# Patient Record
Sex: FEMALE | Race: WHITE | NOT HISPANIC OR LATINO | Employment: UNEMPLOYED | URBAN - NONMETROPOLITAN AREA
[De-identification: names, ages, dates, MRNs, and addresses within clinical notes are randomized per-mention and may not be internally consistent; named-entity substitution may affect disease eponyms.]

---

## 2017-01-24 ENCOUNTER — OFFICE VISIT (OUTPATIENT)
Dept: CARDIOLOGY | Facility: CLINIC | Age: 62
End: 2017-01-24

## 2017-01-24 VITALS
HEIGHT: 60 IN | SYSTOLIC BLOOD PRESSURE: 138 MMHG | DIASTOLIC BLOOD PRESSURE: 82 MMHG | OXYGEN SATURATION: 96 % | BODY MASS INDEX: 41.46 KG/M2 | HEART RATE: 79 BPM | WEIGHT: 211.2 LBS

## 2017-01-24 DIAGNOSIS — I10 ESSENTIAL HYPERTENSION: ICD-10-CM

## 2017-01-24 DIAGNOSIS — I25.10 CORONARY ARTERY DISEASE INVOLVING NATIVE CORONARY ARTERY OF NATIVE HEART WITHOUT ANGINA PECTORIS: ICD-10-CM

## 2017-01-24 DIAGNOSIS — E78.2 MIXED HYPERLIPIDEMIA: Primary | ICD-10-CM

## 2017-01-24 PROCEDURE — 99214 OFFICE O/P EST MOD 30 MIN: CPT | Performed by: INTERNAL MEDICINE

## 2017-01-24 RX ORDER — ATORVASTATIN CALCIUM 40 MG/1
40 TABLET, FILM COATED ORAL DAILY
Qty: 90 TABLET | Refills: 3 | Status: SHIPPED | OUTPATIENT
Start: 2017-01-24 | End: 2018-02-27 | Stop reason: DRUGHIGH

## 2017-01-24 NOTE — PROGRESS NOTES
subjective     Chief Complaint   Patient presents with   • Hypertension   • Hyperlipidemia   • Coronary Artery Disease     History of Present Illness    HYPERTENSION  Lesley Michel has long-standing history of essential hypertension.  she is taking medications regularly.  There are no medication side effects.  Blood pressure is very well controlled.  There has been no headache, nausea or chest pain.  There has been no syncopal or presyncopal episode.  she denies episodes of hypo-tension or accelerated hypertension.  Patient is taking Zestoretic, Norvasc and Coreg.    Coronary artery disease  Patient has known coronary artery disease requiring drug-eluting stent to to LAD in 2011.  She has chronically occluded collateralized dominant RCA with high-grade stenosis of the ostium of small D1 and 60% stenosis of mid  posterolateral branch of RCA.  Patient has been doing very well she denies any chest pain palpitations or shortness of breath.    Hyperlipidemia  Patient has severe hyperlipidemia with LDL over 160.  Patient has been taking Lipitor 20 mg daily.  It'll be increased to 40 mg daily.  After next lab work she may have to go to 80 mg.  Aggressive risk factor modification was stressed.      Hypothyroidism  Lesley Michel has long-standing history of hypothyroidism.she is taking Synthroid.  Doing very well.  No drug side effects.  No change in Weight.  No cold intolerance. denies any constipation.  No dry skin and no hair loss.  .    Patient Active Problem List   Diagnosis   • Hypertension   • Hyperlipidemia   • Migraine without aura   • Coronary artery disease status post drug-eluting stent to LAD 2011.  Chronically occluded collateralized dominant RCA high-grade stenosis of the ostium of small D1 and 60% mid RCA posterolateral wall    • Chronic renal failure, stage 2 (mild)       Social History   Substance Use Topics   • Smoking status: Current Every Day Smoker     Types: Electronic Cigarette   • Smokeless  "tobacco: Never Used   • Alcohol use No       No Known Allergies      Current Outpatient Prescriptions:   •  amLODIPine (NORVASC) 10 MG tablet, Take 10 mg by mouth daily., Disp: , Rfl:   •  aspirin 325 MG tablet, Take 325 mg by mouth as needed for mild pain (1-3)., Disp: , Rfl:   •  busPIRone (BUSPAR) 5 MG tablet, Take 5 mg by mouth daily., Disp: , Rfl:   •  carvedilol (COREG) 12.5 MG tablet, Take 1 tablet by mouth 2 (two) times a day., Disp: 60 tablet, Rfl: 11  •  folic acid (FOLVITE) 1 MG tablet, Take 1 mg by mouth daily., Disp: , Rfl:   •  gabapentin (NEURONTIN) 800 MG tablet, Take 800 mg by mouth 3 (three) times a day., Disp: , Rfl:   •  levothyroxine (SYNTHROID, LEVOTHROID) 25 MCG tablet, Take 25 mcg by mouth daily., Disp: , Rfl:   •  lisinopril-hydrochlorothiazide (PRINZIDE,ZESTORETIC) 20-25 MG per tablet, Take 1 tablet by mouth daily., Disp: , Rfl:   •  loratadine (CLARITIN) 10 MG tablet, Take 10 mg by mouth daily., Disp: , Rfl:   •  montelukast (SINGULAIR) 10 MG tablet, Take 10 mg by mouth every night., Disp: , Rfl:   •  pantoprazole (PROTONIX) 40 MG EC tablet, Take 40 mg by mouth daily., Disp: , Rfl:   •  vitamin B-12 (CYANOCOBALAMIN) 1000 MCG tablet, Take 1,000 mcg by mouth daily., Disp: , Rfl:   •  atorvastatin (LIPITOR) 40 MG tablet, Take 1 tablet by mouth Daily., Disp: 90 tablet, Rfl: 3      The following portions of the patient's history were reviewed and updated as appropriate: allergies, current medications, past family history, past medical history, past social history, past surgical history and problem list.    Review of Systems   Constitution: Negative.   HENT: Negative.    Eyes: Negative.    Cardiovascular: Negative.    Respiratory: Negative.    Hematologic/Lymphatic: Negative.    Musculoskeletal: Negative.    Gastrointestinal: Negative.    Neurological: Negative.           Objective:     Visit Vitals   • /82 (BP Location: Left arm, Patient Position: Sitting)   • Pulse 79   • Ht 60\" (152.4 " cm)   • Wt 211 lb 3.2 oz (95.8 kg)   • SpO2 96%   • BMI 41.25 kg/m2     Physical Exam   Constitutional: She appears well-developed and well-nourished.   HENT:   Head: Normocephalic and atraumatic.   Mouth/Throat: Oropharynx is clear and moist.   Eyes: Conjunctivae and EOM are normal. Pupils are equal, round, and reactive to light. No scleral icterus.   Neck: Normal range of motion. Neck supple. No JVD present. No tracheal deviation present. No thyromegaly present.   Cardiovascular: Normal rate, regular rhythm, normal heart sounds and intact distal pulses.  Exam reveals no friction rub.    No murmur heard.  Pulmonary/Chest: Effort normal and breath sounds normal. No respiratory distress. She has no wheezes. She has no rales. She exhibits no tenderness.   Abdominal: Soft. Bowel sounds are normal. She exhibits no distension and no mass. There is no tenderness. There is no rebound and no guarding.   Musculoskeletal: Normal range of motion. She exhibits no edema, tenderness or deformity.   Lymphadenopathy:     She has no cervical adenopathy.   Neurological: She is alert. She has normal reflexes. No cranial nerve deficit. She exhibits normal muscle tone. Coordination normal.   Skin: Skin is warm and dry.   Psychiatric: She has a normal mood and affect. Her behavior is normal. Judgment and thought content normal.         Lab Review  Cholesterol 230 triglyceride 114  with HDL of 46.  Liver functions are normal    Procedures     I personally viewed and interpreted the patient's LAB data         Assessment:     1. Mixed hyperlipidemia    2. Essential hypertension    3. Coronary artery disease involving native coronary artery of native heart without angina pectoris          Plan:      Patient has severe hyperlipidemia with LDL of 161.  She is taking Lipitor 20 mg daily.  Patient was advised to increase it to 40 mg daily.  Strict dietary control and healthy lifestyle exercise program and risk factor modification was  stressed to the patient.    Coronary artery disease  Patient is stable tolerating medications very well no change in therapy is needed at this time.  Blood pressure is also very well controlled.    After next lab work she may need to go to Lipitor 80 mg daily.  Her liver functions are normal.    Creatinine is mildly elevated at 1.16 with normal BUN of 13.  It was discussed with the patient that she is to drink more fluids and may have to switch his lisinopril HCT to lisinopril.  At this time no change was made.    Return in about 3 months (around 4/24/2017).

## 2017-01-24 NOTE — LETTER
January 29, 2017     Woodrow Raymond, DEEPA  [address] [facility] [address]    Patient: Lesley Michel   YOB: 1955   Date of Visit: 1/24/2017       Dear DEEPA Key:    Thank you for referring Lesley Michel to me for evaluation. Below are the relevant portions of my assessment and plan of care.    If you have questions, please do not hesitate to call me. I look forward to following Lesley along with you.         Sincerely,        Rebecca Echeverria MD        CC: No Recipients  Rebecca Echeverria MD  1/29/2017 11:51 AM  Signed  subjective     Chief Complaint   Patient presents with   • Hypertension   • Hyperlipidemia   • Coronary Artery Disease     History of Present Illness    HYPERTENSION  Lesley Michel has long-standing history of essential hypertension.  she is taking medications regularly.  There are no medication side effects.  Blood pressure is very well controlled.  There has been no headache, nausea or chest pain.  There has been no syncopal or presyncopal episode.  she denies episodes of hypo-tension or accelerated hypertension.  Patient is taking Zestoretic, Norvasc and Coreg.    Coronary artery disease  Patient has known coronary artery disease requiring drug-eluting stent to to LAD in 2011.  She has chronically occluded collateralized dominant RCA with high-grade stenosis of the ostium of small D1 and 60% stenosis of mid  posterolateral branch of RCA.  Patient has been doing very well she denies any chest pain palpitations or shortness of breath.    Hyperlipidemia  Patient has severe hyperlipidemia with LDL over 160.  Patient has been taking Lipitor 20 mg daily.  It'll be increased to 40 mg daily.  After next lab work she may have to go to 80 mg.  Aggressive risk factor modification was stressed.      Hypothyroidism  Lesley Michel has long-standing history of hypothyroidism.she is taking Synthroid.  Doing very well.  No drug side effects.  No change in Weight.  No  cold intolerance. denies any constipation.  No dry skin and no hair loss.  .    Patient Active Problem List   Diagnosis   • Hypertension   • Hyperlipidemia   • Migraine without aura   • Coronary artery disease status post drug-eluting stent to LAD 2011.  Chronically occluded collateralized dominant RCA high-grade stenosis of the ostium of small D1 and 60% mid RCA posterolateral wall    • Chronic renal failure, stage 2 (mild)       Social History   Substance Use Topics   • Smoking status: Current Every Day Smoker     Types: Electronic Cigarette   • Smokeless tobacco: Never Used   • Alcohol use No       No Known Allergies      Current Outpatient Prescriptions:   •  amLODIPine (NORVASC) 10 MG tablet, Take 10 mg by mouth daily., Disp: , Rfl:   •  aspirin 325 MG tablet, Take 325 mg by mouth as needed for mild pain (1-3)., Disp: , Rfl:   •  busPIRone (BUSPAR) 5 MG tablet, Take 5 mg by mouth daily., Disp: , Rfl:   •  carvedilol (COREG) 12.5 MG tablet, Take 1 tablet by mouth 2 (two) times a day., Disp: 60 tablet, Rfl: 11  •  folic acid (FOLVITE) 1 MG tablet, Take 1 mg by mouth daily., Disp: , Rfl:   •  gabapentin (NEURONTIN) 800 MG tablet, Take 800 mg by mouth 3 (three) times a day., Disp: , Rfl:   •  levothyroxine (SYNTHROID, LEVOTHROID) 25 MCG tablet, Take 25 mcg by mouth daily., Disp: , Rfl:   •  lisinopril-hydrochlorothiazide (PRINZIDE,ZESTORETIC) 20-25 MG per tablet, Take 1 tablet by mouth daily., Disp: , Rfl:   •  loratadine (CLARITIN) 10 MG tablet, Take 10 mg by mouth daily., Disp: , Rfl:   •  montelukast (SINGULAIR) 10 MG tablet, Take 10 mg by mouth every night., Disp: , Rfl:   •  pantoprazole (PROTONIX) 40 MG EC tablet, Take 40 mg by mouth daily., Disp: , Rfl:   •  vitamin B-12 (CYANOCOBALAMIN) 1000 MCG tablet, Take 1,000 mcg by mouth daily., Disp: , Rfl:   •  atorvastatin (LIPITOR) 40 MG tablet, Take 1 tablet by mouth Daily., Disp: 90 tablet, Rfl: 3      The following portions of the patient's history were  "reviewed and updated as appropriate: allergies, current medications, past family history, past medical history, past social history, past surgical history and problem list.    Review of Systems   Constitution: Negative.   HENT: Negative.    Eyes: Negative.    Cardiovascular: Negative.    Respiratory: Negative.    Hematologic/Lymphatic: Negative.    Musculoskeletal: Negative.    Gastrointestinal: Negative.    Neurological: Negative.           Objective:     Visit Vitals   • /82 (BP Location: Left arm, Patient Position: Sitting)   • Pulse 79   • Ht 60\" (152.4 cm)   • Wt 211 lb 3.2 oz (95.8 kg)   • SpO2 96%   • BMI 41.25 kg/m2     Physical Exam   Constitutional: She appears well-developed and well-nourished.   HENT:   Head: Normocephalic and atraumatic.   Mouth/Throat: Oropharynx is clear and moist.   Eyes: Conjunctivae and EOM are normal. Pupils are equal, round, and reactive to light. No scleral icterus.   Neck: Normal range of motion. Neck supple. No JVD present. No tracheal deviation present. No thyromegaly present.   Cardiovascular: Normal rate, regular rhythm, normal heart sounds and intact distal pulses.  Exam reveals no friction rub.    No murmur heard.  Pulmonary/Chest: Effort normal and breath sounds normal. No respiratory distress. She has no wheezes. She has no rales. She exhibits no tenderness.   Abdominal: Soft. Bowel sounds are normal. She exhibits no distension and no mass. There is no tenderness. There is no rebound and no guarding.   Musculoskeletal: Normal range of motion. She exhibits no edema, tenderness or deformity.   Lymphadenopathy:     She has no cervical adenopathy.   Neurological: She is alert. She has normal reflexes. No cranial nerve deficit. She exhibits normal muscle tone. Coordination normal.   Skin: Skin is warm and dry.   Psychiatric: She has a normal mood and affect. Her behavior is normal. Judgment and thought content normal.         Lab Review  Cholesterol 230 triglyceride " 114  with HDL of 46.  Liver functions are normal    Procedures     I personally viewed and interpreted the patient's LAB data         Assessment:     1. Mixed hyperlipidemia    2. Essential hypertension    3. Coronary artery disease involving native coronary artery of native heart without angina pectoris          Plan:      Patient has severe hyperlipidemia with LDL of 161.  She is taking Lipitor 20 mg daily.  Patient was advised to increase it to 40 mg daily.  Strict dietary control and healthy lifestyle exercise program and risk factor modification was stressed to the patient.    Coronary artery disease  Patient is stable tolerating medications very well no change in therapy is needed at this time.  Blood pressure is also very well controlled.    After next lab work she may need to go to Lipitor 80 mg daily.  Her liver functions are normal.    Creatinine is mildly elevated at 1.16 with normal BUN of 13.  It was discussed with the patient that she is to drink more fluids and may have to switch his lisinopril HCT to lisinopril.  At this time no change was made.    Return in about 3 months (around 4/24/2017).

## 2017-01-24 NOTE — MR AVS SNAPSHOT
Lesley Michel   1/24/2017 11:00 AM   Office Visit    Dept Phone:  [phone]   Encounter #:  [id number]    Provider:  Rebecca Echeverria MD   Department:  [facility] CARDIOLOGY                Your Full Care Plan              Today's Medication Changes          These changes are accurate as of: 1/24/17 12:07 PM.  If you have any questions, ask your nurse or doctor.               Medication(s)that have changed:     atorvastatin 40 MG tablet   Commonly known as:  LIPITOR   Take 1 tablet by mouth Daily.   What changed:    - medication strength  - how much to take   Changed by:  Rebecca Echeverria MD            Where to Get Your Medications      These medications were sent to [place], [place] - [address] [facility] [address] - [phone] [facility] [phone]   [address] [facility] [facility] [address] [facility] [address]     Phone:  [phone]     atorvastatin 40 MG tablet                  Your Updated Medication List          This list is accurate as of: 1/24/17 12:07 PM.  Always use your most recent med list.                amLODIPine 10 MG tablet   Commonly known as:  NORVASC       aspirin 325 MG tablet       atorvastatin 40 MG tablet   Commonly known as:  LIPITOR   Take 1 tablet by mouth Daily.       busPIRone 5 MG tablet   Commonly known as:  BUSPAR       carvedilol 12.5 MG tablet   Commonly known as:  COREG   Take 1 tablet by mouth 2 (two) times a day.       folic acid 1 MG tablet   Commonly known as:  FOLVITE       gabapentin 800 MG tablet   Commonly known as:  NEURONTIN       levothyroxine 25 MCG tablet   Commonly known as:  SYNTHROID, LEVOTHROID       lisinopril-hydrochlorothiazide 20-25 MG per tablet   Commonly known as:  PRINZIDE,ZESTORETIC       loratadine 10 MG tablet   Commonly known as:  CLARITIN       montelukast 10 MG tablet   Commonly known as:  SINGULAIR       pantoprazole 40 MG EC tablet   Commonly known as:  PROTONIX       vitamin B-12 1000 MCG tablet    "  Commonly known as:  CYANOCOBALAMIN               You Were Diagnosed With        Codes Comments    Essential hypertension    -  Primary ICD-10-CM: I10  ICD-9-CM: 401.9     Mixed hyperlipidemia     ICD-10-CM: E78.2  ICD-9-CM: 272.2     Coronary artery disease involving native coronary artery of native heart without angina pectoris     ICD-10-CM: I25.10  ICD-9-CM: 414.01       Instructions     None    Patient Instructions History      Upcoming Appointments     Visit Type Date Time Department    FOLLOW UP 1/24/2017 11:00 AM [facility] [place]    FOLLOW UP 4/13/2017 10:00 AM [facility] [place] [place]      MyChart Signup     Our records indicate that you have declined [facility] signup. If you would like to sign up for [employer] [employer], please email [email or web address] or call [phone] to obtain an activation code.             Other Info from Your Visit           Your Appointments     Apr 13, 2017 10:00 AM EDT   Follow Up with Rebecca Echeverria MD   [facility] (--)    [address] [place] [address] [phone]           Arrive 15 minutes prior to appointment.              Allergies     No Known Allergies      Reason for Visit     Hypertension     Hyperlipidemia     Coronary Artery Disease           Vital Signs     Blood Pressure Pulse Height Weight Oxygen Saturation Body Mass Index    138/82 (BP Location: Left arm, Patient Position: Sitting) 79 60\" (152.4 cm) 211 lb 3.2 oz (95.8 kg) 96% 41.25 kg/m2    Smoking Status                   Current Every Day Smoker           Problems and Diagnoses Noted     Coronary heart disease    High cholesterol or triglycerides    High blood pressure        "

## 2018-02-27 ENCOUNTER — OFFICE VISIT (OUTPATIENT)
Dept: CARDIOLOGY | Facility: CLINIC | Age: 63
End: 2018-02-27

## 2018-02-27 VITALS
BODY MASS INDEX: 41.19 KG/M2 | WEIGHT: 209.8 LBS | HEIGHT: 60 IN | SYSTOLIC BLOOD PRESSURE: 158 MMHG | HEART RATE: 85 BPM | DIASTOLIC BLOOD PRESSURE: 96 MMHG | OXYGEN SATURATION: 95 %

## 2018-02-27 DIAGNOSIS — N18.2 CHRONIC RENAL FAILURE, STAGE 2 (MILD): ICD-10-CM

## 2018-02-27 DIAGNOSIS — Z72.0 TOBACCO USE: ICD-10-CM

## 2018-02-27 DIAGNOSIS — I25.10 CORONARY ARTERY DISEASE INVOLVING NATIVE CORONARY ARTERY OF NATIVE HEART WITHOUT ANGINA PECTORIS: Primary | ICD-10-CM

## 2018-02-27 DIAGNOSIS — E78.2 MIXED HYPERLIPIDEMIA: ICD-10-CM

## 2018-02-27 DIAGNOSIS — I10 ESSENTIAL HYPERTENSION: ICD-10-CM

## 2018-02-27 PROCEDURE — 99213 OFFICE O/P EST LOW 20 MIN: CPT | Performed by: INTERNAL MEDICINE

## 2018-02-27 RX ORDER — LISINOPRIL AND HYDROCHLOROTHIAZIDE 20; 12.5 MG/1; MG/1
TABLET ORAL
COMMUNITY
Start: 2018-02-02 | End: 2018-02-27 | Stop reason: SDUPTHER

## 2018-02-27 RX ORDER — ATORVASTATIN CALCIUM 80 MG/1
40 TABLET, FILM COATED ORAL NIGHTLY
COMMUNITY
End: 2022-02-10 | Stop reason: ALTCHOICE

## 2018-02-27 RX ORDER — NITROGLYCERIN 0.4 MG/1
0.4 TABLET SUBLINGUAL
COMMUNITY
Start: 2018-02-02 | End: 2023-03-02 | Stop reason: SDUPTHER

## 2018-02-27 RX ORDER — HYDROCODONE BITARTRATE AND ACETAMINOPHEN 10; 325 MG/1; MG/1
TABLET ORAL
COMMUNITY
Start: 2018-02-02

## 2018-02-27 RX ORDER — PROMETHAZINE HYDROCHLORIDE 25 MG/1
TABLET ORAL
COMMUNITY
Start: 2018-02-08 | End: 2018-02-27

## 2018-02-27 RX ORDER — ASPIRIN 325 MG
TABLET, DELAYED RELEASE (ENTERIC COATED) ORAL
COMMUNITY
Start: 2018-02-02 | End: 2018-02-27 | Stop reason: SDUPTHER

## 2018-02-27 RX ORDER — ERGOCALCIFEROL 1.25 MG/1
CAPSULE ORAL
COMMUNITY
Start: 2018-01-04

## 2018-02-27 RX ORDER — ATORVASTATIN CALCIUM 80 MG/1
TABLET, FILM COATED ORAL
COMMUNITY
Start: 2018-02-02 | End: 2018-02-27 | Stop reason: SDUPTHER

## 2018-02-27 NOTE — PROGRESS NOTES
subjective     Chief Complaint   Patient presents with   • Follow-up   • Hypertension   • Hyperlipidemia   • Corony artery disease     History of Present Illness    Ration is here for follow-up.  She has a history of hypertension, hyperlipidemia and known coronary artery disease.  She had the drug-eluting stent to LAD in 2011.  She has chronically occluded and well collateralized RCA.  She also has high-grade stenosis of the small D1 and 60% mid RCA posterolateral branch.    She is quite active denies any chest pain palpitations or shortness of breath.  She is taking Lipitor for hyperlipidemia.  Apparently it was increased to 80 mg daily.  Because of high cholesterol levels.  She is tolerating it very well.  Patient has not lost much weight but she is trying and has lost 2 pounds since last visit    She also has ongoing tobacco use but she is again trying with left chronic cigarettes    Blood pressure at home is normal.  However today blood pressure is quite high.  She states that that she has not taken any medication today    Past Surgical History:   Procedure Laterality Date   • ANKLE SURGERY      RIGHT   • APPENDECTOMY     • CARDIAC CATHETERIZATION      LEFT   • CORONARY STENT PLACEMENT     • HYSTERECTOMY       Family History   Problem Relation Age of Onset   • Coronary artery disease Other    • Heart disease Mother    • Heart attack Mother 73   • Fibromyalgia Mother    • Heart attack Father 72   • Ovarian cancer Sister      Past Medical History:   Diagnosis Date   • Anxiety    • Arthritis    • Coronary artery disease    • H/O heart artery stent    • Hyperlipidemia    • Hypertension    • Obesity      Patient Active Problem List   Diagnosis   • Hypertension   • Hyperlipidemia   • Migraine without aura   • Coronary artery disease status post drug-eluting stent to LAD 2011.  Chronically occluded collateralized dominant RCA high-grade stenosis of the ostium of small D1 and 60% mid RCA posterolateral wall    • Chronic  renal failure, stage 2 (mild)       Social History   Substance Use Topics   • Smoking status: Current Every Day Smoker     Types: Electronic Cigarette   • Smokeless tobacco: Never Used   • Alcohol use No       No Known Allergies    Current Outpatient Prescriptions on File Prior to Visit   Medication Sig   • amLODIPine (NORVASC) 10 MG tablet Take 10 mg by mouth daily.   • aspirin 325 MG tablet Take 325 mg by mouth as needed for mild pain (1-3).   • atorvastatin (LIPITOR) 40 MG tablet Take 1 tablet by mouth Daily.   • busPIRone (BUSPAR) 5 MG tablet Take 5 mg by mouth daily.   • carvedilol (COREG) 12.5 MG tablet Take 1 tablet by mouth 2 (two) times a day.   • folic acid (FOLVITE) 1 MG tablet Take 1 mg by mouth daily.   • gabapentin (NEURONTIN) 800 MG tablet Take 800 mg by mouth 3 (three) times a day.   • levothyroxine (SYNTHROID, LEVOTHROID) 25 MCG tablet Take 25 mcg by mouth daily.   • lisinopril-hydrochlorothiazide (PRINZIDE,ZESTORETIC) 20-25 MG per tablet Take 1 tablet by mouth daily.   • loratadine (CLARITIN) 10 MG tablet Take 10 mg by mouth daily.   • montelukast (SINGULAIR) 10 MG tablet Take 10 mg by mouth every night.   • pantoprazole (PROTONIX) 40 MG EC tablet Take 40 mg by mouth daily.   • vitamin B-12 (CYANOCOBALAMIN) 1000 MCG tablet Take 1,000 mcg by mouth daily.     No current facility-administered medications on file prior to visit.          The following portions of the patient's history were reviewed and updated as appropriate: allergies, current medications, past family history, past medical history, past social history, past surgical history and problem list.    Review of Systems   Constitution: Negative.   HENT: Negative.  Negative for congestion.    Eyes: Negative.    Cardiovascular: Negative.  Negative for chest pain, cyanosis, dyspnea on exertion, irregular heartbeat, leg swelling, near-syncope, orthopnea, palpitations, paroxysmal nocturnal dyspnea and syncope.   Respiratory: Negative.  Negative  "for shortness of breath.    Hematologic/Lymphatic: Negative.    Musculoskeletal: Negative.    Gastrointestinal: Negative.    Neurological: Negative.  Negative for headaches.          Objective:     /96 (BP Location: Left arm, Patient Position: Sitting, Cuff Size: Adult)  Pulse 85  Ht 152.4 cm (60\")  Wt 95.2 kg (209 lb 12.8 oz)  SpO2 95%  BMI 40.97 kg/m2  Physical Exam   Constitutional: She appears well-developed and well-nourished. No distress.   HENT:   Head: Normocephalic and atraumatic.   Mouth/Throat: Oropharynx is clear and moist. No oropharyngeal exudate.   Eyes: Conjunctivae and EOM are normal. Pupils are equal, round, and reactive to light. No scleral icterus.   Neck: Normal range of motion. Neck supple. No JVD present. No tracheal deviation present. No thyromegaly present.   Cardiovascular: Normal rate, regular rhythm, normal heart sounds and intact distal pulses.  PMI is not displaced.  Exam reveals no gallop, no friction rub and no decreased pulses.    No murmur heard.  Pulses:       Carotid pulses are 3+ on the right side, and 3+ on the left side.       Radial pulses are 3+ on the right side, and 3+ on the left side.   Pulmonary/Chest: Effort normal and breath sounds normal. No respiratory distress. She has no wheezes. She has no rales. She exhibits no tenderness.   Abdominal: Soft. Bowel sounds are normal. She exhibits no distension, no abdominal bruit and no mass. There is no splenomegaly or hepatomegaly. There is no tenderness. There is no rebound and no guarding.   Musculoskeletal: Normal range of motion. She exhibits no edema, tenderness or deformity.   Lymphadenopathy:     She has no cervical adenopathy.   Neurological: She is alert. She has normal reflexes. No cranial nerve deficit. She exhibits normal muscle tone. Coordination normal.   Skin: Skin is warm and dry. No rash noted. She is not diaphoretic. No erythema.   Psychiatric: She has a normal mood and affect. Her behavior is " normal. Judgment and thought content normal.         Lab Review  No labs available  Procedures               Assessment:     1. Coronary artery disease involving native coronary artery of native heart without angina pectoris    2. Mixed hyperlipidemia    3. Essential hypertension    4. Chronic renal failure, stage 2 (mild)          Plan:      Blood pressure is significantly elevated today.  Patient was advised to take medications regularly and check blood pressure.  She will let me know about blood pressure readings.  She is taking lisinopril HCT, Coreg and amlodipine.  Patient insists her blood pressure has been very good at home.    Will also get the lab report from PCP.  She is not taking Lipitor 80 mg daily.    She is stable from cardiac standpoint without any chest pain palpitations or shortness of breath.  Aggressive risk factor modification including weight loss and cessation of tobacco with better blood pressure control and lipid control was emphasized.  Follow-up scheduled          No Follow-up on file.

## 2018-05-15 ENCOUNTER — OFFICE VISIT (OUTPATIENT)
Dept: CARDIOLOGY | Facility: CLINIC | Age: 63
End: 2018-05-15

## 2018-05-15 VITALS
HEART RATE: 87 BPM | BODY MASS INDEX: 42.21 KG/M2 | HEIGHT: 60 IN | DIASTOLIC BLOOD PRESSURE: 84 MMHG | SYSTOLIC BLOOD PRESSURE: 144 MMHG | OXYGEN SATURATION: 95 % | WEIGHT: 215 LBS

## 2018-05-15 DIAGNOSIS — I10 ESSENTIAL HYPERTENSION: ICD-10-CM

## 2018-05-15 DIAGNOSIS — Z72.0 TOBACCO USE: ICD-10-CM

## 2018-05-15 DIAGNOSIS — E78.2 MIXED HYPERLIPIDEMIA: ICD-10-CM

## 2018-05-15 DIAGNOSIS — R60.0 BILATERAL LOWER EXTREMITY EDEMA: ICD-10-CM

## 2018-05-15 DIAGNOSIS — N18.2 CHRONIC RENAL FAILURE, STAGE 2 (MILD): ICD-10-CM

## 2018-05-15 DIAGNOSIS — I25.10 CORONARY ARTERY DISEASE INVOLVING NATIVE CORONARY ARTERY OF NATIVE HEART WITHOUT ANGINA PECTORIS: Primary | ICD-10-CM

## 2018-05-15 PROCEDURE — 99214 OFFICE O/P EST MOD 30 MIN: CPT | Performed by: INTERNAL MEDICINE

## 2018-05-15 PROCEDURE — 93000 ELECTROCARDIOGRAM COMPLETE: CPT | Performed by: INTERNAL MEDICINE

## 2018-05-15 RX ORDER — LISINOPRIL 20 MG/1
20 TABLET ORAL 2 TIMES DAILY
Qty: 180 TABLET | Refills: 0 | Status: SHIPPED | OUTPATIENT
Start: 2018-05-15 | End: 2019-03-13 | Stop reason: SDUPTHER

## 2018-05-15 RX ORDER — FUROSEMIDE 20 MG/1
20 TABLET ORAL DAILY
Qty: 90 TABLET | Refills: 0 | Status: SHIPPED | OUTPATIENT
Start: 2018-05-15 | End: 2018-05-29

## 2018-05-15 NOTE — PROGRESS NOTES
subjective     Chief Complaint   Patient presents with   • Leg Swelling   • Edema   • Follow-up   • Results     LABS OUTSIDE      History of Present Illness  Patient is 62 years old white female who was sent here for evaluation of marked bilateral lower extremity edema.  Patient states that she has been gradually swelling and both legs are huge.  There is no pain in the legs no blisters.  No calf tenderness no history of DVT.  Patient has history of hypertension and she is taking Norvasc.  She also has known coronary artery disease with prior drug-eluting stent.  She denies any chest pain or palpitation but she gets short of breath easily.  Other problems consist of mild renal failure, hypertension and hyperlipidemia.  Patient also has ongoing tobacco use issue.    Past Surgical History:   Procedure Laterality Date   • ANKLE SURGERY      RIGHT   • APPENDECTOMY     • CARDIAC CATHETERIZATION      LEFT   • CORONARY STENT PLACEMENT     • HYSTERECTOMY       Family History   Problem Relation Age of Onset   • Coronary artery disease Other    • Heart disease Mother    • Heart attack Mother 73   • Fibromyalgia Mother    • Heart attack Father 72   • Ovarian cancer Sister      Past Medical History:   Diagnosis Date   • Anxiety    • Arthritis    • Coronary artery disease    • H/O heart artery stent    • Hyperlipidemia    • Hypertension    • Obesity      Patient Active Problem List   Diagnosis   • Hypertension   • Hyperlipidemia   • Migraine without aura   • Coronary artery disease status post drug-eluting stent to LAD 2011.  Chronically occluded collateralized dominant RCA high-grade stenosis of the ostium of small D1 and 60% mid RCA posterolateral wall    • Chronic renal failure, stage 2 (mild)   • Tobacco use       Social History   Substance Use Topics   • Smoking status: Current Every Day Smoker     Types: Electronic Cigarette   • Smokeless tobacco: Never Used   • Alcohol use No       No Known Allergies    Current Outpatient  Prescriptions on File Prior to Visit   Medication Sig   • amLODIPine (NORVASC) 10 MG tablet Take 10 mg by mouth daily.   • aspirin 325 MG tablet Take 325 mg by mouth as needed for mild pain (1-3).   • atorvastatin (LIPITOR) 80 MG tablet Take 80 mg by mouth Daily.   • busPIRone (BUSPAR) 5 MG tablet Take 5 mg by mouth daily.   • carvedilol (COREG) 12.5 MG tablet Take 1 tablet by mouth 2 (two) times a day.   • folic acid (FOLVITE) 1 MG tablet Take 1 mg by mouth daily.   • gabapentin (NEURONTIN) 800 MG tablet Take 800 mg by mouth 3 (three) times a day.   • HYDROcodone-acetaminophen (NORCO)  MG per tablet    • levothyroxine (SYNTHROID, LEVOTHROID) 25 MCG tablet Take 25 mcg by mouth daily.   • loratadine (CLARITIN) 10 MG tablet Take 10 mg by mouth daily.   • montelukast (SINGULAIR) 10 MG tablet Take 10 mg by mouth every night.   • nitroglycerin (NITROSTAT) 0.4 MG SL tablet    • pantoprazole (PROTONIX) 40 MG EC tablet Take 40 mg by mouth daily.   • vitamin B-12 (CYANOCOBALAMIN) 1000 MCG tablet Take 1,000 mcg by mouth daily.   • vitamin D (ERGOCALCIFEROL) 09745 units capsule capsule    • [DISCONTINUED] lisinopril-hydrochlorothiazide (PRINZIDE,ZESTORETIC) 20-25 MG per tablet Take 1 tablet by mouth daily.     No current facility-administered medications on file prior to visit.          The following portions of the patient's history were reviewed and updated as appropriate: allergies, current medications, past family history, past medical history, past social history, past surgical history and problem list.    Review of Systems   Constitution: Positive for weight gain.   HENT: Negative.  Negative for congestion.    Eyes: Negative.    Cardiovascular: Positive for dyspnea on exertion and leg swelling. Negative for chest pain, claudication, cyanosis, irregular heartbeat, near-syncope, orthopnea, palpitations, paroxysmal nocturnal dyspnea and syncope.   Respiratory: Positive for shortness of breath.    Endocrine: Negative.   "  Hematologic/Lymphatic: Negative.    Skin: Negative.    Musculoskeletal: Negative.    Gastrointestinal: Negative.    Genitourinary: Negative.    Neurological: Negative.  Negative for headaches.          Objective:     /84 (BP Location: Left arm, Patient Position: Sitting)   Pulse 87   Ht 152.4 cm (60\")   Wt 97.5 kg (215 lb)   SpO2 95%   BMI 41.99 kg/m²   Physical Exam   Constitutional: She appears well-developed and well-nourished. No distress.   HENT:   Head: Normocephalic and atraumatic.   Mouth/Throat: Oropharynx is clear and moist. No oropharyngeal exudate.   Eyes: Conjunctivae and EOM are normal. Pupils are equal, round, and reactive to light. No scleral icterus.   Neck: Normal range of motion. Neck supple. No JVD present. No tracheal deviation present. No thyromegaly present.   Cardiovascular: Normal rate, regular rhythm, normal heart sounds and intact distal pulses.  PMI is not displaced.  Exam reveals no gallop, no friction rub and no decreased pulses.    No murmur heard.  Pulses:       Carotid pulses are 3+ on the right side, and 3+ on the left side.       Radial pulses are 3+ on the right side, and 3+ on the left side.   Pulmonary/Chest: Effort normal and breath sounds normal. No respiratory distress. She has no wheezes. She has no rales. She exhibits no tenderness.   Abdominal: Soft. Bowel sounds are normal. She exhibits no distension, no abdominal bruit and no mass. There is no splenomegaly or hepatomegaly. There is no tenderness. There is no rebound and no guarding.   Musculoskeletal: Normal range of motion. She exhibits edema. She exhibits no tenderness or deformity.   Marked bilateral lower extremity edema.   Lymphadenopathy:     She has no cervical adenopathy.   Neurological: She is alert. She has normal reflexes. No cranial nerve deficit. She exhibits normal muscle tone. Coordination normal.   Skin: Skin is warm and dry. No rash noted. She is not diaphoretic. No erythema.   Psychiatric: " She has a normal mood and affect. Her behavior is normal. Judgment and thought content normal.         Lab Review      ECG 12 Lead  Date/Time: 5/15/2018 4:58 PM  Performed by: AMAYA HERNANDEZ  Authorized by: AMAYA HERNANDEZ   Comparison: compared with previous ECG from 9/23/2016  Similar to previous ECG  Rhythm: sinus rhythm  Rate: normal  BPM: 69  Conduction: conduction normal  ST Segments: ST segments normal  T Waves: T waves normal  QRS axis: normal  Other: no other findings  Clinical impression: abnormal ECG  Comments: Possible old inferior wall MI and nonspecific ST and T changes               I personally viewed and interpreted the patient's LAB data         Assessment:     1. Coronary artery disease involving native coronary artery of native heart without angina pectoris    2. Mixed hyperlipidemia    3. Essential hypertension    4. Chronic renal failure, stage 2 (mild)    5. Tobacco use    6. Bilateral lower extremity edema          Plan:      Patient has marked bilateral lower extremity edema.  Amlodipine probably is contributing to the edema.  Patient was advised to DC amlodipine  She is taking lisinopril HCT 20/25 one a day  She was advised to DC lisinopril HCT and start regular lisinopril 20 mg twice a day.  Lasix 20 mg daily was added.  BNP was ordered.  Patient will have comprehensive metabolic panel and echo next visit.  He was advised to check her blood pressure closely.        Return in about 2 weeks (around 5/29/2018).

## 2018-05-23 ENCOUNTER — HOSPITAL ENCOUNTER (OUTPATIENT)
Dept: CARDIOLOGY | Facility: HOSPITAL | Age: 63
Discharge: HOME | End: 2018-05-23
Attending: INTERNAL MEDICINE | Admitting: INTERNAL MEDICINE

## 2018-05-23 DIAGNOSIS — R60.0 BILATERAL LOWER EXTREMITY EDEMA: ICD-10-CM

## 2018-05-23 DIAGNOSIS — I25.10 CORONARY ARTERY DISEASE INVOLVING NATIVE CORONARY ARTERY OF NATIVE HEART WITHOUT ANGINA PECTORIS: ICD-10-CM

## 2018-05-23 PROCEDURE — 93306 TTE W/DOPPLER COMPLETE: CPT

## 2018-05-23 PROCEDURE — 93306 TTE W/DOPPLER COMPLETE: CPT | Performed by: INTERNAL MEDICINE

## 2018-05-24 LAB
BH CV ECHO MEAS - % IVS THICK: -8.4 %
BH CV ECHO MEAS - % LVPW THICK: 20.7 %
BH CV ECHO MEAS - ACS: 2 CM
BH CV ECHO MEAS - AO MAX PG: 13.3 MMHG
BH CV ECHO MEAS - AO MEAN PG: 7.2 MMHG
BH CV ECHO MEAS - AO ROOT AREA (BSA CORRECTED): 1.6
BH CV ECHO MEAS - AO ROOT AREA: 7.5 CM^2
BH CV ECHO MEAS - AO ROOT DIAM: 3.1 CM
BH CV ECHO MEAS - AO V2 MAX: 182.1 CM/SEC
BH CV ECHO MEAS - AO V2 MEAN: 127.2 CM/SEC
BH CV ECHO MEAS - AO V2 VTI: 39.4 CM
BH CV ECHO MEAS - BSA(HAYCOCK): 2.1 M^2
BH CV ECHO MEAS - BSA: 1.9 M^2
BH CV ECHO MEAS - BZI_BMI: 42 KILOGRAMS/M^2
BH CV ECHO MEAS - BZI_METRIC_HEIGHT: 152.4 CM
BH CV ECHO MEAS - BZI_METRIC_WEIGHT: 97.5 KG
BH CV ECHO MEAS - CONTRAST EF 4CH: 60.4 ML/M^2
BH CV ECHO MEAS - EDV(CUBED): 115.6 ML
BH CV ECHO MEAS - EDV(MOD-SP4): 106 ML
BH CV ECHO MEAS - EDV(TEICH): 111.3 ML
BH CV ECHO MEAS - EF(CUBED): 68.3 %
BH CV ECHO MEAS - EF(MOD-SP4): 60.4 %
BH CV ECHO MEAS - EF(TEICH): 59.7 %
BH CV ECHO MEAS - ESV(CUBED): 36.7 ML
BH CV ECHO MEAS - ESV(MOD-SP4): 42 ML
BH CV ECHO MEAS - ESV(TEICH): 44.9 ML
BH CV ECHO MEAS - FS: 31.8 %
BH CV ECHO MEAS - IVS/LVPW: 0.97
BH CV ECHO MEAS - IVSD: 1 CM
BH CV ECHO MEAS - IVSS: 0.96 CM
BH CV ECHO MEAS - LA DIMENSION: 3.9 CM
BH CV ECHO MEAS - LA/AO: 1.3
BH CV ECHO MEAS - LV DIASTOLIC VOL/BSA (35-75): 55.1 ML/M^2
BH CV ECHO MEAS - LV MASS(C)D: 189 GRAMS
BH CV ECHO MEAS - LV MASS(C)DI: 98.2 GRAMS/M^2
BH CV ECHO MEAS - LV MASS(C)S: 114.6 GRAMS
BH CV ECHO MEAS - LV MASS(C)SI: 59.5 GRAMS/M^2
BH CV ECHO MEAS - LV SYSTOLIC VOL/BSA (12-30): 21.8 ML/M^2
BH CV ECHO MEAS - LVIDD: 4.9 CM
BH CV ECHO MEAS - LVIDS: 3.3 CM
BH CV ECHO MEAS - LVLD AP4: 8 CM
BH CV ECHO MEAS - LVLS AP4: 6.9 CM
BH CV ECHO MEAS - LVOT AREA (M): 3.1 CM^2
BH CV ECHO MEAS - LVOT AREA: 3.2 CM^2
BH CV ECHO MEAS - LVOT DIAM: 2 CM
BH CV ECHO MEAS - LVPWD: 1.1 CM
BH CV ECHO MEAS - LVPWS: 1.3 CM
BH CV ECHO MEAS - MV A MAX VEL: 107.6 CM/SEC
BH CV ECHO MEAS - MV E MAX VEL: 106.1 CM/SEC
BH CV ECHO MEAS - MV E/A: 0.99
BH CV ECHO MEAS - PA ACC SLOPE: 1508 CM/SEC^2
BH CV ECHO MEAS - PA ACC TIME: 0.08 SEC
BH CV ECHO MEAS - PA PR(ACCEL): 42.6 MMHG
BH CV ECHO MEAS - RAP SYSTOLE: 10 MMHG
BH CV ECHO MEAS - RVDD: 1 CM
BH CV ECHO MEAS - RVSP: 43.8 MMHG
BH CV ECHO MEAS - SI(AO): 153.9 ML/M^2
BH CV ECHO MEAS - SI(CUBED): 41 ML/M^2
BH CV ECHO MEAS - SI(MOD-SP4): 33.2 ML/M^2
BH CV ECHO MEAS - SI(TEICH): 34.5 ML/M^2
BH CV ECHO MEAS - SV(AO): 296.2 ML
BH CV ECHO MEAS - SV(CUBED): 78.9 ML
BH CV ECHO MEAS - SV(MOD-SP4): 64 ML
BH CV ECHO MEAS - SV(TEICH): 66.4 ML
BH CV ECHO MEAS - TR MAX VEL: 290.7 CM/SEC
MAXIMAL PREDICTED HEART RATE: 160 BPM
STRESS TARGET HR: 136 BPM

## 2018-05-29 ENCOUNTER — OFFICE VISIT (OUTPATIENT)
Dept: CARDIOLOGY | Facility: CLINIC | Age: 63
End: 2018-05-29

## 2018-05-29 VITALS
SYSTOLIC BLOOD PRESSURE: 172 MMHG | DIASTOLIC BLOOD PRESSURE: 100 MMHG | WEIGHT: 213 LBS | BODY MASS INDEX: 41.82 KG/M2 | OXYGEN SATURATION: 93 % | HEART RATE: 83 BPM | HEIGHT: 60 IN

## 2018-05-29 DIAGNOSIS — Z72.0 TOBACCO USE: ICD-10-CM

## 2018-05-29 DIAGNOSIS — IMO0001 CLASS 3 OBESITY DUE TO EXCESS CALORIES WITHOUT SERIOUS COMORBIDITY WITH BODY MASS INDEX (BMI) OF 40.0 TO 44.9 IN ADULT: ICD-10-CM

## 2018-05-29 DIAGNOSIS — I10 ESSENTIAL HYPERTENSION: ICD-10-CM

## 2018-05-29 DIAGNOSIS — R60.0 BILATERAL LOWER EXTREMITY EDEMA: Primary | ICD-10-CM

## 2018-05-29 PROCEDURE — 99213 OFFICE O/P EST LOW 20 MIN: CPT | Performed by: INTERNAL MEDICINE

## 2018-05-29 RX ORDER — AMLODIPINE BESYLATE 10 MG/1
5 TABLET ORAL DAILY
COMMUNITY
Start: 2018-05-29 | End: 2019-03-13 | Stop reason: SDUPTHER

## 2018-05-29 RX ORDER — CARVEDILOL 12.5 MG/1
25 TABLET ORAL 2 TIMES DAILY WITH MEALS
Qty: 60 TABLET | Refills: 11 | Status: SHIPPED | OUTPATIENT
Start: 2018-05-29 | End: 2019-03-13 | Stop reason: SDUPTHER

## 2018-05-29 RX ORDER — HYDRALAZINE HYDROCHLORIDE 10 MG/1
10 TABLET, FILM COATED ORAL 2 TIMES DAILY
Qty: 60 TABLET | Refills: 2 | Status: SHIPPED | OUTPATIENT
Start: 2018-05-29 | End: 2019-03-13 | Stop reason: SDUPTHER

## 2018-05-29 RX ORDER — FUROSEMIDE 20 MG/1
20 TABLET ORAL 2 TIMES DAILY
Qty: 90 TABLET | Refills: 0 | Status: SHIPPED | OUTPATIENT
Start: 2018-05-29 | End: 2019-03-13 | Stop reason: SDUPTHER

## 2018-05-29 NOTE — PROGRESS NOTES
subjective     Chief Complaint   Patient presents with   • Coronary Artery Disease   • Hyperlipidemia   • Hypertension   • Follow-up     History of Present Illness  Patient is 62 years old white female who was seen by me a few weeks ago because of marked bilateral lower extremity edema.  She has been taking Norvasc 10 mg daily that is probably the cause of the edema.  Patient underwent an echocardiogram.  LV ejection fraction was normal.  BNP was ordered but it was not done.    Patient is feeling better now.  Ankle edema is significantly better however she still has lot of edema.  Blood pressure is running high.    Past Surgical History:   Procedure Laterality Date   • ANKLE SURGERY      RIGHT   • APPENDECTOMY     • CARDIAC CATHETERIZATION      LEFT   • CORONARY STENT PLACEMENT     • HYSTERECTOMY       Family History   Problem Relation Age of Onset   • Coronary artery disease Other    • Heart disease Mother    • Heart attack Mother 73   • Fibromyalgia Mother    • Heart attack Father 72   • Ovarian cancer Sister      Past Medical History:   Diagnosis Date   • Anxiety    • Arthritis    • Coronary artery disease    • H/O heart artery stent    • Hyperlipidemia    • Hypertension    • Obesity      Patient Active Problem List   Diagnosis   • Hypertension   • Hyperlipidemia   • Migraine without aura   • Coronary artery disease status post drug-eluting stent to LAD 2011.  Chronically occluded collateralized dominant RCA high-grade stenosis of the ostium of small D1 and 60% mid RCA posterolateral wall    • Chronic renal failure, stage 2 (mild)   • Tobacco use   • Class 3 obesity due to excess calories without serious comorbidity with body mass index (BMI) of 40.0 to 44.9 in adult   • Bilateral lower extremity edema       Social History   Substance Use Topics   • Smoking status: Current Every Day Smoker     Types: Electronic Cigarette   • Smokeless tobacco: Never Used   • Alcohol use No       No Known Allergies    Current  "Outpatient Prescriptions on File Prior to Visit   Medication Sig   • aspirin 325 MG tablet Take 325 mg by mouth as needed for mild pain (1-3).   • atorvastatin (LIPITOR) 80 MG tablet Take 80 mg by mouth Daily.   • busPIRone (BUSPAR) 5 MG tablet Take 5 mg by mouth daily.   • folic acid (FOLVITE) 1 MG tablet Take 1 mg by mouth daily.   • gabapentin (NEURONTIN) 800 MG tablet Take 800 mg by mouth 3 (three) times a day.   • HYDROcodone-acetaminophen (NORCO)  MG per tablet    • levothyroxine (SYNTHROID, LEVOTHROID) 25 MCG tablet Take 25 mcg by mouth daily.   • lisinopril (PRINIVIL,ZESTRIL) 20 MG tablet Take 1 tablet by mouth 2 (Two) Times a Day.   • loratadine (CLARITIN) 10 MG tablet Take 10 mg by mouth daily.   • montelukast (SINGULAIR) 10 MG tablet Take 10 mg by mouth every night.   • nitroglycerin (NITROSTAT) 0.4 MG SL tablet    • pantoprazole (PROTONIX) 40 MG EC tablet Take 40 mg by mouth daily.   • vitamin B-12 (CYANOCOBALAMIN) 1000 MCG tablet Take 1,000 mcg by mouth daily.   • vitamin D (ERGOCALCIFEROL) 78349 units capsule capsule    • [DISCONTINUED] amLODIPine (NORVASC) 10 MG tablet Take 10 mg by mouth daily.   • [DISCONTINUED] carvedilol (COREG) 12.5 MG tablet Take 1 tablet by mouth 2 (two) times a day.   • [DISCONTINUED] furosemide (LASIX) 20 MG tablet Take 1 tablet by mouth Daily.     No current facility-administered medications on file prior to visit.          The following portions of the patient's history were reviewed and updated as appropriate: allergies, current medications, past family history, past medical history, past social history, past surgical history and problem list.    Review of Systems   Constitution: Negative.   HENT: Negative.    Eyes: Negative.    Cardiovascular: Positive for leg swelling.        Elevated blood pressure   Respiratory: Negative.           Objective:     /100 (BP Location: Left arm, Patient Position: Sitting)   Pulse 83   Ht 152.4 cm (60\")   Wt 96.6 kg (213 lb)  "  SpO2 93%   BMI 41.60 kg/m²   Physical Exam   Constitutional: She appears well-developed and well-nourished. No distress.   Obesity   HENT:   Head: Normocephalic and atraumatic.   Mouth/Throat: Oropharynx is clear and moist. No oropharyngeal exudate.   Eyes: Conjunctivae and EOM are normal. Pupils are equal, round, and reactive to light. No scleral icterus.   Neck: Normal range of motion. Neck supple. No JVD present. No tracheal deviation present. No thyromegaly present.   Cardiovascular: Normal rate, regular rhythm, normal heart sounds and intact distal pulses.  PMI is not displaced.  Exam reveals no gallop, no friction rub and no decreased pulses.    No murmur heard.  Pulses:       Carotid pulses are 3+ on the right side, and 3+ on the left side.       Radial pulses are 3+ on the right side, and 3+ on the left side.   Elevated blood pressure   Pulmonary/Chest: Effort normal and breath sounds normal. No respiratory distress. She has no wheezes. She has no rales. She exhibits no tenderness.   Abdominal: Soft. Bowel sounds are normal. She exhibits no distension, no abdominal bruit and no mass. There is no splenomegaly or hepatomegaly. There is no tenderness. There is no rebound and no guarding.   Musculoskeletal: Normal range of motion. She exhibits edema. She exhibits no tenderness or deformity.   Lymphadenopathy:     She has no cervical adenopathy.   Neurological: She is alert. She has normal reflexes. No cranial nerve deficit. She exhibits normal muscle tone. Coordination normal.   Skin: Skin is warm and dry. No rash noted. She is not diaphoretic. No erythema.   Psychiatric: She has a normal mood and affect. Her behavior is normal. Judgment and thought content normal.         Lab Review    Procedures       I personally viewed and interpreted the patient's LAB data         Assessment:     1. Bilateral lower extremity edema    2. Tobacco use    3. Class 3 obesity due to excess calories without serious comorbidity  with body mass index (BMI) of 40.0 to 44.9 in adult    4. Essential hypertension          Plan:      Patient has marked bilateral lower extremity edema but it is better than before.  Patient was advised to decrease Norvasc 5 mg daily.  Blood pressure is already elevated and probably will get worse.  She was advised to add hydralazine 10 mg twice a day,   Coreg was increased to 25 twice a day.  Lasix was increased to 40 mg daily.  She will continue lisinopril 20 twice a day.    She will check blood pressure closely and call for further instructions.  Cessation of smoking was stressed.  Weight loss and healthy lifestyle was emphasized.          Return in about 3 weeks (around 6/19/2018).

## 2018-06-27 ENCOUNTER — APPOINTMENT (OUTPATIENT)
Dept: CT IMAGING | Facility: HOSPITAL | Age: 63
End: 2018-06-27

## 2018-06-27 ENCOUNTER — HOSPITAL ENCOUNTER (EMERGENCY)
Facility: HOSPITAL | Age: 63
Discharge: HOME | End: 2018-06-27
Attending: INTERNAL MEDICINE | Admitting: INTERNAL MEDICINE

## 2018-06-27 VITALS
WEIGHT: 211 LBS | DIASTOLIC BLOOD PRESSURE: 80 MMHG | HEIGHT: 62 IN | BODY MASS INDEX: 38.83 KG/M2 | HEART RATE: 56 BPM | RESPIRATION RATE: 20 BRPM | OXYGEN SATURATION: 96 % | SYSTOLIC BLOOD PRESSURE: 140 MMHG | TEMPERATURE: 98 F

## 2018-06-27 DIAGNOSIS — S00.83XA TRAUMATIC HEMATOMA OF CHEEK, INITIAL ENCOUNTER: Primary | ICD-10-CM

## 2018-06-27 PROCEDURE — 70450 CT HEAD/BRAIN W/O DYE: CPT

## 2018-06-27 PROCEDURE — 99283 EMERGENCY DEPT VISIT LOW MDM: CPT

## 2018-06-27 PROCEDURE — 70486 CT MAXILLOFACIAL W/O DYE: CPT

## 2018-06-27 PROCEDURE — 70486 CT MAXILLOFACIAL W/O DYE: CPT | Performed by: RADIOLOGY

## 2018-06-27 PROCEDURE — 70450 CT HEAD/BRAIN W/O DYE: CPT | Performed by: RADIOLOGY

## 2018-06-27 RX ORDER — LISINOPRIL 10 MG/1
20 TABLET ORAL ONCE
Status: COMPLETED | OUTPATIENT
Start: 2018-06-27 | End: 2018-06-27

## 2018-06-27 RX ORDER — CARVEDILOL 25 MG/1
25 TABLET ORAL ONCE
Status: COMPLETED | OUTPATIENT
Start: 2018-06-27 | End: 2018-06-27

## 2018-06-27 RX ORDER — HYDROCODONE BITARTRATE AND ACETAMINOPHEN 5; 325 MG/1; MG/1
1 TABLET ORAL ONCE
Status: COMPLETED | OUTPATIENT
Start: 2018-06-27 | End: 2018-06-27

## 2018-06-27 RX ORDER — ACETAMINOPHEN 500 MG
500 TABLET ORAL EVERY 6 HOURS PRN
Qty: 60 TABLET | Refills: 0 | Status: SHIPPED | OUTPATIENT
Start: 2018-06-27

## 2018-06-27 RX ADMIN — HYDROCODONE BITARTRATE AND ACETAMINOPHEN 1 TABLET: 5; 325 TABLET ORAL at 12:06

## 2018-06-27 RX ADMIN — LISINOPRIL 20 MG: 10 TABLET ORAL at 12:05

## 2018-06-27 RX ADMIN — CARVEDILOL 25 MG: 25 TABLET, FILM COATED ORAL at 12:05

## 2018-10-15 ENCOUNTER — NURSE TRIAGE (OUTPATIENT)
Dept: CALL CENTER | Facility: HOSPITAL | Age: 63
End: 2018-10-15

## 2018-10-15 NOTE — TELEPHONE ENCOUNTER
"Caller saw her PCP this afternoon and had additional questions regarding the shingles.      Reason for Disposition  • [1] Shingles rash (matches SYMPTOMS) AND [2] weak immune system (e.g., HIV positive,  cancer chemotherapy, chronic steroid treatment, splenectomy) AND [3] NOT taking antiviral medication    Additional Information  • Negative: Difficult to awaken or acting confused (e.g., disoriented, slurred speech)  • Negative: Sounds like a life-threatening emergency to the triager  • Negative: [1] Localized rash AND [2] doesn't match the SYMPTOMS of shingles  • Negative: [1] Back pain AND [2] doesn't match the SYMPTOMS of shingles  • Negative: Patient sounds very sick or weak to the triager    Answer Assessment - Initial Assessment Questions  1. APPEARANCE of RASH: \"Describe the rash.\"      Flat red leisons that are painful.   2. LOCATION: \"Where is the rash located?\"       Starts at her back and comes around under her breast.   3. ONSET: \"When did the rash start?\"       Last night.   4. ITCHING: \"Does the rash itch?\" If so, ask: \"How bad is the itch?\"  (Scale 1-10; or mild, moderate, severe)      Mild   5. PAIN: \"Does the rash hurt?\" If so, ask: \"How bad is the pain?\"  (Scale 1-10; or mild, moderate, severe)      Moderate   6. OTHER SYMPTOMS: \"Do you have any other symptoms?\" (e.g., fever)      Just does not feel well.  Was seen by her PCP this afternoon and diagnosed.   7. PREGNANCY: \"Is there any chance you are pregnant?\" \"When was your last menstrual period?\"      No - 62 years old.    Protocols used: SHINGLES-ADULT-      "

## 2018-10-31 ENCOUNTER — OFFICE VISIT (OUTPATIENT)
Dept: CARDIOLOGY | Facility: CLINIC | Age: 63
End: 2018-10-31

## 2018-10-31 VITALS
DIASTOLIC BLOOD PRESSURE: 78 MMHG | HEIGHT: 62 IN | OXYGEN SATURATION: 97 % | BODY MASS INDEX: 38.83 KG/M2 | WEIGHT: 211 LBS | SYSTOLIC BLOOD PRESSURE: 136 MMHG | HEART RATE: 88 BPM

## 2018-10-31 DIAGNOSIS — E66.9 OBESITY (BMI 35.0-39.9 WITHOUT COMORBIDITY): ICD-10-CM

## 2018-10-31 DIAGNOSIS — I25.10 CORONARY ARTERY DISEASE INVOLVING NATIVE CORONARY ARTERY OF NATIVE HEART WITHOUT ANGINA PECTORIS: Primary | ICD-10-CM

## 2018-10-31 DIAGNOSIS — R60.0 BILATERAL LOWER EXTREMITY EDEMA: ICD-10-CM

## 2018-10-31 DIAGNOSIS — I10 ESSENTIAL HYPERTENSION: ICD-10-CM

## 2018-10-31 DIAGNOSIS — Z72.0 TOBACCO USE: ICD-10-CM

## 2018-10-31 PROCEDURE — 99214 OFFICE O/P EST MOD 30 MIN: CPT | Performed by: INTERNAL MEDICINE

## 2018-11-01 PROBLEM — E03.8 OTHER SPECIFIED HYPOTHYROIDISM: Status: ACTIVE | Noted: 2018-11-01

## 2018-11-01 PROBLEM — E66.9 OBESITY (BMI 35.0-39.9 WITHOUT COMORBIDITY): Status: ACTIVE | Noted: 2018-05-29

## 2019-03-13 ENCOUNTER — OFFICE VISIT (OUTPATIENT)
Dept: CARDIOLOGY | Facility: CLINIC | Age: 64
End: 2019-03-13

## 2019-03-13 VITALS
DIASTOLIC BLOOD PRESSURE: 96 MMHG | OXYGEN SATURATION: 95 % | HEIGHT: 62 IN | HEART RATE: 80 BPM | BODY MASS INDEX: 41.59 KG/M2 | WEIGHT: 226 LBS | SYSTOLIC BLOOD PRESSURE: 162 MMHG

## 2019-03-13 DIAGNOSIS — E66.9 OBESITY (BMI 35.0-39.9 WITHOUT COMORBIDITY): ICD-10-CM

## 2019-03-13 DIAGNOSIS — Z72.0 TOBACCO USE: ICD-10-CM

## 2019-03-13 DIAGNOSIS — R60.0 BILATERAL LOWER EXTREMITY EDEMA: ICD-10-CM

## 2019-03-13 DIAGNOSIS — E78.2 MIXED HYPERLIPIDEMIA: ICD-10-CM

## 2019-03-13 DIAGNOSIS — I25.10 CORONARY ARTERY DISEASE INVOLVING NATIVE CORONARY ARTERY OF NATIVE HEART WITHOUT ANGINA PECTORIS: ICD-10-CM

## 2019-03-13 DIAGNOSIS — I10 ESSENTIAL HYPERTENSION: Primary | Chronic | ICD-10-CM

## 2019-03-13 PROCEDURE — 99214 OFFICE O/P EST MOD 30 MIN: CPT | Performed by: INTERNAL MEDICINE

## 2019-03-13 RX ORDER — FUROSEMIDE 20 MG/1
20 TABLET ORAL 2 TIMES DAILY
COMMUNITY
End: 2019-10-07

## 2019-03-13 RX ORDER — FERROUS SULFATE 325(65) MG
TABLET ORAL
COMMUNITY
Start: 2019-02-14 | End: 2022-02-10 | Stop reason: ALTCHOICE

## 2019-03-13 RX ORDER — LISINOPRIL 20 MG/1
5 TABLET ORAL DAILY
COMMUNITY
End: 2022-02-10 | Stop reason: DRUGHIGH

## 2019-03-13 RX ORDER — HYDRALAZINE HYDROCHLORIDE 10 MG/1
10 TABLET, FILM COATED ORAL 3 TIMES DAILY
COMMUNITY

## 2019-03-13 RX ORDER — AMLODIPINE BESYLATE 10 MG/1
10 TABLET ORAL DAILY
COMMUNITY
End: 2022-02-10 | Stop reason: ALTCHOICE

## 2019-03-13 RX ORDER — CARVEDILOL 12.5 MG/1
25 TABLET ORAL 2 TIMES DAILY WITH MEALS
COMMUNITY
End: 2022-02-10 | Stop reason: DRUGHIGH

## 2019-03-13 NOTE — PROGRESS NOTES
subjective     Chief Complaint   Patient presents with   • Coronary Artery Disease   • Follow-up     History of Present Illness  Patient is 63 years old white female who is overweight patient is trying to lose weight but has not been successful.  Patient has essential hypertension but is not taking medication regularly.  She has not taken any medication since yesterday and blood pressure is high.  Patient just states that she just forgot.  Patient has known coronary artery disease status post drug-eluting stent to LAD also had chronically occluded collateralized dominant RCA and high-grade stenosis of the small D1.  Patient denies any chest pain palpitations or shortness of breath.  Hyperlipidemia is being treated with Lipitor.  Patient is taking Norvasc, Coreg, hydralazine, lisinopril and Lasix blood pressure is still high because she is not taking it properly.    Patient wants to know about her abdominal aortic aneurysm apparently she had a CAT scan or ultrasound done with her PCP and has appointment with Dr. Cristina.  Size of aneurysm is not none to me.    Past Surgical History:   Procedure Laterality Date   • ANKLE SURGERY      RIGHT   • APPENDECTOMY     • CARDIAC CATHETERIZATION      LEFT   • CORONARY STENT PLACEMENT     • HYSTERECTOMY       Family History   Problem Relation Age of Onset   • Coronary artery disease Other    • Heart disease Mother    • Heart attack Mother 73   • Fibromyalgia Mother    • Heart attack Father 72   • Ovarian cancer Sister      Past Medical History:   Diagnosis Date   • Anxiety    • Arthritis    • Coronary artery disease    • H/O heart artery stent    • Hyperlipidemia    • Hypertension    • Obesity      Patient Active Problem List   Diagnosis   • Hypertension   • Hyperlipidemia   • Migraine without aura   • Coronary artery disease status post drug-eluting stent to LAD 2011.  Chronically occluded collateralized dominant RCA high-grade stenosis of the ostium of small D1 and 60% mid RCA  posterolateral wall    • Chronic renal failure, stage 2 (mild)   • Tobacco use   • Obesity (BMI 35.0-39.9 without comorbidity)   • Bilateral lower extremity edema   • Other specified hypothyroidism       Social History     Tobacco Use   • Smoking status: Current Every Day Smoker     Types: Electronic Cigarette   • Smokeless tobacco: Never Used   Substance Use Topics   • Alcohol use: No   • Drug use: No       No Known Allergies    Current Outpatient Medications on File Prior to Visit   Medication Sig   • acetaminophen (TYLENOL) 500 MG tablet Take 1 tablet by mouth Every 6 (Six) Hours As Needed for Mild Pain .   • amLODIPine (NORVASC) 10 MG tablet Take 10 mg by mouth Daily.   • aspirin 325 MG tablet Take 325 mg by mouth as needed for mild pain (1-3).   • atorvastatin (LIPITOR) 80 MG tablet Take 20 mg by mouth 2 (Two) Times a Day.   • busPIRone (BUSPAR) 5 MG tablet Take 5 mg by mouth daily.   • carvedilol (COREG) 12.5 MG tablet Take 12.5 mg by mouth 2 (Two) Times a Day With Meals.   • ferrous sulfate 325 (65 FE) MG tablet    • folic acid (FOLVITE) 1 MG tablet Take 1 mg by mouth daily.   • furosemide (LASIX) 20 MG tablet Take 20 mg by mouth 2 (Two) Times a Day.   • gabapentin (NEURONTIN) 800 MG tablet Take 800 mg by mouth 3 (three) times a day.   • hydrALAZINE (APRESOLINE) 10 MG tablet Take 10 mg by mouth 3 (Three) Times a Day.   • HYDROcodone-acetaminophen (NORCO)  MG per tablet    • levothyroxine (SYNTHROID, LEVOTHROID) 25 MCG tablet Take 25 mcg by mouth daily.   • lisinopril (PRINIVIL,ZESTRIL) 20 MG tablet Take 20 mg by mouth Daily.   • loratadine (CLARITIN) 10 MG tablet Take 10 mg by mouth daily.   • montelukast (SINGULAIR) 10 MG tablet Take 10 mg by mouth every night.   • nitroglycerin (NITROSTAT) 0.4 MG SL tablet    • pantoprazole (PROTONIX) 40 MG EC tablet Take 40 mg by mouth daily.   • vitamin B-12 (CYANOCOBALAMIN) 1000 MCG tablet Take 1,000 mcg by mouth daily.   • vitamin D (ERGOCALCIFEROL) 23948 units  "capsule capsule    • [DISCONTINUED] amLODIPine (NORVASC) 10 MG tablet Take 0.5 tablets by mouth Daily.   • [DISCONTINUED] carvedilol (COREG) 12.5 MG tablet Take 2 tablets by mouth 2 (Two) Times a Day With Meals.   • [DISCONTINUED] furosemide (LASIX) 20 MG tablet Take 1 tablet by mouth 2 (Two) Times a Day.   • [DISCONTINUED] hydrALAZINE (APRESOLINE) 10 MG tablet Take 1 tablet by mouth 2 (Two) Times a Day.   • [DISCONTINUED] lisinopril (PRINIVIL,ZESTRIL) 20 MG tablet Take 1 tablet by mouth 2 (Two) Times a Day.     No current facility-administered medications on file prior to visit.          The following portions of the patient's history were reviewed and updated as appropriate: allergies, current medications, past family history, past medical history, past social history, past surgical history and problem list.    Review of Systems   Constitution: Negative.   HENT: Negative for congestion.    Eyes: Negative.    Cardiovascular: Positive for leg swelling. Negative for chest pain, cyanosis, dyspnea on exertion, irregular heartbeat, near-syncope, orthopnea, palpitations, paroxysmal nocturnal dyspnea and syncope.   Respiratory: Negative.  Negative for shortness of breath.    Hematologic/Lymphatic: Negative.    Musculoskeletal: Positive for arthritis and back pain.   Gastrointestinal: Negative.    Neurological: Positive for headaches.   Psychiatric/Behavioral: The patient is nervous/anxious.           Objective:     /96 (BP Location: Left arm, Patient Position: Sitting)   Pulse 80   Ht 157.5 cm (62\")   Wt 103 kg (226 lb)   SpO2 95%   BMI 41.34 kg/m²   Physical Exam   Constitutional: She appears well-developed and well-nourished. No distress.   HENT:   Head: Normocephalic and atraumatic.   Mouth/Throat: Oropharynx is clear and moist. No oropharyngeal exudate.   Eyes: Conjunctivae and EOM are normal. Pupils are equal, round, and reactive to light. No scleral icterus.   Neck: Normal range of motion. Neck supple. No " JVD present. No tracheal deviation present. No thyromegaly present.   Cardiovascular: Normal rate, regular rhythm, normal heart sounds and intact distal pulses. PMI is not displaced. Exam reveals no gallop, no friction rub and no decreased pulses.   No murmur heard.  Pulses:       Carotid pulses are 3+ on the right side, and 3+ on the left side.       Radial pulses are 3+ on the right side, and 3+ on the left side.   Pulmonary/Chest: Effort normal and breath sounds normal. No respiratory distress. She has no wheezes. She has no rales. She exhibits no tenderness.   Abdominal: Soft. Bowel sounds are normal. She exhibits no distension, no abdominal bruit and no mass. There is no splenomegaly or hepatomegaly. There is no tenderness. There is no rebound and no guarding.   Musculoskeletal: Normal range of motion. She exhibits no edema, tenderness or deformity.   Lymphadenopathy:     She has no cervical adenopathy.   Neurological: She is alert. She has normal reflexes. No cranial nerve deficit. She exhibits normal muscle tone. Coordination normal.   Skin: Skin is warm and dry. No rash noted. She is not diaphoretic. No erythema.   Psychiatric: She has a normal mood and affect. Her behavior is normal. Judgment and thought content normal.         Lab Review    Procedures       I personally viewed and interpreted the patient's LAB data         Assessment:     1. Essential hypertension    2. Tobacco use    3. Mixed hyperlipidemia    4. Coronary artery disease involving native coronary artery of native heart without angina pectoris    5. Bilateral lower extremity edema    6. Obesity (BMI 35.0-39.9 without comorbidity)          Plan:     Blood pressure is significantly elevated however patient has not taken any medications.  Importance of medications were discussed with the patient in detail.  Patient is supposed to be taking  Coreg 12.5 twice daily  Norvasc 10 mg daily  Hydralazine 10 mg 3 times daily  Lisinopril 20 and Lasix 20  mg daily.    Patient was advised to restart medication and check blood pressure regularly.  She may need to increase lisinopril 20 mg twice daily.    Cessation of tobacco use is very strongly urged.  Ankle edema is much better low-dose Lasix was continued.  Patient's renal function will need to be followed closely.  Healthy lifestyle and weight loss was urged  Patient did not have lab work done she was advised to have lab work done with her next visit to her physician.        No Follow-up on file.

## 2019-03-18 ENCOUNTER — TELEPHONE (OUTPATIENT)
Dept: CARDIOLOGY | Facility: CLINIC | Age: 64
End: 2019-03-18

## 2019-03-22 ENCOUNTER — TRANSCRIBE ORDERS (OUTPATIENT)
Dept: ADMINISTRATIVE | Facility: HOSPITAL | Age: 64
End: 2019-03-22

## 2019-03-22 DIAGNOSIS — I71.9 AORTIC ANEURYSM WITHOUT RUPTURE, UNSPECIFIED PORTION OF AORTA (HCC): Primary | ICD-10-CM

## 2019-04-04 ENCOUNTER — APPOINTMENT (OUTPATIENT)
Dept: ULTRASOUND IMAGING | Facility: HOSPITAL | Age: 64
End: 2019-04-04

## 2019-04-05 ENCOUNTER — HOSPITAL ENCOUNTER (OUTPATIENT)
Dept: ULTRASOUND IMAGING | Facility: HOSPITAL | Age: 64
Discharge: HOME | End: 2019-04-05
Admitting: NURSE PRACTITIONER

## 2019-04-05 DIAGNOSIS — I71.9 AORTIC ANEURYSM WITHOUT RUPTURE, UNSPECIFIED PORTION OF AORTA (HCC): ICD-10-CM

## 2019-04-05 PROCEDURE — 76775 US EXAM ABDO BACK WALL LIM: CPT

## 2019-04-05 PROCEDURE — 76775 US EXAM ABDO BACK WALL LIM: CPT | Performed by: RADIOLOGY

## 2019-07-01 ENCOUNTER — TELEPHONE (OUTPATIENT)
Dept: CARDIAC SURGERY | Facility: CLINIC | Age: 64
End: 2019-07-01

## 2019-07-01 NOTE — TELEPHONE ENCOUNTER
L.V WITH PT AND LEFT MESSAGE WITH PTS SON TO HAVE PT TO CALL OUR OFFICE TO R/S N/S APPT FROM 5/8/19 WITH [facility] IN [place], PT HAS ALSO CX APPT FROM 4/3/19.

## 2019-10-07 ENCOUNTER — TELEPHONE (OUTPATIENT)
Dept: CARDIAC SURGERY | Facility: CLINIC | Age: 64
End: 2019-10-07

## 2019-10-07 ENCOUNTER — OFFICE VISIT (OUTPATIENT)
Dept: CARDIOLOGY | Facility: CLINIC | Age: 64
End: 2019-10-07

## 2019-10-07 VITALS
WEIGHT: 229 LBS | BODY MASS INDEX: 42.14 KG/M2 | DIASTOLIC BLOOD PRESSURE: 80 MMHG | HEIGHT: 62 IN | OXYGEN SATURATION: 95 % | SYSTOLIC BLOOD PRESSURE: 122 MMHG | HEART RATE: 66 BPM

## 2019-10-07 DIAGNOSIS — E78.2 MIXED HYPERLIPIDEMIA: ICD-10-CM

## 2019-10-07 DIAGNOSIS — Z72.0 TOBACCO USE: ICD-10-CM

## 2019-10-07 DIAGNOSIS — I10 ESSENTIAL HYPERTENSION: Chronic | ICD-10-CM

## 2019-10-07 DIAGNOSIS — I25.10 CORONARY ARTERY DISEASE INVOLVING NATIVE CORONARY ARTERY OF NATIVE HEART WITHOUT ANGINA PECTORIS: Primary | ICD-10-CM

## 2019-10-07 DIAGNOSIS — Z23 NEED FOR PROPHYLACTIC VACCINATION AGAINST STREPTOCOCCUS PNEUMONIAE (PNEUMOCOCCUS) AND INFLUENZA: ICD-10-CM

## 2019-10-07 PROCEDURE — 90732 PPSV23 VACC 2 YRS+ SUBQ/IM: CPT | Performed by: INTERNAL MEDICINE

## 2019-10-07 PROCEDURE — 90674 CCIIV4 VAC NO PRSV 0.5 ML IM: CPT | Performed by: INTERNAL MEDICINE

## 2019-10-07 PROCEDURE — 99213 OFFICE O/P EST LOW 20 MIN: CPT | Performed by: INTERNAL MEDICINE

## 2019-10-07 PROCEDURE — 90471 IMMUNIZATION ADMIN: CPT | Performed by: INTERNAL MEDICINE

## 2019-10-07 RX ORDER — DILTIAZEM HYDROCHLORIDE 120 MG/1
120 CAPSULE, COATED, EXTENDED RELEASE ORAL DAILY
COMMUNITY
Start: 2019-09-19 | End: 2022-02-10 | Stop reason: ALTCHOICE

## 2019-10-07 NOTE — PROGRESS NOTES
subjective     Chief Complaint   Patient presents with   • Coronary Artery Disease   • Hypertension     History of Present Illness  Patient is 63 years old white female who has known coronary artery disease requiring drug-eluting stent to LAD.  She also has chronically occluded collateralized dominant RCA with high-grade stenosis of the ostium of small D1 and 60% mid RCA posterior lateral wall branch.  Medical management and risk factor modification.  She is taking Coreg 25 mg twice daily and Lipitor 40 mg daily along with aspirin.    She also has hypertension and is taking hydralazine, Cardizem, Coreg, Norvasc and lisinopril all these medications are under directions of her PCP    She seems to be doing very well except she states that she has developed some degree of renal failure.  No lab work is available for review.  She is still taking Lasix.    Past Surgical History:   Procedure Laterality Date   • ANKLE SURGERY      RIGHT   • APPENDECTOMY     • CARDIAC CATHETERIZATION      LEFT   • CORONARY STENT PLACEMENT     • HYSTERECTOMY       Family History   Problem Relation Age of Onset   • Coronary artery disease Other    • Heart disease Mother    • Heart attack Mother 73   • Fibromyalgia Mother    • Heart attack Father 72   • Ovarian cancer Sister      Past Medical History:   Diagnosis Date   • Anxiety    • Arthritis    • Coronary artery disease    • H/O heart artery stent    • Hyperlipidemia    • Hypertension    • Obesity      Patient Active Problem List   Diagnosis   • Hypertension   • Hyperlipidemia   • Migraine without aura   • Coronary artery disease status post drug-eluting stent to LAD 2011.  Chronically occluded collateralized dominant RCA high-grade stenosis of the ostium of small D1 and 60% mid RCA posterolateral wall    • Chronic renal failure, stage 2 (mild)   • Tobacco use   • Obesity (BMI 35.0-39.9 without comorbidity)   • Bilateral lower extremity edema   • Other specified hypothyroidism       Social  History     Tobacco Use   • Smoking status: Current Every Day Smoker     Types: Electronic Cigarette   • Smokeless tobacco: Never Used   Substance Use Topics   • Alcohol use: No   • Drug use: No       No Known Allergies    Current Outpatient Medications on File Prior to Visit   Medication Sig   • amLODIPine (NORVASC) 10 MG tablet Take 10 mg by mouth Daily.   • aspirin 325 MG tablet Take 325 mg by mouth as needed for mild pain (1-3).   • atorvastatin (LIPITOR) 80 MG tablet Take 40 mg by mouth Every Night.   • busPIRone (BUSPAR) 5 MG tablet Take 5 mg by mouth daily.   • carvedilol (COREG) 12.5 MG tablet Take 25 mg by mouth 2 (Two) Times a Day With Meals.   • dilTIAZem CD (CARDIZEM CD) 120 MG 24 hr capsule Take 120 mg by mouth Daily.   • ferrous sulfate 325 (65 FE) MG tablet    • folic acid (FOLVITE) 1 MG tablet Take 1 mg by mouth daily.   • gabapentin (NEURONTIN) 800 MG tablet Take 800 mg by mouth 3 (three) times a day.   • hydrALAZINE (APRESOLINE) 10 MG tablet Take 10 mg by mouth 3 (Three) Times a Day.   • HYDROcodone-acetaminophen (NORCO)  MG per tablet    • levothyroxine (SYNTHROID, LEVOTHROID) 25 MCG tablet Take 25 mcg by mouth daily.   • lisinopril (PRINIVIL,ZESTRIL) 20 MG tablet Take 20 mg by mouth Daily.   • loratadine (CLARITIN) 10 MG tablet Take 10 mg by mouth daily.   • montelukast (SINGULAIR) 10 MG tablet Take 10 mg by mouth every night.   • nitroglycerin (NITROSTAT) 0.4 MG SL tablet    • pantoprazole (PROTONIX) 40 MG EC tablet Take 40 mg by mouth daily.   • vitamin B-12 (CYANOCOBALAMIN) 1000 MCG tablet Take 1,000 mcg by mouth daily.   • vitamin D (ERGOCALCIFEROL) 71334 units capsule capsule    • [DISCONTINUED] furosemide (LASIX) 20 MG tablet Take 20 mg by mouth 2 (Two) Times a Day.   • acetaminophen (TYLENOL) 500 MG tablet Take 1 tablet by mouth Every 6 (Six) Hours As Needed for Mild Pain .     No current facility-administered medications on file prior to visit.          The following portions of the  "patient's history were reviewed and updated as appropriate: allergies, current medications, past family history, past medical history, past social history, past surgical history and problem list.    Review of Systems   Constitution: Negative.   HENT: Negative.  Negative for congestion.    Eyes: Negative.    Cardiovascular: Negative.  Negative for chest pain, cyanosis, dyspnea on exertion, irregular heartbeat, leg swelling, near-syncope, orthopnea, palpitations, paroxysmal nocturnal dyspnea and syncope.   Respiratory: Negative.  Negative for shortness of breath.    Hematologic/Lymphatic: Negative.    Musculoskeletal: Negative.    Gastrointestinal: Negative.    Neurological: Negative.  Negative for headaches.          Objective:     /80 (BP Location: Left arm, Patient Position: Sitting, Cuff Size: Adult)   Pulse 66   Ht 157.5 cm (62\")   Wt 104 kg (229 lb)   SpO2 95%   BMI 41.88 kg/m²   Physical Exam   Constitutional: She appears well-developed and well-nourished. No distress.   HENT:   Head: Normocephalic and atraumatic.   Mouth/Throat: Oropharynx is clear and moist. No oropharyngeal exudate.   Eyes: Conjunctivae and EOM are normal. Pupils are equal, round, and reactive to light. No scleral icterus.   Neck: Normal range of motion. Neck supple. No JVD present. No tracheal deviation present. No thyromegaly present.   Cardiovascular: Normal rate, regular rhythm, normal heart sounds and intact distal pulses. PMI is not displaced. Exam reveals no gallop, no friction rub and no decreased pulses.   No murmur heard.  Pulses:       Carotid pulses are 3+ on the right side, and 3+ on the left side.       Radial pulses are 3+ on the right side, and 3+ on the left side.   Pulmonary/Chest: Effort normal and breath sounds normal. No respiratory distress. She has no wheezes. She has no rales. She exhibits no tenderness.   Abdominal: Soft. Bowel sounds are normal. She exhibits no distension, no abdominal bruit and no mass. " There is no splenomegaly or hepatomegaly. There is no tenderness. There is no rebound and no guarding.   Musculoskeletal: Normal range of motion. She exhibits no edema, tenderness or deformity.   Lymphadenopathy:     She has no cervical adenopathy.   Neurological: She is alert. She has normal reflexes. No cranial nerve deficit. She exhibits normal muscle tone. Coordination normal.   Skin: Skin is warm and dry. No rash noted. She is not diaphoretic. No erythema.   Psychiatric: She has a normal mood and affect. Her behavior is normal. Judgment and thought content normal.         Lab Review  No lab work available.  Procedures       I personally viewed and interpreted the patient's LAB data         Assessment:     1. Coronary artery disease involving native coronary artery of native heart without angina pectoris    2. Need for prophylactic vaccination against Streptococcus pneumoniae (pneumococcus) and influenza    3. Essential hypertension    4. Mixed hyperlipidemia    5. Tobacco use          Plan:     Patient has known coronary artery disease, she is doing very well without any symptoms.  She was advised to continue aspirin, beta-blocker therapy and statin therapy  Weight loss and cessation of tobacco use was also stressed.  Patient states that she has developed renal failure, however no lab work is available.  She has appointment with Dr. vera however she still taking Lasix 20 mg twice a day.  Unfortunately no lab work is available but patient was advised to hold off on Lasix until she can see Dr. vera.  Follow-up scheduled        No Follow-up on file.

## 2019-12-16 ENCOUNTER — TELEPHONE (OUTPATIENT)
Dept: CARDIAC SURGERY | Facility: CLINIC | Age: 64
End: 2019-12-16

## 2020-01-21 ENCOUNTER — TELEPHONE (OUTPATIENT)
Dept: CARDIAC SURGERY | Facility: CLINIC | Age: 65
End: 2020-01-21

## 2020-04-20 ENCOUNTER — TELEPHONE (OUTPATIENT)
Dept: CARDIOLOGY | Facility: CLINIC | Age: 65
End: 2020-04-20

## 2020-04-20 ENCOUNTER — OFFICE VISIT (OUTPATIENT)
Dept: CARDIOLOGY | Facility: CLINIC | Age: 65
End: 2020-04-20

## 2020-04-20 DIAGNOSIS — E66.9 OBESITY (BMI 35.0-39.9 WITHOUT COMORBIDITY): ICD-10-CM

## 2020-04-20 DIAGNOSIS — Z72.0 TOBACCO USE: ICD-10-CM

## 2020-04-20 DIAGNOSIS — I10 ESSENTIAL HYPERTENSION: Primary | Chronic | ICD-10-CM

## 2020-04-20 DIAGNOSIS — I25.10 CORONARY ARTERY DISEASE INVOLVING NATIVE CORONARY ARTERY OF NATIVE HEART WITHOUT ANGINA PECTORIS: ICD-10-CM

## 2020-04-20 DIAGNOSIS — E78.2 MIXED HYPERLIPIDEMIA: ICD-10-CM

## 2020-04-20 PROCEDURE — 99213 OFFICE O/P EST LOW 20 MIN: CPT | Performed by: INTERNAL MEDICINE

## 2020-04-20 NOTE — TELEPHONE ENCOUNTER
Called and given message per Dr. Echeverria.  She stated she would get her pcp this weeks appt to order this.

## 2020-04-20 NOTE — TELEPHONE ENCOUNTER
----- Message from Rebecca Echeverria MD sent at 4/20/2020  1:19 PM EDT -----  I reviewed your lab work however cholesterol was not checked  in this lab, you may want to discuss with your PCP

## 2020-04-20 NOTE — PROGRESS NOTES
subjective     Chief Complaint   Patient presents with   • Coronary Artery Disease     Follow up,  pt has no complaints    • Hypertension     Follow up     History of Present Illness    You have chosen to receive care through a telephone visit. Do you consent to use a telephone visit for your medical care today? Yes    Patient is 64 years old white female who is being evaluated via telephone for cardiac problem.  Patient states that she has been doing very well.  She has history of coronary artery disease status post drug-eluting stent.  She denies any chest pain palpitations or shortness of breath.  She is following diet and is physically active, following healthy lifestyle.  Blood pressure according to her is very well controlled, patient is taking lisinopril 20 mg daily, hydralazine 10 mg 3 times daily, Cardizem and Coreg.  Patient also is taking Norvasc 10 mg daily.  She is taking 2 calcium channel blockers  Hyperlipidemia is controlled with Lipitor 40 mg daily.  Currently patient is doing very well with no specific complaints.  She also has been trying to lose weight.  Patient had lab work done recently with her PCP will get a copy for our records.    Unfortunately patient is still smoking, tobacco use was discouraged.    Past Surgical History:   Procedure Laterality Date   • ANKLE SURGERY      RIGHT   • APPENDECTOMY     • CARDIAC CATHETERIZATION      LEFT   • CORONARY STENT PLACEMENT     • HYSTERECTOMY       Family History   Problem Relation Age of Onset   • Coronary artery disease Other    • Heart disease Mother    • Heart attack Mother 73   • Fibromyalgia Mother    • Heart attack Father 72   • Ovarian cancer Sister      Past Medical History:   Diagnosis Date   • Anxiety    • Arthritis    • Coronary artery disease    • H/O heart artery stent    • Hyperlipidemia    • Hypertension    • Obesity      Patient Active Problem List   Diagnosis   • Hypertension   • Hyperlipidemia   • Migraine without aura   • Coronary  artery disease status post drug-eluting stent to LAD 2011.  Chronically occluded collateralized dominant RCA high-grade stenosis of the ostium of small D1 and 60% mid RCA posterolateral wall    • Chronic renal failure, stage 2 (mild)   • Tobacco use   • Obesity (BMI 35.0-39.9 without comorbidity)   • Bilateral lower extremity edema   • Other specified hypothyroidism       Social History     Tobacco Use   • Smoking status: Current Every Day Smoker     Packs/day: 0.50     Types: Electronic Cigarette, Cigarettes   • Smokeless tobacco: Never Used   Substance Use Topics   • Alcohol use: No   • Drug use: No       No Known Allergies    Current Outpatient Medications on File Prior to Visit   Medication Sig   • acetaminophen (TYLENOL) 500 MG tablet Take 1 tablet by mouth Every 6 (Six) Hours As Needed for Mild Pain .   • amLODIPine (NORVASC) 10 MG tablet Take 10 mg by mouth Daily.   • aspirin 325 MG tablet Take 325 mg by mouth as needed for mild pain (1-3).   • atorvastatin (LIPITOR) 80 MG tablet Take 40 mg by mouth Every Night.   • busPIRone (BUSPAR) 5 MG tablet Take 5 mg by mouth daily.   • carvedilol (COREG) 12.5 MG tablet Take 25 mg by mouth 2 (Two) Times a Day With Meals.   • dilTIAZem CD (CARDIZEM CD) 120 MG 24 hr capsule Take 120 mg by mouth Daily.   • folic acid (FOLVITE) 1 MG tablet Take 1 mg by mouth daily.   • gabapentin (NEURONTIN) 800 MG tablet Take 800 mg by mouth 3 (three) times a day.   • hydrALAZINE (APRESOLINE) 10 MG tablet Take 10 mg by mouth 3 (Three) Times a Day.   • HYDROcodone-acetaminophen (NORCO)  MG per tablet    • levothyroxine (SYNTHROID, LEVOTHROID) 25 MCG tablet Take 25 mcg by mouth daily.   • lisinopril (PRINIVIL,ZESTRIL) 20 MG tablet Take 20 mg by mouth Daily.   • loratadine (CLARITIN) 10 MG tablet Take 10 mg by mouth daily.   • montelukast (SINGULAIR) 10 MG tablet Take 10 mg by mouth every night.   • nitroglycerin (NITROSTAT) 0.4 MG SL tablet    • pantoprazole (PROTONIX) 40 MG EC tablet  Take 40 mg by mouth daily.   • vitamin B-12 (CYANOCOBALAMIN) 1000 MCG tablet Take 1,000 mcg by mouth daily.   • vitamin D (ERGOCALCIFEROL) 51832 units capsule capsule    • ferrous sulfate 325 (65 FE) MG tablet      No current facility-administered medications on file prior to visit.          The following portions of the patient's history were reviewed and updated as appropriate: allergies, current medications, past family history, past medical history, past social history, past surgical history and problem list.    Review of Systems   Constitution: Negative.   HENT: Negative.  Negative for congestion.    Eyes: Negative.    Cardiovascular: Negative.  Negative for chest pain, cyanosis, dyspnea on exertion, irregular heartbeat, leg swelling, near-syncope, orthopnea, palpitations, paroxysmal nocturnal dyspnea and syncope.   Respiratory: Negative.  Negative for shortness of breath.    Hematologic/Lymphatic: Negative.    Musculoskeletal: Negative.    Gastrointestinal: Negative.    Neurological: Negative.  Negative for headaches.          Objective:     There were no vitals taken for this visit.  Physical Exam   Constitutional:   Not done         Lab Review  Copy of labs from PCP requested  Procedures       I personally viewed and interpreted the patient's LAB data         Assessment:     1. Essential hypertension    2. Mixed hyperlipidemia    3. Coronary artery disease involving native coronary artery of native heart without angina pectoris    4. Tobacco use          Plan:     Blood pressure is very well controlled patient is requiring a lot of medications however her medicines are working patient will continue current medications.  Hyperlipidemia is controlled with Lipitor 40 .patient will continue Lipitor healthy lifestyle weight loss was again emphasized.  Cessation of tobacco use stressed.  Weight loss emphasized.  Patient is doing very well from cardiac standpoint she has had no angina no orthopnea PND or ankle  edema  No change in therapy was made.  We will get a copy of lab work from PCP  Follow-up scheduled  Coronavirus precautions discussed.    This visit has been rescheduled as a phone visit to comply with patient safety concerns in accordance with CDC recommendations. Total time of discussion was 15 minutes.          No follow-ups on file.

## 2020-05-07 ENCOUNTER — TRANSCRIBE ORDERS (OUTPATIENT)
Dept: ADMINISTRATIVE | Facility: HOSPITAL | Age: 65
End: 2020-05-07

## 2020-05-07 DIAGNOSIS — N18.30 CHRONIC KIDNEY DISEASE, STAGE III (MODERATE) (HCC): Primary | ICD-10-CM

## 2020-09-11 ENCOUNTER — APPOINTMENT (OUTPATIENT)
Dept: ULTRASOUND IMAGING | Facility: HOSPITAL | Age: 65
End: 2020-09-11

## 2020-09-17 ENCOUNTER — OFFICE VISIT (OUTPATIENT)
Dept: CARDIOLOGY | Facility: CLINIC | Age: 65
End: 2020-09-17

## 2020-09-17 VITALS
RESPIRATION RATE: 16 BRPM | HEIGHT: 62 IN | DIASTOLIC BLOOD PRESSURE: 68 MMHG | TEMPERATURE: 96.6 F | BODY MASS INDEX: 41.88 KG/M2 | WEIGHT: 227.6 LBS | HEART RATE: 63 BPM | OXYGEN SATURATION: 95 % | SYSTOLIC BLOOD PRESSURE: 128 MMHG

## 2020-09-17 DIAGNOSIS — E78.2 MIXED HYPERLIPIDEMIA: ICD-10-CM

## 2020-09-17 DIAGNOSIS — I10 ESSENTIAL HYPERTENSION: Chronic | ICD-10-CM

## 2020-09-17 DIAGNOSIS — I25.10 CORONARY ARTERY DISEASE INVOLVING NATIVE CORONARY ARTERY OF NATIVE HEART WITHOUT ANGINA PECTORIS: Primary | ICD-10-CM

## 2020-09-17 DIAGNOSIS — Z72.0 TOBACCO USE: ICD-10-CM

## 2020-09-17 PROCEDURE — 99214 OFFICE O/P EST MOD 30 MIN: CPT | Performed by: INTERNAL MEDICINE

## 2020-09-17 PROCEDURE — 93000 ELECTROCARDIOGRAM COMPLETE: CPT | Performed by: INTERNAL MEDICINE

## 2020-09-17 NOTE — PROGRESS NOTES
subjective     Chief Complaint   Patient presents with   • Hypertension     follow up   • Hyperlipidemia     follow up   • Results     labs results     History of Present Illness  Patient is 64 years old white female who is here for cardiology follow-up.  She states that she is doing very well blood pressure is very well controlled on current medication.  Patient also has known coronary artery disease requiring drug-eluting stent in 2011.  She denies any chest pain palpitations or shortness of breath.  She is taking antiplatelet therapy with aspirin, statin therapy with Lipitor and beta-blocker therapy with Coreg.  Hyperlipidemia is well controlled with Lipitor.  She is also trying to diet.  Unfortunately she continues to smoke.    Past Surgical History:   Procedure Laterality Date   • ANKLE SURGERY      RIGHT   • APPENDECTOMY     • CARDIAC CATHETERIZATION      LEFT   • CORONARY STENT PLACEMENT     • HYSTERECTOMY       Family History   Problem Relation Age of Onset   • Coronary artery disease Other    • Heart disease Mother    • Heart attack Mother 73   • Fibromyalgia Mother    • Heart attack Father 72   • Ovarian cancer Sister      Past Medical History:   Diagnosis Date   • Anxiety    • Arthritis    • Coronary artery disease    • H/O heart artery stent    • Hyperlipidemia    • Hypertension    • Obesity      Patient Active Problem List   Diagnosis   • Hypertension   • Hyperlipidemia   • Migraine without aura   • Coronary artery disease status post drug-eluting stent to LAD 2011.  Chronically occluded collateralized dominant RCA high-grade stenosis of the ostium of small D1 and 60% mid RCA posterolateral wall    • Chronic renal failure, stage 2 (mild)   • Tobacco use   • Obesity (BMI 35.0-39.9 without comorbidity)   • Bilateral lower extremity edema   • Other specified hypothyroidism       Social History     Tobacco Use   • Smoking status: Current Every Day Smoker     Packs/day: 0.50     Types: Electronic Cigarette,  Cigarettes   • Smokeless tobacco: Never Used   Substance Use Topics   • Alcohol use: No   • Drug use: No       No Known Allergies    Current Outpatient Medications on File Prior to Visit   Medication Sig   • acetaminophen (TYLENOL) 500 MG tablet Take 1 tablet by mouth Every 6 (Six) Hours As Needed for Mild Pain .   • aspirin 325 MG tablet Take 325 mg by mouth as needed for mild pain (1-3).   • atorvastatin (LIPITOR) 80 MG tablet Take 40 mg by mouth Every Night.   • carvedilol (COREG) 12.5 MG tablet Take 25 mg by mouth 2 (Two) Times a Day With Meals.   • ferrous sulfate 325 (65 FE) MG tablet    • folic acid (FOLVITE) 1 MG tablet Take 1 mg by mouth daily.   • gabapentin (NEURONTIN) 800 MG tablet Take 800 mg by mouth 3 (three) times a day.   • hydrALAZINE (APRESOLINE) 10 MG tablet Take 10 mg by mouth 3 (Three) Times a Day.   • HYDROcodone-acetaminophen (NORCO)  MG per tablet    • levothyroxine (SYNTHROID, LEVOTHROID) 25 MCG tablet Take 75 mcg by mouth Daily.   • lisinopril (PRINIVIL,ZESTRIL) 20 MG tablet Take 5 mg by mouth Daily.   • loratadine (CLARITIN) 10 MG tablet Take 10 mg by mouth daily.   • montelukast (SINGULAIR) 10 MG tablet Take 10 mg by mouth every night.   • nitroglycerin (NITROSTAT) 0.4 MG SL tablet    • pantoprazole (PROTONIX) 40 MG EC tablet Take 40 mg by mouth daily.   • vitamin B-12 (CYANOCOBALAMIN) 1000 MCG tablet Take 1,000 mcg by mouth daily.   • vitamin D (ERGOCALCIFEROL) 99232 units capsule capsule    • amLODIPine (NORVASC) 10 MG tablet Take 10 mg by mouth Daily.   • dilTIAZem CD (CARDIZEM CD) 120 MG 24 hr capsule Take 120 mg by mouth Daily.   • [DISCONTINUED] busPIRone (BUSPAR) 5 MG tablet Take 5 mg by mouth daily.     No current facility-administered medications on file prior to visit.          The following portions of the patient's history were reviewed and updated as appropriate: allergies, current medications, past family history, past medical history, past social history, past  "surgical history and problem list.    Review of Systems   Constitution: Negative.   HENT: Negative.  Negative for congestion.    Eyes: Negative.    Cardiovascular: Negative.  Negative for chest pain, cyanosis, dyspnea on exertion, irregular heartbeat, leg swelling, near-syncope, orthopnea, palpitations, paroxysmal nocturnal dyspnea and syncope.   Respiratory: Negative.  Negative for shortness of breath.    Hematologic/Lymphatic: Negative.    Musculoskeletal: Negative.    Gastrointestinal: Negative.    Neurological: Negative.  Negative for headaches.          Objective:     /68 (BP Location: Left arm, Patient Position: Sitting, Cuff Size: Large Adult)   Pulse 63   Temp 96.6 °F (35.9 °C) (Temporal)   Resp 16   Ht 157.5 cm (62.01\")   Wt 103 kg (227 lb 9.6 oz)   SpO2 95%   BMI 41.62 kg/m²   Constitutional:       General: Not in acute distress.     Appearance: Well-developed. Not diaphoretic.   Eyes:      General: No scleral icterus.     Conjunctiva/sclera: Conjunctivae normal.      Pupils: Pupils are equal, round, and reactive to light.   HENT:      Head: Normocephalic and atraumatic.    Mouth/Throat:      Pharynx: No oropharyngeal exudate.   Neck:      Musculoskeletal: Normal range of motion and neck supple.      Thyroid: No thyromegaly.      Vascular: No JVD.      Trachea: No tracheal deviation.      Lymphadenopathy: No cervical adenopathy.   Pulmonary:      Effort: Pulmonary effort is normal. No respiratory distress.      Breath sounds: Normal breath sounds.   Chest:      Chest wall: Not tender to palpatation.   Cardiovascular:      Normal rate. Regular rhythm.      No gallop.   Pulses:     Intact distal pulses. No decreased pulses.   Abdominal:      General: Bowel sounds are normal. There is no distension or abdominal bruit.      Palpations: Abdomen is soft. There is no hepatomegaly, splenomegaly or abdominal mass.      Tenderness: There is no abdominal tenderness. There is no guarding or rebound.   "   Musculoskeletal: Normal range of motion.         General: No tenderness or deformity.   Skin:     General: Skin is warm and dry.      Findings: No erythema or rash.   Neurological:      Mental Status: Alert.      Cranial Nerves: No cranial nerve deficit.      Motor: Normal muscle tone.      Coordination: Coordination normal.      Deep Tendon Reflexes: Reflexes are normal and symmetric. Reflexes normal.   Psychiatric:         Behavior: Behavior normal.         Thought Content: Thought content normal.         Judgment: Judgment normal.           Lab Review      Procedures       I personally viewed and interpreted the patient's LAB data         Assessment:     1. Coronary artery disease involving native coronary artery of native heart without angina pectoris    2. Essential hypertension    3. Mixed hyperlipidemia    4. Tobacco use          Plan:   Lab work reviewed  Cholesterol is 199 triglyceride 136 LDL was elevated at 135.  Lipitor dose has been increased    She is doing very well from cardiac standpoint she will continue aspirin beta-blocker therapy and statin therapy .    Blood pressure is very well controlled .  Hyperlipidemia is also very well controlled    Cessation of tobacco use was stressed.    Follow-up scheduled    No follow-ups on file.

## 2020-09-22 ENCOUNTER — HOSPITAL ENCOUNTER (OUTPATIENT)
Dept: ULTRASOUND IMAGING | Facility: HOSPITAL | Age: 65
Discharge: HOME | End: 2020-09-22
Admitting: INTERNAL MEDICINE

## 2020-09-22 DIAGNOSIS — N18.30 CHRONIC KIDNEY DISEASE, STAGE III (MODERATE) (HCC): ICD-10-CM

## 2020-09-22 PROCEDURE — 76775 US EXAM ABDO BACK WALL LIM: CPT | Performed by: RADIOLOGY

## 2020-09-22 PROCEDURE — 76775 US EXAM ABDO BACK WALL LIM: CPT

## 2020-10-27 ENCOUNTER — LAB REQUISITION (OUTPATIENT)
Dept: LAB | Facility: HOSPITAL | Age: 65
End: 2020-10-27

## 2020-10-27 DIAGNOSIS — Z20.828 CONTACT WITH AND (SUSPECTED) EXPOSURE TO OTHER VIRAL COMMUNICABLE DISEASES: ICD-10-CM

## 2020-10-27 PROCEDURE — U0004 COV-19 TEST NON-CDC HGH THRU: HCPCS | Performed by: NURSE PRACTITIONER

## 2020-10-28 LAB — SARS-COV-2 RNA RESP QL NAA+PROBE: NOT DETECTED

## 2021-01-11 ENCOUNTER — OFFICE VISIT (OUTPATIENT)
Dept: CARDIOLOGY | Facility: CLINIC | Age: 66
End: 2021-01-11

## 2021-01-11 DIAGNOSIS — Z72.0 TOBACCO USE: ICD-10-CM

## 2021-01-11 DIAGNOSIS — I25.10 CORONARY ARTERY DISEASE INVOLVING NATIVE CORONARY ARTERY OF NATIVE HEART WITHOUT ANGINA PECTORIS: Primary | ICD-10-CM

## 2021-01-11 DIAGNOSIS — I10 ESSENTIAL HYPERTENSION: Chronic | ICD-10-CM

## 2021-01-11 DIAGNOSIS — E78.2 MIXED HYPERLIPIDEMIA: ICD-10-CM

## 2021-01-11 PROCEDURE — 99443 PR PHYS/QHP TELEPHONE EVALUATION 21-30 MIN: CPT | Performed by: INTERNAL MEDICINE

## 2021-01-11 NOTE — PROGRESS NOTES
subjective     Chief Complaint   Patient presents with   • Coronary Artery Disease     Follow up   • Hypertension     Follow up, pt states her bp machine broke so has not been taking her bp      History of Present Illness     You have chosen to receive care through a telephone visit. Do you consent to use a telephone visit for your medical care today? Yes  Patient is 65 years old white female who is being evaluated via telephone visit.  Patient has known coronary artery disease status post drug-eluting stenting of LAD in 2011.  She denies any chest pain.  She is doing very well she is taking aspirin regularly along with the beta-blocker therapy with Coreg and statin therapy with Lipitor.  Patient has essential hypertension which has been difficult to control your blood pressure is very well controlled now she is taking Coreg, lisinopril, hydralazine and Cardizem.    Unfortunately patient continues to use tobacco.  She also has not been able to lose weight.    Past Surgical History:   Procedure Laterality Date   • ANKLE SURGERY      RIGHT   • APPENDECTOMY     • CARDIAC CATHETERIZATION      LEFT   • CORONARY STENT PLACEMENT     • HYSTERECTOMY       Family History   Problem Relation Age of Onset   • Coronary artery disease Other    • Heart disease Mother    • Heart attack Mother 73   • Fibromyalgia Mother    • Heart attack Father 72   • Ovarian cancer Sister      Past Medical History:   Diagnosis Date   • Anxiety    • Arthritis    • Coronary artery disease    • H/O heart artery stent    • Hyperlipidemia    • Hypertension    • Obesity      Patient Active Problem List   Diagnosis   • Hypertension   • Hyperlipidemia   • Migraine without aura   • Coronary artery disease status post drug-eluting stent to LAD 2011.  Chronically occluded collateralized dominant RCA high-grade stenosis of the ostium of small D1 and 60% mid RCA posterolateral wall    • Chronic renal failure, stage 2 (mild)   • Tobacco use   • Obesity (BMI  35.0-39.9 without comorbidity)   • Bilateral lower extremity edema   • Other specified hypothyroidism       Social History     Tobacco Use   • Smoking status: Current Every Day Smoker     Packs/day: 0.50     Types: Electronic Cigarette, Cigarettes   • Smokeless tobacco: Never Used   Substance Use Topics   • Alcohol use: No   • Drug use: No       No Known Allergies    Current Outpatient Medications on File Prior to Visit   Medication Sig   • acetaminophen (TYLENOL) 500 MG tablet Take 1 tablet by mouth Every 6 (Six) Hours As Needed for Mild Pain .   • amLODIPine (NORVASC) 10 MG tablet Take 10 mg by mouth Daily.   • aspirin 325 MG tablet Take 325 mg by mouth as needed for mild pain (1-3).   • atorvastatin (LIPITOR) 80 MG tablet Take 40 mg by mouth Every Night.   • carvedilol (COREG) 12.5 MG tablet Take 25 mg by mouth 2 (Two) Times a Day With Meals.   • dilTIAZem CD (CARDIZEM CD) 120 MG 24 hr capsule Take 120 mg by mouth Daily.   • ferrous sulfate 325 (65 FE) MG tablet    • folic acid (FOLVITE) 1 MG tablet Take 1 mg by mouth daily.   • gabapentin (NEURONTIN) 800 MG tablet Take 800 mg by mouth 3 (three) times a day.   • hydrALAZINE (APRESOLINE) 10 MG tablet Take 10 mg by mouth 3 (Three) Times a Day.   • HYDROcodone-acetaminophen (NORCO)  MG per tablet    • levothyroxine (SYNTHROID, LEVOTHROID) 25 MCG tablet Take 75 mcg by mouth Daily.   • lisinopril (PRINIVIL,ZESTRIL) 20 MG tablet Take 5 mg by mouth Daily.   • loratadine (CLARITIN) 10 MG tablet Take 10 mg by mouth daily.   • montelukast (SINGULAIR) 10 MG tablet Take 10 mg by mouth every night.   • nitroglycerin (NITROSTAT) 0.4 MG SL tablet    • pantoprazole (PROTONIX) 40 MG EC tablet Take 40 mg by mouth daily.   • vitamin B-12 (CYANOCOBALAMIN) 1000 MCG tablet Take 1,000 mcg by mouth daily.   • vitamin D (ERGOCALCIFEROL) 92715 units capsule capsule      No current facility-administered medications on file prior to visit.          The following portions of the  patient's history were reviewed and updated as appropriate: allergies, current medications, past family history, past medical history, past social history, past surgical history and problem list.    Review of Systems   Constitution: Negative.   HENT: Negative.  Negative for congestion.    Eyes: Negative.    Cardiovascular: Negative.  Negative for chest pain, cyanosis, dyspnea on exertion, irregular heartbeat, leg swelling, near-syncope, orthopnea, palpitations, paroxysmal nocturnal dyspnea and syncope.   Respiratory: Negative.  Negative for shortness of breath.    Hematologic/Lymphatic: Negative.    Musculoskeletal: Negative.    Gastrointestinal: Negative.    Neurological: Negative.  Negative for headaches.          Objective:     There were no vitals taken for this visit.  Blood pressure machine is broken at home she is getting a new one soon.  Patient was advised to check blood pressure closely.    Physical Exam  Not available    Lab Review  Copy of lab work will be obtained from PCP    Procedures       I personally viewed and interpreted the patient's LAB data         Assessment:     1. Coronary artery disease involving native coronary artery of native heart without angina pectoris    2. Essential hypertension    3. Mixed hyperlipidemia    4. Tobacco use          Plan:     Patient has known coronary artery disease status post drug-eluting stent.  She is doing very well denies any chest pain.  She was advised to continue antiplatelet therapy with aspirin, statin therapy with Lipitor and beta-blocker therapy with Coreg.    Healthy lifestyle emphasized.  Cessation of tobacco use stressed.  Weight loss and dietary restriction emphasized.  Patient has hyperlipidemia, she had lab work done which will be checked.  Request of lab work from PCP sent.  Blood pressure control according to her is much better.  COVID-19 precautions discussed.  Follow-up scheduled  This visit has been rescheduled as a phone visit to comply  with patient safety concerns in accordance with CDC recommendations. Total time of discussion was 25 minutes.          No follow-ups on file.

## 2021-03-08 ENCOUNTER — TELEPHONE (OUTPATIENT)
Dept: CARDIOLOGY | Facility: CLINIC | Age: 66
End: 2021-03-08

## 2021-03-24 ENCOUNTER — IMMUNIZATION (OUTPATIENT)
Dept: VACCINE CLINIC | Facility: HOSPITAL | Age: 66
End: 2021-03-24

## 2021-03-24 PROCEDURE — 0001A: CPT | Performed by: INTERNAL MEDICINE

## 2021-03-24 PROCEDURE — 91300 HC SARSCOV02 VAC 30MCG/0.3ML IM: CPT | Performed by: INTERNAL MEDICINE

## 2021-04-14 ENCOUNTER — IMMUNIZATION (OUTPATIENT)
Dept: VACCINE CLINIC | Facility: HOSPITAL | Age: 66
End: 2021-04-14

## 2021-04-14 PROCEDURE — 91300 HC SARSCOV02 VAC 30MCG/0.3ML IM: CPT | Performed by: INTERNAL MEDICINE

## 2021-04-14 PROCEDURE — 0002A: CPT | Performed by: INTERNAL MEDICINE

## 2022-02-07 ENCOUNTER — APPOINTMENT (OUTPATIENT)
Dept: MAMMOGRAPHY | Facility: HOSPITAL | Age: 67
End: 2022-02-07

## 2022-02-07 ENCOUNTER — APPOINTMENT (OUTPATIENT)
Dept: BONE DENSITY | Facility: HOSPITAL | Age: 67
End: 2022-02-07

## 2022-02-10 ENCOUNTER — OFFICE VISIT (OUTPATIENT)
Dept: CARDIOLOGY | Facility: CLINIC | Age: 67
End: 2022-02-10

## 2022-02-10 VITALS
HEIGHT: 62 IN | OXYGEN SATURATION: 98 % | TEMPERATURE: 97.3 F | DIASTOLIC BLOOD PRESSURE: 82 MMHG | SYSTOLIC BLOOD PRESSURE: 124 MMHG | BODY MASS INDEX: 43.79 KG/M2 | WEIGHT: 238 LBS

## 2022-02-10 DIAGNOSIS — I10 PRIMARY HYPERTENSION: Chronic | ICD-10-CM

## 2022-02-10 DIAGNOSIS — E78.2 MIXED HYPERLIPIDEMIA: ICD-10-CM

## 2022-02-10 DIAGNOSIS — E66.01 CLASS 3 SEVERE OBESITY DUE TO EXCESS CALORIES WITHOUT SERIOUS COMORBIDITY WITH BODY MASS INDEX (BMI) OF 40.0 TO 44.9 IN ADULT: ICD-10-CM

## 2022-02-10 DIAGNOSIS — F17.210 CIGARETTE SMOKER: ICD-10-CM

## 2022-02-10 DIAGNOSIS — I25.10 CORONARY ARTERY DISEASE INVOLVING NATIVE CORONARY ARTERY OF NATIVE HEART WITHOUT ANGINA PECTORIS: Primary | ICD-10-CM

## 2022-02-10 DIAGNOSIS — Z72.0 TOBACCO USE: ICD-10-CM

## 2022-02-10 PROBLEM — E66.2 HYPOVENTILATION ASSOCIATED WITH OBESITY (HCC): Status: ACTIVE | Noted: 2022-02-10

## 2022-02-10 PROBLEM — E66.813 CLASS 3 SEVERE OBESITY WITHOUT SERIOUS COMORBIDITY WITH BODY MASS INDEX (BMI) OF 40.0 TO 44.9 IN ADULT: Status: ACTIVE | Noted: 2022-02-10

## 2022-02-10 PROBLEM — E66.9 OBESITY (BMI 35.0-39.9 WITHOUT COMORBIDITY): Status: RESOLVED | Noted: 2018-05-29 | Resolved: 2022-02-10

## 2022-02-10 PROBLEM — E66.2 HYPOVENTILATION ASSOCIATED WITH OBESITY (HCC): Status: RESOLVED | Noted: 2022-02-10 | Resolved: 2022-02-10

## 2022-02-10 PROCEDURE — 93000 ELECTROCARDIOGRAM COMPLETE: CPT | Performed by: INTERNAL MEDICINE

## 2022-02-10 PROCEDURE — 99214 OFFICE O/P EST MOD 30 MIN: CPT | Performed by: INTERNAL MEDICINE

## 2022-02-10 RX ORDER — ROSUVASTATIN CALCIUM 20 MG/1
1 TABLET, COATED ORAL DAILY
COMMUNITY
Start: 2022-02-02

## 2022-02-10 RX ORDER — LISINOPRIL 5 MG/1
1 TABLET ORAL DAILY
COMMUNITY
Start: 2022-02-02

## 2022-02-10 RX ORDER — CETIRIZINE HYDROCHLORIDE 10 MG/1
1 TABLET ORAL DAILY
COMMUNITY
Start: 2022-02-02

## 2022-02-10 RX ORDER — LEVOTHYROXINE SODIUM 0.07 MG/1
1 TABLET ORAL DAILY
COMMUNITY
Start: 2022-02-02

## 2022-02-10 RX ORDER — CARVEDILOL 25 MG/1
1 TABLET ORAL DAILY
COMMUNITY
Start: 2022-02-02

## 2022-02-10 NOTE — PROGRESS NOTES
subjective     Chief Complaint   Patient presents with   • Coronary Artery Disease     follow up     History of Present Illness  Lesley 66 years old white female who is here for cardiology follow-up.  She has known coronary artery disease requiring drug-eluting stent to LAD in 2011  She has chronically occluded collateralized dominant RCA      She has been doing very well she has residual disease high-grade stenosis of the ostium of a small D1 and 60% mid RCA posterolateral wall branch.    Is currently taking Coreg 25 twice daily, statin therapy with Crestor and aspirin    Blood pressure is very well controlled she is taking hydralazine 10 mg 3 times daily, lisinopril 5 mg daily Coreg 25 twice daily.    Also states that she has renal failure and potassium was high.  We will check her lab work  Copy of lab work requested.  She is doing fairly well from cardiac standpoint at this time.    She is morbidly obese and has not been able to lose any weight.  She also continues to smoke.    Past Surgical History:   Procedure Laterality Date   • ANKLE SURGERY      RIGHT   • APPENDECTOMY     • CARDIAC CATHETERIZATION      LEFT   • CORONARY STENT PLACEMENT     • HYSTERECTOMY       Family History   Problem Relation Age of Onset   • Coronary artery disease Other    • Heart disease Mother    • Heart attack Mother 73   • Fibromyalgia Mother    • Heart attack Father 72   • Ovarian cancer Sister      Past Medical History:   Diagnosis Date   • Anxiety    • Arthritis    • Coronary artery disease    • H/O heart artery stent    • Hyperlipidemia    • Hypertension    • Obesity      Patient Active Problem List   Diagnosis   • Hypertension   • Hyperlipidemia   • Migraine without aura   • CAD, chronically occluded collateralized dominant RCA, status post drug-eluting stent to LAD 2011, high-grade stenosis of the ostium of the small D1 and 60% mid RCA posterior lateral branch   • Chronic renal failure, stage 2 (mild)   • Tobacco use   •  Bilateral lower extremity edema   • Other specified hypothyroidism   • Cigarette smoker   • Class 3 severe obesity without serious comorbidity with body mass index (BMI) of 40.0 to 44.9 in adult (HCC)       Social History     Tobacco Use   • Smoking status: Current Every Day Smoker     Packs/day: 0.50     Types: Electronic Cigarette, Cigarettes   • Smokeless tobacco: Never Used   Substance Use Topics   • Alcohol use: No   • Drug use: No       No Known Allergies    Current Outpatient Medications on File Prior to Visit   Medication Sig   • acetaminophen (TYLENOL) 500 MG tablet Take 1 tablet by mouth Every 6 (Six) Hours As Needed for Mild Pain .   • aspirin 325 MG tablet Take 325 mg by mouth as needed for mild pain (1-3).   • carvedilol (COREG) 25 MG tablet Take 1 tablet by mouth Daily.   • cetirizine (zyrTEC) 10 MG tablet Take 1 tablet by mouth Daily.   • folic acid (FOLVITE) 1 MG tablet Take 1 mg by mouth daily.   • gabapentin (NEURONTIN) 800 MG tablet Take 800 mg by mouth 3 (three) times a day.   • hydrALAZINE (APRESOLINE) 10 MG tablet Take 10 mg by mouth 3 (Three) Times a Day.   • HYDROcodone-acetaminophen (NORCO)  MG per tablet    • levothyroxine (SYNTHROID, LEVOTHROID) 75 MCG tablet Take 1 tablet by mouth Daily.   • lisinopril (PRINIVIL,ZESTRIL) 5 MG tablet Take 1 tablet by mouth Daily.   • montelukast (SINGULAIR) 10 MG tablet Take 10 mg by mouth every night.   • nitroglycerin (NITROSTAT) 0.4 MG SL tablet Place 0.4 mg under the tongue Every 5 (Five) Minutes As Needed.   • pantoprazole (PROTONIX) 40 MG EC tablet Take 40 mg by mouth daily.   • rosuvastatin (CRESTOR) 20 MG tablet Take 1 tablet by mouth Daily.   • vitamin B-12 (CYANOCOBALAMIN) 1000 MCG tablet Take 1,000 mcg by mouth daily.   • vitamin D (ERGOCALCIFEROL) 34402 units capsule capsule    • [DISCONTINUED] amLODIPine (NORVASC) 10 MG tablet Take 10 mg by mouth Daily.   • [DISCONTINUED] atorvastatin (LIPITOR) 80 MG tablet Take 40 mg by mouth Every  "Night.   • [DISCONTINUED] carvedilol (COREG) 12.5 MG tablet Take 25 mg by mouth 2 (Two) Times a Day With Meals.   • [DISCONTINUED] dilTIAZem CD (CARDIZEM CD) 120 MG 24 hr capsule Take 120 mg by mouth Daily.   • [DISCONTINUED] ferrous sulfate 325 (65 FE) MG tablet    • [DISCONTINUED] levothyroxine (SYNTHROID, LEVOTHROID) 25 MCG tablet Take 75 mcg by mouth Daily.   • [DISCONTINUED] lisinopril (PRINIVIL,ZESTRIL) 20 MG tablet Take 5 mg by mouth Daily.   • [DISCONTINUED] loratadine (CLARITIN) 10 MG tablet Take 10 mg by mouth daily.     No current facility-administered medications on file prior to visit.         The following portions of the patient's history were reviewed and updated as appropriate: allergies, current medications, past family history, past medical history, past social history, past surgical history and problem list.    Review of Systems   Constitutional: Negative.   HENT: Negative.  Negative for congestion.    Eyes: Negative.    Cardiovascular: Negative.  Negative for chest pain, cyanosis, dyspnea on exertion, irregular heartbeat, leg swelling, near-syncope, orthopnea, palpitations, paroxysmal nocturnal dyspnea and syncope.   Respiratory: Negative.  Negative for shortness of breath.    Hematologic/Lymphatic: Negative.    Musculoskeletal: Negative.    Gastrointestinal: Negative.    Neurological: Negative.  Negative for headaches.          Objective:     /82 (BP Location: Left arm, Patient Position: Sitting, Cuff Size: Adult)   Temp 97.3 °F (36.3 °C)   Ht 157.5 cm (62\")   Wt 108 kg (238 lb)   SpO2 98%   BMI 43.53 kg/m²   Pulmonary:      Effort: Pulmonary effort is normal.      Breath sounds: Normal breath sounds. No stridor. No wheezing. No rhonchi. No rales.   Cardiovascular:      PMI at left midclavicular line. Normal rate. Regular rhythm. Normal S1. Normal S2.      Murmurs: There is no murmur.      No gallop. No click. No rub.   Pulses:     Intact distal pulses.   Edema:     Peripheral edema " absent.           Lab Review  Copy of lab work requested    ECG 12 Lead    Date/Time: 2/10/2022 2:13 PM  Performed by: Rebecca Echeverria MD  Authorized by: Rebecca Echeverria MD   Rhythm comments: Junctional rhythm  Rate: normal  BPM: 64  Conduction: conduction normal  QRS axis: normal  Other findings: non-specific ST-T wave changes               I personally viewed and interpreted the patient's LAB data         Assessment:     1. Coronary artery disease involving native coronary artery of native heart without angina pectoris    2. Cigarette smoker    3. Class 3 severe obesity due to excess calories without serious comorbidity with body mass index (BMI) of 40.0 to 44.9 in adult (HCC)    4. Primary hypertension    5. Mixed hyperlipidemia    6. Tobacco use          Plan:     Patient has known coronary artery disease as described.  She is taking antiplatelet therapy statin therapy and beta-blocker therapy.  She is asymptomatic from cardiac standpoint at this point.  Healthy lifestyle emphasized.  Unfortunately she continues to smoke and has not been able to lose weight.  Weight loss and cessation of tobacco use was strongly urged.  Blood pressure is very well controlled.  She states that her potassium runs high  She is taking lisinopril.  We will check copy of lab work from PCP.  Healthy lifestyle emphasized follow-up scheduled.  EKG today does not show any acute changes        No follow-ups on file.

## 2022-02-16 ENCOUNTER — APPOINTMENT (OUTPATIENT)
Dept: MAMMOGRAPHY | Facility: HOSPITAL | Age: 67
End: 2022-02-16

## 2022-02-16 ENCOUNTER — APPOINTMENT (OUTPATIENT)
Dept: BONE DENSITY | Facility: HOSPITAL | Age: 67
End: 2022-02-16

## 2022-06-07 ENCOUNTER — HOSPITAL ENCOUNTER (OUTPATIENT)
Dept: MAMMOGRAPHY | Facility: HOSPITAL | Age: 67
Discharge: HOME | End: 2022-06-07

## 2022-06-07 ENCOUNTER — HOSPITAL ENCOUNTER (OUTPATIENT)
Dept: BONE DENSITY | Facility: HOSPITAL | Age: 67
Discharge: HOME | End: 2022-06-07

## 2022-06-07 DIAGNOSIS — L70.9 SAPHO SYNDROME: ICD-10-CM

## 2022-06-07 DIAGNOSIS — M85.80 SAPHO SYNDROME: ICD-10-CM

## 2022-06-07 DIAGNOSIS — M86.9 SAPHO SYNDROME: ICD-10-CM

## 2022-06-07 DIAGNOSIS — M65.9 SAPHO SYNDROME: ICD-10-CM

## 2022-06-07 DIAGNOSIS — Z12.31 VISIT FOR SCREENING MAMMOGRAM: ICD-10-CM

## 2022-06-07 DIAGNOSIS — L40.3 SAPHO SYNDROME: ICD-10-CM

## 2022-06-07 PROCEDURE — 77080 DXA BONE DENSITY AXIAL: CPT | Performed by: RADIOLOGY

## 2022-06-07 PROCEDURE — 77063 BREAST TOMOSYNTHESIS BI: CPT

## 2022-06-07 PROCEDURE — 77067 SCR MAMMO BI INCL CAD: CPT

## 2022-06-07 PROCEDURE — 77063 BREAST TOMOSYNTHESIS BI: CPT | Performed by: RADIOLOGY

## 2022-06-07 PROCEDURE — 77067 SCR MAMMO BI INCL CAD: CPT | Performed by: RADIOLOGY

## 2022-06-07 PROCEDURE — 77080 DXA BONE DENSITY AXIAL: CPT

## 2023-03-02 ENCOUNTER — OFFICE VISIT (OUTPATIENT)
Dept: CARDIOLOGY | Facility: CLINIC | Age: 68
End: 2023-03-02
Payer: MEDICARE

## 2023-03-02 VITALS — WEIGHT: 180 LBS | HEIGHT: 61 IN | BODY MASS INDEX: 33.99 KG/M2

## 2023-03-02 DIAGNOSIS — I10 PRIMARY HYPERTENSION: Chronic | ICD-10-CM

## 2023-03-02 DIAGNOSIS — E78.2 MIXED HYPERLIPIDEMIA: ICD-10-CM

## 2023-03-02 DIAGNOSIS — F17.210 CIGARETTE SMOKER: ICD-10-CM

## 2023-03-02 DIAGNOSIS — E66.01 CLASS 3 SEVERE OBESITY DUE TO EXCESS CALORIES WITHOUT SERIOUS COMORBIDITY WITH BODY MASS INDEX (BMI) OF 40.0 TO 44.9 IN ADULT: ICD-10-CM

## 2023-03-02 DIAGNOSIS — I25.10 CORONARY ARTERY DISEASE INVOLVING NATIVE CORONARY ARTERY OF NATIVE HEART WITHOUT ANGINA PECTORIS: Primary | ICD-10-CM

## 2023-03-02 PROCEDURE — 99443 PR PHYS/QHP TELEPHONE EVALUATION 21-30 MIN: CPT | Performed by: INTERNAL MEDICINE

## 2023-03-02 RX ORDER — NITROGLYCERIN 0.4 MG/1
0.4 TABLET SUBLINGUAL
Qty: 30 TABLET | Refills: 2 | Status: SHIPPED | OUTPATIENT
Start: 2023-03-02

## 2023-03-02 NOTE — PROGRESS NOTES
subjective     Chief Complaint   Patient presents with   • Follow-up     ROUTINE     History of Present Illness  You have chosen to receive care through a telephone visit. Do you consent to use a telephone visit for your medical care today? Yes    Lesley is 67 years old white female who is being evaluated via telephone visit.  She stated that she has been having cold and runny nose but no fever.    She has history of coronary artery disease with chronically occluded well collateralized dominant RCA, status post drug-eluting stent to LAD with high-grade stenosis of the ostium of the small D1.  She also has 60% mid RCA posterolateral branch lesion.  She is physically quite active and denies any chest pain palpitations or shortness of breath.  She wants refill of nitroglycerin but has not been having any chest pain.    Hyperlipidemia  She is taking Crestor 20 mg daily and plans to have lab work done this week.  She is also trying to diet but she is overweight and has been having some difficulty in losing weight.    Hypertension  Blood pressure according to her is well controlled she is taking lisinopril and hydralazine.    Ongoing tobacco use.  She is still smoking half a pack of cigarettes daily.    Past Surgical History:   Procedure Laterality Date   • ANKLE SURGERY      RIGHT   • APPENDECTOMY     • CARDIAC CATHETERIZATION      LEFT   • CORONARY STENT PLACEMENT     • HYSTERECTOMY       Family History   Problem Relation Age of Onset   • Heart disease Mother    • Heart attack Mother 73   • Fibromyalgia Mother    • Heart attack Father 72   • Ovarian cancer Sister    • Coronary artery disease Other    • Breast cancer Neg Hx      Past Medical History:   Diagnosis Date   • Anxiety    • Arthritis    • Coronary artery disease    • H/O heart artery stent    • Hyperlipidemia    • Hypertension    • Obesity      Patient Active Problem List   Diagnosis   • Hypertension   • Hyperlipidemia   • Migraine without aura   • CAD,  chronically occluded collateralized dominant RCA, status post drug-eluting stent to LAD 2011, high-grade stenosis of the ostium of the small D1 and 60% mid RCA posterior lateral branch   • Chronic renal failure, stage 2 (mild)   • Tobacco use   • Bilateral lower extremity edema   • Other specified hypothyroidism   • Cigarette smoker   • Class 3 severe obesity without serious comorbidity with body mass index (BMI) of 40.0 to 44.9 in adult (HCC)       Social History     Tobacco Use   • Smoking status: Every Day     Packs/day: 0.50     Types: Electronic Cigarette, Cigarettes   • Smokeless tobacco: Never   Substance Use Topics   • Alcohol use: No   • Drug use: No       No Known Allergies    Current Outpatient Medications on File Prior to Visit   Medication Sig   • aspirin 325 MG tablet Take 1 tablet by mouth As Needed for Mild Pain.   • carvedilol (COREG) 25 MG tablet Take 1 tablet by mouth Daily.   • cetirizine (zyrTEC) 10 MG tablet Take 1 tablet by mouth Daily.   • folic acid (FOLVITE) 1 MG tablet Take 1 tablet by mouth Daily.   • gabapentin (NEURONTIN) 800 MG tablet Take 1 tablet by mouth 3 (Three) Times a Day.   • hydrALAZINE (APRESOLINE) 10 MG tablet Take 1 tablet by mouth 3 (Three) Times a Day.   • HYDROcodone-acetaminophen (NORCO)  MG per tablet    • lisinopril (PRINIVIL,ZESTRIL) 5 MG tablet Take 1 tablet by mouth Daily.   • montelukast (SINGULAIR) 10 MG tablet Take 1 tablet by mouth Every Night.   • pantoprazole (PROTONIX) 40 MG EC tablet Take 1 tablet by mouth Daily.   • rosuvastatin (CRESTOR) 20 MG tablet Take 1 tablet by mouth Daily.   • vitamin B-12 (CYANOCOBALAMIN) 1000 MCG tablet Take 1 tablet by mouth Daily.   • vitamin D (ERGOCALCIFEROL) 36737 units capsule capsule    • acetaminophen (TYLENOL) 500 MG tablet Take 1 tablet by mouth Every 6 (Six) Hours As Needed for Mild Pain .   • levothyroxine (SYNTHROID, LEVOTHROID) 75 MCG tablet Take 1 tablet by mouth Daily.   • [DISCONTINUED] nitroglycerin  "(NITROSTAT) 0.4 MG SL tablet Place 1 tablet under the tongue Every 5 (Five) Minutes As Needed.     No current facility-administered medications on file prior to visit.         The following portions of the patient's history were reviewed and updated as appropriate: allergies, current medications, past family history, past medical history, past social history, past surgical history and problem list.    Review of Systems   Constitutional: Negative.   HENT: Negative.  Negative for congestion.    Eyes: Negative.    Cardiovascular: Negative.  Negative for chest pain, cyanosis, dyspnea on exertion, irregular heartbeat, leg swelling, near-syncope, orthopnea, palpitations, paroxysmal nocturnal dyspnea and syncope.   Respiratory: Negative.  Negative for shortness of breath.    Hematologic/Lymphatic: Negative.    Musculoskeletal: Negative.    Gastrointestinal: Negative.    Neurological: Negative.  Negative for headaches.          Objective:     Ht 154.9 cm (61\")   Wt 81.6 kg (180 lb) Comment: STATED  BMI 34.01 kg/m²   Physical Exam      Lab Review  Lab work tomorrow including CBC, CMP, lipid panel and thyroid function    Procedures    We will check EKG next visit.  I personally viewed and interpreted the patient's LAB data         Assessment:     1. Coronary artery disease involving native coronary artery of native heart without angina pectoris    2. Mixed hyperlipidemia    3. Primary hypertension    4. Class 3 severe obesity due to excess calories without serious comorbidity with body mass index (BMI) of 40.0 to 44.9 in adult (HCC)    5. Cigarette smoker          Plan:     Coronary artery disease  Lesley is 67 years old white female who has known coronary artery disease with totally occluded RCA which is well collateralized.  She also has history of LAD stent .  She is taking antiplatelet therapy with aspirin, statin therapy with Crestor.  Fortunately she continues to smoke half pack of cigarettes daily and also has not " been able to lose weight.  Healthy lifestyle and aggressive risk factor modification again emphasized.  She will have EKG and further ischemic work-up next visit.  Hyperlipidemia on Crestor therapy lab work next week.  Hypertension well-controlled with lisinopril and hydralazine.  Refill of nitroglycerin was given.  Follow-up scheduled  This visit has been rescheduled as a phone visit to comply with patient safety concerns in accordance with CDC recommendations. Total time of discussion was 25 minutes.    No follow-ups on file.

## 2023-09-26 ENCOUNTER — TELEPHONE (OUTPATIENT)
Dept: CARDIOLOGY | Facility: CLINIC | Age: 68
End: 2023-09-26
Payer: MEDICARE

## 2023-09-26 NOTE — TELEPHONE ENCOUNTER
Caller: Lesley Michel    Relationship to patient: Self    Best call back number: [phone]    Chief complaint: SWELLING IN HER FEET    Type of visit: FOLLOW UP    Requested date:  ASAP ON A MONDAY/FRIDAY.        Additional notes:PATIENT LAST IN OFFICE IN MARCH 2023.  PLEASE REACH OUT TO PATIENT TO SCHEDULE.  PATIENT NEEDS A MONDAY OR FRIDAY APPOINTMENT

## 2023-10-06 ENCOUNTER — TELEPHONE (OUTPATIENT)
Dept: CARDIOLOGY | Facility: CLINIC | Age: 68
End: 2023-10-06

## 2023-10-06 ENCOUNTER — OFFICE VISIT (OUTPATIENT)
Dept: CARDIOLOGY | Facility: CLINIC | Age: 68
End: 2023-10-06
Payer: MEDICARE

## 2023-10-06 VITALS
RESPIRATION RATE: 18 BRPM | DIASTOLIC BLOOD PRESSURE: 117 MMHG | WEIGHT: 225.8 LBS | HEART RATE: 80 BPM | SYSTOLIC BLOOD PRESSURE: 185 MMHG | BODY MASS INDEX: 42.66 KG/M2 | OXYGEN SATURATION: 92 %

## 2023-10-06 DIAGNOSIS — R06.00 DYSPNEA, UNSPECIFIED TYPE: ICD-10-CM

## 2023-10-06 DIAGNOSIS — I10 PRIMARY HYPERTENSION: ICD-10-CM

## 2023-10-06 DIAGNOSIS — R60.0 BILATERAL LOWER EXTREMITY EDEMA: ICD-10-CM

## 2023-10-06 DIAGNOSIS — I25.10 CORONARY ARTERY DISEASE INVOLVING NATIVE CORONARY ARTERY OF NATIVE HEART WITHOUT ANGINA PECTORIS: Primary | ICD-10-CM

## 2023-10-06 DIAGNOSIS — E78.2 MIXED HYPERLIPIDEMIA: ICD-10-CM

## 2023-10-06 DIAGNOSIS — N18.2 CHRONIC RENAL FAILURE, STAGE 2 (MILD): ICD-10-CM

## 2023-10-06 DIAGNOSIS — Z72.0 TOBACCO USE: ICD-10-CM

## 2023-10-06 RX ORDER — ROSUVASTATIN CALCIUM 20 MG/1
20 TABLET, COATED ORAL DAILY
Qty: 30 TABLET | Refills: 0 | Status: ON HOLD | OUTPATIENT
Start: 2023-10-06

## 2023-10-06 RX ORDER — CARVEDILOL 25 MG/1
25 TABLET ORAL 2 TIMES DAILY WITH MEALS
Qty: 60 TABLET | Refills: 0 | Status: ON HOLD | OUTPATIENT
Start: 2023-10-06

## 2023-10-06 RX ORDER — LEVOTHYROXINE SODIUM 88 UG/1
88 TABLET ORAL DAILY
Status: ON HOLD | COMMUNITY
Start: 2023-09-18

## 2023-10-06 RX ORDER — HYDRALAZINE HYDROCHLORIDE 25 MG/1
25 TABLET, FILM COATED ORAL 2 TIMES WEEKLY
Qty: 60 TABLET | Refills: 0 | Status: SHIPPED | OUTPATIENT
Start: 2023-10-09 | End: 2023-10-06 | Stop reason: SDUPTHER

## 2023-10-06 RX ORDER — HYDRALAZINE HYDROCHLORIDE 25 MG/1
25 TABLET, FILM COATED ORAL 2 TIMES DAILY
Qty: 60 TABLET | Refills: 0 | Status: ON HOLD | OUTPATIENT
Start: 2023-10-06

## 2023-10-06 NOTE — LETTER
October 6, 2023     DEEPA Kim  [address] [facility] [address]    Patient: Lesley Michel   YOB: 1955   Date of Visit: 10/6/2023       Dear DEEPA Kim    Lesley Michel was in my office today. Below is a copy of my note.    If you have questions, please do not hesitate to call me. I look forward to following Lesley along with you.         Sincerely,        DEEPA Villarreal        CC: No Recipients    Subjective    Lesley Michel is a 67 y.o. female.   Chief Complaint   Patient presents with   • Shortness of Breath     Panic attacks, 3-4 times daily, 2 wks duration      History of Present Illness   Lesley Michel is a 67-year-old female who presents to clinic today for delayed cardiology follow-up.     Coronary artery disease with chronically occluded well collateralized dominant RCA, status post drug-eluting stent to LAD with high-grade stenosis of the ostium of the small D1.  She also has 60% mid RCA posterolateral branch lesion.  She is currently on aspirin.  She reportedly is taking Crestor and Coreg however Coreg is not currently on her medication list from the pharmacy but patient feels she is taking it.  She denies chest pain.  She denies any recent nitroglycerin use.     Hyperlipidemia currently taking Crestor 20 mg daily.  No recent labs available for review.  She reports compliance with therapy however verification through the pharmacy reports she has not filled in 2 months.     Hypertension currently on lisinopril 5 mg daily.  She was supposed to be taking hydralazine and Coreg but pharmacy reports no refills since January 2023.  She is not monitoring BP at home.  She continues to smoke.    She states over the last couple weeks that she has noticed some mild dyspnea, orthopnea and bilateral lower extremity swelling.  She is not currently taking diuretic therapy.  She reports underlying CKD however has follow-up with nephrology since  pandemic.    Patient Active Problem List   Diagnosis   • Hypertension   • Hyperlipidemia   • Migraine without aura   • CAD, chronically occluded collateralized dominant RCA, status post drug-eluting stent to LAD 2011, high-grade stenosis of the ostium of the small D1 and 60% mid RCA posterior lateral branch   • Chronic renal failure, stage 2 (mild)   • Tobacco use   • Bilateral lower extremity edema   • Other specified hypothyroidism   • Cigarette smoker   • Class 3 severe obesity without serious comorbidity with body mass index (BMI) of 40.0 to 44.9 in adult     Past Medical History:   Diagnosis Date   • Anxiety    • Arthritis    • Coronary artery disease    • H/O heart artery stent    • Hyperlipidemia    • Hypertension    • Obesity      Past Surgical History:   Procedure Laterality Date   • ANKLE SURGERY      RIGHT   • APPENDECTOMY     • CARDIAC CATHETERIZATION      LEFT   • CORONARY STENT PLACEMENT     • HYSTERECTOMY         Family History   Problem Relation Age of Onset   • Heart disease Mother    • Heart attack Mother 73   • Fibromyalgia Mother    • Heart attack Father 72   • Ovarian cancer Sister    • Coronary artery disease Other    • Breast cancer Neg Hx      Social History     Tobacco Use   • Smoking status: Every Day     Packs/day: 0.50     Types: Electronic Cigarette, Cigarettes   • Smokeless tobacco: Never   Substance Use Topics   • Alcohol use: No   • Drug use: No         The following portions of the patient's history were reviewed and updated as appropriate: allergies, current medications, past family history, past medical history, past social history, past surgical history and problem list.    No Known Allergies      Current Outpatient Medications:   •  aspirin 325 MG tablet, Take 1 tablet by mouth As Needed for Mild Pain., Disp: , Rfl:   •  carvedilol (COREG) 25 MG tablet, Take 1 tablet by mouth 2 (Two) Times a Day With Meals., Disp: 60 tablet, Rfl: 0  •  folic acid (FOLVITE) 1 MG tablet, Take 1  tablet by mouth Daily., Disp: , Rfl:   •  gabapentin (NEURONTIN) 800 MG tablet, Take 1 tablet by mouth 3 (Three) Times a Day., Disp: , Rfl:   •  [START ON 10/9/2023] hydrALAZINE (APRESOLINE) 25 MG tablet, Take 1 tablet by mouth 2 (Two) Times a Week., Disp: 60 tablet, Rfl: 0  •  HYDROcodone-acetaminophen (NORCO)  MG per tablet, , Disp: , Rfl:   •  levothyroxine (SYNTHROID, LEVOTHROID) 88 MCG tablet, Take 1 tablet by mouth Daily., Disp: , Rfl:   •  lisinopril (PRINIVIL,ZESTRIL) 5 MG tablet, Take 1 tablet by mouth Daily., Disp: , Rfl:   •  montelukast (SINGULAIR) 10 MG tablet, Take 1 tablet by mouth Every Night., Disp: , Rfl:   •  nitroglycerin (NITROSTAT) 0.4 MG SL tablet, Place 1 tablet under the tongue Every 5 (Five) Minutes As Needed for Chest Pain., Disp: 30 tablet, Rfl: 2  •  pantoprazole (PROTONIX) 40 MG EC tablet, Take 1 tablet by mouth Daily., Disp: , Rfl:   •  rosuvastatin (CRESTOR) 20 MG tablet, Take 1 tablet by mouth Daily., Disp: 30 tablet, Rfl: 0  •  vitamin B-12 (CYANOCOBALAMIN) 1000 MCG tablet, Take 1 tablet by mouth Daily., Disp: , Rfl:   •  vitamin D (ERGOCALCIFEROL) 58341 units capsule capsule, , Disp: , Rfl:   •  acetaminophen (TYLENOL) 500 MG tablet, Take 1 tablet by mouth Every 6 (Six) Hours As Needed for Mild Pain . (Patient not taking: Reported on 10/6/2023), Disp: 60 tablet, Rfl: 0  •  cetirizine (zyrTEC) 10 MG tablet, Take 1 tablet by mouth Daily. (Patient not taking: Reported on 10/6/2023), Disp: , Rfl:     Review of Systems   Constitutional:  Negative for activity change, appetite change, chills, diaphoresis, fatigue and fever.   HENT:  Negative for congestion, drooling, ear discharge, ear pain, mouth sores, nosebleeds, postnasal drip, rhinorrhea, sinus pressure, sneezing and sore throat.    Eyes:  Negative for pain, discharge and visual disturbance.   Respiratory:  Positive for shortness of breath. Negative for cough, chest tightness and wheezing.    Cardiovascular:  Positive for leg  swelling. Negative for chest pain and palpitations.   Gastrointestinal:  Negative for abdominal pain, constipation, diarrhea, nausea and vomiting.   Endocrine: Negative for cold intolerance, heat intolerance, polydipsia, polyphagia and polyuria.   Musculoskeletal:  Negative for arthralgias, myalgias and neck pain.   Skin:  Negative for rash and wound.   Neurological:  Negative for syncope, speech difficulty, weakness, light-headedness and headaches.   Hematological:  Negative for adenopathy. Does not bruise/bleed easily.   Psychiatric/Behavioral:  Negative for confusion, dysphoric mood and sleep disturbance. The patient is not nervous/anxious.    All other systems reviewed and are negative.    BP (!) 185/117 (BP Location: Right arm, Patient Position: Sitting, Cuff Size: Adult)   Pulse 80   Resp 18   Wt 102 kg (225 lb 12.8 oz)   SpO2 92%   BMI 42.66 kg/m²     Objective  No Known Allergies    Physical Exam  Vitals reviewed.   Constitutional:       Appearance: Normal appearance. She is well-developed. She is obese.   HENT:      Head: Normocephalic.   Eyes:      Conjunctiva/sclera: Conjunctivae normal.   Neck:      Thyroid: No thyromegaly.      Vascular: No carotid bruit or JVD.   Cardiovascular:      Rate and Rhythm: Normal rate and regular rhythm.   Pulmonary:      Effort: Pulmonary effort is normal.      Breath sounds: Wheezing present.   Musculoskeletal:      Cervical back: Neck supple.      Right lower leg: Edema present.      Left lower leg: Edema present.   Skin:     General: Skin is warm and dry.   Neurological:      Mental Status: She is alert and oriented to person, place, and time.   Psychiatric:         Attention and Perception: Attention normal.         Mood and Affect: Mood normal.         Speech: Speech normal.         Behavior: Behavior normal. Behavior is cooperative.         Cognition and Memory: Cognition normal.         ECG 12 Lead    Date/Time: 10/6/2023 12:19 PM  Performed by: Imelda Woodall  DEEPA MAGALLANES  Authorized by: Imelda Woodall APRN   Comparison: compared with previous ECG   Similar to previous ECG  Rhythm: sinus rhythm  Rate: normal  BPM: 77  Other findings: non-specific ST-T wave changes    Clinical impression: non-specific ECG  Comments: QT/QTc - 354/386              Assessment & Plan  Diagnoses and all orders for this visit:    1. Coronary artery disease involving native coronary artery of native heart without angina pectoris (Primary)  -     ECG 12 Lead  Asymptomatic, EKG stable, continue aspirin, plan to reinitiate Crestor and Coreg.  Consider ischemic work-up in the near future    2. Dyspnea, unspecified type  -     BNP; Future  -     XR Chest 2 View; Future  Further work-up is recommended    3. Bilateral lower extremity edema  Further work-up was recommended  Sodium restrictions recommended    4. Primary hypertension  -     Blood Pressure Device  Uncontrolled  Discussed with pharmacy routine medicine refills and they report she has not filled hydralazine or Coreg since January however patient feels she is taking Coreg. will ensure she resumes Coreg and hydralazine, recommend close monitoring of BP    5. Mixed hyperlipidemia  -     Comprehensive Metabolic Panel; Future  -     Lipid Panel; Future  -     CK; Future  Refill Crestor, heart healthy    6. Chronic renal failure, stage 2 (mild)  Continue to monitor     7. Tobacco use  Recommend avoidance of tobacco use    Other orders  -     carvedilol (COREG) 25 MG tablet; Take 1 tablet by mouth 2 (Two) Times a Day With Meals.  Dispense: 60 tablet; Refill: 0  -     hydrALAZINE (APRESOLINE) 25 MG tablet; Take 1 tablet by mouth 2 (Two) Times a Week.  Dispense: 60 tablet; Refill: 0  -     rosuvastatin (CRESTOR) 20 MG tablet; Take 1 tablet by mouth Daily.  Dispense: 30 tablet; Refill: 0         Lengthy discussion with the patient today about medication compliance, routine follow-up and routine labs.  She is encouraged to monitor BP twice daily, bring  in a medication list at follow-up visit.  Discussed the importance with her [id number] medication list.    Further recommendations pending lab results    Advised if new or worsening symptoms following work-up, go to the ER.  She expresses understanding    Follow-up in 1 week, sooner if needed.

## 2023-10-06 NOTE — PROGRESS NOTES
Subjective     Lesley Michel is a 67 y.o. female.   Chief Complaint   Patient presents with    Shortness of Breath     Panic attacks, 3-4 times daily, 2 wks duration      History of Present Illness   Lesley Michel is a 67-year-old female who presents to clinic today for delayed cardiology follow-up.     Coronary artery disease with chronically occluded well collateralized dominant RCA, status post drug-eluting stent to LAD with high-grade stenosis of the ostium of the small D1.  She also has 60% mid RCA posterolateral branch lesion.  She is currently on aspirin.  She reportedly is taking Crestor and Coreg however Coreg is not currently on her medication list from the pharmacy but patient feels she is taking it.  She denies chest pain.  She denies any recent nitroglycerin use.     Hyperlipidemia currently taking Crestor 20 mg daily.  No recent labs available for review.  She reports compliance with therapy however verification through the pharmacy reports she has not filled in 2 months.     Hypertension currently on lisinopril 5 mg daily.  She was supposed to be taking hydralazine and Coreg but pharmacy reports no refills since January 2023.  She is not monitoring BP at home.  She continues to smoke.    She states over the last couple weeks that she has noticed some mild dyspnea, orthopnea and bilateral lower extremity swelling.  She is not currently taking diuretic therapy.  She reports underlying CKD however has follow-up with nephrology since pandemic.    Patient Active Problem List   Diagnosis    Hypertension    Hyperlipidemia    Migraine without aura    CAD, chronically occluded collateralized dominant RCA, status post drug-eluting stent to LAD 2011, high-grade stenosis of the ostium of the small D1 and 60% mid RCA posterior lateral branch    Chronic renal failure, stage 2 (mild)    Tobacco use    Bilateral lower extremity edema    Other specified hypothyroidism    Cigarette smoker    Class 3 severe obesity  without serious comorbidity with body mass index (BMI) of 40.0 to 44.9 in adult     Past Medical History:   Diagnosis Date    Anxiety     Arthritis     Coronary artery disease     H/O heart artery stent     Hyperlipidemia     Hypertension     Obesity      Past Surgical History:   Procedure Laterality Date    ANKLE SURGERY      RIGHT    APPENDECTOMY      CARDIAC CATHETERIZATION      LEFT    CORONARY STENT PLACEMENT      HYSTERECTOMY         Family History   Problem Relation Age of Onset    Heart disease Mother     Heart attack Mother 73    Fibromyalgia Mother     Heart attack Father 72    Ovarian cancer Sister     Coronary artery disease Other     Breast cancer Neg Hx      Social History     Tobacco Use    Smoking status: Every Day     Packs/day: 0.50     Types: Electronic Cigarette, Cigarettes    Smokeless tobacco: Never   Substance Use Topics    Alcohol use: No    Drug use: No         The following portions of the patient's history were reviewed and updated as appropriate: allergies, current medications, past family history, past medical history, past social history, past surgical history and problem list.    No Known Allergies      Current Outpatient Medications:     aspirin 325 MG tablet, Take 1 tablet by mouth As Needed for Mild Pain., Disp: , Rfl:     carvedilol (COREG) 25 MG tablet, Take 1 tablet by mouth 2 (Two) Times a Day With Meals., Disp: 60 tablet, Rfl: 0    folic acid (FOLVITE) 1 MG tablet, Take 1 tablet by mouth Daily., Disp: , Rfl:     gabapentin (NEURONTIN) 800 MG tablet, Take 1 tablet by mouth 3 (Three) Times a Day., Disp: , Rfl:     [START ON 10/9/2023] hydrALAZINE (APRESOLINE) 25 MG tablet, Take 1 tablet by mouth 2 (Two) Times a Week., Disp: 60 tablet, Rfl: 0    HYDROcodone-acetaminophen (NORCO)  MG per tablet, , Disp: , Rfl:     levothyroxine (SYNTHROID, LEVOTHROID) 88 MCG tablet, Take 1 tablet by mouth Daily., Disp: , Rfl:     lisinopril (PRINIVIL,ZESTRIL) 5 MG tablet, Take 1 tablet by  mouth Daily., Disp: , Rfl:     montelukast (SINGULAIR) 10 MG tablet, Take 1 tablet by mouth Every Night., Disp: , Rfl:     nitroglycerin (NITROSTAT) 0.4 MG SL tablet, Place 1 tablet under the tongue Every 5 (Five) Minutes As Needed for Chest Pain., Disp: 30 tablet, Rfl: 2    pantoprazole (PROTONIX) 40 MG EC tablet, Take 1 tablet by mouth Daily., Disp: , Rfl:     rosuvastatin (CRESTOR) 20 MG tablet, Take 1 tablet by mouth Daily., Disp: 30 tablet, Rfl: 0    vitamin B-12 (CYANOCOBALAMIN) 1000 MCG tablet, Take 1 tablet by mouth Daily., Disp: , Rfl:     vitamin D (ERGOCALCIFEROL) 17287 units capsule capsule, , Disp: , Rfl:     acetaminophen (TYLENOL) 500 MG tablet, Take 1 tablet by mouth Every 6 (Six) Hours As Needed for Mild Pain . (Patient not taking: Reported on 10/6/2023), Disp: 60 tablet, Rfl: 0    cetirizine (zyrTEC) 10 MG tablet, Take 1 tablet by mouth Daily. (Patient not taking: Reported on 10/6/2023), Disp: , Rfl:     Review of Systems   Constitutional:  Negative for activity change, appetite change, chills, diaphoresis, fatigue and fever.   HENT:  Negative for congestion, drooling, ear discharge, ear pain, mouth sores, nosebleeds, postnasal drip, rhinorrhea, sinus pressure, sneezing and sore throat.    Eyes:  Negative for pain, discharge and visual disturbance.   Respiratory:  Positive for shortness of breath. Negative for cough, chest tightness and wheezing.    Cardiovascular:  Positive for leg swelling. Negative for chest pain and palpitations.   Gastrointestinal:  Negative for abdominal pain, constipation, diarrhea, nausea and vomiting.   Endocrine: Negative for cold intolerance, heat intolerance, polydipsia, polyphagia and polyuria.   Musculoskeletal:  Negative for arthralgias, myalgias and neck pain.   Skin:  Negative for rash and wound.   Neurological:  Negative for syncope, speech difficulty, weakness, light-headedness and headaches.   Hematological:  Negative for adenopathy. Does not bruise/bleed  easily.   Psychiatric/Behavioral:  Negative for confusion, dysphoric mood and sleep disturbance. The patient is not nervous/anxious.    All other systems reviewed and are negative.    BP (!) 185/117 (BP Location: Right arm, Patient Position: Sitting, Cuff Size: Adult)   Pulse 80   Resp 18   Wt 102 kg (225 lb 12.8 oz)   SpO2 92%   BMI 42.66 kg/m²     Objective   No Known Allergies    Physical Exam  Vitals reviewed.   Constitutional:       Appearance: Normal appearance. She is well-developed. She is obese.   HENT:      Head: Normocephalic.   Eyes:      Conjunctiva/sclera: Conjunctivae normal.   Neck:      Thyroid: No thyromegaly.      Vascular: No carotid bruit or JVD.   Cardiovascular:      Rate and Rhythm: Normal rate and regular rhythm.   Pulmonary:      Effort: Pulmonary effort is normal.      Breath sounds: Wheezing present.   Musculoskeletal:      Cervical back: Neck supple.      Right lower leg: Edema present.      Left lower leg: Edema present.   Skin:     General: Skin is warm and dry.   Neurological:      Mental Status: She is alert and oriented to person, place, and time.   Psychiatric:         Attention and Perception: Attention normal.         Mood and Affect: Mood normal.         Speech: Speech normal.         Behavior: Behavior normal. Behavior is cooperative.         Cognition and Memory: Cognition normal.         ECG 12 Lead    Date/Time: 10/6/2023 12:19 PM  Performed by: Imelda Woodall APRN  Authorized by: Imelda Woodall APRN   Comparison: compared with previous ECG   Similar to previous ECG  Rhythm: sinus rhythm  Rate: normal  BPM: 77  Other findings: non-specific ST-T wave changes    Clinical impression: non-specific ECG  Comments: QT/QTc - 354/386              Assessment & Plan   Diagnoses and all orders for this visit:    1. Coronary artery disease involving native coronary artery of native heart without angina pectoris (Primary)  -     ECG 12 Lead  Asymptomatic, EKG stable, continue  aspirin, plan to reinitiate Crestor and Coreg.  Consider ischemic work-up in the near future    2. Dyspnea, unspecified type  -     BNP; Future  -     XR Chest 2 View; Future  Further work-up is recommended    3. Bilateral lower extremity edema  Further work-up was recommended  Sodium restrictions recommended    4. Primary hypertension  -     Blood Pressure Device  Uncontrolled  Discussed with pharmacy routine medicine refills and they report she has not filled hydralazine or Coreg since January however patient feels she is taking Coreg. will ensure she resumes Coreg and hydralazine, recommend close monitoring of BP    5. Mixed hyperlipidemia  -     Comprehensive Metabolic Panel; Future  -     Lipid Panel; Future  -     CK; Future  Refill Crestor, heart healthy    6. Chronic renal failure, stage 2 (mild)  Continue to monitor     7. Tobacco use  Recommend avoidance of tobacco use    Other orders  -     carvedilol (COREG) 25 MG tablet; Take 1 tablet by mouth 2 (Two) Times a Day With Meals.  Dispense: 60 tablet; Refill: 0  -     hydrALAZINE (APRESOLINE) 25 MG tablet; Take 1 tablet by mouth 2 (Two) Times a Week.  Dispense: 60 tablet; Refill: 0  -     rosuvastatin (CRESTOR) 20 MG tablet; Take 1 tablet by mouth Daily.  Dispense: 30 tablet; Refill: 0         Lengthy discussion with the patient today about medication compliance, routine follow-up and routine labs.  She is encouraged to monitor BP twice daily, bring in a medication list at follow-up visit.  Discussed the importance with her [id number] medication list.    Further recommendations pending lab results    Advised if new or worsening symptoms following work-up, go to the ER.  She expresses understanding    Follow-up in 1 week, sooner if needed.

## 2023-10-08 ENCOUNTER — APPOINTMENT (OUTPATIENT)
Dept: GENERAL RADIOLOGY | Facility: HOSPITAL | Age: 68
DRG: 291 | End: 2023-10-08
Payer: MEDICARE

## 2023-10-08 ENCOUNTER — HOSPITAL ENCOUNTER (INPATIENT)
Facility: HOSPITAL | Age: 68
LOS: 20 days | Discharge: HOME | DRG: 291 | End: 2023-10-30
Attending: EMERGENCY MEDICINE | Admitting: INTERNAL MEDICINE
Payer: MEDICARE

## 2023-10-08 DIAGNOSIS — J96.21 ACUTE ON CHRONIC RESPIRATORY FAILURE WITH HYPOXIA: ICD-10-CM

## 2023-10-08 DIAGNOSIS — I50.9 ACUTE ON CHRONIC CONGESTIVE HEART FAILURE, UNSPECIFIED HEART FAILURE TYPE: ICD-10-CM

## 2023-10-08 DIAGNOSIS — N17.9 ACUTE RENAL FAILURE, UNSPECIFIED ACUTE RENAL FAILURE TYPE: ICD-10-CM

## 2023-10-08 DIAGNOSIS — J44.1 COPD WITH ACUTE EXACERBATION: ICD-10-CM

## 2023-10-08 DIAGNOSIS — N18.9 ACUTE KIDNEY INJURY SUPERIMPOSED ON CHRONIC KIDNEY DISEASE: Primary | ICD-10-CM

## 2023-10-08 DIAGNOSIS — J96.01 ACUTE HYPOXIC RESPIRATORY FAILURE: ICD-10-CM

## 2023-10-08 DIAGNOSIS — N17.9 ACUTE KIDNEY INJURY SUPERIMPOSED ON CHRONIC KIDNEY DISEASE: Primary | ICD-10-CM

## 2023-10-08 DIAGNOSIS — E87.5 HYPERKALEMIA: ICD-10-CM

## 2023-10-08 LAB
A-A DO2: 47.7 MMHG (ref 0–300)
ALBUMIN SERPL-MCNC: 3.4 G/DL (ref 3.5–5.2)
ALBUMIN/GLOB SERPL: 1.1 G/DL
ALP SERPL-CCNC: 146 U/L (ref 39–117)
ALT SERPL W P-5'-P-CCNC: <5 U/L (ref 1–33)
ANION GAP SERPL CALCULATED.3IONS-SCNC: 10.2 MMOL/L (ref 5–15)
ARTERIAL PATENCY WRIST A: ABNORMAL
AST SERPL-CCNC: 7 U/L (ref 1–32)
ATMOSPHERIC PRESS: 725 MMHG
BASE EXCESS BLDA CALC-SCNC: -4.2 MMOL/L (ref 0–2)
BASOPHILS # BLD AUTO: 0.06 10*3/MM3 (ref 0–0.2)
BASOPHILS NFR BLD AUTO: 0.8 % (ref 0–1.5)
BDY SITE: ABNORMAL
BILIRUB SERPL-MCNC: 0.5 MG/DL (ref 0–1.2)
BUN SERPL-MCNC: 27 MG/DL (ref 8–23)
BUN/CREAT SERPL: 7.5 (ref 7–25)
CALCIUM SPEC-SCNC: 9 MG/DL (ref 8.6–10.5)
CHLORIDE SERPL-SCNC: 107 MMOL/L (ref 98–107)
CO2 BLDA-SCNC: 21.9 MMOL/L (ref 22–33)
CO2 SERPL-SCNC: 20.8 MMOL/L (ref 22–29)
COHGB MFR BLD: 3.4 % (ref 0–5)
CREAT SERPL-MCNC: 3.61 MG/DL (ref 0.57–1)
CRP SERPL-MCNC: 5.41 MG/DL (ref 0–0.5)
D-LACTATE SERPL-SCNC: 0.9 MMOL/L (ref 0.5–2)
DEPRECATED RDW RBC AUTO: 53.7 FL (ref 37–54)
EGFRCR SERPLBLD CKD-EPI 2021: 13.4 ML/MIN/1.73
EOSINOPHIL # BLD AUTO: 0.91 10*3/MM3 (ref 0–0.4)
EOSINOPHIL NFR BLD AUTO: 12.3 % (ref 0.3–6.2)
ERYTHROCYTE [DISTWIDTH] IN BLOOD BY AUTOMATED COUNT: 16.5 % (ref 12.3–15.4)
GEN 5 2HR TROPONIN T REFLEX: 26 NG/L
GLOBULIN UR ELPH-MCNC: 3.2 GM/DL
GLUCOSE BLDC GLUCOMTR-MCNC: 49 MG/DL (ref 70–130)
GLUCOSE BLDC GLUCOMTR-MCNC: 67 MG/DL (ref 70–130)
GLUCOSE BLDC GLUCOMTR-MCNC: 79 MG/DL (ref 70–130)
GLUCOSE BLDC GLUCOMTR-MCNC: 91 MG/DL (ref 70–130)
GLUCOSE SERPL-MCNC: 102 MG/DL (ref 65–99)
HCO3 BLDA-SCNC: 20.7 MMOL/L (ref 20–26)
HCT VFR BLD AUTO: 36.2 % (ref 34–46.6)
HCT VFR BLD CALC: 34.5 % (ref 38–51)
HGB BLD-MCNC: 11 G/DL (ref 12–15.9)
HGB BLDA-MCNC: 11.2 G/DL (ref 13.5–17.5)
IMM GRANULOCYTES # BLD AUTO: 0.02 10*3/MM3 (ref 0–0.05)
IMM GRANULOCYTES NFR BLD AUTO: 0.3 % (ref 0–0.5)
INHALED O2 CONCENTRATION: 21 %
LYMPHOCYTES # BLD AUTO: 1.16 10*3/MM3 (ref 0.7–3.1)
LYMPHOCYTES NFR BLD AUTO: 15.7 % (ref 19.6–45.3)
Lab: ABNORMAL
Lab: ABNORMAL
MAGNESIUM SERPL-MCNC: 1.9 MG/DL (ref 1.6–2.4)
MCH RBC QN AUTO: 27 PG (ref 26.6–33)
MCHC RBC AUTO-ENTMCNC: 30.4 G/DL (ref 31.5–35.7)
MCV RBC AUTO: 88.9 FL (ref 79–97)
METHGB BLD QL: 0.2 % (ref 0–3)
MODALITY: ABNORMAL
MONOCYTES # BLD AUTO: 0.61 10*3/MM3 (ref 0.1–0.9)
MONOCYTES NFR BLD AUTO: 8.3 % (ref 5–12)
NEUTROPHILS NFR BLD AUTO: 4.62 10*3/MM3 (ref 1.7–7)
NEUTROPHILS NFR BLD AUTO: 62.6 % (ref 42.7–76)
NOTIFIED BY: ABNORMAL
NOTIFIED WHO: ABNORMAL
NRBC BLD AUTO-RTO: 0 /100 WBC (ref 0–0.2)
NT-PROBNP SERPL-MCNC: 8093 PG/ML (ref 0–900)
OXYHGB MFR BLDV: 85.2 % (ref 94–99)
PCO2 BLDA: 36.5 MM HG (ref 35–45)
PCO2 TEMP ADJ BLD: ABNORMAL MM[HG]
PH BLDA: 7.36 PH UNITS (ref 7.35–7.45)
PH, TEMP CORRECTED: ABNORMAL
PLATELET # BLD AUTO: 289 10*3/MM3 (ref 140–450)
PMV BLD AUTO: 10.1 FL (ref 6–12)
PO2 BLDA: 53.5 MM HG (ref 83–108)
PO2 TEMP ADJ BLD: ABNORMAL MM[HG]
POTASSIUM SERPL-SCNC: 5.9 MMOL/L (ref 3.5–5.2)
PROCALCITONIN SERPL-MCNC: 0.1 NG/ML (ref 0–0.25)
PROT SERPL-MCNC: 6.6 G/DL (ref 6–8.5)
RBC # BLD AUTO: 4.07 10*6/MM3 (ref 3.77–5.28)
SAO2 % BLDCOA: 88.4 % (ref 94–99)
SODIUM SERPL-SCNC: 138 MMOL/L (ref 136–145)
TROPONIN T DELTA: 3 NG/L
TROPONIN T SERPL HS-MCNC: 23 NG/L
VENTILATOR MODE: ABNORMAL
WBC NRBC COR # BLD: 7.38 10*3/MM3 (ref 3.4–10.8)

## 2023-10-08 PROCEDURE — 25010000002 HEPARIN (PORCINE) PER 1000 UNITS: Performed by: INTERNAL MEDICINE

## 2023-10-08 PROCEDURE — G0378 HOSPITAL OBSERVATION PER HR: HCPCS

## 2023-10-08 PROCEDURE — 93005 ELECTROCARDIOGRAM TRACING: CPT | Performed by: EMERGENCY MEDICINE

## 2023-10-08 PROCEDURE — 82948 REAGENT STRIP/BLOOD GLUCOSE: CPT

## 2023-10-08 PROCEDURE — 83605 ASSAY OF LACTIC ACID: CPT | Performed by: EMERGENCY MEDICINE

## 2023-10-08 PROCEDURE — 36600 WITHDRAWAL OF ARTERIAL BLOOD: CPT

## 2023-10-08 PROCEDURE — 36415 COLL VENOUS BLD VENIPUNCTURE: CPT

## 2023-10-08 PROCEDURE — 85025 COMPLETE CBC W/AUTO DIFF WBC: CPT | Performed by: EMERGENCY MEDICINE

## 2023-10-08 PROCEDURE — 84439 ASSAY OF FREE THYROXINE: CPT

## 2023-10-08 PROCEDURE — 82375 ASSAY CARBOXYHB QUANT: CPT

## 2023-10-08 PROCEDURE — 71045 X-RAY EXAM CHEST 1 VIEW: CPT

## 2023-10-08 PROCEDURE — 93010 ELECTROCARDIOGRAM REPORT: CPT | Performed by: INTERNAL MEDICINE

## 2023-10-08 PROCEDURE — 82805 BLOOD GASES W/O2 SATURATION: CPT

## 2023-10-08 PROCEDURE — 63710000001 INSULIN REGULAR HUMAN PER 5 UNITS: Performed by: EMERGENCY MEDICINE

## 2023-10-08 PROCEDURE — 99285 EMERGENCY DEPT VISIT HI MDM: CPT

## 2023-10-08 PROCEDURE — 80053 COMPREHEN METABOLIC PANEL: CPT | Performed by: EMERGENCY MEDICINE

## 2023-10-08 PROCEDURE — 83735 ASSAY OF MAGNESIUM: CPT | Performed by: EMERGENCY MEDICINE

## 2023-10-08 PROCEDURE — 25010000002 CALCIUM GLUCONATE-NACL 1-0.675 GM/50ML-% SOLUTION: Performed by: EMERGENCY MEDICINE

## 2023-10-08 PROCEDURE — 71045 X-RAY EXAM CHEST 1 VIEW: CPT | Performed by: RADIOLOGY

## 2023-10-08 PROCEDURE — 83036 HEMOGLOBIN GLYCOSYLATED A1C: CPT

## 2023-10-08 PROCEDURE — 80061 LIPID PANEL: CPT

## 2023-10-08 PROCEDURE — 83880 ASSAY OF NATRIURETIC PEPTIDE: CPT | Performed by: EMERGENCY MEDICINE

## 2023-10-08 PROCEDURE — 84443 ASSAY THYROID STIM HORMONE: CPT

## 2023-10-08 PROCEDURE — 84145 PROCALCITONIN (PCT): CPT

## 2023-10-08 PROCEDURE — 83050 HGB METHEMOGLOBIN QUAN: CPT

## 2023-10-08 PROCEDURE — 86140 C-REACTIVE PROTEIN: CPT

## 2023-10-08 PROCEDURE — 87040 BLOOD CULTURE FOR BACTERIA: CPT | Performed by: EMERGENCY MEDICINE

## 2023-10-08 PROCEDURE — 84484 ASSAY OF TROPONIN QUANT: CPT | Performed by: EMERGENCY MEDICINE

## 2023-10-08 PROCEDURE — 99223 1ST HOSP IP/OBS HIGH 75: CPT

## 2023-10-08 RX ORDER — AMOXICILLIN 250 MG
2 CAPSULE ORAL 2 TIMES DAILY
Status: DISCONTINUED | OUTPATIENT
Start: 2023-10-08 | End: 2023-10-15

## 2023-10-08 RX ORDER — HEPARIN SODIUM 5000 [USP'U]/ML
5000 INJECTION, SOLUTION INTRAVENOUS; SUBCUTANEOUS EVERY 12 HOURS SCHEDULED
Status: DISCONTINUED | OUTPATIENT
Start: 2023-10-08 | End: 2023-10-24

## 2023-10-08 RX ORDER — SODIUM CHLORIDE 0.9 % (FLUSH) 0.9 %
10 SYRINGE (ML) INJECTION AS NEEDED
Status: DISCONTINUED | OUTPATIENT
Start: 2023-10-08 | End: 2023-10-30 | Stop reason: HOSPADM

## 2023-10-08 RX ORDER — DEXTROSE MONOHYDRATE 25 G/50ML
25 INJECTION, SOLUTION INTRAVENOUS ONCE
Status: COMPLETED | OUTPATIENT
Start: 2023-10-08 | End: 2023-10-08

## 2023-10-08 RX ORDER — BISACODYL 5 MG/1
5 TABLET, DELAYED RELEASE ORAL DAILY PRN
Status: DISCONTINUED | OUTPATIENT
Start: 2023-10-08 | End: 2023-10-15

## 2023-10-08 RX ORDER — POLYETHYLENE GLYCOL 3350 17 G/17G
17 POWDER, FOR SOLUTION ORAL DAILY PRN
Status: DISCONTINUED | OUTPATIENT
Start: 2023-10-08 | End: 2023-10-15

## 2023-10-08 RX ORDER — SODIUM CHLORIDE 9 MG/ML
40 INJECTION, SOLUTION INTRAVENOUS AS NEEDED
Status: DISCONTINUED | OUTPATIENT
Start: 2023-10-08 | End: 2023-10-30 | Stop reason: HOSPADM

## 2023-10-08 RX ORDER — NITROGLYCERIN 0.4 MG/1
0.4 TABLET SUBLINGUAL
Status: DISCONTINUED | OUTPATIENT
Start: 2023-10-08 | End: 2023-10-30 | Stop reason: HOSPADM

## 2023-10-08 RX ORDER — CALCIUM GLUCONATE 20 MG/ML
1000 INJECTION, SOLUTION INTRAVENOUS ONCE
Status: COMPLETED | OUTPATIENT
Start: 2023-10-08 | End: 2023-10-08

## 2023-10-08 RX ORDER — SODIUM CHLORIDE 0.9 % (FLUSH) 0.9 %
10 SYRINGE (ML) INJECTION EVERY 12 HOURS SCHEDULED
Status: DISCONTINUED | OUTPATIENT
Start: 2023-10-08 | End: 2023-10-30 | Stop reason: HOSPADM

## 2023-10-08 RX ORDER — BISACODYL 10 MG
10 SUPPOSITORY, RECTAL RECTAL DAILY PRN
Status: DISCONTINUED | OUTPATIENT
Start: 2023-10-08 | End: 2023-10-15

## 2023-10-08 RX ADMIN — SODIUM BICARBONATE 50 MEQ: 84 INJECTION INTRAVENOUS at 19:49

## 2023-10-08 RX ADMIN — INSULIN HUMAN 10 UNITS: 100 INJECTION, SOLUTION PARENTERAL at 19:53

## 2023-10-08 RX ADMIN — DEXTROSE MONOHYDRATE 25 G: 25 INJECTION, SOLUTION INTRAVENOUS at 19:50

## 2023-10-08 RX ADMIN — SODIUM ZIRCONIUM CYCLOSILICATE 10 G: 10 POWDER, FOR SUSPENSION ORAL at 19:54

## 2023-10-08 RX ADMIN — HEPARIN SODIUM 5000 UNITS: 5000 INJECTION INTRAVENOUS; SUBCUTANEOUS at 21:51

## 2023-10-08 RX ADMIN — DOCUSATE SODIUM 50 MG AND SENNOSIDES 8.6 MG 2 TABLET: 8.6; 5 TABLET, FILM COATED ORAL at 21:50

## 2023-10-08 RX ADMIN — CALCIUM GLUCONATE 1000 MG: 20 INJECTION, SOLUTION INTRAVENOUS at 19:55

## 2023-10-08 RX ADMIN — Medication 10 ML: at 21:51

## 2023-10-08 NOTE — H&P
[facility] Medicine Services  History & Physical    Patient Identification:  Name:  Lesley Michel  Age:  67 y.o.  Sex:  female  :  1955  MRN:  [id number]   Visit Number:  [id number]  Admit Date: 10/8/2023   Primary Care Physician:  Woodrow Raymond APRN    Subjective     Chief complaint: Shortness of Breath, Edema    History of presenting illness:      Lesley Michel is a 67 y.o. female with past medical history significant for CAD status post stenting, hyperlipidemia, essential hypertension, COPD, tobacco abuse, CKD, hypothyroidism, arthritis, anxiety, history of migraines, obesity by BMI, and history of medical noncompliance.  Patient presents to [facility] emergency department for further evaluation of increasing lower extremity edema and shortness of breath which has been progressively worsening over the past few days.  Reportedly visited her cardiologist 2 days ago.  Per office visit note, patient has been noncompliant with all of her medications.  States that she cannot take diuretics due to chronic kidney disease, although it does not appear that she has followed up with nephrologist recently.  Cardiology note documentation included mild CKD.  Creatinine on arrival 3.6. Unknown recent baseline. However, per review of labs from 2020, creatinine was 1.08 at that time. Patient also has elevated proBNP of 8000. High-sensitivity troponin T indeterminately elevated without significant delta. EKG obtained without evidence of acute arrhythmia or acute ST elevations or depressions.  No evidence of acute ischemia.  ABG was obtained on room air and noted hypoxemia with PO2 of 53.5.  Patient was placed on 2 L nasal cannula and is now stable.  Potassium was elevated at 5.9. Received treatment for elevated potassium in the ED.Patient currently denies any chest pain, palpitations, dizziness, nausea, vomiting, abdominal pain, dysuria, or difficulty urinating. She also  "denies recent fever, chills, or sputum production. Reports chronic dry cough. She states that she continues to smoke cigarettes and has requested a nicotine patch. Denies illicit substance abuse or alcohol abuse. She reported compliance with home medications during my exam; asked what kinds of medicines she takes and she states \"I don't know. All I know is if the doctor prescribes them, I have been taking them\". She is alert and oriented x4. Calm, cooperative, pleasant. Does not wear supplemental O2 at home; currently requiring 2L. Discussed plan with patient. She voiced agreement and understanding with no further questions, concerns, or needs at this time.     Upon arrival to the ED, vital signs were temperature 98.1, pulse 68, respirations 16, blood pressure 156/78 sitting, SPO2 saturation 88% on room air.  ABG with pH 7.363, PCO2 36.5, PO2 53.5, O2 saturation 88.4% on room air.  High-sensitivity troponin T 23 with +3 delta.  proBNP 8093.  CMP with potassium 5.9, CO2 20.8, BUN 27, creatinine 3.61, EGFR 13.4, glucose 102, alkaline phosphatase 146, albumin 3.4, otherwise unremarkable.  Lactate 0.9.  CBC with hemoglobin 11.0, RDW 16.5, MCHC 30.4, otherwise unremarkable.  Peripheral blood cultures x2 pending. Chest x-ray noted trace right effusion and right basilar airspace disease.    Known Emergency Department medications received prior to my evaluation included 1000 mg IV calcium gluconate, D50, 10 units of IV regular insulin, 50 mEq sodium bicarb, 10 mg packet of Lokelma. Emergency Department Room location at the time of my evaluation was 102.  Discussed with admitting physician, Dr. Ford, Fanny BAUMAN MD.    ---------------------------------------------------------------------------------------------------------------------   Review of Systems   Constitutional:  Positive for fatigue. Negative for chills and fever.   HENT:  Negative for congestion, sore throat and trouble swallowing.    Respiratory:  Positive for " cough (nonproductive) and shortness of breath. Negative for wheezing.    Cardiovascular:  Positive for leg swelling. Negative for chest pain and palpitations.   Gastrointestinal:  Negative for abdominal pain, constipation, diarrhea, nausea and vomiting.   Genitourinary:  Negative for difficulty urinating, flank pain, frequency and urgency.   Musculoskeletal:  Negative for back pain, gait problem, neck pain and neck stiffness.   Neurological:  Negative for dizziness, tremors, seizures, syncope, facial asymmetry, speech difficulty, weakness, light-headedness, numbness and headaches.     ---------------------------------------------------------------------------------------------------------------------   Past Medical History:   Diagnosis Date    Anxiety     Arthritis     Coronary artery disease     H/O heart artery stent     Hyperlipidemia     Hypertension     Obesity      Past Surgical History:   Procedure Laterality Date    ANKLE SURGERY      RIGHT    APPENDECTOMY      CARDIAC CATHETERIZATION      LEFT    CORONARY STENT PLACEMENT      HYSTERECTOMY       Family History   Problem Relation Age of Onset    Heart disease Mother     Heart attack Mother 73    Fibromyalgia Mother     Heart attack Father 72    Ovarian cancer Sister     Coronary artery disease Other     Breast cancer Neg Hx      Social History     Socioeconomic History    Marital status:    Tobacco Use    Smoking status: Every Day     Packs/day: .5     Types: Electronic Cigarette, Cigarettes    Smokeless tobacco: Never   Substance and Sexual Activity    Alcohol use: No    Drug use: No    Sexual activity: Never     ---------------------------------------------------------------------------------------------------------------------   Allergies:  Patient has no known allergies.  ---------------------------------------------------------------------------------------------------------------------   Home medications:    Medications below are reported home  medications pulling from within the system; at this time, these medications have not been reconciled unless otherwise specified and are in the verification process for further verifcation as current home medications.  (Not in a hospital admission)      Hospital Scheduled Meds:          Current listed hospital scheduled medications may not yet reflect those currently placed in orders that are signed and held awaiting patient's arrival to floor.   ---------------------------------------------------------------------------------------------------------------------     Objective     Vital Signs:  Temp:  [98.1 °F (36.7 °C)] 98.1 °F (36.7 °C)  Heart Rate:  [58-79] 74  Resp:  [16] 16  BP: (115-156)/(70-90) 154/87      10/08/23  1633   Weight: 102 kg (225 lb)     Body mass index is 43.94 kg/m².  ---------------------------------------------------------------------------------------------------------------------       Physical Exam  Vitals reviewed.   Constitutional:       General: She is awake. She is not in acute distress.     Appearance: She is obese. She is not diaphoretic.      Interventions: Nasal cannula in place.      Comments: 2L nasal cannula.    HENT:      Head: Normocephalic and atraumatic.      Mouth/Throat:      Mouth: Mucous membranes are moist.      Pharynx: Oropharynx is clear.   Eyes:      Extraocular Movements: Extraocular movements intact.      Pupils: Pupils are equal, round, and reactive to light.   Cardiovascular:      Rate and Rhythm: Normal rate and regular rhythm.      Pulses: Normal pulses.           Dorsalis pedis pulses are 1+ on the right side and 1+ on the left side.      Heart sounds: Normal heart sounds. No murmur heard.     No friction rub.   Pulmonary:      Effort: Pulmonary effort is normal. No accessory muscle usage, respiratory distress or retractions.      Breath sounds: Decreased breath sounds and wheezing present. No rhonchi or rales.      Comments: Mild, end expiratory wheezing  present and bilateral upper lobes.  Abdominal:      General: Bowel sounds are normal. There is no distension.      Palpations: Abdomen is soft.      Tenderness: There is no abdominal tenderness. There is no guarding.   Musculoskeletal:      Cervical back: Neck supple. No rigidity.      Right lower le+ Pitting Edema present.      Left lower le+ Pitting Edema present.   Skin:     General: Skin is warm and dry.      Capillary Refill: Capillary refill takes 2 to 3 seconds.      Coloration: Skin is pale.   Neurological:      Mental Status: She is alert and oriented to person, place, and time. Mental status is at baseline.      Cranial Nerves: No dysarthria or facial asymmetry.      Sensory: Sensation is intact.      Motor: No weakness or tremor.   Psychiatric:         Attention and Perception: Attention normal.         Mood and Affect: Mood normal.         Speech: Speech normal.         Behavior: Behavior normal. Behavior is cooperative.         Thought Content: Thought content normal.         Cognition and Memory: Cognition normal.         Judgment: Judgment normal.       ---------------------------------------------------------------------------------------------------------------------  EKG:  Pending formal cardiology interpretation. Per my review, appears normal sinus rhythm without evidence of acute ischemia.     Telemetry:  Appearing normal sinus rhythm 60-70s on my exam.   I have personally looked at both the EKG and the telemetry strips.  ---------------------------------------------------------------------------------------------------------------------   Results from last 7 days   Lab Units 10/08/23  1709   LACTATE mmol/L 0.9   WBC 10*3/mm3 7.38   HEMOGLOBIN g/dL 11.0*   HEMATOCRIT % 36.2   MCV fL 88.9   MCHC g/dL 30.4*   PLATELETS 10*3/mm3 289     Results from last 7 days   Lab Units 10/08/23  1707   PH, ARTERIAL pH units 7.363   PO2 ART mm Hg 53.5*   PCO2, ARTERIAL mm Hg 36.5   HCO3 ART mmol/L 20.7  "    Results from last 7 days   Lab Units 10/08/23  1709   SODIUM mmol/L 138   POTASSIUM mmol/L 5.9*   MAGNESIUM mg/dL 1.9   CHLORIDE mmol/L 107   CO2 mmol/L 20.8*   BUN mg/dL 27*   CREATININE mg/dL 3.61*   CALCIUM mg/dL 9.0   GLUCOSE mg/dL 102*   ALBUMIN g/dL 3.4*   BILIRUBIN mg/dL 0.5   ALK PHOS U/L 146*   AST (SGOT) U/L 7   ALT (SGPT) U/L <5   Estimated Creatinine Clearance: 16.7 mL/min (A) (by C-G formula based on SCr of 3.61 mg/dL (H)).  No results found for: \"AMMONIA\"  Results from last 7 days   Lab Units 10/08/23  1923 10/08/23  1709   HSTROP T ng/L 26* 23*     Results from last 7 days   Lab Units 10/08/23  1709   PROBNP pg/mL 8,093.0*     No results found for: \"HGBA1C\"  No results found for: \"TSH\", \"FREET4\"  No results found for: \"PREGTESTUR\", \"PREGSERUM\", \"HCG\", \"HCGQUANT\"  Pain Management Panel           No data to display                  ---------------------------------------------------------------------------------------------------------------------  Imaging Results (Last 7 Days)       Procedure Component Value Units Date/Time    XR Chest 1 View [[id number]] Collected: 10/08/23 1917     Updated: 10/08/23 1919    Narrative:      XR CHEST 1 VW-     CLINICAL INDICATION: sob        COMPARISON: None immediately available      TECHNIQUE: Single frontal view of the chest.     FINDINGS:     LUNGS: Trace right effusion and right basilar airspace disease     HEART AND MEDIASTINUM: Heart and mediastinal contours are unremarkable        SKELETON: Bony and soft tissue structures are unremarkable.             Impression:      Trace right effusion and right basilar airspace disease           This report was finalized on 10/8/2023 7:17 PM by Dr. Julio Dominguez MD.               Last echocardiogram:  Results for orders placed during the hospital encounter of 05/23/18    Adult Transthoracic Echo Complete W/ Cont if Necessary Per Protocol    Interpretation Summary  · Normal left ventricular cavity size and wall thickness " noted.  · Left ventricular systolic function is normal.Estimated EF appears to be in the range of 61 - 65%.  · Left ventricular diastolic dysfunction (grade I) consistent with impaired relaxation.  · Calcification in the right ventricular cavity noted which is probably calcified moderator band .  · Mild MAC is present with trace mitral valve regurgitation .  · Trace tricuspid valve regurgitation is noted with mild pulmonary hypertension . PRVSP is 44 mmHg.  · No significant valvular heart disease  · Small anterior echo-free space is noted which may represent fat pad V/S trivial pericardial effusion. It is located anteriorly and it is hemodynamically insignificant          I have personally reviewed the above radiology images and read the final radiology report on 10/08/23  ---------------------------------------------------------------------------------------------------------------------  Assessment / Plan     Active Hospital Problems    Diagnosis  POA    **Acute hypoxic respiratory failure [J96.01]  Yes       ASSESSMENT/PLAN:  -Acute on chronic HFpEF, present on admission  -Elevated proBNP and evidence of trace right pleural effusion on arrival  -Acute hypoxemic respiratory failure due to above  -COPD without acute exacerbation  -Ongoing tobacco abuse  -Acute kidney injury on CKD, present on admission  -Acute hyperkalemia, present on admission  -Indeterminately elevated troponin without significant delta, present on admission, likely due to poor renal function and hypoxia  -Mild normocytic anemia, unknown chronicity however suspect chronic due to CKD  -History of CAD status post stenting  -Hyperlipidemia  -Essential hypertension  -67 y.o. female presents to [facility] ED with CC of SOA and increasing lower extremity edema over the last several days. Recently evaluated in outpatient Cardiology office on 10/6/2023. Noted history of medical noncompliance. Not on diuretics due to CKD. Formerly followed with Dr. Sherwood  (Nephrology) in the outpatient setting although has not followed up recently.   -proBNP 8000 on arrival. Chest x-ray noted trace right effusion and right basilar airspace disease.  -No leukocytosis.  Pro-Binh negative.  CRP mildly elevated at 5.41.  Patient denies any fever, chills, sputum production.  Do not currently suspect right basilar pneumonia.  Encourage incentive spirometry use and turn, cough, and deep breathing exercises.  -Closely monitor off antibiotics for now.  -Repeat CBC with differential in the a.m.  -Encourage smoking cessation.  -NRT made available.  -Received 80 mg IV Lasix in the ED.  -Monitor response with strict I's and O's and daily weights.  -Currently stable on 2 L. Room air is baseline.  Titrate supplemental O2 to maintain SPO2 saturations greater than 90%.  -Transthoracic echocardiogram from 2019 noted normal EF with grade 1 diastolic dysfunction.  -Obtain updated transthoracic echocardiogram.  -Baseline creatinine unknown with creatinine on admission of 3.6.  -Request records from outpatient Nephrology clinic (Dr. Sherwood).   -Per review of most recent available labs in June 2020; creatinine 1.08 and GFR was between 56 and 63.  -Monitor urine output and renal function closely.  -Avoid nephrotoxins as able. Avoid hypotension.   -Repeat chemistry panel in AM.   -BP appearing controlled/stable at this time. Continue to monitor VS per hospital policy. Review home antihypertensive agents once reconciled by pharmacy with plans to continue except for any that may contribute to renal failure. Holding parameters to prevent hypotension and/or bradycardia.   -High sensitivity Troponin T 23 with +3 delta. EKG without evidence of acute ischemic changes. Patient denies chest pain on exam. Troponin elevation likely secondary to MAICO and hypoxia.   -Potassium was 5.9 on arrival. Patient received hyperkalemia treatment in the ED.  Repeat chemistry panel pending.  -Continuous cardiac monitoring and pulse  oximetry ordered.  -Plan to continue aspirin, statin.   -Obtain HgbA1c and lipid profile.     -Hypoglycemia without known hx of diabetes  -BG 49 on arrival to the floor. Patient was alert&oriented, asymptomatic at the time. Ate a turkey sandwich and peanut butter/jeremy crackers. Repeat BG was 91. Accu checks ordered Q3 hours for now. Obtain HgbA1c. Hypoglycemia protocol in place as necessary. Hypoglycemia likely result of IV insulin that patient received in the ED as part of hyperkalemia treatment.     -Physical debility; PT consult. Maintain fall precautions.  Provide assistance.  -Arthritis; supportive care. Tylenol as needed for mild pain.  -Hypothyroidism; obtain TSH and free T4.  Plan to resume home levothyroxine pending laboratory results.  -History of migraines without aura  -Morbid obesity by BMI; affecting all aspects of care.  Encourage lifestyle modifications.  -History of medical noncompliance; encourage compliance.    ----------  -DVT prophylaxis: Subcu heparin.  -Activity: As tolerated. Fall precautions.   -Expected length of stay:   INPATIENT status due to the need for care which can only be reasonably provided in an hospital setting such as aggressive/expedited ancillary services and/or consultation services, the necessity for IV medications, close physician monitoring and/or the possible need for procedures.  In such, I feel patient's risk for adverse outcomes and need for care warrant INPATIENT evaluation and predict the patient's care encounter to likely last beyond 2 midnights.   -Disposition pending clinical course.    High risk secondary to acute on chronic HFpEF with elevated proBNP and trace right pleural effusion, MAICO on CKD, acute hyperkalemia, and acute hypoxemic respiratory failure.      CODE STATUS: FULL CODE      Marie Pardo, APRN   10/08/23  20:07 EDT  Pager  #[phone]  ---------------------------------------------------------------------------------------------------------------------

## 2023-10-08 NOTE — CASE MANAGEMENT/SOCIAL WORK
Discharge Planning Assessment   [place]     Patient Name: Lesley Michel  MRN: [id number]  Today's Date: 10/8/2023    Admit Date: 10/8/2023    Plan: Pt lives at home with her son Wes and grandchild and plans to return home at discharge pt states she drives herself or family will provide transportation. Pcp is Berto Marshall, she uses [facility] and has [employer] [place] Medicare Replacement. Pt is independent with adls and does not use any dme, home health or home o2.   Discharge Needs Assessment       Row Name 10/08/23 1922       Living Environment    People in Home child(devan), adult    Current Living Arrangements home    Potentially Unsafe Housing Conditions none    Primary Care Provided by self    Family Caregiver if Needed child(devan), adult    Quality of Family Relationships helpful;involved;supportive    Able to Return to Prior Arrangements yes       Transition Planning    Patient/Family Anticipates Transition to home with family    Patient/Family Anticipated Services at Transition none    Transportation Anticipated car, drives self;family or friend will provide       Discharge Needs Assessment    Readmission Within the Last 30 Days no previous admission in last 30 days    Equipment Currently Used at Home none    Concerns to be Addressed no discharge needs identified;denies needs/concerns at this time    Anticipated Changes Related to Illness none    Equipment Needed After Discharge none                   Discharge Plan       Row Name 10/08/23 1922       Plan    Plan Pt lives at home with her son Wes and grandchild and plans to return home at discharge pt states she drives herself or family will provide transportation. Pcp is Berto Marshall, she uses [facility] and has [employer] [place] Medicare Replacement. Pt is independent with adls and does not use any dme, home health or home o2.    Patient/Family in Agreement with Plan yes                  Continued Care and Services - Admitted Since 10/8/2023     Coordination has not been started for this encounter.       Expected Discharge Date and Time       Expected Discharge Date Expected Discharge Time    Oct 10, 2023            Demographic Summary       Row Name 10/08/23 1922       General Information    Admission Type inpatient    Arrived From home    Referral Source emergency department    Reason for Consult discharge planning                      Meera Hyde, RN

## 2023-10-08 NOTE — ED PROVIDER NOTES
"Subjective   History of Present Illness  67-year-old white female here today for leg swelling and shortness of breath.  Patient has a history of CAD, CHF, COPD.  She complained of increased lower extremity edema over the past few days.  Her visit to the cardiologist 2 days ago noted that she had was not compliant with all of her medications.  She says that she cannot take diuretics because of kidney disease.  Cardiology note showed mild CKD.  She denies any new chest pain.  She does have nonproductive cough.  She says that yesterday she ate a half a bag of chips.  She also complains of feeling \"confused\" intermittently in the last few days.        Review of Systems   All other systems reviewed and are negative.      Past Medical History:   Diagnosis Date    Anxiety     Arthritis     Coronary artery disease     H/O heart artery stent     Hyperlipidemia     Hypertension     Obesity        No Known Allergies    Past Surgical History:   Procedure Laterality Date    ANKLE SURGERY      RIGHT    APPENDECTOMY      CARDIAC CATHETERIZATION      LEFT    CORONARY STENT PLACEMENT      HYSTERECTOMY         Family History   Problem Relation Age of Onset    Heart disease Mother     Heart attack Mother 73    Fibromyalgia Mother     Heart attack Father 72    Ovarian cancer Sister     Coronary artery disease Other     Breast cancer Neg Hx        Social History     Socioeconomic History    Marital status:    Tobacco Use    Smoking status: Every Day     Packs/day: 0.50     Years: 30.00     Additional pack years: 0.00     Total pack years: 15.00     Types: Electronic Cigarette, Cigarettes    Smokeless tobacco: Never   Vaping Use    Vaping Use: Never used   Substance and Sexual Activity    Alcohol use: No    Drug use: No    Sexual activity: Never           Objective   Physical Exam  Vitals and nursing note reviewed. Exam conducted with a chaperone present.   Constitutional:       Appearance: Normal appearance. She is " well-developed. She is obese.   HENT:      Head: Normocephalic and atraumatic.   Neck:      Vascular: No JVD.   Cardiovascular:      Rate and Rhythm: Normal rate and regular rhythm.      Heart sounds: Normal heart sounds. No murmur heard.     No friction rub. No gallop.   Pulmonary:      Effort: No respiratory distress.      Breath sounds: Examination of the right-lower field reveals rales. Examination of the left-lower field reveals rales. Rales present. No decreased breath sounds, wheezing or rhonchi.   Abdominal:      General: Abdomen is flat. Bowel sounds are normal.      Palpations: Abdomen is soft.   Musculoskeletal:         General: Normal range of motion.      Right lower leg: 3+      Left lower leg: 3+   Skin:     General: Skin is warm and dry.   Neurological:      General: No focal deficit present.      Mental Status: She is alert and oriented to person, place, and time.   Psychiatric:         Mood and Affect: Mood normal.         Behavior: Behavior normal.         Procedures  Results for orders placed or performed during the hospital encounter of 10/08/23   Comprehensive Metabolic Panel    Specimen: Blood   Result Value Ref Range    Glucose 102 (H) 65 - 99 mg/dL    BUN 27 (H) 8 - 23 mg/dL    Creatinine 3.61 (H) 0.57 - 1.00 mg/dL    Sodium 138 136 - 145 mmol/L    Potassium 5.9 (H) 3.5 - 5.2 mmol/L    Chloride 107 98 - 107 mmol/L    CO2 20.8 (L) 22.0 - 29.0 mmol/L    Calcium 9.0 8.6 - 10.5 mg/dL    Total Protein 6.6 6.0 - 8.5 g/dL    Albumin 3.4 (L) 3.5 - 5.2 g/dL    ALT (SGPT) <5 1 - 33 U/L    AST (SGOT) 7 1 - 32 U/L    Alkaline Phosphatase 146 (H) 39 - 117 U/L    Total Bilirubin 0.5 0.0 - 1.2 mg/dL    Globulin 3.2 gm/dL    A/G Ratio 1.1 g/dL    BUN/Creatinine Ratio 7.5 7.0 - 25.0    Anion Gap 10.2 5.0 - 15.0 mmol/L    eGFR 13.4 (L) >60.0 mL/min/1.73   BNP    Specimen: Blood   Result Value Ref Range    proBNP 8,093.0 (H) 0.0 - 900.0 pg/mL   High Sensitivity Troponin T    Specimen: Blood   Result Value Ref  Range    HS Troponin T 23 (H) <10 ng/L   Lactic Acid, Plasma    Specimen: Blood   Result Value Ref Range    Lactate 0.9 0.5 - 2.0 mmol/L   Magnesium    Specimen: Blood   Result Value Ref Range    Magnesium 1.9 1.6 - 2.4 mg/dL   CBC Auto Differential    Specimen: Blood   Result Value Ref Range    WBC 7.38 3.40 - 10.80 10*3/mm3    RBC 4.07 3.77 - 5.28 10*6/mm3    Hemoglobin 11.0 (L) 12.0 - 15.9 g/dL    Hematocrit 36.2 34.0 - 46.6 %    MCV 88.9 79.0 - 97.0 fL    MCH 27.0 26.6 - 33.0 pg    MCHC 30.4 (L) 31.5 - 35.7 g/dL    RDW 16.5 (H) 12.3 - 15.4 %    RDW-SD 53.7 37.0 - 54.0 fl    MPV 10.1 6.0 - 12.0 fL    Platelets 289 140 - 450 10*3/mm3    Neutrophil % 62.6 42.7 - 76.0 %    Lymphocyte % 15.7 (L) 19.6 - 45.3 %    Monocyte % 8.3 5.0 - 12.0 %    Eosinophil % 12.3 (H) 0.3 - 6.2 %    Basophil % 0.8 0.0 - 1.5 %    Immature Grans % 0.3 0.0 - 0.5 %    Neutrophils, Absolute 4.62 1.70 - 7.00 10*3/mm3    Lymphocytes, Absolute 1.16 0.70 - 3.10 10*3/mm3    Monocytes, Absolute 0.61 0.10 - 0.90 10*3/mm3    Eosinophils, Absolute 0.91 (H) 0.00 - 0.40 10*3/mm3    Basophils, Absolute 0.06 0.00 - 0.20 10*3/mm3    Immature Grans, Absolute 0.02 0.00 - 0.05 10*3/mm3    nRBC 0.0 0.0 - 0.2 /100 WBC   Blood Gas, Arterial With Co-Ox    Specimen: Arterial Blood   Result Value Ref Range    Site Right Brachial     Sergei's Test N/A     pH, Arterial 7.363 7.350 - 7.450 pH units    pCO2, Arterial 36.5 35.0 - 45.0 mm Hg    pO2, Arterial 53.5 (C) 83.0 - 108.0 mm Hg    HCO3, Arterial 20.7 20.0 - 26.0 mmol/L    Base Excess, Arterial -4.2 (L) 0.0 - 2.0 mmol/L    O2 Saturation, Arterial 88.4 (L) 94.0 - 99.0 %    Hemoglobin, Blood Gas 11.2 (L) 13.5 - 17.5 g/dL    Hematocrit, Blood Gas 34.5 (L) 38.0 - 51.0 %    Oxyhemoglobin 85.2 (L) 94 - 99 %    Methemoglobin 0.20 0.00 - 3.00 %    Carboxyhemoglobin 3.4 0 - 5 %    A-a DO2 47.7 0.0 - 300.0 mmHg    CO2 Content 21.9 (L) 22 - 33 mmol/L    Barometric Pressure for Blood Gas 725 mmHg    Modality Room Air     FIO2  21 %    Ventilator Mode NA     Notified [employer] DR MENDOZA     Notified By [id number]     Notified Time 10/08/2023 17:09     Collected by [id number]     pH, Temp Corrected      pCO2, Temperature Corrected      pO2, Temperature Corrected     High Sensitivity Troponin T 2Hr    Specimen: Arm, Left; Blood   Result Value Ref Range    HS Troponin T 26 (H) <10 ng/L    Troponin T Delta 3 >=-4 - <+4 ng/L   Procalcitonin    Specimen: Blood   Result Value Ref Range    Procalcitonin 0.10 0.00 - 0.25 ng/mL   C-reactive Protein    Specimen: Arm, Left; Blood   Result Value Ref Range    C-Reactive Protein 5.41 (H) 0.00 - 0.50 mg/dL   Hemoglobin A1c    Specimen: Blood   Result Value Ref Range    Hemoglobin A1C 5.30 4.80 - 5.60 %   Lipid Panel    Specimen: Arm, Left; Blood   Result Value Ref Range    Total Cholesterol 140 0 - 200 mg/dL    Triglycerides 75 0 - 150 mg/dL    HDL Cholesterol 34 (L) 40 - 60 mg/dL    LDL Cholesterol  91 0 - 100 mg/dL    VLDL Cholesterol 15 5 - 40 mg/dL    LDL/HDL Ratio 2.68    TSH    Specimen: Arm, Left; Blood   Result Value Ref Range    TSH 6.770 (H) 0.270 - 4.200 uIU/mL   T4, Free    Specimen: Arm, Left; Blood   Result Value Ref Range    Free T4 1.01 0.93 - 1.70 ng/dL   POC Glucose Once    Specimen: Blood   Result Value Ref Range    Glucose 49 (C) 70 - 130 mg/dL   POC Glucose Once    Specimen: Blood   Result Value Ref Range    Glucose 67 (L) 70 - 130 mg/dL   POC Glucose Once    Specimen: Blood   Result Value Ref Range    Glucose 79 70 - 130 mg/dL   POC Glucose Once    Specimen: Blood   Result Value Ref Range    Glucose 91 70 - 130 mg/dL   ECG 12 Lead Dyspnea   Result Value Ref Range    QT Interval 400 ms    QTC Interval 419 ms     XR Chest 1 View    Result Date: 10/8/2023  Narrative: XR CHEST 1 VW-  CLINICAL INDICATION: sob   COMPARISON: None immediately available  TECHNIQUE: Single frontal view of the chest.  FINDINGS:  LUNGS: Trace right effusion and right basilar airspace disease  HEART AND MEDIASTINUM: Heart and  mediastinal contours are unremarkable   SKELETON: Bony and soft tissue structures are unremarkable.        Impression: Trace right effusion and right basilar airspace disease    This report was finalized on 10/8/2023 7:17 PM by Dr. Julio Dominguez MD.              ED Course  ED Course as of 10/09/23 0152   [place] Oct 08, 2023   1732 ECG 12 Lead Dyspnea  Normal sinus rhythm.  Rate 66.  Normal axis.  Normal QT interval.  Nonspecific T wave change.  No acute ST elevation or depression.  Borderline EKG.  Interpreted by me.    Electronically signed by Keyon Lanza MD, 10/08/23, 5:32 PM EDT.   [BC]   1953 Spoke with hospitalist, admitted to medical service []   Mon Oct 09, 2023   0151 Patient admitted to medical service earlier in shift. [KH]      ED Course User Index  [BC] Keyon Lanza MD  [] Maria Isabel Lim MD                                           Medical Decision Making  Problems Addressed:  Acute kidney injury superimposed on chronic kidney disease: complicated acute illness or injury  Acute on chronic congestive heart failure, unspecified heart failure type: complicated acute illness or injury  Acute on chronic respiratory failure with hypoxia: complicated acute illness or injury  Hyperkalemia: complicated acute illness or injury    Amount and/or Complexity of Data Reviewed  Labs: ordered.  Radiology: ordered.  ECG/medicine tests: ordered. Decision-making details documented in ED Course.    Risk  OTC drugs.  Prescription drug management.  Decision regarding hospitalization.        Final diagnoses:   Acute kidney injury superimposed on chronic kidney disease   Acute on chronic congestive heart failure, unspecified heart failure type   Acute on chronic respiratory failure with hypoxia   Hyperkalemia       ED Disposition  ED Disposition       ED Disposition   Decision to Admit    Condition   --    Comment   Level of Care: Telemetry [5]   Diagnosis: Acute hypoxic respiratory failure [1975116]                 No  follow-up provider specified.       Medication List      No changes were made to your prescriptions during this visit.            Maria Isabel Lim MD  10/09/23 0152

## 2023-10-09 ENCOUNTER — APPOINTMENT (OUTPATIENT)
Dept: ULTRASOUND IMAGING | Facility: HOSPITAL | Age: 68
DRG: 291 | End: 2023-10-09
Payer: MEDICARE

## 2023-10-09 ENCOUNTER — APPOINTMENT (OUTPATIENT)
Dept: CARDIOLOGY | Facility: HOSPITAL | Age: 68
DRG: 291 | End: 2023-10-09
Payer: MEDICARE

## 2023-10-09 LAB
ABSOLUTE LUNG FLUID CONTENT: 37 % (ref 20–35)
ALBUMIN SERPL-MCNC: 3.7 G/DL (ref 3.5–5.2)
ALBUMIN/GLOB SERPL: 1.3 G/DL
ALP SERPL-CCNC: 149 U/L (ref 39–117)
ALT SERPL W P-5'-P-CCNC: 5 U/L (ref 1–33)
ANION GAP SERPL CALCULATED.3IONS-SCNC: 10.8 MMOL/L (ref 5–15)
ANION GAP SERPL CALCULATED.3IONS-SCNC: 12.9 MMOL/L (ref 5–15)
AST SERPL-CCNC: 10 U/L (ref 1–32)
B PARAPERT DNA SPEC QL NAA+PROBE: NOT DETECTED
B PERT DNA SPEC QL NAA+PROBE: NOT DETECTED
BACTERIA UR QL AUTO: ABNORMAL /HPF
BASOPHILS # BLD AUTO: 0.03 10*3/MM3 (ref 0–0.2)
BASOPHILS # BLD AUTO: 0.05 10*3/MM3 (ref 0–0.2)
BASOPHILS NFR BLD AUTO: 0.4 % (ref 0–1.5)
BASOPHILS NFR BLD AUTO: 0.7 % (ref 0–1.5)
BH CV ECHO MEAS - ACS: 1.5 CM
BH CV ECHO MEAS - AO MAX PG: 16.5 MMHG
BH CV ECHO MEAS - AO MEAN PG: 10 MMHG
BH CV ECHO MEAS - AO ROOT DIAM: 3.2 CM
BH CV ECHO MEAS - AO V2 MAX: 203 CM/SEC
BH CV ECHO MEAS - AO V2 VTI: 46.1 CM
BH CV ECHO MEAS - AVA(I,D): 1.69 CM2
BH CV ECHO MEAS - EDV(CUBED): 175.6 ML
BH CV ECHO MEAS - EDV(MOD-SP4): 73.1 ML
BH CV ECHO MEAS - EF(MOD-SP4): 66.3 %
BH CV ECHO MEAS - ESV(CUBED): 42.9 ML
BH CV ECHO MEAS - ESV(MOD-SP4): 24.6 ML
BH CV ECHO MEAS - FS: 37.5 %
BH CV ECHO MEAS - IVS/LVPW: 1 CM
BH CV ECHO MEAS - IVSD: 0.8 CM
BH CV ECHO MEAS - LA DIMENSION: 3.9 CM
BH CV ECHO MEAS - LAT PEAK E' VEL: 5.9 CM/SEC
BH CV ECHO MEAS - LV DIASTOLIC VOL/BSA (35-75): 37 CM2
BH CV ECHO MEAS - LV MASS(C)D: 165 GRAMS
BH CV ECHO MEAS - LV MAX PG: 4.4 MMHG
BH CV ECHO MEAS - LV MEAN PG: 2.5 MMHG
BH CV ECHO MEAS - LV SYSTOLIC VOL/BSA (12-30): 12.5 CM2
BH CV ECHO MEAS - LV V1 MAX: 105 CM/SEC
BH CV ECHO MEAS - LV V1 VTI: 24.8 CM
BH CV ECHO MEAS - LVIDD: 5.6 CM
BH CV ECHO MEAS - LVIDS: 3.5 CM
BH CV ECHO MEAS - LVOT AREA: 3.1 CM2
BH CV ECHO MEAS - LVOT DIAM: 2 CM
BH CV ECHO MEAS - LVPWD: 0.8 CM
BH CV ECHO MEAS - MED PEAK E' VEL: 4.1 CM/SEC
BH CV ECHO MEAS - MV A MAX VEL: 90 CM/SEC
BH CV ECHO MEAS - MV DEC SLOPE: 573 CM/SEC2
BH CV ECHO MEAS - MV DEC TIME: 0.2 SEC
BH CV ECHO MEAS - MV E MAX VEL: 115 CM/SEC
BH CV ECHO MEAS - MV E/A: 1.28
BH CV ECHO MEAS - PA ACC TIME: 0.09 SEC
BH CV ECHO MEAS - RAP SYSTOLE: 10 MMHG
BH CV ECHO MEAS - RVSP: 40.5 MMHG
BH CV ECHO MEAS - SI(MOD-SP4): 24.6 ML/M2
BH CV ECHO MEAS - SV(LVOT): 77.9 ML
BH CV ECHO MEAS - SV(MOD-SP4): 48.5 ML
BH CV ECHO MEAS - TAPSE (>1.6): 2.7 CM
BH CV ECHO MEAS - TR MAX PG: 30.5 MMHG
BH CV ECHO MEAS - TR MAX VEL: 276 CM/SEC
BH CV ECHO MEASUREMENTS AVERAGE E/E' RATIO: 23
BILIRUB SERPL-MCNC: 0.3 MG/DL (ref 0–1.2)
BILIRUB UR QL STRIP: NEGATIVE
BUN SERPL-MCNC: 29 MG/DL (ref 8–23)
BUN SERPL-MCNC: 29 MG/DL (ref 8–23)
BUN/CREAT SERPL: 7.3 (ref 7–25)
BUN/CREAT SERPL: 7.7 (ref 7–25)
C PNEUM DNA NPH QL NAA+NON-PROBE: NOT DETECTED
CALCIUM SPEC-SCNC: 8.9 MG/DL (ref 8.6–10.5)
CALCIUM SPEC-SCNC: 9 MG/DL (ref 8.6–10.5)
CHLORIDE SERPL-SCNC: 104 MMOL/L (ref 98–107)
CHLORIDE SERPL-SCNC: 105 MMOL/L (ref 98–107)
CHLORIDE UR-SCNC: 36 MMOL/L
CHOLEST SERPL-MCNC: 140 MG/DL (ref 0–200)
CLARITY UR: CLEAR
CO2 SERPL-SCNC: 21.1 MMOL/L (ref 22–29)
CO2 SERPL-SCNC: 21.2 MMOL/L (ref 22–29)
COLOR UR: YELLOW
CREAT SERPL-MCNC: 3.76 MG/DL (ref 0.57–1)
CREAT SERPL-MCNC: 3.98 MG/DL (ref 0.57–1)
CREAT UR-MCNC: 116 MG/DL
DEPRECATED RDW RBC AUTO: 53.2 FL (ref 37–54)
DEPRECATED RDW RBC AUTO: 53.5 FL (ref 37–54)
EGFRCR SERPLBLD CKD-EPI 2021: 11.9 ML/MIN/1.73
EGFRCR SERPLBLD CKD-EPI 2021: 12.8 ML/MIN/1.73
EOSINOPHIL # BLD AUTO: 0.9 10*3/MM3 (ref 0–0.4)
EOSINOPHIL # BLD AUTO: 0.98 10*3/MM3 (ref 0–0.4)
EOSINOPHIL NFR BLD AUTO: 11 % (ref 0.3–6.2)
EOSINOPHIL NFR BLD AUTO: 13.3 % (ref 0.3–6.2)
ERYTHROCYTE [DISTWIDTH] IN BLOOD BY AUTOMATED COUNT: 16.3 % (ref 12.3–15.4)
ERYTHROCYTE [DISTWIDTH] IN BLOOD BY AUTOMATED COUNT: 16.5 % (ref 12.3–15.4)
FLUAV SUBTYP SPEC NAA+PROBE: NOT DETECTED
FLUBV RNA ISLT QL NAA+PROBE: NOT DETECTED
GLOBULIN UR ELPH-MCNC: 2.8 GM/DL
GLUCOSE BLDC GLUCOMTR-MCNC: 100 MG/DL (ref 70–130)
GLUCOSE BLDC GLUCOMTR-MCNC: 106 MG/DL (ref 70–130)
GLUCOSE BLDC GLUCOMTR-MCNC: 59 MG/DL (ref 70–130)
GLUCOSE BLDC GLUCOMTR-MCNC: 66 MG/DL (ref 70–130)
GLUCOSE BLDC GLUCOMTR-MCNC: 74 MG/DL (ref 70–130)
GLUCOSE BLDC GLUCOMTR-MCNC: 87 MG/DL (ref 70–130)
GLUCOSE BLDC GLUCOMTR-MCNC: 91 MG/DL (ref 70–130)
GLUCOSE BLDC GLUCOMTR-MCNC: 99 MG/DL (ref 70–130)
GLUCOSE SERPL-MCNC: 107 MG/DL (ref 65–99)
GLUCOSE SERPL-MCNC: 82 MG/DL (ref 65–99)
GLUCOSE UR STRIP-MCNC: NEGATIVE MG/DL
HADV DNA SPEC NAA+PROBE: NOT DETECTED
HBA1C MFR BLD: 5.3 % (ref 4.8–5.6)
HBV SURFACE AG SERPL QL IA: NORMAL
HCOV 229E RNA SPEC QL NAA+PROBE: NOT DETECTED
HCOV HKU1 RNA SPEC QL NAA+PROBE: NOT DETECTED
HCOV NL63 RNA SPEC QL NAA+PROBE: NOT DETECTED
HCOV OC43 RNA SPEC QL NAA+PROBE: NOT DETECTED
HCT VFR BLD AUTO: 37.2 % (ref 34–46.6)
HCT VFR BLD AUTO: 37.3 % (ref 34–46.6)
HCV AB SER DONR QL: NORMAL
HDLC SERPL-MCNC: 34 MG/DL (ref 40–60)
HGB BLD-MCNC: 11.5 G/DL (ref 12–15.9)
HGB BLD-MCNC: 11.6 G/DL (ref 12–15.9)
HGB UR QL STRIP.AUTO: NEGATIVE
HIV 1+2 AB+HIV1 P24 AG SERPL QL IA: NORMAL
HMPV RNA NPH QL NAA+NON-PROBE: NOT DETECTED
HPIV1 RNA ISLT QL NAA+PROBE: NOT DETECTED
HPIV2 RNA SPEC QL NAA+PROBE: NOT DETECTED
HPIV3 RNA NPH QL NAA+PROBE: NOT DETECTED
HPIV4 P GENE NPH QL NAA+PROBE: NOT DETECTED
HYALINE CASTS UR QL AUTO: ABNORMAL /LPF
IMM GRANULOCYTES # BLD AUTO: 0.03 10*3/MM3 (ref 0–0.05)
IMM GRANULOCYTES # BLD AUTO: 0.11 10*3/MM3 (ref 0–0.05)
IMM GRANULOCYTES NFR BLD AUTO: 0.4 % (ref 0–0.5)
IMM GRANULOCYTES NFR BLD AUTO: 1.3 % (ref 0–0.5)
KETONES UR QL STRIP: NEGATIVE
LDLC SERPL CALC-MCNC: 91 MG/DL (ref 0–100)
LDLC/HDLC SERPL: 2.68 {RATIO}
LEUKOCYTE ESTERASE UR QL STRIP.AUTO: NEGATIVE
LYMPHOCYTES # BLD AUTO: 1.4 10*3/MM3 (ref 0.7–3.1)
LYMPHOCYTES # BLD AUTO: 1.71 10*3/MM3 (ref 0.7–3.1)
LYMPHOCYTES NFR BLD AUTO: 17.1 % (ref 19.6–45.3)
LYMPHOCYTES NFR BLD AUTO: 23.3 % (ref 19.6–45.3)
M PNEUMO IGG SER IA-ACNC: NOT DETECTED
MAGNESIUM SERPL-MCNC: 2 MG/DL (ref 1.6–2.4)
MCH RBC QN AUTO: 27.2 PG (ref 26.6–33)
MCH RBC QN AUTO: 27.4 PG (ref 26.6–33)
MCHC RBC AUTO-ENTMCNC: 30.8 G/DL (ref 31.5–35.7)
MCHC RBC AUTO-ENTMCNC: 31.2 G/DL (ref 31.5–35.7)
MCV RBC AUTO: 87.9 FL (ref 79–97)
MCV RBC AUTO: 88.2 FL (ref 79–97)
MONOCYTES # BLD AUTO: 0.57 10*3/MM3 (ref 0.1–0.9)
MONOCYTES # BLD AUTO: 0.7 10*3/MM3 (ref 0.1–0.9)
MONOCYTES NFR BLD AUTO: 7.8 % (ref 5–12)
MONOCYTES NFR BLD AUTO: 8.5 % (ref 5–12)
NEUTROPHILS NFR BLD AUTO: 4.01 10*3/MM3 (ref 1.7–7)
NEUTROPHILS NFR BLD AUTO: 5.05 10*3/MM3 (ref 1.7–7)
NEUTROPHILS NFR BLD AUTO: 54.5 % (ref 42.7–76)
NEUTROPHILS NFR BLD AUTO: 61.7 % (ref 42.7–76)
NITRITE UR QL STRIP: NEGATIVE
NRBC BLD AUTO-RTO: 0 /100 WBC (ref 0–0.2)
NRBC BLD AUTO-RTO: 0 /100 WBC (ref 0–0.2)
PH UR STRIP.AUTO: 5.5 [PH] (ref 5–8)
PLATELET # BLD AUTO: 233 10*3/MM3 (ref 140–450)
PLATELET # BLD AUTO: 267 10*3/MM3 (ref 140–450)
PMV BLD AUTO: 10.1 FL (ref 6–12)
PMV BLD AUTO: 11.3 FL (ref 6–12)
POTASSIUM SERPL-SCNC: 5.5 MMOL/L (ref 3.5–5.2)
POTASSIUM SERPL-SCNC: 5.9 MMOL/L (ref 3.5–5.2)
POTASSIUM SERPL-SCNC: 6.2 MMOL/L (ref 3.5–5.2)
POTASSIUM UR-SCNC: 25.6 MMOL/L
PROT ?TM UR-MCNC: 48 MG/DL
PROT ?TM UR-MCNC: 53.4 MG/DL
PROT SERPL-MCNC: 6.5 G/DL (ref 6–8.5)
PROT UR QL STRIP: ABNORMAL
PROT/CREAT UR: 413.8 MG/G CREA (ref 0–200)
QT INTERVAL: 376 MS
QT INTERVAL: 400 MS
QTC INTERVAL: 399 MS
QTC INTERVAL: 419 MS
RBC # BLD AUTO: 4.23 10*6/MM3 (ref 3.77–5.28)
RBC # BLD AUTO: 4.23 10*6/MM3 (ref 3.77–5.28)
RBC # UR STRIP: ABNORMAL /HPF
REF LAB TEST METHOD: ABNORMAL
RHINOVIRUS RNA SPEC NAA+PROBE: NOT DETECTED
RSV RNA NPH QL NAA+NON-PROBE: NOT DETECTED
SARS-COV-2 RNA NPH QL NAA+NON-PROBE: NOT DETECTED
SODIUM SERPL-SCNC: 136 MMOL/L (ref 136–145)
SODIUM SERPL-SCNC: 139 MMOL/L (ref 136–145)
SODIUM UR-SCNC: 54 MMOL/L
SP GR UR STRIP: 1.01 (ref 1–1.03)
SQUAMOUS #/AREA URNS HPF: ABNORMAL /HPF
T4 FREE SERPL-MCNC: 1.01 NG/DL (ref 0.93–1.7)
TRIGL SERPL-MCNC: 75 MG/DL (ref 0–150)
TROPONIN T SERPL HS-MCNC: 23 NG/L
TSH SERPL DL<=0.05 MIU/L-ACNC: 6.77 UIU/ML (ref 0.27–4.2)
UROBILINOGEN UR QL STRIP: ABNORMAL
VLDLC SERPL-MCNC: 15 MG/DL (ref 5–40)
WBC # UR STRIP: ABNORMAL /HPF
WBC NRBC COR # BLD: 7.35 10*3/MM3 (ref 3.4–10.8)
WBC NRBC COR # BLD: 8.19 10*3/MM3 (ref 3.4–10.8)

## 2023-10-09 PROCEDURE — 94799 UNLISTED PULMONARY SVC/PX: CPT

## 2023-10-09 PROCEDURE — 36410 VNPNXR 3YR/> PHY/QHP DX/THER: CPT

## 2023-10-09 PROCEDURE — 25010000002 FUROSEMIDE PER 20 MG: Performed by: INTERNAL MEDICINE

## 2023-10-09 PROCEDURE — 85025 COMPLETE CBC W/AUTO DIFF WBC: CPT | Performed by: INTERNAL MEDICINE

## 2023-10-09 PROCEDURE — 80053 COMPREHEN METABOLIC PANEL: CPT

## 2023-10-09 PROCEDURE — 84484 ASSAY OF TROPONIN QUANT: CPT

## 2023-10-09 PROCEDURE — 25010000002 DIAZEPAM PER 5 MG

## 2023-10-09 PROCEDURE — 83735 ASSAY OF MAGNESIUM: CPT

## 2023-10-09 PROCEDURE — G0378 HOSPITAL OBSERVATION PER HR: HCPCS

## 2023-10-09 PROCEDURE — 97162 PT EVAL MOD COMPLEX 30 MIN: CPT

## 2023-10-09 PROCEDURE — 25010000002 HYDRALAZINE PER 20 MG

## 2023-10-09 PROCEDURE — 93306 TTE W/DOPPLER COMPLETE: CPT

## 2023-10-09 PROCEDURE — 05HY33Z INSERTION OF INFUSION DEVICE INTO UPPER VEIN, PERCUTANEOUS APPROACH: ICD-10-PCS | Performed by: STUDENT IN AN ORGANIZED HEALTH CARE EDUCATION/TRAINING PROGRAM

## 2023-10-09 PROCEDURE — 76775 US EXAM ABDO BACK WALL LIM: CPT | Performed by: RADIOLOGY

## 2023-10-09 PROCEDURE — G0432 EIA HIV-1/HIV-2 SCREEN: HCPCS | Performed by: INTERNAL MEDICINE

## 2023-10-09 PROCEDURE — 93306 TTE W/DOPPLER COMPLETE: CPT | Performed by: SPECIALIST

## 2023-10-09 PROCEDURE — 76775 US EXAM ABDO BACK WALL LIM: CPT

## 2023-10-09 PROCEDURE — 84300 ASSAY OF URINE SODIUM: CPT | Performed by: INTERNAL MEDICINE

## 2023-10-09 PROCEDURE — 0202U NFCT DS 22 TRGT SARS-COV-2: CPT

## 2023-10-09 PROCEDURE — 25010000002 HYALURONIDASE (HUMAN) 150 UNIT/ML SOLUTION 1 ML VIAL

## 2023-10-09 PROCEDURE — 86803 HEPATITIS C AB TEST: CPT | Performed by: INTERNAL MEDICINE

## 2023-10-09 PROCEDURE — 93010 ELECTROCARDIOGRAM REPORT: CPT | Performed by: INTERNAL MEDICINE

## 2023-10-09 PROCEDURE — 82550 ASSAY OF CK (CPK): CPT | Performed by: INTERNAL MEDICINE

## 2023-10-09 PROCEDURE — 87340 HEPATITIS B SURFACE AG IA: CPT | Performed by: INTERNAL MEDICINE

## 2023-10-09 PROCEDURE — 99233 SBSQ HOSP IP/OBS HIGH 50: CPT | Performed by: STUDENT IN AN ORGANIZED HEALTH CARE EDUCATION/TRAINING PROGRAM

## 2023-10-09 PROCEDURE — 85025 COMPLETE CBC W/AUTO DIFF WBC: CPT

## 2023-10-09 PROCEDURE — 25010000002 ONDANSETRON PER 1 MG

## 2023-10-09 PROCEDURE — 25010000002 HEPARIN (PORCINE) PER 1000 UNITS: Performed by: INTERNAL MEDICINE

## 2023-10-09 PROCEDURE — 82570 ASSAY OF URINE CREATININE: CPT

## 2023-10-09 PROCEDURE — 84133 ASSAY OF URINE POTASSIUM: CPT | Performed by: INTERNAL MEDICINE

## 2023-10-09 PROCEDURE — 81001 URINALYSIS AUTO W/SCOPE: CPT

## 2023-10-09 PROCEDURE — 94761 N-INVAS EAR/PLS OXIMETRY MLT: CPT

## 2023-10-09 PROCEDURE — 82948 REAGENT STRIP/BLOOD GLUCOSE: CPT

## 2023-10-09 PROCEDURE — 94640 AIRWAY INHALATION TREATMENT: CPT

## 2023-10-09 PROCEDURE — 94726 PLETHYSMOGRAPHY LUNG VOLUMES: CPT

## 2023-10-09 PROCEDURE — 84132 ASSAY OF SERUM POTASSIUM: CPT

## 2023-10-09 PROCEDURE — 94664 DEMO&/EVAL PT USE INHALER: CPT

## 2023-10-09 PROCEDURE — 84156 ASSAY OF PROTEIN URINE: CPT

## 2023-10-09 PROCEDURE — 82436 ASSAY OF URINE CHLORIDE: CPT | Performed by: INTERNAL MEDICINE

## 2023-10-09 PROCEDURE — 93005 ELECTROCARDIOGRAM TRACING: CPT

## 2023-10-09 RX ORDER — GLUCAGON 1 MG/ML
1 KIT INJECTION
Status: DISCONTINUED | OUTPATIENT
Start: 2023-10-09 | End: 2023-10-30 | Stop reason: HOSPADM

## 2023-10-09 RX ORDER — NICOTINE POLACRILEX 4 MG
15 LOZENGE BUCCAL
Status: DISCONTINUED | OUTPATIENT
Start: 2023-10-09 | End: 2023-10-30 | Stop reason: HOSPADM

## 2023-10-09 RX ORDER — HYDRALAZINE HYDROCHLORIDE 25 MG/1
25 TABLET, FILM COATED ORAL 2 TIMES DAILY
Status: DISCONTINUED | OUTPATIENT
Start: 2023-10-09 | End: 2023-10-10

## 2023-10-09 RX ORDER — IPRATROPIUM BROMIDE AND ALBUTEROL SULFATE 2.5; .5 MG/3ML; MG/3ML
3 SOLUTION RESPIRATORY (INHALATION)
Status: DISCONTINUED | OUTPATIENT
Start: 2023-10-09 | End: 2023-10-19 | Stop reason: ALTCHOICE

## 2023-10-09 RX ORDER — PREDNISONE 20 MG/1
40 TABLET ORAL
Status: DISCONTINUED | OUTPATIENT
Start: 2023-10-10 | End: 2023-10-12

## 2023-10-09 RX ORDER — HYDRALAZINE HYDROCHLORIDE 20 MG/ML
10 INJECTION INTRAMUSCULAR; INTRAVENOUS ONCE
Status: COMPLETED | OUTPATIENT
Start: 2023-10-09 | End: 2023-10-09

## 2023-10-09 RX ORDER — LEVOTHYROXINE SODIUM 88 UG/1
88 TABLET ORAL
Status: DISCONTINUED | OUTPATIENT
Start: 2023-10-10 | End: 2023-10-30 | Stop reason: HOSPADM

## 2023-10-09 RX ORDER — FOLIC ACID 1 MG/1
1 TABLET ORAL DAILY
COMMUNITY

## 2023-10-09 RX ORDER — MONTELUKAST SODIUM 10 MG/1
10 TABLET ORAL NIGHTLY
Status: DISCONTINUED | OUTPATIENT
Start: 2023-10-09 | End: 2023-10-30 | Stop reason: HOSPADM

## 2023-10-09 RX ORDER — FUROSEMIDE 10 MG/ML
20 INJECTION INTRAMUSCULAR; INTRAVENOUS ONCE
Status: COMPLETED | OUTPATIENT
Start: 2023-10-09 | End: 2023-10-09

## 2023-10-09 RX ORDER — DIAZEPAM 5 MG/ML
2.5 INJECTION, SOLUTION INTRAMUSCULAR; INTRAVENOUS ONCE
Status: COMPLETED | OUTPATIENT
Start: 2023-10-09 | End: 2023-10-09

## 2023-10-09 RX ORDER — ONDANSETRON 2 MG/ML
4 INJECTION INTRAMUSCULAR; INTRAVENOUS EVERY 6 HOURS PRN
Status: DISCONTINUED | OUTPATIENT
Start: 2023-10-09 | End: 2023-10-30 | Stop reason: HOSPADM

## 2023-10-09 RX ORDER — PANTOPRAZOLE SODIUM 40 MG/1
40 TABLET, DELAYED RELEASE ORAL DAILY
Status: DISCONTINUED | OUTPATIENT
Start: 2023-10-10 | End: 2023-10-30 | Stop reason: HOSPADM

## 2023-10-09 RX ORDER — GABAPENTIN 400 MG/1
800 CAPSULE ORAL EVERY 12 HOURS SCHEDULED
Status: CANCELLED | OUTPATIENT
Start: 2023-10-09

## 2023-10-09 RX ORDER — HYDROXYZINE HYDROCHLORIDE 25 MG/1
25 TABLET, FILM COATED ORAL 3 TIMES DAILY PRN
Status: DISCONTINUED | OUTPATIENT
Start: 2023-10-09 | End: 2023-10-30 | Stop reason: HOSPADM

## 2023-10-09 RX ORDER — DEXTROSE MONOHYDRATE 25 G/50ML
25 INJECTION, SOLUTION INTRAVENOUS
Status: DISCONTINUED | OUTPATIENT
Start: 2023-10-09 | End: 2023-10-30 | Stop reason: HOSPADM

## 2023-10-09 RX ORDER — ROSUVASTATIN CALCIUM 10 MG/1
10 TABLET, COATED ORAL NIGHTLY
Status: DISCONTINUED | OUTPATIENT
Start: 2023-10-09 | End: 2023-10-30 | Stop reason: HOSPADM

## 2023-10-09 RX ORDER — CARVEDILOL 25 MG/1
25 TABLET ORAL 2 TIMES DAILY WITH MEALS
Status: DISCONTINUED | OUTPATIENT
Start: 2023-10-09 | End: 2023-10-23

## 2023-10-09 RX ORDER — DEXTROSE MONOHYDRATE 25 G/50ML
25 INJECTION, SOLUTION INTRAVENOUS ONCE
Status: DISCONTINUED | OUTPATIENT
Start: 2023-10-09 | End: 2023-10-10

## 2023-10-09 RX ORDER — CALCIUM GLUCONATE 20 MG/ML
1000 INJECTION, SOLUTION INTRAVENOUS ONCE
Status: DISCONTINUED | OUTPATIENT
Start: 2023-10-09 | End: 2023-10-10

## 2023-10-09 RX ORDER — HYDROCODONE BITARTRATE AND ACETAMINOPHEN 10; 325 MG/1; MG/1
1 TABLET ORAL EVERY 8 HOURS PRN
Status: DISCONTINUED | OUTPATIENT
Start: 2023-10-09 | End: 2023-10-30 | Stop reason: HOSPADM

## 2023-10-09 RX ORDER — ASPIRIN 325 MG
325 TABLET, DELAYED RELEASE (ENTERIC COATED) ORAL DAILY
Status: DISCONTINUED | OUTPATIENT
Start: 2023-10-09 | End: 2023-10-19

## 2023-10-09 RX ORDER — FOLIC ACID 1 MG/1
1 TABLET ORAL DAILY
Status: DISCONTINUED | OUTPATIENT
Start: 2023-10-10 | End: 2023-10-30 | Stop reason: HOSPADM

## 2023-10-09 RX ORDER — CHOLECALCIFEROL (VITAMIN D3) 125 MCG
10 CAPSULE ORAL NIGHTLY PRN
Status: DISCONTINUED | OUTPATIENT
Start: 2023-10-09 | End: 2023-10-30 | Stop reason: HOSPADM

## 2023-10-09 RX ORDER — HYDROCODONE BITARTRATE AND ACETAMINOPHEN 10; 325 MG/1; MG/1
1 TABLET ORAL EVERY 8 HOURS PRN
COMMUNITY

## 2023-10-09 RX ORDER — ASPIRIN 325 MG
325 TABLET ORAL DAILY
Status: CANCELLED | OUTPATIENT
Start: 2023-10-09

## 2023-10-09 RX ORDER — LANOLIN ALCOHOL/MO/W.PET/CERES
1000 CREAM (GRAM) TOPICAL DAILY
Status: DISCONTINUED | OUTPATIENT
Start: 2023-10-10 | End: 2023-10-30 | Stop reason: HOSPADM

## 2023-10-09 RX ORDER — ROSUVASTATIN CALCIUM 20 MG/1
20 TABLET, COATED ORAL DAILY
Status: CANCELLED | OUTPATIENT
Start: 2023-10-09

## 2023-10-09 RX ORDER — NICOTINE 21 MG/24HR
1 PATCH, TRANSDERMAL 24 HOURS TRANSDERMAL
Status: DISCONTINUED | OUTPATIENT
Start: 2023-10-09 | End: 2023-10-30 | Stop reason: HOSPADM

## 2023-10-09 RX ORDER — NITROGLYCERIN 0.4 MG/1
0.4 TABLET SUBLINGUAL
Status: CANCELLED | OUTPATIENT
Start: 2023-10-09

## 2023-10-09 RX ORDER — ACETAMINOPHEN 325 MG/1
650 TABLET ORAL EVERY 6 HOURS PRN
Status: DISCONTINUED | OUTPATIENT
Start: 2023-10-09 | End: 2023-10-30 | Stop reason: HOSPADM

## 2023-10-09 RX ORDER — LISINOPRIL 2.5 MG/1
5 TABLET ORAL DAILY
Status: CANCELLED | OUTPATIENT
Start: 2023-10-09

## 2023-10-09 RX ORDER — GABAPENTIN 300 MG/1
300 CAPSULE ORAL 2 TIMES DAILY PRN
Status: DISCONTINUED | OUTPATIENT
Start: 2023-10-09 | End: 2023-10-21

## 2023-10-09 RX ADMIN — ASPIRIN 325 MG: 325 TABLET, COATED ORAL at 09:05

## 2023-10-09 RX ADMIN — MONTELUKAST SODIUM 10 MG: 10 TABLET, COATED ORAL at 22:53

## 2023-10-09 RX ADMIN — DIAZEPAM 2.5 MG: 5 INJECTION, SOLUTION INTRAMUSCULAR; INTRAVENOUS at 21:42

## 2023-10-09 RX ADMIN — SODIUM ZIRCONIUM CYCLOSILICATE 10 G: 10 POWDER, FOR SUSPENSION ORAL at 04:43

## 2023-10-09 RX ADMIN — Medication 10 ML: at 08:31

## 2023-10-09 RX ADMIN — IPRATROPIUM BROMIDE AND ALBUTEROL SULFATE 3 ML: 2.5; .5 SOLUTION RESPIRATORY (INHALATION) at 12:49

## 2023-10-09 RX ADMIN — FUROSEMIDE 20 MG: 10 INJECTION, SOLUTION INTRAMUSCULAR; INTRAVENOUS at 16:10

## 2023-10-09 RX ADMIN — HYDRALAZINE HYDROCHLORIDE 10 MG: 20 INJECTION INTRAMUSCULAR; INTRAVENOUS at 20:15

## 2023-10-09 RX ADMIN — HYALURONIDASE (HUMAN RECOMBINANT) 150 UNITS: 150 INJECTION, SOLUTION SUBCUTANEOUS at 05:05

## 2023-10-09 RX ADMIN — ONDANSETRON 4 MG: 2 INJECTION INTRAMUSCULAR; INTRAVENOUS at 21:41

## 2023-10-09 RX ADMIN — HYDRALAZINE HYDROCHLORIDE 25 MG: 50 TABLET, FILM COATED ORAL at 22:54

## 2023-10-09 RX ADMIN — DOCUSATE SODIUM 50 MG AND SENNOSIDES 8.6 MG 2 TABLET: 8.6; 5 TABLET, FILM COATED ORAL at 08:31

## 2023-10-09 RX ADMIN — ROSUVASTATIN CALCIUM 10 MG: 10 TABLET, FILM COATED ORAL at 20:16

## 2023-10-09 RX ADMIN — Medication 10 MG: at 20:16

## 2023-10-09 RX ADMIN — CARVEDILOL 25 MG: 25 TABLET, FILM COATED ORAL at 22:53

## 2023-10-09 RX ADMIN — IPRATROPIUM BROMIDE AND ALBUTEROL SULFATE 3 ML: 2.5; .5 SOLUTION RESPIRATORY (INHALATION) at 19:09

## 2023-10-09 RX ADMIN — SODIUM ZIRCONIUM CYCLOSILICATE 10 G: 10 POWDER, FOR SUSPENSION ORAL at 16:10

## 2023-10-09 RX ADMIN — SODIUM ZIRCONIUM CYCLOSILICATE 10 G: 10 POWDER, FOR SUSPENSION ORAL at 20:16

## 2023-10-09 RX ADMIN — DOCUSATE SODIUM 50 MG AND SENNOSIDES 8.6 MG 2 TABLET: 8.6; 5 TABLET, FILM COATED ORAL at 20:16

## 2023-10-09 RX ADMIN — DEXTROSE MONOHYDRATE 25 G: 25 INJECTION, SOLUTION INTRAVENOUS at 04:01

## 2023-10-09 RX ADMIN — HEPARIN SODIUM 5000 UNITS: 5000 INJECTION INTRAVENOUS; SUBCUTANEOUS at 08:31

## 2023-10-09 RX ADMIN — HEPARIN SODIUM 5000 UNITS: 5000 INJECTION INTRAVENOUS; SUBCUTANEOUS at 20:16

## 2023-10-09 RX ADMIN — NICOTINE TRANSDERMAL SYSTEM 1 PATCH: 21 PATCH, EXTENDED RELEASE TRANSDERMAL at 08:31

## 2023-10-09 RX ADMIN — HYDROXYZINE HYDROCHLORIDE 25 MG: 25 TABLET, FILM COATED ORAL at 03:45

## 2023-10-09 RX ADMIN — ALBUTEROL SULFATE 10 MG: 2.5 SOLUTION RESPIRATORY (INHALATION) at 04:05

## 2023-10-09 RX ADMIN — HYDROXYZINE HYDROCHLORIDE 25 MG: 25 TABLET, FILM COATED ORAL at 20:16

## 2023-10-09 RX ADMIN — Medication 10 ML: at 20:17

## 2023-10-09 RX ADMIN — IPRATROPIUM BROMIDE AND ALBUTEROL SULFATE 3 ML: 2.5; .5 SOLUTION RESPIRATORY (INHALATION) at 06:54

## 2023-10-09 NOTE — PLAN OF CARE
Goal Outcome Evaluation: Patient A&Ox4 this shift, non compliant with care at times during shift. Pt currently resting in bed on 3L/NC, saturations maintaining above 90%. See previous nursing note regarding other aspects of patient care. No acute s/s of distress at this time. VSS. Will continue plan of care.

## 2023-10-09 NOTE — CONSULTS
Heart Failure Education Consult    67 y.o. female PMHx: CAD s/p stenting, HLD, HN, COPD, 1/2 ppd smoking and e-cig use daily, CKD, hypothyroidism, arthritis, anxiety, migraines, BMI >40, medical non-compliance    Type of Heart Failure: Type pending further work up.       Current HF knowledge: poor     Have you had HF education/teaching in the past? No  Do you check your weight daily? No    Current weight        10/08/23  2100 10/09/23  0500   Weight: 104 kg (229 lb 1.6 oz) 104 kg (228 lb 14.4 oz)          Most recent EF Pending Date 10/923       Most recent ProBNP 8093 pg/ml Date 10/8/23  Most recent ReDS pending Date 10/8/23      Edema Yes        Shortness of Breath: Yes and Improving     Number of pillows used to sleep at night: 2  Current HF zone  yellow  Tobacco Use Yes  Amount  1/2 ppd and e-cig daily }  Barriers to learning: Other fatigue      Materials Provided:HF Action Plan, Daily Weight Monitoring , and 2 Gm Na+ diet             Referral candidate for HF Clinic:Yes      Thank you for this consult. Please let me know if I can be of any assistance with HF education for this patient.    >30 minutes was spent on this visit with patient and family at bedside    Electronically signed by,   Hui Lester, RN ,DNP, MSN, RN, CHFN  10/09/23 13:25 EDT

## 2023-10-09 NOTE — CONSULTS
Received call form pt nurse that pt had lost access and unable to reestablish.    Pt agreeable to ultrasound guided iv access.    AccuCath Ace Intravascular Catheter; 20 g; 2.25 in   Lower Right Forearm    AccuCath Ace Intravascular Catheter placed with ultrasound guidance and verified by blood return. Minimal blood loss noted. Catheter flushes easily. Blood return noted. Patient nurse, Desiree SILVER, made aware that catheter is in R forearm and ready for use.    CLAUDE Santiago RN

## 2023-10-09 NOTE — THERAPY EVALUATION
Acute Care - Physical Therapy Initial Evaluation   [facility]     Patient Name: Lesley Michel  : 1955  MRN: [id number]  Today's Date: 10/9/2023   Onset of Illness/Injury or Date of Surgery: 10/08/23  Visit Dx:     ICD-10-CM ICD-9-CM   1. Acute kidney injury superimposed on chronic kidney disease  N17.9 584.9    N18.9 585.9   2. Acute on chronic congestive heart failure, unspecified heart failure type  I50.9 428.0   3. Acute on chronic respiratory failure with hypoxia  J96.21 518.84     799.02   4. Hyperkalemia  E87.5 276.7     Patient Active Problem List   Diagnosis    Hypertension    Hyperlipidemia    Migraine without aura    CAD, chronically occluded collateralized dominant RCA, status post drug-eluting stent to LAD , high-grade stenosis of the ostium of the small D1 and 60% mid RCA posterior lateral branch    Chronic renal failure, stage 2 (mild)    Tobacco use    Bilateral lower extremity edema    Other specified hypothyroidism    Cigarette smoker    Class 3 severe obesity without serious comorbidity with body mass index (BMI) of 40.0 to 44.9 in adult    Acute hypoxic respiratory failure     Past Medical History:   Diagnosis Date    Anxiety     Arthritis     Coronary artery disease     H/O heart artery stent     Hyperlipidemia     Hypertension     Obesity      Past Surgical History:   Procedure Laterality Date    ANKLE SURGERY      RIGHT    APPENDECTOMY      CARDIAC CATHETERIZATION      LEFT    CORONARY STENT PLACEMENT      HYSTERECTOMY       PT Assessment (last 12 hours)       PT Evaluation and Treatment       Row Name 10/09/23 1624          Physical Therapy Time and Intention    Subjective Information complains of;dyspnea  -AG     Document Type evaluation  -AG     Mode of Treatment physical therapy  -AG       Row Name 10/09/23 1624          General Information    Patient Profile Reviewed yes  -AG     Onset of Illness/Injury or Date of Surgery 10/08/23  -AG     Referring Physician Dr. Hanks  -AG      Patient Observations agree to therapy;cooperative;alert  -AG     Patient/Family/Caregiver Comments/Observations pt. supine in bed on 3L O2 nc; pt. family member at bedside.  Pt. agreeable to participation.  She states she has ambulated to restroom without assistive device without difficulty.  However, today, ambulated only a few steps before experiencing dyspnea and O2 desaturation.  -AG     Prior Level of Function independent:;all household mobility  -AG     Equipment Currently Used at Home none  -AG     Pertinent History of Current Functional Problem pt. admitted with acute hypoxic respiratory failure  -AG     Existing Precautions/Restrictions fall;oxygen therapy device and L/min  -AG     Equipment Issued to Patient gait belt  -AG     Benefits Reviewed patient:;improve function;increase independence;increase balance;increase strength;increase knowledge;decrease risk of DVT  -AG     Barriers to Rehab medically complex  -AG       Row Name 10/09/23 1624          Previous Level of Function/Home Environm    Toileting, Premorbid Functional Level independent  -AG     BADLs, Premorbid Functional Level independent  -AG     Bed Mobility, Premorbid Functional Level independent  -AG     Transfers, Premorbid Functional Level independent  -AG     Stairs, Premorbid Functional Level independent  -AG     Community Ambulation, Premorbid Functional Level independent  -AG       Row Name 10/09/23 1624          Living Environment    Current Living Arrangements home  mobile home  -AG     Home Accessibility stairs to enter home  -AG     People in Home child(devan), adult  pt. home with son, grandson  -AG     Primary Care Provided by self  -AG       Row Name 10/09/23 1624          Home Main Entrance    Number of Stairs, Main Entrance two  -AG     Stair Railings, Main Entrance railings on both sides of stairs  -AG       Row Name 10/09/23 1626          Home Use of Assistive/Adaptive Equipment    Equipment Currently Used at Home none  -AG        Row Name 10/09/23 1624          Pain    Additional Documentation --  reports B chronic knee pain d/t OA, states she is awaiting B TKA  -AG       Row Name 10/09/23 1624          Cognition    Affect/Mental Status (Cognition) WFL  -     Orientation Status (Cognition) oriented x 3  -AG     Follows Commands (Cognition) L  -     Personal Safety Interventions fall prevention program maintained;gait belt;nonskid shoes/slippers when out of bed;supervised activity  -AG       Row Name 10/09/23 1624          Range of Motion (ROM)    Range of Motion ROM is WFL;bilateral lower extremities  -AG       [facility] Name 10/09/23 1624          Strength (Manual Muscle Testing)    Strength (Manual Muscle Testing) --  B LE grossly 4/5  -AG       [facility] Name 10/09/23 1624          Sensory    Hearing Status WFL  -[facility] Name 10/09/23 1624          Vision Assessment/Intervention    Visual Impairment/Limitations WFL  -AG       Row Name 10/09/23 1624          Sensory Assessment (Somatosensory)    Sensory Assessment (Somatosensory) LE sensation intact  -[facility] Name 10/09/23 1624          Mobility    Extremity Weight-bearing Status --  no restrictions  -AG       [facility] Name 10/09/23 1624          Bed Mobility    Bed Mobility scooting/bridging;supine-sit  -AG     Scooting/Bridging [place] (Bed Mobility) standby assist  -AG     Supine-Sit [place] (Bed Mobility) standby assist  -AG     Assistive Device (Bed Mobility) bed rails  -AG       [facility] Name 10/09/23 1624          Transfers    Transfers sit-stand transfer;stand-sit transfer;stand pivot/stand step transfer  -[facility] Name 10/09/23 1624          Sit-Stand Transfer    Sit-Stand [place] (Transfers) contact guard;verbal cues  -AG     Assistive Device (Sit-Stand Transfers) --  L HHA  -AG       [facility] Name 10/09/23 1624          Stand-Sit Transfer    Stand-Sit [place] (Transfers) verbal cues;contact guard  -AG     Assistive Device (Stand-Sit Transfers) --  L HHA  -AG        Row Name 10/09/23 1624          Stand Pivot/Stand Step Transfer    Stand Pivot/Stand Step [place] (Transfers) verbal cues;contact guard  -AG     Assistive Device (Stand Pivot Stand Step Transfer) --  L HHA  -AG       Row Name 10/09/23 1624          Gait/Stairs (Locomotion)    Gait/Stairs Locomotion gait/ambulation independence;gait/ambulation assistive device;distance ambulated;gait pattern;gait deviations;maintains weight-bearing status  -AG     [place] Level (Gait) verbal cues;contact guard  -AG     Assistive Device (Gait) --  L HHA  -AG     Patient was able to Ambulate yes  -AG     Distance in Feet (Gait) 4 steps forward, backward  -AG     Pattern (Gait) step-to  -AG     Deviations/Abnormal Patterns (Gait) antalgic;base of support, wide;gait speed decreased;stride length decreased  -AG       Row Name 10/09/23 1624          Safety Issues, Functional Mobility    Impairments Affecting Function (Mobility) endurance/activity tolerance;balance;pain;shortness of breath;strength  -AG       Row Name 10/09/23 1624          Balance    Balance Assessment sitting static balance;sitting dynamic balance;sit to stand dynamic balance;standing static balance;standing dynamic balance  -AG     Static Sitting Balance supervision  -AG     Position, Sitting Balance unsupported;sitting edge of bed  -AG     Static Standing Balance verbal cues;contact guard  -AG     Dynamic Standing Balance verbal cues;contact guard  -AG     Position/Device Used, Standing Balance --  L HHA  -AG       Row Name 10/09/23 1624          Motor Skills    Motor Skills functional endurance;motor control/coordination interventions  -AG     Motor Control/Coordination Interventions therapeutic exercise/ROM;gross motor coordination activities  -AG       Row Name             Wound 10/09/23 0422 Left lower arm Extravasation    Wound - Properties Group Placement Date: 10/09/23  - Placement Time: 0422 -SM Side: Left  - Orientation: lower  - Location:  arm  -SM Primary Wound Type: Extravasatio  -SM    Retired Wound - Properties Group Placement Date: 10/09/23  -SM Placement Time: 0422 -SM Side: Left  -SM Orientation: lower  -SM Location: arm  -SM Primary Wound Type: Extravasatio  -SM    Retired Wound - Properties Group Date first assessed: 10/09/23  -SM Time first assessed: 0422 -SM Side: Left  -SM Location: arm  -SM Primary Wound Type: Extravasatio  -SM      Row Name 10/09/23 1624          Coping    Observed Emotional State calm;cooperative  -AG     Verbalized Emotional State acceptance  -AG     Trust Relationship/Rapport care explained;choices provided;thoughts/feelings acknowledged  -AG     Family/Support Persons family  -AG     Involvement in Care at bedside;attentive to patient  -AG     Family/Support System Care support provided;self-care encouraged  -AG       Row Name 10/09/23 1624          Plan of Care Review    Plan of Care Reviewed With patient  -AG     Outcome Evaluation PT evaluation complete.  Pt. SBA for bed mobility, able to stand with L HHA and take ~4 forward, backward steps to bed; on 3L O2 nc and exhibited dyspnea following activity with O2 desaturation noted.  B ankles, feet edematous.  PT will continue to follow.  -AG       Row Name 10/09/23 1624          Vital Signs    Intra SpO2 (%) 85  -AG     O2 Delivery Intra Treatment supplemental O2  -AG     Post SpO2 (%) 98  -AG     O2 Delivery Post Treatment supplemental O2  -AG     Intra Patient Position Standing  -AG     Post Patient Position Sitting  -AG       Row Name 10/09/23 1624          Positioning and Restraints    Pre-Treatment Position in bed  -AG     Post Treatment Position bed  -AG     In Bed sitting EOB;call light within reach;encouraged to call for assist;with family/caregiver;side rails up x3  -AG       Row Name 10/09/23 1624          Therapy Assessment/Plan (PT)    Patient/Family Therapy Goals Statement (PT) return home to [facility] with family  -AG     Functional Level at Time of  Evaluation (PT) CGA  -AG     PT Diagnosis (PT) impaired functional mobility and endurance  -AG     Rehab Potential (PT) good, to achieve stated therapy goals  -AG     Criteria for Skilled Interventions Met (PT) yes;meets criteria  -AG     Therapy Frequency (PT) 2 times/wk  2-5 times/ week per priority  -AG     Predicted Duration of Therapy Intervention (PT) LOS  -AG     Problem List (PT) problems related to;balance;mobility;strength;pain  -AG     Activity Limitations Related to Problem List (PT) unable to ambulate safely;BADLs not performed adequately or safely;unable to transfer safely  -AG       Row Name 10/09/23 1624          Therapy Plan Review/Discharge Plan (PT)    Therapy Plan Review (PT) evaluation/treatment results reviewed;care plan/treatment goals reviewed;risks/benefits reviewed;current/potential barriers reviewed;participants voiced agreement with care plan;participants included;patient  -AG       Row Name 10/09/23 1627          Physical Therapy Goals    Bed Mobility Goal Selection (PT) bed mobility, PT goal 1  -AG     Transfer Goal Selection (PT) transfer, PT goal 1  -AG     Gait Training Goal Selection (PT) gait training, PT goal 1  -AG       Row Name 10/09/23 1624          Bed Mobility Goal 1 (PT)    Activity/Assistive Device (Bed Mobility Goal 1, PT) bed mobility activities, all  -AG     [place] Level/Cues Needed (Bed Mobility Goal 1, PT) modified independence  -AG     Time Frame (Bed Mobility Goal 1, PT) by discharge  -AG       Row Name 10/09/23 1627          Transfer Goal 1 (PT)    Activity/Assistive Device (Transfer Goal 1, PT) sit-to-stand/stand-to-sit;bed-to-chair/chair-to-bed;toilet  -AG     [place] Level/Cues Needed (Transfer Goal 1, PT) modified independence  -AG     Time Frame (Transfer Goal 1, PT) by discharge  -AG       Row Name 10/09/23 1628          Gait Training Goal 1 (PT)    Activity/Assistive Device (Gait Training Goal 1, PT) gait (walking locomotion);assistive device  use;decrease fall risk;diminish gait deviation;improve balance and speed;increase endurance/gait distance  appropriate a.d.  -AG     [place] Level (Gait Training Goal 1, PT) standby assist  -AG     Distance (Gait Training Goal 1, PT) 25  -AG     Time Frame (Gait Training Goal 1, PT) by discharge  -AG               User Key  (r) = Recorded By, (t) = Taken By, (c) = Cosigned By      Initials Name Provider Type    AG Ayana Logan, PT Physical Therapist    Fiordaliza Echevarria, RN Registered Nurse                    Physical Therapy Education       Title: PT OT SLP Therapies (Done)       Topic: Physical Therapy (Done)       Point: Mobility training (Done)       Learning Progress Summary             Patient Acceptance, E,D, VU,NR by  at 10/9/2023 1623                         Point: Home exercise program (Done)       Learning Progress Summary             Patient Acceptance, E,D, VU,NR by  at 10/9/2023 1623                         Point: Body mechanics (Done)       Learning Progress Summary             Patient Acceptance, E,D, VU,NR by  at 10/9/2023 1623                         Point: Precautions (Done)       Learning Progress Summary             Patient Acceptance, E,D, VU,NR by  at 10/9/2023 1623                                         User Key       Initials Effective Dates Name Provider Type Discipline     06/16/21 -  Ayana Logan, PT Physical Therapist PT                  PT Recommendation and Plan  Planned Therapy Interventions (PT): balance training, bed mobility training, gait training, home exercise program, transfer training, strengthening, patient/family education, postural re-education  Therapy Frequency (PT): 2 times/wk (2-5 times/ week per priority)  Plan of Care Reviewed With: patient  Outcome Evaluation: PT evaluation complete.  Pt. SBA for bed mobility, able to stand with L HHA and take ~4 forward, backward steps to bed; on 3L O2 nc and exhibited dyspnea following activity with O2  desaturation noted.  B ankles, feet edematous.  PT will continue to follow.       Time Calculation:    PT Charges       Row Name 10/09/23 1623             Time Calculation    PT Received On 10/09/23  -AG      PT Goal Re-Cert Due Date 10/23/23  -AG                User Key  (r) = Recorded By, (t) = Taken By, (c) = Cosigned By      Initials Name Provider Type     Ayana Logan, PT Physical Therapist                  Therapy Charges for Today       Code Description Service Date Service Provider Modifiers Qty    36288880213 HC PT EVAL MOD COMPLEXITY 4 10/9/2023 Ayana Logan, PT GP 1            PT G-Codes  AM-PAC 6 Clicks Score (PT): 22    Ayana Logan, PT  10/9/2023

## 2023-10-09 NOTE — CASE MANAGEMENT/SOCIAL WORK
Discharge Planning Assessment   Silvestre     Patient Name: Lesley Michel  MRN: [id number]  Today's Date: 10/9/2023    Admit Date: 10/8/2023     Discharge Plan       Row Name 10/09/23 1348       Plan    Plan CM f/u with pt at the bedside. Pt is sleeping, wakes easily but falls back to sleep mid sentence. Friend at the bedside. Pt lives at home in [facility] with son Wes and 3 yr old grandchild and plans to return at d/c. Pt requests a B/P cuff at d/c. Pt does not have DME/HH. Pt's son will transport home at d/c. CM will follow.    Patient/Family in Agreement with Plan yes             Katie Morris RN

## 2023-10-09 NOTE — NURSING NOTE
Lab notified nursing staff of critical potassium of 6.2, [facility] notified, see orders. Patient's blood glucose was 66, patient given simple carbohydrates, recheck in 15 minutes was 59, x1 D50W given per protcol. Pt showing signs of anxiety, PRN Atarax given. After administration, pt complained of pain and swelling around IV site. Extravasation orders placed. Hyperkalemia protocol yet to be administered due to lack of IV access and hypoglycemia. Pt currently refusing to have blood glucose checked or to be given a new IV. [facility] paged, awaiting response.       Pt later agreeable to D50W antidote injections. Pt continues to refuse to get an IV. Patient agreeable to drink Lokelma for hyperkalemia protocol, no other hyperkalemia orders have been administered due to lack of IV access, [facility] aware.

## 2023-10-09 NOTE — PROGRESS NOTES
[facility] HOSPITALIST PROGRESS NOTE     Patient Identification:  Name:  Lesley Michel  Age:  67 y.o.  Sex:  female  :  1955  MRN:  [id number]  Visit Number:  [id number]  ROOM: [address] [place] [address]     Primary Care Provider:  Woodrow Raymond APRN     Date of Admission: 10/8/2023    Length of stay in inpatient status:  0    Subjective     Chief Compliant:    Chief Complaint   Patient presents with    Shortness of Breath    Edema       Patient with dyspnea at rest improved since admission but continues requiring O2, on RA at baseline at home but does report hx of COPD in past not on any controlling inhalers. Reports LE edema roughly at baseline this am. No cp or palpitations, ongoing non productive cough reported. Patient oriented x3 but occasionally needs questions repeated and having some confusion that waxes/wanes. USIV placed in RUE for difficult access        Objective     Current Hospital Meds:  aspirin, 325 mg, Oral, Daily  calcium gluconate, 1,000 mg, Intravenous, Once  dextrose, 25 g, Intravenous, Once  furosemide, 20 mg, Intravenous, Once  heparin (porcine), 5,000 Units, Subcutaneous, Q12H  insulin regular, 5 Units, Intravenous, Once  ipratropium-albuterol, 3 mL, Nebulization, 4x Daily - RT  nicotine, 1 patch, Transdermal, Q24H  [START ON 10/10/2023] predniSONE, 40 mg, Oral, Daily With Breakfast  rosuvastatin, 10 mg, Oral, Nightly  senna-docusate sodium, 2 tablet, Oral, BID  sodium chloride, 10 mL, Intravenous, Q12H  sodium zirconium cyclosilicate, 10 g, Oral, TID       Current Antimicrobial Therapy:  Anti-Infectives (From admission, onward)      None          Current Diuretic Therapy:  Diuretics (From admission, onward)      Ordered     Dose/Rate Route Frequency Start Stop    10/09/23 1354  furosemide (LASIX) injection 20 mg        Ordering Provider: Palomo Steinberg MD    20 mg Intravenous Once 10/09/23 4058             ----------------------------------------------------------------------------------------------------------------------  Vital Signs:  Temp:  [97.8 °F (36.6 °C)-99.7 °F (37.6 °C)] 98.8 °F (37.1 °C)  Heart Rate:  [] 69  Resp:  [16-22] 22  BP: (115-173)/(70-96) 171/96  SpO2:  [88 %-99 %] 93 %  on  Flow (L/min):  [2-3] 3;   Device (Oxygen Therapy): nasal cannula  Body mass index is 44.7 kg/m².    Wt Readings from Last 3 Encounters:   10/09/23 104 kg (228 lb 14.4 oz)   10/06/23 102 kg (225 lb 12.8 oz)   03/02/23 81.6 kg (180 lb)     Intake & Output (last 3 days)         10/06 0701  10/07 0700 10/07 0701  10/08 0700 10/08 0701  10/09 0700 10/09 0701  10/10 0700    P.O.    480    Total Intake(mL/kg)    480 (4.6)    Net    +480            Urine Unmeasured Occurrence   5 x           Diet: Cardiac Diets, Renal Diets, Fluid Restriction (240 mL/tray) Diets; Healthy Heart (2-3 Na+); Low Sodium (2-3g), Low Potassium, Low Phosphorus; 1500 mL/day; Texture: Regular Texture (IDDSI 7); Fluid Consistency: Thin (IDDSI 0)  ----------------------------------------------------------------------------------------------------------------------  Physical Exam  Vitals and nursing note reviewed.   Constitutional:       General: She is not in acute distress.  HENT:      Head: Normocephalic and atraumatic.   Eyes:      General: No scleral icterus.     Extraocular Movements: Extraocular movements intact.   Cardiovascular:      Rate and Rhythm: Normal rate.   Pulmonary:      Effort: Pulmonary effort is normal. No respiratory distress.      Breath sounds: Wheezing present. No rales.   Abdominal:      General: There is no distension.      Palpations: Abdomen is soft.   Musculoskeletal:      Right lower leg: Edema present.      Left lower leg: Edema present.   Skin:     General: Skin is warm and dry.   Neurological:      General: No focal deficit present.      Mental Status: She is alert.   Psychiatric:         Mood and Affect: Mood normal.          Behavior: Behavior normal.       ----------------------------------------------------------------------------------------------------------------------    ----------------------------------------------------------------------------------------------------------------------  LABS:    CBC and coagulation:  Results from last 7 days   Lab Units 10/09/23  0132 10/08/23  2108 10/08/23  1923 10/08/23  1709   PROCALCITONIN ng/mL  --  0.10  --   --    LACTATE mmol/L  --   --   --  0.9   CRP mg/dL  --   --  5.41*  --    WBC 10*3/mm3 7.35  --   --  7.38   HEMOGLOBIN g/dL 11.6*  --   --  11.0*   HEMATOCRIT % 37.2  --   --  36.2   MCV fL 87.9  --   --  88.9   MCHC g/dL 31.2*  --   --  30.4*   PLATELETS 10*3/mm3 233  --   --  289     Acid/base balance:  Results from last 7 days   Lab Units 10/08/23  1707   PH, ARTERIAL pH units 7.363   PO2 ART mm Hg 53.5*   PCO2, ARTERIAL mm Hg 36.5   HCO3 ART mmol/L 20.7     Renal and electrolytes:  Results from last 7 days   Lab Units 10/09/23  1135 10/09/23  0754 10/09/23  0132 10/08/23  1709   SODIUM mmol/L 136  --  139 138   POTASSIUM mmol/L 5.5* 5.9* 6.2* 5.9*   MAGNESIUM mg/dL  --   --  2.0 1.9   CHLORIDE mmol/L 104  --  105 107   CO2 mmol/L 21.2*  --  21.1* 20.8*   BUN mg/dL 29*  --  29* 27*   CREATININE mg/dL 3.76*  --  3.98* 3.61*   CALCIUM mg/dL 8.9  --  9.0 9.0   GLUCOSE mg/dL 107*  --  82 102*     Estimated Creatinine Clearance: 16.2 mL/min (A) (by C-G formula based on SCr of 3.76 mg/dL (H)).    Liver and pancreatic function:  Results from last 7 days   Lab Units 10/09/23  0132 10/08/23  1709   ALBUMIN g/dL 3.7 3.4*   BILIRUBIN mg/dL 0.3 0.5   ALK PHOS U/L 149* 146*   AST (SGOT) U/L 10 7   ALT (SGPT) U/L 5 <5     Endocrine function:  Lab Results   Component Value Date    HGBA1C 5.30 10/08/2023     Point of care bedside glucose levels:  Results from last 7 days   Lab Units 10/09/23  1117 10/09/23  0751 10/09/23  0654 10/09/23  0425 10/09/23  0353 10/09/23  0324 10/08/23  2111  10/08/23  2229 10/08/23  2207 10/08/23  2147   GLUCOSE mg/dL 91 87 99 74 59* 66* 91 79 67* 49*     Glucose levels from the CMP:  Results from last 7 days   Lab Units 10/09/23  1135 10/09/23  0132 10/08/23  1709   GLUCOSE mg/dL 107* 82 102*     Lab Results   Component Value Date    TSH 6.770 (H) 10/08/2023    FREET4 1.01 10/08/2023     Cardiac:  Results from last 7 days   Lab Units 10/08/23  1923 10/08/23  1709   HSTROP T ng/L 26* 23*   PROBNP pg/mL  --  8,093.0*     Results from last 7 days   Lab Units 10/08/23  1923   CHOLESTEROL mg/dL 140   TRIGLYCERIDES mg/dL 75   HDL CHOL mg/dL 34*   LDL CHOL mg/dL 91     Cultures:  Lab Results   Component Value Date    COLORU Yellow 10/09/2023    CLARITYU Clear 10/09/2023    PHUR 5.5 10/09/2023    PROTEINUR 48.0 10/09/2023    PROTEINUR 53.4 10/09/2023    GLUCOSEU Negative 10/09/2023    KETONESU Negative 10/09/2023    BLOODU Negative 10/09/2023    NITRITEU Negative 10/09/2023    LEUKOCYTESUR Negative 10/09/2023    BILIRUBINUR Negative 10/09/2023    UROBILINOGEN 0.2 E.U./dL 10/09/2023    RBCUA None Seen 10/09/2023    WBCUA 0-2 10/09/2023    BACTERIA None Seen 10/09/2023     Microbiology Results (last 10 days)       Procedure Component Value - Date/Time    Respiratory Panel PCR w/COVID-19(SARS-CoV-2) LOBO/VAZQUEZ/MARLIN/PAD/COR/MAD/KELBY In-House, NP Swab in UTM/VTM, 3-4 HR TAT - Swab, Nasopharynx [[id number]]  (Normal) Collected: 10/09/23 0103    Lab Status: Final result Specimen: Swab from Nasopharynx Updated: 10/09/23 0156     ADENOVIRUS, PCR Not Detected     Coronavirus 229E Not Detected     Coronavirus HKU1 Not Detected     Coronavirus NL63 Not Detected     Coronavirus OC43 Not Detected     COVID19 Not Detected     Human Metapneumovirus Not Detected     Human Rhinovirus/Enterovirus Not Detected     Influenza A PCR Not Detected     Influenza B PCR Not Detected     Parainfluenza Virus 1 Not Detected     Parainfluenza Virus 2 Not Detected     Parainfluenza Virus 3 Not Detected      "Parainfluenza Virus 4 Not Detected     RSV, PCR Not Detected     Bordetella pertussis pcr Not Detected     Bordetella parapertussis PCR Not Detected     Chlamydophila pneumoniae PCR Not Detected     Mycoplasma pneumo by PCR Not Detected    Narrative:      In the setting of a positive respiratory panel with a viral infection PLUS a negative procalcitonin without other underlying concern for bacterial infection, consider observing off antibiotics or discontinuation of antibiotics and continue supportive care. If the respiratory panel is positive for atypical bacterial infection (Bordetella pertussis, Chlamydophila pneumoniae, or Mycoplasma pneumoniae), consider antibiotic de-escalation to target atypical bacterial infection.            No results found for: \"PREGTESTUR\", \"PREGSERUM\", \"HCG\", \"HCGQUANT\"  Pain Management Panel          Latest Ref Rng & Units 10/9/2023   Pain Management Panel   Creatinine, Urine mg/dL 116.0        I have personally looked at the labs and they are summarized above.  ----------------------------------------------------------------------------------------------------------------------  Detailed radiology reports for the last 24 hours:    Imaging Results (Last 24 Hours)       Procedure Component Value Units Date/Time    US Renal Bilateral [[id number]] Collected: 10/09/23 0546     Updated: 10/09/23 0551    Narrative:      CLINICAL HISTORY: Acute kidney injury.     COMPARISON: None available.     TECHNIQUE: Sonography of the kidneys was obtained.     FINDINGS: The right kidney is small and echogenic, with a thinned  cortex.  It measures 8.4 cm in length with the cortical thickness of 6  mm.  The left kidney is normal in size and echogenicity, measuring 10.3  cm in length.  Cortical thickness is normal at 1.1 cm.     No hydronephrosis is present.  No border deforming lesion or shadowing  calculus is identified.       Impression:         SMALL AND ECHOGENIC RIGHT KIDNEY SUGGESTING A UNILATERAL " PROCESS SUCH AS  RENAL ARTERY STENOSIS.     NORMAL LEFT KIDNEY.     NEGATIVE FOR HYDRONEPHROSIS.           This report was finalized on 10/9/2023 5:49 AM by Imelda Walsh MD.       XR Chest 1 View [[id number]] Collected: 10/08/23 1917     Updated: 10/08/23 1919    Narrative:      XR CHEST 1 VW-     CLINICAL INDICATION: sob        COMPARISON: None immediately available      TECHNIQUE: Single frontal view of the chest.     FINDINGS:     LUNGS: Trace right effusion and right basilar airspace disease     HEART AND MEDIASTINUM: Heart and mediastinal contours are unremarkable        SKELETON: Bony and soft tissue structures are unremarkable.             Impression:      Trace right effusion and right basilar airspace disease           This report was finalized on 10/8/2023 7:17 PM by Dr. Julio Dominguez MD.                 CXR reviewed with RLL opacification and small effusion, evidence of hyperinflation    Assessment & Plan      #Acute hypoxic respiratory failure  #COPD hx w/exacerbation  -Likely secondary to progressive HF exacerbation vs volume overload from progressive CKD as primary etiology but wheezing on exam with component of COPDe  -CT chest to better eval for PNA  -Cont duonebs, add prednisone 40mg x5 days. Resp panel reviewed and negative  TTE pending    #Reported hx HpEF  #CAD s/p PCI  -Resume home antihypertensives and beta blocker, asa and dose reduced statin resumed  -Holding lisinopril given HyperK, diuretics per nephrology    #MAICO? on CKD  #Hyperkalemia  #Troponin elevation secondary to CKD  -Baseline unknown, records requested by nephrologist for comparison but BUN near normal range. Right kidney smaller and echogenic concerning for PA, needs close outpatient F/u  -Nephrology consulted and appreciate input  -Cont lokelma for hyperkalemia, no ecg changes and improving on repeat bmp    #Hypoglycemia  -Improved after admission with PO resumption, A1c reviewed and wnl      - - Chronic - -    #Debility: PT  consulted  #Hypothyroidism  #Obesity:Body mass index is 44.7 kg/m².    VTE Prophylaxis:   Mechanical Order History:       None          Pharmalogical Order History:        Ordered     Dose Route Frequency Stop    10/08/23 2052  heparin (porcine) 5000 UNIT/ML injection 5,000 Units         5,000 Units SC Every 12 Hours Scheduled --                    Code Status and Medical Interventions:   Ordered at: 10/08/23 2006     Level Of Support Discussed With:    Patient     Code Status (Patient has no pulse and is not breathing):    CPR (Attempt to Resuscitate)     Medical Interventions (Patient has pulse or is breathing):    Full Support         Disposition: Cont inpatient management pend respiratory status improvement    I have reviewed any copied/forwarded text or data, verified its accuracy, and updated as necessary above.    Chris Hanks MD  [facility]  10/09/23  14:07 EDT

## 2023-10-09 NOTE — PAYOR COMM NOTE
"CONTACT: PARRIS OWENS RN  UTILIZATION MANAGEMENT DEPT.  [facility]  [address] [place] [address]  PHONE: [phone]  FAX: [phone]      OBSERVATION AUTH REQUEST    Matthew Michel (67 y.o. Female)       Date of Birth   1955    Social Security Number       Address   [address] [facility] [address]    Home Phone   [phone]    MRN   [id number]       [employer]arsanism   [facility]    Marital Status                               Admission Date   10/8/23    Admission Type   Emergency    Admitting Provider   Fanny Ford MD    Attending Provider   hCris Hanks MD    Department, Room/Bed   [facility], 3313/2S       Discharge Date       Discharge Disposition       Discharge Destination                                 Attending Provider: Chris Hanks MD    Allergies: No Known Allergies    Isolation: None   Infection: None   Code Status: CPR    Ht: 152.4 cm (60\")   Wt: 104 kg (228 lb 14.4 oz)    Admission Cmt: None   Principal Problem: Acute hypoxic respiratory failure [J96.01]                   Active Insurance as of 10/8/2023       Primary Coverage       Payor Plan Insurance Group Employer/Plan Group    [facility] MEDICARE REPLACEMENT [employer] MEDICARE REPLACEMENT        Payor Plan Address Payor Plan Phone Number Payor Plan Fax Number Effective Dates    [address] [address] [phone]  1/1/2023 - None Entered    [facility] [address]         Subscriber Name Subscriber Birth Date Member ID       MATTHEW MICHEL 1955 [id number]                     Emergency Contacts        (Rel.) Home Phone Work Phone Mobile Phone    LOYD MICHEL (Son) [phone] -- --                 History & Physical        Marie Pardo APRN at 10/08/23 1956       Attestation signed by Fanny Ford MD at 10/09/23 5328    I have reviewed this documentation and agree.  I have formulated and discussed the assessment and plan with WAQAS Salazar.  I " have also reviewed the patient's past medical history, vital signs, labs, imaging, and EKGs.  Please note that the EKGs dated 10/6/2023, 2/10/2022, 5/15/2018, all look similar to the current EKG.  We await the nephrology consultation.                    [facility] Medicine Services  History & Physical    Patient Identification:  Name:  Lesley Michel  Age:  67 y.o.  Sex:  female  :  1955  MRN:  [id number]   Visit Number:  [id number]  Admit Date: 10/8/2023   Primary Care Physician:  Woodrow Raymond APRN    Subjective     Chief complaint: Shortness of Breath, Edema    History of presenting illness:      Lesley Michel is a 67 y.o. female with past medical history significant for CAD status post stenting, hyperlipidemia, essential hypertension, COPD, tobacco abuse, CKD, hypothyroidism, arthritis, anxiety, history of migraines, obesity by BMI, and history of medical noncompliance.  Patient presents to [facility] emergency department for further evaluation of increasing lower extremity edema and shortness of breath which has been progressively worsening over the past few days.  Reportedly visited her cardiologist 2 days ago.  Per office visit note, patient has been noncompliant with all of her medications.  States that she cannot take diuretics due to chronic kidney disease, although it does not appear that she has followed up with nephrologist recently.  Cardiology note documentation included mild CKD.  Creatinine on arrival 3.6. Unknown recent baseline. However, per review of labs from 2020, creatinine was 1.08 at that time. Patient also has elevated proBNP of 8000. High-sensitivity troponin T indeterminately elevated without significant delta. EKG obtained without evidence of acute arrhythmia or acute ST elevations or depressions.  No evidence of acute ischemia.  ABG was obtained on room air and noted hypoxemia with PO2 of 53.5.  Patient was placed on 2 L nasal  "cannula and is now stable.  Potassium was elevated at 5.9. Received treatment for elevated potassium in the ED.Patient currently denies any chest pain, palpitations, dizziness, nausea, vomiting, abdominal pain, dysuria, or difficulty urinating. She also denies recent fever, chills, or sputum production. Reports chronic dry cough. She states that she continues to smoke cigarettes and has requested a nicotine patch. Denies illicit substance abuse or alcohol abuse. She reported compliance with home medications during my exam; asked what kinds of medicines she takes and she states \"I don't know. All I know is if the doctor prescribes them, I have been taking them\". She is alert and oriented x4. Calm, cooperative, pleasant. Does not wear supplemental O2 at home; currently requiring 2L. Discussed plan with patient. She voiced agreement and understanding with no further questions, concerns, or needs at this time.     Upon arrival to the ED, vital signs were temperature 98.1, pulse 68, respirations 16, blood pressure 156/78 sitting, SPO2 saturation 88% on room air.  ABG with pH 7.363, PCO2 36.5, PO2 53.5, O2 saturation 88.4% on room air.  High-sensitivity troponin T 23 with +3 delta.  proBNP 8093.  CMP with potassium 5.9, CO2 20.8, BUN 27, creatinine 3.61, EGFR 13.4, glucose 102, alkaline phosphatase 146, albumin 3.4, otherwise unremarkable.  Lactate 0.9.  CBC with hemoglobin 11.0, RDW 16.5, MCHC 30.4, otherwise unremarkable.  Peripheral blood cultures x2 pending. Chest x-ray noted trace right effusion and right basilar airspace disease.    Known Emergency Department medications received prior to my evaluation included 1000 mg IV calcium gluconate, D50, 10 units of IV regular insulin, 50 mEq sodium bicarb, 10 mg packet of Lokelma. Emergency Department Room location at the time of my evaluation was 102.  Discussed with admitting physician, Fanny Greenberg, " MD.    ---------------------------------------------------------------------------------------------------------------------   Review of Systems   Constitutional:  Positive for fatigue. Negative for chills and fever.   HENT:  Negative for congestion, sore throat and trouble swallowing.    Respiratory:  Positive for cough (nonproductive) and shortness of breath. Negative for wheezing.    Cardiovascular:  Positive for leg swelling. Negative for chest pain and palpitations.   Gastrointestinal:  Negative for abdominal pain, constipation, diarrhea, nausea and vomiting.   Genitourinary:  Negative for difficulty urinating, flank pain, frequency and urgency.   Musculoskeletal:  Negative for back pain, gait problem, neck pain and neck stiffness.   Neurological:  Negative for dizziness, tremors, seizures, syncope, facial asymmetry, speech difficulty, weakness, light-headedness, numbness and headaches.     ---------------------------------------------------------------------------------------------------------------------   Past Medical History:   Diagnosis Date    Anxiety     Arthritis     Coronary artery disease     H/O heart artery stent     Hyperlipidemia     Hypertension     Obesity      Past Surgical History:   Procedure Laterality Date    ANKLE SURGERY      RIGHT    APPENDECTOMY      CARDIAC CATHETERIZATION      LEFT    CORONARY STENT PLACEMENT      HYSTERECTOMY       Family History   Problem Relation Age of Onset    Heart disease Mother     Heart attack Mother 73    Fibromyalgia Mother     Heart attack Father 72    Ovarian cancer Sister     Coronary artery disease Other     Breast cancer Neg Hx      Social History     Socioeconomic History    Marital status:    Tobacco Use    Smoking status: Every Day     Packs/day: .5     Types: Electronic Cigarette, Cigarettes    Smokeless tobacco: Never   Substance and Sexual Activity    Alcohol use: No    Drug use: No    Sexual activity: Never      ---------------------------------------------------------------------------------------------------------------------   Allergies:  Patient has no known allergies.  ---------------------------------------------------------------------------------------------------------------------   Home medications:    Medications below are reported home medications pulling from within the system; at this time, these medications have not been reconciled unless otherwise specified and are in the verification process for further verifcation as current home medications.  (Not in a hospital admission)      Hospital Scheduled Meds:          Current listed hospital scheduled medications may not yet reflect those currently placed in orders that are signed and held awaiting patient's arrival to floor.   ---------------------------------------------------------------------------------------------------------------------     Objective     Vital Signs:  Temp:  [98.1 °F (36.7 °C)] 98.1 °F (36.7 °C)  Heart Rate:  [58-79] 74  Resp:  [16] 16  BP: (115-156)/(70-90) 154/87      10/08/23  1633   Weight: 102 kg (225 lb)     Body mass index is 43.94 kg/m².  ---------------------------------------------------------------------------------------------------------------------       Physical Exam  Vitals reviewed.   Constitutional:       General: She is awake. She is not in acute distress.     Appearance: She is obese. She is not diaphoretic.      Interventions: Nasal cannula in place.      Comments: 2L nasal cannula.    HENT:      Head: Normocephalic and atraumatic.      Mouth/Throat:      Mouth: Mucous membranes are moist.      Pharynx: Oropharynx is clear.   Eyes:      Extraocular Movements: Extraocular movements intact.      Pupils: Pupils are equal, round, and reactive to light.   Cardiovascular:      Rate and Rhythm: Normal rate and regular rhythm.      Pulses: Normal pulses.           Dorsalis pedis pulses are 1+ on the right side and 1+ on  the left side.      Heart sounds: Normal heart sounds. No murmur heard.     No friction rub.   Pulmonary:      Effort: Pulmonary effort is normal. No accessory muscle usage, respiratory distress or retractions.      Breath sounds: Decreased breath sounds and wheezing present. No rhonchi or rales.      Comments: Mild, end expiratory wheezing present and bilateral upper lobes.  Abdominal:      General: Bowel sounds are normal. There is no distension.      Palpations: Abdomen is soft.      Tenderness: There is no abdominal tenderness. There is no guarding.   Musculoskeletal:      Cervical back: Neck supple. No rigidity.      Right lower le+ Pitting Edema present.      Left lower le+ Pitting Edema present.   Skin:     General: Skin is warm and dry.      Capillary Refill: Capillary refill takes 2 to 3 seconds.      Coloration: Skin is pale.   Neurological:      Mental Status: She is alert and oriented to person, place, and time. Mental status is at baseline.      Cranial Nerves: No dysarthria or facial asymmetry.      Sensory: Sensation is intact.      Motor: No weakness or tremor.   Psychiatric:         Attention and Perception: Attention normal.         Mood and Affect: Mood normal.         Speech: Speech normal.         Behavior: Behavior normal. Behavior is cooperative.         Thought Content: Thought content normal.         Cognition and Memory: Cognition normal.         Judgment: Judgment normal.       ---------------------------------------------------------------------------------------------------------------------  EKG:  Pending formal cardiology interpretation. Per my review, appears normal sinus rhythm without evidence of acute ischemia.     Telemetry:  Appearing normal sinus rhythm 60-70s on my exam.   I have personally looked at both the EKG and the telemetry strips.  ---------------------------------------------------------------------------------------------------------------------   Results from  "last 7 days   Lab Units 10/08/23  1709   LACTATE mmol/L 0.9   WBC 10*3/mm3 7.38   HEMOGLOBIN g/dL 11.0*   HEMATOCRIT % 36.2   MCV fL 88.9   MCHC g/dL 30.4*   PLATELETS 10*3/mm3 289     Results from last 7 days   Lab Units 10/08/23  1707   PH, ARTERIAL pH units 7.363   PO2 ART mm Hg 53.5*   PCO2, ARTERIAL mm Hg 36.5   HCO3 ART mmol/L 20.7     Results from last 7 days   Lab Units 10/08/23  1709   SODIUM mmol/L 138   POTASSIUM mmol/L 5.9*   MAGNESIUM mg/dL 1.9   CHLORIDE mmol/L 107   CO2 mmol/L 20.8*   BUN mg/dL 27*   CREATININE mg/dL 3.61*   CALCIUM mg/dL 9.0   GLUCOSE mg/dL 102*   ALBUMIN g/dL 3.4*   BILIRUBIN mg/dL 0.5   ALK PHOS U/L 146*   AST (SGOT) U/L 7   ALT (SGPT) U/L <5   Estimated Creatinine Clearance: 16.7 mL/min (A) (by C-G formula based on SCr of 3.61 mg/dL (H)).  No results found for: \"AMMONIA\"  Results from last 7 days   Lab Units 10/08/23  1923 10/08/23  1709   HSTROP T ng/L 26* 23*     Results from last 7 days   Lab Units 10/08/23  1709   PROBNP pg/mL 8,093.0*     No results found for: \"HGBA1C\"  No results found for: \"TSH\", \"FREET4\"  No results found for: \"PREGTESTUR\", \"PREGSERUM\", \"HCG\", \"HCGQUANT\"  Pain Management Panel           No data to display                  ---------------------------------------------------------------------------------------------------------------------  Imaging Results (Last 7 Days)       Procedure Component Value Units Date/Time    XR Chest 1 View [[id number]] Collected: 10/08/23 1917     Updated: 10/08/23 1919    Narrative:      XR CHEST 1 VW-     CLINICAL INDICATION: sob        COMPARISON: None immediately available      TECHNIQUE: Single frontal view of the chest.     FINDINGS:     LUNGS: Trace right effusion and right basilar airspace disease     HEART AND MEDIASTINUM: Heart and mediastinal contours are unremarkable        SKELETON: Bony and soft tissue structures are unremarkable.             Impression:      Trace right effusion and right basilar airspace disease   "         This report was finalized on 10/8/2023 7:17 PM by Dr. Julio Dominguez MD.               Last echocardiogram:  Results for orders placed during the hospital encounter of 05/23/18    Adult Transthoracic Echo Complete W/ Cont if Necessary Per Protocol    Interpretation Summary  · Normal left ventricular cavity size and wall thickness noted.  · Left ventricular systolic function is normal.Estimated EF appears to be in the range of 61 - 65%.  · Left ventricular diastolic dysfunction (grade I) consistent with impaired relaxation.  · Calcification in the right ventricular cavity noted which is probably calcified moderator band .  · Mild MAC is present with trace mitral valve regurgitation .  · Trace tricuspid valve regurgitation is noted with mild pulmonary hypertension . PRVSP is 44 mmHg.  · No significant valvular heart disease  · Small anterior echo-free space is noted which may represent fat pad V/S trivial pericardial effusion. It is located anteriorly and it is hemodynamically insignificant          I have personally reviewed the above radiology images and read the final radiology report on 10/08/23  ---------------------------------------------------------------------------------------------------------------------  Assessment / Plan     Active Hospital Problems    Diagnosis  POA    **Acute hypoxic respiratory failure [J96.01]  Yes       ASSESSMENT/PLAN:  -Acute on chronic HFpEF, present on admission  -Elevated proBNP and evidence of trace right pleural effusion on arrival  -Acute hypoxemic respiratory failure due to above  -COPD without acute exacerbation  -Ongoing tobacco abuse  -Acute kidney injury on CKD, present on admission  -Acute hyperkalemia, present on admission  -Indeterminately elevated troponin without significant delta, present on admission, likely due to poor renal function and hypoxia  -Mild normocytic anemia, unknown chronicity however suspect chronic due to CKD  -History of CAD status post  stenting  -Hyperlipidemia  -Essential hypertension  -67 y.o. female presents to [facility] ED with CC of SOA and increasing lower extremity edema over the last several days. Recently evaluated in outpatient Cardiology office on 10/6/2023. Noted history of medical noncompliance. Not on diuretics due to CKD. Formerly followed with Dr. Sherwood (Nephrology) in the outpatient setting although has not followed up recently.   -proBNP 8000 on arrival. Chest x-ray noted trace right effusion and right basilar airspace disease.  -No leukocytosis.  Pro-Binh negative.  CRP mildly elevated at 5.41.  Patient denies any fever, chills, sputum production.  Do not currently suspect right basilar pneumonia.  Encourage incentive spirometry use and turn, cough, and deep breathing exercises.  -Closely monitor off antibiotics for now.  -Repeat CBC with differential in the a.m.  -Encourage smoking cessation.  -NRT made available.  -Received 80 mg IV Lasix in the ED.  -Monitor response with strict I's and O's and daily weights.  -Currently stable on 2 L. Room air is baseline.  Titrate supplemental O2 to maintain SPO2 saturations greater than 90%.  -Transthoracic echocardiogram from 2019 noted normal EF with grade 1 diastolic dysfunction.  -Obtain updated transthoracic echocardiogram.  -Baseline creatinine unknown with creatinine on admission of 3.6.  -Request records from outpatient Nephrology clinic (Dr. Sherwood).   -Per review of most recent available labs in June 2020; creatinine 1.08 and GFR was between 56 and 63.  -Monitor urine output and renal function closely.  -Avoid nephrotoxins as able. Avoid hypotension.   -Repeat chemistry panel in AM.   -BP appearing controlled/stable at this time. Continue to monitor VS per hospital policy. Review home antihypertensive agents once reconciled by pharmacy with plans to continue except for any that may contribute to renal failure. Holding parameters to prevent hypotension and/or bradycardia.   -High  sensitivity Troponin T 23 with +3 delta. EKG without evidence of acute ischemic changes. Patient denies chest pain on exam. Troponin elevation likely secondary to MAICO and hypoxia.   -Potassium was 5.9 on arrival. Patient received hyperkalemia treatment in the ED.  Repeat chemistry panel pending.  -Continuous cardiac monitoring and pulse oximetry ordered.  -Plan to continue aspirin, statin.   -Obtain HgbA1c and lipid profile.     -Hypoglycemia without known hx of diabetes  -BG 49 on arrival to the floor. Patient was alert&oriented, asymptomatic at the time. Ate a turkey sandwich and peanut butter/jeremy crackers. Repeat BG was 91. Accu checks ordered Q3 hours for now. Obtain HgbA1c. Hypoglycemia protocol in place as necessary. Hypoglycemia likely result of IV insulin that patient received in the ED as part of hyperkalemia treatment.     -Physical debility; PT consult. Maintain fall precautions.  Provide assistance.  -Arthritis; supportive care. Tylenol as needed for mild pain.  -Hypothyroidism; obtain TSH and free T4.  Plan to resume home levothyroxine pending laboratory results.  -History of migraines without aura  -Morbid obesity by BMI; affecting all aspects of care.  Encourage lifestyle modifications.  -History of medical noncompliance; encourage compliance.    ----------  -DVT prophylaxis: Subcu heparin.  -Activity: As tolerated. Fall precautions.   -Expected length of stay:   INPATIENT status due to the need for care which can only be reasonably provided in an hospital setting such as aggressive/expedited ancillary services and/or consultation services, the necessity for IV medications, close physician monitoring and/or the possible need for procedures.  In such, I feel patient's risk for adverse outcomes and need for care warrant INPATIENT evaluation and predict the patient's care encounter to likely last beyond 2 midnights.   -Disposition pending clinical course.    High risk secondary to acute on chronic  "HFpEF with elevated proBNP and trace right pleural effusion, MAICO on CKD, acute hyperkalemia, and acute hypoxemic respiratory failure.      CODE STATUS: FULL CODE      DEEPA Gaxiola   10/08/23  20:07 EDT  Pager #[phone]  ---------------------------------------------------------------------------------------------------------------------        Electronically signed by Fanny Ford MD at 10/09/23 7274          Emergency Department Notes        Maria Isabel Lim MD at 10/08/23 1640          Subjective   History of Present Illness  67-year-old white female here today for leg swelling and shortness of breath.  Patient has a history of CAD, CHF, COPD.  She complained of increased lower extremity edema over the past few days.  Her visit to the cardiologist 2 days ago noted that she had was not compliant with all of her medications.  She says that she cannot take diuretics because of kidney disease.  Cardiology note showed mild CKD.  She denies any new chest pain.  She does have nonproductive cough.  She says that yesterday she ate a half a bag of chips.  She also complains of feeling \"confused\" intermittently in the last few days.        Review of Systems   All other systems reviewed and are negative.      Past Medical History:   Diagnosis Date    Anxiety     Arthritis     Coronary artery disease     H/O heart artery stent     Hyperlipidemia     Hypertension     Obesity        No Known Allergies    Past Surgical History:   Procedure Laterality Date    ANKLE SURGERY      RIGHT    APPENDECTOMY      CARDIAC CATHETERIZATION      LEFT    CORONARY STENT PLACEMENT      HYSTERECTOMY         Family History   Problem Relation Age of Onset    Heart disease Mother     Heart attack Mother 73    Fibromyalgia Mother     Heart attack Father 72    Ovarian cancer Sister     Coronary artery disease Other     Breast cancer Neg Hx        Social History     Socioeconomic History    Marital status:    Tobacco Use    Smoking " status: Every Day     Packs/day: 0.50     Years: 30.00     Additional pack years: 0.00     Total pack years: 15.00     Types: Electronic Cigarette, Cigarettes    Smokeless tobacco: Never   Vaping Use    Vaping Use: Never used   Substance and Sexual Activity    Alcohol use: No    Drug use: No    Sexual activity: Never           Objective   Physical Exam  Vitals and nursing note reviewed. Exam conducted with a chaperone present.   Constitutional:       Appearance: Normal appearance. She is well-developed. She is obese.   HENT:      Head: Normocephalic and atraumatic.   Neck:      Vascular: No JVD.   Cardiovascular:      Rate and Rhythm: Normal rate and regular rhythm.      Heart sounds: Normal heart sounds. No murmur heard.     No friction rub. No gallop.   Pulmonary:      Effort: No respiratory distress.      Breath sounds: Examination of the right-lower field reveals rales. Examination of the left-lower field reveals rales. Rales present. No decreased breath sounds, wheezing or rhonchi.   Abdominal:      General: Abdomen is flat. Bowel sounds are normal.      Palpations: Abdomen is soft.   Musculoskeletal:         General: Normal range of motion.      Right lower leg: 3+      Left lower leg: 3+   Skin:     General: Skin is warm and dry.   Neurological:      General: No focal deficit present.      Mental Status: She is alert and oriented to person, place, and time.   Psychiatric:         Mood and Affect: Mood normal.         Behavior: Behavior normal.         Procedures  Results for orders placed or performed during the hospital encounter of 10/08/23   Comprehensive Metabolic Panel    Specimen: Blood   Result Value Ref Range    Glucose 102 (H) 65 - 99 mg/dL    BUN 27 (H) 8 - 23 mg/dL    Creatinine 3.61 (H) 0.57 - 1.00 mg/dL    Sodium 138 136 - 145 mmol/L    Potassium 5.9 (H) 3.5 - 5.2 mmol/L    Chloride 107 98 - 107 mmol/L    CO2 20.8 (L) 22.0 - 29.0 mmol/L    Calcium 9.0 8.6 - 10.5 mg/dL    Total Protein 6.6 6.0 -  8.5 g/dL    Albumin 3.4 (L) 3.5 - 5.2 g/dL    ALT (SGPT) <5 1 - 33 U/L    AST (SGOT) 7 1 - 32 U/L    Alkaline Phosphatase 146 (H) 39 - 117 U/L    Total Bilirubin 0.5 0.0 - 1.2 mg/dL    Globulin 3.2 gm/dL    A/G Ratio 1.1 g/dL    BUN/Creatinine Ratio 7.5 7.0 - 25.0    Anion Gap 10.2 5.0 - 15.0 mmol/L    eGFR 13.4 (L) >60.0 mL/min/1.73   BNP    Specimen: Blood   Result Value Ref Range    proBNP 8,093.0 (H) 0.0 - 900.0 pg/mL   High Sensitivity Troponin T    Specimen: Blood   Result Value Ref Range    HS Troponin T 23 (H) <10 ng/L   Lactic Acid, Plasma    Specimen: Blood   Result Value Ref Range    Lactate 0.9 0.5 - 2.0 mmol/L   Magnesium    Specimen: Blood   Result Value Ref Range    Magnesium 1.9 1.6 - 2.4 mg/dL   CBC Auto Differential    Specimen: Blood   Result Value Ref Range    WBC 7.38 3.40 - 10.80 10*3/mm3    RBC 4.07 3.77 - 5.28 10*6/mm3    Hemoglobin 11.0 (L) 12.0 - 15.9 g/dL    Hematocrit 36.2 34.0 - 46.6 %    MCV 88.9 79.0 - 97.0 fL    MCH 27.0 26.6 - 33.0 pg    MCHC 30.4 (L) 31.5 - 35.7 g/dL    RDW 16.5 (H) 12.3 - 15.4 %    RDW-SD 53.7 37.0 - 54.0 fl    MPV 10.1 6.0 - 12.0 fL    Platelets 289 140 - 450 10*3/mm3    Neutrophil % 62.6 42.7 - 76.0 %    Lymphocyte % 15.7 (L) 19.6 - 45.3 %    Monocyte % 8.3 5.0 - 12.0 %    Eosinophil % 12.3 (H) 0.3 - 6.2 %    Basophil % 0.8 0.0 - 1.5 %    Immature Grans % 0.3 0.0 - 0.5 %    Neutrophils, Absolute 4.62 1.70 - 7.00 10*3/mm3    Lymphocytes, Absolute 1.16 0.70 - 3.10 10*3/mm3    Monocytes, Absolute 0.61 0.10 - 0.90 10*3/mm3    Eosinophils, Absolute 0.91 (H) 0.00 - 0.40 10*3/mm3    Basophils, Absolute 0.06 0.00 - 0.20 10*3/mm3    Immature Grans, Absolute 0.02 0.00 - 0.05 10*3/mm3    nRBC 0.0 0.0 - 0.2 /100 WBC   Blood Gas, Arterial With Co-Ox    Specimen: Arterial Blood   Result Value Ref Range    Site Right Brachial     Sergei's Test N/A     pH, Arterial 7.363 7.350 - 7.450 pH units    pCO2, Arterial 36.5 35.0 - 45.0 mm Hg    pO2, Arterial 53.5 (C) 83.0 - 108.0 mm Hg     HCO3, Arterial 20.7 20.0 - 26.0 mmol/L    Base Excess, Arterial -4.2 (L) 0.0 - 2.0 mmol/L    O2 Saturation, Arterial 88.4 (L) 94.0 - 99.0 %    Hemoglobin, Blood Gas 11.2 (L) 13.5 - 17.5 g/dL    Hematocrit, Blood Gas 34.5 (L) 38.0 - 51.0 %    Oxyhemoglobin 85.2 (L) 94 - 99 %    Methemoglobin 0.20 0.00 - 3.00 %    Carboxyhemoglobin 3.4 0 - 5 %    A-a DO2 47.7 0.0 - 300.0 mmHg    CO2 Content 21.9 (L) 22 - 33 mmol/L    Barometric Pressure for Blood Gas 725 mmHg    Modality Room Air     FIO2 21 %    Ventilator Mode NA     Notified Who DR MENDOZA     Notified By [id number]     Notified Time 10/08/2023 17:09     Collected by [id number]     pH, Temp Corrected      pCO2, Temperature Corrected      pO2, Temperature Corrected     High Sensitivity Troponin T 2Hr    Specimen: Arm, Left; Blood   Result Value Ref Range    HS Troponin T 26 (H) <10 ng/L    Troponin T Delta 3 >=-4 - <+4 ng/L   Procalcitonin    Specimen: Blood   Result Value Ref Range    Procalcitonin 0.10 0.00 - 0.25 ng/mL   C-reactive Protein    Specimen: Arm, Left; Blood   Result Value Ref Range    C-Reactive Protein 5.41 (H) 0.00 - 0.50 mg/dL   Hemoglobin A1c    Specimen: Blood   Result Value Ref Range    Hemoglobin A1C 5.30 4.80 - 5.60 %   Lipid Panel    Specimen: Arm, Left; Blood   Result Value Ref Range    Total Cholesterol 140 0 - 200 mg/dL    Triglycerides 75 0 - 150 mg/dL    HDL Cholesterol 34 (L) 40 - 60 mg/dL    LDL Cholesterol  91 0 - 100 mg/dL    VLDL Cholesterol 15 5 - 40 mg/dL    LDL/HDL Ratio 2.68    TSH    Specimen: Arm, Left; Blood   Result Value Ref Range    TSH 6.770 (H) 0.270 - 4.200 uIU/mL   T4, Free    Specimen: Arm, Left; Blood   Result Value Ref Range    Free T4 1.01 0.93 - 1.70 ng/dL   POC Glucose Once    Specimen: Blood   Result Value Ref Range    Glucose 49 (C) 70 - 130 mg/dL   POC Glucose Once    Specimen: Blood   Result Value Ref Range    Glucose 67 (L) 70 - 130 mg/dL   POC Glucose Once    Specimen: Blood   Result Value Ref Range    Glucose 79  70 - 130 mg/dL   POC Glucose Once    Specimen: Blood   Result Value Ref Range    Glucose 91 70 - 130 mg/dL   ECG 12 Lead Dyspnea   Result Value Ref Range    QT Interval 400 ms    QTC Interval 419 ms     XR Chest 1 View    Result Date: 10/8/2023  Narrative: XR CHEST 1 VW-  CLINICAL INDICATION: sob   COMPARISON: None immediately available  TECHNIQUE: Single frontal view of the chest.  FINDINGS:  LUNGS: Trace right effusion and right basilar airspace disease  HEART AND MEDIASTINUM: Heart and mediastinal contours are unremarkable   SKELETON: Bony and soft tissue structures are unremarkable.        Impression: Trace right effusion and right basilar airspace disease    This report was finalized on 10/8/2023 7:17 PM by Dr. Julio Dominguez MD.             ED Course  ED Course as of 10/09/23 0152   [place] Oct 08, 2023   1732 ECG 12 Lead Dyspnea  Normal sinus rhythm.  Rate 66.  Normal axis.  Normal QT interval.  Nonspecific T wave change.  No acute ST elevation or depression.  Borderline EKG.  Interpreted by me.    Electronically signed by Keyon Lanza MD, 10/08/23, 5:32 PM EDT.   [BC]   1953 Spoke with hospitalist, admitted to medical service []   Mon Oct 09, 2023   0151 Patient admitted to medical service earlier in shift. [KH]      ED Course User Index  [BC] Keyon Lanza MD  [] Maria Isabel Lim MD                                           Medical Decision Making  Problems Addressed:  Acute kidney injury superimposed on chronic kidney disease: complicated acute illness or injury  Acute on chronic congestive heart failure, unspecified heart failure type: complicated acute illness or injury  Acute on chronic respiratory failure with hypoxia: complicated acute illness or injury  Hyperkalemia: complicated acute illness or injury    Amount and/or Complexity of Data Reviewed  Labs: ordered.  Radiology: ordered.  ECG/medicine tests: ordered. Decision-making details documented in ED Course.    Risk  OTC drugs.  Prescription drug  management.  Decision regarding hospitalization.        Final diagnoses:   Acute kidney injury superimposed on chronic kidney disease   Acute on chronic congestive heart failure, unspecified heart failure type   Acute on chronic respiratory failure with hypoxia   Hyperkalemia       ED Disposition  ED Disposition       ED Disposition   Decision to Admit    Condition   --    Comment   Level of Care: Telemetry [5]   Diagnosis: Acute hypoxic respiratory failure [[id number]]                 No follow-up provider specified.       Medication List      No changes were made to your prescriptions during this visit.            Maria Isabel Lim MD  10/09/23 0152      Electronically signed by Maria Isabel Lim MD at 10/09/23 0152       Facility-Administered Medications as of 10/9/2023   Medication Dose Route Frequency Provider Last Rate Last Admin    acetaminophen (TYLENOL) tablet 650 mg  650 mg Oral Q6H PRN Marie Pardo APRN        [COMPLETED] albuterol (PROVENTIL) nebulizer solution 0.5% 2.5 mg/0.5mL  10 mg Nebulization Once Marie Pardo APRN   10 mg at 10/09/23 0405    aspirin EC tablet 325 mg  325 mg Oral Daily Marie Pardo APRN   325 mg at 10/09/23 0905    sennosides-docusate (PERICOLACE) 8.6-50 MG per tablet 2 tablet  2 tablet Oral BID Fanny Ford MD   2 tablet at 10/09/23 0831    And    polyethylene glycol (MIRALAX) packet 17 g  17 g Oral Daily PRN Fanny Ford MD        And    bisacodyl (DULCOLAX) EC tablet 5 mg  5 mg Oral Daily PRN Fanny Ford MD        And    bisacodyl (DULCOLAX) suppository 10 mg  10 mg Rectal Daily PRN Fanny Ford MD        [COMPLETED] calcium gluconate 1000 Mg/50ml 0.675% NaCl IV SOLN  1,000 mg Intravenous Once Keyon Lanza MD   Stopped at 10/08/23 2100    calcium gluconate 1000 Mg/50ml 0.675% NaCl IV SOLN  1,000 mg Intravenous Once Marie Pardo APRN        [COMPLETED] dextrose (D50W) (25 g/50 mL) IV injection 25 g  25 g Intravenous Once Keyon Lanza MD   25 g at  10/08/23 1950    dextrose (D50W) (25 g/50 mL) IV injection 25 g  25 g Intravenous Q15 Min PRN Marie Pardo APRN   25 g at 10/09/23 0401    dextrose (D50W) (25 g/50 mL) IV injection 25 g  25 g Intravenous Once Marie Pardo APRN        dextrose (GLUTOSE) oral gel 15 g  15 g Oral Q15 Min PRN Marie Pardo APRN        glucagon HCl (Diagnostic) injection 1 mg  1 mg Intramuscular Q15 Min PRN Marie Pardo APRN        heparin (porcine) 5000 UNIT/ML injection 5,000 Units  5,000 Units Subcutaneous Q12H Fanny Ford MD   5,000 Units at 10/09/23 0831    [COMPLETED] hyaluronidase 150 units in NS 10 ml syringe  150 Units Subcutaneous Once Marie Pardo APRN   150 Units at 10/09/23 0505    hydrOXYzine (ATARAX) tablet 25 mg  25 mg Oral TID PRN Marie Pardo APRN   25 mg at 10/09/23 0345    [COMPLETED] insulin regular (humuLIN R,novoLIN R) injection 10 Units  10 Units Subcutaneous Once Keyon Lanza MD   10 Units at 10/08/23 1953    insulin regular (humuLIN R,novoLIN R) injection 5 Units  5 Units Intravenous Once Marie Pardo APRN        ipratropium-albuterol (DUO-NEB) nebulizer solution 3 mL  3 mL Nebulization 4x Daily - RT Marie Pardo APRN   3 mL at 10/09/23 0654    melatonin tablet 10 mg  10 mg Oral Nightly PRN Marie Pardo APRN        nicotine (NICODERM CQ) 21 MG/24HR patch 1 patch  1 patch Transdermal Q24H Marie Pardo APRN   1 patch at 10/09/23 0831    nitroglycerin (NITROSTAT) SL tablet 0.4 mg  0.4 mg Sublingual Q5 Min PRN Fanny Ford MD        rosuvastatin (CRESTOR) tablet 10 mg  10 mg Oral Nightly Marie Pardo APRN        [COMPLETED] sodium bicarbonate injection 8.4% 50 mEq  50 mEq Intravenous Once Keyon Lanza MD   50 mEq at 10/08/23 1949    sodium chloride 0.9 % flush 10 mL  10 mL Intravenous PRN Fanny Ford MD        sodium chloride 0.9 % flush 10 mL  10 mL Intravenous Q12H Fanny Ford MD   10 mL at 10/09/23 0831    sodium chloride 0.9 % flush 10 mL  10 mL  Intravenous PRN Fanny Ford MD        sodium chloride 0.9 % infusion 40 mL  40 mL Intravenous PRN Fanny Ford MD        [COMPLETED] sodium zirconium cyclosilicate (LOKELMA) pack 10 g  10 g Oral Once Keyon Lanza MD   10 g at 10/08/23 1954    [COMPLETED] sodium zirconium cyclosilicate (LOKELMA) pack 10 g  10 g Oral Once Marie Pardo APRN   10 g at 10/09/23 0443     Orders (all)        Start     Ordered    10/09/23 2100  rosuvastatin (CRESTOR) tablet 10 mg  Nightly         10/09/23 0248    10/09/23 1200  Basic Metabolic Panel  Once         10/09/23 0752    10/09/23 1127  POC Glucose Once  PROCEDURE ONCE        Comments: Complete no more than 45 minutes prior to patient eating      10/09/23 1117    10/09/23 1000  Basic Metabolic Panel  Once,   Status:  Canceled         10/09/23 0752    10/09/23 0920  Connectors / Hubs Must Be Scrubbed 15 Seconds Using 70% Alcohol Before Access - Allow to Dry Before Accessing Line  Continuous         10/09/23 0921    10/09/23 0914  ReDs Vest  Once         10/09/23 0913    10/09/23 0900  nicotine (NICODERM CQ) 21 MG/24HR patch 1 patch  Every 24 Hours Scheduled         10/09/23 0215    10/09/23 0900  aspirin EC tablet 325 mg  Daily         10/09/23 0248    10/09/23 0808  POC Glucose Once  PROCEDURE ONCE        Comments: Complete no more than 45 minutes prior to patient eating      10/09/23 0654    10/09/23 0800  Oral Care  2 Times Daily       10/08/23 2052    10/09/23 0800  Apply Cold Compress to Affected Area for 20 Minutes  4 Times Daily       10/09/23 0414    10/09/23 0757  POC Glucose Once  PROCEDURE ONCE        Comments: Complete no more than 45 minutes prior to patient eating      10/09/23 0751    10/09/23 0755  PT Consult: Eval & Treat Functional Mobility Below Baseline  Once        Comments: Reason Why PT Needed: weakness    10/09/23 0754    10/09/23 0753  Inpatient Nephrology Consult  Once        Specialty:  Nephrology  Provider:  Palomo Steinberg MD     10/09/23 0753    10/09/23 0743  Consult for CHF Education  Once        Provider:  (Not yet assigned)    10/09/23 0742    10/09/23 0730  Potassium  STAT         10/09/23 0729    10/09/23 0700  POC Glucose 4x Daily Before Meals & at Bedtime  4 Times Daily Before Meals & at Bedtime,   Status:  Canceled      Comments: Complete no more than 45 minutes prior to patient eating      10/09/23 0242    10/09/23 0700  ipratropium-albuterol (DUO-NEB) nebulizer solution 3 mL  4 Times Daily - RT         10/09/23 0245    10/09/23 0600  CBC & Differential  Morning Draw         10/09/23 0049    10/09/23 0600  Comprehensive Metabolic Panel  Morning Draw         10/09/23 0049    10/09/23 0600  CBC Auto Differential  PROCEDURE ONCE         10/09/23 0049    10/09/23 0600  Incentive Spirometry  Every 4 Hours While Awake       10/09/23 0245    10/09/23 0535  Needlestick Pt Source  STAT         10/09/23 0534    10/09/23 0535  Hepatitis C Antibody  PROCEDURE ONCE         10/09/23 0534    10/09/23 0535  Hepatitis B Surface Antigen  PROCEDURE ONCE         10/09/23 0534    10/09/23 0535  HIV-1 / O / 2 Ag / Antibody  PROCEDURE ONCE         10/09/23 0534    10/09/23 0500  hyaluronidase 150 units in NS 10 ml syringe  Once         10/09/23 0414    10/09/23 0432  POC Glucose Once  PROCEDURE ONCE        Comments: Complete no more than 45 minutes prior to patient eating      10/09/23 0425    10/09/23 0411  Extravasation Standing Orders - IMMEDIATELY STOP INFUSION  Once         10/09/23 0414    10/09/23 0411  Extravasation Standing Orders - DO NOT REMOVE CATHETER / NEEDLE  Once         10/09/23 0414    10/09/23 0411  Extravasation Standing Orders - AVOID PRESSURE  Once         10/09/23 0414    10/09/23 0411  Extravasation Standing Orders - Disconnect the IV Administration Set  Once         10/09/23 0414    10/09/23 0411  Extravasation Standing Orders - Evaluate the Site for Extravasation of Fluid (Check for Blood Return)  Once         10/09/23 0414     10/09/23 0411  Extravasation Standing Orders - Aspirate as Much of the Medication From the Needle / Cannula As Possible Using A Small (1-5mL) Syringe - Verify Recommended Treatment - If Antidote Does Not Require Infusion Through Extravasated Line Remove Needle / Cannula After Aspiration  Once         10/09/23 0414    10/09/23 0411  Extravasation Standing Orders - Jassi Around the Area With A Pen  Once         10/09/23 0414    10/09/23 0411  Extravasation Standing Orders - Photograph the Area for Medical Record If Indicated Per Photo Policy  Once         10/09/23 0414    10/09/23 0411  Extravasation Standing Orders - Elevate Affected Extremity for 24-48 Hours  Continuous         10/09/23 0414    10/09/23 0411  Notify Provider Prior to Administration of Antidote - Extravasation Standing Orders  Until Discontinued         10/09/23 0414    10/09/23 0411  Notify Provider for Tissue Sloughing, Necrosis or Blistering at Extravasation Site  Until Discontinued         10/09/23 0414    10/09/23 0411  Indicate Extravasated Medication to Determine Antidote to Initiate  Once        Comments: Select infusion extravasated to determine antidote to initiate.    10/09/23 0414    10/09/23 0400  POC Glucose Once  PROCEDURE ONCE        Comments: Complete no more than 45 minutes prior to patient eating      10/09/23 0353    10/09/23 0345  calcium gluconate 1000 Mg/50ml 0.675% NaCl IV SOLN  Once         10/09/23 0253    10/09/23 0345  insulin regular (humuLIN R,novoLIN R) injection 5 Units  Once         10/09/23 0253    10/09/23 0345  dextrose (D50W) (25 g/50 mL) IV injection 25 g  Once         10/09/23 0253    10/09/23 0345  albuterol (PROVENTIL) nebulizer solution 0.5% 2.5 mg/0.5mL  Once         10/09/23 0253    10/09/23 0345  sodium zirconium cyclosilicate (LOKELMA) pack 10 g  Once         10/09/23 0253    10/09/23 0337  POC Glucose Once  PROCEDURE ONCE        Comments: Complete no more than 45 minutes prior to patient eating       10/09/23 0324    10/09/23 0327  hydrOXYzine (ATARAX) tablet 25 mg  3 Times Daily PRN         10/09/23 0327    10/09/23 0327  Urinalysis, Microscopic Only - Urine, Clean Catch  Once         10/09/23 0326    10/09/23 0300  POC Glucose Finger Q3H  Every 3 Hours       10/09/23 0243    10/09/23 0300  POC Glucose Q1H  Every Hour      Comments: POC Glucose - 30 Minutes After Insulin Administration & Then Q1H x3      10/09/23 0253    10/09/23 0255  Magnesium  STAT         10/09/23 0254    10/09/23 0255  US Renal Bilateral  1 Time Imaging         10/09/23 0254    10/09/23 0252  ECG 12 Lead Electrolyte Imbalance  STAT         10/09/23 0251    10/09/23 0251  Fall Precautions  Continuous         10/09/23 0250    10/09/23 0251  Case Management  Consult  Once        Provider:  (Not yet assigned)    10/09/23 0250    10/09/23 0250  Urinalysis With Microscopic If Indicated (No Culture) - Urine, Clean Catch  Once         10/09/23 0249    10/09/23 0250  US Renal Bilateral  1 Time Imaging,   Status:  Canceled         10/09/23 0249    10/09/23 0249  Creatinine Urine Random (kidney function) GFR component - Urine, Clean Catch  Once         10/09/23 0249    10/09/23 0249  Protein / Creatinine Ratio, Urine - Urine, Clean Catch  Once         10/09/23 0249    10/09/23 0249  Protein, Urine, Random - Urine, Clean Catch  Once         10/09/23 0249    10/09/23 0248  Adult Transthoracic Echo Complete W/ Cont if Necessary Per Protocol  Once         10/09/23 0247    10/09/23 0247  Strict Intake & Output  Every Shift       10/09/23 0246    10/09/23 0246  Turn Cough Deep Breathe  Once         10/09/23 0245    10/09/23 0242  dextrose (GLUTOSE) oral gel 15 g  Every 15 Minutes PRN         10/09/23 0242    10/09/23 0242  dextrose (D50W) (25 g/50 mL) IV injection 25 g  Every 15 Minutes PRN         10/09/23 0242    10/09/23 0242  glucagon HCl (Diagnostic) injection 1 mg  Every 15 Minutes PRN         10/09/23 0242    10/09/23 0242  Obtain  Medical Records  Until Discontinued        Comments: Please obtain records from Dr. Sherwood's office regarding most recent office visits.    10/09/23 0242    10/09/23 0050  Respiratory Panel PCR w/COVID-19(SARS-CoV-2) [facility] In-House, NP Swab in UTM/VTM, 3-4 HR TAT - Swab, Nasopharynx  Once         10/09/23 0049    10/09/23 0049  Hemoglobin A1c  Once         10/09/23 0048    10/09/23 0049  Lipid Panel  Once         10/09/23 0048    10/09/23 0049  TSH  Once         10/09/23 0048    10/09/23 0049  T4, Free  Once         10/09/23 0048    10/09/23 0048  melatonin tablet 10 mg  Nightly PRN         10/09/23 0048    10/09/23 0048  acetaminophen (TYLENOL) tablet 650 mg  Every 6 Hours PRN         10/09/23 0048    10/09/23 0000  Vital Signs  Every 4 Hours       10/08/23 2052    10/09/23 0000  Intake & Output  Every 4 Hours       10/08/23 2052    10/08/23 2357  POC Glucose Once  PROCEDURE ONCE        Comments: Complete no more than 45 minutes prior to patient eating      10/08/23 2207    10/08/23 2357  POC Glucose Once  PROCEDURE ONCE        Comments: Complete no more than 45 minutes prior to patient eating      10/08/23 2229    10/08/23 2357  POC Glucose Once  PROCEDURE ONCE        Comments: Complete no more than 45 minutes prior to patient eating      10/08/23 2348    10/08/23 2356  POC Glucose Once  PROCEDURE ONCE        Comments: Complete no more than 45 minutes prior to patient eating      10/08/23 2147    10/08/23 2145  sodium chloride 0.9 % flush 10 mL  Every 12 Hours Scheduled         10/08/23 2052    10/08/23 2145  sennosides-docusate (PERICOLACE) 8.6-50 MG per tablet 2 tablet  2 Times Daily        See Hyperspace for full Linked Orders Report.    10/08/23 2052    10/08/23 2145  heparin (porcine) 5000 UNIT/ML injection 5,000 Units  Every 12 Hours Scheduled         10/08/23 2052    10/08/23 2053  Weigh Patient  Once         10/08/23 2052    10/08/23 2053  Notify PCP of Patient Admission  Once          10/08/23 2052    10/08/23 2053  Insert Peripheral IV  Once         10/08/23 2052    10/08/23 2053  Saline Lock & Maintain IV Access  Continuous,   Status:  Canceled         10/08/23 2052    10/08/23 2053  Continuous Cardiac Monitoring  Continuous        Comments: Follow Standing Orders As Outlined in Process Instructions (Open Order Report to View Full Instructions)    10/08/23 2052    10/08/23 2053  Telemetry - Maintain IV Access  Continuous         10/08/23 2052    10/08/23 2053  Telemetry - Place Orders & Notify Provider of Results When Patient Experiences Acute Chest Pain, Dysrhythmia or Respiratory Distress  Until Discontinued         10/08/23 2052    10/08/23 2053  May Be Off Telemetry for Tests  Continuous         10/08/23 2052    10/08/23 2053  Pulse Oximetry, Continuous  Continuous         10/08/23 2052    10/08/23 2053  Activity - Bed Rest With Exceptions (Specify)  Until Discontinued         10/08/23 2052    10/08/23 2053  Daily Weights  Daily       10/08/23 2052    10/08/23 2053  POC Glucose Once  Once        Comments: Complete no more than 45 minutes prior to patient eating      10/08/23 2052    10/08/23 2053  Diet: Cardiac Diets, Renal Diets, Fluid Restriction (240 mL/tray) Diets; Healthy Heart (2-3 Na+); Low Sodium (2-3g), Low Potassium, Low Phosphorus; 1500 mL/day; Texture: Regular Texture (IDDSI 7); Fluid Consistency: Thin (IDDSI 0)  Diet Effective Now         10/08/23 2052    10/08/23 2053  Procalcitonin  STAT         10/08/23 2052    10/08/23 2053  C-reactive Protein  Once         10/08/23 2052    10/08/23 2052  sodium chloride 0.9 % flush 10 mL  As Needed         10/08/23 2052    10/08/23 2052  sodium chloride 0.9 % infusion 40 mL  As Needed         10/08/23 2052    10/08/23 2052  polyethylene glycol (MIRALAX) packet 17 g  Daily PRN        See Hyperspace for full Linked Orders Report.    10/08/23 2052    10/08/23 2052  bisacodyl (DULCOLAX) EC tablet 5 mg  Daily PRN        See Hyperspace for full  Linked Orders Report.    10/08/23 2052    10/08/23 2052  bisacodyl (DULCOLAX) suppository 10 mg  Daily PRN        See Hyperspace for full Linked Orders Report.    10/08/23 2052    10/08/23 2052  nitroglycerin (NITROSTAT) SL tablet 0.4 mg  Every 5 Minutes PRN         10/08/23 2052    10/08/23 2006  Initiate Observation Status  Once         10/08/23 2006    10/08/23 2006  Code Status and Medical Interventions:  Continuous         10/08/23 2006    10/08/23 1918  dextrose (D50W) (25 g/50 mL) IV injection 25 g  Once         10/08/23 1902    10/08/23 1918  insulin regular (humuLIN R,novoLIN R) injection 10 Units  Once         10/08/23 1902    10/08/23 1918  calcium gluconate 1000 Mg/50ml 0.675% NaCl IV SOLN  Once         10/08/23 1902    10/08/23 1918  sodium bicarbonate injection 8.4% 50 mEq  Once         10/08/23 1902    10/08/23 1918  sodium zirconium cyclosilicate (LOKELMA) pack 10 g  Once         10/08/23 1902    10/08/23 1909  High Sensitivity Troponin T 2Hr  PROCEDURE ONCE         10/08/23 1736    10/08/23 1709  Blood Gas, Arterial With Co-Ox  PROCEDURE ONCE         10/08/23 1707    10/08/23 1650  XR Chest 1 View  1 Time Imaging         10/08/23 1650    10/08/23 1650  Monitor Blood Pressure  Per Hospital Policy         10/08/23 1650    10/08/23 1650  Cardiac Monitoring  Continuous,   Status:  Canceled        Comments: Follow Standing Orders As Outlined in Process Instructions (Open Order Report to View Full Instructions)    10/08/23 1650    10/08/23 1650  Pulse Oximetry, Continuous  Continuous,   Status:  Canceled         10/08/23 1650    10/08/23 1650  Insert Peripheral IV  Once        See Hyperspace for full Linked Orders Report.    10/08/23 1650    10/08/23 1649  sodium chloride 0.9 % flush 10 mL  As Needed        See Hyperspace for full Linked Orders Report.    10/08/23 1650    10/08/23 1649  CBC & Differential  Once         10/08/23 1650    10/08/23 1649  Comprehensive Metabolic Panel  Once         10/08/23  1650    10/08/23 1649  Blood Gas, Arterial -With Co-Ox Panel: Yes  Once         10/08/23 1650    10/08/23 1649  BNP  Once         10/08/23 1650    10/08/23 1649  High Sensitivity Troponin T  Once         10/08/23 1650    10/08/23 1649  Blood Culture - Blood, Arm, Left  Once         10/08/23 1650    10/08/23 1649  Blood Culture - Blood, Arm, Right  Once         10/08/23 1650    10/08/23 1649  Lactic Acid, Plasma  Once         10/08/23 1650    10/08/23 1649  Magnesium  Once         10/08/23 1650    10/08/23 1649  CBC Auto Differential  PROCEDURE ONCE         10/08/23 1650    10/08/23 1636  ECG 12 Lead Dyspnea  Once         10/08/23 1636    Unscheduled  Follow Hypoglycemia Standing Orders For Blood Glucose <70 & Notify Provider of Treatment  As Needed      Comments: Follow Hypoglycemia Orders As Outlined in Process Instructions (Open Order Report to View Full Instructions)  Notify Provider Any Time Hypoglycemia Treatment is Administered    10/09/23 0242    Unscheduled  Extravasation Standing Orders - Encourage Active Range of Motion After 48 Hours  As Needed       10/09/23 0414    Unscheduled  Change Dressing to IV Site As Needed When Damp, Loose or Soiled  As Needed       10/09/23 0921    Unscheduled  Change Needleless Connectors  As Needed      Comments: Change Needleless Connectors When:  - Administration Set Changed  - Dressing Changed  - Removed For Any Reason  - Residual Blood or Debris Within Connector  - Prior to Drawing Blood Cultures  - Contamination of Connector  - After Administration of Blood or Blood Components    10/09/23 0921    --  folic acid (FOLVITE) 1 MG tablet  Daily         10/09/23 1146    --  HYDROcodone-acetaminophen (NORCO)  MG per tablet  Every 8 Hours PRN         10/09/23 1146    Pending  aspirin tablet 325 mg  Daily         Pending    Pending  carvedilol (COREG) tablet 25 mg  2 Times Daily With Meals         Pending    Pending  folic acid (FOLVITE) tablet 1 mg  Daily         Pending     Pending  gabapentin (NEURONTIN) capsule 800 mg  Every 12 Hours Scheduled         Pending    Pending  hydrALAZINE (APRESOLINE) tablet 25 mg  2 Times Daily         Pending    Pending  HYDROcodone-acetaminophen (NORCO)  MG per tablet 1 tablet  Every 8 Hours PRN         Pending    Pending  levothyroxine (SYNTHROID, LEVOTHROID) tablet 88 mcg  Daily         Pending    Pending  lisinopril (PRINIVIL,ZESTRIL) tablet 5 mg  Daily         Pending    Pending  montelukast (SINGULAIR) tablet 10 mg  Nightly         Pending    Pending  nitroglycerin (NITROSTAT) SL tablet 0.4 mg  Every 5 Minutes PRN         Pending    Pending  pantoprazole (PROTONIX) EC tablet 40 mg  Daily         Pending    Pending  rosuvastatin (CRESTOR) tablet 20 mg  Daily         Pending    Pending  vitamin B-12 (CYANOCOBALAMIN) tablet 1,000 mcg  Daily         Pending                  Physician Progress Notes (all)    No notes of this type exist for this encounter.       Consult Notes (all)    No notes of this type exist for this encounter.

## 2023-10-09 NOTE — CONSULTS
NEPHROLOGY CONSULT NOTE    Referring Provider: Dr. Ford  Reason for Consultation: Acute kidney injury    Chief complaint edema    Subjective .     History of present illness: Patient is a 67-year-old with past medical history of coronary artery disease hyperlipidemia hypertension chronic kidney disease stage 3 patient has seen nephrology in the past but has not followed up came in with a creatinine of 3.9 and a potassium of 6.2 patient was taking lisinopril at home patient has swelling going on for few days for least a month before that she was normal patient also has a history of diastolic congestive heart failure.  Patient has seen cardiology recently and has not been taking her medication accordingly going to however she is not taking diuretics secondary to chronic kidney disease patient stated that she has not missed medicine patient denies any nausea vomiting diarrhea no urgency no frequency no fever no chills no passing out no NSAIDs no recent antibiotics does not have an oxygen at home right now she is breathing          Review of Systems  All systems were reviewed and negative except for: Above    History  Past Medical History:   Diagnosis Date    Anxiety     Arthritis     Coronary artery disease     H/O heart artery stent     Hyperlipidemia     Hypertension     Obesity    ,   Past Surgical History:   Procedure Laterality Date    ANKLE SURGERY      RIGHT    APPENDECTOMY      CARDIAC CATHETERIZATION      LEFT    CORONARY STENT PLACEMENT      HYSTERECTOMY     ,   Family History   Problem Relation Age of Onset    Heart disease Mother     Heart attack Mother 73    Fibromyalgia Mother     Heart attack Father 72    Ovarian cancer Sister     Coronary artery disease Other     Breast cancer Neg Hx    ,   Social History     Tobacco Use    Smoking status: Every Day     Packs/day: 0.50     Years: 30.00     Additional pack years: 0.00     Total pack years: 15.00     Types: Electronic Cigarette, Cigarettes    Smokeless  tobacco: Never   Vaping Use    Vaping Use: Never used   Substance Use Topics    Alcohol use: No    Drug use: No   , Allergies:  Patient has no known allergies.,   Current Facility-Administered Medications:     acetaminophen (TYLENOL) tablet 650 mg, 650 mg, Oral, Q6H PRN, Edvin, Marie, APRN    aspirin EC tablet 325 mg, 325 mg, Oral, Daily, Chambers, Marie, APRN, 325 mg at 10/09/23 0905    sennosides-docusate (PERICOLACE) 8.6-50 MG per tablet 2 tablet, 2 tablet, Oral, BID, 2 tablet at 10/09/23 0831 **AND** polyethylene glycol (MIRALAX) packet 17 g, 17 g, Oral, Daily PRN **AND** bisacodyl (DULCOLAX) EC tablet 5 mg, 5 mg, Oral, Daily PRN **AND** bisacodyl (DULCOLAX) suppository 10 mg, 10 mg, Rectal, Daily PRN, Fanny Ford MD    calcium gluconate 1000 Mg/50ml 0.675% NaCl IV SOLN, 1,000 mg, Intravenous, Once, Edvin, [place], APRN    dextrose (D50W) (25 g/50 mL) IV injection 25 g, 25 g, Intravenous, Q15 Min PRN, Edvin, [place], APRN, 25 g at 10/09/23 0401    dextrose (D50W) (25 g/50 mL) IV injection 25 g, 25 g, Intravenous, Once, Edvin, Marie, APRN    dextrose (GLUTOSE) oral gel 15 g, 15 g, Oral, Q15 Min PRN, Edvin, Marie, APRN    glucagon HCl (Diagnostic) injection 1 mg, 1 mg, Intramuscular, Q15 Min PRN, Chambers, Marie, APRN    heparin (porcine) 5000 UNIT/ML injection 5,000 Units, 5,000 Units, Subcutaneous, Q12H, Fanny Ford MD, 5,000 Units at 10/09/23 0831    hydrOXYzine (ATARAX) tablet 25 mg, 25 mg, Oral, TID PRN, Chambers, [place], APRN, 25 mg at 10/09/23 0345    insulin regular (humuLIN R,novoLIN R) injection 5 Units, 5 Units, Intravenous, Once, Marie Pardo APRN    ipratropium-albuterol (DUO-NEB) nebulizer solution 3 mL, 3 mL, Nebulization, 4x Daily - RT, Marie Pardo APRN, 3 mL at 10/09/23 1249    melatonin tablet 10 mg, 10 mg, Oral, Nightly PRN, Marie Pardo APRN    nicotine (NICODERM CQ) 21 MG/24HR patch 1 patch, 1 patch, Transdermal, Q24H, Marie Pardo APRN, 1 patch at 10/09/23 0831     nitroglycerin (NITROSTAT) SL tablet 0.4 mg, 0.4 mg, Sublingual, Q5 Min PRN, Fanny Ford MD    rosuvastatin (CRESTOR) tablet 10 mg, 10 mg, Oral, Nightly, Marie Pardo APRN    [COMPLETED] Insert Peripheral IV, , , Once **AND** sodium chloride 0.9 % flush 10 mL, 10 mL, Intravenous, PRN, Fanny Ford MD    sodium chloride 0.9 % flush 10 mL, 10 mL, Intravenous, Q12H, Fanny Ford MD, 10 mL at 10/09/23 0831    sodium chloride 0.9 % flush 10 mL, 10 mL, Intravenous, PRN, Fanny Ford MD    sodium chloride 0.9 % infusion 40 mL, 40 mL, Intravenous, PRN, Fanny Ford MD     Medications Prior to Admission   Medication Sig Dispense Refill Last Dose    aspirin 325 MG tablet Take 1 tablet by mouth Daily.   10/8/2023    carvedilol (COREG) 25 MG tablet Take 1 tablet by mouth 2 (Two) Times a Day With Meals. 60 tablet 0 10/8/2023    folic acid (FOLVITE) 1 MG tablet Take 1 tablet by mouth Daily.   10/8/2023    hydrALAZINE (APRESOLINE) 25 MG tablet Take 1 tablet by mouth 2 (Two) Times a Day. 60 tablet 0 10/8/2023    levothyroxine (SYNTHROID, LEVOTHROID) 88 MCG tablet Take 1 tablet by mouth Daily.   10/8/2023    lisinopril (PRINIVIL,ZESTRIL) 5 MG tablet Take 1 tablet by mouth Daily.   10/8/2023    montelukast (SINGULAIR) 10 MG tablet Take 1 tablet by mouth Every Night.   10/8/2023    pantoprazole (PROTONIX) 40 MG EC tablet Take 1 tablet by mouth Daily.   10/8/2023    rosuvastatin (CRESTOR) 20 MG tablet Take 1 tablet by mouth Daily. 30 tablet 0 10/8/2023    vitamin B-12 (CYANOCOBALAMIN) 1000 MCG tablet Take 1 tablet by mouth Daily.   10/8/2023    gabapentin (NEURONTIN) 800 MG tablet Take 1 tablet by mouth 2 (Two) Times a Day As Needed (nerve pain).   Unknown    HYDROcodone-acetaminophen (NORCO)  MG per tablet Take 1 tablet by mouth Every 8 (Eight) Hours As Needed for Moderate Pain.   Unknown    nitroglycerin (NITROSTAT) 0.4 MG SL tablet Place 1 tablet under the tongue Every 5 (Five) Minutes As  "Needed for Chest Pain. 30 tablet 2 Unknown          Objective     Laboratory Data :     Albumin Albumin   Date Value Ref Range Status   10/09/2023 3.7 3.5 - 5.2 g/dL Final   10/08/2023 3.4 (L) 3.5 - 5.2 g/dL Final      Magnesium Magnesium   Date Value Ref Range Status   10/09/2023 2.0 1.6 - 2.4 mg/dL Final   10/08/2023 1.9 1.6 - 2.4 mg/dL Final          PTH               No results found for: \"PTH\"    CBC and coagulation:  Results from last 7 days   Lab Units 10/09/23  0132 10/08/23  2108 10/08/23  1923 10/08/23  1709   PROCALCITONIN ng/mL  --  0.10  --   --    LACTATE mmol/L  --   --   --  0.9   CRP mg/dL  --   --  5.41*  --    WBC 10*3/mm3 7.35  --   --  7.38   HEMOGLOBIN g/dL 11.6*  --   --  11.0*   HEMATOCRIT % 37.2  --   --  36.2   MCV fL 87.9  --   --  88.9   MCHC g/dL 31.2*  --   --  30.4*   PLATELETS 10*3/mm3 233  --   --  289     Acid/base balance:  Results from last 7 days   Lab Units 10/08/23  1707   PH, ARTERIAL pH units 7.363   PO2 ART mm Hg 53.5*   PCO2, ARTERIAL mm Hg 36.5   HCO3 ART mmol/L 20.7     Renal and electrolytes:    Results from last 7 days   Lab Units 10/09/23  1135 10/09/23  0754 10/09/23  0132 10/08/23  1709   SODIUM mmol/L 136  --  139 138   POTASSIUM mmol/L 5.5* 5.9* 6.2* 5.9*   MAGNESIUM mg/dL  --   --  2.0 1.9   CHLORIDE mmol/L 104  --  105 107   CO2 mmol/L 21.2*  --  21.1* 20.8*   BUN mg/dL 29*  --  29* 27*   CREATININE mg/dL 3.76*  --  3.98* 3.61*   CALCIUM mg/dL 8.9  --  9.0 9.0     Estimated Creatinine Clearance: 16.2 mL/min (A) (by RODRIGO-G formula based on SCr of 3.76 mg/dL (H)).    Liver and pancreatic function:  Results from last 7 days   Lab Units 10/09/23  0132 10/08/23  1709   ALBUMIN g/dL 3.7 3.4*   BILIRUBIN mg/dL 0.3 0.5   ALK PHOS U/L 149* 146*   AST (SGOT) U/L 10 7   ALT (SGPT) U/L 5 <5         Cardiac:  Results from last 7 days   Lab Units 10/08/23  1709   PROBNP pg/mL 8,093.0*     Liver and pancreatic function:  Results from last 7 days   Lab Units 10/09/23  0132 " 10/08/23  1709   ALBUMIN g/dL 3.7 3.4*   BILIRUBIN mg/dL 0.3 0.5   ALK PHOS U/L 149* 146*   AST (SGOT) U/L 10 7   ALT (SGPT) U/L 5 <5       US Renal Bilateral    Result Date: 10/9/2023   SMALL AND ECHOGENIC RIGHT KIDNEY SUGGESTING A UNILATERAL PROCESS SUCH AS RENAL ARTERY STENOSIS.  NORMAL LEFT KIDNEY.  NEGATIVE FOR HYDRONEPHROSIS.    This report was finalized on 10/9/2023 5:49 AM by Imelda Walsh MD.      XR Chest 1 View    Result Date: 10/8/2023  Trace right effusion and right basilar airspace disease    This report was finalized on 10/8/2023 7:17 PM by Dr. Julio Dominguez MD.        Vital Signs   Vitals:    10/09/23 0640 10/09/23 0654 10/09/23 1056 10/09/23 1249   BP: 173/85  171/96    BP Location: Right arm  Right arm    Patient Position: Lying  Sitting    Pulse: 76 81 69    Resp: 20 20 20 22   Temp: 99.7 °F (37.6 °C)  98.8 °F (37.1 °C)    TempSrc: Oral  Oral    SpO2: 97% 95% 93% 93%   Weight:       Height:            Intake/Output                   10/09/23 0701 - 10/10/23 0700     [id number] Total              Intake    P.O.  480  -- 480    Total Intake 480 -- 480       Output    Total Output -- -- --               Physical Exam:     General Appearance:    Alert, cooperative, in no acute distress, oriented times three   Head:    Normocephalic, without obvious abnormality   Eyes:            Conjunctivae and sclerae normal, no icterus, no pallor, pupils CCERLA   Ears:    Ears appear intact    Throat:      Neck:   No adenopathy,no thyromegaly, no carotid bruit, no JVD   Back:       Lungs:    B/ wheezes    Heart:    Regular rhythm and normal rate, normal S1 and S2, no            murmur, no gallop, no rub, no click   Chest Wall:    No abnormalities observed   Abdomen:     Normal bowel sounds, no masses, no organomegaly, soft        non-tender, non-distended, no guarding, no rebound                tenderness   Rectal:     Deferred   Extremities:   Moves all extremities well, plus 2 edema, no cyanosis, no              redness   Pulses:      Skin:   No petechiae, no nodules, bruising or rash   Lymph nodes:      Neurologic:   Cranial nerves grossly intact, sensation normal, moves all extremities.     Results Review:   I reviewed the patient's new clinical results.  I personally viewed and interpreted the patient's EKG/Telemetry data      Assessment and Plan;    -Acute kidney injury  -Atrophic right kidney  -Chronic kidney disease stage IIIa  -Acute hyperkalemia  -Bilateral edema  -Acute hypoxic respite failure  -COPD  -Hypertension  -Medical noncompliance    10/09/23  Patient baseline creatinine is unknown we will get the records came in with a creatinine of 3.69 is down to 3.6 patient is short of breath we will give 1 dose of Lasix we will check urine protein creatinine ratio, ultrasound of the kidneys showed atrophic right kidney    Patient's bilateral knee pain is most likely multifactorial secondary to diastolic congestive heart failure with pulmonary hypertension also will give 1 dose of Lasix today    Hyperkalemia most likely secondary to lisinopril and decreased GFR we will hold lisinopril and start the patient on Lokelma    Prognosis guarded    Please avoid nephrotoxic medication and pharmacy to adjust the medication according to the GFR    Plan of care discussed with pt, answered all questions, patient verbalized understanding and agreed.    MERNA Steinberg MD  10/09/23  13:21 EDT

## 2023-10-09 NOTE — PLAN OF CARE
Goal Outcome Evaluation:   Patient had non eventful day. Patient on 3L NC maintaining above 90%. No complaints of pain this shift. Family at bedside attentive to care. No other complaints this shift. Plan of care ongoing.

## 2023-10-10 ENCOUNTER — APPOINTMENT (OUTPATIENT)
Dept: CT IMAGING | Facility: HOSPITAL | Age: 68
DRG: 291 | End: 2023-10-10
Payer: MEDICARE

## 2023-10-10 ENCOUNTER — APPOINTMENT (OUTPATIENT)
Dept: GENERAL RADIOLOGY | Facility: HOSPITAL | Age: 68
DRG: 291 | End: 2023-10-10
Payer: MEDICARE

## 2023-10-10 PROBLEM — N17.9 ACUTE RENAL FAILURE (ARF): Status: ACTIVE | Noted: 2023-10-10

## 2023-10-10 LAB
ALBUMIN SERPL-MCNC: 3.2 G/DL (ref 3.5–5.2)
ALBUMIN/GLOB SERPL: 0.9 G/DL
ALP SERPL-CCNC: 132 U/L (ref 39–117)
ALT SERPL W P-5'-P-CCNC: <5 U/L (ref 1–33)
ANION GAP SERPL CALCULATED.3IONS-SCNC: 10.6 MMOL/L (ref 5–15)
ANION GAP SERPL CALCULATED.3IONS-SCNC: 11.2 MMOL/L (ref 5–15)
AST SERPL-CCNC: 13 U/L (ref 1–32)
BILIRUB SERPL-MCNC: 0.3 MG/DL (ref 0–1.2)
BILIRUB UR QL STRIP: NEGATIVE
BUN SERPL-MCNC: 30 MG/DL (ref 8–23)
BUN SERPL-MCNC: 33 MG/DL (ref 8–23)
BUN/CREAT SERPL: 7.4 (ref 7–25)
BUN/CREAT SERPL: 8.1 (ref 7–25)
CALCIUM SPEC-SCNC: 8.7 MG/DL (ref 8.6–10.5)
CALCIUM SPEC-SCNC: 9.1 MG/DL (ref 8.6–10.5)
CHLORIDE SERPL-SCNC: 103 MMOL/L (ref 98–107)
CHLORIDE SERPL-SCNC: 105 MMOL/L (ref 98–107)
CK SERPL-CCNC: 217 U/L (ref 20–180)
CLARITY UR: ABNORMAL
CO2 SERPL-SCNC: 20.8 MMOL/L (ref 22–29)
CO2 SERPL-SCNC: 22.4 MMOL/L (ref 22–29)
COLOR UR: YELLOW
CREAT SERPL-MCNC: 4.06 MG/DL (ref 0.57–1)
CREAT SERPL-MCNC: 4.07 MG/DL (ref 0.57–1)
EGFRCR SERPLBLD CKD-EPI 2021: 11.6 ML/MIN/1.73
EGFRCR SERPLBLD CKD-EPI 2021: 11.7 ML/MIN/1.73
GEN 5 2HR TROPONIN T REFLEX: 25 NG/L
GLOBULIN UR ELPH-MCNC: 3.5 GM/DL
GLUCOSE BLDC GLUCOMTR-MCNC: 101 MG/DL (ref 70–130)
GLUCOSE BLDC GLUCOMTR-MCNC: 102 MG/DL (ref 70–130)
GLUCOSE BLDC GLUCOMTR-MCNC: 103 MG/DL (ref 70–130)
GLUCOSE BLDC GLUCOMTR-MCNC: 126 MG/DL (ref 70–130)
GLUCOSE BLDC GLUCOMTR-MCNC: 164 MG/DL (ref 70–130)
GLUCOSE SERPL-MCNC: 109 MG/DL (ref 65–99)
GLUCOSE SERPL-MCNC: 128 MG/DL (ref 65–99)
GLUCOSE UR STRIP-MCNC: NEGATIVE MG/DL
HGB UR QL STRIP.AUTO: NEGATIVE
KETONES UR QL STRIP: NEGATIVE
LEUKOCYTE ESTERASE UR QL STRIP.AUTO: NEGATIVE
NITRITE UR QL STRIP: NEGATIVE
PH UR STRIP.AUTO: <=5 [PH] (ref 5–8)
POTASSIUM SERPL-SCNC: 5.6 MMOL/L (ref 3.5–5.2)
POTASSIUM SERPL-SCNC: 5.6 MMOL/L (ref 3.5–5.2)
PROT SERPL-MCNC: 6.7 G/DL (ref 6–8.5)
PROT UR QL STRIP: ABNORMAL
QT INTERVAL: 352 MS
QTC INTERVAL: 437 MS
SODIUM SERPL-SCNC: 136 MMOL/L (ref 136–145)
SODIUM SERPL-SCNC: 137 MMOL/L (ref 136–145)
SP GR UR STRIP: 1.01 (ref 1–1.03)
TROPONIN T DELTA: 2 NG/L
UROBILINOGEN UR QL STRIP: ABNORMAL

## 2023-10-10 PROCEDURE — 25010000002 HEPARIN (PORCINE) PER 1000 UNITS: Performed by: INTERNAL MEDICINE

## 2023-10-10 PROCEDURE — 94799 UNLISTED PULMONARY SVC/PX: CPT

## 2023-10-10 PROCEDURE — 81003 URINALYSIS AUTO W/O SCOPE: CPT | Performed by: INTERNAL MEDICINE

## 2023-10-10 PROCEDURE — 86235 NUCLEAR ANTIGEN ANTIBODY: CPT | Performed by: INTERNAL MEDICINE

## 2023-10-10 PROCEDURE — 82948 REAGENT STRIP/BLOOD GLUCOSE: CPT

## 2023-10-10 PROCEDURE — 71250 CT THORAX DX C-: CPT | Performed by: RADIOLOGY

## 2023-10-10 PROCEDURE — 63710000001 PREDNISONE PER 1 MG: Performed by: STUDENT IN AN ORGANIZED HEALTH CARE EDUCATION/TRAINING PROGRAM

## 2023-10-10 PROCEDURE — 25010000002 AZITHROMYCIN PER 500 MG: Performed by: STUDENT IN AN ORGANIZED HEALTH CARE EDUCATION/TRAINING PROGRAM

## 2023-10-10 PROCEDURE — 25810000003 SODIUM CHLORIDE 0.9 % SOLUTION 250 ML FLEX CONT: Performed by: STUDENT IN AN ORGANIZED HEALTH CARE EDUCATION/TRAINING PROGRAM

## 2023-10-10 PROCEDURE — 80048 BASIC METABOLIC PNL TOTAL CA: CPT | Performed by: INTERNAL MEDICINE

## 2023-10-10 PROCEDURE — 25010000002 ONDANSETRON PER 1 MG

## 2023-10-10 PROCEDURE — 71045 X-RAY EXAM CHEST 1 VIEW: CPT | Performed by: RADIOLOGY

## 2023-10-10 PROCEDURE — 94664 DEMO&/EVAL PT USE INHALER: CPT

## 2023-10-10 PROCEDURE — 71045 X-RAY EXAM CHEST 1 VIEW: CPT

## 2023-10-10 PROCEDURE — 94761 N-INVAS EAR/PLS OXIMETRY MLT: CPT

## 2023-10-10 PROCEDURE — 71250 CT THORAX DX C-: CPT

## 2023-10-10 PROCEDURE — 99232 SBSQ HOSP IP/OBS MODERATE 35: CPT | Performed by: STUDENT IN AN ORGANIZED HEALTH CARE EDUCATION/TRAINING PROGRAM

## 2023-10-10 PROCEDURE — P9047 ALBUMIN (HUMAN), 25%, 50ML: HCPCS | Performed by: INTERNAL MEDICINE

## 2023-10-10 PROCEDURE — 25010000002 ALBUMIN HUMAN 25% PER 50 ML: Performed by: INTERNAL MEDICINE

## 2023-10-10 RX ORDER — ALBUMIN (HUMAN) 12.5 G/50ML
25 SOLUTION INTRAVENOUS ONCE
Status: COMPLETED | OUTPATIENT
Start: 2023-10-10 | End: 2023-10-10

## 2023-10-10 RX ORDER — HYDRALAZINE HYDROCHLORIDE 25 MG/1
25 TABLET, FILM COATED ORAL EVERY 8 HOURS SCHEDULED
Status: DISCONTINUED | OUTPATIENT
Start: 2023-10-10 | End: 2023-10-14

## 2023-10-10 RX ADMIN — GABAPENTIN 300 MG: 300 CAPSULE ORAL at 04:15

## 2023-10-10 RX ADMIN — IPRATROPIUM BROMIDE AND ALBUTEROL SULFATE 3 ML: 2.5; .5 SOLUTION RESPIRATORY (INHALATION) at 06:43

## 2023-10-10 RX ADMIN — SODIUM ZIRCONIUM CYCLOSILICATE 10 G: 10 POWDER, FOR SUSPENSION ORAL at 16:07

## 2023-10-10 RX ADMIN — HEPARIN SODIUM 5000 UNITS: 5000 INJECTION INTRAVENOUS; SUBCUTANEOUS at 08:28

## 2023-10-10 RX ADMIN — IPRATROPIUM BROMIDE AND ALBUTEROL SULFATE 3 ML: 2.5; .5 SOLUTION RESPIRATORY (INHALATION) at 01:45

## 2023-10-10 RX ADMIN — DOCUSATE SODIUM 50 MG AND SENNOSIDES 8.6 MG 2 TABLET: 8.6; 5 TABLET, FILM COATED ORAL at 08:27

## 2023-10-10 RX ADMIN — CARVEDILOL 25 MG: 25 TABLET, FILM COATED ORAL at 08:27

## 2023-10-10 RX ADMIN — ROSUVASTATIN CALCIUM 10 MG: 10 TABLET, FILM COATED ORAL at 21:39

## 2023-10-10 RX ADMIN — NICOTINE TRANSDERMAL SYSTEM 1 PATCH: 21 PATCH, EXTENDED RELEASE TRANSDERMAL at 08:34

## 2023-10-10 RX ADMIN — Medication 10 ML: at 21:40

## 2023-10-10 RX ADMIN — ALBUMIN HUMAN 25 G: 0.25 SOLUTION INTRAVENOUS at 10:07

## 2023-10-10 RX ADMIN — LEVOTHYROXINE SODIUM 88 MCG: 88 TABLET ORAL at 05:10

## 2023-10-10 RX ADMIN — PREDNISONE 40 MG: 20 TABLET ORAL at 08:28

## 2023-10-10 RX ADMIN — PANTOPRAZOLE SODIUM 40 MG: 40 TABLET, DELAYED RELEASE ORAL at 08:28

## 2023-10-10 RX ADMIN — HYDROXYZINE HYDROCHLORIDE 25 MG: 25 TABLET, FILM COATED ORAL at 21:40

## 2023-10-10 RX ADMIN — SODIUM ZIRCONIUM CYCLOSILICATE 10 G: 10 POWDER, FOR SUSPENSION ORAL at 21:39

## 2023-10-10 RX ADMIN — ACETAMINOPHEN 650 MG: 325 TABLET ORAL at 08:27

## 2023-10-10 RX ADMIN — HYDROXYZINE HYDROCHLORIDE 25 MG: 25 TABLET, FILM COATED ORAL at 04:15

## 2023-10-10 RX ADMIN — Medication 1000 MCG: at 08:29

## 2023-10-10 RX ADMIN — HYDRALAZINE HYDROCHLORIDE 25 MG: 25 TABLET, FILM COATED ORAL at 15:23

## 2023-10-10 RX ADMIN — ONDANSETRON 4 MG: 2 INJECTION INTRAMUSCULAR; INTRAVENOUS at 04:11

## 2023-10-10 RX ADMIN — ASPIRIN 325 MG: 325 TABLET, COATED ORAL at 08:27

## 2023-10-10 RX ADMIN — Medication 10 ML: at 08:43

## 2023-10-10 RX ADMIN — SODIUM ZIRCONIUM CYCLOSILICATE 10 G: 10 POWDER, FOR SUSPENSION ORAL at 08:28

## 2023-10-10 RX ADMIN — AZITHROMYCIN DIHYDRATE 500 MG: 500 INJECTION, POWDER, LYOPHILIZED, FOR SOLUTION INTRAVENOUS at 08:29

## 2023-10-10 RX ADMIN — ACETAMINOPHEN 650 MG: 325 TABLET ORAL at 15:23

## 2023-10-10 RX ADMIN — Medication 1 MG: at 08:27

## 2023-10-10 RX ADMIN — MONTELUKAST SODIUM 10 MG: 10 TABLET, COATED ORAL at 21:39

## 2023-10-10 RX ADMIN — HYDRALAZINE HYDROCHLORIDE 25 MG: 25 TABLET, FILM COATED ORAL at 21:39

## 2023-10-10 RX ADMIN — DOCUSATE SODIUM 50 MG AND SENNOSIDES 8.6 MG 2 TABLET: 8.6; 5 TABLET, FILM COATED ORAL at 21:39

## 2023-10-10 RX ADMIN — HEPARIN SODIUM 5000 UNITS: 5000 INJECTION INTRAVENOUS; SUBCUTANEOUS at 21:39

## 2023-10-10 RX ADMIN — CARVEDILOL 25 MG: 25 TABLET, FILM COATED ORAL at 17:43

## 2023-10-10 RX ADMIN — IPRATROPIUM BROMIDE AND ALBUTEROL SULFATE 3 ML: 2.5; .5 SOLUTION RESPIRATORY (INHALATION) at 12:08

## 2023-10-10 RX ADMIN — GABAPENTIN 300 MG: 300 CAPSULE ORAL at 19:01

## 2023-10-10 NOTE — PAYOR COMM NOTE
"CONTACT: PARRIS OWENS RN  UTILIZATION MANAGEMENT DEPT.  [facility]  [address] [place] [address]  PHONE: [phone]  FAX: [phone]      INPATIENT AUTH REQUEST    PT HAS BEEN CONVERTED FROM OBSERVATION TO INPATIENT ON 10/10/23    REF#[id number]       Matthew Michel (67 y.o. Female)       Date of Birth   1955    Social Security Number       Address   [address] [place] [address]    Home Phone   [phone]    MRN   [id number]       Protestant   [facility]    Marital Status                               Admission Date   10/10/23    Admission Type   Emergency    Admitting Provider   Fanny Ford MD    Attending Provider   Chris Hanks MD    Department, Room/Bed   [address], 3313/2S       Discharge Date       Discharge Disposition       Discharge Destination                                 Attending Provider: Chris Hanks MD    Allergies: No Known Allergies    Isolation: None   Infection: None   Code Status: CPR    Ht: 152.4 cm (60\")   Wt: 105 kg (230 lb 12.8 oz)    Admission Cmt: None   Principal Problem: Acute hypoxic respiratory failure [J96.01]                   Active Insurance as of 10/8/2023       Primary Coverage       Payor Plan Insurance Group Employer/Plan Group    [facility]IC[facility]        Payor Plan Address Payor Plan Phone Number Payor Plan Fax Number Effective Dates    [address] [address] [phone]  1/1/2023 - None Entered    [facility] [id number]         Subscriber Name Subscriber Birth Date Member ID       MATTHEW MICHEL 1955 [id number]                     Emergency Contacts        (Rel.) Home Phone Work Phone Mobile Phone    LOYD MICHEL (Son) [phone] -- --                 History & Physical        Marie Pardo APRN at 10/08/23 1956       Attestation signed by Fanny Ford MD at 10/09/23 3332    I have reviewed this documentation " and agree.  I have formulated and discussed the assessment and plan with WAQAS Salazar.  I have also reviewed the patient's past medical history, vital signs, labs, imaging, and EKGs.  Please note that the EKGs dated 10/6/2023, 2/10/2022, 5/15/2018, all look similar to the current EKG.  We await the nephrology consultation.                    [facility] Medicine Services  History & Physical    Patient Identification:  Name:  Lesley Michel  Age:  67 y.o.  Sex:  female  :  1955  MRN:  [id number]   Visit Number:  [id number]  Admit Date: 10/8/2023   Primary Care Physician:  Woodrow Raymond APRN    Subjective     Chief complaint: Shortness of Breath, Edema    History of presenting illness:      Lesley Michel is a 67 y.o. female with past medical history significant for CAD status post stenting, hyperlipidemia, essential hypertension, COPD, tobacco abuse, CKD, hypothyroidism, arthritis, anxiety, history of migraines, obesity by BMI, and history of medical noncompliance.  Patient presents to [facility] emergency department for further evaluation of increasing lower extremity edema and shortness of breath which has been progressively worsening over the past few days.  Reportedly visited her cardiologist 2 days ago.  Per office visit note, patient has been noncompliant with all of her medications.  States that she cannot take diuretics due to chronic kidney disease, although it does not appear that she has followed up with nephrologist recently.  Cardiology note documentation included mild CKD.  Creatinine on arrival 3.6. Unknown recent baseline. However, per review of labs from 2020, creatinine was 1.08 at that time. Patient also has elevated proBNP of 8000. High-sensitivity troponin T indeterminately elevated without significant delta. EKG obtained without evidence of acute arrhythmia or acute ST elevations or depressions.  No evidence of acute ischemia.  ABG was obtained on  "room air and noted hypoxemia with PO2 of 53.5.  Patient was placed on 2 L nasal cannula and is now stable.  Potassium was elevated at 5.9. Received treatment for elevated potassium in the ED.Patient currently denies any chest pain, palpitations, dizziness, nausea, vomiting, abdominal pain, dysuria, or difficulty urinating. She also denies recent fever, chills, or sputum production. Reports chronic dry cough. She states that she continues to smoke cigarettes and has requested a nicotine patch. Denies illicit substance abuse or alcohol abuse. She reported compliance with home medications during my exam; asked what kinds of medicines she takes and she states \"I don't know. All I know is if the doctor prescribes them, I have been taking them\". She is alert and oriented x4. Calm, cooperative, pleasant. Does not wear supplemental O2 at home; currently requiring 2L. Discussed plan with patient. She voiced agreement and understanding with no further questions, concerns, or needs at this time.     Upon arrival to the ED, vital signs were temperature 98.1, pulse 68, respirations 16, blood pressure 156/78 sitting, SPO2 saturation 88% on room air.  ABG with pH 7.363, PCO2 36.5, PO2 53.5, O2 saturation 88.4% on room air.  High-sensitivity troponin T 23 with +3 delta.  proBNP 8093.  CMP with potassium 5.9, CO2 20.8, BUN 27, creatinine 3.61, EGFR 13.4, glucose 102, alkaline phosphatase 146, albumin 3.4, otherwise unremarkable.  Lactate 0.9.  CBC with hemoglobin 11.0, RDW 16.5, MCHC 30.4, otherwise unremarkable.  Peripheral blood cultures x2 pending. Chest x-ray noted trace right effusion and right basilar airspace disease.    Known Emergency Department medications received prior to my evaluation included 1000 mg IV calcium gluconate, D50, 10 units of IV regular insulin, 50 mEq sodium bicarb, 10 mg packet of Lokelma. Emergency Department Room location at the time of my evaluation was 102.  Discussed with admitting physician,  " Fanny Ford MD.    ---------------------------------------------------------------------------------------------------------------------   Review of Systems   Constitutional:  Positive for fatigue. Negative for chills and fever.   HENT:  Negative for congestion, sore throat and trouble swallowing.    Respiratory:  Positive for cough (nonproductive) and shortness of breath. Negative for wheezing.    Cardiovascular:  Positive for leg swelling. Negative for chest pain and palpitations.   Gastrointestinal:  Negative for abdominal pain, constipation, diarrhea, nausea and vomiting.   Genitourinary:  Negative for difficulty urinating, flank pain, frequency and urgency.   Musculoskeletal:  Negative for back pain, gait problem, neck pain and neck stiffness.   Neurological:  Negative for dizziness, tremors, seizures, syncope, facial asymmetry, speech difficulty, weakness, light-headedness, numbness and headaches.     ---------------------------------------------------------------------------------------------------------------------   Past Medical History:   Diagnosis Date    Anxiety     Arthritis     Coronary artery disease     H/O heart artery stent     Hyperlipidemia     Hypertension     Obesity      Past Surgical History:   Procedure Laterality Date    ANKLE SURGERY      RIGHT    APPENDECTOMY      CARDIAC CATHETERIZATION      LEFT    CORONARY STENT PLACEMENT      HYSTERECTOMY       Family History   Problem Relation Age of Onset    Heart disease Mother     Heart attack Mother 73    Fibromyalgia Mother     Heart attack Father 72    Ovarian cancer Sister     Coronary artery disease Other     Breast cancer Neg Hx      Social History     Socioeconomic History    Marital status:    Tobacco Use    Smoking status: Every Day     Packs/day: .5     Types: Electronic Cigarette, Cigarettes    Smokeless tobacco: Never   Substance and Sexual Activity    Alcohol use: No    Drug use: No    Sexual activity: Never      ---------------------------------------------------------------------------------------------------------------------   Allergies:  Patient has no known allergies.  ---------------------------------------------------------------------------------------------------------------------   Home medications:    Medications below are reported home medications pulling from within the system; at this time, these medications have not been reconciled unless otherwise specified and are in the verification process for further verifcation as current home medications.  (Not in a hospital admission)      Hospital Scheduled Meds:          Current listed hospital scheduled medications may not yet reflect those currently placed in orders that are signed and held awaiting patient's arrival to floor.   ---------------------------------------------------------------------------------------------------------------------     Objective     Vital Signs:  Temp:  [98.1 °F (36.7 °C)] 98.1 °F (36.7 °C)  Heart Rate:  [58-79] 74  Resp:  [16] 16  BP: (115-156)/(70-90) 154/87      10/08/23  1633   Weight: 102 kg (225 lb)     Body mass index is 43.94 kg/m².  ---------------------------------------------------------------------------------------------------------------------       Physical Exam  Vitals reviewed.   Constitutional:       General: She is awake. She is not in acute distress.     Appearance: She is obese. She is not diaphoretic.      Interventions: Nasal cannula in place.      Comments: 2L nasal cannula.    HENT:      Head: Normocephalic and atraumatic.      Mouth/Throat:      Mouth: Mucous membranes are moist.      Pharynx: Oropharynx is clear.   Eyes:      Extraocular Movements: Extraocular movements intact.      Pupils: Pupils are equal, round, and reactive to light.   Cardiovascular:      Rate and Rhythm: Normal rate and regular rhythm.      Pulses: Normal pulses.           Dorsalis pedis pulses are 1+ on the right side and 1+ on  the left side.      Heart sounds: Normal heart sounds. No murmur heard.     No friction rub.   Pulmonary:      Effort: Pulmonary effort is normal. No accessory muscle usage, respiratory distress or retractions.      Breath sounds: Decreased breath sounds and wheezing present. No rhonchi or rales.      Comments: Mild, end expiratory wheezing present and bilateral upper lobes.  Abdominal:      General: Bowel sounds are normal. There is no distension.      Palpations: Abdomen is soft.      Tenderness: There is no abdominal tenderness. There is no guarding.   Musculoskeletal:      Cervical back: Neck supple. No rigidity.      Right lower le+ Pitting Edema present.      Left lower le+ Pitting Edema present.   Skin:     General: Skin is warm and dry.      Capillary Refill: Capillary refill takes 2 to 3 seconds.      Coloration: Skin is pale.   Neurological:      Mental Status: She is alert and oriented to person, place, and time. Mental status is at baseline.      Cranial Nerves: No dysarthria or facial asymmetry.      Sensory: Sensation is intact.      Motor: No weakness or tremor.   Psychiatric:         Attention and Perception: Attention normal.         Mood and Affect: Mood normal.         Speech: Speech normal.         Behavior: Behavior normal. Behavior is cooperative.         Thought Content: Thought content normal.         Cognition and Memory: Cognition normal.         Judgment: Judgment normal.       ---------------------------------------------------------------------------------------------------------------------  EKG:  Pending formal cardiology interpretation. Per my review, appears normal sinus rhythm without evidence of acute ischemia.     Telemetry:  Appearing normal sinus rhythm 60-70s on my exam.   I have personally looked at both the EKG and the telemetry strips.  ---------------------------------------------------------------------------------------------------------------------   Results from  "last 7 days   Lab Units 10/08/23  1709   LACTATE mmol/L 0.9   WBC 10*3/mm3 7.38   HEMOGLOBIN g/dL 11.0*   HEMATOCRIT % 36.2   MCV fL 88.9   MCHC g/dL 30.4*   PLATELETS 10*3/mm3 289     Results from last 7 days   Lab Units 10/08/23  1707   PH, ARTERIAL pH units 7.363   PO2 ART mm Hg 53.5*   PCO2, ARTERIAL mm Hg 36.5   HCO3 ART mmol/L 20.7     Results from last 7 days   Lab Units 10/08/23  1709   SODIUM mmol/L 138   POTASSIUM mmol/L 5.9*   MAGNESIUM mg/dL 1.9   CHLORIDE mmol/L 107   CO2 mmol/L 20.8*   BUN mg/dL 27*   CREATININE mg/dL 3.61*   CALCIUM mg/dL 9.0   GLUCOSE mg/dL 102*   ALBUMIN g/dL 3.4*   BILIRUBIN mg/dL 0.5   ALK PHOS U/L 146*   AST (SGOT) U/L 7   ALT (SGPT) U/L <5   Estimated Creatinine Clearance: 16.7 mL/min (A) (by C-G formula based on SCr of 3.61 mg/dL (H)).  No results found for: \"AMMONIA\"  Results from last 7 days   Lab Units 10/08/23  1923 10/08/23  1709   HSTROP T ng/L 26* 23*     Results from last 7 days   Lab Units 10/08/23  1709   PROBNP pg/mL 8,093.0*     No results found for: \"HGBA1C\"  No results found for: \"TSH\", \"FREET4\"  No results found for: \"PREGTESTUR\", \"PREGSERUM\", \"HCG\", \"HCGQUANT\"  Pain Management Panel           No data to display                  ---------------------------------------------------------------------------------------------------------------------  Imaging Results (Last 7 Days)       Procedure Component Value Units Date/Time    XR Chest 1 View [[id number]] Collected: 10/08/23 1917     Updated: 10/08/23 1919    Narrative:      XR CHEST 1 VW-     CLINICAL INDICATION: sob        COMPARISON: None immediately available      TECHNIQUE: Single frontal view of the chest.     FINDINGS:     LUNGS: Trace right effusion and right basilar airspace disease     HEART AND MEDIASTINUM: Heart and mediastinal contours are unremarkable        SKELETON: Bony and soft tissue structures are unremarkable.             Impression:      Trace right effusion and right basilar airspace disease   "         This report was finalized on 10/8/2023 7:17 PM by Dr. Julio Dominguez MD.               Last echocardiogram:  Results for orders placed during the hospital encounter of 05/23/18    Adult Transthoracic Echo Complete W/ Cont if Necessary Per Protocol    Interpretation Summary  · Normal left ventricular cavity size and wall thickness noted.  · Left ventricular systolic function is normal.Estimated EF appears to be in the range of 61 - 65%.  · Left ventricular diastolic dysfunction (grade I) consistent with impaired relaxation.  · Calcification in the right ventricular cavity noted which is probably calcified moderator band .  · Mild MAC is present with trace mitral valve regurgitation .  · Trace tricuspid valve regurgitation is noted with mild pulmonary hypertension . PRVSP is 44 mmHg.  · No significant valvular heart disease  · Small anterior echo-free space is noted which may represent fat pad V/S trivial pericardial effusion. It is located anteriorly and it is hemodynamically insignificant          I have personally reviewed the above radiology images and read the final radiology report on 10/08/23  ---------------------------------------------------------------------------------------------------------------------  Assessment / Plan     Active Hospital Problems    Diagnosis  POA    **Acute hypoxic respiratory failure [J96.01]  Yes       ASSESSMENT/PLAN:  -Acute on chronic HFpEF, present on admission  -Elevated proBNP and evidence of trace right pleural effusion on arrival  -Acute hypoxemic respiratory failure due to above  -COPD without acute exacerbation  -Ongoing tobacco abuse  -Acute kidney injury on CKD, present on admission  -Acute hyperkalemia, present on admission  -Indeterminately elevated troponin without significant delta, present on admission, likely due to poor renal function and hypoxia  -Mild normocytic anemia, unknown chronicity however suspect chronic due to CKD  -History of CAD status post  stenting  -Hyperlipidemia  -Essential hypertension  -67 y.o. female presents to [facility] ED with CC of SOA and increasing lower extremity edema over the last several days. Recently evaluated in outpatient Cardiology office on 10/6/2023. Noted history of medical noncompliance. Not on diuretics due to CKD. Formerly followed with Dr. Sherwood (Nephrology) in the outpatient setting although has not followed up recently.   -proBNP 8000 on arrival. Chest x-ray noted trace right effusion and right basilar airspace disease.  -No leukocytosis.  Pro-Binh negative.  CRP mildly elevated at 5.41.  Patient denies any fever, chills, sputum production.  Do not currently suspect right basilar pneumonia.  Encourage incentive spirometry use and turn, cough, and deep breathing exercises.  -Closely monitor off antibiotics for now.  -Repeat CBC with differential in the a.m.  -Encourage smoking cessation.  -NRT made available.  -Received 80 mg IV Lasix in the ED.  -Monitor response with strict I's and O's and daily weights.  -Currently stable on 2 L. Room air is baseline.  Titrate supplemental O2 to maintain SPO2 saturations greater than 90%.  -Transthoracic echocardiogram from 2019 noted normal EF with grade 1 diastolic dysfunction.  -Obtain updated transthoracic echocardiogram.  -Baseline creatinine unknown with creatinine on admission of 3.6.  -Request records from outpatient Nephrology clinic (Dr. Sherwood).   -Per review of most recent available labs in June 2020; creatinine 1.08 and GFR was between 56 and 63.  -Monitor urine output and renal function closely.  -Avoid nephrotoxins as able. Avoid hypotension.   -Repeat chemistry panel in AM.   -BP appearing controlled/stable at this time. Continue to monitor VS per hospital policy. Review home antihypertensive agents once reconciled by pharmacy with plans to continue except for any that may contribute to renal failure. Holding parameters to prevent hypotension and/or bradycardia.   -High  sensitivity Troponin T 23 with +3 delta. EKG without evidence of acute ischemic changes. Patient denies chest pain on exam. Troponin elevation likely secondary to MAICO and hypoxia.   -Potassium was 5.9 on arrival. Patient received hyperkalemia treatment in the ED.  Repeat chemistry panel pending.  -Continuous cardiac monitoring and pulse oximetry ordered.  -Plan to continue aspirin, statin.   -Obtain HgbA1c and lipid profile.     -Hypoglycemia without known hx of diabetes  -BG 49 on arrival to the floor. Patient was alert&oriented, asymptomatic at the time. Ate a turkey sandwich and peanut butter/jeremy crackers. Repeat BG was 91. Accu checks ordered Q3 hours for now. Obtain HgbA1c. Hypoglycemia protocol in place as necessary. Hypoglycemia likely result of IV insulin that patient received in the ED as part of hyperkalemia treatment.     -Physical debility; PT consult. Maintain fall precautions.  Provide assistance.  -Arthritis; supportive care. Tylenol as needed for mild pain.  -Hypothyroidism; obtain TSH and free T4.  Plan to resume home levothyroxine pending laboratory results.  -History of migraines without aura  -Morbid obesity by BMI; affecting all aspects of care.  Encourage lifestyle modifications.  -History of medical noncompliance; encourage compliance.    ----------  -DVT prophylaxis: Subcu heparin.  -Activity: As tolerated. Fall precautions.   -Expected length of stay:   INPATIENT status due to the need for care which can only be reasonably provided in an hospital setting such as aggressive/expedited ancillary services and/or consultation services, the necessity for IV medications, close physician monitoring and/or the possible need for procedures.  In such, I feel patient's risk for adverse outcomes and need for care warrant INPATIENT evaluation and predict the patient's care encounter to likely last beyond 2 midnights.   -Disposition pending clinical course.    High risk secondary to acute on chronic  "HFpEF with elevated proBNP and trace right pleural effusion, MAICO on CKD, acute hyperkalemia, and acute hypoxemic respiratory failure.      CODE STATUS: FULL CODE      DEEPA Gaxiola   10/08/23  20:07 EDT  Pager #[phone]  ---------------------------------------------------------------------------------------------------------------------        Electronically signed by Fanny Ford MD at 10/09/23 2077          Emergency Department Notes        Maria Isabel Lim MD at 10/08/23 1640          Subjective   History of Present Illness  67-year-old white female here today for leg swelling and shortness of breath.  Patient has a history of CAD, CHF, COPD.  She complained of increased lower extremity edema over the past few days.  Her visit to the cardiologist 2 days ago noted that she had was not compliant with all of her medications.  She says that she cannot take diuretics because of kidney disease.  Cardiology note showed mild CKD.  She denies any new chest pain.  She does have nonproductive cough.  She says that yesterday she ate a half a bag of chips.  She also complains of feeling \"confused\" intermittently in the last few days.        Review of Systems   All other systems reviewed and are negative.      Past Medical History:   Diagnosis Date    Anxiety     Arthritis     Coronary artery disease     H/O heart artery stent     Hyperlipidemia     Hypertension     Obesity        No Known Allergies    Past Surgical History:   Procedure Laterality Date    ANKLE SURGERY      RIGHT    APPENDECTOMY      CARDIAC CATHETERIZATION      LEFT    CORONARY STENT PLACEMENT      HYSTERECTOMY         Family History   Problem Relation Age of Onset    Heart disease Mother     Heart attack Mother 73    Fibromyalgia Mother     Heart attack Father 72    Ovarian cancer Sister     Coronary artery disease Other     Breast cancer Neg Hx        Social History     Socioeconomic History    Marital status:    Tobacco Use    Smoking " status: Every Day     Packs/day: 0.50     Years: 30.00     Additional pack years: 0.00     Total pack years: 15.00     Types: Electronic Cigarette, Cigarettes    Smokeless tobacco: Never   Vaping Use    Vaping Use: Never used   Substance and Sexual Activity    Alcohol use: No    Drug use: No    Sexual activity: Never           Objective   Physical Exam  Vitals and nursing note reviewed. Exam conducted with a chaperone present.   Constitutional:       Appearance: Normal appearance. She is well-developed. She is obese.   HENT:      Head: Normocephalic and atraumatic.   Neck:      Vascular: No JVD.   Cardiovascular:      Rate and Rhythm: Normal rate and regular rhythm.      Heart sounds: Normal heart sounds. No murmur heard.     No friction rub. No gallop.   Pulmonary:      Effort: No respiratory distress.      Breath sounds: Examination of the right-lower field reveals rales. Examination of the left-lower field reveals rales. Rales present. No decreased breath sounds, wheezing or rhonchi.   Abdominal:      General: Abdomen is flat. Bowel sounds are normal.      Palpations: Abdomen is soft.   Musculoskeletal:         General: Normal range of motion.      Right lower leg: 3+      Left lower leg: 3+   Skin:     General: Skin is warm and dry.   Neurological:      General: No focal deficit present.      Mental Status: She is alert and oriented to person, place, and time.   Psychiatric:         Mood and Affect: Mood normal.         Behavior: Behavior normal.         Procedures  Results for orders placed or performed during the hospital encounter of 10/08/23   Comprehensive Metabolic Panel    Specimen: Blood   Result Value Ref Range    Glucose 102 (H) 65 - 99 mg/dL    BUN 27 (H) 8 - 23 mg/dL    Creatinine 3.61 (H) 0.57 - 1.00 mg/dL    Sodium 138 136 - 145 mmol/L    Potassium 5.9 (H) 3.5 - 5.2 mmol/L    Chloride 107 98 - 107 mmol/L    CO2 20.8 (L) 22.0 - 29.0 mmol/L    Calcium 9.0 8.6 - 10.5 mg/dL    Total Protein 6.6 6.0 -  8.5 g/dL    Albumin 3.4 (L) 3.5 - 5.2 g/dL    ALT (SGPT) <5 1 - 33 U/L    AST (SGOT) 7 1 - 32 U/L    Alkaline Phosphatase 146 (H) 39 - 117 U/L    Total Bilirubin 0.5 0.0 - 1.2 mg/dL    Globulin 3.2 gm/dL    A/G Ratio 1.1 g/dL    BUN/Creatinine Ratio 7.5 7.0 - 25.0    Anion Gap 10.2 5.0 - 15.0 mmol/L    eGFR 13.4 (L) >60.0 mL/min/1.73   BNP    Specimen: Blood   Result Value Ref Range    proBNP 8,093.0 (H) 0.0 - 900.0 pg/mL   High Sensitivity Troponin T    Specimen: Blood   Result Value Ref Range    HS Troponin T 23 (H) <10 ng/L   Lactic Acid, Plasma    Specimen: Blood   Result Value Ref Range    Lactate 0.9 0.5 - 2.0 mmol/L   Magnesium    Specimen: Blood   Result Value Ref Range    Magnesium 1.9 1.6 - 2.4 mg/dL   CBC Auto Differential    Specimen: Blood   Result Value Ref Range    WBC 7.38 3.40 - 10.80 10*3/mm3    RBC 4.07 3.77 - 5.28 10*6/mm3    Hemoglobin 11.0 (L) 12.0 - 15.9 g/dL    Hematocrit 36.2 34.0 - 46.6 %    MCV 88.9 79.0 - 97.0 fL    MCH 27.0 26.6 - 33.0 pg    MCHC 30.4 (L) 31.5 - 35.7 g/dL    RDW 16.5 (H) 12.3 - 15.4 %    RDW-SD 53.7 37.0 - 54.0 fl    MPV 10.1 6.0 - 12.0 fL    Platelets 289 140 - 450 10*3/mm3    Neutrophil % 62.6 42.7 - 76.0 %    Lymphocyte % 15.7 (L) 19.6 - 45.3 %    Monocyte % 8.3 5.0 - 12.0 %    Eosinophil % 12.3 (H) 0.3 - 6.2 %    Basophil % 0.8 0.0 - 1.5 %    Immature Grans % 0.3 0.0 - 0.5 %    Neutrophils, Absolute 4.62 1.70 - 7.00 10*3/mm3    Lymphocytes, Absolute 1.16 0.70 - 3.10 10*3/mm3    Monocytes, Absolute 0.61 0.10 - 0.90 10*3/mm3    Eosinophils, Absolute 0.91 (H) 0.00 - 0.40 10*3/mm3    Basophils, Absolute 0.06 0.00 - 0.20 10*3/mm3    Immature Grans, Absolute 0.02 0.00 - 0.05 10*3/mm3    nRBC 0.0 0.0 - 0.2 /100 WBC   Blood Gas, Arterial With Co-Ox    Specimen: Arterial Blood   Result Value Ref Range    Site Right Brachial     Sergei's Test N/A     pH, Arterial 7.363 7.350 - 7.450 pH units    pCO2, Arterial 36.5 35.0 - 45.0 mm Hg    pO2, Arterial 53.5 (C) 83.0 - 108.0 mm Hg     HCO3, Arterial 20.7 20.0 - 26.0 mmol/L    Base Excess, Arterial -4.2 (L) 0.0 - 2.0 mmol/L    O2 Saturation, Arterial 88.4 (L) 94.0 - 99.0 %    Hemoglobin, Blood Gas 11.2 (L) 13.5 - 17.5 g/dL    Hematocrit, Blood Gas 34.5 (L) 38.0 - 51.0 %    Oxyhemoglobin 85.2 (L) 94 - 99 %    Methemoglobin 0.20 0.00 - 3.00 %    Carboxyhemoglobin 3.4 0 - 5 %    A-a DO2 47.7 0.0 - 300.0 mmHg    CO2 Content 21.9 (L) 22 - 33 mmol/L    Barometric Pressure for Blood Gas 725 mmHg    Modality Room Air     FIO2 21 %    Ventilator Mode NA     Notified Who DR MENDOZA     Notified By [id number]     Notified Time 10/08/2023 17:09     Collected by [id number]     pH, Temp Corrected      pCO2, Temperature Corrected      pO2, Temperature Corrected     High Sensitivity Troponin T 2Hr    Specimen: Arm, Left; Blood   Result Value Ref Range    HS Troponin T 26 (H) <10 ng/L    Troponin T Delta 3 >=-4 - <+4 ng/L   Procalcitonin    Specimen: Blood   Result Value Ref Range    Procalcitonin 0.10 0.00 - 0.25 ng/mL   C-reactive Protein    Specimen: Arm, Left; Blood   Result Value Ref Range    C-Reactive Protein 5.41 (H) 0.00 - 0.50 mg/dL   Hemoglobin A1c    Specimen: Blood   Result Value Ref Range    Hemoglobin A1C 5.30 4.80 - 5.60 %   Lipid Panel    Specimen: Arm, Left; Blood   Result Value Ref Range    Total Cholesterol 140 0 - 200 mg/dL    Triglycerides 75 0 - 150 mg/dL    HDL Cholesterol 34 (L) 40 - 60 mg/dL    LDL Cholesterol  91 0 - 100 mg/dL    VLDL Cholesterol 15 5 - 40 mg/dL    LDL/HDL Ratio 2.68    TSH    Specimen: Arm, Left; Blood   Result Value Ref Range    TSH 6.770 (H) 0.270 - 4.200 uIU/mL   T4, Free    Specimen: Arm, Left; Blood   Result Value Ref Range    Free T4 1.01 0.93 - 1.70 ng/dL   POC Glucose Once    Specimen: Blood   Result Value Ref Range    Glucose 49 (C) 70 - 130 mg/dL   POC Glucose Once    Specimen: Blood   Result Value Ref Range    Glucose 67 (L) 70 - 130 mg/dL   POC Glucose Once    Specimen: Blood   Result Value Ref Range    Glucose 79  70 - 130 mg/dL   POC Glucose Once    Specimen: Blood   Result Value Ref Range    Glucose 91 70 - 130 mg/dL   ECG 12 Lead Dyspnea   Result Value Ref Range    QT Interval 400 ms    QTC Interval 419 ms     XR Chest 1 View    Result Date: 10/8/2023  Narrative: XR CHEST 1 VW-  CLINICAL INDICATION: sob   COMPARISON: None immediately available  TECHNIQUE: Single frontal view of the chest.  FINDINGS:  LUNGS: Trace right effusion and right basilar airspace disease  HEART AND MEDIASTINUM: Heart and mediastinal contours are unremarkable   SKELETON: Bony and soft tissue structures are unremarkable.        Impression: Trace right effusion and right basilar airspace disease    This report was finalized on 10/8/2023 7:17 PM by Dr. Julio Dominguez MD.             ED Course  ED Course as of 10/09/23 0152   [place] Oct 08, 2023   1732 ECG 12 Lead Dyspnea  Normal sinus rhythm.  Rate 66.  Normal axis.  Normal QT interval.  Nonspecific T wave change.  No acute ST elevation or depression.  Borderline EKG.  Interpreted by me.    Electronically signed by Keyon Lanza MD, 10/08/23, 5:32 PM EDT.   [BC]   1953 Spoke with hospitalist, admitted to medical service []   Mon Oct 09, 2023   0151 Patient admitted to medical service earlier in shift. [KH]      ED Course User Index  [BC] Keyon Lanza MD  [] Maria Isabel Lim MD                                           Medical Decision Making  Problems Addressed:  Acute kidney injury superimposed on chronic kidney disease: complicated acute illness or injury  Acute on chronic congestive heart failure, unspecified heart failure type: complicated acute illness or injury  Acute on chronic respiratory failure with hypoxia: complicated acute illness or injury  Hyperkalemia: complicated acute illness or injury    Amount and/or Complexity of Data Reviewed  Labs: ordered.  Radiology: ordered.  ECG/medicine tests: ordered. Decision-making details documented in ED Course.    Risk  OTC drugs.  Prescription drug  management.  Decision regarding hospitalization.        Final diagnoses:   Acute kidney injury superimposed on chronic kidney disease   Acute on chronic congestive heart failure, unspecified heart failure type   Acute on chronic respiratory failure with hypoxia   Hyperkalemia       ED Disposition  ED Disposition       ED Disposition   Decision to Admit    Condition   --    Comment   Level of Care: Telemetry [5]   Diagnosis: Acute hypoxic respiratory failure [[id number]]                 No follow-up provider specified.       Medication List      No changes were made to your prescriptions during this visit.            Maria Isabel Lim MD  10/09/23 0152      Electronically signed by Maria Isabel Lim MD at 10/09/23 0152       Facility-Administered Medications as of 10/10/2023   Medication Dose Route Frequency Provider Last Rate Last Admin    acetaminophen (TYLENOL) tablet 650 mg  650 mg Oral Q6H PRN Marie Pardo APRN   650 mg at 10/10/23 0827    [COMPLETED] albumin human 25 % IV SOLN 25 g  25 g Intravenous Once Palomo Steinberg MD   25 g at 10/10/23 1007    [COMPLETED] albuterol (PROVENTIL) nebulizer solution 0.5% 2.5 mg/0.5mL  10 mg Nebulization Once Marie Pardo APRN   10 mg at 10/09/23 0405    aspirin EC tablet 325 mg  325 mg Oral Daily Marie Pardo APRN   325 mg at 10/10/23 0827    azithromycin (ZITHROMAX) 500 mg in sodium chloride 0.9 % 250 mL IVPB-VTB  500 mg Intravenous Q24H Chris Hanks MD   500 mg at 10/10/23 0829    sennosides-docusate (PERICOLACE) 8.6-50 MG per tablet 2 tablet  2 tablet Oral BID Fanny Ford MD   2 tablet at 10/10/23 0827    And    polyethylene glycol (MIRALAX) packet 17 g  17 g Oral Daily PRN Fanny Ford MD        And    bisacodyl (DULCOLAX) EC tablet 5 mg  5 mg Oral Daily PRN Fanny Ford MD        And    bisacodyl (DULCOLAX) suppository 10 mg  10 mg Rectal Daily PRN Fanny Ford MD        [COMPLETED] calcium gluconate 1000 Mg/50ml 0.675% NaCl IV SOLN   1,000 mg Intravenous Once Keyon Lanza MD   Stopped at 10/08/23 2100    calcium gluconate 1000 Mg/50ml 0.675% NaCl IV SOLN  1,000 mg Intravenous Once Marie Pardo APRN        carvedilol (COREG) tablet 25 mg  25 mg Oral BID With Meals Marie Pardo APRN   25 mg at 10/10/23 0827    [COMPLETED] dextrose (D50W) (25 g/50 mL) IV injection 25 g  25 g Intravenous Once Keyon Lanza MD   25 g at 10/08/23 1950    dextrose (D50W) (25 g/50 mL) IV injection 25 g  25 g Intravenous Q15 Min PRN Marie Pardo APRN   25 g at 10/09/23 0401    dextrose (D50W) (25 g/50 mL) IV injection 25 g  25 g Intravenous Once Marie Pardo APRN        dextrose (GLUTOSE) oral gel 15 g  15 g Oral Q15 Min PRN Marie Pardo APRN        [COMPLETED] diazePAM (VALIUM) injection 2.5 mg  2.5 mg Intravenous Once Marie Pardo APRN   2.5 mg at 10/09/23 2142    folic acid (FOLVITE) tablet 1 mg  1 mg Oral Daily Marie Pardo APRN   1 mg at 10/10/23 0827    [COMPLETED] furosemide (LASIX) injection 20 mg  20 mg Intravenous Once Palomo Steinberg MD   20 mg at 10/09/23 1610    gabapentin (NEURONTIN) capsule 300 mg  300 mg Oral BID PRN Marie Pardo APRN   300 mg at 10/10/23 0415    glucagon HCl (Diagnostic) injection 1 mg  1 mg Intramuscular Q15 Min PRN Marie Pardo APRN        heparin (porcine) 5000 UNIT/ML injection 5,000 Units  5,000 Units Subcutaneous Q12H Fanny Ford MD   5,000 Units at 10/10/23 0828    [COMPLETED] hyaluronidase 150 units in NS 10 ml syringe  150 Units Subcutaneous Once Marie Pardo APRN   150 Units at 10/09/23 0505    [COMPLETED] hydrALAZINE (APRESOLINE) injection 10 mg  10 mg Intravenous Once Marie Pardo APRN   10 mg at 10/09/23 2015    hydrALAZINE (APRESOLINE) tablet 25 mg  25 mg Oral Q8H Palomo Steinberg MD        HYDROcodone-acetaminophen (NORCO)  MG per tablet 1 tablet  1 tablet Oral Q8H PRN Marie Pardo APRN        hydrOXYzine (ATARAX) tablet 25 mg  25 mg Oral TID PRN Marie Pardo,  APRN   25 mg at 10/10/23 0415    [COMPLETED] insulin regular (humuLIN R,novoLIN R) injection 10 Units  10 Units Subcutaneous Once Keyon Lanza MD   10 Units at 10/08/23 1953    insulin regular (humuLIN R,novoLIN R) injection 5 Units  5 Units Intravenous Once Marie Pardo APRN        ipratropium-albuterol (DUO-NEB) nebulizer solution 3 mL  3 mL Nebulization 4x Daily - RT Marie Pardo APRN   3 mL at 10/10/23 0643    levothyroxine (SYNTHROID, LEVOTHROID) tablet 88 mcg  88 mcg Oral Q AM Marie Pardo APRN   88 mcg at 10/10/23 0510    melatonin tablet 10 mg  10 mg Oral Nightly PRN Marie Pardo APRN   10 mg at 10/09/23 2016    montelukast (SINGULAIR) tablet 10 mg  10 mg Oral Nightly Marie Pardo APRN   10 mg at 10/09/23 2253    nicotine (NICODERM CQ) 21 MG/24HR patch 1 patch  1 patch Transdermal Q24H Marie Pardo APRN   1 patch at 10/10/23 0834    nitroglycerin (NITROSTAT) SL tablet 0.4 mg  0.4 mg Sublingual Q5 Min PRN Fanny Ford MD        ondansetron (ZOFRAN) injection 4 mg  4 mg Intravenous Q6H PRN Marie Pardo APRN   4 mg at 10/10/23 0411    pantoprazole (PROTONIX) EC tablet 40 mg  40 mg Oral Daily Marie Pardo APRN   40 mg at 10/10/23 0828    predniSONE (DELTASONE) tablet 40 mg  40 mg Oral Daily With Breakfast Chris Hanks MD   40 mg at 10/10/23 0828    rosuvastatin (CRESTOR) tablet 10 mg  10 mg Oral Nightly Marie Pardo APRN   10 mg at 10/09/23 2016    [COMPLETED] sodium bicarbonate injection 8.4% 50 mEq  50 mEq Intravenous Once Keyon Lanza MD   50 mEq at 10/08/23 1949    sodium chloride 0.9 % flush 10 mL  10 mL Intravenous PRN Fanny Ford MD        sodium chloride 0.9 % flush 10 mL  10 mL Intravenous Q12H Fanny Ford MD   10 mL at 10/10/23 0843    sodium chloride 0.9 % flush 10 mL  10 mL Intravenous PRN Fanny Ford MD        sodium chloride 0.9 % infusion 40 mL  40 mL Intravenous PRN Fanny Ford MD        [COMPLETED] sodium zirconium  cyclosilicate (LOKELMA) pack 10 g  10 g Oral Once Keyon Lanza MD   10 g at 10/08/23 1954    [COMPLETED] sodium zirconium cyclosilicate (LOKELMA) pack 10 g  10 g Oral Once Marie Pardo APRN   10 g at 10/09/23 0443    sodium zirconium cyclosilicate (LOKELMA) pack 10 g  10 g Oral TID Palomo Steinberg MD   10 g at 10/10/23 0828    vitamin B-12 (CYANOCOBALAMIN) tablet 1,000 mcg  1,000 mcg Oral Daily Marie Pardo APRN   1,000 mcg at 10/10/23 0829     Orders (all)        Start     Ordered    10/11/23 0600  ANCA Panel  Morning Draw         10/10/23 0826    10/11/23 0600  Anti-DNA Antibody, Double-stranded  Morning Draw         10/10/23 0826    10/11/23 0600  Antinuclear Antibodies Direct  Morning Draw         10/10/23 0826    10/11/23 0600  C3 Complement  Morning Draw         10/10/23 0826    10/11/23 0600  C4 Complement  Morning Draw         10/10/23 0826    10/11/23 0600  Glomerular Basement Membrane Antibodies  Morning Draw         10/10/23 0826    10/11/23 0600  Hepatitis Panel, Acute  Morning Draw         10/10/23 0826    10/11/23 0600  TERRELL + PE  Morning Draw         10/10/23 0826    10/11/23 0400  Comprehensive Metabolic Panel  Morning Draw         10/10/23 0826    10/10/23 1400  Basic Metabolic Panel  Morning Draw         10/09/23 1347    10/10/23 1400  hydrALAZINE (APRESOLINE) tablet 25 mg  Every 8 Hours Scheduled         10/10/23 0825    10/10/23 1039  Inpatient Admission  Once         10/10/23 1038    10/10/23 1018  XR Chest 1 View  1 Time Imaging         10/10/23 1017    10/10/23 0915  albumin human 25 % IV SOLN 25 g  Once         10/10/23 0826    10/10/23 0900  folic acid (FOLVITE) tablet 1 mg  Daily         10/09/23 2138    10/10/23 0900  pantoprazole (PROTONIX) EC tablet 40 mg  Daily         10/09/23 2138    10/10/23 0900  vitamin B-12 (CYANOCOBALAMIN) tablet 1,000 mcg  Daily         10/09/23 2138    10/10/23 0900  azithromycin (ZITHROMAX) 500 mg in sodium chloride 0.9 % 250 mL IVPB-VTB  Every 24  Hours         10/10/23 0722    10/10/23 0827  Antihistone Antibodies  Once         10/10/23 0826    10/10/23 0800  predniSONE (DELTASONE) tablet 40 mg  Daily With Breakfast         10/09/23 1406    10/10/23 0728  POC Glucose Once  PROCEDURE ONCE        Comments: Complete no more than 45 minutes prior to patient eating      10/10/23 0711    10/10/23 0600  levothyroxine (SYNTHROID, LEVOTHROID) tablet 88 mcg  Every Early Morning         10/09/23 2138    10/10/23 0600  CK  Morning Draw         10/09/23 1347    10/10/23 0518  POC Glucose Once  PROCEDURE ONCE        Comments: Complete no more than 45 minutes prior to patient eating      10/10/23 0512    10/10/23 0400  CBC & Differential  Morning Draw         10/09/23 1347    10/10/23 0400  Comprehensive Metabolic Panel  Morning Draw         10/09/23 1347    10/10/23 0400  CBC Auto Differential  PROCEDURE ONCE         10/09/23 2001    10/09/23 2352  High Sensitivity Troponin T 2Hr  PROCEDURE ONCE         10/09/23 2229    10/09/23 2230  carvedilol (COREG) tablet 25 mg  2 Times Daily With Meals         10/09/23 2138    10/09/23 2230  hydrALAZINE (APRESOLINE) tablet 25 mg  2 Times Daily,   Status:  Discontinued         10/09/23 2138    10/09/23 2230  montelukast (SINGULAIR) tablet 10 mg  Nightly         10/09/23 2138    10/09/23 2230  diazePAM (VALIUM) injection 2.5 mg  Once         10/09/23 2135    10/09/23 2138  HYDROcodone-acetaminophen (NORCO)  MG per tablet 1 tablet  Every 8 Hours PRN         10/09/23 2138    10/09/23 2138  gabapentin (NEURONTIN) capsule 300 mg  2 Times Daily PRN         10/09/23 2138    10/09/23 2135  ECG 12 Lead Other; Smothering, Nauseated, Anxious  STAT         10/09/23 2134    10/09/23 2135  High Sensitivity Troponin T  STAT         10/09/23 2135    10/09/23 2132  ondansetron (ZOFRAN) injection 4 mg  Every 6 Hours PRN         10/09/23 2132    10/09/23 2100  rosuvastatin (CRESTOR) tablet 10 mg  Nightly         10/09/23 0248    10/09/23 2100   hydrALAZINE (APRESOLINE) injection 10 mg  Once         10/09/23 2003    10/09/23 2011  POC Glucose Once  PROCEDURE ONCE        Comments: Complete no more than 45 minutes prior to patient eating      10/09/23 2004    10/09/23 1625  POC Glucose Once  PROCEDURE ONCE        Comments: Complete no more than 45 minutes prior to patient eating      10/09/23 1616    10/09/23 1600  sodium zirconium cyclosilicate (LOKELMA) pack 10 g  3 Times Daily         10/09/23 1346    10/09/23 1445  furosemide (LASIX) injection 20 mg  Once         10/09/23 1354    10/09/23 1409  CT Chest Without Contrast Diagnostic  1 Time Imaging         10/09/23 1408    10/09/23 1347  Potassium, Urine, Random - Urine, Clean Catch  Once         10/09/23 1347    10/09/23 1347  Chloride, Urine, Random - Urine, Clean Catch  Once         10/09/23 1347    10/09/23 1347  Sodium, Urine, Random - Urine, Clean Catch  Once         10/09/23 1347    10/09/23 1347  Urinalysis without microscopic (no culture) -  Once         10/09/23 1347    10/09/23 1200  Basic Metabolic Panel  Once         10/09/23 0752    10/09/23 1127  POC Glucose Once  PROCEDURE ONCE        Comments: Complete no more than 45 minutes prior to patient eating      10/09/23 1117    10/09/23 1000  Basic Metabolic Panel  Once,   Status:  Canceled         10/09/23 0752    10/09/23 0920  Connectors / Hubs Must Be Scrubbed 15 Seconds Using 70% Alcohol Before Access - Allow to Dry Before Accessing Line  Continuous         10/09/23 0921    10/09/23 0914  ReDs Vest  Once         10/09/23 0913    10/09/23 0900  nicotine (NICODERM CQ) 21 MG/24HR patch 1 patch  Every 24 Hours Scheduled         10/09/23 0215    10/09/23 0900  aspirin EC tablet 325 mg  Daily         10/09/23 0248    10/09/23 0808  POC Glucose Once  PROCEDURE ONCE        Comments: Complete no more than 45 minutes prior to patient eating      10/09/23 0654    10/09/23 0800  Oral Care  2 Times Daily       10/08/23 2052    10/09/23 0800  Apply Cold  Compress to Affected Area for 20 Minutes  4 Times Daily       10/09/23 0414    10/09/23 0757  POC Glucose Once  PROCEDURE ONCE        Comments: Complete no more than 45 minutes prior to patient eating      10/09/23 0751    10/09/23 0755  PT Consult: Eval & Treat Functional Mobility Below Baseline  Once        Comments: Reason Why PT Needed: weakness    10/09/23 0754    10/09/23 0753  Inpatient Nephrology Consult  Once        Specialty:  Nephrology  Provider:  Palomo Steinberg MD    10/09/23 0753    10/09/23 0743  Consult for CHF Education  Once        Provider:  (Not yet assigned)    10/09/23 0742    10/09/23 0730  Potassium  STAT         10/09/23 0729    10/09/23 0700  POC Glucose 4x Daily Before Meals & at Bedtime  4 Times Daily Before Meals & at Bedtime,   Status:  Canceled      Comments: Complete no more than 45 minutes prior to patient eating      10/09/23 0242    10/09/23 0700  ipratropium-albuterol (DUO-NEB) nebulizer solution 3 mL  4 Times Daily - RT         10/09/23 0245    10/09/23 0600  CBC & Differential  Morning Draw         10/09/23 0049    10/09/23 0600  Comprehensive Metabolic Panel  Morning Draw         10/09/23 0049    10/09/23 0600  CBC Auto Differential  PROCEDURE ONCE         10/09/23 0049    10/09/23 0600  Incentive Spirometry  Every 4 Hours While Awake       10/09/23 0245    10/09/23 0535  Needlestick Pt Source  STAT         10/09/23 0534    10/09/23 0535  Hepatitis C Antibody  PROCEDURE ONCE         10/09/23 0534    10/09/23 0535  Hepatitis B Surface Antigen  PROCEDURE ONCE         10/09/23 0534    10/09/23 0535  HIV-1 / O / 2 Ag / Antibody  PROCEDURE ONCE         10/09/23 0534    10/09/23 0500  hyaluronidase 150 units in NS 10 ml syringe  Once         10/09/23 0414    10/09/23 0432  POC Glucose Once  PROCEDURE ONCE        Comments: Complete no more than 45 minutes prior to patient eating      10/09/23 0425    10/09/23 0411  Extravasation Standing Orders - IMMEDIATELY STOP INFUSION  Once          10/09/23 0414    10/09/23 0411  Extravasation Standing Orders - DO NOT REMOVE CATHETER / NEEDLE  Once         10/09/23 0414    10/09/23 0411  Extravasation Standing Orders - AVOID PRESSURE  Once         10/09/23 0414    10/09/23 0411  Extravasation Standing Orders - Disconnect the IV Administration Set  Once         10/09/23 0414    10/09/23 0411  Extravasation Standing Orders - Evaluate the Site for Extravasation of Fluid (Check for Blood Return)  Once         10/09/23 0414    10/09/23 0411  Extravasation Standing Orders - Aspirate as Much of the Medication From the Needle / Cannula As Possible Using A Small (1-5mL) Syringe - Verify Recommended Treatment - If Antidote Does Not Require Infusion Through Extravasated Line Remove Needle / Cannula After Aspiration  Once         10/09/23 0414    10/09/23 0411  Extravasation Standing Orders - Jassi Around the Area With A Pen  Once         10/09/23 0414    10/09/23 0411  Extravasation Standing Orders - Photograph the Area for Medical Record If Indicated Per Photo Policy  Once         10/09/23 0414    10/09/23 0411  Extravasation Standing Orders - Elevate Affected Extremity for 24-48 Hours  Continuous         10/09/23 0414    10/09/23 0411  Notify Provider Prior to Administration of Antidote - Extravasation Standing Orders  Until Discontinued         10/09/23 0414    10/09/23 0411  Notify Provider for Tissue Sloughing, Necrosis or Blistering at Extravasation Site  Until Discontinued         10/09/23 0414    10/09/23 0411  Indicate Extravasated Medication to Determine Antidote to Initiate  Once        Comments: Select infusion extravasated to determine antidote to initiate.    10/09/23 0414    10/09/23 0400  POC Glucose Once  PROCEDURE ONCE        Comments: Complete no more than 45 minutes prior to patient eating      10/09/23 0353    10/09/23 0345  calcium gluconate 1000 Mg/50ml 0.675% NaCl IV SOLN  Once         10/09/23 0253    10/09/23 0345  insulin regular (humuLIN  R,novoLIN R) injection 5 Units  Once         10/09/23 0253    10/09/23 0345  dextrose (D50W) (25 g/50 mL) IV injection 25 g  Once         10/09/23 0253    10/09/23 0345  albuterol (PROVENTIL) nebulizer solution 0.5% 2.5 mg/0.5mL  Once         10/09/23 0253    10/09/23 0345  sodium zirconium cyclosilicate (LOKELMA) pack 10 g  Once         10/09/23 0253    10/09/23 0337  POC Glucose Once  PROCEDURE ONCE        Comments: Complete no more than 45 minutes prior to patient eating      10/09/23 0324    10/09/23 0327  hydrOXYzine (ATARAX) tablet 25 mg  3 Times Daily PRN         10/09/23 0327    10/09/23 0327  Urinalysis, Microscopic Only - Urine, Clean Catch  Once         10/09/23 0326    10/09/23 0300  POC Glucose Finger Q3H  Every 3 Hours       10/09/23 0243    10/09/23 0300  POC Glucose Q1H  Every Hour      Comments: POC Glucose - 30 Minutes After Insulin Administration & Then Q1H x3      10/09/23 0253    10/09/23 0255  Magnesium  STAT         10/09/23 0254    10/09/23 0255  US Renal Bilateral  1 Time Imaging         10/09/23 0254    10/09/23 0252  ECG 12 Lead Electrolyte Imbalance  STAT         10/09/23 0251    10/09/23 0251  Fall Precautions  Continuous         10/09/23 0250    10/09/23 0251  Case Management  Consult  Once        Provider:  (Not yet assigned)    10/09/23 0250    10/09/23 0250  Urinalysis With Microscopic If Indicated (No Culture) - Urine, Clean Catch  Once         10/09/23 0249    10/09/23 0250  US Renal Bilateral  1 Time Imaging,   Status:  Canceled         10/09/23 0249    10/09/23 0249  Creatinine Urine Random (kidney function) GFR component - Urine, Clean Catch  Once,   Status:  Canceled         10/09/23 0249    10/09/23 0249  Protein / Creatinine Ratio, Urine - Urine, Clean Catch  Once         10/09/23 0249    10/09/23 0249  Protein, Urine, Random - Urine, Clean Catch  Once         10/09/23 0249    10/09/23 0248  Adult Transthoracic Echo Complete W/ Cont if Necessary Per Protocol   Once         10/09/23 0247    10/09/23 0247  Strict Intake & Output  Every Shift       10/09/23 0246    10/09/23 0246  Turn Cough Deep Breathe  Once         10/09/23 0245    10/09/23 0242  dextrose (GLUTOSE) oral gel 15 g  Every 15 Minutes PRN         10/09/23 0242    10/09/23 0242  dextrose (D50W) (25 g/50 mL) IV injection 25 g  Every 15 Minutes PRN         10/09/23 0242    10/09/23 0242  glucagon HCl (Diagnostic) injection 1 mg  Every 15 Minutes PRN         10/09/23 0242    10/09/23 0242  Obtain Medical Records  Until Discontinued        Comments: Please obtain records from Dr. Sherwood's office regarding most recent office visits.    10/09/23 0242    10/09/23 0050  Respiratory Panel PCR w/COVID-19(SARS-CoV-2) [facility] In-House, NP Swab in UTM/VTM, 3-4 HR TAT - Swab, Nasopharynx  Once         10/09/23 0049    10/09/23 0049  Hemoglobin A1c  Once         10/09/23 0048    10/09/23 0049  Lipid Panel  Once         10/09/23 0048    10/09/23 0049  TSH  Once         10/09/23 0048    10/09/23 0049  T4, Free  Once         10/09/23 0048    10/09/23 0048  melatonin tablet 10 mg  Nightly PRN         10/09/23 0048    10/09/23 0048  acetaminophen (TYLENOL) tablet 650 mg  Every 6 Hours PRN         10/09/23 0048    10/09/23 0000  Vital Signs  Every 4 Hours       10/08/23 2052    10/09/23 0000  Intake & Output  Every 4 Hours       10/08/23 2052    10/09/23 0000  Ambulatory Referral to Heart Failure Clinic         10/09/23 1330    10/08/23 2357  POC Glucose Once  PROCEDURE ONCE        Comments: Complete no more than 45 minutes prior to patient eating      10/08/23 2207    10/08/23 2357  POC Glucose Once  PROCEDURE ONCE        Comments: Complete no more than 45 minutes prior to patient eating      10/08/23 2229    10/08/23 2357  POC Glucose Once  PROCEDURE ONCE        Comments: Complete no more than 45 minutes prior to patient eating      10/08/23 3588    10/08/23 8743  POC Glucose Once  PROCEDURE ONCE        Comments:  Complete no more than 45 minutes prior to patient eating      10/08/23 2147    10/08/23 2145  sodium chloride 0.9 % flush 10 mL  Every 12 Hours Scheduled         10/08/23 2052    10/08/23 2145  sennosides-docusate (PERICOLACE) 8.6-50 MG per tablet 2 tablet  2 Times Daily        See Hyperspace for full Linked Orders Report.    10/08/23 2052    10/08/23 2145  heparin (porcine) 5000 UNIT/ML injection 5,000 Units  Every 12 Hours Scheduled         10/08/23 2052    10/08/23 2053  Weigh Patient  Once         10/08/23 2052    10/08/23 2053  Notify PCP of Patient Admission  Once         10/08/23 2052    10/08/23 2053  Insert Peripheral IV  Once         10/08/23 2052    10/08/23 2053  Saline Lock & Maintain IV Access  Continuous,   Status:  Canceled         10/08/23 2052    10/08/23 2053  Continuous Cardiac Monitoring  Continuous        Comments: Follow Standing Orders As Outlined in Process Instructions (Open Order Report to View Full Instructions)    10/08/23 2052    10/08/23 2053  Telemetry - Maintain IV Access  Continuous         10/08/23 2052    10/08/23 2053  Telemetry - Place Orders & Notify Provider of Results When Patient Experiences Acute Chest Pain, Dysrhythmia or Respiratory Distress  Until Discontinued         10/08/23 2052    10/08/23 2053  May Be Off Telemetry for Tests  Continuous         10/08/23 2052    10/08/23 2053  Pulse Oximetry, Continuous  Continuous         10/08/23 2052    10/08/23 2053  Activity - Bed Rest With Exceptions (Specify)  Until Discontinued         10/08/23 2052    10/08/23 2053  Daily Weights  Daily       10/08/23 2052    10/08/23 2053  POC Glucose Once  Once        Comments: Complete no more than 45 minutes prior to patient eating      10/08/23 2052    10/08/23 2053  Diet: Cardiac Diets, Renal Diets, Fluid Restriction (240 mL/tray) Diets; Healthy Heart (2-3 Na+); Low Sodium (2-3g), Low Potassium, Low Phosphorus; 1500 mL/day; Texture: Regular Texture (IDDSI 7); Fluid Consistency: Thin  (IDDSI 0)  Diet Effective Now         10/08/23 2052    10/08/23 2053  Procalcitonin  STAT         10/08/23 2052    10/08/23 2053  C-reactive Protein  Once         10/08/23 2052    10/08/23 2052  sodium chloride 0.9 % flush 10 mL  As Needed         10/08/23 2052    10/08/23 2052  sodium chloride 0.9 % infusion 40 mL  As Needed         10/08/23 2052    10/08/23 2052  polyethylene glycol (MIRALAX) packet 17 g  Daily PRN        See Hyperspace for full Linked Orders Report.    10/08/23 2052    10/08/23 2052  bisacodyl (DULCOLAX) EC tablet 5 mg  Daily PRN        See Hyperspace for full Linked Orders Report.    10/08/23 2052    10/08/23 2052  bisacodyl (DULCOLAX) suppository 10 mg  Daily PRN        See Hyperspace for full Linked Orders Report.    10/08/23 2052    10/08/23 2052  nitroglycerin (NITROSTAT) SL tablet 0.4 mg  Every 5 Minutes PRN         10/08/23 2052    10/08/23 2006  Initiate Observation Status  Once         10/08/23 2006    10/08/23 2006  Code Status and Medical Interventions:  Continuous         10/08/23 2006    10/08/23 1918  dextrose (D50W) (25 g/50 mL) IV injection 25 g  Once         10/08/23 1902    10/08/23 1918  insulin regular (humuLIN R,novoLIN R) injection 10 Units  Once         10/08/23 1902    10/08/23 1918  calcium gluconate 1000 Mg/50ml 0.675% NaCl IV SOLN  Once         10/08/23 1902    10/08/23 1918  sodium bicarbonate injection 8.4% 50 mEq  Once         10/08/23 1902    10/08/23 1918  sodium zirconium cyclosilicate (LOKELMA) pack 10 g  Once         10/08/23 1902    10/08/23 1909  High Sensitivity Troponin T 2Hr  PROCEDURE ONCE         10/08/23 1736    10/08/23 1709  Blood Gas, Arterial With Co-Ox  PROCEDURE ONCE         10/08/23 1707    10/08/23 1650  XR Chest 1 View  1 Time Imaging         10/08/23 1650    10/08/23 1650  Monitor Blood Pressure  Per Hospital Policy         10/08/23 1650    10/08/23 1650  Cardiac Monitoring  Continuous,   Status:  Canceled        Comments: Follow Standing  Orders As Outlined in Process Instructions (Open Order Report to View Full Instructions)    10/08/23 1650    10/08/23 1650  Pulse Oximetry, Continuous  Continuous,   Status:  Canceled         10/08/23 1650    10/08/23 1650  Insert Peripheral IV  Once        See Hyperspace for full Linked Orders Report.    10/08/23 1650    10/08/23 1649  sodium chloride 0.9 % flush 10 mL  As Needed        See Hyperspace for full Linked Orders Report.    10/08/23 1650    10/08/23 1649  CBC & Differential  Once         10/08/23 1650    10/08/23 1649  Comprehensive Metabolic Panel  Once         10/08/23 1650    10/08/23 1649  Blood Gas, Arterial -With Co-Ox Panel: Yes  Once         10/08/23 1650    10/08/23 1649  BNP  Once         10/08/23 1650    10/08/23 1649  High Sensitivity Troponin T  Once         10/08/23 1650    10/08/23 1649  Blood Culture - Blood, Arm, Left  Once         10/08/23 1650    10/08/23 1649  Blood Culture - Blood, Arm, Right  Once         10/08/23 1650    10/08/23 1649  Lactic Acid, Plasma  Once         10/08/23 1650    10/08/23 1649  Magnesium  Once         10/08/23 1650    10/08/23 1649  CBC Auto Differential  PROCEDURE ONCE         10/08/23 1650    10/08/23 1636  ECG 12 Lead Dyspnea  Once         10/08/23 1636    Unscheduled  Follow Hypoglycemia Standing Orders For Blood Glucose <70 & Notify Provider of Treatment  As Needed      Comments: Follow Hypoglycemia Orders As Outlined in Process Instructions (Open Order Report to View Full Instructions)  Notify Provider Any Time Hypoglycemia Treatment is Administered    10/09/23 0242    Unscheduled  Extravasation Standing Orders - Encourage Active Range of Motion After 48 Hours  As Needed       10/09/23 0414    Unscheduled  Change Dressing to IV Site As Needed When Damp, Loose or Soiled  As Needed       10/09/23 0921    Unscheduled  Change Needleless Connectors  As Needed      Comments: Change Needleless Connectors When:  - Administration Set Changed  - Dressing  Changed  - Removed For Any Reason  - Residual Blood or Debris Within Connector  - Prior to Drawing Blood Cultures  - Contamination of Connector  - After Administration of Blood or Blood Components    10/09/23 0921    --  folic acid (FOLVITE) 1 MG tablet  Daily         10/09/23 1146    --  HYDROcodone-acetaminophen (NORCO)  MG per tablet  Every 8 Hours PRN         10/09/23 1146    --  SCANNED - TELEMETRY           10/08/23 0000    --  SCANNED - TELEMETRY           10/08/23 0000    --  SCANNED - TELEMETRY           10/08/23 0000                     Physician Progress Notes (all)        Chris Hanks MD at 10/09/23 1405              [facility] HOSPITALIST PROGRESS NOTE     Patient Identification:  Name:  Lesley Michel  Age:  67 y.o.  Sex:  female  :  1955  MRN:  [id number]  Visit Number:  [id number]  ROOM: [address] [place] [address]     Primary Care Provider:  Woodrow Raymond APRN     Date of Admission: 10/8/2023    Length of stay in inpatient status:  0    Subjective     Chief Compliant:    Chief Complaint   Patient presents with    Shortness of Breath    Edema       Patient with dyspnea at rest improved since admission but continues requiring O2, on RA at baseline at home but does report hx of COPD in past not on any controlling inhalers. Reports LE edema roughly at baseline this am. No cp or palpitations, ongoing non productive cough reported. Patient oriented x3 but occasionally needs questions repeated and having some confusion that waxes/wanes. USIV placed in RUE for difficult access        Objective     Current Hospital Meds:  aspirin, 325 mg, Oral, Daily  calcium gluconate, 1,000 mg, Intravenous, Once  dextrose, 25 g, Intravenous, Once  furosemide, 20 mg, Intravenous, Once  heparin (porcine), 5,000 Units, Subcutaneous, Q12H  insulin regular, 5 Units, Intravenous, Once  ipratropium-albuterol, 3 mL, Nebulization, 4x Daily - RT  nicotine, 1 patch, Transdermal, Q24H  [START ON 10/10/2023]  predniSONE, 40 mg, Oral, Daily With Breakfast  rosuvastatin, 10 mg, Oral, Nightly  senna-docusate sodium, 2 tablet, Oral, BID  sodium chloride, 10 mL, Intravenous, Q12H  sodium zirconium cyclosilicate, 10 g, Oral, TID       Current Antimicrobial Therapy:  Anti-Infectives (From admission, onward)      None          Current Diuretic Therapy:  Diuretics (From admission, onward)      Ordered     Dose/Rate Route Frequency Start Stop    10/09/23 1354  furosemide (LASIX) injection 20 mg        Ordering Provider: Palomo Steinberg MD    20 mg Intravenous Once 10/09/23 1445            ----------------------------------------------------------------------------------------------------------------------  Vital Signs:  Temp:  [97.8 °F (36.6 °C)-99.7 °F (37.6 °C)] 98.8 °F (37.1 °C)  Heart Rate:  [] 69  Resp:  [16-22] 22  BP: (115-173)/(70-96) 171/96  SpO2:  [88 %-99 %] 93 %  on  Flow (L/min):  [2-3] 3;   Device (Oxygen Therapy): nasal cannula  Body mass index is 44.7 kg/m².    Wt Readings from Last 3 Encounters:   10/09/23 104 kg (228 lb 14.4 oz)   10/06/23 102 kg (225 lb 12.8 oz)   03/02/23 81.6 kg (180 lb)     Intake & Output (last 3 days)         10/06 0701  10/07 0700 10/07 0701  10/08 0700 10/08 0701  10/09 0700 10/09 0701  10/10 0700    P.O.    480    Total Intake(mL/kg)    480 (4.6)    Net    +480            Urine Unmeasured Occurrence   5 x           Diet: Cardiac Diets, Renal Diets, Fluid Restriction (240 mL/tray) Diets; Healthy Heart (2-3 Na+); Low Sodium (2-3g), Low Potassium, Low Phosphorus; 1500 mL/day; Texture: Regular Texture (IDDSI 7); Fluid Consistency: Thin (IDDSI 0)  ----------------------------------------------------------------------------------------------------------------------  Physical Exam  Vitals and nursing note reviewed.   Constitutional:       General: She is not in acute distress.  HENT:      Head: Normocephalic and atraumatic.   Eyes:      General: No scleral icterus.     Extraocular  Movements: Extraocular movements intact.   Cardiovascular:      Rate and Rhythm: Normal rate.   Pulmonary:      Effort: Pulmonary effort is normal. No respiratory distress.      Breath sounds: Wheezing present. No rales.   Abdominal:      General: There is no distension.      Palpations: Abdomen is soft.   Musculoskeletal:      Right lower leg: Edema present.      Left lower leg: Edema present.   Skin:     General: Skin is warm and dry.   Neurological:      General: No focal deficit present.      Mental Status: She is alert.   Psychiatric:         Mood and Affect: Mood normal.         Behavior: Behavior normal.       ----------------------------------------------------------------------------------------------------------------------    ----------------------------------------------------------------------------------------------------------------------  LABS:    CBC and coagulation:  Results from last 7 days   Lab Units 10/09/23  0132 10/08/23  2108 10/08/23  1923 10/08/23  1709   PROCALCITONIN ng/mL  --  0.10  --   --    LACTATE mmol/L  --   --   --  0.9   CRP mg/dL  --   --  5.41*  --    WBC 10*3/mm3 7.35  --   --  7.38   HEMOGLOBIN g/dL 11.6*  --   --  11.0*   HEMATOCRIT % 37.2  --   --  36.2   MCV fL 87.9  --   --  88.9   MCHC g/dL 31.2*  --   --  30.4*   PLATELETS 10*3/mm3 233  --   --  289     Acid/base balance:  Results from last 7 days   Lab Units 10/08/23  1707   PH, ARTERIAL pH units 7.363   PO2 ART mm Hg 53.5*   PCO2, ARTERIAL mm Hg 36.5   HCO3 ART mmol/L 20.7     Renal and electrolytes:  Results from last 7 days   Lab Units 10/09/23  1135 10/09/23  0754 10/09/23  0132 10/08/23  1709   SODIUM mmol/L 136  --  139 138   POTASSIUM mmol/L 5.5* 5.9* 6.2* 5.9*   MAGNESIUM mg/dL  --   --  2.0 1.9   CHLORIDE mmol/L 104  --  105 107   CO2 mmol/L 21.2*  --  21.1* 20.8*   BUN mg/dL 29*  --  29* 27*   CREATININE mg/dL 3.76*  --  3.98* 3.61*   CALCIUM mg/dL 8.9  --  9.0 9.0   GLUCOSE mg/dL 107*  --  82 102*      Estimated Creatinine Clearance: 16.2 mL/min (A) (by C-G formula based on SCr of 3.76 mg/dL (H)).    Liver and pancreatic function:  Results from last 7 days   Lab Units 10/09/23  0132 10/08/23  1709   ALBUMIN g/dL 3.7 3.4*   BILIRUBIN mg/dL 0.3 0.5   ALK PHOS U/L 149* 146*   AST (SGOT) U/L 10 7   ALT (SGPT) U/L 5 <5     Endocrine function:  Lab Results   Component Value Date    HGBA1C 5.30 10/08/2023     Point of care bedside glucose levels:  Results from last 7 days   Lab Units 10/09/23  1117 10/09/23  0751 10/09/23  0654 10/09/23  0425 10/09/23  0353 10/09/23  0324 10/08/23  2348 10/08/23  2229 10/08/23  2207 10/08/23  2147   GLUCOSE mg/dL 91 87 99 74 59* 66* 91 79 67* 49*     Glucose levels from the [facility]:  Results from last 7 days   Lab Units 10/09/23  1135 10/09/23  0132 10/08/23  1709   GLUCOSE mg/dL 107* 82 102*     Lab Results   Component Value Date    TSH 6.770 (H) 10/08/2023    FREET4 1.01 10/08/2023     Cardiac:  Results from last 7 days   Lab Units 10/08/23  1923 10/08/23  1709   HSTROP T ng/L 26* 23*   PROBNP pg/mL  --  8,093.0*     Results from last 7 days   Lab Units 10/08/23  1923   CHOLESTEROL mg/dL 140   TRIGLYCERIDES mg/dL 75   HDL CHOL mg/dL 34*   LDL CHOL mg/dL 91     Cultures:  Lab Results   Component Value Date    COLORU Yellow 10/09/2023    CLARITYU Clear 10/09/2023    PHUR 5.5 10/09/2023    PROTEINUR 48.0 10/09/2023    PROTEINUR 53.4 10/09/2023    GLUCOSEU Negative 10/09/2023    KETONESU Negative 10/09/2023    BLOODU Negative 10/09/2023    NITRITEU Negative 10/09/2023    LEUKOCYTESUR Negative 10/09/2023    BILIRUBINUR Negative 10/09/2023    UROBILINOGEN 0.2 E.U./dL 10/09/2023    RBCUA None Seen 10/09/2023    WBCUA 0-2 10/09/2023    BACTERIA None Seen 10/09/2023     Microbiology Results (last 10 days)       Procedure Component Value - Date/Time    Respiratory Panel PCR w/COVID-19(SARS-CoV-2) LOBO/VAZQUEZ/MARLIN/PAD/COR/MAD/KELBY In-House, NP Swab in UTM/VTM, 3-4 HR TAT - Swab, Nasopharynx [[id number]]  " (Normal) Collected: 10/09/23 0103    Lab Status: Final result Specimen: Swab from Nasopharynx Updated: 10/09/23 0156     ADENOVIRUS, PCR Not Detected     Coronavirus 229E Not Detected     Coronavirus HKU1 Not Detected     Coronavirus NL63 Not Detected     Coronavirus OC43 Not Detected     COVID19 Not Detected     Human Metapneumovirus Not Detected     Human Rhinovirus/Enterovirus Not Detected     Influenza A PCR Not Detected     Influenza B PCR Not Detected     Parainfluenza Virus 1 Not Detected     Parainfluenza Virus 2 Not Detected     Parainfluenza Virus 3 Not Detected     Parainfluenza Virus 4 Not Detected     RSV, PCR Not Detected     Bordetella pertussis pcr Not Detected     Bordetella parapertussis PCR Not Detected     Chlamydophila pneumoniae PCR Not Detected     Mycoplasma pneumo by PCR Not Detected    Narrative:      In the setting of a positive respiratory panel with a viral infection PLUS a negative procalcitonin without other underlying concern for bacterial infection, consider observing off antibiotics or discontinuation of antibiotics and continue supportive care. If the respiratory panel is positive for atypical bacterial infection (Bordetella pertussis, Chlamydophila pneumoniae, or Mycoplasma pneumoniae), consider antibiotic de-escalation to target atypical bacterial infection.            No results found for: \"PREGTESTUR\", \"PREGSERUM\", \"HCG\", \"HCGQUANT\"  Pain Management Panel          Latest Ref Rng & Units 10/9/2023   Pain Management Panel   Creatinine, Urine mg/dL 116.0        I have personally looked at the labs and they are summarized above.  ----------------------------------------------------------------------------------------------------------------------  Detailed radiology reports for the last 24 hours:    Imaging Results (Last 24 Hours)       Procedure Component Value Units Date/Time    US Renal Bilateral [[id number]] Collected: 10/09/23 0546     Updated: 10/09/23 0551    Narrative:      " CLINICAL HISTORY: Acute kidney injury.     COMPARISON: None available.     TECHNIQUE: Sonography of the kidneys was obtained.     FINDINGS: The right kidney is small and echogenic, with a thinned  cortex.  It measures 8.4 cm in length with the cortical thickness of 6  mm.  The left kidney is normal in size and echogenicity, measuring 10.3  cm in length.  Cortical thickness is normal at 1.1 cm.     No hydronephrosis is present.  No border deforming lesion or shadowing  calculus is identified.       Impression:         SMALL AND ECHOGENIC RIGHT KIDNEY SUGGESTING A UNILATERAL PROCESS SUCH AS  RENAL ARTERY STENOSIS.     NORMAL LEFT KIDNEY.     NEGATIVE FOR HYDRONEPHROSIS.           This report was finalized on 10/9/2023 5:49 AM by Imelda Walsh MD.       XR Chest 1 View [[id number]] Collected: 10/08/23 1917     Updated: 10/08/23 1919    Narrative:      XR CHEST 1 VW-     CLINICAL INDICATION: sob        COMPARISON: None immediately available      TECHNIQUE: Single frontal view of the chest.     FINDINGS:     LUNGS: Trace right effusion and right basilar airspace disease     HEART AND MEDIASTINUM: Heart and mediastinal contours are unremarkable        SKELETON: Bony and soft tissue structures are unremarkable.             Impression:      Trace right effusion and right basilar airspace disease           This report was finalized on 10/8/2023 7:17 PM by Dr. Julio Dominguez MD.                 CXR reviewed with RLL opacification and small effusion, evidence of hyperinflation    Assessment & Plan      #Acute hypoxic respiratory failure  #COPD hx w/exacerbation  -Likely secondary to progressive HF exacerbation vs volume overload from progressive CKD as primary etiology but wheezing on exam with component of COPDe  -CT chest to better eval for PNA  -Cont duonebs, add prednisone 40mg x5 days. Resp panel reviewed and negative  TTE pending    #Reported hx HpEF  #CAD s/p PCI  -Resume home antihypertensives and beta blocker, asa and  dose reduced statin resumed  -Holding lisinopril given HyperK, diuretics per nephrology    #MAICO? on CKD  #Hyperkalemia  #Troponin elevation secondary to CKD  -Baseline unknown, records requested by nephrologist for comparison but BUN near normal range. Right kidney smaller and echogenic concerning for PA, needs close outpatient F/u  -Nephrology consulted and appreciate input  -Cont lokelma for hyperkalemia, no ecg changes and improving on repeat bmp    #Hypoglycemia  -Improved after admission with PO resumption, A1c reviewed and wnl      - - Chronic - -    #Debility: PT consulted  #Hypothyroidism  #Obesity:Body mass index is 44.7 kg/m².    VTE Prophylaxis:   Mechanical Order History:       None          Pharmalogical Order History:        Ordered     Dose Route Frequency Stop    10/08/23 2052  heparin (porcine) 5000 UNIT/ML injection 5,000 Units         5,000 Units SC Every 12 Hours Scheduled --                    Code Status and Medical Interventions:   Ordered at: 10/08/23 2006     Level Of Support Discussed With:    Patient     Code Status (Patient has no pulse and is not breathing):    CPR (Attempt to Resuscitate)     Medical Interventions (Patient has pulse or is breathing):    Full Support         Disposition: Cont inpatient management pend respiratory status improvement    I have reviewed any copied/forwarded text or data, verified its accuracy, and updated as necessary above.    Chris Hanks MD  [facility]  10/09/23  14:07 EDT      Electronically signed by Chris Hanks MD at 10/09/23 1423       Progress Notes signed by [employer], [employer] at 10/09/23 1442         [Media Unavailable] Scan on 10/8/2023 by [employer], [employer]: KIDNEY DISEASE OFFICE VISIT, NEPHROLOGY SPECIALIST, 05/07/2020          Electronically signed by [employer], [employer] at 10/09/23 1442          Consult Notes (all)        Palomo Steinberg MD at 10/09/23 1321          NEPHROLOGY CONSULT NOTE    Referring  Provider: Dr. Ford  Reason for Consultation: Acute kidney injury    Chief complaint edema    Subjective .     History of present illness: Patient is a 67-year-old with past medical history of coronary artery disease hyperlipidemia hypertension chronic kidney disease stage 3 patient has seen nephrology in the past but has not followed up came in with a creatinine of 3.9 and a potassium of 6.2 patient was taking lisinopril at home patient has swelling going on for few days for least a month before that she was normal patient also has a history of diastolic congestive heart failure.  Patient has seen cardiology recently and has not been taking her medication accordingly going to however she is not taking diuretics secondary to chronic kidney disease patient stated that she has not missed medicine patient denies any nausea vomiting diarrhea no urgency no frequency no fever no chills no passing out no NSAIDs no recent antibiotics does not have an oxygen at home right now she is breathing          Review of Systems  All systems were reviewed and negative except for: Above    History  Past Medical History:   Diagnosis Date    Anxiety     Arthritis     Coronary artery disease     H/O heart artery stent     Hyperlipidemia     Hypertension     Obesity    ,   Past Surgical History:   Procedure Laterality Date    ANKLE SURGERY      RIGHT    APPENDECTOMY      CARDIAC CATHETERIZATION      LEFT    CORONARY STENT PLACEMENT      HYSTERECTOMY     ,   Family History   Problem Relation Age of Onset    Heart disease Mother     Heart attack Mother 73    Fibromyalgia Mother     Heart attack Father 72    Ovarian cancer Sister     Coronary artery disease Other     Breast cancer Neg Hx    ,   Social History     Tobacco Use    Smoking status: Every Day     Packs/day: 0.50     Years: 30.00     Additional pack years: 0.00     Total pack years: 15.00     Types: Electronic Cigarette, Cigarettes    Smokeless tobacco: Never   Vaping Use     Vaping Use: Never used   Substance Use Topics    Alcohol use: No    Drug use: No   , Allergies:  Patient has no known allergies.,   Current Facility-Administered Medications:     acetaminophen (TYLENOL) tablet 650 mg, 650 mg, Oral, Q6H PRN, Marie Pardo, APRN    aspirin EC tablet 325 mg, 325 mg, Oral, Daily, Chambers, [place], APRN, 325 mg at 10/09/23 0905    sennosides-docusate (PERICOLACE) 8.6-50 MG per tablet 2 tablet, 2 tablet, Oral, BID, 2 tablet at 10/09/23 0831 **AND** polyethylene glycol (MIRALAX) packet 17 g, 17 g, Oral, Daily PRN **AND** bisacodyl (DULCOLAX) EC tablet 5 mg, 5 mg, Oral, Daily PRN **AND** bisacodyl (DULCOLAX) suppository 10 mg, 10 mg, Rectal, Daily PRN, Fanny Ford MD    calcium gluconate 1000 Mg/50ml 0.675% NaCl IV SOLN, 1,000 mg, Intravenous, Once, Edvin, Marie, APRN    dextrose (D50W) (25 g/50 mL) IV injection 25 g, 25 g, Intravenous, Q15 Min PRN, Marie Pardo, APRN, 25 g at 10/09/23 0401    dextrose (D50W) (25 g/50 mL) IV injection 25 g, 25 g, Intravenous, Once, Edvin, Marie, APRN    dextrose (GLUTOSE) oral gel 15 g, 15 g, Oral, Q15 Min PRN, Tyrel Pardoy, APRN    glucagon HCl (Diagnostic) injection 1 mg, 1 mg, Intramuscular, Q15 Min PRN, Edvin, Marie, APRN    heparin (porcine) 5000 UNIT/ML injection 5,000 Units, 5,000 Units, Subcutaneous, Q12H, Fanny Ford MD, 5,000 Units at 10/09/23 0831    hydrOXYzine (ATARAX) tablet 25 mg, 25 mg, Oral, TID PRN, Edvin, [place], APRN, 25 mg at 10/09/23 0345    insulin regular (humuLIN R,novoLIN R) injection 5 Units, 5 Units, Intravenous, Once, Marie Pardo APRN    ipratropium-albuterol (DUO-NEB) nebulizer solution 3 mL, 3 mL, Nebulization, 4x Daily - RT, Marie Pardo APRN, 3 mL at 10/09/23 1249    melatonin tablet 10 mg, 10 mg, Oral, Nightly PRN, Marie Pardo APRN    nicotine (NICODERM CQ) 21 MG/24HR patch 1 patch, 1 patch, Transdermal, Q24H, Marie Pardo APRN, 1 patch at 10/09/23 0831    nitroglycerin (NITROSTAT) SL  tablet 0.4 mg, 0.4 mg, Sublingual, Q5 Min PRN, Fanny Ford MD    rosuvastatin (CRESTOR) tablet 10 mg, 10 mg, Oral, Nightly, Marie Pardo APRN    [COMPLETED] Insert Peripheral IV, , , Once **AND** sodium chloride 0.9 % flush 10 mL, 10 mL, Intravenous, PRN, Fanny Ford MD    sodium chloride 0.9 % flush 10 mL, 10 mL, Intravenous, Q12H, Fanny Ford MD, 10 mL at 10/09/23 0831    sodium chloride 0.9 % flush 10 mL, 10 mL, Intravenous, PRN, Fanny Ford MD    sodium chloride 0.9 % infusion 40 mL, 40 mL, Intravenous, PRGLENN, Fanny Ford MD     Medications Prior to Admission   Medication Sig Dispense Refill Last Dose    aspirin 325 MG tablet Take 1 tablet by mouth Daily.   10/8/2023    carvedilol (COREG) 25 MG tablet Take 1 tablet by mouth 2 (Two) Times a Day With Meals. 60 tablet 0 10/8/2023    folic acid (FOLVITE) 1 MG tablet Take 1 tablet by mouth Daily.   10/8/2023    hydrALAZINE (APRESOLINE) 25 MG tablet Take 1 tablet by mouth 2 (Two) Times a Day. 60 tablet 0 10/8/2023    levothyroxine (SYNTHROID, LEVOTHROID) 88 MCG tablet Take 1 tablet by mouth Daily.   10/8/2023    lisinopril (PRINIVIL,ZESTRIL) 5 MG tablet Take 1 tablet by mouth Daily.   10/8/2023    montelukast (SINGULAIR) 10 MG tablet Take 1 tablet by mouth Every Night.   10/8/2023    pantoprazole (PROTONIX) 40 MG EC tablet Take 1 tablet by mouth Daily.   10/8/2023    rosuvastatin (CRESTOR) 20 MG tablet Take 1 tablet by mouth Daily. 30 tablet 0 10/8/2023    vitamin B-12 (CYANOCOBALAMIN) 1000 MCG tablet Take 1 tablet by mouth Daily.   10/8/2023    gabapentin (NEURONTIN) 800 MG tablet Take 1 tablet by mouth 2 (Two) Times a Day As Needed (nerve pain).   Unknown    HYDROcodone-acetaminophen (NORCO)  MG per tablet Take 1 tablet by mouth Every 8 (Eight) Hours As Needed for Moderate Pain.   Unknown    nitroglycerin (NITROSTAT) 0.4 MG SL tablet Place 1 tablet under the tongue Every 5 (Five) Minutes As Needed for Chest Pain. 30 tablet 2  "Unknown          Objective     Laboratory Data :     Albumin Albumin   Date Value Ref Range Status   10/09/2023 3.7 3.5 - 5.2 g/dL Final   10/08/2023 3.4 (L) 3.5 - 5.2 g/dL Final      Magnesium Magnesium   Date Value Ref Range Status   10/09/2023 2.0 1.6 - 2.4 mg/dL Final   10/08/2023 1.9 1.6 - 2.4 mg/dL Final          PTH               No results found for: \"PTH\"    CBC and coagulation:  Results from last 7 days   Lab Units 10/09/23  0132 10/08/23  2108 10/08/23  1923 10/08/23  1709   PROCALCITONIN ng/mL  --  0.10  --   --    LACTATE mmol/L  --   --   --  0.9   CRP mg/dL  --   --  5.41*  --    WBC 10*3/mm3 7.35  --   --  7.38   HEMOGLOBIN g/dL 11.6*  --   --  11.0*   HEMATOCRIT % 37.2  --   --  36.2   MCV fL 87.9  --   --  88.9   MCHC g/dL 31.2*  --   --  30.4*   PLATELETS 10*3/mm3 233  --   --  289     Acid/base balance:  Results from last 7 days   Lab Units 10/08/23  1707   PH, ARTERIAL pH units 7.363   PO2 ART mm Hg 53.5*   PCO2, ARTERIAL mm Hg 36.5   HCO3 ART mmol/L 20.7     Renal and electrolytes:    Results from last 7 days   Lab Units 10/09/23  1135 10/09/23  0754 10/09/23  0132 10/08/23  1709   SODIUM mmol/L 136  --  139 138   POTASSIUM mmol/L 5.5* 5.9* 6.2* 5.9*   MAGNESIUM mg/dL  --   --  2.0 1.9   CHLORIDE mmol/L 104  --  105 107   CO2 mmol/L 21.2*  --  21.1* 20.8*   BUN mg/dL 29*  --  29* 27*   CREATININE mg/dL 3.76*  --  3.98* 3.61*   CALCIUM mg/dL 8.9  --  9.0 9.0     Estimated Creatinine Clearance: 16.2 mL/min (A) (by C-G formula based on SCr of 3.76 mg/dL (H)).    Liver and pancreatic function:  Results from last 7 days   Lab Units 10/09/23  0132 10/08/23  1709   ALBUMIN g/dL 3.7 3.4*   BILIRUBIN mg/dL 0.3 0.5   ALK PHOS U/L 149* 146*   AST (SGOT) U/L 10 7   ALT (SGPT) U/L 5 <5         Cardiac:  Results from last 7 days   Lab Units 10/08/23  1709   PROBNP pg/mL 8,093.0*     Liver and pancreatic function:  Results from last 7 days   Lab Units 10/09/23  0132 10/08/23  1709   ALBUMIN g/dL 3.7 3.4* "   BILIRUBIN mg/dL 0.3 0.5   ALK PHOS U/L 149* 146*   AST (SGOT) U/L 10 7   ALT (SGPT) U/L 5 <5       US Renal Bilateral    Result Date: 10/9/2023   SMALL AND ECHOGENIC RIGHT KIDNEY SUGGESTING A UNILATERAL PROCESS SUCH AS RENAL ARTERY STENOSIS.  NORMAL LEFT KIDNEY.  NEGATIVE FOR HYDRONEPHROSIS.    This report was finalized on 10/9/2023 5:49 AM by Imelda Walsh MD.      XR Chest 1 View    Result Date: 10/8/2023  Trace right effusion and right basilar airspace disease    This report was finalized on 10/8/2023 7:17 PM by Dr. Julio Dominguez MD.        Vital Signs   Vitals:    10/09/23 0640 10/09/23 0654 10/09/23 1056 10/09/23 1249   BP: 173/85  171/96    BP Location: Right arm  Right arm    Patient Position: Lying  Sitting    Pulse: 76 81 69    Resp: 20 20 20 22   Temp: 99.7 °F (37.6 °C)  98.8 °F (37.1 °C)    TempSrc: Oral  Oral    SpO2: 97% 95% 93% 93%   Weight:       Height:            Intake/Output                   10/09/23 0701 - 10/10/23 0700     [id number] Total              Intake    P.O.  480  -- 480    Total Intake 480 -- 480       Output    Total Output -- -- --               Physical Exam:     General Appearance:    Alert, cooperative, in no acute distress, oriented times three   Head:    Normocephalic, without obvious abnormality   Eyes:            Conjunctivae and sclerae normal, no icterus, no pallor, pupils CCERLA   Ears:    Ears appear intact    Throat:      Neck:   No adenopathy,no thyromegaly, no carotid bruit, no JVD   Back:       Lungs:    B/ wheezes    Heart:    Regular rhythm and normal rate, normal S1 and S2, no            murmur, no gallop, no rub, no click   Chest Wall:    No abnormalities observed   Abdomen:     Normal bowel sounds, no masses, no organomegaly, soft        non-tender, non-distended, no guarding, no rebound                tenderness   Rectal:     Deferred   Extremities:   Moves all extremities well, plus 2 edema, no cyanosis, no             redness   Pulses:      Skin:    No petechiae, no nodules, bruising or rash   Lymph nodes:      Neurologic:   Cranial nerves grossly intact, sensation normal, moves all extremities.     Results Review:   I reviewed the patient's new clinical results.  I personally viewed and interpreted the patient's EKG/Telemetry data      Assessment and Plan;    -Acute kidney injury  -Atrophic right kidney  -Chronic kidney disease stage IIIa  -Acute hyperkalemia  -Bilateral edema  -Acute hypoxic respite failure  -COPD  -Hypertension  -Medical noncompliance    10/09/23  Patient baseline creatinine is unknown we will get the records came in with a creatinine of 3.69 is down to 3.6 patient is short of breath we will give 1 dose of Lasix we will check urine protein creatinine ratio, ultrasound of the kidneys showed atrophic right kidney    Patient's bilateral knee pain is most likely multifactorial secondary to diastolic congestive heart failure with pulmonary hypertension also will give 1 dose of Lasix today    Hyperkalemia most likely secondary to lisinopril and decreased GFR we will hold lisinopril and start the patient on Lokelma    Prognosis guarded    Please avoid nephrotoxic medication and pharmacy to adjust the medication according to the GFR    Plan of care discussed with pt, answered all questions, patient verbalized understanding and agreed.    MERNA Steinberg MD  10/09/23  13:21 EDT            Electronically signed by Palomo Steinberg MD at 10/09/23 0294

## 2023-10-10 NOTE — PROGRESS NOTES
Nephrology Progress Note      Subjective     10/10/2023  Patient still short of breath blood pressures running towards higher side still have swelling    Objective       Vital signs :     Vitals:    10/10/23 0145 10/10/23 0338 10/10/23 0557 10/10/23 0700   BP:  174/81  160/89   BP Location:  Left arm  Left arm   Patient Position:  Lying  Sitting   Pulse: 75 81  82   Resp: 20 20  18   Temp:  98.2 °F (36.8 °C)  97.4 °F (36.3 °C)   TempSrc:  Oral  Oral   SpO2: 90% 90%  93%   Weight:   105 kg (230 lb 12.8 oz)    Height:            Intake/Output                   10/09/23 0701 - 10/10/23 0700     [id number] 4067-0564 Total              Intake    P.O.  480  120 600    Total Intake 480 120 600       Output    Urine  500  -- 500    Total Output 500 -- 500           10/10/2023 examined and reviewed  Physical Exam:    General Appearance : alert, pleasant, appears stated age, cooperative   Head  :  normocephalic, without obvious abnormality and atraumatic  Eyes  :  conjunctivae and sclerae normal, no icterus, no pallor and PERRLA  Neck  :  no adenopathy, suppple, no carotid bruit, no JVD   Lungs : Bilateral wheezes  Heart :  regular rhythm & normal rate, normal S1, S2 and no murmur, no rub  Abdomen : normal bowel sounds, no masses, no hepatomegaly, no splenomegaly, soft non-tender and no guarding  Extremities : moves extremities well, 2 plus  edema, no cyanosis and no redness  Skin :  no bleeding, bruising or rash  Neurologic :   orientated to person, place, time and situation, Grossly no focal deficits  Acess :     Laboratory Data :       CBC and coagulation:  Results from last 7 days   Lab Units 10/09/23  2326 10/09/23  0132 10/08/23  2108 10/08/23  1923 10/08/23  1709   PROCALCITONIN ng/mL  --   --  0.10  --   --    LACTATE mmol/L  --   --   --   --  0.9   CRP mg/dL  --   --   --  5.41*  --    WBC 10*3/mm3 8.19 7.35  --   --  7.38   HEMOGLOBIN g/dL 11.5* 11.6*  --   --  11.0*   HEMATOCRIT % 37.3 37.2  --   --  36.2   MCV fL  "88.2 87.9  --   --  88.9   MCHC g/dL 30.8* 31.2*  --   --  30.4*   PLATELETS 10*3/mm3 267 233  --   --  289     Acid/base balance:  Results from last 7 days   Lab Units 10/08/23  1707   PH, ARTERIAL pH units 7.363   PO2 ART mm Hg 53.5*   PCO2, ARTERIAL mm Hg 36.5   HCO3 ART mmol/L 20.7     Renal and electrolytes:    Results from last 7 days   Lab Units 10/09/23  2325 10/09/23  1135 10/09/23  0754 10/09/23  0132 10/08/23  1709   SODIUM mmol/L 136 136  --  139 138   POTASSIUM mmol/L 5.6* 5.5* 5.9* 6.2* 5.9*   MAGNESIUM mg/dL  --   --   --  2.0 1.9   CHLORIDE mmol/L 103 104  --  105 107   CO2 mmol/L 22.4 21.2*  --  21.1* 20.8*   BUN mg/dL 30* 29*  --  29* 27*   CREATININE mg/dL 4.07* 3.76*  --  3.98* 3.61*   CALCIUM mg/dL 9.1 8.9  --  9.0 9.0     Estimated Creatinine Clearance: 15.1 mL/min (A) (by C-G formula based on SCr of 4.07 mg/dL (H)).     Liver and pancreatic function:  Results from last 7 days   Lab Units 10/09/23  2325 10/09/23  0132 10/08/23  1709   ALBUMIN g/dL 3.2* 3.7 3.4*   BILIRUBIN mg/dL 0.3 0.3 0.5   ALK PHOS U/L 132* 149* 146*   AST (SGOT) U/L 13 10 7   ALT (SGPT) U/L <5 5 <5       Albumin Albumin   Date Value Ref Range Status   10/09/2023 3.2 (L) 3.5 - 5.2 g/dL Final   10/09/2023 3.7 3.5 - 5.2 g/dL Final   10/08/2023 3.4 (L) 3.5 - 5.2 g/dL Final      Magnesium Magnesium   Date Value Ref Range Status   10/09/2023 2.0 1.6 - 2.4 mg/dL Final   10/08/2023 1.9 1.6 - 2.4 mg/dL Final          PTH               No results found for: \"PTH\"    Cardiac:  Results from last 7 days   Lab Units 10/08/23  1709   PROBNP pg/mL 8,093.0*     Liver and pancreatic function:  Results from last 7 days   Lab Units 10/09/23  2325 10/09/23  0132 10/08/23  1709   ALBUMIN g/dL 3.2* 3.7 3.4*   BILIRUBIN mg/dL 0.3 0.3 0.5   ALK PHOS U/L 132* 149* 146*   AST (SGOT) U/L 13 10 7   ALT (SGPT) U/L <5 5 <5       US Renal Bilateral    Result Date: 10/9/2023   SMALL AND ECHOGENIC RIGHT KIDNEY SUGGESTING A UNILATERAL PROCESS SUCH AS RENAL " ARTERY STENOSIS.  NORMAL LEFT KIDNEY.  NEGATIVE FOR HYDRONEPHROSIS.    This report was finalized on 10/9/2023 5:49 AM by Imelda Walsh MD.      XR Chest 1 View    Result Date: 10/8/2023  Trace right effusion and right basilar airspace disease    This report was finalized on 10/8/2023 7:17 PM by Dr. Julio Dominguez MD.        Medications :     aspirin, 325 mg, Oral, Daily  azithromycin, 500 mg, Intravenous, Q24H  calcium gluconate, 1,000 mg, Intravenous, Once  carvedilol, 25 mg, Oral, BID With Meals  dextrose, 25 g, Intravenous, Once  folic acid, 1 mg, Oral, Daily  heparin (porcine), 5,000 Units, Subcutaneous, Q12H  hydrALAZINE, 25 mg, Oral, BID  insulin regular, 5 Units, Intravenous, Once  ipratropium-albuterol, 3 mL, Nebulization, 4x Daily - RT  levothyroxine, 88 mcg, Oral, Q AM  montelukast, 10 mg, Oral, Nightly  nicotine, 1 patch, Transdermal, Q24H  pantoprazole, 40 mg, Oral, Daily  predniSONE, 40 mg, Oral, Daily With Breakfast  rosuvastatin, 10 mg, Oral, Nightly  senna-docusate sodium, 2 tablet, Oral, BID  sodium chloride, 10 mL, Intravenous, Q12H  sodium zirconium cyclosilicate, 10 g, Oral, TID  vitamin B-12, 1,000 mcg, Oral, Daily             Assessment & Plan     -Acute kidney injury  -Atrophic right kidney  -Chronic kidney disease stage IIIa  -Acute hyperkalemia  -Bilateral edema  -Acute hypoxic respite failure  -COPD  -Hypertension  -Medical noncompliance       10/09/23  Patient baseline creatinine is unknown we will get the records came in with a creatinine of 3.69 is down to 3.6 patient is short of breath we will give 1 dose of Lasix we will check urine protein creatinine ratio, ultrasound of the kidneys showed atrophic right kidney     Patient's bilateral swelling is most likely multifactorial secondary to diastolic congestive heart failure with pulmonary hypertension also will give 1 dose of Lasix today     Hyperkalemia most likely secondary to lisinopril and decreased GFR we will hold lisinopril and  start the patient on Lokelma      10/10/2023  Patient's creatinine is 4.07 from 3.6, patient got 1 dose of Lasix as patient was very short of breath, ultrasound of the kidney showed atrophic right kidney can be secondary to renal artery stenosis but I do not think this is causing her kidney function to get worse acutely, patient's proteinuria is only 413 mg, will check serology, hold diuretics give 1 dose of albumin , continue to hold lisinopril    Continue Lokelma for hyperkalemia    Will increase hydralazine to 25 mg 3 times a day    Patient's anasarca is most likely secondary to diastolic congestive heart failure and pulmonary hypertension PA pressure around 45    Prognosis guarded     Please avoid nephrotoxic medication and pharmacy to adjust the medication according to the GFR     Plan of care discussed with pt, answered all questions, patient verbalized understanding and agreed.      MERNA Steinberg MD  10/10/23  07:36 EDT

## 2023-10-10 NOTE — PROGRESS NOTES
[facility] HOSPITALIST PROGRESS NOTE     Patient Identification:  Name:  Lesley Michel  Age:  67 y.o.  Sex:  female  :  1955  MRN:  [id number]  Visit Number:  [id number]  ROOM: [address] [place] [address]     Primary Care Provider:  Woodrow Raymond APRN     Date of Admission: 10/8/2023    Length of stay in inpatient status:  0    Subjective     Chief Compliant:    Chief Complaint   Patient presents with    Shortness of Breath    Edema       Patient with ongoing O2 requirement without any new issues reported overnight. Tolerating lying flat this am but continues using 3L NC and lungs sound progressively wet with diffuse crackles b/l. Discussed w/u thus far and patient agreeable to plan.  UOP remains <800cc/24hrs        Objective     Current Hospital Meds:  aspirin, 325 mg, Oral, Daily  azithromycin, 500 mg, Intravenous, Q24H  carvedilol, 25 mg, Oral, BID With Meals  folic acid, 1 mg, Oral, Daily  heparin (porcine), 5,000 Units, Subcutaneous, Q12H  hydrALAZINE, 25 mg, Oral, Q8H  insulin regular, 5 Units, Intravenous, Once  ipratropium-albuterol, 3 mL, Nebulization, 4x Daily - RT  levothyroxine, 88 mcg, Oral, Q AM  montelukast, 10 mg, Oral, Nightly  nicotine, 1 patch, Transdermal, Q24H  pantoprazole, 40 mg, Oral, Daily  predniSONE, 40 mg, Oral, Daily With Breakfast  rosuvastatin, 10 mg, Oral, Nightly  senna-docusate sodium, 2 tablet, Oral, BID  sodium chloride, 10 mL, Intravenous, Q12H  sodium zirconium cyclosilicate, 10 g, Oral, TID  vitamin B-12, 1,000 mcg, Oral, Daily       Current Antimicrobial Therapy:  Anti-Infectives (From admission, onward)      Ordered     Dose/Rate Route Frequency Start Stop    10/10/23 0722  azithromycin (ZITHROMAX) 500 mg in sodium chloride 0.9 % 250 mL IVPB-VTB        Ordering Provider: Chris Hanks MD    500 mg  over 60 Minutes Intravenous Every 24 Hours 10/10/23 0900 10/13/23 0859          Current Diuretic Therapy:  Diuretics (From admission, onward)      Ordered      Dose/Rate Route Frequency Start Stop    10/09/23 1354  furosemide (LASIX) injection 20 mg        Ordering Provider: Palomo Steinberg MD    20 mg Intravenous Once 10/09/23 1445 10/09/23 1610          ----------------------------------------------------------------------------------------------------------------------  Vital Signs:  Temp:  [97.4 °F (36.3 °C)-99 °F (37.2 °C)] 97.6 °F (36.4 °C)  Heart Rate:  [] 82  Resp:  [18-24] 20  BP: (144-200)/() 144/78  SpO2:  [90 %-95 %] 95 %  on  Flow (L/min):  [3] 3;   Device (Oxygen Therapy): nasal cannula;humidified  Body mass index is 45.08 kg/m².    Wt Readings from Last 3 Encounters:   10/10/23 105 kg (230 lb 12.8 oz)   10/06/23 102 kg (225 lb 12.8 oz)   03/02/23 81.6 kg (180 lb)     Intake & Output (last 3 days)         10/07 0701  10/08 0700 10/08 0701  10/09 0700 10/09 0701  10/10 0700 10/10 0701  10/11 0700    P.O.   600 240    Total Intake(mL/kg)   600 (5.7) 240 (2.3)    Urine (mL/kg/hr)   500 (0.2)     Total Output   500     Net   +100 +240            Urine Unmeasured Occurrence  5 x 2 x     Stool Unmeasured Occurrence   0 x           Diet: Cardiac Diets, Renal Diets, Fluid Restriction (240 mL/tray) Diets; Healthy Heart (2-3 Na+); Low Sodium (2-3g), Low Potassium, Low Phosphorus; 1500 mL/day; Texture: Regular Texture (IDDSI 7); Fluid Consistency: Thin (IDDSI 0)  ----------------------------------------------------------------------------------------------------------------------  Physical Exam  Vitals and nursing note reviewed.   Constitutional:       General: She is not in acute distress.  HENT:      Head: Normocephalic and atraumatic.   Eyes:      General: No scleral icterus.     Extraocular Movements: Extraocular movements intact.   Cardiovascular:      Rate and Rhythm: Normal rate.   Pulmonary:      Effort: Pulmonary effort is normal. No respiratory distress.      Breath sounds: Wheezing present. No rales.   Abdominal:      General: There is no  distension.      Palpations: Abdomen is soft.   Musculoskeletal:      Right lower leg: Edema present.      Left lower leg: Edema present.   Skin:     General: Skin is warm and dry.   Neurological:      General: No focal deficit present.      Mental Status: She is alert.   Psychiatric:         Mood and Affect: Mood normal.         Behavior: Behavior normal.       ----------------------------------------------------------------------------------------------------------------------    ----------------------------------------------------------------------------------------------------------------------  LABS:    CBC and coagulation:  Results from last 7 days   Lab Units 10/09/23  2326 10/09/23  0132 10/08/23  2108 10/08/23  1923 10/08/23  1709   PROCALCITONIN ng/mL  --   --  0.10  --   --    LACTATE mmol/L  --   --   --   --  0.9   CRP mg/dL  --   --   --  5.41*  --    WBC 10*3/mm3 8.19 7.35  --   --  7.38   HEMOGLOBIN g/dL 11.5* 11.6*  --   --  11.0*   HEMATOCRIT % 37.3 37.2  --   --  36.2   MCV fL 88.2 87.9  --   --  88.9   MCHC g/dL 30.8* 31.2*  --   --  30.4*   PLATELETS 10*3/mm3 267 233  --   --  289     Acid/base balance:  Results from last 7 days   Lab Units 10/08/23  1707   PH, ARTERIAL pH units 7.363   PO2 ART mm Hg 53.5*   PCO2, ARTERIAL mm Hg 36.5   HCO3 ART mmol/L 20.7     Renal and electrolytes:  Results from last 7 days   Lab Units 10/09/23  2325 10/09/23  1135 10/09/23  0754 10/09/23  0132 10/08/23  1709   SODIUM mmol/L 136 136  --  139 138   POTASSIUM mmol/L 5.6* 5.5* 5.9* 6.2* 5.9*   MAGNESIUM mg/dL  --   --   --  2.0 1.9   CHLORIDE mmol/L 103 104  --  105 107   CO2 mmol/L 22.4 21.2*  --  21.1* 20.8*   BUN mg/dL 30* 29*  --  29* 27*   CREATININE mg/dL 4.07* 3.76*  --  3.98* 3.61*   CALCIUM mg/dL 9.1 8.9  --  9.0 9.0   GLUCOSE mg/dL 109* 107*  --  82 102*     Estimated Creatinine Clearance: 15.1 mL/min (A) (by C-G formula based on SCr of 4.07 mg/dL (H)).    Liver and pancreatic function:  Results from  last 7 days   Lab Units 10/09/23  2325 10/09/23  0132 10/08/23  1709   ALBUMIN g/dL 3.2* 3.7 3.4*   BILIRUBIN mg/dL 0.3 0.3 0.5   ALK PHOS U/L 132* 149* 146*   AST (SGOT) U/L 13 10 7   ALT (SGPT) U/L <5 5 <5     Endocrine function:  Lab Results   Component Value Date    HGBA1C 5.30 10/08/2023     Point of care bedside glucose levels:  Results from last 7 days   Lab Units 10/10/23  1124 10/10/23  0711 10/10/23  0512 10/09/23  2004 10/09/23  1616 10/09/23  1117 10/09/23  0751 10/09/23  0654 10/09/23  0425 10/09/23  0353 10/09/23  0324 10/08/23  2348 10/08/23  2229 10/08/23  2207   GLUCOSE mg/dL 103 101 102 100 106 91 87 99 74 59* 66* 91 79 67*     Glucose levels from the [facility]:  Results from last 7 days   Lab Units 10/09/23  2325 10/09/23  1135 10/09/23  0132 10/08/23  1709   GLUCOSE mg/dL 109* 107* 82 102*     Lab Results   Component Value Date    TSH 6.770 (H) 10/08/2023    FREET4 1.01 10/08/2023     Cardiac:  Results from last 7 days   Lab Units 10/09/23  2325 10/09/23  2152 10/08/23  1923 10/08/23  1709   CK TOTAL U/L 217*  --   --   --    HSTROP T ng/L 25* 23* 26* 23*   PROBNP pg/mL  --   --   --  8,093.0*     Results from last 7 days   Lab Units 10/08/23  1923   CHOLESTEROL mg/dL 140   TRIGLYCERIDES mg/dL 75   HDL CHOL mg/dL 34*   LDL CHOL mg/dL 91     Cultures:  Lab Results   Component Value Date    COLORU Yellow 10/09/2023    CLARITYU Clear 10/09/2023    PHUR 5.5 10/09/2023    PROTEINUR 48.0 10/09/2023    PROTEINUR 53.4 10/09/2023    GLUCOSEU Negative 10/09/2023    KETONESU Negative 10/09/2023    BLOODU Negative 10/09/2023    NITRITEU Negative 10/09/2023    LEUKOCYTESUR Negative 10/09/2023    BILIRUBINUR Negative 10/09/2023    UROBILINOGEN 0.2 E.U./dL 10/09/2023    RBCUA None Seen 10/09/2023    WBCUA 0-2 10/09/2023    BACTERIA None Seen 10/09/2023     Microbiology Results (last 10 days)       Procedure Component Value - Date/Time    Respiratory Panel PCR w/COVID-19(SARS-CoV-2) LOBO/VAZQUEZ/MARLIN/PAD/COR/MAD/KELBY  "In-House, NP Swab in [facility], 3-4 HR TAT - Swab, Nasopharynx [[id number]]  (Normal) Collected: 10/09/23 0103    Lab Status: Final result Specimen: Swab from Nasopharynx Updated: 10/09/23 0156     ADENOVIRUS, PCR Not Detected     Coronavirus 229E Not Detected     Coronavirus HKU1 Not Detected     Coronavirus NL63 Not Detected     Coronavirus OC43 Not Detected     COVID19 Not Detected     Human Metapneumovirus Not Detected     Human Rhinovirus/Enterovirus Not Detected     Influenza A PCR Not Detected     Influenza B PCR Not Detected     Parainfluenza Virus 1 Not Detected     Parainfluenza Virus 2 Not Detected     Parainfluenza Virus 3 Not Detected     Parainfluenza Virus 4 Not Detected     RSV, PCR Not Detected     Bordetella pertussis pcr Not Detected     Bordetella parapertussis PCR Not Detected     Chlamydophila pneumoniae PCR Not Detected     Mycoplasma pneumo by PCR Not Detected    Narrative:      In the setting of a positive respiratory panel with a viral infection PLUS a negative procalcitonin without other underlying concern for bacterial infection, consider observing off antibiotics or discontinuation of antibiotics and continue supportive care. If the respiratory panel is positive for atypical bacterial infection (Bordetella pertussis, Chlamydophila pneumoniae, or Mycoplasma pneumoniae), consider antibiotic de-escalation to target atypical bacterial infection.    Blood Culture - Blood, Arm, Left [[id number]]  (Normal) Collected: 10/08/23 1709    Lab Status: Preliminary result Specimen: Blood from Arm, Left Updated: 10/09/23 1715     Blood Culture No growth at 24 hours    Blood Culture - Blood, Arm, Right [[id number]]  (Normal) Collected: 10/08/23 1709    Lab Status: Preliminary result Specimen: Blood from Arm, Right Updated: 10/09/23 1715     Blood Culture No growth at 24 hours            No results found for: \"PREGTESTUR\", \"PREGSERUM\", \"HCG\", \"HCGQUANT\"  Pain Management Panel          Latest Ref Rng & Units " 10/9/2023   Pain Management Panel   Creatinine, Urine mg/dL 116.0        I have personally looked at the labs and they are summarized above.  ----------------------------------------------------------------------------------------------------------------------  Detailed radiology reports for the last 24 hours:    Imaging Results (Last 24 Hours)       Procedure Component Value Units Date/Time    XR Chest 1 View [[id number]] Collected: 10/10/23 1155     Updated: 10/10/23 1157    Narrative:      EXAM:    XR Chest, 1 View     EXAM DATE:    10/10/2023 12:08 PM     CLINICAL HISTORY:    dyspnea; N17.9-Acute kidney failure, unspecified; N18.9-Chronic kidney  disease, unspecified; I50.9-Heart failure, unspecified; J96.21-Acute and  chronic respiratory failure with hypoxia; E87.5-Hyperkalemia     TECHNIQUE:    Frontal view of the chest.     COMPARISON:    10/8/2023     FINDINGS:    Lungs and pleural spaces:  CHF with interstitial edema and pulmonary  vascular congestion.  Development of consolidative bilateral mid and  lower lung airspace disease.  Trace right pleural effusion.  No  pneumothorax.    Heart:  Unremarkable as visualized.  No cardiomegaly.    Mediastinum:  Unremarkable as visualized.    Bones/joints:  Unremarkable as visualized.       Impression:      1.  Bilateral airspace disease consistent with pneumonia.  2.  CHF/edema.        This report was finalized on 10/10/2023 11:55 AM by Dr. Eduardo August MD.       CT Chest Without Contrast Diagnostic [[id number]] Collected: 10/10/23 1150     Updated: 10/10/23 1157    Narrative:      EXAM:    CT Chest Without Intravenous Contrast     EXAM DATE:    10/10/2023 11:59 AM     CLINICAL HISTORY:    Respiratory illness, nondiagnostic xray; N17.9-Acute kidney failure,  unspecified; N18.9-Chronic kidney disease, unspecified; I50.9-Heart  failure, unspecified; J96.21-Acute and chronic respiratory failure with  hypoxia; E87.5-Hyperkalemia     TECHNIQUE:    Axial computed  tomography images of the chest without intravenous  contrast.  Sagittal and coronal reformatted images were created and  reviewed.  This CT exam was performed using one or more of the following  dose reduction techniques:  automated exposure control, adjustment of  the mA and/or kV according to patient size, and/or use of iterative  reconstruction technique.     COMPARISON:    X-ray 10/8/2023     FINDINGS:    Lungs and pleural spaces:  Consolidative airspace disease of the  peripheral aspect right lower lobe and right middle lobe with volume  loss.  Partial consolidation of the lingula.  Patchy airspace disease  left lower lobe.  Small right and very small left pleural effusions  which appear nonloculated.  Pulmonary vascular congestion.    Heart:  Small pericardial effusion.  Cardiomegaly.  Severe coronary  artery calcifications.    Mediastinum:  Mediastinal adenopathy and right greater than left hilar  adenopathy.  Right hilar lymph node is 1.8 cm.  Subcarinal lymph node is  2.5 cm.    Bones/joints:  Unremarkable as visualized.  No acute fracture.  No  dislocation.    Soft tissues:  Interstitial thickening compatible with edema.  Mild  anasarca.    Vasculature:  Atherosclerosis thoracic aorta.    Lymph nodes:  Right paratracheal lymph node is 2.4 x 1.3 cm.    Liver:  Liver cirrhosis.       Impression:      1.  CHF/edema.  2.  Small right and very small left pleural effusions which are  nonloculated.  3.  Bilateral consolidative airspace disease as detailed above  consistent with pneumonia.  4.  Small pericardial effusion.  5.  Cardiomegaly with severe coronary artery calcifications.  6.  Mediastinal and hilar adenopathy. May be reactive but follow-up  recommended to exclude neoplastic etiologies.  7.  Liver cirrhosis.  8.  Anasarca.  9.  Other nonacute findings above.        This report was finalized on 10/10/2023 11:55 AM by Dr. Eduardo August MD.                 CT chest reviewed with b/l opacification  consistent with edema vs atypical PNA    Assessment & Plan      #Acute hypoxic respiratory failure on 3L NC, secondary to PNA  #COPD hx w/exacerbation  -Likely secondary to progressive HF exacerbation vs volume overload from progressive CKD as primary etiology but wheezing on exam with component of COPDe  -CT chest performed and consistent with edema but cannot definitively exclude atypical PNA  -Resp panel reviewed and negative, TTE with diastolic dysfunction and EF preserved. CRP elevated but procal wnl  -Cont duonebs, prednisone, and azithromycin    #Reported hx HpEF  #CAD s/p PCI  -Resumed home antihypertensives and beta blocker, asa and dose reduced statin resumed. Hydralazine dose increased from BID to q8hr and can increase further to maintain BP <180 systolic  -Holding lisinopril given HyperK, diuretics per nephrology  -Reds vest midlly elevated at 37%    #Oliguric MAICO? on CKD  #Hyperkalemia  #Troponin elevation secondary to CKD  -Baseline unknown, records requested by nephrologist for comparison but BUN near normal range. Right kidney smaller and echogenic concerning for PA, needs close outpatient F/u. UA with mild proteinuria, otherwise bland  -Nephrology consulted and appreciate input  -Cont lokelma for hyperkalemia, no ecg changes and improving on repeat bmp but remains elevated  -Cr not improving currently, nephrology rec albumin dose today and will repeat renal panel daily but patient at high risk for progression to HD    #Hypoglycemia  -Improved after admission with PO diet resumption, A1c reviewed and wnl      - - Chronic - -    #Debility: PT consulted  #Hypothyroidism: Cont synthroid  #Obesity:Body mass index is 45.08 kg/m².    VTE Prophylaxis:   Mechanical Order History:       None          Pharmalogical Order History:        Ordered     Dose Route Frequency Stop    10/08/23 2052  heparin (porcine) 5000 UNIT/ML injection 5,000 Units         5,000 Units SC Every 12 Hours Scheduled --                     Code Status and Medical Interventions:   Ordered at: 10/08/23 2006     Level Of Support Discussed With:    Patient     Code Status (Patient has no pulse and is not breathing):    CPR (Attempt to Resuscitate)     Medical Interventions (Patient has pulse or is breathing):    Full Support         Disposition: Cont inpatient management for respiratory and renal failure    I have reviewed any copied/forwarded text or data, verified its accuracy, and updated as necessary above.    Chris Hanks MD  [facility] Hospitalist  10/10/23  14:39 EDT

## 2023-10-10 NOTE — PLAN OF CARE
Goal Outcome Evaluation:   Patient resting in bed. No acute signs or symptoms of distress. No complaints at this time. Patient complaint of anxiety earlier in shift that has since resolved. PRN medication provided. Patient remains on 3L nasal cannula, humidified and tolerating well. Room air is patient baseline. Will continue to follow with plan of care.

## 2023-10-10 NOTE — PLAN OF CARE
Goal Outcome Evaluation:   Patient has been pleasant this shift. Patient's O2 would drop when she would take off her nasal cannula, education provided and given. No other S&S of distress noted during shift. Patient is currently resting in bed. Family at bedside. Will continue with plan of care.

## 2023-10-11 LAB
ALBUMIN SERPL-MCNC: 3.8 G/DL (ref 3.5–5.2)
ALBUMIN/GLOB SERPL: 1.1 G/DL
ALP SERPL-CCNC: 136 U/L (ref 39–117)
ALT SERPL W P-5'-P-CCNC: 6 U/L (ref 1–33)
ANION GAP SERPL CALCULATED.3IONS-SCNC: 11.1 MMOL/L (ref 5–15)
ANION GAP SERPL CALCULATED.3IONS-SCNC: 11.1 MMOL/L (ref 5–15)
AST SERPL-CCNC: 13 U/L (ref 1–32)
BILIRUB SERPL-MCNC: 0.3 MG/DL (ref 0–1.2)
BUN SERPL-MCNC: 38 MG/DL (ref 8–23)
BUN SERPL-MCNC: 39 MG/DL (ref 8–23)
BUN/CREAT SERPL: 8.1 (ref 7–25)
BUN/CREAT SERPL: 8.3 (ref 7–25)
C3 SERPL-MCNC: 121 MG/DL (ref 82–167)
C4 SERPL-MCNC: 35 MG/DL (ref 14–44)
CALCIUM SPEC-SCNC: 9.4 MG/DL (ref 8.6–10.5)
CALCIUM SPEC-SCNC: 9.5 MG/DL (ref 8.6–10.5)
CHLORIDE SERPL-SCNC: 101 MMOL/L (ref 98–107)
CHLORIDE SERPL-SCNC: 101 MMOL/L (ref 98–107)
CO2 SERPL-SCNC: 21.9 MMOL/L (ref 22–29)
CO2 SERPL-SCNC: 23.9 MMOL/L (ref 22–29)
CREAT SERPL-MCNC: 4.7 MG/DL (ref 0.57–1)
CREAT SERPL-MCNC: 4.71 MG/DL (ref 0.57–1)
EGFRCR SERPLBLD CKD-EPI 2021: 9.8 ML/MIN/1.73
EGFRCR SERPLBLD CKD-EPI 2021: 9.8 ML/MIN/1.73
GLOBULIN UR ELPH-MCNC: 3.4 GM/DL
GLUCOSE BLDC GLUCOMTR-MCNC: 127 MG/DL (ref 70–130)
GLUCOSE BLDC GLUCOMTR-MCNC: 153 MG/DL (ref 70–130)
GLUCOSE BLDC GLUCOMTR-MCNC: 157 MG/DL (ref 70–130)
GLUCOSE BLDC GLUCOMTR-MCNC: 172 MG/DL (ref 70–130)
GLUCOSE SERPL-MCNC: 133 MG/DL (ref 65–99)
GLUCOSE SERPL-MCNC: 155 MG/DL (ref 65–99)
HAV IGM SERPL QL IA: NORMAL
HBV CORE IGM SERPL QL IA: NORMAL
HBV SURFACE AG SERPL QL IA: NORMAL
HCV AB SER DONR QL: NORMAL
POTASSIUM SERPL-SCNC: 5.5 MMOL/L (ref 3.5–5.2)
POTASSIUM SERPL-SCNC: 5.9 MMOL/L (ref 3.5–5.2)
PROT SERPL-MCNC: 7.2 G/DL (ref 6–8.5)
SODIUM SERPL-SCNC: 134 MMOL/L (ref 136–145)
SODIUM SERPL-SCNC: 136 MMOL/L (ref 136–145)

## 2023-10-11 PROCEDURE — 94799 UNLISTED PULMONARY SVC/PX: CPT

## 2023-10-11 PROCEDURE — 83516 IMMUNOASSAY NONANTIBODY: CPT | Performed by: INTERNAL MEDICINE

## 2023-10-11 PROCEDURE — 80048 BASIC METABOLIC PNL TOTAL CA: CPT | Performed by: INTERNAL MEDICINE

## 2023-10-11 PROCEDURE — 86037 ANCA TITER EACH ANTIBODY: CPT | Performed by: INTERNAL MEDICINE

## 2023-10-11 PROCEDURE — 97116 GAIT TRAINING THERAPY: CPT

## 2023-10-11 PROCEDURE — 25010000002 ALBUMIN HUMAN 25% PER 50 ML: Performed by: INTERNAL MEDICINE

## 2023-10-11 PROCEDURE — 94761 N-INVAS EAR/PLS OXIMETRY MLT: CPT

## 2023-10-11 PROCEDURE — 25010000002 HEPARIN (PORCINE) PER 1000 UNITS: Performed by: INTERNAL MEDICINE

## 2023-10-11 PROCEDURE — 25010000002 AZITHROMYCIN PER 500 MG: Performed by: STUDENT IN AN ORGANIZED HEALTH CARE EDUCATION/TRAINING PROGRAM

## 2023-10-11 PROCEDURE — 82948 REAGENT STRIP/BLOOD GLUCOSE: CPT

## 2023-10-11 PROCEDURE — 86038 ANTINUCLEAR ANTIBODIES: CPT | Performed by: INTERNAL MEDICINE

## 2023-10-11 PROCEDURE — 97110 THERAPEUTIC EXERCISES: CPT

## 2023-10-11 PROCEDURE — 94664 DEMO&/EVAL PT USE INHALER: CPT

## 2023-10-11 PROCEDURE — 86160 COMPLEMENT ANTIGEN: CPT | Performed by: INTERNAL MEDICINE

## 2023-10-11 PROCEDURE — 82784 ASSAY IGA/IGD/IGG/IGM EACH: CPT | Performed by: INTERNAL MEDICINE

## 2023-10-11 PROCEDURE — 86225 DNA ANTIBODY NATIVE: CPT | Performed by: INTERNAL MEDICINE

## 2023-10-11 PROCEDURE — 99232 SBSQ HOSP IP/OBS MODERATE 35: CPT | Performed by: STUDENT IN AN ORGANIZED HEALTH CARE EDUCATION/TRAINING PROGRAM

## 2023-10-11 PROCEDURE — 86334 IMMUNOFIX E-PHORESIS SERUM: CPT | Performed by: INTERNAL MEDICINE

## 2023-10-11 PROCEDURE — 80074 ACUTE HEPATITIS PANEL: CPT | Performed by: INTERNAL MEDICINE

## 2023-10-11 PROCEDURE — 80053 COMPREHEN METABOLIC PANEL: CPT | Performed by: INTERNAL MEDICINE

## 2023-10-11 PROCEDURE — 25810000003 SODIUM CHLORIDE 0.9 % SOLUTION 250 ML FLEX CONT: Performed by: STUDENT IN AN ORGANIZED HEALTH CARE EDUCATION/TRAINING PROGRAM

## 2023-10-11 PROCEDURE — 63710000001 PREDNISONE PER 1 MG: Performed by: STUDENT IN AN ORGANIZED HEALTH CARE EDUCATION/TRAINING PROGRAM

## 2023-10-11 PROCEDURE — P9047 ALBUMIN (HUMAN), 25%, 50ML: HCPCS | Performed by: INTERNAL MEDICINE

## 2023-10-11 PROCEDURE — 84165 PROTEIN E-PHORESIS SERUM: CPT | Performed by: INTERNAL MEDICINE

## 2023-10-11 RX ORDER — SODIUM POLYSTYRENE SULFONATE 4.1 MEQ/G
30 POWDER, FOR SUSPENSION ORAL; RECTAL ONCE
Status: COMPLETED | OUTPATIENT
Start: 2023-10-11 | End: 2023-10-11

## 2023-10-11 RX ORDER — LACTULOSE 10 G/15ML
30 SOLUTION ORAL DAILY
Status: COMPLETED | OUTPATIENT
Start: 2023-10-11 | End: 2023-10-11

## 2023-10-11 RX ORDER — ALBUMIN (HUMAN) 12.5 G/50ML
25 SOLUTION INTRAVENOUS ONCE
Status: COMPLETED | OUTPATIENT
Start: 2023-10-11 | End: 2023-10-11

## 2023-10-11 RX ADMIN — PANTOPRAZOLE SODIUM 40 MG: 40 TABLET, DELAYED RELEASE ORAL at 08:33

## 2023-10-11 RX ADMIN — SODIUM POLYSTYRENE SULFONATE 30 G: 1 POWDER ORAL; RECTAL at 10:46

## 2023-10-11 RX ADMIN — LEVOTHYROXINE SODIUM 88 MCG: 88 TABLET ORAL at 06:44

## 2023-10-11 RX ADMIN — ALBUMIN HUMAN 25 G: 0.25 SOLUTION INTRAVENOUS at 10:47

## 2023-10-11 RX ADMIN — HYDRALAZINE HYDROCHLORIDE 25 MG: 25 TABLET, FILM COATED ORAL at 06:44

## 2023-10-11 RX ADMIN — HYDROXYZINE HYDROCHLORIDE 25 MG: 25 TABLET, FILM COATED ORAL at 21:53

## 2023-10-11 RX ADMIN — AZITHROMYCIN DIHYDRATE 500 MG: 500 INJECTION, POWDER, LYOPHILIZED, FOR SOLUTION INTRAVENOUS at 10:47

## 2023-10-11 RX ADMIN — HYDRALAZINE HYDROCHLORIDE 25 MG: 25 TABLET, FILM COATED ORAL at 14:55

## 2023-10-11 RX ADMIN — HYDRALAZINE HYDROCHLORIDE 25 MG: 25 TABLET, FILM COATED ORAL at 21:53

## 2023-10-11 RX ADMIN — CARVEDILOL 25 MG: 25 TABLET, FILM COATED ORAL at 17:38

## 2023-10-11 RX ADMIN — Medication 10 ML: at 21:54

## 2023-10-11 RX ADMIN — IPRATROPIUM BROMIDE AND ALBUTEROL SULFATE 3 ML: 2.5; .5 SOLUTION RESPIRATORY (INHALATION) at 01:42

## 2023-10-11 RX ADMIN — NICOTINE TRANSDERMAL SYSTEM 1 PATCH: 21 PATCH, EXTENDED RELEASE TRANSDERMAL at 08:34

## 2023-10-11 RX ADMIN — ROSUVASTATIN CALCIUM 10 MG: 10 TABLET, FILM COATED ORAL at 21:53

## 2023-10-11 RX ADMIN — MONTELUKAST SODIUM 10 MG: 10 TABLET, COATED ORAL at 21:53

## 2023-10-11 RX ADMIN — HEPARIN SODIUM 5000 UNITS: 5000 INJECTION INTRAVENOUS; SUBCUTANEOUS at 08:32

## 2023-10-11 RX ADMIN — PREDNISONE 40 MG: 20 TABLET ORAL at 08:33

## 2023-10-11 RX ADMIN — Medication 1000 MCG: at 08:34

## 2023-10-11 RX ADMIN — IPRATROPIUM BROMIDE AND ALBUTEROL SULFATE 3 ML: 2.5; .5 SOLUTION RESPIRATORY (INHALATION) at 18:39

## 2023-10-11 RX ADMIN — HEPARIN SODIUM 5000 UNITS: 5000 INJECTION INTRAVENOUS; SUBCUTANEOUS at 21:52

## 2023-10-11 RX ADMIN — GABAPENTIN 300 MG: 300 CAPSULE ORAL at 21:52

## 2023-10-11 RX ADMIN — CARVEDILOL 25 MG: 25 TABLET, FILM COATED ORAL at 08:33

## 2023-10-11 RX ADMIN — LACTULOSE 30 G: 20 SOLUTION ORAL at 10:46

## 2023-10-11 RX ADMIN — ASPIRIN 325 MG: 325 TABLET, COATED ORAL at 08:34

## 2023-10-11 RX ADMIN — IPRATROPIUM BROMIDE AND ALBUTEROL SULFATE 3 ML: 2.5; .5 SOLUTION RESPIRATORY (INHALATION) at 06:49

## 2023-10-11 RX ADMIN — IPRATROPIUM BROMIDE AND ALBUTEROL SULFATE 3 ML: 2.5; .5 SOLUTION RESPIRATORY (INHALATION) at 13:04

## 2023-10-11 RX ADMIN — Medication 10 ML: at 08:34

## 2023-10-11 RX ADMIN — Medication 1 MG: at 08:33

## 2023-10-11 NOTE — PLAN OF CARE
Goal Outcome Evaluation: Patient has been pleasant. Compliant with care. Patient remains on 3L/NC, saturations remaining above 90% with oxygen. Patient ambulated to bedside, steady gait noted. Currently resting in bed, watching television. Call light in reach.

## 2023-10-11 NOTE — PROGRESS NOTES
[facility] HOSPITALIST PROGRESS NOTE     Patient Identification:  Name:  Lesley Michel  Age:  67 y.o.  Sex:  female  :  1955  MRN:  [id number]  Visit Number:  [id number]  ROOM: [address] [place] [address]     Primary Care Provider:  Woodrow Raymond APRN     Date of Admission: 10/8/2023    Length of stay in inpatient status:  1    Subjective     Chief Compliant:    Chief Complaint   Patient presents with    Shortness of Breath    Edema       Patient remains on supplemental O2 but wheezing and WOB improved, LE edema reported a stable by patient but appears increased slightly on my exam as compared to yesterday. BP better controlled with increased hydralazine dose.        Objective     Current Hospital Meds:  aspirin, 325 mg, Oral, Daily  azithromycin, 500 mg, Intravenous, Q24H  carvedilol, 25 mg, Oral, BID With Meals  folic acid, 1 mg, Oral, Daily  heparin (porcine), 5,000 Units, Subcutaneous, Q12H  hydrALAZINE, 25 mg, Oral, Q8H  insulin regular, 5 Units, Intravenous, Once  ipratropium-albuterol, 3 mL, Nebulization, 4x Daily - RT  levothyroxine, 88 mcg, Oral, Q AM  montelukast, 10 mg, Oral, Nightly  nicotine, 1 patch, Transdermal, Q24H  pantoprazole, 40 mg, Oral, Daily  predniSONE, 40 mg, Oral, Daily With Breakfast  rosuvastatin, 10 mg, Oral, Nightly  senna-docusate sodium, 2 tablet, Oral, BID  sodium chloride, 10 mL, Intravenous, Q12H  vitamin B-12, 1,000 mcg, Oral, Daily       Current Antimicrobial Therapy:  Anti-Infectives (From admission, onward)      Ordered     Dose/Rate Route Frequency Start Stop    10/10/23 0722  azithromycin (ZITHROMAX) 500 mg in sodium chloride 0.9 % 250 mL IVPB-VTB        Ordering Provider: Chris Hanks MD    500 mg  over 60 Minutes Intravenous Every 24 Hours 10/10/23 0900 10/13/23 0859          Current Diuretic Therapy:  Diuretics (From admission, onward)      Ordered     Dose/Rate Route Frequency Start Stop    10/09/23 1354  furosemide (LASIX) injection 20 mg         Ordering Provider: Palomo Steinberg MD    20 mg Intravenous Once 10/09/23 1445 10/09/23 1610          ----------------------------------------------------------------------------------------------------------------------  Vital Signs:  Temp:  [97.9 °F (36.6 °C)-98.6 °F (37 °C)] 98 °F (36.7 °C)  Heart Rate:  [61-83] 83  Resp:  [18-21] 20  BP: (118-176)/(63-95) 147/72  SpO2:  [92 %-99 %] 96 %  on  Flow (L/min):  [3] 3;   Device (Oxygen Therapy): nasal cannula  Body mass index is 45.43 kg/m².    Wt Readings from Last 3 Encounters:   10/11/23 106 kg (232 lb 9.6 oz)   10/06/23 102 kg (225 lb 12.8 oz)   03/02/23 81.6 kg (180 lb)     Intake & Output (last 3 days)         10/08 0701  10/09 0700 10/09 0701  10/10 0700 10/10 0701  10/11 0700 10/11 0701  10/12 0700    P.O.  600 480 240    Total Intake(mL/kg)  600 (5.7) 480 (4.5) 240 (2.3)    Urine (mL/kg/hr)  500 (0.2)      Total Output  500      Net  +100 +480 +240            Urine Unmeasured Occurrence 5 x 2 x 2 x 2 x    Stool Unmeasured Occurrence  0 x  2 x          Diet: Cardiac Diets, Renal Diets, Fluid Restriction (240 mL/tray) Diets; Healthy Heart (2-3 Na+); Low Sodium (2-3g), Low Potassium, Low Phosphorus; 1500 mL/day; Texture: Regular Texture (IDDSI 7); Fluid Consistency: Thin (IDDSI 0)  ----------------------------------------------------------------------------------------------------------------------  Physical Exam  Vitals and nursing note reviewed.   Constitutional:       General: She is not in acute distress.  HENT:      Head: Normocephalic and atraumatic.   Eyes:      General: No scleral icterus.     Extraocular Movements: Extraocular movements intact.   Cardiovascular:      Rate and Rhythm: Normal rate.   Pulmonary:      Effort: Pulmonary effort is normal. No respiratory distress.      Breath sounds: No wheezing (Wheezing improved) or rales.   Abdominal:      General: There is no distension.      Palpations: Abdomen is soft.   Musculoskeletal:      Right  lower leg: Edema present.      Left lower leg: Edema present.   Skin:     General: Skin is warm and dry.   Neurological:      General: No focal deficit present.      Mental Status: She is alert.   Psychiatric:         Mood and Affect: Mood normal.         Behavior: Behavior normal.       ----------------------------------------------------------------------------------------------------------------------    ----------------------------------------------------------------------------------------------------------------------  LABS:    CBC and coagulation:  Results from last 7 days   Lab Units 10/09/23  2326 10/09/23  0132 10/08/23  2108 10/08/23  1923 10/08/23  1709   PROCALCITONIN ng/mL  --   --  0.10  --   --    LACTATE mmol/L  --   --   --   --  0.9   CRP mg/dL  --   --   --  5.41*  --    WBC 10*3/mm3 8.19 7.35  --   --  7.38   HEMOGLOBIN g/dL 11.5* 11.6*  --   --  11.0*   HEMATOCRIT % 37.3 37.2  --   --  36.2   MCV fL 88.2 87.9  --   --  88.9   MCHC g/dL 30.8* 31.2*  --   --  30.4*   PLATELETS 10*3/mm3 267 233  --   --  289     Acid/base balance:  Results from last 7 days   Lab Units 10/08/23  1707   PH, ARTERIAL pH units 7.363   PO2 ART mm Hg 53.5*   PCO2, ARTERIAL mm Hg 36.5   HCO3 ART mmol/L 20.7     Renal and electrolytes:  Results from last 7 days   Lab Units 10/11/23  1156 10/11/23  0525 10/10/23  1420 10/09/23  2325 10/09/23  1135 10/09/23  0754 10/09/23  0132 10/08/23  1709   SODIUM mmol/L 134* 136 137 136 136  --  139 138   POTASSIUM mmol/L 5.5* 5.9* 5.6* 5.6* 5.5* 5.9* 6.2* 5.9*   MAGNESIUM mg/dL  --   --   --   --   --   --  2.0 1.9   CHLORIDE mmol/L 101 101 105 103 104  --  105 107   CO2 mmol/L 21.9* 23.9 20.8* 22.4 21.2*  --  21.1* 20.8*   BUN mg/dL 39* 38* 33* 30* 29*  --  29* 27*   CREATININE mg/dL 4.70* 4.71* 4.06* 4.07* 3.76*  --  3.98* 3.61*   CALCIUM mg/dL 9.5 9.4 8.7 9.1 8.9  --  9.0 9.0   GLUCOSE mg/dL 133* 155* 128* 109* 107*  --  82 102*     Estimated Creatinine Clearance: 13.1 mL/min (A)  (by C-G formula based on SCr of 4.7 mg/dL (H)).    Liver and pancreatic function:  Results from last 7 days   Lab Units 10/11/23  0525 10/09/23  2325 10/09/23  0132 10/08/23  1709   ALBUMIN g/dL 3.8 3.2* 3.7 3.4*   BILIRUBIN mg/dL 0.3 0.3 0.3 0.5   ALK PHOS U/L 136* 132* 149* 146*   AST (SGOT) U/L 13 13 10 7   ALT (SGPT) U/L 6 <5 5 <5     Endocrine function:  Lab Results   Component Value Date    HGBA1C 5.30 10/08/2023     Point of care bedside glucose levels:  Results from last 7 days   Lab Units 10/11/23  1610 10/11/23  1113 10/11/23  0551 10/10/23  1926 10/10/23  1712 10/10/23  1124 10/10/23  0711 10/10/23  0512 10/09/23  2004 10/09/23  1616 10/09/23  1117 10/09/23  0751 10/09/23  0654 10/09/23  0425   GLUCOSE mg/dL 153* 127 172* 164* 126 103 101 102 100 106 91 87 99 74     Glucose levels from the [facility]:  Results from last 7 days   Lab Units 10/11/23  1156 10/11/23  0525 10/10/23  1420 10/09/23  2325 10/09/23  1135 10/09/23  0132 10/08/23  1709   GLUCOSE mg/dL 133* 155* 128* 109* 107* 82 102*     Lab Results   Component Value Date    TSH 6.770 (H) 10/08/2023    FREET4 1.01 10/08/2023     Cardiac:  Results from last 7 days   Lab Units 10/09/23  2325 10/09/23  2152 10/08/23  1923 10/08/23  1709   CK TOTAL U/L 217*  --   --   --    HSTROP T ng/L 25* 23* 26* 23*   PROBNP pg/mL  --   --   --  8,093.0*     Results from last 7 days   Lab Units 10/08/23  1923   CHOLESTEROL mg/dL 140   TRIGLYCERIDES mg/dL 75   HDL CHOL mg/dL 34*   LDL CHOL mg/dL 91     Cultures:  Lab Results   Component Value Date    COLORU Yellow 10/10/2023    CLARITYU Cloudy (A) 10/10/2023    PHUR <=5.0 10/10/2023    PROTEINUR 48.0 10/09/2023    PROTEINUR 53.4 10/09/2023    GLUCOSEU Negative 10/10/2023    KETONESU Negative 10/10/2023    BLOODU Negative 10/10/2023    NITRITEU Negative 10/10/2023    LEUKOCYTESUR Negative 10/10/2023    BILIRUBINUR Negative 10/10/2023    UROBILINOGEN 0.2 E.U./dL 10/10/2023    RBCUA None Seen 10/09/2023    WBCUA 0-2 10/09/2023     BACTERIA None Seen 10/09/2023     Microbiology Results (last 10 days)       Procedure Component Value - Date/Time    Respiratory Panel PCR w/COVID-19(SARS-CoV-2) LOBO/VAZQUEZ/MARLIN/PAD/COR/MAD/KELBY In-House, NP Swab in UTM/VTM, 3-4 HR TAT - Swab, Nasopharynx [[id number]]  (Normal) Collected: 10/09/23 0103    Lab Status: Final result Specimen: Swab from Nasopharynx Updated: 10/09/23 0156     ADENOVIRUS, PCR Not Detected     Coronavirus 229E Not Detected     Coronavirus HKU1 Not Detected     Coronavirus NL63 Not Detected     Coronavirus OC43 Not Detected     COVID19 Not Detected     Human Metapneumovirus Not Detected     Human Rhinovirus/Enterovirus Not Detected     Influenza A PCR Not Detected     Influenza B PCR Not Detected     Parainfluenza Virus 1 Not Detected     Parainfluenza Virus 2 Not Detected     Parainfluenza Virus 3 Not Detected     Parainfluenza Virus 4 Not Detected     RSV, PCR Not Detected     Bordetella pertussis pcr Not Detected     Bordetella parapertussis PCR Not Detected     Chlamydophila pneumoniae PCR Not Detected     Mycoplasma pneumo by PCR Not Detected    Narrative:      In the setting of a positive respiratory panel with a viral infection PLUS a negative procalcitonin without other underlying concern for bacterial infection, consider observing off antibiotics or discontinuation of antibiotics and continue supportive care. If the respiratory panel is positive for atypical bacterial infection (Bordetella pertussis, Chlamydophila pneumoniae, or Mycoplasma pneumoniae), consider antibiotic de-escalation to target atypical bacterial infection.    Blood Culture - Blood, Arm, Left [[id number]]  (Normal) Collected: 10/08/23 1709    Lab Status: Preliminary result Specimen: Blood from Arm, Left Updated: 10/10/23 1715     Blood Culture No growth at 2 days    Blood Culture - Blood, Arm, Right [[id number]]  (Normal) Collected: 10/08/23 1709    Lab Status: Preliminary result Specimen: Blood from Arm, Right  "Updated: 10/10/23 3585     Blood Culture No growth at 2 days            No results found for: \"PREGTESTUR\", \"PREGSERUM\", \"HCG\", \"HCGQUANT\"  Pain Management Panel          Latest Ref Rng & Units 10/9/2023   Pain Management Panel   Creatinine, Urine mg/dL 116.0        I have personally looked at the labs and they are summarized above.  ----------------------------------------------------------------------------------------------------------------------  Detailed radiology reports for the last 24 hours:    Imaging Results (Last 24 Hours)       ** No results found for the last 24 hours. **              CT chest reviewed with b/l opacification consistent with edema vs atypical PNA    Assessment & Plan      #Acute hypoxic respiratory failure on 3L NC, secondary to PNA  #COPD hx w/exacerbation  -Likely secondary to progressive HF exacerbation vs volume overload from progressive CKD as primary etiology but wheezing on exam with component of COPDe  -CT chest performed and consistent with edema but cannot definitively exclude atypical PNA  -Resp panel reviewed and negative, TTE with diastolic dysfunction and EF preserved. CRP elevated but procal wnl  -Cont duonebs, prednisone, and azithromycin. Wheezing improved and will wean O2 as able however Redsvest elevated and pulm edema likely contributing to O2 requirement    #Reported hx HpEF  #CAD s/p PCI  -Resumed home antihypertensives and beta blocker, asa and dose reduced statin resumed. Hydralazine dose increased from BID to q8hr and can increase further to maintain BP <180 systolic  -Holding lisinopril given HyperK, diuretics per nephrology  -Reds vest midlly elevated at 37%    #Oliguric MAICO? on CKD  #Hyperkalemia  #Troponin elevation secondary to CKD  -Baseline unknown, records requested by nephrologist for comparison but BUN near normal range. Right kidney smaller and echogenic concerning for PA, needs close outpatient F/u. UA with mild proteinuria, otherwise " lolita  -Nephrology consulted and appreciate input  -Cont lokelma for hyperkalemia, no ecg changes and improving on repeat bmp but remains elevated  -Cr not improving currently, nephrology rec albumin dose today and will repeat renal panel daily but patient at high risk for progression to HD. Remains hypervolemic without significant UOP documented  -C3/c4 wnl, complete serologies pend    #Hypoglycemia  -Improved after admission with PO diet resumption, A1c reviewed and wnl    - - Chronic - -    #Debility: PT consulted  #Hypothyroidism: Cont synthroid  #Obesity:Body mass index is 45.43 kg/m².    VTE Prophylaxis:   Mechanical Order History:       None          Pharmalogical Order History:        Ordered     Dose Route Frequency Stop    10/08/23 2052  heparin (porcine) 5000 UNIT/ML injection 5,000 Units         5,000 Units SC Every 12 Hours Scheduled --                    Code Status and Medical Interventions:   Ordered at: 10/08/23 2006     Level Of Support Discussed With:    Patient     Code Status (Patient has no pulse and is not breathing):    CPR (Attempt to Resuscitate)     Medical Interventions (Patient has pulse or is breathing):    Full Support         Disposition: Cont inpatient management for respiratory and renal failure    I have reviewed any copied/forwarded text or data, verified its accuracy, and updated as necessary above.    Chris Hanks MD  [facility]  10/11/23  16:54 EDT

## 2023-10-11 NOTE — THERAPY TREATMENT NOTE
Acute Care - Physical Therapy Treatment Note   [place]     Patient Name: Lesley Michel  : 1955  MRN: [id number]  Today's Date: 10/11/2023   Onset of Illness/Injury or Date of Surgery: 10/08/23  Visit Dx:     ICD-10-CM ICD-9-CM   1. Acute kidney injury superimposed on chronic kidney disease  N17.9 584.9    N18.9 585.9   2. Acute on chronic congestive heart failure, unspecified heart failure type  I50.9 428.0   3. Acute on chronic respiratory failure with hypoxia  J96.21 518.84     799.02   4. Hyperkalemia  E87.5 276.7     Patient Active Problem List   Diagnosis    Hypertension    Hyperlipidemia    Migraine without aura    CAD, chronically occluded collateralized dominant RCA, status post drug-eluting stent to LAD , high-grade stenosis of the ostium of the small D1 and 60% mid RCA posterior lateral branch    Chronic renal failure, stage 2 (mild)    Tobacco use    Bilateral lower extremity edema    Other specified hypothyroidism    Cigarette smoker    Class 3 severe obesity without serious comorbidity with body mass index (BMI) of 40.0 to 44.9 in adult    Acute hypoxic respiratory failure    Acute renal failure (ARF)     Past Medical History:   Diagnosis Date    Anxiety     Arthritis     Coronary artery disease     H/O heart artery stent     Hyperlipidemia     Hypertension     Obesity      Past Surgical History:   Procedure Laterality Date    ANKLE SURGERY      RIGHT    APPENDECTOMY      CARDIAC CATHETERIZATION      LEFT    CORONARY STENT PLACEMENT      HYSTERECTOMY       PT Assessment (last 12 hours)       PT Evaluation and Treatment       Row Name 10/11/23 1434          Physical Therapy Time and Intention    Subjective Information no complaints  -KM     Document Type therapy note (daily note)  -KM     Mode of Treatment individual therapy;physical therapy  -KM     Patient Effort good  -KM     Symptoms Noted During/After Treatment none  -KM       Row Name 10/11/23 1439          General Information     Patient Profile Reviewed yes  -KM     Patient Observations alert;cooperative;agree to therapy  -KM     Existing Precautions/Restrictions fall;oxygen therapy device and L/min  -KM       Row Name 10/11/23 1434          Cognition    Affect/Mental Status (Cognition) WFL  -KM     Follows Commands (Cognition) WFL  -KM       Row Name 10/11/23 1434          Bed Mobility    Bed Mobility supine-sit  -KM     Supine-Sit [place] (Bed Mobility) standby assist  -KM       Row Name 10/11/23 1434          Transfers    Transfers sit-stand transfer;stand-sit transfer  -KM       Row Name 10/11/23 1434          Sit-Stand Transfer    Sit-Stand [place] (Transfers) contact guard  -KM       Row Name 10/11/23 1434          Stand-Sit Transfer    Stand-Sit [place] (Transfers) contact guard  -KM       Row Name 10/11/23 1434          Gait/Stairs (Locomotion)    Gait/Stairs Locomotion gait/ambulation independence;distance ambulated  -KM     [place] Level (Gait) contact guard;minimum assist (75% patient effort)  -KM     Assistive Device (Gait) --  HHA  -KM     Distance in Feet (Gait) 50  -KM     Pattern (Gait) step-to  -KM     Deviations/Abnormal Patterns (Gait) antalgic;base of support, wide;gait speed decreased;stride length decreased  -KM       Row Name 10/11/23 1434          Safety Issues, Functional Mobility    Impairments Affecting Function (Mobility) endurance/activity tolerance;balance;pain;shortness of breath;strength  -KM       Row Name 10/11/23 1434          Motor Skills    Comments, Therapeutic Exercise seated ther-ex  -KM     Additional Documentation Comments, Therapeutic Exercise (Row)  -KM       Row Name             Wound 10/09/23 0422 Left lower arm Extravasation    Wound - Properties Group Placement Date: 10/09/23  -SM Placement Time: 0422 -SM Side: Left  -SM Orientation: lower  -SM Location: arm  -SM Primary Wound Type: Extravasatio  -SM    Retired Wound - Properties Group Placement Date: 10/09/23  -  Placement Time: 0422 -SM Side: Left  -SM Orientation: lower  -SM Location: arm  -SM Primary Wound Type: Extravasatio  -SM    Retired Wound - Properties Group Date first assessed: 10/09/23  - Time first assessed: 0422 -SM Side: Left  -SM Location: arm  -SM Primary Wound Type: Extravasatio  -SM      Row Name 10/11/23 1434          Progress Summary (PT)    Daily Progress Summary (PT) Pt. was able to perform functional mobility skills w/ SBA-CGA. She was able to ambulate moderate distance w/ HHA. She tolerated EOB ther-ex w/ no complaints. Pt. would continue to benefit from skilled PT services.  -               User Key  (r) = Recorded By, (t) = Taken By, (c) = Cosigned By      Initials Name Provider Type    Fiordaliza Echevarria, RN Registered Nurse    Jose M Fung, PT Physical Therapist                    Physical Therapy Education       Title: PT OT SLP Therapies (Done)       Topic: Physical Therapy (Done)       Point: Mobility training (Done)       Learning Progress Summary             Patient Acceptance, E,D, VU,NR by  at 10/9/2023 1623                         Point: Home exercise program (Done)       Learning Progress Summary             Patient Acceptance, E,D, VU,NR by  at 10/9/2023 1623                         Point: Body mechanics (Done)       Learning Progress Summary             Patient Acceptance, E,D, VU,NR by  at 10/9/2023 1623                         Point: Precautions (Done)       Learning Progress Summary             Patient Acceptance, E,D, VU,NR by  at 10/9/2023 1623                                         User Key       Initials Effective Dates Name Provider Type On license of [facility] 06/16/21 -  Ayana Logan, PT Physical Therapist PT                  PT Recommendation and Plan     Progress Summary (PT)  Daily Progress Summary (PT): Pt. was able to perform functional mobility skills w/ SBA-CGA. She was able to ambulate moderate distance w/ HHA. She tolerated EOB ther-ex w/ no  complaints. Pt. would continue to benefit from skilled PT services.       Time Calculation:    PT Charges       Row Name 10/11/23 1430             Time Calculation    PT Received On 10/11/23  -KM         Time Calculation- PT    Total Timed Code Minutes- PT 23 minute(s)  -KM                User Key  (r) = Recorded By, (t) = Taken By, (c) = Cosigned By      Initials Name Provider Type    Jose M Fung, PT Physical Therapist                  Therapy Charges for Today       Code Description Service Date Service Provider Modifiers Qty    09466707543 HC PT THER PROC EA 15 MIN 10/11/2023 Jose M Anthony, PT GP 1    43272805885 HC GAIT TRAINING EA 15 MIN 10/11/2023 Jose M Anthony, PT GP 1            PT G-Codes  AM-PAC 6 Clicks Score (PT): 21    Jose M Anthony PT  10/11/2023

## 2023-10-11 NOTE — CASE MANAGEMENT/SOCIAL WORK
Continued Stay Note  [facility] [place]     Patient Name: Lesley Michel  MRN: [id number]  Today's Date: 10/11/2023    Admit Date: 10/8/2023    Plan: Discharge plan remains unchanged, should pt require O2, no preference of provider; discharge plan remains home with no further needs identified at this time, family to transport.   Discharge Plan       Row Name 10/11/23 1128       Plan    Plan Discharge plan remains unchanged, should pt require O2, no preference of provider; discharge plan remains home with no further needs identified at this time, family to transport.    Patient/Family in Agreement with Plan yes    Plan Comments per nephro will order kayexalate, and lactulose for hyperkalemia, K+ 5.9, Cr 4.71, pt is at high risk for HD; cont on iv zithro.          Avani Pope

## 2023-10-11 NOTE — PLAN OF CARE
Goal Outcome Evaluation:     Patient resting in bed. No complaints of chest pain or shortness of breath. No visible indicators of acute distress noted at this time. Will continue to follow plan of care this shift.

## 2023-10-11 NOTE — PROGRESS NOTES
Nephrology Progress Note      Subjective     10/10/2023  Patient still short of breath blood pressures running towards higher side still have swelling    10/11/2023  Patient still wheezing still short of breath blood pressure is better swelling is still there and wants to go home    Objective       Vital signs :     Vitals:    10/11/23 0300 10/11/23 0500 10/11/23 0550 10/11/23 0641   BP: 124/70  153/76 142/72   BP Location: Left arm  Left arm Left arm   Patient Position: Lying  Lying Lying   Pulse: 74  63 61   Resp: 20   20   Temp: 98.4 °F (36.9 °C)   97.9 °F (36.6 °C)   TempSrc: Oral   Oral   SpO2: 94%   98%   Weight:  106 kg (232 lb 9.6 oz)     Height:            Intake/Output                         10/09/23 0701 - 10/10/23 0700 10/10/23 0701 - 10/11/23 0700     7392-4680 0529-5810 Total 1788-1913 5147-0957 Total                 Intake    P.O.  480  120 600  480  -- 480    Total Intake 480 120 600 480 -- 480       Output    Urine  500  -- 500  --  -- --    Total Output 500 -- 500 -- -- --           10/10/2023 examined and reviewed  10/11/2023 examined and reviewed  Physical Exam:    General Appearance : alert, pleasant, appears stated age, cooperative   Head  :  normocephalic, without obvious abnormality and atraumatic  Eyes  :  conjunctivae and sclerae normal, no icterus, no pallor and PERRLA  Neck  :  no adenopathy, suppple, no carotid bruit, no JVD   Lungs : Bilateral wheezes  Heart :  regular rhythm & normal rate, normal S1, S2 and no murmur, no rub  Abdomen : normal bowel sounds, no masses, no hepatomegaly, no splenomegaly, soft non-tender and no guarding  Extremities : moves extremities well, 2 plus  edema, no cyanosis and no redness  Skin :  no bleeding, bruising or rash  Neurologic :   orientated to person, place, time and situation, Grossly no focal deficits  Acess :     Laboratory Data :       CBC and coagulation:  Results from last 7 days   Lab Units 10/09/23  2326 10/09/23  0132 10/08/23  2107  10/08/23  1923 10/08/23  1709   PROCALCITONIN ng/mL  --   --  0.10  --   --    LACTATE mmol/L  --   --   --   --  0.9   CRP mg/dL  --   --   --  5.41*  --    WBC 10*3/mm3 8.19 7.35  --   --  7.38   HEMOGLOBIN g/dL 11.5* 11.6*  --   --  11.0*   HEMATOCRIT % 37.3 37.2  --   --  36.2   MCV fL 88.2 87.9  --   --  88.9   MCHC g/dL 30.8* 31.2*  --   --  30.4*   PLATELETS 10*3/mm3 267 233  --   --  289     Acid/base balance:  Results from last 7 days   Lab Units 10/08/23  1707   PH, ARTERIAL pH units 7.363   PO2 ART mm Hg 53.5*   PCO2, ARTERIAL mm Hg 36.5   HCO3 ART mmol/L 20.7     Renal and electrolytes:    Results from last 7 days   Lab Units 10/11/23  0525 10/10/23  1420 10/09/23  2325 10/09/23  1135 10/09/23  0754 10/09/23  0132 10/08/23  1709   SODIUM mmol/L 136 137 136 136  --  139 138   POTASSIUM mmol/L 5.9* 5.6* 5.6* 5.5*   < > 6.2* 5.9*   MAGNESIUM mg/dL  --   --   --   --   --  2.0 1.9   CHLORIDE mmol/L 101 105 103 104  --  105 107   CO2 mmol/L 23.9 20.8* 22.4 21.2*  --  21.1* 20.8*   BUN mg/dL 38* 33* 30* 29*  --  29* 27*   CREATININE mg/dL 4.71* 4.06* 4.07* 3.76*  --  3.98* 3.61*   CALCIUM mg/dL 9.4 8.7 9.1 8.9  --  9.0 9.0    < > = values in this interval not displayed.     Estimated Creatinine Clearance: 13.1 mL/min (A) (by C-G formula based on SCr of 4.71 mg/dL (H)).     Liver and pancreatic function:  Results from last 7 days   Lab Units 10/11/23  0525 10/09/23  2325 10/09/23  0132   ALBUMIN g/dL 3.8 3.2* 3.7   BILIRUBIN mg/dL 0.3 0.3 0.3   ALK PHOS U/L 136* 132* 149*   AST (SGOT) U/L 13 13 10   ALT (SGPT) U/L 6 <5 5       Albumin Albumin   Date Value Ref Range Status   10/11/2023 3.8 3.5 - 5.2 g/dL Final   10/09/2023 3.2 (L) 3.5 - 5.2 g/dL Final   10/09/2023 3.7 3.5 - 5.2 g/dL Final   10/08/2023 3.4 (L) 3.5 - 5.2 g/dL Final      Magnesium Magnesium   Date Value Ref Range Status   10/09/2023 2.0 1.6 - 2.4 mg/dL Final   10/08/2023 1.9 1.6 - 2.4 mg/dL Final          PTH               No results found for:  "\"PTH\"    Cardiac:  Results from last 7 days   Lab Units 10/08/23  1709   PROBNP pg/mL 8,093.0*     Liver and pancreatic function:  Results from last 7 days   Lab Units 10/11/23  0525 10/09/23  2325 10/09/23  0132   ALBUMIN g/dL 3.8 3.2* 3.7   BILIRUBIN mg/dL 0.3 0.3 0.3   ALK PHOS U/L 136* 132* 149*   AST (SGOT) U/L 13 13 10   ALT (SGPT) U/L 6 <5 5       XR Chest 1 View    Result Date: 10/10/2023  1.  Bilateral airspace disease consistent with pneumonia. 2.  CHF/edema.   This report was finalized on 10/10/2023 11:55 AM by Dr. Eduardo August MD.      CT Chest Without Contrast Diagnostic    Result Date: 10/10/2023  1.  CHF/edema. 2.  Small right and very small left pleural effusions which are nonloculated. 3.  Bilateral consolidative airspace disease as detailed above consistent with pneumonia. 4.  Small pericardial effusion. 5.  Cardiomegaly with severe coronary artery calcifications. 6.  Mediastinal and hilar adenopathy. May be reactive but follow-up recommended to exclude neoplastic etiologies. 7.  Liver cirrhosis. 8.  Anasarca. 9.  Other nonacute findings above.   This report was finalized on 10/10/2023 11:55 AM by Dr. Eduardo August MD.      US Renal Bilateral    Result Date: 10/9/2023   SMALL AND ECHOGENIC RIGHT KIDNEY SUGGESTING A UNILATERAL PROCESS SUCH AS RENAL ARTERY STENOSIS.  NORMAL LEFT KIDNEY.  NEGATIVE FOR HYDRONEPHROSIS.    This report was finalized on 10/9/2023 5:49 AM by Imelda Walsh MD.      XR Chest 1 View    Result Date: 10/8/2023  Trace right effusion and right basilar airspace disease    This report was finalized on 10/8/2023 7:17 PM by Dr. Julio Dominguez MD.        Medications :     aspirin, 325 mg, Oral, Daily  azithromycin, 500 mg, Intravenous, Q24H  carvedilol, 25 mg, Oral, BID With Meals  folic acid, 1 mg, Oral, Daily  heparin (porcine), 5,000 Units, Subcutaneous, Q12H  hydrALAZINE, 25 mg, Oral, Q8H  insulin regular, 5 Units, Intravenous, Once  ipratropium-albuterol, 3 mL, Nebulization, 4x " Daily - RT  levothyroxine, 88 mcg, Oral, Q AM  montelukast, 10 mg, Oral, Nightly  nicotine, 1 patch, Transdermal, Q24H  pantoprazole, 40 mg, Oral, Daily  predniSONE, 40 mg, Oral, Daily With Breakfast  rosuvastatin, 10 mg, Oral, Nightly  senna-docusate sodium, 2 tablet, Oral, BID  sodium chloride, 10 mL, Intravenous, Q12H  sodium zirconium cyclosilicate, 10 g, Oral, TID  vitamin B-12, 1,000 mcg, Oral, Daily             Assessment & Plan     -Acute kidney injury  -Atrophic right kidney  -Chronic kidney disease stage IIIa  -Acute hyperkalemia  -Bilateral edema  -Acute hypoxic respite failure  -COPD  -Hypertension  -Medical noncompliance       10/09/23  Patient baseline creatinine is unknown we will get the records came in with a creatinine of 3.69 is down to 3.6 patient is short of breath we will give 1 dose of Lasix we will check urine protein creatinine ratio, ultrasound of the kidneys showed atrophic right kidney     Patient's bilateral swelling is most likely multifactorial secondary to diastolic congestive heart failure with pulmonary hypertension also will give 1 dose of Lasix today     Hyperkalemia most likely secondary to lisinopril and decreased GFR we will hold lisinopril and start the patient on Lokelma      10/10/2023  Patient's creatinine is 4.07 from 3.6, patient got 1 dose of Lasix as patient was very short of breath, ultrasound of the kidney showed atrophic right kidney can be secondary to renal artery stenosis but I do not think this is causing her kidney function to get worse acutely, patient's proteinuria is only 413 mg, will check serology, hold diuretics give 1 dose of albumin , continue to hold lisinopril    Continue Lokelma for hyperkalemia    Will increase hydralazine to 25 mg 3 times a day    Patient's anasarca is most likely secondary to diastolic congestive heart failure and pulmonary hypertension PA pressure around 45    10/11/2023  Patient's baseline creatinine is unknown and is rising 4.7  from 4.0 from 3.6, will continue to hold Lasix ultrasound of the kidney showed atrophic right kidney proteinuria of 430 mg serologies pending, will continue to hold lisinopril give 1 dose of albumin  We will give Kayexalate and lactulose for hyperkalemia and counseled the patient on low potassium diet  Patient is high risk for dialysis  Blood pressures well controlled      Prognosis guarded     Please avoid nephrotoxic medication and pharmacy to adjust the medication according to the GFR     Plan of care discussed with pt, answered all questions, patient verbalized understanding and agreed.      MERNA Steinberg MD  10/11/23  07:22 EDT

## 2023-10-12 ENCOUNTER — APPOINTMENT (OUTPATIENT)
Dept: GENERAL RADIOLOGY | Facility: HOSPITAL | Age: 68
DRG: 291 | End: 2023-10-12
Payer: MEDICARE

## 2023-10-12 LAB
ALBUMIN SERPL ELPH-MCNC: 3.3 G/DL (ref 2.9–4.4)
ALBUMIN SERPL-MCNC: 3.9 G/DL (ref 3.5–5.2)
ALBUMIN/GLOB SERPL: 1.1 {RATIO} (ref 0.7–1.7)
ALBUMIN/GLOB SERPL: 1.3 G/DL
ALP SERPL-CCNC: 140 U/L (ref 39–117)
ALPHA1 GLOB SERPL ELPH-MCNC: 0.3 G/DL (ref 0–0.4)
ALPHA2 GLOB SERPL ELPH-MCNC: 0.9 G/DL (ref 0.4–1)
ALT SERPL W P-5'-P-CCNC: 7 U/L (ref 1–33)
ANA SER QL: NEGATIVE
ANION GAP SERPL CALCULATED.3IONS-SCNC: 12.7 MMOL/L (ref 5–15)
ANION GAP SERPL CALCULATED.3IONS-SCNC: 12.7 MMOL/L (ref 5–15)
ANION GAP SERPL CALCULATED.3IONS-SCNC: 9.4 MMOL/L (ref 5–15)
AST SERPL-CCNC: 15 U/L (ref 1–32)
B-GLOBULIN SERPL ELPH-MCNC: 1.1 G/DL (ref 0.7–1.3)
BILIRUB SERPL-MCNC: 0.3 MG/DL (ref 0–1.2)
BUN SERPL-MCNC: 46 MG/DL (ref 8–23)
BUN SERPL-MCNC: 47 MG/DL (ref 8–23)
BUN SERPL-MCNC: 47 MG/DL (ref 8–23)
BUN/CREAT SERPL: 9.2 (ref 7–25)
BUN/CREAT SERPL: 9.6 (ref 7–25)
BUN/CREAT SERPL: 9.7 (ref 7–25)
C-ANCA TITR SER IF: NORMAL TITER
CALCIUM SPEC-SCNC: 9.3 MG/DL (ref 8.6–10.5)
CALCIUM SPEC-SCNC: 9.3 MG/DL (ref 8.6–10.5)
CALCIUM SPEC-SCNC: 9.5 MG/DL (ref 8.6–10.5)
CHLORIDE SERPL-SCNC: 101 MMOL/L (ref 98–107)
CHLORIDE SERPL-SCNC: 102 MMOL/L (ref 98–107)
CHLORIDE SERPL-SCNC: 104 MMOL/L (ref 98–107)
CO2 SERPL-SCNC: 19.3 MMOL/L (ref 22–29)
CO2 SERPL-SCNC: 20.3 MMOL/L (ref 22–29)
CO2 SERPL-SCNC: 23.6 MMOL/L (ref 22–29)
CREAT SERPL-MCNC: 4.85 MG/DL (ref 0.57–1)
CREAT SERPL-MCNC: 4.91 MG/DL (ref 0.57–1)
CREAT SERPL-MCNC: 5.01 MG/DL (ref 0.57–1)
DEPRECATED RDW RBC AUTO: 54.5 FL (ref 37–54)
DSDNA AB SER-ACNC: <1 IU/ML (ref 0–9)
EGFRCR SERPLBLD CKD-EPI 2021: 9.1 ML/MIN/1.73
EGFRCR SERPLBLD CKD-EPI 2021: 9.3 ML/MIN/1.73
EGFRCR SERPLBLD CKD-EPI 2021: 9.4 ML/MIN/1.73
ERYTHROCYTE [DISTWIDTH] IN BLOOD BY AUTOMATED COUNT: 16.3 % (ref 12.3–15.4)
GAMMA GLOB SERPL ELPH-MCNC: 0.9 G/DL (ref 0.4–1.8)
GBM AB SER IA-ACNC: <0.2 UNITS (ref 0–0.9)
GLOBULIN SER-MCNC: 3.2 G/DL (ref 2.2–3.9)
GLOBULIN UR ELPH-MCNC: 3.1 GM/DL
GLUCOSE BLDC GLUCOMTR-MCNC: 103 MG/DL (ref 70–130)
GLUCOSE BLDC GLUCOMTR-MCNC: 122 MG/DL (ref 70–130)
GLUCOSE BLDC GLUCOMTR-MCNC: 130 MG/DL (ref 70–130)
GLUCOSE BLDC GLUCOMTR-MCNC: 145 MG/DL (ref 70–130)
GLUCOSE SERPL-MCNC: 114 MG/DL (ref 65–99)
GLUCOSE SERPL-MCNC: 126 MG/DL (ref 65–99)
GLUCOSE SERPL-MCNC: 145 MG/DL (ref 65–99)
HCT VFR BLD AUTO: 31.9 % (ref 34–46.6)
HGB BLD-MCNC: 9.5 G/DL (ref 12–15.9)
HISTONE IGG SER IA-ACNC: 0.3 UNITS (ref 0–0.9)
IGA SERPL-MCNC: 456 MG/DL (ref 87–352)
IGG SERPL-MCNC: 1007 MG/DL (ref 586–1602)
IGM SERPL-MCNC: 20 MG/DL (ref 26–217)
INTERPRETATION SERPL IEP-IMP: ABNORMAL
LABORATORY COMMENT REPORT: ABNORMAL
M PROTEIN SERPL ELPH-MCNC: ABNORMAL G/DL
MCH RBC QN AUTO: 26.8 PG (ref 26.6–33)
MCHC RBC AUTO-ENTMCNC: 29.8 G/DL (ref 31.5–35.7)
MCV RBC AUTO: 90.1 FL (ref 79–97)
MYELOPEROXIDASE AB SER IA-ACNC: <0.2 UNITS (ref 0–0.9)
P-ANCA ATYPICAL TITR SER IF: NORMAL TITER
P-ANCA TITR SER IF: NORMAL TITER
PLATELET # BLD AUTO: 269 10*3/MM3 (ref 140–450)
PMV BLD AUTO: 10.7 FL (ref 6–12)
POTASSIUM SERPL-SCNC: 5.5 MMOL/L (ref 3.5–5.2)
POTASSIUM SERPL-SCNC: 5.5 MMOL/L (ref 3.5–5.2)
POTASSIUM SERPL-SCNC: 5.8 MMOL/L (ref 3.5–5.2)
PROT SERPL-MCNC: 6.5 G/DL (ref 6–8.5)
PROT SERPL-MCNC: 7 G/DL (ref 6–8.5)
PROTEINASE3 AB SER IA-ACNC: <0.2 UNITS (ref 0–0.9)
RBC # BLD AUTO: 3.54 10*6/MM3 (ref 3.77–5.28)
SODIUM SERPL-SCNC: 134 MMOL/L (ref 136–145)
SODIUM SERPL-SCNC: 135 MMOL/L (ref 136–145)
SODIUM SERPL-SCNC: 136 MMOL/L (ref 136–145)
WBC NRBC COR # BLD: 7.34 10*3/MM3 (ref 3.4–10.8)

## 2023-10-12 PROCEDURE — 94799 UNLISTED PULMONARY SVC/PX: CPT

## 2023-10-12 PROCEDURE — 25010000002 FUROSEMIDE PER 20 MG: Performed by: INTERNAL MEDICINE

## 2023-10-12 PROCEDURE — 97110 THERAPEUTIC EXERCISES: CPT

## 2023-10-12 PROCEDURE — 71045 X-RAY EXAM CHEST 1 VIEW: CPT | Performed by: RADIOLOGY

## 2023-10-12 PROCEDURE — 80053 COMPREHEN METABOLIC PANEL: CPT | Performed by: INTERNAL MEDICINE

## 2023-10-12 PROCEDURE — 71045 X-RAY EXAM CHEST 1 VIEW: CPT

## 2023-10-12 PROCEDURE — 25810000003 SODIUM CHLORIDE 0.9 % SOLUTION: Performed by: INTERNAL MEDICINE

## 2023-10-12 PROCEDURE — 82948 REAGENT STRIP/BLOOD GLUCOSE: CPT

## 2023-10-12 PROCEDURE — 85027 COMPLETE CBC AUTOMATED: CPT | Performed by: STUDENT IN AN ORGANIZED HEALTH CARE EDUCATION/TRAINING PROGRAM

## 2023-10-12 PROCEDURE — 63710000001 PREDNISONE PER 1 MG: Performed by: STUDENT IN AN ORGANIZED HEALTH CARE EDUCATION/TRAINING PROGRAM

## 2023-10-12 PROCEDURE — 99222 1ST HOSP IP/OBS MODERATE 55: CPT | Performed by: INTERNAL MEDICINE

## 2023-10-12 PROCEDURE — 94664 DEMO&/EVAL PT USE INHALER: CPT

## 2023-10-12 PROCEDURE — P9047 ALBUMIN (HUMAN), 25%, 50ML: HCPCS | Performed by: INTERNAL MEDICINE

## 2023-10-12 PROCEDURE — 25010000002 AZITHROMYCIN PER 500 MG: Performed by: STUDENT IN AN ORGANIZED HEALTH CARE EDUCATION/TRAINING PROGRAM

## 2023-10-12 PROCEDURE — 25010000002 ALBUMIN HUMAN 25% PER 50 ML: Performed by: INTERNAL MEDICINE

## 2023-10-12 PROCEDURE — 94761 N-INVAS EAR/PLS OXIMETRY MLT: CPT

## 2023-10-12 PROCEDURE — 25010000002 HEPARIN (PORCINE) PER 1000 UNITS: Performed by: INTERNAL MEDICINE

## 2023-10-12 PROCEDURE — 25810000003 SODIUM CHLORIDE 0.9 % SOLUTION 250 ML FLEX CONT: Performed by: INTERNAL MEDICINE

## 2023-10-12 PROCEDURE — 94660 CPAP INITIATION&MGMT: CPT

## 2023-10-12 PROCEDURE — 25810000003 SODIUM CHLORIDE 0.9 % SOLUTION 250 ML FLEX CONT: Performed by: STUDENT IN AN ORGANIZED HEALTH CARE EDUCATION/TRAINING PROGRAM

## 2023-10-12 PROCEDURE — 99233 SBSQ HOSP IP/OBS HIGH 50: CPT | Performed by: INTERNAL MEDICINE

## 2023-10-12 RX ORDER — LACTULOSE 10 G/15ML
10 SOLUTION ORAL ONCE
Status: COMPLETED | OUTPATIENT
Start: 2023-10-12 | End: 2023-10-12

## 2023-10-12 RX ORDER — ALBUMIN (HUMAN) 12.5 G/50ML
25 SOLUTION INTRAVENOUS ONCE
Status: COMPLETED | OUTPATIENT
Start: 2023-10-12 | End: 2023-10-12

## 2023-10-12 RX ORDER — FUROSEMIDE 10 MG/ML
20 INJECTION INTRAMUSCULAR; INTRAVENOUS ONCE
Status: COMPLETED | OUTPATIENT
Start: 2023-10-12 | End: 2023-10-12

## 2023-10-12 RX ORDER — SODIUM CHLORIDE 9 MG/ML
75 INJECTION, SOLUTION INTRAVENOUS CONTINUOUS
Status: DISCONTINUED | OUTPATIENT
Start: 2023-10-12 | End: 2023-10-12

## 2023-10-12 RX ORDER — PREDNISONE 20 MG/1
20 TABLET ORAL
Status: COMPLETED | OUTPATIENT
Start: 2023-10-13 | End: 2023-10-14

## 2023-10-12 RX ADMIN — SODIUM ZIRCONIUM CYCLOSILICATE 10 G: 10 POWDER, FOR SUSPENSION ORAL at 10:43

## 2023-10-12 RX ADMIN — HYDRALAZINE HYDROCHLORIDE 25 MG: 25 TABLET, FILM COATED ORAL at 14:16

## 2023-10-12 RX ADMIN — FUROSEMIDE 20 MG: 10 INJECTION, SOLUTION INTRAMUSCULAR; INTRAVENOUS at 12:06

## 2023-10-12 RX ADMIN — Medication 10 MG: at 20:34

## 2023-10-12 RX ADMIN — ASPIRIN 325 MG: 325 TABLET, COATED ORAL at 09:37

## 2023-10-12 RX ADMIN — LEVOTHYROXINE SODIUM 88 MCG: 88 TABLET ORAL at 05:49

## 2023-10-12 RX ADMIN — HYDROXYZINE HYDROCHLORIDE 25 MG: 25 TABLET, FILM COATED ORAL at 09:31

## 2023-10-12 RX ADMIN — Medication 10 ML: at 09:32

## 2023-10-12 RX ADMIN — HYDRALAZINE HYDROCHLORIDE 25 MG: 25 TABLET, FILM COATED ORAL at 21:05

## 2023-10-12 RX ADMIN — CARVEDILOL 25 MG: 25 TABLET, FILM COATED ORAL at 17:07

## 2023-10-12 RX ADMIN — IPRATROPIUM BROMIDE AND ALBUTEROL SULFATE 3 ML: 2.5; .5 SOLUTION RESPIRATORY (INHALATION) at 07:07

## 2023-10-12 RX ADMIN — MONTELUKAST SODIUM 10 MG: 10 TABLET, COATED ORAL at 20:36

## 2023-10-12 RX ADMIN — NICOTINE TRANSDERMAL SYSTEM 1 PATCH: 21 PATCH, EXTENDED RELEASE TRANSDERMAL at 10:45

## 2023-10-12 RX ADMIN — PANTOPRAZOLE SODIUM 40 MG: 40 TABLET, DELAYED RELEASE ORAL at 10:43

## 2023-10-12 RX ADMIN — IPRATROPIUM BROMIDE AND ALBUTEROL SULFATE 3 ML: 2.5; .5 SOLUTION RESPIRATORY (INHALATION) at 00:17

## 2023-10-12 RX ADMIN — SODIUM CHLORIDE 75 ML/HR: 9 INJECTION, SOLUTION INTRAVENOUS at 10:49

## 2023-10-12 RX ADMIN — HYDRALAZINE HYDROCHLORIDE 25 MG: 25 TABLET, FILM COATED ORAL at 05:49

## 2023-10-12 RX ADMIN — SODIUM CHLORIDE 40 ML: 9 INJECTION, SOLUTION INTRAVENOUS at 10:49

## 2023-10-12 RX ADMIN — HYDROCODONE BITARTRATE AND ACETAMINOPHEN 1 TABLET: 10; 325 TABLET ORAL at 10:43

## 2023-10-12 RX ADMIN — Medication 1000 MCG: at 10:42

## 2023-10-12 RX ADMIN — CARVEDILOL 25 MG: 25 TABLET, FILM COATED ORAL at 10:44

## 2023-10-12 RX ADMIN — Medication 10 ML: at 20:35

## 2023-10-12 RX ADMIN — HYDROXYZINE HYDROCHLORIDE 25 MG: 25 TABLET, FILM COATED ORAL at 20:34

## 2023-10-12 RX ADMIN — Medication 1 MG: at 10:43

## 2023-10-12 RX ADMIN — ROSUVASTATIN CALCIUM 10 MG: 10 TABLET, FILM COATED ORAL at 20:35

## 2023-10-12 RX ADMIN — HEPARIN SODIUM 5000 UNITS: 5000 INJECTION INTRAVENOUS; SUBCUTANEOUS at 09:38

## 2023-10-12 RX ADMIN — ALBUMIN HUMAN 25 G: 0.25 SOLUTION INTRAVENOUS at 09:31

## 2023-10-12 RX ADMIN — LACTULOSE 10 G: 20 SOLUTION ORAL at 09:38

## 2023-10-12 RX ADMIN — PREDNISONE 40 MG: 20 TABLET ORAL at 10:43

## 2023-10-12 RX ADMIN — IPRATROPIUM BROMIDE AND ALBUTEROL SULFATE 3 ML: 2.5; .5 SOLUTION RESPIRATORY (INHALATION) at 18:36

## 2023-10-12 RX ADMIN — AZITHROMYCIN DIHYDRATE 500 MG: 500 INJECTION, POWDER, LYOPHILIZED, FOR SOLUTION INTRAVENOUS at 10:37

## 2023-10-12 RX ADMIN — GABAPENTIN 300 MG: 300 CAPSULE ORAL at 13:13

## 2023-10-12 RX ADMIN — HEPARIN SODIUM 5000 UNITS: 5000 INJECTION INTRAVENOUS; SUBCUTANEOUS at 20:34

## 2023-10-12 NOTE — THERAPY TREATMENT NOTE
Acute Care - Physical Therapy Treatment Note   [facility]     Patient Name: Lesley Michel  : 1955  MRN: [id number]  Today's Date: 10/12/2023   Onset of Illness/Injury or Date of Surgery: 10/08/23  Visit Dx:     ICD-10-CM ICD-9-CM   1. Acute kidney injury superimposed on chronic kidney disease  N17.9 584.9    N18.9 585.9   2. Acute on chronic congestive heart failure, unspecified heart failure type  I50.9 428.0   3. Acute on chronic respiratory failure with hypoxia  J96.21 518.84     799.02   4. Hyperkalemia  E87.5 276.7     Patient Active Problem List   Diagnosis    Hypertension    Hyperlipidemia    Migraine without aura    CAD, chronically occluded collateralized dominant RCA, status post drug-eluting stent to LAD , high-grade stenosis of the ostium of the small D1 and 60% mid RCA posterior lateral branch    Chronic renal failure, stage 2 (mild)    Tobacco use    Bilateral lower extremity edema    Other specified hypothyroidism    Cigarette smoker    Class 3 severe obesity without serious comorbidity with body mass index (BMI) of 40.0 to 44.9 in adult    Acute hypoxic respiratory failure    Acute renal failure (ARF)     Past Medical History:   Diagnosis Date    Anxiety     Arthritis     Coronary artery disease     H/O heart artery stent     Hyperlipidemia     Hypertension     Obesity      Past Surgical History:   Procedure Laterality Date    ANKLE SURGERY      RIGHT    APPENDECTOMY      CARDIAC CATHETERIZATION      LEFT    CORONARY STENT PLACEMENT      HYSTERECTOMY       PT Assessment (last 12 hours)       PT Evaluation and Treatment       Row Name 10/12/23 1043          Physical Therapy Time and Intention    Subjective Information complains of;weakness;fatigue;pain;dyspnea;swelling  -HC     Document Type therapy note (daily note)  -HC     Mode of Treatment individual therapy;physical therapy  -HC     Patient Effort good  -HC     Comment Pt and RN Kouts in agreement for PT. Pt was sitting EOB when  entering room and agreed to EOB exercises but refused all other theraputic activities. Pt return to sidelying at end of session.  -       Row Name 10/12/23 1043          General Information    Patient Profile Reviewed yes  -     Existing Precautions/Restrictions fall;oxygen therapy device and L/min  -       Row Name 10/12/23 1043          Cognition    Follows Commands (Cognition) WFL  -     Personal Safety Interventions fall prevention program maintained;nonskid shoes/slippers when out of bed;supervised activity  -       Row Name 10/12/23 1043          Bed Mobility    Bed Mobility supine-sit;sit-supine  -     Sit-Supine [place] (Bed Mobility) contact guard  -       Row Name 10/12/23 1043          Transfers    Transfers sit-stand transfer;stand-sit transfer  -       Row Name 10/12/23 1043          Sit-Stand Transfer    Comment, (Sit-Stand Transfer) refused  -       Row Name 10/12/23 1043          Safety Issues, Functional Mobility    Impairments Affecting Function (Mobility) endurance/activity tolerance;balance;pain;shortness of breath;strength  -       Row Name 10/12/23 1043          Motor Skills    Therapeutic Exercise other (see comments)  Sitting: AP, LAQ, March, knees in/out  -       Row Name             Wound 10/09/23 0422 Left lower arm Extravasation    Wound - Properties Group Placement Date: 10/09/23  -SM Placement Time: 0422 -SM Side: Left  -SM Orientation: lower  -SM Location: arm  -SM Primary Wound Type: Extravasatio  -SM    Retired Wound - Properties Group Placement Date: 10/09/23  -SM Placement Time: 0422  -SM Side: Left  -SM Orientation: lower  -SM Location: arm  -SM Primary Wound Type: Extravasatio  -SM    Retired Wound - Properties Group Date first assessed: 10/09/23  -SM Time first assessed: 0422  -SM Side: Left  -SM Location: arm  -SM Primary Wound Type: Extravasatio  -SM      Row Name 10/12/23 1043          Positioning and Restraints    Pre-Treatment Position in bed   -HC     Post Treatment Position bed  -HC     In Bed side lying left;fowlers;call light within reach;encouraged to call for assist;side rails up x2  -HC               User Key  (r) = Recorded By, (t) = Taken By, (c) = Cosigned By      Initials Name Provider Type    Fiordaliza Echevarria, RN Registered Nurse     Supriya Carias PTA Physical Therapist Assistant                    Physical Therapy Education       Title: PT OT SLP Therapies (Done)       Topic: Physical Therapy (Done)       Point: Mobility training (Done)       Learning Progress Summary             Patient Acceptance, E,D, VU,NR by  at 10/9/2023 1623                         Point: Home exercise program (Done)       Learning Progress Summary             Patient Acceptance, E,D, VU,NR by  at 10/9/2023 1623                         Point: Body mechanics (Done)       Learning Progress Summary             Patient Acceptance, E,D, VU,NR by  at 10/9/2023 1623                         Point: Precautions (Done)       Learning Progress Summary             Patient Acceptance, E,D, VU,NR by  at 10/9/2023 1623                                         User Key       Initials Effective Dates Name Provider Type Discipline     06/16/21 -  Ayana Logan, PT Physical Therapist PT                  PT Recommendation and Plan             Time Calculation:    PT Charges       Row Name 10/12/23 1046             Time Calculation    PT Received On 10/12/23  -HC         Time Calculation- PT    Total Timed Code Minutes- PT 15 minute(s)  -HC                User Key  (r) = Recorded By, (t) = Taken By, (c) = Cosigned By      Initials Name Provider Type     Supriya Carias PTA Physical Therapist Assistant                  Therapy Charges for Today       Code Description Service Date Service Provider Modifiers Qty    [id number]  PT THER PROC EA 15 MIN 10/12/2023 Supriya Carias PTA GP, CQ 1            PT G-Codes  AM-PAC 6 Clicks Score (PT): 21    Supriya L  Aretha, PTA  10/12/2023

## 2023-10-12 NOTE — PROGRESS NOTES
[facility] HOSPITALIST PROGRESS NOTE    Subjective     History:   Lesley Michel is a 67 y.o. female admitted on 10/8/2023 secondary to Acute hypoxic respiratory failure     Procedures: None    CC: Follow up MAICO    Patient seen and examined with NADEEM Boyle. Awake and alert. Reports continued dyspnea. Reports poor appetite. No reported vomiting. No reported CP. Episode of worsening dyspnea this AM with IVF's with some improvement after IVF's stopped. No acute events overnight per RN.     History taken from: patient, chart, and RN.      Objective     Vital Signs  Temp:  [97.8 °F (36.6 °C)-98.4 °F (36.9 °C)] 98 °F (36.7 °C)  Heart Rate:  [66-86] 66  Resp:  [18-20] 18  BP: (154-178)/(73-98) 158/77    Intake/Output Summary (Last 24 hours) at 10/12/2023 1810  Last data filed at 10/12/2023 1500  Gross per 24 hour   Intake 900 ml   Output 800 ml   Net 100 ml         Physical Exam:  General:    Awake, alert, in no acute distress, ill appearing   Heart:      Normal S1 and S2. Regular rate and rhythm. No significant murmur, rubs or gallops appreciated.   Lungs:     Respirations regular, even and unlabored. Wheezing in upper lobes with diminished breath sounds at bases.    Abdomen:   Soft and nontender. No guarding, rebound tenderness or  organomegaly noted. Bowel sounds present x 4.   Extremities:  2+ bilateral lower extremity edema. (+) swelling B/L hands. Moves UE and LE equally B/L.     Results Review:    Results from last 7 days   Lab Units 10/12/23  0057 10/09/23  2326 10/09/23  0132 10/08/23  1709   WBC 10*3/mm3 7.34 8.19 7.35 7.38   HEMOGLOBIN g/dL 9.5* 11.5* 11.6* 11.0*   PLATELETS 10*3/mm3 269 267 233 289     Results from last 7 days   Lab Units 10/12/23  1429 10/12/23  1225 10/12/23  0057 10/11/23  1156 10/11/23  0525 10/10/23  1420 10/09/23  2325   SODIUM mmol/L 135* 136 134* 134* 136 137 136   POTASSIUM mmol/L 5.5* 5.5* 5.8* 5.5* 5.9* 5.6* 5.6*   CHLORIDE mmol/L 102 104 101 101 101 105 103   CO2  mmol/L 20.3* 19.3* 23.6 21.9* 23.9 20.8* 22.4   BUN mg/dL 47* 47* 46* 39* 38* 33* 30*   CREATININE mg/dL 4.91* 4.85* 5.01* 4.70* 4.71* 4.06* 4.07*   CALCIUM mg/dL 9.3 9.3 9.5 9.5 9.4 8.7 9.1   GLUCOSE mg/dL 126* 114* 145* 133* 155* 128* 109*     Results from last 7 days   Lab Units 10/12/23  0057 10/11/23  0525 10/09/23  2325 10/09/23  0132 10/08/23  1709   BILIRUBIN mg/dL 0.3 0.3 0.3 0.3 0.5   ALK PHOS U/L 140* 136* 132* 149* 146*   AST (SGOT) U/L 15 13 13 10 7   ALT (SGPT) U/L 7 6 <5 5 <5     Results from last 7 days   Lab Units 10/09/23  0132 10/08/23  1709   MAGNESIUM mg/dL 2.0 1.9         Results from last 7 days   Lab Units 10/09/23  2325 10/09/23  2152 10/08/23  1923   CK TOTAL U/L 217*  --   --    HSTROP T ng/L 25* 23* 26*       Imaging Results (Last 24 Hours)       Procedure Component Value Units Date/Time    XR Chest 1 View [[id number]] Collected: 10/12/23 1218     Updated: 10/12/23 1220    Narrative:      EXAM:    XR Chest, 1 View     EXAM DATE:    10/12/2023 12:41 PM     CLINICAL HISTORY:    Shortness of Breath; N17.9-Acute kidney failure, unspecified;  N18.9-Chronic kidney disease, unspecified; I50.9-Heart failure,  unspecified; J96.21-Acute and chronic respiratory failure with hypoxia;  E87.5-Hyperkalemia     TECHNIQUE:    Frontal view of the chest.     COMPARISON:    10/10/2023     FINDINGS:    Lungs and pleural spaces:  Decreased vascular congestion.  Decreased  interstitial thickening.  Improvement in pleural effusions.  No  pneumothorax.    Heart:  Cardiomegaly.    Mediastinum:  Unremarkable as visualized.    Bones/joints:  Unremarkable as visualized.       Impression:        Significant interval improvement in CHF/volume overload.        This report was finalized on 10/12/2023 12:18 PM by Dr. Eduardo August MD.                 Medications:  aspirin, 325 mg, Oral, Daily  carvedilol, 25 mg, Oral, BID With Meals  folic acid, 1 mg, Oral, Daily  heparin (porcine), 5,000 Units, Subcutaneous,  Q12H  hydrALAZINE, 25 mg, Oral, Q8H  insulin regular, 5 Units, Intravenous, Once  ipratropium-albuterol, 3 mL, Nebulization, 4x Daily - RT  levothyroxine, 88 mcg, Oral, Q AM  montelukast, 10 mg, Oral, Nightly  nicotine, 1 patch, Transdermal, Q24H  pantoprazole, 40 mg, Oral, Daily  [START ON 10/13/2023] predniSONE, 20 mg, Oral, Daily With Breakfast  rosuvastatin, 10 mg, Oral, Nightly  senna-docusate sodium, 2 tablet, Oral, BID  sodium chloride, 10 mL, Intravenous, Q12H  vitamin B-12, 1,000 mcg, Oral, Daily               Assessment & Plan   Anasarca: proBNP and ReDs vest reading elevated on admission. Imaging reveals CHF. Echo reveals an EF of 56-60%, grade I diastolic dysfunction, mild AS, mild pulmonary HTN and small pericardial effusion with no evidence of cardiac tamponade. Worsening renal failure possibly contributing with acute diastolic CHF as well. Repeat ReDs vest. Management of volume status per nephrology. Pt at high risk for progressing to HD but currently stating she would want HD if deemed necessary. Cont to monitor volume status closely.      Acute exacerbation of COPD: Possibly exacerbated by pulmonary edema. Respiratory PCR negative. Pulm consulted in the setting of persistent wheezing and discussion with nephrology. Pulm has decreased steroids. Course of azithromycin completed. Cont nebs. Pulm input appreciated.     Bilateral pneumonia: Procal normal. Cultures with NGTD. Completed course of azithromycin as above. Cont nebs.     Acute hypoxic respiratory failure: Likely multifactorial in the setting of above. Treatment as outlined above. Wean supplemental O2 as tolerated.     MAICO on CKD IIIa: Baseline Cr appears to be around 2.2. Non nephrotic proteinuria. Complements are normal with serologies pending. No evidence of hydronephrosis on renal US with US revealing small and echogenic right kidney. Cr has continued to rise. Pt at high risk for HD. Cont to monitor closely. Nephrology input appreciated.      Hyperkalemia: Holding home lisinopril. Cont targeted treatment per nephrology. Monitor on telemetry.     Essential HTN: BP intermittently elevated. Hydralazine has been increased. Cont COreg. Cont to monitor.     Hypothyroidism: TSH mildly elevated with normal free T4. Cont levothyroxine. Will need repeat labs as an outpatient.     Tobacco abuse: Cont NRT and encourage cessation.     Morbid obesity by BMI: Complicates all aspects of care.     Goals of Care: Pt informs me she would likely not want HD if deemed necessary. Consulted palliative care for additional support with she voicing this to palliative as well. Per discussion with palliative, pt wishes to be DNR/DNI. Cont ongoing discussions and supportive treatment. Palliative care input appreciated.     DVT PPX: SQ heparin    Discussed with Mike Mccracken and palliative care APRN.     Disposition Unclear at present in the setting of MAICO.     José Miguel Tellez DO  10/12/23  18:10 EDT

## 2023-10-12 NOTE — CONSULTS
Referring Provider: dr Tellez  Reason for Consultation: sob, hypoxic respiratory failure      Chief complaint -sob      History of present illness: Patient is a 67-year-old female with past medical history significant for coronary artery disease status post tenting, hyperlipidemia, essential hypertension, COPD, tobacco abuse, CKD, hypothyroidism, arthritis, anxiety, history of migraines, obesity and history of medical noncompliance presented to our hospital ER with increasing shortness of breath and leg swelling.  Patients shortness of breath has been progressively getting worse over last few days before coming to the ER.  Treatment given in the ER then onto the floor was reviewed.  Patient was diagnosed with volume overload and received diuretics.  Case discussed with Dr. Houser.  Latest ABG chest x-ray and vitals reviewed.  Chest x-ray from today shows improvement.    Review of Systems  History obtained from chart review and the patient  General ROS: Positive for generalized fatigue  Psychological ROS: negative for - anxiety or depression  ENT ROS: negative for - headaches, visual changes or vocal changes  Respiratory ROS: positive for - cough and shortness of breath  Cardiovascular ROS: no chest pain or dyspnea on exertion  Gastrointestinal ROS: no abdominal pain, change in bowel habits, or black or bloody stools  Musculoskeletal ROS: negative for - joint pain, joint stiffness or joint swelling  Neurological ROS: no TIA or stroke symptoms  Hematological: no bleeding  Skin: no bruises, no rash        History  Past Medical History:   Diagnosis Date    Anxiety     Arthritis     Coronary artery disease     H/O heart artery stent     Hyperlipidemia     Hypertension     Obesity    ,   Past Surgical History:   Procedure Laterality Date    ANKLE SURGERY      RIGHT    APPENDECTOMY      CARDIAC CATHETERIZATION      LEFT    CORONARY STENT PLACEMENT      HYSTERECTOMY     ,   Family History   Problem Relation Age of  Onset    Heart disease Mother     Heart attack Mother 73    Fibromyalgia Mother     Heart attack Father 72    Ovarian cancer Sister     Coronary artery disease Other     Breast cancer Neg Hx    ,   Social History     Tobacco Use    Smoking status: Every Day     Packs/day: 0.50     Years: 30.00     Additional pack years: 0.00     Total pack years: 15.00     Types: Electronic Cigarette, Cigarettes    Smokeless tobacco: Never   Vaping Use    Vaping Use: Never used   Substance Use Topics    Alcohol use: No    Drug use: No   ,   Medications Prior to Admission   Medication Sig Dispense Refill Last Dose    aspirin 325 MG tablet Take 1 tablet by mouth Daily.   10/8/2023    carvedilol (COREG) 25 MG tablet Take 1 tablet by mouth 2 (Two) Times a Day With Meals. 60 tablet 0 10/8/2023    folic acid (FOLVITE) 1 MG tablet Take 1 tablet by mouth Daily.   10/8/2023    hydrALAZINE (APRESOLINE) 25 MG tablet Take 1 tablet by mouth 2 (Two) Times a Day. 60 tablet 0 10/8/2023    levothyroxine (SYNTHROID, LEVOTHROID) 88 MCG tablet Take 1 tablet by mouth Daily.   10/8/2023    lisinopril (PRINIVIL,ZESTRIL) 5 MG tablet Take 1 tablet by mouth Daily.   10/8/2023    montelukast (SINGULAIR) 10 MG tablet Take 1 tablet by mouth Every Night.   10/8/2023    pantoprazole (PROTONIX) 40 MG EC tablet Take 1 tablet by mouth Daily.   10/8/2023    rosuvastatin (CRESTOR) 20 MG tablet Take 1 tablet by mouth Daily. 30 tablet 0 10/8/2023    vitamin B-12 (CYANOCOBALAMIN) 1000 MCG tablet Take 1 tablet by mouth Daily.   10/8/2023    gabapentin (NEURONTIN) 800 MG tablet Take 1 tablet by mouth 2 (Two) Times a Day As Needed (nerve pain).   Unknown    HYDROcodone-acetaminophen (NORCO)  MG per tablet Take 1 tablet by mouth Every 8 (Eight) Hours As Needed for Moderate Pain.   Unknown    nitroglycerin (NITROSTAT) 0.4 MG SL tablet Place 1 tablet under the tongue Every 5 (Five) Minutes As Needed for Chest Pain. 30 tablet 2 Unknown   , Scheduled Meds:  aspirin, 325  mg, Oral, Daily  carvedilol, 25 mg, Oral, BID With Meals  folic acid, 1 mg, Oral, Daily  heparin (porcine), 5,000 Units, Subcutaneous, Q12H  hydrALAZINE, 25 mg, Oral, Q8H  insulin regular, 5 Units, Intravenous, Once  ipratropium-albuterol, 3 mL, Nebulization, 4x Daily - RT  levothyroxine, 88 mcg, Oral, Q AM  montelukast, 10 mg, Oral, Nightly  nicotine, 1 patch, Transdermal, Q24H  pantoprazole, 40 mg, Oral, Daily  predniSONE, 40 mg, Oral, Daily With Breakfast  rosuvastatin, 10 mg, Oral, Nightly  senna-docusate sodium, 2 tablet, Oral, BID  sodium chloride, 10 mL, Intravenous, Q12H  vitamin B-12, 1,000 mcg, Oral, Daily    , Continuous Infusions:    and Allergies:  Patient has no known allergies.    Objective     Vital Signs   Temp:  [97.8 °F (36.6 °C)-98.4 °F (36.9 °C)] 98.1 °F (36.7 °C)  Heart Rate:  [69-86] 86  Resp:  [18-20] 18  BP: (147-178)/(72-98) 168/83    Physical Exam:             General-obese in appearance, not in any acute distress    HEENT-PERRLA    Neck-supple    Respiratory-respirations normal-on auscultation no wheezing no crackles, decreased breath sounds    Cardiovascular-  Normal S1 and S2. No S3, S4 or murmurs. No JVD, no carotid bruit and no edema, pulses normal bilaterally     GI-nontender nondistended bowel sounds positive    CNS-nonfocal    Musculoskeletal -no edema  Extremities- no obvious deformity noticed     Psychiatric-mood good, good eye contact, alert awake oriented  Skin- no visible rash                                                                   Results Review:    LABS:    Lab Results   Component Value Date    GLUCOSE 114 (H) 10/12/2023    BUN 47 (H) 10/12/2023    CREATININE 4.85 (H) 10/12/2023    BCR 9.7 10/12/2023    CO2 19.3 (L) 10/12/2023    CALCIUM 9.3 10/12/2023    ALBUMIN 3.9 10/12/2023    AST 15 10/12/2023    ALT 7 10/12/2023    WBC 7.34 10/12/2023    HGB 9.5 (L) 10/12/2023    HCT 31.9 (L) 10/12/2023    MCV 90.1 10/12/2023     10/12/2023     10/12/2023    K  "5.5 (H) 10/12/2023     10/12/2023    ANIONGAP 12.7 10/12/2023       No results found for: \"INR\", \"PROTIME\"             I reviewed the patient's new clinical results.  I reviewed the patient's new imaging results and agree with the interpretation.      Assessment & Plan     Shortness of breath-likely multifactorial most likely related to her chronic heart failure with preserved ejection fraction with elevated brain atretic peptide.  Received diuretics which increase her creatinine.  Latest chest x-ray shows improvement.      COPD-we will decrease the steroids.  Continue oxygen to maintain saturation 88 to 92%.  We will start the patient on BiPAP for possible sleep apnea and respiratory failure.  Patient agreed to be admitted overnight.  Oxygen FiO2 requirement reviewed and titrated to maintain saturation 88 to 92%.    Renal failure and elevated potassium- received fluids this morning which made her short of breath which were stopped and diuretics were given.      Bedside rounds were done with RT and patient's nurse. All the lab and clinical findings were discussed with them and plan was also discussed in great detail.      Smoker-did extensive smoking cessation counseling.  Patient is not ready to quit yet.  Follow-up with pulmonary in the outpatient basis.  PFTs on outpatient basis.  Family member present-son at bedside Case discussed with him in great detail and answered his infection.        Echo-  Results for orders placed during the hospital encounter of 10/08/23    Adult Transthoracic Echo Complete W/ Cont if Necessary Per Protocol    Interpretation Summary    Left ventricular systolic function is normal. Left ventricular ejection fraction appears to be 56 - 60%.    Left ventricular diastolic function is consistent with (grade Ia w/high LAP) impaired relaxation.    The left atrial cavity is mild to moderately dilated.    Left atrial volume is moderately increased.    Mild aortic valve stenosis is " present.    Estimated right ventricular systolic pressure from tricuspid regurgitation is mildly elevated (35-45 mmHg).    There is a small (<1cm) pericardial effusion. There is no evidence of cardiac tamponade.  Case discussed with primary team.        Acute hypoxic respiratory failure    Acute renal failure (ARF)          Elmer Mckeon MD  10/12/23  14:00 EDT

## 2023-10-12 NOTE — PLAN OF CARE
Goal Outcome Evaluation:     Patient resting in bed. Patient continues to wear 3L NC for oxygen saturation to remain >90% Will continue to follow plan of care this shift.

## 2023-10-12 NOTE — PLAN OF CARE
Goal Outcome Evaluation:   Pt resting in bed. No signs or symptoms of distress noted. Pt is very anxious this shift over the thought that her medications is over dosing her. MD made aware. Will continue with plan of care.

## 2023-10-12 NOTE — CONSULTS
Palliative Care Initial Consult     Attending Physician: José Miguel Tellez, *  Referring Provider: Jerry Tellez    assistance with clarification of goals of care and psychosocial support  Code Status:   Code Status and Medical Interventions:   Ordered at: 10/08/23 2006     Level Of Support Discussed With:    Patient     Code Status (Patient has no pulse and is not breathing):    CPR (Attempt to Resuscitate)     Medical Interventions (Patient has pulse or is breathing):    Full Support      Advanced Directives: Advance Directive Status: Patient does not have advance directive   Healthcare surrogate: NOK son Wes Michel  Goals of Care: Lesley states that she would not want to be resuscitated or to be intubated on a ventilator, she also states that if Hemodialysis were recommended she would not want it.    HPI:  Lesley Michel is a 67 y.o. female admitted on 10/8/2023 due to shortness of breath and edema. Lesley has a medical history of CAD status post stenting, hyperlipidemia, essential hypertension, COPD, tobacco abuse, CKD, hypothyroidism, arthritis, anxiety, history of migraines, obesity by BMI, and history of medical noncompliance. Pt follows with a cardiologist and per their notes CKD and that  she has been noncompliant with all of her medications, despite upon admission her stating that she is compliant with her medications. Today 10/12/23 she is alert and oriented time four and was able to discuss goals of care.         ROS: Negative except as above in HPI.     Past Medical History:   Diagnosis Date    Anxiety     Arthritis     Coronary artery disease     H/O heart artery stent     Hyperlipidemia     Hypertension     Obesity      Past Surgical History:   Procedure Laterality Date    ANKLE SURGERY      RIGHT    APPENDECTOMY      CARDIAC CATHETERIZATION      LEFT    CORONARY STENT PLACEMENT      HYSTERECTOMY       Social History     Socioeconomic History    Marital status:    Tobacco Use     Smoking status: Every Day     Packs/day: 0.50     Years: 30.00     Additional pack years: 0.00     Total pack years: 15.00     Types: Electronic Cigarette, Cigarettes    Smokeless tobacco: Never   Vaping Use    Vaping Use: Never used   Substance and Sexual Activity    Alcohol use: No    Drug use: No    Sexual activity: Never     Family History   Problem Relation Age of Onset    Heart disease Mother     Heart attack Mother 73    Fibromyalgia Mother     Heart attack Father 72    Ovarian cancer Sister     Coronary artery disease Other     Breast cancer Neg Hx        No Known Allergies    Current Facility-Administered Medications   Medication Dose Route Frequency Provider Last Rate Last Admin    acetaminophen (TYLENOL) tablet 650 mg  650 mg Oral Q6H PRN Edvin, Fentress, APRN   650 mg at 10/10/23 1523    aspirin EC tablet 325 mg  325 mg Oral Daily Chambers, Marie, APRN   325 mg at 10/12/23 0937    sennosides-docusate (PERICOLACE) 8.6-50 MG per tablet 2 tablet  2 tablet Oral BID Fanny Ford MD   2 tablet at 10/10/23 2139    And    polyethylene glycol (MIRALAX) packet 17 g  17 g Oral Daily PRN Fanny Ford MD        And    bisacodyl (DULCOLAX) EC tablet 5 mg  5 mg Oral Daily PRN Fanny Ford MD        And    bisacodyl (DULCOLAX) suppository 10 mg  10 mg Rectal Daily PRN Fanny Ford MD        carvedilol (COREG) tablet 25 mg  25 mg Oral BID With Meals Edvin, Marie, APRN   25 mg at 10/12/23 1044    dextrose (D50W) (25 g/50 mL) IV injection 25 g  25 g Intravenous Q15 Min PRN Edvin, Fentress, APRN   25 g at 10/09/23 0401    dextrose (GLUTOSE) oral gel 15 g  15 g Oral Q15 Min PRN Edvin, Fentress, APRN        folic acid (FOLVITE) tablet 1 mg  1 mg Oral Daily Chambers, Fentress, APRN   1 mg at 10/12/23 1043    gabapentin (NEURONTIN) capsule 300 mg  300 mg Oral BID PRN Chambers, Marie, APRN   300 mg at 10/12/23 1313    glucagon HCl (Diagnostic) injection 1 mg  1 mg Intramuscular Q15 Min PRN Edvin, Marie, APRN         heparin (porcine) 5000 UNIT/ML injection 5,000 Units  5,000 Units Subcutaneous Q12H Fanny Ford MD   5,000 Units at 10/12/23 0938    hydrALAZINE (APRESOLINE) tablet 25 mg  25 mg Oral Q8H Palomo Steinberg MD   25 mg at 10/12/23 1416    HYDROcodone-acetaminophen (NORCO)  MG per tablet 1 tablet  1 tablet Oral Q8H PRN Marie Pardo APRN   1 tablet at 10/12/23 1043    hydrOXYzine (ATARAX) tablet 25 mg  25 mg Oral TID PRN Marie Pardo APRN   25 mg at 10/12/23 0931    insulin regular (humuLIN R,novoLIN R) injection 5 Units  5 Units Intravenous Once Marie Pardo APRN        ipratropium-albuterol (DUO-NEB) nebulizer solution 3 mL  3 mL Nebulization 4x Daily - RT Marie Pardo APRN   3 mL at 10/12/23 0707    levothyroxine (SYNTHROID, LEVOTHROID) tablet 88 mcg  88 mcg Oral Q AM Marie Pardo APRN   88 mcg at 10/12/23 0549    melatonin tablet 10 mg  10 mg Oral Nightly PRN Marie Pardo APRN   10 mg at 10/09/23 2016    montelukast (SINGULAIR) tablet 10 mg  10 mg Oral Nightly Marie Pardo APRN   10 mg at 10/11/23 2153    nicotine (NICODERM CQ) 21 MG/24HR patch 1 patch  1 patch Transdermal Q24H Marie Pardo APRN   1 patch at 10/12/23 1045    nitroglycerin (NITROSTAT) SL tablet 0.4 mg  0.4 mg Sublingual Q5 Min PRN Fanny Ford MD        ondansetron (ZOFRAN) injection 4 mg  4 mg Intravenous Q6H PRN Marie Pardo APRN   4 mg at 10/10/23 0411    pantoprazole (PROTONIX) EC tablet 40 mg  40 mg Oral Daily Marie Pardo APRN   40 mg at 10/12/23 1043    predniSONE (DELTASONE) tablet 40 mg  40 mg Oral Daily With Breakfast Chris Hanks MD   40 mg at 10/12/23 1043    rosuvastatin (CRESTOR) tablet 10 mg  10 mg Oral Nightly Marie Pardo APRN   10 mg at 10/11/23 2153    sodium chloride 0.9 % flush 10 mL  10 mL Intravenous PRN Fanny Ford MD        sodium chloride 0.9 % flush 10 mL  10 mL Intravenous Q12H Fanny Ford MD   10 mL at 10/12/23 0932    sodium chloride 0.9 % flush  "10 mL  10 mL Intravenous PRN Fanny Ford MD        sodium chloride 0.9 % infusion 40 mL  40 mL Intravenous PRN Fanny Ford MD   40 mL at 10/12/23 1049    vitamin B-12 (CYANOCOBALAMIN) tablet 1,000 mcg  1,000 mcg Oral Daily Marie Pardo APRN   1,000 mcg at 10/12/23 1042          acetaminophen    senna-docusate sodium **AND** polyethylene glycol **AND** bisacodyl **AND** bisacodyl    dextrose    dextrose    gabapentin    glucagon (human recombinant)    HYDROcodone-acetaminophen    hydrOXYzine    melatonin    nitroglycerin    ondansetron    [COMPLETED] Insert Peripheral IV **AND** sodium chloride    sodium chloride    sodium chloride    Current medication reviewed for route, type, dose and frequency and are current per MAR.    Palliative Performance Scale Score:     /77 (BP Location: Left arm, Patient Position: Lying)   Pulse 66   Temp 98 °F (36.7 °C) (Oral)   Resp 18   Ht 152.4 cm (60\")   Wt 109 kg (241 lb 4.8 oz)   SpO2 95%   BMI 47.13 kg/m²     Intake/Output Summary (Last 24 hours) at 10/12/2023 1508  Last data filed at 10/12/2023 1500  Gross per 24 hour   Intake 900 ml   Output 800 ml   Net 100 ml       PE:  General Appearance:    Chronically ill appearing, alert, cooperative, NAD   HEENT:    NC/AT, without obvious abnormality, EOMI, anicteric    Neck:   supple, trachea midline, no JVD   Lungs:     Unlabored respirations, no wheezes rhonchi or rales    Heart:    RRR, normal S1 and S2, no M/R/G   Abdomen:     Soft, NT, ND, NABS    Extremities:   Moves all extremities, BLE 2+ edema   Pulses:   Pulses palpable and equal bilaterally   Skin:   Warm, dry   Neurologic:   A/Ox3, cooperative   Psych:   Calm, appropriate       Labs:   Results from last 7 days   Lab Units 10/12/23  0057   WBC 10*3/mm3 7.34   HEMOGLOBIN g/dL 9.5*   HEMATOCRIT % 31.9*   PLATELETS 10*3/mm3 269     Results from last 7 days   Lab Units 10/12/23  1225   SODIUM mmol/L 136   POTASSIUM mmol/L 5.5*   CHLORIDE mmol/L 104 "   CO2 mmol/L 19.3*   BUN mg/dL 47*   CREATININE mg/dL 4.85*   GLUCOSE mg/dL 114*   CALCIUM mg/dL 9.3     Results from last 7 days   Lab Units 10/12/23  1225 10/12/23  0057   SODIUM mmol/L 136 134*   POTASSIUM mmol/L 5.5* 5.8*   CHLORIDE mmol/L 104 101   CO2 mmol/L 19.3* 23.6   BUN mg/dL 47* 46*   CREATININE mg/dL 4.85* 5.01*   CALCIUM mg/dL 9.3 9.5   BILIRUBIN mg/dL  --  0.3   ALK PHOS U/L  --  140*   ALT (SGPT) U/L  --  7   AST (SGOT) U/L  --  15   GLUCOSE mg/dL 114* 145*     Imaging Results (Last 72 Hours)       Procedure Component Value Units Date/Time    XR Chest 1 View [[id number]] Collected: 10/12/23 1218     Updated: 10/12/23 1220    Narrative:      EXAM:    XR Chest, 1 View     EXAM DATE:    10/12/2023 12:41 PM     CLINICAL HISTORY:    Shortness of Breath; N17.9-Acute kidney failure, unspecified;  N18.9-Chronic kidney disease, unspecified; I50.9-Heart failure,  unspecified; J96.21-Acute and chronic respiratory failure with hypoxia;  E87.5-Hyperkalemia     TECHNIQUE:    Frontal view of the chest.     COMPARISON:    10/10/2023     FINDINGS:    Lungs and pleural spaces:  Decreased vascular congestion.  Decreased  interstitial thickening.  Improvement in pleural effusions.  No  pneumothorax.    Heart:  Cardiomegaly.    Mediastinum:  Unremarkable as visualized.    Bones/joints:  Unremarkable as visualized.       Impression:        Significant interval improvement in CHF/volume overload.        This report was finalized on 10/12/2023 12:18 PM by Dr. Eduardo August MD.       XR Chest 1 View [[id number]] Collected: 10/10/23 1155     Updated: 10/10/23 1157    Narrative:      EXAM:    XR Chest, 1 View     EXAM DATE:    10/10/2023 12:08 PM     CLINICAL HISTORY:    dyspnea; N17.9-Acute kidney failure, unspecified; N18.9-Chronic kidney  disease, unspecified; I50.9-Heart failure, unspecified; J96.21-Acute and  chronic respiratory failure with hypoxia; E87.5-Hyperkalemia     TECHNIQUE:    Frontal view of the chest.      COMPARISON:    10/8/2023     FINDINGS:    Lungs and pleural spaces:  CHF with interstitial edema and pulmonary  vascular congestion.  Development of consolidative bilateral mid and  lower lung airspace disease.  Trace right pleural effusion.  No  pneumothorax.    Heart:  Unremarkable as visualized.  No cardiomegaly.    Mediastinum:  Unremarkable as visualized.    Bones/joints:  Unremarkable as visualized.       Impression:      1.  Bilateral airspace disease consistent with pneumonia.  2.  CHF/edema.        This report was finalized on 10/10/2023 11:55 AM by Dr. Eduardo August MD.       CT Chest Without Contrast Diagnostic [[id number]] Collected: 10/10/23 1150     Updated: 10/10/23 1157    Narrative:      EXAM:    CT Chest Without Intravenous Contrast     EXAM DATE:    10/10/2023 11:59 AM     CLINICAL HISTORY:    Respiratory illness, nondiagnostic xray; N17.9-Acute kidney failure,  unspecified; N18.9-Chronic kidney disease, unspecified; I50.9-Heart  failure, unspecified; J96.21-Acute and chronic respiratory failure with  hypoxia; E87.5-Hyperkalemia     TECHNIQUE:    Axial computed tomography images of the chest without intravenous  contrast.  Sagittal and coronal reformatted images were created and  reviewed.  This CT exam was performed using one or more of the following  dose reduction techniques:  automated exposure control, adjustment of  the mA and/or kV according to patient size, and/or use of iterative  reconstruction technique.     COMPARISON:    X-ray 10/8/2023     FINDINGS:    Lungs and pleural spaces:  Consolidative airspace disease of the  peripheral aspect right lower lobe and right middle lobe with volume  loss.  Partial consolidation of the lingula.  Patchy airspace disease  left lower lobe.  Small right and very small left pleural effusions  which appear nonloculated.  Pulmonary vascular congestion.    Heart:  Small pericardial effusion.  Cardiomegaly.  Severe coronary  artery calcifications.     Mediastinum:  Mediastinal adenopathy and right greater than left hilar  adenopathy.  Right hilar lymph node is 1.8 cm.  Subcarinal lymph node is  2.5 cm.    Bones/joints:  Unremarkable as visualized.  No acute fracture.  No  dislocation.    Soft tissues:  Interstitial thickening compatible with edema.  Mild  anasarca.    Vasculature:  Atherosclerosis thoracic aorta.    Lymph nodes:  Right paratracheal lymph node is 2.4 x 1.3 cm.    Liver:  Liver cirrhosis.       Impression:      1.  CHF/edema.  2.  Small right and very small left pleural effusions which are  nonloculated.  3.  Bilateral consolidative airspace disease as detailed above  consistent with pneumonia.  4.  Small pericardial effusion.  5.  Cardiomegaly with severe coronary artery calcifications.  6.  Mediastinal and hilar adenopathy. May be reactive but follow-up  recommended to exclude neoplastic etiologies.  7.  Liver cirrhosis.  8.  Anasarca.  9.  Other nonacute findings above.        This report was finalized on 10/10/2023 11:55 AM by Dr. Eduardo August MD.               Diagnostics: Reviewed    A: Lesley Michel is a 67 y.o. female admitted on 10/8/2023 due to shortness of breath and edema. Lesley has a medical history of CAD status post stenting, hyperlipidemia, essential hypertension, COPD, tobacco abuse, CKD, hypothyroidism, arthritis, anxiety, history of migraines, obesity by BMI, and history of medical noncompliance. Pt follows with a cardiologist and per their notes CKD and that  she has been noncompliant with all of her medications, despite upon admission her stating that she is compliant with her medications. Today 10/12/23 she is alert and oriented time four and was able to discuss goals of care.          P:   I was able to have a conversation with Lesley regarding her goals of care for herself. We discussed if she would want to be resuscitated should her heart stop and to be intubated and placed on a ventilator. She stated that she would  not want to be resuscitated or to be put on a ventilator and stated if it is my time to go, to just let me go. I did discuss with her if nephrologists felt hemodialysis were indicated, how she felt about this, and she said that she would not want dialysis as she has seen to many people go on dialysis and not make it anyway, she note her daughter in law just passed of kidney failure on dialysis. I changed her code status to DNR/DNI and let NADEEM Boyle know and Dr Tellez of conversation.    We appreciate the consult and the opportunity to participate in Lesley Michel's care. We will continue to follow along. Please do not hesitate to contact us regarding further symptom management or goals of care needs, including after hours or on weekends via our on call provider at [phone].     Time: 55 minutes spent reviewing medical and medication records, assessing and examining patient, discussing with family, answering questions, providing some guidance about a plan and documentation of care, and coordinating care with other healthcare members, with > 50% time spent face to face.     Gewn Ellis, APRN    10/12/2023

## 2023-10-13 ENCOUNTER — APPOINTMENT (OUTPATIENT)
Dept: GENERAL RADIOLOGY | Facility: HOSPITAL | Age: 68
DRG: 291 | End: 2023-10-13
Payer: MEDICARE

## 2023-10-13 LAB
ABSOLUTE LUNG FLUID CONTENT: 43 % (ref 20–35)
ALBUMIN SERPL-MCNC: 4.1 G/DL (ref 3.5–5.2)
ALBUMIN/GLOB SERPL: 1.4 G/DL
ALP SERPL-CCNC: 124 U/L (ref 39–117)
ALT SERPL W P-5'-P-CCNC: 7 U/L (ref 1–33)
ANION GAP SERPL CALCULATED.3IONS-SCNC: 10.9 MMOL/L (ref 5–15)
ANION GAP SERPL CALCULATED.3IONS-SCNC: 13.5 MMOL/L (ref 5–15)
ANION GAP SERPL CALCULATED.3IONS-SCNC: 14.5 MMOL/L (ref 5–15)
AST SERPL-CCNC: 12 U/L (ref 1–32)
BACTERIA SPEC AEROBE CULT: NORMAL
BACTERIA SPEC AEROBE CULT: NORMAL
BASOPHILS # BLD AUTO: 0.01 10*3/MM3 (ref 0–0.2)
BASOPHILS NFR BLD AUTO: 0.2 % (ref 0–1.5)
BILIRUB SERPL-MCNC: 0.3 MG/DL (ref 0–1.2)
BUN SERPL-MCNC: 51 MG/DL (ref 8–23)
BUN SERPL-MCNC: 54 MG/DL (ref 8–23)
BUN SERPL-MCNC: 59 MG/DL (ref 8–23)
BUN/CREAT SERPL: 10 (ref 7–25)
BUN/CREAT SERPL: 11 (ref 7–25)
BUN/CREAT SERPL: 11.4 (ref 7–25)
CALCIUM SPEC-SCNC: 9.1 MG/DL (ref 8.6–10.5)
CALCIUM SPEC-SCNC: 9.5 MG/DL (ref 8.6–10.5)
CALCIUM SPEC-SCNC: 9.6 MG/DL (ref 8.6–10.5)
CHLORIDE SERPL-SCNC: 101 MMOL/L (ref 98–107)
CHLORIDE SERPL-SCNC: 102 MMOL/L (ref 98–107)
CHLORIDE SERPL-SCNC: 103 MMOL/L (ref 98–107)
CO2 SERPL-SCNC: 19.5 MMOL/L (ref 22–29)
CO2 SERPL-SCNC: 20.5 MMOL/L (ref 22–29)
CO2 SERPL-SCNC: 22.1 MMOL/L (ref 22–29)
CREAT SERPL-MCNC: 4.91 MG/DL (ref 0.57–1)
CREAT SERPL-MCNC: 5.1 MG/DL (ref 0.57–1)
CREAT SERPL-MCNC: 5.17 MG/DL (ref 0.57–1)
DEPRECATED RDW RBC AUTO: 54.1 FL (ref 37–54)
EGFRCR SERPLBLD CKD-EPI 2021: 8.7 ML/MIN/1.73
EGFRCR SERPLBLD CKD-EPI 2021: 8.9 ML/MIN/1.73
EGFRCR SERPLBLD CKD-EPI 2021: 9.3 ML/MIN/1.73
EOSINOPHIL # BLD AUTO: 0 10*3/MM3 (ref 0–0.4)
EOSINOPHIL NFR BLD AUTO: 0 % (ref 0.3–6.2)
ERYTHROCYTE [DISTWIDTH] IN BLOOD BY AUTOMATED COUNT: 16.5 % (ref 12.3–15.4)
GEN 5 2HR TROPONIN T REFLEX: 38 NG/L
GLOBULIN UR ELPH-MCNC: 2.9 GM/DL
GLUCOSE BLDC GLUCOMTR-MCNC: 117 MG/DL (ref 70–130)
GLUCOSE BLDC GLUCOMTR-MCNC: 132 MG/DL (ref 70–130)
GLUCOSE BLDC GLUCOMTR-MCNC: 144 MG/DL (ref 70–130)
GLUCOSE BLDC GLUCOMTR-MCNC: 153 MG/DL (ref 70–130)
GLUCOSE SERPL-MCNC: 106 MG/DL (ref 65–99)
GLUCOSE SERPL-MCNC: 119 MG/DL (ref 65–99)
GLUCOSE SERPL-MCNC: 149 MG/DL (ref 65–99)
HCT VFR BLD AUTO: 30.8 % (ref 34–46.6)
HGB BLD-MCNC: 9.5 G/DL (ref 12–15.9)
IMM GRANULOCYTES # BLD AUTO: 0.03 10*3/MM3 (ref 0–0.05)
IMM GRANULOCYTES NFR BLD AUTO: 0.5 % (ref 0–0.5)
LYMPHOCYTES # BLD AUTO: 1.32 10*3/MM3 (ref 0.7–3.1)
LYMPHOCYTES NFR BLD AUTO: 23.2 % (ref 19.6–45.3)
MCH RBC QN AUTO: 27.5 PG (ref 26.6–33)
MCHC RBC AUTO-ENTMCNC: 30.8 G/DL (ref 31.5–35.7)
MCV RBC AUTO: 89.3 FL (ref 79–97)
MONOCYTES # BLD AUTO: 0.26 10*3/MM3 (ref 0.1–0.9)
MONOCYTES NFR BLD AUTO: 4.6 % (ref 5–12)
NEUTROPHILS NFR BLD AUTO: 4.08 10*3/MM3 (ref 1.7–7)
NEUTROPHILS NFR BLD AUTO: 71.5 % (ref 42.7–76)
NRBC BLD AUTO-RTO: 0 /100 WBC (ref 0–0.2)
PLATELET # BLD AUTO: 279 10*3/MM3 (ref 140–450)
PMV BLD AUTO: 10.6 FL (ref 6–12)
POTASSIUM SERPL-SCNC: 5.6 MMOL/L (ref 3.5–5.2)
POTASSIUM SERPL-SCNC: 5.9 MMOL/L (ref 3.5–5.2)
POTASSIUM SERPL-SCNC: 6 MMOL/L (ref 3.5–5.2)
PROT SERPL-MCNC: 7 G/DL (ref 6–8.5)
RBC # BLD AUTO: 3.45 10*6/MM3 (ref 3.77–5.28)
SODIUM SERPL-SCNC: 134 MMOL/L (ref 136–145)
SODIUM SERPL-SCNC: 136 MMOL/L (ref 136–145)
SODIUM SERPL-SCNC: 137 MMOL/L (ref 136–145)
TROPONIN T DELTA: 3 NG/L
TROPONIN T SERPL HS-MCNC: 35 NG/L
WBC NRBC COR # BLD: 5.7 10*3/MM3 (ref 3.4–10.8)

## 2023-10-13 PROCEDURE — 94799 UNLISTED PULMONARY SVC/PX: CPT

## 2023-10-13 PROCEDURE — 99232 SBSQ HOSP IP/OBS MODERATE 35: CPT | Performed by: INTERNAL MEDICINE

## 2023-10-13 PROCEDURE — 71045 X-RAY EXAM CHEST 1 VIEW: CPT | Performed by: RADIOLOGY

## 2023-10-13 PROCEDURE — 94726 PLETHYSMOGRAPHY LUNG VOLUMES: CPT

## 2023-10-13 PROCEDURE — 63710000001 PREDNISONE PER 1 MG: Performed by: INTERNAL MEDICINE

## 2023-10-13 PROCEDURE — 25010000002 HEPARIN (PORCINE) PER 1000 UNITS: Performed by: INTERNAL MEDICINE

## 2023-10-13 PROCEDURE — 84484 ASSAY OF TROPONIN QUANT: CPT | Performed by: INTERNAL MEDICINE

## 2023-10-13 PROCEDURE — 93010 ELECTROCARDIOGRAM REPORT: CPT | Performed by: SPECIALIST

## 2023-10-13 PROCEDURE — 94761 N-INVAS EAR/PLS OXIMETRY MLT: CPT

## 2023-10-13 PROCEDURE — 80053 COMPREHEN METABOLIC PANEL: CPT | Performed by: INTERNAL MEDICINE

## 2023-10-13 PROCEDURE — 25010000002 ALBUMIN HUMAN 25% PER 50 ML: Performed by: INTERNAL MEDICINE

## 2023-10-13 PROCEDURE — 94664 DEMO&/EVAL PT USE INHALER: CPT

## 2023-10-13 PROCEDURE — 25010000002 FUROSEMIDE PER 20 MG: Performed by: INTERNAL MEDICINE

## 2023-10-13 PROCEDURE — P9047 ALBUMIN (HUMAN), 25%, 50ML: HCPCS | Performed by: INTERNAL MEDICINE

## 2023-10-13 PROCEDURE — 25010000002 HYDRALAZINE PER 20 MG: Performed by: INTERNAL MEDICINE

## 2023-10-13 PROCEDURE — 71045 X-RAY EXAM CHEST 1 VIEW: CPT

## 2023-10-13 PROCEDURE — 93005 ELECTROCARDIOGRAM TRACING: CPT | Performed by: INTERNAL MEDICINE

## 2023-10-13 PROCEDURE — 25810000003 SODIUM CHLORIDE 0.9 % SOLUTION: Performed by: INTERNAL MEDICINE

## 2023-10-13 PROCEDURE — 82948 REAGENT STRIP/BLOOD GLUCOSE: CPT

## 2023-10-13 PROCEDURE — 85025 COMPLETE CBC W/AUTO DIFF WBC: CPT | Performed by: INTERNAL MEDICINE

## 2023-10-13 RX ORDER — HYDRALAZINE HYDROCHLORIDE 20 MG/ML
10 INJECTION INTRAMUSCULAR; INTRAVENOUS EVERY 6 HOURS PRN
Status: DISCONTINUED | OUTPATIENT
Start: 2023-10-13 | End: 2023-10-30 | Stop reason: HOSPADM

## 2023-10-13 RX ORDER — FUROSEMIDE 10 MG/ML
20 INJECTION INTRAMUSCULAR; INTRAVENOUS ONCE
Status: COMPLETED | OUTPATIENT
Start: 2023-10-13 | End: 2023-10-13

## 2023-10-13 RX ORDER — ALBUMIN (HUMAN) 12.5 G/50ML
25 SOLUTION INTRAVENOUS ONCE
Qty: 100 ML | Refills: 0 | Status: COMPLETED | OUTPATIENT
Start: 2023-10-13 | End: 2023-10-13

## 2023-10-13 RX ORDER — SODIUM POLYSTYRENE SULFONATE 4.1 MEQ/G
30 POWDER, FOR SUSPENSION ORAL; RECTAL ONCE
Status: COMPLETED | OUTPATIENT
Start: 2023-10-14 | End: 2023-10-14

## 2023-10-13 RX ORDER — SODIUM CHLORIDE 9 MG/ML
100 INJECTION, SOLUTION INTRAVENOUS CONTINUOUS
Status: DISCONTINUED | OUTPATIENT
Start: 2023-10-13 | End: 2023-10-13

## 2023-10-13 RX ORDER — SODIUM POLYSTYRENE SULFONATE 4.1 MEQ/G
30 POWDER, FOR SUSPENSION ORAL; RECTAL ONCE
Qty: 30 G | Refills: 0 | Status: COMPLETED | OUTPATIENT
Start: 2023-10-13 | End: 2023-10-13

## 2023-10-13 RX ORDER — LACTULOSE 10 G/15ML
30 SOLUTION ORAL ONCE
Status: COMPLETED | OUTPATIENT
Start: 2023-10-13 | End: 2023-10-13

## 2023-10-13 RX ADMIN — PREDNISONE 20 MG: 20 TABLET ORAL at 08:36

## 2023-10-13 RX ADMIN — HYDRALAZINE HYDROCHLORIDE 10 MG: 20 INJECTION INTRAMUSCULAR; INTRAVENOUS at 12:58

## 2023-10-13 RX ADMIN — HYDROXYZINE HYDROCHLORIDE 25 MG: 25 TABLET, FILM COATED ORAL at 20:50

## 2023-10-13 RX ADMIN — Medication 10 ML: at 08:39

## 2023-10-13 RX ADMIN — HYDROCODONE BITARTRATE AND ACETAMINOPHEN 1 TABLET: 10; 325 TABLET ORAL at 20:50

## 2023-10-13 RX ADMIN — IPRATROPIUM BROMIDE AND ALBUTEROL SULFATE 3 ML: 2.5; .5 SOLUTION RESPIRATORY (INHALATION) at 12:30

## 2023-10-13 RX ADMIN — ASPIRIN 325 MG: 325 TABLET, COATED ORAL at 08:36

## 2023-10-13 RX ADMIN — IPRATROPIUM BROMIDE AND ALBUTEROL SULFATE 3 ML: 2.5; .5 SOLUTION RESPIRATORY (INHALATION) at 06:50

## 2023-10-13 RX ADMIN — HYDRALAZINE HYDROCHLORIDE 25 MG: 25 TABLET, FILM COATED ORAL at 20:51

## 2023-10-13 RX ADMIN — HYDRALAZINE HYDROCHLORIDE 25 MG: 25 TABLET, FILM COATED ORAL at 05:24

## 2023-10-13 RX ADMIN — HEPARIN SODIUM 5000 UNITS: 5000 INJECTION INTRAVENOUS; SUBCUTANEOUS at 20:50

## 2023-10-13 RX ADMIN — Medication 10 MG: at 20:50

## 2023-10-13 RX ADMIN — Medication 10 ML: at 20:50

## 2023-10-13 RX ADMIN — ROSUVASTATIN CALCIUM 10 MG: 10 TABLET, FILM COATED ORAL at 20:50

## 2023-10-13 RX ADMIN — CARVEDILOL 25 MG: 25 TABLET, FILM COATED ORAL at 08:42

## 2023-10-13 RX ADMIN — DOCUSATE SODIUM 50 MG AND SENNOSIDES 8.6 MG 2 TABLET: 8.6; 5 TABLET, FILM COATED ORAL at 08:36

## 2023-10-13 RX ADMIN — HYDRALAZINE HYDROCHLORIDE 25 MG: 25 TABLET, FILM COATED ORAL at 16:28

## 2023-10-13 RX ADMIN — HYDROCODONE BITARTRATE AND ACETAMINOPHEN 1 TABLET: 10; 325 TABLET ORAL at 08:59

## 2023-10-13 RX ADMIN — CARVEDILOL 25 MG: 25 TABLET, FILM COATED ORAL at 17:59

## 2023-10-13 RX ADMIN — GABAPENTIN 300 MG: 300 CAPSULE ORAL at 08:59

## 2023-10-13 RX ADMIN — SODIUM CHLORIDE 100 ML/HR: 9 INJECTION, SOLUTION INTRAVENOUS at 09:37

## 2023-10-13 RX ADMIN — ALBUMIN HUMAN 25 G: 0.25 SOLUTION INTRAVENOUS at 08:50

## 2023-10-13 RX ADMIN — MONTELUKAST SODIUM 10 MG: 10 TABLET, COATED ORAL at 20:50

## 2023-10-13 RX ADMIN — NICOTINE TRANSDERMAL SYSTEM 1 PATCH: 21 PATCH, EXTENDED RELEASE TRANSDERMAL at 08:43

## 2023-10-13 RX ADMIN — IPRATROPIUM BROMIDE AND ALBUTEROL SULFATE 3 ML: 2.5; .5 SOLUTION RESPIRATORY (INHALATION) at 00:06

## 2023-10-13 RX ADMIN — FUROSEMIDE 20 MG: 10 INJECTION, SOLUTION INTRAMUSCULAR; INTRAVENOUS at 16:28

## 2023-10-13 RX ADMIN — LACTULOSE 30 G: 20 SOLUTION ORAL at 08:51

## 2023-10-13 RX ADMIN — GABAPENTIN 300 MG: 300 CAPSULE ORAL at 20:50

## 2023-10-13 RX ADMIN — DOCUSATE SODIUM 50 MG AND SENNOSIDES 8.6 MG 2 TABLET: 8.6; 5 TABLET, FILM COATED ORAL at 20:50

## 2023-10-13 RX ADMIN — LEVOTHYROXINE SODIUM 88 MCG: 88 TABLET ORAL at 05:24

## 2023-10-13 RX ADMIN — IPRATROPIUM BROMIDE AND ALBUTEROL SULFATE 3 ML: 2.5; .5 SOLUTION RESPIRATORY (INHALATION) at 18:23

## 2023-10-13 RX ADMIN — Medication 1 MG: at 08:37

## 2023-10-13 RX ADMIN — Medication 1000 MCG: at 08:37

## 2023-10-13 RX ADMIN — HEPARIN SODIUM 5000 UNITS: 5000 INJECTION INTRAVENOUS; SUBCUTANEOUS at 08:38

## 2023-10-13 RX ADMIN — PANTOPRAZOLE SODIUM 40 MG: 40 TABLET, DELAYED RELEASE ORAL at 08:37

## 2023-10-13 RX ADMIN — HYDROXYZINE HYDROCHLORIDE 25 MG: 25 TABLET, FILM COATED ORAL at 12:07

## 2023-10-13 RX ADMIN — SODIUM POLYSTYRENE SULFONATE 30 G: 1 POWDER ORAL; RECTAL at 08:50

## 2023-10-13 NOTE — PROGRESS NOTES
"Palliative Care Daily Progress Note     S: Medical record reviewed, followed up with Primary NADEEM Okeefe and Dr Tellez regarding patient's condition. When palliative care entered the room Lesley was sitting up on edge of bed and spoke to me for a moment but then had to immerge get to BSC, I told her I would come back later, but ended up calling her room. She denied pain , nausea, vomiting, and shortness of breath at this time.      O:   Palliative Performance Scale Score:     BP (!) 185/100 (BP Location: Right arm, Patient Position: Lying) Comment: RN Sary Notified  Pulse 72   Temp 98.1 °F (36.7 °C) (Oral)   Resp 16   Ht 152.4 cm (60\")   Wt 110 kg (242 lb 9.6 oz)   SpO2 96%   BMI 47.38 kg/m²     Intake/Output Summary (Last 24 hours) at 10/13/2023 1642  Last data filed at 10/13/2023 0317  Gross per 24 hour   Intake 500 ml   Output --   Net 500 ml       PE:  Lesley was awake and alert  and was not in distress, she had unlabored respirations with no audible wheezes noted. She had visible 2+ BLE edema and was able to quickly get up and transfer to BSC.    Meds: Reviewed and changes noted    Labs:   Results from last 7 days   Lab Units 10/13/23  0428   WBC 10*3/mm3 5.70   HEMOGLOBIN g/dL 9.5*   HEMATOCRIT % 30.8*   PLATELETS 10*3/mm3 279     Results from last 7 days   Lab Units 10/13/23  1414   SODIUM mmol/L 136   POTASSIUM mmol/L 5.6*   CHLORIDE mmol/L 103   CO2 mmol/L 19.5*   BUN mg/dL 54*   CREATININE mg/dL 4.91*   GLUCOSE mg/dL 106*   CALCIUM mg/dL 9.5     Results from last 7 days   Lab Units 10/13/23  1414 10/13/23  0540   SODIUM mmol/L 136 134*   POTASSIUM mmol/L 5.6* 6.0*   CHLORIDE mmol/L 103 101   CO2 mmol/L 19.5* 22.1   BUN mg/dL 54* 51*   CREATININE mg/dL 4.91* 5.10*   CALCIUM mg/dL 9.5 9.6   BILIRUBIN mg/dL  --  0.3   ALK PHOS U/L  --  124*   ALT (SGPT) U/L  --  7   AST (SGOT) U/L  --  12   GLUCOSE mg/dL 106* 119*     Imaging Results (Last 72 Hours)       Procedure Component Value Units Date/Time "    XR Chest 1 View [[id number]] Collected: 10/13/23 1242     Updated: 10/13/23 1245    Narrative:      EXAM:    XR Chest, 1 View     EXAM DATE:    10/13/2023 1:10 PM     CLINICAL HISTORY:    Dyspnea; N17.9-Acute kidney failure, unspecified; N18.9-Chronic kidney  disease, unspecified; I50.9-Heart failure, unspecified; J96.21-Acute and  chronic respiratory failure with hypoxia; E87.5-Hyperkalemia     TECHNIQUE:    Frontal view of the chest.     COMPARISON:    10/12/2023     FINDINGS:    Lungs and pleural spaces:  Interstitial thickening compatible with  edema.  Minimal bibasilar airspace disease.  No pneumothorax.    Heart:  Cardiac enlargement.    Mediastinum:  Unremarkable as visualized.    Bones/joints:  Bony structures are stable.       Impression:      1.  Stable changes of CHF with interstitial edema.  2.  Development of bibasilar airspace disease which may represent  atelectasis or pneumonia.        This report was finalized on 10/13/2023 12:43 PM by Dr. Eduardo August MD.       XR Chest 1 View [[id number]] Collected: 10/12/23 1218     Updated: 10/12/23 1220    Narrative:      EXAM:    XR Chest, 1 View     EXAM DATE:    10/12/2023 12:41 PM     CLINICAL HISTORY:    Shortness of Breath; N17.9-Acute kidney failure, unspecified;  N18.9-Chronic kidney disease, unspecified; I50.9-Heart failure,  unspecified; J96.21-Acute and chronic respiratory failure with hypoxia;  E87.5-Hyperkalemia     TECHNIQUE:    Frontal view of the chest.     COMPARISON:    10/10/2023     FINDINGS:    Lungs and pleural spaces:  Decreased vascular congestion.  Decreased  interstitial thickening.  Improvement in pleural effusions.  No  pneumothorax.    Heart:  Cardiomegaly.    Mediastinum:  Unremarkable as visualized.    Bones/joints:  Unremarkable as visualized.       Impression:        Significant interval improvement in CHF/volume overload.        This report was finalized on 10/12/2023 12:18 PM by Dr. Eduardo August MD.                  Diagnostics: Reviewed    A: Lesley Michel is a 67 y.o.  female e admitted on 10/8/2023 due to shortness of breath and edema. Lesley has a medical history of CAD status post stenting, hyperlipidemia, essential hypertension, COPD, tobacco abuse, CKD, hypothyroidism, arthritis, anxiety, history of migraines, obesity by BMI, and history of medical noncompliance. Pt follows with a cardiologist and per their notes CKD and that  she has been noncompliant with all of her medications, despite upon admission her stating that she is compliant with her medications. Today 10/12/23 she is alert and oriented time four and was able to discuss goals of care.          P:  I was able to discuss with Lesley on the phone today to re visit her goals of care, specifically how she felt about dialysis. Lesley today tells me that she really does not want to have dialysis, but would do it if it were absolutely necessary. I feel like she is having a hard time accepting that her kidney functioning have worsened and has a bad experience with a loved one passing while on dialysis. I will continue to follow and support Lesley. I did discuss this conversation with Dr Tellez.    We will continue to follow along. Please do not hesitate to contact us regarding further sx mgmt or GOC needs, including after hours or on weekends via our on call provider at [phone].     Gwen Ellis, APRN    10/13/2023

## 2023-10-13 NOTE — PROGRESS NOTES
Referring Provider: dr Tellez  Reason for Consultation: sob, hypoxic respiratory failure      Chief complaint -sob      Sub- overnight events reviewed  All the labs med ins and out reviewed   Didn't like bipap  Suggested to lower the settings - but she doesnot want it.   Will dc it  Review of Systems- no changes   History obtained from chart review and the patient  General ROS: Positive for generalized fatigue  Psychological ROS: negative for - anxiety or depression  ENT ROS: negative for - headaches, visual changes or vocal changes  Respiratory ROS: positive for - cough and shortness of breath  Cardiovascular ROS: no chest pain or dyspnea on exertion  Gastrointestinal ROS: no abdominal pain, change in bowel habits, or black or bloody stools  Musculoskeletal ROS: negative for - joint pain, joint stiffness or joint swelling  Neurological ROS: no TIA or stroke symptoms  Hematological: no bleeding  Skin: no bruises, no rash        History  Past Medical History:   Diagnosis Date    Anxiety     Arthritis     Coronary artery disease     H/O heart artery stent     Hyperlipidemia     Hypertension     Obesity    ,   Past Surgical History:   Procedure Laterality Date    ANKLE SURGERY      RIGHT    APPENDECTOMY      CARDIAC CATHETERIZATION      LEFT    CORONARY STENT PLACEMENT      HYSTERECTOMY     ,   Family History   Problem Relation Age of Onset    Heart disease Mother     Heart attack Mother 73    Fibromyalgia Mother     Heart attack Father 72    Ovarian cancer Sister     Coronary artery disease Other     Breast cancer Neg Hx    ,   Social History     Tobacco Use    Smoking status: Every Day     Packs/day: 0.50     Years: 30.00     Additional pack years: 0.00     Total pack years: 15.00     Types: Electronic Cigarette, Cigarettes    Smokeless tobacco: Never   Vaping Use    Vaping Use: Never used   Substance Use Topics    Alcohol use: No    Drug use: No   ,   Medications Prior to Admission   Medication Sig Dispense  Refill Last Dose    aspirin 325 MG tablet Take 1 tablet by mouth Daily.   10/8/2023    carvedilol (COREG) 25 MG tablet Take 1 tablet by mouth 2 (Two) Times a Day With Meals. 60 tablet 0 10/8/2023    folic acid (FOLVITE) 1 MG tablet Take 1 tablet by mouth Daily.   10/8/2023    hydrALAZINE (APRESOLINE) 25 MG tablet Take 1 tablet by mouth 2 (Two) Times a Day. 60 tablet 0 10/8/2023    levothyroxine (SYNTHROID, LEVOTHROID) 88 MCG tablet Take 1 tablet by mouth Daily.   10/8/2023    lisinopril (PRINIVIL,ZESTRIL) 5 MG tablet Take 1 tablet by mouth Daily.   10/8/2023    montelukast (SINGULAIR) 10 MG tablet Take 1 tablet by mouth Every Night.   10/8/2023    pantoprazole (PROTONIX) 40 MG EC tablet Take 1 tablet by mouth Daily.   10/8/2023    rosuvastatin (CRESTOR) 20 MG tablet Take 1 tablet by mouth Daily. 30 tablet 0 10/8/2023    vitamin B-12 (CYANOCOBALAMIN) 1000 MCG tablet Take 1 tablet by mouth Daily.   10/8/2023    gabapentin (NEURONTIN) 800 MG tablet Take 1 tablet by mouth 2 (Two) Times a Day As Needed (nerve pain).   Unknown    HYDROcodone-acetaminophen (NORCO)  MG per tablet Take 1 tablet by mouth Every 8 (Eight) Hours As Needed for Moderate Pain.   Unknown    nitroglycerin (NITROSTAT) 0.4 MG SL tablet Place 1 tablet under the tongue Every 5 (Five) Minutes As Needed for Chest Pain. 30 tablet 2 Unknown   , Scheduled Meds:  albumin human, 25 g, Intravenous, Once  aspirin, 325 mg, Oral, Daily  carvedilol, 25 mg, Oral, BID With Meals  folic acid, 1 mg, Oral, Daily  heparin (porcine), 5,000 Units, Subcutaneous, Q12H  hydrALAZINE, 25 mg, Oral, Q8H  insulin regular, 5 Units, Intravenous, Once  ipratropium-albuterol, 3 mL, Nebulization, 4x Daily - RT  lactulose, 30 g, Oral, Once  levothyroxine, 88 mcg, Oral, Q AM  montelukast, 10 mg, Oral, Nightly  nicotine, 1 patch, Transdermal, Q24H  pantoprazole, 40 mg, Oral, Daily  predniSONE, 20 mg, Oral, Daily With Breakfast  rosuvastatin, 10 mg, Oral, Nightly  senna-docusate  "sodium, 2 tablet, Oral, BID  sodium chloride, 10 mL, Intravenous, Q12H  sodium polystyrene, 30 g, Oral, Once  vitamin B-12, 1,000 mcg, Oral, Daily    , Continuous Infusions:  sodium chloride, 100 mL/hr     and Allergies:  Patient has no known allergies.    Objective     Vital Signs   Temp:  [96.4 °F (35.8 °C)-98.7 °F (37.1 °C)] 96.4 °F (35.8 °C)  Heart Rate:  [57-86] 74  Resp:  [11-24] 11  BP: (127-179)/(77-93) 172/79    Physical Exam:no changes             General-obese in appearance, not in any acute distress    HEENT-PERRLA    Neck-supple    Respiratory-respirations normal-on auscultation no wheezing no crackles, decreased breath sounds bilateral no crackles    Cardiovascular-  Normal S1 and S2. No S3, S4 or murmurs. No JVD, no carotid bruit and no edema, pulses normal bilaterally     GI-nontender nondistended bowel sounds positive    CNS-nonfocal    Musculoskeletal -no edema  Extremities- no obvious deformity noticed     Psychiatric-mood good, good eye contact, alert awake oriented  Skin- no visible rash                                                                   Results Review:    LABS:    Lab Results   Component Value Date    GLUCOSE 119 (H) 10/13/2023    BUN 51 (H) 10/13/2023    CREATININE 5.10 (H) 10/13/2023    BCR 10.0 10/13/2023    CO2 22.1 10/13/2023    CALCIUM 9.6 10/13/2023    PROTENTOTREF 6.5 10/11/2023    ALBUMIN 4.1 10/13/2023    LABIL2 1.1 10/11/2023    AST 12 10/13/2023    ALT 7 10/13/2023    WBC 5.70 10/13/2023    HGB 9.5 (L) 10/13/2023    HCT 30.8 (L) 10/13/2023    MCV 89.3 10/13/2023     10/13/2023     (L) 10/13/2023    K 6.0 (H) 10/13/2023     10/13/2023    ANIONGAP 10.9 10/13/2023       No results found for: \"INR\", \"PROTIME\"             I reviewed the patient's new clinical results.  I reviewed the patient's new imaging results and agree with the interpretation.      Assessment & Plan     Shortness of breath-likely multifactorial most likely related to her chronic " heart failure with preserved ejection fraction with elevated brain atretic peptide.    Latest chest x-ray shows improvement- d/w patient       COPD-wdecreased the steroids.  Continue oxygen to maintain saturation 88 to 92%.    Oxygen FiO2 requirement reviewed and titrated to maintain saturation 88 to 92%.  Didn't use bipap and dont want to use it   Educated her about benefits   But still doesnot want to use it.  was educated about ill health effects of sleep apnea and what all effects it can have on health in long-term.  Which includes blood clots, arrhythmias, stroke, depression, hypothyroidism. was also educated about the pathophysiology of sleep apnea and how weight contributes in it.  Patient understood the conversation well and will try and do exercise and eat right kind of food to lose weight.       Renal failure and elevated potassium- nephrology on case   Recommend lokelma for hyperkalemia       Bedside rounds were done with RT and patient's nurse. All the lab and clinical findings were discussed with them and plan was also discussed in great detail.      Smoker-did extensive smoking cessation counseling.  Patient is not ready to quit yet.  Follow-up with pulmonary in the outpatient basis.  PFTs on outpatient basis.  Family member present-son at bedside Case discussed with him in great detail and answered his infection.        Echo-  Results for orders placed during the hospital encounter of 10/08/23    Adult Transthoracic Echo Complete W/ Cont if Necessary Per Protocol    Interpretation Summary    Left ventricular systolic function is normal. Left ventricular ejection fraction appears to be 56 - 60%.    Left ventricular diastolic function is consistent with (grade Ia w/high LAP) impaired relaxation.    The left atrial cavity is mild to moderately dilated.    Left atrial volume is moderately increased.    Mild aortic valve stenosis is present.    Estimated right ventricular systolic pressure from tricuspid  regurgitation is mildly elevated (35-45 mmHg).    There is a small (<1cm) pericardial effusion. There is no evidence of cardiac tamponade.  Case discussed with primary team.        Acute hypoxic respiratory failure    Acute renal failure (ARF)          Elmer Mckeon MD  10/13/23  08:16 EDT

## 2023-10-13 NOTE — PROGRESS NOTES
[facility] HOSPITALIST PROGRESS NOTE    Subjective     History:   Lesley Michel is a 67 y.o. female admitted on 10/8/2023 secondary to Acute hypoxic respiratory failure     Procedures: None    CC: Follow up MAICO    Patient seen and examined with NADEEM Okeefe. Awake and alert. Reports continued dyspnea.  No reported vomiting. Reports right shoulder pain. Remains edematous. Episodes of anxiety. BP intermittently elevated. No acute events overnight per RN.     History taken from: patient, chart, and RN.      Objective     Vital Signs  Temp:  [96.4 °F (35.8 °C)-98.7 °F (37.1 °C)] 98.1 °F (36.7 °C)  Heart Rate:  [57-81] 72  Resp:  [11-24] 16  BP: (127-212)/() 185/100    Intake/Output Summary (Last 24 hours) at 10/13/2023 1733  Last data filed at 10/13/2023 0317  Gross per 24 hour   Intake 500 ml   Output --   Net 500 ml         Physical Exam:  General:    Awake, alert, in no acute distress, ill appearing   Heart:      Normal S1 and S2. Regular rate and rhythm. No significant murmur, rubs or gallops appreciated.   Lungs:     Respirations regular, even and unlabored. Scattered wheezes with diminished breath sounds at bases but aeration slightly improved.    Abdomen:   Soft and nontender. No guarding, rebound tenderness or  organomegaly noted. Bowel sounds present x 4.   Extremities:  2+ bilateral lower extremity edema. (+) swelling B/L hands. Moves UE and LE equally B/L.     Results Review:    Results from last 7 days   Lab Units 10/13/23  0428 10/12/23  0057 10/09/23  2326 10/09/23  0132 10/08/23  1709   WBC 10*3/mm3 5.70 7.34 8.19 7.35 7.38   HEMOGLOBIN g/dL 9.5* 9.5* 11.5* 11.6* 11.0*   PLATELETS 10*3/mm3 279 269 267 233 289     Results from last 7 days   Lab Units 10/13/23  1414 10/13/23  0540 10/12/23  1429 10/12/23  1225 10/12/23  0057 10/11/23  1156 10/11/23  0525   SODIUM mmol/L 136 134* 135* 136 134* 134* 136   POTASSIUM mmol/L 5.6* 6.0* 5.5* 5.5* 5.8* 5.5* 5.9*   CHLORIDE mmol/L 103 101 102  104 101 101 101   CO2 mmol/L 19.5* 22.1 20.3* 19.3* 23.6 21.9* 23.9   BUN mg/dL 54* 51* 47* 47* 46* 39* 38*   CREATININE mg/dL 4.91* 5.10* 4.91* 4.85* 5.01* 4.70* 4.71*   CALCIUM mg/dL 9.5 9.6 9.3 9.3 9.5 9.5 9.4   GLUCOSE mg/dL 106* 119* 126* 114* 145* 133* 155*     Results from last 7 days   Lab Units 10/13/23  0540 10/12/23  0057 10/11/23  0525 10/09/23  2325 10/09/23  0132 10/08/23  1709   BILIRUBIN mg/dL 0.3 0.3 0.3 0.3 0.3 0.5   ALK PHOS U/L 124* 140* 136* 132* 149* 146*   AST (SGOT) U/L 12 15 13 13 10 7   ALT (SGPT) U/L 7 7 6 <5 5 <5     Results from last 7 days   Lab Units 10/09/23  0132 10/08/23  1709   MAGNESIUM mg/dL 2.0 1.9         Results from last 7 days   Lab Units 10/13/23  1659 10/13/23  1414 10/09/23  2325   CK TOTAL U/L  --   --  217*   HSTROP T ng/L 38* 35* 25*       Imaging Results (Last 24 Hours)       Procedure Component Value Units Date/Time    XR Chest 1 View [[id number]] Collected: 10/13/23 1242     Updated: 10/13/23 1245    Narrative:      EXAM:    XR Chest, 1 View     EXAM DATE:    10/13/2023 1:10 PM     CLINICAL HISTORY:    Dyspnea; N17.9-Acute kidney failure, unspecified; N18.9-Chronic kidney  disease, unspecified; I50.9-Heart failure, unspecified; J96.21-Acute and  chronic respiratory failure with hypoxia; E87.5-Hyperkalemia     TECHNIQUE:    Frontal view of the chest.     COMPARISON:    10/12/2023     FINDINGS:    Lungs and pleural spaces:  Interstitial thickening compatible with  edema.  Minimal bibasilar airspace disease.  No pneumothorax.    Heart:  Cardiac enlargement.    Mediastinum:  Unremarkable as visualized.    Bones/joints:  Bony structures are stable.       Impression:      1.  Stable changes of CHF with interstitial edema.  2.  Development of bibasilar airspace disease which may represent  atelectasis or pneumonia.        This report was finalized on 10/13/2023 12:43 PM by Dr. Eduardo August MD.                 Medications:  aspirin, 325 mg, Oral, Daily  carvedilol, 25 mg,  Oral, BID With Meals  folic acid, 1 mg, Oral, Daily  heparin (porcine), 5,000 Units, Subcutaneous, Q12H  hydrALAZINE, 25 mg, Oral, Q8H  insulin regular, 5 Units, Intravenous, Once  ipratropium-albuterol, 3 mL, Nebulization, 4x Daily - RT  levothyroxine, 88 mcg, Oral, Q AM  montelukast, 10 mg, Oral, Nightly  nicotine, 1 patch, Transdermal, Q24H  pantoprazole, 40 mg, Oral, Daily  predniSONE, 20 mg, Oral, Daily With Breakfast  rosuvastatin, 10 mg, Oral, Nightly  senna-docusate sodium, 2 tablet, Oral, BID  sodium chloride, 10 mL, Intravenous, Q12H  vitamin B-12, 1,000 mcg, Oral, Daily               Assessment & Plan   Anasarca: proBNP and ReDs vest reading elevated on admission. Imaging reveals CHF. Echo reveals an EF of 56-60%, grade I diastolic dysfunction, mild AS, mild pulmonary HTN and small pericardial effusion with no evidence of cardiac tamponade. Worsening renal failure possibly contributing with acute diastolic CHF as well. Repeat ReDs vest elevated from on admission. Management of volume status per nephrology. Pt at high risk for progressing to HD with pt now stating she would consider HD if deemed necessary. Cont to monitor volume status closely.      Acute exacerbation of COPD: Possibly exacerbated by pulmonary edema. Respiratory PCR negative. Pulm consulted in the setting of persistent wheezing and discussion with nephrology. Pulm has decreased steroids. Course of azithromycin completed. Cont nebs. Pulm input appreciated.     Bilateral pneumonia: Procal normal. Cultures with NGTD. Completed course of azithromycin as above. Cont nebs.     Acute hypoxic respiratory failure: Likely multifactorial in the setting of above. O2 requirements stable today. Treatment as outlined above. Wean supplemental O2 as tolerated.     MAICO on CKD IIIa: Baseline Cr appears to be around 2.2. Non nephrotic proteinuria. Complements are normal with serologies pending. No evidence of hydronephrosis on renal US with US revealing small  and echogenic right kidney. Cr remains elevated. Nephrology attempting IVF's again today. Pt at high risk for HD. Cont to monitor closely. Nephrology input appreciated.     Hyperkalemia: Holding home lisinopril. Cont targeted treatment per nephrology. Monitor on telemetry.     Essential HTN: BP intermittently elevated. Hydralazine has been increased. Cont Coreg. Add PRN IV hydralazine. Cont to monitor.     Hypothyroidism: TSH mildly elevated with normal free T4. Cont levothyroxine. Will need repeat labs as an outpatient.     Tobacco abuse: Cont NRT and encourage cessation.     Morbid obesity by BMI: Complicates all aspects of care.     Goals of Care: Pt now stating she would consider HD if deemed absolutely necessary. Per discussion with palliative, pt wishes to be DNR/DNI. Cont ongoing discussions and supportive treatment. Palliative care input appreciated.     DVT PPX: SQ heparin    Discussed with Dr. Steinberg and palliative care APRN.     Disposition Unclear at present in the setting of MAICO.     José Miguel Tellez,   10/13/23  17:33 EDT

## 2023-10-13 NOTE — PLAN OF CARE
Goal Outcome Evaluation:     Patient resting in bed. No complaints of chest pain. Patient wore bipap for about 10 minutes regardless of education and attempts to calm patient. No visible indicators of acute distress noted. Will continue to follow plan of care this shift.

## 2023-10-13 NOTE — PLAN OF CARE
Goal Outcome Evaluation: Patient has been very pleasant. Compliant with care. Patient remains on 3L/NC, saturations remaining above 90%. Patient ambulated in room steady gait noted. Patients family at bedside, updated on plan of care. Patient currently resting in bed telemetry monitor In tact. Call light in reach.

## 2023-10-13 NOTE — CASE MANAGEMENT/SOCIAL WORK
Continued Stay Note  [facility]     Patient Name: Lesley Michel  MRN: [id number]  Today's Date: 10/13/2023    Admit Date: 10/8/2023    Plan: Discharge plan remains unchanged, should pt require O2, no preference of provider; discharge plan remains home with no further needs identified at this time; did relay disinterest in HD even if recommended.   Discharge Plan       Row Name 10/13/23 1351       Plan    Plan Discharge plan remains unchanged, should pt require O2, no preference of provider; discharge plan remains home with no further needs identified at this time; did relay disinterest in HD even if recommended.    Patient/Family in Agreement with Plan yes      Row Name 10/13/23 4235       Plan    Plan Comments sat 92% 3L NC; per nephro, ivf@100, x1 dose iv albumin, repeat labs for further plan of care.          Avani Pope

## 2023-10-13 NOTE — PROGRESS NOTES
Nephrology Progress Note      Subjective     10/10/2023  Patient still short of breath blood pressures running towards higher side still have swelling    10/11/2023  Patient still wheezing still short of breath blood pressure is better swelling is still there and wants to go home    10/12/2023  Patient still wheezing but is feeling better denies any shortness of breath had diarrhea also no constipation    10/13/2023  Patient's breathing is much better denies any shortness of breath still have swelling no chest pain no urgency no frequency no nausea vomiting diarrhea    Objective       Vital signs :     Vitals:    10/13/23 0006 10/13/23 0016 10/13/23 0317 10/13/23 0500   BP:   179/88    BP Location:   Left arm    Patient Position:   Lying    Pulse: 72 74 57    Resp: 18 18 18    Temp:   98.4 °F (36.9 °C)    TempSrc:   Oral    SpO2: 95% 97% 97%    Weight:    110 kg (242 lb 9.6 oz)   Height:            Intake/Output                         10/11/23 0701 - 10/12/23 0700 10/12/23 0701 - 10/13/23 0700     [id number] 6901-0557 Total 9431-9846 7322-0655 Total                 Intake    P.O.  600  -- 600  900  500 1400    Total Intake 600 -- 600        Output    Urine  --  -- --  800  -- 800    Total Output -- -- -- 800 -- 800           10/10/2023 examined and reviewed  10/11/2023 examined and reviewed  10/12/2023 examined and reviewed  10/13/2023 examined and reviewed    Physical Exam:    General Appearance : alert, pleasant, appears stated age, cooperative   Head  :  normocephalic, without obvious abnormality and atraumatic  Eyes  :  conjunctivae and sclerae normal, no icterus, no pallor and PERRLA  Neck  :  no adenopathy, suppple, no carotid bruit, no JVD   Lungs : Decreased breath sound bilateral  Heart :  regular rhythm & normal rate, normal S1, S2 and no murmur, no rub  Abdomen : normal bowel sounds, no masses, no hepatomegaly, no splenomegaly, soft non-tender and no guarding  Extremities : moves extremities  well, 2 plus  edema, no cyanosis and no redness  Skin :  no bleeding, bruising or rash  Neurologic :   orientated to person, place, time and situation, Grossly no focal deficits  Acess :     Laboratory Data :       CBC and coagulation:  Results from last 7 days   Lab Units 10/13/23  0428 10/12/23  0057 10/09/23  2326 10/09/23  0132 10/08/23  2108 10/08/23  1923 10/08/23  1709   PROCALCITONIN ng/mL  --   --   --   --  0.10  --   --    LACTATE mmol/L  --   --   --   --   --   --  0.9   CRP mg/dL  --   --   --   --   --  5.41*  --    WBC 10*3/mm3 5.70 7.34 8.19   < >  --   --  7.38   HEMOGLOBIN g/dL 9.5* 9.5* 11.5*   < >  --   --  11.0*   HEMATOCRIT % 30.8* 31.9* 37.3   < >  --   --  36.2   MCV fL 89.3 90.1 88.2   < >  --   --  88.9   MCHC g/dL 30.8* 29.8* 30.8*   < >  --   --  30.4*   PLATELETS 10*3/mm3 279 269 267   < >  --   --  289    < > = values in this interval not displayed.     Acid/base balance:  Results from last 7 days   Lab Units 10/08/23  1707   PH, ARTERIAL pH units 7.363   PO2 ART mm Hg 53.5*   PCO2, ARTERIAL mm Hg 36.5   HCO3 ART mmol/L 20.7     Renal and electrolytes:    Results from last 7 days   Lab Units 10/13/23  0540 10/12/23  1429 10/12/23  1225 10/12/23  0057 10/11/23  1156 10/09/23  0754 10/09/23  0132 10/08/23  1709   SODIUM mmol/L 134* 135* 136 134* 134*   < > 139 138   POTASSIUM mmol/L 6.0* 5.5* 5.5* 5.8* 5.5*   < > 6.2* 5.9*   MAGNESIUM mg/dL  --   --   --   --   --   --  2.0 1.9   CHLORIDE mmol/L 101 102 104 101 101   < > 105 107   CO2 mmol/L 22.1 20.3* 19.3* 23.6 21.9*   < > 21.1* 20.8*   BUN mg/dL 51* 47* 47* 46* 39*   < > 29* 27*   CREATININE mg/dL 5.10* 4.91* 4.85* 5.01* 4.70*   < > 3.98* 3.61*   CALCIUM mg/dL 9.6 9.3 9.3 9.5 9.5   < > 9.0 9.0    < > = values in this interval not displayed.     Estimated Creatinine Clearance: 12.4 mL/min (A) (by C-G formula based on SCr of 5.1 mg/dL (H)).     Liver and pancreatic function:  Results from last 7 days   Lab Units 10/13/23  8291  "10/12/23  0057 10/11/23  0525   ALBUMIN g/dL 4.1 3.9 3.8  3.3   BILIRUBIN mg/dL 0.3 0.3 0.3   ALK PHOS U/L 124* 140* 136*   AST (SGOT) U/L 12 15 13   ALT (SGPT) U/L 7 7 6       Albumin Albumin   Date Value Ref Range Status   10/13/2023 4.1 3.5 - 5.2 g/dL Final   10/12/2023 3.9 3.5 - 5.2 g/dL Final   10/11/2023 3.8 3.5 - 5.2 g/dL Final   10/11/2023 3.3 2.9 - 4.4 g/dL Final      Magnesium No results found for: \"MG\"         PTH               No results found for: \"PTH\"    Cardiac:  Results from last 7 days   Lab Units 10/08/23  1709   PROBNP pg/mL 8,093.0*     Liver and pancreatic function:  Results from last 7 days   Lab Units 10/13/23  0540 10/12/23  0057 10/11/23  0525   ALBUMIN g/dL 4.1 3.9 3.8  3.3   BILIRUBIN mg/dL 0.3 0.3 0.3   ALK PHOS U/L 124* 140* 136*   AST (SGOT) U/L 12 15 13   ALT (SGPT) U/L 7 7 6       XR Chest 1 View    Result Date: 10/12/2023    Significant interval improvement in CHF/volume overload.   This report was finalized on 10/12/2023 12:18 PM by Dr. Eduardo August MD.      XR Chest 1 View    Result Date: 10/10/2023  1.  Bilateral airspace disease consistent with pneumonia. 2.  CHF/edema.   This report was finalized on 10/10/2023 11:55 AM by Dr. Eduardo August MD.      CT Chest Without Contrast Diagnostic    Result Date: 10/10/2023  1.  CHF/edema. 2.  Small right and very small left pleural effusions which are nonloculated. 3.  Bilateral consolidative airspace disease as detailed above consistent with pneumonia. 4.  Small pericardial effusion. 5.  Cardiomegaly with severe coronary artery calcifications. 6.  Mediastinal and hilar adenopathy. May be reactive but follow-up recommended to exclude neoplastic etiologies. 7.  Liver cirrhosis. 8.  Anasarca. 9.  Other nonacute findings above.   This report was finalized on 10/10/2023 11:55 AM by Dr. Eduardo August MD.      US Renal Bilateral    Result Date: 10/9/2023   SMALL AND ECHOGENIC RIGHT KIDNEY SUGGESTING A UNILATERAL PROCESS SUCH AS RENAL " ARTERY STENOSIS.  NORMAL LEFT KIDNEY.  NEGATIVE FOR HYDRONEPHROSIS.    This report was finalized on 10/9/2023 5:49 AM by Imelda Walsh MD.      XR Chest 1 View    Result Date: 10/8/2023  Trace right effusion and right basilar airspace disease    This report was finalized on 10/8/2023 7:17 PM by Dr. Julio Dominguez MD.        Medications :     aspirin, 325 mg, Oral, Daily  carvedilol, 25 mg, Oral, BID With Meals  folic acid, 1 mg, Oral, Daily  heparin (porcine), 5,000 Units, Subcutaneous, Q12H  hydrALAZINE, 25 mg, Oral, Q8H  insulin regular, 5 Units, Intravenous, Once  ipratropium-albuterol, 3 mL, Nebulization, 4x Daily - RT  levothyroxine, 88 mcg, Oral, Q AM  montelukast, 10 mg, Oral, Nightly  nicotine, 1 patch, Transdermal, Q24H  pantoprazole, 40 mg, Oral, Daily  predniSONE, 20 mg, Oral, Daily With Breakfast  rosuvastatin, 10 mg, Oral, Nightly  senna-docusate sodium, 2 tablet, Oral, BID  sodium chloride, 10 mL, Intravenous, Q12H  vitamin B-12, 1,000 mcg, Oral, Daily             Assessment & Plan     -Acute kidney injury  -Atrophic right kidney  -Chronic kidney disease stage IIIa  -Acute hyperkalemia  -Bilateral edema  -Acute hypoxic respite failure  -COPD  -Hypertension  -Medical noncompliance       10/09/23  Patient baseline creatinine is unknown we will get the records came in with a creatinine of 3.69 is down to 3.6 patient is short of breath we will give 1 dose of Lasix we will check urine protein creatinine ratio, ultrasound of the kidneys showed atrophic right kidney     Patient's bilateral swelling is most likely multifactorial secondary to diastolic congestive heart failure with pulmonary hypertension also will give 1 dose of Lasix today     Hyperkalemia most likely secondary to lisinopril and decreased GFR we will hold lisinopril and start the patient on Lokelma      10/10/2023  Patient's creatinine is 4.07 from 3.6, patient got 1 dose of Lasix as patient was very short of breath, ultrasound of the kidney  showed atrophic right kidney can be secondary to renal artery stenosis but I do not think this is causing her kidney function to get worse acutely, patient's proteinuria is only 413 mg, will check serology, hold diuretics give 1 dose of albumin , continue to hold lisinopril    Continue Lokelma for hyperkalemia    Will increase hydralazine to 25 mg 3 times a day    Patient's anasarca is most likely secondary to diastolic congestive heart failure and pulmonary hypertension PA pressure around 45    10/11/2023  Patient's baseline creatinine is unknown and is rising 4.7 from 4.0 from 3.6, will continue to hold Lasix ultrasound of the kidney showed atrophic right kidney proteinuria of 430 mg serologies pending, will continue to hold lisinopril give 1 dose of albumin  We will give Kayexalate and lactulose for hyperkalemia and counseled the patient on low potassium diet  Patient is high risk for dialysis  Blood pressures well controlled    10/12/2023  Patient's baseline creatinine last year was 2.2 is rising up to 5.0 ultrasound of the kidney showed atrophic right kidney proteinuria 430 mg serologies are pending, clinically patient is not in CHF we will start patient on gentle hydration  We will continue Lokelma for hyperkalemia  Patient is high risk for dialysis  Complements are normal other serologies are pending    10/13/2023  We will give Kayexalate and lactulose for hyperkalemia and check labs again at 12  Patient's creatinine 5.0 proteinuria 430 mg ultrasound showed atrophic right kidney but no obstruction serologies are pending most likely cause of acute kidney injury I think its intravascular depletion as I do not think patient is in florid congestive heart failure so we will start the patient on normal saline at 100 cc/h also check give 1 dose of albumin and repeat labs and then decide further  As the patient is high risk for dialysis I had a detailed discussion with the patient and the family explained to them all  the risk including but not limited to infection bleeding stroke death etc. with dialysis also options benefit and prognosis was explained to the patient and the family they verbalized understanding and patient states that she only wants if it is absolutely needed      Prognosis critical     Please avoid nephrotoxic medication and pharmacy to adjust the medication according to the GFR     Plan of care discussed with pt, and family answered all questions, patient verbalized understanding and agreed.      MERNA Steinberg MD  10/13/23  06:46 EDT

## 2023-10-14 LAB
ALBUMIN SERPL-MCNC: 4.1 G/DL (ref 3.5–5.2)
ALBUMIN/GLOB SERPL: 1.6 G/DL
ALP SERPL-CCNC: 105 U/L (ref 39–117)
ALT SERPL W P-5'-P-CCNC: 6 U/L (ref 1–33)
ANION GAP SERPL CALCULATED.3IONS-SCNC: 11.7 MMOL/L (ref 5–15)
ANION GAP SERPL CALCULATED.3IONS-SCNC: 13.3 MMOL/L (ref 5–15)
AST SERPL-CCNC: 10 U/L (ref 1–32)
BASOPHILS # BLD AUTO: 0.01 10*3/MM3 (ref 0–0.2)
BASOPHILS NFR BLD AUTO: 0.1 % (ref 0–1.5)
BILIRUB SERPL-MCNC: 0.3 MG/DL (ref 0–1.2)
BUN SERPL-MCNC: 57 MG/DL (ref 8–23)
BUN SERPL-MCNC: 60 MG/DL (ref 8–23)
BUN/CREAT SERPL: 11.8 (ref 7–25)
BUN/CREAT SERPL: 12.1 (ref 7–25)
CALCIUM SPEC-SCNC: 9.2 MG/DL (ref 8.6–10.5)
CALCIUM SPEC-SCNC: 9.3 MG/DL (ref 8.6–10.5)
CHLORIDE SERPL-SCNC: 103 MMOL/L (ref 98–107)
CHLORIDE SERPL-SCNC: 103 MMOL/L (ref 98–107)
CO2 SERPL-SCNC: 22.3 MMOL/L (ref 22–29)
CO2 SERPL-SCNC: 23.7 MMOL/L (ref 22–29)
CREAT SERPL-MCNC: 4.82 MG/DL (ref 0.57–1)
CREAT SERPL-MCNC: 4.96 MG/DL (ref 0.57–1)
DEPRECATED RDW RBC AUTO: 51.5 FL (ref 37–54)
EGFRCR SERPLBLD CKD-EPI 2021: 9.2 ML/MIN/1.73
EGFRCR SERPLBLD CKD-EPI 2021: 9.5 ML/MIN/1.73
EOSINOPHIL # BLD AUTO: 0 10*3/MM3 (ref 0–0.4)
EOSINOPHIL NFR BLD AUTO: 0 % (ref 0.3–6.2)
ERYTHROCYTE [DISTWIDTH] IN BLOOD BY AUTOMATED COUNT: 16.2 % (ref 12.3–15.4)
GLOBULIN UR ELPH-MCNC: 2.5 GM/DL
GLUCOSE BLDC GLUCOMTR-MCNC: 110 MG/DL (ref 70–130)
GLUCOSE BLDC GLUCOMTR-MCNC: 117 MG/DL (ref 70–130)
GLUCOSE BLDC GLUCOMTR-MCNC: 148 MG/DL (ref 70–130)
GLUCOSE BLDC GLUCOMTR-MCNC: 149 MG/DL (ref 70–130)
GLUCOSE SERPL-MCNC: 119 MG/DL (ref 65–99)
GLUCOSE SERPL-MCNC: 141 MG/DL (ref 65–99)
HCT VFR BLD AUTO: 30.3 % (ref 34–46.6)
HGB BLD-MCNC: 9.5 G/DL (ref 12–15.9)
IMM GRANULOCYTES # BLD AUTO: 0.06 10*3/MM3 (ref 0–0.05)
IMM GRANULOCYTES NFR BLD AUTO: 0.8 % (ref 0–0.5)
LYMPHOCYTES # BLD AUTO: 1.81 10*3/MM3 (ref 0.7–3.1)
LYMPHOCYTES NFR BLD AUTO: 24.5 % (ref 19.6–45.3)
MCH RBC QN AUTO: 27.2 PG (ref 26.6–33)
MCHC RBC AUTO-ENTMCNC: 31.4 G/DL (ref 31.5–35.7)
MCV RBC AUTO: 86.8 FL (ref 79–97)
MONOCYTES # BLD AUTO: 0.55 10*3/MM3 (ref 0.1–0.9)
MONOCYTES NFR BLD AUTO: 7.5 % (ref 5–12)
NEUTROPHILS NFR BLD AUTO: 4.95 10*3/MM3 (ref 1.7–7)
NEUTROPHILS NFR BLD AUTO: 67.1 % (ref 42.7–76)
NRBC BLD AUTO-RTO: 0 /100 WBC (ref 0–0.2)
PLATELET # BLD AUTO: 299 10*3/MM3 (ref 140–450)
PMV BLD AUTO: 10.4 FL (ref 6–12)
POTASSIUM SERPL-SCNC: 5.2 MMOL/L (ref 3.5–5.2)
POTASSIUM SERPL-SCNC: 5.5 MMOL/L (ref 3.5–5.2)
PROT SERPL-MCNC: 6.6 G/DL (ref 6–8.5)
QT INTERVAL: 344 MS
QTC INTERVAL: 411 MS
RBC # BLD AUTO: 3.49 10*6/MM3 (ref 3.77–5.28)
SODIUM SERPL-SCNC: 137 MMOL/L (ref 136–145)
SODIUM SERPL-SCNC: 140 MMOL/L (ref 136–145)
WBC NRBC COR # BLD: 7.38 10*3/MM3 (ref 3.4–10.8)

## 2023-10-14 PROCEDURE — 25010000002 HYDRALAZINE PER 20 MG: Performed by: INTERNAL MEDICINE

## 2023-10-14 PROCEDURE — 82948 REAGENT STRIP/BLOOD GLUCOSE: CPT

## 2023-10-14 PROCEDURE — 99232 SBSQ HOSP IP/OBS MODERATE 35: CPT | Performed by: INTERNAL MEDICINE

## 2023-10-14 PROCEDURE — 94761 N-INVAS EAR/PLS OXIMETRY MLT: CPT

## 2023-10-14 PROCEDURE — 25010000002 HEPARIN (PORCINE) PER 1000 UNITS: Performed by: INTERNAL MEDICINE

## 2023-10-14 PROCEDURE — 94799 UNLISTED PULMONARY SVC/PX: CPT

## 2023-10-14 PROCEDURE — 63710000001 PREDNISONE PER 1 MG: Performed by: INTERNAL MEDICINE

## 2023-10-14 PROCEDURE — 80053 COMPREHEN METABOLIC PANEL: CPT | Performed by: INTERNAL MEDICINE

## 2023-10-14 PROCEDURE — 85025 COMPLETE CBC W/AUTO DIFF WBC: CPT | Performed by: INTERNAL MEDICINE

## 2023-10-14 PROCEDURE — 94664 DEMO&/EVAL PT USE INHALER: CPT

## 2023-10-14 RX ORDER — HYDRALAZINE HYDROCHLORIDE 50 MG/1
50 TABLET, FILM COATED ORAL EVERY 8 HOURS SCHEDULED
Status: DISCONTINUED | OUTPATIENT
Start: 2023-10-14 | End: 2023-10-24

## 2023-10-14 RX ADMIN — ROSUVASTATIN CALCIUM 10 MG: 10 TABLET, FILM COATED ORAL at 20:48

## 2023-10-14 RX ADMIN — HYDRALAZINE HYDROCHLORIDE 50 MG: 50 TABLET, FILM COATED ORAL at 20:48

## 2023-10-14 RX ADMIN — DOCUSATE SODIUM 50 MG AND SENNOSIDES 8.6 MG 2 TABLET: 8.6; 5 TABLET, FILM COATED ORAL at 20:48

## 2023-10-14 RX ADMIN — IPRATROPIUM BROMIDE AND ALBUTEROL SULFATE 3 ML: 2.5; .5 SOLUTION RESPIRATORY (INHALATION) at 12:43

## 2023-10-14 RX ADMIN — Medication 10 ML: at 20:48

## 2023-10-14 RX ADMIN — HEPARIN SODIUM 5000 UNITS: 5000 INJECTION INTRAVENOUS; SUBCUTANEOUS at 08:17

## 2023-10-14 RX ADMIN — NICOTINE TRANSDERMAL SYSTEM 1 PATCH: 21 PATCH, EXTENDED RELEASE TRANSDERMAL at 08:18

## 2023-10-14 RX ADMIN — MONTELUKAST SODIUM 10 MG: 10 TABLET, COATED ORAL at 20:48

## 2023-10-14 RX ADMIN — IPRATROPIUM BROMIDE AND ALBUTEROL SULFATE 3 ML: 2.5; .5 SOLUTION RESPIRATORY (INHALATION) at 06:54

## 2023-10-14 RX ADMIN — IPRATROPIUM BROMIDE AND ALBUTEROL SULFATE 3 ML: 2.5; .5 SOLUTION RESPIRATORY (INHALATION) at 00:09

## 2023-10-14 RX ADMIN — Medication 1000 MCG: at 08:17

## 2023-10-14 RX ADMIN — SODIUM POLYSTYRENE SULFONATE 30 G: 1 POWDER ORAL; RECTAL at 00:26

## 2023-10-14 RX ADMIN — Medication 10 ML: at 08:18

## 2023-10-14 RX ADMIN — CARVEDILOL 25 MG: 25 TABLET, FILM COATED ORAL at 08:17

## 2023-10-14 RX ADMIN — HYDRALAZINE HYDROCHLORIDE 25 MG: 25 TABLET, FILM COATED ORAL at 05:25

## 2023-10-14 RX ADMIN — CARVEDILOL 25 MG: 25 TABLET, FILM COATED ORAL at 17:05

## 2023-10-14 RX ADMIN — ASPIRIN 325 MG: 325 TABLET, COATED ORAL at 08:17

## 2023-10-14 RX ADMIN — HYDRALAZINE HYDROCHLORIDE 10 MG: 20 INJECTION INTRAMUSCULAR; INTRAVENOUS at 00:44

## 2023-10-14 RX ADMIN — LEVOTHYROXINE SODIUM 88 MCG: 88 TABLET ORAL at 05:24

## 2023-10-14 RX ADMIN — HYDROCODONE BITARTRATE AND ACETAMINOPHEN 1 TABLET: 10; 325 TABLET ORAL at 08:22

## 2023-10-14 RX ADMIN — PANTOPRAZOLE SODIUM 40 MG: 40 TABLET, DELAYED RELEASE ORAL at 08:17

## 2023-10-14 RX ADMIN — PREDNISONE 20 MG: 20 TABLET ORAL at 08:17

## 2023-10-14 RX ADMIN — IPRATROPIUM BROMIDE AND ALBUTEROL SULFATE 3 ML: 2.5; .5 SOLUTION RESPIRATORY (INHALATION) at 19:23

## 2023-10-14 RX ADMIN — HEPARIN SODIUM 5000 UNITS: 5000 INJECTION INTRAVENOUS; SUBCUTANEOUS at 20:48

## 2023-10-14 RX ADMIN — DOCUSATE SODIUM 50 MG AND SENNOSIDES 8.6 MG 2 TABLET: 8.6; 5 TABLET, FILM COATED ORAL at 08:17

## 2023-10-14 RX ADMIN — HYDRALAZINE HYDROCHLORIDE 50 MG: 50 TABLET, FILM COATED ORAL at 13:03

## 2023-10-14 RX ADMIN — Medication 1 MG: at 08:17

## 2023-10-14 NOTE — PROGRESS NOTES
Nephrology Progress Note      Subjective     10/10/2023  Patient still short of breath blood pressures running towards higher side still have swelling    10/11/2023  Patient still wheezing still short of breath blood pressure is better swelling is still there and wants to go home    10/12/2023  Patient still wheezing but is feeling better denies any shortness of breath had diarrhea also no constipation    10/13/2023  Patient's breathing is much better denies any shortness of breath still have swelling no chest pain no urgency no frequency no nausea vomiting diarrhea    10/14/2023  Patient feeling much better denies any shortness of breath no nausea vomiting diarrhea    Objective       Vital signs :     Vitals:    10/14/23 0009 10/14/23 0206 10/14/23 0235 10/14/23 0700   BP:  173/87 176/98 169/90   BP Location:  Left arm Left arm Right arm   Patient Position:  Lying Sitting Lying   Pulse: 77 75 75 77   Resp: 20  20 15   Temp:   97.5 °F (36.4 °C) 97.8 °F (36.6 °C)   TempSrc:   Oral Oral   SpO2: 90%  95% 95%   Weight:       Height:            Intake/Output                         10/12/23 0701 - 10/13/23 0700 10/13/23 0701 - 10/14/23 0700     3288-7425 7381-0166 Total 1570-5273 4762-9997 Total                 Intake    P.O.  900  500 1400  355  265 620    Total Intake  355 265 620       Output    Urine  800  -- 800  --  -- --    Total Output 800 -- 800 -- -- --           10/10/2023 examined and reviewed  10/11/2023 examined and reviewed  10/12/2023 examined and reviewed  10/13/2023 examined and reviewed  10/14/2023 examined and  Physical Exam:    General Appearance : alert, pleasant, appears stated age, cooperative   Head  :  normocephalic, without obvious abnormality and atraumatic  Eyes  :  conjunctivae and sclerae normal, no icterus, no pallor and PERRLA  Neck  :  no adenopathy, suppple, no carotid bruit, no JVD   Lungs : Wheezes is much better  Heart :  regular rhythm & normal rate, normal S1, S2 and no  murmur, no rub  Abdomen : normal bowel sounds, no masses, no hepatomegaly, no splenomegaly, soft non-tender and no guarding  Extremities : moves extremities well, 2 plus  edema, no cyanosis and no redness  Skin :  no bleeding, bruising or rash  Neurologic :   orientated to person, place, time and situation, Grossly no focal deficits  Acess :     Laboratory Data :       CBC and coagulation:  Results from last 7 days   Lab Units 10/14/23  0337 10/13/23  0428 10/12/23  0057 10/09/23  0132 10/08/23  2108 10/08/23  1923 10/08/23  1709   PROCALCITONIN ng/mL  --   --   --   --  0.10  --   --    LACTATE mmol/L  --   --   --   --   --   --  0.9   CRP mg/dL  --   --   --   --   --  5.41*  --    WBC 10*3/mm3 7.38 5.70 7.34   < >  --   --  7.38   HEMOGLOBIN g/dL 9.5* 9.5* 9.5*   < >  --   --  11.0*   HEMATOCRIT % 30.3* 30.8* 31.9*   < >  --   --  36.2   MCV fL 86.8 89.3 90.1   < >  --   --  88.9   MCHC g/dL 31.4* 30.8* 29.8*   < >  --   --  30.4*   PLATELETS 10*3/mm3 299 279 269   < >  --   --  289    < > = values in this interval not displayed.     Acid/base balance:  Results from last 7 days   Lab Units 10/08/23  1707   PH, ARTERIAL pH units 7.363   PO2 ART mm Hg 53.5*   PCO2, ARTERIAL mm Hg 36.5   HCO3 ART mmol/L 20.7     Renal and electrolytes:    Results from last 7 days   Lab Units 10/14/23  0337 10/13/23  2127 10/13/23  1414 10/13/23  0540 10/12/23  1429 10/09/23  0754 10/09/23  0132 10/08/23  1709   SODIUM mmol/L 137 137 136 134* 135*   < > 139 138   POTASSIUM mmol/L 5.2 5.9* 5.6* 6.0* 5.5*   < > 6.2* 5.9*   MAGNESIUM mg/dL  --   --   --   --   --   --  2.0 1.9   CHLORIDE mmol/L 103 102 103 101 102   < > 105 107   CO2 mmol/L 22.3 20.5* 19.5* 22.1 20.3*   < > 21.1* 20.8*   BUN mg/dL 57* 59* 54* 51* 47*   < > 29* 27*   CREATININE mg/dL 4.82* 5.17* 4.91* 5.10* 4.91*   < > 3.98* 3.61*   CALCIUM mg/dL 9.2 9.1 9.5 9.6 9.3   < > 9.0 9.0    < > = values in this interval not displayed.     Estimated Creatinine Clearance: 13.1  "mL/min (A) (by C-G formula based on SCr of 4.82 mg/dL (H)).     Liver and pancreatic function:  Results from last 7 days   Lab Units 10/14/23  0337 10/13/23  0540 10/12/23  0057   ALBUMIN g/dL 4.1 4.1 3.9   BILIRUBIN mg/dL 0.3 0.3 0.3   ALK PHOS U/L 105 124* 140*   AST (SGOT) U/L 10 12 15   ALT (SGPT) U/L 6 7 7       Albumin Albumin   Date Value Ref Range Status   10/14/2023 4.1 3.5 - 5.2 g/dL Final   10/13/2023 4.1 3.5 - 5.2 g/dL Final   10/12/2023 3.9 3.5 - 5.2 g/dL Final      Magnesium No results found for: \"MG\"         PTH               No results found for: \"PTH\"    Cardiac:  Results from last 7 days   Lab Units 10/08/23  1709   PROBNP pg/mL 8,093.0*     Liver and pancreatic function:  Results from last 7 days   Lab Units 10/14/23  0337 10/13/23  0540 10/12/23  0057   ALBUMIN g/dL 4.1 4.1 3.9   BILIRUBIN mg/dL 0.3 0.3 0.3   ALK PHOS U/L 105 124* 140*   AST (SGOT) U/L 10 12 15   ALT (SGPT) U/L 6 7 7       XR Chest 1 View    Result Date: 10/13/2023  1.  Stable changes of CHF with interstitial edema. 2.  Development of bibasilar airspace disease which may represent atelectasis or pneumonia.   This report was finalized on 10/13/2023 12:43 PM by Dr. Eduardo August MD.      XR Chest 1 View    Result Date: 10/12/2023    Significant interval improvement in CHF/volume overload.   This report was finalized on 10/12/2023 12:18 PM by Dr. Eduardo August MD.      XR Chest 1 View    Result Date: 10/10/2023  1.  Bilateral airspace disease consistent with pneumonia. 2.  CHF/edema.   This report was finalized on 10/10/2023 11:55 AM by Dr. Eduardo August MD.      CT Chest Without Contrast Diagnostic    Result Date: 10/10/2023  1.  CHF/edema. 2.  Small right and very small left pleural effusions which are nonloculated. 3.  Bilateral consolidative airspace disease as detailed above consistent with pneumonia. 4.  Small pericardial effusion. 5.  Cardiomegaly with severe coronary artery calcifications. 6.  Mediastinal and hilar " adenopathy. May be reactive but follow-up recommended to exclude neoplastic etiologies. 7.  Liver cirrhosis. 8.  Anasarca. 9.  Other nonacute findings above.   This report was finalized on 10/10/2023 11:55 AM by Dr. Eduardo August MD.      US Renal Bilateral    Result Date: 10/9/2023   SMALL AND ECHOGENIC RIGHT KIDNEY SUGGESTING A UNILATERAL PROCESS SUCH AS RENAL ARTERY STENOSIS.  NORMAL LEFT KIDNEY.  NEGATIVE FOR HYDRONEPHROSIS.    This report was finalized on 10/9/2023 5:49 AM by Imelda Walsh MD.      XR Chest 1 View    Result Date: 10/8/2023  Trace right effusion and right basilar airspace disease    This report was finalized on 10/8/2023 7:17 PM by Dr. Julio Dominguez MD.        Medications :     aspirin, 325 mg, Oral, Daily  carvedilol, 25 mg, Oral, BID With Meals  folic acid, 1 mg, Oral, Daily  heparin (porcine), 5,000 Units, Subcutaneous, Q12H  hydrALAZINE, 50 mg, Oral, Q8H  insulin regular, 5 Units, Intravenous, Once  ipratropium-albuterol, 3 mL, Nebulization, 4x Daily - RT  levothyroxine, 88 mcg, Oral, Q AM  montelukast, 10 mg, Oral, Nightly  nicotine, 1 patch, Transdermal, Q24H  pantoprazole, 40 mg, Oral, Daily  rosuvastatin, 10 mg, Oral, Nightly  senna-docusate sodium, 2 tablet, Oral, BID  sodium chloride, 10 mL, Intravenous, Q12H  vitamin B-12, 1,000 mcg, Oral, Daily             Assessment & Plan     -Acute kidney injury  -Atrophic right kidney  -Chronic kidney disease stage IIIa  -Acute hyperkalemia  -Bilateral edema  -Acute hypoxic respite failure  -COPD  -Hypertension  -Medical noncompliance       10/09/23  Patient baseline creatinine is unknown we will get the records came in with a creatinine of 3.69 is down to 3.6 patient is short of breath we will give 1 dose of Lasix we will check urine protein creatinine ratio, ultrasound of the kidneys showed atrophic right kidney     Patient's bilateral swelling is most likely multifactorial secondary to diastolic congestive heart failure with pulmonary  hypertension also will give 1 dose of Lasix today     Hyperkalemia most likely secondary to lisinopril and decreased GFR we will hold lisinopril and start the patient on Lokelma      10/10/2023  Patient's creatinine is 4.07 from 3.6, patient got 1 dose of Lasix as patient was very short of breath, ultrasound of the kidney showed atrophic right kidney can be secondary to renal artery stenosis but I do not think this is causing her kidney function to get worse acutely, patient's proteinuria is only 413 mg, will check serology, hold diuretics give 1 dose of albumin , continue to hold lisinopril    Continue Lokelma for hyperkalemia    Will increase hydralazine to 25 mg 3 times a day    Patient's anasarca is most likely secondary to diastolic congestive heart failure and pulmonary hypertension PA pressure around 45    10/11/2023  Patient's baseline creatinine is unknown and is rising 4.7 from 4.0 from 3.6, will continue to hold Lasix ultrasound of the kidney showed atrophic right kidney proteinuria of 430 mg serologies pending, will continue to hold lisinopril give 1 dose of albumin  We will give Kayexalate and lactulose for hyperkalemia and counseled the patient on low potassium diet  Patient is high risk for dialysis  Blood pressures well controlled    10/12/2023  Patient's baseline creatinine last year was 2.2 is rising up to 5.0 ultrasound of the kidney showed atrophic right kidney proteinuria 430 mg serologies are pending, clinically patient is not in CHF we will start patient on gentle hydration  We will continue Lokelma for hyperkalemia  Patient is high risk for dialysis  Complements are normal other serologies are pending    10/13/2023  We will give Kayexalate and lactulose for hyperkalemia and check labs again at 12  Patient's creatinine 5.0 proteinuria 430 mg ultrasound showed atrophic right kidney but no obstruction serologies are pending most likely cause of acute kidney injury I think its intravascular  depletion as I do not think patient is in florid congestive heart failure so we will start the patient on normal saline at 100 cc/h also check give 1 dose of albumin and repeat labs and then decide further  As the patient is high risk for dialysis I had a detailed discussion with the patient and the family explained to them all the risk including but not limited to infection bleeding stroke death etc. with dialysis also options benefit and prognosis was explained to the patient and the family they verbalized understanding and patient states that she only wants if it is absolutely needed    10/14/2023  Patient's creatinine is 4.8 from 5.0 so finally getting better we will hold off on any diuretics and fluid as the patient's shortness of breath is better   We will repeat labs with urine for potassium of 5.3  No acute indication for dialysis  Discussed with Dr. Houser    Prognosis critical     Please avoid nephrotoxic medication and pharmacy to adjust the medication according to the GFR     Plan of care discussed with pt, and family answered all questions, patient verbalized understanding and agreed.      MERNA Steinberg MD  10/14/23  09:20 EDT

## 2023-10-14 NOTE — PLAN OF CARE
Goal Outcome Evaluation:  Plan of Care Reviewed With: patient           Outcome Evaluation: Patient's VSS. Pt ambulated to the bathroom during shift and sat up on side of the bed. Pt complained of pain during shift & PRN pain medication given. Pt on 3L NC saturations of 92%. Pt voiced no further concerns at this time.

## 2023-10-14 NOTE — PLAN OF CARE
Problem: Adult Inpatient Plan of Care  Goal: Absence of Hospital-Acquired Illness or Injury  Intervention: Prevent Skin Injury  Recent Flowsheet Documentation  Taken 10/13/2023 1902 by Toribio Holloway RN  Body Position: position changed independently     Problem: Adult Inpatient Plan of Care  Goal: Absence of Hospital-Acquired Illness or Injury  Intervention: Prevent and Manage VTE (Venous Thromboembolism) Risk  Recent Flowsheet Documentation  Taken 10/13/2023 1902 by Toribio Holloway, RN  Activity Management: activity encouraged  VTE Prevention/Management: (See MAR) other (see comments)   Goal Outcome Evaluation:

## 2023-10-14 NOTE — PROGRESS NOTES
[facility] HOSPITALIST PROGRESS NOTE    Subjective     History:   Lesley Michel is a 67 y.o. female admitted on 10/8/2023 secondary to Acute hypoxic respiratory failure     Procedures: None    CC: Follow up MAICO    Patient seen and examined with NADEEM Montero. Awake and alert. States she feels better today with improving dyspnea. No reported CP. No reported N/V.  BP elevated. No acute events overnight per RN.     History taken from: patient, chart, and RN.      Objective     Vital Signs  Temp:  [97.5 °F (36.4 °C)-97.9 °F (36.6 °C)] 97.8 °F (36.6 °C)  Heart Rate:  [65-86] 68  Resp:  [15-20] 18  BP: (148-192)/() 148/78    Intake/Output Summary (Last 24 hours) at 10/14/2023 1507  Last data filed at 10/14/2023 1300  Gross per 24 hour   Intake 885 ml   Output --   Net 885 ml         Physical Exam:  General:    Awake, alert, in no acute distress, ill appearing   Heart:      Normal S1 and S2. Regular rate and rhythm. No significant murmur, rubs or gallops appreciated.   Lungs:     Respirations regular, even and unlabored. Diminished breath sounds at bases but aeration slightly improved. No wheezes appreciated today.    Abdomen:   Soft and nontender. No guarding, rebound tenderness or  organomegaly noted. Bowel sounds present x 4.   Extremities:  2+ bilateral lower extremity edema. (+) swelling B/L hands. Moves UE and LE equally B/L.     Results Review:    Results from last 7 days   Lab Units 10/14/23  0337 10/13/23  0428 10/12/23  0057 10/09/23  2326 10/09/23  0132 10/08/23  1709   WBC 10*3/mm3 7.38 5.70 7.34 8.19 7.35 7.38   HEMOGLOBIN g/dL 9.5* 9.5* 9.5* 11.5* 11.6* 11.0*   PLATELETS 10*3/mm3 299 279 269 267 233 289     Results from last 7 days   Lab Units 10/14/23  1335 10/14/23  0337 10/13/23  2127 10/13/23  1414 10/13/23  0540 10/12/23  1429 10/12/23  1225   SODIUM mmol/L 140 137 137 136 134* 135* 136   POTASSIUM mmol/L 5.5* 5.2 5.9* 5.6* 6.0* 5.5* 5.5*   CHLORIDE mmol/L 103 103 102 103 101 102  104   CO2 mmol/L 23.7 22.3 20.5* 19.5* 22.1 20.3* 19.3*   BUN mg/dL 60* 57* 59* 54* 51* 47* 47*   CREATININE mg/dL 4.96* 4.82* 5.17* 4.91* 5.10* 4.91* 4.85*   CALCIUM mg/dL 9.3 9.2 9.1 9.5 9.6 9.3 9.3   GLUCOSE mg/dL 141* 119* 149* 106* 119* 126* 114*     Results from last 7 days   Lab Units 10/14/23  0337 10/13/23  0540 10/12/23  0057 10/11/23  0525 10/09/23  2325 10/09/23  0132 10/08/23  1709   BILIRUBIN mg/dL 0.3 0.3 0.3 0.3 0.3 0.3 0.5   ALK PHOS U/L 105 124* 140* 136* 132* 149* 146*   AST (SGOT) U/L 10 12 15 13 13 10 7   ALT (SGPT) U/L 6 7 7 6 <5 5 <5     Results from last 7 days   Lab Units 10/09/23  0132 10/08/23  1709   MAGNESIUM mg/dL 2.0 1.9         Results from last 7 days   Lab Units 10/13/23  1659 10/13/23  1414 10/09/23  2325   CK TOTAL U/L  --   --  217*   HSTROP T ng/L 38* 35* 25*       Imaging Results (Last 24 Hours)       ** No results found for the last 24 hours. **              Medications:  aspirin, 325 mg, Oral, Daily  carvedilol, 25 mg, Oral, BID With Meals  folic acid, 1 mg, Oral, Daily  heparin (porcine), 5,000 Units, Subcutaneous, Q12H  hydrALAZINE, 50 mg, Oral, Q8H  insulin regular, 5 Units, Intravenous, Once  ipratropium-albuterol, 3 mL, Nebulization, 4x Daily - RT  levothyroxine, 88 mcg, Oral, Q AM  montelukast, 10 mg, Oral, Nightly  nicotine, 1 patch, Transdermal, Q24H  pantoprazole, 40 mg, Oral, Daily  rosuvastatin, 10 mg, Oral, Nightly  senna-docusate sodium, 2 tablet, Oral, BID  sodium chloride, 10 mL, Intravenous, Q12H  vitamin B-12, 1,000 mcg, Oral, Daily               Assessment & Plan   Anasarca: proBNP and ReDs vest reading elevated on admission. Imaging reveals CHF. Echo reveals an EF of 56-60%, grade I diastolic dysfunction, mild AS, mild pulmonary HTN and small pericardial effusion with no evidence of cardiac tamponade. Worsening renal failure possibly contributing with acute diastolic CHF as well. Repeat ReDs vest elevated from on admission. Management of volume status per  nephrology. Pt at high risk for progressing to HD with pt now stating she would consider HD if deemed necessary. Cont to monitor volume status closely.      Acute exacerbation of COPD: Possibly exacerbated by pulmonary edema. Respiratory PCR negative. Pulm consulted in the setting of persistent wheezing and discussion with nephrology. Pulm has tapered steroids. Course of azithromycin completed. Cont nebs. Pulm input appreciated.     Bilateral pneumonia: Procal normal. Cultures with NGTD. Completed course of azithromycin as above. Cont nebs.     Acute hypoxic respiratory failure: Likely multifactorial in the setting of above. O2 requirements stable today. Treatment as outlined above. Wean supplemental O2 as tolerated.     MAICO on CKD IIIa: Baseline Cr appears to be around 2.2. Non nephrotic proteinuria. Complements are normal with serologies pending. No evidence of hydronephrosis on renal US with US revealing small and echogenic right kidney. Cr remains elevated but stable today. Nephrology recommending close monitoring off Lasix or IVF's today. Pt at high risk for HD. Cont to monitor closely. Nephrology input appreciated.     Hyperkalemia: Holding home lisinopril. Cont targeted treatment per nephrology. Monitor on telemetry.     Essential HTN: BP remains elevated. Increase hydralazine. Cont Coreg. Cont PRN IV hydralazine. Attempt to avoid wide fluctuations in BP. Cont to monitor.     Hypothyroidism: TSH mildly elevated with normal free T4. Cont levothyroxine. Will need repeat labs as an outpatient.     Tobacco abuse: Cont NRT and encourage cessation.     Morbid obesity by BMI: Complicates all aspects of care.     Goals of Care: Pt initially informed me she would not want HD but later informed me she would consider HD if deemed absolutely necessary. Per discussion with palliative, pt wishes to be DNR/DNI. Cont ongoing discussions and supportive treatment. Palliative care input appreciated.     DVT PPX: SQ  heparin    Discussed with Dr. Steinberg.     Disposition Unclear at present in the setting of MAICO.     José Miguel Tellez,   10/14/23  15:07 EDT

## 2023-10-15 LAB
ALBUMIN SERPL-MCNC: 3.8 G/DL (ref 3.5–5.2)
ALBUMIN/GLOB SERPL: 1.5 G/DL
ALP SERPL-CCNC: 90 U/L (ref 39–117)
ALT SERPL W P-5'-P-CCNC: 7 U/L (ref 1–33)
ANION GAP SERPL CALCULATED.3IONS-SCNC: 13.2 MMOL/L (ref 5–15)
AST SERPL-CCNC: 9 U/L (ref 1–32)
BASOPHILS # BLD AUTO: 0.02 10*3/MM3 (ref 0–0.2)
BASOPHILS NFR BLD AUTO: 0.3 % (ref 0–1.5)
BILIRUB SERPL-MCNC: 0.4 MG/DL (ref 0–1.2)
BUN SERPL-MCNC: 58 MG/DL (ref 8–23)
BUN/CREAT SERPL: 13.3 (ref 7–25)
CALCIUM SPEC-SCNC: 9.4 MG/DL (ref 8.6–10.5)
CHLORIDE SERPL-SCNC: 105 MMOL/L (ref 98–107)
CO2 SERPL-SCNC: 20.8 MMOL/L (ref 22–29)
CREAT SERPL-MCNC: 4.35 MG/DL (ref 0.57–1)
DEPRECATED RDW RBC AUTO: 52.9 FL (ref 37–54)
EGFRCR SERPLBLD CKD-EPI 2021: 10.7 ML/MIN/1.73
EOSINOPHIL # BLD AUTO: 0.07 10*3/MM3 (ref 0–0.4)
EOSINOPHIL NFR BLD AUTO: 0.9 % (ref 0.3–6.2)
ERYTHROCYTE [DISTWIDTH] IN BLOOD BY AUTOMATED COUNT: 16.2 % (ref 12.3–15.4)
GLOBULIN UR ELPH-MCNC: 2.6 GM/DL
GLUCOSE BLDC GLUCOMTR-MCNC: 100 MG/DL (ref 70–130)
GLUCOSE BLDC GLUCOMTR-MCNC: 110 MG/DL (ref 70–130)
GLUCOSE BLDC GLUCOMTR-MCNC: 113 MG/DL (ref 70–130)
GLUCOSE BLDC GLUCOMTR-MCNC: 115 MG/DL (ref 70–130)
GLUCOSE BLDC GLUCOMTR-MCNC: 123 MG/DL (ref 70–130)
GLUCOSE SERPL-MCNC: 98 MG/DL (ref 65–99)
HCT VFR BLD AUTO: 31.5 % (ref 34–46.6)
HGB BLD-MCNC: 9.5 G/DL (ref 12–15.9)
IMM GRANULOCYTES # BLD AUTO: 0.04 10*3/MM3 (ref 0–0.05)
IMM GRANULOCYTES NFR BLD AUTO: 0.5 % (ref 0–0.5)
LYMPHOCYTES # BLD AUTO: 2.29 10*3/MM3 (ref 0.7–3.1)
LYMPHOCYTES NFR BLD AUTO: 31 % (ref 19.6–45.3)
MCH RBC QN AUTO: 26.7 PG (ref 26.6–33)
MCHC RBC AUTO-ENTMCNC: 30.2 G/DL (ref 31.5–35.7)
MCV RBC AUTO: 88.5 FL (ref 79–97)
MONOCYTES # BLD AUTO: 0.65 10*3/MM3 (ref 0.1–0.9)
MONOCYTES NFR BLD AUTO: 8.8 % (ref 5–12)
NEUTROPHILS NFR BLD AUTO: 4.32 10*3/MM3 (ref 1.7–7)
NEUTROPHILS NFR BLD AUTO: 58.5 % (ref 42.7–76)
NRBC BLD AUTO-RTO: 0 /100 WBC (ref 0–0.2)
PLATELET # BLD AUTO: 292 10*3/MM3 (ref 140–450)
PMV BLD AUTO: 10.4 FL (ref 6–12)
POTASSIUM SERPL-SCNC: 5.1 MMOL/L (ref 3.5–5.2)
PROT SERPL-MCNC: 6.4 G/DL (ref 6–8.5)
RBC # BLD AUTO: 3.56 10*6/MM3 (ref 3.77–5.28)
SODIUM SERPL-SCNC: 139 MMOL/L (ref 136–145)
WBC NRBC COR # BLD: 7.39 10*3/MM3 (ref 3.4–10.8)

## 2023-10-15 PROCEDURE — 99232 SBSQ HOSP IP/OBS MODERATE 35: CPT | Performed by: INTERNAL MEDICINE

## 2023-10-15 PROCEDURE — 25010000002 HEPARIN (PORCINE) PER 1000 UNITS: Performed by: INTERNAL MEDICINE

## 2023-10-15 PROCEDURE — 82948 REAGENT STRIP/BLOOD GLUCOSE: CPT

## 2023-10-15 PROCEDURE — 94761 N-INVAS EAR/PLS OXIMETRY MLT: CPT

## 2023-10-15 PROCEDURE — 94664 DEMO&/EVAL PT USE INHALER: CPT

## 2023-10-15 PROCEDURE — 94799 UNLISTED PULMONARY SVC/PX: CPT

## 2023-10-15 PROCEDURE — 80053 COMPREHEN METABOLIC PANEL: CPT | Performed by: INTERNAL MEDICINE

## 2023-10-15 PROCEDURE — 85025 COMPLETE CBC W/AUTO DIFF WBC: CPT | Performed by: INTERNAL MEDICINE

## 2023-10-15 PROCEDURE — 25010000002 HYDRALAZINE PER 20 MG: Performed by: INTERNAL MEDICINE

## 2023-10-15 RX ADMIN — HEPARIN SODIUM 5000 UNITS: 5000 INJECTION INTRAVENOUS; SUBCUTANEOUS at 08:49

## 2023-10-15 RX ADMIN — MONTELUKAST SODIUM 10 MG: 10 TABLET, COATED ORAL at 20:49

## 2023-10-15 RX ADMIN — HYDROXYZINE HYDROCHLORIDE 25 MG: 25 TABLET, FILM COATED ORAL at 14:21

## 2023-10-15 RX ADMIN — Medication 10 ML: at 08:46

## 2023-10-15 RX ADMIN — HYDRALAZINE HYDROCHLORIDE 50 MG: 50 TABLET, FILM COATED ORAL at 05:20

## 2023-10-15 RX ADMIN — HYDRALAZINE HYDROCHLORIDE 50 MG: 50 TABLET, FILM COATED ORAL at 22:14

## 2023-10-15 RX ADMIN — ACETAMINOPHEN 650 MG: 325 TABLET ORAL at 14:22

## 2023-10-15 RX ADMIN — ASPIRIN 325 MG: 325 TABLET, COATED ORAL at 08:43

## 2023-10-15 RX ADMIN — HEPARIN SODIUM 5000 UNITS: 5000 INJECTION INTRAVENOUS; SUBCUTANEOUS at 20:49

## 2023-10-15 RX ADMIN — IPRATROPIUM BROMIDE AND ALBUTEROL SULFATE 3 ML: 2.5; .5 SOLUTION RESPIRATORY (INHALATION) at 07:08

## 2023-10-15 RX ADMIN — LEVOTHYROXINE SODIUM 88 MCG: 88 TABLET ORAL at 05:20

## 2023-10-15 RX ADMIN — NICOTINE TRANSDERMAL SYSTEM 1 PATCH: 21 PATCH, EXTENDED RELEASE TRANSDERMAL at 08:48

## 2023-10-15 RX ADMIN — CARVEDILOL 25 MG: 25 TABLET, FILM COATED ORAL at 17:36

## 2023-10-15 RX ADMIN — HYDRALAZINE HYDROCHLORIDE 50 MG: 50 TABLET, FILM COATED ORAL at 14:21

## 2023-10-15 RX ADMIN — HYDRALAZINE HYDROCHLORIDE 10 MG: 20 INJECTION INTRAMUSCULAR; INTRAVENOUS at 09:23

## 2023-10-15 RX ADMIN — ROSUVASTATIN CALCIUM 10 MG: 10 TABLET, FILM COATED ORAL at 20:49

## 2023-10-15 RX ADMIN — CARVEDILOL 25 MG: 25 TABLET, FILM COATED ORAL at 08:43

## 2023-10-15 RX ADMIN — Medication 10 ML: at 20:49

## 2023-10-15 RX ADMIN — GABAPENTIN 300 MG: 300 CAPSULE ORAL at 14:22

## 2023-10-15 RX ADMIN — Medication 10 MG: at 20:49

## 2023-10-15 RX ADMIN — PANTOPRAZOLE SODIUM 40 MG: 40 TABLET, DELAYED RELEASE ORAL at 08:46

## 2023-10-15 RX ADMIN — HYDROCODONE BITARTRATE AND ACETAMINOPHEN 1 TABLET: 10; 325 TABLET ORAL at 20:48

## 2023-10-15 RX ADMIN — Medication 1000 MCG: at 08:43

## 2023-10-15 RX ADMIN — Medication 1 MG: at 08:46

## 2023-10-15 NOTE — PLAN OF CARE
Goal Outcome Evaluation:   Patient has been pleasant. Compliant with care and medications as ordered. Patient has complained of pain and anxiety, PRN medications given as ordered. Patient is currently resting in bed. Will continue with plan of care.

## 2023-10-15 NOTE — PROGRESS NOTES
Nephrology Progress Note      Subjective     10/10/2023  Patient still short of breath blood pressures running towards higher side still have swelling    10/11/2023  Patient still wheezing still short of breath blood pressure is better swelling is still there and wants to go home    10/12/2023  Patient still wheezing but is feeling better denies any shortness of breath had diarrhea also no constipation    10/13/2023  Patient's breathing is much better denies any shortness of breath still have swelling no chest pain no urgency no frequency no nausea vomiting diarrhea    10/14/2023  Patient feeling much better denies any shortness of breath no nausea vomiting diarrhea    10/15/2023  Patient is having a lot of diarrhea denies any shortness of breath wheezes are much better blood pressure is up and down    Objective       Vital signs :     Vitals:    10/15/23 0319 10/15/23 0500 10/15/23 0700 10/15/23 0916   BP: 150/78  (!) 190/96 (!) 201/100   BP Location: Right arm  Right arm    Patient Position: Lying  Lying    Pulse: 69  81    Resp: 18  16    Temp: 98 °F (36.7 °C)  97.8 °F (36.6 °C)    TempSrc: Oral  Oral    SpO2: 96%  94%    Weight:  109 kg (240 lb 14.4 oz)     Height:            Intake/Output                         10/13/23 0701 - 10/14/23 0700 10/14/23 0701 - 10/15/23 0700     3799-1232 8714-6998 Total 0630-7069 7951-3240 Total                 Intake    P.O.  355  265 620  590  60 650    I.V.  --  -- --  0  -- 0    Total Intake 355 265 620 590 60 650       Output    Total Output -- -- -- -- -- --           10/10/2023 examined and reviewed  10/11/2023 examined and reviewed  10/12/2023 examined and reviewed  10/13/2023 examined and reviewed  10/14/2023 examined and reviewed  10/15/2023 examined and reviewed    Physical Exam:    General Appearance : alert, pleasant, appears stated age, cooperative   Head  :  normocephalic, without obvious abnormality and atraumatic  Eyes  :  conjunctivae and sclerae normal, no  icterus, no pallor and PERRLA  Neck  :  no adenopathy, suppple, no carotid bruit, no JVD   Lungs : Wheezes is much better  Heart :  regular rhythm & normal rate, normal S1, S2 and no murmur, no rub  Abdomen : normal bowel sounds, no masses, no hepatomegaly, no splenomegaly, soft non-tender and no guarding  Extremities : moves extremities well, 2 plus  edema, no cyanosis and no redness  Skin :  no bleeding, bruising or rash  Neurologic :   orientated to person, place, time and situation, Grossly no focal deficits  Acess :     Laboratory Data :       CBC and coagulation:  Results from last 7 days   Lab Units 10/15/23  0317 10/14/23  0337 10/13/23  0428 10/09/23  0132 10/08/23  2108 10/08/23  1923 10/08/23  1709   PROCALCITONIN ng/mL  --   --   --   --  0.10  --   --    LACTATE mmol/L  --   --   --   --   --   --  0.9   CRP mg/dL  --   --   --   --   --  5.41*  --    WBC 10*3/mm3 7.39 7.38 5.70   < >  --   --  7.38   HEMOGLOBIN g/dL 9.5* 9.5* 9.5*   < >  --   --  11.0*   HEMATOCRIT % 31.5* 30.3* 30.8*   < >  --   --  36.2   MCV fL 88.5 86.8 89.3   < >  --   --  88.9   MCHC g/dL 30.2* 31.4* 30.8*   < >  --   --  30.4*   PLATELETS 10*3/mm3 292 299 279   < >  --   --  289    < > = values in this interval not displayed.     Acid/base balance:  Results from last 7 days   Lab Units 10/08/23  1707   PH, ARTERIAL pH units 7.363   PO2 ART mm Hg 53.5*   PCO2, ARTERIAL mm Hg 36.5   HCO3 ART mmol/L 20.7     Renal and electrolytes:    Results from last 7 days   Lab Units 10/15/23  0317 10/14/23  1335 10/14/23  0337 10/13/23  2127 10/13/23  1414 10/09/23  0754 10/09/23  0132 10/08/23  1709   SODIUM mmol/L 139 140 137 137 136   < > 139 138   POTASSIUM mmol/L 5.1 5.5* 5.2 5.9* 5.6*   < > 6.2* 5.9*   MAGNESIUM mg/dL  --   --   --   --   --   --  2.0 1.9   CHLORIDE mmol/L 105 103 103 102 103   < > 105 107   CO2 mmol/L 20.8* 23.7 22.3 20.5* 19.5*   < > 21.1* 20.8*   BUN mg/dL 58* 60* 57* 59* 54*   < > 29* 27*   CREATININE mg/dL 4.35*  "4.96* 4.82* 5.17* 4.91*   < > 3.98* 3.61*   CALCIUM mg/dL 9.4 9.3 9.2 9.1 9.5   < > 9.0 9.0    < > = values in this interval not displayed.     Estimated Creatinine Clearance: 14.4 mL/min (A) (by C-G formula based on SCr of 4.35 mg/dL (H)).     Liver and pancreatic function:  Results from last 7 days   Lab Units 10/15/23  0317 10/14/23  0337 10/13/23  0540   ALBUMIN g/dL 3.8 4.1 4.1   BILIRUBIN mg/dL 0.4 0.3 0.3   ALK PHOS U/L 90 105 124*   AST (SGOT) U/L 9 10 12   ALT (SGPT) U/L 7 6 7       Albumin Albumin   Date Value Ref Range Status   10/15/2023 3.8 3.5 - 5.2 g/dL Final   10/14/2023 4.1 3.5 - 5.2 g/dL Final   10/13/2023 4.1 3.5 - 5.2 g/dL Final      Magnesium No results found for: \"MG\"         PTH               No results found for: \"PTH\"    Cardiac:  Results from last 7 days   Lab Units 10/08/23  1709   PROBNP pg/mL 8,093.0*     Liver and pancreatic function:  Results from last 7 days   Lab Units 10/15/23  0317 10/14/23  0337 10/13/23  0540   ALBUMIN g/dL 3.8 4.1 4.1   BILIRUBIN mg/dL 0.4 0.3 0.3   ALK PHOS U/L 90 105 124*   AST (SGOT) U/L 9 10 12   ALT (SGPT) U/L 7 6 7       XR Chest 1 View    Result Date: 10/13/2023  1.  Stable changes of CHF with interstitial edema. 2.  Development of bibasilar airspace disease which may represent atelectasis or pneumonia.   This report was finalized on 10/13/2023 12:43 PM by Dr. Eduardo August MD.      XR Chest 1 View    Result Date: 10/12/2023    Significant interval improvement in CHF/volume overload.   This report was finalized on 10/12/2023 12:18 PM by Dr. Eduardo August MD.      XR Chest 1 View    Result Date: 10/10/2023  1.  Bilateral airspace disease consistent with pneumonia. 2.  CHF/edema.   This report was finalized on 10/10/2023 11:55 AM by Dr. Eduardo August MD.      CT Chest Without Contrast Diagnostic    Result Date: 10/10/2023  1.  CHF/edema. 2.  Small right and very small left pleural effusions which are nonloculated. 3.  Bilateral consolidative airspace " disease as detailed above consistent with pneumonia. 4.  Small pericardial effusion. 5.  Cardiomegaly with severe coronary artery calcifications. 6.  Mediastinal and hilar adenopathy. May be reactive but follow-up recommended to exclude neoplastic etiologies. 7.  Liver cirrhosis. 8.  Anasarca. 9.  Other nonacute findings above.   This report was finalized on 10/10/2023 11:55 AM by Dr. Eduardo August MD.      US Renal Bilateral    Result Date: 10/9/2023   SMALL AND ECHOGENIC RIGHT KIDNEY SUGGESTING A UNILATERAL PROCESS SUCH AS RENAL ARTERY STENOSIS.  NORMAL LEFT KIDNEY.  NEGATIVE FOR HYDRONEPHROSIS.    This report was finalized on 10/9/2023 5:49 AM by Imelda Walsh MD.      XR Chest 1 View    Result Date: 10/8/2023  Trace right effusion and right basilar airspace disease    This report was finalized on 10/8/2023 7:17 PM by Dr. Julio Dominguez MD.        Medications :     aspirin, 325 mg, Oral, Daily  carvedilol, 25 mg, Oral, BID With Meals  folic acid, 1 mg, Oral, Daily  heparin (porcine), 5,000 Units, Subcutaneous, Q12H  hydrALAZINE, 50 mg, Oral, Q8H  insulin regular, 5 Units, Intravenous, Once  ipratropium-albuterol, 3 mL, Nebulization, 4x Daily - RT  levothyroxine, 88 mcg, Oral, Q AM  montelukast, 10 mg, Oral, Nightly  nicotine, 1 patch, Transdermal, Q24H  pantoprazole, 40 mg, Oral, Daily  rosuvastatin, 10 mg, Oral, Nightly  senna-docusate sodium, 2 tablet, Oral, BID  sodium chloride, 10 mL, Intravenous, Q12H  vitamin B-12, 1,000 mcg, Oral, Daily             Assessment & Plan     -Acute kidney injury  -Atrophic right kidney  -Chronic kidney disease stage IIIa  -Acute hyperkalemia  -Bilateral edema  -Acute hypoxic respite failure  -COPD  -Hypertension  -Medical noncompliance       10/09/23  Patient baseline creatinine is unknown we will get the records came in with a creatinine of 3.69 is down to 3.6 patient is short of breath we will give 1 dose of Lasix we will check urine protein creatinine ratio, ultrasound of  the kidneys showed atrophic right kidney     Patient's bilateral swelling is most likely multifactorial secondary to diastolic congestive heart failure with pulmonary hypertension also will give 1 dose of Lasix today     Hyperkalemia most likely secondary to lisinopril and decreased GFR we will hold lisinopril and start the patient on Lokelma      10/10/2023  Patient's creatinine is 4.07 from 3.6, patient got 1 dose of Lasix as patient was very short of breath, ultrasound of the kidney showed atrophic right kidney can be secondary to renal artery stenosis but I do not think this is causing her kidney function to get worse acutely, patient's proteinuria is only 413 mg, will check serology, hold diuretics give 1 dose of albumin , continue to hold lisinopril    Continue Lokelma for hyperkalemia    Will increase hydralazine to 25 mg 3 times a day    Patient's anasarca is most likely secondary to diastolic congestive heart failure and pulmonary hypertension PA pressure around 45    10/11/2023  Patient's baseline creatinine is unknown and is rising 4.7 from 4.0 from 3.6, will continue to hold Lasix ultrasound of the kidney showed atrophic right kidney proteinuria of 430 mg serologies pending, will continue to hold lisinopril give 1 dose of albumin  We will give Kayexalate and lactulose for hyperkalemia and counseled the patient on low potassium diet  Patient is high risk for dialysis  Blood pressures well controlled    10/12/2023  Patient's baseline creatinine last year was 2.2 is rising up to 5.0 ultrasound of the kidney showed atrophic right kidney proteinuria 430 mg serologies are pending, clinically patient is not in CHF we will start patient on gentle hydration  We will continue Lokelma for hyperkalemia  Patient is high risk for dialysis  Complements are normal other serologies are pending    10/13/2023  We will give Kayexalate and lactulose for hyperkalemia and check labs again at 12  Patient's creatinine 5.0  proteinuria 430 mg ultrasound showed atrophic right kidney but no obstruction serologies are pending most likely cause of acute kidney injury I think its intravascular depletion as I do not think patient is in florid congestive heart failure so we will start the patient on normal saline at 100 cc/h also check give 1 dose of albumin and repeat labs and then decide further  As the patient is high risk for dialysis I had a detailed discussion with the patient and the family explained to them all the risk including but not limited to infection bleeding stroke death etc. with dialysis also options benefit and prognosis was explained to the patient and the family they verbalized understanding and patient states that she only wants if it is absolutely needed    10/14/2023  Patient's creatinine is 4.8 from 5.0 so finally getting better we will hold off on any diuretics and fluid as the patient's shortness of breath is better   We will repeat labs with urine for potassium of 5.3  No acute indication for dialysis  Discussed with Dr. Houser    10/15/2023  Patient's baseline creatinine is 2.2 last year , is now 4.3 from 4.8 from 5.0, getting better so no absolute indication for dialysis ultrasound of the kidney showed atrophic right kidney, proteinuria 430 mg, most likely patient had acute kidney injury secondary to diuretics which is already getting better  Patient is having diarrhea secondary to lactulose and Kayexalate potassium is better we will hold off on lactulose   No absolute indication for dialysis today      Prognosis critical     Please avoid nephrotoxic medication and pharmacy to adjust the medication according to the GFR     Plan of care discussed with pt, and family answered all questions, patient verbalized understanding and agreed.      MERNA Steinberg MD  10/15/23  09:42 EDT

## 2023-10-15 NOTE — PLAN OF CARE
Problem: Adult Inpatient Plan of Care  Goal: Absence of Hospital-Acquired Illness or Injury  Intervention: Prevent Skin Injury  Recent Flowsheet Documentation  Taken 10/14/2023 1918 by Toribio Holloway RN  Body Position: position changed independently  Skin Protection:   adhesive use limited   incontinence pads utilized     Problem: Adult Inpatient Plan of Care  Goal: Absence of Hospital-Acquired Illness or Injury  Intervention: Prevent Infection  Recent Flowsheet Documentation  Taken 10/14/2023 2300 by Toribio Holloway RN  Infection Prevention:   rest/sleep promoted   environmental surveillance performed  Taken 10/14/2023 2100 by Toribio Holloway RN  Infection Prevention:   rest/sleep promoted   environmental surveillance performed  Taken 10/14/2023 1918 by Toribio Holloway RN  Infection Prevention:   rest/sleep promoted   environmental surveillance performed   Goal Outcome Evaluation:

## 2023-10-15 NOTE — PROGRESS NOTES
Palomo Steinberg MD  Physician  Nephrology     Progress Notes      Incomplete     Date of Service: 10/15/23 0939  Creation Time: 10/15/23 0939     Incomplete       Expand All Collapse All    Nephrology Progress Note           Subjective   10/10/2023  Patient still short of breath blood pressures running towards higher side still have swelling     10/11/2023  Patient still wheezing still short of breath blood pressure is better swelling is still there and wants to go home     10/12/2023  Patient still wheezing but is feeling better denies any shortness of breath had diarrhea also no constipation     10/13/2023  Patient's breathing is much better denies any shortness of breath still have swelling no chest pain no urgency no frequency no nausea vomiting diarrhea     10/14/2023  Patient feeling much better denies any shortness of breath no nausea vomiting diarrhea           Objective   Vital signs :      Vitals          Vitals:     10/15/23 0319 10/15/23 0500 10/15/23 0700 10/15/23 0916   BP: 150/78   (!) 190/96 (!) 201/100   BP Location: Right arm   Right arm     Patient Position: Lying   Lying     Pulse: 69   81     Resp: 18   16     Temp: 98 °F (36.7 °C)   97.8 °F (36.6 °C)     TempSrc: Oral   Oral     SpO2: 96%   94%     Weight:   109 kg (240 lb 14.4 oz)       Height:                    Intake/Output                             10/13/23 0701 - 10/14/23 0700 10/14/23 0701 - 10/15/23 0700       [id number] 1535-0260 Total 6063-2504 4184-2706 Total                                  Intake     P.O.  355  265 620  590  60 650     I.V.  --  -- --  0  -- 0     Total Intake 355 265 620 590 60 650                Output     Total Output -- -- -- -- -- --             10/10/2023 examined and reviewed  10/11/2023 examined and reviewed  10/12/2023 examined and reviewed  10/13/2023 examined and reviewed  10/14/2023 examined and  Physical Exam:     General Appearance : alert, pleasant, appears stated age, cooperative   Head  :   normocephalic, without obvious abnormality and atraumatic  Eyes  :  conjunctivae and sclerae normal, no icterus, no pallor and PERRLA  Neck  :  no adenopathy, suppple, no carotid bruit, no JVD   Lungs : Wheezes is much better  Heart :  regular rhythm & normal rate, normal S1, S2 and no murmur, no rub  Abdomen : normal bowel sounds, no masses, no hepatomegaly, no splenomegaly, soft non-tender and no guarding  Extremities : moves extremities well, 2 plus  edema, no cyanosis and no redness  Skin :  no bleeding, bruising or rash  Neurologic :   orientated to person, place, time and situation, Grossly no focal deficits  Acess :      Laboratory Data :         CBC and coagulation:             Results from last 7 days   Lab Units 10/15/23  0317 10/14/23  0337 10/13/23  0428 10/09/23  0132 10/08/23  2108 10/08/23  1923 10/08/23  1709   PROCALCITONIN ng/mL  --   --   --   --  0.10  --   --    LACTATE mmol/L  --   --   --   --   --   --  0.9   CRP mg/dL  --   --   --   --   --  5.41*  --    WBC 10*3/mm3 7.39 7.38 5.70   < >  --   --  7.38   HEMOGLOBIN g/dL 9.5* 9.5* 9.5*   < >  --   --  11.0*   HEMATOCRIT % 31.5* 30.3* 30.8*   < >  --   --  36.2   MCV fL 88.5 86.8 89.3   < >  --   --  88.9   MCHC g/dL 30.2* 31.4* 30.8*   < >  --   --  30.4*   PLATELETS 10*3/mm3 292 299 279   < >  --   --  289    < > = values in this interval not displayed.      Acid/base balance:       Results from last 7 days   Lab Units 10/08/23  1707   PH, ARTERIAL pH units 7.363   PO2 ART mm Hg 53.5*   PCO2, ARTERIAL mm Hg 36.5   HCO3 ART mmol/L 20.7      Renal and electrolytes:                 Results from last 7 days   Lab Units 10/15/23  0317 10/14/23  1335 10/14/23  0337 10/13/23  2127 10/13/23  1414 10/09/23  0754 10/09/23  0132 10/08/23  1709   SODIUM mmol/L 139 140 137 137 136   < > 139 138   POTASSIUM mmol/L 5.1 5.5* 5.2 5.9* 5.6*   < > 6.2* 5.9*   MAGNESIUM mg/dL  --   --   --   --   --   --  2.0 1.9   CHLORIDE mmol/L 105 103 103 102 103   < > 105  "107   CO2 mmol/L 20.8* 23.7 22.3 20.5* 19.5*   < > 21.1* 20.8*   BUN mg/dL 58* 60* 57* 59* 54*   < > 29* 27*   CREATININE mg/dL 4.35* 4.96* 4.82* 5.17* 4.91*   < > 3.98* 3.61*   CALCIUM mg/dL 9.4 9.3 9.2 9.1 9.5   < > 9.0 9.0    < > = values in this interval not displayed.      Estimated Creatinine Clearance: 14.4 mL/min (A) (by C-G formula based on SCr of 4.35 mg/dL (H)).     Liver and pancreatic function:         Results from last 7 days   Lab Units 10/15/23  0317 10/14/23  0337 10/13/23  0540   ALBUMIN g/dL 3.8 4.1 4.1   BILIRUBIN mg/dL 0.4 0.3 0.3   ALK PHOS U/L 90 105 124*   AST (SGOT) U/L 9 10 12   ALT (SGPT) U/L 7 6 7         Albumin       Albumin   Date Value Ref Range Status   10/15/2023 3.8 3.5 - 5.2 g/dL Final   10/14/2023 4.1 3.5 - 5.2 g/dL Final   10/13/2023 4.1 3.5 - 5.2 g/dL Final      Magnesium No results found for: \"MG\"            PTH               No results found for: \"PTH\"     Cardiac:       Results from last 7 days   Lab Units 10/08/23  1709   PROBNP pg/mL 8,093.0*      Liver and pancreatic function:         Results from last 7 days   Lab Units 10/15/23  0317 10/14/23  0337 10/13/23  0540   ALBUMIN g/dL 3.8 4.1 4.1   BILIRUBIN mg/dL 0.4 0.3 0.3   ALK PHOS U/L 90 105 124*   AST (SGOT) U/L 9 10 12   ALT (SGPT) U/L 7 6 7         XR Chest 1 View     Result Date: 10/13/2023  1.  Stable changes of CHF with interstitial edema. 2.  Development of bibasilar airspace disease which may represent atelectasis or pneumonia.   This report was finalized on 10/13/2023 12:43 PM by Dr. Eduardo August MD.       XR Chest 1 View     Result Date: 10/12/2023    Significant interval improvement in CHF/volume overload.   This report was finalized on 10/12/2023 12:18 PM by Dr. Eduardo August MD.       XR Chest 1 View     Result Date: 10/10/2023  1.  Bilateral airspace disease consistent with pneumonia. 2.  CHF/edema.   This report was finalized on 10/10/2023 11:55 AM by Dr. Eduardo August MD.       CT Chest Without " Contrast Diagnostic     Result Date: 10/10/2023  1.  CHF/edema. 2.  Small right and very small left pleural effusions which are nonloculated. 3.  Bilateral consolidative airspace disease as detailed above consistent with pneumonia. 4.  Small pericardial effusion. 5.  Cardiomegaly with severe coronary artery calcifications. 6.  Mediastinal and hilar adenopathy. May be reactive but follow-up recommended to exclude neoplastic etiologies. 7.  Liver cirrhosis. 8.  Anasarca. 9.  Other nonacute findings above.   This report was finalized on 10/10/2023 11:55 AM by Dr. Eduardo August MD.       US Renal Bilateral     Result Date: 10/9/2023   SMALL AND ECHOGENIC RIGHT KIDNEY SUGGESTING A UNILATERAL PROCESS SUCH AS RENAL ARTERY STENOSIS.  NORMAL LEFT KIDNEY.  NEGATIVE FOR HYDRONEPHROSIS.    This report was finalized on 10/9/2023 5:49 AM by Imelda Walsh MD.       XR Chest 1 View     Result Date: 10/8/2023  Trace right effusion and right basilar airspace disease    This report was finalized on 10/8/2023 7:17 PM by Dr. Julio Dominguez MD.         Medications :      aspirin, 325 mg, Oral, Daily  carvedilol, 25 mg, Oral, BID With Meals  folic acid, 1 mg, Oral, Daily  heparin (porcine), 5,000 Units, Subcutaneous, Q12H  hydrALAZINE, 50 mg, Oral, Q8H  insulin regular, 5 Units, Intravenous, Once  ipratropium-albuterol, 3 mL, Nebulization, 4x Daily - RT  levothyroxine, 88 mcg, Oral, Q AM  montelukast, 10 mg, Oral, Nightly  nicotine, 1 patch, Transdermal, Q24H  pantoprazole, 40 mg, Oral, Daily  rosuvastatin, 10 mg, Oral, Nightly  senna-docusate sodium, 2 tablet, Oral, BID  sodium chloride, 10 mL, Intravenous, Q12H  vitamin B-12, 1,000 mcg, Oral, Daily                    Assessment & Plan  -Acute kidney injury  -Atrophic right kidney  -Chronic kidney disease stage IIIa  -Acute hyperkalemia  -Bilateral edema  -Acute hypoxic respite failure  -COPD  -Hypertension  -Medical noncompliance        10/09/23  Patient baseline creatinine is unknown  we will get the records came in with a creatinine of 3.69 is down to 3.6 patient is short of breath we will give 1 dose of Lasix we will check urine protein creatinine ratio, ultrasound of the kidneys showed atrophic right kidney     Patient's bilateral swelling is most likely multifactorial secondary to diastolic congestive heart failure with pulmonary hypertension also will give 1 dose of Lasix today     Hyperkalemia most likely secondary to lisinopril and decreased GFR we will hold lisinopril and start the patient on Lokelma        10/10/2023  Patient's creatinine is 4.07 from 3.6, patient got 1 dose of Lasix as patient was very short of breath, ultrasound of the kidney showed atrophic right kidney can be secondary to renal artery stenosis but I do not think this is causing her kidney function to get worse acutely, patient's proteinuria is only 413 mg, will check serology, hold diuretics give 1 dose of albumin , continue to hold lisinopril     Continue Lokelma for hyperkalemia     Will increase hydralazine to 25 mg 3 times a day     Patient's anasarca is most likely secondary to diastolic congestive heart failure and pulmonary hypertension PA pressure around 45     10/11/2023  Patient's baseline creatinine is unknown and is rising 4.7 from 4.0 from 3.6, will continue to hold Lasix ultrasound of the kidney showed atrophic right kidney proteinuria of 430 mg serologies pending, will continue to hold lisinopril give 1 dose of albumin  We will give Kayexalate and lactulose for hyperkalemia and counseled the patient on low potassium diet  Patient is high risk for dialysis  Blood pressures well controlled     10/12/2023  Patient's baseline creatinine last year was 2.2 is rising up to 5.0 ultrasound of the kidney showed atrophic right kidney proteinuria 430 mg serologies are pending, clinically patient is not in CHF we will start patient on gentle hydration  We will continue Lokelma for hyperkalemia  Patient is high risk  for dialysis  Complements are normal other serologies are pending     10/13/2023  We will give Kayexalate and lactulose for hyperkalemia and check labs again at 12  Patient's creatinine 5.0 proteinuria 430 mg ultrasound showed atrophic right kidney but no obstruction serologies are pending most likely cause of acute kidney injury I think its intravascular depletion as I do not think patient is in florid congestive heart failure so we will start the patient on normal saline at 100 cc/h also check give 1 dose of albumin and repeat labs and then decide further  As the patient is high risk for dialysis I had a detailed discussion with the patient and the family explained to them all the risk including but not limited to infection bleeding stroke death etc. with dialysis also options benefit and prognosis was explained to the patient and the family they verbalized understanding and patient states that she only wants if it is absolutely needed     10/14/2023  Patient's creatinine is 4.8 from 5.0 so finally getting better we will hold off on any diuretics and fluid as the patient's shortness of breath is better   We will repeat labs with urine for potassium of 5.3  No acute indication for dialysis  Discussed with Dr. Houser     Prognosis critical     Please avoid nephrotoxic medication and pharmacy to adjust the medication according to the GFR     Plan of care discussed with pt, and family answered all questions, patient verbalized understanding and agreed.        MERNA Steinberg MD  10/15/23  09:39 EDT

## 2023-10-15 NOTE — PROGRESS NOTES
[facility] HOSPITALIST PROGRESS NOTE    Subjective     History:   Lesley Michel is a 67 y.o. female admitted on 10/8/2023 secondary to Acute hypoxic respiratory failure     Procedures: None    CC: Follow up MAICO    Patient seen and examined with NADEEM Parekh. Awake and alert. Dyspnea overall improved and stable today. No reported CP. No reported vomiting. Reports poor appetite.  BP elevated. Anxious to go home and considered leaving AMA but now agreeable to stay. No acute events overnight per RN.     History taken from: patient, chart, and RN.      Objective     Vital Signs  Temp:  [97.7 °F (36.5 °C)-98 °F (36.7 °C)] 97.8 °F (36.6 °C)  Heart Rate:  [64-81] 64  Resp:  [15-18] 18  BP: (142-201)/() 142/71    Intake/Output Summary (Last 24 hours) at 10/15/2023 1137  Last data filed at 10/14/2023 2000  Gross per 24 hour   Intake 590 ml   Output --   Net 590 ml         Physical Exam: Unchanged from previous.   General:    Awake, alert, in no acute distress, ill appearing   Heart:      Normal S1 and S2. Regular rate and rhythm. No significant murmur, rubs or gallops appreciated.   Lungs:     Respirations regular, even and unlabored. Diminished breath sounds at bases but aeration slightly improved. No wheezes appreciated today.    Abdomen:   Soft and nontender. No guarding, rebound tenderness or  organomegaly noted. Bowel sounds present x 4.   Extremities:  2+ bilateral lower extremity edema. (+) swelling B/L hands. Moves UE and LE equally B/L.     Results Review:    Results from last 7 days   Lab Units 10/15/23  0317 10/14/23  0337 10/13/23  0428 10/12/23  0057 10/09/23  2326 10/09/23  0132 10/08/23  1709   WBC 10*3/mm3 7.39 7.38 5.70 7.34 8.19 7.35 7.38   HEMOGLOBIN g/dL 9.5* 9.5* 9.5* 9.5* 11.5* 11.6* 11.0*   PLATELETS 10*3/mm3 292 299 279 269 267 233 289     Results from last 7 days   Lab Units 10/15/23  0317 10/14/23  1335 10/14/23  0337 10/13/23  2127 10/13/23  1414 10/13/23  0540 10/12/23  1429    SODIUM mmol/L 139 140 137 137 136 134* 135*   POTASSIUM mmol/L 5.1 5.5* 5.2 5.9* 5.6* 6.0* 5.5*   CHLORIDE mmol/L 105 103 103 102 103 101 102   CO2 mmol/L 20.8* 23.7 22.3 20.5* 19.5* 22.1 20.3*   BUN mg/dL 58* 60* 57* 59* 54* 51* 47*   CREATININE mg/dL 4.35* 4.96* 4.82* 5.17* 4.91* 5.10* 4.91*   CALCIUM mg/dL 9.4 9.3 9.2 9.1 9.5 9.6 9.3   GLUCOSE mg/dL 98 141* 119* 149* 106* 119* 126*     Results from last 7 days   Lab Units 10/15/23  0317 10/14/23  0337 10/13/23  0540 10/12/23  0057 10/11/23  0525 10/09/23  2325 10/09/23  0132   BILIRUBIN mg/dL 0.4 0.3 0.3 0.3 0.3 0.3 0.3   ALK PHOS U/L 90 105 124* 140* 136* 132* 149*   AST (SGOT) U/L 9 10 12 15 13 13 10   ALT (SGPT) U/L 7 6 7 7 6 <5 5     Results from last 7 days   Lab Units 10/09/23  0132 10/08/23  1709   MAGNESIUM mg/dL 2.0 1.9         Results from last 7 days   Lab Units 10/13/23  1659 10/13/23  1414 10/09/23  2325   CK TOTAL U/L  --   --  217*   HSTROP T ng/L 38* 35* 25*       Imaging Results (Last 24 Hours)       ** No results found for the last 24 hours. **              Medications:  aspirin, 325 mg, Oral, Daily  carvedilol, 25 mg, Oral, BID With Meals  folic acid, 1 mg, Oral, Daily  heparin (porcine), 5,000 Units, Subcutaneous, Q12H  hydrALAZINE, 50 mg, Oral, Q8H  insulin regular, 5 Units, Intravenous, Once  ipratropium-albuterol, 3 mL, Nebulization, 4x Daily - RT  levothyroxine, 88 mcg, Oral, Q AM  montelukast, 10 mg, Oral, Nightly  nicotine, 1 patch, Transdermal, Q24H  pantoprazole, 40 mg, Oral, Daily  rosuvastatin, 10 mg, Oral, Nightly  sodium chloride, 10 mL, Intravenous, Q12H  vitamin B-12, 1,000 mcg, Oral, Daily               Assessment & Plan   Anasarca: proBNP and ReDs vest reading elevated on admission. Imaging reveals CHF. Echo reveals an EF of 56-60%, grade I diastolic dysfunction, mild AS, mild pulmonary HTN and small pericardial effusion with no evidence of cardiac tamponade. Worsening renal failure possibly contributing with acute diastolic  CHF as well. Repeat ReDs vest elevated from on admission. Management of volume status per nephrology. Pt at high risk for progressing to HD with pt now stating she would consider HD if deemed necessary. Cont to monitor volume status closely.      Acute exacerbation of COPD: Possibly exacerbated by pulmonary edema. Respiratory PCR negative. Pulm consulted in the setting of persistent wheezing and discussion with nephrology. Pulm has tapered steroids. Course of azithromycin completed. Wheezing improved. Cont nebs. Pulm input appreciated.     Bilateral pneumonia: Procal normal. Cultures with NGTD. Completed course of azithromycin as above. Cont nebs.     Acute hypoxic respiratory failure: Likely multifactorial in the setting of above. O2 requirements stable today. Treatment as outlined above. Wean supplemental O2 as tolerated.     MAICO on CKD IIIa: Baseline Cr appears to be around 2.2. Non nephrotic proteinuria. Complements are normal with serologies pending. No evidence of hydronephrosis on renal US with US revealing small and echogenic right kidney. Cr remains elevated but stable today. Nephrology recommending close monitoring off Lasix or IVF's again today. Pt at high risk for HD. Cont to monitor closely. Nephrology input appreciated.     Hyperkalemia: Holding home lisinopril. Improved. Targeted treatment per nephrology. Monitor on telemetry.     Essential HTN: BP remains elevated. Increased hydralazine. Cont Coreg. Cont PRN IV hydralazine. Attempt to avoid wide fluctuations in BP. Cont to monitor.     Hypothyroidism: TSH mildly elevated with normal free T4. Cont levothyroxine. Will need repeat labs as an outpatient.     Tobacco abuse: Cont NRT and encourage cessation.     Morbid obesity by BMI: Complicates all aspects of care.     Goals of Care: Pt initially informed me she would not want HD but later informed me she would consider HD if deemed absolutely necessary. Per discussion with palliative, pt wishes to be  DNR/DNI. Cont ongoing discussions and supportive treatment. Palliative care input appreciated.     DVT PPX: SQ heparin     Disposition Unclear at present in the setting of MAICO.     José Miguel Tellez DO  10/15/23  11:37 EDT

## 2023-10-16 LAB
A-A DO2: 112.7 MMHG (ref 0–300)
A-A DO2: 89.8 MMHG (ref 0–300)
ALBUMIN SERPL-MCNC: 4 G/DL (ref 3.5–5.2)
ALBUMIN/GLOB SERPL: 1.6 G/DL
ALP SERPL-CCNC: 94 U/L (ref 39–117)
ALT SERPL W P-5'-P-CCNC: 6 U/L (ref 1–33)
ANION GAP SERPL CALCULATED.3IONS-SCNC: 11.5 MMOL/L (ref 5–15)
ARTERIAL PATENCY WRIST A: ABNORMAL
ARTERIAL PATENCY WRIST A: ABNORMAL
AST SERPL-CCNC: 10 U/L (ref 1–32)
ATMOSPHERIC PRESS: 726 MMHG
ATMOSPHERIC PRESS: 726 MMHG
BASE EXCESS BLDA CALC-SCNC: -2.4 MMOL/L (ref 0–2)
BASE EXCESS BLDA CALC-SCNC: -2.6 MMOL/L (ref 0–2)
BASOPHILS # BLD AUTO: 0.05 10*3/MM3 (ref 0–0.2)
BASOPHILS NFR BLD AUTO: 0.5 % (ref 0–1.5)
BDY SITE: ABNORMAL
BDY SITE: ABNORMAL
BILIRUB SERPL-MCNC: 0.4 MG/DL (ref 0–1.2)
BUN SERPL-MCNC: 61 MG/DL (ref 8–23)
BUN/CREAT SERPL: 13.6 (ref 7–25)
CALCIUM SPEC-SCNC: 9.1 MG/DL (ref 8.6–10.5)
CHLORIDE SERPL-SCNC: 106 MMOL/L (ref 98–107)
CO2 BLDA-SCNC: 25.3 MMOL/L (ref 22–33)
CO2 BLDA-SCNC: 25.7 MMOL/L (ref 22–33)
CO2 SERPL-SCNC: 22.5 MMOL/L (ref 22–29)
COHGB MFR BLD: 1.8 % (ref 0–5)
COHGB MFR BLD: 2.4 % (ref 0–5)
CREAT SERPL-MCNC: 4.47 MG/DL (ref 0.57–1)
DEPRECATED RDW RBC AUTO: 53.7 FL (ref 37–54)
EGFRCR SERPLBLD CKD-EPI 2021: 10.4 ML/MIN/1.73
EOSINOPHIL # BLD AUTO: 0.79 10*3/MM3 (ref 0–0.4)
EOSINOPHIL NFR BLD AUTO: 8.5 % (ref 0.3–6.2)
EPAP: 8
ERYTHROCYTE [DISTWIDTH] IN BLOOD BY AUTOMATED COUNT: 16.3 % (ref 12.3–15.4)
GAS FLOW AIRWAY: 4 LPM
GLOBULIN UR ELPH-MCNC: 2.5 GM/DL
GLUCOSE BLDC GLUCOMTR-MCNC: 105 MG/DL (ref 70–130)
GLUCOSE BLDC GLUCOMTR-MCNC: 122 MG/DL (ref 70–130)
GLUCOSE BLDC GLUCOMTR-MCNC: 140 MG/DL (ref 70–130)
GLUCOSE BLDC GLUCOMTR-MCNC: 99 MG/DL (ref 70–130)
GLUCOSE SERPL-MCNC: 103 MG/DL (ref 65–99)
HCO3 BLDA-SCNC: 23.9 MMOL/L (ref 20–26)
HCO3 BLDA-SCNC: 24.2 MMOL/L (ref 20–26)
HCT VFR BLD AUTO: 34.2 % (ref 34–46.6)
HCT VFR BLD CALC: 29.2 % (ref 38–51)
HCT VFR BLD CALC: 29.4 % (ref 38–51)
HGB BLD-MCNC: 10.5 G/DL (ref 12–15.9)
HGB BLDA-MCNC: 9.5 G/DL (ref 13.5–17.5)
HGB BLDA-MCNC: 9.6 G/DL (ref 13.5–17.5)
IMM GRANULOCYTES # BLD AUTO: 0.04 10*3/MM3 (ref 0–0.05)
IMM GRANULOCYTES NFR BLD AUTO: 0.4 % (ref 0–0.5)
INHALED O2 CONCENTRATION: 32 %
INHALED O2 CONCENTRATION: 36 %
IPAP: 16
LYMPHOCYTES # BLD AUTO: 3.87 10*3/MM3 (ref 0.7–3.1)
LYMPHOCYTES NFR BLD AUTO: 41.5 % (ref 19.6–45.3)
Lab: 2202
Lab: ABNORMAL
MCH RBC QN AUTO: 27.5 PG (ref 26.6–33)
MCHC RBC AUTO-ENTMCNC: 30.7 G/DL (ref 31.5–35.7)
MCV RBC AUTO: 89.5 FL (ref 79–97)
METHGB BLD QL: 0.5 % (ref 0–3)
METHGB BLD QL: <-0.1 % (ref 0–3)
MODALITY: ABNORMAL
MODALITY: ABNORMAL
MONOCYTES # BLD AUTO: 0.8 10*3/MM3 (ref 0.1–0.9)
MONOCYTES NFR BLD AUTO: 8.6 % (ref 5–12)
NEUTROPHILS NFR BLD AUTO: 3.77 10*3/MM3 (ref 1.7–7)
NEUTROPHILS NFR BLD AUTO: 40.5 % (ref 42.7–76)
NRBC BLD AUTO-RTO: 0 /100 WBC (ref 0–0.2)
OXYHGB MFR BLDV: 93 % (ref 94–99)
OXYHGB MFR BLDV: 93.7 % (ref 94–99)
PCO2 BLDA: 47.4 MM HG (ref 35–45)
PCO2 BLDA: 50.6 MM HG (ref 35–45)
PCO2 TEMP ADJ BLD: ABNORMAL MM[HG]
PCO2 TEMP ADJ BLD: ABNORMAL MM[HG]
PH BLDA: 7.29 PH UNITS (ref 7.35–7.45)
PH BLDA: 7.31 PH UNITS (ref 7.35–7.45)
PH, TEMP CORRECTED: ABNORMAL
PH, TEMP CORRECTED: ABNORMAL
PLATELET # BLD AUTO: 325 10*3/MM3 (ref 140–450)
PMV BLD AUTO: 10.3 FL (ref 6–12)
PO2 BLDA: 74.9 MM HG (ref 83–108)
PO2 BLDA: 75.8 MM HG (ref 83–108)
PO2 TEMP ADJ BLD: ABNORMAL MM[HG]
PO2 TEMP ADJ BLD: ABNORMAL MM[HG]
POTASSIUM SERPL-SCNC: 4.9 MMOL/L (ref 3.5–5.2)
PROT SERPL-MCNC: 6.5 G/DL (ref 6–8.5)
RBC # BLD AUTO: 3.82 10*6/MM3 (ref 3.77–5.28)
SAO2 % BLDCOA: 95.2 % (ref 94–99)
SAO2 % BLDCOA: 95.2 % (ref 94–99)
SET MECH RESP RATE: 12
SODIUM SERPL-SCNC: 140 MMOL/L (ref 136–145)
VENTILATOR MODE: ABNORMAL
VENTILATOR MODE: ABNORMAL
WBC NRBC COR # BLD: 9.32 10*3/MM3 (ref 3.4–10.8)

## 2023-10-16 PROCEDURE — 94799 UNLISTED PULMONARY SVC/PX: CPT

## 2023-10-16 PROCEDURE — 94664 DEMO&/EVAL PT USE INHALER: CPT

## 2023-10-16 PROCEDURE — 94660 CPAP INITIATION&MGMT: CPT

## 2023-10-16 PROCEDURE — 25010000002 ONDANSETRON PER 1 MG

## 2023-10-16 PROCEDURE — 82948 REAGENT STRIP/BLOOD GLUCOSE: CPT

## 2023-10-16 PROCEDURE — 85025 COMPLETE CBC W/AUTO DIFF WBC: CPT | Performed by: INTERNAL MEDICINE

## 2023-10-16 PROCEDURE — 99233 SBSQ HOSP IP/OBS HIGH 50: CPT | Performed by: INTERNAL MEDICINE

## 2023-10-16 PROCEDURE — 82375 ASSAY CARBOXYHB QUANT: CPT

## 2023-10-16 PROCEDURE — 99232 SBSQ HOSP IP/OBS MODERATE 35: CPT | Performed by: STUDENT IN AN ORGANIZED HEALTH CARE EDUCATION/TRAINING PROGRAM

## 2023-10-16 PROCEDURE — 36600 WITHDRAWAL OF ARTERIAL BLOOD: CPT

## 2023-10-16 PROCEDURE — 82805 BLOOD GASES W/O2 SATURATION: CPT

## 2023-10-16 PROCEDURE — 83050 HGB METHEMOGLOBIN QUAN: CPT

## 2023-10-16 PROCEDURE — 80053 COMPREHEN METABOLIC PANEL: CPT | Performed by: INTERNAL MEDICINE

## 2023-10-16 PROCEDURE — 94761 N-INVAS EAR/PLS OXIMETRY MLT: CPT

## 2023-10-16 PROCEDURE — 25010000002 HEPARIN (PORCINE) PER 1000 UNITS: Performed by: INTERNAL MEDICINE

## 2023-10-16 RX ADMIN — ROSUVASTATIN CALCIUM 10 MG: 10 TABLET, FILM COATED ORAL at 20:21

## 2023-10-16 RX ADMIN — HYDRALAZINE HYDROCHLORIDE 50 MG: 50 TABLET, FILM COATED ORAL at 05:13

## 2023-10-16 RX ADMIN — Medication 1 MG: at 08:14

## 2023-10-16 RX ADMIN — Medication 1000 MCG: at 08:14

## 2023-10-16 RX ADMIN — HEPARIN SODIUM 5000 UNITS: 5000 INJECTION INTRAVENOUS; SUBCUTANEOUS at 20:21

## 2023-10-16 RX ADMIN — LEVOTHYROXINE SODIUM 88 MCG: 88 TABLET ORAL at 05:13

## 2023-10-16 RX ADMIN — NICOTINE TRANSDERMAL SYSTEM 1 PATCH: 21 PATCH, EXTENDED RELEASE TRANSDERMAL at 08:14

## 2023-10-16 RX ADMIN — PANTOPRAZOLE SODIUM 40 MG: 40 TABLET, DELAYED RELEASE ORAL at 08:14

## 2023-10-16 RX ADMIN — CARVEDILOL 25 MG: 25 TABLET, FILM COATED ORAL at 08:13

## 2023-10-16 RX ADMIN — IPRATROPIUM BROMIDE AND ALBUTEROL SULFATE 3 ML: 2.5; .5 SOLUTION RESPIRATORY (INHALATION) at 18:27

## 2023-10-16 RX ADMIN — HYDROXYZINE HYDROCHLORIDE 25 MG: 25 TABLET, FILM COATED ORAL at 05:13

## 2023-10-16 RX ADMIN — Medication 10 ML: at 20:21

## 2023-10-16 RX ADMIN — IPRATROPIUM BROMIDE AND ALBUTEROL SULFATE 3 ML: 2.5; .5 SOLUTION RESPIRATORY (INHALATION) at 01:20

## 2023-10-16 RX ADMIN — CARVEDILOL 25 MG: 25 TABLET, FILM COATED ORAL at 18:40

## 2023-10-16 RX ADMIN — HEPARIN SODIUM 5000 UNITS: 5000 INJECTION INTRAVENOUS; SUBCUTANEOUS at 08:14

## 2023-10-16 RX ADMIN — ASPIRIN 325 MG: 325 TABLET, COATED ORAL at 08:13

## 2023-10-16 RX ADMIN — ONDANSETRON 4 MG: 2 INJECTION INTRAMUSCULAR; INTRAVENOUS at 04:13

## 2023-10-16 RX ADMIN — IPRATROPIUM BROMIDE AND ALBUTEROL SULFATE 3 ML: 2.5; .5 SOLUTION RESPIRATORY (INHALATION) at 13:10

## 2023-10-16 RX ADMIN — HYDRALAZINE HYDROCHLORIDE 50 MG: 50 TABLET, FILM COATED ORAL at 16:13

## 2023-10-16 RX ADMIN — IPRATROPIUM BROMIDE AND ALBUTEROL SULFATE 3 ML: 2.5; .5 SOLUTION RESPIRATORY (INHALATION) at 06:54

## 2023-10-16 RX ADMIN — HYDRALAZINE HYDROCHLORIDE 50 MG: 50 TABLET, FILM COATED ORAL at 22:50

## 2023-10-16 RX ADMIN — MONTELUKAST SODIUM 10 MG: 10 TABLET, COATED ORAL at 20:21

## 2023-10-16 RX ADMIN — Medication 10 ML: at 08:14

## 2023-10-16 NOTE — CASE MANAGEMENT/SOCIAL WORK
Continued Stay Note  [facility]     Patient Name: Lesley Michel  MRN: [id number]  Today's Date: 10/16/2023    Admit Date: 10/8/2023    Plan: Discharge plan remains unchanged, return home with family, no prefrence should O2 be required at discharge.  Attending updated via secure chat, pt is very frustrated and considering leaving AMA.  Avani Pope

## 2023-10-16 NOTE — PLAN OF CARE
Goal Outcome Evaluation:      Patient is on 3L nasal cannula with oxygen saturation 92% and greater. Lying in bed with no S&S of distress. No complaints at this time. Will continue to follow plan of care.

## 2023-10-16 NOTE — PROGRESS NOTES
"Progress Note Pulmonary      Subjective no new complaints.  He is on 3 L oxygen to maintain her O2 sat around 92%.  She wants to go home.    Interval History: No event        Review of Systems:    Reviewed ; unchanged       Vital Signs  Temp:  [97.5 °F (36.4 °C)-98.3 °F (36.8 °C)] 98 °F (36.7 °C)  Heart Rate:  [56-80] 58  Resp:  [16-20] 18  BP: (111-171)/(61-86) 111/61  Body mass index is 46.42 kg/m².    Intake/Output Summary (Last 24 hours) at 10/16/2023 1411  Last data filed at 10/16/2023 0900  Gross per 24 hour   Intake 200 ml   Output --   Net 200 ml     I/O this shift:  In: 200 [P.O.:200]  Out: -     Physical Exam:  General- normal in appearance, not in any acute distress    HEENT- pupils equally reactive to light, normal in size, no scleral icterus    Neck- supple    No JVD, no carotid bruit    Respiratory-low breathing, few basal rales, no wheezing.  Cardiovascular-  Normal S1 and S2. No S3, S4 or murmurs.    GI-nontender nondistended bowel sounds positive    CNS-alert oriented x3, grossly nonfocal    Extremities- pulses normal bilaterally , no clubbing and 1+ edema     Results Review:      Results from last 7 days   Lab Units 10/16/23  0042 10/15/23  0317 10/14/23  0337   WBC 10*3/mm3 9.32 7.39 7.38   HEMOGLOBIN g/dL 10.5* 9.5* 9.5*   PLATELETS 10*3/mm3 325 292 299     Results from last 7 days   Lab Units 10/16/23  0042 10/15/23  0317 10/14/23  1335   SODIUM mmol/L 140 139 140   POTASSIUM mmol/L 4.9 5.1 5.5*   CHLORIDE mmol/L 106 105 103   CO2 mmol/L 22.5 20.8* 23.7   BUN mg/dL 61* 58* 60*   CREATININE mg/dL 4.47* 4.35* 4.96*   CALCIUM mg/dL 9.1 9.4 9.3   GLUCOSE mg/dL 103* 98 141*     No results found for: \"INR\", \"PROTIME\"  Results from last 7 days   Lab Units 10/16/23  0042 10/15/23  0317 10/14/23  0337   ALK PHOS U/L 94 90 105   BILIRUBIN mg/dL 0.4 0.4 0.3   ALT (SGPT) U/L 6 7 6   AST (SGOT) U/L 10 9 10         Imaging Results (Last 24 Hours)       ** No results found for the last 24 hours. **         "         aspirin, 325 mg, Oral, Daily  carvedilol, 25 mg, Oral, BID With Meals  folic acid, 1 mg, Oral, Daily  heparin (porcine), 5,000 Units, Subcutaneous, Q12H  hydrALAZINE, 50 mg, Oral, Q8H  insulin regular, 5 Units, Intravenous, Once  ipratropium-albuterol, 3 mL, Nebulization, 4x Daily - RT  levothyroxine, 88 mcg, Oral, Q AM  montelukast, 10 mg, Oral, Nightly  nicotine, 1 patch, Transdermal, Q24H  pantoprazole, 40 mg, Oral, Daily  rosuvastatin, 10 mg, Oral, Nightly  sodium chloride, 10 mL, Intravenous, Q12H  vitamin B-12, 1,000 mcg, Oral, Daily           Medication Review:     Assessment & Plan     Morbidly obese female with chronic kidney disease.  Admitted with worsening shortness of breath and hypoxia.  Clinically improved.  She may needs oxygen as an outpatient.    Etiology of hypoxia seems to be multifactorial.  Fluid overload, underlying COPD, suspect obstructive sleep apnea.  Diastolic dysfunction.    She declined to use BiPAP.  Sequences of untreated sleep apnea which include hypertension, congestive heart failure, increased risk of coronary artery disease, stroke, depression, anxiety discussed with the patient.    However she agreed for outpatient at home sleep study.    I would also like to get a PFTs as an outpatient.    Taper down steroid.       Renal failure and elevated potassium- nephrology on case   Smoker-did   smoking cessation counseling.  Patient is not ready to quit yet.  Follow-up with pulmonary in the outpatient basis.        Anderson Solomon MD  10/16/23  14:11 EDT

## 2023-10-16 NOTE — PROGRESS NOTES
Nephrology Progress Note      Subjective     Patient states her diarrhea is getting better but not completely resolved.  No chest pain or shortness of breath    Objective       Vital signs :     Vitals:    10/16/23 0120 10/16/23 0300 10/16/23 0500 10/16/23 0715   BP:  148/68  143/78   BP Location:  Left arm  Left arm   Patient Position:  Lying  Lying   Pulse: 80 66  72   Resp: 18 18  18   Temp:  97.7 °F (36.5 °C)  98.3 °F (36.8 °C)   TempSrc:  Oral  Oral   SpO2: 96% 93%  93%   Weight:   108 kg (237 lb 11.2 oz)    Height:            Intake/Output                         10/14/23 0701 - 10/15/23 0700 10/15/23 0701 - 10/16/23 0700     7700-4357 2970-9850 Total 5664-1587 3506-6618 Total                 Intake    P.O.  590  60 650  360  -- 360    I.V.  0  -- 0  --  -- --    Total Intake 590 60 650 360 -- 360       Output    Total Output -- -- -- -- -- --             Physical Exam:    General Appearance : Not in acute distress  Lungs : Bilateral normal vesicular breathing with prolonged expiratory phase no crackles  Heart :  regular rhythm & normal rate, normal S1, S2 and no murmur, no rub  Abdomen : Soft nontender nondistended   extremities : moves extremities well, 2 plus  edema, no cyanosis and no redness  Neurologic :   orientated to person, place, time and situation, Grossly no focal deficits    Laboratory Data :       CBC and coagulation:  Results from last 7 days   Lab Units 10/16/23  0042 10/15/23  0317 10/14/23  0337   WBC 10*3/mm3 9.32 7.39 7.38   HEMOGLOBIN g/dL 10.5* 9.5* 9.5*   HEMATOCRIT % 34.2 31.5* 30.3*   MCV fL 89.5 88.5 86.8   MCHC g/dL 30.7* 30.2* 31.4*   PLATELETS 10*3/mm3 325 292 299     Acid/base balance:        Renal and electrolytes:    Results from last 7 days   Lab Units 10/16/23  0042 10/15/23  0317 10/14/23  1335 10/14/23  0337 10/13/23  2127   SODIUM mmol/L 140 139 140 137 137   POTASSIUM mmol/L 4.9 5.1 5.5* 5.2 5.9*   CHLORIDE mmol/L 106 105 103 103 102   CO2 mmol/L 22.5 20.8* 23.7 22.3  "20.5*   BUN mg/dL 61* 58* 60* 57* 59*   CREATININE mg/dL 4.47* 4.35* 4.96* 4.82* 5.17*   CALCIUM mg/dL 9.1 9.4 9.3 9.2 9.1     Estimated Creatinine Clearance: 14 mL/min (A) (by C-G formula based on SCr of 4.47 mg/dL (H)).     Liver and pancreatic function:  Results from last 7 days   Lab Units 10/16/23  0042 10/15/23  0317 10/14/23  0337   ALBUMIN g/dL 4.0 3.8 4.1   BILIRUBIN mg/dL 0.4 0.4 0.3   ALK PHOS U/L 94 90 105   AST (SGOT) U/L 10 9 10   ALT (SGPT) U/L 6 7 6       Albumin Albumin   Date Value Ref Range Status   10/16/2023 4.0 3.5 - 5.2 g/dL Final   10/15/2023 3.8 3.5 - 5.2 g/dL Final   10/14/2023 4.1 3.5 - 5.2 g/dL Final      Magnesium No results found for: \"MG\"         PTH               No results found for: \"PTH\"    Cardiac:        Liver and pancreatic function:  Results from last 7 days   Lab Units 10/16/23  0042 10/15/23  0317 10/14/23  0337   ALBUMIN g/dL 4.0 3.8 4.1   BILIRUBIN mg/dL 0.4 0.4 0.3   ALK PHOS U/L 94 90 105   AST (SGOT) U/L 10 9 10   ALT (SGPT) U/L 6 7 6       XR Chest 1 View    Result Date: 10/13/2023  1.  Stable changes of CHF with interstitial edema. 2.  Development of bibasilar airspace disease which may represent atelectasis or pneumonia.   This report was finalized on 10/13/2023 12:43 PM by Dr. Eduardo August MD.      XR Chest 1 View    Result Date: 10/12/2023    Significant interval improvement in CHF/volume overload.   This report was finalized on 10/12/2023 12:18 PM by Dr. Eduardo August MD.      XR Chest 1 View    Result Date: 10/10/2023  1.  Bilateral airspace disease consistent with pneumonia. 2.  CHF/edema.   This report was finalized on 10/10/2023 11:55 AM by Dr. Eduardo August MD.      CT Chest Without Contrast Diagnostic    Result Date: 10/10/2023  1.  CHF/edema. 2.  Small right and very small left pleural effusions which are nonloculated. 3.  Bilateral consolidative airspace disease as detailed above consistent with pneumonia. 4.  Small pericardial effusion. 5.  " Cardiomegaly with severe coronary artery calcifications. 6.  Mediastinal and hilar adenopathy. May be reactive but follow-up recommended to exclude neoplastic etiologies. 7.  Liver cirrhosis. 8.  Anasarca. 9.  Other nonacute findings above.   This report was finalized on 10/10/2023 11:55 AM by Dr. Eduardo August MD.      US Renal Bilateral    Result Date: 10/9/2023   SMALL AND ECHOGENIC RIGHT KIDNEY SUGGESTING A UNILATERAL PROCESS SUCH AS RENAL ARTERY STENOSIS.  NORMAL LEFT KIDNEY.  NEGATIVE FOR HYDRONEPHROSIS.    This report was finalized on 10/9/2023 5:49 AM by Imelda Walsh MD.      XR Chest 1 View    Result Date: 10/8/2023  Trace right effusion and right basilar airspace disease    This report was finalized on 10/8/2023 7:17 PM by Dr. Julio Dominguez MD.        Medications :     aspirin, 325 mg, Oral, Daily  carvedilol, 25 mg, Oral, BID With Meals  folic acid, 1 mg, Oral, Daily  heparin (porcine), 5,000 Units, Subcutaneous, Q12H  hydrALAZINE, 50 mg, Oral, Q8H  insulin regular, 5 Units, Intravenous, Once  ipratropium-albuterol, 3 mL, Nebulization, 4x Daily - RT  levothyroxine, 88 mcg, Oral, Q AM  montelukast, 10 mg, Oral, Nightly  nicotine, 1 patch, Transdermal, Q24H  pantoprazole, 40 mg, Oral, Daily  rosuvastatin, 10 mg, Oral, Nightly  sodium chloride, 10 mL, Intravenous, Q12H  vitamin B-12, 1,000 mcg, Oral, Daily             Assessment & Plan     -Acute kidney injury  -Atrophic right kidney  -Chronic kidney disease stage IIIa  -Acute hyperkalemia  -Bilateral edema  -Acute hypoxic respite failure  -COPD  -Hypertension  -Medical noncompliance    No further improvement in creatinine remains at 4.4, although nonoliguric.      MAICO on CKD most likely multifactori including ATN from prolonged prerenal state and hemodynamic changes with concomitant use of ACE inhibitors in setting of volume loss   baseline creatinine 2.2, admitted with 3.69   ultrasound of the kidneys showed atrophic right kidney    Bilateral lower  extremity swelling is better, will hold further diuretics today  - Follow-up serology  - Continue holding lisinopril  - Watchful waiting  - Patient is a high risk to require dialysis  - Educated and counseled the patient, explained the risk of hypokalemia and uremic encephalopathy in setting of untreated advanced renal failure, patient understood and verbalized appropriately    Please avoid nephrotoxic medication and pharmacy to adjust the medication according to the GFR     Plan of care discussed with pt, and family answered all questions, patient verbalized understanding and agreed.      Vani León MD  10/16/23  08:38 EDT

## 2023-10-16 NOTE — PROGRESS NOTES
[facility] HOSPITALIST PROGRESS NOTE     Patient Identification:  Name:  Lesley Michel  Age:  67 y.o.  Sex:  female  :  1955  MRN:  [id number]  Visit Number:  [id number]  ROOM: [address] [place] [address]     Primary Care Provider:  Woodrow Raymond APRN    Length of stay in inpatient status:  6    Subjective     Chief Compliant:    Chief Complaint   Patient presents with    Shortness of Breath    Edema       History of Presenting Illness: Patient seen and evaluated in follow-up for anasarca with MAICO on CKD stage IIIa complicated by acute exacerbation of COPD and bilateral pneumonia with acute hypoxemic respiratory failure.  Patient still requiring supplemental oxygen at time of evaluation today at 3 L nasal cannula.  Patient still with evidence of volume overload.  Nephrology following along, appreciate input as well as pulmonology's.    Objective     Current Hospital Meds:  aspirin, 325 mg, Oral, Daily  carvedilol, 25 mg, Oral, BID With Meals  folic acid, 1 mg, Oral, Daily  heparin (porcine), 5,000 Units, Subcutaneous, Q12H  hydrALAZINE, 50 mg, Oral, Q8H  insulin regular, 5 Units, Intravenous, Once  ipratropium-albuterol, 3 mL, Nebulization, 4x Daily - RT  levothyroxine, 88 mcg, Oral, Q AM  montelukast, 10 mg, Oral, Nightly  nicotine, 1 patch, Transdermal, Q24H  pantoprazole, 40 mg, Oral, Daily  rosuvastatin, 10 mg, Oral, Nightly  sodium chloride, 10 mL, Intravenous, Q12H  vitamin B-12, 1,000 mcg, Oral, Daily         ----------------------------------------------------------------------------------------------------------------------  Vital Signs:  Temp:  [97.5 °F (36.4 °C)-98.3 °F (36.8 °C)] 98.3 °F (36.8 °C)  Heart Rate:  [58-80] 67  Resp:  [18-20] 18  BP: (111-148)/(61-81) 140/77  SpO2:  [90 %-96 %] 96 %  on  Flow (L/min):  [2-4] 4;   Device (Oxygen Therapy): nasal cannula  Body mass index is 46.42 kg/m².      Intake/Output Summary (Last 24 hours) at 10/16/2023 2000  Last data filed at 10/16/2023  "0900  Gross per 24 hour   Intake 200 ml   Output --   Net 200 ml      ----------------------------------------------------------------------------------------------------------------------  Physical exam:  Constitutional: Elderly chronically ill-appearing adult female.  No acute distress.      HENT:  Head:  Normocephalic and atraumatic.  Mouth:  Moist mucous membranes.    Eyes:  Conjunctivae and EOM are normal. No scleral icterus.    Cardiovascular:  Normal rate, regular rhythm and normal heart sounds with no murmur.  Pulmonary/Chest:  No respiratory distress, no wheezes, no crackles, with diminished breath sounds at the bases.  Abdominal:  Soft.  Bowel sounds are normal.  No distension and no tenderness.   Musculoskeletal:  No tenderness and no deformity.  No red or swollen joints anywhere.   Neurological:  Alert and oriented to person, place, and time.  No cranial nerve deficit.  No tongue deviation.  No facial droop.  No slurred speech. Intact Sensation throughout  Skin:  Skin is warm and dry. No rash or lesion noted. No pallor.   Peripheral vascular:  Pulses in all 4 extremities with no clubbing, no cyanosis, 2+ bilateral lower extremity edema with trace edema of the upper extremity.  Psychiatric: Appropriate mood and affect, pleasant.   ----------------------------------------------------------------------------------------------------------------------  WBC/HGB/HCT/PLT   9.32/10.5/34.2/325 (10/16 0042)  BUN/CREAT/GLUC/ALT/AST/INOCENTE/LIP    61/4.47/103/6/10/--/-- (10/16 0042)  LYTES - Na/K/Cl/CO2: 140/4.9/106/22.5 (10/16 0042)        No results found for: \"URINECX\"  No results found for: \"BLOODCX\"    I have personally looked at the labs and they are summarized above.  ----------------------------------------------------------------------------------------------------------------------  Detailed radiology reports for the last 24 hours:  No radiology results for the last day  Assessment & Plan  "     Anasarca  Hyperkalemia  MAICO on CKD stage IIIa    -Patient presenting with anasarca with echo showing EF of 56 to 60% with grade 1 diastolic dysfunction, mild AAS, mild pulmonary hypertension and small pericardial effusion without evidence of tamponade.    -Patient with progressive worsening renal failure likely contributing to volume retention with possibly some component of acute HFpEF.    -Reds vest remains elevated.    -Nephrology consulted and following along, volume status management per their discretion as patient not yet in emergent need of hemodialysis however remains at high risk for it.  Appreciate nephrology input.    -Patient wanting to go home and requesting to be discharged and stating she would fluid restrict at home and follow-up with nephrology outpatient as she does not want to be in the hospital.    Bilateral bacterial pneumonia  Acute exacerbation of COPD  Acute hypoxemic respiratory failure    -Procalcitonin normal, cultures without growth.    -Completed an appropriate course of azithromycin    -Continue nebulized medications    -We will wean supplemental oxygen as able however large component likely driven by pulmonary edema and anasarca.  Will likely need supplemental oxygen at home at discharge.    Essential hypertension    -Continue home antihypertensives as appropriate.  Holding lisinopril in the setting of recent hyperkalemia    Hypothyroidism    -Continue Synthroid    Tobacco abuse    -Cessation counseled.    Morbid obesity    -Complicates all aspects of care    Copied text in portions of the note has been reviewed and is accurate as of 10/16/23    VTE Prophylaxis:   Mechanical Order History:       None          Pharmalogical Order History:        Ordered     Dose Route Frequency Stop    10/08/23 2052  heparin (porcine) 5000 UNIT/ML injection 5,000 Units         5,000 Units SC Every 12 Hours Scheduled --                    Disposition Home once medically stable and  improved.    Jay Jay Pritchett DO  [facility]  10/16/23  20:00 EDT

## 2023-10-16 NOTE — PLAN OF CARE
Goal Outcome Evaluation:         Patient rested well overnight. No complaints at this time. PRN medication given as ordered when requested. Tolerated interventions well. Vital signs stable. Will continue to monitor.

## 2023-10-17 LAB
ALBUMIN SERPL-MCNC: 3.5 G/DL (ref 3.5–5.2)
ALBUMIN/GLOB SERPL: 1.3 G/DL
ALP SERPL-CCNC: 89 U/L (ref 39–117)
ALT SERPL W P-5'-P-CCNC: 6 U/L (ref 1–33)
ANION GAP SERPL CALCULATED.3IONS-SCNC: 12.1 MMOL/L (ref 5–15)
ANION GAP SERPL CALCULATED.3IONS-SCNC: 12.5 MMOL/L (ref 5–15)
AST SERPL-CCNC: 8 U/L (ref 1–32)
BILIRUB SERPL-MCNC: 0.2 MG/DL (ref 0–1.2)
BUN SERPL-MCNC: 63 MG/DL (ref 8–23)
BUN SERPL-MCNC: 69 MG/DL (ref 8–23)
BUN/CREAT SERPL: 11.3 (ref 7–25)
BUN/CREAT SERPL: 12.4 (ref 7–25)
CALCIUM SPEC-SCNC: 8.6 MG/DL (ref 8.6–10.5)
CALCIUM SPEC-SCNC: 8.7 MG/DL (ref 8.6–10.5)
CHLORIDE SERPL-SCNC: 104 MMOL/L (ref 98–107)
CHLORIDE SERPL-SCNC: 99 MMOL/L (ref 98–107)
CO2 SERPL-SCNC: 22.5 MMOL/L (ref 22–29)
CO2 SERPL-SCNC: 22.9 MMOL/L (ref 22–29)
CREAT SERPL-MCNC: 5.1 MG/DL (ref 0.57–1)
CREAT SERPL-MCNC: 6.1 MG/DL (ref 0.57–1)
EGFRCR SERPLBLD CKD-EPI 2021: 7.2 ML/MIN/1.73
EGFRCR SERPLBLD CKD-EPI 2021: 8.9 ML/MIN/1.73
GLOBULIN UR ELPH-MCNC: 2.6 GM/DL
GLUCOSE BLDC GLUCOMTR-MCNC: 114 MG/DL (ref 70–130)
GLUCOSE BLDC GLUCOMTR-MCNC: 115 MG/DL (ref 70–130)
GLUCOSE BLDC GLUCOMTR-MCNC: 123 MG/DL (ref 70–130)
GLUCOSE BLDC GLUCOMTR-MCNC: 134 MG/DL (ref 70–130)
GLUCOSE SERPL-MCNC: 101 MG/DL (ref 65–99)
GLUCOSE SERPL-MCNC: 116 MG/DL (ref 65–99)
POTASSIUM SERPL-SCNC: 5.2 MMOL/L (ref 3.5–5.2)
POTASSIUM SERPL-SCNC: 5.3 MMOL/L (ref 3.5–5.2)
PROT SERPL-MCNC: 6.1 G/DL (ref 6–8.5)
SODIUM SERPL-SCNC: 134 MMOL/L (ref 136–145)
SODIUM SERPL-SCNC: 139 MMOL/L (ref 136–145)

## 2023-10-17 PROCEDURE — 25010000002 FUROSEMIDE PER 20 MG: Performed by: INTERNAL MEDICINE

## 2023-10-17 PROCEDURE — 94799 UNLISTED PULMONARY SVC/PX: CPT

## 2023-10-17 PROCEDURE — 85060 BLOOD SMEAR INTERPRETATION: CPT | Performed by: INTERNAL MEDICINE

## 2023-10-17 PROCEDURE — 94660 CPAP INITIATION&MGMT: CPT

## 2023-10-17 PROCEDURE — 80053 COMPREHEN METABOLIC PANEL: CPT | Performed by: STUDENT IN AN ORGANIZED HEALTH CARE EDUCATION/TRAINING PROGRAM

## 2023-10-17 PROCEDURE — 99232 SBSQ HOSP IP/OBS MODERATE 35: CPT | Performed by: INTERNAL MEDICINE

## 2023-10-17 PROCEDURE — 82948 REAGENT STRIP/BLOOD GLUCOSE: CPT

## 2023-10-17 PROCEDURE — 25010000002 HEPARIN (PORCINE) PER 1000 UNITS: Performed by: INTERNAL MEDICINE

## 2023-10-17 PROCEDURE — 85025 COMPLETE CBC W/AUTO DIFF WBC: CPT | Performed by: INTERNAL MEDICINE

## 2023-10-17 PROCEDURE — 99233 SBSQ HOSP IP/OBS HIGH 50: CPT | Performed by: STUDENT IN AN ORGANIZED HEALTH CARE EDUCATION/TRAINING PROGRAM

## 2023-10-17 PROCEDURE — 94664 DEMO&/EVAL PT USE INHALER: CPT

## 2023-10-17 PROCEDURE — 94761 N-INVAS EAR/PLS OXIMETRY MLT: CPT

## 2023-10-17 RX ORDER — FUROSEMIDE 10 MG/ML
80 INJECTION INTRAMUSCULAR; INTRAVENOUS ONCE
Qty: 8 ML | Refills: 0 | Status: COMPLETED | OUTPATIENT
Start: 2023-10-17 | End: 2023-10-17

## 2023-10-17 RX ORDER — FUROSEMIDE 10 MG/ML
80 INJECTION INTRAMUSCULAR; INTRAVENOUS ONCE
Status: COMPLETED | OUTPATIENT
Start: 2023-10-17 | End: 2023-10-17

## 2023-10-17 RX ADMIN — HYDROXYZINE HYDROCHLORIDE 25 MG: 25 TABLET, FILM COATED ORAL at 03:06

## 2023-10-17 RX ADMIN — GABAPENTIN 300 MG: 300 CAPSULE ORAL at 03:06

## 2023-10-17 RX ADMIN — IPRATROPIUM BROMIDE AND ALBUTEROL SULFATE 3 ML: 2.5; .5 SOLUTION RESPIRATORY (INHALATION) at 06:22

## 2023-10-17 RX ADMIN — FUROSEMIDE 80 MG: 10 INJECTION, SOLUTION INTRAMUSCULAR; INTRAVENOUS at 17:52

## 2023-10-17 RX ADMIN — IPRATROPIUM BROMIDE AND ALBUTEROL SULFATE 3 ML: 2.5; .5 SOLUTION RESPIRATORY (INHALATION) at 00:45

## 2023-10-17 RX ADMIN — HYDROCODONE BITARTRATE AND ACETAMINOPHEN 1 TABLET: 10; 325 TABLET ORAL at 03:06

## 2023-10-17 RX ADMIN — Medication 1000 MCG: at 08:24

## 2023-10-17 RX ADMIN — SODIUM ZIRCONIUM CYCLOSILICATE 10 G: 10 POWDER, FOR SUSPENSION ORAL at 10:41

## 2023-10-17 RX ADMIN — Medication 1 MG: at 08:24

## 2023-10-17 RX ADMIN — HYDRALAZINE HYDROCHLORIDE 50 MG: 50 TABLET, FILM COATED ORAL at 13:06

## 2023-10-17 RX ADMIN — FUROSEMIDE 80 MG: 10 INJECTION, SOLUTION INTRAMUSCULAR; INTRAVENOUS at 08:28

## 2023-10-17 RX ADMIN — HEPARIN SODIUM 5000 UNITS: 5000 INJECTION INTRAVENOUS; SUBCUTANEOUS at 21:38

## 2023-10-17 RX ADMIN — MONTELUKAST SODIUM 10 MG: 10 TABLET, COATED ORAL at 21:38

## 2023-10-17 RX ADMIN — HYDRALAZINE HYDROCHLORIDE 50 MG: 50 TABLET, FILM COATED ORAL at 06:07

## 2023-10-17 RX ADMIN — ROSUVASTATIN CALCIUM 10 MG: 10 TABLET, FILM COATED ORAL at 21:38

## 2023-10-17 RX ADMIN — NICOTINE TRANSDERMAL SYSTEM 1 PATCH: 21 PATCH, EXTENDED RELEASE TRANSDERMAL at 08:24

## 2023-10-17 RX ADMIN — PANTOPRAZOLE SODIUM 40 MG: 40 TABLET, DELAYED RELEASE ORAL at 08:24

## 2023-10-17 RX ADMIN — HYDRALAZINE HYDROCHLORIDE 50 MG: 50 TABLET, FILM COATED ORAL at 21:38

## 2023-10-17 RX ADMIN — IPRATROPIUM BROMIDE AND ALBUTEROL SULFATE 3 ML: 2.5; .5 SOLUTION RESPIRATORY (INHALATION) at 13:22

## 2023-10-17 RX ADMIN — IPRATROPIUM BROMIDE AND ALBUTEROL SULFATE 3 ML: 2.5; .5 SOLUTION RESPIRATORY (INHALATION) at 18:33

## 2023-10-17 RX ADMIN — LEVOTHYROXINE SODIUM 88 MCG: 88 TABLET ORAL at 06:07

## 2023-10-17 RX ADMIN — CARVEDILOL 25 MG: 25 TABLET, FILM COATED ORAL at 17:04

## 2023-10-17 RX ADMIN — Medication 10 ML: at 21:37

## 2023-10-17 RX ADMIN — ASPIRIN 325 MG: 325 TABLET, COATED ORAL at 08:24

## 2023-10-17 RX ADMIN — HEPARIN SODIUM 5000 UNITS: 5000 INJECTION INTRAVENOUS; SUBCUTANEOUS at 08:24

## 2023-10-17 RX ADMIN — Medication 10 ML: at 08:24

## 2023-10-17 RX ADMIN — CARVEDILOL 25 MG: 25 TABLET, FILM COATED ORAL at 08:24

## 2023-10-17 NOTE — PROGRESS NOTES
"Nephrology Progress Note      Subjective     Patient denied any chest pain has some shortness of breath.  She still wants to go home    Objective       Vital signs :     Vitals:    10/17/23 0500 10/17/23 0622 10/17/23 0639 10/17/23 0702   BP:    117/61   BP Location:    Left arm   Patient Position:    Lying   Pulse:  70  76   Resp:  15  18   Temp:    98.3 °F (36.8 °C)   TempSrc:    Oral   SpO2:  92% 91% 93%   Weight: 109 kg (240 lb 9.6 oz)      Height: 152.4 cm (60\")           Intake/Output                         10/15/23 0701 - 10/16/23 0700 10/16/23 0701 - 10/17/23 0700     9126-4894 8328-7450 Total 5417-2225 7001-6375 Total                 Intake    P.O.  360  -- 360  200  -- 200    Total Intake 360 -- 360 200 -- 200       Output    Urine  --  -- --  --  125 125    Total Output -- -- -- -- 125 125             Physical Exam:    General Appearance : Not in acute distress  Lungs : Bibasilar crackles noted trace edema  Heart :  regular rhythm & normal rate, normal S1, S2 and no murmur, no rub  Abdomen : Soft nontender nondistended   extremities : Trace edema  Neurologic :   orientated to person, place, time and situation, Grossly no focal deficits    Laboratory Data :       CBC and coagulation:  Results from last 7 days   Lab Units 10/16/23  0042 10/15/23  0317 10/14/23  0337   WBC 10*3/mm3 9.32 7.39 7.38   HEMOGLOBIN g/dL 10.5* 9.5* 9.5*   HEMATOCRIT % 34.2 31.5* 30.3*   MCV fL 89.5 88.5 86.8   MCHC g/dL 30.7* 30.2* 31.4*   PLATELETS 10*3/mm3 325 292 299     Acid/base balance:  Results from last 7 days   Lab Units 10/16/23  2202 10/16/23  2020   PH, ARTERIAL pH units 7.311* 7.288*   PO2 ART mm Hg 74.9* 75.8*   PCO2, ARTERIAL mm Hg 47.4* 50.6*   HCO3 ART mmol/L 23.9 24.2       Renal and electrolytes:    Results from last 7 days   Lab Units 10/17/23  0038 10/16/23  0042 10/15/23  0317 10/14/23  1335 10/14/23  0337   SODIUM mmol/L 139 140 139 140 137   POTASSIUM mmol/L 5.3* 4.9 5.1 5.5* 5.2   CHLORIDE mmol/L 104 106 " "105 103 103   CO2 mmol/L 22.5 22.5 20.8* 23.7 22.3   BUN mg/dL 63* 61* 58* 60* 57*   CREATININE mg/dL 5.10* 4.47* 4.35* 4.96* 4.82*   CALCIUM mg/dL 8.6 9.1 9.4 9.3 9.2     Estimated Creatinine Clearance: 12.3 mL/min (A) (by C-G formula based on SCr of 5.1 mg/dL (H)).     Liver and pancreatic function:  Results from last 7 days   Lab Units 10/17/23  0038 10/16/23  0042 10/15/23  0317   ALBUMIN g/dL 3.5 4.0 3.8   BILIRUBIN mg/dL 0.2 0.4 0.4   ALK PHOS U/L 89 94 90   AST (SGOT) U/L 8 10 9   ALT (SGPT) U/L 6 6 7       Albumin Albumin   Date Value Ref Range Status   10/17/2023 3.5 3.5 - 5.2 g/dL Final   10/16/2023 4.0 3.5 - 5.2 g/dL Final   10/15/2023 3.8 3.5 - 5.2 g/dL Final      Magnesium No results found for: \"MG\"         PTH               No results found for: \"PTH\"    Cardiac:        Liver and pancreatic function:  Results from last 7 days   Lab Units 10/17/23  0038 10/16/23  0042 10/15/23  0317   ALBUMIN g/dL 3.5 4.0 3.8   BILIRUBIN mg/dL 0.2 0.4 0.4   ALK PHOS U/L 89 94 90   AST (SGOT) U/L 8 10 9   ALT (SGPT) U/L 6 6 7       XR Chest 1 View    Result Date: 10/13/2023  1.  Stable changes of CHF with interstitial edema. 2.  Development of bibasilar airspace disease which may represent atelectasis or pneumonia.   This report was finalized on 10/13/2023 12:43 PM by Dr. Eduardo August MD.      XR Chest 1 View    Result Date: 10/12/2023    Significant interval improvement in CHF/volume overload.   This report was finalized on 10/12/2023 12:18 PM by Dr. Eduardo August MD.      XR Chest 1 View    Result Date: 10/10/2023  1.  Bilateral airspace disease consistent with pneumonia. 2.  CHF/edema.   This report was finalized on 10/10/2023 11:55 AM by Dr. Eduardo August MD.      CT Chest Without Contrast Diagnostic    Result Date: 10/10/2023  1.  CHF/edema. 2.  Small right and very small left pleural effusions which are nonloculated. 3.  Bilateral consolidative airspace disease as detailed above consistent with pneumonia. 4.  " Small pericardial effusion. 5.  Cardiomegaly with severe coronary artery calcifications. 6.  Mediastinal and hilar adenopathy. May be reactive but follow-up recommended to exclude neoplastic etiologies. 7.  Liver cirrhosis. 8.  Anasarca. 9.  Other nonacute findings above.   This report was finalized on 10/10/2023 11:55 AM by Dr. Eduardo August MD.        Medications :     aspirin, 325 mg, Oral, Daily  carvedilol, 25 mg, Oral, BID With Meals  folic acid, 1 mg, Oral, Daily  heparin (porcine), 5,000 Units, Subcutaneous, Q12H  hydrALAZINE, 50 mg, Oral, Q8H  ipratropium-albuterol, 3 mL, Nebulization, 4x Daily - RT  levothyroxine, 88 mcg, Oral, Q AM  montelukast, 10 mg, Oral, Nightly  nicotine, 1 patch, Transdermal, Q24H  pantoprazole, 40 mg, Oral, Daily  rosuvastatin, 10 mg, Oral, Nightly  sodium chloride, 10 mL, Intravenous, Q12H  vitamin B-12, 1,000 mcg, Oral, Daily             Assessment & Plan     -Acute kidney injury  -Atrophic right kidney  -Chronic kidney disease stage IIIa  -Acute hyperkalemia  -Bilateral edema  -Acute hypoxic respite failure  -COPD  -Hypertension  -Medical noncompliance    Creatinine further worsened to 5.1.  Developing ongoing fluid overload with mild respiratory distress.  Educated and counseled the patient again and the need of dialysis.  She is adamant about not to do dialysis and wants to go home.  We will give a loading dose of Lasix 80 mg once and reassess in evening to repeat dose  Discussed in detail with Dr. Pritchett    MAICO on CKD most likely multifactori including ATN from prolonged prerenal state and hemodynamic changes with concomitant use of ACE inhibitors in setting of volume loss   baseline creatinine 2.2, admitted with 3.69   ultrasound of the kidneys showed atrophic right kidney  Serologies are negative    Bilateral lower extremity swelling is better, will hold further diuretics today  - IV Lasix 80 mg once  - Patient is a high risk to require dialysis  - Educated and counseled  the patient, explained the risk of hypokalemia and uremic encephalopathy in setting of untreated advanced renal failure, patient understood and verbalized appropriately    Please avoid nephrotoxic medication and pharmacy to adjust the medication according to the GFR     Plan of care discussed with pt, and family answered all questions, patient verbalized understanding and agreed.      Vani León MD  10/17/23  07:54 EDT

## 2023-10-17 NOTE — PROGRESS NOTES
"Progress Note Pulmonary      Subjective no new complaints.  Found off oxygen this morning.  She was able to use BiPAP last night  Interval History: Event noted.  Patient was found off oxygen and was a bit confused.  Blood gases showed mild mixed metabolic and respiratory acidosis.  BiPAP was placed      Review of Systems:    Reviewed ; unchanged       Vital Signs  Temp:  [97.7 °F (36.5 °C)-98.3 °F (36.8 °C)] 98.3 °F (36.8 °C)  Heart Rate:  [58-82] 76  Resp:  [15-20] 18  BP: (111-147)/(58-77) 117/61  Body mass index is 46.99 kg/m².    Intake/Output Summary (Last 24 hours) at 10/17/2023 1132  Last data filed at 10/17/2023 0900  Gross per 24 hour   Intake 200 ml   Output 125 ml   Net 75 ml     I/O this shift:  In: 200 [P.O.:200]  Out: -     Physical Exam:  General- normal in appearance, not in any acute distress    HEENT- pupils equally reactive to light, normal in size, no scleral icterus    Neck- supple    No JVD, no carotid bruit    Respiratory-occasional wheezing, few basal rales, no wheezing.  Cardiovascular-  Normal S1 and S2. No S3, S4 or murmurs.    GI-nontender nondistended bowel sounds positive    CNS-alert oriented x3, grossly nonfocal    Extremities- pulses normal bilaterally , no clubbing and 1+ edema     Results Review:      Results from last 7 days   Lab Units 10/16/23  0042 10/15/23  0317 10/14/23  0337   WBC 10*3/mm3 9.32 7.39 7.38   HEMOGLOBIN g/dL 10.5* 9.5* 9.5*   PLATELETS 10*3/mm3 325 292 299     Results from last 7 days   Lab Units 10/17/23  0038 10/16/23  0042 10/15/23  0317   SODIUM mmol/L 139 140 139   POTASSIUM mmol/L 5.3* 4.9 5.1   CHLORIDE mmol/L 104 106 105   CO2 mmol/L 22.5 22.5 20.8*   BUN mg/dL 63* 61* 58*   CREATININE mg/dL 5.10* 4.47* 4.35*   CALCIUM mg/dL 8.6 9.1 9.4   GLUCOSE mg/dL 101* 103* 98     No results found for: \"INR\", \"PROTIME\"  Results from last 7 days   Lab Units 10/17/23  0038 10/16/23  0042 10/15/23  0317   ALK PHOS U/L 89 94 90   BILIRUBIN mg/dL 0.2 0.4 0.4   ALT " (SGPT) U/L 6 6 7   AST (SGOT) U/L 8 10 9     Results from last 7 days   Lab Units 10/16/23  2202   PH, ARTERIAL pH units 7.311*   PO2 ART mm Hg 74.9*   PCO2, ARTERIAL mm Hg 47.4*   HCO3 ART mmol/L 23.9     Imaging Results (Last 24 Hours)       ** No results found for the last 24 hours. **                 aspirin, 325 mg, Oral, Daily  carvedilol, 25 mg, Oral, BID With Meals  folic acid, 1 mg, Oral, Daily  heparin (porcine), 5,000 Units, Subcutaneous, Q12H  hydrALAZINE, 50 mg, Oral, Q8H  ipratropium-albuterol, 3 mL, Nebulization, 4x Daily - RT  levothyroxine, 88 mcg, Oral, Q AM  montelukast, 10 mg, Oral, Nightly  nicotine, 1 patch, Transdermal, Q24H  pantoprazole, 40 mg, Oral, Daily  rosuvastatin, 10 mg, Oral, Nightly  sodium chloride, 10 mL, Intravenous, Q12H  vitamin B-12, 1,000 mcg, Oral, Daily           Medication Review:     Assessment & Plan     Morbidly obese female with chronic kidney disease.  Admitted with worsening shortness of breath and hypoxia.  Clinically improved.  She may needs oxygen as an outpatient.    Etiology of hypoxia seems to be multifactorial.  Fluid overload, underlying COPD, suspect obstructive sleep apnea.  Diastolic dysfunction.    She was able to use BiPAP last night but has declined to use it as an outpatient.    she agreed for outpatient at home sleep study.    I would also like to get a PFTs as an outpatient.    Taper down steroid.       Stable from pulmonary standpoint.    Anderson Solomon MD  10/17/23  11:32 EDT

## 2023-10-17 NOTE — PROGRESS NOTES
[facility] HOSPITALISTS CROSS COVER NOTE    Patient Identification:  Name:  Lesley Michel  Age:  67 y.o.  Sex:  female  :  1955  MRN:  [id number]  Visit number:  [id number]  Primary Care Provider:  Woodrow Raymond APRN    Length of stay in inpatient status:  6    Brief Update     Called about stat ABG results.  The PaCO2 is elevated and the PaO2 is higher than desired in this COPD patient.  Thus, I have ordered BiPAP with an ABG in one hour and for the oxygen saturations to remain between 88-92% to prevent any further CO2 trapping.      Fanny Ford MD  [facility]  10/16/23  20:39 EDT

## 2023-10-17 NOTE — PLAN OF CARE
Goal Outcome Evaluation:      Patient restless over night. At beginning of shift. Patient was not herself compared to previous night shift. Pt had her tele leads and oxygen sensor off laying on bedside table. Nasal cannula was off and wrapped around her legs. Stated she was going to go to the restroom without her oxygen. Stat ABG obtained per protocol and VICK Diez made aware. New order for BiPAP after ABG results. Pt back to baseline mental status without confusion. Vital signs stable. Will continue to monitor.

## 2023-10-17 NOTE — PLAN OF CARE
Goal Outcome Evaluation:  Pt resting in bed. No signs or symptoms of distress noted. No complaint at this time. Will continue with plan of care.

## 2023-10-17 NOTE — CASE MANAGEMENT/SOCIAL WORK
Discharge Planning Assessment   [place]     Patient Name: Lesley Michel  MRN: [id number]  Today's Date: 10/17/2023    Admit Date: 10/8/2023       Discharge Plan       Row Name 10/17/23 1622       Plan    Plan SS consulted on this date for discharge planning.  Case Management has been following pt thus far.  SS spoke with pt at bedside.  Pt resides at home with family and plans to return home at discharge.  Pt currently does not utilize home health or DME.  Pt stated she was going home in am.  SS questioned pt on whether she would need outpt dialysis.  Pt stated that she is not agreeable to dialysis.  SS would recommend Psych evaluation for capacity if questions remain regarding pt's ability to make and understand own decisions. SS noted Palliative Care following pt.   SS will follow along with Case Management.                  Continued Care and Services - Admitted Since 10/8/2023    Coordination has not been started for this encounter.       Expected Discharge Date and Time       Expected Discharge Date Expected Discharge Time    Oct 17, 2023               LIZBET Schwarz

## 2023-10-17 NOTE — PROGRESS NOTES
[facility] HOSPITALIST PROGRESS NOTE     Patient Identification:  Name:  Lesley Michel  Age:  67 y.o.  Sex:  female  :  1955  MRN:  [id number]  Visit Number:  [id number]  ROOM: [address] [place] [address]     Primary Care Provider:  Woodrow Raymond APRN    Length of stay in inpatient status:  7    Subjective     Chief Compliant:    Chief Complaint   Patient presents with    Shortness of Breath    Edema       History of Presenting Illness: Patient seen and evaluated in follow-up for anasarca with MAICO on CKD stage IIIa complicated by acute exacerbation of COPD and bilateral pneumonia with acute hypoxemic respiratory failure.  Patient still requiring supplemental oxygen at time of evaluation today at 3 L nasal cannula.  Patient still with evidence of volume overload.  Nephrology following along, appreciate input as well as pulmonology's.    Objective     Current Hospital Meds:  aspirin, 325 mg, Oral, Daily  carvedilol, 25 mg, Oral, BID With Meals  folic acid, 1 mg, Oral, Daily  heparin (porcine), 5,000 Units, Subcutaneous, Q12H  hydrALAZINE, 50 mg, Oral, Q8H  ipratropium-albuterol, 3 mL, Nebulization, 4x Daily - RT  levothyroxine, 88 mcg, Oral, Q AM  montelukast, 10 mg, Oral, Nightly  nicotine, 1 patch, Transdermal, Q24H  pantoprazole, 40 mg, Oral, Daily  rosuvastatin, 10 mg, Oral, Nightly  sodium chloride, 10 mL, Intravenous, Q12H  vitamin B-12, 1,000 mcg, Oral, Daily         ----------------------------------------------------------------------------------------------------------------------  Vital Signs:  Temp:  [97.7 °F (36.5 °C)-98.3 °F (36.8 °C)] 98.2 °F (36.8 °C)  Heart Rate:  [65-84] 84  Resp:  [15-20] 18  BP: (117-147)/(58-84) 138/84  SpO2:  [91 %-97 %] 92 %  on  Flow (L/min):  [3-4] 3;   Device (Oxygen Therapy): humidified;nasal cannula  Body mass index is 46.99 kg/m².      Intake/Output Summary (Last 24 hours) at 10/17/2023 1848  Last data filed at 10/17/2023 1500  Gross per 24 hour   Intake 600  "ml   Output 130 ml   Net 470 ml      ----------------------------------------------------------------------------------------------------------------------  Physical exam:  Constitutional: Elderly chronically ill-appearing adult female.  No acute distress.      HENT:  Head:  Normocephalic and atraumatic.  Mouth:  Moist mucous membranes.    Eyes:  Conjunctivae and EOM are normal. No scleral icterus.    Cardiovascular:  Normal rate, regular rhythm and normal heart sounds with no murmur.  Pulmonary/Chest:  No respiratory distress, no wheezes, no crackles, with diminished breath sounds at the bases.  Abdominal:  Soft.  Bowel sounds are normal.  No distension and no tenderness.   Musculoskeletal:  No tenderness and no deformity.  No red or swollen joints anywhere.   Neurological:  Alert and oriented to person, place, and time.  No cranial nerve deficit.  No tongue deviation.  No facial droop.  No slurred speech. Intact Sensation throughout  Skin:  Skin is warm and dry. No rash or lesion noted. No pallor.   Peripheral vascular:  Pulses in all 4 extremities with no clubbing, no cyanosis, 2+ bilateral lower extremity edema with trace edema of the upper extremity.  Psychiatric: Appropriate mood and affect, pleasant.   ----------------------------------------------------------------------------------------------------------------------  WBC/HGB/HCT/PLT   10.86/9.2/31.0/259 (10/17 1744)  BUN/CREAT/GLUC/ALT/AST/INOCENTE/LIP    69/6.10/116/6/8/--/-- (10/17 0038-10/17 1744)  LYTES - Na/K/Cl/CO2: 134*/5.2/99/22.9 (10/17 1744)     pH/pCO2/pO2/HCO3   7.311/47.4/74.9/23.9 (10/16 2202)  No results found for: \"URINECX\"  No results found for: \"BLOODCX\"    I have personally looked at the labs and they are summarized above.  ----------------------------------------------------------------------------------------------------------------------  Detailed radiology reports for the last 24 hours:  No radiology results for the last day  Assessment & " Plan      Anasarca  Hyperkalemia  MAICO on CKD stage IIIa with likely progression to CKD V vs ESRD    -Patient presenting with anasarca with echo showing EF of 56 to 60% with grade 1 diastolic dysfunction, mild AAS, mild pulmonary hypertension and small pericardial effusion without evidence of tamponade.    -Patient with progressive worsening renal failure likely contributing to volume retention with possibly some component of acute HFpEF.    -Reds vest remains elevated.  Patient trialed on diuretic therapy with little to no urine output and remains within anuric range.  Suspect patient will ultimately require hemodialysis.    -Nephrology consulted and following along, volume status management per their discretion as patient not yet in emergent need of hemodialysis however remains at high risk for it and if patient unresponsive to diuretics plans to discuss initiation of HD.    -Patient wanting to go home and requesting to be discharged and stating she would fluid restrict at home and follow-up with nephrology outpatient as she does not want to be in the hospital however given the lack of improvement in renal function have discussed the importance/need for possible need of initiation of a hemodialysis    -Palliative care consulted and following along.  Plan for family discussion tomorrow regarding hemodialysis as patient has at times expressed desire not to pursue it then changing her mind and being agreeable but going back and forth multiple times.  Also patient stating that she does not understand things despite multiple days of explaining in detail the reasons why interventions as above as well as what hemodialysis is.    Bilateral bacterial pneumonia  Acute exacerbation of COPD  Acute hypoxemic respiratory failure    -Procalcitonin normal, cultures without growth.    -Completed an appropriate course of azithromycin    -Continue nebulized medications    -We will wean supplemental oxygen as able however large  component likely driven by pulmonary edema and anasarca.  Will likely need supplemental oxygen at home at discharge.    Essential hypertension    -Continue home antihypertensives as appropriate.  Holding lisinopril in the setting of recent hyperkalemia    Hypothyroidism    -Continue Synthroid    Tobacco abuse    -Cessation counseled.    Morbid obesity    -Complicates all aspects of care    Copied text in portions of the note has been reviewed and is accurate as of 10/17/23    VTE Prophylaxis:   Mechanical Order History:       None          Pharmalogical Order History:        Ordered     Dose Route Frequency Stop    10/08/23 2052  heparin (porcine) 5000 UNIT/ML injection 5,000 Units         5,000 Units SC Every 12 Hours Scheduled --                    Disposition Home once medically stable and improved.    Jay Jay Pritchett DO  [facility] Hospitalist  10/17/23  18:48 EDT

## 2023-10-17 NOTE — PROGRESS NOTES
"Palliative Care Daily Progress Note     S: Medical record reviewed, followed up with Primary RN Tamar and Dr Pritchett regarding patient's condition. When palliative entered the room Lesley was laying down with grand daughter Olivia at bedside. I again discussed with Lesley her feelings about dialysis and despite me explaining what dialysis and what it was several times states she does not understand. GD Olivia says she thinks that someone needs to explain HD to her, I assured her that this has been discussed multiple times. Lesley denied pain. Nausea or anxiety at this time. States they have her on so many medications that she is confused. She was drowsy however alert and orient times three.      O:   Palliative Performance Scale Score:     /84 (BP Location: Left arm, Patient Position: Lying)   Pulse 84   Temp 98.2 °F (36.8 °C) (Oral)   Resp 18   Ht 152.4 cm (60\")   Wt 109 kg (240 lb 9.6 oz)   SpO2 92%   BMI 46.99 kg/m²     Intake/Output Summary (Last 24 hours) at 10/17/2023 1655  Last data filed at 10/17/2023 1500  Gross per 24 hour   Intake 440 ml   Output 125 ml   Net 315 ml       PE:  General Appearance:    Chronically ill appearing, alert, cooperative, NAD   HEENT:    NC/AT, without obvious abnormality, EOMI, anicteric    Neck:   supple, trachea midline, no JVD   Lungs:     Unlabored respirations, diminished bases, no wheezes rhonchi or rales    Heart:    RRR, normal S1 and S2, no M/R/G   Abdomen:     Soft, NT, ND, NABS    Extremities:   Moves all extremities, no edema   Pulses:   Pulses palpable and equal bilaterally   Skin:   Warm, dry   Neurologic:   A/Ox3, does not seem to understand her situation    Psych:   Calm, appropriate         Meds: Reviewed and changes noted    Labs:   Results from last 7 days   Lab Units 10/16/23  0042   WBC 10*3/mm3 9.32   HEMOGLOBIN g/dL 10.5*   HEMATOCRIT % 34.2   PLATELETS 10*3/mm3 325     Results from last 7 days   Lab Units 10/17/23  0038   SODIUM mmol/L 139 "   POTASSIUM mmol/L 5.3*   CHLORIDE mmol/L 104   CO2 mmol/L 22.5   BUN mg/dL 63*   CREATININE mg/dL 5.10*   GLUCOSE mg/dL 101*   CALCIUM mg/dL 8.6     Results from last 7 days   Lab Units 10/17/23  0038   SODIUM mmol/L 139   POTASSIUM mmol/L 5.3*   CHLORIDE mmol/L 104   CO2 mmol/L 22.5   BUN mg/dL 63*   CREATININE mg/dL 5.10*   CALCIUM mg/dL 8.6   BILIRUBIN mg/dL 0.2   ALK PHOS U/L 89   ALT (SGPT) U/L 6   AST (SGOT) U/L 8   GLUCOSE mg/dL 101*     Imaging Results (Last 72 Hours)       ** No results found for the last 72 hours. **              Diagnostics: Reviewed    A: Lesley Michel is a 67 y.o.  female admitted on 10/8/2023 due to shortness of breath and edema. Lesley has a medical history of CAD status post stenting, hyperlipidemia, essential hypertension, COPD, tobacco abuse, CKD, hypothyroidism, arthritis, anxiety, history of migraines, obesity by BMI, and history of medical noncompliance. Pt follows with a cardiologist and per their notes CKD and that  she has been noncompliant with all of her medications, despite upon admission her stating that she is compliant with her medications. Today 10/12/23 she is alert and oriented time four and was able to discuss goals of care.    Today 10/17/2023  Nephrology to give a loading dose of lasix 80mg and reassess this evening, per RN pt has 5ml of urine output since this am. T 98.2, HT 84, BP of 138/84 and was saturating 92 % on 3L NC    P:  I was able to have conversation with Lesley about her condition and her feelings about dialysis. Despite explaining to her multiple times what HD entailed, she states do I have to go 7 days a week which I explained that this was not the case. DEANA Baker at bedside and I encourage Her and her family to have a serious discussion with Lesley. I call her son and he is going to be available tomorrow at 8 am with his sister vis phone to have family discussion with Palliative and Dr Pritchett and if nephrology is available appreciate their  support/input.    We will continue to follow along. Please do not hesitate to contact us regarding further sx mgmt or GOC needs, including after hours or on weekends via our on call provider at [phone].     Gwen Ellis, APRN    10/17/2023

## 2023-10-18 ENCOUNTER — APPOINTMENT (OUTPATIENT)
Dept: GENERAL RADIOLOGY | Facility: HOSPITAL | Age: 68
DRG: 291 | End: 2023-10-18
Payer: MEDICARE

## 2023-10-18 LAB
ALBUMIN SERPL-MCNC: 3.6 G/DL (ref 3.5–5.2)
ALBUMIN/GLOB SERPL: 1.3 G/DL
ALP SERPL-CCNC: 134 U/L (ref 39–117)
ALT SERPL W P-5'-P-CCNC: 8 U/L (ref 1–33)
ANION GAP SERPL CALCULATED.3IONS-SCNC: 11.5 MMOL/L (ref 5–15)
AST SERPL-CCNC: 10 U/L (ref 1–32)
BASOPHILS # BLD AUTO: 0.03 10*3/MM3 (ref 0–0.2)
BASOPHILS NFR BLD AUTO: 0.3 % (ref 0–1.5)
BILIRUB SERPL-MCNC: 0.3 MG/DL (ref 0–1.2)
BUN SERPL-MCNC: 69 MG/DL (ref 8–23)
BUN/CREAT SERPL: 10.9 (ref 7–25)
CALCIUM SPEC-SCNC: 9 MG/DL (ref 8.6–10.5)
CHLORIDE SERPL-SCNC: 100 MMOL/L (ref 98–107)
CO2 SERPL-SCNC: 20.5 MMOL/L (ref 22–29)
CREAT SERPL-MCNC: 6.32 MG/DL (ref 0.57–1)
CYTOLOGIST CVX/VAG CYTO: NORMAL
DEPRECATED RDW RBC AUTO: 56.3 FL (ref 37–54)
DEPRECATED RDW RBC AUTO: 56.8 FL (ref 37–54)
EGFRCR SERPLBLD CKD-EPI 2021: 6.9 ML/MIN/1.73
EOSINOPHIL # BLD AUTO: 0.81 10*3/MM3 (ref 0–0.4)
EOSINOPHIL NFR BLD AUTO: 7.5 % (ref 0.3–6.2)
ERYTHROCYTE [DISTWIDTH] IN BLOOD BY AUTOMATED COUNT: 16.8 % (ref 12.3–15.4)
ERYTHROCYTE [DISTWIDTH] IN BLOOD BY AUTOMATED COUNT: 16.9 % (ref 12.3–15.4)
GLOBULIN UR ELPH-MCNC: 2.7 GM/DL
GLUCOSE BLDC GLUCOMTR-MCNC: 114 MG/DL (ref 70–130)
GLUCOSE BLDC GLUCOMTR-MCNC: 118 MG/DL (ref 70–130)
GLUCOSE BLDC GLUCOMTR-MCNC: 118 MG/DL (ref 70–130)
GLUCOSE SERPL-MCNC: 126 MG/DL (ref 65–99)
HCT VFR BLD AUTO: 31 % (ref 34–46.6)
HCT VFR BLD AUTO: 31.5 % (ref 34–46.6)
HGB BLD-MCNC: 9.2 G/DL (ref 12–15.9)
HGB BLD-MCNC: 9.3 G/DL (ref 12–15.9)
IMM GRANULOCYTES # BLD AUTO: 0.05 10*3/MM3 (ref 0–0.05)
IMM GRANULOCYTES NFR BLD AUTO: 0.5 % (ref 0–0.5)
LYMPHOCYTES # BLD AUTO: 1.86 10*3/MM3 (ref 0.7–3.1)
LYMPHOCYTES NFR BLD AUTO: 17.1 % (ref 19.6–45.3)
MCH RBC QN AUTO: 27.1 PG (ref 26.6–33)
MCH RBC QN AUTO: 27.1 PG (ref 26.6–33)
MCHC RBC AUTO-ENTMCNC: 29.5 G/DL (ref 31.5–35.7)
MCHC RBC AUTO-ENTMCNC: 29.7 G/DL (ref 31.5–35.7)
MCV RBC AUTO: 91.2 FL (ref 79–97)
MCV RBC AUTO: 91.8 FL (ref 79–97)
MONOCYTES # BLD AUTO: 0.97 10*3/MM3 (ref 0.1–0.9)
MONOCYTES NFR BLD AUTO: 8.9 % (ref 5–12)
NEUTROPHILS NFR BLD AUTO: 65.7 % (ref 42.7–76)
NEUTROPHILS NFR BLD AUTO: 7.14 10*3/MM3 (ref 1.7–7)
NRBC BLD AUTO-RTO: 0 /100 WBC (ref 0–0.2)
PATH INTERP BLD-IMP: NORMAL
PLATELET # BLD AUTO: 242 10*3/MM3 (ref 140–450)
PLATELET # BLD AUTO: 259 10*3/MM3 (ref 140–450)
PMV BLD AUTO: 10.6 FL (ref 6–12)
PMV BLD AUTO: 10.8 FL (ref 6–12)
POTASSIUM SERPL-SCNC: 5.2 MMOL/L (ref 3.5–5.2)
PROT SERPL-MCNC: 6.3 G/DL (ref 6–8.5)
RBC # BLD AUTO: 3.4 10*6/MM3 (ref 3.77–5.28)
RBC # BLD AUTO: 3.43 10*6/MM3 (ref 3.77–5.28)
SODIUM SERPL-SCNC: 132 MMOL/L (ref 136–145)
WBC NRBC COR # BLD: 10.86 10*3/MM3 (ref 3.4–10.8)
WBC NRBC COR # BLD: 11.6 10*3/MM3 (ref 3.4–10.8)

## 2023-10-18 PROCEDURE — 71045 X-RAY EXAM CHEST 1 VIEW: CPT | Performed by: RADIOLOGY

## 2023-10-18 PROCEDURE — 82948 REAGENT STRIP/BLOOD GLUCOSE: CPT

## 2023-10-18 PROCEDURE — 94799 UNLISTED PULMONARY SVC/PX: CPT

## 2023-10-18 PROCEDURE — 85027 COMPLETE CBC AUTOMATED: CPT | Performed by: STUDENT IN AN ORGANIZED HEALTH CARE EDUCATION/TRAINING PROGRAM

## 2023-10-18 PROCEDURE — 25010000002 ONDANSETRON PER 1 MG

## 2023-10-18 PROCEDURE — 25010000002 HEPARIN (PORCINE) PER 1000 UNITS: Performed by: INTERNAL MEDICINE

## 2023-10-18 PROCEDURE — 99233 SBSQ HOSP IP/OBS HIGH 50: CPT | Performed by: STUDENT IN AN ORGANIZED HEALTH CARE EDUCATION/TRAINING PROGRAM

## 2023-10-18 PROCEDURE — 25010000002 FUROSEMIDE PER 20 MG: Performed by: INTERNAL MEDICINE

## 2023-10-18 PROCEDURE — 94660 CPAP INITIATION&MGMT: CPT

## 2023-10-18 PROCEDURE — 94761 N-INVAS EAR/PLS OXIMETRY MLT: CPT

## 2023-10-18 PROCEDURE — 71045 X-RAY EXAM CHEST 1 VIEW: CPT

## 2023-10-18 PROCEDURE — 97116 GAIT TRAINING THERAPY: CPT

## 2023-10-18 PROCEDURE — 94664 DEMO&/EVAL PT USE INHALER: CPT

## 2023-10-18 PROCEDURE — 80053 COMPREHEN METABOLIC PANEL: CPT | Performed by: STUDENT IN AN ORGANIZED HEALTH CARE EDUCATION/TRAINING PROGRAM

## 2023-10-18 PROCEDURE — 97110 THERAPEUTIC EXERCISES: CPT

## 2023-10-18 RX ORDER — FUROSEMIDE 10 MG/ML
80 INJECTION INTRAMUSCULAR; INTRAVENOUS ONCE
Status: COMPLETED | OUTPATIENT
Start: 2023-10-18 | End: 2023-10-18

## 2023-10-18 RX ADMIN — ROSUVASTATIN CALCIUM 10 MG: 10 TABLET, FILM COATED ORAL at 21:37

## 2023-10-18 RX ADMIN — CARVEDILOL 25 MG: 25 TABLET, FILM COATED ORAL at 08:07

## 2023-10-18 RX ADMIN — NICOTINE TRANSDERMAL SYSTEM 1 PATCH: 21 PATCH, EXTENDED RELEASE TRANSDERMAL at 08:08

## 2023-10-18 RX ADMIN — FUROSEMIDE 80 MG: 10 INJECTION, SOLUTION INTRAMUSCULAR; INTRAVENOUS at 10:56

## 2023-10-18 RX ADMIN — HYDROCODONE BITARTRATE AND ACETAMINOPHEN 1 TABLET: 10; 325 TABLET ORAL at 08:15

## 2023-10-18 RX ADMIN — ASPIRIN 325 MG: 325 TABLET, COATED ORAL at 08:07

## 2023-10-18 RX ADMIN — CARVEDILOL 25 MG: 25 TABLET, FILM COATED ORAL at 17:18

## 2023-10-18 RX ADMIN — ONDANSETRON 4 MG: 2 INJECTION INTRAMUSCULAR; INTRAVENOUS at 15:03

## 2023-10-18 RX ADMIN — LEVOTHYROXINE SODIUM 88 MCG: 88 TABLET ORAL at 05:02

## 2023-10-18 RX ADMIN — Medication 1 MG: at 08:08

## 2023-10-18 RX ADMIN — MONTELUKAST SODIUM 10 MG: 10 TABLET, COATED ORAL at 21:37

## 2023-10-18 RX ADMIN — Medication 1000 MCG: at 08:07

## 2023-10-18 RX ADMIN — PANTOPRAZOLE SODIUM 40 MG: 40 TABLET, DELAYED RELEASE ORAL at 08:07

## 2023-10-18 RX ADMIN — Medication 10 ML: at 08:09

## 2023-10-18 RX ADMIN — IPRATROPIUM BROMIDE AND ALBUTEROL SULFATE 3 ML: 2.5; .5 SOLUTION RESPIRATORY (INHALATION) at 07:19

## 2023-10-18 RX ADMIN — HYDRALAZINE HYDROCHLORIDE 50 MG: 50 TABLET, FILM COATED ORAL at 14:47

## 2023-10-18 RX ADMIN — IPRATROPIUM BROMIDE AND ALBUTEROL SULFATE 3 ML: 2.5; .5 SOLUTION RESPIRATORY (INHALATION) at 00:11

## 2023-10-18 RX ADMIN — HEPARIN SODIUM 5000 UNITS: 5000 INJECTION INTRAVENOUS; SUBCUTANEOUS at 08:08

## 2023-10-18 RX ADMIN — Medication 10 ML: at 21:37

## 2023-10-18 RX ADMIN — HYDRALAZINE HYDROCHLORIDE 50 MG: 50 TABLET, FILM COATED ORAL at 05:02

## 2023-10-18 RX ADMIN — HEPARIN SODIUM 5000 UNITS: 5000 INJECTION INTRAVENOUS; SUBCUTANEOUS at 21:37

## 2023-10-18 NOTE — CASE MANAGEMENT/SOCIAL WORK
Continued Stay Note  [facility]     Patient Name: Lesley Michel  MRN: [id number]  Today's Date: 10/18/2023    Admit Date: 10/8/2023    Plan: Pt now agreeable to dialysis; general surgery consult for tunneled catheter placement; SS updated.  Discharge plan remains home with family whom will transport, and likely O/P HD set-up.    Avani Pope

## 2023-10-18 NOTE — PROGRESS NOTES
"Nephrology Progress Note      Subjective     Has mild shortness of breath but no chest pain    Objective       Vital signs :     Vitals:    10/18/23 0336 10/18/23 0500 10/18/23 0715 10/18/23 0719   BP: 138/62  162/88    BP Location: Left arm  Left arm    Patient Position: Lying  Lying    Pulse: 82  78 79   Resp: 18 18 18   Temp: 99 °F (37.2 °C)  98.5 °F (36.9 °C)    TempSrc: Oral  Oral    SpO2: 95%  93% 92%   Weight:  109 kg (239 lb 12.8 oz)     Height:  152.4 cm (60\")          Intake/Output                         10/16/23 0701 - 10/17/23 0700 10/17/23 0701 - 10/18/23 0700     7900-2263 8518-9702 Total 5670-2039 0391-0263 Total                 Intake    P.O.  200  -- 200  600  120 720    Total Intake 200 -- 200 600 120 720       Output    Urine  --  125 125  5  300 305    Total Output -- 125 125 5 300 305             Physical Exam:    General Appearance : Not in acute distress  Lungs : Bibasilar crackles noted trace edema  Heart :  regular rhythm & normal rate, normal S1, S2 and no murmur, no rub  Abdomen : Soft nontender nondistended   extremities : Trace edema  Neurologic :   orientated to person, place, time and situation, Grossly no focal deficits    Laboratory Data :       CBC and coagulation:  Results from last 7 days   Lab Units 10/18/23  0059 10/17/23  1744 10/16/23  0042   WBC 10*3/mm3 11.60* 10.86* 9.32   HEMOGLOBIN g/dL 9.3* 9.2* 10.5*   HEMATOCRIT % 31.5* 31.0* 34.2   MCV fL 91.8 91.2 89.5   MCHC g/dL 29.5* 29.7* 30.7*   PLATELETS 10*3/mm3 242 259 325     Acid/base balance:  Results from last 7 days   Lab Units 10/16/23  2202 10/16/23  2020   PH, ARTERIAL pH units 7.311* 7.288*   PO2 ART mm Hg 74.9* 75.8*   PCO2, ARTERIAL mm Hg 47.4* 50.6*   HCO3 ART mmol/L 23.9 24.2       Renal and electrolytes:    Results from last 7 days   Lab Units 10/18/23  0059 10/17/23  1744 10/17/23  0038 10/16/23  0042 10/15/23  0317   SODIUM mmol/L 132* 134* 139 140 139   POTASSIUM mmol/L 5.2 5.2 5.3* 4.9 5.1   CHLORIDE " "mmol/L 100 99 104 106 105   CO2 mmol/L 20.5* 22.9 22.5 22.5 20.8*   BUN mg/dL 69* 69* 63* 61* 58*   CREATININE mg/dL 6.32* 6.10* 5.10* 4.47* 4.35*   CALCIUM mg/dL 9.0 8.7 8.6 9.1 9.4     Estimated Creatinine Clearance: 9.9 mL/min (A) (by C-G formula based on SCr of 6.32 mg/dL (H)).     Liver and pancreatic function:  Results from last 7 days   Lab Units 10/18/23  0059 10/17/23  0038 10/16/23  0042   ALBUMIN g/dL 3.6 3.5 4.0   BILIRUBIN mg/dL 0.3 0.2 0.4   ALK PHOS U/L 134* 89 94   AST (SGOT) U/L 10 8 10   ALT (SGPT) U/L 8 6 6       Albumin Albumin   Date Value Ref Range Status   10/18/2023 3.6 3.5 - 5.2 g/dL Final   10/17/2023 3.5 3.5 - 5.2 g/dL Final   10/16/2023 4.0 3.5 - 5.2 g/dL Final      Magnesium No results found for: \"MG\"         PTH               No results found for: \"PTH\"    Cardiac:        Liver and pancreatic function:  Results from last 7 days   Lab Units 10/18/23  0059 10/17/23  0038 10/16/23  0042   ALBUMIN g/dL 3.6 3.5 4.0   BILIRUBIN mg/dL 0.3 0.2 0.4   ALK PHOS U/L 134* 89 94   AST (SGOT) U/L 10 8 10   ALT (SGPT) U/L 8 6 6       XR Chest 1 View    Result Date: 10/13/2023  1.  Stable changes of CHF with interstitial edema. 2.  Development of bibasilar airspace disease which may represent atelectasis or pneumonia.   This report was finalized on 10/13/2023 12:43 PM by Dr. Eduardo August MD.      XR Chest 1 View    Result Date: 10/12/2023    Significant interval improvement in CHF/volume overload.   This report was finalized on 10/12/2023 12:18 PM by Dr. Eduardo August MD.      XR Chest 1 View    Result Date: 10/10/2023  1.  Bilateral airspace disease consistent with pneumonia. 2.  CHF/edema.   This report was finalized on 10/10/2023 11:55 AM by Dr. Eduardo August MD.      CT Chest Without Contrast Diagnostic    Result Date: 10/10/2023  1.  CHF/edema. 2.  Small right and very small left pleural effusions which are nonloculated. 3.  Bilateral consolidative airspace disease as detailed above consistent " with pneumonia. 4.  Small pericardial effusion. 5.  Cardiomegaly with severe coronary artery calcifications. 6.  Mediastinal and hilar adenopathy. May be reactive but follow-up recommended to exclude neoplastic etiologies. 7.  Liver cirrhosis. 8.  Anasarca. 9.  Other nonacute findings above.   This report was finalized on 10/10/2023 11:55 AM by Dr. Eduardo August MD.        Medications :     aspirin, 325 mg, Oral, Daily  carvedilol, 25 mg, Oral, BID With Meals  folic acid, 1 mg, Oral, Daily  heparin (porcine), 5,000 Units, Subcutaneous, Q12H  hydrALAZINE, 50 mg, Oral, Q8H  ipratropium-albuterol, 3 mL, Nebulization, 4x Daily - RT  levothyroxine, 88 mcg, Oral, Q AM  montelukast, 10 mg, Oral, Nightly  nicotine, 1 patch, Transdermal, Q24H  pantoprazole, 40 mg, Oral, Daily  rosuvastatin, 10 mg, Oral, Nightly  sodium chloride, 10 mL, Intravenous, Q12H  vitamin B-12, 1,000 mcg, Oral, Daily             Assessment & Plan     -Acute kidney injury  -Atrophic right kidney  -Chronic kidney disease stage IIIa  -Acute hyperkalemia  -Bilateral edema  -Acute hypoxic respite failure  -COPD  -Hypertension  -Medical noncompliance    Oliguric ATN continue to get worse with worsening fluid status.  Again had a lengthy discussion with the patient and her son educated and counseled again on the merits and demerits of dialysis, complications and risks involved not being on dialysis.  Patient finally agreed to proceed to dialysis, will place surgical consult for tunneled dialysis catheter placement tomorrow.  Will give another dose of Lasix today    MAICO on CKD most likely multifactori including ATN from prolonged prerenal state and hemodynamic changes with concomitant use of ACE inhibitors in setting of volume loss   baseline creatinine 2.2, admitted with 3.69   ultrasound of the kidneys showed atrophic right kidney  Serologies are negative    Bilateral lower extremity swelling is better, will hold further diuretics today  - IV Lasix 80 mg  once  - Yale Kaiser's catheter    Please avoid nephrotoxic medication and pharmacy to adjust the medication according to the GFR     Plan of care discussed with pt, and family answered all questions, patient verbalized understanding and agreed.      Vani León MD  10/18/23  08:05 EDT

## 2023-10-18 NOTE — PLAN OF CARE
Goal Outcome Evaluation:   Pt resting in bed. No signs or symptoms of distress noted. No complaint at this time. Pt signed consent form with Kera MENDEZ RN and VAL (RN) at bedside at this time and pt was agreeable. Will continue with plan of care.

## 2023-10-18 NOTE — CASE MANAGEMENT/SOCIAL WORK
Discharge Planning Assessment  [facility]     Patient Name: Lesley Michel  MRN: [id number]  Today's Date: 10/18/2023    Admit Date: 10/8/2023       Discharge Plan       Row Name 10/18/23 1308       Plan    Plan SS noted pt now agreeable to receiving dialysis and will continue to follow to assist with arrangements for outpt dialysis chair.      Row Name 10/18/23 9737                           Continued Care and Services - Admitted Since 10/8/2023    Coordination has not been started for this encounter.       Expected Discharge Date and Time       Expected Discharge Date Expected Discharge Time    Oct 23, 2023             JAVED SchwarzW

## 2023-10-18 NOTE — THERAPY TREATMENT NOTE
Acute Care - Physical Therapy Treatment Note  [facility]     Patient Name: Lesley Michel  : 1955  MRN: [id number]  Today's Date: 10/18/2023   Onset of Illness/Injury or Date of Surgery: 10/08/23  Visit Dx:     ICD-10-CM ICD-9-CM   1. Acute kidney injury superimposed on chronic kidney disease  N17.9 584.9    N18.9 585.9   2. Acute on chronic congestive heart failure, unspecified heart failure type  I50.9 428.0   3. Acute on chronic respiratory failure with hypoxia  J96.21 518.84     799.02   4. Hyperkalemia  E87.5 276.7   5. Acute renal failure, unspecified acute renal failure type  N17.9 584.9     Patient Active Problem List   Diagnosis    Hypertension    Hyperlipidemia    Migraine without aura    CAD, chronically occluded collateralized dominant RCA, status post drug-eluting stent to LAD , high-grade stenosis of the ostium of the small D1 and 60% mid RCA posterior lateral branch    Chronic renal failure, stage 2 (mild)    Tobacco use    Bilateral lower extremity edema    Other specified hypothyroidism    Cigarette smoker    Class 3 severe obesity without serious comorbidity with body mass index (BMI) of 40.0 to 44.9 in adult    Acute hypoxic respiratory failure    Acute renal failure (ARF)     Past Medical History:   Diagnosis Date    Anxiety     Arthritis     Coronary artery disease     H/O heart artery stent     Hyperlipidemia     Hypertension     Obesity      Past Surgical History:   Procedure Laterality Date    ANKLE SURGERY      RIGHT    APPENDECTOMY      CARDIAC CATHETERIZATION      LEFT    CORONARY STENT PLACEMENT      HYSTERECTOMY       PT Assessment (last 12 hours)       PT Evaluation and Treatment       Row Name 10/18/23 1130          Physical Therapy Time and Intention    Subjective Information complains of;fatigue  -HC     Document Type therapy note (daily note)  -HC     Mode of Treatment individual therapy;physical therapy  -HC     Patient Effort good  -HC     Comment Pt and RN Tamar in  agreement for PT. Pt completed EOB exercises until tolerance. Pt walked 12' with HHA CGA/min A.  -       Row Name 10/18/23 1130          General Information    Patient Profile Reviewed yes  -HC     Existing Precautions/Restrictions fall;oxygen therapy device and L/min  -       Row Name 10/18/23 1130          Cognition    Follows Commands (Cognition) WFL  -     Personal Safety Interventions fall prevention program maintained;nonskid shoes/slippers when out of bed;supervised activity  -       Row Name 10/18/23 1130          Bed Mobility    Bed Mobility supine-sit;sit-supine  -     Supine-Sit [place] (Bed Mobility) standby assist  -       Row Name 10/18/23 1130          Transfers    Transfers sit-stand transfer;stand-sit transfer  -       Row Name 10/18/23 1130          Sit-Stand Transfer    Sit-Stand [place] (Transfers) contact guard  -     Assistive Device (Sit-Stand Transfers) other (see comments)  HHA  -HC       Row Name 10/18/23 1130          Stand-Sit Transfer    Stand-Sit [place] (Transfers) contact guard  -     Assistive Device (Stand-Sit Transfers) other (see comments)  HHA  -HC       Row Name 10/18/23 1130          Gait/Stairs (Locomotion)    Gait/Stairs Locomotion gait/ambulation independence;distance ambulated  -     [place] Level (Gait) contact guard;minimum assist (75% patient effort)  -     Assistive Device (Gait) other (see comments)  HHA  -HC     Patient was able to Ambulate yes  -     Distance in Feet (Gait) 12  -HC     Pattern (Gait) step-to  -HC     Deviations/Abnormal Patterns (Gait) antalgic;base of support, wide;gait speed decreased;stride length decreased  -       Row Name 10/18/23 1130          Safety Issues, Functional Mobility    Impairments Affecting Function (Mobility) endurance/activity tolerance;balance;pain;shortness of breath;strength  -       Row Name 10/18/23 1130          Motor Skills    Therapeutic Exercise other (see comments)   Sitting: AP, LAQ, March, Knees in/out  -HC       Row Name             Wound 10/09/23 0422 Left lower arm Extravasation    Wound - Properties Group Placement Date: 10/09/23  -SM Placement Time: 0422 -SM Side: Left  -SM Orientation: lower  -SM Location: arm  -SM Primary Wound Type: Extravasatio  -SM    Retired Wound - Properties Group Placement Date: 10/09/23  -SM Placement Time: 0422 -SM Side: Left  -SM Orientation: lower  -SM Location: arm  -SM Primary Wound Type: Extravasatio  -SM    Retired Wound - Properties Group Date first assessed: 10/09/23  -SM Time first assessed: 0422 -SM Side: Left  -SM Location: arm  -SM Primary Wound Type: Extravasatio  -SM      Row Name 10/18/23 1130          Positioning and Restraints    Pre-Treatment Position in bed  -HC     Post Treatment Position bed  -HC     In Bed sitting EOB;with nsg  -HC               User Key  (r) = Recorded By, (t) = Taken By, (c) = Cosigned By      Initials Name Provider Type    Fiordaliza Echevarria, RN Registered Nurse    Supriya Lopez PTA Physical Therapist Assistant                    Physical Therapy Education       Title: PT OT SLP Therapies (Done)       Topic: Physical Therapy (Done)       Point: Mobility training (Done)       Learning Progress Summary             Patient Acceptance, E,D, VU,NR by  at 10/9/2023 1623                         Point: Home exercise program (Done)       Learning Progress Summary             Patient Acceptance, E,D, VU,NR by  at 10/9/2023 1623                         Point: Body mechanics (Done)       Learning Progress Summary             Patient Acceptance, E,D, VU,NR by  at 10/9/2023 1623                         Point: Precautions (Done)       Learning Progress Summary             Patient Acceptance, E,D, VU,NR by  at 10/9/2023 1623                                         User Key       Initials Effective Dates Name Provider Type Discipline     06/16/21 -  Ayana Logan, PT Physical Therapist PT                   PT Recommendation and Plan             Time Calculation:    PT Charges       Row Name 10/18/23 1134             Time Calculation    PT Received On 10/18/23  -HC         Time Calculation- PT    Total Timed Code Minutes- PT 24 minute(s)  -HC                User Key  (r) = Recorded By, (t) = Taken By, (c) = Cosigned By      Initials Name Provider Type     Supriya Carias, BASILIA Physical Therapist Assistant                  Therapy Charges for Today       Code Description Service Date Service Provider Modifiers Qty    50483434455  GAIT TRAINING EA 15 MIN 10/18/2023 Supriay Carias PTA GP, CQ 1    [id number]  PT THER PROC EA 15 MIN 10/18/2023 Supriya Carias, BASILIA GP, CQ 1            PT G-Codes  AM-PAC 6 Clicks Score (PT): 22    Supriya Carias PTA  10/18/2023

## 2023-10-18 NOTE — PLAN OF CARE
Goal Outcome Evaluation:      Patient rested well overnight. No complaints. Remains on 3L NC. Refused bipap overnight. Vital signs stable. Will continue to monitor.

## 2023-10-18 NOTE — PROGRESS NOTES
"Palliative Care Daily Progress Note     S: Medical record reviewed, followed up with Primary RN Tamar and Dr Pritchett and Dr León regarding patient's condition. When Palliative entered the room Lesley was sitting up on edge of bed with her son Wes at bedside. Lesley denied pain, nausea, or shortness of breath at his time and did not appear to be in distress.      O:   Palliative Performance Scale Score:     /75 (BP Location: Left arm, Patient Position: Lying)   Pulse 67   Temp 98.5 °F (36.9 °C) (Oral)   Resp 18   Ht 152.4 cm (60\")   Wt 109 kg (239 lb 12.8 oz)   SpO2 92%   BMI 46.83 kg/m²     Intake/Output Summary (Last 24 hours) at 10/18/2023 1552  Last data filed at 10/18/2023 1300  Gross per 24 hour   Intake 600 ml   Output 675 ml   Net -75 ml       PE:  General Appearance:    Chronically ill appearing, alert, cooperative, NAD   HEENT:    NC/AT, without obvious abnormality, EOMI, anicteric    Neck:   supple, trachea midline, no JVD   Lungs:     Unlabored respirations, diminished bases, no wheezes rhonchi or rales    Heart:    RRR, normal S1 and S2, no M/R/G   Abdomen:     Soft, NT, ND, NABS    Extremities:   Moves all extremities, no edema   Pulses:   Pulses palpable and equal bilaterally   Skin:   Warm, dry   Neurologic:   A/Ox3, does not seem to understand her situation    Psych:   Calm, appropriate         Meds: Reviewed and changes noted    Labs:   Results from last 7 days   Lab Units 10/18/23  0059   WBC 10*3/mm3 11.60*   HEMOGLOBIN g/dL 9.3*   HEMATOCRIT % 31.5*   PLATELETS 10*3/mm3 242     Results from last 7 days   Lab Units 10/18/23  0059   SODIUM mmol/L 132*   POTASSIUM mmol/L 5.2   CHLORIDE mmol/L 100   CO2 mmol/L 20.5*   BUN mg/dL 69*   CREATININE mg/dL 6.32*   GLUCOSE mg/dL 126*   CALCIUM mg/dL 9.0     Results from last 7 days   Lab Units 10/18/23  0059   SODIUM mmol/L 132*   POTASSIUM mmol/L 5.2   CHLORIDE mmol/L 100   CO2 mmol/L 20.5*   BUN mg/dL 69*   CREATININE mg/dL 6.32* "   CALCIUM mg/dL 9.0   BILIRUBIN mg/dL 0.3   ALK PHOS U/L 134*   ALT (SGPT) U/L 8   AST (SGOT) U/L 10   GLUCOSE mg/dL 126*     Imaging Results (Last 72 Hours)       Procedure Component Value Units Date/Time    XR Chest 1 View [[id number]] Collected: 10/18/23 1021     Updated: 10/18/23 1024    Narrative:      EXAM:    XR Chest, 1 View     EXAM DATE:    10/18/2023 10:32 AM     CLINICAL HISTORY:    pulm edema; N17.9-Acute kidney failure, unspecified; N18.9-Chronic  kidney disease, unspecified; I50.9-Heart failure, unspecified;  J96.21-Acute and chronic respiratory failure with hypoxia;  E87.5-Hyperkalemia     TECHNIQUE:    Frontal view of the chest.     COMPARISON:    10/13/2023     FINDINGS:    LUNGS AND PLEURAL SPACES:  Coarsened interstitial markings and vague  bibasilar airspace opacities again noted.  Now small right pleural  effusion.  Tiny left pleural effusion.  No pneumothorax.    HEART:  Cardiomegaly.    MEDIASTINUM:  Unremarkable as visualized.    BONES/JOINTS:  Unremarkable as visualized.       Impression:      1.  Coarsened interstitial markings and vague bibasilar airspace  opacities again noted.  2.  Now small right pleural effusion.  3.  Tiny left pleural effusion.  4.  Cardiomegaly.        This report was finalized on 10/18/2023 10:22 AM by Dr. Kirk Mcdonald MD.                 Diagnostics: Reviewed    A: Lesley Michel is a 66 yo female admitted on 10/8/2023 due to shortness of breath and edema. Lesley has a medical history of CAD status post stenting, hyperlipidemia, essential hypertension, COPD, tobacco abuse, CKD, hypothyroidism, arthritis, anxiety, history of migraines, obesity by BMI, and history of medical noncompliance. Pt follows with a cardiologist and per their notes CKD and that  she has been noncompliant with all of her medications, despite upon admission her stating that she is compliant with her medications. Today 10/12/23 she is alert and oriented time four and was able to discuss  goals of care.     Today 10/18/2023  T 98.5, HR 67, RR 18, BP of 138/75 saturating 92% on 4 L NC, Lesley had minimal urine output, less than 30ml/hr with increase in Creatinine to 6.3.    P:  Today palliative was able to meet with  patients rosa isela Harvey at bedside to have a discussion with he and Lesley about her goals of care and to explain dialysis once more. I was able to answer all of Lesley;s questions as well as her sons and let her know that Dr León would be by today to discuss logistics of hemodialysis. At the end of out conversation Lesley was agreeable to dialysis and rosa isela Harvey agreed with this decision. I discussed pt and conversation with Dr Pritchett as well.    We will continue to follow along. Please do not hesitate to contact us regarding further sx mgmt or GOC needs, including after hours or on weekends via our on call provider at [phone].     Gwen Ellis, APRN    10/18/2023

## 2023-10-18 NOTE — PROGRESS NOTES
Psychiatric HOSPITALIST PROGRESS NOTE     Patient Identification:  Name:  Lesley Michel  Age:  67 y.o.  Sex:  female  :  1955  MRN:  [id number]  Visit Number:  [id number]  ROOM: [address] [place] [address]     Primary Care Provider:  Woodrow Raymond APRN    Length of stay in inpatient status:  8    Subjective     Chief Compliant:    Chief Complaint   Patient presents with    Shortness of Breath    Edema       History of Presenting Illness: Patient seen and evaluated in follow-up for anasarca with MAICO on CKD stage IIIa complicated by acute exacerbation of COPD and bilateral pneumonia with acute hypoxemic respiratory failure.  Patient still requiring supplemental oxygen at time of evaluation today at 3 L nasal cannula.  Unfortunately patient with little to no response to diuretics and renal function continue to worsen and nephrology recommending hemodialysis and patient now finally agreeable.    Objective     Current Hospital Meds:  aspirin, 325 mg, Oral, Daily  carvedilol, 25 mg, Oral, BID With Meals  folic acid, 1 mg, Oral, Daily  heparin (porcine), 5,000 Units, Subcutaneous, Q12H  hydrALAZINE, 50 mg, Oral, Q8H  ipratropium-albuterol, 3 mL, Nebulization, 4x Daily - RT  levothyroxine, 88 mcg, Oral, Q AM  montelukast, 10 mg, Oral, Nightly  nicotine, 1 patch, Transdermal, Q24H  pantoprazole, 40 mg, Oral, Daily  rosuvastatin, 10 mg, Oral, Nightly  sodium chloride, 10 mL, Intravenous, Q12H  vitamin B-12, 1,000 mcg, Oral, Daily         ----------------------------------------------------------------------------------------------------------------------  Vital Signs:  Temp:  [98 °F (36.7 °C)-99 °F (37.2 °C)] 98 °F (36.7 °C)  Heart Rate:  [63-82] 63  Resp:  [18] 18  BP: (123-162)/(56-88) 127/68  SpO2:  [91 %-98 %] 91 %  on  Flow (L/min):  [3] 3;   Device (Oxygen Therapy): nasal cannula  Body mass index is 46.83 kg/m².      Intake/Output Summary (Last 24 hours) at 10/18/2023 1852  Last data filed at 10/18/2023  "1300  Gross per 24 hour   Intake 600 ml   Output 675 ml   Net -75 ml      ----------------------------------------------------------------------------------------------------------------------  Physical exam:  Constitutional: Elderly chronically ill-appearing adult female.  No acute distress.      HENT:  Head:  Normocephalic and atraumatic.  Mouth:  Moist mucous membranes.    Eyes:  Conjunctivae and EOM are normal. No scleral icterus.    Cardiovascular:  Normal rate, regular rhythm and normal heart sounds with no murmur.  Pulmonary/Chest:  No respiratory distress, no wheezes, no crackles, with diminished breath sounds at the bases.  Abdominal:  Soft.  Bowel sounds are normal.  No distension and no tenderness.   Musculoskeletal:  No tenderness and no deformity.  No red or swollen joints anywhere.   Neurological:  Alert and oriented to person, place, and time.  No cranial nerve deficit.  No tongue deviation.  No facial droop.  No slurred speech. Intact Sensation throughout  Skin:  Skin is warm and dry. No rash or lesion noted. No pallor.   Peripheral vascular:  Pulses in all 4 extremities with no clubbing, no cyanosis, 2+ bilateral lower extremity edema with trace edema of the upper extremity.  Psychiatric: Appropriate mood and affect, pleasant.   ----------------------------------------------------------------------------------------------------------------------  WBC/HGB/HCT/PLT   11.60/9.3/31.5/242 (10/18 0059)  BUN/CREAT/GLUC/ALT/AST/INOCENTE/LIP    69/6.32/126/8/10/--/-- (10/18 0059)  LYTES - Na/K/Cl/CO2: 132*/5.2/100/20.5* (10/18 0059)        No results found for: \"URINECX\"  No results found for: \"BLOODCX\"    I have personally looked at the labs and they are summarized above.  ----------------------------------------------------------------------------------------------------------------------  Detailed radiology reports for the last 24 hours:  XR Chest 1 View    Result Date: 10/18/2023  1.  Coarsened interstitial " markings and vague bibasilar airspace opacities again noted. 2.  Now small right pleural effusion. 3.  Tiny left pleural effusion. 4.  Cardiomegaly.   This report was finalized on 10/18/2023 10:22 AM by Dr. Kirk Mcdonald MD.     Assessment & Plan      Anasarca  Hyperkalemia  MAICO on CKD stage IIIa with likely progression to CKD V vs ESRD    -Patient presenting with anasarca with echo showing EF of 56 to 60% with grade 1 diastolic dysfunction, mild AAS, mild pulmonary hypertension and small pericardial effusion without evidence of tamponade.    -Patient with progressive worsening renal failure likely contributing to volume retention with possibly some component of acute HFpEF.    -Reds vest remains elevated.  Patient trialed on diuretic therapy with little to no urine output and remains within anuric range.  Suspect patient will ultimately require hemodialysis.    -Nephrology consulted and following along, volume status management per their discretion.  Patient has been  trialed on high-dose diuretic therapy to no avail with continued worsening/no improvement in renal function.  As such nephrology recommending hemodialysis and patient agreeable.  Will be n.p.o. after midnight for tunneled dialysis catheter placement tomorrow.    -Patient wanting to go home and requesting to be discharged and stating she would fluid restrict at home and follow-up with nephrology outpatient as she does not want to be in the hospital however given the lack of improvement in renal function have discussed the importance/need for possible need of initiation of a hemodialysis and she is agreeable as above.    -Palliative care consulted and following along.      Bilateral bacterial pneumonia  Acute exacerbation of COPD  Acute hypoxemic respiratory failure    -Procalcitonin normal, cultures without growth.    -Completed an appropriate course of azithromycin    -Continue nebulized medications    -We will wean supplemental oxygen as able however  large component likely driven by pulmonary edema and anasarca.  Will likely need supplemental oxygen at home at discharge.    Essential hypertension    -Continue home antihypertensives as appropriate.  Holding lisinopril in the setting of recent hyperkalemia    Hypothyroidism    -Continue Synthroid    Tobacco abuse    -Cessation counseled.    Morbid obesity    -Complicates all aspects of care    Copied text in portions of the note has been reviewed and is accurate as of 10/18/23    VTE Prophylaxis:   Mechanical Order History:       None          Pharmalogical Order History:        Ordered     Dose Route Frequency Stop    10/08/23 2052  heparin (porcine) 5000 UNIT/ML injection 5,000 Units         5,000 Units SC Every 12 Hours Scheduled --                    Disposition Home once medically stable and improved and hemodialysis outpatient chair arranged.    Jay Jay Pritchett DO  [facility] Hospitalist  10/18/23  18:52 EDT

## 2023-10-19 ENCOUNTER — ANESTHESIA EVENT (OUTPATIENT)
Dept: PERIOP | Facility: HOSPITAL | Age: 68
End: 2023-10-19
Payer: MEDICARE

## 2023-10-19 ENCOUNTER — ANESTHESIA (OUTPATIENT)
Dept: PERIOP | Facility: HOSPITAL | Age: 68
End: 2023-10-19
Payer: MEDICARE

## 2023-10-19 ENCOUNTER — APPOINTMENT (OUTPATIENT)
Dept: GENERAL RADIOLOGY | Facility: HOSPITAL | Age: 68
DRG: 291 | End: 2023-10-19
Payer: MEDICARE

## 2023-10-19 ENCOUNTER — APPOINTMENT (OUTPATIENT)
Dept: CT IMAGING | Facility: HOSPITAL | Age: 68
DRG: 291 | End: 2023-10-19
Payer: MEDICARE

## 2023-10-19 LAB
ANION GAP SERPL CALCULATED.3IONS-SCNC: 12.8 MMOL/L (ref 5–15)
BUN SERPL-MCNC: 75 MG/DL (ref 8–23)
BUN/CREAT SERPL: 10.8 (ref 7–25)
CALCIUM SPEC-SCNC: 9 MG/DL (ref 8.6–10.5)
CHLORIDE SERPL-SCNC: 101 MMOL/L (ref 98–107)
CO2 SERPL-SCNC: 20.2 MMOL/L (ref 22–29)
CREAT SERPL-MCNC: 6.93 MG/DL (ref 0.57–1)
EGFRCR SERPLBLD CKD-EPI 2021: 6.1 ML/MIN/1.73
GLUCOSE BLDC GLUCOMTR-MCNC: 110 MG/DL (ref 70–130)
GLUCOSE BLDC GLUCOMTR-MCNC: 115 MG/DL (ref 70–130)
GLUCOSE BLDC GLUCOMTR-MCNC: 123 MG/DL (ref 70–130)
GLUCOSE BLDC GLUCOMTR-MCNC: 82 MG/DL (ref 70–130)
GLUCOSE SERPL-MCNC: 107 MG/DL (ref 65–99)
HAV IGM SERPL QL IA: NORMAL
HBV CORE IGM SERPL QL IA: NORMAL
HBV SURFACE AG SERPL QL IA: NORMAL
HCV AB SER DONR QL: NORMAL
POTASSIUM SERPL-SCNC: 5.9 MMOL/L (ref 3.5–5.2)
SODIUM SERPL-SCNC: 134 MMOL/L (ref 136–145)

## 2023-10-19 PROCEDURE — 80048 BASIC METABOLIC PNL TOTAL CA: CPT | Performed by: INTERNAL MEDICINE

## 2023-10-19 PROCEDURE — 80074 ACUTE HEPATITIS PANEL: CPT | Performed by: INTERNAL MEDICINE

## 2023-10-19 PROCEDURE — 25010000002 MORPHINE PER 10 MG

## 2023-10-19 PROCEDURE — C1750 CATH, HEMODIALYSIS,LONG-TERM: HCPCS | Performed by: SURGERY

## 2023-10-19 PROCEDURE — 82948 REAGENT STRIP/BLOOD GLUCOSE: CPT

## 2023-10-19 PROCEDURE — 02HV33Z INSERTION OF INFUSION DEVICE INTO SUPERIOR VENA CAVA, PERCUTANEOUS APPROACH: ICD-10-PCS | Performed by: SURGERY

## 2023-10-19 PROCEDURE — 25010000002 ONDANSETRON PER 1 MG: Performed by: NURSE ANESTHETIST, CERTIFIED REGISTERED

## 2023-10-19 PROCEDURE — 94660 CPAP INITIATION&MGMT: CPT

## 2023-10-19 PROCEDURE — 63710000001 INSULIN REGULAR HUMAN PER 5 UNITS: Performed by: STUDENT IN AN ORGANIZED HEALTH CARE EDUCATION/TRAINING PROGRAM

## 2023-10-19 PROCEDURE — 99233 SBSQ HOSP IP/OBS HIGH 50: CPT | Performed by: STUDENT IN AN ORGANIZED HEALTH CARE EDUCATION/TRAINING PROGRAM

## 2023-10-19 PROCEDURE — 0JH63XZ INSERTION OF TUNNELED VASCULAR ACCESS DEVICE INTO CHEST SUBCUTANEOUS TISSUE AND FASCIA, PERCUTANEOUS APPROACH: ICD-10-PCS | Performed by: SURGERY

## 2023-10-19 PROCEDURE — 71250 CT THORAX DX C-: CPT

## 2023-10-19 PROCEDURE — 25010000002 PROPOFOL 200 MG/20ML EMULSION: Performed by: NURSE ANESTHETIST, CERTIFIED REGISTERED

## 2023-10-19 PROCEDURE — 77001 FLUOROGUIDE FOR VEIN DEVICE: CPT | Performed by: SURGERY

## 2023-10-19 PROCEDURE — 97116 GAIT TRAINING THERAPY: CPT

## 2023-10-19 PROCEDURE — 94761 N-INVAS EAR/PLS OXIMETRY MLT: CPT

## 2023-10-19 PROCEDURE — 99222 1ST HOSP IP/OBS MODERATE 55: CPT | Performed by: SURGERY

## 2023-10-19 PROCEDURE — 71045 X-RAY EXAM CHEST 1 VIEW: CPT

## 2023-10-19 PROCEDURE — 5A1D70Z PERFORMANCE OF URINARY FILTRATION, INTERMITTENT, LESS THAN 6 HOURS PER DAY: ICD-10-PCS | Performed by: INTERNAL MEDICINE

## 2023-10-19 PROCEDURE — 25010000002 HEPARIN LOCK FLUSH PER 10 UNITS: Performed by: SURGERY

## 2023-10-19 PROCEDURE — 94799 UNLISTED PULMONARY SVC/PX: CPT

## 2023-10-19 PROCEDURE — 94664 DEMO&/EVAL PT USE INHALER: CPT

## 2023-10-19 PROCEDURE — 71250 CT THORAX DX C-: CPT | Performed by: RADIOLOGY

## 2023-10-19 PROCEDURE — 25010000002 LIDOCAINE 1 % SOLUTION: Performed by: SURGERY

## 2023-10-19 PROCEDURE — 76000 FLUOROSCOPY <1 HR PHYS/QHP: CPT

## 2023-10-19 PROCEDURE — 99233 SBSQ HOSP IP/OBS HIGH 50: CPT | Performed by: INTERNAL MEDICINE

## 2023-10-19 PROCEDURE — 25010000002 FENTANYL CITRATE (PF) 50 MCG/ML SOLUTION: Performed by: NURSE ANESTHETIST, CERTIFIED REGISTERED

## 2023-10-19 PROCEDURE — 25010000002 CEFAZOLIN PER 500 MG: Performed by: SURGERY

## 2023-10-19 PROCEDURE — 71045 X-RAY EXAM CHEST 1 VIEW: CPT | Performed by: RADIOLOGY

## 2023-10-19 PROCEDURE — 76000 FLUOROSCOPY <1 HR PHYS/QHP: CPT | Performed by: RADIOLOGY

## 2023-10-19 PROCEDURE — 36558 INSERT TUNNELED CV CATH: CPT | Performed by: SURGERY

## 2023-10-19 RX ORDER — FENTANYL CITRATE 50 UG/ML
INJECTION, SOLUTION INTRAMUSCULAR; INTRAVENOUS AS NEEDED
Status: DISCONTINUED | OUTPATIENT
Start: 2023-10-19 | End: 2023-10-19 | Stop reason: SURG

## 2023-10-19 RX ORDER — OXYCODONE HYDROCHLORIDE AND ACETAMINOPHEN 5; 325 MG/1; MG/1
1 TABLET ORAL ONCE AS NEEDED
Status: DISCONTINUED | OUTPATIENT
Start: 2023-10-19 | End: 2023-10-19 | Stop reason: HOSPADM

## 2023-10-19 RX ORDER — IPRATROPIUM BROMIDE AND ALBUTEROL SULFATE 2.5; .5 MG/3ML; MG/3ML
3 SOLUTION RESPIRATORY (INHALATION)
Status: DISCONTINUED | OUTPATIENT
Start: 2023-10-19 | End: 2023-10-20

## 2023-10-19 RX ORDER — ACETYLCYSTEINE 200 MG/ML
4 SOLUTION ORAL; RESPIRATORY (INHALATION)
Status: DISCONTINUED | OUTPATIENT
Start: 2023-10-19 | End: 2023-10-19

## 2023-10-19 RX ORDER — HEPARIN SODIUM (PORCINE) LOCK FLUSH IV SOLN 100 UNIT/ML 100 UNIT/ML
SOLUTION INTRAVENOUS AS NEEDED
Status: DISCONTINUED | OUTPATIENT
Start: 2023-10-19 | End: 2023-10-19 | Stop reason: HOSPADM

## 2023-10-19 RX ORDER — SODIUM CHLORIDE 9 MG/ML
INJECTION, SOLUTION INTRAVENOUS CONTINUOUS PRN
Status: DISCONTINUED | OUTPATIENT
Start: 2023-10-19 | End: 2023-10-19 | Stop reason: SURG

## 2023-10-19 RX ORDER — IPRATROPIUM BROMIDE AND ALBUTEROL SULFATE 2.5; .5 MG/3ML; MG/3ML
3 SOLUTION RESPIRATORY (INHALATION) ONCE AS NEEDED
Status: COMPLETED | OUTPATIENT
Start: 2023-10-19 | End: 2023-10-19

## 2023-10-19 RX ORDER — MIDAZOLAM HYDROCHLORIDE 1 MG/ML
0.5 INJECTION INTRAMUSCULAR; INTRAVENOUS
Status: DISCONTINUED | OUTPATIENT
Start: 2023-10-19 | End: 2023-10-19 | Stop reason: HOSPADM

## 2023-10-19 RX ORDER — SODIUM CHLORIDE 0.9 % (FLUSH) 0.9 %
10 SYRINGE (ML) INJECTION EVERY 12 HOURS SCHEDULED
Status: DISCONTINUED | OUTPATIENT
Start: 2023-10-19 | End: 2023-10-19 | Stop reason: HOSPADM

## 2023-10-19 RX ORDER — PROPOFOL 10 MG/ML
INJECTION, EMULSION INTRAVENOUS AS NEEDED
Status: DISCONTINUED | OUTPATIENT
Start: 2023-10-19 | End: 2023-10-19 | Stop reason: SURG

## 2023-10-19 RX ORDER — MIDAZOLAM HYDROCHLORIDE 1 MG/ML
1 INJECTION INTRAMUSCULAR; INTRAVENOUS
Status: DISCONTINUED | OUTPATIENT
Start: 2023-10-19 | End: 2023-10-19 | Stop reason: HOSPADM

## 2023-10-19 RX ORDER — ONDANSETRON 2 MG/ML
INJECTION INTRAMUSCULAR; INTRAVENOUS AS NEEDED
Status: DISCONTINUED | OUTPATIENT
Start: 2023-10-19 | End: 2023-10-19 | Stop reason: SURG

## 2023-10-19 RX ORDER — ASPIRIN 81 MG/1
81 TABLET ORAL DAILY
Status: DISCONTINUED | OUTPATIENT
Start: 2023-10-20 | End: 2023-10-30 | Stop reason: HOSPADM

## 2023-10-19 RX ORDER — SODIUM CHLORIDE, SODIUM LACTATE, POTASSIUM CHLORIDE, CALCIUM CHLORIDE 600; 310; 30; 20 MG/100ML; MG/100ML; MG/100ML; MG/100ML
125 INJECTION, SOLUTION INTRAVENOUS ONCE
Status: DISCONTINUED | OUTPATIENT
Start: 2023-10-19 | End: 2023-10-19 | Stop reason: HOSPADM

## 2023-10-19 RX ORDER — DEXTROSE MONOHYDRATE 25 G/50ML
25 INJECTION, SOLUTION INTRAVENOUS ONCE
Status: COMPLETED | OUTPATIENT
Start: 2023-10-19 | End: 2023-10-19

## 2023-10-19 RX ORDER — LIDOCAINE HYDROCHLORIDE 10 MG/ML
INJECTION, SOLUTION INFILTRATION; PERINEURAL AS NEEDED
Status: DISCONTINUED | OUTPATIENT
Start: 2023-10-19 | End: 2023-10-19 | Stop reason: HOSPADM

## 2023-10-19 RX ORDER — MAGNESIUM HYDROXIDE 1200 MG/15ML
LIQUID ORAL AS NEEDED
Status: DISCONTINUED | OUTPATIENT
Start: 2023-10-19 | End: 2023-10-19 | Stop reason: HOSPADM

## 2023-10-19 RX ORDER — MORPHINE SULFATE 2 MG/ML
1 INJECTION, SOLUTION INTRAMUSCULAR; INTRAVENOUS ONCE
Status: COMPLETED | OUTPATIENT
Start: 2023-10-19 | End: 2023-10-19

## 2023-10-19 RX ORDER — ACETYLCYSTEINE 200 MG/ML
1.5 SOLUTION ORAL; RESPIRATORY (INHALATION)
Status: DISCONTINUED | OUTPATIENT
Start: 2023-10-19 | End: 2023-10-20

## 2023-10-19 RX ORDER — ONDANSETRON 2 MG/ML
4 INJECTION INTRAMUSCULAR; INTRAVENOUS AS NEEDED
Status: DISCONTINUED | OUTPATIENT
Start: 2023-10-19 | End: 2023-10-19 | Stop reason: HOSPADM

## 2023-10-19 RX ORDER — SODIUM CHLORIDE, SODIUM LACTATE, POTASSIUM CHLORIDE, CALCIUM CHLORIDE 600; 310; 30; 20 MG/100ML; MG/100ML; MG/100ML; MG/100ML
100 INJECTION, SOLUTION INTRAVENOUS ONCE AS NEEDED
Status: DISCONTINUED | OUTPATIENT
Start: 2023-10-19 | End: 2023-10-19 | Stop reason: HOSPADM

## 2023-10-19 RX ORDER — EPHEDRINE SULFATE 5 MG/ML
INJECTION INTRAVENOUS AS NEEDED
Status: DISCONTINUED | OUTPATIENT
Start: 2023-10-19 | End: 2023-10-19 | Stop reason: SURG

## 2023-10-19 RX ORDER — FLUMAZENIL 0.1 MG/ML
INJECTION INTRAVENOUS AS NEEDED
Status: DISCONTINUED | OUTPATIENT
Start: 2023-10-19 | End: 2023-10-19 | Stop reason: SURG

## 2023-10-19 RX ORDER — SODIUM CHLORIDE 0.9 % (FLUSH) 0.9 %
10 SYRINGE (ML) INJECTION AS NEEDED
Status: DISCONTINUED | OUTPATIENT
Start: 2023-10-19 | End: 2023-10-19 | Stop reason: HOSPADM

## 2023-10-19 RX ORDER — FAMOTIDINE 10 MG/ML
INJECTION, SOLUTION INTRAVENOUS AS NEEDED
Status: DISCONTINUED | OUTPATIENT
Start: 2023-10-19 | End: 2023-10-19 | Stop reason: SURG

## 2023-10-19 RX ORDER — ALPRAZOLAM 1 MG/1
1 TABLET ORAL ONCE
Status: COMPLETED | OUTPATIENT
Start: 2023-10-19 | End: 2023-10-19

## 2023-10-19 RX ORDER — SODIUM CHLORIDE 9 MG/ML
40 INJECTION, SOLUTION INTRAVENOUS AS NEEDED
Status: DISCONTINUED | OUTPATIENT
Start: 2023-10-19 | End: 2023-10-19 | Stop reason: HOSPADM

## 2023-10-19 RX ADMIN — ACETYLCYSTEINE 1.5 ML: 200 SOLUTION ORAL; RESPIRATORY (INHALATION) at 23:10

## 2023-10-19 RX ADMIN — IPRATROPIUM BROMIDE AND ALBUTEROL SULFATE 3 ML: 2.5; .5 SOLUTION RESPIRATORY (INHALATION) at 00:30

## 2023-10-19 RX ADMIN — Medication 1000 MCG: at 08:51

## 2023-10-19 RX ADMIN — FAMOTIDINE 20 MG: 10 INJECTION, SOLUTION INTRAVENOUS at 12:40

## 2023-10-19 RX ADMIN — ACETYLCYSTEINE 1.5 ML: 200 SOLUTION ORAL; RESPIRATORY (INHALATION) at 16:28

## 2023-10-19 RX ADMIN — PROPOFOL 10 MG: 10 INJECTION, EMULSION INTRAVENOUS at 12:50

## 2023-10-19 RX ADMIN — Medication 10 ML: at 08:51

## 2023-10-19 RX ADMIN — EPHEDRINE SULFATE 10 MG: 5 INJECTION INTRAVENOUS at 13:01

## 2023-10-19 RX ADMIN — PROPOFOL 10 MG: 10 INJECTION, EMULSION INTRAVENOUS at 12:46

## 2023-10-19 RX ADMIN — CEFAZOLIN 2000 MG: 2 INJECTION, POWDER, FOR SOLUTION INTRAMUSCULAR; INTRAVENOUS at 12:40

## 2023-10-19 RX ADMIN — CARVEDILOL 25 MG: 25 TABLET, FILM COATED ORAL at 08:51

## 2023-10-19 RX ADMIN — EPHEDRINE SULFATE 15 MG: 5 INJECTION INTRAVENOUS at 12:44

## 2023-10-19 RX ADMIN — MORPHINE SULFATE 1 MG: 2 INJECTION, SOLUTION INTRAMUSCULAR; INTRAVENOUS at 21:07

## 2023-10-19 RX ADMIN — INSULIN HUMAN 5 UNITS: 100 INJECTION, SOLUTION PARENTERAL at 09:06

## 2023-10-19 RX ADMIN — IPRATROPIUM BROMIDE AND ALBUTEROL SULFATE 3 ML: .5; 2.5 SOLUTION RESPIRATORY (INHALATION) at 16:28

## 2023-10-19 RX ADMIN — Medication 1 MG: at 08:51

## 2023-10-19 RX ADMIN — PANTOPRAZOLE SODIUM 40 MG: 40 TABLET, DELAYED RELEASE ORAL at 08:51

## 2023-10-19 RX ADMIN — ASPIRIN 325 MG: 325 TABLET, COATED ORAL at 08:51

## 2023-10-19 RX ADMIN — FLUMAZENIL 0.5 MG: 0.1 INJECTION, SOLUTION INTRAVENOUS at 13:40

## 2023-10-19 RX ADMIN — ONDANSETRON 4 MG: 2 INJECTION INTRAMUSCULAR; INTRAVENOUS at 12:40

## 2023-10-19 RX ADMIN — FENTANYL CITRATE 100 MCG: 50 INJECTION, SOLUTION INTRAMUSCULAR; INTRAVENOUS at 12:40

## 2023-10-19 RX ADMIN — ALPRAZOLAM 1 MG: 1 TABLET ORAL at 09:33

## 2023-10-19 RX ADMIN — DEXTROSE MONOHYDRATE 25 G: 25 INJECTION, SOLUTION INTRAVENOUS at 09:07

## 2023-10-19 RX ADMIN — SODIUM CHLORIDE: 9 INJECTION, SOLUTION INTRAVENOUS at 12:33

## 2023-10-19 RX ADMIN — IPRATROPIUM BROMIDE AND ALBUTEROL SULFATE 3 ML: .5; 2.5 SOLUTION RESPIRATORY (INHALATION) at 23:10

## 2023-10-19 RX ADMIN — IPRATROPIUM BROMIDE AND ALBUTEROL SULFATE 3 ML: 2.5; .5 SOLUTION RESPIRATORY (INHALATION) at 07:14

## 2023-10-19 RX ADMIN — PROPOFOL 20 MG: 10 INJECTION, EMULSION INTRAVENOUS at 12:42

## 2023-10-19 RX ADMIN — IPRATROPIUM BROMIDE AND ALBUTEROL SULFATE 3 ML: .5; 2.5 SOLUTION RESPIRATORY (INHALATION) at 13:26

## 2023-10-19 RX ADMIN — Medication 10 ML: at 21:07

## 2023-10-19 NOTE — ANESTHESIA PREPROCEDURE EVALUATION
Anesthesia Evaluation     no history of anesthetic complications:   NPO Solid Status: Waived due to emergency  NPO Liquid Status: Waived due to emergency           Airway   Mallampati: II  TM distance: >3 FB  Neck ROM: full  No difficulty expected  Dental    (+) upper dentures    Pulmonary - normal exam   Cardiovascular - normal exam    (+) hypertension, CAD, hyperlipidemia      Neuro/Psych  (+) headaches, psychiatric history Anxiety  GI/Hepatic/Renal/Endo    (+) obesity, morbid obesity, renal disease- ARF, thyroid problem     Musculoskeletal     Abdominal  - normal exam    Bowel sounds: normal.   Substance History      OB/GYN          Other                          Anesthesia Plan    ASA 3     general     intravenous induction     Anesthetic plan, risks, benefits, and alternatives have been provided, discussed and informed consent has been obtained with: patient.        CODE STATUS:    Medical Intervention Limits: NO intubation (DNI)  Level Of Support Discussed With: Patient  Code Status (Patient has no pulse and is not breathing): No CPR (Do Not Attempt to Resuscitate)  Medical Interventions (Patient has pulse or is breathing): Limited Support

## 2023-10-19 NOTE — ANESTHESIA POSTPROCEDURE EVALUATION
Patient: Lesley Michel    Procedure Summary       Date: 10/19/23 Room / Location: [facility] OR 02 /  [facility] OR    Anesthesia Start: 1230 Anesthesia Stop: 1308    Procedure: HEMODIALYSIS CATHETER INSERTION (Right) Diagnosis:       Acute renal failure, unspecified acute renal failure type      (Acute renal failure, unspecified acute renal failure type [N17.9])    Surgeons: Bartolome Schwartz MD Provider: Artur Bess MD    Anesthesia Type: general ASA Status: 3            Anesthesia Type: general    Vitals  Vitals Value Taken Time   /66 10/19/23 1347   Temp 97 °F (36.1 °C) 10/19/23 1309   Pulse 61 10/19/23 1352   Resp 22 10/19/23 1309   SpO2 92 % 10/19/23 1352   Vitals shown include unfiled device data.        Post Anesthesia Care and Evaluation    Patient location during evaluation: PHASE II  Patient participation: complete - patient participated  Level of consciousness: responsive to light touch  Pain score: 1  Pain management: adequate    Airway patency: patent  Anesthetic complications: No anesthetic complications  PONV Status: controlled  Cardiovascular status: acceptable  Respiratory status: acceptable  Hydration status: acceptable

## 2023-10-19 NOTE — THERAPY TREATMENT NOTE
Acute Care - Physical Therapy Treatment Note   Sil[facility]re     Patient Name: Lesley Michel  : 1955  MRN: [id number]  Today's Date: 10/19/2023   Onset of Illness/Injury or Date of Surgery: 10/08/23  Visit Dx:     ICD-10-CM ICD-9-CM   1. Acute kidney injury superimposed on chronic kidney disease  N17.9 584.9    N18.9 585.9   2. Acute on chronic congestive heart failure, unspecified heart failure type  I50.9 428.0   3. Acute on chronic respiratory failure with hypoxia  J96.21 518.84     799.02   4. Hyperkalemia  E87.5 276.7   5. Acute renal failure, unspecified acute renal failure type  N17.9 584.9     Patient Active Problem List   Diagnosis    Hypertension    Hyperlipidemia    Migraine without aura    CAD, chronically occluded collateralized dominant RCA, status post drug-eluting stent to LAD , high-grade stenosis of the ostium of the small D1 and 60% mid RCA posterior lateral branch    Chronic renal failure, stage 2 (mild)    Tobacco use    Bilateral lower extremity edema    Other specified hypothyroidism    Cigarette smoker    Class 3 severe obesity without serious comorbidity with body mass index (BMI) of 40.0 to 44.9 in adult    Acute hypoxic respiratory failure    Acute renal failure (ARF)     Past Medical History:   Diagnosis Date    Anxiety     Arthritis     Coronary artery disease     H/O heart artery stent     Hyperlipidemia     Hypertension     Obesity      Past Surgical History:   Procedure Laterality Date    ANKLE SURGERY      RIGHT    APPENDECTOMY      CARDIAC CATHETERIZATION      LEFT    CORONARY STENT PLACEMENT      HYSTERECTOMY       PT Assessment (last 12 hours)       PT Evaluation and Treatment       Row Name 10/19/23 1153          Physical Therapy Time and Intention    Subjective Information no complaints  -HC     Document Type therapy note (daily note)  -HC     Mode of Treatment individual therapy;physical therapy  -HC     Patient Effort good  -HC     Comment Pt and RN Tamar in  agreement for PT. Pts family in room throughout Rx. Pt walked 20' x 2 with RW CGA/min A. . BR transfer min A.  -       Row Name 10/19/23 1153          General Information    Patient Profile Reviewed yes  -     Existing Precautions/Restrictions fall;oxygen therapy device and L/min  -       Row Name 10/19/23 1153          Cognition    Follows Commands (Cognition) WFL  -     Personal Safety Interventions fall prevention program maintained;gait belt;nonskid shoes/slippers when out of bed;supervised activity  -       Row Name 10/19/23 1153          Bed Mobility    Bed Mobility supine-sit;sit-supine  -     Supine-Sit Robinson (Bed Mobility) standby assist  -       Row Name 10/19/23 1153          Transfers    Transfers sit-stand transfer;stand-sit transfer;toilet transfer  -       Row Name 10/19/23 1153          Sit-Stand Transfer    Sit-Stand Robinson (Transfers) contact guard  -     Assistive Device (Sit-Stand Transfers) walker, front-wheeled  -       Row Name 10/19/23 1153          Stand-Sit Transfer    Stand-Sit Robinson (Transfers) contact guard  -     Assistive Device (Stand-Sit Transfers) walker, front-wheeled  -       Row Name 10/19/23 1153          Stand Pivot/Stand Step Transfer    Stand Pivot/Stand Step Robinson (Transfers) verbal cues;contact guard  -     Assistive Device (Stand Pivot Stand Step Transfer) walker, front-wheeled  -       Row Name 10/19/23 1153          Toilet Transfer    Type (Toilet Transfer) stand pivot/stand step  -     Robinson Level (Toilet Transfer) minimum assist (75% patient effort)  -     Assistive Device (Toilet Transfer) walker, front-wheeled  -       Row Name 10/19/23 1153          Gait/Stairs (Locomotion)    Gait/Stairs Locomotion gait/ambulation independence;distance ambulated  -     Robinson Level (Gait) contact guard;minimum assist (75% patient effort)  -     Assistive Device (Gait) walker, front-wheeled  -      Patient was able to Ambulate yes  -HC     Distance in Feet (Gait) 20' x 2  -HC     Pattern (Gait) step-to  -HC     Deviations/Abnormal Patterns (Gait) antalgic;base of support, wide;gait speed decreased;stride length decreased  -HC       Row Name 10/19/23 1153          Safety Issues, Functional Mobility    Impairments Affecting Function (Mobility) endurance/activity tolerance;balance;pain;shortness of breath;strength  -HC       Row Name             Wound 10/09/23 0422 Left lower arm Extravasation    Wound - Properties Group Placement Date: 10/09/23  - Placement Time: 0422 -SM Side: Left  -SM Orientation: lower  -SM Location: arm  -SM Primary Wound Type: Extravasatio  -SM    Retired Wound - Properties Group Placement Date: 10/09/23  -SM Placement Time: 0422 -SM Side: Left  -SM Orientation: lower  -SM Location: arm  -SM Primary Wound Type: Extravasatio  -SM    Retired Wound - Properties Group Date first assessed: 10/09/23  - Time first assessed: 0422 -SM Side: Left  -SM Location: arm  -SM Primary Wound Type: Extravasatio  -SM      Row Name 10/19/23 1153          Positioning and Restraints    Pre-Treatment Position in bed  -HC     Post Treatment Position chair  -HC     In Chair sitting;call light within reach;encouraged to call for assist;with family/caregiver;notified ns  -               User Key  (r) = Recorded By, (t) = Taken By, (c) = Cosigned By      Initials Name Provider Type    Fiordaliza Echevarria RN Registered Nurse    Supriya Lopez PTA Physical Therapist Assistant                    Physical Therapy Education       Title: PT OT SLP Therapies (Done)       Topic: Physical Therapy (Done)       Point: Mobility training (Done)       Learning Progress Summary             Patient Acceptance, E,D, VU,NR by  at 10/9/2023 1623                         Point: Home exercise program (Done)       Learning Progress Summary             Patient Acceptance, E,D, VU,NR by  at 10/9/2023 1623                          Point: Body mechanics (Done)       Learning Progress Summary             Patient Acceptance, E,D, VU,NR by  at 10/9/2023 1623                         Point: Precautions (Done)       Learning Progress Summary             Patient Acceptance, E,D, VU,NR by  at 10/9/2023 1623                                         User Key       Initials Effective Dates Name Provider Type Discipline     06/16/21 -  Ayana Logan, PT Physical Therapist PT                  PT Recommendation and Plan             Time Calculation:    PT Charges       Row Name 10/19/23 1156             Time Calculation    PT Received On 10/19/23  -HC         Time Calculation- PT    Total Timed Code Minutes- PT 15 minute(s)  -                User Key  (r) = Recorded By, (t) = Taken By, (c) = Cosigned By      Initials Name Provider Type     Supriya Carias PTA Physical Therapist Assistant                  Therapy Charges for Today       Code Description Service Date Service Provider Modifiers Qty    59352832660 HC GAIT TRAINING EA 15 MIN 10/18/2023 Supriya Carias PTA GP, CQ 1    21986085162 HC PT THER PROC EA 15 MIN 10/18/2023 Supriya Carias PTA GP, CQ 1    15082980783 HC GAIT TRAINING EA 15 MIN 10/19/2023 Supriya Carias, BASILIA GP, CQ 1            PT G-Codes  AM-PAC 6 Clicks Score (PT): 22    Supriya Carias PTA  10/19/2023

## 2023-10-19 NOTE — OP NOTE
HEMODIALYSIS CATHETER INSERTION  Procedure Note    Lesley Michel  10/19/2023    Pre-op Diagnosis:   Acute renal failure, unspecified acute renal failure type [N17.9]    Post-op Diagnosis:     Post-Op Diagnosis Codes:     * Acute renal failure, unspecified acute renal failure type [N17.9]    Procedure(s):  HEMODIALYSIS CATHETER INSERTION    Surgeon(s):  Bartolome Schwartz MD    Anesthesia: Choice    Staff:   Circulator: Ora Gonzalez RN  Radiology Technologist: Ethan Moreno  Scrub Person: Harry Sotomayor; Harry Erwin  Assistant: Kika Milton    Findings: Successful placement of 23 cm tunneled hemodialysis catheter into the right internal jugular vein          Operative Procedure: Patient was taken operating suite placed upon the operating table.  After induction of MAC anesthesia the right neck and chest were prepped and draped in usual sterile fashion.  Timeout procedure was performed.  Under ultrasound visualization after injection of local anesthetic an 18-gauge access needle was inserted into the right internal jugular vein and venous blood was aspirated.  Guidewire was placed without resistance and confirmed in appropriate position with fluoroscopy.  Ultrasound also confirmed guidewire resting in the right internal jugular vein.  A stab incision with guidewire entry site was made and via Seldinger technique and attempted serial dilation was made but the catheter in the subcutaneous tissue developed a kink and required reaccess and replacement of the guidewire.  Following replacement of the guidewire this was once again confirmed in the right internal jugular vein and the tract was serially dilated via Seldinger technique and a dilator introducer sheath inserted into the right internal jugular vein.  The 23 cm tunneled hemodialysis catheter was inserted into the breakaway sheath and the sheath was removed.  The tips were positioned appropriately and an exit site on the right chest was  identified.  A stab incision at this location was made and tunneler device was passed through the exit site up to the access site in the right neck.  This tract was dilated and in a retrograde fashion the catheter was pulled through the subcutaneous tunnel with the tips in appropriate position in the cuff 2 to 3 cm above the exit site.  The catheter was assembled and accessed and withdrew venous blood and flushed with heparin easily.  Fluoroscopy confirmed the tips in appropriate position at this time the catheter was secured to the skin with nylon suture and the neck access site closed with Monocryl subcuticular suture and dressed with skin affix.  A Biopatch occlusive dressing was placed at the exit site and the patient was awakened from anesthesia and taken recovery where stat chest x-ray was performed.    Estimated Blood Loss: 25 mL    Specimens:   None           Drains: None    Grafts/Implants: Right IJ 23 centimeter tunneled hemodialysis catheter    Complications: None           Bartolome Schwartz MD     Date: 10/19/2023  Time: 13:02 EDT

## 2023-10-19 NOTE — PROGRESS NOTES
[facility] HOSPITALIST PROGRESS NOTE     Patient Identification:  Name:  Lesley Michel  Age:  67 y.o.  Sex:  female  :  1955  MRN:  [id number]  Visit Number:  [id number]  ROOM: [address] [place] [address]     Primary Care Provider:  Woodrow Raymond APRN    Length of stay in inpatient status:  9    Subjective     Chief Compliant:    Chief Complaint   Patient presents with    Shortness of Breath    Edema       History of Presenting Illness: Patient seen and evaluated in follow-up for anasarca with MAICO on CKD stage IIIa complicated by acute exacerbation of COPD and bilateral pneumonia with acute hypoxemic respiratory failure.  Patient with successful placement of tunneled hemodialysis catheter today.  However postop x-ray notable for increased pleural effusion and likely mucous plugging of the right lower lobe.  Discussed with pulmonology and conservative measures today and possible bronc tomorrow if not improving.    Objective     Current Hospital Meds:  acetylcysteine, 1.5 mL, Nebulization, Q4H - RT  aspirin, 325 mg, Oral, Daily  carvedilol, 25 mg, Oral, BID With Meals  folic acid, 1 mg, Oral, Daily  heparin (porcine), 5,000 Units, Subcutaneous, Q12H  hydrALAZINE, 50 mg, Oral, Q8H  ipratropium-albuterol, 3 mL, Nebulization, Q4H - RT  levothyroxine, 88 mcg, Oral, Q AM  montelukast, 10 mg, Oral, Nightly  nicotine, 1 patch, Transdermal, Q24H  pantoprazole, 40 mg, Oral, Daily  rosuvastatin, 10 mg, Oral, Nightly  sodium chloride, 10 mL, Intravenous, Q12H  sodium zirconium cyclosilicate, 10 g, Oral, Once  vitamin B-12, 1,000 mcg, Oral, Daily         ----------------------------------------------------------------------------------------------------------------------  Vital Signs:  Temp:  [97 °F (36.1 °C)-98.1 °F (36.7 °C)] 97.9 °F (36.6 °C)  Heart Rate:  [60-77] 66  Resp:  [16-24] 20  BP: ()/(47-80) 138/80  SpO2:  [84 %-96 %] 95 %  on  Flow (L/min):  [3-6] 4;   Device (Oxygen Therapy): nasal  "cannula;humidified  Body mass index is 47.09 kg/m².      Intake/Output Summary (Last 24 hours) at 10/19/2023 1727  Last data filed at 10/19/2023 1302  Gross per 24 hour   Intake 290 ml   Output --   Net 290 ml      ----------------------------------------------------------------------------------------------------------------------  Physical exam:  Constitutional: Elderly chronically ill-appearing adult female.  No acute distress.      HENT:  Head:  Normocephalic and atraumatic.  Mouth:  Moist mucous membranes.    Eyes:  Conjunctivae and EOM are normal. No scleral icterus.    Cardiovascular:  Normal rate, regular rhythm and normal heart sounds with no murmur.  Pulmonary/Chest:  No respiratory distress, no wheezes, no crackles, with diminished breath sounds at the bases.  Abdominal:  Soft.  Bowel sounds are normal.  No distension and no tenderness.   Musculoskeletal:  No tenderness and no deformity.  No red or swollen joints anywhere.   Neurological: Somnolent but arousable and oriented.  No cranial nerve deficit.  No tongue deviation.  No facial droop.  No slurred speech. Intact Sensation throughout  Skin:  Skin is warm and dry. No rash or lesion noted. No pallor.   Peripheral vascular:  Pulses in all 4 extremities with no clubbing, no cyanosis, 2+ bilateral lower extremity edema with trace edema of the upper extremity.  Psychiatric: Appropriate mood and affect, pleasant.   ----------------------------------------------------------------------------------------------------------------------     BUN/CREAT/GLUC/ALT/AST/INOCENTE/LIP    75/6.93/107/--/--/--/-- (10/19 0055)  JESSICA - Na/K/Cl/CO2: 134*/5.9*/101/20.2* (10/19 0055)        No results found for: \"URINECX\"  No results found for: \"BLOODCX\"    I have personally looked at the labs and they are summarized above.  ----------------------------------------------------------------------------------------------------------------------  Detailed radiology reports for the last " 24 hours:  CT Chest Without Contrast Diagnostic    Result Date: 10/19/2023  1.  Tiny pericardial effusion. 2.  Right lung base fairly extensive atelectasis and probably consolidative pneumonia. Again there may be some mucus plugging or narrowing. 3.  Again there is tiny bilateral pleural effusions. 4.  Cardiomegaly.   This report was finalized on 10/19/2023 3:51 PM by Dr. Kirk Mcdonald MD.      XR Chest AP    Result Date: 10/19/2023  1.  There is been collapse of probably the right lower lobe possibly related to mucous plug. 2.  Small right pleural effusion persists. 3.  Improved aeration of the left lung base.   This report was finalized on 10/19/2023 2:00 PM by Dr. Kirk Mcdonald MD.      FL Surgery Fluoro    Result Date: 10/19/2023    As above.   This report was finalized on 10/19/2023 1:42 PM by Dr. Kirk Mcdonald MD.      XR Chest 1 View    Result Date: 10/18/2023  1.  Coarsened interstitial markings and vague bibasilar airspace opacities again noted. 2.  Now small right pleural effusion. 3.  Tiny left pleural effusion. 4.  Cardiomegaly.   This report was finalized on 10/18/2023 10:22 AM by Dr. Kirk Mcdonald MD.     Assessment & Plan      Anasarca  Hyperkalemia  MAICO on CKD stage IIIa with likely progression to CKD V vs ESRD    -Patient presenting with anasarca with echo showing EF of 56 to 60% with grade 1 diastolic dysfunction, mild AAS, mild pulmonary hypertension and small pericardial effusion without evidence of tamponade.    -Patient with progressive worsening renal failure likely contributing to volume retention with possibly some component of acute HFpEF.    -Reds vest remains elevated.  Patient trialed on diuretic therapy with little to no urine output and remains within anuric range.      -Nephrology consulted and following along, volume status management per their discretion.  Patient has been  trialed on high-dose diuretic therapy to no avail with continued worsening/no improvement in renal function.   As such nephrology recommending hemodialysis and patient agreeable and had successful tunneled hemodialysis catheter placement today.    -First dialysis session today, monitor for hypotension postdialysis.    -Palliative care consulted and following along.      Bilateral bacterial pneumonia  Acute exacerbation of COPD  Acute hypoxemic respiratory failure  Right lower lobe mucous plug with atelectasis and collapse    -Procalcitonin normal, cultures without growth.    -Completed an appropriate course of azithromycin    -Continue nebulized medications    -Patient maintaining 3 L nasal cannula requirement.  However postoperatively postop chest x-ray showing right lower lobe collapse and atelectasis likely due to mucous plug with pleural effusion.  CT imaging obtained for better characterization of lung parenchyma and shows the same as well as dense consolidation possibly pneumonia.    -Mucomyst 4 times daily with DuoNebs for conservative management for now after discussion with pulmonology.  Repeat chest x-ray in the morning if no resolution of mucous plugging plan for therapeutic bronchoscopy.    -Continue to hold on antibiotic therapy for now and follow-up on chest x-ray.    Essential hypertension    -Continue home antihypertensives as appropriate.      Hypothyroidism    -Continue Synthroid    Tobacco abuse    -Cessation counseled.    Morbid obesity    -Complicates all aspects of care    Copied text in portions of the note has been reviewed and is accurate as of 10/19/23    VTE Prophylaxis:   Mechanical Order History:       None          Pharmalogical Order History:        Ordered     Dose Route Frequency Stop    10/08/23 2052  heparin (porcine) 5000 UNIT/ML injection 5,000 Units         5,000 Units SC Every 12 Hours Scheduled --                    Disposition Home once medically stable and improved and hemodialysis outpatient chair arranged.    Jay Jay Pritchett DO  [facility] Hospitalist  10/19/23  17:27  EDT

## 2023-10-19 NOTE — PLAN OF CARE
Goal Outcome Evaluation:   Pt is at Diaylsis at this time. No signs or symptoms of distress noted. Care on going

## 2023-10-19 NOTE — PROGRESS NOTES
Chief complaint: Acute kidney injury    No acute events.  Patient with Xanax this morning for anxiety and is somewhat drowsy but stable.    No acute distress, sleeping but arousable  Clear to auscultation bilaterally with diminished bases    67-year-old female with acute on chronic kidney injury and need for tunneled hemodialysis access.  -OR for tunneled hemodialysis catheter placement today.

## 2023-10-19 NOTE — CONSULTS
[facility]   Consult Note    Patient Name: Lesley Michel  : 1955  MRN: [id number]  Primary Care Physician:  Woodrow Raymond APRN  Referring Physician: No ref. provider found  Date of admission: 10/8/2023    Consults  Subjective   Subjective     Reason for Consult/ Chief Complaint: Need for hemodialysis access    Shortness of Breath  Pertinent negatives include no abdominal pain, chest pain, fever, headaches, rash, sore throat or vomiting.     Lesley Michel is a 67 y.o. female with acute on chronic worsening kidney disease and significant cardiopulmonary complications with need for improved volume status via hemodialysis and likely progression to hemodialysis with her underlying kidney disease.  No anticoagulation reported.  Patient on nasal cannula oxygen 4 L on examination.  She is alert and oriented.  No previous dialysis access reported.    Review of Systems   Constitutional:  Negative for activity change, appetite change, chills and fever.   HENT:  Negative for sore throat and trouble swallowing.    Eyes:  Negative for visual disturbance.   Respiratory:  Positive for shortness of breath. Negative for cough.    Cardiovascular:  Negative for chest pain and palpitations.   Gastrointestinal:  Negative for abdominal distention, abdominal pain, blood in stool, constipation, diarrhea, nausea and vomiting.   Endocrine: Negative for cold intolerance and heat intolerance.   Genitourinary:  Negative for dysuria.   Musculoskeletal:  Negative for joint swelling.   Skin:  Negative for color change, rash and wound.   Allergic/Immunologic: Negative for immunocompromised state.   Neurological:  Negative for dizziness, seizures, weakness and headaches.   Hematological:  Negative for adenopathy. Does not bruise/bleed easily.   Psychiatric/Behavioral:  Negative for agitation and confusion.         Personal History     Past Medical History:   Diagnosis Date    Anxiety     Arthritis     Coronary artery disease      H/O heart artery stent     Hyperlipidemia     Hypertension     Obesity        Past Surgical History:   Procedure Laterality Date    ANKLE SURGERY      RIGHT    APPENDECTOMY      CARDIAC CATHETERIZATION      LEFT    CORONARY STENT PLACEMENT      HYSTERECTOMY         Family History: family history includes Coronary artery disease in an other family member; Fibromyalgia in her mother; Heart attack (age of onset: 72) in her father; Heart attack (age of onset: 73) in her mother; Heart disease in her mother; Ovarian cancer in her sister. Otherwise pertinent FHx was reviewed and not pertinent to current issue.    Social History:  reports that she has been smoking electronic cigarette and cigarettes. She has a 15.00 pack-year smoking history. She has never used smokeless tobacco. She reports that she does not drink alcohol and does not use drugs.    Home Medications:   HYDROcodone-acetaminophen, aspirin, carvedilol, folic acid, gabapentin, hydrALAZINE, levothyroxine, lisinopril, montelukast, nitroglycerin, pantoprazole, rosuvastatin, and vitamin B-12    Allergies:  No Known Allergies    Objective    Objective     Vitals:  Temp:  [97.7 °F (36.5 °C)-98.5 °F (36.9 °C)] 97.7 °F (36.5 °C)  Heart Rate:  [60-77] 77  Resp:  [18] 18  BP: (111-138)/(56-78) 119/56  Flow (L/min):  [3] 3    Physical Exam  Constitutional:       Appearance: She is well-developed.   HENT:      Head: Normocephalic and atraumatic.   Eyes:      Conjunctiva/sclera: Conjunctivae normal.      Pupils: Pupils are equal, round, and reactive to light.   Neck:      Thyroid: No thyromegaly.      Vascular: No JVD.      Trachea: No tracheal deviation.   Cardiovascular:      Rate and Rhythm: Normal rate and regular rhythm.      Heart sounds: No murmur heard.     No friction rub. No gallop.   Pulmonary:      Effort: Pulmonary effort is normal.      Comments: Diminished bases bilaterally  Abdominal:      General: There is no distension.      Palpations: Abdomen is soft.  There is no hepatomegaly or splenomegaly.      Tenderness: There is no abdominal tenderness.      Hernia: No hernia is present.   Musculoskeletal:         General: No deformity. Normal range of motion.      Cervical back: Neck supple.   Skin:     General: Skin is warm and dry.   Neurological:      Mental Status: She is alert and oriented to person, place, and time.         Result Review    Result Review:  I have personally reviewed the results from the time of this admission to 10/19/2023 08:55 EDT and agree with these findings:  [x]  Laboratory list / accordion  []  Microbiology  [x]  Radiology  []  EKG/Telemetry   []  Cardiology/Vascular   []  Pathology  []  Old records  []  Other:        Assessment & Plan   Assessment / Plan     Brief Patient Summary:  Lesley Michel is a 67 y.o. female who has acute on chronic kidney disease with need for long-term hemodialysis access.    Active Hospital Problems:  Active Hospital Problems    Diagnosis     **Acute hypoxic respiratory failure     Acute renal failure (ARF)      Plan:   OR for tunneled hemodialysis catheter placement.  I discussed the risk and benefits of the procedure with the patient.    Bartolome Schwartz MD

## 2023-10-19 NOTE — PROGRESS NOTES
Nephrology Progress Note      Subjective     Patient is nervous and tearful but willing to proceed with catheter placement today    Objective       Vital signs :     Vitals:    10/19/23 0300 10/19/23 0500 10/19/23 0714 10/19/23 0727   BP: 125/72      BP Location: Left arm      Patient Position: Lying      Pulse: 60  65 77   Resp: 18  18 18   Temp: 97.7 °F (36.5 °C)      TempSrc:       SpO2: 95%  94% 93%   Weight:  109 kg (241 lb 1.6 oz)     Height:            Intake/Output                         10/17/23 0701 - 10/18/23 0700 10/18/23 0701 - 10/19/23 0700     8750-6102 1592-5313 Total 0826-0326 2764-2295 Total                 Intake    P.O.  600  120 720  720  -- 720    Total Intake 600 120 720 720 -- 720       Output    Urine  5  300 305  375  -- 375    Total Output 5 300 305 375 -- 375             Physical Exam:    General Appearance : Not in acute distress  Lungs : Bibasilar crackles noted trace edema  Heart :  regular rhythm & normal rate, normal S1, S2 and no murmur, no rub  Abdomen : Soft nontender nondistended   extremities : Trace edema  Neurologic :   orientated to person, place, time and situation, Grossly no focal deficits    Laboratory Data :       CBC and coagulation:  Results from last 7 days   Lab Units 10/18/23  0059 10/17/23  1744 10/16/23  0042   WBC 10*3/mm3 11.60* 10.86* 9.32   HEMOGLOBIN g/dL 9.3* 9.2* 10.5*   HEMATOCRIT % 31.5* 31.0* 34.2   MCV fL 91.8 91.2 89.5   MCHC g/dL 29.5* 29.7* 30.7*   PLATELETS 10*3/mm3 242 259 325     Acid/base balance:  Results from last 7 days   Lab Units 10/16/23  2202 10/16/23  2020   PH, ARTERIAL pH units 7.311* 7.288*   PO2 ART mm Hg 74.9* 75.8*   PCO2, ARTERIAL mm Hg 47.4* 50.6*   HCO3 ART mmol/L 23.9 24.2       Renal and electrolytes:    Results from last 7 days   Lab Units 10/19/23  0055 10/18/23  0059 10/17/23  1744 10/17/23  0038 10/16/23  0042   SODIUM mmol/L 134* 132* 134* 139 140   POTASSIUM mmol/L 5.9* 5.2 5.2 5.3* 4.9   CHLORIDE mmol/L 101 100 99 104  "106   CO2 mmol/L 20.2* 20.5* 22.9 22.5 22.5   BUN mg/dL 75* 69* 69* 63* 61*   CREATININE mg/dL 6.93* 6.32* 6.10* 5.10* 4.47*   CALCIUM mg/dL 9.0 9.0 8.7 8.6 9.1     Estimated Creatinine Clearance: 9.1 mL/min (A) (by C-G formula based on SCr of 6.93 mg/dL (H)).     Liver and pancreatic function:  Results from last 7 days   Lab Units 10/18/23  0059 10/17/23  0038 10/16/23  0042   ALBUMIN g/dL 3.6 3.5 4.0   BILIRUBIN mg/dL 0.3 0.2 0.4   ALK PHOS U/L 134* 89 94   AST (SGOT) U/L 10 8 10   ALT (SGPT) U/L 8 6 6       Albumin Albumin   Date Value Ref Range Status   10/18/2023 3.6 3.5 - 5.2 g/dL Final   10/17/2023 3.5 3.5 - 5.2 g/dL Final      Magnesium No results found for: \"MG\"         PTH               No results found for: \"PTH\"    Cardiac:        Liver and pancreatic function:  Results from last 7 days   Lab Units 10/18/23  0059 10/17/23  0038 10/16/23  0042   ALBUMIN g/dL 3.6 3.5 4.0   BILIRUBIN mg/dL 0.3 0.2 0.4   ALK PHOS U/L 134* 89 94   AST (SGOT) U/L 10 8 10   ALT (SGPT) U/L 8 6 6       XR Chest 1 View    Result Date: 10/18/2023  1.  Coarsened interstitial markings and vague bibasilar airspace opacities again noted. 2.  Now small right pleural effusion. 3.  Tiny left pleural effusion. 4.  Cardiomegaly.   This report was finalized on 10/18/2023 10:22 AM by Dr. Kirk Mcdonald MD.      XR Chest 1 View    Result Date: 10/13/2023  1.  Stable changes of CHF with interstitial edema. 2.  Development of bibasilar airspace disease which may represent atelectasis or pneumonia.   This report was finalized on 10/13/2023 12:43 PM by Dr. Eduardo August MD.      XR Chest 1 View    Result Date: 10/12/2023    Significant interval improvement in CHF/volume overload.   This report was finalized on 10/12/2023 12:18 PM by Dr. Eduardo August MD.        Medications :     aspirin, 325 mg, Oral, Daily  carvedilol, 25 mg, Oral, BID With Meals  folic acid, 1 mg, Oral, Daily  heparin (porcine), 5,000 Units, Subcutaneous, Q12H  hydrALAZINE, 50 " mg, Oral, Q8H  ipratropium-albuterol, 3 mL, Nebulization, 4x Daily - RT  levothyroxine, 88 mcg, Oral, Q AM  montelukast, 10 mg, Oral, Nightly  nicotine, 1 patch, Transdermal, Q24H  pantoprazole, 40 mg, Oral, Daily  rosuvastatin, 10 mg, Oral, Nightly  sodium chloride, 10 mL, Intravenous, Q12H  vitamin B-12, 1,000 mcg, Oral, Daily             Assessment & Plan     -Acute kidney injury  - Acute hypokalemia  -Atrophic right kidney  -Chronic kidney disease stage IIIa  -Acute hyperkalemia  -Bilateral edema  -Acute hypoxic respite failure  -COPD  -Hypertension  -Medical noncompliance    Getting tunneled dialysis catheter placement today and dialysis low efficacy for 2 and half hours and plan to do dialysis again tomorrow.  Educated and counseled the patient and family members present at bedside  Hyperkalemia is likely to be improving after dialysis    MAICO on CKD most likely multifactori including ATN from prolonged prerenal state and hemodynamic changes with concomitant use of ACE inhibitors in setting of volume loss   baseline creatinine 2.2, admitted with 3.69   ultrasound of the kidneys showed atrophic right kidney  Serologies are negative    Bilateral lower extremity swelling is better, will hold further diuretics today  - [place] [facility]'s catheter    Please avoid nephrotoxic medication and pharmacy to adjust the medication according to the GFR     Plan of care discussed with pt, and family answered all questions, patient verbalized understanding and agreed.      Vani León MD  10/19/23  08:10 EDT

## 2023-10-19 NOTE — ANESTHESIA POSTPROCEDURE EVALUATION
Patient: Lesley Michel    Procedure Summary       Date: 10/19/23 Room / Location: [facility] OR 02 /  [facility] OR    Anesthesia Start: 1230 Anesthesia Stop: 1308    Procedure: HEMODIALYSIS CATHETER INSERTION (Right) Diagnosis:       Acute renal failure, unspecified acute renal failure type      (Acute renal failure, unspecified acute renal failure type [N17.9])    Surgeons: Bartolome Schwartz MD Provider: Artur Bess MD    Anesthesia Type: general ASA Status: 3            Anesthesia Type: general    Vitals  Vitals Value Taken Time   BP     Temp     Pulse 66 10/19/23 1313   Resp     SpO2 88 % 10/19/23 1313   Vitals shown include unfiled device data.        Post Anesthesia Care and Evaluation    Patient location during evaluation: PHASE II  Patient participation: complete - patient participated  Level of consciousness: awake and alert  Pain score: 0  Pain management: adequate    Airway patency: patent  Anesthetic complications: No anesthetic complications    Cardiovascular status: acceptable  Respiratory status: acceptable  Hydration status: acceptable

## 2023-10-19 NOTE — PLAN OF CARE
Goal Outcome Evaluation:  Patient has rested well in bed this shift. A&Ox4 w/ intermittent confusion. Patient has maintained NPO status after midnight. No visible signs and symptoms of acute distress noted at this time. No requests or complaints at this time. Will continue to follow the plan of care.

## 2023-10-19 NOTE — PROGRESS NOTES
"Progress Note Pulmonary      Subjective notified by Dr. Pritchett for resident normal x-rays post tunnel catheter catheter placement.  Denies any worsening shortness of breath.  She also denies chest pain.          Interval History: Event noted as above       Review of Systems:    Reviewed ; unchanged       Vital Signs  Temp:  [97 °F (36.1 °C)-98.1 °F (36.7 °C)] 97.1 °F (36.2 °C)  Heart Rate:  [60-77] 61  Resp:  [17-24] 19  BP: ()/(47-78) 117/66  Body mass index is 47.09 kg/m².    Intake/Output Summary (Last 24 hours) at 10/19/2023 1504  Last data filed at 10/19/2023 1302  Gross per 24 hour   Intake 290 ml   Output --   Net 290 ml     I/O this shift:  In: 50 [I.V.:50]  Out: -     Physical Exam:  General- normal in appearance, not in any acute distress    HEENT- pupils equally reactive to light, normal in size, no scleral icterus    Neck- supple    No JVD, no carotid bruit    Respiratory-diminished breath sounds over right lung base.  No significant wheezing.    cardiovascular-  Normal S1 and S2. No S3, S4 or murmurs.    GI-nontender nondistended bowel sounds positive    CNS-alert oriented x3, grossly nonfocal    Extremities- pulses normal bilaterally , no clubbing and 1+ edema     Results Review:      Results from last 7 days   Lab Units 10/18/23  0059 10/17/23  1744 10/16/23  0042   WBC 10*3/mm3 11.60* 10.86* 9.32   HEMOGLOBIN g/dL 9.3* 9.2* 10.5*   PLATELETS 10*3/mm3 242 259 325     Results from last 7 days   Lab Units 10/19/23  0055 10/18/23  0059 10/17/23  1744   SODIUM mmol/L 134* 132* 134*   POTASSIUM mmol/L 5.9* 5.2 5.2   CHLORIDE mmol/L 101 100 99   CO2 mmol/L 20.2* 20.5* 22.9   BUN mg/dL 75* 69* 69*   CREATININE mg/dL 6.93* 6.32* 6.10*   CALCIUM mg/dL 9.0 9.0 8.7   GLUCOSE mg/dL 107* 126* 116*     No results found for: \"INR\", \"PROTIME\"  Results from last 7 days   Lab Units 10/18/23  0059 10/17/23  0038 10/16/23  0042   ALK PHOS U/L 134* 89 94   BILIRUBIN mg/dL 0.3 0.2 0.4   ALT (SGPT) U/L 8 6 6   AST " (SGOT) U/L 10 8 10     Results from last 7 days   Lab Units 10/16/23  2202   PH, ARTERIAL pH units 7.311*   PO2 ART mm Hg 74.9*   PCO2, ARTERIAL mm Hg 47.4*   HCO3 ART mmol/L 23.9     Imaging Results (Last 24 Hours)       Procedure Component Value Units Date/Time    XR Chest AP [[id number]] Collected: 10/19/23 1359     Updated: 10/19/23 1402    Narrative:      EXAM:    XR Chest, 1 View     EXAM DATE:    10/19/2023 2:04 PM     CLINICAL HISTORY:    Post hemodialysis cath placement; N17.9-Acute kidney failure,  unspecified; N18.9-Chronic kidney disease, unspecified; I50.9-Heart  failure, unspecified; J96.21-Acute and chronic respiratory failure with  hypoxia; E87.5-Hyperkalemia; N17.9-Acute kidney failure, unspecified     TECHNIQUE:    Frontal view of the chest.     COMPARISON:    XR Chest dated 10/18/2023     FINDINGS:    LUNGS AND PLEURAL SPACES:  There is been collapse of probably the  right lower lobe possibly related to mucous plug.  Small right pleural  effusion persists.  Improved aeration of the left lung base.    HEART:  Unremarkable as visualized.  No cardiomegaly.    MEDIASTINUM:  Mediastinum is shifted to the right.    BONES/JOINTS:  Unremarkable as visualized.    TUBES, LINES AND DEVICES:  Right central line is been placed with tip  in SVC.       Impression:      1.  There is been collapse of probably the right lower lobe possibly  related to mucous plug.  2.  Small right pleural effusion persists.  3.  Improved aeration of the left lung base.        This report was finalized on 10/19/2023 2:00 PM by Dr. Kirk Mcdonald MD.       FL Surgery Fluoro [[id number]] Collected: 10/19/23 1341     Updated: 10/19/23 1344    Narrative:      EXAM:    FL Fluoroscopy < 1 Hour     EXAM DATE:    10/19/2023 1:27 PM     CLINICAL HISTORY:    port; N17.9-Acute kidney failure, unspecified; N18.9-Chronic kidney  disease, unspecified; I50.9-Heart failure, unspecified; J96.21-Acute and  chronic respiratory failure with hypoxia;  E87.5-Hyperkalemia;  N17.9-Acute kidney failure, unspecified     TECHNIQUE:    Fluoroscopic images performed in multiple projections.  Fluoroscopic  guidance was provided by a physician. Fluoroscopy exposure time of  approximately 1 minute or less.     COMPARISON:    10/19/2023     FINDINGS:    TUBES, LINES AND DEVICES:  Right central catheter with tip in SVC.       Impression:        As above.        This report was finalized on 10/19/2023 1:42 PM by Dr. Kirk Mcdonald MD.                    acetylcysteine, 1.5 mL, Nebulization, Q4H - RT  aspirin, 325 mg, Oral, Daily  carvedilol, 25 mg, Oral, BID With Meals  folic acid, 1 mg, Oral, Daily  heparin (porcine), 5,000 Units, Subcutaneous, Q12H  hydrALAZINE, 50 mg, Oral, Q8H  ipratropium-albuterol, 3 mL, Nebulization, Q4H - RT  levothyroxine, 88 mcg, Oral, Q AM  montelukast, 10 mg, Oral, Nightly  nicotine, 1 patch, Transdermal, Q24H  pantoprazole, 40 mg, Oral, Daily  rosuvastatin, 10 mg, Oral, Nightly  sodium chloride, 10 mL, Intravenous, Q12H  sodium zirconium cyclosilicate, 10 g, Oral, Once  vitamin B-12, 1,000 mcg, Oral, Daily           Medication Review:     Assessment & Plan     Morbidly obese female with chronic kidney disease.  Initially admitted with worsening shortness of breath and hypoxia.      X-ray chest was reviewed by me.  Showed worsening right lower lobe airspace disease, trachea is slightly shifted to the right.  To me it looks like a combination of pleural effusion and mucous plug.    Dr. Pritchett has ordered CT of the chest to have a better look at lungs.  I have ordered DuoNeb with Mucomyst followed by chest PT.    We will keep patient n.p.o. overnight.  Repeat x-ray chest in the morning.  Consider bronchoscopy for therapeutic aspiration if mucous plugging persist.    # COPD,   # suspect obstructive sleep apnea.   #  Diastolic dysfunction.         Anderson Solomon MD  10/19/23  15:04 EDT

## 2023-10-20 ENCOUNTER — APPOINTMENT (OUTPATIENT)
Dept: GENERAL RADIOLOGY | Facility: HOSPITAL | Age: 68
DRG: 291 | End: 2023-10-20
Payer: MEDICARE

## 2023-10-20 LAB
A-A DO2: 67.4 MMHG (ref 0–300)
ALBUMIN SERPL-MCNC: 3.1 G/DL (ref 3.5–5.2)
ALBUMIN/GLOB SERPL: 1 G/DL
ALP SERPL-CCNC: 124 U/L (ref 39–117)
ALT SERPL W P-5'-P-CCNC: 5 U/L (ref 1–33)
ANION GAP SERPL CALCULATED.3IONS-SCNC: 13 MMOL/L (ref 5–15)
ARTERIAL PATENCY WRIST A: ABNORMAL
AST SERPL-CCNC: 7 U/L (ref 1–32)
ATMOSPHERIC PRESS: 717 MMHG
BASE EXCESS BLDA CALC-SCNC: 5.7 MMOL/L (ref 0–2)
BASOPHILS # BLD AUTO: 0.04 10*3/MM3 (ref 0–0.2)
BASOPHILS NFR BLD AUTO: 0.5 % (ref 0–1.5)
BDY SITE: ABNORMAL
BILIRUB SERPL-MCNC: 0.4 MG/DL (ref 0–1.2)
BUN SERPL-MCNC: 50 MG/DL (ref 8–23)
BUN/CREAT SERPL: 9.7 (ref 7–25)
CALCIUM SPEC-SCNC: 8.9 MG/DL (ref 8.6–10.5)
CHLORIDE SERPL-SCNC: 97 MMOL/L (ref 98–107)
CO2 BLDA-SCNC: 34.1 MMOL/L (ref 22–33)
CO2 SERPL-SCNC: 25 MMOL/L (ref 22–29)
COHGB MFR BLD: 2.2 % (ref 0–5)
CREAT SERPL-MCNC: 5.16 MG/DL (ref 0.57–1)
DEPRECATED RDW RBC AUTO: 54.5 FL (ref 37–54)
EGFRCR SERPLBLD CKD-EPI 2021: 8.7 ML/MIN/1.73
EOSINOPHIL # BLD AUTO: 0.52 10*3/MM3 (ref 0–0.4)
EOSINOPHIL NFR BLD AUTO: 6.6 % (ref 0.3–6.2)
ERYTHROCYTE [DISTWIDTH] IN BLOOD BY AUTOMATED COUNT: 16.8 % (ref 12.3–15.4)
GAS FLOW AIRWAY: 2 LPM
GLOBULIN UR ELPH-MCNC: 3 GM/DL
GLUCOSE BLDC GLUCOMTR-MCNC: 86 MG/DL (ref 70–130)
GLUCOSE SERPL-MCNC: 81 MG/DL (ref 65–99)
HCO3 BLDA-SCNC: 32.3 MMOL/L (ref 20–26)
HCT VFR BLD AUTO: 28.8 % (ref 34–46.6)
HCT VFR BLD CALC: 27.6 % (ref 38–51)
HGB BLD-MCNC: 8.7 G/DL (ref 12–15.9)
HGB BLDA-MCNC: 9 G/DL (ref 13.5–17.5)
IMM GRANULOCYTES # BLD AUTO: 0.06 10*3/MM3 (ref 0–0.05)
IMM GRANULOCYTES NFR BLD AUTO: 0.8 % (ref 0–0.5)
INHALED O2 CONCENTRATION: 28 %
LYMPHOCYTES # BLD AUTO: 1.65 10*3/MM3 (ref 0.7–3.1)
LYMPHOCYTES NFR BLD AUTO: 21.1 % (ref 19.6–45.3)
Lab: ABNORMAL
MCH RBC QN AUTO: 27.1 PG (ref 26.6–33)
MCHC RBC AUTO-ENTMCNC: 30.2 G/DL (ref 31.5–35.7)
MCV RBC AUTO: 89.7 FL (ref 79–97)
METHGB BLD QL: 0 % (ref 0–3)
MODALITY: ABNORMAL
MONOCYTES # BLD AUTO: 0.94 10*3/MM3 (ref 0.1–0.9)
MONOCYTES NFR BLD AUTO: 12 % (ref 5–12)
NEUTROPHILS NFR BLD AUTO: 4.62 10*3/MM3 (ref 1.7–7)
NEUTROPHILS NFR BLD AUTO: 59 % (ref 42.7–76)
NRBC BLD AUTO-RTO: 0 /100 WBC (ref 0–0.2)
OXYHGB MFR BLDV: 87 % (ref 94–99)
PCO2 BLDA: 57.9 MM HG (ref 35–45)
PCO2 TEMP ADJ BLD: ABNORMAL MM[HG]
PH BLDA: 7.36 PH UNITS (ref 7.35–7.45)
PH, TEMP CORRECTED: ABNORMAL
PLATELET # BLD AUTO: 222 10*3/MM3 (ref 140–450)
PMV BLD AUTO: 11.1 FL (ref 6–12)
PO2 BLDA: 55.7 MM HG (ref 83–108)
PO2 TEMP ADJ BLD: ABNORMAL MM[HG]
POTASSIUM SERPL-SCNC: 4.6 MMOL/L (ref 3.5–5.2)
PROCALCITONIN SERPL-MCNC: 0.4 NG/ML (ref 0–0.25)
PROT SERPL-MCNC: 6.1 G/DL (ref 6–8.5)
RBC # BLD AUTO: 3.21 10*6/MM3 (ref 3.77–5.28)
SAO2 % BLDCOA: 89 % (ref 94–99)
SODIUM SERPL-SCNC: 135 MMOL/L (ref 136–145)
VENTILATOR MODE: ABNORMAL
WBC NRBC COR # BLD: 7.83 10*3/MM3 (ref 3.4–10.8)

## 2023-10-20 PROCEDURE — 71045 X-RAY EXAM CHEST 1 VIEW: CPT

## 2023-10-20 PROCEDURE — 99233 SBSQ HOSP IP/OBS HIGH 50: CPT | Performed by: STUDENT IN AN ORGANIZED HEALTH CARE EDUCATION/TRAINING PROGRAM

## 2023-10-20 PROCEDURE — 25010000002 DIPHENHYDRAMINE PER 50 MG

## 2023-10-20 PROCEDURE — 87040 BLOOD CULTURE FOR BACTERIA: CPT | Performed by: INTERNAL MEDICINE

## 2023-10-20 PROCEDURE — 85025 COMPLETE CBC W/AUTO DIFF WBC: CPT | Performed by: STUDENT IN AN ORGANIZED HEALTH CARE EDUCATION/TRAINING PROGRAM

## 2023-10-20 PROCEDURE — 83050 HGB METHEMOGLOBIN QUAN: CPT

## 2023-10-20 PROCEDURE — 25010000002 DIAZEPAM PER 5 MG

## 2023-10-20 PROCEDURE — 25010000002 HEPARIN (PORCINE) PER 1000 UNITS: Performed by: SURGERY

## 2023-10-20 PROCEDURE — 80053 COMPREHEN METABOLIC PANEL: CPT | Performed by: INTERNAL MEDICINE

## 2023-10-20 PROCEDURE — 82375 ASSAY CARBOXYHB QUANT: CPT

## 2023-10-20 PROCEDURE — 99233 SBSQ HOSP IP/OBS HIGH 50: CPT | Performed by: INTERNAL MEDICINE

## 2023-10-20 PROCEDURE — 87150 DNA/RNA AMPLIFIED PROBE: CPT | Performed by: INTERNAL MEDICINE

## 2023-10-20 PROCEDURE — 94799 UNLISTED PULMONARY SVC/PX: CPT

## 2023-10-20 PROCEDURE — 82948 REAGENT STRIP/BLOOD GLUCOSE: CPT

## 2023-10-20 PROCEDURE — 94664 DEMO&/EVAL PT USE INHALER: CPT

## 2023-10-20 PROCEDURE — 71045 X-RAY EXAM CHEST 1 VIEW: CPT | Performed by: RADIOLOGY

## 2023-10-20 PROCEDURE — 94660 CPAP INITIATION&MGMT: CPT

## 2023-10-20 PROCEDURE — 94761 N-INVAS EAR/PLS OXIMETRY MLT: CPT

## 2023-10-20 PROCEDURE — 36600 WITHDRAWAL OF ARTERIAL BLOOD: CPT

## 2023-10-20 PROCEDURE — 84145 PROCALCITONIN (PCT): CPT | Performed by: INTERNAL MEDICINE

## 2023-10-20 PROCEDURE — 82805 BLOOD GASES W/O2 SATURATION: CPT

## 2023-10-20 RX ORDER — DIPHENHYDRAMINE HYDROCHLORIDE 50 MG/ML
25 INJECTION INTRAMUSCULAR; INTRAVENOUS ONCE
Status: COMPLETED | OUTPATIENT
Start: 2023-10-20 | End: 2023-10-20

## 2023-10-20 RX ORDER — IPRATROPIUM BROMIDE AND ALBUTEROL SULFATE 2.5; .5 MG/3ML; MG/3ML
3 SOLUTION RESPIRATORY (INHALATION)
Status: DISCONTINUED | OUTPATIENT
Start: 2023-10-20 | End: 2023-10-30 | Stop reason: HOSPADM

## 2023-10-20 RX ORDER — ACETYLCYSTEINE 200 MG/ML
1.5 SOLUTION ORAL; RESPIRATORY (INHALATION)
Status: DISCONTINUED | OUTPATIENT
Start: 2023-10-20 | End: 2023-10-30 | Stop reason: HOSPADM

## 2023-10-20 RX ORDER — DIAZEPAM 5 MG/ML
2.5 INJECTION, SOLUTION INTRAMUSCULAR; INTRAVENOUS ONCE
Status: COMPLETED | OUTPATIENT
Start: 2023-10-21 | End: 2023-10-20

## 2023-10-20 RX ADMIN — Medication 10 ML: at 12:47

## 2023-10-20 RX ADMIN — IPRATROPIUM BROMIDE AND ALBUTEROL SULFATE 3 ML: 2.5; .5 SOLUTION RESPIRATORY (INHALATION) at 18:36

## 2023-10-20 RX ADMIN — ASPIRIN 81 MG: 81 TABLET, COATED ORAL at 12:46

## 2023-10-20 RX ADMIN — IPRATROPIUM BROMIDE AND ALBUTEROL SULFATE 3 ML: 2.5; .5 SOLUTION RESPIRATORY (INHALATION) at 13:25

## 2023-10-20 RX ADMIN — Medication 1 MG: at 12:46

## 2023-10-20 RX ADMIN — HEPARIN SODIUM 5000 UNITS: 5000 INJECTION INTRAVENOUS; SUBCUTANEOUS at 20:04

## 2023-10-20 RX ADMIN — ACETYLCYSTEINE 1.5 ML: 200 SOLUTION ORAL; RESPIRATORY (INHALATION) at 13:25

## 2023-10-20 RX ADMIN — HYDRALAZINE HYDROCHLORIDE 50 MG: 50 TABLET, FILM COATED ORAL at 20:03

## 2023-10-20 RX ADMIN — HEPARIN SODIUM 5000 UNITS: 5000 INJECTION INTRAVENOUS; SUBCUTANEOUS at 12:46

## 2023-10-20 RX ADMIN — ACETYLCYSTEINE 1.5 ML: 200 SOLUTION ORAL; RESPIRATORY (INHALATION) at 18:36

## 2023-10-20 RX ADMIN — DIAZEPAM 2.5 MG: 5 INJECTION, SOLUTION INTRAMUSCULAR; INTRAVENOUS at 23:22

## 2023-10-20 RX ADMIN — CARVEDILOL 25 MG: 25 TABLET, FILM COATED ORAL at 10:18

## 2023-10-20 RX ADMIN — HYDROXYZINE HYDROCHLORIDE 25 MG: 25 TABLET, FILM COATED ORAL at 19:19

## 2023-10-20 RX ADMIN — HYDRALAZINE HYDROCHLORIDE 50 MG: 50 TABLET, FILM COATED ORAL at 16:01

## 2023-10-20 RX ADMIN — NICOTINE TRANSDERMAL SYSTEM 1 PATCH: 21 PATCH, EXTENDED RELEASE TRANSDERMAL at 12:47

## 2023-10-20 RX ADMIN — IPRATROPIUM BROMIDE AND ALBUTEROL SULFATE 3 ML: .5; 2.5 SOLUTION RESPIRATORY (INHALATION) at 06:59

## 2023-10-20 RX ADMIN — CARVEDILOL 25 MG: 25 TABLET, FILM COATED ORAL at 18:21

## 2023-10-20 RX ADMIN — Medication 1000 MCG: at 12:46

## 2023-10-20 RX ADMIN — ACETYLCYSTEINE 1.5 ML: 200 SOLUTION ORAL; RESPIRATORY (INHALATION) at 06:59

## 2023-10-20 RX ADMIN — ROSUVASTATIN CALCIUM 10 MG: 10 TABLET, FILM COATED ORAL at 20:04

## 2023-10-20 RX ADMIN — DIPHENHYDRAMINE HYDROCHLORIDE 25 MG: 50 INJECTION INTRAMUSCULAR; INTRAVENOUS at 21:17

## 2023-10-20 RX ADMIN — ACETAMINOPHEN 650 MG: 325 TABLET ORAL at 18:21

## 2023-10-20 RX ADMIN — PANTOPRAZOLE SODIUM 40 MG: 40 TABLET, DELAYED RELEASE ORAL at 12:47

## 2023-10-20 RX ADMIN — MONTELUKAST SODIUM 10 MG: 10 TABLET, COATED ORAL at 20:04

## 2023-10-20 NOTE — DISCHARGE PLACEMENT REQUEST
"Matthew Michel (67 y.o. Female)       Date of Birth   1955    Social Security Number       Address   [address] [facility] [address]    Home Phone   [phone]    MRN   [id number]       [facility]    Marital Status                               Admission Date   10/8/23    Admission Type   Emergency    Admitting Provider   Fanny Ford MD    Attending Provider   Jay Jay Pritchett DO    Department, Room/Bed   [address], [address]321/       Discharge Date       Discharge Disposition       Discharge Destination                                 Attending Provider: Jay Jay Pritchett DO    Allergies: No Known Allergies    Isolation: None   Infection: None   Code Status: No CPR    Ht: 152.4 cm (60\")   Wt: 101 kg (223 lb 9.6 oz)    Admission Cmt: None   Principal Problem: Acute hypoxic respiratory failure [J96.01]                   Active Insurance as of 10/8/2023       Primary Coverage       Payor Plan Insurance Group Employer/Plan Group    [facility] [employer] MEDICARE REPLACEMENT        Payor Plan Address Payor Plan Phone Number Payor Plan Fax Number Effective Dates    [address] [address] [phone]  1/1/2023 - None Entered    [facility] [id number]         Subscriber Name Subscriber Birth Date Member ID       MATTHEW MICHEL 1955 [id number]                     Emergency Contacts        (Rel.) Home Phone Work Phone Mobile Phone    LOYD MICHEL (Son) [phone] -- --              Emergency Contact Information       Name Relation Home Work Mobile    LOYD MICHEL Son [phone]            Insurance Information                  [facility] MEDICARE REPLACEMENT/WELLCARE MEDICARE REPLACEMENT Phone: [phone]    Subscriber: Matthew Michel Subscriber#: [id number]    Group#:  Precert#: [id number]          Problem List             Codes Noted - Resolved       Hospital    Acute renal failure (ARF) ICD-10-CM: " N17.9  ICD-9-CM: 584.9 10/10/2023 - Present    * (Principal) Acute hypoxic respiratory failure ICD-10-CM: J96.01  ICD-9-CM: 518.81 10/8/2023 - Present       Non-Hospital    Cigarette smoker ICD-10-CM: F17.210  ICD-9-CM: 305.1 2/10/2022 - Present    Class 3 severe obesity without serious comorbidity with body mass index (BMI) of 40.0 to 44.9 in adult ICD-10-CM: E66.01, Z68.41  ICD-9-CM: 278.01, V85.41 2/10/2022 - Present    Other specified hypothyroidism ICD-10-CM: E03.8  ICD-9-CM: 244.8 2018 - Present    Bilateral lower extremity edema ICD-10-CM: R60.0  ICD-9-CM: 782.3 2018 - Present    Tobacco use ICD-10-CM: Z72.0  ICD-9-CM: 305.1 2018 - Present    Hypertension (Chronic) ICD-10-CM: I10  ICD-9-CM: 401.9 2016 - Present    Hyperlipidemia ICD-10-CM: E78.5  ICD-9-CM: 272.4 2016 - Present    Migraine without aura ICD-10-CM: G43.009  ICD-9-CM: 346.10 2016 - Present    CAD, chronically occluded collateralized dominant RCA, status post drug-eluting stent to LAD , high-grade stenosis of the ostium of the small D1 and 60% mid RCA posterior lateral branch ICD-10-CM: I25.10  ICD-9-CM: 414.00 2016 - Present    Chronic renal failure, stage 2 (mild) ICD-10-CM: N18.2  ICD-9-CM: 585.2 2016 - Present        History & Physical        Chambers, DEEPA Salazar at 10/08/23 1956       Attestation signed by Fanny Ford MD at 10/09/23 4089    I have reviewed this documentation and agree.  I have formulated and discussed the assessment and plan with WAQAS Salazar.  I have also reviewed the patient's past medical history, vital signs, labs, imaging, and EKGs.  Please note that the EKGs dated 10/6/2023, 2/10/2022, 5/15/2018, all look similar to the current EKG.  We await the nephrology consultation.                    [facility] Medicine Services  History & Physical    Patient Identification:  Name:  Lesley Michel  Age:  67 y.o.  Sex:  female  :  1955  MRN:  [id number]    Visit Number:  [id number]  Admit Date: 10/8/2023   Primary Care Physician:  Woodrow Ryamond APRN    Subjective     Chief complaint: Shortness of Breath, Edema    History of presenting illness:      Lesley Michel is a 67 y.o. female with past medical history significant for CAD status post stenting, hyperlipidemia, essential hypertension, COPD, tobacco abuse, CKD, hypothyroidism, arthritis, anxiety, history of migraines, obesity by BMI, and history of medical noncompliance.  Patient presents to [facility] emergency department for further evaluation of increasing lower extremity edema and shortness of breath which has been progressively worsening over the past few days.  Reportedly visited her cardiologist 2 days ago.  Per office visit note, patient has been noncompliant with all of her medications.  States that she cannot take diuretics due to chronic kidney disease, although it does not appear that she has followed up with nephrologist recently.  Cardiology note documentation included mild CKD.  Creatinine on arrival 3.6. Unknown recent baseline. However, per review of labs from June 2020, creatinine was 1.08 at that time. Patient also has elevated proBNP of 8000. High-sensitivity troponin T indeterminately elevated without significant delta. EKG obtained without evidence of acute arrhythmia or acute ST elevations or depressions.  No evidence of acute ischemia.  ABG was obtained on room air and noted hypoxemia with PO2 of 53.5.  Patient was placed on 2 L nasal cannula and is now stable.  Potassium was elevated at 5.9. Received treatment for elevated potassium in the ED.Patient currently denies any chest pain, palpitations, dizziness, nausea, vomiting, abdominal pain, dysuria, or difficulty urinating. She also denies recent fever, chills, or sputum production. Reports chronic dry cough. She states that she continues to smoke cigarettes and has requested a nicotine patch. Denies illicit substance  "abuse or alcohol abuse. She reported compliance with home medications during my exam; asked what kinds of medicines she takes and she states \"I don't know. All I know is if the doctor prescribes them, I have been taking them\". She is alert and oriented x4. Calm, cooperative, pleasant. Does not wear supplemental O2 at home; currently requiring 2L. Discussed plan with patient. She voiced agreement and understanding with no further questions, concerns, or needs at this time.     Upon arrival to the ED, vital signs were temperature 98.1, pulse 68, respirations 16, blood pressure 156/78 sitting, SPO2 saturation 88% on room air.  ABG with pH 7.363, PCO2 36.5, PO2 53.5, O2 saturation 88.4% on room air.  High-sensitivity troponin T 23 with +3 delta.  proBNP 8093.  CMP with potassium 5.9, CO2 20.8, BUN 27, creatinine 3.61, EGFR 13.4, glucose 102, alkaline phosphatase 146, albumin 3.4, otherwise unremarkable.  Lactate 0.9.  CBC with hemoglobin 11.0, RDW 16.5, MCHC 30.4, otherwise unremarkable.  Peripheral blood cultures x2 pending. Chest x-ray noted trace right effusion and right basilar airspace disease.    Known Emergency Department medications received prior to my evaluation included 1000 mg IV calcium gluconate, D50, 10 units of IV regular insulin, 50 mEq sodium bicarb, 10 mg packet of Lokelma. Emergency Department Room location at the time of my evaluation was [facility].  Discussed with admitting physician, Fanny Greenberg MD.    ---------------------------------------------------------------------------------------------------------------------   Review of Systems   Constitutional:  Positive for fatigue. Negative for chills and fever.   HENT:  Negative for congestion, sore throat and trouble swallowing.    Respiratory:  Positive for cough (nonproductive) and shortness of breath. Negative for wheezing.    Cardiovascular:  Positive for leg swelling. Negative for chest pain and palpitations.   Gastrointestinal:  Negative " for abdominal pain, constipation, diarrhea, nausea and vomiting.   Genitourinary:  Negative for difficulty urinating, flank pain, frequency and urgency.   Musculoskeletal:  Negative for back pain, gait problem, neck pain and neck stiffness.   Neurological:  Negative for dizziness, tremors, seizures, syncope, facial asymmetry, speech difficulty, weakness, light-headedness, numbness and headaches.     ---------------------------------------------------------------------------------------------------------------------   Past Medical History:   Diagnosis Date    Anxiety     Arthritis     Coronary artery disease     H/O heart artery stent     Hyperlipidemia     Hypertension     Obesity      Past Surgical History:   Procedure Laterality Date    ANKLE SURGERY      RIGHT    APPENDECTOMY      CARDIAC CATHETERIZATION      LEFT    CORONARY STENT PLACEMENT      HYSTERECTOMY       Family History   Problem Relation Age of Onset    Heart disease Mother     Heart attack Mother 73    Fibromyalgia Mother     Heart attack Father 72    Ovarian cancer Sister     Coronary artery disease Other     Breast cancer Neg Hx      Social History     Socioeconomic History    Marital status:    Tobacco Use    Smoking status: Every Day     Packs/day: .5     Types: Electronic Cigarette, Cigarettes    Smokeless tobacco: Never   Substance and Sexual Activity    Alcohol use: No    Drug use: No    Sexual activity: Never     ---------------------------------------------------------------------------------------------------------------------   Allergies:  Patient has no known allergies.  ---------------------------------------------------------------------------------------------------------------------   Home medications:    Medications below are reported home medications pulling from within the system; at this time, these medications have not been reconciled unless otherwise specified and are in the verification process for further verifcation as  current home medications.  (Not in a hospital admission)      Hospital Scheduled Meds:          Current listed hospital scheduled medications may not yet reflect those currently placed in orders that are signed and held awaiting patient's arrival to floor.   ---------------------------------------------------------------------------------------------------------------------     Objective     Vital Signs:  Temp:  [98.1 °F (36.7 °C)] 98.1 °F (36.7 °C)  Heart Rate:  [58-79] 74  Resp:  [16] 16  BP: (115-156)/(70-90) 154/87      10/08/23  1633   Weight: 102 kg (225 lb)     Body mass index is 43.94 kg/m².  ---------------------------------------------------------------------------------------------------------------------       Physical Exam  Vitals reviewed.   Constitutional:       General: She is awake. She is not in acute distress.     Appearance: She is obese. She is not diaphoretic.      Interventions: Nasal cannula in place.      Comments: 2L nasal cannula.    HENT:      Head: Normocephalic and atraumatic.      Mouth/Throat:      Mouth: Mucous membranes are moist.      Pharynx: Oropharynx is clear.   Eyes:      Extraocular Movements: Extraocular movements intact.      Pupils: Pupils are equal, round, and reactive to light.   Cardiovascular:      Rate and Rhythm: Normal rate and regular rhythm.      Pulses: Normal pulses.           Dorsalis pedis pulses are 1+ on the right side and 1+ on the left side.      Heart sounds: Normal heart sounds. No murmur heard.     No friction rub.   Pulmonary:      Effort: Pulmonary effort is normal. No accessory muscle usage, respiratory distress or retractions.      Breath sounds: Decreased breath sounds and wheezing present. No rhonchi or rales.      Comments: Mild, end expiratory wheezing present and bilateral upper lobes.  Abdominal:      General: Bowel sounds are normal. There is no distension.      Palpations: Abdomen is soft.      Tenderness: There is no abdominal tenderness.  There is no guarding.   Musculoskeletal:      Cervical back: Neck supple. No rigidity.      Right lower le+ Pitting Edema present.      Left lower le+ Pitting Edema present.   Skin:     General: Skin is warm and dry.      Capillary Refill: Capillary refill takes 2 to 3 seconds.      Coloration: Skin is pale.   Neurological:      Mental Status: She is alert and oriented to person, place, and time. Mental status is at baseline.      Cranial Nerves: No dysarthria or facial asymmetry.      Sensory: Sensation is intact.      Motor: No weakness or tremor.   Psychiatric:         Attention and Perception: Attention normal.         Mood and Affect: Mood normal.         Speech: Speech normal.         Behavior: Behavior normal. Behavior is cooperative.         Thought Content: Thought content normal.         Cognition and Memory: Cognition normal.         Judgment: Judgment normal.       ---------------------------------------------------------------------------------------------------------------------  EKG:  Pending formal cardiology interpretation. Per my review, appears normal sinus rhythm without evidence of acute ischemia.     Telemetry:  Appearing normal sinus rhythm 60-70s on my exam.   I have personally looked at both the EKG and the telemetry strips.  ---------------------------------------------------------------------------------------------------------------------   Results from last 7 days   Lab Units 10/08/23  1709   LACTATE mmol/L 0.9   WBC 10*3/mm3 7.38   HEMOGLOBIN g/dL 11.0*   HEMATOCRIT % 36.2   MCV fL 88.9   MCHC g/dL 30.4*   PLATELETS 10*3/mm3 289     Results from last 7 days   Lab Units 10/08/23  1707   PH, ARTERIAL pH units 7.363   PO2 ART mm Hg 53.5*   PCO2, ARTERIAL mm Hg 36.5   HCO3 ART mmol/L 20.7     Results from last 7 days   Lab Units 10/08/23  1709   SODIUM mmol/L 138   POTASSIUM mmol/L 5.9*   MAGNESIUM mg/dL 1.9   CHLORIDE mmol/L 107   CO2 mmol/L 20.8*   BUN mg/dL 27*   CREATININE mg/dL  "3.61*   CALCIUM mg/dL 9.0   GLUCOSE mg/dL 102*   ALBUMIN g/dL 3.4*   BILIRUBIN mg/dL 0.5   ALK PHOS U/L 146*   AST (SGOT) U/L 7   ALT (SGPT) U/L <5   Estimated Creatinine Clearance: 16.7 mL/min (A) (by C-G formula based on SCr of 3.61 mg/dL (H)).  No results found for: \"AMMONIA\"  Results from last 7 days   Lab Units 10/08/23  1923 10/08/23  1709   HSTROP T ng/L 26* 23*     Results from last 7 days   Lab Units 10/08/23  1709   PROBNP pg/mL 8,093.0*     No results found for: \"HGBA1C\"  No results found for: \"TSH\", \"FREET4\"  No results found for: \"PREGTESTUR\", \"PREGSERUM\", \"HCG\", \"HCGQUANT\"  Pain Management Panel           No data to display                  ---------------------------------------------------------------------------------------------------------------------  Imaging Results (Last 7 Days)       Procedure Component Value Units Date/Time    XR Chest 1 View [[id number]] Collected: 10/08/23 1917     Updated: 10/08/23 1919    Narrative:      XR CHEST 1 VW-     CLINICAL INDICATION: sob        COMPARISON: None immediately available      TECHNIQUE: Single frontal view of the chest.     FINDINGS:     LUNGS: Trace right effusion and right basilar airspace disease     HEART AND MEDIASTINUM: Heart and mediastinal contours are unremarkable        SKELETON: Bony and soft tissue structures are unremarkable.             Impression:      Trace right effusion and right basilar airspace disease           This report was finalized on 10/8/2023 7:17 PM by Dr. Julio Dominguez MD.               Last echocardiogram:  Results for orders placed during the hospital encounter of 05/23/18    Adult Transthoracic Echo Complete W/ Cont if Necessary Per Protocol    Interpretation Summary  · Normal left ventricular cavity size and wall thickness noted.  · Left ventricular systolic function is normal.Estimated EF appears to be in the range of 61 - 65%.  · Left ventricular diastolic dysfunction (grade I) consistent with impaired " relaxation.  · Calcification in the right ventricular cavity noted which is probably calcified moderator band .  · Mild MAC is present with trace mitral valve regurgitation .  · Trace tricuspid valve regurgitation is noted with mild pulmonary hypertension . PRVSP is 44 mmHg.  · No significant valvular heart disease  · Small anterior echo-free space is noted which may represent fat pad V/S trivial pericardial effusion. It is located anteriorly and it is hemodynamically insignificant          I have personally reviewed the above radiology images and read the final radiology report on 10/08/23  ---------------------------------------------------------------------------------------------------------------------  Assessment / Plan     Active Hospital Problems    Diagnosis  POA    **Acute hypoxic respiratory failure [J96.01]  Yes       ASSESSMENT/PLAN:  -Acute on chronic HFpEF, present on admission  -Elevated proBNP and evidence of trace right pleural effusion on arrival  -Acute hypoxemic respiratory failure due to above  -COPD without acute exacerbation  -Ongoing tobacco abuse  -Acute kidney injury on CKD, present on admission  -Acute hyperkalemia, present on admission  -Indeterminately elevated troponin without significant delta, present on admission, likely due to poor renal function and hypoxia  -Mild normocytic anemia, unknown chronicity however suspect chronic due to CKD  -History of CAD status post stenting  -Hyperlipidemia  -Essential hypertension  -67 y.o. female presents to [facility] ED with CC of SOA and increasing lower extremity edema over the last several days. Recently evaluated in outpatient Cardiology office on 10/6/2023. Noted history of medical noncompliance. Not on diuretics due to CKD. Formerly followed with Dr. Sherwood (Nephrology) in the outpatient setting although has not followed up recently.   -proBNP 8000 on arrival. Chest x-ray noted trace right effusion and right basilar airspace disease.  -No  leukocytosis.  Pro-Binh negative.  CRP mildly elevated at 5.41.  Patient denies any fever, chills, sputum production.  Do not currently suspect right basilar pneumonia.  Encourage incentive spirometry use and turn, cough, and deep breathing exercises.  -Closely monitor off antibiotics for now.  -Repeat CBC with differential in the a.m.  -Encourage smoking cessation.  -NRT made available.  -Received 80 mg IV Lasix in the ED.  -Monitor response with strict I's and O's and daily weights.  -Currently stable on 2 L. Room air is baseline.  Titrate supplemental O2 to maintain SPO2 saturations greater than 90%.  -Transthoracic echocardiogram from 2019 noted normal EF with grade 1 diastolic dysfunction.  -Obtain updated transthoracic echocardiogram.  -Baseline creatinine unknown with creatinine on admission of 3.6.  -Request records from outpatient Nephrology clinic (Dr. Sherwood).   -Per review of most recent available labs in June 2020; creatinine 1.08 and GFR was between 56 and 63.  -Monitor urine output and renal function closely.  -Avoid nephrotoxins as able. Avoid hypotension.   -Repeat chemistry panel in AM.   -BP appearing controlled/stable at this time. Continue to monitor VS per hospital policy. Review home antihypertensive agents once reconciled by pharmacy with plans to continue except for any that may contribute to renal failure. Holding parameters to prevent hypotension and/or bradycardia.   -High sensitivity Troponin T 23 with +3 delta. EKG without evidence of acute ischemic changes. Patient denies chest pain on exam. Troponin elevation likely secondary to MAICO and hypoxia.   -Potassium was 5.9 on arrival. Patient received hyperkalemia treatment in the ED.  Repeat chemistry panel pending.  -Continuous cardiac monitoring and pulse oximetry ordered.  -Plan to continue aspirin, statin.   -Obtain HgbA1c and lipid profile.     -Hypoglycemia without known hx of diabetes  -BG 49 on arrival to the floor. Patient was  alert&oriented, asymptomatic at the time. Ate a turkey sandwich and peanut butter/jeremy crackers. Repeat BG was 91. Accu checks ordered Q3 hours for now. Obtain HgbA1c. Hypoglycemia protocol in place as necessary. Hypoglycemia likely result of IV insulin that patient received in the ED as part of hyperkalemia treatment.     -Physical debility; PT consult. Maintain fall precautions.  Provide assistance.  -Arthritis; supportive care. Tylenol as needed for mild pain.  -Hypothyroidism; obtain TSH and free T4.  Plan to resume home levothyroxine pending laboratory results.  -History of migraines without aura  -Morbid obesity by BMI; affecting all aspects of care.  Encourage lifestyle modifications.  -History of medical noncompliance; encourage compliance.    ----------  -DVT prophylaxis: Subcu heparin.  -Activity: As tolerated. Fall precautions.   -Expected length of stay:   INPATIENT status due to the need for care which can only be reasonably provided in an hospital setting such as aggressive/expedited ancillary services and/or consultation services, the necessity for IV medications, close physician monitoring and/or the possible need for procedures.  In such, I feel patient's risk for adverse outcomes and need for care warrant INPATIENT evaluation and predict the patient's care encounter to likely last beyond 2 midnights.   -Disposition pending clinical course.    High risk secondary to acute on chronic HFpEF with elevated proBNP and trace right pleural effusion, MAICO on CKD, acute hyperkalemia, and acute hypoxemic respiratory failure.      CODE STATUS: FULL CODE      Marie PardoDEEPA   10/08/23  20:07 EDT  Pager #[phone]  ---------------------------------------------------------------------------------------------------------------------        Electronically signed by Fanny Ford MD at 10/09/23 3275       Vital Signs (last day)       Date/Time Temp Temp src Pulse Resp BP Patient Position SpO2     10/20/23 1209 97.6 (36.4) Oral 78 18 157/80 Lying 91    10/20/23 1128 97.9 (36.6) Temporal 86 18 183/89 Lying --    10/20/23 0752 98.2 (36.8) Temporal 75 16 150/71 Lying --    10/20/23 0713 -- -- 87 16 -- -- 99    10/20/23 0659 -- -- 83 20 -- -- 93    10/20/23 0322 -- -- 83 20 -- -- 96    10/20/23 0300 98.1 (36.7) Oral 83 19 164/80 Lying 97    10/19/23 2310 -- -- 74 20 -- -- 98    10/19/23 2248 97.6 (36.4) Axillary 75 19 126/65 Lying 98    10/19/23 2003 97.6 (36.4) Axillary 75 19 146/82 Lying 96    10/19/23 1932 97.5 (36.4) Temporal 71 16 163/76 Lying --    10/19/23 1640 97.9 (36.6) Temporal 66 20 138/80 Lying --    10/19/23 1628 -- -- 64 16 -- -- 95    10/19/23 1404 -- -- 61 -- 117/66 Lying 94    10/19/23 1400 -- -- 60 -- 117/66 Lying 95    10/19/23 1359 97.1 (36.2) Temporal 62 19 110/76 Lying 96    10/19/23 1354 -- -- 61 18 109/68 Lying 93    10/19/23 1349 -- -- 62 17 108/66 Lying 95    10/19/23 1344 -- -- 62 19 106/64 Lying 96    10/19/23 1339 -- -- 64 24 105/54 Lying 92    10/19/23 1334 -- -- 64 21 102/58 Lying 94    10/19/23 1332 -- -- 65 18 -- -- --    10/19/23 1329 -- -- 68 19 102/61 Lying 93    10/19/23 1326 -- -- 64 20 -- -- 92    10/19/23 1324 -- -- 64 20 98/65 Lying 91    10/19/23 1319 -- -- 66 24 90/56 Lying 84    10/19/23 1314 -- -- 66 19 89/55 Lying 89    10/19/23 1309 97 (36.1) Temporal 66 22 76/47 Lying 89    10/19/23 1206 97.7 (36.5) Oral 64 20 106/53 Lying 95    10/19/23 0727 -- -- 77 18 -- -- 93    10/19/23 0714 97.7 (36.5) Oral 65 18 119/56 Lying 94    10/19/23 0300 97.7 (36.5) -- 60 18 125/72 Lying 95    10/19/23 0030 -- -- 65 18 -- -- 95          Lines, Drains & Airways       Active LDAs       Name Placement date Placement time Site Days    Peripheral IV 10/09/23 0910 Anterior;Right Forearm 10/09/23  0910  Forearm  11    Urethral Catheter Straight-tip 10/20/23  0612  -- less than 1    Hemodialysis Cath Double 10/19/23  1252  Internal Jugular  less than 1                  Current  Facility-Administered Medications   Medication Dose Route Frequency Provider Last Rate Last Admin    acetaminophen (TYLENOL) tablet 650 mg  650 mg Oral Q6H PRN Bartolome Schwartz MD   650 mg at 10/15/23 1422    acetylcysteine (MUCOMYST) 20 % nebulizer solution 1.5 mL  1.5 mL Nebulization Q6H - RT Jay Jay Pritchett DO        aspirin EC tablet 81 mg  81 mg Oral Daily Jay Jay Pritchett DO        carvedilol (COREG) tablet 25 mg  25 mg Oral BID With Meals Bartolome Schwartz MD   25 mg at 10/20/23 1018    dextrose (D50W) (25 g/50 mL) IV injection 25 g  25 g Intravenous Q15 Min PRN Bartolome Schwartz MD   25 g at 10/09/23 0401    dextrose (GLUTOSE) oral gel 15 g  15 g Oral Q15 Min PRN Bartolome Schwartz MD        folic acid (FOLVITE) tablet 1 mg  1 mg Oral Daily Bartolome Schwartz MD   1 mg at 10/19/23 0851    gabapentin (NEURONTIN) capsule 300 mg  300 mg Oral BID PRN Bartolome Schwartz MD   300 mg at 10/17/23 0306    glucagon HCl (Diagnostic) injection 1 mg  1 mg Intramuscular Q15 Min PRN Bartolome Schwartz MD        heparin (porcine) 5000 UNIT/ML injection 5,000 Units  5,000 Units Subcutaneous Q12H Bartolome Schwartz MD   5,000 Units at 10/18/23 2137    hydrALAZINE (APRESOLINE) injection 10 mg  10 mg Intravenous Q6H PRN Bartolome Schwartz MD   10 mg at 10/15/23 0923    hydrALAZINE (APRESOLINE) tablet 50 mg  50 mg Oral Q8H Bartolome Schwartz MD   50 mg at 10/18/23 1447    HYDROcodone-acetaminophen (NORCO)  MG per tablet 1 tablet  1 tablet Oral Q8H PRN Bartolome Schwartz MD   1 tablet at 10/18/23 0815    hydrOXYzine (ATARAX) tablet 25 mg  25 mg Oral TID PRN Bartolome Schwartz MD   25 mg at 10/17/23 0306    ipratropium-albuterol (DUO-NEB) nebulizer solution 3 mL  3 mL Nebulization Q6H - RT Jay Jay Pritchett DO        levothyroxine (SYNTHROID, LEVOTHROID) tablet 88 mcg  88 mcg Oral Q AM Bartolome Schwartz MD   88 mcg at 10/18/23 0502    melatonin tablet 10 mg  10 mg Oral Nightly PRN  Bartolome Schwartz MD   10 mg at 10/15/23 2049    montelukast (SINGULAIR) tablet 10 mg  10 mg Oral Nightly Bartolome Schwartz MD   10 mg at 10/18/23 2137    nicotine (NICODERM CQ) 21 MG/24HR patch 1 patch  1 patch Transdermal Q24H Bartolome Schwartz MD   1 patch at 10/18/23 0808    nitroglycerin (NITROSTAT) SL tablet 0.4 mg  0.4 mg Sublingual Q5 Min PRN Bartolome Schwartz MD        ondansetron (ZOFRAN) injection 4 mg  4 mg Intravenous Q6H PRN Bartolome Schwartz MD   4 mg at 10/18/23 1503    pantoprazole (PROTONIX) EC tablet 40 mg  40 mg Oral Daily Bartolome Schwartz MD   40 mg at 10/19/23 0851    rosuvastatin (CRESTOR) tablet 10 mg  10 mg Oral Nightly Bartolome Schwartz MD   10 mg at 10/18/23 2137    sodium chloride 0.9 % flush 10 mL  10 mL Intravenous PRN Bartolome Schwartz MD        sodium chloride 0.9 % flush 10 mL  10 mL Intravenous Q12H Bartolome Schwartz MD   10 mL at 10/19/23 2107    sodium chloride 0.9 % flush 10 mL  10 mL Intravenous PRN Bartolome Schwartz MD        sodium chloride 0.9 % infusion 40 mL  40 mL Intravenous PRN Bartolome Schwartz MD   40 mL at 10/12/23 1049    sodium zirconium cyclosilicate (LOKELMA) pack 10 g  10 g Oral Once Bartolome Schwartz MD        vitamin B-12 (CYANOCOBALAMIN) tablet 1,000 mcg  1,000 mcg Oral Daily Bartolome Schwartz MD   1,000 mcg at 10/19/23 0851     Lab Results (most recent)       Procedure Component Value Units Date/Time    POC Glucose Once [[id number]]  (Normal) Collected: 10/20/23 1218    Specimen: Blood Updated: 10/20/23 1225     Glucose 86 mg/dL     Procalcitonin [[id number]]  (Abnormal) Collected: 10/20/23 0157    Specimen: Blood Updated: 10/20/23 0956     Procalcitonin 0.40 ng/mL     Narrative:      As a Marker for Sepsis (Non-Neonates):    1. <0.5 ng/mL represents a low risk of severe sepsis and/or septic shock.  2. >2 ng/mL represents a high risk of severe sepsis and/or septic shock.    As a Marker for Lower Respiratory  "Tract Infections that require antibiotic therapy:    PCT on Admission    Antibiotic Therapy       6-12 Hrs later    >0.5                Strongly Recommended  >0.25 - <0.5        Recommended   0.1 - 0.25          Discouraged              Remeasure/reassess PCT  <0.1                Strongly Discouraged     Remeasure/reassess PCT    As 28 day mortality risk marker: \"Change in Procalcitonin Result\" (>80% or <=80%) if Day 0 (or Day 1) and Day 4 values are available. Refer to [email or web address]-[email or web address]    Change in PCT <=80%  A decrease of PCT levels below or equal to 80% defines a positive change in PCT test result representing a higher risk for 28-day all-cause mortality of patients diagnosed with severe sepsis for septic shock.    Change in PCT >80%  A decrease of PCT levels of more than 80% defines a negative change in PCT result representing a lower risk for 28-day all-cause mortality of patients diagnosed with severe sepsis or septic shock.       Comprehensive Metabolic Panel [[id number]]  (Abnormal) Collected: 10/20/23 0157    Specimen: Blood Updated: 10/20/23 0252     Glucose 81 mg/dL      BUN 50 mg/dL      Creatinine 5.16 mg/dL      Sodium 135 mmol/L      Potassium 4.6 mmol/L      Chloride 97 mmol/L      CO2 25.0 mmol/L      Calcium 8.9 mg/dL      Total Protein 6.1 g/dL      Albumin 3.1 g/dL      ALT (SGPT) 5 U/L      AST (SGOT) 7 U/L      Alkaline Phosphatase 124 U/L      Total Bilirubin 0.4 mg/dL      Globulin 3.0 gm/dL      A/G Ratio 1.0 g/dL      BUN/Creatinine Ratio 9.7     Anion Gap 13.0 mmol/L      eGFR 8.7 mL/min/1.73      Comment: <15 Indicative of kidney failure       Narrative:      GFR Normal >60  Chronic Kidney Disease <60  Kidney Failure <15      CBC & Differential [[id number]]  (Abnormal) Collected: 10/20/23 0157    Specimen: Blood Updated: 10/20/23 0225    Narrative:      The following orders were created for panel order CBC & Differential.  Procedure                               " Abnormality         Status                     ---------                               -----------         ------                     CBC Auto Differential[[id number]]        Abnormal            Final result                 Please view results for these tests on the individual orders.    CBC Auto Differential [[id number]]  (Abnormal) Collected: 10/20/23 0157    Specimen: Blood Updated: 10/20/23 0225     WBC 7.83 10*3/mm3      RBC 3.21 10*6/mm3      Hemoglobin 8.7 g/dL      Hematocrit 28.8 %      MCV 89.7 fL      MCH 27.1 pg      MCHC 30.2 g/dL      RDW 16.8 %      RDW-SD 54.5 fl      MPV 11.1 fL      Platelets 222 10*3/mm3      Neutrophil % 59.0 %      Lymphocyte % 21.1 %      Monocyte % 12.0 %      Eosinophil % 6.6 %      Basophil % 0.5 %      Immature Grans % 0.8 %      Neutrophils, Absolute 4.62 10*3/mm3      Lymphocytes, Absolute 1.65 10*3/mm3      Monocytes, Absolute 0.94 10*3/mm3      Eosinophils, Absolute 0.52 10*3/mm3      Basophils, Absolute 0.04 10*3/mm3      Immature Grans, Absolute 0.06 10*3/mm3      nRBC 0.0 /100 WBC     POC Glucose Once [[id number]]  (Normal) Collected: 10/19/23 2108    Specimen: Blood Updated: 10/19/23 2114     Glucose 82 mg/dL     Hepatitis Panel, Acute [[id number]]  (Normal) Collected: 10/19/23 0055    Specimen: Blood Updated: 10/19/23 0138     Hepatitis B Surface Ag Non-Reactive     Hep A IgM Non-Reactive     Hep B C IgM Non-Reactive     Hepatitis C Ab Non-Reactive    Narrative:      Results may be falsely decreased if patient taking Biotin.     Basic Metabolic Panel [[id number]]  (Abnormal) Collected: 10/19/23 0055    Specimen: Blood Updated: 10/19/23 0127     Glucose 107 mg/dL      BUN 75 mg/dL      Creatinine 6.93 mg/dL      Sodium 134 mmol/L      Potassium 5.9 mmol/L      Chloride 101 mmol/L      CO2 20.2 mmol/L      Calcium 9.0 mg/dL      BUN/Creatinine Ratio 10.8     Anion Gap 12.8 mmol/L      eGFR 6.1 mL/min/1.73      Comment: <15 Indicative of kidney failure       Narrative:       GFR Normal >60  Chronic Kidney Disease <60  Kidney Failure <15      Slide Review, Hematology [[id number]] Collected: 10/17/23 1744    Specimen: Blood Updated: 10/18/23 1040     Performed by: Andreina Cuevas M.D.     Pathologist Interpretation --     Minimal leukocytosis with mild absolute neutrophilia. Moderate normocytic anemia with mild anisocytosis, occasional colten cells and very rare helmet cells. No significant schistocytosis seen. Unremarkable platelets. No blasts or atypical cells identified. Findings were discussed with Dr. León at 10:35 am on 10/18/23.     CBC & Differential [[id number]]  (Abnormal) Collected: 10/17/23 1744    Specimen: Blood Updated: 10/18/23 0900    Narrative:      The following orders were created for panel order CBC & Differential.  Procedure                               Abnormality         Status                     ---------                               -----------         ------                     CBC Auto Differential[[id number]]        Abnormal            Edited Result - FINAL      Scan Slide[[id number]]                                                                    Please view results for these tests on the individual orders.    CBC Auto Differential [[id number]]  (Abnormal) Collected: 10/17/23 1744    Specimen: Blood Updated: 10/18/23 0900     WBC 10.86 10*3/mm3      RBC 3.40 10*6/mm3      Hemoglobin 9.2 g/dL      Hematocrit 31.0 %      MCV 91.2 fL      MCH 27.1 pg      MCHC 29.7 g/dL      RDW 16.9 %      RDW-SD 56.3 fl      MPV 10.6 fL      Platelets 259 10*3/mm3      Neutrophil % 65.7 %      Lymphocyte % 17.1 %      Monocyte % 8.9 %      Eosinophil % 7.5 %      Basophil % 0.3 %      Immature Grans % 0.5 %      Neutrophils, Absolute 7.14 10*3/mm3      Lymphocytes, Absolute 1.86 10*3/mm3      Monocytes, Absolute 0.97 10*3/mm3      Eosinophils, Absolute 0.81 10*3/mm3      Basophils, Absolute 0.03 10*3/mm3      Immature Grans, Absolute 0.05 10*3/mm3      nRBC 0.0 /100 WBC      Comprehensive Metabolic Panel [[id number]]  (Abnormal) Collected: 10/18/23 0059    Specimen: Blood Updated: 10/18/23 0156     Glucose 126 mg/dL      BUN 69 mg/dL      Creatinine 6.32 mg/dL      Sodium 132 mmol/L      Potassium 5.2 mmol/L      Chloride 100 mmol/L      CO2 20.5 mmol/L      Calcium 9.0 mg/dL      Total Protein 6.3 g/dL      Albumin 3.6 g/dL      ALT (SGPT) 8 U/L      AST (SGOT) 10 U/L      Alkaline Phosphatase 134 U/L      Total Bilirubin 0.3 mg/dL      Globulin 2.7 gm/dL      A/G Ratio 1.3 g/dL      BUN/Creatinine Ratio 10.9     Anion Gap 11.5 mmol/L      eGFR 6.9 mL/min/1.73      Comment: <15 Indicative of kidney failure       Narrative:      GFR Normal >60  Chronic Kidney Disease <60  Kidney Failure <15      CBC (No Diff) [[id number]]  (Abnormal) Collected: 10/18/23 0059    Specimen: Blood Updated: 10/18/23 0121     WBC 11.60 10*3/mm3      RBC 3.43 10*6/mm3      Hemoglobin 9.3 g/dL      Hematocrit 31.5 %      MCV 91.8 fL      MCH 27.1 pg      MCHC 29.5 g/dL      RDW 16.8 %      RDW-SD 56.8 fl      MPV 10.8 fL      Platelets 242 10*3/mm3     Basic Metabolic Panel [[id number]]  (Abnormal) Collected: 10/17/23 1744    Specimen: Blood Updated: 10/17/23 1809     Glucose 116 mg/dL      BUN 69 mg/dL      Creatinine 6.10 mg/dL      Sodium 134 mmol/L      Potassium 5.2 mmol/L      Chloride 99 mmol/L      CO2 22.9 mmol/L      Calcium 8.7 mg/dL      BUN/Creatinine Ratio 11.3     Anion Gap 12.1 mmol/L      eGFR 7.2 mL/min/1.73      Comment: <15 Indicative of kidney failure       Narrative:      GFR Normal >60  Chronic Kidney Disease <60  Kidney Failure <15      Blood Gas, Arterial With Co-Ox [[id number]]  (Abnormal) Collected: 10/16/23 2202    Specimen: Arterial Blood Updated: 10/16/23 2205     Site Left Brachial     Sergei's Test N/A     pH, Arterial 7.311 pH units      Comment: 84 Value below reference range        pCO2, Arterial 47.4 mm Hg      pO2, Arterial 74.9 mm Hg      Comment: 84 Value below reference  range        HCO3, Arterial 23.9 mmol/L      Base Excess, Arterial -2.4 mmol/L      O2 Saturation, Arterial 95.2 %      Hemoglobin, Blood Gas 9.6 g/dL      Comment: 84 Value below reference range        Hematocrit, Blood Gas 29.4 %      Comment: 84 Value below reference range        Oxyhemoglobin 93.7 %      Comment: 84 Value below reference range        Methemoglobin <-0.10 %      Comment: 94 Value below reportable range < _0.1        Carboxyhemoglobin 2.4 %      A-a DO2 89.8 mmHg      CO2 Content 25.3 mmol/L      Barometric Pressure for Blood Gas 726 mmHg      Modality BiPap     FIO2 32 %      Ventilator Mode NA     Set Mech Resp Rate 12.0     IPAP 16     Comment: Meter: [id number]     :  [id number]        EPAP 8     Collected by [id number]     pH, Temp Corrected --     pCO2, Temperature Corrected --     pO2, Temperature Corrected --    Blood Gas, Arterial With Co-Ox [[id number]]  (Abnormal) Collected: 10/16/23 2020    Specimen: Arterial Blood Updated: 10/16/23 2022     Site Right Brachial     Sergei's Test N/A     pH, Arterial 7.288 pH units      Comment: 84 Value below reference range        pCO2, Arterial 50.6 mm Hg      Comment: 83 Value above reference range        pO2, Arterial 75.8 mm Hg      Comment: 84 Value below reference range        HCO3, Arterial 24.2 mmol/L      Base Excess, Arterial -2.6 mmol/L      O2 Saturation, Arterial 95.2 %      Hemoglobin, Blood Gas 9.5 g/dL      Comment: 84 Value below reference range        Hematocrit, Blood Gas 29.2 %      Comment: 84 Value below reference range        Oxyhemoglobin 93.0 %      Comment: 84 Value below reference range        Methemoglobin 0.50 %      Carboxyhemoglobin 1.8 %      A-a DO2 112.7 mmHg      CO2 Content 25.7 mmol/L      Barometric Pressure for Blood Gas 726 mmHg      Modality Nasal Cannula     FIO2 36 %      Flow Rate 4.0 lpm      Ventilator Mode NA     Collected by [id number]     Comment: Meter: [id number]     :  [id number]         pH, Temp Corrected --     pCO2, Temperature Corrected --     pO2, Temperature Corrected --    High Sensitivity Troponin T 2Hr [[id number]]  (Abnormal) Collected: 10/13/23 1659    Specimen: Blood Updated: 10/13/23 1726     HS Troponin T 38 ng/L      Troponin T Delta 3 ng/L     Narrative:      High Sensitive Troponin T Reference Range:  <10.0 ng/L- Negative Female for AMI  <15.0 ng/L- Negative Male for AMI  >=10 - Abnormal Female indicating possible myocardial injury.  >=15 - Abnormal Male indicating possible myocardial injury.   Clinicians would have to utilize clinical acumen, EKG, Troponin, and serial changes to determine if it is an Acute Myocardial Infarction or myocardial injury due to an underlying chronic condition.         Blood Culture - Blood, Arm, Left [[id number]]  (Normal) Collected: 10/08/23 1709    Specimen: Blood from Arm, Left Updated: 10/13/23 1715     Blood Culture No growth at 5 days    Blood Culture - Blood, Arm, Right [[id number]]  (Normal) Collected: 10/08/23 1709    Specimen: Blood from Arm, Right Updated: 10/13/23 1715     Blood Culture No growth at 5 days    High Sensitivity Troponin T [[id number]]  (Abnormal) Collected: 10/13/23 1414    Specimen: Blood Updated: 10/13/23 1502     HS Troponin T 35 ng/L     Narrative:      High Sensitive Troponin T Reference Range:  <10.0 ng/L- Negative Female for AMI  <15.0 ng/L- Negative Male for AMI  >=10 - Abnormal Female indicating possible myocardial injury.  >=15 - Abnormal Male indicating possible myocardial injury.   Clinicians would have to utilize clinical acumen, EKG, Troponin, and serial changes to determine if it is an Acute Myocardial Infarction or myocardial injury due to an underlying chronic condition.         ANCA Panel [[id number]] Collected: 10/11/23 0525    Specimen: Blood Updated: 10/12/23 2207     ANTI-MPO ANTIBODIES <0.2 units      ANTI-PR3 ANTIBODIES <0.2 units      C-ANCA <1:20 titer      P-ANCA <1:20 titer      Comment: The presence of  positive fluorescence exhibiting P-ANCA or C-ANCA  patterns alone is not specific for the diagnosis of Wegener's  Granulomatosis (WG) or microscopic polyangiitis. Decisions about  treatment should not be based solely on ANCA IFA results.  The  International ANCA Group Consensus recommends follow up testing of  positive sera with both MD-3 and MPO-ANCA enzyme immunoassays. As  many as 5% serum samples are positive only by EIA.  Ref. AM J Clin Pathol 1999;111:507-513.        Atypical pANCA <1:20 titer      Comment: The atypical pANCA pattern has been observed in a significant  percentage of patients with ulcerative colitis, primary sclerosing  cholangitis and autoimmune hepatitis.       Narrative:      Performed at:  [address]  [id number]  : Nilo Tabares MD, Phone:  [phone]  Performed at:  [address] [place] [address]  [id number]  : Dipak Marino PhD, Phone:  [phone]    Glomerular Basement Membrane Antibodies [[id number]] Collected: 10/11/23 0525    Specimen: Blood Updated: 10/12/23 2207     Glomerular Basement Membrane Ab <0.2 units     Narrative:      Performed at:  [address]  [id number]  : Nilo Tabares MD, Phone:  [phone]    Antihistone Antibodies [[id number]] Collected: 10/10/23 1420    Specimen: Blood Updated: 10/12/23 1613     Histone Ab 0.3 Units      Comment:                          Negative                <1.0                           Weak Positive      1.0 - 1.5                           Moderate Positive  1.6 - 2.5                           Strong Positive         >2.5       Narrative:      Performed at:  [address]  [id number]  : Nilo Tabares MD, Phone:  [phone]    TERRELL + PE [[id number]]  (Abnormal) Collected: 10/11/23 0525    Specimen: Blood Updated: 10/12/23 1412     IgG 1007  mg/dL      IgA 456 mg/dL      IgM 20 mg/dL      Comment: Result confirmed on concentration.        Total Protein 6.5 g/dL      Albumin 3.3 g/dL      Alpha-1-Globulin 0.3 g/dL      Alpha-2-Globulin 0.9 g/dL      Beta Globulin 1.1 g/dL      Gamma Globulin 0.9 g/dL      M-Lamberto Not Observed g/dL      Globulin 3.2 g/dL      A/G Ratio 1.1     Immunofixation Reflex, Serum Comment:     Comment: Presence of monoclonal protein is unclear at this time. Suggest  repeat in 3 to 6 months if clinically indicated.        Please note Comment     Comment: Protein electrophoresis scan will follow via computer, mail, or   delivery.       Narrative:      Performed at:  [address] [place] [address]  [id number]  : Dipak Marino PhD, Phone:  [phone]    Anti-DNA Antibody, Double-stranded [[id number]] Collected: 10/11/23 0525    Specimen: Blood Updated: 10/12/23 1412     Anti-DNA (DS) Ab Qn <1 IU/mL      Comment:                                    Negative      <5                                     Equivocal  5 - 9                                     Positive      >9       Narrative:      Performed at:  [address] [place] [address]  [id number]  : Dipak Marino PhD, Phone:  [phone]    Antinuclear Antibodies Direct [[id number]] Collected: 10/11/23 0525    Specimen: Blood Updated: 10/12/23 1412     BETO Direct Negative    Narrative:      Performed at:  [address] [place] [address]  [id number]  : Dipak Marino PhD, Phone:  [phone]    CBC (No Diff) [[id number]]  (Abnormal) Collected: 10/12/23 0057    Specimen: Blood Updated: 10/12/23 0130     WBC 7.34 10*3/mm3      RBC 3.54 10*6/mm3      Hemoglobin 9.5 g/dL      Hematocrit 31.9 %      MCV 90.1 fL      MCH 26.8 pg      MCHC 29.8 g/dL      RDW 16.3 %      RDW-SD 54.5 fl      MPV 10.7 fL      Platelets 269 10*3/mm3     C3 Complement [[id number]]  (Normal) Collected: 10/11/23 0525     Specimen: Blood Updated: 10/11/23 1226     C3 Complement 121.0 mg/dl     C4 Complement [[id number]]  (Normal) Collected: 10/11/23 0525    Specimen: Blood Updated: 10/11/23 1226     C4 Complement 35.0 mg/dl     Hepatitis Panel, Acute [[id number]]  (Normal) Collected: 10/11/23 0525    Specimen: Blood Updated: 10/11/23 0632     Hepatitis B Surface Ag Non-Reactive     Hep A IgM Non-Reactive     Hep B C IgM Non-Reactive     Hepatitis C Ab Non-Reactive    Narrative:      Results may be falsely decreased if patient taking Biotin.     Urinalysis without microscopic (no culture) - Urine, Clean Catch [[id number]]  (Abnormal) Collected: 10/10/23 1915    Specimen: Urine, Clean Catch Updated: 10/10/23 1921     Color, UA Yellow     Appearance, UA Cloudy     pH, UA <=5.0     Specific Gravity, UA 1.012     Glucose, UA Negative     Ketones, UA Negative     Bilirubin, UA Negative     Blood, UA Negative     Protein, UA Trace     Leuk Esterase, UA Negative     Nitrite, UA Negative     Urobilinogen, UA 0.2 E.U./dL    CK [[id number]]  (Abnormal) Collected: 10/09/23 2325    Specimen: Blood Updated: 10/10/23 0001     Creatine Kinase 217 U/L     High Sensitivity Troponin T 2Hr [[id number]]  (Abnormal) Collected: 10/09/23 2325    Specimen: Blood Updated: 10/10/23 0000     HS Troponin T 25 ng/L      Troponin T Delta 2 ng/L     Narrative:      High Sensitive Troponin T Reference Range:  <10.0 ng/L- Negative Female for AMI  <15.0 ng/L- Negative Male for AMI  >=10 - Abnormal Female indicating possible myocardial injury.  >=15 - Abnormal Male indicating possible myocardial injury.   Clinicians would have to utilize clinical acumen, EKG, Troponin, and serial changes to determine if it is an Acute Myocardial Infarction or myocardial injury due to an underlying chronic condition.         High Sensitivity Troponin T [[id number]]  (Abnormal) Collected: 10/09/23 2152    Specimen: Blood Updated: 10/09/23 2229     HS Troponin T 23 ng/L      Comment:  Specimen hemolyzed.  Results may be affected.       Narrative:      High Sensitive Troponin T Reference Range:  <10.0 ng/L- Negative Female for AMI  <15.0 ng/L- Negative Male for AMI  >=10 - Abnormal Female indicating possible myocardial injury.  >=15 - Abnormal Male indicating possible myocardial injury.   Clinicians would have to utilize clinical acumen, EKG, Troponin, and serial changes to determine if it is an Acute Myocardial Infarction or myocardial injury due to an underlying chronic condition.         Chloride, Urine, Random - Urine, Clean Catch [[id number]] Collected: 10/09/23 1527    Specimen: Urine, Clean Catch Updated: 10/09/23 1545     Chloride, Urine 36 mmol/L     Narrative:      Reference intervals for random urine have not been established.  Clinical usage is dependent upon physician's interpretation in combination with other laboratory tests.       Sodium, Urine, Random - Urine, Clean Catch [[id number]] Collected: 10/09/23 1527    Specimen: Urine, Clean Catch Updated: 10/09/23 1545     Sodium, Urine 54 mmol/L     Narrative:      Reference intervals for random urine have not been established.  Clinical usage is dependent upon physician's interpretation in combination with other laboratory tests.       Potassium, Urine, Random - Urine, Clean Catch [[id number]] Collected: 10/09/23 1527    Specimen: Urine, Clean Catch Updated: 10/09/23 1544     Potassium, Urine 25.6 mmol/L     Narrative:      Reference intervals for random urine have not been established.  Clinical usage is dependent upon physician's interpretation in combination with other laboratory tests.       Protein / Creatinine Ratio, Urine - Urine, Clean Catch [[id number]]  (Abnormal) Collected: 10/09/23 0307    Specimen: Urine, Clean Catch Updated: 10/09/23 1208     Protein/Creatinine Ratio, Urine 413.8 mg/G Crea      Creatinine, Urine 116.0 mg/dL      Total Protein, Urine 48.0 mg/dL     Potassium [[id number]]  (Abnormal) Collected: 10/09/23 0754     Specimen: Blood Updated: 10/09/23 0858     Potassium 5.9 mmol/L      Comment: Slight hemolysis detected by analyzer. Results may be affected.       Needlestick Pt Source [[id number]]  (Normal) Collected: 10/09/23 0550    Specimen: Blood Updated: 10/09/23 0716    Narrative:      The following orders were created for panel order Needlestick Pt Source.  Procedure                               Abnormality         Status                     ---------                               -----------         ------                     Hepatitis C Antibody[[id number]]         Normal              Final result               Hepatitis B Surface Antigen[[id number]]  Normal              Final result               HIV-1 / O / 2 Ag / Antibody[[id number]]  Normal              Final result                 Please view results for these tests on the individual orders.    HIV-1 / O / 2 Ag / Antibody [[id number]]  (Normal) Collected: 10/09/23 0550    Specimen: Blood Updated: 10/09/23 0716     HIV-1/ HIV-2 Non-Reactive     Comment: A non-reactive test result does not preclude the possibility of exposure to HIV or infection with HIV. An antibody response to recent exposure may take several months to reach detectable levels.       Narrative:      The HIV antibody/antigen combo assay is a qualitative assay for HIV that includes the p24 antigen as well as antibodies to HIV types 1 and 2. This test is intended to be used as a screening assay in the diagnosis of HIV infection in patients over the age of 2.    Hepatitis C Antibody [[id number]]  (Normal) Collected: 10/09/23 0550    Specimen: Blood Updated: 10/09/23 0704     Hepatitis C Ab Non-Reactive    Narrative:      Results may be falsely decreased if patient taking Biotin.      Hepatitis B Surface Antigen [[id number]]  (Normal) Collected: 10/09/23 0550    Specimen: Blood Updated: 10/09/23 0654     Hepatitis B Surface Ag Non-Reactive    Urinalysis, Microscopic Only - Urine, Clean Catch [[id number]]   (Abnormal) Collected: 10/09/23 0307    Specimen: Urine, Clean Catch Updated: 10/09/23 0334     RBC, UA None Seen /HPF      WBC, UA 0-2 /HPF      Bacteria, UA None Seen /HPF      Squamous Epithelial Cells, UA 3-6 /HPF      Hyaline Casts, UA 0-2 /LPF      Methodology Manual Light Microscopy    Urinalysis With Microscopic If Indicated (No Culture) - Urine, Clean Catch [[id number]]  (Abnormal) Collected: 10/09/23 0307    Specimen: Urine, Clean Catch Updated: 10/09/23 0328     Color, UA Yellow     Appearance, UA Clear     pH, UA 5.5     Specific Gravity, UA 1.015     Glucose, UA Negative     Ketones, UA Negative     Bilirubin, UA Negative     Blood, UA Negative     Protein, UA 30 mg/dL (1+)     Leuk Esterase, UA Negative     Nitrite, UA Negative     Urobilinogen, UA 0.2 E.U./dL    Protein, Urine, Random - Urine, Clean Catch [[id number]] Collected: 10/09/23 0307    Specimen: Urine, Clean Catch Updated: 10/09/23 0325     Total Protein, Urine 53.4 mg/dL     Narrative:      Reference intervals for random urine have not been established.  Clinical usage is dependent upon physician's interpretation in combination with other laboratory tests.       Magnesium [[id number]]  (Normal) Collected: 10/09/23 0132    Specimen: Blood Updated: 10/09/23 0305     Magnesium 2.0 mg/dL     Respiratory Panel PCR w/COVID-19(SARS-CoV-2) LOBO/VAZQUEZ/MARLIN/PAD/COR/MAD/KELBY In-House, NP Swab in UTM/VTM, 3-4 HR TAT - Swab, Nasopharynx [[id number]]  (Normal) Collected: 10/09/23 0103    Specimen: Swab from Nasopharynx Updated: 10/09/23 0156     ADENOVIRUS, PCR Not Detected     Coronavirus 229E Not Detected     Coronavirus HKU1 Not Detected     Coronavirus NL63 Not Detected     Coronavirus OC43 Not Detected     COVID19 Not Detected     Human Metapneumovirus Not Detected     Human Rhinovirus/Enterovirus Not Detected     Influenza A PCR Not Detected     Influenza B PCR Not Detected     Parainfluenza Virus 1 Not Detected     Parainfluenza Virus 2 Not Detected      Parainfluenza Virus 3 Not Detected     Parainfluenza Virus 4 Not Detected     RSV, PCR Not Detected     Bordetella pertussis pcr Not Detected     Bordetella parapertussis PCR Not Detected     Chlamydophila pneumoniae PCR Not Detected     Mycoplasma pneumo by PCR Not Detected    Narrative:      In the setting of a positive respiratory panel with a viral infection PLUS a negative procalcitonin without other underlying concern for bacterial infection, consider observing off antibiotics or discontinuation of antibiotics and continue supportive care. If the respiratory panel is positive for atypical bacterial infection (Bordetella pertussis, Chlamydophila pneumoniae, or Mycoplasma pneumoniae), consider antibiotic de-escalation to target atypical bacterial infection.    TSH [[id number]]  (Abnormal) Collected: 10/08/23 1923    Specimen: Blood from Arm, Left Updated: 10/09/23 0120     TSH 6.770 uIU/mL     T4, Free [[id number]]  (Normal) Collected: 10/08/23 1923    Specimen: Blood from Arm, Left Updated: 10/09/23 0120     Free T4 1.01 ng/dL     Narrative:      Results may be falsely increased if patient taking Biotin.      Lipid Panel [[id number]]  (Abnormal) Collected: 10/08/23 1923    Specimen: Blood from Arm, Left Updated: 10/09/23 0113     Total Cholesterol 140 mg/dL      Triglycerides 75 mg/dL      HDL Cholesterol 34 mg/dL      LDL Cholesterol  91 mg/dL      VLDL Cholesterol 15 mg/dL      LDL/HDL Ratio 2.68    Narrative:      Cholesterol Reference Ranges  (U.S. Department of Health and Human Services ATP III Classifications)    Desirable          <200 mg/dL  Borderline High    200-239 mg/dL  High Risk          >240 mg/dL      Triglyceride Reference Ranges  (U.S. Department of Health and Human Services ATP III Classifications)    Normal           <150 mg/dL  Borderline High  150-199 mg/dL  High             200-499 mg/dL  Very High        >500 mg/dL    HDL Reference Ranges  (U.S. Department of Health and Human Services ATP  "III Classifications)    Low     <40 mg/dl (major risk factor for CHD)  High    >60 mg/dl ('negative' risk factor for CHD)        LDL Reference Ranges  (U.S. Department of Health and Human Services ATP III Classifications)    Optimal          <100 mg/dL  Near Optimal     100-129 mg/dL  Borderline High  130-159 mg/dL  High             160-189 mg/dL  Very High        >189 mg/dL    Hemoglobin A1c [[id number]]  (Normal) Collected: 10/08/23 1709    Specimen: Blood Updated: 10/09/23 0112     Hemoglobin A1C 5.30 %     Narrative:      Hemoglobin A1C Ranges:    Increased Risk for Diabetes  5.7% to 6.4%  Diabetes                     >= 6.5%  Diabetic Goal                < 7.0%    Procalcitonin [[id number]]  (Normal) Collected: 10/08/23 2108    Specimen: Blood Updated: 10/08/23 2143     Procalcitonin 0.10 ng/mL     Narrative:      As a Marker for Sepsis (Non-Neonates):    1. <0.5 ng/mL represents a low risk of severe sepsis and/or septic shock.  2. >2 ng/mL represents a high risk of severe sepsis and/or septic shock.    As a Marker for Lower Respiratory Tract Infections that require antibiotic therapy:    PCT on Admission    Antibiotic Therapy       6-12 Hrs later    >0.5                Strongly Recommended  >0.25 - <0.5        Recommended   0.1 - 0.25          Discouraged              Remeasure/reassess PCT  <0.1                Strongly Discouraged     Remeasure/reassess PCT    As 28 day mortality risk marker: \"Change in Procalcitonin Result\" (>80% or <=80%) if Day 0 (or Day 1) and Day 4 values are available. Refer to [email or web address]    Change in PCT <=80%  A decrease of PCT levels below or equal to 80% defines a positive change in PCT test result representing a higher risk for 28-day all-cause mortality of patients diagnosed with severe sepsis for septic shock.    Change in PCT >80%  A decrease of PCT levels of more than 80% defines a negative change in PCT result representing a lower risk for 28-day " all-cause mortality of patients diagnosed with severe sepsis or septic shock.       C-reactive Protein [[id number]]  (Abnormal) Collected: 10/08/23 1923    Specimen: Blood from Arm, Left Updated: 10/08/23 2115     C-Reactive Protein 5.41 mg/dL     Magnesium [[id number]]  (Normal) Collected: 10/08/23 1709    Specimen: Blood Updated: 10/08/23 1737     Magnesium 1.9 mg/dL     BNP [[id number]]  (Abnormal) Collected: 10/08/23 1709    Specimen: Blood Updated: 10/08/23 1735     proBNP 8,093.0 pg/mL     Narrative:      This assay is used as an aid in the diagnosis of individuals suspected of having heart failure. It can be used as an aid in the diagnosis of acute decompensated heart failure (ADHF) in patients presenting with signs and symptoms of ADHF to the emergency department (ED). In addition, NT-proBNP of <300 pg/mL indicates ADHF is not likely.    Age Range Result Interpretation  NT-proBNP Concentration (pg/mL:      <50             Positive            >450                   Gray                 300-450                    Negative             <300    50-75           Positive            >900                  Gray                300-900                  Negative            <300      >75             Positive            >1800                  Gray                300-1800                  Negative            <300    Lactic Acid, Plasma [[id number]]  (Normal) Collected: 10/08/23 1709    Specimen: Blood Updated: 10/08/23 1735     Lactate 0.9 mmol/L           Orders (last 24 hrs)        Start     Ordered    10/21/23 0400  Basic Metabolic Panel  Morning Draw         10/20/23 1034    10/20/23 1300  acetylcysteine (MUCOMYST) 20 % nebulizer solution 1.5 mL  Every 6 Hours - RT         10/20/23 0919    10/20/23 1300  ipratropium-albuterol (DUO-NEB) nebulizer solution 3 mL  Every 6 Hours - RT         10/20/23 0919    10/20/23 1226  POC Glucose Once  PROCEDURE ONCE        Comments: Complete no more than 45 minutes prior to patient  eating      10/20/23 1218    10/20/23 1025  Blood Gas, Arterial -With Co-Ox Panel: Yes  Once         10/20/23 1024    10/20/23 1007  Sitter At Bedside  Continuous         10/20/23 1007    10/20/23 0919  Blood Culture - Blood,  Once        See Hyperspace for full Linked Orders Report.    10/20/23 0918    10/20/23 0919  Blood Culture - Blood,  Once        Comments: From a different site than #1.     See Hyperspace for full Linked Orders Report.    10/20/23 0918    10/20/23 0919  Procalcitonin  STAT         10/20/23 0918    10/20/23 0918  Respiratory Culture - Sputum, Cough  Once         10/20/23 0918    10/20/23 0900  aspirin EC tablet 81 mg  Daily         10/19/23 1740    10/20/23 0856  Diet: Cardiac Diets, Diabetic Diets, Renal Diets; Healthy Heart (2-3 Na+); Consistent Carbohydrate; Low Potassium; Texture: Regular Texture (IDDSI 7); Fluid Consistency: Thin (IDDSI 0)  Diet Effective Now         10/20/23 0856    10/20/23 0804  Hemodialysis Inpatient  Once         10/20/23 0805    10/20/23 0801  Case Management  Consult  Once        Provider:  (Not yet assigned)    10/20/23 0801    10/20/23 0602  Insert Indwelling Urinary Catheter  Once        Comments: Follow Protocol As Outlined in Process Instructions (Open Order Report to View Full Instructions)   See Hyperspace for full Linked Orders Report.    10/20/23 0602    10/20/23 0602  Assess Need for Indwelling Urinary Catheter - Follow Removal Protocol  Continuous        Comments: Follow Protocol As Outlined in Process Instructions (Open Order Report to View Full Instructions)   See Hyperspace for full Linked Orders Report.    10/20/23 0602    10/20/23 0602  Urinary Catheter Care  Every Shift      See Hyperspace for full Linked Orders Report.    10/20/23 0602    10/20/23 0600  XR Chest 1 View  1 Time Imaging         10/19/23 1451    10/20/23 0600  CBC & Differential  Morning Draw         10/19/23 1739    10/20/23 0600  Comprehensive Metabolic Panel   Morning Draw,   Status:  Canceled         10/19/23 1739    10/20/23 0600  Bladder Scan  Daily       10/19/23 2048    10/20/23 0600  CBC Auto Differential  PROCEDURE ONCE         10/19/23 2202    10/20/23 0400  Comprehensive Metabolic Panel  Morning Draw         10/19/23 2059    10/20/23 0001  NPO Diet NPO Type: Strict NPO  Diet Effective Midnight,   Status:  Canceled         10/19/23 1448    10/20/23 0000  Oscillating Positive Expiratory Pressure (OPEP)  Every 4 Hours - RT      Comments: If patient cannot tolerate vest cpt with treatment    10/19/23 2332    10/19/23 2200  morphine injection 1 mg  Once         10/19/23 2102    10/19/23 2150  Initiate & Follow Hypercapnic Monitoring Guideline for Opioid Administration via EtCO2 and / or SpO2  Continuous        Comments: Follow Hypercapnic Monitoring Guideline As Outlined in Process Instructions (Open Order Report to View Full Instructions)    10/19/23 2149    10/19/23 2150  Opioid Administration - Document EtCO2 and / or SpO2 With Each Set of Vitals & Any Change in Patient Status  Per Order Details        Comments: With Each Set of Vitals & Any Change in Patient Status    10/19/23 2149    10/19/23 2150  Opioid Administration - Notify Provider Hypercapnic Monitoring  Until Discontinued         10/19/23 2149    10/19/23 2150  Opioid Administration - Continuous Pulse Oximetry (SpO2)  Continuous         10/19/23 2149    10/19/23 2115  POC Glucose Once  PROCEDURE ONCE        Comments: Complete no more than 45 minutes prior to patient eating      10/19/23 2108    10/19/23 1659  Hemodialysis Inpatient  Once         10/19/23 1659    10/19/23 1630  acetylcysteine (MUCOMYST) 20 % nebulizer solution 4 mL  4 Times Daily - RT,   Status:  Discontinued         10/19/23 1449    10/19/23 1530  ipratropium-albuterol (DUO-NEB) nebulizer solution 3 mL  Every 4 Hours - RT,   Status:  Discontinued         10/19/23 1451    10/19/23 1530  acetylcysteine (MUCOMYST) 20 % nebulizer solution 1.5  mL  Every 4 Hours - RT,   Status:  Discontinued         10/19/23 1451    10/19/23 1530  Chest Physiotherapy (PD & P)  Every 4 Hours - RT,   Status:  Canceled      Comments: Vest post neb    10/19/23 1451    10/19/23 1510  Obtain Informed Consent  Once         10/19/23 1510    10/19/23 1451  CPR - Full Support in OR  Once         10/19/23 1451    10/19/23 1443  CT Chest Without Contrast Diagnostic  1 Time Imaging         10/19/23 1443    10/19/23 1421  Advance Diet As Tolerated -  Until Discontinued         10/19/23 1420    10/19/23 1323  Pulse Oximetry, Continuous  Continuous,   Status:  Canceled         10/19/23 1322    10/19/23 1323  Vital signs every 5 minutes for 15 minutes, every 15 minutes thereafter.  Once,   Status:  Canceled         10/19/23 1322    10/19/23 1323  Call Anesthesiologist for additional IV Fluid bolus for Hypotension/Tachycardia  Until Discontinued,   Status:  Canceled         10/19/23 1322    10/19/23 1323  Notify Anesthesia of Any Acute Changes in Patient Condition  Until Discontinued,   Status:  Canceled         10/19/23 1322    10/19/23 1323  Notify Anesthesia for Unrelieved Pain  Until Discontinued,   Status:  Canceled         10/19/23 1322    10/19/23 1323  Once DC criteria to floor met, follow surgeon's orders.  Until Discontinued,   Status:  Canceled         10/19/23 1322    10/19/23 1323  Discharge patient from PACU when discharge criteria is met.  Until Discontinued,   Status:  Canceled         10/19/23 1322    10/19/23 1322  lactated ringers infusion  Once As Needed,   Status:  Discontinued         10/19/23 1322    10/19/23 1322  oxyCODONE-acetaminophen (PERCOCET) 5-325 MG per tablet 1 tablet  Once As Needed,   Status:  Discontinued         10/19/23 1322    10/19/23 1322  ondansetron (ZOFRAN) injection 4 mg  As Needed,   Status:  Discontinued         10/19/23 1322    10/19/23 1322  ipratropium-albuterol (DUO-NEB) nebulizer solution 3 mL  Once As Needed         10/19/23 1322     10/19/23 1304  XR Chest AP  1 Time Imaging         10/19/23 1304    10/19/23 1259  heparin injection  As Needed,   Status:  Discontinued         10/19/23 1259    10/19/23 1259  lidocaine (XYLOCAINE) 1 % injection  As Needed,   Status:  Discontinued         10/19/23 1259    10/19/23 1247  sodium chloride (NS) irrigation solution  As Needed,   Status:  Discontinued         10/19/23 1247    10/19/23 1202  sodium chloride 0.9 % flush 10 mL  Every 12 Hours Scheduled,   Status:  Discontinued         10/19/23 1200    10/19/23 1202  lactated ringers infusion  Once,   Status:  Discontinued         10/19/23 1200    10/19/23 1158  Vital Signs - Per Anesthesia Protocol  As Needed,   Status:  Canceled       10/19/23 1200    10/19/23 1158  sodium chloride 0.9 % flush 10 mL  As Needed,   Status:  Discontinued         10/19/23 1200    10/19/23 1158  sodium chloride 0.9 % infusion 40 mL  As Needed,   Status:  Discontinued         10/19/23 1200    10/19/23 1158  midazolam (VERSED) injection 1 mg  Every 10 Minutes PRN,   Status:  Discontinued         10/19/23 1200    10/19/23 1158  midazolam (VERSED) injection 0.5 mg  Every 10 Minutes PRN,   Status:  Discontinued         10/19/23 1200    10/19/23 1154  ceFAZolin 2000 mg IVPB in 100 mL NS (VTB)  Once         10/19/23 1152    10/19/23 0915  sodium zirconium cyclosilicate (LOKELMA) pack 10 g  Once         10/19/23 0817    10/14/23 1400  hydrALAZINE (APRESOLINE) tablet 50 mg  Every 8 Hours Scheduled         10/14/23 0825    10/14/23 1100  POC Glucose Finger 4x Daily Before Meals & at Bedtime  4 Times Daily Before Meals & at Bedtime       10/14/23 0825    10/13/23 1206  hydrALAZINE (APRESOLINE) injection 10 mg  Every 6 Hours PRN         10/13/23 1206    10/10/23 0900  folic acid (FOLVITE) tablet 1 mg  Daily         10/09/23 2138    10/10/23 0900  pantoprazole (PROTONIX) EC tablet 40 mg  Daily         10/09/23 2138    10/10/23 0900  vitamin B-12 (CYANOCOBALAMIN) tablet 1,000 mcg  Daily          10/09/23 2138    10/10/23 0600  levothyroxine (SYNTHROID, LEVOTHROID) tablet 88 mcg  Every Early Morning         10/09/23 2138    10/09/23 2230  carvedilol (COREG) tablet 25 mg  2 Times Daily With Meals         10/09/23 2138    10/09/23 2230  montelukast (SINGULAIR) tablet 10 mg  Nightly         10/09/23 2138    10/09/23 2138  HYDROcodone-acetaminophen (NORCO)  MG per tablet 1 tablet  Every 8 Hours PRN         10/09/23 2138    10/09/23 2138  gabapentin (NEURONTIN) capsule 300 mg  2 Times Daily PRN         10/09/23 2138    10/09/23 2132  ondansetron (ZOFRAN) injection 4 mg  Every 6 Hours PRN         10/09/23 2132    10/09/23 2100  rosuvastatin (CRESTOR) tablet 10 mg  Nightly         10/09/23 0248    10/09/23 0900  nicotine (NICODERM CQ) 21 MG/24HR patch 1 patch  Every 24 Hours Scheduled         10/09/23 0215    10/09/23 0900  aspirin EC tablet 325 mg  Daily,   Status:  Discontinued         10/09/23 0248    10/09/23 0800  Oral Care  2 Times Daily       10/08/23 2052    10/09/23 0700  ipratropium-albuterol (DUO-NEB) nebulizer solution 3 mL  4 Times Daily - RT,   Status:  Discontinued         10/09/23 0245    10/09/23 0600  Incentive Spirometry  Every 4 Hours While Awake       10/09/23 0245    10/09/23 0327  hydrOXYzine (ATARAX) tablet 25 mg  3 Times Daily PRN         10/09/23 0327    10/09/23 0247  Strict Intake & Output  Every Shift       10/09/23 0246    10/09/23 0242  dextrose (GLUTOSE) oral gel 15 g  Every 15 Minutes PRN         10/09/23 0242    10/09/23 0242  dextrose (D50W) (25 g/50 mL) IV injection 25 g  Every 15 Minutes PRN         10/09/23 0242    10/09/23 0242  glucagon HCl (Diagnostic) injection 1 mg  Every 15 Minutes PRN         10/09/23 0242    10/09/23 0048  melatonin tablet 10 mg  Nightly PRN         10/09/23 0048    10/09/23 0048  acetaminophen (TYLENOL) tablet 650 mg  Every 6 Hours PRN         10/09/23 0048    10/09/23 0000  Vital Signs  Every 4 Hours       10/08/23 2052    10/09/23 0000   Intake & Output  Every 4 Hours       10/08/23 2052    10/09/23 0000  Ambulatory Referral to Heart Failure Clinic         10/09/23 1330    10/08/23 2145  sodium chloride 0.9 % flush 10 mL  Every 12 Hours Scheduled         10/08/23 2052    10/08/23 2145  heparin (porcine) 5000 UNIT/ML injection 5,000 Units  Every 12 Hours Scheduled         10/08/23 2052    10/08/23 2053  Daily Weights  Daily       10/08/23 2052    10/08/23 2052  sodium chloride 0.9 % flush 10 mL  As Needed         10/08/23 2052    10/08/23 2052  sodium chloride 0.9 % infusion 40 mL  As Needed         10/08/23 2052    10/08/23 2052  nitroglycerin (NITROSTAT) SL tablet 0.4 mg  Every 5 Minutes PRN         10/08/23 2052    10/08/23 1649  sodium chloride 0.9 % flush 10 mL  As Needed        See Hyperspace for full Linked Orders Report.    10/08/23 1650    Unscheduled  Follow Hypoglycemia Standing Orders For Blood Glucose <70 & Notify Provider of Treatment  As Needed      Comments: Follow Hypoglycemia Orders As Outlined in Process Instructions (Open Order Report to View Full Instructions)  Notify Provider Any Time Hypoglycemia Treatment is Administered    10/09/23 0242    Unscheduled  Extravasation Standing Orders - Encourage Active Range of Motion After 48 Hours  As Needed       10/09/23 0414    Unscheduled  Change Dressing to IV Site As Needed When Damp, Loose or Soiled  As Needed       10/09/23 0921    Unscheduled  Change Needleless Connectors  As Needed      Comments: Change Needleless Connectors When:  - Administration Set Changed  - Dressing Changed  - Removed For Any Reason  - Residual Blood or Debris Within Connector  - Prior to Drawing Blood Cultures  - Contamination of Connector  - After Administration of Blood or Blood Components    10/09/23 0921    --  folic acid (FOLVITE) 1 MG tablet  Daily         10/09/23 1146    --  HYDROcodone-acetaminophen (NORCO)  MG per tablet  Every 8 Hours PRN         10/09/23 1146    --  SCANNED - TELEMETRY            10/08/23 0000    --  SCANNED - TELEMETRY           10/08/23 0000    --  SCANNED - TELEMETRY           10/08/23 0000    --  SCANNED - TELEMETRY           10/08/23 0000    --  SCANNED - TELEMETRY           10/08/23 0000    --  SCANNED - TELEMETRY           10/08/23 0000    --  SCANNED - TELEMETRY           10/08/23 0000    --  SCANNED - TELEMETRY           10/08/23 0000    --  SCANNED - TELEMETRY           10/08/23 0000    --  SCANNED - TELEMETRY           10/08/23 0000    --  SCANNED - TELEMETRY           10/08/23 0000    --  SCANNED - TELEMETRY           10/08/23 0000    --  SCANNED - TELEMETRY           10/08/23 0000    --  SCANNED - TELEMETRY           10/08/23 0000    --  SCANNED - TELEMETRY           10/08/23 0000    --  SCANNED - TELEMETRY           10/08/23 0000    --  SCANNED - TELEMETRY           10/08/23 0000    --  SCANNED - TELEMETRY           10/08/23 0000    --  SCANNED - TELEMETRY           10/08/23 0000    --  SCANNED - TELEMETRY           10/08/23 0000    --  SCANNED - TELEMETRY           10/08/23 0000    --  SCANNED - TELEMETRY           10/08/23 0000    --  SCANNED - TELEMETRY           10/08/23 0000    --  SCANNED - TELEMETRY           10/08/23 0000    --  SCANNED - TELEMETRY           10/08/23 0000    --  SCANNED - TELEMETRY           10/08/23 0000    Pending  XR Chest 1 View         Pending                     Operative/Procedure Notes (most recent note)        Bartolome Schwartz MD at 10/19/23 1251          HEMODIALYSIS CATHETER INSERTION  Procedure Note    Lesley Michel  10/19/2023    Pre-op Diagnosis:   Acute renal failure, unspecified acute renal failure type [N17.9]    Post-op Diagnosis:     Post-Op Diagnosis Codes:     * Acute renal failure, unspecified acute renal failure type [N17.9]    Procedure(s):  HEMODIALYSIS CATHETER INSERTION    Surgeon(s):  Bartolome Schwartz MD    Anesthesia: Choice    Staff:   Circulator: Ora Gonzalez RN  Radiology Technologist: Josh  Ethan FISCHER  Scrub Person: Harry Sotomayor; Harry Erwin  Assistant: Kika Milton    Findings: Successful placement of 23 cm tunneled hemodialysis catheter into the right internal jugular vein          Operative Procedure: Patient was taken operating suite placed upon the operating table.  After induction of MAC anesthesia the right neck and chest were prepped and draped in usual sterile fashion.  Timeout procedure was performed.  Under ultrasound visualization after injection of local anesthetic an 18-gauge access needle was inserted into the right internal jugular vein and venous blood was aspirated.  Guidewire was placed without resistance and confirmed in appropriate position with fluoroscopy.  Ultrasound also confirmed guidewire resting in the right internal jugular vein.  A stab incision with guidewire entry site was made and via Seldinger technique and attempted serial dilation was made but the catheter in the subcutaneous tissue developed a kink and required reaccess and replacement of the guidewire.  Following replacement of the guidewire this was once again confirmed in the right internal jugular vein and the tract was serially dilated via Seldinger technique and a dilator introducer sheath inserted into the right internal jugular vein.  The 23 cm tunneled hemodialysis catheter was inserted into the breakaway sheath and the sheath was removed.  The tips were positioned appropriately and an exit site on the right chest was identified.  A stab incision at this location was made and tunneler device was passed through the exit site up to the access site in the right neck.  This tract was dilated and in a retrograde fashion the catheter was pulled through the subcutaneous tunnel with the tips in appropriate position in the cuff 2 to 3 cm above the exit site.  The catheter was assembled and accessed and withdrew venous blood and flushed with heparin easily.  Fluoroscopy confirmed the tips in appropriate  position at this time the catheter was secured to the skin with nylon suture and the neck access site closed with Monocryl subcuticular suture and dressed with skin affix.  A Biopatch occlusive dressing was placed at the exit site and the patient was awakened from anesthesia and taken recovery where stat chest x-ray was performed.    Estimated Blood Loss: 25 mL    Specimens:   None           Drains: None    Grafts/Implants: Right IJ 23 centimeter tunneled hemodialysis catheter    Complications: None           Bartolome Schwartz MD     Date: 10/19/2023  Time: 13:02 EDT    Electronically signed by Bartolome Schwartz MD at 10/19/23 1305          Physician Progress Notes (most recent note)        Anderson Solomon MD at 10/20/23 0914          Progress Note Pulmonary      Subjective   Patient is getting dialysis and she feels much better      Interval History: EXTR chest from this morning reviewed, noted significant improvement in air entry in the right lung zone.      Review of Systems:    Reviewed ; unchanged       Vital Signs  Temp:  [97 °F (36.1 °C)-98.2 °F (36.8 °C)] 98.2 °F (36.8 °C)  Heart Rate:  [60-87] 75  Resp:  [16-24] 16  BP: ()/(47-82) 150/71  Body mass index is 43.67 kg/m².    Intake/Output Summary (Last 24 hours) at 10/20/2023 0914  Last data filed at 10/20/2023 0800  Gross per 24 hour   Intake 200 ml   Output 2300 ml   Net -2100 ml     No intake/output data recorded.    Physical Exam:  General- normal in appearance, not in any acute distress    HEENT- pupils equally reactive to light, normal in size, no scleral icterus    Neck- supple    No JVD, no carotid bruit    Respiratory-approved breath sounds over right lung base.  Occasional wheezing.      cardiovascular-  Normal S1 and S2. No S3, S4 or murmurs.    GI-nontender nondistended bowel sounds positive    CNS-alert oriented x3, grossly nonfocal    Extremities- pulses normal bilaterally , no clubbing and 1+ edema     Results Review:      Results  "from last 7 days   Lab Units 10/20/23  0157 10/18/23  0059 10/17/23  1744   WBC 10*3/mm3 7.83 11.60* 10.86*   HEMOGLOBIN g/dL 8.7* 9.3* 9.2*   PLATELETS 10*3/mm3 222 242 259     Results from last 7 days   Lab Units 10/20/23  0157 10/19/23  0055 10/18/23  0059   SODIUM mmol/L 135* 134* 132*   POTASSIUM mmol/L 4.6 5.9* 5.2   CHLORIDE mmol/L 97* 101 100   CO2 mmol/L 25.0 20.2* 20.5*   BUN mg/dL 50* 75* 69*   CREATININE mg/dL 5.16* 6.93* 6.32*   CALCIUM mg/dL 8.9 9.0 9.0   GLUCOSE mg/dL 81 107* 126*     No results found for: \"INR\", \"PROTIME\"  Results from last 7 days   Lab Units 10/20/23  0157 10/18/23  0059 10/17/23  0038   ALK PHOS U/L 124* 134* 89   BILIRUBIN mg/dL 0.4 0.3 0.2   ALT (SGPT) U/L 5 8 6   AST (SGOT) U/L 7 10 8     Results from last 7 days   Lab Units 10/16/23  2202   PH, ARTERIAL pH units 7.311*   PO2 ART mm Hg 74.9*   PCO2, ARTERIAL mm Hg 47.4*   HCO3 ART mmol/L 23.9     Imaging Results (Last 24 Hours)       Procedure Component Value Units Date/Time    XR Chest 1 View [[id number]] Collected: 10/20/23 0832     Updated: 10/20/23 0836    Narrative:      EXAM:    XR Chest, 1 View     EXAM DATE:    10/20/2023 8:37 AM     CLINICAL HISTORY:    follow up mucus plug and RLL collapse; N17.9-Acute kidney failure,  unspecified; N18.9-Chronic kidney disease, unspecified; I50.9-Heart  failure, unspecified; J96.21-Acute and chronic respiratory failure with  hypoxia; E87.5-Hyperkalemia; N17.9-Acute kidney failure, unspecified     TECHNIQUE:    Frontal view of the chest.     COMPARISON:    10/19/2023     FINDINGS:    LUNGS AND PLEURAL SPACES:  Better expansion of the right lower lobe  with some persistent airspace disease.  Minimal left lower lobe airspace  disease.  Coarsened interstitial markings noted throughout the lungs.   No pneumothorax.    HEART:  Mild cardiomegaly again noted.    MEDIASTINUM:  Unremarkable as visualized.    BONES/JOINTS:  Unremarkable as visualized.    TUBES, LINES AND DEVICES:  Right central " catheter with tip in SVC.       Impression:      1.  Better expansion of the right lower lobe with some persistent  airspace disease.  2.  Minimal left lower lobe airspace disease.  3.  Right central catheter with tip in SVC.  4.  Coarsened interstitial markings noted throughout the lungs.  5.  Mild cardiomegaly again noted.        This report was finalized on 10/20/2023 8:34 AM by Dr. Kirk Mcdonald MD.       CT Chest Without Contrast Diagnostic [[id number]] Collected: 10/19/23 1550     Updated: 10/19/23 1554    Narrative:      EXAM:    CT Chest Without Intravenous Contrast     EXAM DATE:    10/19/2023 4:32 PM     CLINICAL HISTORY:    rule out mucus plug vs hemothorax; N17.9-Acute kidney failure,  unspecified; N18.9-Chronic kidney disease, unspecified; I50.9-Heart  failure, unspecified; J96.21-Acute and chronic respiratory failure with  hypoxia; E87.5-Hyperkalemia; N17.9-Acute kidney failure, unspecified     TECHNIQUE:    Axial computed tomography images of the chest without intravenous  contrast.  Sagittal and coronal reformatted images were created and  reviewed.  This CT exam was performed using one or more of the following  dose reduction techniques:  automated exposure control, adjustment of  the mA and/or kV according to patient size, and/or use of iterative  reconstruction technique.     COMPARISON:    10/10/2023     FINDINGS:    LUNGS AND PLEURAL SPACES:  Right lung base fairly extensive  atelectasis and probably consolidative pneumonia. Again there may be  some mucus plugging or narrowing.  Again there is tiny bilateral pleural  effusions.    HEART:  Tiny pericardial effusion.  Cardiomegaly.  Coronary artery  calcifications are noted.    BONES/JOINTS:  Unremarkable as visualized.  No acute fracture.  No  dislocation.    SOFT TISSUES:  Unremarkable as visualized.    VASCULATURE:  See above.    LYMPH NODES:  Unremarkable as visualized.  No enlarged lymph nodes.       Impression:      1.  Tiny pericardial  effusion.  2.  Right lung base fairly extensive atelectasis and probably  consolidative pneumonia. Again there may be some mucus plugging or  narrowing.  3.  Again there is tiny bilateral pleural effusions.  4.  Cardiomegaly.        This report was finalized on 10/19/2023 3:51 PM by Dr. Kirk Mcdonald MD.       XR Chest AP [[id number]] Collected: 10/19/23 1359     Updated: 10/19/23 1402    Narrative:      EXAM:    XR Chest, 1 View     EXAM DATE:    10/19/2023 2:04 PM     CLINICAL HISTORY:    Post hemodialysis cath placement; N17.9-Acute kidney failure,  unspecified; N18.9-Chronic kidney disease, unspecified; I50.9-Heart  failure, unspecified; J96.21-Acute and chronic respiratory failure with  hypoxia; E87.5-Hyperkalemia; N17.9-Acute kidney failure, unspecified     TECHNIQUE:    Frontal view of the chest.     COMPARISON:    XR Chest dated 10/18/2023     FINDINGS:    LUNGS AND PLEURAL SPACES:  There is been collapse of probably the  right lower lobe possibly related to mucous plug.  Small right pleural  effusion persists.  Improved aeration of the left lung base.    HEART:  Unremarkable as visualized.  No cardiomegaly.    MEDIASTINUM:  Mediastinum is shifted to the right.    BONES/JOINTS:  Unremarkable as visualized.    TUBES, LINES AND DEVICES:  Right central line is been placed with tip  in SVC.       Impression:      1.  There is been collapse of probably the right lower lobe possibly  related to mucous plug.  2.  Small right pleural effusion persists.  3.  Improved aeration of the left lung base.        This report was finalized on 10/19/2023 2:00 PM by Dr. Kirk Mcdonald MD.       FL Surgery Fluoro [[id number]] Collected: 10/19/23 1341     Updated: 10/19/23 1344    Narrative:      EXAM:    FL Fluoroscopy < 1 Hour     EXAM DATE:    10/19/2023 1:27 PM     CLINICAL HISTORY:    port; N17.9-Acute kidney failure, unspecified; N18.9-Chronic kidney  disease, unspecified; I50.9-Heart failure, unspecified; J96.21-Acute  and  chronic respiratory failure with hypoxia; E87.5-Hyperkalemia;  N17.9-Acute kidney failure, unspecified     TECHNIQUE:    Fluoroscopic images performed in multiple projections.  Fluoroscopic  guidance was provided by a physician. Fluoroscopy exposure time of  approximately 1 minute or less.     COMPARISON:    10/19/2023     FINDINGS:    TUBES, LINES AND DEVICES:  Right central catheter with tip in SVC.       Impression:        As above.        This report was finalized on 10/19/2023 1:42 PM by Dr. Kirk Mcdonald MD.                    acetylcysteine, 1.5 mL, Nebulization, Q4H - RT  aspirin, 81 mg, Oral, Daily  carvedilol, 25 mg, Oral, BID With Meals  folic acid, 1 mg, Oral, Daily  heparin (porcine), 5,000 Units, Subcutaneous, Q12H  hydrALAZINE, 50 mg, Oral, Q8H  ipratropium-albuterol, 3 mL, Nebulization, Q4H - RT  levothyroxine, 88 mcg, Oral, Q AM  montelukast, 10 mg, Oral, Nightly  nicotine, 1 patch, Transdermal, Q24H  pantoprazole, 40 mg, Oral, Daily  rosuvastatin, 10 mg, Oral, Nightly  sodium chloride, 10 mL, Intravenous, Q12H  sodium zirconium cyclosilicate, 10 g, Oral, Once  vitamin B-12, 1,000 mcg, Oral, Daily           Medication Review:     Assessment & Plan     Morbidly obese female with chronic kidney disease.  Initially admitted with worsening shortness of breath and hypoxia.  X-ray chest from yesterday and later on CT scan showed significant atelectasis of the right lower lobe and midlung zone there was a concern for mucous plug.  Follow-up x-ray chest was done this morning after aggressive chest PT with the help of DuoNeb and Mucomyst followed by chest PT.  It showed remarkable improvement in right lung airspace disease.    Patient has coughed up yellowish sputum.  I have ordered sputum for Gram stain culture, blood cultures x2 and procalcitonin.  If procalcitonin is elevated, will consider adding broad-spectrum antibiotics.    No need to do a bronchoscopy given improvement in exam findings and x-ray  chest    # COPD,   # suspect obstructive sleep apnea.  refused BiPAP  #  Diastolic dysfunction.    #End-stage kidney disease, started on dialysis.    Titrate oxygen to a saturation of 92%.    DVT and GI prophylaxis.    Total time spent 30 minutes         Anderson Solomon MD  10/20/23  09:14 EDT      Electronically signed by Anderson Solomon MD at 10/20/23 0921          Consult Notes (most recent note)        Bartolome Schwartz MD at 10/19/23 0855          [facility]   Consult Note    Patient Name: Lesley Michel  : 1955  MRN: [id number]  Primary Care Physician:  Woodrow Raymond APRN  Referring Physician: No ref. provider found  Date of admission: 10/8/2023    Consults  Subjective   Subjective     Reason for Consult/ Chief Complaint: Need for hemodialysis access    Shortness of Breath  Pertinent negatives include no abdominal pain, chest pain, fever, headaches, rash, sore throat or vomiting.     Lesley Michel is a 67 y.o. female with acute on chronic worsening kidney disease and significant cardiopulmonary complications with need for improved volume status via hemodialysis and likely progression to hemodialysis with her underlying kidney disease.  No anticoagulation reported.  Patient on nasal cannula oxygen 4 L on examination.  She is alert and oriented.  No previous dialysis access reported.    Review of Systems   Constitutional:  Negative for activity change, appetite change, chills and fever.   HENT:  Negative for sore throat and trouble swallowing.    Eyes:  Negative for visual disturbance.   Respiratory:  Positive for shortness of breath. Negative for cough.    Cardiovascular:  Negative for chest pain and palpitations.   Gastrointestinal:  Negative for abdominal distention, abdominal pain, blood in stool, constipation, diarrhea, nausea and vomiting.   Endocrine: Negative for cold intolerance and heat intolerance.   Genitourinary:  Negative for dysuria.   Musculoskeletal:  Negative for joint  swelling.   Skin:  Negative for color change, rash and wound.   Allergic/Immunologic: Negative for immunocompromised state.   Neurological:  Negative for dizziness, seizures, weakness and headaches.   Hematological:  Negative for adenopathy. Does not bruise/bleed easily.   Psychiatric/Behavioral:  Negative for agitation and confusion.         Personal History     Past Medical History:   Diagnosis Date    Anxiety     Arthritis     Coronary artery disease     H/O heart artery stent     Hyperlipidemia     Hypertension     Obesity        Past Surgical History:   Procedure Laterality Date    ANKLE SURGERY      RIGHT    APPENDECTOMY      CARDIAC CATHETERIZATION      LEFT    CORONARY STENT PLACEMENT      HYSTERECTOMY         Family History: family history includes Coronary artery disease in an other family member; Fibromyalgia in her mother; Heart attack (age of onset: 72) in her father; Heart attack (age of onset: 73) in her mother; Heart disease in her mother; Ovarian cancer in her sister. Otherwise pertinent FHx was reviewed and not pertinent to current issue.    Social History:  reports that she has been smoking electronic cigarette and cigarettes. She has a 15.00 pack-year smoking history. She has never used smokeless tobacco. She reports that she does not drink alcohol and does not use drugs.    Home Medications:   HYDROcodone-acetaminophen, aspirin, carvedilol, folic acid, gabapentin, hydrALAZINE, levothyroxine, lisinopril, montelukast, nitroglycerin, pantoprazole, rosuvastatin, and vitamin B-12    Allergies:  No Known Allergies    Objective    Objective     Vitals:  Temp:  [97.7 °F (36.5 °C)-98.5 °F (36.9 °C)] 97.7 °F (36.5 °C)  Heart Rate:  [60-77] 77  Resp:  [18] 18  BP: (111-138)/(56-78) 119/56  Flow (L/min):  [3] 3    Physical Exam  Constitutional:       Appearance: She is well-developed.   HENT:      Head: Normocephalic and atraumatic.   Eyes:      Conjunctiva/sclera: Conjunctivae normal.      Pupils: Pupils  are equal, round, and reactive to light.   Neck:      Thyroid: No thyromegaly.      Vascular: No JVD.      Trachea: No tracheal deviation.   Cardiovascular:      Rate and Rhythm: Normal rate and regular rhythm.      Heart sounds: No murmur heard.     No friction rub. No gallop.   Pulmonary:      Effort: Pulmonary effort is normal.      Comments: Diminished bases bilaterally  Abdominal:      General: There is no distension.      Palpations: Abdomen is soft. There is no hepatomegaly or splenomegaly.      Tenderness: There is no abdominal tenderness.      Hernia: No hernia is present.   Musculoskeletal:         General: No deformity. Normal range of motion.      Cervical back: Neck supple.   Skin:     General: Skin is warm and dry.   Neurological:      Mental Status: She is alert and oriented to person, place, and time.         Result Review    Result Review:  I have personally reviewed the results from the time of this admission to 10/19/2023 08:55 EDT and agree with these findings:  [x]  Laboratory list / accordion  []  Microbiology  [x]  Radiology  []  EKG/Telemetry   []  Cardiology/Vascular   []  Pathology  []  Old records  []  Other:        Assessment & Plan   Assessment / Plan     Brief Patient Summary:  Lesley Michel is a 67 y.o. female who has acute on chronic kidney disease with need for long-term hemodialysis access.    Active Hospital Problems:  Active Hospital Problems    Diagnosis     **Acute hypoxic respiratory failure     Acute renal failure (ARF)      Plan:   OR for tunneled hemodialysis catheter placement.  I discussed the risk and benefits of the procedure with the patient.    Bartolome Schwartz MD      Electronically signed by Bartolome Schwartz MD at 10/19/23 4876          Physical Therapy Notes (most recent note)        Supriya Carias PTA at 10/19/23 1257  Version 1 of 1         Acute Care - Physical Therapy Treatment Note  [facility]     Patient Name: Lesley Michel  :  1955  MRN: [id number]  Today's Date: 10/19/2023   Onset of Illness/Injury or Date of Surgery: 10/08/23  Visit Dx:     ICD-10-CM ICD-9-CM   1. Acute kidney injury superimposed on chronic kidney disease  N17.9 584.9    N18.9 585.9   2. Acute on chronic congestive heart failure, unspecified heart failure type  I50.9 428.0   3. Acute on chronic respiratory failure with hypoxia  J96.21 518.84     799.02   4. Hyperkalemia  E87.5 276.7   5. Acute renal failure, unspecified acute renal failure type  N17.9 584.9     Patient Active Problem List   Diagnosis    Hypertension    Hyperlipidemia    Migraine without aura    CAD, chronically occluded collateralized dominant RCA, status post drug-eluting stent to LAD 2011, high-grade stenosis of the ostium of the small D1 and 60% mid RCA posterior lateral branch    Chronic renal failure, stage 2 (mild)    Tobacco use    Bilateral lower extremity edema    Other specified hypothyroidism    Cigarette smoker    Class 3 severe obesity without serious comorbidity with body mass index (BMI) of 40.0 to 44.9 in adult    Acute hypoxic respiratory failure    Acute renal failure (ARF)     Past Medical History:   Diagnosis Date    Anxiety     Arthritis     Coronary artery disease     H/O heart artery stent     Hyperlipidemia     Hypertension     Obesity      Past Surgical History:   Procedure Laterality Date    ANKLE SURGERY      RIGHT    APPENDECTOMY      CARDIAC CATHETERIZATION      LEFT    CORONARY STENT PLACEMENT      HYSTERECTOMY       PT Assessment (last 12 hours)       PT Evaluation and Treatment       Row Name 10/19/23 1153          Physical Therapy Time and Intention    Subjective Information no complaints  -HC     Document Type therapy note (daily note)  -HC     Mode of Treatment individual therapy;physical therapy  -HC     Patient Effort good  -HC     Comment Pt and RN [place] in agreement for PT. Pts family in room throughout Rx. Pt walked 20' x 2 with RW CGA/min A. . BR transfer  min A.  -       Row Name 10/19/23 1153          General Information    Patient Profile Reviewed yes  -HC     Existing Precautions/Restrictions fall;oxygen therapy device and L/min  -       Row Name 10/19/23 1153          Cognition    Follows Commands (Cognition) WFL  -     Personal Safety Interventions fall prevention program maintained;gait belt;nonskid shoes/slippers when out of bed;supervised activity  -       Row Name 10/19/23 1153          Bed Mobility    Bed Mobility supine-sit;sit-supine  -     Supine-Sit [place] (Bed Mobility) standby assist  -       Row Name 10/19/23 1153          Transfers    Transfers sit-stand transfer;stand-sit transfer;toilet transfer  -       Row Name 10/19/23 1153          Sit-Stand Transfer    Sit-Stand [place] (Transfers) contact guard  -     Assistive Device (Sit-Stand Transfers) walker, front-wheeled  -       Row Name 10/19/23 1153          Stand-Sit Transfer    Stand-Sit [place] (Transfers) contact guard  -     Assistive Device (Stand-Sit Transfers) walker, front-wheeled  -       Row Name 10/19/23 1153          Stand Pivot/Stand Step Transfer    Stand Pivot/Stand Step [place] (Transfers) verbal cues;contact guard  -     Assistive Device (Stand Pivot Stand Step Transfer) walker, front-wheeled  -       Row Name 10/19/23 1153          Toilet Transfer    Type (Toilet Transfer) stand pivot/stand step  -     [place] Level (Toilet Transfer) minimum assist (75% patient effort)  -     Assistive Device (Toilet Transfer) walker, front-wheeled  -       Row Name 10/19/23 1153          Gait/Stairs (Locomotion)    Gait/Stairs Locomotion gait/ambulation independence;distance ambulated  -     [place] Level (Gait) contact guard;minimum assist (75% patient effort)  -     Assistive Device (Gait) walker, front-wheeled  -     Patient was able to Ambulate yes  -     Distance in Feet (Gait) 20' x 2  -HC     Pattern (Gait) step-to   -HC     Deviations/Abnormal Patterns (Gait) antalgic;base of support, wide;gait speed decreased;stride length decreased  -HC       Row Name 10/19/23 1153          Safety Issues, Functional Mobility    Impairments Affecting Function (Mobility) endurance/activity tolerance;balance;pain;shortness of breath;strength  -HC       Row Name             Wound 10/09/23 0422 Left lower arm Extravasation    Wound - Properties Group Placement Date: 10/09/23  - Placement Time: 0422 -SM Side: Left  -SM Orientation: lower  -SM Location: arm  -SM Primary Wound Type: Extravasatio  -SM    Retired Wound - Properties Group Placement Date: 10/09/23  - Placement Time: 0422 -SM Side: Left  -SM Orientation: lower  -SM Location: arm  -SM Primary Wound Type: Extravasatio  -SM    Retired Wound - Properties Group Date first assessed: 10/09/23  - Time first assessed: 0422 -SM Side: Left  -SM Location: arm  -SM Primary Wound Type: Extravasatio  -SM      Row Name 10/19/23 1153          Positioning and Restraints    Pre-Treatment Position in bed  -HC     Post Treatment Position chair  -HC     In Chair sitting;call light within reach;encouraged to call for assist;with family/caregiver;notified [facility]  -               User Key  (r) = Recorded By, (t) = Taken By, (c) = Cosigned By      Initials Name Provider Type    Fiordaliza Echevarria, RN Registered Nurse    Supriya Lopez PTA Physical Therapist Assistant                    Physical Therapy Education       Title: PT OT SLP Therapies (Done)       Topic: Physical Therapy (Done)       Point: Mobility training (Done)       Learning Progress Summary             Patient Acceptance, E,D, VU,NR by  at 10/9/2023 1623                         Point: Home exercise program (Done)       Learning Progress Summary             Patient Acceptance, E,D, VU,NR by  at 10/9/2023 1623                         Point: Body mechanics (Done)       Learning Progress Summary             Patient Acceptance,  E,D, VU,NR by  at 10/9/2023 1623                         Point: Precautions (Done)       Learning Progress Summary             Patient Acceptance, E,D, VU,NR by  at 10/9/2023 1623                                         User Key       Initials Effective Dates Name Provider Type Discipline     06/16/21 -  Ayana Logan, PT Physical Therapist PT                  PT Recommendation and Plan             Time Calculation:    PT Charges       Row Name 10/19/23 1156             Time Calculation    PT Received On 10/19/23  -HC         Time Calculation- PT    Total Timed Code Minutes- PT 15 minute(s)  -HC                User Key  (r) = Recorded By, (t) = Taken By, (c) = Cosigned By      Initials Name Provider Type     Supriya Carias PTA Physical Therapist Assistant                  Therapy Charges for Today       Code Description Service Date Service Provider Modifiers Qty    65843640116 HC GAIT TRAINING EA 15 MIN 10/18/2023 Supriya Carias PTA GP, CQ 1    36934253142 HC PT THER PROC EA 15 MIN 10/18/2023 Supriya Carias PTA GP, CQ 1    59316692571 HC GAIT TRAINING EA 15 MIN 10/19/2023 Supriya Carias PTA GP, CQ 1            PT G-Codes  AM-PAC 6 Clicks Score (PT): 22    Supriya Carias PTA  10/19/2023      Electronically signed by Supriya Carias PTA at 10/19/23 1157       Occupational Therapy Notes (most recent note)    No notes exist for this encounter.       Speech Language Pathology Notes (most recent note)    No notes exist for this encounter.       ADL Documentation (most recent)      Flowsheet Row Most Recent Value   Transferring 2 - assistive person   Toileting 2 - assistive person   Bathing 0 - independent   Dressing 0 - independent   Eating 0 - independent   Communication 0 - understands/communicates without difficulty   Swallowing 0 - swallows foods/liquids without difficulty   Equipment Currently Used at Home none

## 2023-10-20 NOTE — PROGRESS NOTES
Nephrology Progress Note      Subjective     Not in acute distress, no chest pain    Objective       Vital signs :     Vitals:    10/20/23 0322 10/20/23 0500 10/20/23 0659 10/20/23 0713   BP:       BP Location:       Patient Position:       Pulse: 83  83 87   Resp: 20 20 16   Temp:       TempSrc:       SpO2: 96%  93% 99%   Weight:  101 kg (223 lb 9.6 oz)     Height:            Intake/Output                         10/18/23 0701 - 10/19/23 0700 10/19/23 0701 - 10/20/23 0700     [id number] 1065-2802 Total 3712-2074 3928-0153 Total                 Intake    P.O.  720  -- 720  --  150 150    I.V.  --  -- --  50  -- 50    Total Intake 720 -- 720 50 150 200       Output    Urine  375  -- 375  --  300 300    Dialysis  --  -- --  --  2000 2000    Total Output 375 -- 375 -- 2300 2300             Physical Exam:    General Appearance : Not in acute distress  Lungs : Bibasilar crackles noted trace edema  Heart :  regular rhythm & normal rate, normal S1, S2 and no murmur, no rub  Abdomen : Soft nontender nondistended   extremities : Trace edema  Neurologic :   orientated to person, place, time and situation, Grossly no focal deficits    Laboratory Data :       CBC and coagulation:  Results from last 7 days   Lab Units 10/20/23  0157 10/18/23  0059 10/17/23  1744   WBC 10*3/mm3 7.83 11.60* 10.86*   HEMOGLOBIN g/dL 8.7* 9.3* 9.2*   HEMATOCRIT % 28.8* 31.5* 31.0*   MCV fL 89.7 91.8 91.2   MCHC g/dL 30.2* 29.5* 29.7*   PLATELETS 10*3/mm3 222 242 259     Acid/base balance:  Results from last 7 days   Lab Units 10/16/23  2202 10/16/23  2020   PH, ARTERIAL pH units 7.311* 7.288*   PO2 ART mm Hg 74.9* 75.8*   PCO2, ARTERIAL mm Hg 47.4* 50.6*   HCO3 ART mmol/L 23.9 24.2       Renal and electrolytes:    Results from last 7 days   Lab Units 10/20/23  0157 10/19/23  0055 10/18/23  0059 10/17/23  1744 10/17/23  0038   SODIUM mmol/L 135* 134* 132* 134* 139   POTASSIUM mmol/L 4.6 5.9* 5.2 5.2 5.3*   CHLORIDE mmol/L 97* 101 100 99 104   CO2  "mmol/L 25.0 20.2* 20.5* 22.9 22.5   BUN mg/dL 50* 75* 69* 69* 63*   CREATININE mg/dL 5.16* 6.93* 6.32* 6.10* 5.10*   CALCIUM mg/dL 8.9 9.0 9.0 8.7 8.6     Estimated Creatinine Clearance: 11.6 mL/min (A) (by C-G formula based on SCr of 5.16 mg/dL (H)).     Liver and pancreatic function:  Results from last 7 days   Lab Units 10/20/23  0157 10/18/23  0059 10/17/23  0038   ALBUMIN g/dL 3.1* 3.6 3.5   BILIRUBIN mg/dL 0.4 0.3 0.2   ALK PHOS U/L 124* 134* 89   AST (SGOT) U/L 7 10 8   ALT (SGPT) U/L 5 8 6       Albumin Albumin   Date Value Ref Range Status   10/20/2023 3.1 (L) 3.5 - 5.2 g/dL Final   10/18/2023 3.6 3.5 - 5.2 g/dL Final      Magnesium No results found for: \"MG\"         PTH               No results found for: \"PTH\"    Cardiac:        Liver and pancreatic function:  Results from last 7 days   Lab Units 10/20/23  0157 10/18/23  0059 10/17/23  0038   ALBUMIN g/dL 3.1* 3.6 3.5   BILIRUBIN mg/dL 0.4 0.3 0.2   ALK PHOS U/L 124* 134* 89   AST (SGOT) U/L 7 10 8   ALT (SGPT) U/L 5 8 6       CT Chest Without Contrast Diagnostic    Result Date: 10/19/2023  1.  Tiny pericardial effusion. 2.  Right lung base fairly extensive atelectasis and probably consolidative pneumonia. Again there may be some mucus plugging or narrowing. 3.  Again there is tiny bilateral pleural effusions. 4.  Cardiomegaly.   This report was finalized on 10/19/2023 3:51 PM by Dr. Kirk Mcdonald MD.      XR Chest AP    Result Date: 10/19/2023  1.  There is been collapse of probably the right lower lobe possibly related to mucous plug. 2.  Small right pleural effusion persists. 3.  Improved aeration of the left lung base.   This report was finalized on 10/19/2023 2:00 PM by Dr. Kirk Mcdonald MD.      FL Surgery Fluoro    Result Date: 10/19/2023    As above.   This report was finalized on 10/19/2023 1:42 PM by Dr. Kirk Mcdonald MD.      XR Chest 1 View    Result Date: 10/18/2023  1.  Coarsened interstitial markings and vague bibasilar airspace opacities " again noted. 2.  Now small right pleural effusion. 3.  Tiny left pleural effusion. 4.  Cardiomegaly.   This report was finalized on 10/18/2023 10:22 AM by Dr. Kirk Mcdonald MD.      XR Chest 1 View    Result Date: 10/13/2023  1.  Stable changes of CHF with interstitial edema. 2.  Development of bibasilar airspace disease which may represent atelectasis or pneumonia.   This report was finalized on 10/13/2023 12:43 PM by Dr. Eduardo August MD.      XR Chest 1 View    Result Date: 10/12/2023    Significant interval improvement in CHF/volume overload.   This report was finalized on 10/12/2023 12:18 PM by Dr. Eduardo August MD.        Medications :     acetylcysteine, 1.5 mL, Nebulization, Q4H - RT  aspirin, 81 mg, Oral, Daily  carvedilol, 25 mg, Oral, BID With Meals  folic acid, 1 mg, Oral, Daily  heparin (porcine), 5,000 Units, Subcutaneous, Q12H  hydrALAZINE, 50 mg, Oral, Q8H  ipratropium-albuterol, 3 mL, Nebulization, Q4H - RT  levothyroxine, 88 mcg, Oral, Q AM  montelukast, 10 mg, Oral, Nightly  nicotine, 1 patch, Transdermal, Q24H  pantoprazole, 40 mg, Oral, Daily  rosuvastatin, 10 mg, Oral, Nightly  sodium chloride, 10 mL, Intravenous, Q12H  sodium zirconium cyclosilicate, 10 g, Oral, Once  vitamin B-12, 1,000 mcg, Oral, Daily             Assessment & Plan     -Acute kidney injury  - Acute hypokalemia  -Atrophic right kidney  -Chronic kidney disease stage IIIa  -Acute hyperkalemia  -Bilateral edema  -Acute hypoxic respite failure  -COPD  -Hypertension  -Medical noncompliance    Patient is getting second session of dialysis today will do third consecutive session tomorrow and then will leave on Tuesdays Thursdays and Saturdays intermittent dialysis  Once outpatient dialysis chair gets arranged patient can be discharged home    MAICO on CKD most likely multifactori including ATN from prolonged prerenal state and hemodynamic changes with concomitant use of ACE inhibitors in setting of volume loss   baseline creatinine  2.2, admitted with 3.69   ultrasound of the kidneys showed atrophic right kidney  Serologies are negative    Bilateral lower extremity swelling is better, will hold further diuretics today  - Norton Kaiser's catheter    Please avoid nephrotoxic medication and pharmacy to adjust the medication according to the GFR     Plan of care discussed with pt, and family answered all questions, patient verbalized understanding and agreed.      Vani León MD  10/20/23  08:00 EDT

## 2023-10-20 NOTE — PROGRESS NOTES
[facility] HOSPITALIST PROGRESS NOTE     Patient Identification:  Name:  Lesley Michel  Age:  67 y.o.  Sex:  female  :  1955  MRN:  [id number]  Visit Number:  [id number]  ROOM: [address] [place] [address]     Primary Care Provider:  Woodrow Raymond APRN    Length of stay in inpatient status:  10    Subjective     Chief Compliant:    Chief Complaint   Patient presents with    Shortness of Breath    Edema       History of Presenting Illness: Patient seen and evaluated in follow-up for anasarca with MAICO on CKD stage IIIa complicated by acute exacerbation of COPD and bilateral pneumonia with acute hypoxemic respiratory failure.  Patient with successful placement of tunneled hemodialysis catheter yesterday but postop x-ray showing likely mucous plugging and atelectasis and effusion.  Repeat chest x-ray today shows improvement postdialysis.  Patient with increased confusion not previously noted prior to tunneled dialysis catheter placement.  Possible component of some disequilibrium given back-to-back dialysis sessions coupled with recent anesthesia as well as some CO2 retention as noted on ABG.    Objective     Current Hospital Meds:  acetylcysteine, 1.5 mL, Nebulization, Q6H - RT  aspirin, 81 mg, Oral, Daily  carvedilol, 25 mg, Oral, BID With Meals  folic acid, 1 mg, Oral, Daily  heparin (porcine), 5,000 Units, Subcutaneous, Q12H  hydrALAZINE, 50 mg, Oral, Q8H  ipratropium-albuterol, 3 mL, Nebulization, Q6H - RT  levothyroxine, 88 mcg, Oral, Q AM  montelukast, 10 mg, Oral, Nightly  nicotine, 1 patch, Transdermal, Q24H  pantoprazole, 40 mg, Oral, Daily  rosuvastatin, 10 mg, Oral, Nightly  sodium chloride, 10 mL, Intravenous, Q12H  sodium zirconium cyclosilicate, 10 g, Oral, Once  vitamin B-12, 1,000 mcg, Oral, Daily         ----------------------------------------------------------------------------------------------------------------------  Vital Signs:  Temp:  [97.5 °F (36.4 °C)-98.2 °F (36.8 °C)] 98.2 °F  (36.8 °C)  Heart Rate:  [71-87] 74  Resp:  [16-24] 18  BP: (126-183)/(65-89) 154/77  SpO2:  [89 %-99 %] 96 %  on  Flow (L/min):  [4] 4;   Device (Oxygen Therapy): humidified;nasal cannula  Body mass index is 43.67 kg/m².      Intake/Output Summary (Last 24 hours) at 10/20/2023 1716  Last data filed at 10/20/2023 1127  Gross per 24 hour   Intake 150 ml   Output 4510 ml   Net -4360 ml      ----------------------------------------------------------------------------------------------------------------------  Physical exam:  Constitutional: Elderly chronically ill-appearing adult female.  No acute distress.      HENT:  Head:  Normocephalic and atraumatic.  Mouth:  Moist mucous membranes.    Eyes:  Conjunctivae and EOM are normal. No scleral icterus.    Cardiovascular:  Normal rate, regular rhythm and normal heart sounds with no murmur.  Pulmonary/Chest:  No respiratory distress, no wheezes, no crackles, with diminished breath sounds at the bases with improved aeration in the lower right lower lung field.  Abdominal:  Soft.  Bowel sounds are normal.  No distension and no tenderness.   Musculoskeletal:  No tenderness and no deformity.  No red or swollen joints anywhere.   Neurological: Alert and oriented only to self and not time or place.  No cranial nerve deficit.  No tongue deviation.  No facial droop.  No slurred speech. Intact Sensation throughout  Skin:  Skin is warm and dry. No rash or lesion noted. No pallor.   Peripheral vascular:  Pulses in all 4 extremities with no clubbing, no cyanosis, 2+ bilateral lower extremity edema with trace edema of the upper extremity.  Psychiatric: Appropriate mood and affect, pleasant.   ----------------------------------------------------------------------------------------------------------------------  WBC/HGB/HCT/PLT   7.83/8.7/28.8/222 (10/20 0157)  BUN/CREAT/GLUC/ALT/AST/INOCENTE/LIP    50/5.16/81/5/7/--/-- (10/20 0157)  LYTES - Na/K/Cl/CO2: 135*/4.6/97*/25.0 (10/20 0157)    "  pH/pCO2/pO2/HCO3   7.355/57.9/55.7/32.3 (10/20 1330)  No results found for: \"URINECX\"  No results found for: \"BLOODCX\"    I have personally looked at the labs and they are summarized above.  ----------------------------------------------------------------------------------------------------------------------  Detailed radiology reports for the last 24 hours:  XR Chest 1 View    Result Date: 10/20/2023  1.  Better expansion of the right lower lobe with some persistent airspace disease. 2.  Minimal left lower lobe airspace disease. 3.  Right central catheter with tip in SVC. 4.  Coarsened interstitial markings noted throughout the lungs. 5.  Mild cardiomegaly again noted.   This report was finalized on 10/20/2023 8:34 AM by Dr. Kirk Mcdonald MD.      CT Chest Without Contrast Diagnostic    Result Date: 10/19/2023  1.  Tiny pericardial effusion. 2.  Right lung base fairly extensive atelectasis and probably consolidative pneumonia. Again there may be some mucus plugging or narrowing. 3.  Again there is tiny bilateral pleural effusions. 4.  Cardiomegaly.   This report was finalized on 10/19/2023 3:51 PM by Dr. Kirk Mcdonald MD.      XR Chest AP    Result Date: 10/19/2023  1.  There is been collapse of probably the right lower lobe possibly related to mucous plug. 2.  Small right pleural effusion persists. 3.  Improved aeration of the left lung base.   This report was finalized on 10/19/2023 2:00 PM by Dr. Kirk Mcdonald MD.      FL Surgery Fluoro    Result Date: 10/19/2023    As above.   This report was finalized on 10/19/2023 1:42 PM by Dr. Kirk Mcdonald MD.     Assessment & Plan      Anasarca  Hyperkalemia  MAICO on CKD stage IIIa with likely progression to CKD V vs ESRD    -Patient presenting with anasarca with echo showing EF of 56 to 60% with grade 1 diastolic dysfunction, mild AAS, mild pulmonary hypertension and small pericardial effusion without evidence of tamponade.    -Patient with progressive worsening renal " failure likely contributing to volume retention with possibly some component of acute HFpEF.    -Reds vest remains elevated.  Patient trialed on diuretic therapy with little to no urine output and remains within anuric range.      -Nephrology consulted and following along, volume status management per their discretion.  Patient has been  trialed on high-dose diuretic therapy to no avail with continued worsening/no improvement in renal function.  As such nephrology recommending hemodialysis and patient agreeable and had successful tunneled hemodialysis catheter placement on 10/19/2023.    -First dialysis session yesterday with repeat session today.  In total approximately 4 L of volume removed.    -Palliative care consulted and following along.      Acute delirium  Bilateral bacterial pneumonia  Acute exacerbation of COPD  Acute hypoxemic respiratory failure  Right lower lobe mucous plug with atelectasis and collapse    -Procalcitonin normal, cultures without growth.    -Completed an appropriate course of azithromycin    -Continue nebulized medications    -Patient maintaining 3 L nasal cannula requirement.  However postoperatively postop chest x-ray showing right lower lobe collapse and atelectasis likely due to mucous plug with pleural effusion.  CT imaging obtained for better characterization of lung parenchyma and shows the same as well as dense consolidation possibly pneumonia.    -Mucomyst 4 times daily with DuoNebs for conservative management for now after discussion with pulmonology.  Repeat chest x-ray in the morning if no resolution of mucous plugging plan for therapeutic bronchoscopy.    -Chest x-ray obtained this morning 10/20/2023 shows improved aeration and reduction in atelectasis, plugging and effusion after dialysis and breathing treatments.  Continue breathing treatments however chest physiotherapy discontinued as patient noncompliant and refusing.    -Patient with some increased confusion and  delirium today and ABG shows increased CO2 and was placed on BiPAP this evening as patient previously refused but having increasing hypercapnia.  Procalcitonin not significantly elevated doubt strong suspicion for any ongoing active pneumonia at this time.  Continue supportive care and sitter at bedside.    Essential hypertension    -Continue home antihypertensives as appropriate.      Hypothyroidism    -Continue Synthroid    Tobacco abuse    -Cessation counseled.    Morbid obesity    -Complicates all aspects of care    Copied text in portions of the note has been reviewed and is accurate as of 10/20/23    VTE Prophylaxis:   Mechanical Order History:       None          Pharmalogical Order History:        Ordered     Dose Route Frequency Stop    10/08/23 2052  heparin (porcine) 5000 UNIT/ML injection 5,000 Units         5,000 Units SC Every 12 Hours Scheduled --                    Disposition Home once medically stable and improved and hemodialysis outpatient chair arranged.    Jay Jay Pritchett DO  [facility] Hospitalist  10/20/23  17:16 EDT

## 2023-10-20 NOTE — CASE MANAGEMENT/SOCIAL WORK
Discharge Planning Assessment  [facility]     Patient Name: Lesley Michel  MRN: [id number]  Today's Date: 10/20/2023    Admit Date: 10/8/2023    Plan: SS received consult to arrange outpt dialysis chair.  SS spoke with pt's son Wes who is agreeable for Dialysis setup at [facility].  SS sent pt's information to [facility] at [phone] and notified [facility] per Yadira.  SS will follow.      JAVED SchwarzW

## 2023-10-20 NOTE — PLAN OF CARE
Took over care from Janneth SILVER. I agree with previous assessment. No s/s of acute distress. VSS

## 2023-10-20 NOTE — PROGRESS NOTES
"Palliative Care Daily Progress Note     S: Medical record reviewed, pt was noted in room so evaluated patient in the acute dialysis room. Pt is much more alert today than yesterday however she remains confused. She is picking at dialysis lines but very talkative. She reports that she is feeling better. Family at bedside during this time. She denies any pain, no nausea, vomiting or diarrhea, no anxiety noted.       O:   Palliative Performance Scale Score:     /80 (BP Location: Left arm, Patient Position: Lying)   Pulse 78   Temp 97.6 °F (36.4 °C) (Oral)   Resp 18   Ht 152.4 cm (60\")   Wt 101 kg (223 lb 9.6 oz)   SpO2 91%   BMI 43.67 kg/m²     Intake/Output Summary (Last 24 hours) at 10/20/2023 1231  Last data filed at 10/20/2023 1127  Gross per 24 hour   Intake 200 ml   Output 4510 ml   Net -4310 ml       PE:    General Appearance:    Elderly Chronically ill appearing, alert, obese, cooperative, NAD   HEENT:    NC/AT, without obvious abnormality, EOMI, anicteric    Neck:   supple, trachea midline, no JVD   Lungs:     Diminished in bases, poor airflow noted in right lung, +crackles     Heart:    Irregular , normal S1 and S2, no M/R/G   Abdomen:     Soft, NT, ND, NABS    Extremities:   Moves all extremities, 2+ edema BLE   Pulses:   Pulses palpable and equal bilaterally   Skin:   Warm, dry   Neurologic:   Alert to self and situation, confused, disoriented    Psych:   \"Fidgeting with dialysis, appears anxious\"          Meds: Reviewed and changes not noted    Labs:   Results from last 7 days   Lab Units 10/20/23  0157   WBC 10*3/mm3 7.83   HEMOGLOBIN g/dL 8.7*   HEMATOCRIT % 28.8*   PLATELETS 10*3/mm3 222     Results from last 7 days   Lab Units 10/20/23  0157   SODIUM mmol/L 135*   POTASSIUM mmol/L 4.6   CHLORIDE mmol/L 97*   CO2 mmol/L 25.0   BUN mg/dL 50*   CREATININE mg/dL 5.16*   GLUCOSE mg/dL 81   CALCIUM mg/dL 8.9     Results from last 7 days   Lab Units 10/20/23  0157   SODIUM mmol/L 135*   POTASSIUM " mmol/L 4.6   CHLORIDE mmol/L 97*   CO2 mmol/L 25.0   BUN mg/dL 50*   CREATININE mg/dL 5.16*   CALCIUM mg/dL 8.9   BILIRUBIN mg/dL 0.4   ALK PHOS U/L 124*   ALT (SGPT) U/L 5   AST (SGOT) U/L 7   GLUCOSE mg/dL 81     Imaging Results (Last 72 Hours)       Procedure Component Value Units Date/Time    XR Chest 1 View [[id number]] Collected: 10/20/23 0832     Updated: 10/20/23 0836    Narrative:      EXAM:    XR Chest, 1 View     EXAM DATE:    10/20/2023 8:37 AM     CLINICAL HISTORY:    follow up mucus plug and RLL collapse; N17.9-Acute kidney failure,  unspecified; N18.9-Chronic kidney disease, unspecified; I50.9-Heart  failure, unspecified; J96.21-Acute and chronic respiratory failure with  hypoxia; E87.5-Hyperkalemia; N17.9-Acute kidney failure, unspecified     TECHNIQUE:    Frontal view of the chest.     COMPARISON:    10/19/2023     FINDINGS:    LUNGS AND PLEURAL SPACES:  Better expansion of the right lower lobe  with some persistent airspace disease.  Minimal left lower lobe airspace  disease.  Coarsened interstitial markings noted throughout the lungs.   No pneumothorax.    HEART:  Mild cardiomegaly again noted.    MEDIASTINUM:  Unremarkable as visualized.    BONES/JOINTS:  Unremarkable as visualized.    TUBES, LINES AND DEVICES:  Right central catheter with tip in SVC.       Impression:      1.  Better expansion of the right lower lobe with some persistent  airspace disease.  2.  Minimal left lower lobe airspace disease.  3.  Right central catheter with tip in SVC.  4.  Coarsened interstitial markings noted throughout the lungs.  5.  Mild cardiomegaly again noted.        This report was finalized on 10/20/2023 8:34 AM by Dr. Kirk Mcdnoald MD.       CT Chest Without Contrast Diagnostic [[id number]] Collected: 10/19/23 1550     Updated: 10/19/23 1554    Narrative:      EXAM:    CT Chest Without Intravenous Contrast     EXAM DATE:    10/19/2023 4:32 PM     CLINICAL HISTORY:    rule out mucus plug vs hemothorax;  N17.9-Acute kidney failure,  unspecified; N18.9-Chronic kidney disease, unspecified; I50.9-Heart  failure, unspecified; J96.21-Acute and chronic respiratory failure with  hypoxia; E87.5-Hyperkalemia; N17.9-Acute kidney failure, unspecified     TECHNIQUE:    Axial computed tomography images of the chest without intravenous  contrast.  Sagittal and coronal reformatted images were created and  reviewed.  This CT exam was performed using one or more of the following  dose reduction techniques:  automated exposure control, adjustment of  the mA and/or kV according to patient size, and/or use of iterative  reconstruction technique.     COMPARISON:    10/10/2023     FINDINGS:    LUNGS AND PLEURAL SPACES:  Right lung base fairly extensive  atelectasis and probably consolidative pneumonia. Again there may be  some mucus plugging or narrowing.  Again there is tiny bilateral pleural  effusions.    HEART:  Tiny pericardial effusion.  Cardiomegaly.  Coronary artery  calcifications are noted.    BONES/JOINTS:  Unremarkable as visualized.  No acute fracture.  No  dislocation.    SOFT TISSUES:  Unremarkable as visualized.    VASCULATURE:  See above.    LYMPH NODES:  Unremarkable as visualized.  No enlarged lymph nodes.       Impression:      1.  Tiny pericardial effusion.  2.  Right lung base fairly extensive atelectasis and probably  consolidative pneumonia. Again there may be some mucus plugging or  narrowing.  3.  Again there is tiny bilateral pleural effusions.  4.  Cardiomegaly.        This report was finalized on 10/19/2023 3:51 PM by Dr. Kirk Mcdonald MD.       XR Chest AP [[id number]] Collected: 10/19/23 1359     Updated: 10/19/23 1402    Narrative:      EXAM:    XR Chest, 1 View     EXAM DATE:    10/19/2023 2:04 PM     CLINICAL HISTORY:    Post hemodialysis cath placement; N17.9-Acute kidney failure,  unspecified; N18.9-Chronic kidney disease, unspecified; I50.9-Heart  failure, unspecified; J96.21-Acute and chronic  respiratory failure with  hypoxia; E87.5-Hyperkalemia; N17.9-Acute kidney failure, unspecified     TECHNIQUE:    Frontal view of the chest.     COMPARISON:    XR Chest dated 10/18/2023     FINDINGS:    LUNGS AND PLEURAL SPACES:  There is been collapse of probably the  right lower lobe possibly related to mucous plug.  Small right pleural  effusion persists.  Improved aeration of the left lung base.    HEART:  Unremarkable as visualized.  No cardiomegaly.    MEDIASTINUM:  Mediastinum is shifted to the right.    BONES/JOINTS:  Unremarkable as visualized.    TUBES, LINES AND DEVICES:  Right central line is been placed with tip  in SVC.       Impression:      1.  There is been collapse of probably the right lower lobe possibly  related to mucous plug.  2.  Small right pleural effusion persists.  3.  Improved aeration of the left lung base.        This report was finalized on 10/19/2023 2:00 PM by Dr. Kirk Mcdonald MD.       FL Surgery Fluoro [[id number]] Collected: 10/19/23 1341     Updated: 10/19/23 1344    Narrative:      EXAM:    FL Fluoroscopy < 1 Hour     EXAM DATE:    10/19/2023 1:27 PM     CLINICAL HISTORY:    port; N17.9-Acute kidney failure, unspecified; N18.9-Chronic kidney  disease, unspecified; I50.9-Heart failure, unspecified; J96.21-Acute and  chronic respiratory failure with hypoxia; E87.5-Hyperkalemia;  N17.9-Acute kidney failure, unspecified     TECHNIQUE:    Fluoroscopic images performed in multiple projections.  Fluoroscopic  guidance was provided by a physician. Fluoroscopy exposure time of  approximately 1 minute or less.     COMPARISON:    10/19/2023     FINDINGS:    TUBES, LINES AND DEVICES:  Right central catheter with tip in SVC.       Impression:        As above.        This report was finalized on 10/19/2023 1:42 PM by Dr. Kirk Mcdonald MD.       XR Chest 1 View [[id number]] Collected: 10/18/23 1021     Updated: 10/18/23 1024    Narrative:      EXAM:    XR Chest, 1 View     EXAM DATE:     "10/18/2023 10:32 AM     CLINICAL HISTORY:    pulm edema; N17.9-Acute kidney failure, unspecified; N18.9-Chronic  kidney disease, unspecified; I50.9-Heart failure, unspecified;  J96.21-Acute and chronic respiratory failure with hypoxia;  E87.5-Hyperkalemia     TECHNIQUE:    Frontal view of the chest.     COMPARISON:    10/13/2023     FINDINGS:    LUNGS AND PLEURAL SPACES:  Coarsened interstitial markings and vague  bibasilar airspace opacities again noted.  Now small right pleural  effusion.  Tiny left pleural effusion.  No pneumothorax.    HEART:  Cardiomegaly.    MEDIASTINUM:  Unremarkable as visualized.    BONES/JOINTS:  Unremarkable as visualized.       Impression:      1.  Coarsened interstitial markings and vague bibasilar airspace  opacities again noted.  2.  Now small right pleural effusion.  3.  Tiny left pleural effusion.  4.  Cardiomegaly.        This report was finalized on 10/18/2023 10:22 AM by Dr. Kirk Mcdonald MD.                 Diagnostics: Reviewed    A: Mrs. Michel is much more alert today than previously RN reports. She is talkative, is rather confused and disoriented. She keeps pulling at dialysis catheter lines and has to be reminded. She reports that \"oh I shouldn't touch that either\". Family is at bedside. Her pallor has improved since yesterday. Labs today sodium 135, BUN 50, Creat 5.16, eGFR 8.7, alk phos 124, procalcitonin 0.40, H&H 8.7/28.8 Her repeat CXR today shows better expansion of the right lower lobe with some persistent airspace disease, minimal left lower lobe airspace disease, right central catheter with tip in SVC, coarsened interstitial markings noted throughout the lungs, mild cardiomegaly again noted. 157/80 hr 78 rr 18 sat 91% on 02@4lpm n/c       P: Continue with current regimen at this time. Will continue to provide symptomatic and supportive palliative care for patient and family members. Will assist with disposition if needed.       We will continue to follow along. " Please do not hesitate to contact us regarding further sx mgmt or GOC needs, including after hours or on weekends via our on call provider at [phone].     Kimber Martel, APRN    10/20/2023

## 2023-10-20 NOTE — PROGRESS NOTES
"Progress Note Pulmonary      Subjective   Patient is getting dialysis and she feels much better      Interval History: EXTR chest from this morning reviewed, noted significant improvement in air entry in the right lung zone.      Review of Systems:    Reviewed ; unchanged       Vital Signs  Temp:  [97 °F (36.1 °C)-98.2 °F (36.8 °C)] 98.2 °F (36.8 °C)  Heart Rate:  [60-87] 75  Resp:  [16-24] 16  BP: ()/(47-82) 150/71  Body mass index is 43.67 kg/m².    Intake/Output Summary (Last 24 hours) at 10/20/2023 0914  Last data filed at 10/20/2023 0800  Gross per 24 hour   Intake 200 ml   Output 2300 ml   Net -2100 ml     No intake/output data recorded.    Physical Exam:  General- normal in appearance, not in any acute distress    HEENT- pupils equally reactive to light, normal in size, no scleral icterus    Neck- supple    No JVD, no carotid bruit    Respiratory-approved breath sounds over right lung base.  Occasional wheezing.      cardiovascular-  Normal S1 and S2. No S3, S4 or murmurs.    GI-nontender nondistended bowel sounds positive    CNS-alert oriented x3, grossly nonfocal    Extremities- pulses normal bilaterally , no clubbing and 1+ edema     Results Review:      Results from last 7 days   Lab Units 10/20/23  0157 10/18/23  0059 10/17/23  1744   WBC 10*3/mm3 7.83 11.60* 10.86*   HEMOGLOBIN g/dL 8.7* 9.3* 9.2*   PLATELETS 10*3/mm3 222 242 259     Results from last 7 days   Lab Units 10/20/23  0157 10/19/23  0055 10/18/23  0059   SODIUM mmol/L 135* 134* 132*   POTASSIUM mmol/L 4.6 5.9* 5.2   CHLORIDE mmol/L 97* 101 100   CO2 mmol/L 25.0 20.2* 20.5*   BUN mg/dL 50* 75* 69*   CREATININE mg/dL 5.16* 6.93* 6.32*   CALCIUM mg/dL 8.9 9.0 9.0   GLUCOSE mg/dL 81 107* 126*     No results found for: \"INR\", \"PROTIME\"  Results from last 7 days   Lab Units 10/20/23  0157 10/18/23  0059 10/17/23  0038   ALK PHOS U/L 124* 134* 89   BILIRUBIN mg/dL 0.4 0.3 0.2   ALT (SGPT) U/L 5 8 6   AST (SGOT) U/L 7 10 8     Results from " last 7 days   Lab Units 10/16/23  2202   PH, ARTERIAL pH units 7.311*   PO2 ART mm Hg 74.9*   PCO2, ARTERIAL mm Hg 47.4*   HCO3 ART mmol/L 23.9     Imaging Results (Last 24 Hours)       Procedure Component Value Units Date/Time    XR Chest 1 View [[id number]] Collected: 10/20/23 0832     Updated: 10/20/23 0836    Narrative:      EXAM:    XR Chest, 1 View     EXAM DATE:    10/20/2023 8:37 AM     CLINICAL HISTORY:    follow up mucus plug and RLL collapse; N17.9-Acute kidney failure,  unspecified; N18.9-Chronic kidney disease, unspecified; I50.9-Heart  failure, unspecified; J96.21-Acute and chronic respiratory failure with  hypoxia; E87.5-Hyperkalemia; N17.9-Acute kidney failure, unspecified     TECHNIQUE:    Frontal view of the chest.     COMPARISON:    10/19/2023     FINDINGS:    LUNGS AND PLEURAL SPACES:  Better expansion of the right lower lobe  with some persistent airspace disease.  Minimal left lower lobe airspace  disease.  Coarsened interstitial markings noted throughout the lungs.   No pneumothorax.    HEART:  Mild cardiomegaly again noted.    MEDIASTINUM:  Unremarkable as visualized.    BONES/JOINTS:  Unremarkable as visualized.    TUBES, LINES AND DEVICES:  Right central catheter with tip in SVC.       Impression:      1.  Better expansion of the right lower lobe with some persistent  airspace disease.  2.  Minimal left lower lobe airspace disease.  3.  Right central catheter with tip in SVC.  4.  Coarsened interstitial markings noted throughout the lungs.  5.  Mild cardiomegaly again noted.        This report was finalized on 10/20/2023 8:34 AM by Dr. Kirk Mcdonald MD.       CT Chest Without Contrast Diagnostic [[id number]] Collected: 10/19/23 1550     Updated: 10/19/23 1554    Narrative:      EXAM:    CT Chest Without Intravenous Contrast     EXAM DATE:    10/19/2023 4:32 PM     CLINICAL HISTORY:    rule out mucus plug vs hemothorax; N17.9-Acute kidney failure,  unspecified; N18.9-Chronic kidney disease,  unspecified; I50.9-Heart  failure, unspecified; J96.21-Acute and chronic respiratory failure with  hypoxia; E87.5-Hyperkalemia; N17.9-Acute kidney failure, unspecified     TECHNIQUE:    Axial computed tomography images of the chest without intravenous  contrast.  Sagittal and coronal reformatted images were created and  reviewed.  This CT exam was performed using one or more of the following  dose reduction techniques:  automated exposure control, adjustment of  the mA and/or kV according to patient size, and/or use of iterative  reconstruction technique.     COMPARISON:    10/10/2023     FINDINGS:    LUNGS AND PLEURAL SPACES:  Right lung base fairly extensive  atelectasis and probably consolidative pneumonia. Again there may be  some mucus plugging or narrowing.  Again there is tiny bilateral pleural  effusions.    HEART:  Tiny pericardial effusion.  Cardiomegaly.  Coronary artery  calcifications are noted.    BONES/JOINTS:  Unremarkable as visualized.  No acute fracture.  No  dislocation.    SOFT TISSUES:  Unremarkable as visualized.    VASCULATURE:  See above.    LYMPH NODES:  Unremarkable as visualized.  No enlarged lymph nodes.       Impression:      1.  Tiny pericardial effusion.  2.  Right lung base fairly extensive atelectasis and probably  consolidative pneumonia. Again there may be some mucus plugging or  narrowing.  3.  Again there is tiny bilateral pleural effusions.  4.  Cardiomegaly.        This report was finalized on 10/19/2023 3:51 PM by Dr. Kirk Mcdonald MD.       XR Chest AP [[id number]] Collected: 10/19/23 1359     Updated: 10/19/23 1402    Narrative:      EXAM:    XR Chest, 1 View     EXAM DATE:    10/19/2023 2:04 PM     CLINICAL HISTORY:    Post hemodialysis cath placement; N17.9-Acute kidney failure,  unspecified; N18.9-Chronic kidney disease, unspecified; I50.9-Heart  failure, unspecified; J96.21-Acute and chronic respiratory failure with  hypoxia; E87.5-Hyperkalemia; N17.9-Acute kidney  failure, unspecified     TECHNIQUE:    Frontal view of the chest.     COMPARISON:    XR Chest dated 10/18/2023     FINDINGS:    LUNGS AND PLEURAL SPACES:  There is been collapse of probably the  right lower lobe possibly related to mucous plug.  Small right pleural  effusion persists.  Improved aeration of the left lung base.    HEART:  Unremarkable as visualized.  No cardiomegaly.    MEDIASTINUM:  Mediastinum is shifted to the right.    BONES/JOINTS:  Unremarkable as visualized.    TUBES, LINES AND DEVICES:  Right central line is been placed with tip  in SVC.       Impression:      1.  There is been collapse of probably the right lower lobe possibly  related to mucous plug.  2.  Small right pleural effusion persists.  3.  Improved aeration of the left lung base.        This report was finalized on 10/19/2023 2:00 PM by Dr. Kirk Mcdonald MD.       FL Surgery Fluoro [[id number]] Collected: 10/19/23 1341     Updated: 10/19/23 1344    Narrative:      EXAM:    FL Fluoroscopy < 1 Hour     EXAM DATE:    10/19/2023 1:27 PM     CLINICAL HISTORY:    port; N17.9-Acute kidney failure, unspecified; N18.9-Chronic kidney  disease, unspecified; I50.9-Heart failure, unspecified; J96.21-Acute and  chronic respiratory failure with hypoxia; E87.5-Hyperkalemia;  N17.9-Acute kidney failure, unspecified     TECHNIQUE:    Fluoroscopic images performed in multiple projections.  Fluoroscopic  guidance was provided by a physician. Fluoroscopy exposure time of  approximately 1 minute or less.     COMPARISON:    10/19/2023     FINDINGS:    TUBES, LINES AND DEVICES:  Right central catheter with tip in SVC.       Impression:        As above.        This report was finalized on 10/19/2023 1:42 PM by Dr. Kirk Mcdonald MD.                    acetylcysteine, 1.5 mL, Nebulization, Q4H - RT  aspirin, 81 mg, Oral, Daily  carvedilol, 25 mg, Oral, BID With Meals  folic acid, 1 mg, Oral, Daily  heparin (porcine), 5,000 Units, Subcutaneous,  Q12H  hydrALAZINE, 50 mg, Oral, Q8H  ipratropium-albuterol, 3 mL, Nebulization, Q4H - RT  levothyroxine, 88 mcg, Oral, Q AM  montelukast, 10 mg, Oral, Nightly  nicotine, 1 patch, Transdermal, Q24H  pantoprazole, 40 mg, Oral, Daily  rosuvastatin, 10 mg, Oral, Nightly  sodium chloride, 10 mL, Intravenous, Q12H  sodium zirconium cyclosilicate, 10 g, Oral, Once  vitamin B-12, 1,000 mcg, Oral, Daily           Medication Review:     Assessment & Plan     Morbidly obese female with chronic kidney disease.  Initially admitted with worsening shortness of breath and hypoxia.  X-ray chest from yesterday and later on CT scan showed significant atelectasis of the right lower lobe and midlung zone there was a concern for mucous plug.  Follow-up x-ray chest was done this morning after aggressive chest PT with the help of DuoNeb and Mucomyst followed by chest PT.  It showed remarkable improvement in right lung airspace disease.    Patient has coughed up yellowish sputum.  I have ordered sputum for Gram stain culture, blood cultures x2 and procalcitonin.  If procalcitonin is elevated, will consider adding broad-spectrum antibiotics.    No need to do a bronchoscopy given improvement in exam findings and x-ray chest    # COPD,   # suspect obstructive sleep apnea.  refused BiPAP  #  Diastolic dysfunction.    #End-stage kidney disease, started on dialysis.    Titrate oxygen to a saturation of 92%.    DVT and GI prophylaxis.    Total time spent 30 minutes         Anderson Solomon MD  10/20/23  09:14 EDT

## 2023-10-20 NOTE — PLAN OF CARE
Problem: Adult Inpatient Plan of Care  Goal: Absence of Hospital-Acquired Illness or Injury  Intervention: Prevent Skin Injury  Recent Flowsheet Documentation  Taken 10/19/2023 2015 by Toribio Holloway RN  Body Position: supine  Skin Protection:   adhesive use limited   incontinence pads utilized     Problem: Adult Inpatient Plan of Care  Goal: Absence of Hospital-Acquired Illness or Injury  Intervention: Prevent and Manage VTE (Venous Thromboembolism) Risk  Recent Flowsheet Documentation  Taken 10/19/2023 2015 by Toribio Holloway RN  Activity Management: bedrest  VTE Prevention/Management:   sequential compression devices off   patient refused intervention   Goal Outcome Evaluation:

## 2023-10-21 LAB
A-A DO2: 113 MMHG (ref 0–300)
ANION GAP SERPL CALCULATED.3IONS-SCNC: 14.4 MMOL/L (ref 5–15)
ARTERIAL PATENCY WRIST A: ABNORMAL
ATMOSPHERIC PRESS: 723 MMHG
BASE EXCESS BLDA CALC-SCNC: 3.3 MMOL/L (ref 0–2)
BDY SITE: ABNORMAL
BUN SERPL-MCNC: 32 MG/DL (ref 8–23)
BUN/CREAT SERPL: 8.4 (ref 7–25)
CALCIUM SPEC-SCNC: 9 MG/DL (ref 8.6–10.5)
CHLORIDE SERPL-SCNC: 93 MMOL/L (ref 98–107)
CO2 BLDA-SCNC: 31.1 MMOL/L (ref 22–33)
CO2 SERPL-SCNC: 25.6 MMOL/L (ref 22–29)
COHGB MFR BLD: 2.1 % (ref 0–5)
CREAT SERPL-MCNC: 3.83 MG/DL (ref 0.57–1)
DEPRECATED RDW RBC AUTO: 53.1 FL (ref 37–54)
EGFRCR SERPLBLD CKD-EPI 2021: 12.5 ML/MIN/1.73
ERYTHROCYTE [DISTWIDTH] IN BLOOD BY AUTOMATED COUNT: 16.5 % (ref 12.3–15.4)
GAS FLOW AIRWAY: 4 LPM
GEN 5 2HR TROPONIN T REFLEX: 54 NG/L
GLUCOSE BLDC GLUCOMTR-MCNC: 81 MG/DL (ref 70–130)
GLUCOSE BLDC GLUCOMTR-MCNC: 86 MG/DL (ref 70–130)
GLUCOSE BLDC GLUCOMTR-MCNC: 87 MG/DL (ref 70–130)
GLUCOSE SERPL-MCNC: 71 MG/DL (ref 65–99)
HCO3 BLDA-SCNC: 29.5 MMOL/L (ref 20–26)
HCT VFR BLD AUTO: 27.9 % (ref 34–46.6)
HCT VFR BLD CALC: 28 % (ref 38–51)
HGB BLD-MCNC: 8.8 G/DL (ref 12–15.9)
HGB BLDA-MCNC: 9.2 G/DL (ref 13.5–17.5)
INHALED O2 CONCENTRATION: 36 %
Lab: ABNORMAL
MCH RBC QN AUTO: 27.9 PG (ref 26.6–33)
MCHC RBC AUTO-ENTMCNC: 31.5 G/DL (ref 31.5–35.7)
MCV RBC AUTO: 88.6 FL (ref 79–97)
METHGB BLD QL: 0.1 % (ref 0–3)
MODALITY: ABNORMAL
OXYHGB MFR BLDV: 92.5 % (ref 94–99)
PCO2 BLDA: 52.6 MM HG (ref 35–45)
PCO2 TEMP ADJ BLD: ABNORMAL MM[HG]
PH BLDA: 7.36 PH UNITS (ref 7.35–7.45)
PH, TEMP CORRECTED: ABNORMAL
PLATELET # BLD AUTO: 226 10*3/MM3 (ref 140–450)
PMV BLD AUTO: 10.9 FL (ref 6–12)
PO2 BLDA: 72.2 MM HG (ref 83–108)
PO2 TEMP ADJ BLD: ABNORMAL MM[HG]
POTASSIUM SERPL-SCNC: 4.4 MMOL/L (ref 3.5–5.2)
RBC # BLD AUTO: 3.15 10*6/MM3 (ref 3.77–5.28)
SAO2 % BLDCOA: 94.6 % (ref 94–99)
SODIUM SERPL-SCNC: 133 MMOL/L (ref 136–145)
TROPONIN T DELTA: 3 NG/L
TROPONIN T SERPL HS-MCNC: 51 NG/L
VENTILATOR MODE: ABNORMAL
WBC NRBC COR # BLD: 8.84 10*3/MM3 (ref 3.4–10.8)

## 2023-10-21 PROCEDURE — 25010000002 DIAZEPAM PER 5 MG

## 2023-10-21 PROCEDURE — 36600 WITHDRAWAL OF ARTERIAL BLOOD: CPT

## 2023-10-21 PROCEDURE — 82805 BLOOD GASES W/O2 SATURATION: CPT

## 2023-10-21 PROCEDURE — 93005 ELECTROCARDIOGRAM TRACING: CPT | Performed by: STUDENT IN AN ORGANIZED HEALTH CARE EDUCATION/TRAINING PROGRAM

## 2023-10-21 PROCEDURE — 99233 SBSQ HOSP IP/OBS HIGH 50: CPT | Performed by: INTERNAL MEDICINE

## 2023-10-21 PROCEDURE — 82948 REAGENT STRIP/BLOOD GLUCOSE: CPT

## 2023-10-21 PROCEDURE — 94660 CPAP INITIATION&MGMT: CPT

## 2023-10-21 PROCEDURE — 94799 UNLISTED PULMONARY SVC/PX: CPT

## 2023-10-21 PROCEDURE — 94761 N-INVAS EAR/PLS OXIMETRY MLT: CPT

## 2023-10-21 PROCEDURE — 93010 ELECTROCARDIOGRAM REPORT: CPT | Performed by: INTERNAL MEDICINE

## 2023-10-21 PROCEDURE — 25010000002 LORAZEPAM PER 2 MG: Performed by: STUDENT IN AN ORGANIZED HEALTH CARE EDUCATION/TRAINING PROGRAM

## 2023-10-21 PROCEDURE — 25010000002 ZIPRASIDONE MESYLATE PER 10 MG

## 2023-10-21 PROCEDURE — 83050 HGB METHEMOGLOBIN QUAN: CPT

## 2023-10-21 PROCEDURE — 94664 DEMO&/EVAL PT USE INHALER: CPT

## 2023-10-21 PROCEDURE — 82375 ASSAY CARBOXYHB QUANT: CPT

## 2023-10-21 PROCEDURE — 85027 COMPLETE CBC AUTOMATED: CPT | Performed by: STUDENT IN AN ORGANIZED HEALTH CARE EDUCATION/TRAINING PROGRAM

## 2023-10-21 PROCEDURE — 99233 SBSQ HOSP IP/OBS HIGH 50: CPT | Performed by: STUDENT IN AN ORGANIZED HEALTH CARE EDUCATION/TRAINING PROGRAM

## 2023-10-21 PROCEDURE — 80048 BASIC METABOLIC PNL TOTAL CA: CPT | Performed by: INTERNAL MEDICINE

## 2023-10-21 PROCEDURE — 25010000002 HEPARIN (PORCINE) PER 1000 UNITS: Performed by: SURGERY

## 2023-10-21 PROCEDURE — 84484 ASSAY OF TROPONIN QUANT: CPT | Performed by: STUDENT IN AN ORGANIZED HEALTH CARE EDUCATION/TRAINING PROGRAM

## 2023-10-21 RX ORDER — LORAZEPAM 2 MG/ML
0.5 INJECTION INTRAMUSCULAR ONCE
Status: COMPLETED | OUTPATIENT
Start: 2023-10-21 | End: 2023-10-21

## 2023-10-21 RX ORDER — CALCIUM CARBONATE 500 MG/1
2 TABLET, CHEWABLE ORAL 3 TIMES DAILY PRN
Status: DISCONTINUED | OUTPATIENT
Start: 2023-10-21 | End: 2023-10-30 | Stop reason: HOSPADM

## 2023-10-21 RX ORDER — RISPERIDONE 0.25 MG/1
0.25 TABLET ORAL EVERY 12 HOURS SCHEDULED
Status: DISCONTINUED | OUTPATIENT
Start: 2023-10-21 | End: 2023-10-22

## 2023-10-21 RX ORDER — GABAPENTIN 100 MG/1
200 CAPSULE ORAL EVERY 12 HOURS SCHEDULED
Status: DISCONTINUED | OUTPATIENT
Start: 2023-10-21 | End: 2023-10-30 | Stop reason: HOSPADM

## 2023-10-21 RX ORDER — DIAZEPAM 5 MG/ML
2.5 INJECTION, SOLUTION INTRAMUSCULAR; INTRAVENOUS ONCE
Status: COMPLETED | OUTPATIENT
Start: 2023-10-21 | End: 2023-10-21

## 2023-10-21 RX ADMIN — IPRATROPIUM BROMIDE AND ALBUTEROL SULFATE 3 ML: 2.5; .5 SOLUTION RESPIRATORY (INHALATION) at 00:18

## 2023-10-21 RX ADMIN — HYDROXYZINE HYDROCHLORIDE 25 MG: 25 TABLET, FILM COATED ORAL at 09:43

## 2023-10-21 RX ADMIN — MONTELUKAST SODIUM 10 MG: 10 TABLET, COATED ORAL at 21:01

## 2023-10-21 RX ADMIN — HYDRALAZINE HYDROCHLORIDE 50 MG: 50 TABLET, FILM COATED ORAL at 06:26

## 2023-10-21 RX ADMIN — CARVEDILOL 25 MG: 25 TABLET, FILM COATED ORAL at 09:16

## 2023-10-21 RX ADMIN — HEPARIN SODIUM 5000 UNITS: 5000 INJECTION INTRAVENOUS; SUBCUTANEOUS at 09:17

## 2023-10-21 RX ADMIN — IPRATROPIUM BROMIDE AND ALBUTEROL SULFATE 3 ML: 2.5; .5 SOLUTION RESPIRATORY (INHALATION) at 07:16

## 2023-10-21 RX ADMIN — IPRATROPIUM BROMIDE AND ALBUTEROL SULFATE 3 ML: 2.5; .5 SOLUTION RESPIRATORY (INHALATION) at 19:51

## 2023-10-21 RX ADMIN — RISPERIDONE 0.25 MG: 0.25 TABLET, FILM COATED ORAL at 21:01

## 2023-10-21 RX ADMIN — ASPIRIN 81 MG: 81 TABLET, COATED ORAL at 09:15

## 2023-10-21 RX ADMIN — PANTOPRAZOLE SODIUM 40 MG: 40 TABLET, DELAYED RELEASE ORAL at 09:16

## 2023-10-21 RX ADMIN — ACETYLCYSTEINE 1.5 ML: 200 SOLUTION ORAL; RESPIRATORY (INHALATION) at 13:42

## 2023-10-21 RX ADMIN — CARVEDILOL 25 MG: 25 TABLET, FILM COATED ORAL at 18:18

## 2023-10-21 RX ADMIN — ACETYLCYSTEINE 1.5 ML: 200 SOLUTION ORAL; RESPIRATORY (INHALATION) at 07:16

## 2023-10-21 RX ADMIN — HYDRALAZINE HYDROCHLORIDE 50 MG: 50 TABLET, FILM COATED ORAL at 13:20

## 2023-10-21 RX ADMIN — LORAZEPAM 0.5 MG: 2 INJECTION INTRAMUSCULAR; INTRAVENOUS at 11:59

## 2023-10-21 RX ADMIN — GABAPENTIN 200 MG: 100 CAPSULE ORAL at 10:49

## 2023-10-21 RX ADMIN — DIAZEPAM 2.5 MG: 5 INJECTION, SOLUTION INTRAMUSCULAR; INTRAVENOUS at 01:24

## 2023-10-21 RX ADMIN — ROSUVASTATIN CALCIUM 10 MG: 10 TABLET, FILM COATED ORAL at 21:01

## 2023-10-21 RX ADMIN — ACETYLCYSTEINE 1.5 ML: 200 SOLUTION ORAL; RESPIRATORY (INHALATION) at 19:51

## 2023-10-21 RX ADMIN — GABAPENTIN 200 MG: 100 CAPSULE ORAL at 21:01

## 2023-10-21 RX ADMIN — NICOTINE TRANSDERMAL SYSTEM 1 PATCH: 21 PATCH, EXTENDED RELEASE TRANSDERMAL at 09:17

## 2023-10-21 RX ADMIN — Medication 10 ML: at 09:17

## 2023-10-21 RX ADMIN — HEPARIN SODIUM 5000 UNITS: 5000 INJECTION INTRAVENOUS; SUBCUTANEOUS at 21:01

## 2023-10-21 RX ADMIN — ZIPRASIDONE MESYLATE 10 MG: 20 INJECTION, POWDER, LYOPHILIZED, FOR SOLUTION INTRAMUSCULAR at 03:38

## 2023-10-21 RX ADMIN — NITROGLYCERIN 0.4 MG: 0.4 TABLET, ORALLY DISINTEGRATING SUBLINGUAL at 12:03

## 2023-10-21 RX ADMIN — Medication 1000 MCG: at 09:16

## 2023-10-21 RX ADMIN — IPRATROPIUM BROMIDE AND ALBUTEROL SULFATE 3 ML: 2.5; .5 SOLUTION RESPIRATORY (INHALATION) at 13:43

## 2023-10-21 RX ADMIN — ACETYLCYSTEINE 1.5 ML: 200 SOLUTION ORAL; RESPIRATORY (INHALATION) at 00:18

## 2023-10-21 RX ADMIN — HYDROCODONE BITARTRATE AND ACETAMINOPHEN 1 TABLET: 10; 325 TABLET ORAL at 21:01

## 2023-10-21 RX ADMIN — HYDRALAZINE HYDROCHLORIDE 50 MG: 50 TABLET, FILM COATED ORAL at 21:01

## 2023-10-21 RX ADMIN — HYDROXYZINE HYDROCHLORIDE 25 MG: 25 TABLET, FILM COATED ORAL at 21:01

## 2023-10-21 RX ADMIN — Medication 10 ML: at 21:01

## 2023-10-21 RX ADMIN — Medication 1 MG: at 09:16

## 2023-10-21 NOTE — PLAN OF CARE
Goal Outcome Evaluation:                     Pt became restless, uncooperative, and anxious multiple times throughout shift. Pt refused BiPAP and pulled at catheter. Pt attempted to get out of bed.  AMADO Prado and RODRIGO Dexter APRN aware (see MAR for interventions). Sitter at bedside throughout shift. No s/s of acute distress noted at this time. /100, scheduled hydralazine given (see MAR).  Plan of care ongoing.

## 2023-10-21 NOTE — PLAN OF CARE
Goal Outcome Evaluation:  Plan of Care Reviewed With: patient        Progress: improving          Patient sitting in chair at bedside at this time with  at bedside. Patient has been been alert to self today. Patient currently voicing no complaints or concerns. No s/s of acute distress noted. Will continue with plan of care.

## 2023-10-21 NOTE — PROGRESS NOTES
Nephrology Progress Note      Subjective     The patient has been reported to be agitated and noncooperative but not in acute distress she has been becoming anxious and tried to pull the catheter    Objective       Vital signs :     Vitals:    10/21/23 0626 10/21/23 0716 10/21/23 0730 10/21/23 0855   BP: (!) 190/100   151/76   BP Location: Right arm   Right arm   Patient Position: Lying   Lying   Pulse: 79 84 80 75   Resp: 18 24 24 20   Temp: 98 °F (36.7 °C)      TempSrc: Oral      SpO2:  93% 97%    Weight:       Height:            Intake/Output                         10/19/23 0701 - 10/20/23 0700 10/20/23 0701 - 10/21/23 0700     [id number] 8888-1584 Total 5642-4547 2353-5657 Total                 Intake    P.O.  --  150 150  0  -- 0    I.V.  50  -- 50  --  -- --    Total Intake 50 150 200 0 -- 0       Output    Urine  --  300 300  250  300 550    Dialysis  --  2000 2000  2210  -- 2210    Total Output -- 2300 2300 2460 300 2760             Physical Exam:    General Appearance : Not in acute distress  Lungs : Bibasilar crackles noted trace edema  Heart :  regular rhythm & normal rate, normal S1, S2 and no murmur, no rub  Abdomen : Soft nontender nondistended   extremities : Trace edema  Neurologic :   Unable to assess fully    Laboratory Data :       CBC and coagulation:  Results from last 7 days   Lab Units 10/21/23  0325 10/20/23  0157 10/18/23  0059   PROCALCITONIN ng/mL  --  0.40*  --    WBC 10*3/mm3 8.84 7.83 11.60*   HEMOGLOBIN g/dL 8.8* 8.7* 9.3*   HEMATOCRIT % 27.9* 28.8* 31.5*   MCV fL 88.6 89.7 91.8   MCHC g/dL 31.5 30.2* 29.5*   PLATELETS 10*3/mm3 226 222 242     Acid/base balance:  Results from last 7 days   Lab Units 10/21/23  0907 10/20/23  1330 10/16/23  2202   PH, ARTERIAL pH units 7.357 7.355 7.311*   PO2 ART mm Hg 72.2* 55.7* 74.9*   PCO2, ARTERIAL mm Hg 52.6* 57.9* 47.4*   HCO3 ART mmol/L 29.5* 32.3* 23.9       Renal and electrolytes:    Results from last 7 days   Lab Units 10/21/23  5646  "10/20/23  0157 10/19/23  0055 10/18/23  0059 10/17/23  1744   SODIUM mmol/L 133* 135* 134* 132* 134*   POTASSIUM mmol/L 4.4 4.6 5.9* 5.2 5.2   CHLORIDE mmol/L 93* 97* 101 100 99   CO2 mmol/L 25.6 25.0 20.2* 20.5* 22.9   BUN mg/dL 32* 50* 75* 69* 69*   CREATININE mg/dL 3.83* 5.16* 6.93* 6.32* 6.10*   CALCIUM mg/dL 9.0 8.9 9.0 9.0 8.7     Estimated Creatinine Clearance: 15.7 mL/min (A) (by C-G formula based on SCr of 3.83 mg/dL (H)).     Liver and pancreatic function:  Results from last 7 days   Lab Units 10/20/23  0157 10/18/23  0059 10/17/23  0038   ALBUMIN g/dL 3.1* 3.6 3.5   BILIRUBIN mg/dL 0.4 0.3 0.2   ALK PHOS U/L 124* 134* 89   AST (SGOT) U/L 7 10 8   ALT (SGPT) U/L 5 8 6       Albumin Albumin   Date Value Ref Range Status   10/20/2023 3.1 (L) 3.5 - 5.2 g/dL Final      Magnesium No results found for: \"MG\"         PTH               No results found for: \"PTH\"    Cardiac:        Liver and pancreatic function:  Results from last 7 days   Lab Units 10/20/23  0157 10/18/23  0059 10/17/23  0038   ALBUMIN g/dL 3.1* 3.6 3.5   BILIRUBIN mg/dL 0.4 0.3 0.2   ALK PHOS U/L 124* 134* 89   AST (SGOT) U/L 7 10 8   ALT (SGPT) U/L 5 8 6       XR Chest 1 View    Result Date: 10/20/2023  1.  Better expansion of the right lower lobe with some persistent airspace disease. 2.  Minimal left lower lobe airspace disease. 3.  Right central catheter with tip in SVC. 4.  Coarsened interstitial markings noted throughout the lungs. 5.  Mild cardiomegaly again noted.   This report was finalized on 10/20/2023 8:34 AM by Dr. Kirk Mcdonald MD.      CT Chest Without Contrast Diagnostic    Result Date: 10/19/2023  1.  Tiny pericardial effusion. 2.  Right lung base fairly extensive atelectasis and probably consolidative pneumonia. Again there may be some mucus plugging or narrowing. 3.  Again there is tiny bilateral pleural effusions. 4.  Cardiomegaly.   This report was finalized on 10/19/2023 3:51 PM by Dr. Kirk Mcdonald MD.      XR Chest " AP    Result Date: 10/19/2023  1.  There is been collapse of probably the right lower lobe possibly related to mucous plug. 2.  Small right pleural effusion persists. 3.  Improved aeration of the left lung base.   This report was finalized on 10/19/2023 2:00 PM by Dr. Kirk Mcdonald MD.      FL Surgery Fluoro    Result Date: 10/19/2023    As above.   This report was finalized on 10/19/2023 1:42 PM by Dr. Kirk Mcdonald MD.      XR Chest 1 View    Result Date: 10/18/2023  1.  Coarsened interstitial markings and vague bibasilar airspace opacities again noted. 2.  Now small right pleural effusion. 3.  Tiny left pleural effusion. 4.  Cardiomegaly.   This report was finalized on 10/18/2023 10:22 AM by Dr. Kirk Mcdonald MD.      XR Chest 1 View    Result Date: 10/13/2023  1.  Stable changes of CHF with interstitial edema. 2.  Development of bibasilar airspace disease which may represent atelectasis or pneumonia.   This report was finalized on 10/13/2023 12:43 PM by Dr. Eduardo August MD.        Medications :     acetylcysteine, 1.5 mL, Nebulization, Q6H - RT  aspirin, 81 mg, Oral, Daily  carvedilol, 25 mg, Oral, BID With Meals  folic acid, 1 mg, Oral, Daily  gabapentin, 200 mg, Oral, Q12H  heparin (porcine), 5,000 Units, Subcutaneous, Q12H  hydrALAZINE, 50 mg, Oral, Q8H  ipratropium-albuterol, 3 mL, Nebulization, Q6H - RT  levothyroxine, 88 mcg, Oral, Q AM  montelukast, 10 mg, Oral, Nightly  nicotine, 1 patch, Transdermal, Q24H  pantoprazole, 40 mg, Oral, Daily  rosuvastatin, 10 mg, Oral, Nightly  sodium chloride, 10 mL, Intravenous, Q12H  vitamin B-12, 1,000 mcg, Oral, Daily             Assessment & Plan     -Acute kidney injury initiated on dialysis during this hospitalization on 10/19/2023  - Acute metabolic encephalopathy  -Atrophic right kidney  -Chronic kidney disease stage IIIa  -Acute hyperkalemia, resolved  -Acute hypoxic respite failure, improving  -COPD  -Essential hypertension  -Medical noncompliance    Patient  has 2 consecutive low efficacy dialysis sessions done so far.  Will hold dialysis today, and reassess tomorrow.  Highly likelihood of underlying generalized anxiety disorder and now with delirium's and strongly recommend psych evaluation.  Likelihood of dialysis related disequilibrium is extremely less likely and diagnosis of DDS is one of exclusion. Patient was dialyzed with low efficacy and short duration and predialysis BUN was only 75 mg/dL.  And urea reduction postdialysis is very appropriate from 75-50-32.    MAICO on CKD most likely multifactori including ATN from prolonged prerenal state and hemodynamic changes with concomitant use of ACE inhibitors in setting of volume loss   baseline creatinine 2.2, admitted with 3.69   ultrasound of the kidneys showed atrophic right kidney  Serologies are negative    Please avoid nephrotoxic medication and pharmacy to adjust the medication according to the GFR     Plan of care discussed with pt, and family answered all questions, patient verbalized understanding and agreed.      Vani León MD  10/21/23  10:47 EDT

## 2023-10-21 NOTE — PROGRESS NOTES
"Progress Note Pulmonary      Subjective   Patient is getting dialysis and she feels much better      Interval History: EXTR chest from this morning reviewed, noted significant improvement in air entry in the right lung zone.      Review of Systems:    Reviewed ; unchanged       Vital Signs  Temp:  [97.6 °F (36.4 °C)-98.3 °F (36.8 °C)] 98 °F (36.7 °C)  Heart Rate:  [66-86] 75  Resp:  [16-24] 20  BP: (131-190)/() 151/76  Body mass index is 44.02 kg/m².    Intake/Output Summary (Last 24 hours) at 10/21/2023 1117  Last data filed at 10/21/2023 0335  Gross per 24 hour   Intake --   Output 2760 ml   Net -2760 ml     No intake/output data recorded.    Physical Exam:  General- normal in appearance, not in any acute distress    HEENT- pupils equally reactive to light, normal in size, no scleral icterus    Neck- supple    No JVD, no carotid bruit    Respiratory-approved breath sounds over right lung base.  Occasional wheezing.      cardiovascular-  Normal S1 and S2. No S3, S4 or murmurs.    GI-nontender nondistended bowel sounds positive    CNS-alert oriented x3, grossly nonfocal    Extremities- pulses normal bilaterally , no clubbing and 1+ edema     Results Review:      Results from last 7 days   Lab Units 10/21/23  0325 10/20/23  0157 10/18/23  0059   WBC 10*3/mm3 8.84 7.83 11.60*   HEMOGLOBIN g/dL 8.8* 8.7* 9.3*   PLATELETS 10*3/mm3 226 222 242     Results from last 7 days   Lab Units 10/21/23  0325 10/20/23  0157 10/19/23  0055   SODIUM mmol/L 133* 135* 134*   POTASSIUM mmol/L 4.4 4.6 5.9*   CHLORIDE mmol/L 93* 97* 101   CO2 mmol/L 25.6 25.0 20.2*   BUN mg/dL 32* 50* 75*   CREATININE mg/dL 3.83* 5.16* 6.93*   CALCIUM mg/dL 9.0 8.9 9.0   GLUCOSE mg/dL 71 81 107*     No results found for: \"INR\", \"PROTIME\"  Results from last 7 days   Lab Units 10/20/23  0157 10/18/23  0059 10/17/23  0038   ALK PHOS U/L 124* 134* 89   BILIRUBIN mg/dL 0.4 0.3 0.2   ALT (SGPT) U/L 5 8 6   AST (SGOT) U/L 7 10 8     Results from last 7 " days   Lab Units 10/21/23  0907   PH, ARTERIAL pH units 7.357   PO2 ART mm Hg 72.2*   PCO2, ARTERIAL mm Hg 52.6*   HCO3 ART mmol/L 29.5*     Imaging Results (Last 24 Hours)       ** No results found for the last 24 hours. **                 acetylcysteine, 1.5 mL, Nebulization, Q6H - RT  aspirin, 81 mg, Oral, Daily  carvedilol, 25 mg, Oral, BID With Meals  folic acid, 1 mg, Oral, Daily  gabapentin, 200 mg, Oral, Q12H  heparin (porcine), 5,000 Units, Subcutaneous, Q12H  hydrALAZINE, 50 mg, Oral, Q8H  ipratropium-albuterol, 3 mL, Nebulization, Q6H - RT  levothyroxine, 88 mcg, Oral, Q AM  montelukast, 10 mg, Oral, Nightly  nicotine, 1 patch, Transdermal, Q24H  pantoprazole, 40 mg, Oral, Daily  rosuvastatin, 10 mg, Oral, Nightly  sodium chloride, 10 mL, Intravenous, Q12H  vitamin B-12, 1,000 mcg, Oral, Daily           Medication Review:     Assessment & Plan     Morbidly obese female with chronic kidney disease.  Initially admitted with worsening shortness of breath and hypoxia.  X-ray chest from yesterday and later on CT scan showed significant atelectasis of the right lower lobe and midlung zone there was a concern for mucous plug.  Follow-up x-ray chest was done this morning after aggressive chest PT with the help of DuoNeb and Mucomyst followed by chest PT.  It showed remarkable improvement in right lung airspace disease.    Patient has coughed up yellowish sputum.  I have ordered sputum for Gram stain culture, blood cultures x2 and procalcitonin.  If procalcitonin is elevated, will consider adding broad-spectrum antibiotics.    No need to do a bronchoscopy given improvement in exam findings and x-ray chest    # COPD,   # suspect obstructive sleep apnea.  refused BiPAP  #  Diastolic dysfunction.    #End-stage kidney disease, started on dialysis.    Titrate oxygen to a saturation of 92%.    DVT and GI prophylaxis.    Total time spent 30 minutes         Anderson Solomon MD  10/21/23  11:17 EDT

## 2023-10-21 NOTE — PROGRESS NOTES
[facility] HOSPITALIST PROGRESS NOTE     Patient Identification:  Name:  Lesley Michel  Age:  67 y.o.  Sex:  female  :  1955  MRN:  [id number]  Visit Number:  [id number]  ROOM: [address] [place] [address]     Primary Care Provider:  Woodrow Raymond APRN    Length of stay in inpatient status:  11    Subjective     Chief Compliant:    Chief Complaint   Patient presents with    Shortness of Breath    Edema       History of Presenting Illness: Patient seen and evaluated in follow-up for anasarca with MAICO on CKD stage IIIa complicated by acute exacerbation of COPD and bilateral pneumonia with acute hypoxemic respiratory failure.  Patient with successful placement of tunneled hemodialysis catheter 10/19/2023.  Patient with increased anxiety and as well as confusion and agitation overnight requiring Geodon.  Patient with increased anxiety and agitation/delirium ever since tunneled dialysis catheter placement.  Likely multifactorial as patient prior to tunnel dialysis catheter with significant anxiety and irritability over wanting to be out of the hospital.    Objective     Current Hospital Meds:  acetylcysteine, 1.5 mL, Nebulization, Q6H - RT  aspirin, 81 mg, Oral, Daily  carvedilol, 25 mg, Oral, BID With Meals  folic acid, 1 mg, Oral, Daily  gabapentin, 200 mg, Oral, Q12H  heparin (porcine), 5,000 Units, Subcutaneous, Q12H  hydrALAZINE, 50 mg, Oral, Q8H  ipratropium-albuterol, 3 mL, Nebulization, Q6H - RT  levothyroxine, 88 mcg, Oral, Q AM  montelukast, 10 mg, Oral, Nightly  nicotine, 1 patch, Transdermal, Q24H  pantoprazole, 40 mg, Oral, Daily  risperiDONE, 0.25 mg, Oral, Q12H  rosuvastatin, 10 mg, Oral, Nightly  sodium chloride, 10 mL, Intravenous, Q12H  vitamin B-12, 1,000 mcg, Oral, Daily         ----------------------------------------------------------------------------------------------------------------------  Vital Signs:  Temp:  [98 °F (36.7 °C)-98.3 °F (36.8 °C)] 98 °F (36.7 °C)  Heart Rate:  [66-84]  71  Resp:  [16-24] 20  BP: (131-190)/() 133/67  SpO2:  [93 %-98 %] 96 %  on  Flow (L/min):  [4] 4;   Device (Oxygen Therapy): nasal cannula;humidified  Body mass index is 44.02 kg/m².      Intake/Output Summary (Last 24 hours) at 10/21/2023 1633  Last data filed at 10/21/2023 1200  Gross per 24 hour   Intake 0 ml   Output 550 ml   Net -550 ml      ----------------------------------------------------------------------------------------------------------------------  Physical exam:  Constitutional: Elderly chronically ill-appearing adult female.  No acute distress.      HENT:  Head:  Normocephalic and atraumatic.  Mouth:  Moist mucous membranes.    Eyes:  Conjunctivae and EOM are normal. No scleral icterus.    Cardiovascular:  Normal rate, regular rhythm and normal heart sounds with no murmur.  Pulmonary/Chest:  No respiratory distress, no wheezes, no crackles, with diminished breath sounds at the bases with improved aeration in the lower right lower lung field.  Abdominal:  Soft.  Bowel sounds are normal.  No distension and no tenderness.   Musculoskeletal:  No tenderness and no deformity.  No red or swollen joints anywhere.   Neurological: Alert and and oriented to self and year but not exact date or place.  No cranial nerve deficit.  No tongue deviation.  No facial droop.  No slurred speech. Intact Sensation throughout  Skin:  Skin is warm and dry. No rash or lesion noted. No pallor.   Peripheral vascular:  Pulses in all 4 extremities with no clubbing, no cyanosis, 2+ bilateral lower extremity edema with trace edema of the upper extremity.  Psychiatric: Appropriate mood and affect, pleasant.   ----------------------------------------------------------------------------------------------------------------------  WBC/HGB/HCT/PLT   8.84/8.8/27.9/226 (10/21 0325)  BUN/CREAT/GLUC/ALT/AST/INOCENTE/LIP    32/3.83/71/--/--/--/-- (10/21 0325)  LYTES - Na/K/Cl/CO2: 133*/4.4/93*/25.6 (10/21 0325)     pH/pCO2/pO2/HCO3    "7.357/52.6/72.2/29.5 (10/21 0907)  No results found for: \"URINECX\"  No results found for: \"BLOODCX\"    I have personally looked at the labs and they are summarized above.  ----------------------------------------------------------------------------------------------------------------------  Detailed radiology reports for the last 24 hours:  XR Chest 1 View    Result Date: 10/20/2023  1.  Better expansion of the right lower lobe with some persistent airspace disease. 2.  Minimal left lower lobe airspace disease. 3.  Right central catheter with tip in SVC. 4.  Coarsened interstitial markings noted throughout the lungs. 5.  Mild cardiomegaly again noted.   This report was finalized on 10/20/2023 8:34 AM by Dr. Kirk Mcdonald MD.     Assessment & Plan      Anasarca  Hyperkalemia  MAICO on CKD stage IIIa with likely progression to CKD V vs ESRD    -Patient presenting with anasarca with echo showing EF of 56 to 60% with grade 1 diastolic dysfunction, mild AAS, mild pulmonary hypertension and small pericardial effusion without evidence of tamponade.    -Patient with progressive worsening renal failure likely contributing to volume retention with possibly some component of acute HFpEF.    -Reds vest remains elevated.  Patient trialed on diuretic therapy with little to no urine output and remains within anuric range.      -Nephrology consulted and following along, volume status management per their discretion.  Patient has been  trialed on high-dose diuretic therapy to no avail with continued worsening/no improvement in renal function.  As such nephrology recommending hemodialysis and patient agreeable and had successful tunneled hemodialysis catheter placement on 10/19/2023.    -First dialysis session 10/19 with repeat session on 10/20.  Approximately 4 L of volume removed in the last 48 hours.    -Palliative care consulted and following along.      Bilateral bacterial pneumonia  Acute exacerbation of COPD  Acute hypoxemic " respiratory failure  Right lower lobe mucous plug with atelectasis and collapse    -Procalcitonin normal, cultures without growth.    -Completed an appropriate course of azithromycin    -Continue nebulized medications    -Patient maintaining 3 L nasal cannula requirement.  However postoperatively postop chest x-ray showing right lower lobe collapse and atelectasis likely due to mucous plug with pleural effusion.  CT imaging obtained for better characterization of lung parenchyma and shows the same as well as dense consolidation possibly pneumonia.    -Mucomyst 4 times daily with Evelia for conservative management for now after discussion with pulmonology.  Repeat chest x-ray in the morning if no resolution of mucous plugging plan for therapeutic bronchoscopy.    -Chest x-ray obtained morning 10/20/2023 shows improved aeration and reduction in atelectasis, plugging and effusion after dialysis and breathing treatments.  Continue breathing treatments however chest physiotherapy discontinued as patient noncompliant and refusing.  Begin working on weaning Mucomyst treatments.    -Patient with some increased confusion and delirium 10/20/2023 and ABG shows increased CO2 and was placed on BiPAP that evening as patient previously refused but having increasing hypercapnia.  Procalcitonin not significantly elevated doubt strong suspicion for any ongoing active pneumonia at this time.  Continue supportive care and sitter at bedside.    Anxiety  Acute delirium    -Patient with significant anxiety prior to initiation of hemodialysis and placement of tunneled hemodialysis catheter.  Previously difficult when making decision regarding hemodialysis as patient's anxiety almost crippling and going back and forth on decision while in hospital as well as starting to leave AMA on several occasions.    -Patient now with continued anxiety as well as some delirium suspect hospital related with agitation overnight requiring pharmacotherapy  for safety.    -Patient frequently pulling at lines and BiPAP mask overnight.  Hold on further BiPAP as ABG is compensated and will also remove Kaiser catheter as patient is making excellent urine with no evidence of urinary retention on frequent bladder scans.    -Discussed with nephrology and reasonable to obtain psychiatry consult in regards to recommendations for further pharmacotherapy to help stabilize patient's anxiety and agitation/delirium.    -In the interim have started-Low-dose Risperdal and will use as needed anxiolytics as needed.  Given patient's agitation and confusion and delirium overnight would avoid further administrations of Benadryl as this would likely precipitate and worsen rather than abort symptoms.    -Consider CT head for further improvement in delirium with supportive care over the next 24 hours.    Essential hypertension    -Continue home antihypertensives as appropriate.      Hypothyroidism    -Continue Synthroid    Tobacco abuse    -Cessation counseled.    Morbid obesity    -Complicates all aspects of care    Copied text in portions of the note has been reviewed and is accurate as of 10/21/23    VTE Prophylaxis:   Mechanical Order History:       None          Pharmalogical Order History:        Ordered     Dose Route Frequency Stop    10/08/23 2052  heparin (porcine) 5000 UNIT/ML injection 5,000 Units         5,000 Units SC Every 12 Hours Scheduled --                    Disposition Home once medically stable and improved and hemodialysis outpatient chair arranged.    Jay Jay Pritchett DO  [facility]  10/21/23  16:33 EDT

## 2023-10-22 ENCOUNTER — APPOINTMENT (OUTPATIENT)
Dept: GENERAL RADIOLOGY | Facility: HOSPITAL | Age: 68
DRG: 291 | End: 2023-10-22
Payer: MEDICARE

## 2023-10-22 LAB
BACTERIA BLD CULT: NORMAL
BOTTLE TYPE: NORMAL
GLUCOSE BLDC GLUCOMTR-MCNC: 109 MG/DL (ref 70–130)
GLUCOSE BLDC GLUCOMTR-MCNC: 110 MG/DL (ref 70–130)
GLUCOSE BLDC GLUCOMTR-MCNC: 83 MG/DL (ref 70–130)
QT INTERVAL: 402 MS
QTC INTERVAL: 436 MS

## 2023-10-22 PROCEDURE — 94799 UNLISTED PULMONARY SVC/PX: CPT

## 2023-10-22 PROCEDURE — 99221 1ST HOSP IP/OBS SF/LOW 40: CPT | Performed by: PSYCHIATRY & NEUROLOGY

## 2023-10-22 PROCEDURE — 82948 REAGENT STRIP/BLOOD GLUCOSE: CPT

## 2023-10-22 PROCEDURE — 99232 SBSQ HOSP IP/OBS MODERATE 35: CPT | Performed by: INTERNAL MEDICINE

## 2023-10-22 PROCEDURE — 94761 N-INVAS EAR/PLS OXIMETRY MLT: CPT

## 2023-10-22 PROCEDURE — 25010000002 HEPARIN (PORCINE) PER 1000 UNITS: Performed by: SURGERY

## 2023-10-22 PROCEDURE — 71045 X-RAY EXAM CHEST 1 VIEW: CPT

## 2023-10-22 PROCEDURE — 94664 DEMO&/EVAL PT USE INHALER: CPT

## 2023-10-22 PROCEDURE — 99233 SBSQ HOSP IP/OBS HIGH 50: CPT | Performed by: STUDENT IN AN ORGANIZED HEALTH CARE EDUCATION/TRAINING PROGRAM

## 2023-10-22 PROCEDURE — 94660 CPAP INITIATION&MGMT: CPT

## 2023-10-22 RX ORDER — SERTRALINE HYDROCHLORIDE 25 MG/1
25 TABLET, FILM COATED ORAL DAILY
Status: DISCONTINUED | OUTPATIENT
Start: 2023-10-22 | End: 2023-10-30 | Stop reason: HOSPADM

## 2023-10-22 RX ADMIN — IPRATROPIUM BROMIDE AND ALBUTEROL SULFATE 3 ML: 2.5; .5 SOLUTION RESPIRATORY (INHALATION) at 13:02

## 2023-10-22 RX ADMIN — ACETYLCYSTEINE 1.5 ML: 200 SOLUTION ORAL; RESPIRATORY (INHALATION) at 13:02

## 2023-10-22 RX ADMIN — HEPARIN SODIUM 5000 UNITS: 5000 INJECTION INTRAVENOUS; SUBCUTANEOUS at 20:07

## 2023-10-22 RX ADMIN — ACETYLCYSTEINE 1.5 ML: 200 SOLUTION ORAL; RESPIRATORY (INHALATION) at 01:27

## 2023-10-22 RX ADMIN — IPRATROPIUM BROMIDE AND ALBUTEROL SULFATE 3 ML: 2.5; .5 SOLUTION RESPIRATORY (INHALATION) at 07:07

## 2023-10-22 RX ADMIN — THROMBIN, TOPICAL (BOVINE) 5000 UNITS: KIT at 15:05

## 2023-10-22 RX ADMIN — HEPARIN SODIUM 5000 UNITS: 5000 INJECTION INTRAVENOUS; SUBCUTANEOUS at 08:33

## 2023-10-22 RX ADMIN — ACETYLCYSTEINE 1.5 ML: 200 SOLUTION ORAL; RESPIRATORY (INHALATION) at 18:33

## 2023-10-22 RX ADMIN — PANTOPRAZOLE SODIUM 40 MG: 40 TABLET, DELAYED RELEASE ORAL at 08:32

## 2023-10-22 RX ADMIN — Medication 10 ML: at 08:33

## 2023-10-22 RX ADMIN — RISPERIDONE 0.25 MG: 0.25 TABLET, FILM COATED ORAL at 08:33

## 2023-10-22 RX ADMIN — CARVEDILOL 25 MG: 25 TABLET, FILM COATED ORAL at 08:32

## 2023-10-22 RX ADMIN — MONTELUKAST SODIUM 10 MG: 10 TABLET, COATED ORAL at 20:08

## 2023-10-22 RX ADMIN — GABAPENTIN 200 MG: 100 CAPSULE ORAL at 08:32

## 2023-10-22 RX ADMIN — Medication 10 MG: at 20:08

## 2023-10-22 RX ADMIN — IPRATROPIUM BROMIDE AND ALBUTEROL SULFATE 3 ML: 2.5; .5 SOLUTION RESPIRATORY (INHALATION) at 01:27

## 2023-10-22 RX ADMIN — HYDROCODONE BITARTRATE AND ACETAMINOPHEN 1 TABLET: 10; 325 TABLET ORAL at 20:08

## 2023-10-22 RX ADMIN — Medication 1000 MCG: at 08:32

## 2023-10-22 RX ADMIN — IPRATROPIUM BROMIDE AND ALBUTEROL SULFATE 3 ML: 2.5; .5 SOLUTION RESPIRATORY (INHALATION) at 18:33

## 2023-10-22 RX ADMIN — CARVEDILOL 25 MG: 25 TABLET, FILM COATED ORAL at 17:12

## 2023-10-22 RX ADMIN — LEVOTHYROXINE SODIUM 88 MCG: 88 TABLET ORAL at 05:34

## 2023-10-22 RX ADMIN — ACETYLCYSTEINE 1.5 ML: 200 SOLUTION ORAL; RESPIRATORY (INHALATION) at 07:07

## 2023-10-22 RX ADMIN — ROSUVASTATIN CALCIUM 10 MG: 10 TABLET, FILM COATED ORAL at 20:08

## 2023-10-22 RX ADMIN — ASPIRIN 81 MG: 81 TABLET, COATED ORAL at 08:33

## 2023-10-22 RX ADMIN — Medication 10 ML: at 20:08

## 2023-10-22 RX ADMIN — HYDRALAZINE HYDROCHLORIDE 50 MG: 50 TABLET, FILM COATED ORAL at 21:45

## 2023-10-22 RX ADMIN — NICOTINE TRANSDERMAL SYSTEM 1 PATCH: 21 PATCH, EXTENDED RELEASE TRANSDERMAL at 08:33

## 2023-10-22 RX ADMIN — HYDROXYZINE HYDROCHLORIDE 25 MG: 25 TABLET, FILM COATED ORAL at 20:08

## 2023-10-22 RX ADMIN — HYDRALAZINE HYDROCHLORIDE 50 MG: 50 TABLET, FILM COATED ORAL at 05:34

## 2023-10-22 RX ADMIN — Medication 1 MG: at 08:32

## 2023-10-22 RX ADMIN — SERTRALINE HYDROCHLORIDE 25 MG: 25 TABLET ORAL at 15:05

## 2023-10-22 RX ADMIN — GABAPENTIN 200 MG: 100 CAPSULE ORAL at 20:08

## 2023-10-22 RX ADMIN — HYDRALAZINE HYDROCHLORIDE 50 MG: 50 TABLET, FILM COATED ORAL at 14:10

## 2023-10-22 NOTE — PLAN OF CARE
Problem: Adult Inpatient Plan of Care  Goal: Absence of Hospital-Acquired Illness or Injury  Intervention: Prevent Skin Injury  Recent Flowsheet Documentation  Taken 10/21/2023 1934 by Toribio Holloway RN  Body Position: supine  Skin Protection:   adhesive use limited   incontinence pads utilized     Problem: Adult Inpatient Plan of Care  Goal: Absence of Hospital-Acquired Illness or Injury  Intervention: Prevent and Manage VTE (Venous Thromboembolism) Risk  Recent Flowsheet Documentation  Taken 10/21/2023 1934 by Toribio Holloway, RN  Activity Management: activity encouraged  VTE Prevention/Management: (See MAR) other (see comments)   Goal Outcome Evaluation:

## 2023-10-22 NOTE — PROGRESS NOTES
[facility] HOSPITALIST PROGRESS NOTE     Patient Identification:  Name:  Lesley Michel  Age:  67 y.o.  Sex:  female  :  1955  MRN:  [id number]  Visit Number:  [id number]  ROOM: [address] [place] [address]     Primary Care Provider:  Woodrow Raymond APRN    Length of stay in inpatient status:  12    Subjective     Chief Compliant:    Chief Complaint   Patient presents with    Shortness of Breath    Edema       History of Presenting Illness: Patient seen and evaluated in follow-up for anasarca with MAICO on CKD stage IIIa complicated by acute exacerbation of COPD and bilateral pneumonia with acute hypoxemic respiratory failure.  Patient with successful placement of tunneled hemodialysis catheter 10/19/2023.  Patient significantly improved mental status wise today at time of exam.  She is alert and oriented x4.  She does fatigue easily and is slightly anxious but overall essentially back to baseline.  Patient inquiring when she will be able to return home as she is tired of being in the hospital.    Objective     Current Hospital Meds:  acetylcysteine, 1.5 mL, Nebulization, Q6H - RT  aspirin, 81 mg, Oral, Daily  carvedilol, 25 mg, Oral, BID With Meals  folic acid, 1 mg, Oral, Daily  gabapentin, 200 mg, Oral, Q12H  heparin (porcine), 5,000 Units, Subcutaneous, Q12H  hydrALAZINE, 50 mg, Oral, Q8H  ipratropium-albuterol, 3 mL, Nebulization, Q6H - RT  levothyroxine, 88 mcg, Oral, Q AM  montelukast, 10 mg, Oral, Nightly  nicotine, 1 patch, Transdermal, Q24H  pantoprazole, 40 mg, Oral, Daily  rosuvastatin, 10 mg, Oral, Nightly  sertraline, 25 mg, Oral, Daily  sodium chloride, 10 mL, Intravenous, Q12H  vitamin B-12, 1,000 mcg, Oral, Daily         ----------------------------------------------------------------------------------------------------------------------  Vital Signs:  Temp:  [97.8 °F (36.6 °C)-98.1 °F (36.7 °C)] 97.8 °F (36.6 °C)  Heart Rate:  [71-83] 71  Resp:  [20-21] 20  BP: (150-163)/(72-85)  "161/83  SpO2:  [94 %-99 %] 99 %  on  Flow (L/min):  [4] 4;   Device (Oxygen Therapy): nasal cannula;humidified  Body mass index is 44.02 kg/m².      Intake/Output Summary (Last 24 hours) at 10/22/2023 1620  Last data filed at 10/22/2023 1346  Gross per 24 hour   Intake 360 ml   Output 200 ml   Net 160 ml      ----------------------------------------------------------------------------------------------------------------------  Physical exam:  Constitutional: Elderly chronically ill-appearing adult female.  No acute distress.      HENT:  Head:  Normocephalic and atraumatic.  Mouth:  Moist mucous membranes.    Eyes:  Conjunctivae and EOM are normal. No scleral icterus.    Cardiovascular:  Normal rate, regular rhythm and normal heart sounds with no murmur.  Pulmonary/Chest:  No respiratory distress, no wheezes, no crackles, with diminished breath sounds at the bases with improved aeration in the lower right lower lung field.  Abdominal:  Soft.  Bowel sounds are normal.  No distension and no tenderness.   Musculoskeletal:  No tenderness and no deformity.  No red or swollen joints anywhere.   Neurological: Alert and and oriented to person, place, and time and situation.  No cranial nerve deficit.  No tongue deviation.  No facial droop.  No slurred speech. Intact Sensation throughout  Skin:  Skin is warm and dry. No rash or lesion noted. No pallor.   Peripheral vascular:  Pulses in all 4 extremities with no clubbing, no cyanosis, 1+ bilateral lower extremity edema with trace edema of the upper extremity.  Psychiatric: Appropriate mood and affect, pleasant.   ----------------------------------------------------------------------------------------------------------------------                 No results found for: \"URINECX\"  No results found for: \"BLOODCX\"    I have personally looked at the labs and they are summarized " above.  ----------------------------------------------------------------------------------------------------------------------  Detailed radiology reports for the last 24 hours:  No radiology results for the last day  Assessment & Plan      Anasarca  Hyperkalemia  MAICO on CKD stage IIIa with likely progression to CKD V vs ESRD    -Patient presenting with anasarca with echo showing EF of 56 to 60% with grade 1 diastolic dysfunction, mild AAS, mild pulmonary hypertension and small pericardial effusion without evidence of tamponade.    -Patient with progressive worsening renal failure likely contributing to volume retention with possibly some component of acute HFpEF.    -Reds vest remains elevated.  Patient trialed on diuretic therapy with little to no urine output and remains within anuric range.      -Nephrology consulted and following along, volume status management per their discretion.  Patient has been  trialed on high-dose diuretic therapy to no avail with continued worsening/no improvement in renal function.  As such nephrology recommending hemodialysis and patient agreeable and had successful tunneled hemodialysis catheter placement on 10/19/2023.    -First dialysis session 10/19 with repeat session on 10/20.  Approximately 4 L of volume removed in the last 48 hours.    -Palliative care consulted and following along.      -Patient with only reliable transportation/family support on Mondays, Wednesdays and Fridays for hemodialysis sessions and would not have enough family support for Tuesday, Thursday, Saturday.  Updated nephrology and agreeable for this to be patient's schedule in the outpatient setting.    Bilateral bacterial pneumonia  Acute exacerbation of COPD  Acute hypoxemic respiratory failure  Right lower lobe mucous plug with atelectasis and collapse    -Procalcitonin normal, cultures without growth.    -Completed an appropriate course of azithromycin    -Continue nebulized medications    -Patient  maintaining 3 L nasal cannula requirement.  However postoperatively postop chest x-ray showing right lower lobe collapse and atelectasis likely due to mucous plug with pleural effusion.  CT imaging obtained for better characterization of lung parenchyma and shows the same as well as dense consolidation possibly pneumonia.    -Mucomyst 4 times daily with Evelia for conservative management for now after discussion with pulmonology.  Repeat chest x-ray in the morning if no resolution of mucous plugging plan for therapeutic bronchoscopy.    -Chest x-ray obtained morning 10/20/2023 shows improved aeration and reduction in atelectasis, plugging and effusion after dialysis and breathing treatments.  Continue breathing treatments however chest physiotherapy discontinued as patient noncompliant and refusing.  Begin working on weaning Mucomyst treatments.  Patient still with cough and decreased breath sounds in the right lower lung fields and will obtain updated chest x-ray to follow-up on previous effusion and atelectasis/airspace disease.    -Patient with some increased confusion and delirium 10/20/2023 and ABG shows increased CO2 and was placed on BiPAP that evening as patient previously refused but having increasing hypercapnia.  Procalcitonin not significantly elevated doubt strong suspicion for any ongoing active pneumonia at this time.  Continue supportive care and sitter at bedside.    -NIPPV discontinued due to patient noncompliance.    Anxiety  Acute delirium resolved    -Patient with significant anxiety prior to initiation of hemodialysis and placement of tunneled hemodialysis catheter.  Previously difficult when making decision regarding hemodialysis as patient's anxiety almost crippling and going back and forth on decision while in hospital as well as starting to leave AMA on several occasions.    -Patient now significantly improved today and alert and oriented x3 although still anxious at times.    -Patient  seen and evaluated by psychiatry today and started on sertraline.    -Discontinuation of Risperdal in light of sertraline.  Given patient's agitation and confusion and delirium overnight would avoid further administrations of Benadryl as this would likely precipitate and worsen rather than abort symptoms.    -Continue supportive care and frequent reorientation on hospital.    Essential hypertension    -Continue home antihypertensives as appropriate.      Hypothyroidism    -Continue Synthroid    Tobacco abuse    -Cessation counseled.    Morbid obesity    -Complicates all aspects of care    Copied text in portions of the note has been reviewed and is accurate as of 10/22/23    VTE Prophylaxis:   Mechanical Order History:       None          Pharmalogical Order History:        Ordered     Dose Route Frequency Stop    10/08/23 2052  heparin (porcine) 5000 UNIT/ML injection 5,000 Units         5,000 Units SC Every 12 Hours Scheduled --                    Disposition Home once medically stable and improved and hemodialysis outpatient chair arranged.    Jay Jay Pritchett DO  [facility] Hospitalist  10/22/23  16:20 EDT

## 2023-10-22 NOTE — PROGRESS NOTES
"Progress Note Pulmonary      Subjective   Patient feels better, no new complaints  Interval History: Event overnight    Review of Systems:    Reviewed ; unchanged       Vital Signs  Temp:  [97.9 °F (36.6 °C)-98.1 °F (36.7 °C)] 98 °F (36.7 °C)  Heart Rate:  [71-83] 74  Resp:  [20-21] 20  BP: (133-163)/(67-85) 160/84  Body mass index is 44.02 kg/m².    Intake/Output Summary (Last 24 hours) at 10/22/2023 1053  Last data filed at 10/22/2023 0800  Gross per 24 hour   Intake 120 ml   Output 200 ml   Net -80 ml     I/O this shift:  In: 120 [P.O.:120]  Out: -     Physical Exam:  General- normal in appearance, not in any acute distress    HEENT- pupils equally reactive to light, normal in size, no scleral icterus    Neck- supple    No JVD, no carotid bruit    Respiratory-improved breath sounds over right lung base.  Occasional wheezing.      cardiovascular-  Normal S1 and S2. No S3, S4 or murmurs.    GI-nontender nondistended bowel sounds positive    CNS-alert oriented x3, grossly nonfocal    Extremities- pulses normal bilaterally , no clubbing and 1+ edema     Results Review:      Results from last 7 days   Lab Units 10/21/23  0325 10/20/23  0157 10/18/23  0059   WBC 10*3/mm3 8.84 7.83 11.60*   HEMOGLOBIN g/dL 8.8* 8.7* 9.3*   PLATELETS 10*3/mm3 226 222 242     Results from last 7 days   Lab Units 10/21/23  0325 10/20/23  0157 10/19/23  0055   SODIUM mmol/L 133* 135* 134*   POTASSIUM mmol/L 4.4 4.6 5.9*   CHLORIDE mmol/L 93* 97* 101   CO2 mmol/L 25.6 25.0 20.2*   BUN mg/dL 32* 50* 75*   CREATININE mg/dL 3.83* 5.16* 6.93*   CALCIUM mg/dL 9.0 8.9 9.0   GLUCOSE mg/dL 71 81 107*     No results found for: \"INR\", \"PROTIME\"  Results from last 7 days   Lab Units 10/20/23  0157 10/18/23  0059 10/17/23  0038   ALK PHOS U/L 124* 134* 89   BILIRUBIN mg/dL 0.4 0.3 0.2   ALT (SGPT) U/L 5 8 6   AST (SGOT) U/L 7 10 8     Results from last 7 days   Lab Units 10/21/23  0907   PH, ARTERIAL pH units 7.357   PO2 ART mm Hg 72.2*   PCO2, ARTERIAL " mm Hg 52.6*   HCO3 ART mmol/L 29.5*     Imaging Results (Last 24 Hours)       ** No results found for the last 24 hours. **                 acetylcysteine, 1.5 mL, Nebulization, Q6H - RT  aspirin, 81 mg, Oral, Daily  carvedilol, 25 mg, Oral, BID With Meals  folic acid, 1 mg, Oral, Daily  gabapentin, 200 mg, Oral, Q12H  heparin (porcine), 5,000 Units, Subcutaneous, Q12H  hydrALAZINE, 50 mg, Oral, Q8H  ipratropium-albuterol, 3 mL, Nebulization, Q6H - RT  levothyroxine, 88 mcg, Oral, Q AM  montelukast, 10 mg, Oral, Nightly  nicotine, 1 patch, Transdermal, Q24H  pantoprazole, 40 mg, Oral, Daily  rosuvastatin, 10 mg, Oral, Nightly  sodium chloride, 10 mL, Intravenous, Q12H  vitamin B-12, 1,000 mcg, Oral, Daily           Medication Review:     Assessment & Plan     # Morbidly obese female with chronic kidney disease.  Initially admitted with worsening shortness of breath and hypoxia.  X-ray chest the day before yesterday and later on CT scan showed significant atelectasis of the right lower lobe and midlung zone there was a concern for mucous plug.  Follow-up x-ray chest was done today morning after aggressive chest PT with the help of DuoNeb and Mucomyst followed by chest PT.  It showed remarkable improvement in right lung airspace disease.    Patient has coughed up yellowish sputum.  Sputum culture negative.  Pro-Binh was slightly elevated.  She is not on antibiotic at the moment.    # COPD, stable  # suspect obstructive sleep apnea.  refused BiPAP  #  Diastolic dysfunction    #Highly suspect obstructive sleep apnea.  But patient refused to use BiPAP.    #End-stage kidney disease, started on dialysis.    Titrate oxygen to a saturation of 92%.    DVT and GI prophylaxis.    Total time spent 30 minutes    Follow-up as an outpatient.  Stable from pulmonary standpoint    Anderson Solomon MD  10/22/23  10:53 EDT

## 2023-10-22 NOTE — PLAN OF CARE
Goal Outcome Evaluation:  Pt resting in bed. No signs or symptoms of distress noted. No complaint at this time. Scant amount of blood around Dialysis cath, see MD orders. Plan of care on going.

## 2023-10-22 NOTE — PROGRESS NOTES
Nephrology Progress Note      Subjective   Feeling much better, no chest pain or shortness of breath    Objective       Vital signs :     Vitals:    10/22/23 0615 10/22/23 0707 10/22/23 0723 10/22/23 1039   BP: 163/82   160/84   BP Location: Right arm   Right arm   Patient Position: Lying   Lying   Pulse: 74 77 83 74   Resp: 20 20 20 20   Temp: 97.9 °F (36.6 °C)   98 °F (36.7 °C)   TempSrc: Oral   Oral   SpO2: 97% 99% 99% 94%   Weight:       Height:            Intake/Output                               10/20/23 0701 - 10/21/23 0700 10/21/23 0701 - 10/22/23 0700 10/22/23 0701 - 10/23/23 0700     3206-0370 0908-2065 Total 6514-5045 5814-7669 Total 9304-5459 5721-1334 Total                    Intake    P.O.  0  -- 0  0  -- 0  120  -- 120    Total Intake 0 -- 0 0 -- 0 120 -- 120       Output    Urine  250  300 550  --  200 200  --  -- --    Dialysis  2210  -- 2210  --  -- --  --  -- --    Total Output 2460 300 2760 -- 200 200 -- -- --             Physical Exam:    General Appearance : Not in acute distress  Lungs : Bibasilar crackles noted trace edema  Heart :  regular rhythm & normal rate, normal S1, S2 and no murmur, no rub  Abdomen : Soft nontender nondistended   extremities : Trace edema  Neurologic :   Unable to assess fully    Laboratory Data :       CBC and coagulation:  Results from last 7 days   Lab Units 10/21/23  0325 10/20/23  0157 10/18/23  0059   PROCALCITONIN ng/mL  --  0.40*  --    WBC 10*3/mm3 8.84 7.83 11.60*   HEMOGLOBIN g/dL 8.8* 8.7* 9.3*   HEMATOCRIT % 27.9* 28.8* 31.5*   MCV fL 88.6 89.7 91.8   MCHC g/dL 31.5 30.2* 29.5*   PLATELETS 10*3/mm3 226 222 242     Acid/base balance:  Results from last 7 days   Lab Units 10/21/23  0907 10/20/23  1330 10/16/23  2202   PH, ARTERIAL pH units 7.357 7.355 7.311*   PO2 ART mm Hg 72.2* 55.7* 74.9*   PCO2, ARTERIAL mm Hg 52.6* 57.9* 47.4*   HCO3 ART mmol/L 29.5* 32.3* 23.9       Renal and electrolytes:    Results from last 7 days   Lab Units 10/21/23  4970  "10/20/23  0157 10/19/23  0055 10/18/23  0059 10/17/23  1744   SODIUM mmol/L 133* 135* 134* 132* 134*   POTASSIUM mmol/L 4.4 4.6 5.9* 5.2 5.2   CHLORIDE mmol/L 93* 97* 101 100 99   CO2 mmol/L 25.6 25.0 20.2* 20.5* 22.9   BUN mg/dL 32* 50* 75* 69* 69*   CREATININE mg/dL 3.83* 5.16* 6.93* 6.32* 6.10*   CALCIUM mg/dL 9.0 8.9 9.0 9.0 8.7     Estimated Creatinine Clearance: 15.7 mL/min (A) (by C-G formula based on SCr of 3.83 mg/dL (H)).     Liver and pancreatic function:  Results from last 7 days   Lab Units 10/20/23  0157 10/18/23  0059 10/17/23  0038   ALBUMIN g/dL 3.1* 3.6 3.5   BILIRUBIN mg/dL 0.4 0.3 0.2   ALK PHOS U/L 124* 134* 89   AST (SGOT) U/L 7 10 8   ALT (SGPT) U/L 5 8 6       Albumin Albumin   Date Value Ref Range Status   10/20/2023 3.1 (L) 3.5 - 5.2 g/dL Final      Magnesium No results found for: \"MG\"         PTH               No results found for: \"PTH\"    Cardiac:        Liver and pancreatic function:  Results from last 7 days   Lab Units 10/20/23  0157 10/18/23  0059 10/17/23  0038   ALBUMIN g/dL 3.1* 3.6 3.5   BILIRUBIN mg/dL 0.4 0.3 0.2   ALK PHOS U/L 124* 134* 89   AST (SGOT) U/L 7 10 8   ALT (SGPT) U/L 5 8 6       XR Chest 1 View    Result Date: 10/20/2023  1.  Better expansion of the right lower lobe with some persistent airspace disease. 2.  Minimal left lower lobe airspace disease. 3.  Right central catheter with tip in SVC. 4.  Coarsened interstitial markings noted throughout the lungs. 5.  Mild cardiomegaly again noted.   This report was finalized on 10/20/2023 8:34 AM by Dr. Kirk Mcdonald MD.      CT Chest Without Contrast Diagnostic    Result Date: 10/19/2023  1.  Tiny pericardial effusion. 2.  Right lung base fairly extensive atelectasis and probably consolidative pneumonia. Again there may be some mucus plugging or narrowing. 3.  Again there is tiny bilateral pleural effusions. 4.  Cardiomegaly.   This report was finalized on 10/19/2023 3:51 PM by Dr. Kirk Mcdonald MD.      XR Chest " AP    Result Date: 10/19/2023  1.  There is been collapse of probably the right lower lobe possibly related to mucous plug. 2.  Small right pleural effusion persists. 3.  Improved aeration of the left lung base.   This report was finalized on 10/19/2023 2:00 PM by Dr. Kirk Mcdonald MD.      FL Surgery Fluoro    Result Date: 10/19/2023    As above.   This report was finalized on 10/19/2023 1:42 PM by Dr. Kirk Mcdonald MD.      XR Chest 1 View    Result Date: 10/18/2023  1.  Coarsened interstitial markings and vague bibasilar airspace opacities again noted. 2.  Now small right pleural effusion. 3.  Tiny left pleural effusion. 4.  Cardiomegaly.   This report was finalized on 10/18/2023 10:22 AM by Dr. Kirk Mcdonald MD.        Medications :     acetylcysteine, 1.5 mL, Nebulization, Q6H - RT  aspirin, 81 mg, Oral, Daily  carvedilol, 25 mg, Oral, BID With Meals  folic acid, 1 mg, Oral, Daily  gabapentin, 200 mg, Oral, Q12H  heparin (porcine), 5,000 Units, Subcutaneous, Q12H  hydrALAZINE, 50 mg, Oral, Q8H  ipratropium-albuterol, 3 mL, Nebulization, Q6H - RT  levothyroxine, 88 mcg, Oral, Q AM  montelukast, 10 mg, Oral, Nightly  nicotine, 1 patch, Transdermal, Q24H  pantoprazole, 40 mg, Oral, Daily  rosuvastatin, 10 mg, Oral, Nightly  sodium chloride, 10 mL, Intravenous, Q12H  vitamin B-12, 1,000 mcg, Oral, Daily             Assessment & Plan     -Acute kidney injury initiated on dialysis during this hospitalization on 10/19/2023  - Acute metabolic encephalopathy  -Atrophic right kidney  -Chronic kidney disease stage IIIa  -Acute hyperkalemia, resolved  -Acute hypoxic respite failure, improving  -COPD  -Essential hypertension  -Medical noncompliance    No emergent indication for dialysis today plan to do it tomorrow and outpatient dialysis schedule to be set as Mondays Wednesdays and Fridays per patient's convenience.  Mental status is significantly improved      MAICO on CKD most likely multifactori including ATN from  prolonged prerenal state and hemodynamic changes with concomitant use of ACE inhibitors in setting of volume loss   baseline creatinine 2.2, admitted with 3.69   ultrasound of the kidneys showed atrophic right kidney  Serologies are negative    Please avoid nephrotoxic medication and pharmacy to adjust the medication according to the GFR     Plan of care discussed with pt, and family answered all questions, patient verbalized understanding and agreed.      Vani León MD  10/22/23  10:57 EDT

## 2023-10-22 NOTE — CONSULTS
"Referring Provider: Vani León MD   Reason for Consultation: has history of extreme anxiety and likely generilzed anxiety disorder, now with agitation.       Chief complaint/Focus of Exam:  Lower extremity edema, Worsening of shrtness of breath    Subjective . \" I am doing okay today\"    History of present illness:  Patient is a 67 year old female with past medical history f CAD, S/P STENT, COPD, Hypertension, CKD, HYPOTHYROIDISM, ANXIETY, OBESITY , NONCOMPLIANCE, admitted to [facility] for lower extremity edema, and worsening of shortness of breath.  Psychiatry is consulted to assess for anxiety, agitation.  Upon evaluation she is pleasant this morning, interacting appropriately, she has her son at bed side and two grand daughters at bedside one is an adult with being in the process of becoming RN. Per patient she is feeling much better today and some days she feels little weird before her Hemodialysis, she states history anxiety and getting overwhelmed , she said \" it is not that nobody ever experiences\", But \"I never felt hopeless or worthless, I did not feel suicidal or homicidal or anything like that \" Per her son and adult grand daughter patient did have base line anxiety which is manifested as difficulty breathing and palpitations, usually at nights, patient is somewhat dismissive of her not taking recommendations from medical providers seriously, son stated he will not know what doctors are recommending the patient to do, stating that she does not follow the directions by providers. Patient denies significant mood episodes either depression or juan, denies auditory and visual hallucinations, except the time prior to or after her hemodialysis.  Patient denies seen ing a psychiatrist in the past, denies any psychotropic therapy except her son said she may have been given Xanax on and off for anxiety related to hemodialysis.  Patient denies previous suicide attempts, denies previous " psychiatric hospitalizations.  Review of Systems  Pertinent items are noted in HPI, all other systems reviewed and negative    History  Past Medical History:   Diagnosis Date    Anxiety     Arthritis     Coronary artery disease     H/O heart artery stent     Hyperlipidemia     Hypertension     Obesity    ,   Past Surgical History:   Procedure Laterality Date    ANKLE SURGERY      RIGHT    APPENDECTOMY      CARDIAC CATHETERIZATION      LEFT    CORONARY STENT PLACEMENT      HYSTERECTOMY     ,   Family History   Problem Relation Age of Onset    Heart disease Mother     Heart attack Mother 73    Fibromyalgia Mother     Heart attack Father 72    Ovarian cancer Sister     Coronary artery disease Other     Breast cancer Neg Hx    ,   Social History     Socioeconomic History    Marital status:    Tobacco Use    Smoking status: Every Day     Packs/day: 0.50     Years: 30.00     Additional pack years: 0.00     Total pack years: 15.00     Types: Electronic Cigarette, Cigarettes    Smokeless tobacco: Never   Vaping Use    Vaping Use: Never used   Substance and Sexual Activity    Alcohol use: No    Drug use: No    Sexual activity: Never     E-cigarette/Vaping    E-cigarette/Vaping Use Never User     Passive Exposure No     Counseling Given No      E-cigarette/Vaping Substances    Nicotine No     THC No     CBD No     Flavoring No      E-cigarette/Vaping Devices    Disposable No     Pre-filled or Refillable Cartridge No     Refillable Tank No     Pre-filled Pod No          ,   Medications Prior to Admission   Medication Sig Dispense Refill Last Dose    aspirin 325 MG tablet Take 1 tablet by mouth Daily.   10/8/2023    carvedilol (COREG) 25 MG tablet Take 1 tablet by mouth 2 (Two) Times a Day With Meals. 60 tablet 0 10/8/2023    folic acid (FOLVITE) 1 MG tablet Take 1 tablet by mouth Daily.   10/8/2023    hydrALAZINE (APRESOLINE) 25 MG tablet Take 1 tablet by mouth 2 (Two) Times a Day. 60 tablet 0 10/8/2023     levothyroxine (SYNTHROID, LEVOTHROID) 88 MCG tablet Take 1 tablet by mouth Daily.   10/8/2023    lisinopril (PRINIVIL,ZESTRIL) 5 MG tablet Take 1 tablet by mouth Daily.   10/8/2023    montelukast (SINGULAIR) 10 MG tablet Take 1 tablet by mouth Every Night.   10/8/2023    pantoprazole (PROTONIX) 40 MG EC tablet Take 1 tablet by mouth Daily.   10/8/2023    rosuvastatin (CRESTOR) 20 MG tablet Take 1 tablet by mouth Daily. 30 tablet 0 10/8/2023    vitamin B-12 (CYANOCOBALAMIN) 1000 MCG tablet Take 1 tablet by mouth Daily.   10/8/2023    gabapentin (NEURONTIN) 800 MG tablet Take 1 tablet by mouth 2 (Two) Times a Day As Needed (nerve pain).   Unknown    HYDROcodone-acetaminophen (NORCO)  MG per tablet Take 1 tablet by mouth Every 8 (Eight) Hours As Needed for Moderate Pain.   Unknown    nitroglycerin (NITROSTAT) 0.4 MG SL tablet Place 1 tablet under the tongue Every 5 (Five) Minutes As Needed for Chest Pain. 30 tablet 2 Unknown   , Scheduled Meds:  acetylcysteine, 1.5 mL, Nebulization, Q6H - RT  aspirin, 81 mg, Oral, Daily  carvedilol, 25 mg, Oral, BID With Meals  folic acid, 1 mg, Oral, Daily  gabapentin, 200 mg, Oral, Q12H  heparin (porcine), 5,000 Units, Subcutaneous, Q12H  hydrALAZINE, 50 mg, Oral, Q8H  ipratropium-albuterol, 3 mL, Nebulization, Q6H - RT  levothyroxine, 88 mcg, Oral, Q AM  montelukast, 10 mg, Oral, Nightly  nicotine, 1 patch, Transdermal, Q24H  pantoprazole, 40 mg, Oral, Daily  rosuvastatin, 10 mg, Oral, Nightly  sodium chloride, 10 mL, Intravenous, Q12H  vitamin B-12, 1,000 mcg, Oral, Daily   , and Allergies:  Patient has no known allergies.    Objective     Vital Signs   Temp:  [97.9 °F (36.6 °C)-98.1 °F (36.7 °C)] 98 °F (36.7 °C)  Heart Rate:  [71-83] 73  Resp:  [20-21] 20  BP: (150-163)/(72-85) 160/84    Mental Status Exam:  Hygiene:   fair  Cooperation:  Evasive  Eye Contact:  Fair  Psychomotor Behavior:  Appropriate  Affect:  Full range  Hopelessness: Denies  Speech:  Normal  Thought  Progress:  Goal directed  Thought Content:  Normal  Suicidal:  None  Homicidal:  None  Hallucinations:  None  Delusion:  None  Memory:  Intact  Orientation:  Person, Place, Time, and Situation  Reliability:  fair  Insight:  Fair  Judgement:  Fair  Impulse Control:  Fair    Results Review:   I reviewed the patient's new clinical results.  Lab Results (last 24 hours)       Procedure Component Value Units Date/Time    POC Glucose Once [[id number]]  (Normal) Collected: 10/22/23 1010    Specimen: Blood Updated: 10/22/23 1017     Glucose 110 mg/dL     POC Glucose Once [[id number]]  (Normal) Collected: 10/22/23 0655    Specimen: Blood Updated: 10/22/23 0712     Glucose 83 mg/dL     Blood Culture ID, PCR - Blood, Arm, Right [[id number]] Collected: 10/20/23 1334    Specimen: Blood from Arm, Right Updated: 10/22/23 0519     BCID, PCR Negative by BCID PCR. Culture to Follow.     BOTTLE TYPE Aerobic Bottle    Blood Culture - Blood, Arm, Right [[id number]]  (Abnormal) Collected: 10/20/23 1334    Specimen: Blood from Arm, Right Updated: 10/22/23 0519     Blood Culture Abnormal Stain     Gram Stain Aerobic Bottle Gram positive cocci in clusters    POC Glucose Once [[id number]]  (Normal) Collected: 10/21/23 2115    Specimen: Blood Updated: 10/21/23 2120     Glucose 86 mg/dL     High Sensitivity Troponin T 2Hr [[id number]]  (Abnormal) Collected: 10/21/23 1716    Specimen: Blood Updated: 10/21/23 1842     HS Troponin T 54 ng/L      Troponin T Delta 3 ng/L     Narrative:      High Sensitive Troponin T Reference Range:  <10.0 ng/L- Negative Female for AMI  <15.0 ng/L- Negative Male for AMI  >=10 - Abnormal Female indicating possible myocardial injury.  >=15 - Abnormal Male indicating possible myocardial injury.   Clinicians would have to utilize clinical acumen, EKG, Troponin, and serial changes to determine if it is an Acute Myocardial Infarction or myocardial injury due to an underlying chronic condition.         High Sensitivity Troponin  T [[id number]]  (Abnormal) Collected: 10/21/23 1316    Specimen: Blood Updated: 10/21/23 1418     HS Troponin T 51 ng/L     Narrative:      High Sensitive Troponin T Reference Range:  <10.0 ng/L- Negative Female for AMI  <15.0 ng/L- Negative Male for AMI  >=10 - Abnormal Female indicating possible myocardial injury.  >=15 - Abnormal Male indicating possible myocardial injury.   Clinicians would have to utilize clinical acumen, EKG, Troponin, and serial changes to determine if it is an Acute Myocardial Infarction or myocardial injury due to an underlying chronic condition.         Blood Culture - Blood, Arm, Left [[id number]]  (Normal) Collected: 10/20/23 1234    Specimen: Blood from Arm, Left Updated: 10/21/23 1401     Blood Culture No growth at 24 hours          Imaging Results (Last 24 Hours)       ** No results found for the last 24 hours. 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   Sertraline 25 mg po q daily , informed patient and family the benefits, risks, side effects, informed risk of addiction with benzodiazepine for anxiety disorder .  Patient and family agreed with plan.      I discussed the patient's findings and my recommendations with patient, family, and nursing staff    Renny Cruz MD  10/22/23  13:21 EDT

## 2023-10-22 NOTE — PAYOR COMM NOTE
"[facility]  JOSE ENRIQUE RIOS  PHONE  [phone]  FAX  [phone]  NPI:  [id number]    CLINICAL UPDATE  AUTH# [id number]    Matthew Michel (67 y.o. Female)       Date of Birth   1955    Social Security Number       Address   [address] [facility] [address]    Home Phone   [phone]    MRN   [id number]       Bahai   [place]    Marital Status                               Admission Date   10/8/23    Admission Type   Emergency    Admitting Provider   Fanny Ford MD    Attending Provider   Jay Jay Pritchett DO    Department, Room/Bed   [address], [address]/       Discharge Date       Discharge Disposition       Discharge Destination                                 Attending Provider: Jay Jay Pritchett DO    Allergies: No Known Allergies    Isolation: None   Infection: None   Code Status: No CPR    Ht: 152.4 cm (60\")   Wt: 102 kg (225 lb 6.4 oz)    Admission Cmt: None   Principal Problem: Acute hypoxic respiratory failure [J96.01]                   Active Insurance as of 10/8/2023       Primary Coverage       Payor Plan Insurance Group Employer/Plan Group    [facility] MEDICARE REPLACEMENT [employer] MEDICARE REPLACEMENT        Payor Plan Address Payor Plan Phone Number Payor Plan Fax Number Effective Dates    [address] [address] [phone]  1/1/2023 - None Entered    [facility] [id number]         Subscriber Name Subscriber Birth Date Member ID       MATTHEW MICHEL 1955 [id number]                     Emergency Contacts        (Rel.) Home Phone Work Phone Mobile Phone    LOYD MICHEL (Son) [phone] -- --              Current Facility-Administered Medications   Medication Dose Route Frequency Provider Last Rate Last Admin    acetaminophen (TYLENOL) tablet 650 mg  650 mg Oral Q6H PRN Bartolome Schwartz MD   650 mg at 10/20/23 1821    acetylcysteine (MUCOMYST) 20 % nebulizer solution 1.5 mL  1.5 mL Nebulization Q6H - RT Yarely" DO Jay Jay   1.5 mL at 10/22/23 1302    aspirin EC tablet 81 mg  81 mg Oral Daily Jay Jay Pritchett DO   81 mg at 10/22/23 0833    calcium carbonate (TUMS) chewable tablet 500 mg (200 mg elemental)  2 tablet Oral TID PRN Jay Jay Pritchett DO        carvedilol (COREG) tablet 25 mg  25 mg Oral BID With Meals Bartolome Schwartz MD   25 mg at 10/22/23 1712    dextrose (D50W) (25 g/50 mL) IV injection 25 g  25 g Intravenous Q15 Min PRN Bartolome Schwartz MD   25 g at 10/09/23 0401    dextrose (GLUTOSE) oral gel 15 g  15 g Oral Q15 Min PRN Bartolome Schwartz MD        folic acid (FOLVITE) tablet 1 mg  1 mg Oral Daily Bartolome Schwartz MD   1 mg at 10/22/23 0832    gabapentin (NEURONTIN) capsule 200 mg  200 mg Oral Q12H Jay Jay Pritchett DO   200 mg at 10/22/23 0832    glucagon HCl (Diagnostic) injection 1 mg  1 mg Intramuscular Q15 Min PRN Bartolome Schwartz MD        heparin (porcine) 5000 UNIT/ML injection 5,000 Units  5,000 Units Subcutaneous Q12H Bartolome Schwartz MD   5,000 Units at 10/22/23 0833    hydrALAZINE (APRESOLINE) injection 10 mg  10 mg Intravenous Q6H PRN Bartolome Schwartz MD   10 mg at 10/15/23 0923    hydrALAZINE (APRESOLINE) tablet 50 mg  50 mg Oral Q8H Bartolome Schwartz MD   50 mg at 10/22/23 1410    HYDROcodone-acetaminophen (NORCO)  MG per tablet 1 tablet  1 tablet Oral Q8H PRN Bartolome Schwartz MD   1 tablet at 10/21/23 2101    hydrOXYzine (ATARAX) tablet 25 mg  25 mg Oral TID PRN Bartolome Schwartz MD   25 mg at 10/21/23 2101    ipratropium-albuterol (DUO-NEB) nebulizer solution 3 mL  3 mL Nebulization Q6H - RT Ja yJay Pritchett DO   3 mL at 10/22/23 1302    levothyroxine (SYNTHROID, LEVOTHROID) tablet 88 mcg  88 mcg Oral Q AM Bartolome Schwartz MD   88 mcg at 10/22/23 0534    melatonin tablet 10 mg  10 mg Oral Nightly PRN Bartolome Schwartz MD   10 mg at 10/15/23 2049    montelukast (SINGULAIR) tablet 10 mg  10 mg Oral Nightly Bartolome Schwartz MD   10  mg at 10/21/23 2101    nicotine (NICODERM CQ) 21 MG/24HR patch 1 patch  1 patch Transdermal Q24H Bartolome Schwartz MD   1 patch at 10/22/23 0833    nitroglycerin (NITROSTAT) SL tablet 0.4 mg  0.4 mg Sublingual Q5 Min PRN Bartolome Schwartz MD   0.4 mg at 10/21/23 1203    ondansetron (ZOFRAN) injection 4 mg  4 mg Intravenous Q6H PRN Bartolome Schwartz MD   4 mg at 10/18/23 1503    pantoprazole (PROTONIX) EC tablet 40 mg  40 mg Oral Daily Bartolome Schwartz MD   40 mg at 10/22/23 0832    rosuvastatin (CRESTOR) tablet 10 mg  10 mg Oral Nightly Bartolome Schwartz MD   10 mg at 10/21/23 2101    sertraline (ZOLOFT) tablet 25 mg  25 mg Oral Daily Renny Cruz MD   25 mg at 10/22/23 1505    sodium chloride 0.9 % flush 10 mL  10 mL Intravenous PRN Bartolome Schwartz MD        sodium chloride 0.9 % flush 10 mL  10 mL Intravenous Q12H Bartolome Schwartz MD   10 mL at 10/22/23 0833    sodium chloride 0.9 % flush 10 mL  10 mL Intravenous PRN Bartolome Schwartz MD        sodium chloride 0.9 % infusion 40 mL  40 mL Intravenous PRN Bartolome Schwartz MD   40 mL at 10/12/23 1049    vitamin B-12 (CYANOCOBALAMIN) tablet 1,000 mcg  1,000 mcg Oral Daily Bartolome Schwartz MD   1,000 mcg at 10/22/23 0832     Orders (last 24 hrs)        Start     Ordered    10/23/23 0600  CBC (No Diff)  Morning Draw         10/22/23 1641    10/23/23 0600  Basic Metabolic Panel  Morning Draw         10/22/23 1641    10/22/23 1640  POC Glucose Once  PROCEDURE ONCE        Comments: Complete no more than 45 minutes prior to patient eating      10/22/23 1611    10/22/23 1620  XR Chest 1 View  1 Time Imaging         10/22/23 1620    10/22/23 1430  thrombin topical  Once         10/22/23 1338    10/22/23 1430  sertraline (ZOLOFT) tablet 25 mg  Daily         10/22/23 1339    10/22/23 1018  POC Glucose Once  PROCEDURE ONCE        Comments: Complete no more than 45 minutes prior to patient eating      10/22/23 1010     10/22/23 0713  POC Glucose Once  PROCEDURE ONCE        Comments: Complete no more than 45 minutes prior to patient eating      10/22/23 0655    10/22/23 0600  CBC (No Diff)  Morning Draw,   Status:  Canceled         10/21/23 1645    10/22/23 0600  Comprehensive Metabolic Panel  Morning Draw,   Status:  Canceled         10/21/23 1645    10/22/23 0406  Blood Culture ID, PCR - Blood, Arm, Right  Once        Comments: From a different site than #1.      10/22/23 0405    10/21/23 2121  POC Glucose Once  PROCEDURE ONCE        Comments: Complete no more than 45 minutes prior to patient eating      10/21/23 2115    10/21/23 2100  risperiDONE (risperDAL) tablet 0.25 mg  Every 12 Hours Scheduled,   Status:  Discontinued         10/21/23 1633    10/21/23 1152  calcium carbonate (TUMS) chewable tablet 500 mg (200 mg elemental)  3 Times Daily PRN         10/21/23 1152    10/21/23 1130  gabapentin (NEURONTIN) capsule 200 mg  Every 12 Hours Scheduled         10/21/23 1030    10/20/23 1300  acetylcysteine (MUCOMYST) 20 % nebulizer solution 1.5 mL  Every 6 Hours - RT         10/20/23 0919    10/20/23 1300  ipratropium-albuterol (DUO-NEB) nebulizer solution 3 mL  Every 6 Hours - RT         10/20/23 0919    10/20/23 0900  aspirin EC tablet 81 mg  Daily         10/19/23 1740    10/20/23 0602  Urinary Catheter Care  Every Shift,   Status:  Canceled      See Alena for full Linked Orders Report.    10/20/23 0602    10/20/23 0600  Bladder Scan  Daily       10/19/23 2048    10/20/23 0000  Oscillating Positive Expiratory Pressure (OPEP)  Every 4 Hours - RT      Comments: If patient cannot tolerate vest cpt with treatment    10/19/23 2332    10/14/23 1400  hydrALAZINE (APRESOLINE) tablet 50 mg  Every 8 Hours Scheduled         10/14/23 0825    10/14/23 1100  POC Glucose Finger 4x Daily Before Meals & at Bedtime  4 Times Daily Before Meals & at Bedtime       10/14/23 0825    10/13/23 1206  hydrALAZINE (APRESOLINE) injection 10 mg  Every 6  Hours PRN         10/13/23 1206    10/10/23 0900  folic acid (FOLVITE) tablet 1 mg  Daily         10/09/23 2138    10/10/23 0900  pantoprazole (PROTONIX) EC tablet 40 mg  Daily         10/09/23 2138    10/10/23 0900  vitamin B-12 (CYANOCOBALAMIN) tablet 1,000 mcg  Daily         10/09/23 2138    10/10/23 0600  levothyroxine (SYNTHROID, LEVOTHROID) tablet 88 mcg  Every Early Morning         10/09/23 2138    10/09/23 2230  carvedilol (COREG) tablet 25 mg  2 Times Daily With Meals         10/09/23 2138    10/09/23 2230  montelukast (SINGULAIR) tablet 10 mg  Nightly         10/09/23 2138    10/09/23 2138  HYDROcodone-acetaminophen (NORCO)  MG per tablet 1 tablet  Every 8 Hours PRN         10/09/23 2138    10/09/23 2132  ondansetron (ZOFRAN) injection 4 mg  Every 6 Hours PRN         10/09/23 2132    10/09/23 2100  rosuvastatin (CRESTOR) tablet 10 mg  Nightly         10/09/23 0248    10/09/23 0900  nicotine (NICODERM CQ) 21 MG/24HR patch 1 patch  Every 24 Hours Scheduled         10/09/23 0215    10/09/23 0800  Oral Care  2 Times Daily       10/08/23 2052    10/09/23 0600  Incentive Spirometry  Every 4 Hours While Awake       10/09/23 0245    10/09/23 0327  hydrOXYzine (ATARAX) tablet 25 mg  3 Times Daily PRN         10/09/23 0327    10/09/23 0247  Strict Intake & Output  Every Shift       10/09/23 0246    10/09/23 0242  dextrose (GLUTOSE) oral gel 15 g  Every 15 Minutes PRN         10/09/23 0242    10/09/23 0242  dextrose (D50W) (25 g/50 mL) IV injection 25 g  Every 15 Minutes PRN         10/09/23 0242    10/09/23 0242  glucagon HCl (Diagnostic) injection 1 mg  Every 15 Minutes PRN         10/09/23 0242    10/09/23 0048  melatonin tablet 10 mg  Nightly PRN         10/09/23 0048    10/09/23 0048  acetaminophen (TYLENOL) tablet 650 mg  Every 6 Hours PRN         10/09/23 0048    10/09/23 0000  Vital Signs  Every 4 Hours       10/08/23 2052    10/09/23 0000  Intake & Output  Every 4 Hours       10/08/23 2052    10/09/23  0000  Ambulatory Referral to Heart Failure Clinic         10/09/23 1330    10/08/23 2145  sodium chloride 0.9 % flush 10 mL  Every 12 Hours Scheduled         10/08/23 2052    10/08/23 2145  heparin (porcine) 5000 UNIT/ML injection 5,000 Units  Every 12 Hours Scheduled         10/08/23 2052    10/08/23 2053  Daily Weights  Daily       10/08/23 2052    10/08/23 2052  sodium chloride 0.9 % flush 10 mL  As Needed         10/08/23 2052    10/08/23 2052  sodium chloride 0.9 % infusion 40 mL  As Needed         10/08/23 2052    10/08/23 2052  nitroglycerin (NITROSTAT) SL tablet 0.4 mg  Every 5 Minutes PRN         10/08/23 2052    10/08/23 1649  sodium chloride 0.9 % flush 10 mL  As Needed        See Hyperspace for full Linked Orders Report.    10/08/23 1650    Unscheduled  Follow Hypoglycemia Standing Orders For Blood Glucose <70 & Notify Provider of Treatment  As Needed      Comments: Follow Hypoglycemia Orders As Outlined in Process Instructions (Open Order Report to View Full Instructions)  Notify Provider Any Time Hypoglycemia Treatment is Administered    10/09/23 0242    Unscheduled  Extravasation Standing Orders - Encourage Active Range of Motion After 48 Hours  As Needed       10/09/23 0414    Unscheduled  Change Dressing to IV Site As Needed When Damp, Loose or Soiled  As Needed       10/09/23 0921    Unscheduled  Change Needleless Connectors  As Needed      Comments: Change Needleless Connectors When:  - Administration Set Changed  - Dressing Changed  - Removed For Any Reason  - Residual Blood or Debris Within Connector  - Prior to Drawing Blood Cultures  - Contamination of Connector  - After Administration of Blood or Blood Components    10/09/23 0921    Unscheduled  Bladder Scan if Patient Unable to Void 4-6 Hours After Catheter Removal  As Needed         10/21/23 1039    Unscheduled  If Bladder Scan Volume is Less Than 500mL & Patient is Without Symptoms of Bladder Discomfort / Distention Monitor Every 1-2 Hours  for Spontaneous Void  As Needed       10/21/23 1039    Unscheduled  Straight Cath Every 4-6 Hours As Needed If Patient is Unable to Void After 4-6 Hours, Bladder Scan Volume is Greater Than 500mL & Patient Has Symptoms of Bladder Discomfort / Distention  As Needed       10/21/23 1039    Unscheduled  Schedule / Prompt Voiding For Patients With Urinary Incontinence  As Needed       10/21/23 1039    --  folic acid (FOLVITE) 1 MG tablet  Daily         10/09/23 1146    --  HYDROcodone-acetaminophen (NORCO)  MG per tablet  Every 8 Hours PRN         10/09/23 1146    --  SCANNED - TELEMETRY           10/08/23 0000    --  SCANNED - TELEMETRY           10/08/23 0000    --  SCANNED - TELEMETRY           10/08/23 0000    --  SCANNED - TELEMETRY           10/08/23 0000    --  SCANNED - TELEMETRY           10/08/23 0000    --  SCANNED - TELEMETRY           10/08/23 0000    --  SCANNED - TELEMETRY           10/08/23 0000    --  SCANNED - TELEMETRY           10/08/23 0000    --  SCANNED - TELEMETRY           10/08/23 0000    --  SCANNED - TELEMETRY           10/08/23 0000    --  SCANNED - TELEMETRY           10/08/23 0000    --  SCANNED - TELEMETRY           10/08/23 0000    --  SCANNED - TELEMETRY           10/08/23 0000    --  SCANNED - TELEMETRY           10/08/23 0000    --  SCANNED - TELEMETRY           10/08/23 0000    --  SCANNED - TELEMETRY           10/08/23 0000    --  SCANNED - TELEMETRY           10/08/23 0000    --  SCANNED - TELEMETRY           10/08/23 0000    --  SCANNED - TELEMETRY           10/08/23 0000    --  SCANNED - TELEMETRY           10/08/23 0000    --  SCANNED - TELEMETRY           10/08/23 0000    --  SCANNED - TELEMETRY           10/08/23 0000    --  SCANNED - TELEMETRY           10/08/23 0000    --  SCANNED - TELEMETRY           10/08/23 0000    --  SCANNED - TELEMETRY           10/08/23 0000    --  SCANNED - TELEMETRY           10/08/23 0000    --  SCANNED - TELEMETRY           10/08/23 0000     --  SCANNED - TELEMETRY           10/08/23 0000                     Physician Progress Notes (last 72 hours)        Jay Jay Pritchett DO at 10/22/23 1620              [facility] HOSPITALIST PROGRESS NOTE     Patient Identification:  Name:  Lesley Michel  Age:  67 y.o.  Sex:  female  :  1955  MRN:  [id number]  Visit Number:  [id number]  ROOM: [address] [place] [address]     Primary Care Provider:  Woodrow Raymond APRN    Length of stay in inpatient status:  12    Subjective     Chief Compliant:    Chief Complaint   Patient presents with    Shortness of Breath    Edema       History of Presenting Illness: Patient seen and evaluated in follow-up for anasarca with MAICO on CKD stage IIIa complicated by acute exacerbation of COPD and bilateral pneumonia with acute hypoxemic respiratory failure.  Patient with successful placement of tunneled hemodialysis catheter 10/19/2023.  Patient significantly improved mental status wise today at time of exam.  She is alert and oriented x4.  She does fatigue easily and is slightly anxious but overall essentially back to baseline.  Patient inquiring when she will be able to return home as she is tired of being in the hospital.    Objective     Current Hospital Meds:  acetylcysteine, 1.5 mL, Nebulization, Q6H - RT  aspirin, 81 mg, Oral, Daily  carvedilol, 25 mg, Oral, BID With Meals  folic acid, 1 mg, Oral, Daily  gabapentin, 200 mg, Oral, Q12H  heparin (porcine), 5,000 Units, Subcutaneous, Q12H  hydrALAZINE, 50 mg, Oral, Q8H  ipratropium-albuterol, 3 mL, Nebulization, Q6H - RT  levothyroxine, 88 mcg, Oral, Q AM  montelukast, 10 mg, Oral, Nightly  nicotine, 1 patch, Transdermal, Q24H  pantoprazole, 40 mg, Oral, Daily  rosuvastatin, 10 mg, Oral, Nightly  sertraline, 25 mg, Oral, Daily  sodium chloride, 10 mL, Intravenous, Q12H  vitamin B-12, 1,000 mcg, Oral, Daily          ----------------------------------------------------------------------------------------------------------------------  Vital Signs:  Temp:  [97.8 °F (36.6 °C)-98.1 °F (36.7 °C)] 97.8 °F (36.6 °C)  Heart Rate:  [71-83] 71  Resp:  [20-21] 20  BP: (150-163)/(72-85) 161/83  SpO2:  [94 %-99 %] 99 %  on  Flow (L/min):  [4] 4;   Device (Oxygen Therapy): nasal cannula;humidified  Body mass index is 44.02 kg/m².      Intake/Output Summary (Last 24 hours) at 10/22/2023 1620  Last data filed at 10/22/2023 1346  Gross per 24 hour   Intake 360 ml   Output 200 ml   Net 160 ml      ----------------------------------------------------------------------------------------------------------------------  Physical exam:  Constitutional: Elderly chronically ill-appearing adult female.  No acute distress.      HENT:  Head:  Normocephalic and atraumatic.  Mouth:  Moist mucous membranes.    Eyes:  Conjunctivae and EOM are normal. No scleral icterus.    Cardiovascular:  Normal rate, regular rhythm and normal heart sounds with no murmur.  Pulmonary/Chest:  No respiratory distress, no wheezes, no crackles, with diminished breath sounds at the bases with improved aeration in the lower right lower lung field.  Abdominal:  Soft.  Bowel sounds are normal.  No distension and no tenderness.   Musculoskeletal:  No tenderness and no deformity.  No red or swollen joints anywhere.   Neurological: Alert and and oriented to person, place, and time and situation.  No cranial nerve deficit.  No tongue deviation.  No facial droop.  No slurred speech. Intact Sensation throughout  Skin:  Skin is warm and dry. No rash or lesion noted. No pallor.   Peripheral vascular:  Pulses in all 4 extremities with no clubbing, no cyanosis, 1+ bilateral lower extremity edema with trace edema of the upper extremity.  Psychiatric: Appropriate mood and affect, pleasant.  "  ----------------------------------------------------------------------------------------------------------------------                 No results found for: \"URINECX\"  No results found for: \"BLOODCX\"    I have personally looked at the labs and they are summarized above.  ----------------------------------------------------------------------------------------------------------------------  Detailed radiology reports for the last 24 hours:  No radiology results for the last day  Assessment & Plan      Anasarca  Hyperkalemia  MAICO on CKD stage IIIa with likely progression to CKD V vs ESRD    -Patient presenting with anasarca with echo showing EF of 56 to 60% with grade 1 diastolic dysfunction, mild AAS, mild pulmonary hypertension and small pericardial effusion without evidence of tamponade.    -Patient with progressive worsening renal failure likely contributing to volume retention with possibly some component of acute HFpEF.    -Reds vest remains elevated.  Patient trialed on diuretic therapy with little to no urine output and remains within anuric range.      -Nephrology consulted and following along, volume status management per their discretion.  Patient has been  trialed on high-dose diuretic therapy to no avail with continued worsening/no improvement in renal function.  As such nephrology recommending hemodialysis and patient agreeable and had successful tunneled hemodialysis catheter placement on 10/19/2023.    -First dialysis session 10/19 with repeat session on 10/20.  Approximately 4 L of volume removed in the last 48 hours.    -Palliative care consulted and following along.      -Patient with only reliable transportation/family support on Mondays, Wednesdays and Fridays for hemodialysis sessions and would not have enough family support for Tuesday, Thursday, Saturday.  Updated nephrology and agreeable for this to be patient's schedule in the outpatient setting.    Bilateral bacterial pneumonia  Acute " exacerbation of COPD  Acute hypoxemic respiratory failure  Right lower lobe mucous plug with atelectasis and collapse    -Procalcitonin normal, cultures without growth.    -Completed an appropriate course of azithromycin    -Continue nebulized medications    -Patient maintaining 3 L nasal cannula requirement.  However postoperatively postop chest x-ray showing right lower lobe collapse and atelectasis likely due to mucous plug with pleural effusion.  CT imaging obtained for better characterization of lung parenchyma and shows the same as well as dense consolidation possibly pneumonia.    -Mucomyst 4 times daily with Evelia for conservative management for now after discussion with pulmonology.  Repeat chest x-ray in the morning if no resolution of mucous plugging plan for therapeutic bronchoscopy.    -Chest x-ray obtained morning 10/20/2023 shows improved aeration and reduction in atelectasis, plugging and effusion after dialysis and breathing treatments.  Continue breathing treatments however chest physiotherapy discontinued as patient noncompliant and refusing.  Begin working on weaning Mucomyst treatments.  Patient still with cough and decreased breath sounds in the right lower lung fields and will obtain updated chest x-ray to follow-up on previous effusion and atelectasis/airspace disease.    -Patient with some increased confusion and delirium 10/20/2023 and ABG shows increased CO2 and was placed on BiPAP that evening as patient previously refused but having increasing hypercapnia.  Procalcitonin not significantly elevated doubt strong suspicion for any ongoing active pneumonia at this time.  Continue supportive care and sitter at bedside.    -NIPPV discontinued due to patient noncompliance.    Anxiety  Acute delirium resolved    -Patient with significant anxiety prior to initiation of hemodialysis and placement of tunneled hemodialysis catheter.  Previously difficult when making decision regarding hemodialysis  as patient's anxiety almost crippling and going back and forth on decision while in hospital as well as starting to leave AMA on several occasions.    -Patient now significantly improved today and alert and oriented x3 although still anxious at times.    -Patient seen and evaluated by psychiatry today and started on sertraline.    -Discontinuation of Risperdal in light of sertraline.  Given patient's agitation and confusion and delirium overnight would avoid further administrations of Benadryl as this would likely precipitate and worsen rather than abort symptoms.    -Continue supportive care and frequent reorientation on hospital.    Essential hypertension    -Continue home antihypertensives as appropriate.      Hypothyroidism    -Continue Synthroid    Tobacco abuse    -Cessation counseled.    Morbid obesity    -Complicates all aspects of care    Copied text in portions of the note has been reviewed and is accurate as of 10/22/23    VTE Prophylaxis:   Mechanical Order History:       None          Pharmalogical Order History:        Ordered     Dose Route Frequency Stop    10/08/23 2052  heparin (porcine) 5000 UNIT/ML injection 5,000 Units         5,000 Units SC Every 12 Hours Scheduled --                    Disposition Home once medically stable and improved and hemodialysis outpatient chair arranged.    Jay Jay Pritchett DO  [facility] Hospitalist  10/22/23  16:20 EDT      Electronically signed by Jay Jay Pritchett DO at 10/22/23 1624       Vani León MD at 10/22/23 1057          Nephrology Progress Note      Subjective   Feeling much better, no chest pain or shortness of breath    Objective       Vital signs :     Vitals:    10/22/23 0615 10/22/23 0707 10/22/23 0723 10/22/23 1039   BP: 163/82   160/84   BP Location: Right arm   Right arm   Patient Position: Lying   Lying   Pulse: 74 77 83 74   Resp: 20 20 20 20   Temp: 97.9 °F (36.6 °C)   98 °F (36.7 °C)   TempSrc: Oral   Oral   SpO2: 97% 99% 99% 94%    Weight:       Height:            Intake/Output                               10/20/23 0701 - 10/21/23 0700 10/21/23 0701 - 10/22/23 0700 10/22/23 0701 - 10/23/23 0700     1085-0527 5061-2926 Total 3289-1989 7748-1503 Total 8015-4286 3796-5576 Total                    Intake    P.O.  0  -- 0  0  -- 0  120  -- 120    Total Intake 0 -- 0 0 -- 0 120 -- 120       Output    Urine  250  300 550  --  200 200  --  -- --    Dialysis  2210  -- 2210  --  -- --  --  -- --    Total Output 2460 300 2760 -- 200 200 -- -- --             Physical Exam:    General Appearance : Not in acute distress  Lungs : Bibasilar crackles noted trace edema  Heart :  regular rhythm & normal rate, normal S1, S2 and no murmur, no rub  Abdomen : Soft nontender nondistended   extremities : Trace edema  Neurologic :   Unable to assess fully    Laboratory Data :       CBC and coagulation:  Results from last 7 days   Lab Units 10/21/23  0325 10/20/23  0157 10/18/23  0059   PROCALCITONIN ng/mL  --  0.40*  --    WBC 10*3/mm3 8.84 7.83 11.60*   HEMOGLOBIN g/dL 8.8* 8.7* 9.3*   HEMATOCRIT % 27.9* 28.8* 31.5*   MCV fL 88.6 89.7 91.8   MCHC g/dL 31.5 30.2* 29.5*   PLATELETS 10*3/mm3 226 222 242     Acid/base balance:  Results from last 7 days   Lab Units 10/21/23  0907 10/20/23  1330 10/16/23  2202   PH, ARTERIAL pH units 7.357 7.355 7.311*   PO2 ART mm Hg 72.2* 55.7* 74.9*   PCO2, ARTERIAL mm Hg 52.6* 57.9* 47.4*   HCO3 ART mmol/L 29.5* 32.3* 23.9       Renal and electrolytes:    Results from last 7 days   Lab Units 10/21/23  0325 10/20/23  0157 10/19/23  0055 10/18/23  0059 10/17/23  1744   SODIUM mmol/L 133* 135* 134* 132* 134*   POTASSIUM mmol/L 4.4 4.6 5.9* 5.2 5.2   CHLORIDE mmol/L 93* 97* 101 100 99   CO2 mmol/L 25.6 25.0 20.2* 20.5* 22.9   BUN mg/dL 32* 50* 75* 69* 69*   CREATININE mg/dL 3.83* 5.16* 6.93* 6.32* 6.10*   CALCIUM mg/dL 9.0 8.9 9.0 9.0 8.7     Estimated Creatinine Clearance: 15.7 mL/min (A) (by C-G formula based on SCr of 3.83 mg/dL  "(H)).     Liver and pancreatic function:  Results from last 7 days   Lab Units 10/20/23  0157 10/18/23  0059 10/17/23  0038   ALBUMIN g/dL 3.1* 3.6 3.5   BILIRUBIN mg/dL 0.4 0.3 0.2   ALK PHOS U/L 124* 134* 89   AST (SGOT) U/L 7 10 8   ALT (SGPT) U/L 5 8 6       Albumin Albumin   Date Value Ref Range Status   10/20/2023 3.1 (L) 3.5 - 5.2 g/dL Final      Magnesium No results found for: \"MG\"         PTH               No results found for: \"PTH\"    Cardiac:        Liver and pancreatic function:  Results from last 7 days   Lab Units 10/20/23  0157 10/18/23  0059 10/17/23  0038   ALBUMIN g/dL 3.1* 3.6 3.5   BILIRUBIN mg/dL 0.4 0.3 0.2   ALK PHOS U/L 124* 134* 89   AST (SGOT) U/L 7 10 8   ALT (SGPT) U/L 5 8 6       XR Chest 1 View    Result Date: 10/20/2023  1.  Better expansion of the right lower lobe with some persistent airspace disease. 2.  Minimal left lower lobe airspace disease. 3.  Right central catheter with tip in SVC. 4.  Coarsened interstitial markings noted throughout the lungs. 5.  Mild cardiomegaly again noted.   This report was finalized on 10/20/2023 8:34 AM by Dr. Kirk Mcdonald MD.      CT Chest Without Contrast Diagnostic    Result Date: 10/19/2023  1.  Tiny pericardial effusion. 2.  Right lung base fairly extensive atelectasis and probably consolidative pneumonia. Again there may be some mucus plugging or narrowing. 3.  Again there is tiny bilateral pleural effusions. 4.  Cardiomegaly.   This report was finalized on 10/19/2023 3:51 PM by Dr. Kirk Mcdonald MD.      XR Chest AP    Result Date: 10/19/2023  1.  There is been collapse of probably the right lower lobe possibly related to mucous plug. 2.  Small right pleural effusion persists. 3.  Improved aeration of the left lung base.   This report was finalized on 10/19/2023 2:00 PM by Dr. Kirk Mcdonald MD.      FL Surgery Fluoro    Result Date: 10/19/2023    As above.   This report was finalized on 10/19/2023 1:42 PM by Dr. Kirk Mcdonald MD.      XR " Chest 1 View    Result Date: 10/18/2023  1.  Coarsened interstitial markings and vague bibasilar airspace opacities again noted. 2.  Now small right pleural effusion. 3.  Tiny left pleural effusion. 4.  Cardiomegaly.   This report was finalized on 10/18/2023 10:22 AM by Dr. Kirk Mcdonald MD.        Medications :     acetylcysteine, 1.5 mL, Nebulization, Q6H - RT  aspirin, 81 mg, Oral, Daily  carvedilol, 25 mg, Oral, BID With Meals  folic acid, 1 mg, Oral, Daily  gabapentin, 200 mg, Oral, Q12H  heparin (porcine), 5,000 Units, Subcutaneous, Q12H  hydrALAZINE, 50 mg, Oral, Q8H  ipratropium-albuterol, 3 mL, Nebulization, Q6H - RT  levothyroxine, 88 mcg, Oral, Q AM  montelukast, 10 mg, Oral, Nightly  nicotine, 1 patch, Transdermal, Q24H  pantoprazole, 40 mg, Oral, Daily  rosuvastatin, 10 mg, Oral, Nightly  sodium chloride, 10 mL, Intravenous, Q12H  vitamin B-12, 1,000 mcg, Oral, Daily             Assessment & Plan     -Acute kidney injury initiated on dialysis during this hospitalization on 10/19/2023  - Acute metabolic encephalopathy  -Atrophic right kidney  -Chronic kidney disease stage IIIa  -Acute hyperkalemia, resolved  -Acute hypoxic respite failure, improving  -COPD  -Essential hypertension  -Medical noncompliance    No emergent indication for dialysis today plan to do it tomorrow and outpatient dialysis schedule to be set as Mondays Wednesdays and Fridays per patient's convenience.  Mental status is significantly improved      MAICO on CKD most likely multifactori including ATN from prolonged prerenal state and hemodynamic changes with concomitant use of ACE inhibitors in setting of volume loss   baseline creatinine 2.2, admitted with 3.69   ultrasound of the kidneys showed atrophic right kidney  Serologies are negative    Please avoid nephrotoxic medication and pharmacy to adjust the medication according to the GFR     Plan of care discussed with pt, and family answered all questions, patient verbalized  "understanding and agreed.      Vani León MD  10/22/23  10:57 EDT      Electronically signed by Vani León MD at 10/22/23 1246       Anderson Solomon MD at 10/22/23 1053          Progress Note Pulmonary      Subjective   Patient feels better, no new complaints  Interval History: Event overnight    Review of Systems:    Reviewed ; unchanged       Vital Signs  Temp:  [97.9 °F (36.6 °C)-98.1 °F (36.7 °C)] 98 °F (36.7 °C)  Heart Rate:  [71-83] 74  Resp:  [20-21] 20  BP: (133-163)/(67-85) 160/84  Body mass index is 44.02 kg/m².    Intake/Output Summary (Last 24 hours) at 10/22/2023 1053  Last data filed at 10/22/2023 0800  Gross per 24 hour   Intake 120 ml   Output 200 ml   Net -80 ml     I/O this shift:  In: 120 [P.O.:120]  Out: -     Physical Exam:  General- normal in appearance, not in any acute distress    HEENT- pupils equally reactive to light, normal in size, no scleral icterus    Neck- supple    No JVD, no carotid bruit    Respiratory-improved breath sounds over right lung base.  Occasional wheezing.      cardiovascular-  Normal S1 and S2. No S3, S4 or murmurs.    GI-nontender nondistended bowel sounds positive    CNS-alert oriented x3, grossly nonfocal    Extremities- pulses normal bilaterally , no clubbing and 1+ edema     Results Review:      Results from last 7 days   Lab Units 10/21/23  0325 10/20/23  0157 10/18/23  0059   WBC 10*3/mm3 8.84 7.83 11.60*   HEMOGLOBIN g/dL 8.8* 8.7* 9.3*   PLATELETS 10*3/mm3 226 222 242     Results from last 7 days   Lab Units 10/21/23  0325 10/20/23  0157 10/19/23  0055   SODIUM mmol/L 133* 135* 134*   POTASSIUM mmol/L 4.4 4.6 5.9*   CHLORIDE mmol/L 93* 97* 101   CO2 mmol/L 25.6 25.0 20.2*   BUN mg/dL 32* 50* 75*   CREATININE mg/dL 3.83* 5.16* 6.93*   CALCIUM mg/dL 9.0 8.9 9.0   GLUCOSE mg/dL 71 81 107*     No results found for: \"INR\", \"PROTIME\"  Results from last 7 days   Lab Units 10/20/23  0157 10/18/23  0059 10/17/23  0038   ALK PHOS U/L 124* 134* 89   BILIRUBIN mg/dL " 0.4 0.3 0.2   ALT (SGPT) U/L 5 8 6   AST (SGOT) U/L 7 10 8     Results from last 7 days   Lab Units 10/21/23  0907   PH, ARTERIAL pH units 7.357   PO2 ART mm Hg 72.2*   PCO2, ARTERIAL mm Hg 52.6*   HCO3 ART mmol/L 29.5*     Imaging Results (Last 24 Hours)       ** No results found for the last 24 hours. **                 acetylcysteine, 1.5 mL, Nebulization, Q6H - RT  aspirin, 81 mg, Oral, Daily  carvedilol, 25 mg, Oral, BID With Meals  folic acid, 1 mg, Oral, Daily  gabapentin, 200 mg, Oral, Q12H  heparin (porcine), 5,000 Units, Subcutaneous, Q12H  hydrALAZINE, 50 mg, Oral, Q8H  ipratropium-albuterol, 3 mL, Nebulization, Q6H - RT  levothyroxine, 88 mcg, Oral, Q AM  montelukast, 10 mg, Oral, Nightly  nicotine, 1 patch, Transdermal, Q24H  pantoprazole, 40 mg, Oral, Daily  rosuvastatin, 10 mg, Oral, Nightly  sodium chloride, 10 mL, Intravenous, Q12H  vitamin B-12, 1,000 mcg, Oral, Daily           Medication Review:     Assessment & Plan     # Morbidly obese female with chronic kidney disease.  Initially admitted with worsening shortness of breath and hypoxia.  X-ray chest the day before yesterday and later on CT scan showed significant atelectasis of the right lower lobe and midlung zone there was a concern for mucous plug.  Follow-up x-ray chest was done today morning after aggressive chest PT with the help of DuoNeb and Mucomyst followed by chest PT.  It showed remarkable improvement in right lung airspace disease.    Patient has coughed up yellowish sputum.  Sputum culture negative.  Pro-Binh was slightly elevated.  She is not on antibiotic at the moment.    # COPD, stable  # suspect obstructive sleep apnea.  refused BiPAP  #  Diastolic dysfunction    #Highly suspect obstructive sleep apnea.  But patient refused to use BiPAP.    #End-stage kidney disease, started on dialysis.    Titrate oxygen to a saturation of 92%.    DVT and GI prophylaxis.    Total time spent 30 minutes    Follow-up as an outpatient.  Stable  from pulmonary standpoint    Anderson Solomon MD  10/22/23  10:53 EDT      Electronically signed by Anderson Solomon MD at 10/22/23 1139       Jay Jay Pritchett DO at 10/21/23 1633              [facility] HOSPITALIST PROGRESS NOTE     Patient Identification:  Name:  Lesley Michel  Age:  67 y.o.  Sex:  female  :  1955  MRN:  [id number]  Visit Number:  [id number]  ROOM: [address] [place] [address]     Primary Care Provider:  Woodrow Raymond APRN    Length of stay in inpatient status:  11    Subjective     Chief Compliant:    Chief Complaint   Patient presents with    Shortness of Breath    Edema       History of Presenting Illness: Patient seen and evaluated in follow-up for anasarca with MAICO on CKD stage IIIa complicated by acute exacerbation of COPD and bilateral pneumonia with acute hypoxemic respiratory failure.  Patient with successful placement of tunneled hemodialysis catheter 10/19/2023.  Patient with increased anxiety and as well as confusion and agitation overnight requiring Geodon.  Patient with increased anxiety and agitation/delirium ever since tunneled dialysis catheter placement.  Likely multifactorial as patient prior to tunnel dialysis catheter with significant anxiety and irritability over wanting to be out of the hospital.    Objective     Current Hospital Meds:  acetylcysteine, 1.5 mL, Nebulization, Q6H - RT  aspirin, 81 mg, Oral, Daily  carvedilol, 25 mg, Oral, BID With Meals  folic acid, 1 mg, Oral, Daily  gabapentin, 200 mg, Oral, Q12H  heparin (porcine), 5,000 Units, Subcutaneous, Q12H  hydrALAZINE, 50 mg, Oral, Q8H  ipratropium-albuterol, 3 mL, Nebulization, Q6H - RT  levothyroxine, 88 mcg, Oral, Q AM  montelukast, 10 mg, Oral, Nightly  nicotine, 1 patch, Transdermal, Q24H  pantoprazole, 40 mg, Oral, Daily  risperiDONE, 0.25 mg, Oral, Q12H  rosuvastatin, 10 mg, Oral, Nightly  sodium chloride, 10 mL, Intravenous, Q12H  vitamin B-12, 1,000 mcg, Oral, Daily          ----------------------------------------------------------------------------------------------------------------------  Vital Signs:  Temp:  [98 °F (36.7 °C)-98.3 °F (36.8 °C)] 98 °F (36.7 °C)  Heart Rate:  [66-84] 71  Resp:  [16-24] 20  BP: (131-190)/() 133/67  SpO2:  [93 %-98 %] 96 %  on  Flow (L/min):  [4] 4;   Device (Oxygen Therapy): nasal cannula;humidified  Body mass index is 44.02 kg/m².      Intake/Output Summary (Last 24 hours) at 10/21/2023 1633  Last data filed at 10/21/2023 1200  Gross per 24 hour   Intake 0 ml   Output 550 ml   Net -550 ml      ----------------------------------------------------------------------------------------------------------------------  Physical exam:  Constitutional: Elderly chronically ill-appearing adult female.  No acute distress.      HENT:  Head:  Normocephalic and atraumatic.  Mouth:  Moist mucous membranes.    Eyes:  Conjunctivae and EOM are normal. No scleral icterus.    Cardiovascular:  Normal rate, regular rhythm and normal heart sounds with no murmur.  Pulmonary/Chest:  No respiratory distress, no wheezes, no crackles, with diminished breath sounds at the bases with improved aeration in the lower right lower lung field.  Abdominal:  Soft.  Bowel sounds are normal.  No distension and no tenderness.   Musculoskeletal:  No tenderness and no deformity.  No red or swollen joints anywhere.   Neurological: Alert and and oriented to self and year but not exact date or place.  No cranial nerve deficit.  No tongue deviation.  No facial droop.  No slurred speech. Intact Sensation throughout  Skin:  Skin is warm and dry. No rash or lesion noted. No pallor.   Peripheral vascular:  Pulses in all 4 extremities with no clubbing, no cyanosis, 2+ bilateral lower extremity edema with trace edema of the upper extremity.  Psychiatric: Appropriate mood and affect, pleasant.  "  ----------------------------------------------------------------------------------------------------------------------  WBC/HGB/HCT/PLT   8.84/8.8/27.9/226 (10/21 0325)  BUN/CREAT/GLUC/ALT/AST/INOCENTE/LIP    32/3.83/71/--/--/--/-- (10/21 0325)  LYTES - Na/K/Cl/CO2: 133*/4.4/93*/25.6 (10/21 0325)     pH/pCO2/pO2/HCO3   7.357/52.6/72.2/29.5 (10/21 0907)  No results found for: \"URINECX\"  No results found for: \"BLOODCX\"    I have personally looked at the labs and they are summarized above.  ----------------------------------------------------------------------------------------------------------------------  Detailed radiology reports for the last 24 hours:  XR Chest 1 View    Result Date: 10/20/2023  1.  Better expansion of the right lower lobe with some persistent airspace disease. 2.  Minimal left lower lobe airspace disease. 3.  Right central catheter with tip in SVC. 4.  Coarsened interstitial markings noted throughout the lungs. 5.  Mild cardiomegaly again noted.   This report was finalized on 10/20/2023 8:34 AM by Dr. Kirk Mcdonald MD.     Assessment & Plan      Anasarca  Hyperkalemia  MAICO on CKD stage IIIa with likely progression to CKD V vs ESRD    -Patient presenting with anasarca with echo showing EF of 56 to 60% with grade 1 diastolic dysfunction, mild AAS, mild pulmonary hypertension and small pericardial effusion without evidence of tamponade.    -Patient with progressive worsening renal failure likely contributing to volume retention with possibly some component of acute HFpEF.    -Reds vest remains elevated.  Patient trialed on diuretic therapy with little to no urine output and remains within anuric range.      -Nephrology consulted and following along, volume status management per their discretion.  Patient has been  trialed on high-dose diuretic therapy to no avail with continued worsening/no improvement in renal function.  As such nephrology recommending hemodialysis and patient agreeable and had " successful tunneled hemodialysis catheter placement on 10/19/2023.    -First dialysis session 10/19 with repeat session on 10/20.  Approximately 4 L of volume removed in the last 48 hours.    -Palliative care consulted and following along.      Bilateral bacterial pneumonia  Acute exacerbation of COPD  Acute hypoxemic respiratory failure  Right lower lobe mucous plug with atelectasis and collapse    -Procalcitonin normal, cultures without growth.    -Completed an appropriate course of azithromycin    -Continue nebulized medications    -Patient maintaining 3 L nasal cannula requirement.  However postoperatively postop chest x-ray showing right lower lobe collapse and atelectasis likely due to mucous plug with pleural effusion.  CT imaging obtained for better characterization of lung parenchyma and shows the same as well as dense consolidation possibly pneumonia.    -Mucomyst 4 times daily with Evelia for conservative management for now after discussion with pulmonology.  Repeat chest x-ray in the morning if no resolution of mucous plugging plan for therapeutic bronchoscopy.    -Chest x-ray obtained morning 10/20/2023 shows improved aeration and reduction in atelectasis, plugging and effusion after dialysis and breathing treatments.  Continue breathing treatments however chest physiotherapy discontinued as patient noncompliant and refusing.  Begin working on weaning Mucomyst treatments.    -Patient with some increased confusion and delirium 10/20/2023 and ABG shows increased CO2 and was placed on BiPAP that evening as patient previously refused but having increasing hypercapnia.  Procalcitonin not significantly elevated doubt strong suspicion for any ongoing active pneumonia at this time.  Continue supportive care and sitter at bedside.    Anxiety  Acute delirium    -Patient with significant anxiety prior to initiation of hemodialysis and placement of tunneled hemodialysis catheter.  Previously difficult when making  decision regarding hemodialysis as patient's anxiety almost crippling and going back and forth on decision while in hospital as well as starting to leave AMA on several occasions.    -Patient now with continued anxiety as well as some delirium suspect hospital related with agitation overnight requiring pharmacotherapy for safety.    -Patient frequently pulling at lines and BiPAP mask overnight.  Hold on further BiPAP as ABG is compensated and will also remove Kaiser catheter as patient is making excellent urine with no evidence of urinary retention on frequent bladder scans.    -Discussed with nephrology and reasonable to obtain psychiatry consult in regards to recommendations for further pharmacotherapy to help stabilize patient's anxiety and agitation/delirium.    -In the interim have started-Low-dose Risperdal and will use as needed anxiolytics as needed.  Given patient's agitation and confusion and delirium overnight would avoid further administrations of Benadryl as this would likely precipitate and worsen rather than abort symptoms.    -Consider CT head for further improvement in delirium with supportive care over the next 24 hours.    Essential hypertension    -Continue home antihypertensives as appropriate.      Hypothyroidism    -Continue Synthroid    Tobacco abuse    -Cessation counseled.    Morbid obesity    -Complicates all aspects of care    Copied text in portions of the note has been reviewed and is accurate as of 10/21/23    VTE Prophylaxis:   Mechanical Order History:       None          Pharmalogical Order History:        Ordered     Dose Route Frequency Stop    10/08/23 2052  heparin (porcine) 5000 UNIT/ML injection 5,000 Units         5,000 Units SC Every 12 Hours Scheduled --                    Disposition Home once medically stable and improved and hemodialysis outpatient chair arranged.    Jay Jay Pritchett DO  [facility] Hospitalist  10/21/23  16:33 EDT      Electronically signed by  "Jay Jay Pritchett, DO at 10/21/23 1642       Anderson Solomon MD at 10/21/23 1117          Progress Note Pulmonary      Subjective   Patient is getting dialysis and she feels much better      Interval History: EXTR chest from this morning reviewed, noted significant improvement in air entry in the right lung zone.      Review of Systems:    Reviewed ; unchanged       Vital Signs  Temp:  [97.6 °F (36.4 °C)-98.3 °F (36.8 °C)] 98 °F (36.7 °C)  Heart Rate:  [66-86] 75  Resp:  [16-24] 20  BP: (131-190)/() 151/76  Body mass index is 44.02 kg/m².    Intake/Output Summary (Last 24 hours) at 10/21/2023 1117  Last data filed at 10/21/2023 0335  Gross per 24 hour   Intake --   Output 2760 ml   Net -2760 ml     No intake/output data recorded.    Physical Exam:  General- normal in appearance, not in any acute distress    HEENT- pupils equally reactive to light, normal in size, no scleral icterus    Neck- supple    No JVD, no carotid bruit    Respiratory-approved breath sounds over right lung base.  Occasional wheezing.      cardiovascular-  Normal S1 and S2. No S3, S4 or murmurs.    GI-nontender nondistended bowel sounds positive    CNS-alert oriented x3, grossly nonfocal    Extremities- pulses normal bilaterally , no clubbing and 1+ edema     Results Review:      Results from last 7 days   Lab Units 10/21/23  0325 10/20/23  0157 10/18/23  0059   WBC 10*3/mm3 8.84 7.83 11.60*   HEMOGLOBIN g/dL 8.8* 8.7* 9.3*   PLATELETS 10*3/mm3 226 222 242     Results from last 7 days   Lab Units 10/21/23  0325 10/20/23  0157 10/19/23  0055   SODIUM mmol/L 133* 135* 134*   POTASSIUM mmol/L 4.4 4.6 5.9*   CHLORIDE mmol/L 93* 97* 101   CO2 mmol/L 25.6 25.0 20.2*   BUN mg/dL 32* 50* 75*   CREATININE mg/dL 3.83* 5.16* 6.93*   CALCIUM mg/dL 9.0 8.9 9.0   GLUCOSE mg/dL 71 81 107*     No results found for: \"INR\", \"PROTIME\"  Results from last 7 days   Lab Units 10/20/23  0157 10/18/23  0059 10/17/23  0038   ALK PHOS U/L 124* 134* 89   BILIRUBIN mg/dL " 0.4 0.3 0.2   ALT (SGPT) U/L 5 8 6   AST (SGOT) U/L 7 10 8     Results from last 7 days   Lab Units 10/21/23  0907   PH, ARTERIAL pH units 7.357   PO2 ART mm Hg 72.2*   PCO2, ARTERIAL mm Hg 52.6*   HCO3 ART mmol/L 29.5*     Imaging Results (Last 24 Hours)       ** No results found for the last 24 hours. **                 acetylcysteine, 1.5 mL, Nebulization, Q6H - RT  aspirin, 81 mg, Oral, Daily  carvedilol, 25 mg, Oral, BID With Meals  folic acid, 1 mg, Oral, Daily  gabapentin, 200 mg, Oral, Q12H  heparin (porcine), 5,000 Units, Subcutaneous, Q12H  hydrALAZINE, 50 mg, Oral, Q8H  ipratropium-albuterol, 3 mL, Nebulization, Q6H - RT  levothyroxine, 88 mcg, Oral, Q AM  montelukast, 10 mg, Oral, Nightly  nicotine, 1 patch, Transdermal, Q24H  pantoprazole, 40 mg, Oral, Daily  rosuvastatin, 10 mg, Oral, Nightly  sodium chloride, 10 mL, Intravenous, Q12H  vitamin B-12, 1,000 mcg, Oral, Daily           Medication Review:     Assessment & Plan     Morbidly obese female with chronic kidney disease.  Initially admitted with worsening shortness of breath and hypoxia.  X-ray chest from yesterday and later on CT scan showed significant atelectasis of the right lower lobe and midlung zone there was a concern for mucous plug.  Follow-up x-ray chest was done this morning after aggressive chest PT with the help of DuoNeb and Mucomyst followed by chest PT.  It showed remarkable improvement in right lung airspace disease.    Patient has coughed up yellowish sputum.  I have ordered sputum for Gram stain culture, blood cultures x2 and procalcitonin.  If procalcitonin is elevated, will consider adding broad-spectrum antibiotics.    No need to do a bronchoscopy given improvement in exam findings and x-ray chest    # COPD,   # suspect obstructive sleep apnea.  refused BiPAP  #  Diastolic dysfunction.    #End-stage kidney disease, started on dialysis.    Titrate oxygen to a saturation of 92%.    DVT and GI prophylaxis.    Total time spent  30 minutes         Anderson Solomon MD  10/21/23  11:17 EDT      Electronically signed by Anderson Solomon MD at 10/22/23 1053       Vani León MD at 10/21/23 1047          Nephrology Progress Note      Subjective     The patient has been reported to be agitated and noncooperative but not in acute distress she has been becoming anxious and tried to pull the catheter    Objective       Vital signs :     Vitals:    10/21/23 0626 10/21/23 0716 10/21/23 0730 10/21/23 0855   BP: (!) 190/100   151/76   BP Location: Right arm   Right arm   Patient Position: Lying   Lying   Pulse: 79 84 80 75   Resp: 18 24 24 20   Temp: 98 °F (36.7 °C)      TempSrc: Oral      SpO2:  93% 97%    Weight:       Height:            Intake/Output                         10/19/23 0701 - 10/20/23 0700 10/20/23 0701 - 10/21/23 0700     [id number] Total [id number] 4454-2486 Total                 Intake    P.O.  --  150 150  0  -- 0    I.V.  50  -- 50  --  -- --    Total Intake 50 150 200 0 -- 0       Output    Urine  --  300 300  250  300 550    Dialysis  --  2000 2000 2210  -- 2210    Total Output -- 2300 2300 2460 300 2760             Physical Exam:    General Appearance : Not in acute distress  Lungs : Bibasilar crackles noted trace edema  Heart :  regular rhythm & normal rate, normal S1, S2 and no murmur, no rub  Abdomen : Soft nontender nondistended   extremities : Trace edema  Neurologic :   Unable to assess fully    Laboratory Data :       CBC and coagulation:  Results from last 7 days   Lab Units 10/21/23  0325 10/20/23  0157 10/18/23  0059   PROCALCITONIN ng/mL  --  0.40*  --    WBC 10*3/mm3 8.84 7.83 11.60*   HEMOGLOBIN g/dL 8.8* 8.7* 9.3*   HEMATOCRIT % 27.9* 28.8* 31.5*   MCV fL 88.6 89.7 91.8   MCHC g/dL 31.5 30.2* 29.5*   PLATELETS 10*3/mm3 226 222 242     Acid/base balance:  Results from last 7 days   Lab Units 10/21/23  0907 10/20/23  1330 10/16/23  2202   PH, ARTERIAL pH units 7.357 7.355 7.311*   PO2 ART mm Hg 72.2* 55.7*  "74.9*   PCO2, ARTERIAL mm Hg 52.6* 57.9* 47.4*   HCO3 ART mmol/L 29.5* 32.3* 23.9       Renal and electrolytes:    Results from last 7 days   Lab Units 10/21/23  0325 10/20/23  0157 10/19/23  0055 10/18/23  0059 10/17/23  1744   SODIUM mmol/L 133* 135* 134* 132* 134*   POTASSIUM mmol/L 4.4 4.6 5.9* 5.2 5.2   CHLORIDE mmol/L 93* 97* 101 100 99   CO2 mmol/L 25.6 25.0 20.2* 20.5* 22.9   BUN mg/dL 32* 50* 75* 69* 69*   CREATININE mg/dL 3.83* 5.16* 6.93* 6.32* 6.10*   CALCIUM mg/dL 9.0 8.9 9.0 9.0 8.7     Estimated Creatinine Clearance: 15.7 mL/min (A) (by C-G formula based on SCr of 3.83 mg/dL (H)).     Liver and pancreatic function:  Results from last 7 days   Lab Units 10/20/23  0157 10/18/23  0059 10/17/23  0038   ALBUMIN g/dL 3.1* 3.6 3.5   BILIRUBIN mg/dL 0.4 0.3 0.2   ALK PHOS U/L 124* 134* 89   AST (SGOT) U/L 7 10 8   ALT (SGPT) U/L 5 8 6       Albumin Albumin   Date Value Ref Range Status   10/20/2023 3.1 (L) 3.5 - 5.2 g/dL Final      Magnesium No results found for: \"MG\"         PTH               No results found for: \"PTH\"    Cardiac:        Liver and pancreatic function:  Results from last 7 days   Lab Units 10/20/23  0157 10/18/23  0059 10/17/23  0038   ALBUMIN g/dL 3.1* 3.6 3.5   BILIRUBIN mg/dL 0.4 0.3 0.2   ALK PHOS U/L 124* 134* 89   AST (SGOT) U/L 7 10 8   ALT (SGPT) U/L 5 8 6       XR Chest 1 View    Result Date: 10/20/2023  1.  Better expansion of the right lower lobe with some persistent airspace disease. 2.  Minimal left lower lobe airspace disease. 3.  Right central catheter with tip in SVC. 4.  Coarsened interstitial markings noted throughout the lungs. 5.  Mild cardiomegaly again noted.   This report was finalized on 10/20/2023 8:34 AM by Dr. Kirk Mcdonald MD.      CT Chest Without Contrast Diagnostic    Result Date: 10/19/2023  1.  Tiny pericardial effusion. 2.  Right lung base fairly extensive atelectasis and probably consolidative pneumonia. Again there may be some mucus plugging or narrowing. " 3.  Again there is tiny bilateral pleural effusions. 4.  Cardiomegaly.   This report was finalized on 10/19/2023 3:51 PM by Dr. Kirk Mcdonald MD.      XR Chest AP    Result Date: 10/19/2023  1.  There is been collapse of probably the right lower lobe possibly related to mucous plug. 2.  Small right pleural effusion persists. 3.  Improved aeration of the left lung base.   This report was finalized on 10/19/2023 2:00 PM by Dr. Kirk Mcdonald MD.      FL Surgery Fluoro    Result Date: 10/19/2023    As above.   This report was finalized on 10/19/2023 1:42 PM by Dr. Kirk Mcdonald MD.      XR Chest 1 View    Result Date: 10/18/2023  1.  Coarsened interstitial markings and vague bibasilar airspace opacities again noted. 2.  Now small right pleural effusion. 3.  Tiny left pleural effusion. 4.  Cardiomegaly.   This report was finalized on 10/18/2023 10:22 AM by Dr. Kirk Mcdonald MD.      XR Chest 1 View    Result Date: 10/13/2023  1.  Stable changes of CHF with interstitial edema. 2.  Development of bibasilar airspace disease which may represent atelectasis or pneumonia.   This report was finalized on 10/13/2023 12:43 PM by Dr. Eduardo August MD.        Medications :     acetylcysteine, 1.5 mL, Nebulization, Q6H - RT  aspirin, 81 mg, Oral, Daily  carvedilol, 25 mg, Oral, BID With Meals  folic acid, 1 mg, Oral, Daily  gabapentin, 200 mg, Oral, Q12H  heparin (porcine), 5,000 Units, Subcutaneous, Q12H  hydrALAZINE, 50 mg, Oral, Q8H  ipratropium-albuterol, 3 mL, Nebulization, Q6H - RT  levothyroxine, 88 mcg, Oral, Q AM  montelukast, 10 mg, Oral, Nightly  nicotine, 1 patch, Transdermal, Q24H  pantoprazole, 40 mg, Oral, Daily  rosuvastatin, 10 mg, Oral, Nightly  sodium chloride, 10 mL, Intravenous, Q12H  vitamin B-12, 1,000 mcg, Oral, Daily             Assessment & Plan     -Acute kidney injury initiated on dialysis during this hospitalization on 10/19/2023  - Acute metabolic encephalopathy  -Atrophic right kidney  -Chronic kidney  disease stage IIIa  -Acute hyperkalemia, resolved  -Acute hypoxic respite failure, improving  -COPD  -Essential hypertension  -Medical noncompliance    Patient has 2 consecutive low efficacy dialysis sessions done so far.  Will hold dialysis today, and reassess tomorrow.  Highly likelihood of underlying generalized anxiety disorder and now with delirium's and strongly recommend psych evaluation.  Likelihood of dialysis related disequilibrium is extremely less likely and diagnosis of DDS is one of exclusion. Patient was dialyzed with low efficacy and short duration and predialysis BUN was only 75 mg/dL.  And urea reduction postdialysis is very appropriate from 75-50-32.    MAICO on CKD most likely multifactori including ATN from prolonged prerenal state and hemodynamic changes with concomitant use of ACE inhibitors in setting of volume loss   baseline creatinine 2.2, admitted with 3.69   ultrasound of the kidneys showed atrophic right kidney  Serologies are negative    Please avoid nephrotoxic medication and pharmacy to adjust the medication according to the GFR     Plan of care discussed with pt, and family answered all questions, patient verbalized understanding and agreed.      Vani León MD  10/21/23  10:47 EDT      Electronically signed by Vani León MD at 10/22/23 1057       Jay Jay Pritchett DO at 10/20/23 1716              [facility] HOSPITALIST PROGRESS NOTE     Patient Identification:  Name:  Lesley Michel  Age:  67 y.o.  Sex:  female  :  1955  MRN:  [id number]  Visit Number:  [id number]  ROOM: [address] [place] [address]     Primary Care Provider:  Woodrow Raymond APRN    Length of stay in inpatient status:  10    Subjective     Chief Compliant:    Chief Complaint   Patient presents with    Shortness of Breath    Edema       History of Presenting Illness: Patient seen and evaluated in follow-up for anasarca with MAICO on CKD stage IIIa complicated by acute exacerbation of COPD and bilateral pneumonia  with acute hypoxemic respiratory failure.  Patient with successful placement of tunneled hemodialysis catheter yesterday but postop x-ray showing likely mucous plugging and atelectasis and effusion.  Repeat chest x-ray today shows improvement postdialysis.  Patient with increased confusion not previously noted prior to tunneled dialysis catheter placement.  Possible component of some disequilibrium given back-to-back dialysis sessions coupled with recent anesthesia as well as some CO2 retention as noted on ABG.    Objective     Current Hospital Meds:  acetylcysteine, 1.5 mL, Nebulization, Q6H - RT  aspirin, 81 mg, Oral, Daily  carvedilol, 25 mg, Oral, BID With Meals  folic acid, 1 mg, Oral, Daily  heparin (porcine), 5,000 Units, Subcutaneous, Q12H  hydrALAZINE, 50 mg, Oral, Q8H  ipratropium-albuterol, 3 mL, Nebulization, Q6H - RT  levothyroxine, 88 mcg, Oral, Q AM  montelukast, 10 mg, Oral, Nightly  nicotine, 1 patch, Transdermal, Q24H  pantoprazole, 40 mg, Oral, Daily  rosuvastatin, 10 mg, Oral, Nightly  sodium chloride, 10 mL, Intravenous, Q12H  sodium zirconium cyclosilicate, 10 g, Oral, Once  vitamin B-12, 1,000 mcg, Oral, Daily         ----------------------------------------------------------------------------------------------------------------------  Vital Signs:  Temp:  [97.5 °F (36.4 °C)-98.2 °F (36.8 °C)] 98.2 °F (36.8 °C)  Heart Rate:  [71-87] 74  Resp:  [16-24] 18  BP: (126-183)/(65-89) 154/77  SpO2:  [89 %-99 %] 96 %  on  Flow (L/min):  [4] 4;   Device (Oxygen Therapy): humidified;nasal cannula  Body mass index is 43.67 kg/m².      Intake/Output Summary (Last 24 hours) at 10/20/2023 1716  Last data filed at 10/20/2023 1127  Gross per 24 hour   Intake 150 ml   Output 4510 ml   Net -4360 ml      ----------------------------------------------------------------------------------------------------------------------  Physical exam:  Constitutional: Elderly chronically ill-appearing adult female.  No acute  "distress.      HENT:  Head:  Normocephalic and atraumatic.  Mouth:  Moist mucous membranes.    Eyes:  Conjunctivae and EOM are normal. No scleral icterus.    Cardiovascular:  Normal rate, regular rhythm and normal heart sounds with no murmur.  Pulmonary/Chest:  No respiratory distress, no wheezes, no crackles, with diminished breath sounds at the bases with improved aeration in the lower right lower lung field.  Abdominal:  Soft.  Bowel sounds are normal.  No distension and no tenderness.   Musculoskeletal:  No tenderness and no deformity.  No red or swollen joints anywhere.   Neurological: Alert and oriented only to self and not time or place.  No cranial nerve deficit.  No tongue deviation.  No facial droop.  No slurred speech. Intact Sensation throughout  Skin:  Skin is warm and dry. No rash or lesion noted. No pallor.   Peripheral vascular:  Pulses in all 4 extremities with no clubbing, no cyanosis, 2+ bilateral lower extremity edema with trace edema of the upper extremity.  Psychiatric: Appropriate mood and affect, pleasant.   ----------------------------------------------------------------------------------------------------------------------  WBC/HGB/HCT/PLT   7.83/8.7/28.8/222 (10/20 0157)  BUN/CREAT/GLUC/ALT/AST/INOCENTE/LIP    50/5.16/81/5/7/--/-- (10/20 0157)  LYTES - Na/K/Cl/CO2: 135*/4.6/97*/25.0 (10/20 0157)     pH/pCO2/pO2/HCO3   7.355/57.9/55.7/32.3 (10/20 1330)  No results found for: \"URINECX\"  No results found for: \"BLOODCX\"    I have personally looked at the labs and they are summarized above.  ----------------------------------------------------------------------------------------------------------------------  Detailed radiology reports for the last 24 hours:  XR Chest 1 View    Result Date: 10/20/2023  1.  Better expansion of the right lower lobe with some persistent airspace disease. 2.  Minimal left lower lobe airspace disease. 3.  Right central catheter with tip in SVC. 4.  Coarsened interstitial " markings noted throughout the lungs. 5.  Mild cardiomegaly again noted.   This report was finalized on 10/20/2023 8:34 AM by Dr. Kirk Mcdonald MD.      CT Chest Without Contrast Diagnostic    Result Date: 10/19/2023  1.  Tiny pericardial effusion. 2.  Right lung base fairly extensive atelectasis and probably consolidative pneumonia. Again there may be some mucus plugging or narrowing. 3.  Again there is tiny bilateral pleural effusions. 4.  Cardiomegaly.   This report was finalized on 10/19/2023 3:51 PM by Dr. Kirk Mcdonald MD.      XR Chest AP    Result Date: 10/19/2023  1.  There is been collapse of probably the right lower lobe possibly related to mucous plug. 2.  Small right pleural effusion persists. 3.  Improved aeration of the left lung base.   This report was finalized on 10/19/2023 2:00 PM by Dr. Kirk Mcdonald MD.      FL Surgery Fluoro    Result Date: 10/19/2023    As above.   This report was finalized on 10/19/2023 1:42 PM by Dr. Kirk Mcdonald MD.     Assessment & Plan      Anasarca  Hyperkalemia  MAICO on CKD stage IIIa with likely progression to CKD V vs ESRD    -Patient presenting with anasarca with echo showing EF of 56 to 60% with grade 1 diastolic dysfunction, mild AAS, mild pulmonary hypertension and small pericardial effusion without evidence of tamponade.    -Patient with progressive worsening renal failure likely contributing to volume retention with possibly some component of acute HFpEF.    -Reds vest remains elevated.  Patient trialed on diuretic therapy with little to no urine output and remains within anuric range.      -Nephrology consulted and following along, volume status management per their discretion.  Patient has been  trialed on high-dose diuretic therapy to no avail with continued worsening/no improvement in renal function.  As such nephrology recommending hemodialysis and patient agreeable and had successful tunneled hemodialysis catheter placement on 10/19/2023.    -First dialysis  session yesterday with repeat session today.  Tylenol total approximately 4 L of volume removed.    -Palliative care consulted and following along.      Acute delirium  Bilateral bacterial pneumonia  Acute exacerbation of COPD  Acute hypoxemic respiratory failure  Right lower lobe mucous plug with atelectasis and collapse    -Procalcitonin normal, cultures without growth.    -Completed an appropriate course of azithromycin    -Continue nebulized medications    -Patient maintaining 3 L nasal cannula requirement.  However postoperatively postop chest x-ray showing right lower lobe collapse and atelectasis likely due to mucous plug with pleural effusion.  CT imaging obtained for better characterization of lung parenchyma and shows the same as well as dense consolidation possibly pneumonia.    -Mucomyst 4 times daily with DuoNebs for conservative management for now after discussion with pulmonology.  Repeat chest x-ray in the morning if no resolution of mucous plugging plan for therapeutic bronchoscopy.    -Chest x-ray obtained this morning 10/20/2023 shows improved aeration and reduction in atelectasis, plugging and effusion after dialysis and breathing treatments.  Continue breathing treatments however chest physiotherapy discontinued as patient noncompliant and refusing.    -Patient with some increased confusion and delirium today and ABG shows increased CO2 and was placed on BiPAP this evening as patient previously refused but having increasing hypercapnia.  Procalcitonin not significantly elevated doubt strong suspicion for any ongoing active pneumonia at this time.  Continue supportive care and sitter at bedside.    Essential hypertension    -Continue home antihypertensives as appropriate.      Hypothyroidism    -Continue Synthroid    Tobacco abuse    -Cessation counseled.    Morbid obesity    -Complicates all aspects of care    Copied text in portions of the note has been reviewed and is accurate as of  10/20/23    VTE Prophylaxis:   Mechanical Order History:       None          Pharmalogical Order History:        Ordered     Dose Route Frequency Stop    10/08/23 2052  heparin (porcine) 5000 UNIT/ML injection 5,000 Units         5,000 Units SC Every 12 Hours Scheduled --                    Disposition Home once medically stable and improved and hemodialysis outpatient chair arranged.    Jay Jay Pritchett DO  [facility]  10/20/23  17:16 EDT      Electronically signed by Jay Jay Pritchett DO at 10/20/23 1725       Anderson Solomon MD at 10/20/23 0914          Progress Note Pulmonary      Subjective   Patient is getting dialysis and she feels much better      Interval History: EXTR chest from this morning reviewed, noted significant improvement in air entry in the right lung zone.      Review of Systems:    Reviewed ; unchanged       Vital Signs  Temp:  [97 °F (36.1 °C)-98.2 °F (36.8 °C)] 98.2 °F (36.8 °C)  Heart Rate:  [60-87] 75  Resp:  [16-24] 16  BP: ()/(47-82) 150/71  Body mass index is 43.67 kg/m².    Intake/Output Summary (Last 24 hours) at 10/20/2023 0914  Last data filed at 10/20/2023 0800  Gross per 24 hour   Intake 200 ml   Output 2300 ml   Net -2100 ml     No intake/output data recorded.    Physical Exam:  General- normal in appearance, not in any acute distress    HEENT- pupils equally reactive to light, normal in size, no scleral icterus    Neck- supple    No JVD, no carotid bruit    Respiratory-approved breath sounds over right lung base.  Occasional wheezing.      cardiovascular-  Normal S1 and S2. No S3, S4 or murmurs.    GI-nontender nondistended bowel sounds positive    CNS-alert oriented x3, grossly nonfocal    Extremities- pulses normal bilaterally , no clubbing and 1+ edema     Results Review:      Results from last 7 days   Lab Units 10/20/23  0157 10/18/23  0059 10/17/23  1744   WBC 10*3/mm3 7.83 11.60* 10.86*   HEMOGLOBIN g/dL 8.7* 9.3* 9.2*   PLATELETS 10*3/mm3 222 242  "259     Results from last 7 days   Lab Units 10/20/23  0157 10/19/23  0055 10/18/23  0059   SODIUM mmol/L 135* 134* 132*   POTASSIUM mmol/L 4.6 5.9* 5.2   CHLORIDE mmol/L 97* 101 100   CO2 mmol/L 25.0 20.2* 20.5*   BUN mg/dL 50* 75* 69*   CREATININE mg/dL 5.16* 6.93* 6.32*   CALCIUM mg/dL 8.9 9.0 9.0   GLUCOSE mg/dL 81 107* 126*     No results found for: \"INR\", \"PROTIME\"  Results from last 7 days   Lab Units 10/20/23  0157 10/18/23  0059 10/17/23  0038   ALK PHOS U/L 124* 134* 89   BILIRUBIN mg/dL 0.4 0.3 0.2   ALT (SGPT) U/L 5 8 6   AST (SGOT) U/L 7 10 8     Results from last 7 days   Lab Units 10/16/23  2202   PH, ARTERIAL pH units 7.311*   PO2 ART mm Hg 74.9*   PCO2, ARTERIAL mm Hg 47.4*   HCO3 ART mmol/L 23.9     Imaging Results (Last 24 Hours)       Procedure Component Value Units Date/Time    XR Chest 1 View [[id number]] Collected: 10/20/23 0832     Updated: 10/20/23 0836    Narrative:      EXAM:    XR Chest, 1 View     EXAM DATE:    10/20/2023 8:37 AM     CLINICAL HISTORY:    follow up mucus plug and RLL collapse; N17.9-Acute kidney failure,  unspecified; N18.9-Chronic kidney disease, unspecified; I50.9-Heart  failure, unspecified; J96.21-Acute and chronic respiratory failure with  hypoxia; E87.5-Hyperkalemia; N17.9-Acute kidney failure, unspecified     TECHNIQUE:    Frontal view of the chest.     COMPARISON:    10/19/2023     FINDINGS:    LUNGS AND PLEURAL SPACES:  Better expansion of the right lower lobe  with some persistent airspace disease.  Minimal left lower lobe airspace  disease.  Coarsened interstitial markings noted throughout the lungs.   No pneumothorax.    HEART:  Mild cardiomegaly again noted.    MEDIASTINUM:  Unremarkable as visualized.    BONES/JOINTS:  Unremarkable as visualized.    TUBES, LINES AND DEVICES:  Right central catheter with tip in SVC.       Impression:      1.  Better expansion of the right lower lobe with some persistent  airspace disease.  2.  Minimal left lower lobe airspace " disease.  3.  Right central catheter with tip in SVC.  4.  Coarsened interstitial markings noted throughout the lungs.  5.  Mild cardiomegaly again noted.        This report was finalized on 10/20/2023 8:34 AM by Dr. Kirk Mcdonald MD.       CT Chest Without Contrast Diagnostic [[id number]] Collected: 10/19/23 1550     Updated: 10/19/23 1554    Narrative:      EXAM:    CT Chest Without Intravenous Contrast     EXAM DATE:    10/19/2023 4:32 PM     CLINICAL HISTORY:    rule out mucus plug vs hemothorax; N17.9-Acute kidney failure,  unspecified; N18.9-Chronic kidney disease, unspecified; I50.9-Heart  failure, unspecified; J96.21-Acute and chronic respiratory failure with  hypoxia; E87.5-Hyperkalemia; N17.9-Acute kidney failure, unspecified     TECHNIQUE:    Axial computed tomography images of the chest without intravenous  contrast.  Sagittal and coronal reformatted images were created and  reviewed.  This CT exam was performed using one or more of the following  dose reduction techniques:  automated exposure control, adjustment of  the mA and/or kV according to patient size, and/or use of iterative  reconstruction technique.     COMPARISON:    10/10/2023     FINDINGS:    LUNGS AND PLEURAL SPACES:  Right lung base fairly extensive  atelectasis and probably consolidative pneumonia. Again there may be  some mucus plugging or narrowing.  Again there is tiny bilateral pleural  effusions.    HEART:  Tiny pericardial effusion.  Cardiomegaly.  Coronary artery  calcifications are noted.    BONES/JOINTS:  Unremarkable as visualized.  No acute fracture.  No  dislocation.    SOFT TISSUES:  Unremarkable as visualized.    VASCULATURE:  See above.    LYMPH NODES:  Unremarkable as visualized.  No enlarged lymph nodes.       Impression:      1.  Tiny pericardial effusion.  2.  Right lung base fairly extensive atelectasis and probably  consolidative pneumonia. Again there may be some mucus plugging or  narrowing.  3.  Again there is tiny  bilateral pleural effusions.  4.  Cardiomegaly.        This report was finalized on 10/19/2023 3:51 PM by Dr. Kirk Mcdonald MD.       XR Chest AP [[id number]] Collected: 10/19/23 1359     Updated: 10/19/23 1402    Narrative:      EXAM:    XR Chest, 1 View     EXAM DATE:    10/19/2023 2:04 PM     CLINICAL HISTORY:    Post hemodialysis cath placement; N17.9-Acute kidney failure,  unspecified; N18.9-Chronic kidney disease, unspecified; I50.9-Heart  failure, unspecified; J96.21-Acute and chronic respiratory failure with  hypoxia; E87.5-Hyperkalemia; N17.9-Acute kidney failure, unspecified     TECHNIQUE:    Frontal view of the chest.     COMPARISON:    XR Chest dated 10/18/2023     FINDINGS:    LUNGS AND PLEURAL SPACES:  There is been collapse of probably the  right lower lobe possibly related to mucous plug.  Small right pleural  effusion persists.  Improved aeration of the left lung base.    HEART:  Unremarkable as visualized.  No cardiomegaly.    MEDIASTINUM:  Mediastinum is shifted to the right.    BONES/JOINTS:  Unremarkable as visualized.    TUBES, LINES AND DEVICES:  Right central line is been placed with tip  in SVC.       Impression:      1.  There is been collapse of probably the right lower lobe possibly  related to mucous plug.  2.  Small right pleural effusion persists.  3.  Improved aeration of the left lung base.        This report was finalized on 10/19/2023 2:00 PM by Dr. Kirk Mcdonald MD.       FL Surgery Fluoro [[id number]] Collected: 10/19/23 1341     Updated: 10/19/23 1344    Narrative:      EXAM:    FL Fluoroscopy < 1 Hour     EXAM DATE:    10/19/2023 1:27 PM     CLINICAL HISTORY:    port; N17.9-Acute kidney failure, unspecified; N18.9-Chronic kidney  disease, unspecified; I50.9-Heart failure, unspecified; J96.21-Acute and  chronic respiratory failure with hypoxia; E87.5-Hyperkalemia;  N17.9-Acute kidney failure, unspecified     TECHNIQUE:    Fluoroscopic images performed in multiple projections.   Fluoroscopic  guidance was provided by a physician. Fluoroscopy exposure time of  approximately 1 minute or less.     COMPARISON:    10/19/2023     FINDINGS:    TUBES, LINES AND DEVICES:  Right central catheter with tip in SVC.       Impression:        As above.        This report was finalized on 10/19/2023 1:42 PM by Dr. Kirk Mcdonald MD.                    acetylcysteine, 1.5 mL, Nebulization, Q4H - RT  aspirin, 81 mg, Oral, Daily  carvedilol, 25 mg, Oral, BID With Meals  folic acid, 1 mg, Oral, Daily  heparin (porcine), 5,000 Units, Subcutaneous, Q12H  hydrALAZINE, 50 mg, Oral, Q8H  ipratropium-albuterol, 3 mL, Nebulization, Q4H - RT  levothyroxine, 88 mcg, Oral, Q AM  montelukast, 10 mg, Oral, Nightly  nicotine, 1 patch, Transdermal, Q24H  pantoprazole, 40 mg, Oral, Daily  rosuvastatin, 10 mg, Oral, Nightly  sodium chloride, 10 mL, Intravenous, Q12H  sodium zirconium cyclosilicate, 10 g, Oral, Once  vitamin B-12, 1,000 mcg, Oral, Daily           Medication Review:     Assessment & Plan     Morbidly obese female with chronic kidney disease.  Initially admitted with worsening shortness of breath and hypoxia.  X-ray chest from yesterday and later on CT scan showed significant atelectasis of the right lower lobe and midlung zone there was a concern for mucous plug.  Follow-up x-ray chest was done this morning after aggressive chest PT with the help of DuoNeb and Mucomyst followed by chest PT.  It showed remarkable improvement in right lung airspace disease.    Patient has coughed up yellowish sputum.  I have ordered sputum for Gram stain culture, blood cultures x2 and procalcitonin.  If procalcitonin is elevated, will consider adding broad-spectrum antibiotics.    No need to do a bronchoscopy given improvement in exam findings and x-ray chest    # COPD,   # suspect obstructive sleep apnea.  refused BiPAP  #  Diastolic dysfunction.    #End-stage kidney disease, started on dialysis.    Titrate oxygen to a saturation  "of 92%.    DVT and GI prophylaxis.    Total time spent 30 minutes         Anderson Solomon MD  10/20/23  09:14 EDT      Electronically signed by Anderson Solomon MD at 10/20/23 0921       Kimber Martel APRN at 10/20/23 0850          Palliative Care Daily Progress Note     S: Medical record reviewed, pt was noted in room so evaluated patient in the acute dialysis room. Pt is much more alert today than yesterday however she remains confused. She is picking at dialysis lines but very talkative. She reports that she is feeling better. Family at bedside during this time. She denies any pain, no nausea, vomiting or diarrhea, no anxiety noted.       O:   Palliative Performance Scale Score:     /80 (BP Location: Left arm, Patient Position: Lying)   Pulse 78   Temp 97.6 °F (36.4 °C) (Oral)   Resp 18   Ht 152.4 cm (60\")   Wt 101 kg (223 lb 9.6 oz)   SpO2 91%   BMI 43.67 kg/m²     Intake/Output Summary (Last 24 hours) at 10/20/2023 1231  Last data filed at 10/20/2023 1127  Gross per 24 hour   Intake 200 ml   Output 4510 ml   Net -4310 ml       PE:    General Appearance:    Elderly Chronically ill appearing, alert, obese, cooperative, NAD   HEENT:    NC/AT, without obvious abnormality, EOMI, anicteric    Neck:   supple, trachea midline, no JVD   Lungs:     Diminished in bases, poor airflow noted in right lung, +crackles     Heart:    Irregular , normal S1 and S2, no M/R/G   Abdomen:     Soft, NT, ND, NABS    Extremities:   Moves all extremities, 2+ edema BLE   Pulses:   Pulses palpable and equal bilaterally   Skin:   Warm, dry   Neurologic:   Alert to self and situation, confused, disoriented    Psych:   \"Fidgeting with dialysis, appears anxious\"          Meds: Reviewed and changes not noted    Labs:   Results from last 7 days   Lab Units 10/20/23  0157   WBC 10*3/mm3 7.83   HEMOGLOBIN g/dL 8.7*   HEMATOCRIT % 28.8*   PLATELETS 10*3/mm3 222     Results from last 7 days   Lab Units 10/20/23  0157   SODIUM mmol/L 135* "   POTASSIUM mmol/L 4.6   CHLORIDE mmol/L 97*   CO2 mmol/L 25.0   BUN mg/dL 50*   CREATININE mg/dL 5.16*   GLUCOSE mg/dL 81   CALCIUM mg/dL 8.9     Results from last 7 days   Lab Units 10/20/23  0157   SODIUM mmol/L 135*   POTASSIUM mmol/L 4.6   CHLORIDE mmol/L 97*   CO2 mmol/L 25.0   BUN mg/dL 50*   CREATININE mg/dL 5.16*   CALCIUM mg/dL 8.9   BILIRUBIN mg/dL 0.4   ALK PHOS U/L 124*   ALT (SGPT) U/L 5   AST (SGOT) U/L 7   GLUCOSE mg/dL 81     Imaging Results (Last 72 Hours)       Procedure Component Value Units Date/Time    XR Chest 1 View [[id number]] Collected: 10/20/23 0832     Updated: 10/20/23 0836    Narrative:      EXAM:    XR Chest, 1 View     EXAM DATE:    10/20/2023 8:37 AM     CLINICAL HISTORY:    follow up mucus plug and RLL collapse; N17.9-Acute kidney failure,  unspecified; N18.9-Chronic kidney disease, unspecified; I50.9-Heart  failure, unspecified; J96.21-Acute and chronic respiratory failure with  hypoxia; E87.5-Hyperkalemia; N17.9-Acute kidney failure, unspecified     TECHNIQUE:    Frontal view of the chest.     COMPARISON:    10/19/2023     FINDINGS:    LUNGS AND PLEURAL SPACES:  Better expansion of the right lower lobe  with some persistent airspace disease.  Minimal left lower lobe airspace  disease.  Coarsened interstitial markings noted throughout the lungs.   No pneumothorax.    HEART:  Mild cardiomegaly again noted.    MEDIASTINUM:  Unremarkable as visualized.    BONES/JOINTS:  Unremarkable as visualized.    TUBES, LINES AND DEVICES:  Right central catheter with tip in SVC.       Impression:      1.  Better expansion of the right lower lobe with some persistent  airspace disease.  2.  Minimal left lower lobe airspace disease.  3.  Right central catheter with tip in SVC.  4.  Coarsened interstitial markings noted throughout the lungs.  5.  Mild cardiomegaly again noted.        This report was finalized on 10/20/2023 8:34 AM by Dr. Kirk Mcdonald MD.       CT Chest Without Contrast Diagnostic  [[id number]] Collected: 10/19/23 1550     Updated: 10/19/23 1554    Narrative:      EXAM:    CT Chest Without Intravenous Contrast     EXAM DATE:    10/19/2023 4:32 PM     CLINICAL HISTORY:    rule out mucus plug vs hemothorax; N17.9-Acute kidney failure,  unspecified; N18.9-Chronic kidney disease, unspecified; I50.9-Heart  failure, unspecified; J96.21-Acute and chronic respiratory failure with  hypoxia; E87.5-Hyperkalemia; N17.9-Acute kidney failure, unspecified     TECHNIQUE:    Axial computed tomography images of the chest without intravenous  contrast.  Sagittal and coronal reformatted images were created and  reviewed.  This CT exam was performed using one or more of the following  dose reduction techniques:  automated exposure control, adjustment of  the mA and/or kV according to patient size, and/or use of iterative  reconstruction technique.     COMPARISON:    10/10/2023     FINDINGS:    LUNGS AND PLEURAL SPACES:  Right lung base fairly extensive  atelectasis and probably consolidative pneumonia. Again there may be  some mucus plugging or narrowing.  Again there is tiny bilateral pleural  effusions.    HEART:  Tiny pericardial effusion.  Cardiomegaly.  Coronary artery  calcifications are noted.    BONES/JOINTS:  Unremarkable as visualized.  No acute fracture.  No  dislocation.    SOFT TISSUES:  Unremarkable as visualized.    VASCULATURE:  See above.    LYMPH NODES:  Unremarkable as visualized.  No enlarged lymph nodes.       Impression:      1.  Tiny pericardial effusion.  2.  Right lung base fairly extensive atelectasis and probably  consolidative pneumonia. Again there may be some mucus plugging or  narrowing.  3.  Again there is tiny bilateral pleural effusions.  4.  Cardiomegaly.        This report was finalized on 10/19/2023 3:51 PM by Dr. Kirk Mcdonald MD.       XR Chest AP [[id number]] Collected: 10/19/23 1359     Updated: 10/19/23 1402    Narrative:      EXAM:    XR Chest, 1 View     EXAM DATE:     10/19/2023 2:04 PM     CLINICAL HISTORY:    Post hemodialysis cath placement; N17.9-Acute kidney failure,  unspecified; N18.9-Chronic kidney disease, unspecified; I50.9-Heart  failure, unspecified; J96.21-Acute and chronic respiratory failure with  hypoxia; E87.5-Hyperkalemia; N17.9-Acute kidney failure, unspecified     TECHNIQUE:    Frontal view of the chest.     COMPARISON:    XR Chest dated 10/18/2023     FINDINGS:    LUNGS AND PLEURAL SPACES:  There is been collapse of probably the  right lower lobe possibly related to mucous plug.  Small right pleural  effusion persists.  Improved aeration of the left lung base.    HEART:  Unremarkable as visualized.  No cardiomegaly.    MEDIASTINUM:  Mediastinum is shifted to the right.    BONES/JOINTS:  Unremarkable as visualized.    TUBES, LINES AND DEVICES:  Right central line is been placed with tip  in SVC.       Impression:      1.  There is been collapse of probably the right lower lobe possibly  related to mucous plug.  2.  Small right pleural effusion persists.  3.  Improved aeration of the left lung base.        This report was finalized on 10/19/2023 2:00 PM by Dr. Kirk Mcdonald MD.       FL Surgery Fluoro [[id number]] Collected: 10/19/23 1341     Updated: 10/19/23 1344    Narrative:      EXAM:    FL Fluoroscopy < 1 Hour     EXAM DATE:    10/19/2023 1:27 PM     CLINICAL HISTORY:    port; N17.9-Acute kidney failure, unspecified; N18.9-Chronic kidney  disease, unspecified; I50.9-Heart failure, unspecified; J96.21-Acute and  chronic respiratory failure with hypoxia; E87.5-Hyperkalemia;  N17.9-Acute kidney failure, unspecified     TECHNIQUE:    Fluoroscopic images performed in multiple projections.  Fluoroscopic  guidance was provided by a physician. Fluoroscopy exposure time of  approximately 1 minute or less.     COMPARISON:    10/19/2023     FINDINGS:    TUBES, LINES AND DEVICES:  Right central catheter with tip in SVC.       Impression:        As above.        This  "report was finalized on 10/19/2023 1:42 PM by Dr. Kirk Mcdonald MD.       XR Chest 1 View [[id number]] Collected: 10/18/23 1021     Updated: 10/18/23 1024    Narrative:      EXAM:    XR Chest, 1 View     EXAM DATE:    10/18/2023 10:32 AM     CLINICAL HISTORY:    pulm edema; N17.9-Acute kidney failure, unspecified; N18.9-Chronic  kidney disease, unspecified; I50.9-Heart failure, unspecified;  J96.21-Acute and chronic respiratory failure with hypoxia;  E87.5-Hyperkalemia     TECHNIQUE:    Frontal view of the chest.     COMPARISON:    10/13/2023     FINDINGS:    LUNGS AND PLEURAL SPACES:  Coarsened interstitial markings and vague  bibasilar airspace opacities again noted.  Now small right pleural  effusion.  Tiny left pleural effusion.  No pneumothorax.    HEART:  Cardiomegaly.    MEDIASTINUM:  Unremarkable as visualized.    BONES/JOINTS:  Unremarkable as visualized.       Impression:      1.  Coarsened interstitial markings and vague bibasilar airspace  opacities again noted.  2.  Now small right pleural effusion.  3.  Tiny left pleural effusion.  4.  Cardiomegaly.        This report was finalized on 10/18/2023 10:22 AM by Dr. Kirk Mcdonald MD.                 Diagnostics: Reviewed    A: Mrs. Michel is much more alert today than previously RN reports. She is talkative, is rather confused and disoriented. She keeps pulling at dialysis catheter lines and has to be reminded. She reports that \"oh I shouldn't touch that either\". Family is at bedside. Her pallor has improved since yesterday. Labs today sodium 135, BUN 50, Creat 5.16, eGFR 8.7, alk phos 124, procalcitonin 0.40, H&H 8.7/28.8 Her repeat CXR today shows better expansion of the right lower lobe with some persistent airspace disease, minimal left lower lobe airspace disease, right central catheter with tip in SVC, coarsened interstitial markings noted throughout the lungs, mild cardiomegaly again noted. 157/80 hr 78 rr 18 sat 91% on 02@4lpm n/c       P: Continue " with current regimen at this time. Will continue to provide symptomatic and supportive palliative care for patient and family members. Will assist with disposition if needed.       We will continue to follow along. Please do not hesitate to contact us regarding further sx mgmt or GOC needs, including after hours or on weekends via our on call provider at [phone].     DEEPA Naidu    10/20/2023    Electronically signed by Kimber Martel APRN at 10/20/23 1245       Vani León MD at 10/20/23 0800          Nephrology Progress Note      Subjective     Not in acute distress, no chest pain    Objective       Vital signs :     Vitals:    10/20/23 0322 10/20/23 0500 10/20/23 0659 10/20/23 0713   BP:       BP Location:       Patient Position:       Pulse: 83  83 87   Resp: 20 20 16   Temp:       TempSrc:       SpO2: 96%  93% 99%   Weight:  101 kg (223 lb 9.6 oz)     Height:            Intake/Output                         10/18/23 0701 - 10/19/23 0700 10/19/23 0701 - 10/20/23 0700     [id number] 5929-5334 Total 3905-4062 9945-9401 Total                 Intake    P.O.  720  -- 720  --  150 150    I.V.  --  -- --  50  -- 50    Total Intake 720 -- 720 50 150 200       Output    Urine  375  -- 375  --  300 300    Dialysis  --  -- --  --  2000 2000    Total Output 375 -- 375 -- 2300 2300             Physical Exam:    General Appearance : Not in acute distress  Lungs : Bibasilar crackles noted trace edema  Heart :  regular rhythm & normal rate, normal S1, S2 and no murmur, no rub  Abdomen : Soft nontender nondistended   extremities : Trace edema  Neurologic :   orientated to person, place, time and situation, Grossly no focal deficits    Laboratory Data :       CBC and coagulation:  Results from last 7 days   Lab Units 10/20/23  0157 10/18/23  0059 10/17/23  1744   WBC 10*3/mm3 7.83 11.60* 10.86*   HEMOGLOBIN g/dL 8.7* 9.3* 9.2*   HEMATOCRIT % 28.8* 31.5* 31.0*   MCV fL 89.7 91.8 91.2   MCHC g/dL 30.2* 29.5*  "29.7*   PLATELETS 10*3/mm3 222 242 259     Acid/base balance:  Results from last 7 days   Lab Units 10/16/23  2202 10/16/23  2020   PH, ARTERIAL pH units 7.311* 7.288*   PO2 ART mm Hg 74.9* 75.8*   PCO2, ARTERIAL mm Hg 47.4* 50.6*   HCO3 ART mmol/L 23.9 24.2       Renal and electrolytes:    Results from last 7 days   Lab Units 10/20/23  0157 10/19/23  0055 10/18/23  0059 10/17/23  1744 10/17/23  0038   SODIUM mmol/L 135* 134* 132* 134* 139   POTASSIUM mmol/L 4.6 5.9* 5.2 5.2 5.3*   CHLORIDE mmol/L 97* 101 100 99 104   CO2 mmol/L 25.0 20.2* 20.5* 22.9 22.5   BUN mg/dL 50* 75* 69* 69* 63*   CREATININE mg/dL 5.16* 6.93* 6.32* 6.10* 5.10*   CALCIUM mg/dL 8.9 9.0 9.0 8.7 8.6     Estimated Creatinine Clearance: 11.6 mL/min (A) (by C-G formula based on SCr of 5.16 mg/dL (H)).     Liver and pancreatic function:  Results from last 7 days   Lab Units 10/20/23  0157 10/18/23  0059 10/17/23  0038   ALBUMIN g/dL 3.1* 3.6 3.5   BILIRUBIN mg/dL 0.4 0.3 0.2   ALK PHOS U/L 124* 134* 89   AST (SGOT) U/L 7 10 8   ALT (SGPT) U/L 5 8 6       Albumin Albumin   Date Value Ref Range Status   10/20/2023 3.1 (L) 3.5 - 5.2 g/dL Final   10/18/2023 3.6 3.5 - 5.2 g/dL Final      Magnesium No results found for: \"MG\"         PTH               No results found for: \"PTH\"    Cardiac:        Liver and pancreatic function:  Results from last 7 days   Lab Units 10/20/23  0157 10/18/23  0059 10/17/23  0038   ALBUMIN g/dL 3.1* 3.6 3.5   BILIRUBIN mg/dL 0.4 0.3 0.2   ALK PHOS U/L 124* 134* 89   AST (SGOT) U/L 7 10 8   ALT (SGPT) U/L 5 8 6       CT Chest Without Contrast Diagnostic    Result Date: 10/19/2023  1.  Tiny pericardial effusion. 2.  Right lung base fairly extensive atelectasis and probably consolidative pneumonia. Again there may be some mucus plugging or narrowing. 3.  Again there is tiny bilateral pleural effusions. 4.  Cardiomegaly.   This report was finalized on 10/19/2023 3:51 PM by Dr. Kirk Mcdonald MD.      XR Chest AP    Result Date: " 10/19/2023  1.  There is been collapse of probably the right lower lobe possibly related to mucous plug. 2.  Small right pleural effusion persists. 3.  Improved aeration of the left lung base.   This report was finalized on 10/19/2023 2:00 PM by Dr. Kirk Mcdonald MD.      FL Surgery Fluoro    Result Date: 10/19/2023    As above.   This report was finalized on 10/19/2023 1:42 PM by Dr. Kirk Mcdonald MD.      XR Chest 1 View    Result Date: 10/18/2023  1.  Coarsened interstitial markings and vague bibasilar airspace opacities again noted. 2.  Now small right pleural effusion. 3.  Tiny left pleural effusion. 4.  Cardiomegaly.   This report was finalized on 10/18/2023 10:22 AM by Dr. Kirk Mcdonald MD.      XR Chest 1 View    Result Date: 10/13/2023  1.  Stable changes of CHF with interstitial edema. 2.  Development of bibasilar airspace disease which may represent atelectasis or pneumonia.   This report was finalized on 10/13/2023 12:43 PM by Dr. Eduardo August MD.      XR Chest 1 View    Result Date: 10/12/2023    Significant interval improvement in CHF/volume overload.   This report was finalized on 10/12/2023 12:18 PM by Dr. Eduardo August MD.        Medications :     acetylcysteine, 1.5 mL, Nebulization, Q4H - RT  aspirin, 81 mg, Oral, Daily  carvedilol, 25 mg, Oral, BID With Meals  folic acid, 1 mg, Oral, Daily  heparin (porcine), 5,000 Units, Subcutaneous, Q12H  hydrALAZINE, 50 mg, Oral, Q8H  ipratropium-albuterol, 3 mL, Nebulization, Q4H - RT  levothyroxine, 88 mcg, Oral, Q AM  montelukast, 10 mg, Oral, Nightly  nicotine, 1 patch, Transdermal, Q24H  pantoprazole, 40 mg, Oral, Daily  rosuvastatin, 10 mg, Oral, Nightly  sodium chloride, 10 mL, Intravenous, Q12H  sodium zirconium cyclosilicate, 10 g, Oral, Once  vitamin B-12, 1,000 mcg, Oral, Daily             Assessment & Plan     -Acute kidney injury  - Acute hypokalemia  -Atrophic right kidney  -Chronic kidney disease stage IIIa  -Acute hyperkalemia  -Bilateral  "edema  -Acute hypoxic respite failure  -COPD  -Hypertension  -Medical noncompliance    Patient is getting second session of dialysis today will do third consecutive session tomorrow and then will leave on Tuesdays Thursdays and Saturdays intermittent dialysis  Once outpatient dialysis chair gets arranged patient can be discharged home    MAICO on CKD most likely multifactori including ATN from prolonged prerenal state and hemodynamic changes with concomitant use of ACE inhibitors in setting of volume loss   baseline creatinine 2.2, admitted with 3.69   ultrasound of the kidneys showed atrophic right kidney  Serologies are negative    Bilateral lower extremity swelling is better, will hold further diuretics today  - Cardington Kaiser's catheter    Please avoid nephrotoxic medication and pharmacy to adjust the medication according to the GFR     Plan of care discussed with pt, and family answered all questions, patient verbalized understanding and agreed.      Vani León MD  10/20/23  08:00 EDT      Electronically signed by Vani León MD at 10/20/23 1606          Consult Notes (last 72 hours)        Renny Cruz MD at 10/22/23 1321        Consult Orders    1. Inpatient Psychiatrist Consult [[id number]] ordered by Vani León MD at 10/21/23 1055                 Referring Provider: Vani León MD   Reason for Consultation: has history of extreme anxiety and likely generilzed anxiety disorder, now with agitation.       Chief complaint/Focus of Exam:  Lower extremity edema, Worsening of shrtness of breath    Subjective . \" I am doing okay today\"    History of present illness:  Patient is a 67 year old female with past medical history f CAD, S/P STENT, COPD, Hypertension, CKD, HYPOTHYROIDISM, ANXIETY, OBESITY , NONCOMPLIANCE, admitted to [facility] for lower extremity edema, and worsening of shortness of breath.  Psychiatry is consulted to assess for anxiety, agitation.  Upon evaluation she is " "pleasant this morning, interacting appropriately, she has her son at bed side and two grand daughters at bedside one is an adult with being in the process of becoming RN. Per patient she is feeling much better today and some days she feels little weird before her Hemodialysis, she states history anxiety and getting overwhelmed , she said \" it is not that nobody ever experiences\", But \"I never felt hopeless or worthless, I did not feel suicidal or homicidal or anything like that \" Per her son and adult grand daughter patient did have base line anxiety which is manifested as difficulty breathing and palpitations, usually at nights, patient is somewhat dismissive of her not taking recommendations from medical providers seriously, son stated he will not know what doctors are recommending the patient to do, stating that she does not follow the directions by providers. Patient denies significant mood episodes either depression or juan, denies auditory and visual hallucinations, except the time prior to or after her hemodialysis.  Patient denies seen ing a psychiatrist in the past, denies any psychotropic therapy except her son said she may have been given Xanax on and off for anxiety related to hemodialysis.  Patient denies previous suicide attempts, denies previous psychiatric hospitalizations.  Review of Systems  Pertinent items are noted in HPI, all other systems reviewed and negative    History  Past Medical History:   Diagnosis Date    Anxiety     Arthritis     Coronary artery disease     H/O heart artery stent     Hyperlipidemia     Hypertension     Obesity    ,   Past Surgical History:   Procedure Laterality Date    ANKLE SURGERY      RIGHT    APPENDECTOMY      CARDIAC CATHETERIZATION      LEFT    CORONARY STENT PLACEMENT      HYSTERECTOMY     ,   Family History   Problem Relation Age of Onset    Heart disease Mother     Heart attack Mother 73    Fibromyalgia Mother     Heart attack Father 72    Ovarian cancer " Sister     Coronary artery disease Other     Breast cancer Neg Hx    ,   Social History     Socioeconomic History    Marital status:    Tobacco Use    Smoking status: Every Day     Packs/day: 0.50     Years: 30.00     Additional pack years: 0.00     Total pack years: 15.00     Types: Electronic Cigarette, Cigarettes    Smokeless tobacco: Never   Vaping Use    Vaping Use: Never used   Substance and Sexual Activity    Alcohol use: No    Drug use: No    Sexual activity: Never     E-cigarette/Vaping    E-cigarette/Vaping Use Never User     Passive Exposure No     Counseling Given No      E-cigarette/Vaping Substances    Nicotine No     THC No     CBD No     Flavoring No      E-cigarette/Vaping Devices    Disposable No     Pre-filled or Refillable Cartridge No     Refillable Tank No     Pre-filled Pod No          ,   Medications Prior to Admission   Medication Sig Dispense Refill Last Dose    aspirin 325 MG tablet Take 1 tablet by mouth Daily.   10/8/2023    carvedilol (COREG) 25 MG tablet Take 1 tablet by mouth 2 (Two) Times a Day With Meals. 60 tablet 0 10/8/2023    folic acid (FOLVITE) 1 MG tablet Take 1 tablet by mouth Daily.   10/8/2023    hydrALAZINE (APRESOLINE) 25 MG tablet Take 1 tablet by mouth 2 (Two) Times a Day. 60 tablet 0 10/8/2023    levothyroxine (SYNTHROID, LEVOTHROID) 88 MCG tablet Take 1 tablet by mouth Daily.   10/8/2023    lisinopril (PRINIVIL,ZESTRIL) 5 MG tablet Take 1 tablet by mouth Daily.   10/8/2023    montelukast (SINGULAIR) 10 MG tablet Take 1 tablet by mouth Every Night.   10/8/2023    pantoprazole (PROTONIX) 40 MG EC tablet Take 1 tablet by mouth Daily.   10/8/2023    rosuvastatin (CRESTOR) 20 MG tablet Take 1 tablet by mouth Daily. 30 tablet 0 10/8/2023    vitamin B-12 (CYANOCOBALAMIN) 1000 MCG tablet Take 1 tablet by mouth Daily.   10/8/2023    gabapentin (NEURONTIN) 800 MG tablet Take 1 tablet by mouth 2 (Two) Times a Day As Needed (nerve pain).   Unknown     HYDROcodone-acetaminophen (NORCO)  MG per tablet Take 1 tablet by mouth Every 8 (Eight) Hours As Needed for Moderate Pain.   Unknown    nitroglycerin (NITROSTAT) 0.4 MG SL tablet Place 1 tablet under the tongue Every 5 (Five) Minutes As Needed for Chest Pain. 30 tablet 2 Unknown   , Scheduled Meds:  acetylcysteine, 1.5 mL, Nebulization, Q6H - RT  aspirin, 81 mg, Oral, Daily  carvedilol, 25 mg, Oral, BID With Meals  folic acid, 1 mg, Oral, Daily  gabapentin, 200 mg, Oral, Q12H  heparin (porcine), 5,000 Units, Subcutaneous, Q12H  hydrALAZINE, 50 mg, Oral, Q8H  ipratropium-albuterol, 3 mL, Nebulization, Q6H - RT  levothyroxine, 88 mcg, Oral, Q AM  montelukast, 10 mg, Oral, Nightly  nicotine, 1 patch, Transdermal, Q24H  pantoprazole, 40 mg, Oral, Daily  rosuvastatin, 10 mg, Oral, Nightly  sodium chloride, 10 mL, Intravenous, Q12H  vitamin B-12, 1,000 mcg, Oral, Daily   , and Allergies:  Patient has no known allergies.    Objective     Vital Signs   Temp:  [97.9 °F (36.6 °C)-98.1 °F (36.7 °C)] 98 °F (36.7 °C)  Heart Rate:  [71-83] 73  Resp:  [20-21] 20  BP: (150-163)/(72-85) 160/84    Mental Status Exam:  Hygiene:   fair  Cooperation:  Evasive  Eye Contact:  Fair  Psychomotor Behavior:  Appropriate  Affect:  Full range  Hopelessness: Denies  Speech:  Normal  Thought Progress:  Goal directed  Thought Content:  Normal  Suicidal:  None  Homicidal:  None  Hallucinations:  None  Delusion:  None  Memory:  Intact  Orientation:  Person, Place, Time, and Situation  Reliability:  fair  Insight:  Fair  Judgement:  Fair  Impulse Control:  Fair    Results Review:   I reviewed the patient's new clinical results.  Lab Results (last 24 hours)       Procedure Component Value Units Date/Time    POC Glucose Once [[id number]]  (Normal) Collected: 10/22/23 1010    Specimen: Blood Updated: 10/22/23 1017     Glucose 110 mg/dL     POC Glucose Once [[id number]]  (Normal) Collected: 10/22/23 0655    Specimen: Blood Updated: 10/22/23 0712      Glucose 83 mg/dL     Blood Culture ID, PCR - Blood, Arm, Right [[id number]] Collected: 10/20/23 1334    Specimen: Blood from Arm, Right Updated: 10/22/23 0519     BCID, PCR Negative by BCID PCR. Culture to Follow.     BOTTLE TYPE Aerobic Bottle    Blood Culture - Blood, Arm, Right [[id number]]  (Abnormal) Collected: 10/20/23 1334    Specimen: Blood from Arm, Right Updated: 10/22/23 0519     Blood Culture Abnormal Stain     Gram Stain Aerobic Bottle Gram positive cocci in clusters    POC Glucose Once [[id number]]  (Normal) Collected: 10/21/23 2115    Specimen: Blood Updated: 10/21/23 2120     Glucose 86 mg/dL     High Sensitivity Troponin T 2Hr [[id number]]  (Abnormal) Collected: 10/21/23 1716    Specimen: Blood Updated: 10/21/23 1842     HS Troponin T 54 ng/L      Troponin T Delta 3 ng/L     Narrative:      High Sensitive Troponin T Reference Range:  <10.0 ng/L- Negative Female for AMI  <15.0 ng/L- Negative Male for AMI  >=10 - Abnormal Female indicating possible myocardial injury.  >=15 - Abnormal Male indicating possible myocardial injury.   Clinicians would have to utilize clinical acumen, EKG, Troponin, and serial changes to determine if it is an Acute Myocardial Infarction or myocardial injury due to an underlying chronic condition.         High Sensitivity Troponin T [[id number]]  (Abnormal) Collected: 10/21/23 1316    Specimen: Blood Updated: 10/21/23 1418     HS Troponin T 51 ng/L     Narrative:      High Sensitive Troponin T Reference Range:  <10.0 ng/L- Negative Female for AMI  <15.0 ng/L- Negative Male for AMI  >=10 - Abnormal Female indicating possible myocardial injury.  >=15 - Abnormal Male indicating possible myocardial injury.   Clinicians would have to utilize clinical acumen, EKG, Troponin, and serial changes to determine if it is an Acute Myocardial Infarction or myocardial injury due to an underlying chronic condition.         Blood Culture - Blood, Arm, Left [[id number]]  (Normal) Collected: 10/20/23  1234    Specimen: Blood from Arm, Left Updated: 10/21/23 1401     Blood Culture No growth at 24 hours          Imaging Results (Last 24 Hours)       ** No results found for the last 24 hours. 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   Sertraline 25 mg po q daily , informed patient and family the benefits, risks, side effects, informed risk of addiction with benzodiazepine for anxiety disorder .  Patient and family agreed with plan.      I discussed the patient's findings and my recommendations with patient, family, and nursing staff    Renny Cruz MD  10/22/23  13:21 EDT     Electronically signed by Renny Cruz MD at 10/22/23 4095

## 2023-10-23 LAB
ANION GAP SERPL CALCULATED.3IONS-SCNC: 10 MMOL/L (ref 5–15)
BACTERIA SPEC AEROBE CULT: ABNORMAL
BUN SERPL-MCNC: 43 MG/DL (ref 8–23)
BUN/CREAT SERPL: 7.9 (ref 7–25)
CALCIUM SPEC-SCNC: 8.9 MG/DL (ref 8.6–10.5)
CHLORIDE SERPL-SCNC: 94 MMOL/L (ref 98–107)
CO2 SERPL-SCNC: 31 MMOL/L (ref 22–29)
CREAT SERPL-MCNC: 5.43 MG/DL (ref 0.57–1)
DEPRECATED RDW RBC AUTO: 53.1 FL (ref 37–54)
EGFRCR SERPLBLD CKD-EPI 2021: 8.2 ML/MIN/1.73
ERYTHROCYTE [DISTWIDTH] IN BLOOD BY AUTOMATED COUNT: 16.3 % (ref 12.3–15.4)
GLUCOSE BLDC GLUCOMTR-MCNC: 106 MG/DL (ref 70–130)
GLUCOSE BLDC GLUCOMTR-MCNC: 108 MG/DL (ref 70–130)
GLUCOSE BLDC GLUCOMTR-MCNC: 99 MG/DL (ref 70–130)
GLUCOSE SERPL-MCNC: 100 MG/DL (ref 65–99)
GRAM STN SPEC: ABNORMAL
HCT VFR BLD AUTO: 26.8 % (ref 34–46.6)
HGB BLD-MCNC: 8 G/DL (ref 12–15.9)
ISOLATED FROM: ABNORMAL
MCH RBC QN AUTO: 26.9 PG (ref 26.6–33)
MCHC RBC AUTO-ENTMCNC: 29.9 G/DL (ref 31.5–35.7)
MCV RBC AUTO: 90.2 FL (ref 79–97)
PLATELET # BLD AUTO: 190 10*3/MM3 (ref 140–450)
PMV BLD AUTO: 10.4 FL (ref 6–12)
POTASSIUM SERPL-SCNC: 4.7 MMOL/L (ref 3.5–5.2)
RBC # BLD AUTO: 2.97 10*6/MM3 (ref 3.77–5.28)
SODIUM SERPL-SCNC: 135 MMOL/L (ref 136–145)
WBC NRBC COR # BLD: 5.71 10*3/MM3 (ref 3.4–10.8)

## 2023-10-23 PROCEDURE — 82948 REAGENT STRIP/BLOOD GLUCOSE: CPT

## 2023-10-23 PROCEDURE — 94799 UNLISTED PULMONARY SVC/PX: CPT

## 2023-10-23 PROCEDURE — 93005 ELECTROCARDIOGRAM TRACING: CPT | Performed by: INTERNAL MEDICINE

## 2023-10-23 PROCEDURE — 93010 ELECTROCARDIOGRAM REPORT: CPT | Performed by: SPECIALIST

## 2023-10-23 PROCEDURE — 99233 SBSQ HOSP IP/OBS HIGH 50: CPT | Performed by: INTERNAL MEDICINE

## 2023-10-23 PROCEDURE — 94761 N-INVAS EAR/PLS OXIMETRY MLT: CPT

## 2023-10-23 PROCEDURE — 94664 DEMO&/EVAL PT USE INHALER: CPT

## 2023-10-23 PROCEDURE — 25010000002 HEPARIN (PORCINE) PER 1000 UNITS: Performed by: SURGERY

## 2023-10-23 PROCEDURE — 93005 ELECTROCARDIOGRAM TRACING: CPT

## 2023-10-23 PROCEDURE — 80048 BASIC METABOLIC PNL TOTAL CA: CPT | Performed by: STUDENT IN AN ORGANIZED HEALTH CARE EDUCATION/TRAINING PROGRAM

## 2023-10-23 PROCEDURE — 85027 COMPLETE CBC AUTOMATED: CPT | Performed by: STUDENT IN AN ORGANIZED HEALTH CARE EDUCATION/TRAINING PROGRAM

## 2023-10-23 PROCEDURE — 99232 SBSQ HOSP IP/OBS MODERATE 35: CPT | Performed by: INTERNAL MEDICINE

## 2023-10-23 RX ORDER — ALPRAZOLAM 0.5 MG/1
0.5 TABLET ORAL ONCE
Status: COMPLETED | OUTPATIENT
Start: 2023-10-23 | End: 2023-10-23

## 2023-10-23 RX ORDER — BISACODYL 10 MG
10 SUPPOSITORY, RECTAL RECTAL ONCE
Status: COMPLETED | OUTPATIENT
Start: 2023-10-23 | End: 2023-10-23

## 2023-10-23 RX ORDER — METOPROLOL TARTRATE 50 MG/1
50 TABLET, FILM COATED ORAL EVERY 12 HOURS SCHEDULED
Status: DISCONTINUED | OUTPATIENT
Start: 2023-10-23 | End: 2023-10-26

## 2023-10-23 RX ORDER — METOPROLOL TARTRATE 5 MG/5ML
2.5 INJECTION INTRAVENOUS ONCE
Status: COMPLETED | OUTPATIENT
Start: 2023-10-24 | End: 2023-10-24

## 2023-10-23 RX ORDER — ALPRAZOLAM 1 MG/1
1 TABLET ORAL ONCE
Qty: 1 TABLET | Refills: 0 | Status: COMPLETED | OUTPATIENT
Start: 2023-10-24 | End: 2023-10-24

## 2023-10-23 RX ADMIN — GABAPENTIN 200 MG: 100 CAPSULE ORAL at 20:39

## 2023-10-23 RX ADMIN — HEPARIN SODIUM 5000 UNITS: 5000 INJECTION INTRAVENOUS; SUBCUTANEOUS at 20:39

## 2023-10-23 RX ADMIN — SERTRALINE HYDROCHLORIDE 25 MG: 25 TABLET ORAL at 09:01

## 2023-10-23 RX ADMIN — ASPIRIN 81 MG: 81 TABLET, COATED ORAL at 08:59

## 2023-10-23 RX ADMIN — PANTOPRAZOLE SODIUM 40 MG: 40 TABLET, DELAYED RELEASE ORAL at 09:01

## 2023-10-23 RX ADMIN — MUPIROCIN 1 APPLICATION: 20 OINTMENT TOPICAL at 20:39

## 2023-10-23 RX ADMIN — BISACODYL 10 MG: 10 SUPPOSITORY RECTAL at 06:11

## 2023-10-23 RX ADMIN — LEVOTHYROXINE SODIUM 88 MCG: 88 TABLET ORAL at 06:12

## 2023-10-23 RX ADMIN — IPRATROPIUM BROMIDE AND ALBUTEROL SULFATE 3 ML: 2.5; .5 SOLUTION RESPIRATORY (INHALATION) at 00:13

## 2023-10-23 RX ADMIN — ACETYLCYSTEINE 1.5 ML: 200 SOLUTION ORAL; RESPIRATORY (INHALATION) at 00:13

## 2023-10-23 RX ADMIN — Medication 10 ML: at 09:01

## 2023-10-23 RX ADMIN — Medication 1 MG: at 09:00

## 2023-10-23 RX ADMIN — HYDROCODONE BITARTRATE AND ACETAMINOPHEN 1 TABLET: 10; 325 TABLET ORAL at 20:38

## 2023-10-23 RX ADMIN — Medication 1000 MCG: at 09:01

## 2023-10-23 RX ADMIN — MUPIROCIN 1 APPLICATION: 20 OINTMENT TOPICAL at 09:01

## 2023-10-23 RX ADMIN — CARVEDILOL 25 MG: 25 TABLET, FILM COATED ORAL at 08:59

## 2023-10-23 RX ADMIN — HYDRALAZINE HYDROCHLORIDE 50 MG: 50 TABLET, FILM COATED ORAL at 06:12

## 2023-10-23 RX ADMIN — Medication 10 ML: at 20:39

## 2023-10-23 RX ADMIN — METOPROLOL TARTRATE 50 MG: 50 TABLET, FILM COATED ORAL at 20:38

## 2023-10-23 RX ADMIN — GABAPENTIN 200 MG: 100 CAPSULE ORAL at 10:36

## 2023-10-23 RX ADMIN — HYDROXYZINE HYDROCHLORIDE 25 MG: 25 TABLET, FILM COATED ORAL at 20:38

## 2023-10-23 RX ADMIN — ROSUVASTATIN CALCIUM 10 MG: 10 TABLET, FILM COATED ORAL at 20:39

## 2023-10-23 RX ADMIN — IPRATROPIUM BROMIDE AND ALBUTEROL SULFATE 3 ML: 2.5; .5 SOLUTION RESPIRATORY (INHALATION) at 06:36

## 2023-10-23 RX ADMIN — MONTELUKAST SODIUM 10 MG: 10 TABLET, COATED ORAL at 20:38

## 2023-10-23 RX ADMIN — NICOTINE TRANSDERMAL SYSTEM 1 PATCH: 21 PATCH, EXTENDED RELEASE TRANSDERMAL at 10:33

## 2023-10-23 RX ADMIN — HYDRALAZINE HYDROCHLORIDE 50 MG: 50 TABLET, FILM COATED ORAL at 14:23

## 2023-10-23 RX ADMIN — ACETYLCYSTEINE 1.5 ML: 200 SOLUTION ORAL; RESPIRATORY (INHALATION) at 06:36

## 2023-10-23 RX ADMIN — HYDRALAZINE HYDROCHLORIDE 50 MG: 50 TABLET, FILM COATED ORAL at 20:38

## 2023-10-23 RX ADMIN — Medication 10 MG: at 20:38

## 2023-10-23 RX ADMIN — ALPRAZOLAM 0.5 MG: 0.5 TABLET ORAL at 08:50

## 2023-10-23 RX ADMIN — HEPARIN SODIUM 5000 UNITS: 5000 INJECTION INTRAVENOUS; SUBCUTANEOUS at 09:00

## 2023-10-23 NOTE — NURSING NOTE
No documented BM since 10/14/23. rBian APRN and Naeem APRN aware. Rectal suppository given. Will continue to monitor throughout rest of shift.

## 2023-10-23 NOTE — CASE MANAGEMENT/SOCIAL WORK
Discharge Planning Assessment   Silvestre     Patient Name: Lesley Michel  MRN: [id number]  Today's Date: 10/23/2023    Admit Date: 10/8/2023    Plan: SS contacted Silvestre VASQUEZ per Jesika who states all the information has been sent to insurance for verification at this time. SS to follow.     Discharge Plan       Row Name 10/23/23 1205       Plan    Plan SS contacted Silvestre VASQUEZ per Jesika who states all the information has been sent to insurance for verification at this time. SS to follow.      1348- SS received a call from Silvestre VASQUEZ per Jesika who states insurance has came back and approved. Jesika states Otilia is not in office today and will be able to give outpatient dialysis chair days and times tomorrow 10/24/23. SS to follow.                 LIZBET Rodriguez

## 2023-10-23 NOTE — PROGRESS NOTES
Referring Provider: dr Tellez  Reason for Consultation: sob, hypoxic respiratory failure      Chief complaint -sob      Sub-overnight events reviewed.  All the labs medications and the notes and vitals reviewed.  Oxygen requirements reviewed and adjusted to maintain a sat of 88 to 92%.  Resting in bed comfortably not in any distress.  Review of Systems-generalized fatigue and shortness of breath on exertion otherwise negative.      History  Past Medical History:   Diagnosis Date    Anxiety     Arthritis     Coronary artery disease     H/O heart artery stent     Hyperlipidemia     Hypertension     Obesity    ,   Past Surgical History:   Procedure Laterality Date    ANKLE SURGERY      RIGHT    APPENDECTOMY      CARDIAC CATHETERIZATION      LEFT    CORONARY STENT PLACEMENT      HYSTERECTOMY     ,   Family History   Problem Relation Age of Onset    Heart disease Mother     Heart attack Mother 73    Fibromyalgia Mother     Heart attack Father 72    Ovarian cancer Sister     Coronary artery disease Other     Breast cancer Neg Hx    ,   Social History     Tobacco Use    Smoking status: Every Day     Packs/day: 0.50     Years: 30.00     Additional pack years: 0.00     Total pack years: 15.00     Types: Electronic Cigarette, Cigarettes    Smokeless tobacco: Never   Vaping Use    Vaping Use: Never used   Substance Use Topics    Alcohol use: No    Drug use: No   ,   Medications Prior to Admission   Medication Sig Dispense Refill Last Dose    aspirin 325 MG tablet Take 1 tablet by mouth Daily.   10/8/2023    carvedilol (COREG) 25 MG tablet Take 1 tablet by mouth 2 (Two) Times a Day With Meals. 60 tablet 0 10/8/2023    folic acid (FOLVITE) 1 MG tablet Take 1 tablet by mouth Daily.   10/8/2023    hydrALAZINE (APRESOLINE) 25 MG tablet Take 1 tablet by mouth 2 (Two) Times a Day. 60 tablet 0 10/8/2023    levothyroxine (SYNTHROID, LEVOTHROID) 88 MCG tablet Take 1 tablet by mouth Daily.   10/8/2023    lisinopril  (PRINIVIL,ZESTRIL) 5 MG tablet Take 1 tablet by mouth Daily.   10/8/2023    montelukast (SINGULAIR) 10 MG tablet Take 1 tablet by mouth Every Night.   10/8/2023    pantoprazole (PROTONIX) 40 MG EC tablet Take 1 tablet by mouth Daily.   10/8/2023    rosuvastatin (CRESTOR) 20 MG tablet Take 1 tablet by mouth Daily. 30 tablet 0 10/8/2023    vitamin B-12 (CYANOCOBALAMIN) 1000 MCG tablet Take 1 tablet by mouth Daily.   10/8/2023    gabapentin (NEURONTIN) 800 MG tablet Take 1 tablet by mouth 2 (Two) Times a Day As Needed (nerve pain).   Unknown    HYDROcodone-acetaminophen (NORCO)  MG per tablet Take 1 tablet by mouth Every 8 (Eight) Hours As Needed for Moderate Pain.   Unknown    nitroglycerin (NITROSTAT) 0.4 MG SL tablet Place 1 tablet under the tongue Every 5 (Five) Minutes As Needed for Chest Pain. 30 tablet 2 Unknown   , Scheduled Meds:  acetylcysteine, 1.5 mL, Nebulization, Q6H - RT  [START ON 10/24/2023] ALPRAZolam, 1 mg, Oral, Once  aspirin, 81 mg, Oral, Daily  folic acid, 1 mg, Oral, Daily  gabapentin, 200 mg, Oral, Q12H  heparin (porcine), 5,000 Units, Subcutaneous, Q12H  hydrALAZINE, 50 mg, Oral, Q8H  ipratropium-albuterol, 3 mL, Nebulization, Q6H - RT  levothyroxine, 88 mcg, Oral, Q AM  metoprolol tartrate, 50 mg, Oral, Q12H  montelukast, 10 mg, Oral, Nightly  mupirocin, 1 application , Each Nare, BID  nicotine, 1 patch, Transdermal, Q24H  pantoprazole, 40 mg, Oral, Daily  rosuvastatin, 10 mg, Oral, Nightly  sertraline, 25 mg, Oral, Daily  sodium chloride, 10 mL, Intravenous, Q12H  vitamin B-12, 1,000 mcg, Oral, Daily    , Continuous Infusions:      and Allergies:  Patient has no known allergies.    Objective     Vital Signs   Temp:  [97.9 °F (36.6 °C)-98.5 °F (36.9 °C)] 98.4 °F (36.9 °C)  Heart Rate:  [] 137  Resp:  [18-20] 20  BP: (100-157)/(56-81) 100/74    Physical Exam:           General-resting in bed comfortably not in any distress    HEENT-PERRLA    Neck-supple    Respiratory- not in any  "respiratory distress.  Wearing nasal cannula oxygen  Cardiovascular-  Normal S1 and S2. No S3, S4 or murmurs. No JVD, no carotid bruit and no edema, pulses normal bilaterally     GI-nontender nondistended bowel sounds positive    CNS-nonfocal    Musculoskeletal -no edema  Extremities- no obvious deformity noticed     Psychiatric-not awake oriented skin- no visible rash                                                                   Results Review:    LABS:    Lab Results   Component Value Date    GLUCOSE 100 (H) 10/23/2023    BUN 43 (H) 10/23/2023    CREATININE 5.43 (H) 10/23/2023    BCR 7.9 10/23/2023    CO2 31.0 (H) 10/23/2023    CALCIUM 8.9 10/23/2023    PROTENTOTREF 6.5 10/11/2023    ALBUMIN 3.1 (L) 10/20/2023    LABIL2 1.1 10/11/2023    AST 7 10/20/2023    ALT 5 10/20/2023    WBC 5.71 10/23/2023    HGB 8.0 (L) 10/23/2023    HCT 26.8 (L) 10/23/2023    MCV 90.2 10/23/2023     10/23/2023     (L) 10/23/2023    K 4.7 10/23/2023    CL 94 (L) 10/23/2023    ANIONGAP 10.0 10/23/2023       No results found for: \"INR\", \"PROTIME\"             I reviewed the patient's new clinical results.  I reviewed the patient's new imaging results and agree with the interpretation.      Assessment & Plan     Shortness of breath-likely multifactorial most likely related to her chronic heart failure with preserved ejection fraction with elevated brain atretic peptide.  Failed diuretics trial and has been on dialysis since 10/19.  Initially did not tolerate HD due to heart rate being high.  Latest chest x-ray shows improvement- d/w patient       COPD-continue oxygen to maintain saturation 88 to 92%.  Continue nebs.  Completed steroids and azithromycin.  If gets short of breath will start on BiPAP.    FLOR-sleep study on the outpatient basis.    Renal failure -on dialysis now.  Nephrology on case.    Bedside rounds were done with RT and patient's nurse. All the lab and clinical findings were discussed with them and plan was " also discussed in great detail.      Smoker-did extensive smoking cessation counseling.  Patient is not ready to quit yet.  Follow-up with pulmonary in the outpatient basis.  PFTs on outpatient basis.        Echo-  Results for orders placed during the hospital encounter of 10/08/23    Adult Transthoracic Echo Complete W/ Cont if Necessary Per Protocol    Interpretation Summary    Left ventricular systolic function is normal. Left ventricular ejection fraction appears to be 56 - 60%.    Left ventricular diastolic function is consistent with (grade Ia w/high LAP) impaired relaxation.    The left atrial cavity is mild to moderately dilated.    Left atrial volume is moderately increased.    Mild aortic valve stenosis is present.    Estimated right ventricular systolic pressure from tricuspid regurgitation is mildly elevated (35-45 mmHg).    There is a small (<1cm) pericardial effusion. There is no evidence of cardiac tamponade.          Acute hypoxic respiratory failure    Acute renal failure (ARF)          Elmer Mckeon MD  10/23/23  19:46 EDT

## 2023-10-23 NOTE — PROGRESS NOTES
[facility] HOSPITALIST PROGRESS NOTE    Subjective     History:   Lesley Michel is a 67 y.o. female admitted on 10/8/2023 secondary to Acute hypoxic respiratory failure     Procedures:   10/19/23: Right IJ tunneled HD catheter placement     CC: Follow up MAICO    Patient seen and examined in dialysis. Awake and alert. Reports anxiety with initiation of HD. Dyspnea and edema overall improved from previous. No reported CP. No reported vomiting. Tachycardic with initiation of HD. No acute events overnight per RN.     History taken from: patient, chart, and RN.      Objective     Vital Signs  Temp:  [97.9 °F (36.6 °C)-98.5 °F (36.9 °C)] 98.5 °F (36.9 °C)  Heart Rate:  [] 110  Resp:  [18-20] 20  BP: (110-157)/(56-81) 131/81    Intake/Output Summary (Last 24 hours) at 10/23/2023 1619  Last data filed at 10/23/2023 1226  Gross per 24 hour   Intake 580 ml   Output 100 ml   Net 480 ml         Physical Exam:   General:    Awake, alert, appears anxious, ill appearing   Heart:      Normal S1 and S2. Tachycardic. No significant murmur, rubs or gallops appreciated.   Lungs:     Respirations regular, even and unlabored. Scattered rales with diminished breath sounds at bases. No wheezes appreciated.     Abdomen:   Soft and nontender. No guarding, rebound tenderness or  organomegaly noted. Bowel sounds present x 4.   Extremities:  Tr bilateral lower extremity edema. Moves UE and LE equally B/L.     Results Review:    Results from last 7 days   Lab Units 10/23/23  0255 10/21/23  0325 10/20/23  0157 10/18/23  0059 10/17/23  1744   WBC 10*3/mm3 5.71 8.84 7.83 11.60* 10.86*   HEMOGLOBIN g/dL 8.0* 8.8* 8.7* 9.3* 9.2*   PLATELETS 10*3/mm3 190 226 222 242 259     Results from last 7 days   Lab Units 10/23/23  0255 10/21/23  0325 10/20/23  0157 10/19/23  0055 10/18/23  0059 10/17/23  1744 10/17/23  0038   SODIUM mmol/L 135* 133* 135* 134* 132* 134* 139   POTASSIUM mmol/L 4.7 4.4 4.6 5.9* 5.2 5.2 5.3*   CHLORIDE mmol/L  94* 93* 97* 101 100 99 104   CO2 mmol/L 31.0* 25.6 25.0 20.2* 20.5* 22.9 22.5   BUN mg/dL 43* 32* 50* 75* 69* 69* 63*   CREATININE mg/dL 5.43* 3.83* 5.16* 6.93* 6.32* 6.10* 5.10*   CALCIUM mg/dL 8.9 9.0 8.9 9.0 9.0 8.7 8.6   GLUCOSE mg/dL 100* 71 81 107* 126* 116* 101*     Results from last 7 days   Lab Units 10/20/23  0157 10/18/23  0059 10/17/23  0038   BILIRUBIN mg/dL 0.4 0.3 0.2   ALK PHOS U/L 124* 134* 89   AST (SGOT) U/L 7 10 8   ALT (SGPT) U/L 5 8 6               Results from last 7 days   Lab Units 10/21/23  1716 10/21/23  1316   HSTROP T ng/L 54* 51*       Imaging Results (Last 24 Hours)       Procedure Component Value Units Date/Time    XR Chest 1 View [[id number]] Collected: 10/22/23 2054     Updated: 10/22/23 2100    Narrative:      Procedure: Upright portable AP view chest.     Comparison: 10/20/2023.     Findings: A right-sided central venous at the expected position of the  catheter noted with distal tip atriocaval junction/SVC, unchanged.   Right lower lobe demonstrates left airspace consolidation or pleural  effusion when compared to the prior examination.  The platelike  atelectasis within the left mid hemithorax is also resolved.     Chest is fairly well expanded persistent mild to moderate pulmonary  edema present with interstitial changes.  Cardiac silhouette unchanged  and enlarged.     Overall, the congestive changes are mildly improved when compared to the  prior examination.       Impression:         Mild improvement in the appearance of the chest when compared to the  prior examination.        This report was finalized on 10/22/2023 8:58 PM by Shayna Gee MD.                 Medications:  acetylcysteine, 1.5 mL, Nebulization, Q6H - RT  [START ON 10/24/2023] ALPRAZolam, 1 mg, Oral, Once  aspirin, 81 mg, Oral, Daily  carvedilol, 25 mg, Oral, BID With Meals  folic acid, 1 mg, Oral, Daily  gabapentin, 200 mg, Oral, Q12H  heparin (porcine), 5,000 Units, Subcutaneous, Q12H  hydrALAZINE, 50  mg, Oral, Q8H  ipratropium-albuterol, 3 mL, Nebulization, Q6H - RT  levothyroxine, 88 mcg, Oral, Q AM  montelukast, 10 mg, Oral, Nightly  mupirocin, 1 application , Each Nare, BID  nicotine, 1 patch, Transdermal, Q24H  pantoprazole, 40 mg, Oral, Daily  rosuvastatin, 10 mg, Oral, Nightly  sertraline, 25 mg, Oral, Daily  sodium chloride, 10 mL, Intravenous, Q12H  vitamin B-12, 1,000 mcg, Oral, Daily               Assessment & Plan   Anasarca: proBNP and ReDs vest reading elevated on admission. Imaging revealed CHF. Echo revealed an EF of 56-60%, grade I diastolic dysfunction, mild AS, mild pulmonary HTN and small pericardial effusion with no evidence of cardiac tamponade. Worsening renal failure possibly contributing with acute diastolic CHF as well. Failed trials of diuretics with initiation of HD on 10/19. Pt unable to tolerate HD today due to tachycardia. Possible anxiety component addressed. Change Coreg to metoprolol and consult cardiology.  HD per nephrology.     Acute exacerbation of COPD: Possibly exacerbated by pulmonary edema. Respiratory PCR negative. Pulm consulted and assisted with steroid taper. Course of azithromycin completed. Wheezing improved. Cont nebs. Pulm input appreciated.     Bilateral pneumonia: Procal normal. Cultures with NGTD. Completed course of azithromycin as above. Cont nebs.     Acute hypoxic respiratory failure: Likely multifactorial in the setting of above. Post-op mucous plugging noted as well but improved. Pt has refused BiPAP. O2 requirements stable today. Treatment as outlined above. Wean supplemental O2 as tolerated.     MAICO on CKD IIIa with initiation of HD this admission: Baseline Cr appears to be around 2.2. Non nephrotic proteinuria. Complements are normal with negative serologies. No evidence of hydronephrosis on renal US with US revealing small and echogenic right kidney. S/P tunneled HD catheter placement with initiation of HD on 10/19 as above. Cont to monitor closely.  Nephrology input appreciated.     Hyperkalemia: Resolved with targeted treatment and initiation of HD. Holding home lisinopril. Monitor on telemetry.     Essential HTN: BP overall improved. Changed Coreg to metoprolol as above. Cont hydralazine. Attempt to avoid wide fluctuations in BP. Cont to monitor.     Hypothyroidism: TSH mildly elevated with normal free T4. Cont levothyroxine. Will need repeat labs as an outpatient.     Anxiety: Psychiatry consulted and started sertraline. Nephrology has ordered Xanax with HD. Cont supportive treatment.     Tobacco abuse: Cont NRT and encourage cessation.     Morbid obesity by BMI: Complicates all aspects of care.     DVT PPX: SQ heparin     Disposition SS working to arrange HD chair.     José Miguel Tellez,   10/23/23  16:19 EDT

## 2023-10-23 NOTE — PLAN OF CARE
Goal Outcome Evaluation:   Patient has been pleasantly confused during shift. Compliant with care and medications as ordered. Patient's heart rate has remained in the 130's throughout shift, EKG ordered, DO Geoff aware, see MAR.. Patient is currently resting in bed. Will continue with plan of care.

## 2023-10-23 NOTE — PROGRESS NOTES
Nephrology Progress Note      Subjective   Seen on dialysis, patient has had tachycardia started after initiating on dialysis.  There was no response with Xanax 0.5 mg so dialysis treatment has been stopped    Objective       Vital signs :     Vitals:    10/23/23 0500 10/23/23 0636 10/23/23 0646 10/23/23 0740   BP:   147/78 157/79   BP Location:   Right arm Left arm   Patient Position:   Lying Lying   Pulse:  84 76 81   Resp:  20 20 18   Temp:   98.4 °F (36.9 °C) 97.9 °F (36.6 °C)   TempSrc:   Oral Temporal   SpO2:  90% 97%    Weight: 99.8 kg (220 lb 1.6 oz)      Height:            Intake/Output                         10/21/23 0701 - 10/22/23 0700 10/22/23 0701 - 10/23/23 0700     5076-0065 2200-0149 Total 7285-8713 7086-4301 Total                 Intake    P.O.  0  -- 0  610  270 880    Total Intake 0 -- 0 610 270 880       Output    Urine  --  200 200  --  -- --    Total Output -- 200 200 -- -- --             Physical Exam:    General Appearance : Not in acute distress  Lungs : Bibasilar crackles noted trace edema  Heart :  regular rhythm & normal rate, normal S1, S2 and no murmur, no rub  Abdomen : Soft nontender nondistended   extremities : Trace edema  Neurologic :   Unable to assess fully    Laboratory Data :       CBC and coagulation:  Results from last 7 days   Lab Units 10/23/23  0255 10/21/23  0325 10/20/23  0157   PROCALCITONIN ng/mL  --   --  0.40*   WBC 10*3/mm3 5.71 8.84 7.83   HEMOGLOBIN g/dL 8.0* 8.8* 8.7*   HEMATOCRIT % 26.8* 27.9* 28.8*   MCV fL 90.2 88.6 89.7   MCHC g/dL 29.9* 31.5 30.2*   PLATELETS 10*3/mm3 190 226 222     Acid/base balance:  Results from last 7 days   Lab Units 10/21/23  0907 10/20/23  1330 10/16/23  2202   PH, ARTERIAL pH units 7.357 7.355 7.311*   PO2 ART mm Hg 72.2* 55.7* 74.9*   PCO2, ARTERIAL mm Hg 52.6* 57.9* 47.4*   HCO3 ART mmol/L 29.5* 32.3* 23.9       Renal and electrolytes:    Results from last 7 days   Lab Units 10/23/23  0255 10/21/23  0325 10/20/23  0157  "10/19/23  0055 10/18/23  0059   SODIUM mmol/L 135* 133* 135* 134* 132*   POTASSIUM mmol/L 4.7 4.4 4.6 5.9* 5.2   CHLORIDE mmol/L 94* 93* 97* 101 100   CO2 mmol/L 31.0* 25.6 25.0 20.2* 20.5*   BUN mg/dL 43* 32* 50* 75* 69*   CREATININE mg/dL 5.43* 3.83* 5.16* 6.93* 6.32*   CALCIUM mg/dL 8.9 9.0 8.9 9.0 9.0     Estimated Creatinine Clearance: 11 mL/min (A) (by C-G formula based on SCr of 5.43 mg/dL (H)).     Liver and pancreatic function:  Results from last 7 days   Lab Units 10/20/23  0157 10/18/23  0059 10/17/23  0038   ALBUMIN g/dL 3.1* 3.6 3.5   BILIRUBIN mg/dL 0.4 0.3 0.2   ALK PHOS U/L 124* 134* 89   AST (SGOT) U/L 7 10 8   ALT (SGPT) U/L 5 8 6       Albumin No results found for: \"ALBUMIN\"     Magnesium No results found for: \"MG\"         PTH               No results found for: \"PTH\"    Cardiac:        Liver and pancreatic function:  Results from last 7 days   Lab Units 10/20/23  0157 10/18/23  0059 10/17/23  0038   ALBUMIN g/dL 3.1* 3.6 3.5   BILIRUBIN mg/dL 0.4 0.3 0.2   ALK PHOS U/L 124* 134* 89   AST (SGOT) U/L 7 10 8   ALT (SGPT) U/L 5 8 6       XR Chest 1 View    Result Date: 10/22/2023   Mild improvement in the appearance of the chest when compared to the prior examination.   This report was finalized on 10/22/2023 8:58 PM by Shayna Gee MD.      XR Chest 1 View    Result Date: 10/20/2023  1.  Better expansion of the right lower lobe with some persistent airspace disease. 2.  Minimal left lower lobe airspace disease. 3.  Right central catheter with tip in SVC. 4.  Coarsened interstitial markings noted throughout the lungs. 5.  Mild cardiomegaly again noted.   This report was finalized on 10/20/2023 8:34 AM by Dr. Kirk Mcdonald MD.      CT Chest Without Contrast Diagnostic    Result Date: 10/19/2023  1.  Tiny pericardial effusion. 2.  Right lung base fairly extensive atelectasis and probably consolidative pneumonia. Again there may be some mucus plugging or narrowing. 3.  Again there is tiny bilateral " pleural effusions. 4.  Cardiomegaly.   This report was finalized on 10/19/2023 3:51 PM by Dr. Kirk Mcdonald MD.      XR Chest AP    Result Date: 10/19/2023  1.  There is been collapse of probably the right lower lobe possibly related to mucous plug. 2.  Small right pleural effusion persists. 3.  Improved aeration of the left lung base.   This report was finalized on 10/19/2023 2:00 PM by Dr. Kirk Mcdonald MD.      FL Surgery Fluoro    Result Date: 10/19/2023    As above.   This report was finalized on 10/19/2023 1:42 PM by Dr. Kirk Mcdonald MD.      XR Chest 1 View    Result Date: 10/18/2023  1.  Coarsened interstitial markings and vague bibasilar airspace opacities again noted. 2.  Now small right pleural effusion. 3.  Tiny left pleural effusion. 4.  Cardiomegaly.   This report was finalized on 10/18/2023 10:22 AM by Dr. Kirk Mcdonald MD.        Medications :     acetylcysteine, 1.5 mL, Nebulization, Q6H - RT  aspirin, 81 mg, Oral, Daily  carvedilol, 25 mg, Oral, BID With Meals  folic acid, 1 mg, Oral, Daily  gabapentin, 200 mg, Oral, Q12H  heparin (porcine), 5,000 Units, Subcutaneous, Q12H  hydrALAZINE, 50 mg, Oral, Q8H  ipratropium-albuterol, 3 mL, Nebulization, Q6H - RT  levothyroxine, 88 mcg, Oral, Q AM  montelukast, 10 mg, Oral, Nightly  mupirocin, 1 application , Each Nare, BID  nicotine, 1 patch, Transdermal, Q24H  pantoprazole, 40 mg, Oral, Daily  rosuvastatin, 10 mg, Oral, Nightly  sertraline, 25 mg, Oral, Daily  sodium chloride, 10 mL, Intravenous, Q12H  vitamin B-12, 1,000 mcg, Oral, Daily             Assessment & Plan     -Acute kidney injury initiated on dialysis during this hospitalization on 10/19/2023  - Acute metabolic encephalopathy  -Atrophic right kidney  -Chronic kidney disease stage IIIa  -Acute hyperkalemia, resolved  -Acute hypoxic respite failure, improving  -COPD  -Essential hypertension  -Medical noncompliance    Did not tolerate dialysis due to tachycardia, likely underlying generalized  anxiety disorder.  If tachycardia do not resolve after stopping dialysis recommend further work-up.  [facility] dialysis tomorrow, Xanax 0.5 mg 30 minutes before dialysis  Educated and counseled the patient      MAICO on CKD most likely multifactori including ATN from prolonged prerenal state and hemodynamic changes with concomitant use of ACE inhibitors in setting of volume loss   baseline creatinine 2.2, admitted with 3.69   ultrasound of the kidneys showed atrophic right kidney  Serologies are negative    Please avoid nephrotoxic medication and pharmacy to adjust the medication according to the GFR     Plan of care discussed with pt, and family answered all questions, patient verbalized understanding and agreed.      Vani León MD  10/23/23  08:01 EDT

## 2023-10-23 NOTE — NURSING NOTE
Received call from Dialysis RN at this time. States they will be up to get patient shortly. Dialysis RN stated to hold BP meds if patient is not picked up before morning medication pass. Will pass along in morning report.

## 2023-10-23 NOTE — PLAN OF CARE
Goal Outcome Evaluation:   Patient resting in bed. No acute signs or symptoms of distress. No complaints at this time. Patient had bath this shift. Patient has ambulated this shift with minimal assistance. Dialysis site care completed with antimicrobial dressing changed. Patient on 3L nasal cannula and tolerating well. Room air is patient baseline. Patient possible discharge this AM pending dialysis chair. Will continue to follow plan of care.

## 2023-10-24 LAB
ALBUMIN SERPL-MCNC: 3.4 G/DL (ref 3.5–5.2)
ALBUMIN/GLOB SERPL: 1.1 G/DL
ALP SERPL-CCNC: 121 U/L (ref 39–117)
ALT SERPL W P-5'-P-CCNC: 5 U/L (ref 1–33)
ANION GAP SERPL CALCULATED.3IONS-SCNC: 13.6 MMOL/L (ref 5–15)
APTT PPP: 47.6 SECONDS (ref 26.5–34.5)
AST SERPL-CCNC: 16 U/L (ref 1–32)
BASOPHILS # BLD AUTO: 0.04 10*3/MM3 (ref 0–0.2)
BASOPHILS NFR BLD AUTO: 0.7 % (ref 0–1.5)
BILIRUB SERPL-MCNC: 0.5 MG/DL (ref 0–1.2)
BUN SERPL-MCNC: 36 MG/DL (ref 8–23)
BUN/CREAT SERPL: 6.9 (ref 7–25)
CALCIUM SPEC-SCNC: 9.3 MG/DL (ref 8.6–10.5)
CHLORIDE SERPL-SCNC: 92 MMOL/L (ref 98–107)
CO2 SERPL-SCNC: 27.4 MMOL/L (ref 22–29)
CREAT SERPL-MCNC: 5.25 MG/DL (ref 0.57–1)
DEPRECATED RDW RBC AUTO: 55.1 FL (ref 37–54)
EGFRCR SERPLBLD CKD-EPI 2021: 8.6 ML/MIN/1.73
EOSINOPHIL # BLD AUTO: 0.17 10*3/MM3 (ref 0–0.4)
EOSINOPHIL NFR BLD AUTO: 2.8 % (ref 0.3–6.2)
ERYTHROCYTE [DISTWIDTH] IN BLOOD BY AUTOMATED COUNT: 16.6 % (ref 12.3–15.4)
GLOBULIN UR ELPH-MCNC: 3 GM/DL
GLUCOSE BLDC GLUCOMTR-MCNC: 111 MG/DL (ref 70–130)
GLUCOSE BLDC GLUCOMTR-MCNC: 114 MG/DL (ref 70–130)
GLUCOSE BLDC GLUCOMTR-MCNC: 118 MG/DL (ref 70–130)
GLUCOSE BLDC GLUCOMTR-MCNC: 163 MG/DL (ref 70–130)
GLUCOSE SERPL-MCNC: 103 MG/DL (ref 65–99)
HCT VFR BLD AUTO: 27.7 % (ref 34–46.6)
HGB BLD-MCNC: 8.4 G/DL (ref 12–15.9)
IMM GRANULOCYTES # BLD AUTO: 0.03 10*3/MM3 (ref 0–0.05)
IMM GRANULOCYTES NFR BLD AUTO: 0.5 % (ref 0–0.5)
INR PPP: 1.07 (ref 0.9–1.1)
LYMPHOCYTES # BLD AUTO: 0.86 10*3/MM3 (ref 0.7–3.1)
LYMPHOCYTES NFR BLD AUTO: 14.3 % (ref 19.6–45.3)
MCH RBC QN AUTO: 27.5 PG (ref 26.6–33)
MCHC RBC AUTO-ENTMCNC: 30.3 G/DL (ref 31.5–35.7)
MCV RBC AUTO: 90.8 FL (ref 79–97)
MONOCYTES # BLD AUTO: 0.85 10*3/MM3 (ref 0.1–0.9)
MONOCYTES NFR BLD AUTO: 14.2 % (ref 5–12)
NEUTROPHILS NFR BLD AUTO: 4.05 10*3/MM3 (ref 1.7–7)
NEUTROPHILS NFR BLD AUTO: 67.5 % (ref 42.7–76)
NRBC BLD AUTO-RTO: 0 /100 WBC (ref 0–0.2)
PLATELET # BLD AUTO: 192 10*3/MM3 (ref 140–450)
PMV BLD AUTO: 10.8 FL (ref 6–12)
POTASSIUM SERPL-SCNC: 4.9 MMOL/L (ref 3.5–5.2)
PROT SERPL-MCNC: 6.4 G/DL (ref 6–8.5)
PROTHROMBIN TIME: 14.4 SECONDS (ref 12.1–14.7)
QT INTERVAL: 306 MS
QT INTERVAL: 328 MS
QTC INTERVAL: 467 MS
QTC INTERVAL: 499 MS
RBC # BLD AUTO: 3.05 10*6/MM3 (ref 3.77–5.28)
SODIUM SERPL-SCNC: 133 MMOL/L (ref 136–145)
UFH PPP CHRO-ACNC: <0.1 IU/ML (ref 0.3–0.7)
UFH PPP CHRO-ACNC: <0.1 IU/ML (ref 0.3–0.7)
WBC NRBC COR # BLD: 6 10*3/MM3 (ref 3.4–10.8)

## 2023-10-24 PROCEDURE — 25010000002 ALBUMIN HUMAN 25% PER 50 ML: Performed by: INTERNAL MEDICINE

## 2023-10-24 PROCEDURE — P9047 ALBUMIN (HUMAN), 25%, 50ML: HCPCS | Performed by: INTERNAL MEDICINE

## 2023-10-24 PROCEDURE — 94799 UNLISTED PULMONARY SVC/PX: CPT

## 2023-10-24 PROCEDURE — 94761 N-INVAS EAR/PLS OXIMETRY MLT: CPT

## 2023-10-24 PROCEDURE — 99222 1ST HOSP IP/OBS MODERATE 55: CPT | Performed by: SPECIALIST

## 2023-10-24 PROCEDURE — 25010000002 HEPARIN (PORCINE) 25000-0.45 UT/250ML-% SOLUTION: Performed by: SPECIALIST

## 2023-10-24 PROCEDURE — 85520 HEPARIN ASSAY: CPT | Performed by: NURSE PRACTITIONER

## 2023-10-24 PROCEDURE — 85730 THROMBOPLASTIN TIME PARTIAL: CPT | Performed by: NURSE PRACTITIONER

## 2023-10-24 PROCEDURE — 85520 HEPARIN ASSAY: CPT | Performed by: SPECIALIST

## 2023-10-24 PROCEDURE — 99232 SBSQ HOSP IP/OBS MODERATE 35: CPT | Performed by: INTERNAL MEDICINE

## 2023-10-24 PROCEDURE — 99233 SBSQ HOSP IP/OBS HIGH 50: CPT | Performed by: INTERNAL MEDICINE

## 2023-10-24 PROCEDURE — 25010000002 HEPARIN (PORCINE) PER 1000 UNITS: Performed by: SURGERY

## 2023-10-24 PROCEDURE — 80053 COMPREHEN METABOLIC PANEL: CPT | Performed by: INTERNAL MEDICINE

## 2023-10-24 PROCEDURE — 94664 DEMO&/EVAL PT USE INHALER: CPT

## 2023-10-24 PROCEDURE — 82948 REAGENT STRIP/BLOOD GLUCOSE: CPT

## 2023-10-24 PROCEDURE — 85025 COMPLETE CBC W/AUTO DIFF WBC: CPT | Performed by: INTERNAL MEDICINE

## 2023-10-24 PROCEDURE — 85610 PROTHROMBIN TIME: CPT | Performed by: NURSE PRACTITIONER

## 2023-10-24 RX ORDER — HEPARIN SODIUM 10000 [USP'U]/100ML
1000 INJECTION, SOLUTION INTRAVENOUS
Status: DISCONTINUED | OUTPATIENT
Start: 2023-10-24 | End: 2023-10-24

## 2023-10-24 RX ORDER — HEPARIN SODIUM 5000 [USP'U]/ML
4000 INJECTION, SOLUTION INTRAVENOUS; SUBCUTANEOUS AS NEEDED
Status: DISCONTINUED | OUTPATIENT
Start: 2023-10-24 | End: 2023-10-24 | Stop reason: ALTCHOICE

## 2023-10-24 RX ORDER — HEPARIN SODIUM 5000 [USP'U]/ML
4000 INJECTION, SOLUTION INTRAVENOUS; SUBCUTANEOUS ONCE
Status: COMPLETED | OUTPATIENT
Start: 2023-10-24 | End: 2023-10-25

## 2023-10-24 RX ORDER — HEPARIN SODIUM 5000 [USP'U]/ML
2000 INJECTION, SOLUTION INTRAVENOUS; SUBCUTANEOUS AS NEEDED
Status: DISCONTINUED | OUTPATIENT
Start: 2023-10-24 | End: 2023-10-24 | Stop reason: ALTCHOICE

## 2023-10-24 RX ORDER — ALBUMIN (HUMAN) 12.5 G/50ML
12.5 SOLUTION INTRAVENOUS ONCE
Status: COMPLETED | OUTPATIENT
Start: 2023-10-24 | End: 2023-10-24

## 2023-10-24 RX ORDER — HYDRALAZINE HYDROCHLORIDE 25 MG/1
25 TABLET, FILM COATED ORAL EVERY 8 HOURS SCHEDULED
Status: DISCONTINUED | OUTPATIENT
Start: 2023-10-24 | End: 2023-10-30 | Stop reason: HOSPADM

## 2023-10-24 RX ORDER — HEPARIN SODIUM 10000 [USP'U]/100ML
10.9 INJECTION, SOLUTION INTRAVENOUS
Status: DISCONTINUED | OUTPATIENT
Start: 2023-10-24 | End: 2023-10-28

## 2023-10-24 RX ADMIN — HEPARIN SODIUM 9.9 UNITS/KG/HR: 10000 INJECTION, SOLUTION INTRAVENOUS at 17:09

## 2023-10-24 RX ADMIN — ACETAMINOPHEN 650 MG: 325 TABLET ORAL at 12:09

## 2023-10-24 RX ADMIN — MUPIROCIN 1 APPLICATION: 20 OINTMENT TOPICAL at 10:14

## 2023-10-24 RX ADMIN — ALBUMIN HUMAN 12.5 G: 0.25 SOLUTION INTRAVENOUS at 09:49

## 2023-10-24 RX ADMIN — ACETYLCYSTEINE 1.5 ML: 200 SOLUTION ORAL; RESPIRATORY (INHALATION) at 00:18

## 2023-10-24 RX ADMIN — Medication 1 MG: at 10:13

## 2023-10-24 RX ADMIN — GABAPENTIN 200 MG: 100 CAPSULE ORAL at 10:13

## 2023-10-24 RX ADMIN — NICOTINE TRANSDERMAL SYSTEM 1 PATCH: 21 PATCH, EXTENDED RELEASE TRANSDERMAL at 10:14

## 2023-10-24 RX ADMIN — PANTOPRAZOLE SODIUM 40 MG: 40 TABLET, DELAYED RELEASE ORAL at 10:15

## 2023-10-24 RX ADMIN — IPRATROPIUM BROMIDE AND ALBUTEROL SULFATE 3 ML: 2.5; .5 SOLUTION RESPIRATORY (INHALATION) at 18:40

## 2023-10-24 RX ADMIN — HEPARIN SODIUM 5000 UNITS: 5000 INJECTION INTRAVENOUS; SUBCUTANEOUS at 10:14

## 2023-10-24 RX ADMIN — SERTRALINE HYDROCHLORIDE 25 MG: 25 TABLET ORAL at 10:15

## 2023-10-24 RX ADMIN — HYDRALAZINE HYDROCHLORIDE 50 MG: 50 TABLET, FILM COATED ORAL at 05:10

## 2023-10-24 RX ADMIN — HYDROXYZINE HYDROCHLORIDE 25 MG: 25 TABLET, FILM COATED ORAL at 05:10

## 2023-10-24 RX ADMIN — Medication 10 ML: at 21:13

## 2023-10-24 RX ADMIN — ALPRAZOLAM 1 MG: 1 TABLET ORAL at 05:10

## 2023-10-24 RX ADMIN — HYDROCODONE BITARTRATE AND ACETAMINOPHEN 1 TABLET: 10; 325 TABLET ORAL at 05:10

## 2023-10-24 RX ADMIN — Medication 1000 MCG: at 10:16

## 2023-10-24 RX ADMIN — HYDRALAZINE HYDROCHLORIDE 50 MG: 50 TABLET, FILM COATED ORAL at 15:02

## 2023-10-24 RX ADMIN — LEVOTHYROXINE SODIUM 88 MCG: 88 TABLET ORAL at 05:10

## 2023-10-24 RX ADMIN — METOPROLOL TARTRATE 2.5 MG: 1 INJECTION, SOLUTION INTRAVENOUS at 00:20

## 2023-10-24 RX ADMIN — IPRATROPIUM BROMIDE AND ALBUTEROL SULFATE 3 ML: 2.5; .5 SOLUTION RESPIRATORY (INHALATION) at 13:00

## 2023-10-24 RX ADMIN — HYDROXYZINE HYDROCHLORIDE 25 MG: 25 TABLET, FILM COATED ORAL at 18:46

## 2023-10-24 RX ADMIN — IPRATROPIUM BROMIDE AND ALBUTEROL SULFATE 3 ML: 2.5; .5 SOLUTION RESPIRATORY (INHALATION) at 00:18

## 2023-10-24 RX ADMIN — ASPIRIN 81 MG: 81 TABLET, COATED ORAL at 10:13

## 2023-10-24 RX ADMIN — ACETYLCYSTEINE 1.5 ML: 200 SOLUTION ORAL; RESPIRATORY (INHALATION) at 18:40

## 2023-10-24 RX ADMIN — ALBUMIN HUMAN 12.5 G: 0.25 SOLUTION INTRAVENOUS at 10:08

## 2023-10-24 RX ADMIN — Medication 10 ML: at 10:16

## 2023-10-24 NOTE — PROGRESS NOTES
HEPARIN INFUSION  Lesley Michel is a  67 y.o. female receiving heparin infusion.     Therapy for (VTE/Cardiac):   cardiac  Patient Dosing Weight: 101  KG  Initial Bolus (Y/N):   no  Any Bolus (Y/N):    yes     Signs or Symptoms of Bleeding: no    Cardiac or Other (Not VTE)   Initial Bolus: 60 units/kg (Max 4,000 units)  Initial rate: 12 units/kg/hr (Max 1,000 units/hr)   Anti Xa Rebolus Infusion Hold time Change infusion Dose (Units/kg/hr) Next Anti Xa or aPTT Level Due   < 0.11 50 Units/kg  (4000 Units Max) None Increase by  3 Units/kg/hr 6 hours   0.11- 0.19 25 Units/kg  (2000 Units Max) None Increase by  2 Units/kg/hr 6 hours   0.2 - 0.29 0 None Increase by  1 Units/kg/hr 6 hours   0.3 - 0.5 0 None No Change 6 hours (after 2 consecutive levels in range check q24h @0700)   0.51 - 0.6 0 None Decrease by  1 Units/kg/hr 6 hours   0.61 - 0.8 0 30 Minutes Decrease by  2 Units/kg/hr 6 hours   0.81 - 1 0 60 Minutes Decrease by  3 Units/kg/hr 6 hours   >1 0 Hold  After Anti Xa less than 0.5 decrease previous rate by  4 Units/kg/hr  Every 2 hours until Anti Xa  less than 0.5 then when infusion restarts in 6 hours         Recommend anti-Xa every 6 hours.          Date   Time   Anti-Xa Current Rate (Unit/kg/hr) Bolus   (Units) Rate Change   (Unit/kg/hr) New Rate (Unit/kg/hr) Next   Anti-Xa Comments  Pump Check Daily   10/24 1612 drawn 0 no +9.90 9.90 2200 Discussed initial rate,no bolus w/ Katlyn RN                                                                                                                                                                                                                                 Pharmacy will continue to follow anti-Xa results and monitor for signs and symptoms of bleeding or thrombosis.    Inder Herrera, PharmD  10/24/23 15:50 EDT

## 2023-10-24 NOTE — PLAN OF CARE
Goal Outcome Evaluation:   Patient has been pleasant. Compliant with care and medications as ordered. Patient is currently resting in bed. No S&S of distress noted at this time. Heparin drip currently infusing as ordered. Will continue with plan of care.

## 2023-10-24 NOTE — CASE MANAGEMENT/SOCIAL WORK
Discharge Planning Assessment   Silvestre     Patient Name: Lesley Michel  MRN: [id number]  Today's Date: 10/24/2023    Admit Date: 10/8/2023    Plan: SS followed up with Silvestre VASQUEZ per Otilia who states she has to speak with dialysis RNEvelia today to see how pt tolerates dialysis. Otilia states he has concerns about pt's behaviors during dialysis previously. Otilia states she will follow up with SS after dialysis today. SS to notified physician. SS to follow.     Discharge Plan       Row Name 10/24/23 1206       Plan    Plan SS followed up with Silvestre VASQUEZ per Otilia who states she has to speak with dialysis RNEvelia today to see how pt tolerates dialysis. Otilia states he has concerns about pt's behaviors during dialysis previously. Otilia states she will follow up with SS after dialysis today. SS to notified physician. SS to follow.            LIZBET Rodriguez

## 2023-10-24 NOTE — PLAN OF CARE
Goal Outcome Evaluation:   Patient resting in bed. No acute signs or symptoms of distress. No complaints at this time. Patient remains anxious throughout shift. PRN and scheduled medication provided. Patient in At.Fib RVR upon shift change. Confirmed with EKG. 2.5 of metoprolol IV given per M.Chambers APRN order. Heart rate remains elevated, but less than before. Sustaining 120s-130s. Cardiology consult placed for this AM. Patient on 3L nasal cannula and tolerating well. Room air is patient baseline.Patient has ambulated this shift with minimal assistance. Dialysis site clean, no signs or symptoms of bleeding. Dialysis plan for this AM due to not being able to complete yesterday's scheduled dialysis. Social work to find chair at dialysis clinic. Plans for discharge upon being stable and finding chair per MD note. Will continue to follow plan of care.

## 2023-10-24 NOTE — PROGRESS NOTES
Referring Provider: dr Tellez  Reason for Consultation: sob, hypoxic respiratory failure      Chief complaint -sob      Sub-overnight events reviewed.  All the labs medications ins and outs reviewed.  Patient getting hemodialysis.  Heart rate remains on the higher side.  Received Xanax.  Sleepy.    Review of Systems-could not be obtained due to patient's mental status.      History  Past Medical History:   Diagnosis Date    Anxiety     Arthritis     Coronary artery disease     H/O heart artery stent     Hyperlipidemia     Hypertension     Obesity    ,   Past Surgical History:   Procedure Laterality Date    ANKLE SURGERY      RIGHT    APPENDECTOMY      CARDIAC CATHETERIZATION      LEFT    CORONARY STENT PLACEMENT      HYSTERECTOMY     ,   Family History   Problem Relation Age of Onset    Heart disease Mother     Heart attack Mother 73    Fibromyalgia Mother     Heart attack Father 72    Ovarian cancer Sister     Coronary artery disease Other     Breast cancer Neg Hx    ,   Social History     Tobacco Use    Smoking status: Every Day     Packs/day: 0.50     Years: 30.00     Additional pack years: 0.00     Total pack years: 15.00     Types: Electronic Cigarette, Cigarettes    Smokeless tobacco: Never   Vaping Use    Vaping Use: Never used   Substance Use Topics    Alcohol use: No    Drug use: No   ,   Medications Prior to Admission   Medication Sig Dispense Refill Last Dose    aspirin 325 MG tablet Take 1 tablet by mouth Daily.   10/8/2023    carvedilol (COREG) 25 MG tablet Take 1 tablet by mouth 2 (Two) Times a Day With Meals. 60 tablet 0 10/8/2023    folic acid (FOLVITE) 1 MG tablet Take 1 tablet by mouth Daily.   10/8/2023    hydrALAZINE (APRESOLINE) 25 MG tablet Take 1 tablet by mouth 2 (Two) Times a Day. 60 tablet 0 10/8/2023    levothyroxine (SYNTHROID, LEVOTHROID) 88 MCG tablet Take 1 tablet by mouth Daily.   10/8/2023    lisinopril (PRINIVIL,ZESTRIL) 5 MG tablet Take 1 tablet by mouth Daily.   10/8/2023     montelukast (SINGULAIR) 10 MG tablet Take 1 tablet by mouth Every Night.   10/8/2023    pantoprazole (PROTONIX) 40 MG EC tablet Take 1 tablet by mouth Daily.   10/8/2023    rosuvastatin (CRESTOR) 20 MG tablet Take 1 tablet by mouth Daily. 30 tablet 0 10/8/2023    vitamin B-12 (CYANOCOBALAMIN) 1000 MCG tablet Take 1 tablet by mouth Daily.   10/8/2023    gabapentin (NEURONTIN) 800 MG tablet Take 1 tablet by mouth 2 (Two) Times a Day As Needed (nerve pain).   Unknown    HYDROcodone-acetaminophen (NORCO)  MG per tablet Take 1 tablet by mouth Every 8 (Eight) Hours As Needed for Moderate Pain.   Unknown    nitroglycerin (NITROSTAT) 0.4 MG SL tablet Place 1 tablet under the tongue Every 5 (Five) Minutes As Needed for Chest Pain. 30 tablet 2 Unknown   , Scheduled Meds:  acetylcysteine, 1.5 mL, Nebulization, Q6H - RT  aspirin, 81 mg, Oral, Daily  folic acid, 1 mg, Oral, Daily  gabapentin, 200 mg, Oral, Q12H  heparin (porcine), 5,000 Units, Subcutaneous, Q12H  hydrALAZINE, 50 mg, Oral, Q8H  ipratropium-albuterol, 3 mL, Nebulization, Q6H - RT  levothyroxine, 88 mcg, Oral, Q AM  metoprolol tartrate, 50 mg, Oral, Q12H  montelukast, 10 mg, Oral, Nightly  mupirocin, 1 application , Each Nare, BID  nicotine, 1 patch, Transdermal, Q24H  pantoprazole, 40 mg, Oral, Daily  rosuvastatin, 10 mg, Oral, Nightly  sertraline, 25 mg, Oral, Daily  sodium chloride, 10 mL, Intravenous, Q12H  vitamin B-12, 1,000 mcg, Oral, Daily    , Continuous Infusions:      and Allergies:  Patient has no known allergies.    Objective     Vital Signs   Temp:  [97.7 °F (36.5 °C)-98.4 °F (36.9 °C)] 97.7 °F (36.5 °C)  Heart Rate:  [108-149] 111  Resp:  [16-20] 17  BP: ()/(60-79) 138/79    Physical Exam:           General-resting in bed comfortably not in any distress  Sleepy but easily arousable able to protect airway  HEENT-PERRLA    Neck-supple    Respiratory- not in any respiratory distress.  Wearing nasal cannula oxygen  Cardiovascular-  Normal S1  "and S2. No S3, S4 or murmurs. No JVD, no carotid bruit and no edema, pulses normal bilaterally     GI-nontender nondistended bowel sounds positive    CNS-nonfocal    Musculoskeletal -no edema  Extremities- no obvious deformity noticed     Psychiatric-not awake oriented skin- no visible rash                                                                   Results Review:    LABS:    Lab Results   Component Value Date    GLUCOSE 103 (H) 10/24/2023    BUN 36 (H) 10/24/2023    CREATININE 5.25 (H) 10/24/2023    BCR 6.9 (L) 10/24/2023    CO2 27.4 10/24/2023    CALCIUM 9.3 10/24/2023    PROTENTOTREF 6.5 10/11/2023    ALBUMIN 3.4 (L) 10/24/2023    LABIL2 1.1 10/11/2023    AST 16 10/24/2023    ALT 5 10/24/2023    WBC 6.00 10/24/2023    HGB 8.4 (L) 10/24/2023    HCT 27.7 (L) 10/24/2023    MCV 90.8 10/24/2023     10/24/2023     (L) 10/24/2023    K 4.9 10/24/2023    CL 92 (L) 10/24/2023    ANIONGAP 13.6 10/24/2023       No results found for: \"INR\", \"PROTIME\"             I reviewed the patient's new clinical results.  I reviewed the patient's new imaging results and agree with the interpretation.      Assessment & Plan     Shortness of breath-likely multifactorial most likely related to her chronic heart failure with preserved ejection fraction with elevated brain atretic peptide.  Failed diuretics trial and has been on dialysis since 10/19.  Initially did not tolerate HD due to heart rate being high.  Latest chest x-ray shows improvement- d/w patient   Getting hemodialysis.  Heart rate is high around 1 10-1 20.  Blood pressure holding good saturations are good.    COPD-continue oxygen to maintain saturation 88 to 92%.  Continue nebs.  Completed steroids and azithromycin.  If gets short of breath will start on BiPAP.    FLOR-sleep study on the outpatient basis.    Renal failure -on dialysis now.  Nephrology on case.        Smoker-did extensive smoking cessation counseling.  Patient is not ready to quit " yet.  Follow-up with pulmonary in the outpatient basis.  PFTs on outpatient basis.        Echo-  Results for orders placed during the hospital encounter of 10/08/23    Adult Transthoracic Echo Complete W/ Cont if Necessary Per Protocol    Interpretation Summary    Left ventricular systolic function is normal. Left ventricular ejection fraction appears to be 56 - 60%.    Left ventricular diastolic function is consistent with (grade Ia w/high LAP) impaired relaxation.    The left atrial cavity is mild to moderately dilated.    Left atrial volume is moderately increased.    Mild aortic valve stenosis is present.    Estimated right ventricular systolic pressure from tricuspid regurgitation is mildly elevated (35-45 mmHg).    There is a small (<1cm) pericardial effusion. There is no evidence of cardiac tamponade.          Acute hypoxic respiratory failure    Acute renal failure (ARF)          Elmer Mckeon MD  10/24/23  12:29 EDT

## 2023-10-24 NOTE — PROGRESS NOTES
"Palliative Care Daily Progress Note     S: Medical record reviewed, followed up with Primary NADEEM Parekh and Dr Tellez regarding patient's condition. Per conversation with RN Lesley had been drowsy this morning due to medication.  When palliative care entered the room Lesley was awake with her GD Olivia at bedside. Lesley stated she had back pain, however says that this is normal for her. She was anxious and frustrated as she wants to go home. She asked for a piece of paper and a writing utensil as she states she needed to make some lists. She was not in distress at this time.      O:   Palliative Performance Scale Score:     /79 (BP Location: Right arm, Patient Position: Lying)   Pulse (!) 135   Temp 97.7 °F (36.5 °C) (Temporal)   Resp 20   Ht 152.4 cm (60\")   Wt 101 kg (221 lb 9.6 oz)   SpO2 94%   BMI 43.28 kg/m²     Intake/Output Summary (Last 24 hours) at 10/24/2023 1522  Last data filed at 10/24/2023 1153  Gross per 24 hour   Intake 420 ml   Output 2800 ml   Net -2380 ml       PE:  General Appearance:    Chronically ill appearing, alert, cooperative, NAD   HEENT:    NC/AT, without obvious abnormality, EOMI, anicteric    Neck:   supple, trachea midline, no JVD   Lungs:     Unlabored respirations, diminished bases, no wheezes rhonchi or rales, occasional no productive cough noted.    Heart:    Tachycardic irregular rhythm, normal,, no M/R/G   Abdomen:     Soft, NT, ND, NABS    Extremities:   Moves all extremities, BLE edema 1-2(improved )   Pulses:   Pulses palpable and equal bilaterally   Skin:   Warm, dry   Neurologic:   A/Ox3, still does not understand complexity of her situation   Psych:   Anxious however pleasant         Meds: Reviewed and changes noted    Labs:   Results from last 7 days   Lab Units 10/24/23  0336   WBC 10*3/mm3 6.00   HEMOGLOBIN g/dL 8.4*   HEMATOCRIT % 27.7*   PLATELETS 10*3/mm3 192     Results from last 7 days   Lab Units 10/24/23  0336   SODIUM mmol/L 133*   POTASSIUM " mmol/L 4.9   CHLORIDE mmol/L 92*   CO2 mmol/L 27.4   BUN mg/dL 36*   CREATININE mg/dL 5.25*   GLUCOSE mg/dL 103*   CALCIUM mg/dL 9.3     Results from last 7 days   Lab Units 10/24/23  0336   SODIUM mmol/L 133*   POTASSIUM mmol/L 4.9   CHLORIDE mmol/L 92*   CO2 mmol/L 27.4   BUN mg/dL 36*   CREATININE mg/dL 5.25*   CALCIUM mg/dL 9.3   BILIRUBIN mg/dL 0.5   ALK PHOS U/L 121*   ALT (SGPT) U/L 5   AST (SGOT) U/L 16   GLUCOSE mg/dL 103*     Imaging Results (Last 72 Hours)       Procedure Component Value Units Date/Time    XR Chest 1 View [[id number]] Collected: 10/22/23 2054     Updated: 10/22/23 2100    Narrative:      Procedure: Upright portable AP view chest.     Comparison: 10/20/2023.     Findings: A right-sided central venous at the expected position of the  catheter noted with distal tip atriocaval junction/SVC, unchanged.   Right lower lobe demonstrates left airspace consolidation or pleural  effusion when compared to the prior examination.  The platelike  atelectasis within the left mid hemithorax is also resolved.     Chest is fairly well expanded persistent mild to moderate pulmonary  edema present with interstitial changes.  Cardiac silhouette unchanged  and enlarged.     Overall, the congestive changes are mildly improved when compared to the  prior examination.       Impression:         Mild improvement in the appearance of the chest when compared to the  prior examination.        This report was finalized on 10/22/2023 8:58 PM by Shayna Gee MD.                 Diagnostics: Reviewed    A: Lesley Michel is a 67 y.o.female  female admitted on 10/8/2023 due to shortness of breath and edema. Lesley has a medical history of CAD status post stenting, hyperlipidemia, essential hypertension, COPD, tobacco abuse, CKD, hypothyroidism, arthritis, anxiety, history of migraines, obesity by BMI, and history of medical noncompliance. Pt follows with a cardiologist and per their notes CKD and that  she has been  noncompliant with all of her medications, despite upon admission her stating that she is compliant with her medications. Today 10/12/23 she is alert and oriented time four and was able to discuss goals of care.   On Thursday 10/19 Lesley received her dialysis access had dialysis later that evening.  Today 10/24/23  Pt received Hemodialysis and tolerated better with HR still tachycardic however 110-20 with borderline hypotension. She was afebrile, RR 20 BP of 139/79(out of HD) and was saturating 93% on NC @ 3.5 L    P:  I was able to touch base with pts granddaughter Olivia at bedside to update and provide support. While Lesley does not understand her medical situation,ie dialysis her elevated HR and irregular rhythm, her granddaughter Olivia is more realistic and understands Lesley is not quite ready to discharge. I discussed pt with Dr Tellez.    We will continue to follow along. Please do not hesitate to contact us regarding further sx mgmt or GOC needs, including after hours or on weekends via our on call provider at [phone].     Gwen Ellis, APRN    10/24/2023

## 2023-10-24 NOTE — PHARMACY PATIENT ASSISTANCE
Pharmacy was consulted to check on Eliquis cost. Per patient's insurance, she has no copay at our retail pharmacy.    Thank you,  Adele Palacios, PharmD

## 2023-10-24 NOTE — PROGRESS NOTES
[facility] HOSPITALIST PROGRESS NOTE    Subjective     History:   Lesley Michel is a 67 y.o. female admitted on 10/8/2023 secondary to Acute hypoxic respiratory failure     Procedures:   10/19/23: Right IJ tunneled HD catheter placement     CC: Follow up MAICO    Patient seen and examined this AM in dialysis. More drowsy today with some confusion. Reports some dyspnea. No reported CP. No reported vomiting. BP borderline low. Episodes of tachycardia again noted with concern for new onset Afib. No acute events overnight per RN.     History taken from: patient, chart, and RN.      Objective     Vital Signs  Temp:  [97.7 °F (36.5 °C)-98.4 °F (36.9 °C)] 97.7 °F (36.5 °C)  Heart Rate:  [108-149] 135  Resp:  [16-20] 20  BP: ()/(60-79) 138/79    Intake/Output Summary (Last 24 hours) at 10/24/2023 1554  Last data filed at 10/24/2023 1153  Gross per 24 hour   Intake 420 ml   Output 2800 ml   Net -2380 ml         Physical Exam:   General:    Awake, more drowsy today, ill appearing   Heart:      Normal S1 and S2. Irregularly irregular. Tachycardic.    Lungs:     Respirations regular, even and unlabored. Scattered rales with diminished breath sounds at bases. No wheezes appreciated.     Abdomen:   Soft and nontender. No guarding, rebound tenderness or  organomegaly noted. Bowel sounds present x 4.   Extremities:  Tr bilateral lower extremity edema. Moves UE and LE equally B/L.     Results Review:    Results from last 7 days   Lab Units 10/24/23  0336 10/23/23  0255 10/21/23  0325 10/20/23  0157 10/18/23  0059 10/17/23  1744   WBC 10*3/mm3 6.00 5.71 8.84 7.83 11.60* 10.86*   HEMOGLOBIN g/dL 8.4* 8.0* 8.8* 8.7* 9.3* 9.2*   PLATELETS 10*3/mm3 192 190 226 222 242 259     Results from last 7 days   Lab Units 10/24/23  0336 10/23/23  0255 10/21/23  0325 10/20/23  0157 10/19/23  0055 10/18/23  0059 10/17/23  1744   SODIUM mmol/L 133* 135* 133* 135* 134* 132* 134*   POTASSIUM mmol/L 4.9 4.7 4.4 4.6 5.9* 5.2 5.2    CHLORIDE mmol/L 92* 94* 93* 97* 101 100 99   CO2 mmol/L 27.4 31.0* 25.6 25.0 20.2* 20.5* 22.9   BUN mg/dL 36* 43* 32* 50* 75* 69* 69*   CREATININE mg/dL 5.25* 5.43* 3.83* 5.16* 6.93* 6.32* 6.10*   CALCIUM mg/dL 9.3 8.9 9.0 8.9 9.0 9.0 8.7   GLUCOSE mg/dL 103* 100* 71 81 107* 126* 116*     Results from last 7 days   Lab Units 10/24/23  0336 10/20/23  0157 10/18/23  0059   BILIRUBIN mg/dL 0.5 0.4 0.3   ALK PHOS U/L 121* 124* 134*   AST (SGOT) U/L 16 7 10   ALT (SGPT) U/L 5 5 8               Results from last 7 days   Lab Units 10/21/23  1716 10/21/23  1316   HSTROP T ng/L 54* 51*       Imaging Results (Last 24 Hours)       ** No results found for the last 24 hours. **              Medications:  acetylcysteine, 1.5 mL, Nebulization, Q6H - RT  aspirin, 81 mg, Oral, Daily  folic acid, 1 mg, Oral, Daily  gabapentin, 200 mg, Oral, Q12H  hydrALAZINE, 50 mg, Oral, Q8H  ipratropium-albuterol, 3 mL, Nebulization, Q6H - RT  levothyroxine, 88 mcg, Oral, Q AM  metoprolol tartrate, 50 mg, Oral, Q12H  montelukast, 10 mg, Oral, Nightly  mupirocin, 1 application , Each Nare, BID  nicotine, 1 patch, Transdermal, Q24H  pantoprazole, 40 mg, Oral, Daily  rosuvastatin, 10 mg, Oral, Nightly  sertraline, 25 mg, Oral, Daily  sodium chloride, 10 mL, Intravenous, Q12H  vitamin B-12, 1,000 mcg, Oral, Daily      heparin, 1,000 Units/hr  Pharmacy Consult,   Pharmacy to Dose Heparin,             Assessment & Plan   Anasarca: proBNP and ReDs vest reading elevated on admission. Imaging revealed CHF. Echo revealed an EF of 56-60%, grade I diastolic dysfunction, mild AS, mild pulmonary HTN and small pericardial effusion with no evidence of cardiac tamponade. Worsening renal failure possibly contributing with acute diastolic CHF as well. Failed trials of diuretics with initiation of HD on 10/19. HD per nephrology.     Acute exacerbation of COPD: Possibly exacerbated by pulmonary edema. Respiratory PCR negative. Pulm consulted and assisted with  steroid taper. Course of azithromycin completed. Wheezing improved. Cont nebs. Pulm input appreciated.     Bilateral pneumonia: Procal normal. Cultures with NGTD. Completed course of azithromycin as above. Cont nebs.     Acute hypoxic respiratory failure: Likely multifactorial in the setting of above. Post-op mucous plugging noted as well but improved. Pt has refused BiPAP. O2 requirements stable today. Treatment as outlined above. Wean supplemental O2 as tolerated.     MAICO on CKD IIIa with initiation of HD this admission: Baseline Cr appears to be around 2.2. Non nephrotic proteinuria. Complements are normal with negative serologies. No evidence of hydronephrosis on renal US with US revealing small and echogenic right kidney. S/P tunneled HD catheter placement with initiation of HD on 10/19 as above. Cont to monitor closely. Nephrology input appreciated.     Hyperkalemia: Resolved with targeted treatment and initiation of HD. Holding home lisinopril. Monitor on telemetry.     New onset Afib with RVR: Previously changed Coreg to metoprolol. BP now borderline low limiting Rx options. Cardiology consulted and has started heparin gtt for stroke prevention. Monitor on telemetry. Cardiology input appreciated.     Essential HTN: BP previously elevated but now borderline low. Changed Coreg to metoprolol as above. Decrease hydralazine. Attempt to avoid wide fluctuations in BP. Cont to monitor.     Hypothyroidism: TSH mildly elevated with normal free T4. Cont levothyroxine. Will need repeat labs as an outpatient.     Anxiety: Psychiatry consulted and started sertraline. Nephrology has ordered Xanax with HD. Cont supportive treatment.     Tobacco abuse: Cont NRT and encourage cessation.     Morbid obesity by BMI: Complicates all aspects of care.     DVT PPX: Heparin gtt     Disposition SS working to arrange HD chair when medically stable.     José Miguel Tellez DO  10/24/23  15:54 EDT

## 2023-10-24 NOTE — CONSULTS
[facility]  CONSULT  NOTE      Patient information:  Date of Admit: 10/8/2023  Date of Consult: 10/24/23  Hospitalist/Referring MD:Fanny Ford MD  PCP: Woodrow Raymond APRN  MRN:  [id number]  Visit Number:  [id number]    LOS: 14  CODE STATUS:  Code Status and Medical Interventions:   Ordered at: 10/12/23 1553     Medical Intervention Limits:    NO intubation (DNI)     Level Of Support Discussed With:    Patient     Code Status (Patient has no pulse and is not breathing):    No CPR (Do Not Attempt to Resuscitate)     Medical Interventions (Patient has pulse or is breathing):    Limited Support       PROBLEM LIST: Principal Problem:    Acute hypoxic respiratory failure  Active Problems:    Acute renal failure (ARF)        General Cardiology Consulting Physician: Dr. Jessica MENDEZ    Assessment    New onset atrial fibrillation with RVR chads Vascor is at least 3  MAICO on CKD on hemodialysis  ASCVD status post  of the dominant RCA, status post drug-eluting stent placement to the LAD, and also existent high-grade stenosis of the ostial small first diagonal, was also 60% RCA lateral branch lesion  Dyslipidemia  Essential hypertension  Acute on chronic HFpEF  COPD  Current ongoing tobacco abuse  Mental status changes  Medical noncompliance          Recommendations   1.  Patient has new onset atrial fibrillation with RVR we will try to get rate control will be difficult to add further medications considering low blood pressure if continues to have atrial fibrillation with RVR may have to add amiodarone for rate control  2.  Will start heparin for thromboembolic prophylaxis we will consult pharmacy for Eliquis price  3.  Patient will require ischemia work-up however she had significant orthopnea she would not be able to lay flat consider ischemia work-up once patient is more stable and able to lay flat  4.  Continue hemodialysis and hemofiltration        Reason for  "Cardiology consultation: New onset atrial fibrillation with RVR    Subjective Data   ADMISSION INFORMATION:  Chief Complaint   Patient presents with    Shortness of Breath    Edema     History of Present Illness    Lesley Michel is a 67 y.o. female with a past medical history significant for  CAD chronically occluded well collateralized dominant RCA, status post drug-eluting stent to LAD with high-grade stenosis of the ostium of the small D1 and 60% mid RCA post lateral branch lesion, hyperlipidemia, essential hypertension, COPD, current tobacco abuse, CKD stage II, hypothyroidism, arthritis, anxiety, history of migraines, obesity by BMI, and history of medical noncompliance.       Patient presented to [facility] ([facility] emergency room (ER) on 10/8/2023 with complaints of increasing lower extremity edema and shortness of breath which has been progressively worsening over the past few days prior to presentation.  Today on 10/24/2023, Cardiology has been consulted for further evaluation and management for what appears to be new onset atrial fibrillation with RVR since 10/23/2023.     Primary Cardiologist: Imelda ARENAS/Dr. Echeverria and her last office visit was on 10/6/2023.    Patient is in room 321 and was examined by Dr. Lopez.  Telemetry currently reveals atrial fibrillation 114 with RVR noted as seen in telemetry strip attached below.  Patient is lying in bed resting quietly.  No acute distress noted at this time.  Patient currently denies any fluttering, skipped beats, chest pain, palpitations, and reports her breathing is  \"okay\" . Patient is oriented to person and place but not events or time. Patient reports her breathing is only short when she gets upset because she cannot go home when she has to be right back here on Monday.\"  She thinks today is Saturday. Patient reports she is not able to lay flat for long at all.     ORDERS:   Pharmacy consult for price check on Eliquis  Initiate IV " heparin drip with pharmacy to dose per weight without bolus    Cardiac risk factors:arteriosclerotic heart disease, diabetes mellitus, hypercholesterolemia, hypertension, smoking, Sedentary life style, and Obesity      Last Echo: Results for orders placed during the hospital encounter of 10/08/23    Adult Transthoracic Echo Complete W/ Cont if Necessary Per Protocol    Interpretation Summary    Left ventricular systolic function is normal. Left ventricular ejection fraction appears to be 56 - 60%.    Left ventricular diastolic function is consistent with (grade Ia w/high LAP) impaired relaxation.    The left atrial cavity is mild to moderately dilated.    Left atrial volume is moderately increased.    Mild aortic valve stenosis is present.    Estimated right ventricular systolic pressure from tricuspid regurgitation is mildly elevated (35-45 mmHg).    There is a small (<1cm) pericardial effusion. There is no evidence of cardiac tamponade.         Last Stress: None found in patient's chart.      Last Cath:  None found in patient's chart.                         Past Medical History:   Diagnosis Date    Anxiety     Arthritis     Coronary artery disease     H/O heart artery stent     Hyperlipidemia     Hypertension     Obesity      Past Surgical History:   Procedure Laterality Date    ANKLE SURGERY      RIGHT    APPENDECTOMY      CARDIAC CATHETERIZATION      LEFT    CORONARY STENT PLACEMENT      HYSTERECTOMY       Family History   Problem Relation Age of Onset    Heart disease Mother     Heart attack Mother 73    Fibromyalgia Mother     Heart attack Father 72    Ovarian cancer Sister     Coronary artery disease Other     Breast cancer Neg Hx      Social History     Tobacco Use    Smoking status: Every Day     Packs/day: 0.50     Years: 30.00     Additional pack years: 0.00     Total pack years: 15.00     Types: Electronic Cigarette, Cigarettes    Smokeless tobacco: Never   Vaping Use    Vaping Use: Never used    Substance Use Topics    Alcohol use: No    Drug use: No       ALLERGIES: Patient has no known allergies.    Medications listed below are reported home medications pulling from within the system:  Medications Prior to Admission   Medication Sig Dispense Refill Last Dose    aspirin 325 MG tablet Take 1 tablet by mouth Daily.   10/8/2023    carvedilol (COREG) 25 MG tablet Take 1 tablet by mouth 2 (Two) Times a Day With Meals. 60 tablet 0 10/8/2023    folic acid (FOLVITE) 1 MG tablet Take 1 tablet by mouth Daily.   10/8/2023    hydrALAZINE (APRESOLINE) 25 MG tablet Take 1 tablet by mouth 2 (Two) Times a Day. 60 tablet 0 10/8/2023    levothyroxine (SYNTHROID, LEVOTHROID) 88 MCG tablet Take 1 tablet by mouth Daily.   10/8/2023    lisinopril (PRINIVIL,ZESTRIL) 5 MG tablet Take 1 tablet by mouth Daily.   10/8/2023    montelukast (SINGULAIR) 10 MG tablet Take 1 tablet by mouth Every Night.   10/8/2023    pantoprazole (PROTONIX) 40 MG EC tablet Take 1 tablet by mouth Daily.   10/8/2023    rosuvastatin (CRESTOR) 20 MG tablet Take 1 tablet by mouth Daily. 30 tablet 0 10/8/2023    vitamin B-12 (CYANOCOBALAMIN) 1000 MCG tablet Take 1 tablet by mouth Daily.   10/8/2023    gabapentin (NEURONTIN) 800 MG tablet Take 1 tablet by mouth 2 (Two) Times a Day As Needed (nerve pain).   Unknown    HYDROcodone-acetaminophen (NORCO)  MG per tablet Take 1 tablet by mouth Every 8 (Eight) Hours As Needed for Moderate Pain.   Unknown    nitroglycerin (NITROSTAT) 0.4 MG SL tablet Place 1 tablet under the tongue Every 5 (Five) Minutes As Needed for Chest Pain. 30 tablet 2 Unknown       Review of Systems   Constitutional:  Negative for activity change, appetite change and diaphoresis.   HENT:  Negative for facial swelling and trouble swallowing.    Eyes:  Negative for visual disturbance.   Respiratory:  Positive for shortness of breath.    Cardiovascular:  Negative for chest pain, palpitations and leg swelling.   Gastrointestinal:  Negative  for blood in stool, nausea and vomiting.   Endocrine: Negative.    Genitourinary:  Negative for hematuria.   Musculoskeletal:  Negative for gait problem.   Skin:  Negative for color change.   Neurological:  Negative for dizziness, syncope, speech difficulty, weakness, light-headedness and headaches.   Psychiatric/Behavioral:  Negative for agitation and behavioral problems.      Objective Data      Vital Signs  Temp:  [97.7 °F (36.5 °C)-98.4 °F (36.9 °C)] 97.7 °F (36.5 °C)  Heart Rate:  [108-149] 135  Resp:  [16-20] 20  BP: ()/(60-79) 138/79  Flow (L/min):  [3-4] 3.5  Device (Oxygen Therapy): humidified;nasal cannula  Vital Signs (last 72 hrs)         10/21 0700  10/22 0659 10/22 0700  10/23 0659 10/23 0700  10/24 0659 10/24 0700  10/24 0847   Most Recent      Temp (°F) 97.9 -  98.1    97.8 -  98.4    97.9 -  98.5      98.3     98.3 (36.8) 10/24 0712    Heart Rate 71 -  84    71 -  84    81 -  149      108     108 10/24 0712    Resp 20 -  24    18 -  20    18 -  20      18     18 10/24 0712    /67 -  163/82    110/56 -  161/83    100/74 -  157/79      99/60     99/60 10/24 0712    SpO2 (%) 93 -  98    90 -  99    94 -  96      91     91 10/24 0712    Flow (L/min)   4    3 -  4    3 -  4       3 10/24 0424          BMI:  Body mass index is 43.28 kg/m².    WEIGHT:      10/23/23  0500 10/24/23  0500   Weight: 99.8 kg (220 lb 1.6 oz) 101 kg (221 lb 9.6 oz)       DIET:  Diet: Cardiac Diets, Diabetic Diets, Renal Diets; Healthy Heart (2-3 Na+); Consistent Carbohydrate; Low Potassium; Texture: Regular Texture (IDDSI 7); Fluid Consistency: Thin (IDDSI 0)    I&O:  Intake & Output (last 3 days)         10/21 0701  10/22 0700 10/22 0701  10/23 0700 10/23 0701  10/24 0700 10/24 0701  10/25 0700    P.O. 0 880 480     Total Intake(mL/kg) 0 (0) 880 (8.8) 480 (4.8)     Urine (mL/kg/hr) 200 (0.1)  500 (0.2)     Stool   0     Dialysis   100 2300    Total Output 200  600 2300    Net -200 +880 -120 -2300            Urine  Unmeasured Occurrence  3 x 1 x     Stool Unmeasured Occurrence  0 x 1 x             Physical Exam  Constitutional:       Appearance: Normal appearance.      Comments: BMI 43.28   HENT:      Head: Normocephalic and atraumatic.      Nose: Nose normal.      Mouth/Throat:      Mouth: Mucous membranes are moist.      Pharynx: Oropharynx is clear.   Eyes:      Conjunctiva/sclera: Conjunctivae normal.   Cardiovascular:      Rate and Rhythm: Tachycardia present. Rhythm irregular.      Pulses: Normal pulses.      Heart sounds: Normal heart sounds.   Pulmonary:      Effort: Pulmonary effort is normal.      Breath sounds: Normal breath sounds.   Abdominal:      General: Bowel sounds are normal.      Palpations: Abdomen is soft.   Musculoskeletal:         General: Normal range of motion.      Cervical back: Normal range of motion.   Skin:     General: Skin is warm and dry.   Neurological:      General: No focal deficit present.      Mental Status: She is alert and oriented to person, place, and time.   Psychiatric:         Mood and Affect: Mood normal.         Behavior: Behavior normal.       Results review   Results Review:    I have reviewed the patient's new clinical results.    Results from last 7 days   Lab Units 10/21/23  1716 10/21/23  1316   HSTROP T ng/L 54* 51*     Lab Results   Component Value Date    PROBNP 8,093.0 (H) 10/08/2023     Results from last 7 days   Lab Units 10/24/23  0336 10/23/23  0255 10/21/23  0325 10/20/23  0157 10/18/23  0059 10/17/23  1744   WBC 10*3/mm3 6.00 5.71 8.84 7.83 11.60* 10.86*   HEMOGLOBIN g/dL 8.4* 8.0* 8.8* 8.7* 9.3* 9.2*   PLATELETS 10*3/mm3 192 190 226 222 242 259     Results from last 7 days   Lab Units 10/24/23  0336 10/23/23  0255 10/21/23  0325 10/20/23  0157 10/19/23  0055 10/18/23  0059 10/17/23  1744   SODIUM mmol/L 133* 135* 133* 135* 134* 132* 134*   POTASSIUM mmol/L 4.9 4.7 4.4 4.6 5.9* 5.2 5.2   CHLORIDE mmol/L 92* 94* 93* 97* 101 100 99   CO2 mmol/L 27.4 31.0* 25.6 25.0  "20.2* 20.5* 22.9   BUN mg/dL 36* 43* 32* 50* 75* 69* 69*   CREATININE mg/dL 5.25* 5.43* 3.83* 5.16* 6.93* 6.32* 6.10*   CALCIUM mg/dL 9.3 8.9 9.0 8.9 9.0 9.0 8.7   GLUCOSE mg/dL 103* 100* 71 81 107* 126* 116*   ALT (SGPT) U/L 5  --   --  5  --  8  --    AST (SGOT) U/L 16  --   --  7  --  10  --      Lab Results   Component Value Date    MG 2.0 10/09/2023    MG 1.9 10/08/2023     Lab Results   Component Value Date    CHOL 140 10/08/2023    TRIG 75 10/08/2023    HDL 34 (L) 10/08/2023    LDL 91 10/08/2023     Estimated Creatinine Clearance: 11.4 mL/min (A) (by C-G formula based on SCr of 5.25 mg/dL (H)).  Lab Results   Component Value Date    HGBA1C 5.30 10/08/2023     No results found for: \"INR\"      Lab Results   Component Value Date    TSH 6.770 (H) 10/08/2023      No results found for: \"URICACID\"  Pain Management Panel          Latest Ref Rng & Units 10/9/2023   Pain Management Panel   Creatinine, Urine mg/dL 116.0      Microbiology Results (last 10 days)       Procedure Component Value - Date/Time    Blood Culture - Blood, Arm, Right [[id number]]  (Abnormal) Collected: 10/20/23 1334    Lab Status: Final result Specimen: Blood from Arm, Right Updated: 10/23/23 0825     Blood Culture Micrococcus species     Comment: No specific CLSI guidelines and no validated TANYA testing method.  Rarely implicated in infections.          Isolated from Aerobic Bottle     Gram Stain Aerobic Bottle Gram positive cocci in clusters    Narrative:      Probable contaminant requires clinical correlation, susceptibility not performed unless requested by physician.      Blood Culture ID, PCR - Blood, Arm, Right [[id number]] Collected: 10/20/23 1334    Lab Status: Final result Specimen: Blood from Arm, Right Updated: 10/22/23 0519     BCID, PCR Negative by BCID PCR. Culture to Follow.     BOTTLE TYPE Aerobic Bottle    Blood Culture - Blood, Arm, Left [[id number]]  (Normal) Collected: 10/20/23 1234    Lab Status: Preliminary result Specimen: " Blood from Arm, Left Updated: 10/24/23 1401     Blood Culture No growth at 4 days           Blood Culture   Date Value Ref Range Status   10/20/2023 Micrococcus species (C)  Final     Comment:     No specific CLSI guidelines and no validated TANYA testing method.  Rarely implicated in infections.           ECG:  10/23/2023      ECG/EMG Results (last 24 hours)       Procedure Component Value Units Date/Time    ECG 12 Lead Rhythm Change [[id number]] Collected: 10/23/23 1130     Updated: 10/23/23 1132     QT Interval 306 ms      QTC Interval 467 ms     Narrative:      Test Reason : Rhythm Change  Blood Pressure :   */*   mmHG  Vent. Rate : 140 BPM     Atrial Rate :  73 BPM     P-R Int :   * ms          QRS Dur :  94 ms      QT Int : 306 ms       P-R-T Axes :   *  24 -59 degrees     QTc Int : 467 ms    ** Critical Test Result: High HR  Supraventricular tachycardia with premature supraventricular complexes  ST & T wave abnormality, consider inferior ischemia  Abnormal ECG  When compared with ECG of 21-OCT-2023 11:47,  premature supraventricular complexes are now present  Vent. rate has increased BY  69 BPM  Inverted T waves have replaced nonspecific T wave abnormality in Inferior leads  T wave amplitude has decreased in Lateral leads    Referred By: LORRAINE           Confirmed By:     SCANNED - TELEMETRY   [[id number]] Resulted: 10/08/23     Updated: 10/23/23 1305    SCANNED - TELEMETRY   [[id number]] Resulted: 10/08/23     Updated: 10/23/23 1520    ECG 12 Lead Rhythm Change [[id number]] Collected: 10/23/23 1937     Updated: 10/23/23 1939     QT Interval 328 ms      QTC Interval 499 ms     Narrative:      Test Reason : Rhythm Change  Blood Pressure :   */*   mmHG  Vent. Rate : 139 BPM     Atrial Rate : 111 BPM     P-R Int :   * ms          QRS Dur :  82 ms      QT Int : 328 ms       P-R-T Axes :   *  30 -52 degrees     QTc Int : 499 ms    Atrial fibrillation with rapid ventricular response  ST & T wave abnormality, consider  inferior ischemia  Abnormal ECG  When compared with ECG of 23-OCT-2023 11:30, (Unconfirmed)  Atrial fibrillation has replaced Sinus rhythm    Referred By:            Confirmed By:     SCANNED - TELEMETRY   [[id number]] Resulted: 10/08/23     Updated: 10/23/23 2006    SCANNED - TELEMETRY   [[id number]] Resulted: 10/08/23     Updated: 10/24/23 0741            TELEMETRY:   Atrial fibrillation 114 with RVR noted as seen in telemetry strip attached below                    RADIOLOGY STUDIES:  Imaging Results (Last 72 Hours)       Procedure Component Value Units Date/Time    XR Chest 1 View [[id number]] Collected: 10/22/23 2054     Updated: 10/22/23 2100    Narrative:      Procedure: Upright portable AP view chest.     Comparison: 10/20/2023.     Findings: A right-sided central venous at the expected position of the  catheter noted with distal tip atriocaval junction/SVC, unchanged.   Right lower lobe demonstrates left airspace consolidation or pleural  effusion when compared to the prior examination.  The platelike  atelectasis within the left mid hemithorax is also resolved.     Chest is fairly well expanded persistent mild to moderate pulmonary  edema present with interstitial changes.  Cardiac silhouette unchanged  and enlarged.     Overall, the congestive changes are mildly improved when compared to the  prior examination.       Impression:         Mild improvement in the appearance of the chest when compared to the  prior examination.        This report was finalized on 10/22/2023 8:58 PM by Shayna Gee MD.               ALLERGIES: Patient has no known allergies.    CURRENT MEDICATIONS:  Current list of medications may not reflect those currently placed in orders that are not signed or are being held.     acetylcysteine, 1.5 mL, Nebulization, Q6H - RT  aspirin, 81 mg, Oral, Daily  folic acid, 1 mg, Oral, Daily  gabapentin, 200 mg, Oral, Q12H  heparin (porcine), 5,000 Units, Subcutaneous, Q12H  hydrALAZINE, 50 mg,  Oral, Q8H  ipratropium-albuterol, 3 mL, Nebulization, Q6H - RT  levothyroxine, 88 mcg, Oral, Q AM  metoprolol tartrate, 50 mg, Oral, Q12H  montelukast, 10 mg, Oral, Nightly  mupirocin, 1 application , Each Nare, BID  nicotine, 1 patch, Transdermal, Q24H  pantoprazole, 40 mg, Oral, Daily  rosuvastatin, 10 mg, Oral, Nightly  sertraline, 25 mg, Oral, Daily  sodium chloride, 10 mL, Intravenous, Q12H  vitamin B-12, 1,000 mcg, Oral, Daily           acetaminophen    calcium carbonate    dextrose    dextrose    glucagon (human recombinant)    hydrALAZINE    HYDROcodone-acetaminophen    hydrOXYzine    melatonin    nitroglycerin    ondansetron    [COMPLETED] Insert Peripheral IV **AND** sodium chloride    sodium chloride    sodium chloride    Thank you very much for asking us to be involved in this patient's care.  We will follow along with you. Please do not hesitate to call for any questions or concerns.     I have discussed the patients findings and recommendations with patient.    Electronically signed by DEEPA Navarro, 10/24/23, 3:37 PM EDT.   Electronically signed by Dwayne Lopez MD, 10/24/23, 3:58 PM EDT.                        Please note that portions of this note were copied and has been reviewed and is accurate as of 10/24/2023 .      Please note that portions of this note were completed with a voice recognition program.

## 2023-10-24 NOTE — PROGRESS NOTES
Nephrology Progress Note      Subjective   Seen on dialysis, tolerating dialysis relatively better today.    Heart rate running around 110s to 120s.    Objective       Vital signs :     Vitals:    10/24/23 0648 10/24/23 0712 10/24/23 1152 10/24/23 1204   BP:  99/60 98/69 138/79   BP Location:  Right arm Right arm Right arm   Patient Position:  Lying Lying Lying   Pulse: (!) 126 108 (!) 140 111   Resp: 16 18 18 17   Temp:  98.3 °F (36.8 °C) 98 °F (36.7 °C) 97.7 °F (36.5 °C)   TempSrc:  Oral Oral Temporal   SpO2: 94% 91% 91%    Weight:       Height:            Intake/Output                               10/22/23 0701 - 10/23/23 0700 10/23/23 0701 - 10/24/23 0700 10/24/23 0701 - 10/25/23 0700     0869-3416 6194-8371 Total 3370-6463 7572-1086 Total 6953-2556 3603-5132 Total                    Intake    P.O.  610  270 880  180  300 480  --  -- --    Total Intake 610 270 880 180 300 480 -- -- --       Output    Urine  --  -- --  --  500 500  --  -- --    Stool  --  -- --  0  0 0  --  -- --    Dialysis  --  -- --  100  -- 100  2300  -- 2300    Total Output -- -- -- 100  -- 2300             Physical Exam:    General Appearance : Not in acute distress  Lungs : Bibasilar crackles noted trace edema  Heart :  regular rhythm & normal rate, normal S1, S2 and no murmur, no rub  Abdomen : Soft nontender nondistended   extremities : Trace edema  Neurologic :   Unable to assess fully    Laboratory Data :       CBC and coagulation:  Results from last 7 days   Lab Units 10/24/23  0336 10/23/23  0255 10/21/23  0325 10/20/23  0157   PROCALCITONIN ng/mL  --   --   --  0.40*   WBC 10*3/mm3 6.00 5.71 8.84 7.83   HEMOGLOBIN g/dL 8.4* 8.0* 8.8* 8.7*   HEMATOCRIT % 27.7* 26.8* 27.9* 28.8*   MCV fL 90.8 90.2 88.6 89.7   MCHC g/dL 30.3* 29.9* 31.5 30.2*   PLATELETS 10*3/mm3 192 190 226 222     Acid/base balance:  Results from last 7 days   Lab Units 10/21/23  0907 10/20/23  1330   PH, ARTERIAL pH units 7.357 7.355   PO2 ART mm Hg  "72.2* 55.7*   PCO2, ARTERIAL mm Hg 52.6* 57.9*   HCO3 ART mmol/L 29.5* 32.3*       Renal and electrolytes:    Results from last 7 days   Lab Units 10/24/23  0336 10/23/23  0255 10/21/23  0325 10/20/23  0157 10/19/23  0055   SODIUM mmol/L 133* 135* 133* 135* 134*   POTASSIUM mmol/L 4.9 4.7 4.4 4.6 5.9*   CHLORIDE mmol/L 92* 94* 93* 97* 101   CO2 mmol/L 27.4 31.0* 25.6 25.0 20.2*   BUN mg/dL 36* 43* 32* 50* 75*   CREATININE mg/dL 5.25* 5.43* 3.83* 5.16* 6.93*   CALCIUM mg/dL 9.3 8.9 9.0 8.9 9.0     Estimated Creatinine Clearance: 11.4 mL/min (A) (by C-G formula based on SCr of 5.25 mg/dL (H)).     Liver and pancreatic function:  Results from last 7 days   Lab Units 10/24/23  0336 10/20/23  0157 10/18/23  0059   ALBUMIN g/dL 3.4* 3.1* 3.6   BILIRUBIN mg/dL 0.5 0.4 0.3   ALK PHOS U/L 121* 124* 134*   AST (SGOT) U/L 16 7 10   ALT (SGPT) U/L 5 5 8       Albumin Albumin   Date Value Ref Range Status   10/24/2023 3.4 (L) 3.5 - 5.2 g/dL Final        Magnesium No results found for: \"MG\"         PTH               No results found for: \"PTH\"    Cardiac:        Liver and pancreatic function:  Results from last 7 days   Lab Units 10/24/23  0336 10/20/23  0157 10/18/23  0059   ALBUMIN g/dL 3.4* 3.1* 3.6   BILIRUBIN mg/dL 0.5 0.4 0.3   ALK PHOS U/L 121* 124* 134*   AST (SGOT) U/L 16 7 10   ALT (SGPT) U/L 5 5 8       XR Chest 1 View    Result Date: 10/22/2023   Mild improvement in the appearance of the chest when compared to the prior examination.   This report was finalized on 10/22/2023 8:58 PM by Shayna Gee MD.      XR Chest 1 View    Result Date: 10/20/2023  1.  Better expansion of the right lower lobe with some persistent airspace disease. 2.  Minimal left lower lobe airspace disease. 3.  Right central catheter with tip in SVC. 4.  Coarsened interstitial markings noted throughout the lungs. 5.  Mild cardiomegaly again noted.   This report was finalized on 10/20/2023 8:34 AM by Dr. Kirk Mcdonald MD.      CT Chest Without " Contrast Diagnostic    Result Date: 10/19/2023  1.  Tiny pericardial effusion. 2.  Right lung base fairly extensive atelectasis and probably consolidative pneumonia. Again there may be some mucus plugging or narrowing. 3.  Again there is tiny bilateral pleural effusions. 4.  Cardiomegaly.   This report was finalized on 10/19/2023 3:51 PM by Dr. Kirk Mcdonald MD.      XR Chest AP    Result Date: 10/19/2023  1.  There is been collapse of probably the right lower lobe possibly related to mucous plug. 2.  Small right pleural effusion persists. 3.  Improved aeration of the left lung base.   This report was finalized on 10/19/2023 2:00 PM by Dr. Kirk Mcdonald MD.      FL Surgery Fluoro    Result Date: 10/19/2023    As above.   This report was finalized on 10/19/2023 1:42 PM by Dr. Kirk Mcdonald MD.      XR Chest 1 View    Result Date: 10/18/2023  1.  Coarsened interstitial markings and vague bibasilar airspace opacities again noted. 2.  Now small right pleural effusion. 3.  Tiny left pleural effusion. 4.  Cardiomegaly.   This report was finalized on 10/18/2023 10:22 AM by Dr. Kirk Mcdonald MD.        Medications :     acetylcysteine, 1.5 mL, Nebulization, Q6H - RT  aspirin, 81 mg, Oral, Daily  folic acid, 1 mg, Oral, Daily  gabapentin, 200 mg, Oral, Q12H  heparin (porcine), 5,000 Units, Subcutaneous, Q12H  hydrALAZINE, 50 mg, Oral, Q8H  ipratropium-albuterol, 3 mL, Nebulization, Q6H - RT  levothyroxine, 88 mcg, Oral, Q AM  metoprolol tartrate, 50 mg, Oral, Q12H  montelukast, 10 mg, Oral, Nightly  mupirocin, 1 application , Each Nare, BID  nicotine, 1 patch, Transdermal, Q24H  pantoprazole, 40 mg, Oral, Daily  rosuvastatin, 10 mg, Oral, Nightly  sertraline, 25 mg, Oral, Daily  sodium chloride, 10 mL, Intravenous, Q12H  vitamin B-12, 1,000 mcg, Oral, Daily             Assessment & Plan     -Acute kidney injury initiated on dialysis during this hospitalization on 10/19/2023  - Acute metabolic encephalopathy  -Atrophic right  kidney  -Chronic kidney disease stage IIIa  -Acute hyperkalemia, resolved  -Acute hypoxic respite failure, improving  -COPD  -Essential hypertension  -Medical noncompliance    Evaluated the patient on dialysis, tolerating slightly better compared to yesterday.  Borderline hypotension, ordered IV albumin 1 dose during dialysis.  Overall significant fluid overload clinically will continue doing ultrafiltration as hemodynamically tolerated  Educated and counseled patient      MAICO on CKD most likely multifactori including ATN from prolonged prerenal state and hemodynamic changes with concomitant use of ACE inhibitors in setting of volume loss   baseline creatinine 2.2, admitted with 3.69   ultrasound of the kidneys showed atrophic right kidney  Serologies are negative    Please avoid nephrotoxic medication and pharmacy to adjust the medication according to the GFR     Plan of care discussed with pt, and family answered all questions, patient verbalized understanding and agreed.      Vani León MD  10/24/23  14:18 EDT

## 2023-10-25 ENCOUNTER — APPOINTMENT (OUTPATIENT)
Dept: GENERAL RADIOLOGY | Facility: HOSPITAL | Age: 68
DRG: 291 | End: 2023-10-25
Payer: MEDICARE

## 2023-10-25 LAB
A-A DO2: 111.3 MMHG (ref 0–300)
ANION GAP SERPL CALCULATED.3IONS-SCNC: 13 MMOL/L (ref 5–15)
ARTERIAL PATENCY WRIST A: POSITIVE
ATMOSPHERIC PRESS: 733 MMHG
BACTERIA SPEC AEROBE CULT: NORMAL
BASE EXCESS BLDA CALC-SCNC: 6.3 MMOL/L (ref 0–2)
BASOPHILS # BLD AUTO: 0.05 10*3/MM3 (ref 0–0.2)
BASOPHILS NFR BLD AUTO: 0.6 % (ref 0–1.5)
BDY SITE: ABNORMAL
BUN SERPL-MCNC: 24 MG/DL (ref 8–23)
BUN/CREAT SERPL: 6 (ref 7–25)
CALCIUM SPEC-SCNC: 9 MG/DL (ref 8.6–10.5)
CHLORIDE SERPL-SCNC: 92 MMOL/L (ref 98–107)
CO2 BLDA-SCNC: 34.1 MMOL/L (ref 22–33)
CO2 SERPL-SCNC: 30 MMOL/L (ref 22–29)
COHGB MFR BLD: 2.2 % (ref 0–5)
CREAT SERPL-MCNC: 3.99 MG/DL (ref 0.57–1)
DEPRECATED RDW RBC AUTO: 54.6 FL (ref 37–54)
EGFRCR SERPLBLD CKD-EPI 2021: 11.9 ML/MIN/1.73
EOSINOPHIL # BLD AUTO: 0.53 10*3/MM3 (ref 0–0.4)
EOSINOPHIL NFR BLD AUTO: 6.2 % (ref 0.3–6.2)
ERYTHROCYTE [DISTWIDTH] IN BLOOD BY AUTOMATED COUNT: 16.8 % (ref 12.3–15.4)
GAS FLOW AIRWAY: 4 LPM
GLUCOSE BLDC GLUCOMTR-MCNC: 157 MG/DL (ref 70–130)
GLUCOSE BLDC GLUCOMTR-MCNC: 82 MG/DL (ref 70–130)
GLUCOSE BLDC GLUCOMTR-MCNC: 90 MG/DL (ref 70–130)
GLUCOSE SERPL-MCNC: 86 MG/DL (ref 65–99)
HCO3 BLDA-SCNC: 32.4 MMOL/L (ref 20–26)
HCT VFR BLD AUTO: 26.9 % (ref 34–46.6)
HCT VFR BLD CALC: 26.6 % (ref 38–51)
HGB BLD-MCNC: 8.1 G/DL (ref 12–15.9)
HGB BLDA-MCNC: 8.7 G/DL (ref 13.5–17.5)
IMM GRANULOCYTES # BLD AUTO: 0.04 10*3/MM3 (ref 0–0.05)
IMM GRANULOCYTES NFR BLD AUTO: 0.5 % (ref 0–0.5)
INHALED O2 CONCENTRATION: 36 %
LYMPHOCYTES # BLD AUTO: 1.62 10*3/MM3 (ref 0.7–3.1)
LYMPHOCYTES NFR BLD AUTO: 19 % (ref 19.6–45.3)
Lab: ABNORMAL
MCH RBC QN AUTO: 27.2 PG (ref 26.6–33)
MCHC RBC AUTO-ENTMCNC: 30.1 G/DL (ref 31.5–35.7)
MCV RBC AUTO: 90.3 FL (ref 79–97)
METHGB BLD QL: 0.3 % (ref 0–3)
MODALITY: ABNORMAL
MONOCYTES # BLD AUTO: 0.97 10*3/MM3 (ref 0.1–0.9)
MONOCYTES NFR BLD AUTO: 11.4 % (ref 5–12)
NEUTROPHILS NFR BLD AUTO: 5.32 10*3/MM3 (ref 1.7–7)
NEUTROPHILS NFR BLD AUTO: 62.3 % (ref 42.7–76)
NRBC BLD AUTO-RTO: 0 /100 WBC (ref 0–0.2)
OXYHGB MFR BLDV: 93.9 % (ref 94–99)
PCO2 BLDA: 54.7 MM HG (ref 35–45)
PCO2 TEMP ADJ BLD: ABNORMAL MM[HG]
PH BLDA: 7.38 PH UNITS (ref 7.35–7.45)
PH, TEMP CORRECTED: ABNORMAL
PLATELET # BLD AUTO: 190 10*3/MM3 (ref 140–450)
PMV BLD AUTO: 10.8 FL (ref 6–12)
PO2 BLDA: 75.3 MM HG (ref 83–108)
PO2 TEMP ADJ BLD: ABNORMAL MM[HG]
POTASSIUM SERPL-SCNC: 4 MMOL/L (ref 3.5–5.2)
QT INTERVAL: 438 MS
QTC INTERVAL: 448 MS
RBC # BLD AUTO: 2.98 10*6/MM3 (ref 3.77–5.28)
SAO2 % BLDCOA: 96.3 % (ref 94–99)
SODIUM SERPL-SCNC: 135 MMOL/L (ref 136–145)
UFH PPP CHRO-ACNC: 0.28 IU/ML (ref 0.3–0.7)
UFH PPP CHRO-ACNC: 0.3 IU/ML (ref 0.3–0.7)
UFH PPP CHRO-ACNC: 0.59 IU/ML (ref 0.3–0.7)
VENTILATOR MODE: ABNORMAL
WBC NRBC COR # BLD: 8.53 10*3/MM3 (ref 3.4–10.8)

## 2023-10-25 PROCEDURE — 94761 N-INVAS EAR/PLS OXIMETRY MLT: CPT

## 2023-10-25 PROCEDURE — 36600 WITHDRAWAL OF ARTERIAL BLOOD: CPT

## 2023-10-25 PROCEDURE — 80048 BASIC METABOLIC PNL TOTAL CA: CPT | Performed by: INTERNAL MEDICINE

## 2023-10-25 PROCEDURE — 94799 UNLISTED PULMONARY SVC/PX: CPT

## 2023-10-25 PROCEDURE — 85520 HEPARIN ASSAY: CPT

## 2023-10-25 PROCEDURE — 85520 HEPARIN ASSAY: CPT | Performed by: INTERNAL MEDICINE

## 2023-10-25 PROCEDURE — 25010000002 HEPARIN (PORCINE) PER 1000 UNITS: Performed by: INTERNAL MEDICINE

## 2023-10-25 PROCEDURE — 87205 SMEAR GRAM STAIN: CPT | Performed by: INTERNAL MEDICINE

## 2023-10-25 PROCEDURE — 71045 X-RAY EXAM CHEST 1 VIEW: CPT | Performed by: RADIOLOGY

## 2023-10-25 PROCEDURE — 99232 SBSQ HOSP IP/OBS MODERATE 35: CPT | Performed by: INTERNAL MEDICINE

## 2023-10-25 PROCEDURE — 85025 COMPLETE CBC W/AUTO DIFF WBC: CPT | Performed by: INTERNAL MEDICINE

## 2023-10-25 PROCEDURE — 93005 ELECTROCARDIOGRAM TRACING: CPT | Performed by: INTERNAL MEDICINE

## 2023-10-25 PROCEDURE — 82948 REAGENT STRIP/BLOOD GLUCOSE: CPT

## 2023-10-25 PROCEDURE — 25010000002 HEPARIN (PORCINE) 25000-0.45 UT/250ML-% SOLUTION: Performed by: INTERNAL MEDICINE

## 2023-10-25 PROCEDURE — 82375 ASSAY CARBOXYHB QUANT: CPT

## 2023-10-25 PROCEDURE — 99232 SBSQ HOSP IP/OBS MODERATE 35: CPT | Performed by: SPECIALIST

## 2023-10-25 PROCEDURE — 94664 DEMO&/EVAL PT USE INHALER: CPT

## 2023-10-25 PROCEDURE — 71045 X-RAY EXAM CHEST 1 VIEW: CPT

## 2023-10-25 PROCEDURE — 82805 BLOOD GASES W/O2 SATURATION: CPT

## 2023-10-25 PROCEDURE — 87070 CULTURE OTHR SPECIMN AEROBIC: CPT | Performed by: INTERNAL MEDICINE

## 2023-10-25 PROCEDURE — 93010 ELECTROCARDIOGRAM REPORT: CPT | Performed by: SPECIALIST

## 2023-10-25 PROCEDURE — 83050 HGB METHEMOGLOBIN QUAN: CPT

## 2023-10-25 RX ORDER — AMIODARONE HYDROCHLORIDE 200 MG/1
300 TABLET ORAL EVERY 12 HOURS SCHEDULED
Qty: 8 TABLET | Refills: 0 | Status: COMPLETED | OUTPATIENT
Start: 2023-10-25 | End: 2023-10-26

## 2023-10-25 RX ADMIN — ACETYLCYSTEINE 1.5 ML: 200 SOLUTION ORAL; RESPIRATORY (INHALATION) at 18:33

## 2023-10-25 RX ADMIN — Medication 10 ML: at 08:11

## 2023-10-25 RX ADMIN — AMIODARONE HYDROCHLORIDE 300 MG: 200 TABLET ORAL at 11:55

## 2023-10-25 RX ADMIN — IPRATROPIUM BROMIDE AND ALBUTEROL SULFATE 3 ML: 2.5; .5 SOLUTION RESPIRATORY (INHALATION) at 13:31

## 2023-10-25 RX ADMIN — PANTOPRAZOLE SODIUM 40 MG: 40 TABLET, DELAYED RELEASE ORAL at 08:10

## 2023-10-25 RX ADMIN — METOPROLOL TARTRATE 50 MG: 50 TABLET, FILM COATED ORAL at 20:42

## 2023-10-25 RX ADMIN — AMIODARONE HYDROCHLORIDE 300 MG: 200 TABLET ORAL at 20:43

## 2023-10-25 RX ADMIN — IPRATROPIUM BROMIDE AND ALBUTEROL SULFATE 3 ML: 2.5; .5 SOLUTION RESPIRATORY (INHALATION) at 07:21

## 2023-10-25 RX ADMIN — ACETYLCYSTEINE 1.5 ML: 200 SOLUTION ORAL; RESPIRATORY (INHALATION) at 07:22

## 2023-10-25 RX ADMIN — Medication 10 ML: at 20:49

## 2023-10-25 RX ADMIN — SERTRALINE HYDROCHLORIDE 25 MG: 25 TABLET ORAL at 08:10

## 2023-10-25 RX ADMIN — ACETYLCYSTEINE 1.5 ML: 200 SOLUTION ORAL; RESPIRATORY (INHALATION) at 13:31

## 2023-10-25 RX ADMIN — MONTELUKAST SODIUM 10 MG: 10 TABLET, COATED ORAL at 20:43

## 2023-10-25 RX ADMIN — HYDRALAZINE HYDROCHLORIDE 25 MG: 50 TABLET, FILM COATED ORAL at 05:34

## 2023-10-25 RX ADMIN — Medication 1000 MCG: at 08:10

## 2023-10-25 RX ADMIN — GABAPENTIN 200 MG: 100 CAPSULE ORAL at 20:42

## 2023-10-25 RX ADMIN — LEVOTHYROXINE SODIUM 88 MCG: 88 TABLET ORAL at 05:35

## 2023-10-25 RX ADMIN — ASPIRIN 81 MG: 81 TABLET, COATED ORAL at 08:10

## 2023-10-25 RX ADMIN — HYDRALAZINE HYDROCHLORIDE 25 MG: 50 TABLET, FILM COATED ORAL at 20:49

## 2023-10-25 RX ADMIN — IPRATROPIUM BROMIDE AND ALBUTEROL SULFATE 3 ML: 2.5; .5 SOLUTION RESPIRATORY (INHALATION) at 18:33

## 2023-10-25 RX ADMIN — IPRATROPIUM BROMIDE AND ALBUTEROL SULFATE 3 ML: 2.5; .5 SOLUTION RESPIRATORY (INHALATION) at 00:33

## 2023-10-25 RX ADMIN — ACETYLCYSTEINE 1.5 ML: 200 SOLUTION ORAL; RESPIRATORY (INHALATION) at 00:33

## 2023-10-25 RX ADMIN — NICOTINE TRANSDERMAL SYSTEM 1 PATCH: 21 PATCH, EXTENDED RELEASE TRANSDERMAL at 08:10

## 2023-10-25 RX ADMIN — GABAPENTIN 200 MG: 100 CAPSULE ORAL at 08:40

## 2023-10-25 RX ADMIN — HYDRALAZINE HYDROCHLORIDE 25 MG: 50 TABLET, FILM COATED ORAL at 14:03

## 2023-10-25 RX ADMIN — ROSUVASTATIN CALCIUM 10 MG: 10 TABLET, FILM COATED ORAL at 20:43

## 2023-10-25 RX ADMIN — HEPARIN SODIUM 4000 UNITS: 5000 INJECTION INTRAVENOUS; SUBCUTANEOUS at 00:25

## 2023-10-25 RX ADMIN — METOPROLOL TARTRATE 50 MG: 50 TABLET, FILM COATED ORAL at 08:10

## 2023-10-25 RX ADMIN — HYDROXYZINE HYDROCHLORIDE 25 MG: 25 TABLET, FILM COATED ORAL at 20:48

## 2023-10-25 RX ADMIN — Medication 10 MG: at 20:48

## 2023-10-25 RX ADMIN — Medication 1 MG: at 08:10

## 2023-10-25 RX ADMIN — HEPARIN SODIUM 11.9 UNITS/KG/HR: 10000 INJECTION, SOLUTION INTRAVENOUS at 14:12

## 2023-10-25 RX ADMIN — MUPIROCIN 1 APPLICATION: 20 OINTMENT TOPICAL at 08:11

## 2023-10-25 NOTE — PROGRESS NOTES
[facility]  PROGRESS NOTE    Patient information:  Name: Lesley Michel  Age/Sex: 67 y.o. female  :  1955        PCP: Woodrow Raymond APRN  Attending: Fanny Ford MD  MRN:  [id number]  Visit Number:  [id number]    LOS:  LOS: 15 days     CODE STATUS:    Code Status and Medical Interventions:   Ordered at: 10/12/23 8709     Medical Intervention Limits:    NO intubation (DNI)     Level Of Support Discussed With:    Patient     Code Status (Patient has no pulse and is not breathing):    No CPR (Do Not Attempt to Resuscitate)     Medical Interventions (Patient has pulse or is breathing):    Limited Support       PROBLEM LIST:Principal Problem:    Acute hypoxic respiratory failure  Active Problems:    Acute renal failure (ARF)      Reason for Cardiology follow-up: New onset atrial fibrillation    Subjective   ADMISSION INFORMATION:  Chief Complaint   Patient presents with    Shortness of Breath    Edema       HPI:  Lesley Michel is a 67 y.o. female with a past medical history significant for  CAD chronically occluded well collateralized dominant RCA, status post drug-eluting stent to LAD with high-grade stenosis of the ostium of the small D1 and 60% mid RCA post lateral branch lesion, hyperlipidemia, essential hypertension, COPD, current tobacco abuse, CKD stage II, hypothyroidism, arthritis, anxiety, history of migraines, obesity by BMI, and history of medical noncompliance.        Patient presented to [facility] ([facility] emergency room (ER) on 10/8/2023 with complaints of increasing lower extremity edema and shortness of breath which has been progressively worsening over the past few days prior to presentation.  10/24/2023, Cardiology was consulted for further evaluation and management for what appears to be new onset atrial fibrillation with RVR since 10/23/2023.      Primary Cardiologist: Imelda ARENAS/Dr. Echeverria and her last office  visit was on 10/6/2023.    PROCEDURES:   10/24/2023: Dialysis    Interval History:   Patient is in room 321 and was examined by Dr. Lopez.  Per saved telemetry strips, patient  converted back to sinus rhythm around 4:53 PM on 10/24/2023.  Had a brief run of atrial fibrillation around 5:43 AM 10/25/2023.  Is now currently in sinus rhythm 60s with PACs.  Please see telemetry strips attached below.  Sodium is 135, potassium 4.0, chloride 92, CO2 30.0, BUN 24, and creatinine 3.99.  Hemoglobin is 8.1 and platelet count is 190.  Patient is lying in bed resting quietly.  No acute distress noted at this time.  Patient is more alert and oriented x3 today.  Head of bed is elevated approximately 15 degrees.  She currently denies any chest pain, shortness of breath, or palpitations.  Patient agrees to have a Loretta stress test on 10/26/2023.    ORDERS:   No caffeine after 7 PM 10/25/2023  N.p.o. after midnight 10/25/2023  Loretta stress test on 10/26/2023    Vital Signs  Temp:  [97.4 °F (36.3 °C)-98.1 °F (36.7 °C)] 97.4 °F (36.3 °C)  Heart Rate:  [] 55  Resp:  [16-20] 16  BP: ()/(64-86) 153/86  Flow (L/min):  [3-4] 4  Device (Oxygen Therapy): humidified;nasal cannula  Vital Signs (last 72 hrs)         10/22 0700  10/23 0659 10/23 0700  10/24 0659 10/24 0700  10/25 0659 10/25 0700  10/25 0731   Most Recent      Temp (°F) 97.8 -  98.4    97.9 -  98.5    97.7 -  98.3      97.4     97.4 (36.3) 10/25 0700    Heart Rate 71 -  84    81 -  149    64 -  140      55     55 10/25 0700    Resp 18 -  20    16 -  20    17 -  20      16     16 10/25 0700    /56 -  161/83    100/74 -  157/79    98/69 -  143/80      153/86     153/86 10/25 0700    SpO2 (%) 90 -  99    94 -  96    91 -  98      95     95 10/25 0700    Flow (L/min) 3 -  4    3 -  4    3 -  4       4 10/25 0033          BMI:Body mass index is 43.83 kg/m².    WEIGHT:      10/23/23  0500 10/24/23  0500 10/25/23  0504   Weight: 99.8 kg (220 lb 1.6 oz) 101 kg (221 lb 9.6  oz) 102 kg (224 lb 6.4 oz)       DIET:Diet: Cardiac Diets, Diabetic Diets, Renal Diets; Healthy Heart (2-3 Na+); Consistent Carbohydrate; Low Potassium; Texture: Regular Texture (IDDSI 7); Fluid Consistency: Thin (IDDSI 0)    I&O:  Intake & Output (last 3 days)         10/22 0701  10/23 0700 10/23 0701  10/24 0700 10/24 0701  10/25 0700 10/25 0701  10/26 0700    P.O. 880 480 560     Total Intake(mL/kg) 880 (8.8) 480 (4.8) 560 (5.5)     Urine (mL/kg/hr)  500 (0.2)      Stool  0      Dialysis  100 2300     Total Output  600 2300     Net +880 -120 -1740             Urine Unmeasured Occurrence 3 x 1 x 0 x     Stool Unmeasured Occurrence 0 x 1 x              Objective     Physical Exam:      General Appearance:    Alert, cooperative, in no acute distress.  BMI 43.83   Head:    Normocephalic, without obvious abnormality, atraumatic.   Eyes:                          Conjunctivae and sclerae normal, no icterus, no pallor, corneas clear.   Neck:   No adenopathy, supple, trachea midline, no thyromegaly, no carotid bruit, no JVD.   Lungs:     Clear to auscultation, respirations regular, even and             unlabored.    Heart:    Regular rhythm and normal rate, normal S1 and S2, no          murmur, no gallop, no rub, no click   Chest Wall:    No abnormalities observed.   Abdomen:     Normal bowel sounds, no masses, no organomegaly, soft nontender, nondistended, no guarding, no rebound tenderness.   Extremities:   Moves all extremities well, no edema, no cyanosis, no           redness.   Pulses:   Pulses palpable and equal bilaterally.   Skin:   No bleeding, bruising or rash.   Neurologic:   Alert and Oriented x 3, Speech Clear & comprehensive.       Results review   Results Review:   Results from last 7 days   Lab Units 10/21/23  1716 10/21/23  1316   HSTROP T ng/L 54* 51*     Lab Results   Component Value Date    PROBNP 8,093.0 (H) 10/08/2023     Results from last 7 days   Lab Units 10/25/23  0706 10/24/23  0336  10/23/23  0255 10/21/23  0325 10/20/23  0157   WBC 10*3/mm3 8.53 6.00 5.71 8.84 7.83   HEMOGLOBIN g/dL 8.1* 8.4* 8.0* 8.8* 8.7*   PLATELETS 10*3/mm3 190 192 190 226 222     Results from last 7 days   Lab Units 10/25/23  0706 10/24/23  0336 10/23/23  0255 10/21/23  0325 10/20/23  0157 10/19/23  0055   SODIUM mmol/L 135* 133* 135* 133* 135* 134*   POTASSIUM mmol/L 4.0 4.9 4.7 4.4 4.6 5.9*   CHLORIDE mmol/L 92* 92* 94* 93* 97* 101   CO2 mmol/L 30.0* 27.4 31.0* 25.6 25.0 20.2*   BUN mg/dL 24* 36* 43* 32* 50* 75*   CREATININE mg/dL 3.99* 5.25* 5.43* 3.83* 5.16* 6.93*   CALCIUM mg/dL 9.0 9.3 8.9 9.0 8.9 9.0   GLUCOSE mg/dL 86 103* 100* 71 81 107*   ALT (SGPT) U/L  --  5  --   --  5  --    AST (SGOT) U/L  --  16  --   --  7  --      Lab Results   Component Value Date    MG 2.0 10/09/2023    MG 1.9 10/08/2023     Estimated Creatinine Clearance: 15.1 mL/min (A) (by C-G formula based on SCr of 3.99 mg/dL (H)).    Lab Results   Component Value Date    HGBA1C 5.30 10/08/2023     Lab Results   Component Value Date    CHOL 140 10/08/2023    TRIG 75 10/08/2023    LDL 91 10/08/2023    HDL 34 (L) 10/08/2023     Lab Results   Component Value Date    ABSOLUTELUNG 43 (A) 10/13/2023    ABSOLUTELUNG 37 (A) 10/09/2023     Lab Results   Component Value Date    INR 1.07 10/24/2023     Lab Results   Component Value Date    TSH 6.770 (H) 10/08/2023      Pain Management Panel          Latest Ref Rng & Units 10/9/2023   Pain Management Panel   Creatinine, Urine mg/dL 116.0      Microbiology Results (last 10 days)       Procedure Component Value - Date/Time    Blood Culture - Blood, Arm, Right [[id number]]  (Abnormal) Collected: 10/20/23 1334    Lab Status: Final result Specimen: Blood from Arm, Right Updated: 10/23/23 0833     Blood Culture Micrococcus species     Comment: No specific CLSI guidelines and no validated TANYA testing method.  Rarely implicated in infections.          Isolated from Aerobic Bottle     Gram Stain Aerobic Bottle Gram  positive cocci in clusters    Narrative:      Probable contaminant requires clinical correlation, susceptibility not performed unless requested by physician.      Blood Culture ID, PCR - Blood, Arm, Right [[id number]] Collected: 10/20/23 1334    Lab Status: Final result Specimen: Blood from Arm, Right Updated: 10/22/23 0519     BCID, PCR Negative by BCID PCR. Culture to Follow.     BOTTLE TYPE Aerobic Bottle    Blood Culture - Blood, Arm, Left [[id number]]  (Normal) Collected: 10/20/23 1234    Lab Status: Preliminary result Specimen: Blood from Arm, Left Updated: 10/24/23 1401     Blood Culture No growth at 4 days           Imaging Results (Last 24 Hours)       ** No results found for the last 24 hours. **          ECHO:  Results for orders placed during the hospital encounter of 10/08/23    Adult Transthoracic Echo Complete W/ Cont if Necessary Per Protocol    Interpretation Summary    Left ventricular systolic function is normal. Left ventricular ejection fraction appears to be 56 - 60%.    Left ventricular diastolic function is consistent with (grade Ia w/high LAP) impaired relaxation.    The left atrial cavity is mild to moderately dilated.    Left atrial volume is moderately increased.    Mild aortic valve stenosis is present.    Estimated right ventricular systolic pressure from tricuspid regurgitation is mildly elevated (35-45 mmHg).    There is a small (<1cm) pericardial effusion. There is no evidence of cardiac tamponade.      STRESS TEST:   No results found for this or any previous visit.      HEART CATH:  No results found for this or any previous visit.      TELEMETRY:     SR 60s with PACs                    I reviewed the patient's new clinical results.    ALLERGIES: Patient has no known allergies.    Medication Review:   Current list of medications may not reflect those currently placed in orders that are not signed or are being held.     acetylcysteine, 1.5 mL, Nebulization, Q6H - RT  aspirin, 81 mg,  Oral, Daily  folic acid, 1 mg, Oral, Daily  gabapentin, 200 mg, Oral, Q12H  hydrALAZINE, 25 mg, Oral, Q8H  ipratropium-albuterol, 3 mL, Nebulization, Q6H - RT  levothyroxine, 88 mcg, Oral, Q AM  metoprolol tartrate, 50 mg, Oral, Q12H  montelukast, 10 mg, Oral, Nightly  mupirocin, 1 application , Each Nare, BID  nicotine, 1 patch, Transdermal, Q24H  pantoprazole, 40 mg, Oral, Daily  rosuvastatin, 10 mg, Oral, Nightly  sertraline, 25 mg, Oral, Daily  sodium chloride, 10 mL, Intravenous, Q12H  vitamin B-12, 1,000 mcg, Oral, Daily      heparin, 12.9 Units/kg/hr, Last Rate: 12.9 Units/kg/hr (10/25/23 0027)  Pharmacy to Dose Heparin,         acetaminophen    calcium carbonate    dextrose    dextrose    glucagon (human recombinant)    hydrALAZINE    HYDROcodone-acetaminophen    hydrOXYzine    melatonin    nitroglycerin    ondansetron    Pharmacy to Dose Heparin    [COMPLETED] Insert Peripheral IV **AND** sodium chloride    sodium chloride    sodium chloride    Assessment    New onset atrial fibrillation with RVR chads Vascor is at least 3  MAICO on CKD on hemodialysis  ASCVD status post  of the dominant RCA, status post drug-eluting stent placement to the LAD, and also existent high-grade stenosis of the ostial small first diagonal, was also 60% RCA lateral branch lesion  Dyslipidemia  Essential hypertension  Acute on chronic HFpEF  COPD  Current ongoing tobacco abuse  Mental status changes  Medical noncompliance            Plan   1.  Patient is converted to normal sinus rhythm continue current management, will add amiodarone to maintain sinus rhythm  2.  We will proceed with ischemia work-up for assessment of ischemia with stress testing in a.m.  3.  We will start on Eliquis after stress testing if no ischemia          I have discussed the patients findings and recommendations with patient.    Thank you very much for asking us to be involved in this patient's care.  We will follow along with you.    Electronically signed  by DEEPA Navarro, 10/25/23, 10:31 AM EDT.   Electronically signed by Dwayne Lopez MD, 10/25/23, 10:36 AM EDT.                      Please note that portions of this note were completed with a voice recognition program.    Please note that portions of this note were copied and has been reviewed and is accurate as of 10/25/2023 .

## 2023-10-25 NOTE — CASE MANAGEMENT/SOCIAL WORK
Discharge Planning Assessment   [facility]     Patient Name: Lesley Michel  MRN: [id number]  Today's Date: 10/25/2023    Admit Date: 10/8/2023    Plan: SS contacted [facility] Bingham on this date. SS awaiting response at this time. SS to follow.     Discharge Plan       Row Name 10/25/23 1304       Plan    Plan SS contacted [facility] Bingham on this date. SS awaiting response at this time. SS to follow.    1537- SS received a call from [facility]re Bingham who states she is not able to give pt a dialysis chair on this date. She states she would like to see how pt reacts with dialyisis tomorrow. SS to follow.             LIZBET Rodriguez

## 2023-10-25 NOTE — PROGRESS NOTES
Referring Provider: dr Tellez  Reason for Consultation: sob, hypoxic respiratory failure      Chief complaint -sob      Sub-    overnight events reviewed.   Will get a chest xray  All the labs medications ins and outs and vitals reviewed.  Resting in bed comfortably not in any distress.  Review of Systems-resting in bed comfortably not in any distress.      History  Past Medical History:   Diagnosis Date    Anxiety     Arthritis     Coronary artery disease     H/O heart artery stent     Hyperlipidemia     Hypertension     Obesity    ,   Past Surgical History:   Procedure Laterality Date    ANKLE SURGERY      RIGHT    APPENDECTOMY      CARDIAC CATHETERIZATION      LEFT    CORONARY STENT PLACEMENT      HYSTERECTOMY      INSERTION HEMODIALYSIS CATHETER Right 10/19/2023    Procedure: HEMODIALYSIS CATHETER INSERTION;  Surgeon: Bartolome Schwartz MD;  Location: [facility];  Service: General;  Laterality: Right;   ,   Family History   Problem Relation Age of Onset    Heart disease Mother     Heart attack Mother 73    Fibromyalgia Mother     Heart attack Father 72    Ovarian cancer Sister     Coronary artery disease Other     Breast cancer Neg Hx    ,   Social History     Tobacco Use    Smoking status: Every Day     Packs/day: 0.50     Years: 30.00     Additional pack years: 0.00     Total pack years: 15.00     Types: Electronic Cigarette, Cigarettes    Smokeless tobacco: Never   Vaping Use    Vaping Use: Never used   Substance Use Topics    Alcohol use: No    Drug use: No   ,   Medications Prior to Admission   Medication Sig Dispense Refill Last Dose    aspirin 325 MG tablet Take 1 tablet by mouth Daily.   10/8/2023    carvedilol (COREG) 25 MG tablet Take 1 tablet by mouth 2 (Two) Times a Day With Meals. 60 tablet 0 10/8/2023    folic acid (FOLVITE) 1 MG tablet Take 1 tablet by mouth Daily.   10/8/2023    hydrALAZINE (APRESOLINE) 25 MG tablet Take 1 tablet by mouth 2 (Two) Times a Day. 60 tablet 0 10/8/2023     levothyroxine (SYNTHROID, LEVOTHROID) 88 MCG tablet Take 1 tablet by mouth Daily.   10/8/2023    lisinopril (PRINIVIL,ZESTRIL) 5 MG tablet Take 1 tablet by mouth Daily.   10/8/2023    montelukast (SINGULAIR) 10 MG tablet Take 1 tablet by mouth Every Night.   10/8/2023    pantoprazole (PROTONIX) 40 MG EC tablet Take 1 tablet by mouth Daily.   10/8/2023    rosuvastatin (CRESTOR) 20 MG tablet Take 1 tablet by mouth Daily. 30 tablet 0 10/8/2023    vitamin B-12 (CYANOCOBALAMIN) 1000 MCG tablet Take 1 tablet by mouth Daily.   10/8/2023    gabapentin (NEURONTIN) 800 MG tablet Take 1 tablet by mouth 2 (Two) Times a Day As Needed (nerve pain).   Unknown    HYDROcodone-acetaminophen (NORCO)  MG per tablet Take 1 tablet by mouth Every 8 (Eight) Hours As Needed for Moderate Pain.   Unknown    nitroglycerin (NITROSTAT) 0.4 MG SL tablet Place 1 tablet under the tongue Every 5 (Five) Minutes As Needed for Chest Pain. 30 tablet 2 Unknown   , Scheduled Meds:  acetylcysteine, 1.5 mL, Nebulization, Q6H - RT  amiodarone, 300 mg, Oral, Q12H  aspirin, 81 mg, Oral, Daily  folic acid, 1 mg, Oral, Daily  gabapentin, 200 mg, Oral, Q12H  hydrALAZINE, 25 mg, Oral, Q8H  ipratropium-albuterol, 3 mL, Nebulization, Q6H - RT  levothyroxine, 88 mcg, Oral, Q AM  metoprolol tartrate, 50 mg, Oral, Q12H  montelukast, 10 mg, Oral, Nightly  mupirocin, 1 application , Each Nare, BID  nicotine, 1 patch, Transdermal, Q24H  pantoprazole, 40 mg, Oral, Daily  rosuvastatin, 10 mg, Oral, Nightly  sertraline, 25 mg, Oral, Daily  sodium chloride, 10 mL, Intravenous, Q12H  vitamin B-12, 1,000 mcg, Oral, Daily    , Continuous Infusions:  heparin, 11.9 Units/kg/hr, Last Rate: 11.9 Units/kg/hr (10/25/23 0837)  Pharmacy to Dose Heparin,        and Allergies:  Patient has no known allergies.    Objective     Vital Signs   Temp:  [97.4 °F (36.3 °C)-98.1 °F (36.7 °C)] 97.4 °F (36.3 °C)  Heart Rate:  [] 58  Resp:  [16-20] 16  BP: ()/(64-86)  134/68    Physical Exam: No major changes          General-resting in bed comfortably not in any distress  More alert  HEENT-PERRLA    Neck-supple    Respiratory- not in any respiratory distress.  Wearing nasal cannula oxygen, not in any distress  Cardiovascular-  Normal S1 and S2. No S3, S4 or murmurs. No JVD, no carotid bruit and no edema, pulses normal bilaterally     GI-nontender nondistended bowel sounds positive    CNS-nonfocal    Musculoskeletal -no edema  Extremities- no obvious deformity noticed     Psychiatric-not awake oriented skin- no visible rash                                                                   Results Review:    LABS:    Lab Results   Component Value Date    GLUCOSE 86 10/25/2023    BUN 24 (H) 10/25/2023    CREATININE 3.99 (H) 10/25/2023    BCR 6.0 (L) 10/25/2023    CO2 30.0 (H) 10/25/2023    CALCIUM 9.0 10/25/2023    PROTENTOTREF 6.5 10/11/2023    ALBUMIN 3.4 (L) 10/24/2023    LABIL2 1.1 10/11/2023    AST 16 10/24/2023    ALT 5 10/24/2023    WBC 8.53 10/25/2023    HGB 8.1 (L) 10/25/2023    HCT 26.9 (L) 10/25/2023    MCV 90.3 10/25/2023     10/25/2023     (L) 10/25/2023    K 4.0 10/25/2023    CL 92 (L) 10/25/2023    ANIONGAP 13.0 10/25/2023       Lab Results   Component Value Date    INR 1.07 10/24/2023    PROTIME 14.4 10/24/2023       Results from last 7 days   Lab Units 10/24/23  1609   INR  1.07   APTT seconds 47.6*          I reviewed the patient's new clinical results.  I reviewed the patient's new imaging results and agree with the interpretation.      Assessment & Plan     Shortness of breath-likely multifactorial most likely related to her chronic heart failure with preserved ejection fraction with elevated brain atretic peptide.  Failed diuretics trial and has been on dialysis since 10/19.  Chest x-ray from today shows bilateral consolidations.  With patient's mental status could have aspirated.  We will get a CBC tomorrow morning if shows leukocytosis will  consider starting antibiotics.  Strict aspiration precautions.  We will repeat chest x-ray tomorrow morning.  Getting hemodialysis.  Heart rate is high around 1 10-1 20.  Blood pressure holding good saturations are good.    COPD-continue oxygen to maintain saturation 88 to 92%.  Continue nebs.  Completed steroids and azithromycin.  If gets short of breath will start on BiPAP.    FLOR-sleep study on the outpatient basis.    Renal failure -on dialysis now.  Nephrology on case.        Smoker-did extensive smoking cessation counseling.  Patient is not ready to quit yet.  Follow-up with pulmonary in the outpatient basis.  PFTs on outpatient basis.        Echo-  Results for orders placed during the hospital encounter of 10/08/23    Adult Transthoracic Echo Complete W/ Cont if Necessary Per Protocol    Interpretation Summary    Left ventricular systolic function is normal. Left ventricular ejection fraction appears to be 56 - 60%.    Left ventricular diastolic function is consistent with (grade Ia w/high LAP) impaired relaxation.    The left atrial cavity is mild to moderately dilated.    Left atrial volume is moderately increased.    Mild aortic valve stenosis is present.    Estimated right ventricular systolic pressure from tricuspid regurgitation is mildly elevated (35-45 mmHg).    There is a small (<1cm) pericardial effusion. There is no evidence of cardiac tamponade.          Acute hypoxic respiratory failure    Acute renal failure (ARF)          Elmer Mckeon MD  10/25/23  10:59 EDT

## 2023-10-25 NOTE — PROGRESS NOTES
Nephrology Progress Note      Subjective   Seen on dialysis, tolerating dialysis relatively better today.    Heart rate running around 110s to 120s.    Objective       Vital signs :     Vitals:    10/24/23 2341 10/25/23 0033 10/25/23 0504 10/25/23 0700   BP: 105/64  143/80 153/86   BP Location: Right arm  Right arm Right arm   Patient Position: Lying  Sitting Lying   Pulse: 68 65 71 55   Resp: 18 20 20 16   Temp:    97.4 °F (36.3 °C)   TempSrc:    Oral   SpO2: 97% 97% 92% 95%   Weight:   102 kg (224 lb 6.4 oz)    Height:            Intake/Output                         10/23/23 0701 - 10/24/23 0700 10/24/23 0701 - 10/25/23 0700     9104-5104 0770-6798 Total 1483-7199 6383-7286 Total                 Intake    P.O.  180  300 480  320  240 560    Total Intake 180 300 480 320 240 560       Output    Urine  --  500 500  --  -- --    Stool  0  0 0  --  -- --    Dialysis  100  -- 100  2300  -- 2300    Total Output 100  -- 2300             Physical Exam:    General Appearance : Not in acute distress  Lungs : Bibasilar crackles noted trace edema  Heart :  regular rhythm & normal rate, normal S1, S2 and no murmur, no rub  Abdomen : Soft nontender nondistended   extremities : Trace edema  Neurologic :   Unable to assess fully    Laboratory Data :       CBC and coagulation:  Results from last 7 days   Lab Units 10/25/23  0706 10/24/23  1609 10/24/23  0336 10/23/23  0255 10/21/23  0325 10/20/23  0157   PROCALCITONIN ng/mL  --   --   --   --   --  0.40*   WBC 10*3/mm3 8.53  --  6.00 5.71   < > 7.83   HEMOGLOBIN g/dL 8.1*  --  8.4* 8.0*   < > 8.7*   HEMATOCRIT % 26.9*  --  27.7* 26.8*   < > 28.8*   MCV fL 90.3  --  90.8 90.2   < > 89.7   MCHC g/dL 30.1*  --  30.3* 29.9*   < > 30.2*   PLATELETS 10*3/mm3 190  --  192 190   < > 222   INR   --  1.07  --   --   --   --     < > = values in this interval not displayed.     Acid/base balance:  Results from last 7 days   Lab Units 10/25/23  0252 10/21/23  0907 10/20/23  4180  "  PH, ARTERIAL pH units 7.381 7.357 7.355   PO2 ART mm Hg 75.3* 72.2* 55.7*   PCO2, ARTERIAL mm Hg 54.7* 52.6* 57.9*   HCO3 ART mmol/L 32.4* 29.5* 32.3*       Renal and electrolytes:    Results from last 7 days   Lab Units 10/25/23  0706 10/24/23  0336 10/23/23  0255 10/21/23  0325 10/20/23  0157   SODIUM mmol/L 135* 133* 135* 133* 135*   POTASSIUM mmol/L 4.0 4.9 4.7 4.4 4.6   CHLORIDE mmol/L 92* 92* 94* 93* 97*   CO2 mmol/L 30.0* 27.4 31.0* 25.6 25.0   BUN mg/dL 24* 36* 43* 32* 50*   CREATININE mg/dL 3.99* 5.25* 5.43* 3.83* 5.16*   CALCIUM mg/dL 9.0 9.3 8.9 9.0 8.9     Estimated Creatinine Clearance: 15.1 mL/min (A) (by C-G formula based on SCr of 3.99 mg/dL (H)).     Liver and pancreatic function:  Results from last 7 days   Lab Units 10/24/23  0336 10/20/23  0157   ALBUMIN g/dL 3.4* 3.1*   BILIRUBIN mg/dL 0.5 0.4   ALK PHOS U/L 121* 124*   AST (SGOT) U/L 16 7   ALT (SGPT) U/L 5 5       Albumin Albumin   Date Value Ref Range Status   10/24/2023 3.4 (L) 3.5 - 5.2 g/dL Final        Magnesium No results found for: \"MG\"         PTH               No results found for: \"PTH\"    Cardiac:        Liver and pancreatic function:  Results from last 7 days   Lab Units 10/24/23  0336 10/20/23  0157   ALBUMIN g/dL 3.4* 3.1*   BILIRUBIN mg/dL 0.5 0.4   ALK PHOS U/L 121* 124*   AST (SGOT) U/L 16 7   ALT (SGPT) U/L 5 5       XR Chest 1 View    Result Date: 10/22/2023   Mild improvement in the appearance of the chest when compared to the prior examination.   This report was finalized on 10/22/2023 8:58 PM by Shayna Gee MD.      XR Chest 1 View    Result Date: 10/20/2023  1.  Better expansion of the right lower lobe with some persistent airspace disease. 2.  Minimal left lower lobe airspace disease. 3.  Right central catheter with tip in SVC. 4.  Coarsened interstitial markings noted throughout the lungs. 5.  Mild cardiomegaly again noted.   This report was finalized on 10/20/2023 8:34 AM by Dr. Kirk Mcdonald MD.      CT Chest " Without Contrast Diagnostic    Result Date: 10/19/2023  1.  Tiny pericardial effusion. 2.  Right lung base fairly extensive atelectasis and probably consolidative pneumonia. Again there may be some mucus plugging or narrowing. 3.  Again there is tiny bilateral pleural effusions. 4.  Cardiomegaly.   This report was finalized on 10/19/2023 3:51 PM by Dr. Kirk Mcdonald MD.      XR Chest AP    Result Date: 10/19/2023  1.  There is been collapse of probably the right lower lobe possibly related to mucous plug. 2.  Small right pleural effusion persists. 3.  Improved aeration of the left lung base.   This report was finalized on 10/19/2023 2:00 PM by Dr. Kirk Mcdonald MD.      FL Surgery Fluoro    Result Date: 10/19/2023    As above.   This report was finalized on 10/19/2023 1:42 PM by Dr. Kirk Mcdonald MD.      XR Chest 1 View    Result Date: 10/18/2023  1.  Coarsened interstitial markings and vague bibasilar airspace opacities again noted. 2.  Now small right pleural effusion. 3.  Tiny left pleural effusion. 4.  Cardiomegaly.   This report was finalized on 10/18/2023 10:22 AM by Dr. Kirk Mcdonald MD.        Medications :     acetylcysteine, 1.5 mL, Nebulization, Q6H - RT  aspirin, 81 mg, Oral, Daily  folic acid, 1 mg, Oral, Daily  gabapentin, 200 mg, Oral, Q12H  hydrALAZINE, 25 mg, Oral, Q8H  ipratropium-albuterol, 3 mL, Nebulization, Q6H - RT  levothyroxine, 88 mcg, Oral, Q AM  metoprolol tartrate, 50 mg, Oral, Q12H  montelukast, 10 mg, Oral, Nightly  mupirocin, 1 application , Each Nare, BID  nicotine, 1 patch, Transdermal, Q24H  pantoprazole, 40 mg, Oral, Daily  rosuvastatin, 10 mg, Oral, Nightly  sertraline, 25 mg, Oral, Daily  sodium chloride, 10 mL, Intravenous, Q12H  vitamin B-12, 1,000 mcg, Oral, Daily      heparin, 12.9 Units/kg/hr, Last Rate: 12.9 Units/kg/hr (10/25/23 0027)  Pharmacy to Dose Heparin,           Assessment & Plan     -Acute kidney injury initiated on dialysis during this hospitalization on  10/19/2023  - Acute metabolic encephalopathy  -Atrophic right kidney  -Chronic kidney disease stage IIIa  -Acute hyperkalemia, resolved  -Acute hypoxic respite failure, improving  -COPD  -Essential hypertension  -Medical noncompliance    Evaluated the patient on dialysis, tolerating slightly better compared to yesterday.  Borderline hypotension, ordered IV albumin 1 dose during dialysis.  Overall significant fluid overload clinically will continue doing ultrafiltration as hemodynamically tolerated  Educated and counseled patient      MAICO on CKD most likely multifactori including ATN from prolonged prerenal state and hemodynamic changes with concomitant use of ACE inhibitors in setting of volume loss   baseline creatinine 2.2, admitted with 3.69   ultrasound of the kidneys showed atrophic right kidney  Serologies are negative    Please avoid nephrotoxic medication and pharmacy to adjust the medication according to the GFR     Plan of care discussed with pt, and family answered all questions, patient verbalized understanding and agreed.      Vani León MD  10/25/23  08:26 EDT

## 2023-10-25 NOTE — PROGRESS NOTES
[facility] HOSPITALIST PROGRESS NOTE    Subjective     History:   Lesley Michel is a 67 y.o. female admitted on 10/8/2023 secondary to Acute hypoxic respiratory failure     Procedures:   10/19/23: Right IJ tunneled HD catheter placement     CC: Follow up MAICO    Patient seen and examined. Appears much more awake and alert today. Dyspnea appears improved. No reported CP. No reported vomiting. BP stable. Brief episode of tachycardia but rate improved. No acute events overnight per RN.     History taken from: patient, chart, and RN.      Objective     Vital Signs  Temp:  [97.4 °F (36.3 °C)-98.1 °F (36.7 °C)] 97.9 °F (36.6 °C)  Heart Rate:  [55-71] 58  Resp:  [16-20] 18  BP: (105-153)/(64-86) 139/83    Intake/Output Summary (Last 24 hours) at 10/25/2023 1829  Last data filed at 10/25/2023 1400  Gross per 24 hour   Intake 600 ml   Output --   Net 600 ml         Physical Exam:   General:    Awake, more alert and interactive today, ill appearing   Heart:      Normal S1 and S2. Regular rate and rhythm.    Lungs:     Respirations regular, even and unlabored. Diminished breath sounds at bases. No wheezes appreciated.     Abdomen:   Soft and nontender. No guarding, rebound tenderness or  organomegaly noted. Bowel sounds present x 4.   Extremities:  Tr bilateral lower extremity edema. Moves UE and LE equally B/L.     Results Review:    Results from last 7 days   Lab Units 10/25/23  0706 10/24/23  0336 10/23/23  0255 10/21/23  0325 10/20/23  0157   WBC 10*3/mm3 8.53 6.00 5.71 8.84 7.83   HEMOGLOBIN g/dL 8.1* 8.4* 8.0* 8.8* 8.7*   PLATELETS 10*3/mm3 190 192 190 226 222     Results from last 7 days   Lab Units 10/25/23  0706 10/24/23  0336 10/23/23  0255 10/21/23  0325 10/20/23  0157 10/19/23  0055   SODIUM mmol/L 135* 133* 135* 133* 135* 134*   POTASSIUM mmol/L 4.0 4.9 4.7 4.4 4.6 5.9*   CHLORIDE mmol/L 92* 92* 94* 93* 97* 101   CO2 mmol/L 30.0* 27.4 31.0* 25.6 25.0 20.2*   BUN mg/dL 24* 36* 43* 32* 50* 75*    CREATININE mg/dL 3.99* 5.25* 5.43* 3.83* 5.16* 6.93*   CALCIUM mg/dL 9.0 9.3 8.9 9.0 8.9 9.0   GLUCOSE mg/dL 86 103* 100* 71 81 107*     Results from last 7 days   Lab Units 10/24/23  0336 10/20/23  0157   BILIRUBIN mg/dL 0.5 0.4   ALK PHOS U/L 121* 124*   AST (SGOT) U/L 16 7   ALT (SGPT) U/L 5 5           Results from last 7 days   Lab Units 10/24/23  1609   INR  1.07     Results from last 7 days   Lab Units 10/21/23  1716 10/21/23  1316   HSTROP T ng/L 54* 51*       Imaging Results (Last 24 Hours)       Procedure Component Value Units Date/Time    XR Chest 1 View [[id number]] Collected: 10/25/23 1244     Updated: 10/25/23 1247    Narrative:      XR CHEST 1 VW-     CLINICAL INDICATION: sob; N17.9-Acute kidney failure, unspecified;  N18.9-Chronic kidney disease, unspecified; I50.9-Heart failure,  unspecified; J96.21-Acute and chronic respiratory failure with hypoxia;  E87.5-Hyperkalemia; N17.9-Acute kidney failure, unspecified        COMPARISON: 10/22/2023     TECHNIQUE: Single frontal view of the chest.     FINDINGS:     LUNGS: Bibasilar consolidation     HEART AND MEDIASTINUM: Heart and mediastinal contours are unremarkable        SKELETON: Bony and soft tissue structures are unremarkable.             Impression:      Bibasilar consolidation           This report was finalized on 10/25/2023 12:44 PM by Dr. Julio Dominguez MD.                 Medications:  acetylcysteine, 1.5 mL, Nebulization, Q6H - RT  amiodarone, 300 mg, Oral, Q12H  aspirin, 81 mg, Oral, Daily  folic acid, 1 mg, Oral, Daily  gabapentin, 200 mg, Oral, Q12H  hydrALAZINE, 25 mg, Oral, Q8H  ipratropium-albuterol, 3 mL, Nebulization, Q6H - RT  levothyroxine, 88 mcg, Oral, Q AM  metoprolol tartrate, 50 mg, Oral, Q12H  montelukast, 10 mg, Oral, Nightly  mupirocin, 1 application , Each Nare, BID  nicotine, 1 patch, Transdermal, Q24H  pantoprazole, 40 mg, Oral, Daily  rosuvastatin, 10 mg, Oral, Nightly  sertraline, 25 mg, Oral, Daily  sodium chloride, 10  mL, Intravenous, Q12H  vitamin B-12, 1,000 mcg, Oral, Daily      heparin, 12.9 Units/kg/hr, Last Rate: 12.9 Units/kg/hr (10/25/23 1550)  Pharmacy to Dose Heparin,             Assessment & Plan   Anasarca: proBNP and ReDs vest reading elevated on admission. Imaging revealed CHF. Echo revealed an EF of 56-60%, grade I diastolic dysfunction, mild AS, mild pulmonary HTN and small pericardial effusion with no evidence of cardiac tamponade. Worsening renal failure possibly contributing with acute diastolic CHF as well. Failed trials of diuretics with initiation of HD on 10/19. HD per nephrology.     Acute exacerbation of COPD: Possibly exacerbated by pulmonary edema. Respiratory PCR negative. Pulm consulted and assisted with steroid taper. Course of azithromycin completed. Wheezing improved. Cont nebs. Pulm input appreciated.     Bilateral pneumonia: Procal normal. Cultures with NGTD. Completed course of azithromycin as above. Cont nebs.     Acute hypoxic respiratory failure: Likely multifactorial in the setting of above. Post-op mucous plugging noted as well but improved. Pt has refused BiPAP. O2 requirements stable today. Treatment as outlined above. Wean supplemental O2 as tolerated.     MAICO on CKD IIIa with initiation of HD this admission: Baseline Cr around 2.2. Non nephrotic proteinuria. Complements are normal with negative serologies. No evidence of hydronephrosis on renal US with US revealing small and echogenic right kidney. Likely 2/2 ATN 2/2 prolonged prerenal state and hemodynamic changes with use of ACE inhibitors as well. S/P tunneled HD catheter placement with initiation of HD on 10/19 as above. Cont to monitor closely. Nephrology input appreciated.     Hyperkalemia: Resolved with targeted treatment and initiation of HD. Holding home lisinopril. Monitor on telemetry.     New onset Afib with RVR: Previously changed Coreg to metoprolol. BP later borderline low limiting Rx options. Decreased hydralazine to  allow rise in BP. Cont metoprolol. Cardiology has added amiodarone. Currently on heparin gtt for stroke preventions. Cards planning ischemic eval with stress test. Monitor on telemetry. Cardiology input appreciated.     Essential HTN: BP previously elevated but later borderline low after initiation of HD. Changed Coreg to metoprolol as above. Decreased hydralazine with BP improved today. Attempt to avoid wide fluctuations in BP. Cont to monitor.     Hypothyroidism: TSH mildly elevated with normal free T4. Cont levothyroxine. Will need repeat labs as an outpatient.     Anxiety: Psychiatry consulted and started sertraline. Nephrology has ordered Xanax with HD. Cont supportive treatment.     Tobacco abuse: Cont NRT and encourage cessation.     Morbid obesity by BMI: Complicates all aspects of care.     DVT PPX: Heparin gtt     Disposition SS working to arrange HD chair when medically stable.     José Miguel Tellez DO  10/25/23  18:29 EDT

## 2023-10-25 NOTE — PROGRESS NOTES
Nephrology Progress Note      Subjective   No chest pain, shortness of breath    Objective       Vital signs :     Vitals:    10/25/23 1030 10/25/23 1331 10/25/23 1340 10/25/23 1500   BP: 134/68  118/65 139/83   BP Location: Right arm  Left arm Left arm   Patient Position: Lying  Lying Lying   Pulse: 58 65 61 58   Resp: 16 18 18 18   Temp:   97.9 °F (36.6 °C) 97.9 °F (36.6 °C)   TempSrc:   Oral Oral   SpO2:  96% 96% 97%   Weight:       Height:            Intake/Output                               10/23/23 0701 - 10/24/23 0700 10/24/23 0701 - 10/25/23 0700 10/25/23 0701 - 10/26/23 0700     2375-2893 2707-2573 Total 3928-0072 3943-3562 Total 9647-1537 1218-8537 Total                    Intake    P.O.  180  300 480  320  240 560  360  -- 360    Total Intake 180 300 480 320 240 560 360 -- 360       Output    Urine  --  500 500  --  -- --  --  -- --    Stool  0  0 0  --  -- --  --  -- --    Dialysis  100  -- 100  2300  -- 2300  --  -- --    Total Output 100  -- 2300 -- -- --             Physical Exam:    General Appearance : Not in acute distress  Lungs : Bibasilar crackles noted trace edema  Heart :  regular rhythm & normal rate, normal S1, S2 and no murmur, no rub  Abdomen : Soft nontender nondistended   extremities : Trace edema  Neurologic :   Unable to assess fully    Laboratory Data :       CBC and coagulation:  Results from last 7 days   Lab Units 10/25/23  0706 10/24/23  1609 10/24/23  0336 10/23/23  0255 10/21/23  0325 10/20/23  0157   PROCALCITONIN ng/mL  --   --   --   --   --  0.40*   WBC 10*3/mm3 8.53  --  6.00 5.71   < > 7.83   HEMOGLOBIN g/dL 8.1*  --  8.4* 8.0*   < > 8.7*   HEMATOCRIT % 26.9*  --  27.7* 26.8*   < > 28.8*   MCV fL 90.3  --  90.8 90.2   < > 89.7   MCHC g/dL 30.1*  --  30.3* 29.9*   < > 30.2*   PLATELETS 10*3/mm3 190  --  192 190   < > 222   INR   --  1.07  --   --   --   --     < > = values in this interval not displayed.     Acid/base balance:  Results from last 7 days   Lab  "Units 10/25/23  0252 10/21/23  0907 10/20/23  1330   PH, ARTERIAL pH units 7.381 7.357 7.355   PO2 ART mm Hg 75.3* 72.2* 55.7*   PCO2, ARTERIAL mm Hg 54.7* 52.6* 57.9*   HCO3 ART mmol/L 32.4* 29.5* 32.3*       Renal and electrolytes:    Results from last 7 days   Lab Units 10/25/23  0706 10/24/23  0336 10/23/23  0255 10/21/23  0325 10/20/23  0157   SODIUM mmol/L 135* 133* 135* 133* 135*   POTASSIUM mmol/L 4.0 4.9 4.7 4.4 4.6   CHLORIDE mmol/L 92* 92* 94* 93* 97*   CO2 mmol/L 30.0* 27.4 31.0* 25.6 25.0   BUN mg/dL 24* 36* 43* 32* 50*   CREATININE mg/dL 3.99* 5.25* 5.43* 3.83* 5.16*   CALCIUM mg/dL 9.0 9.3 8.9 9.0 8.9     Estimated Creatinine Clearance: 15.1 mL/min (A) (by C-G formula based on SCr of 3.99 mg/dL (H)).     Liver and pancreatic function:  Results from last 7 days   Lab Units 10/24/23  0336 10/20/23  0157   ALBUMIN g/dL 3.4* 3.1*   BILIRUBIN mg/dL 0.5 0.4   ALK PHOS U/L 121* 124*   AST (SGOT) U/L 16 7   ALT (SGPT) U/L 5 5       Albumin Albumin   Date Value Ref Range Status   10/24/2023 3.4 (L) 3.5 - 5.2 g/dL Final        Magnesium No results found for: \"MG\"         PTH               No results found for: \"PTH\"    Cardiac:        Liver and pancreatic function:  Results from last 7 days   Lab Units 10/24/23  0336 10/20/23  0157   ALBUMIN g/dL 3.4* 3.1*   BILIRUBIN mg/dL 0.5 0.4   ALK PHOS U/L 121* 124*   AST (SGOT) U/L 16 7   ALT (SGPT) U/L 5 5       XR Chest 1 View    Result Date: 10/25/2023  Bibasilar consolidation    This report was finalized on 10/25/2023 12:44 PM by Dr. Julio Dominguez MD.      XR Chest 1 View    Result Date: 10/22/2023   Mild improvement in the appearance of the chest when compared to the prior examination.   This report was finalized on 10/22/2023 8:58 PM by Shayna Gee MD.      XR Chest 1 View    Result Date: 10/20/2023  1.  Better expansion of the right lower lobe with some persistent airspace disease. 2.  Minimal left lower lobe airspace disease. 3.  Right central catheter with " tip in SVC. 4.  Coarsened interstitial markings noted throughout the lungs. 5.  Mild cardiomegaly again noted.   This report was finalized on 10/20/2023 8:34 AM by Dr. Kirk Mcdonald MD.      CT Chest Without Contrast Diagnostic    Result Date: 10/19/2023  1.  Tiny pericardial effusion. 2.  Right lung base fairly extensive atelectasis and probably consolidative pneumonia. Again there may be some mucus plugging or narrowing. 3.  Again there is tiny bilateral pleural effusions. 4.  Cardiomegaly.   This report was finalized on 10/19/2023 3:51 PM by Dr. Kirk Mcdonald MD.      XR Chest AP    Result Date: 10/19/2023  1.  There is been collapse of probably the right lower lobe possibly related to mucous plug. 2.  Small right pleural effusion persists. 3.  Improved aeration of the left lung base.   This report was finalized on 10/19/2023 2:00 PM by Dr. Kirk Mcdonald MD.      FL Surgery Fluoro    Result Date: 10/19/2023    As above.   This report was finalized on 10/19/2023 1:42 PM by Dr. Kirk Mcdonald MD.      XR Chest 1 View    Result Date: 10/18/2023  1.  Coarsened interstitial markings and vague bibasilar airspace opacities again noted. 2.  Now small right pleural effusion. 3.  Tiny left pleural effusion. 4.  Cardiomegaly.   This report was finalized on 10/18/2023 10:22 AM by Dr. Kirk Mcdonald MD.        Medications :     acetylcysteine, 1.5 mL, Nebulization, Q6H - RT  amiodarone, 300 mg, Oral, Q12H  aspirin, 81 mg, Oral, Daily  folic acid, 1 mg, Oral, Daily  gabapentin, 200 mg, Oral, Q12H  hydrALAZINE, 25 mg, Oral, Q8H  ipratropium-albuterol, 3 mL, Nebulization, Q6H - RT  levothyroxine, 88 mcg, Oral, Q AM  metoprolol tartrate, 50 mg, Oral, Q12H  montelukast, 10 mg, Oral, Nightly  mupirocin, 1 application , Each Nare, BID  nicotine, 1 patch, Transdermal, Q24H  pantoprazole, 40 mg, Oral, Daily  rosuvastatin, 10 mg, Oral, Nightly  sertraline, 25 mg, Oral, Daily  sodium chloride, 10 mL, Intravenous, Q12H  vitamin B-12, 1,000  mcg, Oral, Daily      heparin, 12.9 Units/kg/hr, Last Rate: 12.9 Units/kg/hr (10/25/23 1550)  Pharmacy to Dose Heparin,           Assessment & Plan     -Acute kidney injury initiated on dialysis during this hospitalization on 10/19/2023  - Acute metabolic encephalopathy  -Atrophic right kidney  -Chronic kidney disease stage IIIa  -Acute hyperkalemia, resolved  -Acute hypoxic respite failure, improving  -COPD  -Essential hypertension  -Medical noncompliance    Grossly stable, awaiting outpatient dialysis chair placement    MAICO on CKD most likely multifactori including ATN from prolonged prerenal state and hemodynamic changes with concomitant use of ACE inhibitors in setting of volume loss   baseline creatinine 2.2, admitted with 3.69   ultrasound of the kidneys showed atrophic right kidney  Serologies are negative    Please avoid nephrotoxic medication and pharmacy to adjust the medication according to the GFR     Plan of care discussed with pt, and family answered all questions, patient verbalized understanding and agreed.      Vani León MD  10/25/23  17:47 EDT

## 2023-10-25 NOTE — PLAN OF CARE
Goal Outcome Evaluation:  Pt resting in bed with no s/s of distress noted. VSS. IV access maintained throughout shift. No requests at this time. Pt on 3L NC to maintain O2 92% or above. Will continue with plan of care.

## 2023-10-25 NOTE — PLAN OF CARE
Goal Outcome Evaluation:      Patient has been resting in bed this shift. Patient was unable to arouse long enough to take evening medications, patient was very lethargic. DEEPA Garcia aware. Patient did arouse at about 0230 and was able to stay awake, orientation improved with time awake. Will continue with plan of care.

## 2023-10-25 NOTE — PAYOR COMM NOTE
"[facility]  NPI:[id number]    Utilization Review  Contact: Jeimy Beltran RN  Phone: [phone]  Fax:[phone]    CLINICAL UPDATE    ATTENTION  Amelia and Dr. Elise    MichelMatthew (67 y.o. Female)       Date of Birth   1955    Social Security Number       Address   [address] [place], [place] [address]    Home Phone   [phone]    MRN   [id number]       Ep[employer]   Faith    Marital Status                               Admission Date   10/8/23    Admission Type   Emergency    Admitting Provider   Fanny Ford MD    Attending Provider   José Miguel Tellez DO    Department, Room/Bed   [address], 3321/       Discharge Date       Discharge Disposition       Discharge Destination                                 Attending Provider: José Miguel Tellez DO    Allergies: No Known Allergies    Isolation: None   Infection: None   Code Status: No CPR    Ht: 152.4 cm (60\")   Wt: 102 kg (224 lb 6.4 oz)    Admission Cmt: None   Principal Problem: Acute hypoxic respiratory failure [J96.01]                   Active Insurance as of 10/8/2023       Primary Coverage       Payor Plan Insurance Group Employer/Plan Group    [facility]IC[facility]PL[facility]        Payor Plan Address Payor Plan Phone Number Payor Plan Fax Number Effective Dates    [address] [address] [phone]  1/1/2023 - None Entered    [facility] [address]         Subscriber Name Subscriber Birth Date Member ID       MATTHEW MICHEL 1955 [id number]                     Emergency Contacts        (Rel.) Home Phone Work Phone Mobile Phone    LOYD MICHEL (Son) [phone] -- --                 Physician Progress Notes (last 72 hours)        José Miguel Tellez DO at 10/24/23 1554                [facility] HOSPITALIST PROGRESS NOTE    Subjective     History:   Matthew Michel is a 67 y.o. female admitted on " 10/8/2023 secondary to Acute hypoxic respiratory failure     Procedures:   10/19/23: Right IJ tunneled HD catheter placement     CC: Follow up MAICO    Patient seen and examined this AM in dialysis. More drowsy today with some confusion. Reports some dyspnea. No reported CP. No reported vomiting. BP borderline low. Episodes of tachycardia again noted with concern for new onset Afib. No acute events overnight per RN.     History taken from: patient, chart, and RN.      Objective     Vital Signs  Temp:  [97.7 °F (36.5 °C)-98.4 °F (36.9 °C)] 97.7 °F (36.5 °C)  Heart Rate:  [108-149] 135  Resp:  [16-20] 20  BP: ()/(60-79) 138/79    Intake/Output Summary (Last 24 hours) at 10/24/2023 1554  Last data filed at 10/24/2023 1153  Gross per 24 hour   Intake 420 ml   Output 2800 ml   Net -2380 ml         Physical Exam:   General:    Awake, more drowsy today, ill appearing   Heart:      Normal S1 and S2. Irregularly irregular. Tachycardic.    Lungs:     Respirations regular, even and unlabored. Scattered rales with diminished breath sounds at bases. No wheezes appreciated.     Abdomen:   Soft and nontender. No guarding, rebound tenderness or  organomegaly noted. Bowel sounds present x 4.   Extremities:  Tr bilateral lower extremity edema. Moves UE and LE equally B/L.     Results Review:    Results from last 7 days   Lab Units 10/24/23  0336 10/23/23  0255 10/21/23  0325 10/20/23  0157 10/18/23  0059 10/17/23  1744   WBC 10*3/mm3 6.00 5.71 8.84 7.83 11.60* 10.86*   HEMOGLOBIN g/dL 8.4* 8.0* 8.8* 8.7* 9.3* 9.2*   PLATELETS 10*3/mm3 192 190 226 222 242 259     Results from last 7 days   Lab Units 10/24/23  0336 10/23/23  0255 10/21/23  0325 10/20/23  0157 10/19/23  0055 10/18/23  0059 10/17/23  1744   SODIUM mmol/L 133* 135* 133* 135* 134* 132* 134*   POTASSIUM mmol/L 4.9 4.7 4.4 4.6 5.9* 5.2 5.2   CHLORIDE mmol/L 92* 94* 93* 97* 101 100 99   CO2 mmol/L 27.4 31.0* 25.6 25.0 20.2* 20.5* 22.9   BUN mg/dL 36* 43* 32* 50* 75* 69*  69*   CREATININE mg/dL 5.25* 5.43* 3.83* 5.16* 6.93* 6.32* 6.10*   CALCIUM mg/dL 9.3 8.9 9.0 8.9 9.0 9.0 8.7   GLUCOSE mg/dL 103* 100* 71 81 107* 126* 116*     Results from last 7 days   Lab Units 10/24/23  0336 10/20/23  0157 10/18/23  0059   BILIRUBIN mg/dL 0.5 0.4 0.3   ALK PHOS U/L 121* 124* 134*   AST (SGOT) U/L 16 7 10   ALT (SGPT) U/L 5 5 8               Results from last 7 days   Lab Units 10/21/23  1716 10/21/23  1316   HSTROP T ng/L 54* 51*       Imaging Results (Last 24 Hours)       ** No results found for the last 24 hours. **              Medications:  acetylcysteine, 1.5 mL, Nebulization, Q6H - RT  aspirin, 81 mg, Oral, Daily  folic acid, 1 mg, Oral, Daily  gabapentin, 200 mg, Oral, Q12H  hydrALAZINE, 50 mg, Oral, Q8H  ipratropium-albuterol, 3 mL, Nebulization, Q6H - RT  levothyroxine, 88 mcg, Oral, Q AM  metoprolol tartrate, 50 mg, Oral, Q12H  montelukast, 10 mg, Oral, Nightly  mupirocin, 1 application , Each Nare, BID  nicotine, 1 patch, Transdermal, Q24H  pantoprazole, 40 mg, Oral, Daily  rosuvastatin, 10 mg, Oral, Nightly  sertraline, 25 mg, Oral, Daily  sodium chloride, 10 mL, Intravenous, Q12H  vitamin B-12, 1,000 mcg, Oral, Daily      heparin, 1,000 Units/hr  Pharmacy Consult,   Pharmacy to Dose Heparin,             Assessment & Plan   Anasarca: proBNP and ReDs vest reading elevated on admission. Imaging revealed CHF. Echo revealed an EF of 56-60%, grade I diastolic dysfunction, mild AS, mild pulmonary HTN and small pericardial effusion with no evidence of cardiac tamponade. Worsening renal failure possibly contributing with acute diastolic CHF as well. Failed trials of diuretics with initiation of HD on 10/19. HD per nephrology.     Acute exacerbation of COPD: Possibly exacerbated by pulmonary edema. Respiratory PCR negative. Pulm consulted and assisted with steroid taper. Course of azithromycin completed. Wheezing improved. Cont nebs. Pulm input appreciated.     Bilateral pneumonia: Procal  normal. Cultures with NGTD. Completed course of azithromycin as above. Cont nebs.     Acute hypoxic respiratory failure: Likely multifactorial in the setting of above. Post-op mucous plugging noted as well but improved. Pt has refused BiPAP. O2 requirements stable today. Treatment as outlined above. Wean supplemental O2 as tolerated.     MAICO on CKD IIIa with initiation of HD this admission: Baseline creatine appears to be around 2.2. Non nephrotic proteinuria. Complements are normal with negative serologies. No evidence of hydronephrosis on renal US with US revealing small and echogenic right kidney. S/P tunneled HD catheter placement with initiation of HD on 10/19 as above. Cont to monitor closely. Nephrology input appreciated.     Hyperkalemia: Resolved with targeted treatment and initiation of HD. Holding home lisinopril. Monitor on telemetry.     New onset Afib with RVR: Previously changed Coreg to metoprolol. BP now borderline low limiting Rx options. Cardiology consulted and has started heparin gtt for stroke prevention. Monitor on telemetry. Cardiology input appreciated.     Essential HTN: BP previously elevated but now borderline low. Changed Coreg to metoprolol as above. Decrease hydralazine. Attempt to avoid wide fluctuations in BP. Cont to monitor.     Hypothyroidism: TSH mildly elevated with normal free T4. Cont levothyroxine. Will need repeat labs as an outpatient.     Anxiety: Psychiatry consulted and started sertraline. Nephrology has ordered Xanax with HD. Cont supportive treatment.     Tobacco abuse: Cont NRT and encourage cessation.     Morbid obesity by BMI: Complicates all aspects of care.     DVT PPX: Heparin gtt     Disposition SS working to arrange HD chair when medically stable.     José Miguel Tellez DO  10/24/23  15:54 EDT     Electronically signed by José Miguel Tellez DO at 10/24/23 7183       Gwen Ellis APRN at 10/24/23 6953          Palliative Care Daily Progress Note  "    S: Medical record reviewed, followed up with Primary NADEEM Parekh and Dr Tellez regarding patient's condition. Per conversation with RN Lesley had been drowsy this morning due to medication.  When palliative care entered the room Lesley was awake with her GD Olivia at bedside. Lesley stated she had back pain, however says that this is normal for her. She was anxious and frustrated as she wants to go home. She asked for a piece of paper and a writing utensil as she states she needed to make some lists. She was not in distress at this time.      O:   Palliative Performance Scale Score:     /79 (BP Location: Right arm, Patient Position: Lying)   Pulse (!) 135   Temp 97.7 °F (36.5 °C) (Temporal)   Resp 20   Ht 152.4 cm (60\")   Wt 101 kg (221 lb 9.6 oz)   SpO2 94%   BMI 43.28 kg/m²     Intake/Output Summary (Last 24 hours) at 10/24/2023 1522  Last data filed at 10/24/2023 1153  Gross per 24 hour   Intake 420 ml   Output 2800 ml   Net -2380 ml       PE:  General Appearance:    Chronically ill appearing, alert, cooperative, NAD   HEENT:    NC/AT, without obvious abnormality, EOMI, anicteric    Neck:   supple, trachea midline, no JVD   Lungs:     Unlabored respirations, diminished bases, no wheezes rhonchi or rales, occasional no productive cough noted.    Heart:    Tachycardic irregular rhythm, normal,, no M/R/G   Abdomen:     Soft, NT, ND, NABS    Extremities:   Moves all extremities, BLE edema 1-2(improved )   Pulses:   Pulses palpable and equal bilaterally   Skin:   Warm, dry   Neurologic:   A/Ox3, still does not understand complexity of her situation   Psych:   Anxious however pleasant         Meds: Reviewed and changes noted    Labs:   Results from last 7 days   Lab Units 10/24/23  0336   WBC 10*3/mm3 6.00   HEMOGLOBIN g/dL 8.4*   HEMATOCRIT % 27.7*   PLATELETS 10*3/mm3 192     Results from last 7 days   Lab Units 10/24/23  0336   SODIUM mmol/L 133*   POTASSIUM mmol/L 4.9   CHLORIDE mmol/L 92*   CO2 " mmol/L 27.4   BUN mg/dL 36*   CREATININE mg/dL 5.25*   GLUCOSE mg/dL 103*   CALCIUM mg/dL 9.3     Results from last 7 days   Lab Units 10/24/23  0336   SODIUM mmol/L 133*   POTASSIUM mmol/L 4.9   CHLORIDE mmol/L 92*   CO2 mmol/L 27.4   BUN mg/dL 36*   CREATININE mg/dL 5.25*   CALCIUM mg/dL 9.3   BILIRUBIN mg/dL 0.5   ALK PHOS U/L 121*   ALT (SGPT) U/L 5   AST (SGOT) U/L 16   GLUCOSE mg/dL 103*     Imaging Results (Last 72 Hours)       Procedure Component Value Units Date/Time    XR Chest 1 View [[id number]] Collected: 10/22/23 2054     Updated: 10/22/23 2100    Narrative:      Procedure: Upright portable AP view chest.     Comparison: 10/20/2023.     Findings: A right-sided central venous at the expected position of the  catheter noted with distal tip atriocaval junction/SVC, unchanged.   Right lower lobe demonstrates left airspace consolidation or pleural  effusion when compared to the prior examination.  The platelike  atelectasis within the left mid hemithorax is also resolved.     Chest is fairly well expanded persistent mild to moderate pulmonary  edema present with interstitial changes.  Cardiac silhouette unchanged  and enlarged.     Overall, the congestive changes are mildly improved when compared to the  prior examination.       Impression:         Mild improvement in the appearance of the chest when compared to the  prior examination.        This report was finalized on 10/22/2023 8:58 PM by Shayna Gee MD.                 Diagnostics: Reviewed    A: Lesley Michel is a 67 y.o.female  female admitted on 10/8/2023 due to shortness of breath and edema. Lesley has a medical history of CAD status post stenting, hyperlipidemia, essential hypertension, COPD, tobacco abuse, CKD, hypothyroidism, arthritis, anxiety, history of migraines, obesity by BMI, and history of medical noncompliance. Pt follows with a cardiologist and per their notes CKD and that  she has been noncompliant with all of her medications,  despite upon admission her stating that she is compliant with her medications. Today 10/12/23 she is alert and oriented time four and was able to discuss goals of care.   On Thursday 10/19 Lesley received her dialysis access had dialysis later that evening.  Today 10/24/23  Pt received Hemodialysis and tolerated better with HR still tachycardic however 110-20 with borderline hypotension. She was afebrile, RR 20 BP of 139/79(out of HD) and was saturating 93% on NC @ 3.5 L    P:  I was able to touch base with pts granddaughter Olivia at bedside to update and provide support. While Lesley does not understand her medical situation,ie dialysis her elevated HR and irregular rhythm, her granddaughter Olivia is more realistic and understands Lesley is not quite ready to discharge. I discussed pt with Dr Tellez.    We will continue to follow along. Please do not hesitate to contact us regarding further sx mgmt or GOC needs, including after hours or on weekends via our on call provider at [phone].     DEEPA Johnson    10/24/2023    Electronically signed by Gwen Ellis APRN at 10/24/23 1602       Vani León MD at 10/24/23 1418          Nephrology Progress Note      Subjective   Seen on dialysis, tolerating dialysis relatively better today.    Heart rate running around 110s to 120s.    Objective       Vital signs :     Vitals:    10/24/23 0648 10/24/23 0712 10/24/23 1152 10/24/23 1204   BP:  99/60 98/69 138/79   BP Location:  Right arm Right arm Right arm   Patient Position:  Lying Lying Lying   Pulse: (!) 126 108 (!) 140 111   Resp: 16 18 18 17   Temp:  98.3 °F (36.8 °C) 98 °F (36.7 °C) 97.7 °F (36.5 °C)   TempSrc:  Oral Oral Temporal   SpO2: 94% 91% 91%    Weight:       Height:            Intake/Output                               10/22/23 0701 - 10/23/23 0700 10/23/23 0701 - 10/24/23 0700 10/24/23 0701 - 10/25/23 0700     [id number] Total 0701-1900 1901-0700 Total 0701-1900 1901-0700 Total                     Intake    P.O.  610  270 880  180  300 480  --  -- --    Total Intake 610 270 880 180 300 480 -- -- --       Output    Urine  --  -- --  --  500 500  --  -- --    Stool  --  -- --  0  0 0  --  -- --    Dialysis  --  -- --  100  -- 100  2300  -- 2300    Total Output -- -- -- 100  -- 2300             Physical Exam:    General Appearance : Not in acute distress  Lungs : Bibasilar crackles noted trace edema  Heart :  regular rhythm & normal rate, normal S1, S2 and no murmur, no rub  Abdomen : Soft nontender nondistended   extremities : Trace edema  Neurologic :   Unable to assess fully    Laboratory Data :       CBC and coagulation:  Results from last 7 days   Lab Units 10/24/23  0336 10/23/23  0255 10/21/23  0325 10/20/23  0157   PROCALCITONIN ng/mL  --   --   --  0.40*   WBC 10*3/mm3 6.00 5.71 8.84 7.83   HEMOGLOBIN g/dL 8.4* 8.0* 8.8* 8.7*   HEMATOCRIT % 27.7* 26.8* 27.9* 28.8*   MCV fL 90.8 90.2 88.6 89.7   MCHC g/dL 30.3* 29.9* 31.5 30.2*   PLATELETS 10*3/mm3 192 190 226 222     Acid/base balance:  Results from last 7 days   Lab Units 10/21/23  0907 10/20/23  1330   PH, ARTERIAL pH units 7.357 7.355   PO2 ART mm Hg 72.2* 55.7*   PCO2, ARTERIAL mm Hg 52.6* 57.9*   HCO3 ART mmol/L 29.5* 32.3*       Renal and electrolytes:    Results from last 7 days   Lab Units 10/24/23  0336 10/23/23  0255 10/21/23  0325 10/20/23  0157 10/19/23  0055   SODIUM mmol/L 133* 135* 133* 135* 134*   POTASSIUM mmol/L 4.9 4.7 4.4 4.6 5.9*   CHLORIDE mmol/L 92* 94* 93* 97* 101   CO2 mmol/L 27.4 31.0* 25.6 25.0 20.2*   BUN mg/dL 36* 43* 32* 50* 75*   CREATININE mg/dL 5.25* 5.43* 3.83* 5.16* 6.93*   CALCIUM mg/dL 9.3 8.9 9.0 8.9 9.0     Estimated Creatinine Clearance: 11.4 mL/min (A) (by C-G formula based on SCr of 5.25 mg/dL (H)).     Liver and pancreatic function:  Results from last 7 days   Lab Units 10/24/23  0336 10/20/23  0157 10/18/23  0059   ALBUMIN g/dL 3.4* 3.1* 3.6   BILIRUBIN mg/dL 0.5 0.4 0.3   ALK  "PHOS U/L 121* 124* 134*   AST (SGOT) U/L 16 7 10   ALT (SGPT) U/L 5 5 8       Albumin Albumin   Date Value Ref Range Status   10/24/2023 3.4 (L) 3.5 - 5.2 g/dL Final        Magnesium No results found for: \"MG\"         PTH               No results found for: \"PTH\"    Cardiac:        Liver and pancreatic function:  Results from last 7 days   Lab Units 10/24/23  0336 10/20/23  0157 10/18/23  0059   ALBUMIN g/dL 3.4* 3.1* 3.6   BILIRUBIN mg/dL 0.5 0.4 0.3   ALK PHOS U/L 121* 124* 134*   AST (SGOT) U/L 16 7 10   ALT (SGPT) U/L 5 5 8       XR Chest 1 View    Result Date: 10/22/2023   Mild improvement in the appearance of the chest when compared to the prior examination.   This report was finalized on 10/22/2023 8:58 PM by Shayna Gee MD.      XR Chest 1 View    Result Date: 10/20/2023  1.  Better expansion of the right lower lobe with some persistent airspace disease. 2.  Minimal left lower lobe airspace disease. 3.  Right central catheter with tip in SVC. 4.  Coarsened interstitial markings noted throughout the lungs. 5.  Mild cardiomegaly again noted.   This report was finalized on 10/20/2023 8:34 AM by Dr. Kirk Mcdonald MD.      CT Chest Without Contrast Diagnostic    Result Date: 10/19/2023  1.  Tiny pericardial effusion. 2.  Right lung base fairly extensive atelectasis and probably consolidative pneumonia. Again there may be some mucus plugging or narrowing. 3.  Again there is tiny bilateral pleural effusions. 4.  Cardiomegaly.   This report was finalized on 10/19/2023 3:51 PM by Dr. Kirk Mcdonald MD.      XR Chest AP    Result Date: 10/19/2023  1.  There is been collapse of probably the right lower lobe possibly related to mucous plug. 2.  Small right pleural effusion persists. 3.  Improved aeration of the left lung base.   This report was finalized on 10/19/2023 2:00 PM by Dr. Kirk Mcdonald MD.      FL Surgery Fluoro    Result Date: 10/19/2023    As above.   This report was finalized on 10/19/2023 1:42 PM by " Dr. Kirk Mcdonald MD.      XR Chest 1 View    Result Date: 10/18/2023  1.  Coarsened interstitial markings and vague bibasilar airspace opacities again noted. 2.  Now small right pleural effusion. 3.  Tiny left pleural effusion. 4.  Cardiomegaly.   This report was finalized on 10/18/2023 10:22 AM by Dr. Kirk Mcdonald MD.        Medications :     acetylcysteine, 1.5 mL, Nebulization, Q6H - RT  aspirin, 81 mg, Oral, Daily  folic acid, 1 mg, Oral, Daily  gabapentin, 200 mg, Oral, Q12H  heparin (porcine), 5,000 Units, Subcutaneous, Q12H  hydrALAZINE, 50 mg, Oral, Q8H  ipratropium-albuterol, 3 mL, Nebulization, Q6H - RT  levothyroxine, 88 mcg, Oral, Q AM  metoprolol tartrate, 50 mg, Oral, Q12H  montelukast, 10 mg, Oral, Nightly  mupirocin, 1 application , Each Nare, BID  nicotine, 1 patch, Transdermal, Q24H  pantoprazole, 40 mg, Oral, Daily  rosuvastatin, 10 mg, Oral, Nightly  sertraline, 25 mg, Oral, Daily  sodium chloride, 10 mL, Intravenous, Q12H  vitamin B-12, 1,000 mcg, Oral, Daily             Assessment & Plan     -Acute kidney injury initiated on dialysis during this hospitalization on 10/19/2023  - Acute metabolic encephalopathy  -Atrophic right kidney  -Chronic kidney disease stage IIIa  -Acute hyperkalemia, resolved  -Acute hypoxic respite failure, improving  -COPD  -Essential hypertension  -Medical noncompliance    Evaluated the patient on dialysis, tolerating slightly better compared to yesterday.  Borderline hypotension, ordered IV albumin 1 dose during dialysis.  Overall significant fluid overload clinically will continue doing ultrafiltration as hemodynamically tolerated  Educated and counseled patient      MAICO on CKD most likely multifactori including ATN from prolonged prerenal state and hemodynamic changes with concomitant use of ACE inhibitors in setting of volume loss   baseline creatinine 2.2, admitted with 3.69   ultrasound of the kidneys showed atrophic right kidney  Serologies are  negative    Please avoid nephrotoxic medication and pharmacy to adjust the medication according to the GFR     Plan of care discussed with pt, and family answered all questions, patient verbalized understanding and agreed.      Vani León MD  10/24/23  14:18 EDT      Electronically signed by Vani León MD at 10/24/23 1419       Elmer Mckeon MD at 10/24/23 1227              Referring Provider: dr Tellez  Reason for Consultation: sob, hypoxic respiratory failure      Chief complaint -sob      Sub-overnight events reviewed.  All the labs medications ins and outs reviewed.  Patient getting hemodialysis.  Heart rate remains on the higher side.  Received Xanax.  Sleepy.    Review of Systems-could not be obtained due to patient's mental status.      History  Past Medical History:   Diagnosis Date    Anxiety     Arthritis     Coronary artery disease     H/O heart artery stent     Hyperlipidemia     Hypertension     Obesity    ,   Past Surgical History:   Procedure Laterality Date    ANKLE SURGERY      RIGHT    APPENDECTOMY      CARDIAC CATHETERIZATION      LEFT    CORONARY STENT PLACEMENT      HYSTERECTOMY     ,   Family History   Problem Relation Age of Onset    Heart disease Mother     Heart attack Mother 73    Fibromyalgia Mother     Heart attack Father 72    Ovarian cancer Sister     Coronary artery disease Other     Breast cancer Neg Hx    ,   Social History     Tobacco Use    Smoking status: Every Day     Packs/day: 0.50     Years: 30.00     Additional pack years: 0.00     Total pack years: 15.00     Types: Electronic Cigarette, Cigarettes    Smokeless tobacco: Never   Vaping Use    Vaping Use: Never used   Substance Use Topics    Alcohol use: No    Drug use: No   ,   Medications Prior to Admission   Medication Sig Dispense Refill Last Dose    aspirin 325 MG tablet Take 1 tablet by mouth Daily.   10/8/2023    carvedilol (COREG) 25 MG tablet Take 1 tablet by mouth 2 (Two) Times a Day With Meals. 60 tablet 0  10/8/2023    folic acid (FOLVITE) 1 MG tablet Take 1 tablet by mouth Daily.   10/8/2023    hydrALAZINE (APRESOLINE) 25 MG tablet Take 1 tablet by mouth 2 (Two) Times a Day. 60 tablet 0 10/8/2023    levothyroxine (SYNTHROID, LEVOTHROID) 88 MCG tablet Take 1 tablet by mouth Daily.   10/8/2023    lisinopril (PRINIVIL,ZESTRIL) 5 MG tablet Take 1 tablet by mouth Daily.   10/8/2023    montelukast (SINGULAIR) 10 MG tablet Take 1 tablet by mouth Every Night.   10/8/2023    pantoprazole (PROTONIX) 40 MG EC tablet Take 1 tablet by mouth Daily.   10/8/2023    rosuvastatin (CRESTOR) 20 MG tablet Take 1 tablet by mouth Daily. 30 tablet 0 10/8/2023    vitamin B-12 (CYANOCOBALAMIN) 1000 MCG tablet Take 1 tablet by mouth Daily.   10/8/2023    gabapentin (NEURONTIN) 800 MG tablet Take 1 tablet by mouth 2 (Two) Times a Day As Needed (nerve pain).   Unknown    HYDROcodone-acetaminophen (NORCO)  MG per tablet Take 1 tablet by mouth Every 8 (Eight) Hours As Needed for Moderate Pain.   Unknown    nitroglycerin (NITROSTAT) 0.4 MG SL tablet Place 1 tablet under the tongue Every 5 (Five) Minutes As Needed for Chest Pain. 30 tablet 2 Unknown   , Scheduled Meds:  acetylcysteine, 1.5 mL, Nebulization, Q6H - RT  aspirin, 81 mg, Oral, Daily  folic acid, 1 mg, Oral, Daily  gabapentin, 200 mg, Oral, Q12H  heparin (porcine), 5,000 Units, Subcutaneous, Q12H  hydrALAZINE, 50 mg, Oral, Q8H  ipratropium-albuterol, 3 mL, Nebulization, Q6H - RT  levothyroxine, 88 mcg, Oral, Q AM  metoprolol tartrate, 50 mg, Oral, Q12H  montelukast, 10 mg, Oral, Nightly  mupirocin, 1 application , Each Nare, BID  nicotine, 1 patch, Transdermal, Q24H  pantoprazole, 40 mg, Oral, Daily  rosuvastatin, 10 mg, Oral, Nightly  sertraline, 25 mg, Oral, Daily  sodium chloride, 10 mL, Intravenous, Q12H  vitamin B-12, 1,000 mcg, Oral, Daily    , Continuous Infusions:      and Allergies:  Patient has no known allergies.    Objective     Vital Signs   Temp:  [97.7 °F (36.5  "°C)-98.4 °F (36.9 °C)] 97.7 °F (36.5 °C)  Heart Rate:  [108-149] 111  Resp:  [16-20] 17  BP: ()/(60-79) 138/79    Physical Exam:           General-resting in bed comfortably not in any distress  Sleepy but easily arousable able to protect airway  HEENT-PERRLA    Neck-supple    Respiratory- not in any respiratory distress.  Wearing nasal cannula oxygen  Cardiovascular-  Normal S1 and S2. No S3, S4 or murmurs. No JVD, no carotid bruit and no edema, pulses normal bilaterally     GI-nontender nondistended bowel sounds positive    CNS-nonfocal    Musculoskeletal -no edema  Extremities- no obvious deformity noticed     Psychiatric-not awake oriented skin- no visible rash                                                                   Results Review:    LABS:    Lab Results   Component Value Date    GLUCOSE 103 (H) 10/24/2023    BUN 36 (H) 10/24/2023    CREATININE 5.25 (H) 10/24/2023    BCR 6.9 (L) 10/24/2023    CO2 27.4 10/24/2023    CALCIUM 9.3 10/24/2023    PROTENTOTREF 6.5 10/11/2023    ALBUMIN 3.4 (L) 10/24/2023    LABIL2 1.1 10/11/2023    AST 16 10/24/2023    ALT 5 10/24/2023    WBC 6.00 10/24/2023    HGB 8.4 (L) 10/24/2023    HCT 27.7 (L) 10/24/2023    MCV 90.8 10/24/2023     10/24/2023     (L) 10/24/2023    K 4.9 10/24/2023    CL 92 (L) 10/24/2023    ANIONGAP 13.6 10/24/2023       No results found for: \"INR\", \"PROTIME\"             I reviewed the patient's new clinical results.  I reviewed the patient's new imaging results and agree with the interpretation.      Assessment & Plan     Shortness of breath-likely multifactorial most likely related to her chronic heart failure with preserved ejection fraction with elevated brain atretic peptide.  Failed diuretics trial and has been on dialysis since 10/19.  Initially did not tolerate HD due to heart rate being high.  Latest chest x-ray shows improvement- d/w patient   Getting hemodialysis.  Heart rate is high around 1 10-1 20.  Blood pressure " holding good saturations are good.    COPD-continue oxygen to maintain saturation 88 to 92%.  Continue nebs.  Completed steroids and azithromycin.  If gets short of breath will start on BiPAP.    FLOR-sleep study on the outpatient basis.    Renal failure -on dialysis now.  Nephrology on case.        Smoker-did extensive smoking cessation counseling.  Patient is not ready to quit yet.  Follow-up with pulmonary in the outpatient basis.  PFTs on outpatient basis.        Echo-  Results for orders placed during the hospital encounter of 10/08/23    Adult Transthoracic Echo Complete W/ Cont if Necessary Per Protocol    Interpretation Summary    Left ventricular systolic function is normal. Left ventricular ejection fraction appears to be 56 - 60%.    Left ventricular diastolic function is consistent with (grade Ia w/high LAP) impaired relaxation.    The left atrial cavity is mild to moderately dilated.    Left atrial volume is moderately increased.    Mild aortic valve stenosis is present.    Estimated right ventricular systolic pressure from tricuspid regurgitation is mildly elevated (35-45 mmHg).    There is a small (<1cm) pericardial effusion. There is no evidence of cardiac tamponade.          Acute hypoxic respiratory failure    Acute renal failure (ARF)          Elmer Mckeon MD  10/24/23  12:29 EDT         Electronically signed by Elmer Mckeon MD at 10/24/23 1231       Elmer Mckeon MD at 10/23/23 1946              Referring Provider: dr Tellez  Reason for Consultation: sob, hypoxic respiratory failure      Chief complaint -sob      Sub-overnight events reviewed.  All the labs medications and the notes and vitals reviewed.  Oxygen requirements reviewed and adjusted to maintain a sat of 88 to 92%.  Resting in bed comfortably not in any distress.  Review of Systems-generalized fatigue and shortness of breath on exertion otherwise negative.      History  Past Medical History:   Diagnosis Date    Anxiety      Arthritis     Coronary artery disease     H/O heart artery stent     Hyperlipidemia     Hypertension     Obesity    ,   Past Surgical History:   Procedure Laterality Date    ANKLE SURGERY      RIGHT    APPENDECTOMY      CARDIAC CATHETERIZATION      LEFT    CORONARY STENT PLACEMENT      HYSTERECTOMY     ,   Family History   Problem Relation Age of Onset    Heart disease Mother     Heart attack Mother 73    Fibromyalgia Mother     Heart attack Father 72    Ovarian cancer Sister     Coronary artery disease Other     Breast cancer Neg Hx    ,   Social History     Tobacco Use    Smoking status: Every Day     Packs/day: 0.50     Years: 30.00     Additional pack years: 0.00     Total pack years: 15.00     Types: Electronic Cigarette, Cigarettes    Smokeless tobacco: Never   Vaping Use    Vaping Use: Never used   Substance Use Topics    Alcohol use: No    Drug use: No   ,   Medications Prior to Admission   Medication Sig Dispense Refill Last Dose    aspirin 325 MG tablet Take 1 tablet by mouth Daily.   10/8/2023    carvedilol (COREG) 25 MG tablet Take 1 tablet by mouth 2 (Two) Times a Day With Meals. 60 tablet 0 10/8/2023    folic acid (FOLVITE) 1 MG tablet Take 1 tablet by mouth Daily.   10/8/2023    hydrALAZINE (APRESOLINE) 25 MG tablet Take 1 tablet by mouth 2 (Two) Times a Day. 60 tablet 0 10/8/2023    levothyroxine (SYNTHROID, LEVOTHROID) 88 MCG tablet Take 1 tablet by mouth Daily.   10/8/2023    lisinopril (PRINIVIL,ZESTRIL) 5 MG tablet Take 1 tablet by mouth Daily.   10/8/2023    montelukast (SINGULAIR) 10 MG tablet Take 1 tablet by mouth Every Night.   10/8/2023    pantoprazole (PROTONIX) 40 MG EC tablet Take 1 tablet by mouth Daily.   10/8/2023    rosuvastatin (CRESTOR) 20 MG tablet Take 1 tablet by mouth Daily. 30 tablet 0 10/8/2023    vitamin B-12 (CYANOCOBALAMIN) 1000 MCG tablet Take 1 tablet by mouth Daily.   10/8/2023    gabapentin (NEURONTIN) 800 MG tablet Take 1 tablet by mouth 2 (Two) Times a Day As Needed  (nerve pain).   Unknown    HYDROcodone-acetaminophen (NORCO)  MG per tablet Take 1 tablet by mouth Every 8 (Eight) Hours As Needed for Moderate Pain.   Unknown    nitroglycerin (NITROSTAT) 0.4 MG SL tablet Place 1 tablet under the tongue Every 5 (Five) Minutes As Needed for Chest Pain. 30 tablet 2 Unknown   , Scheduled Meds:  acetylcysteine, 1.5 mL, Nebulization, Q6H - RT  [START ON 10/24/2023] ALPRAZolam, 1 mg, Oral, Once  aspirin, 81 mg, Oral, Daily  folic acid, 1 mg, Oral, Daily  gabapentin, 200 mg, Oral, Q12H  heparin (porcine), 5,000 Units, Subcutaneous, Q12H  hydrALAZINE, 50 mg, Oral, Q8H  ipratropium-albuterol, 3 mL, Nebulization, Q6H - RT  levothyroxine, 88 mcg, Oral, Q AM  metoprolol tartrate, 50 mg, Oral, Q12H  montelukast, 10 mg, Oral, Nightly  mupirocin, 1 application , Each Nare, BID  nicotine, 1 patch, Transdermal, Q24H  pantoprazole, 40 mg, Oral, Daily  rosuvastatin, 10 mg, Oral, Nightly  sertraline, 25 mg, Oral, Daily  sodium chloride, 10 mL, Intravenous, Q12H  vitamin B-12, 1,000 mcg, Oral, Daily    , Continuous Infusions:      and Allergies:  Patient has no known allergies.    Objective     Vital Signs   Temp:  [97.9 °F (36.6 °C)-98.5 °F (36.9 °C)] 98.4 °F (36.9 °C)  Heart Rate:  [] 137  Resp:  [18-20] 20  BP: (100-157)/(56-81) 100/74    Physical Exam:           General-resting in bed comfortably not in any distress    HEENT-PERRLA    Neck-supple    Respiratory- not in any respiratory distress.  Wearing nasal cannula oxygen  Cardiovascular-  Normal S1 and S2. No S3, S4 or murmurs. No JVD, no carotid bruit and no edema, pulses normal bilaterally     GI-nontender nondistended bowel sounds positive    CNS-nonfocal    Musculoskeletal -no edema  Extremities- no obvious deformity noticed     Psychiatric-not awake oriented skin- no visible rash                                                                   Results Review:    LABS:    Lab Results   Component Value Date    GLUCOSE 100 (H)  "10/23/2023    BUN 43 (H) 10/23/2023    CREATININE 5.43 (H) 10/23/2023    BCR 7.9 10/23/2023    CO2 31.0 (H) 10/23/2023    CALCIUM 8.9 10/23/2023    PROTENTOTREF 6.5 10/11/2023    ALBUMIN 3.1 (L) 10/20/2023    LABIL2 1.1 10/11/2023    AST 7 10/20/2023    ALT 5 10/20/2023    WBC 5.71 10/23/2023    HGB 8.0 (L) 10/23/2023    HCT 26.8 (L) 10/23/2023    MCV 90.2 10/23/2023     10/23/2023     (L) 10/23/2023    K 4.7 10/23/2023    CL 94 (L) 10/23/2023    ANIONGAP 10.0 10/23/2023       No results found for: \"INR\", \"PROTIME\"             I reviewed the patient's new clinical results.  I reviewed the patient's new imaging results and agree with the interpretation.      Assessment & Plan     Shortness of breath-likely multifactorial most likely related to her chronic heart failure with preserved ejection fraction with elevated brain atretic peptide.  Failed diuretics trial and has been on dialysis since 10/19.  Initially did not tolerate HD due to heart rate being high.  Latest chest x-ray shows improvement- d/w patient       COPD-continue oxygen to maintain saturation 88 to 92%.  Continue nebs.  Completed steroids and azithromycin.  If gets short of breath will start on BiPAP.    FLOR-sleep study on the outpatient basis.    Renal failure -on dialysis now.  Nephrology on case.    Bedside rounds were done with RT and patient's nurse. All the lab and clinical findings were discussed with them and plan was also discussed in great detail.      Smoker-did extensive smoking cessation counseling.  Patient is not ready to quit yet.  Follow-up with pulmonary in the outpatient basis.  PFTs on outpatient basis.        Echo-  Results for orders placed during the hospital encounter of 10/08/23    Adult Transthoracic Echo Complete W/ Cont if Necessary Per Protocol    Interpretation Summary    Left ventricular systolic function is normal. Left ventricular ejection fraction appears to be 56 - 60%.    Left ventricular diastolic " function is consistent with (grade Ia w/high LAP) impaired relaxation.    The left atrial cavity is mild to moderately dilated.    Left atrial volume is moderately increased.    Mild aortic valve stenosis is present.    Estimated right ventricular systolic pressure from tricuspid regurgitation is mildly elevated (35-45 mmHg).    There is a small (<1cm) pericardial effusion. There is no evidence of cardiac tamponade.          Acute hypoxic respiratory failure    Acute renal failure (ARF)          Elmer Mckeon MD  10/23/23  19:46 EDT         Electronically signed by Elmer Mckeon MD at 10/24/23 1229       José Miguel Tellez DO at 10/23/23 1619                [facility] HOSPITALIST PROGRESS NOTE    Subjective     History:   Lesley Michel is a 67 y.o. female admitted on 10/8/2023 secondary to Acute hypoxic respiratory failure     Procedures:   10/19/23: Right IJ tunneled HD catheter placement     CC: Follow up MAICO    Patient seen and examined in dialysis. Awake and alert. Reports anxiety with initiation of HD. Dyspnea and edema overall improved from previous. No reported CP. No reported vomiting. Tachycardic with initiation of HD. No acute events overnight per RN.     History taken from: patient, chart, and RN.      Objective     Vital Signs  Temp:  [97.9 °F (36.6 °C)-98.5 °F (36.9 °C)] 98.5 °F (36.9 °C)  Heart Rate:  [] 110  Resp:  [18-20] 20  BP: (110-157)/(56-81) 131/81    Intake/Output Summary (Last 24 hours) at 10/23/2023 1619  Last data filed at 10/23/2023 1226  Gross per 24 hour   Intake 580 ml   Output 100 ml   Net 480 ml         Physical Exam:   General:    Awake, alert, appears anxious, ill appearing   Heart:      Normal S1 and S2. Tachycardic. No significant murmur, rubs or gallops appreciated.   Lungs:     Respirations regular, even and unlabored. Scattered rales with diminished breath sounds at bases. No wheezes appreciated.     Abdomen:   Soft and nontender. No guarding,  rebound tenderness or  organomegaly noted. Bowel sounds present x 4.   Extremities:  Tr bilateral lower extremity edema. Moves UE and LE equally B/L.     Results Review:    Results from last 7 days   Lab Units 10/23/23  0255 10/21/23  0325 10/20/23  0157 10/18/23  0059 10/17/23  1744   WBC 10*3/mm3 5.71 8.84 7.83 11.60* 10.86*   HEMOGLOBIN g/dL 8.0* 8.8* 8.7* 9.3* 9.2*   PLATELETS 10*3/mm3 190 226 222 242 259     Results from last 7 days   Lab Units 10/23/23  0255 10/21/23  0325 10/20/23  0157 10/19/23  0055 10/18/23  0059 10/17/23  1744 10/17/23  0038   SODIUM mmol/L 135* 133* 135* 134* 132* 134* 139   POTASSIUM mmol/L 4.7 4.4 4.6 5.9* 5.2 5.2 5.3*   CHLORIDE mmol/L 94* 93* 97* 101 100 99 104   CO2 mmol/L 31.0* 25.6 25.0 20.2* 20.5* 22.9 22.5   BUN mg/dL 43* 32* 50* 75* 69* 69* 63*   CREATININE mg/dL 5.43* 3.83* 5.16* 6.93* 6.32* 6.10* 5.10*   CALCIUM mg/dL 8.9 9.0 8.9 9.0 9.0 8.7 8.6   GLUCOSE mg/dL 100* 71 81 107* 126* 116* 101*     Results from last 7 days   Lab Units 10/20/23  0157 10/18/23  0059 10/17/23  0038   BILIRUBIN mg/dL 0.4 0.3 0.2   ALK PHOS U/L 124* 134* 89   AST (SGOT) U/L 7 10 8   ALT (SGPT) U/L 5 8 6               Results from last 7 days   Lab Units 10/21/23  1716 10/21/23  1316   HSTROP T ng/L 54* 51*       Imaging Results (Last 24 Hours)       Procedure Component Value Units Date/Time    XR Chest 1 View [[id number]] Collected: 10/22/23 2054     Updated: 10/22/23 2100    Narrative:      Procedure: Upright portable AP view chest.     Comparison: 10/20/2023.     Findings: A right-sided central venous at the expected position of the  catheter noted with distal tip atriocaval junction/SVC, unchanged.   Right lower lobe demonstrates left airspace consolidation or pleural  effusion when compared to the prior examination.  The platelike  atelectasis within the left mid hemithorax is also resolved.     Chest is fairly well expanded persistent mild to moderate pulmonary  edema present with interstitial  changes.  Cardiac silhouette unchanged  and enlarged.     Overall, the congestive changes are mildly improved when compared to the  prior examination.       Impression:         Mild improvement in the appearance of the chest when compared to the  prior examination.        This report was finalized on 10/22/2023 8:58 PM by Shayna Gee MD.                 Medications:  acetylcysteine, 1.5 mL, Nebulization, Q6H - RT  [START ON 10/24/2023] ALPRAZolam, 1 mg, Oral, Once  aspirin, 81 mg, Oral, Daily  carvedilol, 25 mg, Oral, BID With Meals  folic acid, 1 mg, Oral, Daily  gabapentin, 200 mg, Oral, Q12H  heparin (porcine), 5,000 Units, Subcutaneous, Q12H  hydrALAZINE, 50 mg, Oral, Q8H  ipratropium-albuterol, 3 mL, Nebulization, Q6H - RT  levothyroxine, 88 mcg, Oral, Q AM  montelukast, 10 mg, Oral, Nightly  mupirocin, 1 application , Each Nare, BID  nicotine, 1 patch, Transdermal, Q24H  pantoprazole, 40 mg, Oral, Daily  rosuvastatin, 10 mg, Oral, Nightly  sertraline, 25 mg, Oral, Daily  sodium chloride, 10 mL, Intravenous, Q12H  vitamin B-12, 1,000 mcg, Oral, Daily               Assessment & Plan   Anasarca: proBNP and ReDs vest reading elevated on admission. Imaging revealed CHF. Echo revealed an EF of 56-60%, grade I diastolic dysfunction, mild AS, mild pulmonary HTN and small pericardial effusion with no evidence of cardiac tamponade. Worsening renal failure possibly contributing with acute diastolic CHF as well. Failed trials of diuretics with initiation of HD on 10/19. Pt unable to tolerate HD today due to tachycardia. Possible anxiety component addressed. Change Coreg to metoprolol and consult cardiology.  HD per nephrology.     Acute exacerbation of COPD: Possibly exacerbated by pulmonary edema. Respiratory PCR negative. Pulm consulted and assisted with steroid taper. Course of azithromycin completed. Wheezing improved. Cont nebs. Pulm input appreciated.     Bilateral pneumonia: Procal normal. Cultures with NGTD.  Completed course of azithromycin as above. Cont nebs.     Acute hypoxic respiratory failure: Likely multifactorial in the setting of above. Post-op mucous plugging noted as well but improved. Pt has refused BiPAP. O2 requirements stable today. Treatment as outlined above. Wean supplemental O2 as tolerated.     MAICO on CKD IIIa with initiation of HD this admission: Baseline creatine appears to be around 2.2. Non nephrotic proteinuria. Complements are normal with negative serologies. No evidence of hydronephrosis on renal US with US revealing small and echogenic right kidney. S/P tunneled HD catheter placement with initiation of HD on 10/19 as above. Cont to monitor closely. Nephrology input appreciated.     Hyperkalemia: Resolved with targeted treatment and initiation of HD. Holding home lisinopril. Monitor on telemetry.     Essential HTN: BP overall improved. Changed Coreg to metoprolol as above. Cont hydralazine. Attempt to avoid wide fluctuations in BP. Cont to monitor.     Hypothyroidism: TSH mildly elevated with normal free T4. Cont levothyroxine. Will need repeat labs as an outpatient.     Anxiety: Psychiatry consulted and started sertraline. Nephrology has ordered Xanax with HD. Cont supportive treatment.     Tobacco abuse: Cont NRT and encourage cessation.     Morbid obesity by BMI: Complicates all aspects of care.     DVT PPX: SQ heparin     Disposition SS working to arrange HD chair.     José Miguel Tellez DO  10/23/23  16:19 EDT     Electronically signed by José Miguel Tellez DO at 10/24/23 1610       Vani León MD at 10/23/23 0801          Nephrology Progress Note      Subjective   Seen on dialysis, patient has had tachycardia started after initiating on dialysis.  There was no response with Xanax 0.5 mg so dialysis treatment has been stopped    Objective       Vital signs :     Vitals:    10/23/23 0500 10/23/23 0636 10/23/23 0646 10/23/23 0740   BP:   147/78 157/79   BP Location:   Right arm  Left arm   Patient Position:   Lying Lying   Pulse:  84 76 81   Resp:  20 20 18   Temp:   98.4 °F (36.9 °C) 97.9 °F (36.6 °C)   TempSrc:   Oral Temporal   SpO2:  90% 97%    Weight: 99.8 kg (220 lb 1.6 oz)      Height:            Intake/Output                         10/21/23 0701 - 10/22/23 0700 10/22/23 0701 - 10/23/23 0700     9726-6615 1454-9721 Total 2197-3251 8894-6785 Total                 Intake    P.O.  0  -- 0  610  270 880    Total Intake 0 -- 0 610 270 880       Output    Urine  --  200 200  --  -- --    Total Output -- 200 200 -- -- --             Physical Exam:    General Appearance : Not in acute distress  Lungs : Bibasilar crackles noted trace edema  Heart :  regular rhythm & normal rate, normal S1, S2 and no murmur, no rub  Abdomen : Soft nontender nondistended   extremities : Trace edema  Neurologic :   Unable to assess fully    Laboratory Data :       CBC and coagulation:  Results from last 7 days   Lab Units 10/23/23  0255 10/21/23  0325 10/20/23  0157   PROCALCITONIN ng/mL  --   --  0.40*   WBC 10*3/mm3 5.71 8.84 7.83   HEMOGLOBIN g/dL 8.0* 8.8* 8.7*   HEMATOCRIT % 26.8* 27.9* 28.8*   MCV fL 90.2 88.6 89.7   MCHC g/dL 29.9* 31.5 30.2*   PLATELETS 10*3/mm3 190 226 222     Acid/base balance:  Results from last 7 days   Lab Units 10/21/23  0907 10/20/23  1330 10/16/23  2202   PH, ARTERIAL pH units 7.357 7.355 7.311*   PO2 ART mm Hg 72.2* 55.7* 74.9*   PCO2, ARTERIAL mm Hg 52.6* 57.9* 47.4*   HCO3 ART mmol/L 29.5* 32.3* 23.9       Renal and electrolytes:    Results from last 7 days   Lab Units 10/23/23  0255 10/21/23  0325 10/20/23  0157 10/19/23  0055 10/18/23  0059   SODIUM mmol/L 135* 133* 135* 134* 132*   POTASSIUM mmol/L 4.7 4.4 4.6 5.9* 5.2   CHLORIDE mmol/L 94* 93* 97* 101 100   CO2 mmol/L 31.0* 25.6 25.0 20.2* 20.5*   BUN mg/dL 43* 32* 50* 75* 69*   CREATININE mg/dL 5.43* 3.83* 5.16* 6.93* 6.32*   CALCIUM mg/dL 8.9 9.0 8.9 9.0 9.0     Estimated Creatinine Clearance: 11 mL/min (A) (by C-G  "formula based on SCr of 5.43 mg/dL (H)).     Liver and pancreatic function:  Results from last 7 days   Lab Units 10/20/23  0157 10/18/23  0059 10/17/23  0038   ALBUMIN g/dL 3.1* 3.6 3.5   BILIRUBIN mg/dL 0.4 0.3 0.2   ALK PHOS U/L 124* 134* 89   AST (SGOT) U/L 7 10 8   ALT (SGPT) U/L 5 8 6       Albumin No results found for: \"ALBUMIN\"     Magnesium No results found for: \"MG\"         PTH               No results found for: \"PTH\"    Cardiac:        Liver and pancreatic function:  Results from last 7 days   Lab Units 10/20/23  0157 10/18/23  0059 10/17/23  0038   ALBUMIN g/dL 3.1* 3.6 3.5   BILIRUBIN mg/dL 0.4 0.3 0.2   ALK PHOS U/L 124* 134* 89   AST (SGOT) U/L 7 10 8   ALT (SGPT) U/L 5 8 6       XR Chest 1 View    Result Date: 10/22/2023   Mild improvement in the appearance of the chest when compared to the prior examination.   This report was finalized on 10/22/2023 8:58 PM by Shayna Gee MD.      XR Chest 1 View    Result Date: 10/20/2023  1.  Better expansion of the right lower lobe with some persistent airspace disease. 2.  Minimal left lower lobe airspace disease. 3.  Right central catheter with tip in SVC. 4.  Coarsened interstitial markings noted throughout the lungs. 5.  Mild cardiomegaly again noted.   This report was finalized on 10/20/2023 8:34 AM by Dr. Kirk Mcdonald MD.      CT Chest Without Contrast Diagnostic    Result Date: 10/19/2023  1.  Tiny pericardial effusion. 2.  Right lung base fairly extensive atelectasis and probably consolidative pneumonia. Again there may be some mucus plugging or narrowing. 3.  Again there is tiny bilateral pleural effusions. 4.  Cardiomegaly.   This report was finalized on 10/19/2023 3:51 PM by Dr. Kirk Mcdonald MD.      XR Chest AP    Result Date: 10/19/2023  1.  There is been collapse of probably the right lower lobe possibly related to mucous plug. 2.  Small right pleural effusion persists. 3.  Improved aeration of the left lung base.   This report was finalized " on 10/19/2023 2:00 PM by Dr. Kirk Mcdonald MD.      FL Surgery Fluoro    Result Date: 10/19/2023    As above.   This report was finalized on 10/19/2023 1:42 PM by Dr. Kirk Mcdonald MD.      XR Chest 1 View    Result Date: 10/18/2023  1.  Coarsened interstitial markings and vague bibasilar airspace opacities again noted. 2.  Now small right pleural effusion. 3.  Tiny left pleural effusion. 4.  Cardiomegaly.   This report was finalized on 10/18/2023 10:22 AM by Dr. Kirk Mcdonald MD.        Medications :     acetylcysteine, 1.5 mL, Nebulization, Q6H - RT  aspirin, 81 mg, Oral, Daily  carvedilol, 25 mg, Oral, BID With Meals  folic acid, 1 mg, Oral, Daily  gabapentin, 200 mg, Oral, Q12H  heparin (porcine), 5,000 Units, Subcutaneous, Q12H  hydrALAZINE, 50 mg, Oral, Q8H  ipratropium-albuterol, 3 mL, Nebulization, Q6H - RT  levothyroxine, 88 mcg, Oral, Q AM  montelukast, 10 mg, Oral, Nightly  mupirocin, 1 application , Each Nare, BID  nicotine, 1 patch, Transdermal, Q24H  pantoprazole, 40 mg, Oral, Daily  rosuvastatin, 10 mg, Oral, Nightly  sertraline, 25 mg, Oral, Daily  sodium chloride, 10 mL, Intravenous, Q12H  vitamin B-12, 1,000 mcg, Oral, Daily             Assessment & Plan     -Acute kidney injury initiated on dialysis during this hospitalization on 10/19/2023  - Acute metabolic encephalopathy  -Atrophic right kidney  -Chronic kidney disease stage IIIa  -Acute hyperkalemia, resolved  -Acute hypoxic respite failure, improving  -COPD  -Essential hypertension  -Medical noncompliance    Did not tolerate dialysis due to tachycardia, likely underlying generalized anxiety disorder.  If tachycardia do not resolve after stopping dialysis recommend further work-up.  [facility] dialysis tomorrow, Xanax 0.5 mg 30 minutes before dialysis  Educated and counseled the patient      MAICO on CKD most likely multifactori including ATN from prolonged prerenal state and hemodynamic changes with concomitant use of ACE inhibitors in setting  of volume loss   baseline creatinine 2.2, admitted with 3.69   ultrasound of the kidneys showed atrophic right kidney  Serologies are negative    Please avoid nephrotoxic medication and pharmacy to adjust the medication according to the GFR     Plan of care discussed with pt, and family answered all questions, patient verbalized understanding and agreed.      Vani León MD  10/23/23  08:01 EDT      Electronically signed by Vani León MD at 10/23/23 0931       Jay Jay Pritchett DO at 10/22/23 1620              [facility] HOSPITALIST PROGRESS NOTE     Patient Identification:  Name:  Lesley Michel  Age:  67 y.o.  Sex:  female  :  1955  MRN:  [id number]  Visit Number:  [id number]  ROOM: [address] [place] [address]     Primary Care Provider:  Woodrow Raymond APRN    Length of stay in inpatient status:  12    Subjective     Chief Compliant:    Chief Complaint   Patient presents with    Shortness of Breath    Edema       History of Presenting Illness: Patient seen and evaluated in follow-up for anasarca with MAICO on CKD stage IIIa complicated by acute exacerbation of COPD and bilateral pneumonia with acute hypoxemic respiratory failure.  Patient with successful placement of tunneled hemodialysis catheter 10/19/2023.  Patient significantly improved mental status wise today at time of exam.  She is alert and oriented x4.  She does fatigue easily and is slightly anxious but overall essentially back to baseline.  Patient inquiring when she will be able to return home as she is tired of being in the hospital.    Objective     Current Hospital Meds:  acetylcysteine, 1.5 mL, Nebulization, Q6H - RT  aspirin, 81 mg, Oral, Daily  carvedilol, 25 mg, Oral, BID With Meals  folic acid, 1 mg, Oral, Daily  gabapentin, 200 mg, Oral, Q12H  heparin (porcine), 5,000 Units, Subcutaneous, Q12H  hydrALAZINE, 50 mg, Oral, Q8H  ipratropium-albuterol, 3 mL, Nebulization, Q6H - RT  levothyroxine, 88 mcg, Oral, Q AM  montelukast, 10 mg,  Oral, Nightly  nicotine, 1 patch, Transdermal, Q24H  pantoprazole, 40 mg, Oral, Daily  rosuvastatin, 10 mg, Oral, Nightly  sertraline, 25 mg, Oral, Daily  sodium chloride, 10 mL, Intravenous, Q12H  vitamin B-12, 1,000 mcg, Oral, Daily         ----------------------------------------------------------------------------------------------------------------------  Vital Signs:  Temp:  [97.8 °F (36.6 °C)-98.1 °F (36.7 °C)] 97.8 °F (36.6 °C)  Heart Rate:  [71-83] 71  Resp:  [20-21] 20  BP: (150-163)/(72-85) 161/83  SpO2:  [94 %-99 %] 99 %  on  Flow (L/min):  [4] 4;   Device (Oxygen Therapy): nasal cannula;humidified  Body mass index is 44.02 kg/m².      Intake/Output Summary (Last 24 hours) at 10/22/2023 1620  Last data filed at 10/22/2023 1346  Gross per 24 hour   Intake 360 ml   Output 200 ml   Net 160 ml      ----------------------------------------------------------------------------------------------------------------------  Physical exam:  Constitutional: Elderly chronically ill-appearing adult female.  No acute distress.      HENT:  Head:  Normocephalic and atraumatic.  Mouth:  Moist mucous membranes.    Eyes:  Conjunctivae and EOM are normal. No scleral icterus.    Cardiovascular:  Normal rate, regular rhythm and normal heart sounds with no murmur.  Pulmonary/Chest:  No respiratory distress, no wheezes, no crackles, with diminished breath sounds at the bases with improved aeration in the lower right lower lung field.  Abdominal:  Soft.  Bowel sounds are normal.  No distension and no tenderness.   Musculoskeletal:  No tenderness and no deformity.  No red or swollen joints anywhere.   Neurological: Alert and and oriented to person, place, and time and situation.  No cranial nerve deficit.  No tongue deviation.  No facial droop.  No slurred speech. Intact Sensation throughout  Skin:  Skin is warm and dry. No rash or lesion noted. No pallor.   Peripheral vascular:  Pulses in all 4 extremities with no clubbing, no  "cyanosis, 1+ bilateral lower extremity edema with trace edema of the upper extremity.  Psychiatric: Appropriate mood and affect, pleasant.   ----------------------------------------------------------------------------------------------------------------------                 No results found for: \"URINECX\"  No results found for: \"BLOODCX\"    I have personally looked at the labs and they are summarized above.  ----------------------------------------------------------------------------------------------------------------------  Detailed radiology reports for the last 24 hours:  No radiology results for the last day  Assessment & Plan      Anasarca  Hyperkalemia  MAICO on CKD stage IIIa with likely progression to CKD V vs ESRD    -Patient presenting with anasarca with echo showing EF of 56 to 60% with grade 1 diastolic dysfunction, mild AAS, mild pulmonary hypertension and small pericardial effusion without evidence of tamponade.    -Patient with progressive worsening renal failure likely contributing to volume retention with possibly some component of acute HFpEF.    -Reds vest remains elevated.  Patient trialed on diuretic therapy with little to no urine output and remains within anuric range.      -Nephrology consulted and following along, volume status management per their discretion.  Patient has been  trialed on high-dose diuretic therapy to no avail with continued worsening/no improvement in renal function.  As such nephrology recommending hemodialysis and patient agreeable and had successful tunneled hemodialysis catheter placement on 10/19/2023.    -First dialysis session 10/19 with repeat session on 10/20.  Approximately 4 L of volume removed in the last 48 hours.    -Palliative care consulted and following along.      -Patient with only reliable transportation/family support on Mondays, Wednesdays and Fridays for hemodialysis sessions and would not have enough family support for Tuesday, Thursday, Saturday.  " Updated nephrology and agreeable for this to be patient's schedule in the outpatient setting.    Bilateral bacterial pneumonia  Acute exacerbation of COPD  Acute hypoxemic respiratory failure  Right lower lobe mucous plug with atelectasis and collapse    -Procalcitonin normal, cultures without growth.    -Completed an appropriate course of azithromycin    -Continue nebulized medications    -Patient maintaining 3 L nasal cannula requirement.  However postoperatively postop chest x-ray showing right lower lobe collapse and atelectasis likely due to mucous plug with pleural effusion.  CT imaging obtained for better characterization of lung parenchyma and shows the same as well as dense consolidation possibly pneumonia.    -Mucomyst 4 times daily with Evelia for conservative management for now after discussion with pulmonology.  Repeat chest x-ray in the morning if no resolution of mucous plugging plan for therapeutic bronchoscopy.    -Chest x-ray obtained morning 10/20/2023 shows improved aeration and reduction in atelectasis, plugging and effusion after dialysis and breathing treatments.  Continue breathing treatments however chest physiotherapy discontinued as patient noncompliant and refusing.  Begin working on weaning Mucomyst treatments.  Patient still with cough and decreased breath sounds in the right lower lung fields and will obtain updated chest x-ray to follow-up on previous effusion and atelectasis/airspace disease.    -Patient with some increased confusion and delirium 10/20/2023 and ABG shows increased CO2 and was placed on BiPAP that evening as patient previously refused but having increasing hypercapnia.  Procalcitonin not significantly elevated doubt strong suspicion for any ongoing active pneumonia at this time.  Continue supportive care and sitter at bedside.    -NIPPV discontinued due to patient noncompliance.    Anxiety  Acute delirium resolved    -Patient with significant anxiety prior to  initiation of hemodialysis and placement of tunneled hemodialysis catheter.  Previously difficult when making decision regarding hemodialysis as patient's anxiety almost crippling and going back and forth on decision while in hospital as well as starting to leave AMA on several occasions.    -Patient now significantly improved today and alert and oriented x3 although still anxious at times.    -Patient seen and evaluated by psychiatry today and started on sertraline.    -Discontinuation of Risperdal in light of sertraline.  Given patient's agitation and confusion and delirium overnight would avoid further administrations of Benadryl as this would likely precipitate and worsen rather than abort symptoms.    -Continue supportive care and frequent reorientation on hospital.    Essential hypertension    -Continue home antihypertensives as appropriate.      Hypothyroidism    -Continue Synthroid    Tobacco abuse    -Cessation counseled.    Morbid obesity    -Complicates all aspects of care    Copied text in portions of the note has been reviewed and is accurate as of 10/22/23    VTE Prophylaxis:   Mechanical Order History:       None          Pharmalogical Order History:        Ordered     Dose Route Frequency Stop    10/08/23 2052  heparin (porcine) 5000 UNIT/ML injection 5,000 Units         5,000 Units SC Every 12 Hours Scheduled --                    Disposition Home once medically stable and improved and hemodialysis outpatient chair arranged.    Jay Jay Pritchett DO  [facility]  10/22/23  16:20 EDT      Electronically signed by Jay Jay Pritchett DO at 10/22/23 1624       Vani León MD at 10/22/23 1057          Nephrology Progress Note      Subjective   Feeling much better, no chest pain or shortness of breath    Objective       Vital signs :     Vitals:    10/22/23 0615 10/22/23 0707 10/22/23 0723 10/22/23 1039   BP: 163/82   160/84   BP Location: Right arm   Right arm   Patient Position: Lying    Lying   Pulse: 74 77 83 74   Resp: 20 20 20 20   Temp: 97.9 °F (36.6 °C)   98 °F (36.7 °C)   TempSrc: Oral   Oral   SpO2: 97% 99% 99% 94%   Weight:       Height:            Intake/Output                               10/20/23 0701 - 10/21/23 0700 10/21/23 0701 - 10/22/23 0700 10/22/23 0701 - 10/23/23 0700     4629-8865 4980-1358 Total 6247-8954 0087-5168 Total 9079-7223 5137-1734 Total                    Intake    P.O.  0  -- 0  0  -- 0  120  -- 120    Total Intake 0 -- 0 0 -- 0 120 -- 120       Output    Urine  250  300 550  --  200 200  --  -- --    Dialysis  2210  -- 2210  --  -- --  --  -- --    Total Output 2460 300 2760 -- 200 200 -- -- --             Physical Exam:    General Appearance : Not in acute distress  Lungs : Bibasilar crackles noted trace edema  Heart :  regular rhythm & normal rate, normal S1, S2 and no murmur, no rub  Abdomen : Soft nontender nondistended   extremities : Trace edema  Neurologic :   Unable to assess fully    Laboratory Data :       CBC and coagulation:  Results from last 7 days   Lab Units 10/21/23  0325 10/20/23  0157 10/18/23  0059   PROCALCITONIN ng/mL  --  0.40*  --    WBC 10*3/mm3 8.84 7.83 11.60*   HEMOGLOBIN g/dL 8.8* 8.7* 9.3*   HEMATOCRIT % 27.9* 28.8* 31.5*   MCV fL 88.6 89.7 91.8   MCHC g/dL 31.5 30.2* 29.5*   PLATELETS 10*3/mm3 226 222 242     Acid/base balance:  Results from last 7 days   Lab Units 10/21/23  0907 10/20/23  1330 10/16/23  2202   PH, ARTERIAL pH units 7.357 7.355 7.311*   PO2 ART mm Hg 72.2* 55.7* 74.9*   PCO2, ARTERIAL mm Hg 52.6* 57.9* 47.4*   HCO3 ART mmol/L 29.5* 32.3* 23.9       Renal and electrolytes:    Results from last 7 days   Lab Units 10/21/23  0325 10/20/23  0157 10/19/23  0055 10/18/23  0059 10/17/23  1744   SODIUM mmol/L 133* 135* 134* 132* 134*   POTASSIUM mmol/L 4.4 4.6 5.9* 5.2 5.2   CHLORIDE mmol/L 93* 97* 101 100 99   CO2 mmol/L 25.6 25.0 20.2* 20.5* 22.9   BUN mg/dL 32* 50* 75* 69* 69*   CREATININE mg/dL 3.83* 5.16* 6.93* 6.32*  "6.10*   CALCIUM mg/dL 9.0 8.9 9.0 9.0 8.7     Estimated Creatinine Clearance: 15.7 mL/min (A) (by C-G formula based on SCr of 3.83 mg/dL (H)).     Liver and pancreatic function:  Results from last 7 days   Lab Units 10/20/23  0157 10/18/23  0059 10/17/23  0038   ALBUMIN g/dL 3.1* 3.6 3.5   BILIRUBIN mg/dL 0.4 0.3 0.2   ALK PHOS U/L 124* 134* 89   AST (SGOT) U/L 7 10 8   ALT (SGPT) U/L 5 8 6       Albumin Albumin   Date Value Ref Range Status   10/20/2023 3.1 (L) 3.5 - 5.2 g/dL Final      Magnesium No results found for: \"MG\"         PTH               No results found for: \"PTH\"    Cardiac:        Liver and pancreatic function:  Results from last 7 days   Lab Units 10/20/23  0157 10/18/23  0059 10/17/23  0038   ALBUMIN g/dL 3.1* 3.6 3.5   BILIRUBIN mg/dL 0.4 0.3 0.2   ALK PHOS U/L 124* 134* 89   AST (SGOT) U/L 7 10 8   ALT (SGPT) U/L 5 8 6       XR Chest 1 View    Result Date: 10/20/2023  1.  Better expansion of the right lower lobe with some persistent airspace disease. 2.  Minimal left lower lobe airspace disease. 3.  Right central catheter with tip in SVC. 4.  Coarsened interstitial markings noted throughout the lungs. 5.  Mild cardiomegaly again noted.   This report was finalized on 10/20/2023 8:34 AM by Dr. Kirk Mcdonald MD.      CT Chest Without Contrast Diagnostic    Result Date: 10/19/2023  1.  Tiny pericardial effusion. 2.  Right lung base fairly extensive atelectasis and probably consolidative pneumonia. Again there may be some mucus plugging or narrowing. 3.  Again there is tiny bilateral pleural effusions. 4.  Cardiomegaly.   This report was finalized on 10/19/2023 3:51 PM by Dr. Kirk Mcdonald MD.      XR Chest AP    Result Date: 10/19/2023  1.  There is been collapse of probably the right lower lobe possibly related to mucous plug. 2.  Small right pleural effusion persists. 3.  Improved aeration of the left lung base.   This report was finalized on 10/19/2023 2:00 PM by Dr. Kirk Mcdonald MD.      [place] " Surgery Fluoro    Result Date: 10/19/2023    As above.   This report was finalized on 10/19/2023 1:42 PM by Dr. Kirk Mcdonald MD.      XR Chest 1 View    Result Date: 10/18/2023  1.  Coarsened interstitial markings and vague bibasilar airspace opacities again noted. 2.  Now small right pleural effusion. 3.  Tiny left pleural effusion. 4.  Cardiomegaly.   This report was finalized on 10/18/2023 10:22 AM by Dr. Kirk Mcdonald MD.        Medications :     acetylcysteine, 1.5 mL, Nebulization, Q6H - RT  aspirin, 81 mg, Oral, Daily  carvedilol, 25 mg, Oral, BID With Meals  folic acid, 1 mg, Oral, Daily  gabapentin, 200 mg, Oral, Q12H  heparin (porcine), 5,000 Units, Subcutaneous, Q12H  hydrALAZINE, 50 mg, Oral, Q8H  ipratropium-albuterol, 3 mL, Nebulization, Q6H - RT  levothyroxine, 88 mcg, Oral, Q AM  montelukast, 10 mg, Oral, Nightly  nicotine, 1 patch, Transdermal, Q24H  pantoprazole, 40 mg, Oral, Daily  rosuvastatin, 10 mg, Oral, Nightly  sodium chloride, 10 mL, Intravenous, Q12H  vitamin B-12, 1,000 mcg, Oral, Daily             Assessment & Plan     -Acute kidney injury initiated on dialysis during this hospitalization on 10/19/2023  - Acute metabolic encephalopathy  -Atrophic right kidney  -Chronic kidney disease stage IIIa  -Acute hyperkalemia, resolved  -Acute hypoxic respite failure, improving  -COPD  -Essential hypertension  -Medical noncompliance    No emergent indication for dialysis today plan to do it tomorrow and outpatient dialysis schedule to be set as Mondays Wednesdays and Fridays per patient's convenience.  Mental status is significantly improved      MAICO on CKD most likely multifactori including ATN from prolonged prerenal state and hemodynamic changes with concomitant use of ACE inhibitors in setting of volume loss   baseline creatinine 2.2, admitted with 3.69   ultrasound of the kidneys showed atrophic right kidney  Serologies are negative    Please avoid nephrotoxic medication and pharmacy to  "adjust the medication according to the GFR     Plan of care discussed with pt, and family answered all questions, patient verbalized understanding and agreed.      Vani León MD  10/22/23  10:57 EDT      Electronically signed by Vani León MD at 10/22/23 1246       Anderson Solomon MD at 10/22/23 1053          Progress Note Pulmonary      Subjective   Patient feels better, no new complaints  Interval History: Event overnight    Review of Systems:    Reviewed ; unchanged       Vital Signs  Temp:  [97.9 °F (36.6 °C)-98.1 °F (36.7 °C)] 98 °F (36.7 °C)  Heart Rate:  [71-83] 74  Resp:  [20-21] 20  BP: (133-163)/(67-85) 160/84  Body mass index is 44.02 kg/m².    Intake/Output Summary (Last 24 hours) at 10/22/2023 1053  Last data filed at 10/22/2023 0800  Gross per 24 hour   Intake 120 ml   Output 200 ml   Net -80 ml     I/O this shift:  In: 120 [P.O.:120]  Out: -     Physical Exam:  General- normal in appearance, not in any acute distress    HEENT- pupils equally reactive to light, normal in size, no scleral icterus    Neck- supple    No JVD, no carotid bruit    Respiratory-improved breath sounds over right lung base.  Occasional wheezing.      cardiovascular-  Normal S1 and S2. No S3, S4 or murmurs.    GI-nontender nondistended bowel sounds positive    CNS-alert oriented x3, grossly nonfocal    Extremities- pulses normal bilaterally , no clubbing and 1+ edema     Results Review:      Results from last 7 days   Lab Units 10/21/23  0325 10/20/23  0157 10/18/23  0059   WBC 10*3/mm3 8.84 7.83 11.60*   HEMOGLOBIN g/dL 8.8* 8.7* 9.3*   PLATELETS 10*3/mm3 226 222 242     Results from last 7 days   Lab Units 10/21/23  0325 10/20/23  0157 10/19/23  0055   SODIUM mmol/L 133* 135* 134*   POTASSIUM mmol/L 4.4 4.6 5.9*   CHLORIDE mmol/L 93* 97* 101   CO2 mmol/L 25.6 25.0 20.2*   BUN mg/dL 32* 50* 75*   CREATININE mg/dL 3.83* 5.16* 6.93*   CALCIUM mg/dL 9.0 8.9 9.0   GLUCOSE mg/dL 71 81 107*     No results found for: \"INR\", " "\"PROTIME\"  Results from last 7 days   Lab Units 10/20/23  0157 10/18/23  0059 10/17/23  0038   ALK PHOS U/L 124* 134* 89   BILIRUBIN mg/dL 0.4 0.3 0.2   ALT (SGPT) U/L 5 8 6   AST (SGOT) U/L 7 10 8     Results from last 7 days   Lab Units 10/21/23  0907   PH, ARTERIAL pH units 7.357   PO2 ART mm Hg 72.2*   PCO2, ARTERIAL mm Hg 52.6*   HCO3 ART mmol/L 29.5*     Imaging Results (Last 24 Hours)       ** No results found for the last 24 hours. **                 acetylcysteine, 1.5 mL, Nebulization, Q6H - RT  aspirin, 81 mg, Oral, Daily  carvedilol, 25 mg, Oral, BID With Meals  folic acid, 1 mg, Oral, Daily  gabapentin, 200 mg, Oral, Q12H  heparin (porcine), 5,000 Units, Subcutaneous, Q12H  hydrALAZINE, 50 mg, Oral, Q8H  ipratropium-albuterol, 3 mL, Nebulization, Q6H - RT  levothyroxine, 88 mcg, Oral, Q AM  montelukast, 10 mg, Oral, Nightly  nicotine, 1 patch, Transdermal, Q24H  pantoprazole, 40 mg, Oral, Daily  rosuvastatin, 10 mg, Oral, Nightly  sodium chloride, 10 mL, Intravenous, Q12H  vitamin B-12, 1,000 mcg, Oral, Daily           Medication Review:     Assessment & Plan     # Morbidly obese female with chronic kidney disease.  Initially admitted with worsening shortness of breath and hypoxia.  X-ray chest the day before yesterday and later on CT scan showed significant atelectasis of the right lower lobe and midlung zone there was a concern for mucous plug.  Follow-up x-ray chest was done today morning after aggressive chest PT with the help of DuoNeb and Mucomyst followed by chest PT.  It showed remarkable improvement in right lung airspace disease.    Patient has coughed up yellowish sputum.  Sputum culture negative.  Pro-Binh was slightly elevated.  She is not on antibiotic at the moment.    # COPD, stable  # suspect obstructive sleep apnea.  refused BiPAP  #  Diastolic dysfunction    #Highly suspect obstructive sleep apnea.  But patient refused to use BiPAP.    #End-stage kidney disease, started on " dialysis.    Titrate oxygen to a saturation of 92%.    DVT and GI prophylaxis.    Total time spent 30 minutes    Follow-up as an outpatient.  Stable from pulmonary standpoint    Anderson Solomon MD  10/22/23  10:53 EDT      Electronically signed by Anderson Solomon MD at 10/22/23 1139          Consult Notes (last 72 hours)        Dwayne Lopez MD at 10/24/23 1526              [facility]  CONSULT  NOTE      Patient information:  Date of Admit: 10/8/2023  Date of Consult: 10/24/23  Hospitalist/Referring MD:Fanny Ford MD  PCP: Woodrow Raymond APRN  MRN:  [id number]  Visit Number:  [id number]    LOS: 14  CODE STATUS:  Code Status and Medical Interventions:   Ordered at: 10/12/23 1553     Medical Intervention Limits:    NO intubation (DNI)     Level Of Support Discussed With:    Patient     Code Status (Patient has no pulse and is not breathing):    No CPR (Do Not Attempt to Resuscitate)     Medical Interventions (Patient has pulse or is breathing):    Limited Support       PROBLEM LIST: Principal Problem:    Acute hypoxic respiratory failure  Active Problems:    Acute renal failure (ARF)        General Cardiology Consulting Physician: Dr. Jessica MENDEZ    Assessment    New onset atrial fibrillation with RVR chads Vascor is at least 3  MAICO on CKD on hemodialysis  ASCVD status post  of the dominant RCA, status post drug-eluting stent placement to the LAD, and also existent high-grade stenosis of the ostial small first diagonal, was also 60% RCA lateral branch lesion  Dyslipidemia  Essential hypertension  Acute on chronic HFpEF  COPD  Current ongoing tobacco abuse  Mental status changes  Medical noncompliance          Recommendations   1.  Patient has new onset atrial fibrillation with RVR we will try to get rate control will be difficult to add further medications considering low blood pressure if continues to have atrial fibrillation with RVR may have to add amiodarone  "for rate control  2.  Will start heparin for thromboembolic prophylaxis we will consult pharmacy for Eliquis price  3.  Patient will require ischemia work-up however she had significant orthopnea she would not be able to lay flat consider ischemia work-up once patient is more stable and able to lay flat  4.  Continue hemodialysis and hemofiltration        Reason for Cardiology consultation: New onset atrial fibrillation with RVR    Subjective Data   ADMISSION INFORMATION:  Chief Complaint   Patient presents with    Shortness of Breath    Edema     History of Present Illness    Lesley Michel is a 67 y.o. female with a past medical history significant for  CAD chronically occluded well collateralized dominant RCA, status post drug-eluting stent to LAD with high-grade stenosis of the ostium of the small D1 and 60% mid RCA post lateral branch lesion, hyperlipidemia, essential hypertension, COPD, current tobacco abuse, CKD stage II, hypothyroidism, arthritis, anxiety, history of migraines, obesity by BMI, and history of medical noncompliance.       Patient presented to [facility] ([facility] emergency room (ER) on 10/8/2023 with complaints of increasing lower extremity edema and shortness of breath which has been progressively worsening over the past few days prior to presentation.  Today on 10/24/2023, Cardiology has been consulted for further evaluation and management for what appears to be new onset atrial fibrillation with RVR since 10/23/2023.     Primary Cardiologist: Imelda ARENAS/Dr. Echeverria and her last office visit was on 10/6/2023.    Patient is in room 321 and was examined by Dr. Lopez.  Telemetry currently reveals atrial fibrillation 114 with RVR noted as seen in telemetry strip attached below.  Patient is lying in bed resting quietly.  No acute distress noted at this time.  Patient currently denies any fluttering, skipped beats, chest pain, palpitations, and reports her breathing is  \"okay\" . " "Patient is oriented to person and place but not events or time. Patient reports her breathing is only short when she gets upset because she cannot go home when she has to be right back here on Monday.\"  She thinks today is Saturday. Patient reports she is not able to lay flat for long at all.     ORDERS:   Pharmacy consult for price check on Eliquis  Initiate IV heparin drip with pharmacy to dose per weight without bolus    Cardiac risk factors:arteriosclerotic heart disease, diabetes mellitus, hypercholesterolemia, hypertension, smoking, Sedentary life style, and Obesity      Last Echo: Results for orders placed during the hospital encounter of 10/08/23    Adult Transthoracic Echo Complete W/ Cont if Necessary Per Protocol    Interpretation Summary    Left ventricular systolic function is normal. Left ventricular ejection fraction appears to be 56 - 60%.    Left ventricular diastolic function is consistent with (grade Ia w/high LAP) impaired relaxation.    The left atrial cavity is mild to moderately dilated.    Left atrial volume is moderately increased.    Mild aortic valve stenosis is present.    Estimated right ventricular systolic pressure from tricuspid regurgitation is mildly elevated (35-45 mmHg).    There is a small (<1cm) pericardial effusion. There is no evidence of cardiac tamponade.         Last Stress: None found in patient's chart.      Last Cath:  None found in patient's chart.                         Past Medical History:   Diagnosis Date    Anxiety     Arthritis     Coronary artery disease     H/O heart artery stent     Hyperlipidemia     Hypertension     Obesity      Past Surgical History:   Procedure Laterality Date    ANKLE SURGERY      RIGHT    APPENDECTOMY      CARDIAC CATHETERIZATION      LEFT    CORONARY STENT PLACEMENT      HYSTERECTOMY       Family History   Problem Relation Age of Onset    Heart disease Mother     Heart attack Mother 73    Fibromyalgia Mother     Heart attack Father 72 "    Ovarian cancer Sister     Coronary artery disease Other     Breast cancer Neg Hx      Social History     Tobacco Use    Smoking status: Every Day     Packs/day: 0.50     Years: 30.00     Additional pack years: 0.00     Total pack years: 15.00     Types: Electronic Cigarette, Cigarettes    Smokeless tobacco: Never   Vaping Use    Vaping Use: Never used   Substance Use Topics    Alcohol use: No    Drug use: No       ALLERGIES: Patient has no known allergies.    Medications listed below are reported home medications pulling from within the system:  Medications Prior to Admission   Medication Sig Dispense Refill Last Dose    aspirin 325 MG tablet Take 1 tablet by mouth Daily.   10/8/2023    carvedilol (COREG) 25 MG tablet Take 1 tablet by mouth 2 (Two) Times a Day With Meals. 60 tablet 0 10/8/2023    folic acid (FOLVITE) 1 MG tablet Take 1 tablet by mouth Daily.   10/8/2023    hydrALAZINE (APRESOLINE) 25 MG tablet Take 1 tablet by mouth 2 (Two) Times a Day. 60 tablet 0 10/8/2023    levothyroxine (SYNTHROID, LEVOTHROID) 88 MCG tablet Take 1 tablet by mouth Daily.   10/8/2023    lisinopril (PRINIVIL,ZESTRIL) 5 MG tablet Take 1 tablet by mouth Daily.   10/8/2023    montelukast (SINGULAIR) 10 MG tablet Take 1 tablet by mouth Every Night.   10/8/2023    pantoprazole (PROTONIX) 40 MG EC tablet Take 1 tablet by mouth Daily.   10/8/2023    rosuvastatin (CRESTOR) 20 MG tablet Take 1 tablet by mouth Daily. 30 tablet 0 10/8/2023    vitamin B-12 (CYANOCOBALAMIN) 1000 MCG tablet Take 1 tablet by mouth Daily.   10/8/2023    gabapentin (NEURONTIN) 800 MG tablet Take 1 tablet by mouth 2 (Two) Times a Day As Needed (nerve pain).   Unknown    HYDROcodone-acetaminophen (NORCO)  MG per tablet Take 1 tablet by mouth Every 8 (Eight) Hours As Needed for Moderate Pain.   Unknown    nitroglycerin (NITROSTAT) 0.4 MG SL tablet Place 1 tablet under the tongue Every 5 (Five) Minutes As Needed for Chest Pain. 30 tablet 2 Unknown        Review of Systems   Constitutional:  Negative for activity change, appetite change and diaphoresis.   HENT:  Negative for facial swelling and trouble swallowing.    Eyes:  Negative for visual disturbance.   Respiratory:  Positive for shortness of breath.    Cardiovascular:  Negative for chest pain, palpitations and leg swelling.   Gastrointestinal:  Negative for blood in stool, nausea and vomiting.   Endocrine: Negative.    Genitourinary:  Negative for hematuria.   Musculoskeletal:  Negative for gait problem.   Skin:  Negative for color change.   Neurological:  Negative for dizziness, syncope, speech difficulty, weakness, light-headedness and headaches.   Psychiatric/Behavioral:  Negative for agitation and behavioral problems.      Objective Data      Vital Signs  Temp:  [97.7 °F (36.5 °C)-98.4 °F (36.9 °C)] 97.7 °F (36.5 °C)  Heart Rate:  [108-149] 135  Resp:  [16-20] 20  BP: ()/(60-79) 138/79  Flow (L/min):  [3-4] 3.5  Device (Oxygen Therapy): humidified;nasal cannula  Vital Signs (last 72 hrs)         10/21 0700  10/22 0659 10/22 0700  10/23 0659 10/23 0700  10/24 0659 10/24 0700  10/24 0847   Most Recent      Temp (°F) 97.9 -  98.1    97.8 -  98.4    97.9 -  98.5      98.3     98.3 (36.8) 10/24 0712    Heart Rate 71 -  84    71 -  84    81 -  149      108     108 10/24 0712    Resp 20 -  24    18 -  20    18 -  20      18     18 10/24 0712    /67 -  163/82    110/56 -  161/83    100/74 -  157/79      99/60     99/60 10/24 0712    SpO2 (%) 93 -  98    90 -  99    94 -  96      91     91 10/24 0712    Flow (L/min)   4    3 -  4    3 -  4       3 10/24 0424          BMI:  Body mass index is 43.28 kg/m².    WEIGHT:      10/23/23  0500 10/24/23  0500   Weight: 99.8 kg (220 lb 1.6 oz) 101 kg (221 lb 9.6 oz)       DIET:  Diet: Cardiac Diets, Diabetic Diets, Renal Diets; Healthy Heart (2-3 Na+); Consistent Carbohydrate; Low Potassium; Texture: Regular Texture (IDDSI 7); Fluid Consistency: Thin (IDDSI  0)    I&O:  Intake & Output (last 3 days)         10/21 0701  10/22 0700 10/22 0701  10/23 0700 10/23 0701  10/24 0700 10/24 0701  10/25 0700    P.O. 0 880 480     Total Intake(mL/kg) 0 (0) 880 (8.8) 480 (4.8)     Urine (mL/kg/hr) 200 (0.1)  500 (0.2)     Stool   0     Dialysis   100 2300    Total Output 200  600 2300    Net -200 +880 -120 -2300            Urine Unmeasured Occurrence  3 x 1 x     Stool Unmeasured Occurrence  0 x 1 x             Physical Exam  Constitutional:       Appearance: Normal appearance.      Comments: BMI 43.28   HENT:      Head: Normocephalic and atraumatic.      Nose: Nose normal.      Mouth/Throat:      Mouth: Mucous membranes are moist.      Pharynx: Oropharynx is clear.   Eyes:      Conjunctiva/sclera: Conjunctivae normal.   Cardiovascular:      Rate and Rhythm: Tachycardia present. Rhythm irregular.      Pulses: Normal pulses.      Heart sounds: Normal heart sounds.   Pulmonary:      Effort: Pulmonary effort is normal.      Breath sounds: Normal breath sounds.   Abdominal:      General: Bowel sounds are normal.      Palpations: Abdomen is soft.   Musculoskeletal:         General: Normal range of motion.      Cervical back: Normal range of motion.   Skin:     General: Skin is warm and dry.   Neurological:      General: No focal deficit present.      Mental Status: She is alert and oriented to person, place, and time.   Psychiatric:         Mood and Affect: Mood normal.         Behavior: Behavior normal.       Results review   Results Review:    I have reviewed the patient's new clinical results.    Results from last 7 days   Lab Units 10/21/23  1716 10/21/23  1316   HSTROP T ng/L 54* 51*     Lab Results   Component Value Date    PROBNP 8,093.0 (H) 10/08/2023     Results from last 7 days   Lab Units 10/24/23  0336 10/23/23  0255 10/21/23  0325 10/20/23  0157 10/18/23  0059 10/17/23  1744   WBC 10*3/mm3 6.00 5.71 8.84 7.83 11.60* 10.86*   HEMOGLOBIN g/dL 8.4* 8.0* 8.8* 8.7* 9.3* 9.2*  "  PLATELETS 10*3/mm3 192 190 226 222 242 259     Results from last 7 days   Lab Units 10/24/23  0336 10/23/23  0255 10/21/23  0325 10/20/23  0157 10/19/23  0055 10/18/23  0059 10/17/23  1744   SODIUM mmol/L 133* 135* 133* 135* 134* 132* 134*   POTASSIUM mmol/L 4.9 4.7 4.4 4.6 5.9* 5.2 5.2   CHLORIDE mmol/L 92* 94* 93* 97* 101 100 99   CO2 mmol/L 27.4 31.0* 25.6 25.0 20.2* 20.5* 22.9   BUN mg/dL 36* 43* 32* 50* 75* 69* 69*   CREATININE mg/dL 5.25* 5.43* 3.83* 5.16* 6.93* 6.32* 6.10*   CALCIUM mg/dL 9.3 8.9 9.0 8.9 9.0 9.0 8.7   GLUCOSE mg/dL 103* 100* 71 81 107* 126* 116*   ALT (SGPT) U/L 5  --   --  5  --  8  --    AST (SGOT) U/L 16  --   --  7  --  10  --      Lab Results   Component Value Date    MG 2.0 10/09/2023    MG 1.9 10/08/2023     Lab Results   Component Value Date    CHOL 140 10/08/2023    TRIG 75 10/08/2023    HDL 34 (L) 10/08/2023    LDL 91 10/08/2023     Estimated Creatinine Clearance: 11.4 mL/min (A) (by C-G formula based on SCr of 5.25 mg/dL (H)).  Lab Results   Component Value Date    HGBA1C 5.30 10/08/2023     No results found for: \"INR\"      Lab Results   Component Value Date    TSH 6.770 (H) 10/08/2023      No results found for: \"URICACID\"  Pain Management Panel          Latest Ref Rng & Units 10/9/2023   Pain Management Panel   Creatinine, Urine mg/dL 116.0      Microbiology Results (last 10 days)       Procedure Component Value - Date/Time    Blood Culture - Blood, Arm, Right [[id number]]  (Abnormal) Collected: 10/20/23 1334    Lab Status: Final result Specimen: Blood from Arm, Right Updated: 10/23/23 0897     Blood Culture Micrococcus species     Comment: No specific CLSI guidelines and no validated TANYA testing method.  Rarely implicated in infections.          Isolated from Aerobic Bottle     Gram Stain Aerobic Bottle Gram positive cocci in clusters    Narrative:      Probable contaminant requires clinical correlation, susceptibility not performed unless requested by physician.      Blood " Culture ID, PCR - Blood, Arm, Right [[id number]] Collected: 10/20/23 1334    Lab Status: Final result Specimen: Blood from Arm, Right Updated: 10/22/23 0519     BCID, PCR Negative by BCID PCR. Culture to Follow.     BOTTLE TYPE Aerobic Bottle    Blood Culture - Blood, Arm, Left [[id number]]  (Normal) Collected: 10/20/23 1234    Lab Status: Preliminary result Specimen: Blood from Arm, Left Updated: 10/24/23 1401     Blood Culture No growth at 4 days           Blood Culture   Date Value Ref Range Status   10/20/2023 Micrococcus species (C)  Final     Comment:     No specific CLSI guidelines and no validated TANYA testing method.  Rarely implicated in infections.           ECG:  10/23/2023      ECG/EMG Results (last 24 hours)       Procedure Component Value Units Date/Time    ECG 12 Lead Rhythm Change [[id number]] Collected: 10/23/23 1130     Updated: 10/23/23 1132     QT Interval 306 ms      QTC Interval 467 ms     Narrative:      Test Reason : Rhythm Change  Blood Pressure :   */*   mmHG  Vent. Rate : 140 BPM     Atrial Rate :  73 BPM     P-R Int :   * ms          QRS Dur :  94 ms      QT Int : 306 ms       P-R-T Axes :   *  24 -59 degrees     QTc Int : 467 ms    ** Critical Test Result: High HR  Supraventricular tachycardia with premature supraventricular complexes  ST & T wave abnormality, consider inferior ischemia  Abnormal ECG  When compared with ECG of 21-OCT-2023 11:47,  premature supraventricular complexes are now present  Vent. rate has increased BY  69 BPM  Inverted T waves have replaced nonspecific T wave abnormality in Inferior leads  T wave amplitude has decreased in Lateral leads    Referred By: LORRAINE           Confirmed By:     SCANNED - TELEMETRY   [[id number]] Resulted: 10/08/23     Updated: 10/23/23 1305    SCANNED - TELEMETRY   [[id number]] Resulted: 10/08/23     Updated: 10/23/23 1520    ECG 12 Lead Rhythm Change [[id number]] Collected: 10/23/23 1937     Updated: 10/23/23 1939     QT Interval 328 ms       QTC Interval 499 ms     Narrative:      Test Reason : Rhythm Change  Blood Pressure :   */*   mmHG  Vent. Rate : 139 BPM     Atrial Rate : 111 BPM     P-R Int :   * ms          QRS Dur :  82 ms      QT Int : 328 ms       P-R-T Axes :   *  30 -52 degrees     QTc Int : 499 ms    Atrial fibrillation with rapid ventricular response  ST & T wave abnormality, consider inferior ischemia  Abnormal ECG  When compared with ECG of 23-OCT-2023 11:30, (Unconfirmed)  Atrial fibrillation has replaced Sinus rhythm    Referred By:            Confirmed By:     SCANNED - TELEMETRY   [[id number]] Resulted: 10/08/23     Updated: 10/23/23 2006    SCANNED - TELEMETRY   [[id number]] Resulted: 10/08/23     Updated: 10/24/23 0741            TELEMETRY:   Atrial fibrillation 114 with RVR noted as seen in telemetry strip attached below                    RADIOLOGY STUDIES:  Imaging Results (Last 72 Hours)       Procedure Component Value Units Date/Time    XR Chest 1 View [[id number]] Collected: 10/22/23 2054     Updated: 10/22/23 2100    Narrative:      Procedure: Upright portable AP view chest.     Comparison: 10/20/2023.     Findings: A right-sided central venous at the expected position of the  catheter noted with distal tip atriocaval junction/SVC, unchanged.   Right lower lobe demonstrates left airspace consolidation or pleural  effusion when compared to the prior examination.  The platelike  atelectasis within the left mid hemithorax is also resolved.     Chest is fairly well expanded persistent mild to moderate pulmonary  edema present with interstitial changes.  Cardiac silhouette unchanged  and enlarged.     Overall, the congestive changes are mildly improved when compared to the  prior examination.       Impression:         Mild improvement in the appearance of the chest when compared to the  prior examination.        This report was finalized on 10/22/2023 8:58 PM by Shayna Gee MD.               ALLERGIES: Patient has no known  allergies.    CURRENT MEDICATIONS:  Current list of medications may not reflect those currently placed in orders that are not signed or are being held.     acetylcysteine, 1.5 mL, Nebulization, Q6H - RT  aspirin, 81 mg, Oral, Daily  folic acid, 1 mg, Oral, Daily  gabapentin, 200 mg, Oral, Q12H  heparin (porcine), 5,000 Units, Subcutaneous, Q12H  hydrALAZINE, 50 mg, Oral, Q8H  ipratropium-albuterol, 3 mL, Nebulization, Q6H - RT  levothyroxine, 88 mcg, Oral, Q AM  metoprolol tartrate, 50 mg, Oral, Q12H  montelukast, 10 mg, Oral, Nightly  mupirocin, 1 application , Each Nare, BID  nicotine, 1 patch, Transdermal, Q24H  pantoprazole, 40 mg, Oral, Daily  rosuvastatin, 10 mg, Oral, Nightly  sertraline, 25 mg, Oral, Daily  sodium chloride, 10 mL, Intravenous, Q12H  vitamin B-12, 1,000 mcg, Oral, Daily           acetaminophen    calcium carbonate    dextrose    dextrose    glucagon (human recombinant)    hydrALAZINE    HYDROcodone-acetaminophen    hydrOXYzine    melatonin    nitroglycerin    ondansetron    [COMPLETED] Insert Peripheral IV **AND** sodium chloride    sodium chloride    sodium chloride    Thank you very much for asking us to be involved in this patient's care.  We will follow along with you. Please do not hesitate to call for any questions or concerns.     I have discussed the patients findings and recommendations with patient.    Electronically signed by DEEPA Navarro, 10/24/23, 3:37 PM EDT.   Electronically signed by Dwayne Lopez MD, 10/24/23, 3:58 PM EDT.                        Please note that portions of this note were copied and has been reviewed and is accurate as of 10/24/2023 .      Please note that portions of this note were completed with a voice recognition program.     Electronically signed by Dwayne Lopez MD at 10/24/23 2667       Renny Crzu MD at 10/22/23 1321        Consult Orders    1. Inpatient Psychiatrist Consult [[id number]] ordered by Vani León MD at 10/21/23  "0748                 Referring Provider: Vani León MD   Reason for Consultation: has history of extreme anxiety and likely generilzed anxiety disorder, now with agitation.       Chief complaint/Focus of Exam:  Lower extremity edema, Worsening of shrtness of breath    Subjective . \" I am doing okay today\"    History of present illness:  Patient is a 67 year old female with past medical history f CAD, S/P STENT, COPD, Hypertension, CKD, HYPOTHYROIDISM, ANXIETY, OBESITY , NONCOMPLIANCE, admitted to [facility] for lower extremity edema, and worsening of shortness of breath.  Psychiatry is consulted to assess for anxiety, agitation.  Upon evaluation she is pleasant this morning, interacting appropriately, she has her son at bed side and two grand daughters at bedside one is an adult with being in the process of becoming RN. Per patient she is feeling much better today and some days she feels little weird before her Hemodialysis, she states history anxiety and getting overwhelmed , she said \" it is not that nobody ever experiences\", But \"I never felt hopeless or worthless, I did not feel suicidal or homicidal or anything like that \" Per her son and adult grand daughter patient did have base line anxiety which is manifested as difficulty breathing and palpitations, usually at nights, patient is somewhat dismissive of her not taking recommendations from medical providers seriously, son stated he will not know what doctors are recommending the patient to do, stating that she does not follow the directions by providers. Patient denies significant mood episodes either depression or juan, denies auditory and visual hallucinations, except the time prior to or after her hemodialysis.  Patient denies seen ing a psychiatrist in the past, denies any psychotropic therapy except her son said she may have been given Xanax on and off for anxiety related to hemodialysis.  Patient denies previous suicide attempts, " denies previous psychiatric hospitalizations.  Review of Systems  Pertinent items are noted in HPI, all other systems reviewed and negative    History  Past Medical History:   Diagnosis Date    Anxiety     Arthritis     Coronary artery disease     H/O heart artery stent     Hyperlipidemia     Hypertension     Obesity    ,   Past Surgical History:   Procedure Laterality Date    ANKLE SURGERY      RIGHT    APPENDECTOMY      CARDIAC CATHETERIZATION      LEFT    CORONARY STENT PLACEMENT      HYSTERECTOMY     ,   Family History   Problem Relation Age of Onset    Heart disease Mother     Heart attack Mother 73    Fibromyalgia Mother     Heart attack Father 72    Ovarian cancer Sister     Coronary artery disease Other     Breast cancer Neg Hx    ,   Social History     Socioeconomic History    Marital status:    Tobacco Use    Smoking status: Every Day     Packs/day: 0.50     Years: 30.00     Additional pack years: 0.00     Total pack years: 15.00     Types: Electronic Cigarette, Cigarettes    Smokeless tobacco: Never   Vaping Use    Vaping Use: Never used   Substance and Sexual Activity    Alcohol use: No    Drug use: No    Sexual activity: Never     E-cigarette/Vaping    E-cigarette/Vaping Use Never User     Passive Exposure No     Counseling Given No      E-cigarette/Vaping Substances    Nicotine No     THC No     CBD No     Flavoring No      E-cigarette/Vaping Devices    Disposable No     Pre-filled or Refillable Cartridge No     Refillable Tank No     Pre-filled Pod No          ,   Medications Prior to Admission   Medication Sig Dispense Refill Last Dose    aspirin 325 MG tablet Take 1 tablet by mouth Daily.   10/8/2023    carvedilol (COREG) 25 MG tablet Take 1 tablet by mouth 2 (Two) Times a Day With Meals. 60 tablet 0 10/8/2023    folic acid (FOLVITE) 1 MG tablet Take 1 tablet by mouth Daily.   10/8/2023    hydrALAZINE (APRESOLINE) 25 MG tablet Take 1 tablet by mouth 2 (Two) Times a Day. 60 tablet 0 10/8/2023     levothyroxine (SYNTHROID, LEVOTHROID) 88 MCG tablet Take 1 tablet by mouth Daily.   10/8/2023    lisinopril (PRINIVIL,ZESTRIL) 5 MG tablet Take 1 tablet by mouth Daily.   10/8/2023    montelukast (SINGULAIR) 10 MG tablet Take 1 tablet by mouth Every Night.   10/8/2023    pantoprazole (PROTONIX) 40 MG EC tablet Take 1 tablet by mouth Daily.   10/8/2023    rosuvastatin (CRESTOR) 20 MG tablet Take 1 tablet by mouth Daily. 30 tablet 0 10/8/2023    vitamin B-12 (CYANOCOBALAMIN) 1000 MCG tablet Take 1 tablet by mouth Daily.   10/8/2023    gabapentin (NEURONTIN) 800 MG tablet Take 1 tablet by mouth 2 (Two) Times a Day As Needed (nerve pain).   Unknown    HYDROcodone-acetaminophen (NORCO)  MG per tablet Take 1 tablet by mouth Every 8 (Eight) Hours As Needed for Moderate Pain.   Unknown    nitroglycerin (NITROSTAT) 0.4 MG SL tablet Place 1 tablet under the tongue Every 5 (Five) Minutes As Needed for Chest Pain. 30 tablet 2 Unknown   , Scheduled Meds:  acetylcysteine, 1.5 mL, Nebulization, Q6H - RT  aspirin, 81 mg, Oral, Daily  carvedilol, 25 mg, Oral, BID With Meals  folic acid, 1 mg, Oral, Daily  gabapentin, 200 mg, Oral, Q12H  heparin (porcine), 5,000 Units, Subcutaneous, Q12H  hydrALAZINE, 50 mg, Oral, Q8H  ipratropium-albuterol, 3 mL, Nebulization, Q6H - RT  levothyroxine, 88 mcg, Oral, Q AM  montelukast, 10 mg, Oral, Nightly  nicotine, 1 patch, Transdermal, Q24H  pantoprazole, 40 mg, Oral, Daily  rosuvastatin, 10 mg, Oral, Nightly  sodium chloride, 10 mL, Intravenous, Q12H  vitamin B-12, 1,000 mcg, Oral, Daily   , and Allergies:  Patient has no known allergies.    Objective     Vital Signs   Temp:  [97.9 °F (36.6 °C)-98.1 °F (36.7 °C)] 98 °F (36.7 °C)  Heart Rate:  [71-83] 73  Resp:  [20-21] 20  BP: (150-163)/(72-85) 160/84    Mental Status Exam:  Hygiene:   fair  Cooperation:  Evasive  Eye Contact:  Fair  Psychomotor Behavior:  Appropriate  Affect:  Full range  Hopelessness: Denies  Speech:  Normal  Thought  Progress:  Goal directed  Thought Content:  Normal  Suicidal:  None  Homicidal:  None  Hallucinations:  None  Delusion:  None  Memory:  Intact  Orientation:  Person, Place, Time, and Situation  Reliability:  fair  Insight:  Fair  Judgement:  Fair  Impulse Control:  Fair    Results Review:   I reviewed the patient's new clinical results.  Lab Results (last 24 hours)       Procedure Component Value Units Date/Time    POC Glucose Once [[id number]]  (Normal) Collected: 10/22/23 1010    Specimen: Blood Updated: 10/22/23 1017     Glucose 110 mg/dL     POC Glucose Once [[id number]]  (Normal) Collected: 10/22/23 0655    Specimen: Blood Updated: 10/22/23 0712     Glucose 83 mg/dL     Blood Culture ID, PCR - Blood, Arm, Right [[id number]] Collected: 10/20/23 1334    Specimen: Blood from Arm, Right Updated: 10/22/23 0519     BCID, PCR Negative by BCID PCR. Culture to Follow.     BOTTLE TYPE Aerobic Bottle    Blood Culture - Blood, Arm, Right [[id number]]  (Abnormal) Collected: 10/20/23 1334    Specimen: Blood from Arm, Right Updated: 10/22/23 0519     Blood Culture Abnormal Stain     Gram Stain Aerobic Bottle Gram positive cocci in clusters    POC Glucose Once [[id number]]  (Normal) Collected: 10/21/23 2115    Specimen: Blood Updated: 10/21/23 2120     Glucose 86 mg/dL     High Sensitivity Troponin T 2Hr [[id number]]  (Abnormal) Collected: 10/21/23 1716    Specimen: Blood Updated: 10/21/23 1842     HS Troponin T 54 ng/L      Troponin T Delta 3 ng/L     Narrative:      High Sensitive Troponin T Reference Range:  <10.0 ng/L- Negative Female for AMI  <15.0 ng/L- Negative Male for AMI  >=10 - Abnormal Female indicating possible myocardial injury.  >=15 - Abnormal Male indicating possible myocardial injury.   Clinicians would have to utilize clinical acumen, EKG, Troponin, and serial changes to determine if it is an Acute Myocardial Infarction or myocardial injury due to an underlying chronic condition.         High Sensitivity Troponin  T [[id number]]  (Abnormal) Collected: 10/21/23 1316    Specimen: Blood Updated: 10/21/23 1418     HS Troponin T 51 ng/L     Narrative:      High Sensitive Troponin T Reference Range:  <10.0 ng/L- Negative Female for AMI  <15.0 ng/L- Negative Male for AMI  >=10 - Abnormal Female indicating possible myocardial injury.  >=15 - Abnormal Male indicating possible myocardial injury.   Clinicians would have to utilize clinical acumen, EKG, Troponin, and serial changes to determine if it is an Acute Myocardial Infarction or myocardial injury due to an underlying chronic condition.         Blood Culture - Blood, Arm, Left [[id number]]  (Normal) Collected: 10/20/23 1234    Specimen: Blood from Arm, Left Updated: 10/21/23 1401     Blood Culture No growth at 24 hours          Imaging Results (Last 24 Hours)       ** No results found for the last 24 hours. 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   Sertraline 25 mg po q daily , informed patient and family the benefits, risks, side effects, informed risk of addiction with benzodiazepine for anxiety disorder .  Patient and family agreed with plan.      I discussed the patient's findings and my recommendations with patient, family, and nursing staff    Renny Cruz MD  10/22/23  13:21 EDT     Electronically signed by Renny Cruz MD at 10/22/23 9151

## 2023-10-25 NOTE — PROGRESS NOTES
[facility]  Lesley Michel is a  67 y.o. female receiving heparin infusion.     Therapy for (VTE/Cardiac):   cardiac  Patient Dosing Weight: 101  KG  Initial Bolus (Y/N):   no  Any Bolus (Y/N):    yes     Signs or Symptoms of Bleeding: no    Cardiac or Other (Not VTE)   Initial Bolus: 60 units/kg (Max 4,000 units)  Initial rate: 12 units/kg/hr (Max 1,000 units/hr)   Anti Xa Rebolus Infusion Hold time Change infusion Dose (Units/kg/hr) Next Anti Xa or aPTT Level Due   < 0.11 50 Units/kg  (4000 Units Max) None Increase by  3 Units/kg/hr 6 hours   0.11- 0.19 25 Units/kg  (2000 Units Max) None Increase by  2 Units/kg/hr 6 hours   0.2 - 0.29 0 None Increase by  1 Units/kg/hr 6 hours   0.3 - 0.5 0 None No Change 6 hours (after 2 consecutive levels in range check q24h @0700)   0.51 - 0.6 0 None Decrease by  1 Units/kg/hr 6 hours   0.61 - 0.8 0 30 Minutes Decrease by  2 Units/kg/hr 6 hours   0.81 - 1 0 60 Minutes Decrease by  3 Units/kg/hr 6 hours   >1 0 Hold  After Anti Xa less than 0.5 decrease previous rate by  4 Units/kg/hr  Every 2 hours until Anti Xa  less than 0.5 then when infusion restarts in 6 hours         Recommend anti-Xa every 6 hours.          Date   Time   Anti-Xa Current Rate (Unit/kg/hr) Bolus   (Units) Rate Change   (Unit/kg/hr) New Rate (Unit/kg/hr) Next   Anti-Xa Comments  Pump Check Daily   10/24 1612 drawn 0 no +9.90 9.90 2200 Discussed initial rate,no bolus w/ Katlyn SILVER   10/24 2153 <0.10 9.9 4000 +3 12.9 0400 Discussed with NADEEM Clarke. Report no s/s of bleeding.   10/25 0706 0.59 12.9 - -1 11.9 1400 Rate reduced,no s/s bleeding , d/w  Dalila SILVER                                                                                                                                                                                                           Pharmacy will continue to follow anti-Xa results and monitor for signs and symptoms of bleeding or thrombosis.

## 2023-10-26 ENCOUNTER — APPOINTMENT (OUTPATIENT)
Dept: CARDIOLOGY | Facility: HOSPITAL | Age: 68
DRG: 291 | End: 2023-10-26
Payer: MEDICARE

## 2023-10-26 LAB
ANION GAP SERPL CALCULATED.3IONS-SCNC: 13.1 MMOL/L (ref 5–15)
BASOPHILS # BLD AUTO: 0.05 10*3/MM3 (ref 0–0.2)
BASOPHILS NFR BLD AUTO: 0.6 % (ref 0–1.5)
BUN SERPL-MCNC: 31 MG/DL (ref 8–23)
BUN/CREAT SERPL: 6.5 (ref 7–25)
CALCIUM SPEC-SCNC: 9.1 MG/DL (ref 8.6–10.5)
CHLORIDE SERPL-SCNC: 91 MMOL/L (ref 98–107)
CO2 SERPL-SCNC: 28.9 MMOL/L (ref 22–29)
CREAT SERPL-MCNC: 4.77 MG/DL (ref 0.57–1)
DEPRECATED RDW RBC AUTO: 54.1 FL (ref 37–54)
EGFRCR SERPLBLD CKD-EPI 2021: 9.6 ML/MIN/1.73
EOSINOPHIL # BLD AUTO: 0.64 10*3/MM3 (ref 0–0.4)
EOSINOPHIL NFR BLD AUTO: 7.9 % (ref 0.3–6.2)
ERYTHROCYTE [DISTWIDTH] IN BLOOD BY AUTOMATED COUNT: 16.7 % (ref 12.3–15.4)
GLUCOSE BLDC GLUCOMTR-MCNC: 127 MG/DL (ref 70–130)
GLUCOSE BLDC GLUCOMTR-MCNC: 74 MG/DL (ref 70–130)
GLUCOSE BLDC GLUCOMTR-MCNC: 78 MG/DL (ref 70–130)
GLUCOSE SERPL-MCNC: 87 MG/DL (ref 65–99)
HCT VFR BLD AUTO: 27.2 % (ref 34–46.6)
HGB BLD-MCNC: 8.2 G/DL (ref 12–15.9)
IMM GRANULOCYTES # BLD AUTO: 0.04 10*3/MM3 (ref 0–0.05)
IMM GRANULOCYTES NFR BLD AUTO: 0.5 % (ref 0–0.5)
LYMPHOCYTES # BLD AUTO: 1.7 10*3/MM3 (ref 0.7–3.1)
LYMPHOCYTES NFR BLD AUTO: 20.9 % (ref 19.6–45.3)
MCH RBC QN AUTO: 27.2 PG (ref 26.6–33)
MCHC RBC AUTO-ENTMCNC: 30.1 G/DL (ref 31.5–35.7)
MCV RBC AUTO: 90.1 FL (ref 79–97)
MONOCYTES # BLD AUTO: 0.84 10*3/MM3 (ref 0.1–0.9)
MONOCYTES NFR BLD AUTO: 10.3 % (ref 5–12)
NEUTROPHILS NFR BLD AUTO: 4.85 10*3/MM3 (ref 1.7–7)
NEUTROPHILS NFR BLD AUTO: 59.8 % (ref 42.7–76)
NRBC BLD AUTO-RTO: 0 /100 WBC (ref 0–0.2)
PLATELET # BLD AUTO: 211 10*3/MM3 (ref 140–450)
PMV BLD AUTO: 10.7 FL (ref 6–12)
POTASSIUM SERPL-SCNC: 3.9 MMOL/L (ref 3.5–5.2)
RBC # BLD AUTO: 3.02 10*6/MM3 (ref 3.77–5.28)
SODIUM SERPL-SCNC: 133 MMOL/L (ref 136–145)
UFH PPP CHRO-ACNC: 0.32 IU/ML (ref 0.3–0.7)
WBC NRBC COR # BLD: 8.12 10*3/MM3 (ref 3.4–10.8)

## 2023-10-26 PROCEDURE — 94761 N-INVAS EAR/PLS OXIMETRY MLT: CPT

## 2023-10-26 PROCEDURE — 80048 BASIC METABOLIC PNL TOTAL CA: CPT | Performed by: INTERNAL MEDICINE

## 2023-10-26 PROCEDURE — 94799 UNLISTED PULMONARY SVC/PX: CPT

## 2023-10-26 PROCEDURE — 93308 TTE F-UP OR LMTD: CPT | Performed by: SPECIALIST

## 2023-10-26 PROCEDURE — 93010 ELECTROCARDIOGRAM REPORT: CPT | Performed by: SPECIALIST

## 2023-10-26 PROCEDURE — 99232 SBSQ HOSP IP/OBS MODERATE 35: CPT | Performed by: INTERNAL MEDICINE

## 2023-10-26 PROCEDURE — 85520 HEPARIN ASSAY: CPT | Performed by: INTERNAL MEDICINE

## 2023-10-26 PROCEDURE — 25010000002 HEPARIN (PORCINE) 25000-0.45 UT/250ML-% SOLUTION: Performed by: INTERNAL MEDICINE

## 2023-10-26 PROCEDURE — 94664 DEMO&/EVAL PT USE INHALER: CPT

## 2023-10-26 PROCEDURE — 93321 DOPPLER ECHO F-UP/LMTD STD: CPT

## 2023-10-26 PROCEDURE — 99232 SBSQ HOSP IP/OBS MODERATE 35: CPT | Performed by: SPECIALIST

## 2023-10-26 PROCEDURE — 93308 TTE F-UP OR LMTD: CPT

## 2023-10-26 PROCEDURE — 85025 COMPLETE CBC W/AUTO DIFF WBC: CPT | Performed by: INTERNAL MEDICINE

## 2023-10-26 PROCEDURE — 93005 ELECTROCARDIOGRAM TRACING: CPT | Performed by: INTERNAL MEDICINE

## 2023-10-26 PROCEDURE — 93321 DOPPLER ECHO F-UP/LMTD STD: CPT | Performed by: SPECIALIST

## 2023-10-26 PROCEDURE — 82948 REAGENT STRIP/BLOOD GLUCOSE: CPT

## 2023-10-26 RX ORDER — METOPROLOL TARTRATE 5 MG/5ML
5 INJECTION INTRAVENOUS
Status: DISCONTINUED | OUTPATIENT
Start: 2023-10-26 | End: 2023-10-26

## 2023-10-26 RX ORDER — METOPROLOL TARTRATE 5 MG/5ML
5 INJECTION INTRAVENOUS
Status: DISCONTINUED | OUTPATIENT
Start: 2023-10-26 | End: 2023-10-30 | Stop reason: HOSPADM

## 2023-10-26 RX ADMIN — MUPIROCIN 1 APPLICATION: 20 OINTMENT TOPICAL at 16:40

## 2023-10-26 RX ADMIN — ROSUVASTATIN CALCIUM 10 MG: 10 TABLET, FILM COATED ORAL at 20:34

## 2023-10-26 RX ADMIN — IPRATROPIUM BROMIDE AND ALBUTEROL SULFATE 3 ML: 2.5; .5 SOLUTION RESPIRATORY (INHALATION) at 18:29

## 2023-10-26 RX ADMIN — IPRATROPIUM BROMIDE AND ALBUTEROL SULFATE 3 ML: 2.5; .5 SOLUTION RESPIRATORY (INHALATION) at 07:40

## 2023-10-26 RX ADMIN — HYDRALAZINE HYDROCHLORIDE 25 MG: 50 TABLET, FILM COATED ORAL at 16:38

## 2023-10-26 RX ADMIN — ACETYLCYSTEINE 1.5 ML: 200 SOLUTION ORAL; RESPIRATORY (INHALATION) at 00:32

## 2023-10-26 RX ADMIN — ACETYLCYSTEINE 1.5 ML: 200 SOLUTION ORAL; RESPIRATORY (INHALATION) at 07:39

## 2023-10-26 RX ADMIN — HYDRALAZINE HYDROCHLORIDE 25 MG: 50 TABLET, FILM COATED ORAL at 20:37

## 2023-10-26 RX ADMIN — ACETYLCYSTEINE 1.5 ML: 200 SOLUTION ORAL; RESPIRATORY (INHALATION) at 14:12

## 2023-10-26 RX ADMIN — IPRATROPIUM BROMIDE AND ALBUTEROL SULFATE 3 ML: 2.5; .5 SOLUTION RESPIRATORY (INHALATION) at 00:32

## 2023-10-26 RX ADMIN — HEPARIN SODIUM 12.9 UNITS/KG/HR: 10000 INJECTION, SOLUTION INTRAVENOUS at 10:23

## 2023-10-26 RX ADMIN — GABAPENTIN 200 MG: 100 CAPSULE ORAL at 20:34

## 2023-10-26 RX ADMIN — AMIODARONE HYDROCHLORIDE 300 MG: 200 TABLET ORAL at 20:34

## 2023-10-26 RX ADMIN — Medication 1 MG: at 16:38

## 2023-10-26 RX ADMIN — METOPROLOL TARTRATE 75 MG: 50 TABLET, FILM COATED ORAL at 19:52

## 2023-10-26 RX ADMIN — Medication 10 MG: at 20:34

## 2023-10-26 RX ADMIN — LEVOTHYROXINE SODIUM 88 MCG: 88 TABLET ORAL at 05:17

## 2023-10-26 RX ADMIN — HYDROXYZINE HYDROCHLORIDE 25 MG: 25 TABLET, FILM COATED ORAL at 20:34

## 2023-10-26 RX ADMIN — METOPROLOL TARTRATE 50 MG: 50 TABLET, FILM COATED ORAL at 16:45

## 2023-10-26 RX ADMIN — AMIODARONE HYDROCHLORIDE 300 MG: 200 TABLET ORAL at 16:38

## 2023-10-26 RX ADMIN — MONTELUKAST SODIUM 10 MG: 10 TABLET, COATED ORAL at 20:34

## 2023-10-26 RX ADMIN — ACETYLCYSTEINE 1.5 ML: 200 SOLUTION ORAL; RESPIRATORY (INHALATION) at 18:29

## 2023-10-26 RX ADMIN — METOPROLOL TARTRATE 25 MG: 25 TABLET, FILM COATED ORAL at 16:38

## 2023-10-26 RX ADMIN — Medication 1000 MCG: at 16:38

## 2023-10-26 RX ADMIN — IPRATROPIUM BROMIDE AND ALBUTEROL SULFATE 3 ML: 2.5; .5 SOLUTION RESPIRATORY (INHALATION) at 14:12

## 2023-10-26 RX ADMIN — PANTOPRAZOLE SODIUM 40 MG: 40 TABLET, DELAYED RELEASE ORAL at 16:38

## 2023-10-26 RX ADMIN — Medication 10 ML: at 16:39

## 2023-10-26 RX ADMIN — NICOTINE TRANSDERMAL SYSTEM 1 PATCH: 21 PATCH, EXTENDED RELEASE TRANSDERMAL at 16:40

## 2023-10-26 RX ADMIN — MUPIROCIN 1 APPLICATION: 20 OINTMENT TOPICAL at 20:37

## 2023-10-26 RX ADMIN — SERTRALINE HYDROCHLORIDE 25 MG: 25 TABLET ORAL at 16:38

## 2023-10-26 RX ADMIN — ASPIRIN 81 MG: 81 TABLET, COATED ORAL at 16:38

## 2023-10-26 RX ADMIN — HYDRALAZINE HYDROCHLORIDE 25 MG: 50 TABLET, FILM COATED ORAL at 05:17

## 2023-10-26 NOTE — CASE MANAGEMENT/SOCIAL WORK
63-year-old male with history of ESRD s/p LUKT 5/10/2023, post-op course c/b DGF and increased velocities in artery and vein requiring OR takeback 5/15/202, presented with TRINO and diarrhea. Patient found to have large minimally complex peritransplant collection extending the entire length of the allograft. Patient underwent IR drainage 6/2/2023 -  with N 82%, Cr>30. PCNU tube placed 6/3/2023, started on vanc / cipro. Blood cultures 1/2 with CoNS. Patient reports diarrhea has resolved. He had rash to penicillin in his 30s. Developed tachycardia and chest pressure with TMP-SMX.   Discharge Planning Assessment   Silvestre     Patient Name: Lesley Michel  MRN: [id number]  Today's Date: 10/26/2023    Admit Date: 10/8/2023    Plan: SS followed up with Silvestre VASQUEZ per Otilia who states she is going to see how pt does with dialysis on this date. SS to follow.       Discharge Plan       Row Name 10/26/23 1130       Plan    Plan SS followed up with Silvestre VASQUEZ per Otilia who states she is going to see how pt does with dialysis on this date. SS to follow.      1625- SS attempted to follow up with Silvestre VASQUEZ with no success. SS to follow up.                         LIZBET Rodriguez

## 2023-10-26 NOTE — PROGRESS NOTES
[facility]  Lesley Michel is a  67 y.o. female receiving heparin infusion.     Therapy for (VTE/Cardiac):   cardiac  Patient Dosing Weight: 101  KG  Initial Bolus (Y/N):   no  Any Bolus (Y/N):    yes     Signs or Symptoms of Bleeding: no    Cardiac or Other (Not VTE)   Initial Bolus: 60 units/kg (Max 4,000 units)  Initial rate: 12 units/kg/hr (Max 1,000 units/hr)   Anti Xa Rebolus Infusion Hold time Change infusion Dose (Units/kg/hr) Next Anti Xa or aPTT Level Due   < 0.11 50 Units/kg  (4000 Units Max) None Increase by  3 Units/kg/hr 6 hours   0.11- 0.19 25 Units/kg  (2000 Units Max) None Increase by  2 Units/kg/hr 6 hours   0.2 - 0.29 0 None Increase by  1 Units/kg/hr 6 hours   0.3 - 0.5 0 None No Change 6 hours (after 2 consecutive levels in range check q24h @0700)   0.51 - 0.6 0 None Decrease by  1 Units/kg/hr 6 hours   0.61 - 0.8 0 30 Minutes Decrease by  2 Units/kg/hr 6 hours   0.81 - 1 0 60 Minutes Decrease by  3 Units/kg/hr 6 hours   >1 0 Hold  After Anti Xa less than 0.5 decrease previous rate by  4 Units/kg/hr  Every 2 hours until Anti Xa  less than 0.5 then when infusion restarts in 6 hours         Recommend anti-Xa every 6 hours.          Date   Time   Anti-Xa Current Rate (Unit/kg/hr) Bolus   (Units) Rate Change   (Unit/kg/hr) New Rate (Unit/kg/hr) Next   Anti-Xa Comments  Pump Check Daily   10/24 1612 drawn 0 no +9.90 9.90 2200 Discussed initial rate,no bolus w/ Katlyn RN   10/24 2153 <0.10 9.9 4000 +3 12.9 0400 Discussed with NADEEM Clarke. Report no s/s of bleeding.   10/25 0706 0.59 12.9 - -1 11.9 1400 Rate reduced,no s/s bleeding , d/w  Dalila  RN   10/25 1509 0.28 11.9 - +1 12.9 2200 Rate increased, no s/s bleeding, d/w  Janneth  RN   10/26 2215 0.3 12.9 - - 12.9 0400 Therapeutic x 1. No s/s of bleeding per Yulissa.    10/26 0500 0.32 12.9 - - 12.9 0700 on 10/27 Therapeutic x 2. No s/s of bleeding per Yulissa. Will transition to Qa monitoring.                                                                                                                                                                            Pharmacy will continue to follow anti-Xa results and monitor for signs and symptoms of bleeding or thrombosis.    Thank you,   Pema Michele, PharmD  04:58 EDT

## 2023-10-26 NOTE — PROGRESS NOTES
Nephrology Progress Note      Subjective   Seen patient on dialysis, tolerating very well.  Did not give Xanax this morning    Objective       Vital signs :     Vitals:    10/26/23 0042 10/26/23 0432 10/26/23 0500 10/26/23 0630   BP:  128/65  127/76   BP Location:  Right arm  Right arm   Patient Position:  Lying  Lying   Pulse: 57 60  57   Resp: 18 18  18   Temp:  97.8 °F (36.6 °C)  97.6 °F (36.4 °C)   TempSrc:  Oral  Oral   SpO2: 97% 95%  96%   Weight:   99.9 kg (220 lb 3.2 oz)    Height:            Intake/Output                         10/24/23 0701 - 10/25/23 0700 10/25/23 0701 - 10/26/23 0700     [id number] 9188-0784 Total 7296-6856 3096-2912 Total                 Intake    P.O.  320  240 560  360  240 600    Total Intake 320 240 560 360 240 600       Output    Dialysis  2300  -- 2300  --  -- --    Total Output 2300 -- 2300 -- -- --             Physical Exam:    General Appearance : Not in acute distress  Lungs : Bibasilar crackles noted trace edema  Heart :  regular rhythm & normal rate, normal S1, S2 and no murmur, no rub  Abdomen : Soft nontender nondistended   extremities : Trace edema  Neurologic :   Unable to assess fully    Laboratory Data :       CBC and coagulation:  Results from last 7 days   Lab Units 10/26/23  0429 10/25/23  0706 10/24/23  1609 10/24/23  0336 10/21/23  0325 10/20/23  0157   PROCALCITONIN ng/mL  --   --   --   --   --  0.40*   WBC 10*3/mm3 8.12 8.53  --  6.00   < > 7.83   HEMOGLOBIN g/dL 8.2* 8.1*  --  8.4*   < > 8.7*   HEMATOCRIT % 27.2* 26.9*  --  27.7*   < > 28.8*   MCV fL 90.1 90.3  --  90.8   < > 89.7   MCHC g/dL 30.1* 30.1*  --  30.3*   < > 30.2*   PLATELETS 10*3/mm3 211 190  --  192   < > 222   INR   --   --  1.07  --   --   --     < > = values in this interval not displayed.     Acid/base balance:  Results from last 7 days   Lab Units 10/25/23  0252 10/21/23  0907 10/20/23  1330   PH, ARTERIAL pH units 7.381 7.357 7.355   PO2 ART mm Hg 75.3* 72.2* 55.7*   PCO2, ARTERIAL mm Hg  "54.7* 52.6* 57.9*   HCO3 ART mmol/L 32.4* 29.5* 32.3*       Renal and electrolytes:    Results from last 7 days   Lab Units 10/26/23  0429 10/25/23  0706 10/24/23  0336 10/23/23  0255 10/21/23  0325   SODIUM mmol/L 133* 135* 133* 135* 133*   POTASSIUM mmol/L 3.9 4.0 4.9 4.7 4.4   CHLORIDE mmol/L 91* 92* 92* 94* 93*   CO2 mmol/L 28.9 30.0* 27.4 31.0* 25.6   BUN mg/dL 31* 24* 36* 43* 32*   CREATININE mg/dL 4.77* 3.99* 5.25* 5.43* 3.83*   CALCIUM mg/dL 9.1 9.0 9.3 8.9 9.0     Estimated Creatinine Clearance: 12.5 mL/min (A) (by C-G formula based on SCr of 4.77 mg/dL (H)).     Liver and pancreatic function:  Results from last 7 days   Lab Units 10/24/23  0336 10/20/23  0157   ALBUMIN g/dL 3.4* 3.1*   BILIRUBIN mg/dL 0.5 0.4   ALK PHOS U/L 121* 124*   AST (SGOT) U/L 16 7   ALT (SGPT) U/L 5 5       Albumin Albumin   Date Value Ref Range Status   10/24/2023 3.4 (L) 3.5 - 5.2 g/dL Final        Magnesium No results found for: \"MG\"         PTH               No results found for: \"PTH\"    Cardiac:        Liver and pancreatic function:  Results from last 7 days   Lab Units 10/24/23  0336 10/20/23  0157   ALBUMIN g/dL 3.4* 3.1*   BILIRUBIN mg/dL 0.5 0.4   ALK PHOS U/L 121* 124*   AST (SGOT) U/L 16 7   ALT (SGPT) U/L 5 5       XR Chest 1 View    Result Date: 10/25/2023  Bibasilar consolidation    This report was finalized on 10/25/2023 12:44 PM by Dr. Julio Dominguez MD.      XR Chest 1 View    Result Date: 10/22/2023   Mild improvement in the appearance of the chest when compared to the prior examination.   This report was finalized on 10/22/2023 8:58 PM by Shayna Gee MD.      XR Chest 1 View    Result Date: 10/20/2023  1.  Better expansion of the right lower lobe with some persistent airspace disease. 2.  Minimal left lower lobe airspace disease. 3.  Right central catheter with tip in SVC. 4.  Coarsened interstitial markings noted throughout the lungs. 5.  Mild cardiomegaly again noted.   This report was finalized on " 10/20/2023 8:34 AM by Dr. Kirk Mcdonald MD.      CT Chest Without Contrast Diagnostic    Result Date: 10/19/2023  1.  Tiny pericardial effusion. 2.  Right lung base fairly extensive atelectasis and probably consolidative pneumonia. Again there may be some mucus plugging or narrowing. 3.  Again there is tiny bilateral pleural effusions. 4.  Cardiomegaly.   This report was finalized on 10/19/2023 3:51 PM by Dr. Kirk Mcdonald MD.      XR Chest AP    Result Date: 10/19/2023  1.  There is been collapse of probably the right lower lobe possibly related to mucous plug. 2.  Small right pleural effusion persists. 3.  Improved aeration of the left lung base.   This report was finalized on 10/19/2023 2:00 PM by Dr. Kirk Mcdonald MD.      FL Surgery Fluoro    Result Date: 10/19/2023    As above.   This report was finalized on 10/19/2023 1:42 PM by Dr. Kirk Mcdonald MD.      XR Chest 1 View    Result Date: 10/18/2023  1.  Coarsened interstitial markings and vague bibasilar airspace opacities again noted. 2.  Now small right pleural effusion. 3.  Tiny left pleural effusion. 4.  Cardiomegaly.   This report was finalized on 10/18/2023 10:22 AM by Dr. Kirk Mcdonald MD.        Medications :     acetylcysteine, 1.5 mL, Nebulization, Q6H - RT  amiodarone, 300 mg, Oral, Q12H  aspirin, 81 mg, Oral, Daily  folic acid, 1 mg, Oral, Daily  gabapentin, 200 mg, Oral, Q12H  hydrALAZINE, 25 mg, Oral, Q8H  ipratropium-albuterol, 3 mL, Nebulization, Q6H - RT  levothyroxine, 88 mcg, Oral, Q AM  metoprolol tartrate, 50 mg, Oral, Q12H  montelukast, 10 mg, Oral, Nightly  mupirocin, 1 application , Each Nare, BID  nicotine, 1 patch, Transdermal, Q24H  pantoprazole, 40 mg, Oral, Daily  rosuvastatin, 10 mg, Oral, Nightly  sertraline, 25 mg, Oral, Daily  sodium chloride, 10 mL, Intravenous, Q12H  vitamin B-12, 1,000 mcg, Oral, Daily      heparin, 12.9 Units/kg/hr, Last Rate: 12.9 Units/kg/hr (10/25/23 1550)  Pharmacy to Dose Heparin,           Assessment  & Plan     -Acute kidney injury initiated on dialysis during this hospitalization on 10/19/2023  - Acute metabolic encephalopathy  -Atrophic right kidney  -Chronic kidney disease stage IIIa  -Acute hyperkalemia, resolved  -Acute hypoxic respite failure, improving  -COPD  -Essential hypertension  -Medical noncompliance    Patient is tolerating dialysis GERD without Xanax.  Educated and counseled the patient.  We will continue on intermittent dialysis 3 times a week.  Patient is getting stress tests to be done today.  And awaiting dialysis outpatient chair set up    MAICO on CKD most likely multifactori including ATN from prolonged prerenal state and hemodynamic changes with concomitant use of ACE inhibitors in setting of volume loss   baseline creatinine 2.2, admitted with 3.69   ultrasound of the kidneys showed atrophic right kidney  Serologies are negative    Please avoid nephrotoxic medication and pharmacy to adjust the medication according to the GFR     Plan of care discussed with pt, and family answered all questions, patient verbalized understanding and agreed.      Vani León MD  10/26/23  08:06 EDT

## 2023-10-26 NOTE — PROGRESS NOTES
Referring Provider: dr Tellez  Reason for Consultation: sob, hypoxic respiratory failure      Chief complaint -sob      Sub-  Overnight events reviewed.  All the labs medications ins and outs reviewed.  Chest x-ray from yesterday showed consolidations.  White count within normal limit.  Patient not in any respiratory distress.  Currently getting hemodialysis    Oxygen requirements remain stable.  We will continue to monitor for any signs of infection.  Repeat chest x-ray tomorrow morning.    More alert.  N.p.o. for stress test today.  Review of Systems-resting in bed comfortably not in any distress.      History  Past Medical History:   Diagnosis Date    Anxiety     Arthritis     Coronary artery disease     H/O heart artery stent     Hyperlipidemia     Hypertension     Obesity    ,   Past Surgical History:   Procedure Laterality Date    ANKLE SURGERY      RIGHT    APPENDECTOMY      CARDIAC CATHETERIZATION      LEFT    CORONARY STENT PLACEMENT      HYSTERECTOMY      INSERTION HEMODIALYSIS CATHETER Right 10/19/2023    Procedure: HEMODIALYSIS CATHETER INSERTION;  Surgeon: Bartolome Schwartz MD;  Location: [facility];  Service: General;  Laterality: Right;   ,   Family History   Problem Relation Age of Onset    Heart disease Mother     Heart attack Mother 73    Fibromyalgia Mother     Heart attack Father 72    Ovarian cancer Sister     Coronary artery disease Other     Breast cancer Neg Hx    ,   Social History     Tobacco Use    Smoking status: Every Day     Packs/day: 0.50     Years: 30.00     Additional pack years: 0.00     Total pack years: 15.00     Types: Electronic Cigarette, Cigarettes    Smokeless tobacco: Never   Vaping Use    Vaping Use: Never used   Substance Use Topics    Alcohol use: No    Drug use: No   ,   Medications Prior to Admission   Medication Sig Dispense Refill Last Dose    aspirin 325 MG tablet Take 1 tablet by mouth Daily.   10/8/2023    carvedilol (COREG) 25 MG tablet Take 1 tablet by  mouth 2 (Two) Times a Day With Meals. 60 tablet 0 10/8/2023    folic acid (FOLVITE) 1 MG tablet Take 1 tablet by mouth Daily.   10/8/2023    hydrALAZINE (APRESOLINE) 25 MG tablet Take 1 tablet by mouth 2 (Two) Times a Day. 60 tablet 0 10/8/2023    levothyroxine (SYNTHROID, LEVOTHROID) 88 MCG tablet Take 1 tablet by mouth Daily.   10/8/2023    lisinopril (PRINIVIL,ZESTRIL) 5 MG tablet Take 1 tablet by mouth Daily.   10/8/2023    montelukast (SINGULAIR) 10 MG tablet Take 1 tablet by mouth Every Night.   10/8/2023    pantoprazole (PROTONIX) 40 MG EC tablet Take 1 tablet by mouth Daily.   10/8/2023    rosuvastatin (CRESTOR) 20 MG tablet Take 1 tablet by mouth Daily. 30 tablet 0 10/8/2023    vitamin B-12 (CYANOCOBALAMIN) 1000 MCG tablet Take 1 tablet by mouth Daily.   10/8/2023    gabapentin (NEURONTIN) 800 MG tablet Take 1 tablet by mouth 2 (Two) Times a Day As Needed (nerve pain).   Unknown    HYDROcodone-acetaminophen (NORCO)  MG per tablet Take 1 tablet by mouth Every 8 (Eight) Hours As Needed for Moderate Pain.   Unknown    nitroglycerin (NITROSTAT) 0.4 MG SL tablet Place 1 tablet under the tongue Every 5 (Five) Minutes As Needed for Chest Pain. 30 tablet 2 Unknown   , Scheduled Meds:  acetylcysteine, 1.5 mL, Nebulization, Q6H - RT  amiodarone, 300 mg, Oral, Q12H  aspirin, 81 mg, Oral, Daily  folic acid, 1 mg, Oral, Daily  gabapentin, 200 mg, Oral, Q12H  hydrALAZINE, 25 mg, Oral, Q8H  ipratropium-albuterol, 3 mL, Nebulization, Q6H - RT  levothyroxine, 88 mcg, Oral, Q AM  metoprolol tartrate, 50 mg, Oral, Q12H  montelukast, 10 mg, Oral, Nightly  mupirocin, 1 application , Each Nare, BID  nicotine, 1 patch, Transdermal, Q24H  pantoprazole, 40 mg, Oral, Daily  rosuvastatin, 10 mg, Oral, Nightly  sertraline, 25 mg, Oral, Daily  sodium chloride, 10 mL, Intravenous, Q12H  vitamin B-12, 1,000 mcg, Oral, Daily    , Continuous Infusions:  heparin, 12.9 Units/kg/hr, Last Rate: 12.9 Units/kg/hr (10/25/23 1550)  Pharmacy  to Dose Heparin,        and Allergies:  Patient has no known allergies.    Objective     Vital Signs   Temp:  [97.6 °F (36.4 °C)-98.3 °F (36.8 °C)] 97.6 °F (36.4 °C)  Heart Rate:  [54-65] 57  Resp:  [16-18] 18  BP: (118-147)/(65-83) 127/76    Physical Exam:          General-resting in bed comfortably not in any distress HEENT-PERRLA    Neck-supple    Respiratory- not in any respiratory distress.  Wearing nasal cannula oxygen, not in any distress  Cardiovascular-NSR  GI-nontender nondistended bowel sounds positive    CNS-nonfocal    Musculoskeletal -no edema  Extremities- no obvious deformity noticed     Psychiatric-not awake oriented   skin- no visible rash                                                                   Results Review:    LABS:    Lab Results   Component Value Date    GLUCOSE 87 10/26/2023    BUN 31 (H) 10/26/2023    CREATININE 4.77 (H) 10/26/2023    BCR 6.5 (L) 10/26/2023    CO2 28.9 10/26/2023    CALCIUM 9.1 10/26/2023    PROTENTOTREF 6.5 10/11/2023    ALBUMIN 3.4 (L) 10/24/2023    LABIL2 1.1 10/11/2023    AST 16 10/24/2023    ALT 5 10/24/2023    WBC 8.12 10/26/2023    HGB 8.2 (L) 10/26/2023    HCT 27.2 (L) 10/26/2023    MCV 90.1 10/26/2023     10/26/2023     (L) 10/26/2023    K 3.9 10/26/2023    CL 91 (L) 10/26/2023    ANIONGAP 13.1 10/26/2023       Lab Results   Component Value Date    INR 1.07 10/24/2023    PROTIME 14.4 10/24/2023       Results from last 7 days   Lab Units 10/24/23  1609   INR  1.07   APTT seconds 47.6*          I reviewed the patient's new clinical results.  I reviewed the patient's new imaging results and agree with the interpretation.      Assessment & Plan     Shortness of breath-likely multifactorial most likely related to her chronic heart failure with preserved ejection fraction with elevated brain atretic peptide.  Failed diuretics trial and has been on dialysis since 10/19.  Latest chest x-ray shows consolidations.  Patient oxygen requirements are stable  White count is stable did not spike any fever oxygen requirements did go up.  We will repeat chest x-ray tomorrow and continue to monitor for any signs of developing pneumonia.  If oxygen requirement goes up spikes fever and white count goes up we will start on antibiotics.    With patient's mental status could have aspirated.    Continue aspiration precautions.    Consider decreasing the dose of Xanax before she goes for dialysis.  Currently getting hemodialysis.  Vital stable    COPD-continue oxygen to maintain saturation 88 to 92%.  Continue nebs.  Completed steroids and azithromycin.    If gets short of breath will start on BiPAP.    FLOR-sleep study on the outpatient basis.    Renal failure -on dialysis now.  Nephrology on case.        Smoker-did extensive smoking cessation counseling.  Patient is not ready to quit yet.  Follow-up with pulmonary in the outpatient basis.  PFTs on the outpatient basis.        Echo-  Results for orders placed during the hospital encounter of 10/08/23    Adult Transthoracic Echo Complete W/ Cont if Necessary Per Protocol    Interpretation Summary    Left ventricular systolic function is normal. Left ventricular ejection fraction appears to be 56 - 60%.    Left ventricular diastolic function is consistent with (grade Ia w/high LAP) impaired relaxation.    The left atrial cavity is mild to moderately dilated.    Left atrial volume is moderately increased.    Mild aortic valve stenosis is present.    Estimated right ventricular systolic pressure from tricuspid regurgitation is mildly elevated (35-45 mmHg).    There is a small (<1cm) pericardial effusion. There is no evidence of cardiac tamponade.          Acute hypoxic respiratory failure    Acute renal failure (ARF)          Elmer Mckeon MD  10/26/23  08:20 EDT

## 2023-10-26 NOTE — PLAN OF CARE
Goal Outcome Evaluation:  Progress: no change   Pt resting in bed, watching television. Pt refused stress test due to pt inability to lie flat. Dr. Lopez and Dr. Tellez made aware. Pt has converted from afib to NSR multiple times. Dr. Tellez and Dr. Lopez also aware. No complaints/concerns. No acute distress noted. Will continue to follow the plan of care.

## 2023-10-26 NOTE — PLAN OF CARE
Goal Outcome Evaluation:                   Pt currently resting in bed. No s/s of acute distress noted at this time. No complaints verbalized at this time. Pt has been NPO since midnight for a stress test this AM. Plan of care ongoing.

## 2023-10-26 NOTE — PROGRESS NOTES
[facility]  PROGRESS NOTE    Patient information:  Name: Lesley Michel  Age/Sex: 67 y.o. female  :  1955        PCP: Woodrow Raymond APRN  Attending: Fanny Ford MD  MRN:  [id number]  Visit Number:  [id number]    LOS:  LOS: 16 days     CODE STATUS:    Code Status and Medical Interventions:   Ordered at: 10/12/23 3117     Medical Intervention Limits:    NO intubation (DNI)     Level Of Support Discussed With:    Patient     Code Status (Patient has no pulse and is not breathing):    No CPR (Do Not Attempt to Resuscitate)     Medical Interventions (Patient has pulse or is breathing):    Limited Support       PROBLEM LIST:Principal Problem:    Acute hypoxic respiratory failure  Active Problems:    Acute renal failure (ARF)      Reason for Cardiology follow-up: New onset atrial fibrillation    Subjective   ADMISSION INFORMATION:  Chief Complaint   Patient presents with    Shortness of Breath    Edema       HPI:  Lesley Michel is a 67 y.o. female with a past medical history significant for  CAD chronically occluded well collateralized dominant RCA, status post drug-eluting stent to LAD with high-grade stenosis of the ostium of the small D1 and 60% mid RCA post lateral branch lesion, hyperlipidemia, essential hypertension, COPD, current tobacco abuse, CKD stage II, hypothyroidism, arthritis, anxiety, history of migraines, obesity by BMI, and history of medical noncompliance.        Patient presented to [facility] ([facility] [place]) emergency room (ER) on 10/8/2023 with complaints of increasing lower extremity edema and shortness of breath which has been progressively worsening over the past few days prior to presentation.  10/24/2023, Cardiology was consulted for further evaluation and management for what appears to be new onset atrial fibrillation with RVR since 10/23/2023.      Primary Cardiologist: Imelda ARENAS/Dr. Echeverria and her last office  visit was on 10/6/2023.    PROCEDURES:   10/09/2023: ECHO with EF of 56 to 60%, diastolic function consistent with grade 1A w/ high LAP, mild aortic valve stenosis, and a small (<1 cm) pericardial effusion noted with no evidence of cardiac tamponade.  10/25/2023: P.O. Amiodarone started and is scheduled to be decreased on Saturday 10/28/2023    Interval History:   Patient is in room 321 and was examined by Dr. Lopez.  Telemetry reveals SB 50s with a brief run of atrial fibrillation RVR 130s as seen in telemetry strip attached below.  Patient has been started on p.o. amiodarone.  Sodium 133, potassium 3.9, chloride 91, CO2 28.9, BUN 31, and a creatinine of 4.77.  Which is a bump up from 3.99.  Patient is followed by Nephrology and does receive dialysis.  Hemoglobin is 8.2 with a platelet count of 211.  Patient continues to be on IV heparin at this time.  We will plan to transition to DOAC after stress test later today if there is no ischemia noted.  Patient is lying in bed resting quietly.  No acute distress noted at this time.  Patient currently denies any chest pain, shortness of breath, or palpitations.  Patient is receiving dialysis at this time.    ORDERS:   Limited ECHO today     Vital Signs  Temp:  [97.6 °F (36.4 °C)-98.3 °F (36.8 °C)] 97.6 °F (36.4 °C)  Heart Rate:  [54-65] 59  Resp:  [18-20] 20  BP: (118-147)/(65-83) 127/76  Flow (L/min):  [3] 3  Device (Oxygen Therapy): humidified;nasal cannula  Vital Signs (last 72 hrs)         10/23 0700  10/24 0659 10/24 0700  10/25 0659 10/25 0700  10/26 0659 10/26 0700  10/26 0753   Most Recent      Temp (°F) 97.9 -  98.5    97.7 -  98.3    97.4 -  98.3       97.6 (36.4) 10/26 0630    Heart Rate 81 -  149    64 -  140    54 -  66       57 10/26 0630    Resp 16 -  20    17 -  20    16 -  18       18 10/26 0630    /74 -  157/79    98/69 -  143/80    118/65 -  153/86       127/76 10/26 0630    SpO2 (%) 94 -  96    91 -  98    95 -  97       96 10/26 0630    Flow  (L/min) 3 -  4    3 -  4    3 -  4       3 10/26 0432    Oxygen Concentration (%)       3       3 10/25 1843          BMI:Body mass index is 43 kg/m².    WEIGHT:      10/24/23  0500 10/25/23  0504 10/26/23  0500   Weight: 101 kg (221 lb 9.6 oz) 102 kg (224 lb 6.4 oz) 99.9 kg (220 lb 3.2 oz)       DIET:NPO Diet NPO Type: Sips with Meds    I&O:  Intake & Output (last 3 days)         10/23 0701  10/24 0700 10/24 0701  10/25 0700 10/25 0701  10/26 0700 10/26 0701  10/27 0700    P.O. 480 560 600     Total Intake(mL/kg) 480 (4.8) 560 (5.5) 600 (6)     Urine (mL/kg/hr) 500 (0.2)   100 (0.2)    Stool 0       Dialysis 100 2300      Total Output 600 2300  100    Net -120 -1740 +600 -100            Urine Unmeasured Occurrence 1 x 0 x 1 x     Stool Unmeasured Occurrence 1 x               Objective     Physical Exam:      General Appearance:    Alert, cooperative, in no acute distress.  BMI 43.00.   Head:    Normocephalic, without obvious abnormality, atraumatic.   Eyes:                          Conjunctivae and sclerae normal, no icterus, no pallor, corneas clear.   Neck:   No adenopathy, supple, trachea midline, no thyromegaly, no carotid bruit, no JVD.   Lungs:     Clear to auscultation, respirations regular, even and             unlabored.    Heart:    Regular rhythm and normal rate, normal S1 and S2, grade 2/6 ejection systolic murmur noted and heard best at aortic area and across sternal border, no gallop, no rub, no click   Chest Wall:    No abnormalities observed.   Abdomen:     Normal bowel sounds, no masses, no organomegaly, soft nontender, nondistended, no guarding, no rebound tenderness.   Extremities:   Moves all extremities well, no edema, no cyanosis, no           redness.   Pulses:   Pulses palpable and equal bilaterally.   Skin:   No bleeding, bruising or rash.   Neurologic:   Alert and Oriented x 3, Speech Clear & comprehensive.       Results review   Results Review:   Results from last 7 days   Lab Units  10/21/23  1716 10/21/23  1316   HSTROP T ng/L 54* 51*     Lab Results   Component Value Date    PROBNP 8,093.0 (H) 10/08/2023     Results from last 7 days   Lab Units 10/26/23  0429 10/25/23  0706 10/24/23  0336 10/23/23  0255 10/21/23  0325 10/20/23  0157   WBC 10*3/mm3 8.12 8.53 6.00 5.71 8.84 7.83   HEMOGLOBIN g/dL 8.2* 8.1* 8.4* 8.0* 8.8* 8.7*   PLATELETS 10*3/mm3 211 190 192 190 226 222     Results from last 7 days   Lab Units 10/26/23  0429 10/25/23  0706 10/24/23  0336 10/23/23  0255 10/21/23  0325 10/20/23  0157   SODIUM mmol/L 133* 135* 133* 135* 133* 135*   POTASSIUM mmol/L 3.9 4.0 4.9 4.7 4.4 4.6   CHLORIDE mmol/L 91* 92* 92* 94* 93* 97*   CO2 mmol/L 28.9 30.0* 27.4 31.0* 25.6 25.0   BUN mg/dL 31* 24* 36* 43* 32* 50*   CREATININE mg/dL 4.77* 3.99* 5.25* 5.43* 3.83* 5.16*   CALCIUM mg/dL 9.1 9.0 9.3 8.9 9.0 8.9   GLUCOSE mg/dL 87 86 103* 100* 71 81   ALT (SGPT) U/L  --   --  5  --   --  5   AST (SGOT) U/L  --   --  16  --   --  7     Lab Results   Component Value Date    MG 2.0 10/09/2023    MG 1.9 10/08/2023     Estimated Creatinine Clearance: 12.5 mL/min (A) (by C-G formula based on SCr of 4.77 mg/dL (H)).    Lab Results   Component Value Date    HGBA1C 5.30 10/08/2023     Lab Results   Component Value Date    CHOL 140 10/08/2023    TRIG 75 10/08/2023    LDL 91 10/08/2023    HDL 34 (L) 10/08/2023     Lab Results   Component Value Date    ABSOLUTELUNG 43 (A) 10/13/2023    ABSOLUTELUNG 37 (A) 10/09/2023     Lab Results   Component Value Date    INR 1.07 10/24/2023     Lab Results   Component Value Date    TSH 6.770 (H) 10/08/2023      Pain Management Panel          Latest Ref Rng & Units 10/9/2023   Pain Management Panel   Creatinine, Urine mg/dL 116.0      Microbiology Results (last 10 days)       Procedure Component Value - Date/Time    Respiratory Culture - Sputum, Cough [[id number]] Collected: 10/25/23 1518    Lab Status: Preliminary result Specimen: Sputum from Cough Updated: 10/26/23 7527      Respiratory Culture Culture in progress     Gram Stain Few (2+) Epithelial cells seen      Moderate (3+) WBCs seen      Moderate (3+) Mixed bacterial morphotypes seen on Gram Stain    Blood Culture - Blood, Arm, Right [[id number]]  (Abnormal) Collected: 10/20/23 1334    Lab Status: Final result Specimen: Blood from Arm, Right Updated: 10/23/23 0825     Blood Culture Micrococcus species     Comment: No specific CLSI guidelines and no validated TANYA testing method.  Rarely implicated in infections.          Isolated from Aerobic Bottle     Gram Stain Aerobic Bottle Gram positive cocci in clusters    Narrative:      Probable contaminant requires clinical correlation, susceptibility not performed unless requested by physician.      Blood Culture ID, PCR - Blood, Arm, Right [[id number]] Collected: 10/20/23 1334    Lab Status: Final result Specimen: Blood from Arm, Right Updated: 10/22/23 0519     BCID, PCR Negative by BCID PCR. Culture to Follow.     BOTTLE TYPE Aerobic Bottle    Blood Culture - Blood, Arm, Left [[id number]]  (Normal) Collected: 10/20/23 1234    Lab Status: Final result Specimen: Blood from Arm, Left Updated: 10/25/23 1402     Blood Culture No growth at 5 days           Imaging Results (Last 24 Hours)       Procedure Component Value Units Date/Time    XR Chest 1 View [[id number]] Collected: 10/25/23 1244     Updated: 10/25/23 1247    Narrative:      XR CHEST 1 VW-     CLINICAL INDICATION: sob; N17.9-Acute kidney failure, unspecified;  N18.9-Chronic kidney disease, unspecified; I50.9-Heart failure,  unspecified; J96.21-Acute and chronic respiratory failure with hypoxia;  E87.5-Hyperkalemia; N17.9-Acute kidney failure, unspecified        COMPARISON: 10/22/2023     TECHNIQUE: Single frontal view of the chest.     FINDINGS:     LUNGS: Bibasilar consolidation     HEART AND MEDIASTINUM: Heart and mediastinal contours are unremarkable        SKELETON: Bony and soft tissue structures are unremarkable.              Impression:      Bibasilar consolidation           This report was finalized on 10/25/2023 12:44 PM by Dr. Julio Dominguez MD.             ECHO:  Results for orders placed during the hospital encounter of 10/08/23    Adult Transthoracic Echo Complete W/ Cont if Necessary Per Protocol    Interpretation Summary    Left ventricular systolic function is normal. Left ventricular ejection fraction appears to be 56 - 60%.    Left ventricular diastolic function is consistent with (grade Ia w/high LAP) impaired relaxation.    The left atrial cavity is mild to moderately dilated.    Left atrial volume is moderately increased.    Mild aortic valve stenosis is present.    Estimated right ventricular systolic pressure from tricuspid regurgitation is mildly elevated (35-45 mmHg).    There is a small (<1cm) pericardial effusion. There is no evidence of cardiac tamponade.      STRESS TEST:   Plan for today 10/26/2023.       HEART CATH:  No results found for this or any previous visit.      EKG:  10/25/2023      TELEMETRY:     SB 50s with a brief run of atrial fibrillation RVR 130s as seen in telemetry strip attached below.            I reviewed the patient's new clinical results.    ALLERGIES: Patient has no known allergies.    Medication Review:   Current list of medications may not reflect those currently placed in orders that are not signed or are being held.     acetylcysteine, 1.5 mL, Nebulization, Q6H - RT  amiodarone, 300 mg, Oral, Q12H  aspirin, 81 mg, Oral, Daily  folic acid, 1 mg, Oral, Daily  gabapentin, 200 mg, Oral, Q12H  hydrALAZINE, 25 mg, Oral, Q8H  ipratropium-albuterol, 3 mL, Nebulization, Q6H - RT  levothyroxine, 88 mcg, Oral, Q AM  metoprolol tartrate, 50 mg, Oral, Q12H  montelukast, 10 mg, Oral, Nightly  mupirocin, 1 application , Each Nare, BID  nicotine, 1 patch, Transdermal, Q24H  pantoprazole, 40 mg, Oral, Daily  rosuvastatin, 10 mg, Oral, Nightly  sertraline, 25 mg, Oral, Daily  sodium chloride, 10 mL,  Intravenous, Q12H  vitamin B-12, 1,000 mcg, Oral, Daily      heparin, 12.9 Units/kg/hr, Last Rate: 12.9 Units/kg/hr (10/26/23 1023)  Pharmacy to Dose Heparin,         acetaminophen    calcium carbonate    dextrose    dextrose    glucagon (human recombinant)    hydrALAZINE    HYDROcodone-acetaminophen    hydrOXYzine    melatonin    nitroglycerin    ondansetron    Pharmacy to Dose Heparin    [COMPLETED] Insert Peripheral IV **AND** sodium chloride    sodium chloride    sodium chloride    Assessment      Assessment    New onset atrial fibrillation with RVR chads Vascor is at least 3  MAICO on CKD on hemodialysis  ASCVD status post  of the dominant RCA, status post drug-eluting stent placement to the LAD, and also existent high-grade stenosis of the ostial small first diagonal, was also 60% RCA lateral branch lesion  Dyslipidemia  Essential hypertension  Acute on chronic HFpEF  COPD  Current ongoing tobacco abuse  Mental status changes  Medical noncompliance            Plan   1.  Patient so far tolerating amiodarone mostly maintaining sinus rhythm with slight episodes of short runs of atrial fibrillation  2.  Continue with anticoagulation for thromboembolic prophylaxis  3.  Stress testing today for assessment of ischemia          I have discussed the patients findings and recommendations with patient.    Thank you very much for asking us to be involved in this patient's care.  We will follow along with you.    Electronically signed by DEEPA Navarro, 10/26/23, 11:23 AM EDT.   Electronically signed by Dwayne Lopez MD, 10/26/23, 12:03 PM EDT.                      Please note that portions of this note were completed with a voice recognition program.    Please note that portions of this note were copied and has been reviewed and is accurate as of 10/26/2023 .

## 2023-10-26 NOTE — NURSING NOTE
Per Dr. Lopez, it is okay to give all morning PO medications including increased metoprolol dose (see MAR) as well as ordered amiodarone dose and to monitor pt and pts heart rate. Made Dr. Lopez aware of the multiple conversions from Afib to NSR. Informed me to monitor pt and pts HR. No acute distress noted at this time. Will continue to monitor.

## 2023-10-26 NOTE — SIGNIFICANT NOTE
[facility]  SIGNIFICANT EVENT NOTE      Patient information:  Name: Lesley Michel  Age/Sex: 67 y.o. female  :  1955        PCP: Woodrow Raymond APRN  Attending: Fanny Ford MD  MRN:  [id number]  Visit Number:  [id number]    LOS:  LOS: 16 days   CODE STATUS:   Code Status and Medical Interventions:   Ordered at: 10/12/23 1553     Medical Intervention Limits:    NO intubation (DNI)     Level Of Support Discussed With:    Patient     Code Status (Patient has no pulse and is not breathing):    No CPR (Do Not Attempt to Resuscitate)     Medical Interventions (Patient has pulse or is breathing):    Limited Support       PROBLEM LIST:Principal Problem:    Acute hypoxic respiratory failure  Active Problems:    Acute renal failure (ARF)      Reason for Cardiology follow-up:  New onset atrial fibrillation     Subjective Data:   EVENT/HPI:    Patient's nurse NADEEM Salas from [facility] [employer] called / patient converted to atrial fibrillation with RVR 130s for at least the last 30 minutes.      Objective Data:   Temp:  [97 °F (36.1 °C)-98.3 °F (36.8 °C)] 97.2 °F (36.2 °C)  Heart Rate:  [52-65] 57  Resp:  [18-20] 18  BP: (118-181)/(65-96) 181/96  Flow (L/min):  [3] 3  Device (Oxygen Therapy): humidified;nasal cannula  Vital Signs (last 72 hrs)         10/23 0700  10/24 0659 10/24 0700  10/25 0659 10/25 0700  10/26 0659 10/26 0700  10/26 1315   Most Recent      Temp (°F) 97.9 -  98.5    97.7 -  98.3    97.4 -  98.3    97 -  97.2     97.2 (36.2) 10/26 1206    Heart Rate 81 -  149    64 -  140    54 -  66    52 -  59     57 10/26 1206    Resp 16 -  20    17 -  20    16 -  18    18 -  20     18 10/26 1206    /74 -  157/79    98/69 -  143/80    118/65 -  153/86    164/83 -  181/96     181/96 10/26 1206    SpO2 (%) 94 -  96    91 -  98    95 -  97    96 -  98     98 10/26 1146    Flow (L/min) 3 -  4    3 -  4    3 -  4      3     3 10/26 0739    Oxygen Concentration (%)        3       3 10/25 1843          Body mass index is 43 kg/m².      10/25/23  0504 10/26/23  0500   Weight: 102 kg (224 lb 6.4 oz) 99.9 kg (220 lb 3.2 oz)       Results Reviewed:   TELEMETRY:        EKG:  10/26/2023        Assessment and Plan:   Increase metoprolol tartrate to 75 mg p.o. BID    Lopressor 5 mg IV every 10 minutes x 3 doses if heart rate is greater than 120 BPM and HOLD for systolic blood pressure less than 100 and HR less than 60 BPM.      NADEEM Salas is aware of new orders.    ADDENDUM:   13:58 pm: Patient is converting back an forth between SB/ Atrial Fibrillation per NADEEM Salas.  I asked Dr. Lopez and he said to just give extra 25 mg of PO Metoprolol tartrate to make up for increase from 50 mg to 75 from AM dose and let her start the full 75 mg with PM dose if parameters are met AND to wait on the PRN IV doses of Lopressor for now.           acetylcysteine, 1.5 mL, Nebulization, Q6H - RT  amiodarone, 300 mg, Oral, Q12H  aspirin, 81 mg, Oral, Daily  folic acid, 1 mg, Oral, Daily  gabapentin, 200 mg, Oral, Q12H  hydrALAZINE, 25 mg, Oral, Q8H  ipratropium-albuterol, 3 mL, Nebulization, Q6H - RT  levothyroxine, 88 mcg, Oral, Q AM  metoprolol tartrate, 5 mg, Intravenous, Q10 Min  metoprolol tartrate, 75 mg, Oral, Q12H  montelukast, 10 mg, Oral, Nightly  mupirocin, 1 application , Each Nare, BID  nicotine, 1 patch, Transdermal, Q24H  pantoprazole, 40 mg, Oral, Daily  rosuvastatin, 10 mg, Oral, Nightly  sertraline, 25 mg, Oral, Daily  sodium chloride, 10 mL, Intravenous, Q12H  vitamin B-12, 1,000 mcg, Oral, Daily      heparin, 12.9 Units/kg/hr, Last Rate: 12.9 Units/kg/hr (10/26/23 1023)  Pharmacy to Dose Heparin,       DEEPA Waller (Robin)   [facility] 13:15 EDT  10/26/2023    Electronically signed by DEEPA Navarro, 10/26/23, 1:25 PM EDT.     Electronically signed by DEEPA Navarro, 10/26/23, 2:02 PM EDT.         Please note that portions of this note were  copied and has been reviewed and is accurate as of 10/26/2023 .      Please note that portions of this note were completed with a voice recognition program.

## 2023-10-26 NOTE — SIGNIFICANT NOTE
10/26/23 1452   OTHER   Discipline physical therapist   Rehab Time/Intention   Session Not Performed patient unavailable for treatment  (Pt. off floor at times attempted to be seen.)

## 2023-10-26 NOTE — PROGRESS NOTES
[facility] HOSPITALIST PROGRESS NOTE    Subjective     History:   Lesley Michel is a 67 y.o. female admitted on 10/8/2023 secondary to Acute hypoxic respiratory failure     Procedures:   10/19/23: Right IJ tunneled HD catheter placement     CC: Follow up MAICO    Patient seen and examined in dialysis. Awake and alert. Dyspnea remains overall improved. No reported CP. No reported vomiting. BP stable. No acute events overnight per RN.     History taken from: patient, chart, and RN.      Objective     Vital Signs  Temp:  [97 °F (36.1 °C)-98.4 °F (36.9 °C)] 98.4 °F (36.9 °C)  Heart Rate:  [52-67] 67  Resp:  [18-20] 18  BP: (127-181)/(65-96) 159/86    Intake/Output Summary (Last 24 hours) at 10/26/2023 1457  Last data filed at 10/26/2023 1224  Gross per 24 hour   Intake 240 ml   Output 1671 ml   Net -1431 ml         Physical Exam:   General:    Awake, alert, no acute distress, ill appearing   Heart:      Normal S1 and S2. Regular rate and rhythm.    Lungs:     Respirations regular, even and unlabored. Diminished breath sounds at bases. No wheezes appreciated.     Abdomen:   Soft and nontender. No guarding, rebound tenderness or  organomegaly noted. Bowel sounds present x 4.   Extremities:  Tr bilateral lower extremity edema. Moves UE and LE equally B/L.     Results Review:    Results from last 7 days   Lab Units 10/26/23  0429 10/25/23  0706 10/24/23  0336 10/23/23  0255 10/21/23  0325 10/20/23  0157   WBC 10*3/mm3 8.12 8.53 6.00 5.71 8.84 7.83   HEMOGLOBIN g/dL 8.2* 8.1* 8.4* 8.0* 8.8* 8.7*   PLATELETS 10*3/mm3 211 190 192 190 226 222     Results from last 7 days   Lab Units 10/26/23  0429 10/25/23  0706 10/24/23  0336 10/23/23  0255 10/21/23  0325 10/20/23  0157   SODIUM mmol/L 133* 135* 133* 135* 133* 135*   POTASSIUM mmol/L 3.9 4.0 4.9 4.7 4.4 4.6   CHLORIDE mmol/L 91* 92* 92* 94* 93* 97*   CO2 mmol/L 28.9 30.0* 27.4 31.0* 25.6 25.0   BUN mg/dL 31* 24* 36* 43* 32* 50*   CREATININE mg/dL 4.77* 3.99*  5.25* 5.43* 3.83* 5.16*   CALCIUM mg/dL 9.1 9.0 9.3 8.9 9.0 8.9   GLUCOSE mg/dL 87 86 103* 100* 71 81     Results from last 7 days   Lab Units 10/24/23  0336 10/20/23  0157   BILIRUBIN mg/dL 0.5 0.4   ALK PHOS U/L 121* 124*   AST (SGOT) U/L 16 7   ALT (SGPT) U/L 5 5           Results from last 7 days   Lab Units 10/24/23  1609   INR  1.07     Results from last 7 days   Lab Units 10/21/23  1716 10/21/23  1316   HSTROP T ng/L 54* 51*       Imaging Results (Last 24 Hours)       ** No results found for the last 24 hours. **              Medications:  acetylcysteine, 1.5 mL, Nebulization, Q6H - RT  amiodarone, 300 mg, Oral, Q12H  aspirin, 81 mg, Oral, Daily  folic acid, 1 mg, Oral, Daily  gabapentin, 200 mg, Oral, Q12H  hydrALAZINE, 25 mg, Oral, Q8H  ipratropium-albuterol, 3 mL, Nebulization, Q6H - RT  levothyroxine, 88 mcg, Oral, Q AM  metoprolol tartrate, 25 mg, Oral, Once  metoprolol tartrate, 75 mg, Oral, Q12H  montelukast, 10 mg, Oral, Nightly  mupirocin, 1 application , Each Nare, BID  nicotine, 1 patch, Transdermal, Q24H  pantoprazole, 40 mg, Oral, Daily  rosuvastatin, 10 mg, Oral, Nightly  sertraline, 25 mg, Oral, Daily  sodium chloride, 10 mL, Intravenous, Q12H  vitamin B-12, 1,000 mcg, Oral, Daily      heparin, 12.9 Units/kg/hr, Last Rate: 12.9 Units/kg/hr (10/26/23 1023)  Pharmacy to Dose Heparin,             Assessment & Plan   Anasarca: proBNP and ReDs vest reading elevated on admission. Imaging revealed CHF. Echo revealed an EF of 56-60%, grade I diastolic dysfunction, mild AS, mild pulmonary HTN and small pericardial effusion with no evidence of cardiac tamponade. Worsening renal failure possibly contributing with acute diastolic CHF as well. Failed trials of diuretics with initiation of HD on 10/19. HD per nephrology.     Acute exacerbation of COPD: Possibly exacerbated by pulmonary edema. Respiratory PCR negative. Pulm consulted and assisted with steroid taper. Course of azithromycin completed. Wheezing  resolved. Cont nebs. Pulm input appreciated.     Bilateral pneumonia: Procal normal. Cultures with NGTD. Completed course of azithromycin as above. Cont nebs.     Acute hypoxic respiratory failure: Likely multifactorial in the setting of above. Post-op mucous plugging noted as well but improved. Pt has refused BiPAP. O2 requirements stable today. Treatment as outlined above. Wean supplemental O2 as tolerated.     MAICO on CKD IIIa with initiation of HD this admission: Baseline Cr around 2.2. Non nephrotic proteinuria. Complements are normal with negative serologies. No evidence of hydronephrosis on renal US with US revealing small and echogenic right kidney. Likely 2/2 ATN 2/2 prolonged prerenal state and hemodynamic changes with use of ACE inhibitors as well. S/P tunneled HD catheter placement with initiation of HD on 10/19 as above. Cont to monitor closely. Nephrology input appreciated.     Hyperkalemia: Resolved with targeted treatment and initiation of HD. Holding home lisinopril. Monitor on telemetry.     New onset Afib with RVR: Previously changed Coreg to metoprolol. BP later borderline low limiting Rx options. Decreased hydralazine to allow rise in BP. Cont metoprolol. Cardiology has added amiodarone. Continues to have episodes of Afib with RVR. Currently on heparin gtt for stroke prevention. Cards planning ischemic eval with stress test today. Monitor on telemetry. Cardiology input appreciated.     Essential HTN: BP previously elevated but later borderline low after initiation of HD. Changed Coreg to metoprolol as above. Decreased hydralazine with BP improved. Attempt to avoid wide fluctuations in BP. Cont to monitor.     Hypothyroidism: TSH mildly elevated with normal free T4. Cont levothyroxine. Will need repeat labs as an outpatient.     Anxiety: Psychiatry consulted and started sertraline. Nephrology previously ordered Xanax with HD but held 2/2 episodes of oversedation. Cont supportive treatment.      Tobacco abuse: Cont NRT and encourage cessation.     Morbid obesity by BMI: Complicates all aspects of care.     DVT PPX: Heparin gtt     Disposition SS working to arrange HD chair when medically stable.     José Miguel Tellez DO  10/26/23  14:57 EDT

## 2023-10-27 ENCOUNTER — APPOINTMENT (OUTPATIENT)
Dept: NUCLEAR MEDICINE | Facility: HOSPITAL | Age: 68
DRG: 291 | End: 2023-10-27
Payer: MEDICARE

## 2023-10-27 ENCOUNTER — APPOINTMENT (OUTPATIENT)
Dept: CARDIOLOGY | Facility: HOSPITAL | Age: 68
DRG: 291 | End: 2023-10-27
Payer: MEDICARE

## 2023-10-27 LAB
ANION GAP SERPL CALCULATED.3IONS-SCNC: 12.2 MMOL/L (ref 5–15)
BACTERIA SPEC RESP CULT: NORMAL
BASOPHILS # BLD AUTO: 0.04 10*3/MM3 (ref 0–0.2)
BASOPHILS NFR BLD AUTO: 0.5 % (ref 0–1.5)
BH CV NUCLEAR PRIOR STUDY: 3
BH CV REST NUCLEAR ISOTOPE DOSE: 10.2 MCI
BH CV STRESS BP STAGE 1: NORMAL
BH CV STRESS COMMENTS STAGE 1: NORMAL
BH CV STRESS DOSE REGADENOSON STAGE 1: 0.4
BH CV STRESS DURATION MIN STAGE 1: 0
BH CV STRESS DURATION SEC STAGE 1: 10
BH CV STRESS HR STAGE 1: 68
BH CV STRESS NUCLEAR ISOTOPE DOSE: 30.2 MCI
BH CV STRESS PROTOCOL 1: NORMAL
BH CV STRESS RECOVERY BP: NORMAL MMHG
BH CV STRESS RECOVERY HR: 70 BPM
BH CV STRESS STAGE 1: 1
BUN SERPL-MCNC: 17 MG/DL (ref 8–23)
BUN/CREAT SERPL: 4.6 (ref 7–25)
CALCIUM SPEC-SCNC: 8.9 MG/DL (ref 8.6–10.5)
CHLORIDE SERPL-SCNC: 95 MMOL/L (ref 98–107)
CO2 SERPL-SCNC: 28.8 MMOL/L (ref 22–29)
CREAT SERPL-MCNC: 3.73 MG/DL (ref 0.57–1)
DEPRECATED RDW RBC AUTO: 56.3 FL (ref 37–54)
EGFRCR SERPLBLD CKD-EPI 2021: 12.9 ML/MIN/1.73
EOSINOPHIL # BLD AUTO: 0.61 10*3/MM3 (ref 0–0.4)
EOSINOPHIL NFR BLD AUTO: 7.2 % (ref 0.3–6.2)
ERYTHROCYTE [DISTWIDTH] IN BLOOD BY AUTOMATED COUNT: 16.9 % (ref 12.3–15.4)
GLUCOSE BLDC GLUCOMTR-MCNC: 105 MG/DL (ref 70–130)
GLUCOSE BLDC GLUCOMTR-MCNC: 119 MG/DL (ref 70–130)
GLUCOSE BLDC GLUCOMTR-MCNC: 188 MG/DL (ref 70–130)
GLUCOSE SERPL-MCNC: 97 MG/DL (ref 65–99)
GRAM STN SPEC: NORMAL
HCT VFR BLD AUTO: 28.6 % (ref 34–46.6)
HGB BLD-MCNC: 8.3 G/DL (ref 12–15.9)
IMM GRANULOCYTES # BLD AUTO: 0.04 10*3/MM3 (ref 0–0.05)
IMM GRANULOCYTES NFR BLD AUTO: 0.5 % (ref 0–0.5)
LV EF NUC BP: 68 %
LYMPHOCYTES # BLD AUTO: 1.95 10*3/MM3 (ref 0.7–3.1)
LYMPHOCYTES NFR BLD AUTO: 23.2 % (ref 19.6–45.3)
MAXIMAL PREDICTED HEART RATE: 155 BPM
MCH RBC QN AUTO: 26.9 PG (ref 26.6–33)
MCHC RBC AUTO-ENTMCNC: 29 G/DL (ref 31.5–35.7)
MCV RBC AUTO: 92.6 FL (ref 79–97)
MONOCYTES # BLD AUTO: 0.8 10*3/MM3 (ref 0.1–0.9)
MONOCYTES NFR BLD AUTO: 9.5 % (ref 5–12)
NEUTROPHILS NFR BLD AUTO: 4.98 10*3/MM3 (ref 1.7–7)
NEUTROPHILS NFR BLD AUTO: 59.1 % (ref 42.7–76)
NRBC BLD AUTO-RTO: 0 /100 WBC (ref 0–0.2)
PERCENT MAX PREDICTED HR: 43.87 %
PLATELET # BLD AUTO: 209 10*3/MM3 (ref 140–450)
PMV BLD AUTO: 10.8 FL (ref 6–12)
POTASSIUM SERPL-SCNC: 4 MMOL/L (ref 3.5–5.2)
PROCALCITONIN SERPL-MCNC: 0.34 NG/ML (ref 0–0.25)
QT INTERVAL: 326 MS
QTC INTERVAL: 497 MS
RBC # BLD AUTO: 3.09 10*6/MM3 (ref 3.77–5.28)
SODIUM SERPL-SCNC: 136 MMOL/L (ref 136–145)
STRESS BASELINE BP: NORMAL MMHG
STRESS BASELINE HR: 50 BPM
STRESS PERCENT HR: 52 %
STRESS POST PEAK BP: NORMAL MMHG
STRESS POST PEAK HR: 68 BPM
STRESS TARGET HR: 132 BPM
UFH PPP CHRO-ACNC: 0.19 IU/ML (ref 0.3–0.7)
UFH PPP CHRO-ACNC: 0.43 IU/ML (ref 0.3–0.7)
UFH PPP CHRO-ACNC: >1.1 IU/ML (ref 0.3–0.7)
WBC NRBC COR # BLD: 8.42 10*3/MM3 (ref 3.4–10.8)

## 2023-10-27 PROCEDURE — 25010000002 HEPARIN (PORCINE) 25000-0.45 UT/250ML-% SOLUTION: Performed by: INTERNAL MEDICINE

## 2023-10-27 PROCEDURE — 99232 SBSQ HOSP IP/OBS MODERATE 35: CPT | Performed by: INTERNAL MEDICINE

## 2023-10-27 PROCEDURE — 85520 HEPARIN ASSAY: CPT | Performed by: INTERNAL MEDICINE

## 2023-10-27 PROCEDURE — 78452 HT MUSCLE IMAGE SPECT MULT: CPT

## 2023-10-27 PROCEDURE — 97530 THERAPEUTIC ACTIVITIES: CPT

## 2023-10-27 PROCEDURE — 93017 CV STRESS TEST TRACING ONLY: CPT

## 2023-10-27 PROCEDURE — A9500 TC99M SESTAMIBI: HCPCS | Performed by: INTERNAL MEDICINE

## 2023-10-27 PROCEDURE — 99232 SBSQ HOSP IP/OBS MODERATE 35: CPT | Performed by: SPECIALIST

## 2023-10-27 PROCEDURE — 25010000002 HEPARIN (PORCINE) PER 1000 UNITS: Performed by: INTERNAL MEDICINE

## 2023-10-27 PROCEDURE — 85025 COMPLETE CBC W/AUTO DIFF WBC: CPT | Performed by: INTERNAL MEDICINE

## 2023-10-27 PROCEDURE — 93018 CV STRESS TEST I&R ONLY: CPT | Performed by: INTERNAL MEDICINE

## 2023-10-27 PROCEDURE — 78452 HT MUSCLE IMAGE SPECT MULT: CPT | Performed by: INTERNAL MEDICINE

## 2023-10-27 PROCEDURE — 94799 UNLISTED PULMONARY SVC/PX: CPT

## 2023-10-27 PROCEDURE — 97164 PT RE-EVAL EST PLAN CARE: CPT

## 2023-10-27 PROCEDURE — 80048 BASIC METABOLIC PNL TOTAL CA: CPT | Performed by: INTERNAL MEDICINE

## 2023-10-27 PROCEDURE — 25010000002 REGADENOSON 0.4 MG/5ML SOLUTION: Performed by: INTERNAL MEDICINE

## 2023-10-27 PROCEDURE — 84145 PROCALCITONIN (PCT): CPT | Performed by: INTERNAL MEDICINE

## 2023-10-27 PROCEDURE — 94761 N-INVAS EAR/PLS OXIMETRY MLT: CPT

## 2023-10-27 PROCEDURE — 82948 REAGENT STRIP/BLOOD GLUCOSE: CPT

## 2023-10-27 PROCEDURE — 0 TECHNETIUM SESTAMIBI: Performed by: INTERNAL MEDICINE

## 2023-10-27 PROCEDURE — 94664 DEMO&/EVAL PT USE INHALER: CPT

## 2023-10-27 RX ORDER — AMIODARONE HYDROCHLORIDE 200 MG/1
200 TABLET ORAL EVERY 12 HOURS SCHEDULED
Status: DISCONTINUED | OUTPATIENT
Start: 2023-10-27 | End: 2023-10-30 | Stop reason: HOSPADM

## 2023-10-27 RX ORDER — METOPROLOL TARTRATE 50 MG/1
50 TABLET, FILM COATED ORAL EVERY 12 HOURS SCHEDULED
Status: DISCONTINUED | OUTPATIENT
Start: 2023-10-27 | End: 2023-10-27

## 2023-10-27 RX ORDER — REGADENOSON 0.08 MG/ML
0.4 INJECTION, SOLUTION INTRAVENOUS
Status: COMPLETED | OUTPATIENT
Start: 2023-10-27 | End: 2023-10-27

## 2023-10-27 RX ORDER — HEPARIN SODIUM 5000 [USP'U]/ML
2000 INJECTION, SOLUTION INTRAVENOUS; SUBCUTANEOUS ONCE
Status: COMPLETED | OUTPATIENT
Start: 2023-10-27 | End: 2023-10-27

## 2023-10-27 RX ADMIN — HYDROCODONE BITARTRATE AND ACETAMINOPHEN 1 TABLET: 10; 325 TABLET ORAL at 12:38

## 2023-10-27 RX ADMIN — Medication 10 MG: at 21:19

## 2023-10-27 RX ADMIN — IPRATROPIUM BROMIDE AND ALBUTEROL SULFATE 3 ML: 2.5; .5 SOLUTION RESPIRATORY (INHALATION) at 07:07

## 2023-10-27 RX ADMIN — ACETYLCYSTEINE 1.5 ML: 200 SOLUTION ORAL; RESPIRATORY (INHALATION) at 00:17

## 2023-10-27 RX ADMIN — Medication 1000 MCG: at 12:38

## 2023-10-27 RX ADMIN — ASPIRIN 81 MG: 81 TABLET, COATED ORAL at 12:38

## 2023-10-27 RX ADMIN — NICOTINE TRANSDERMAL SYSTEM 1 PATCH: 21 PATCH, EXTENDED RELEASE TRANSDERMAL at 12:39

## 2023-10-27 RX ADMIN — IPRATROPIUM BROMIDE AND ALBUTEROL SULFATE 3 ML: 2.5; .5 SOLUTION RESPIRATORY (INHALATION) at 00:17

## 2023-10-27 RX ADMIN — ACETAMINOPHEN 650 MG: 325 TABLET ORAL at 21:19

## 2023-10-27 RX ADMIN — HEPARIN SODIUM 2000 UNITS: 5000 INJECTION INTRAVENOUS; SUBCUTANEOUS at 08:28

## 2023-10-27 RX ADMIN — GABAPENTIN 200 MG: 100 CAPSULE ORAL at 21:19

## 2023-10-27 RX ADMIN — HEPARIN SODIUM 12.9 UNITS/KG/HR: 10000 INJECTION, SOLUTION INTRAVENOUS at 05:36

## 2023-10-27 RX ADMIN — AMIODARONE HYDROCHLORIDE 200 MG: 200 TABLET ORAL at 21:19

## 2023-10-27 RX ADMIN — LEVOTHYROXINE SODIUM 88 MCG: 88 TABLET ORAL at 05:13

## 2023-10-27 RX ADMIN — MONTELUKAST SODIUM 10 MG: 10 TABLET, COATED ORAL at 21:19

## 2023-10-27 RX ADMIN — AMIODARONE HYDROCHLORIDE 200 MG: 200 TABLET ORAL at 16:47

## 2023-10-27 RX ADMIN — PANTOPRAZOLE SODIUM 40 MG: 40 TABLET, DELAYED RELEASE ORAL at 12:37

## 2023-10-27 RX ADMIN — MUPIROCIN 1 APPLICATION: 20 OINTMENT TOPICAL at 21:20

## 2023-10-27 RX ADMIN — ACETYLCYSTEINE 1.5 ML: 200 SOLUTION ORAL; RESPIRATORY (INHALATION) at 13:18

## 2023-10-27 RX ADMIN — ROSUVASTATIN CALCIUM 10 MG: 10 TABLET, FILM COATED ORAL at 21:19

## 2023-10-27 RX ADMIN — MUPIROCIN 1 APPLICATION: 20 OINTMENT TOPICAL at 12:38

## 2023-10-27 RX ADMIN — Medication 10 ML: at 12:39

## 2023-10-27 RX ADMIN — HYDRALAZINE HYDROCHLORIDE 25 MG: 50 TABLET, FILM COATED ORAL at 21:19

## 2023-10-27 RX ADMIN — GABAPENTIN 200 MG: 100 CAPSULE ORAL at 12:38

## 2023-10-27 RX ADMIN — REGADENOSON 0.4 MG: 0.08 INJECTION, SOLUTION INTRAVENOUS at 08:59

## 2023-10-27 RX ADMIN — TECHNETIUM TC 99M SESTAMIBI 1 DOSE: 1 INJECTION INTRAVENOUS at 08:59

## 2023-10-27 RX ADMIN — HYDROXYZINE HYDROCHLORIDE 25 MG: 25 TABLET, FILM COATED ORAL at 21:19

## 2023-10-27 RX ADMIN — Medication 10 ML: at 21:20

## 2023-10-27 RX ADMIN — SERTRALINE HYDROCHLORIDE 25 MG: 25 TABLET ORAL at 12:38

## 2023-10-27 RX ADMIN — Medication 1 MG: at 12:38

## 2023-10-27 RX ADMIN — HYDRALAZINE HYDROCHLORIDE 25 MG: 50 TABLET, FILM COATED ORAL at 16:47

## 2023-10-27 RX ADMIN — TECHNETIUM TC 99M SESTAMIBI 1 DOSE: 1 INJECTION INTRAVENOUS at 07:45

## 2023-10-27 RX ADMIN — ACETYLCYSTEINE 1.5 ML: 200 SOLUTION ORAL; RESPIRATORY (INHALATION) at 07:07

## 2023-10-27 RX ADMIN — IPRATROPIUM BROMIDE AND ALBUTEROL SULFATE 3 ML: 2.5; .5 SOLUTION RESPIRATORY (INHALATION) at 13:18

## 2023-10-27 NOTE — SIGNIFICANT NOTE
[facility]  SIGNIFICANT EVENT NOTE      Patient information:  Name: Lesley Michel  Age/Sex: 67 y.o. female  :  1955        PCP: Woodrow Raymond APRN  Attending: Fanny Ford MD  MRN:  [id number]  Visit Number:  [id number]    LOS:  LOS: 17 days   CODE STATUS:   Code Status and Medical Interventions:   Ordered at: 10/12/23 1553     Medical Intervention Limits:    NO intubation (DNI)     Level Of Support Discussed With:    Patient     Code Status (Patient has no pulse and is not breathing):    No CPR (Do Not Attempt to Resuscitate)     Medical Interventions (Patient has pulse or is breathing):    Limited Support       PROBLEM LIST:Principal Problem:    Acute hypoxic respiratory failure  Active Problems:    Acute renal failure (ARF)      Reason for Cardiology follow-up:  New onset atrial fibrillation     Subjective Data:   EVENT/HPI:    13:26 pm: Patient's nurse, NADEEM Salas called in regards to patient heart rate being in the 50s and him not able to give the Metoprolol tartrate according to the parameters that were set with it.     Objective Data:   Temp:  [97.8 °F (36.6 °C)-98.5 °F (36.9 °C)] 98 °F (36.7 °C)  Heart Rate:  [] 58  Resp:  [16-20] 18  BP: (108-159)/(55-98) 150/73  Flow (L/min):  [3-4] 4  Device (Oxygen Therapy): humidified;nasal cannula  Vital Signs (last 72 hrs)         10/24 0700  10/25 0659 10/25 0700  10/26 0659 10/26 0700  10/27 0659 10/27 0700  10/27 1352   Most Recent      Temp (°F) 97.7 -  98.3    97.4 -  98.3    97 -  98.5      98     98 (36.7) 10/27 1050    Heart Rate 64 -  140    54 -  66    52 -  137    56 -  58     58 10/27 1050    Resp 17 -  20    16 -  18    16 -  20      18     18 10/27 1050    BP 98/69 -  143/80    118/65 -  153/86    108/55 -  181/96      150/73     150/73 10/27 1050    SpO2 (%) 91 -  98    95 -  97    93 -  98    98 -  99     98 10/27 1050    Flow (L/min) 3 -  4    3 -  4    3 -  4      4     4  10/27 0710    Oxygen Concentration (%)     3         3 10/25 1843          Body mass index is 42.87 kg/m².      10/26/23  0500 10/27/23  0500   Weight: 99.9 kg (220 lb 3.2 oz) 99.6 kg (219 lb 8 oz)     Results Reviewed:   TELEMETRY:  SB 50's          Assessment and Plan:   I spoke with Dr. Lopez on the telephone and he gave me the following verbal orders:   Stopped Metoprolol PO 50 mg BID completely.   Decreased Amiodarone to 200 mg PO BID.  Orders placed     NADEEM Salas is aware of new orders placed     acetylcysteine, 1.5 mL, Nebulization, Q6H - RT  amiodarone, 200 mg, Oral, Q12H  aspirin, 81 mg, Oral, Daily  folic acid, 1 mg, Oral, Daily  gabapentin, 200 mg, Oral, Q12H  hydrALAZINE, 25 mg, Oral, Q8H  ipratropium-albuterol, 3 mL, Nebulization, Q6H - RT  levothyroxine, 88 mcg, Oral, Q AM  montelukast, 10 mg, Oral, Nightly  mupirocin, 1 application , Each Nare, BID  nicotine, 1 patch, Transdermal, Q24H  pantoprazole, 40 mg, Oral, Daily  rosuvastatin, 10 mg, Oral, Nightly  sertraline, 25 mg, Oral, Daily  sodium chloride, 10 mL, Intravenous, Q12H  vitamin B-12, 1,000 mcg, Oral, Daily      heparin, 14.9 Units/kg/hr, Last Rate: 14.9 Units/kg/hr (10/27/23 0827)  Pharmacy to Dose Heparin,       DEEPA Waller (Robin)   [facility] 13:52 EDT  10/27/2023    Electronically signed by DEEPA Navarro, 10/27/23, 1:59 PM EDT.             Please note that portions of this note were copied and has been reviewed and is accurate as of 10/27/2023 .      Please note that portions of this note were completed with a voice recognition program.

## 2023-10-27 NOTE — CASE MANAGEMENT/SOCIAL WORK
Discharge Planning Assessment   [place]     Patient Name: Lesley Michel  MRN: [id number]  Today's Date: 10/27/2023    Admit Date: 10/8/2023    Plan: SS contacted Silvestre VASQUEZ per Otilia who states pt was able to tolerate dialysis. Otilia states she will work on arranging a dialysis chair and this time. Otilia states she will contact SS with a dialysis slot. SS notified physician. SS to follow.     Discharge Plan       Row Name 10/27/23 1528       Plan    Plan SS contacted Silvestre VASQUEZ per Otilia who states pt was able to tolerate dialysis. Otilia states she will work on arranging a dialysis chair and this time. Otilia states she will contact SS with a dialysis slot. SS notified physician. SS to follow.          LIZBET Rodriguez

## 2023-10-27 NOTE — PROGRESS NOTES
Referring Provider: dr Tellez  Reason for Consultation: sob, hypoxic respiratory failure      Chief complaint -sob      Sub-all the labs medications and the notes were reviewed.  Resting in bed comfortably.  Not in any distress.  Tolerating dialysis  Better now.     Review of Systems-resting in bed comfortably not in any distress.      History  Past Medical History:   Diagnosis Date    Anxiety     Arthritis     Coronary artery disease     H/O heart artery stent     Hyperlipidemia     Hypertension     Obesity    ,   Past Surgical History:   Procedure Laterality Date    ANKLE SURGERY      RIGHT    APPENDECTOMY      CARDIAC CATHETERIZATION      LEFT    CORONARY STENT PLACEMENT      HYSTERECTOMY      INSERTION HEMODIALYSIS CATHETER Right 10/19/2023    Procedure: HEMODIALYSIS CATHETER INSERTION;  Surgeon: Bartolome Schwartz MD;  Location: [facility];  Service: General;  Laterality: Right;   ,   Family History   Problem Relation Age of Onset    Heart disease Mother     Heart attack Mother 73    Fibromyalgia Mother     Heart attack Father 72    Ovarian cancer Sister     Coronary artery disease Other     Breast cancer Neg Hx    ,   Social History     Tobacco Use    Smoking status: Every Day     Packs/day: 0.50     Years: 30.00     Additional pack years: 0.00     Total pack years: 15.00     Types: Electronic Cigarette, Cigarettes    Smokeless tobacco: Never   Vaping Use    Vaping Use: Never used   Substance Use Topics    Alcohol use: No    Drug use: No   ,   Medications Prior to Admission   Medication Sig Dispense Refill Last Dose    aspirin 325 MG tablet Take 1 tablet by mouth Daily.   10/8/2023    carvedilol (COREG) 25 MG tablet Take 1 tablet by mouth 2 (Two) Times a Day With Meals. 60 tablet 0 10/8/2023    folic acid (FOLVITE) 1 MG tablet Take 1 tablet by mouth Daily.   10/8/2023    hydrALAZINE (APRESOLINE) 25 MG tablet Take 1 tablet by mouth 2 (Two) Times a Day. 60 tablet 0 10/8/2023    levothyroxine (SYNTHROID,  LEVOTHROID) 88 MCG tablet Take 1 tablet by mouth Daily.   10/8/2023    lisinopril (PRINIVIL,ZESTRIL) 5 MG tablet Take 1 tablet by mouth Daily.   10/8/2023    montelukast (SINGULAIR) 10 MG tablet Take 1 tablet by mouth Every Night.   10/8/2023    pantoprazole (PROTONIX) 40 MG EC tablet Take 1 tablet by mouth Daily.   10/8/2023    rosuvastatin (CRESTOR) 20 MG tablet Take 1 tablet by mouth Daily. 30 tablet 0 10/8/2023    vitamin B-12 (CYANOCOBALAMIN) 1000 MCG tablet Take 1 tablet by mouth Daily.   10/8/2023    gabapentin (NEURONTIN) 800 MG tablet Take 1 tablet by mouth 2 (Two) Times a Day As Needed (nerve pain).   Unknown    HYDROcodone-acetaminophen (NORCO)  MG per tablet Take 1 tablet by mouth Every 8 (Eight) Hours As Needed for Moderate Pain.   Unknown    nitroglycerin (NITROSTAT) 0.4 MG SL tablet Place 1 tablet under the tongue Every 5 (Five) Minutes As Needed for Chest Pain. 30 tablet 2 Unknown   , Scheduled Meds:  acetylcysteine, 1.5 mL, Nebulization, Q6H - RT  aspirin, 81 mg, Oral, Daily  folic acid, 1 mg, Oral, Daily  gabapentin, 200 mg, Oral, Q12H  hydrALAZINE, 25 mg, Oral, Q8H  ipratropium-albuterol, 3 mL, Nebulization, Q6H - RT  levothyroxine, 88 mcg, Oral, Q AM  metoprolol tartrate, 75 mg, Oral, Q12H  montelukast, 10 mg, Oral, Nightly  mupirocin, 1 application , Each Nare, BID  nicotine, 1 patch, Transdermal, Q24H  pantoprazole, 40 mg, Oral, Daily  rosuvastatin, 10 mg, Oral, Nightly  sertraline, 25 mg, Oral, Daily  sodium chloride, 10 mL, Intravenous, Q12H  vitamin B-12, 1,000 mcg, Oral, Daily    , Continuous Infusions:  heparin, 14.9 Units/kg/hr, Last Rate: 14.9 Units/kg/hr (10/27/23 0827)  Pharmacy to Dose Heparin,        and Allergies:  Patient has no known allergies.    Objective     Vital Signs   Temp:  [97.2 °F (36.2 °C)-98.5 °F (36.9 °C)] 97.8 °F (36.6 °C)  Heart Rate:  [] 56  Resp:  [16-20] 18  BP: (108-181)/(55-98) 110/57    Physical Exam:          General-not in any respiratory  distress.     resting in bed comfortably not in any distress.  HEENT-PERRLA    Neck-supple    Respiratory- not in any respiratory distress.  Wearing nasal cannula oxygen, not in any distress  Cardiovascular-NSR  GI-nontender nondistended bowel sounds positive    CNS-nonfocal    Musculoskeletal -no edema  Extremities- no obvious deformity noticed     Psychiatric-not awake oriented   skin- no visible rash                                                                   Results Review:    LABS:    Lab Results   Component Value Date    GLUCOSE 97 10/27/2023    BUN 17 10/27/2023    CREATININE 3.73 (H) 10/27/2023    BCR 4.6 (L) 10/27/2023    CO2 28.8 10/27/2023    CALCIUM 8.9 10/27/2023    PROTENTOTREF 6.5 10/11/2023    ALBUMIN 3.4 (L) 10/24/2023    LABIL2 1.1 10/11/2023    AST 16 10/24/2023    ALT 5 10/24/2023    WBC 8.42 10/27/2023    HGB 8.3 (L) 10/27/2023    HCT 28.6 (L) 10/27/2023    MCV 92.6 10/27/2023     10/27/2023     10/27/2023    K 4.0 10/27/2023    CL 95 (L) 10/27/2023    ANIONGAP 12.2 10/27/2023       Lab Results   Component Value Date    INR 1.07 10/24/2023    PROTIME 14.4 10/24/2023       Results from last 7 days   Lab Units 10/24/23  1609   INR  1.07   APTT seconds 47.6*          I reviewed the patient's new clinical results.  I reviewed the patient's new imaging results and agree with the interpretation.      Assessment & Plan     Shortness of breath-likely multifactorial most likely related to her chronic heart failure with preserved ejection fraction with elevated brain atretic peptide.  Failed diuretics trial and has been on dialysis since 10/19.  Latest chest x-ray shows consolidations.  Oxygen requirement stable.  White count stable.  With patient's mental status could have aspirated.    Continue aspiration precautions.  Chest x-ray ordered for tomorrow morning.  Consider decreasing the dose of Xanax before she goes for dialysis.  Currently getting hemodialysis.  Vital  stable    COPD-continue oxygen to maintain saturation 88 to 92%.  Continue nebs  Continue steroids.  Follow-up with pulmonary in the outpatient basis we will get PFTs and accordingly will start on inhalers.    FLOR-sleep study on the outpatient basis.    Renal failure -on dialysis now.  Nephrology on case.        Smoker-did extensive smoking cessation counseling.  Patient is not ready to quit yet.          Echo-  Results for orders placed during the hospital encounter of 10/08/23    Adult Transthoracic Echo Limited W/ Cont if Necessary Per Protocol    Interpretation Summary    Left ventricular systolic function is hyperdynamic (EF > 70%). Left ventricular ejection fraction appears to be greater than 70%.    Left ventricular diastolic function was not assessed.    The left atrial cavity is mildly dilated.    The right atrial cavity is borderline dilated.    There is a small (<1cm) pericardial effusion.    This is a technical limited study.          Acute hypoxic respiratory failure    Acute renal failure (ARF)          Elmer Mckeon MD  10/27/23  10:49 EDT

## 2023-10-27 NOTE — THERAPY RE-EVALUATION
Acute Care - Physical Therapy Re-Evaluation   [place]     Patient Name: Lesley Michel  : 1955  MRN: [id number]  Today's Date: 10/27/2023   Onset of Illness/Injury or Date of Surgery: 10/08/23  Visit Dx:     ICD-10-CM ICD-9-CM   1. Acute kidney injury superimposed on chronic kidney disease  N17.9 584.9    N18.9 585.9   2. Acute on chronic congestive heart failure, unspecified heart failure type  I50.9 428.0   3. Acute on chronic respiratory failure with hypoxia  J96.21 518.84     799.02   4. Hyperkalemia  E87.5 276.7   5. Acute renal failure, unspecified acute renal failure type  N17.9 584.9     Patient Active Problem List   Diagnosis    Hypertension    Hyperlipidemia    Migraine without aura    CAD, chronically occluded collateralized dominant RCA, status post drug-eluting stent to LAD , high-grade stenosis of the ostium of the small D1 and 60% mid RCA posterior lateral branch    Chronic renal failure, stage 2 (mild)    Tobacco use    Bilateral lower extremity edema    Other specified hypothyroidism    Cigarette smoker    Class 3 severe obesity without serious comorbidity with body mass index (BMI) of 40.0 to 44.9 in adult    Acute hypoxic respiratory failure    Acute renal failure (ARF)     Past Medical History:   Diagnosis Date    Anxiety     Arthritis     Coronary artery disease     H/O heart artery stent     Hyperlipidemia     Hypertension     Obesity      Past Surgical History:   Procedure Laterality Date    ANKLE SURGERY      RIGHT    APPENDECTOMY      CARDIAC CATHETERIZATION      LEFT    CORONARY STENT PLACEMENT      HYSTERECTOMY      INSERTION HEMODIALYSIS CATHETER Right 10/19/2023    Procedure: HEMODIALYSIS CATHETER INSERTION;  Surgeon: Bartolome Schwartz MD;  Location: [facility];  Service: General;  Laterality: Right;     PT Assessment (last 12 hours)       PT Evaluation and Treatment       Row Name 10/27/23 1307          Physical Therapy Time and Intention    Subjective Information  complains of;fatigue  -KM     Document Type re-evaluation  -KM     Mode of Treatment individual therapy;physical therapy  -KM     Patient Effort adequate  -KM     Symptoms Noted During/After Treatment fatigue  -KM       Row Name 10/27/23 1307          General Information    Patient Profile Reviewed yes  -KM     Patient Observations alert;cooperative;agree to therapy  -KM     Existing Precautions/Restrictions fall;oxygen therapy device and L/min  -KM       Row Name 10/27/23 1307          Cognition    Affect/Mental Status (Cognition) WFL  -KM     Follows Commands (Cognition) WFL  -KM       Row Name 10/27/23 1307          Bed Mobility    Bed Mobility supine-sit;sit-supine  -KM     Supine-Sit [place] (Bed Mobility) minimum assist (75% patient effort)  -KM     Sit-Supine [place] (Bed Mobility) standby assist  -KM     Assistive Device (Bed Mobility) bed rails  -KM       Row Name 10/27/23 1307          Transfers    Comment, (Transfers) pt. deferred d/t increased fatigue from stress test  -KM       Row Name 10/27/23 1307          Toilet Transfer    Assistive Device (Toilet Transfer) walker, front-wheeled  -KM       Row Name 10/27/23 1307          Safety Issues, Functional Mobility    Impairments Affecting Function (Mobility) endurance/activity tolerance;balance;pain;shortness of breath;strength  -KM       Row Name 10/27/23 1307          Balance    Balance Assessment sitting static balance  -KM     Static Sitting Balance supervision  -KM     Position, Sitting Balance sitting edge of bed  -KM       Row Name             Wound 10/09/23 0422 Left lower arm Extravasation    Wound - Properties Group Placement Date: 10/09/23  -SM Placement Time: 0422 -SM Side: Left  -SM Orientation: lower  -SM Location: arm  -SM Primary Wound Type: Extravasatio  -SM    Retired Wound - Properties Group Placement Date: 10/09/23  -SM Placement Time: 0422 -SM Side: Left  -SM Orientation: lower  -SM Location: arm  -SM Primary Wound Type:  Extravasatio  -SM    Retired Wound - Properties Group Date first assessed: 10/09/23  -SM Time first assessed: 0422  -SM Side: Left  -SM Location: arm  -SM Primary Wound Type: Extravasatio  -SM      Row Name             Wound 10/19/23 1251 Right neck Incision    Wound - Properties Group Placement Date: 10/19/23  -KB Placement Time: 1251  -KB Side: Right  -KB Location: neck  -KB Primary Wound Type: Incision  -KB    Retired Wound - Properties Group Placement Date: 10/19/23  -KB Placement Time: 1251  -KB Side: Right  -KB Location: neck  -KB Primary Wound Type: Incision  -KB    Retired Wound - Properties Group Date first assessed: 10/19/23  -KB Time first assessed: 1251  -KB Side: Right  -KB Location: neck  -KB Primary Wound Type: Incision  -KB      Row Name             Wound 10/19/23 1253 Right upper chest Incision    Wound - Properties Group Placement Date: 10/19/23  -KB Placement Time: 1253  -KB Present on Original Admission: N  -KB Side: Right  -KB Orientation: upper  -KB Location: chest  -KB Primary Wound Type: Incision  -KB    Retired Wound - Properties Group Placement Date: 10/19/23  -KB Placement Time: 1253  -KB Present on Original Admission: N  -KB Side: Right  -KB Orientation: upper  -KB Location: chest  -KB Primary Wound Type: Incision  -KB    Retired Wound - Properties Group Date first assessed: 10/19/23  -KB Time first assessed: 1253  -KB Present on Original Admission: N  -KB Side: Right  -KB Location: chest  -KB Primary Wound Type: Incision  -KB      Row Name 10/27/23 1307          Progress Summary (PT)    Daily Progress Summary (PT) Pt. re-evaluation completed during PT session. She was able to perform bed mobility w/ CGA-German. She was able to sit EOB but deferred transfers d/t fatigue post-stress test. Pt. would continue to benefit from skilled PT services.  -KM       Row Name 10/27/23 1308          Physical Therapy Goals    Bed Mobility Goal Selection (PT) bed mobility, PT goal 1  -KM     Transfer Goal  Selection (PT) transfer, PT goal 1  -KM     Gait Training Goal Selection (PT) gait training, PT goal 1  -KM       Row Name 10/27/23 1307          Bed Mobility Goal 1 (PT)    Activity/Assistive Device (Bed Mobility Goal 1, PT) bed mobility activities, all  -KM     [place] Level/Cues Needed (Bed Mobility Goal 1, PT) modified independence  -KM     Time Frame (Bed Mobility Goal 1, PT) by discharge  -KM     Progress/Outcomes (Bed Mobility Goal 1, PT) goal ongoing  -KM       Row Name 10/27/23 1307          Transfer Goal 1 (PT)    Activity/Assistive Device (Transfer Goal 1, PT) sit-to-stand/stand-to-sit;bed-to-chair/chair-to-bed;toilet  -KM     [place] Level/Cues Needed (Transfer Goal 1, PT) modified independence  -KM     Time Frame (Transfer Goal 1, PT) by discharge  -KM     Progress/Outcome (Transfer Goal 1, PT) goal ongoing  -KM       Row Name 10/27/23 1307          Gait Training Goal 1 (PT)    Activity/Assistive Device (Gait Training Goal 1, PT) gait (walking locomotion);assistive device use;decrease fall risk;diminish gait deviation;improve balance and speed;increase endurance/gait distance  -KM     [place] Level (Gait Training Goal 1, PT) standby assist  -KM     Distance (Gait Training Goal 1, PT) 25  -KM     Time Frame (Gait Training Goal 1, PT) by discharge  -KM     Progress/Outcome (Gait Training Goal 1, PT) goal ongoing  -KM               User Key  (r) = Recorded By, (t) = Taken By, (c) = Cosigned By      Initials Name Provider Type    Ora Cifuentes, RN Registered Nurse    Fiordaliza Echevarria RN Registered Nurse    Jose M Fung, PT Physical Therapist                    Physical Therapy Education       Title: PT OT SLP Therapies (Done)       Topic: Physical Therapy (Done)       Point: Mobility training (Done)       Learning Progress Summary             Patient Acceptance, E,D, VU,NR by  at 10/9/2023 8877                         Point: Home exercise program (Done)       Learning  Progress Summary             Patient Acceptance, E,D, VU,NR by  at 10/9/2023 1623                         Point: Body mechanics (Done)       Learning Progress Summary             Patient Acceptance, E,D, VU,NR by  at 10/9/2023 1623                         Point: Precautions (Done)       Learning Progress Summary             Patient Acceptance, E,D, VU,NR by  at 10/9/2023 1623                                         User Key       Initials Effective Dates Name Provider Type Discipline     06/16/21 -  Ayana Logan, PT Physical Therapist PT                  PT Recommendation and Plan     Progress Summary (PT)  Daily Progress Summary (PT): Pt. re-evaluation completed during PT session. She was able to perform bed mobility w/ CGA-German. She was able to sit EOB but deferred transfers d/t fatigue post-stress test. Pt. would continue to benefit from skilled PT services.       Time Calculation:    PT Charges       Row Name 10/27/23 1300             Time Calculation    PT Received On 10/27/23  -KM      PT Goal Re-Cert Due Date 11/10/23  -KM         Time Calculation- PT    Total Timed Code Minutes- PT 15 minute(s)  -KM                User Key  (r) = Recorded By, (t) = Taken By, (c) = Cosigned By      Initials Name Provider Type     Jose M Anthony, PT Physical Therapist                  Therapy Charges for Today       Code Description Service Date Service Provider Modifiers Qty    09793476104  PT THERAPEUTIC ACT EA 15 MIN 10/27/2023 Jose M Anthony, PT GP 1    84025698676 HC PT RE-EVAL ESTABLISHED PLAN 2 10/27/2023 Jose M Anthony, PT GP 1            PT G-Codes  AM-PAC 6 Clicks Score (PT): 20    Jose M Anthony PT  10/27/2023

## 2023-10-27 NOTE — PLAN OF CARE
Goal Outcome Evaluation:                   Pt switched to Afib 130s at beginning of shift, 2100 lopressor given early (Dr Westfall aware). Pt's HR dropped into the 30s, VICK Tineo aware. Pt had a 2 second pause, VICK Tineo aware.   Pt currently resting in bed. No s/s of acute distress noted at this time. No complaints verbalized at this time. Pt has been NPO since midnight for stress test in AM. Plan of care ongoing.

## 2023-10-27 NOTE — PROGRESS NOTES
[facility] HOSPITALIST PROGRESS NOTE    Subjective     History:   Lesley Michel is a 67 y.o. female admitted on 10/8/2023 secondary to Acute hypoxic respiratory failure     Procedures:   10/19/23: Right IJ tunneled HD catheter placement  10/27/23: Nuclear stress test     Left ventricular ejection fraction is normal (Calculated EF = 68%).    Myocardial perfusion imaging indicates a normal myocardial perfusion study with no evidence of ischemia.    TID 1.05.    Findings consistent with a normal ECG stress test.    CC: Follow up MAICO    Patient seen and examined with NADEEM Salas. Awake and alert. Slightly confused. Dyspnea remains overall improved. No reported CP. No reported vomiting. BP stable. HR continues to fluctuate. No acute events overnight per RN.     History taken from: patient, chart, and RN.      Objective     Vital Signs  Temp:  [97.8 °F (36.6 °C)-98.5 °F (36.9 °C)] 98 °F (36.7 °C)  Heart Rate:  [] 58  Resp:  [16-20] 20  BP: (108-150)/(55-98) 150/73    Intake/Output Summary (Last 24 hours) at 10/27/2023 1631  Last data filed at 10/27/2023 0323  Gross per 24 hour   Intake 400 ml   Output --   Net 400 ml         Physical Exam:   General:    Awake, alert, slightly confused, no acute distress, ill appearing   Heart:      Normal S1 and S2. Regular rate and rhythm.    Lungs:     Respirations regular, even and unlabored. Diminished breath sounds at bases. No wheezes appreciated.     Abdomen:   Soft and nontender. No guarding, rebound tenderness or  organomegaly noted. Bowel sounds present x 4.   Extremities:  Tr bilateral lower extremity edema but overall improved. Moves UE and LE equally B/L.     Results Review:    Results from last 7 days   Lab Units 10/27/23  0712 10/26/23  0429 10/25/23  0706 10/24/23  0336 10/23/23  0255 10/21/23  0325   WBC 10*3/mm3 8.42 8.12 8.53 6.00 5.71 8.84   HEMOGLOBIN g/dL 8.3* 8.2* 8.1* 8.4* 8.0* 8.8*   PLATELETS 10*3/mm3 209 211 190 192 190 226     Results from  last 7 days   Lab Units 10/27/23  0712 10/26/23  0429 10/25/23  0706 10/24/23  0336 10/23/23  0255 10/21/23  0325   SODIUM mmol/L 136 133* 135* 133* 135* 133*   POTASSIUM mmol/L 4.0 3.9 4.0 4.9 4.7 4.4   CHLORIDE mmol/L 95* 91* 92* 92* 94* 93*   CO2 mmol/L 28.8 28.9 30.0* 27.4 31.0* 25.6   BUN mg/dL 17 31* 24* 36* 43* 32*   CREATININE mg/dL 3.73* 4.77* 3.99* 5.25* 5.43* 3.83*   CALCIUM mg/dL 8.9 9.1 9.0 9.3 8.9 9.0   GLUCOSE mg/dL 97 87 86 103* 100* 71     Results from last 7 days   Lab Units 10/24/23  0336   BILIRUBIN mg/dL 0.5   ALK PHOS U/L 121*   AST (SGOT) U/L 16   ALT (SGPT) U/L 5           Results from last 7 days   Lab Units 10/24/23  1609   INR  1.07     Results from last 7 days   Lab Units 10/21/23  1716 10/21/23  1316   HSTROP T ng/L 54* 51*       Imaging Results (Last 24 Hours)       ** No results found for the last 24 hours. **              Medications:  acetylcysteine, 1.5 mL, Nebulization, Q6H - RT  amiodarone, 200 mg, Oral, Q12H  aspirin, 81 mg, Oral, Daily  folic acid, 1 mg, Oral, Daily  gabapentin, 200 mg, Oral, Q12H  hydrALAZINE, 25 mg, Oral, Q8H  ipratropium-albuterol, 3 mL, Nebulization, Q6H - RT  levothyroxine, 88 mcg, Oral, Q AM  montelukast, 10 mg, Oral, Nightly  mupirocin, 1 application , Each Nare, BID  nicotine, 1 patch, Transdermal, Q24H  pantoprazole, 40 mg, Oral, Daily  rosuvastatin, 10 mg, Oral, Nightly  sertraline, 25 mg, Oral, Daily  sodium chloride, 10 mL, Intravenous, Q12H  vitamin B-12, 1,000 mcg, Oral, Daily      heparin, 14.9 Units/kg/hr, Last Rate: 14.9 Units/kg/hr (10/27/23 5460)  Pharmacy to Dose Heparin,             Assessment & Plan   Anasarca: proBNP and ReDs vest reading elevated on admission. Imaging revealed CHF. Echo revealed an EF of 56-60%, grade I diastolic dysfunction, mild AS, mild pulmonary HTN and small pericardial effusion with no evidence of cardiac tamponade. Worsening renal failure possibly contributing with acute diastolic CHF as well. Failed trials of  diuretics with initiation of HD on 10/19. HD per nephrology.     Acute exacerbation of COPD: Possibly exacerbated by pulmonary edema. Respiratory PCR negative. Pulm consulted and assisted with steroid taper. Course of azithromycin completed. Wheezing resolved. Cont nebs. Pulm input appreciated.     Bilateral pneumonia: Procal normal. Cultures with NGTD. Completed course of azithromycin as above. Cont nebs.     Acute hypoxic respiratory failure: Likely multifactorial in the setting of above. Post-op mucous plugging noted as well but improved. Pt has refused BiPAP. O2 requirements stable today. Treatment as outlined above. Wean supplemental O2 as tolerated.     MAICO on CKD IIIa with initiation of HD this admission: Baseline Cr around 2.2. Non nephrotic proteinuria. Complements are normal with negative serologies. No evidence of hydronephrosis on renal US with US revealing small and echogenic right kidney. Likely 2/2 ATN 2/2 prolonged prerenal state and hemodynamic changes with use of ACE inhibitors as well. S/P tunneled HD catheter placement with initiation of HD on 10/19 as above. Cont scheduled HD. Cont to monitor closely. Nephrology input appreciated.     Hyperkalemia: Resolved with targeted treatment and initiation of HD. Holding home lisinopril. Monitor on telemetry.     New onset Afib with RVR: No evidence of ischemia on stress test. Previously changed Coreg to metoprolol. BP later borderline low limiting Rx options. Decreased hydralazine to allow rise in BP with subsequent improvement. Cardiology initiated amiodarone. Continues to have short episodes of Afib with RVR in addition to episodes of bradycardia. Cardiology has stopped metoprolol today. Currently on heparin gtt for stroke prevention. Monitor on telemetry. Cardiology input appreciated.     Essential HTN: BP previously elevated but later borderline low after initiation of HD. Changed Coreg to metoprolol as above. Decreased hydralazine with BP improved.  Attempt to avoid wide fluctuations in BP. Cont to monitor.     Hypothyroidism: TSH mildly elevated with normal free T4. Cont levothyroxine. Will need repeat labs as an outpatient.     Anxiety: Psychiatry consulted and started sertraline. Nephrology previously ordered Xanax with HD but held 2/2 episodes of oversedation. Cont supportive treatment.     Tobacco abuse: Cont NRT and encourage cessation.     Morbid obesity by BMI: Complicates all aspects of care.     DVT PPX: Heparin gtt     Disposition SS working to arrange HD chair when medically stable.     José Miguel Tellez,   10/27/23  16:31 EDT

## 2023-10-27 NOTE — PLAN OF CARE
Goal Outcome Evaluation:  Progress: no change   Pt resting in bed, watching television. Pt has tolerated all interventions. No complaints/concerns. No acute distress noted. Will continue to follow the plan of care.

## 2023-10-27 NOTE — PROGRESS NOTES
Nephrology Progress Note      Subjective   Not in acute distress, ongoing relatively better.    Objective       Vital signs :     Vitals:    10/27/23 0707 10/27/23 1050 10/27/23 1318 10/27/23 1700   BP:  150/73  145/86   BP Location:  Left arm  Right arm   Patient Position:  Lying  Lying   Pulse: 56 58 58    Resp: 18 18 20 18   Temp:  98 °F (36.7 °C)     TempSrc:  Oral  Oral   SpO2: 99% 98% 98% 98%   Weight:       Height:            Intake/Output                         10/25/23 0701 - 10/26/23 0700 10/26/23 0701 - 10/27/23 0700     5480-0500 5525-8521 Total 6910-2844 3465-5890 Total                 Intake    P.O.  360  240 600  490  150 640    Total Intake 360 240 600 490 150 640       Output    Urine  --  -- --  100  -- 100    Dialysis  --  -- --  1571  -- 1571    Total Output -- -- -- 1671 -- 1671             Physical Exam:    General Appearance : Not in acute distress  Lungs : Bibasilar crackles noted trace edema  Heart :  regular rhythm & normal rate, normal S1, S2 and no murmur, no rub  Abdomen : Soft nontender nondistended   extremities : Trace edema  Neurologic :   Unable to assess fully    Laboratory Data :       CBC and coagulation:  Results from last 7 days   Lab Units 10/27/23  0712 10/26/23  0429 10/25/23  0706 10/24/23  1609   PROCALCITONIN ng/mL 0.34*  --   --   --    WBC 10*3/mm3 8.42 8.12 8.53  --    HEMOGLOBIN g/dL 8.3* 8.2* 8.1*  --    HEMATOCRIT % 28.6* 27.2* 26.9*  --    MCV fL 92.6 90.1 90.3  --    MCHC g/dL 29.0* 30.1* 30.1*  --    PLATELETS 10*3/mm3 209 211 190  --    INR   --   --   --  1.07     Acid/base balance:  Results from last 7 days   Lab Units 10/25/23  0252 10/21/23  0907   PH, ARTERIAL pH units 7.381 7.357   PO2 ART mm Hg 75.3* 72.2*   PCO2, ARTERIAL mm Hg 54.7* 52.6*   HCO3 ART mmol/L 32.4* 29.5*       Renal and electrolytes:    Results from last 7 days   Lab Units 10/27/23  0712 10/26/23  0429 10/25/23  0706 10/24/23  0336 10/23/23  0255   SODIUM mmol/L 136 133* 135* 133* 135*  "  POTASSIUM mmol/L 4.0 3.9 4.0 4.9 4.7   CHLORIDE mmol/L 95* 91* 92* 92* 94*   CO2 mmol/L 28.8 28.9 30.0* 27.4 31.0*   BUN mg/dL 17 31* 24* 36* 43*   CREATININE mg/dL 3.73* 4.77* 3.99* 5.25* 5.43*   CALCIUM mg/dL 8.9 9.1 9.0 9.3 8.9     Estimated Creatinine Clearance: 15.9 mL/min (A) (by C-G formula based on SCr of 3.73 mg/dL (H)).     Liver and pancreatic function:  Results from last 7 days   Lab Units 10/24/23  0336   ALBUMIN g/dL 3.4*   BILIRUBIN mg/dL 0.5   ALK PHOS U/L 121*   AST (SGOT) U/L 16   ALT (SGPT) U/L 5       Albumin No results found for: \"ALBUMIN\"       Magnesium No results found for: \"MG\"         PTH               No results found for: \"PTH\"    Cardiac:        Liver and pancreatic function:  Results from last 7 days   Lab Units 10/24/23  0336   ALBUMIN g/dL 3.4*   BILIRUBIN mg/dL 0.5   ALK PHOS U/L 121*   AST (SGOT) U/L 16   ALT (SGPT) U/L 5       XR Chest 1 View    Result Date: 10/25/2023  Bibasilar consolidation    This report was finalized on 10/25/2023 12:44 PM by Dr. Julio Dominguez MD.      XR Chest 1 View    Result Date: 10/22/2023   Mild improvement in the appearance of the chest when compared to the prior examination.   This report was finalized on 10/22/2023 8:58 PM by Shayna Gee MD.      XR Chest 1 View    Result Date: 10/20/2023  1.  Better expansion of the right lower lobe with some persistent airspace disease. 2.  Minimal left lower lobe airspace disease. 3.  Right central catheter with tip in SVC. 4.  Coarsened interstitial markings noted throughout the lungs. 5.  Mild cardiomegaly again noted.   This report was finalized on 10/20/2023 8:34 AM by Dr. Kirk Mcdonald MD.        Medications :     acetylcysteine, 1.5 mL, Nebulization, Q6H - RT  amiodarone, 200 mg, Oral, Q12H  aspirin, 81 mg, Oral, Daily  folic acid, 1 mg, Oral, Daily  gabapentin, 200 mg, Oral, Q12H  hydrALAZINE, 25 mg, Oral, Q8H  ipratropium-albuterol, 3 mL, Nebulization, Q6H - RT  levothyroxine, 88 mcg, Oral, Q " AM  montelukast, 10 mg, Oral, Nightly  mupirocin, 1 application , Each Nare, BID  nicotine, 1 patch, Transdermal, Q24H  pantoprazole, 40 mg, Oral, Daily  rosuvastatin, 10 mg, Oral, Nightly  sertraline, 25 mg, Oral, Daily  sodium chloride, 10 mL, Intravenous, Q12H  vitamin B-12, 1,000 mcg, Oral, Daily      heparin, 14.9 Units/kg/hr, Last Rate: 14.9 Units/kg/hr (10/27/23 0827)  Pharmacy to Dose Heparin,           Assessment & Plan     -Acute kidney injury initiated on dialysis during this hospitalization on 10/19/2023  -Atrophic right kidney  -Chronic kidney disease stage IIIa  -Acute hypoxic respite failure, improving  -COPD  -Essential hypertension  -Medical noncompliance    Continue on intermittent dialysis 3 times a week, Tuesdays Thursdays and Saturdays.  Awaiting outpatient dialysis chair.  No clinical signs of renal recovery yet    MAICO on CKD most likely multifactori including ATN from prolonged prerenal state and hemodynamic changes with concomitant use of ACE inhibitors in setting of volume loss   baseline creatinine 2.2, admitted with 3.69   ultrasound of the kidneys showed atrophic right kidney  Serologies are negative    Please avoid nephrotoxic medication and pharmacy to adjust the medication according to the GFR     Plan of care discussed with pt, and family answered all questions, patient verbalized understanding and agreed.      Vani León MD  10/27/23  18:26 EDT

## 2023-10-27 NOTE — PROGRESS NOTES
[facility]  PROGRESS NOTE    Patient information:  Name: Lesley Michel  Age/Sex: 67 y.o. female  :  1955        PCP: Woodrow Raymond APRN  Attending: Fanny Ford MD  MRN:  [id number]  Visit Number:  [id number]    LOS:  LOS: 17 days     CODE STATUS:    Code Status and Medical Interventions:   Ordered at: 10/12/23 8922     Medical Intervention Limits:    NO intubation (DNI)     Level Of Support Discussed With:    Patient     Code Status (Patient has no pulse and is not breathing):    No CPR (Do Not Attempt to Resuscitate)     Medical Interventions (Patient has pulse or is breathing):    Limited Support       PROBLEM LIST:Principal Problem:    Acute hypoxic respiratory failure  Active Problems:    Acute renal failure (ARF)      Reason for Cardiology follow-up: New onset atrial fibrillation    Subjective   ADMISSION INFORMATION:  Chief Complaint   Patient presents with    Shortness of Breath    Edema     HPI:  Lesley Michel is a 67 y.o. female with a past medical history significant for  CAD chronically occluded well collateralized dominant RCA, status post drug-eluting stent to LAD with high-grade stenosis of the ostium of the small D1 and 60% mid RCA post lateral branch lesion, hyperlipidemia, essential hypertension, COPD, current tobacco abuse, CKD stage II, hypothyroidism, arthritis, anxiety, history of migraines, obesity by BMI, and history of medical noncompliance.        Patient presented to [facility] ([facility]) emergency room (ER) on 10/8/2023 with complaints of increasing lower extremity edema and shortness of breath which has been progressively worsening over the past few days prior to presentation.  10/24/2023, Cardiology was consulted for further evaluation and management for what appears to be new onset atrial fibrillation with RVR since 10/23/2023.      Primary Cardiologist: Imelda ARENAS/Dr. Echeverria and her last office  visit was on 10/6/2023.     PROCEDURES:   10/09/2023: ECHO with EF of 56 to 60%, diastolic function consistent with grade 1A w/ high LAP, mild aortic valve stenosis, and a small (<1 cm) pericardial effusion noted with no evidence of cardiac tamponade.  10/25/2023: P.O. Amiodarone started and is scheduled to be decreased on Saturday 10/28/2023  10/26/2023: HD    Interval History:   Patient is in room 321 and was examined by Dr. Lopez.  Telemetry currently SB 40s to 50s with frequent PACs no atrial fibrillation noted with patient since 7:30 PM on 10/26/2023.  Patient was given IV Lopressor at 7 PM and her heart rate dropped to the 30s with a 2-second pause as seen in telemetry strips attached below.  No other adverse events noted through saved or alarm telemetry strips throughout the night.  Patient continues to be on IV heparin at this time.  A.m. labs are pending.  Stress test and limited echo are pending.  Patient is lying in bed resting quietly.  No acute distress noted at this time.  Patient currently denies any chest pain, shortness of breath, or palpitations.    ORDERS:       Vital Signs  Temp:  [97 °F (36.1 °C)-98.5 °F (36.9 °C)] 97.8 °F (36.6 °C)  Heart Rate:  [] 53  Resp:  [16-20] 18  BP: (108-181)/(55-98) 110/57  Flow (L/min):  [3-4] 4  Device (Oxygen Therapy): humidified;nasal cannula  Vital Signs (last 72 hrs)         10/24 0700  10/25 0659 10/25 0700  10/26 0659 10/26 0700  10/27 0659 10/27 0700  10/27 0759   Most Recent      Temp (°F) 97.7 -  98.3    97.4 -  98.3    97 -  98.5       97.8 (36.6) 10/27 0639    Heart Rate 64 -  140    54 -  66    52 -  137       53 10/27 0639    Resp 17 -  20    16 -  18    16 -  20       18 10/27 0639    BP 98/69 -  143/80    118/65 -  153/86    108/55 -  181/96       110/57 10/27 0639    SpO2 (%) 91 -  98    95 -  97    93 -  98       97 10/27 0639    Flow (L/min) 3 -  4    3 -  4    3 -  4       4 10/27 0027    Oxygen Concentration (%)     3         3 10/25 4497           BMI:Body mass index is 42.87 kg/m².    WEIGHT:      10/25/23  0504 10/26/23  0500 10/27/23  0500   Weight: 102 kg (224 lb 6.4 oz) 99.9 kg (220 lb 3.2 oz) 99.6 kg (219 lb 8 oz)       DIET:Diet: Regular/House Diet, Cardiac Diets, Diabetic Diets, Renal Diets; Healthy Heart (2-3 Na+); Consistent Carbohydrate; Low Potassium; Texture: Regular Texture (IDDSI 7); Fluid Consistency: Thin (IDDSI 0)    I&O:  Intake & Output (last 3 days)         10/24 0701  10/25 0700 10/25 0701  10/26 0700 10/26 0701  10/27 0700 10/27 0701  10/28 0700    P.O. 560 600 640     Total Intake(mL/kg) 560 (5.5) 600 (6) 640 (6.4)     Urine (mL/kg/hr)   100 (0)     Stool   0     Dialysis 2300  1571     Total Output 2300  1671     Net -1740 +600 -1031             Urine Unmeasured Occurrence 0 x 1 x 2 x     Stool Unmeasured Occurrence   0 x             Objective     Physical Exam:      General Appearance:    Alert, cooperative, in no acute distress.  BMI 42.87.   Head:    Normocephalic, without obvious abnormality, atraumatic.   Eyes:                          Conjunctivae and sclerae normal, no icterus, no pallor, corneas clear.   Neck:   No adenopathy, supple, trachea midline, no thyromegaly, no carotid bruit, no JVD.   Lungs:     Clear to auscultation, respirations regular, even and             unlabored.    Heart:    Regular rhythm and normal rate, normal S1 and S2, n grade 2/6 ejection systolic murmur noted and heard best at aortic area and across sternal border, no gallop, no rub, no click   Chest Wall:    No abnormalities observed.   Abdomen:     Normal bowel sounds, no masses, no organomegaly, soft nontender, nondistended, no guarding, no rebound tenderness.   Extremities:   Moves all extremities well, no edema, no cyanosis, no           redness.   Pulses:   Pulses palpable and equal bilaterally.   Skin:   No bleeding, bruising or rash.   Neurologic:   Alert and Oriented x 3, Speech Clear & comprehensive.       Results review   Results  Review:   Results from last 7 days   Lab Units 10/21/23  1716 10/21/23  1316   HSTROP T ng/L 54* 51*     Lab Results   Component Value Date    PROBNP 8,093.0 (H) 10/08/2023     Results from last 7 days   Lab Units 10/27/23  0712 10/26/23  0429 10/25/23  0706 10/24/23  0336 10/23/23  0255 10/21/23  0325   WBC 10*3/mm3 8.42 8.12 8.53 6.00 5.71 8.84   HEMOGLOBIN g/dL 8.3* 8.2* 8.1* 8.4* 8.0* 8.8*   PLATELETS 10*3/mm3 209 211 190 192 190 226     Results from last 7 days   Lab Units 10/26/23  0429 10/25/23  0706 10/24/23  0336 10/23/23  0255 10/21/23  0325   SODIUM mmol/L 133* 135* 133* 135* 133*   POTASSIUM mmol/L 3.9 4.0 4.9 4.7 4.4   CHLORIDE mmol/L 91* 92* 92* 94* 93*   CO2 mmol/L 28.9 30.0* 27.4 31.0* 25.6   BUN mg/dL 31* 24* 36* 43* 32*   CREATININE mg/dL 4.77* 3.99* 5.25* 5.43* 3.83*   CALCIUM mg/dL 9.1 9.0 9.3 8.9 9.0   GLUCOSE mg/dL 87 86 103* 100* 71   ALT (SGPT) U/L  --   --  5  --   --    AST (SGOT) U/L  --   --  16  --   --      Lab Results   Component Value Date    MG 2.0 10/09/2023    MG 1.9 10/08/2023     Estimated Creatinine Clearance: 12.5 mL/min (A) (by C-G formula based on SCr of 4.77 mg/dL (H)).    Lab Results   Component Value Date    HGBA1C 5.30 10/08/2023     Lab Results   Component Value Date    CHOL 140 10/08/2023    TRIG 75 10/08/2023    LDL 91 10/08/2023    HDL 34 (L) 10/08/2023     Lab Results   Component Value Date    ABSOLUTELUNG 43 (A) 10/13/2023    ABSOLUTELUNG 37 (A) 10/09/2023     Lab Results   Component Value Date    INR 1.07 10/24/2023     Lab Results   Component Value Date    TSH 6.770 (H) 10/08/2023      Pain Management Panel          Latest Ref Rng & Units 10/9/2023   Pain Management Panel   Creatinine, Urine mg/dL 116.0      Microbiology Results (last 10 days)       Procedure Component Value - Date/Time    Respiratory Culture - Sputum, Cough [[id number]] Collected: 10/25/23 1513    Lab Status: Preliminary result Specimen: Sputum from Cough Updated: 10/26/23 1119     Respiratory  Culture Culture in progress     Gram Stain Few (2+) Epithelial cells seen      Moderate (3+) WBCs seen      Moderate (3+) Mixed bacterial morphotypes seen on Gram Stain    Blood Culture - Blood, Arm, Right [[id number]]  (Abnormal) Collected: 10/20/23 1334    Lab Status: Final result Specimen: Blood from Arm, Right Updated: 10/23/23 0825     Blood Culture Micrococcus species     Comment: No specific CLSI guidelines and no validated TAYNA testing method.  Rarely implicated in infections.          Isolated from Aerobic Bottle     Gram Stain Aerobic Bottle Gram positive cocci in clusters    Narrative:      Probable contaminant requires clinical correlation, susceptibility not performed unless requested by physician.      Blood Culture ID, PCR - Blood, Arm, Right [[id number]] Collected: 10/20/23 1334    Lab Status: Final result Specimen: Blood from Arm, Right Updated: 10/22/23 0519     BCID, PCR Negative by BCID PCR. Culture to Follow.     BOTTLE TYPE Aerobic Bottle    Blood Culture - Blood, Arm, Left [[id number]]  (Normal) Collected: 10/20/23 1234    Lab Status: Final result Specimen: Blood from Arm, Left Updated: 10/25/23 1402     Blood Culture No growth at 5 days           Imaging Results (Last 24 Hours)       ** No results found for the last 24 hours. **          ECHO:  Results for orders placed during the hospital encounter of 10/08/23    Adult Transthoracic Echo Complete W/ Cont if Necessary Per Protocol    Interpretation Summary    Left ventricular systolic function is normal. Left ventricular ejection fraction appears to be 56 - 60%.    Left ventricular diastolic function is consistent with (grade Ia w/high LAP) impaired relaxation.    The left atrial cavity is mild to moderately dilated.    Left atrial volume is moderately increased.    Mild aortic valve stenosis is present.    Estimated right ventricular systolic pressure from tricuspid regurgitation is mildly elevated (35-45 mmHg).    There is a small (<1cm)  pericardial effusion. There is no evidence of cardiac tamponade.      STRESS TEST:   Scheduled for today 10/27/2023      HEART CATH:  No results found for this or any previous visit.      TELEMETRY:     SB 40s to 50s with frequent PACs no atrial fibrillation noted with patient since 7:30 PM on 10/26/2023.  Patient was given IV Lopressor at 7 PM and her heart rate dropped to the 30s with a 2-second pause as seen in telemetry strips attached below.  No other adverse events noted through saved or alarm telemetry strips throughout the night.                                        I reviewed the patient's new clinical results.    ALLERGIES: Patient has no known allergies.    Medication Review:   Current list of medications may not reflect those currently placed in orders that are not signed or are being held.     acetylcysteine, 1.5 mL, Nebulization, Q6H - RT  aspirin, 81 mg, Oral, Daily  folic acid, 1 mg, Oral, Daily  gabapentin, 200 mg, Oral, Q12H  hydrALAZINE, 25 mg, Oral, Q8H  ipratropium-albuterol, 3 mL, Nebulization, Q6H - RT  levothyroxine, 88 mcg, Oral, Q AM  metoprolol tartrate, 75 mg, Oral, Q12H  montelukast, 10 mg, Oral, Nightly  mupirocin, 1 application , Each Nare, BID  nicotine, 1 patch, Transdermal, Q24H  pantoprazole, 40 mg, Oral, Daily  rosuvastatin, 10 mg, Oral, Nightly  sertraline, 25 mg, Oral, Daily  sodium chloride, 10 mL, Intravenous, Q12H  vitamin B-12, 1,000 mcg, Oral, Daily      heparin, 12.9 Units/kg/hr, Last Rate: 12.9 Units/kg/hr (10/27/23 0536)  Pharmacy to Dose Heparin,         acetaminophen    calcium carbonate    dextrose    dextrose    glucagon (human recombinant)    hydrALAZINE    HYDROcodone-acetaminophen    hydrOXYzine    melatonin    metoprolol tartrate    nitroglycerin    ondansetron    Pharmacy to Dose Heparin    [COMPLETED] Insert Peripheral IV **AND** sodium chloride    sodium chloride    sodium chloride    Assessment    New onset atrial fibrillation with RVR chads Vascor is at  least 3  MAICO on CKD on hemodialysis  ASCVD status post  of the dominant RCA, status post drug-eluting stent placement to the LAD, and also existent high-grade stenosis of the ostial small first diagonal, was also 60% RCA lateral branch lesion  Dyslipidemia  Essential hypertension  Acute on chronic HFpEF  COPD  Current ongoing tobacco abuse  Mental status changes  Medical noncompliance               Plan   1.  Patient is mostly in sinus rhythm but with intermittent atrial fibrillation, which when she is she is slightly bradycardic we will cut down the dose of metoprolol to 50 mg twice daily  2.  Stress testing today for assessment of ischemia          I have discussed the patients findings and recommendations with patient.    Thank you very much for asking us to be involved in this patient's care.  We will follow along with you.  Electronically signed by Dwayne Lopez MD, 10/27/23, 11:23 AM EDT.                          Please note that portions of this note were completed with a voice recognition program.    Please note that portions of this note were copied and has been reviewed and is accurate as of 10/27/2023 .

## 2023-10-28 ENCOUNTER — APPOINTMENT (OUTPATIENT)
Dept: GENERAL RADIOLOGY | Facility: HOSPITAL | Age: 68
DRG: 291 | End: 2023-10-28
Payer: MEDICARE

## 2023-10-28 LAB
ALBUMIN SERPL-MCNC: 3.2 G/DL (ref 3.5–5.2)
ALBUMIN/GLOB SERPL: 1.1 G/DL
ALP SERPL-CCNC: 95 U/L (ref 39–117)
ALT SERPL W P-5'-P-CCNC: <5 U/L (ref 1–33)
ANION GAP SERPL CALCULATED.3IONS-SCNC: 11.9 MMOL/L (ref 5–15)
AST SERPL-CCNC: 11 U/L (ref 1–32)
BASOPHILS # BLD AUTO: 0.04 10*3/MM3 (ref 0–0.2)
BASOPHILS NFR BLD AUTO: 0.5 % (ref 0–1.5)
BILIRUB SERPL-MCNC: 0.5 MG/DL (ref 0–1.2)
BUN SERPL-MCNC: 19 MG/DL (ref 8–23)
BUN/CREAT SERPL: 4.4 (ref 7–25)
CALCIUM SPEC-SCNC: 9 MG/DL (ref 8.6–10.5)
CHLORIDE SERPL-SCNC: 93 MMOL/L (ref 98–107)
CO2 SERPL-SCNC: 30.1 MMOL/L (ref 22–29)
CREAT SERPL-MCNC: 4.35 MG/DL (ref 0.57–1)
DEPRECATED RDW RBC AUTO: 55.9 FL (ref 37–54)
EGFRCR SERPLBLD CKD-EPI 2021: 10.7 ML/MIN/1.73
EOSINOPHIL # BLD AUTO: 0.6 10*3/MM3 (ref 0–0.4)
EOSINOPHIL NFR BLD AUTO: 8.1 % (ref 0.3–6.2)
ERYTHROCYTE [DISTWIDTH] IN BLOOD BY AUTOMATED COUNT: 17 % (ref 12.3–15.4)
GLOBULIN UR ELPH-MCNC: 2.9 GM/DL
GLUCOSE BLDC GLUCOMTR-MCNC: 108 MG/DL (ref 70–130)
GLUCOSE BLDC GLUCOMTR-MCNC: 87 MG/DL (ref 70–130)
GLUCOSE BLDC GLUCOMTR-MCNC: 87 MG/DL (ref 70–130)
GLUCOSE SERPL-MCNC: 85 MG/DL (ref 65–99)
HCT VFR BLD AUTO: 26.5 % (ref 34–46.6)
HGB BLD-MCNC: 7.9 G/DL (ref 12–15.9)
IMM GRANULOCYTES # BLD AUTO: 0.04 10*3/MM3 (ref 0–0.05)
IMM GRANULOCYTES NFR BLD AUTO: 0.5 % (ref 0–0.5)
LYMPHOCYTES # BLD AUTO: 2.18 10*3/MM3 (ref 0.7–3.1)
LYMPHOCYTES NFR BLD AUTO: 29.6 % (ref 19.6–45.3)
MAGNESIUM SERPL-MCNC: 1.8 MG/DL (ref 1.6–2.4)
MCH RBC QN AUTO: 27.2 PG (ref 26.6–33)
MCHC RBC AUTO-ENTMCNC: 29.8 G/DL (ref 31.5–35.7)
MCV RBC AUTO: 91.4 FL (ref 79–97)
MONOCYTES # BLD AUTO: 0.69 10*3/MM3 (ref 0.1–0.9)
MONOCYTES NFR BLD AUTO: 9.4 % (ref 5–12)
NEUTROPHILS NFR BLD AUTO: 3.82 10*3/MM3 (ref 1.7–7)
NEUTROPHILS NFR BLD AUTO: 51.9 % (ref 42.7–76)
NRBC BLD AUTO-RTO: 0 /100 WBC (ref 0–0.2)
PLATELET # BLD AUTO: 202 10*3/MM3 (ref 140–450)
PMV BLD AUTO: 11 FL (ref 6–12)
POTASSIUM SERPL-SCNC: 3.6 MMOL/L (ref 3.5–5.2)
POTASSIUM SERPL-SCNC: 3.7 MMOL/L (ref 3.5–5.2)
PROT SERPL-MCNC: 6.1 G/DL (ref 6–8.5)
RBC # BLD AUTO: 2.9 10*6/MM3 (ref 3.77–5.28)
SODIUM SERPL-SCNC: 135 MMOL/L (ref 136–145)
UFH PPP CHRO-ACNC: <0.1 IU/ML (ref 0.3–0.7)
UFH PPP CHRO-ACNC: <0.1 IU/ML (ref 0.3–0.7)
WBC NRBC COR # BLD: 7.37 10*3/MM3 (ref 3.4–10.8)

## 2023-10-28 PROCEDURE — 94799 UNLISTED PULMONARY SVC/PX: CPT

## 2023-10-28 PROCEDURE — 99232 SBSQ HOSP IP/OBS MODERATE 35: CPT | Performed by: INTERNAL MEDICINE

## 2023-10-28 PROCEDURE — 99233 SBSQ HOSP IP/OBS HIGH 50: CPT | Performed by: INTERNAL MEDICINE

## 2023-10-28 PROCEDURE — 85520 HEPARIN ASSAY: CPT | Performed by: INTERNAL MEDICINE

## 2023-10-28 PROCEDURE — 94761 N-INVAS EAR/PLS OXIMETRY MLT: CPT

## 2023-10-28 PROCEDURE — 83735 ASSAY OF MAGNESIUM: CPT | Performed by: INTERNAL MEDICINE

## 2023-10-28 PROCEDURE — 25010000002 ONDANSETRON PER 1 MG: Performed by: SURGERY

## 2023-10-28 PROCEDURE — 25010000002 HEPARIN (PORCINE) PER 1000 UNITS

## 2023-10-28 PROCEDURE — 71045 X-RAY EXAM CHEST 1 VIEW: CPT | Performed by: RADIOLOGY

## 2023-10-28 PROCEDURE — 25010000002 HEPARIN (PORCINE) 25000-0.45 UT/250ML-% SOLUTION: Performed by: INTERNAL MEDICINE

## 2023-10-28 PROCEDURE — 71045 X-RAY EXAM CHEST 1 VIEW: CPT

## 2023-10-28 PROCEDURE — 80053 COMPREHEN METABOLIC PANEL: CPT | Performed by: INTERNAL MEDICINE

## 2023-10-28 PROCEDURE — 84132 ASSAY OF SERUM POTASSIUM: CPT | Performed by: INTERNAL MEDICINE

## 2023-10-28 PROCEDURE — 85025 COMPLETE CBC W/AUTO DIFF WBC: CPT | Performed by: NURSE PRACTITIONER

## 2023-10-28 PROCEDURE — 82948 REAGENT STRIP/BLOOD GLUCOSE: CPT

## 2023-10-28 RX ORDER — HEPARIN SODIUM 10000 [USP'U]/100ML
13.9 INJECTION, SOLUTION INTRAVENOUS
Status: DISCONTINUED | OUTPATIENT
Start: 2023-10-28 | End: 2023-10-28

## 2023-10-28 RX ORDER — HEPARIN SODIUM 5000 [USP'U]/ML
4000 INJECTION, SOLUTION INTRAVENOUS; SUBCUTANEOUS ONCE
Status: COMPLETED | OUTPATIENT
Start: 2023-10-28 | End: 2023-10-28

## 2023-10-28 RX ADMIN — APIXABAN 5 MG: 5 TABLET, FILM COATED ORAL at 17:50

## 2023-10-28 RX ADMIN — ONDANSETRON 4 MG: 2 INJECTION INTRAMUSCULAR; INTRAVENOUS at 18:54

## 2023-10-28 RX ADMIN — AMIODARONE HYDROCHLORIDE 200 MG: 200 TABLET ORAL at 10:31

## 2023-10-28 RX ADMIN — Medication 10 MG: at 21:43

## 2023-10-28 RX ADMIN — HEPARIN SODIUM 10.9 UNITS/KG/HR: 10000 INJECTION, SOLUTION INTRAVENOUS at 02:08

## 2023-10-28 RX ADMIN — AMIODARONE HYDROCHLORIDE 200 MG: 200 TABLET ORAL at 21:36

## 2023-10-28 RX ADMIN — MONTELUKAST SODIUM 10 MG: 10 TABLET, COATED ORAL at 21:36

## 2023-10-28 RX ADMIN — Medication 10 ML: at 21:36

## 2023-10-28 RX ADMIN — Medication 10 ML: at 10:32

## 2023-10-28 RX ADMIN — ACETYLCYSTEINE 1.5 ML: 200 SOLUTION ORAL; RESPIRATORY (INHALATION) at 07:15

## 2023-10-28 RX ADMIN — GABAPENTIN 200 MG: 100 CAPSULE ORAL at 10:31

## 2023-10-28 RX ADMIN — SERTRALINE HYDROCHLORIDE 25 MG: 25 TABLET ORAL at 10:31

## 2023-10-28 RX ADMIN — ACETYLCYSTEINE 1.5 ML: 200 SOLUTION ORAL; RESPIRATORY (INHALATION) at 00:19

## 2023-10-28 RX ADMIN — ASPIRIN 81 MG: 81 TABLET, COATED ORAL at 10:31

## 2023-10-28 RX ADMIN — GABAPENTIN 200 MG: 100 CAPSULE ORAL at 21:36

## 2023-10-28 RX ADMIN — LEVOTHYROXINE SODIUM 88 MCG: 88 TABLET ORAL at 05:36

## 2023-10-28 RX ADMIN — PANTOPRAZOLE SODIUM 40 MG: 40 TABLET, DELAYED RELEASE ORAL at 10:31

## 2023-10-28 RX ADMIN — ROSUVASTATIN CALCIUM 10 MG: 10 TABLET, FILM COATED ORAL at 21:36

## 2023-10-28 RX ADMIN — HYDRALAZINE HYDROCHLORIDE 25 MG: 50 TABLET, FILM COATED ORAL at 21:36

## 2023-10-28 RX ADMIN — HEPARIN SODIUM 4000 UNITS: 5000 INJECTION INTRAVENOUS; SUBCUTANEOUS at 12:09

## 2023-10-28 RX ADMIN — Medication 1 MG: at 10:31

## 2023-10-28 RX ADMIN — IPRATROPIUM BROMIDE AND ALBUTEROL SULFATE 3 ML: 2.5; .5 SOLUTION RESPIRATORY (INHALATION) at 00:19

## 2023-10-28 RX ADMIN — NICOTINE TRANSDERMAL SYSTEM 1 PATCH: 21 PATCH, EXTENDED RELEASE TRANSDERMAL at 10:31

## 2023-10-28 RX ADMIN — IPRATROPIUM BROMIDE AND ALBUTEROL SULFATE 3 ML: 2.5; .5 SOLUTION RESPIRATORY (INHALATION) at 07:15

## 2023-10-28 RX ADMIN — Medication 1000 MCG: at 10:31

## 2023-10-28 RX ADMIN — HYDRALAZINE HYDROCHLORIDE 25 MG: 50 TABLET, FILM COATED ORAL at 05:36

## 2023-10-28 NOTE — PROGRESS NOTES
[facility] HOSPITALIST PROGRESS NOTE    Subjective     History:   Lesley Michel is a 67 y.o. female admitted on 10/8/2023 secondary to Acute hypoxic respiratory failure     Procedures:   10/19/23: Right IJ tunneled HD catheter placement  10/27/23: Nuclear stress test     Left ventricular ejection fraction is normal (Calculated EF = 68%).    Myocardial perfusion imaging indicates a normal myocardial perfusion study with no evidence of ischemia.    TID 1.05.    Findings consistent with a normal ECG stress test.    CC: Follow up MAICO    Patient seen and examined with NADEEM Martinez. Awake and alert. More interactive today. Dyspnea remains overall improved. No reported CP. No reported vomiting. BP stable. HR overall improved with less fluctuations. No acute events overnight per RN.     History taken from: patient, chart, and RN.      Objective     Vital Signs  Temp:  [97.7 °F (36.5 °C)-98.3 °F (36.8 °C)] 98 °F (36.7 °C)  Heart Rate:  [54-66] 54  Resp:  [18] 18  BP: (111-167)/(67-86) 167/83    Intake/Output Summary (Last 24 hours) at 10/28/2023 1457  Last data filed at 10/28/2023 0900  Gross per 24 hour   Intake 780 ml   Output --   Net 780 ml         Physical Exam:   General:    Awake, alert, more interactive today, no acute distress, ill appearing   Heart:      Normal S1 and S2. Regular rate and rhythm.    Lungs:     Respirations regular, even and unlabored. Diminished breath sounds at bases. No wheezes appreciated.     Abdomen:   Soft and nontender. No guarding, rebound tenderness or  organomegaly noted. Bowel sounds present x 4.   Extremities:  Tr bilateral lower extremity edema but overall improved. Moves UE and LE equally B/L.     Results Review:    Results from last 7 days   Lab Units 10/28/23  0032 10/27/23  0712 10/26/23  0429 10/25/23  0706 10/24/23  0336 10/23/23  0255   WBC 10*3/mm3 7.37 8.42 8.12 8.53 6.00 5.71   HEMOGLOBIN g/dL 7.9* 8.3* 8.2* 8.1* 8.4* 8.0*   PLATELETS 10*3/mm3 202 209 211 190  192 190     Results from last 7 days   Lab Units 10/28/23  0032 10/27/23  0712 10/26/23  0429 10/25/23  0706 10/24/23  0336 10/23/23  0255   SODIUM mmol/L 135* 136 133* 135* 133* 135*   POTASSIUM mmol/L 3.6 4.0 3.9 4.0 4.9 4.7   CHLORIDE mmol/L 93* 95* 91* 92* 92* 94*   CO2 mmol/L 30.1* 28.8 28.9 30.0* 27.4 31.0*   BUN mg/dL 19 17 31* 24* 36* 43*   CREATININE mg/dL 4.35* 3.73* 4.77* 3.99* 5.25* 5.43*   CALCIUM mg/dL 9.0 8.9 9.1 9.0 9.3 8.9   GLUCOSE mg/dL 85 97 87 86 103* 100*     Results from last 7 days   Lab Units 10/28/23  0032 10/24/23  0336   BILIRUBIN mg/dL 0.5 0.5   ALK PHOS U/L 95 121*   AST (SGOT) U/L 11 16   ALT (SGPT) U/L <5 5           Results from last 7 days   Lab Units 10/24/23  1609   INR  1.07     Results from last 7 days   Lab Units 10/21/23  1716   HSTROP T ng/L 54*       Imaging Results (Last 24 Hours)       Procedure Component Value Units Date/Time    XR Chest 1 View [[id number]] Collected: 10/28/23 0846     Updated: 10/28/23 0849    Narrative:      EXAM:    XR Chest, 1 View     EXAM DATE:    10/28/2023 9:19 AM     CLINICAL HISTORY:    sob; N17.9-Acute kidney failure, unspecified; N18.9-Chronic kidney  disease, unspecified; I50.9-Heart failure, unspecified; J96.21-Acute and  chronic respiratory failure with hypoxia; E87.5-Hyperkalemia;  N17.9-Acute kidney failure, unspecified     TECHNIQUE:    Frontal view of the chest.     COMPARISON:    10/25/2023     FINDINGS:    LUNGS AND PLEURAL SPACES:  Slightly improved aeration of right lung  base airspace disease.  Slightly improved aeration of left lung base  airspace disease.  Probably tiny right pleural effusion.  No  pneumothorax.    HEART:  Heart size is stable.    MEDIASTINUM:  Unremarkable as visualized.    BONES/JOINTS:  Unremarkable as visualized.    TUBES, LINES AND DEVICES:  Right central catheter with tip in SVC.       Impression:      1.  Slightly improved aeration of right lung base airspace disease.  2.  Slightly improved aeration of left  lung base airspace disease.  3.  Probably tiny right pleural effusion.        This report was finalized on 10/28/2023 8:47 AM by Dr. Kirk Mcdonald MD.                 Medications:  acetylcysteine, 1.5 mL, Nebulization, Q6H - RT  amiodarone, 200 mg, Oral, Q12H  aspirin, 81 mg, Oral, Daily  folic acid, 1 mg, Oral, Daily  gabapentin, 200 mg, Oral, Q12H  hydrALAZINE, 25 mg, Oral, Q8H  ipratropium-albuterol, 3 mL, Nebulization, Q6H - RT  levothyroxine, 88 mcg, Oral, Q AM  montelukast, 10 mg, Oral, Nightly  nicotine, 1 patch, Transdermal, Q24H  pantoprazole, 40 mg, Oral, Daily  rosuvastatin, 10 mg, Oral, Nightly  sertraline, 25 mg, Oral, Daily  sodium chloride, 10 mL, Intravenous, Q12H  vitamin B-12, 1,000 mcg, Oral, Daily      heparin, 13.9 Units/kg/hr (Dosing Weight), Last Rate: 13.9 Units/kg/hr (10/28/23 1210)  Pharmacy to Dose Heparin,             Assessment & Plan   Anasarca: proBNP and ReDs vest reading elevated on admission. Imaging revealed CHF. Echo revealed an EF of 56-60%, grade I diastolic dysfunction, mild AS, mild pulmonary HTN and small pericardial effusion with no evidence of cardiac tamponade. Worsening renal failure possibly contributing with acute diastolic CHF as well. Failed trials of diuretics with initiation of HD on 10/19. HD per nephrology.     Acute exacerbation of COPD: Possibly exacerbated by pulmonary edema. Respiratory PCR negative. Pulm consulted and assisted with steroid taper. Course of azithromycin completed. Wheezing resolved. Cont nebs. Pulm input appreciated.     Bilateral pneumonia: Procal normal. Cultures with NGTD. Completed course of azithromycin as above. Cont nebs.     Acute hypoxic respiratory failure: Likely multifactorial in the setting of above. Post-op mucous plugging noted as well but improved. Pt has refused BiPAP. O2 requirements stable today. Treatment as outlined above. Wean supplemental O2 as tolerated.     MAICO on CKD IIIa with initiation of HD this admission: Baseline  Cr around 2.2. Non nephrotic proteinuria. Complements are normal with negative serologies. No evidence of hydronephrosis on renal US with US revealing small and echogenic right kidney. Likely 2/2 ATN 2/2 prolonged prerenal state and hemodynamic changes with use of ACE inhibitors as well. S/P tunneled HD catheter placement with initiation of HD on 10/19 as above. Cont scheduled HD. Cont to monitor closely. Nephrology input appreciated.     Hyperkalemia: Resolved with targeted treatment and initiation of HD. Holding home lisinopril. Monitor on telemetry.     New onset Afib with RVR: No evidence of ischemia on stress test. Previously changed Coreg to metoprolol. BP later borderline low limiting Rx options. Decreased hydralazine to allow rise in BP with subsequent improvement. Cardiology initiated amiodarone. Continued to have short episodes of Afib with RVR in addition to episodes of bradycardia. Cardiology subsequently stopped metoprolol. Appears more stable today. Cont PO amiodarone. Currently on heparin gtt for stroke prevention. Monitor on telemetry. Cardiology input appreciated.     Essential HTN: BP previously elevated but later borderline low after initiation of HD. Changed Coreg to metoprolol as above. Decreased hydralazine with BP improved. Attempt to avoid wide fluctuations in BP. Cont to monitor.     Hypothyroidism: TSH mildly elevated with normal free T4. Cont levothyroxine. Will need repeat labs as an outpatient.     Anxiety: Psychiatry consulted and started sertraline. Nephrology previously ordered Xanax with HD but held 2/2 episodes of oversedation. Cont supportive treatment.     Tobacco abuse: Cont NRT and encourage cessation.     Morbid obesity by BMI: Complicates all aspects of care.     DVT PPX: Heparin gtt     Disposition SS working to arrange HD chair when medically stable.     José Miguel Tellez,   10/28/23  14:57 EDT

## 2023-10-28 NOTE — PLAN OF CARE
Goal Outcome Evaluation:  Plan of Care Reviewed With: patient        Progress: improving          Patient has been resting this shift. Patient is currently getting dialysis. No s/s of acute distress noted. Will continue to follow plan of care.

## 2023-10-28 NOTE — PROGRESS NOTES
Nephrology Progress Note    Interval History:     Patient Complaints: Not eating. Some ooze around HD catheter. Sen on HD        Vital Signs  Temp:  [97.7 °F (36.5 °C)-98.3 °F (36.8 °C)] 98 °F (36.7 °C)  Heart Rate:  [54-66] 54  Resp:  [18] 18  BP: (111-167)/(67-86) 167/83    Physical Exam:    General:           Morbid obesity      HEENT: pallor               Neck: No JVD       Lungs:   Exp wheeze   Heart:  regular,  no rub       Abdomen:   Normal bowel sounds, soft non-tender, non-distended, no guarding       Extremities: Edema ++       Skin: No petechiae, no rash       Neurologic: Cranial nerves grossly intact, moves all extremities.        Results Review:    I reviewed the patient's new clinical results.    Lab Results (last 24 hours)       Procedure Component Value Units Date/Time    POC Glucose Once [[id number]]  (Normal) Collected: 10/28/23 1210    Specimen: Blood Updated: 10/28/23 1216     Glucose 87 mg/dL     Heparin Anti-Xa [[id number]]  (Abnormal) Collected: 10/28/23 0901    Specimen: Blood Updated: 10/28/23 0958     Heparin Anti-Xa (UFH) <0.10 IU/ml     POC Glucose Once [[id number]]  (Normal) Collected: 10/28/23 0648    Specimen: Blood Updated: 10/28/23 0655     Glucose 108 mg/dL     Comprehensive Metabolic Panel [[id number]]  (Abnormal) Collected: 10/28/23 0032    Specimen: Blood Updated: 10/28/23 0150     Glucose 85 mg/dL      BUN 19 mg/dL      Creatinine 4.35 mg/dL      Sodium 135 mmol/L      Potassium 3.6 mmol/L      Chloride 93 mmol/L      CO2 30.1 mmol/L      Calcium 9.0 mg/dL      Total Protein 6.1 g/dL      Albumin 3.2 g/dL      ALT (SGPT) <5 U/L      AST (SGOT) 11 U/L      Alkaline Phosphatase 95 U/L      Total Bilirubin 0.5 mg/dL      Globulin 2.9 gm/dL      A/G Ratio 1.1 g/dL      BUN/Creatinine Ratio 4.4     Anion Gap 11.9 mmol/L      eGFR 10.7 mL/min/1.73      Comment: <15 Indicative of kidney failure       Narrative:      GFR Normal  >60  Chronic Kidney Disease <60  Kidney Failure <15      Heparin Anti-Xa [[id number]]  (Abnormal) Collected: 10/28/23 0032    Specimen: Blood Updated: 10/28/23 0131     Heparin Anti-Xa (UFH) <0.10 IU/ml     CBC & Differential [[id number]]  (Abnormal) Collected: 10/28/23 0032    Specimen: Blood Updated: 10/28/23 0120    Narrative:      The following orders were created for panel order CBC & Differential.  Procedure                               Abnormality         Status                     ---------                               -----------         ------                     CBC Auto Differential[[id number]]        Abnormal            Final result                 Please view results for these tests on the individual orders.    CBC Auto Differential [[id number]]  (Abnormal) Collected: 10/28/23 0032    Specimen: Blood Updated: 10/28/23 0120     WBC 7.37 10*3/mm3      RBC 2.90 10*6/mm3      Hemoglobin 7.9 g/dL      Hematocrit 26.5 %      MCV 91.4 fL      MCH 27.2 pg      MCHC 29.8 g/dL      RDW 17.0 %      RDW-SD 55.9 fl      MPV 11.0 fL      Platelets 202 10*3/mm3      Neutrophil % 51.9 %      Lymphocyte % 29.6 %      Monocyte % 9.4 %      Eosinophil % 8.1 %      Basophil % 0.5 %      Immature Grans % 0.5 %      Neutrophils, Absolute 3.82 10*3/mm3      Lymphocytes, Absolute 2.18 10*3/mm3      Monocytes, Absolute 0.69 10*3/mm3      Eosinophils, Absolute 0.60 10*3/mm3      Basophils, Absolute 0.04 10*3/mm3      Immature Grans, Absolute 0.04 10*3/mm3      nRBC 0.0 /100 WBC     Heparin Anti-Xa [[id number]]  (Abnormal) Collected: 10/27/23 2157    Specimen: Blood Updated: 10/27/23 2216     Heparin Anti-Xa (UFH) >1.10 IU/ml     POC Glucose Once [[id number]]  (Abnormal) Collected: 10/27/23 1706    Specimen: Blood Updated: 10/27/23 1712     Glucose 188 mg/dL     Heparin Anti-Xa [[id number]]  (Normal) Collected: 10/27/23 1616    Specimen: Blood Updated: 10/27/23 1635     Heparin Anti-Xa (UFH) 0.43 IU/ml             Imaging Results  (Last 24 Hours)       Procedure Component Value Units Date/Time    XR Chest 1 View [[id number]] Collected: 10/28/23 0846     Updated: 10/28/23 0849    Narrative:      EXAM:    XR Chest, 1 View     EXAM DATE:    10/28/2023 9:19 AM     CLINICAL HISTORY:    sob; N17.9-Acute kidney failure, unspecified; N18.9-Chronic kidney  disease, unspecified; I50.9-Heart failure, unspecified; J96.21-Acute and  chronic respiratory failure with hypoxia; E87.5-Hyperkalemia;  N17.9-Acute kidney failure, unspecified     TECHNIQUE:    Frontal view of the chest.     COMPARISON:    10/25/2023     FINDINGS:    LUNGS AND PLEURAL SPACES:  Slightly improved aeration of right lung  base airspace disease.  Slightly improved aeration of left lung base  airspace disease.  Probably tiny right pleural effusion.  No  pneumothorax.    HEART:  Heart size is stable.    MEDIASTINUM:  Unremarkable as visualized.    BONES/JOINTS:  Unremarkable as visualized.    TUBES, LINES AND DEVICES:  Right central catheter with tip in SVC.       Impression:      1.  Slightly improved aeration of right lung base airspace disease.  2.  Slightly improved aeration of left lung base airspace disease.  3.  Probably tiny right pleural effusion.        This report was finalized on 10/28/2023 8:47 AM by Dr. Kirk Mcdonald MD.               Assessment and Plan:    ARF on CKD stage 3A on dialysis  Atrophic right kidney  Pneumonia  Hypoxic respiratory failure  Atrial fibrillation paroxysmal  HTN  HfpEF      Did well on dialysis  Not eating  Change to a regular diet    Rome Sherwood MD  10/28/23  16:15 EDT

## 2023-10-28 NOTE — PLAN OF CARE
Goal Outcome Evaluation:      Patient rested well tonight. No complaints of chest pain or shortness of breath. Vital signs stable. Heparin gtt titrated per pharmacy protocol. No indicators of acute distress noted.

## 2023-10-28 NOTE — PROGRESS NOTES
HEPARIN INFUSION  Lesley Michel is a  67 y.o. female receiving heparin infusion.     Therapy for (VTE/Cardiac):   cardiac  Patient Dosing Weight: 101  KG  Initial Bolus (Y/N):   no  Any Bolus (Y/N):    yes     Signs or Symptoms of Bleeding: no    Cardiac or Other (Not VTE)   Initial Bolus: 60 units/kg (Max 4,000 units)  Initial rate: 12 units/kg/hr (Max 1,000 units/hr)   Anti Xa Rebolus Infusion Hold time Change infusion Dose (Units/kg/hr) Next Anti Xa or aPTT Level Due   < 0.11 50 Units/kg  (4000 Units Max) None Increase by  3 Units/kg/hr 6 hours   0.11- 0.19 25 Units/kg  (2000 Units Max) None Increase by  2 Units/kg/hr 6 hours   0.2 - 0.29 0 None Increase by  1 Units/kg/hr 6 hours   0.3 - 0.5 0 None No Change 6 hours (after 2 consecutive levels in range check q24h @0700)   0.51 - 0.6 0 None Decrease by  1 Units/kg/hr 6 hours   0.61 - 0.8 0 30 Minutes Decrease by  2 Units/kg/hr 6 hours   0.81 - 1 0 60 Minutes Decrease by  3 Units/kg/hr 6 hours   >1 0 Hold  After Anti Xa less than 0.5 decrease previous rate by  4 Units/kg/hr  Every 2 hours until Anti Xa  less than 0.5 then when infusion restarts in 6 hours         Recommend anti-Xa every 6 hours.          Date   Time   Anti-Xa Current Rate (Unit/kg/hr) Bolus   (Units) Rate Change   (Unit/kg/hr) New Rate (Unit/kg/hr) Next   Anti-Xa Comments  Pump Check Daily   10/24 1612 drawn 0 no +9.90 9.90 2200 Discussed initial rate,no bolus w/ Katlyn RN   10/24 2153 <0.10 9.9 4000 +3 12.9 0400 Discussed with NADEEM Clarke. Report no s/s of bleeding.   10/25 0706 0.59 12.9 - -1 11.9 1400 Rate reduced,no s/s bleeding , d/w  Dalila  RN   10/25 1509 0.28 11.9 - +1 12.9 2200 Rate increased, no s/s bleeding, d/w  Janneth  RN   10/26 2215 0.3 12.9 - - 12.9 0400 Therapeutic x 1. No s/s of bleeding per Yulissa.    10/26 0500 0.32 12.9 - - 12.9 0700 on 10/27 Therapeutic x 2. No s/s of bleeding per Yulissa. Will transition to Qam monitoring.    10/27 0712 0.19 12.9 2000 +2 14.9 1400 Bolus, rate  changed, no s/s bleeding , d/w  Josue SILVER   10/27 1616 0.43 14.9 - - 14.9 2200 Therapeutic x 1.Spoke with NADEEM Salas. No s/s bleeding. Continue same    10/27 2220 >1.1 14.9   HOLD 0030 Spoke with Ankit about stopping the patients heparin drip. Will recheck in 2 hours and restart when anti-Xa is less than 0.5 with a decreased rate per protocol. No s/s of bleeding currently.    10/28 0135 <0.1 - - -4 from previous rate 10.9 0730 Spoke with Brian about restarting the patients heparin drip now that the anti-Xa is less than 0.5. No s/s of bleeding.                                                                                                                               Pharmacy will continue to follow anti-Xa results and monitor for signs and symptoms of bleeding or thrombosis.    Pema Michele, PharmD  01:39 EDT  10/28/23

## 2023-10-28 NOTE — PROGRESS NOTES
[facility]  PROGRESS NOTE    Patient information:  Name: Lesley Michel  Age/Sex: 67 y.o. female  :  1955        PCP: Woodrow Raymond APRN  Attending: Fanny Ford MD  MRN:  [id number]  Visit Number:  [id number]    LOS:  LOS: 18 days     CODE STATUS:    Code Status and Medical Interventions:   Ordered at: 10/12/23 7721     Medical Intervention Limits:    NO intubation (DNI)     Level Of Support Discussed With:    Patient     Code Status (Patient has no pulse and is not breathing):    No CPR (Do Not Attempt to Resuscitate)     Medical Interventions (Patient has pulse or is breathing):    Limited Support       PROBLEM LIST:Principal Problem:    Acute hypoxic respiratory failure  Active Problems:    Acute renal failure (ARF)      Reason for Cardiology follow-up: New onset atrial fibrillation    Subjective   ADMISSION INFORMATION:  Chief Complaint   Patient presents with    Shortness of Breath    Edema       HPI:  Lesley Michel is a 67 y.o. female with a past medical history significant for  CAD chronically occluded well collateralized dominant RCA, status post drug-eluting stent to LAD with high-grade stenosis of the ostium of the small D1 and 60% mid RCA post lateral branch lesion, hyperlipidemia, essential hypertension, COPD, current tobacco abuse, CKD stage II, hypothyroidism, arthritis, anxiety, history of migraines, obesity by BMI, and history of medical noncompliance.        Patient presented to [facility] ([facility]) emergency room (ER) on 10/8/2023 with complaints of increasing lower extremity edema and shortness of breath which has been progressively worsening over the past few days prior to presentation.  10/24/2023, Cardiology was consulted for further evaluation and management for what appears to be new onset atrial fibrillation with RVR since 10/23/2023.      Primary Cardiologist: Imelda ARENAS/Dr. Echeverria and her last  office visit was on 10/6/2023.     PROCEDURES:   10/09/2023: ECHO with EF of 56 to 60%, diastolic function consistent with grade 1A w/ high LAP, mild aortic valve stenosis, and a small (<1 cm) pericardial effusion noted with no evidence of cardiac tamponade.  10/25/2023: P.O. Amiodarone started and is scheduled to be decreased on Saturday 10/28/202  10/26/2023: HD      Interval History:   Patient is in room 321 and was examined by Dr. Castro.  Per nursing notes, no new events overnight.  Patient sitting on side of bed. BP this a.m. 134/70, heart rate 50s to 60s.   Patient receives hemodialysis, creatinine 4.35.  Potassium 3.6, hemoglobin 7.9.  Heparin infusion.     Vital Signs  Temp:  [97.7 °F (36.5 °C)-98.3 °F (36.8 °C)] 98.3 °F (36.8 °C)  Heart Rate:  [55-66] 56  Resp:  [18-20] 18  BP: (111-150)/(67-86) 134/70  Flow (L/min):  [4] 4  Vital Signs (last 72 hrs)         10/25 0700  10/26 0659 10/26 0700  10/27 0659 10/27 0700  10/28 0659 10/28 0700  10/28 0921   Most Recent      Temp (°F) 97.4 -  98.3    97 -  98.5    97.7 -  98.3       98.3 (36.8) 10/28 0648    Heart Rate 54 -  66    52 -  137    55 -  66       56 10/28 0648    Resp 16 -  18    16 -  20    18 -  20       18 10/28 0648    /65 -  153/86    108/55 -  181/96    111/77 -  150/73       134/70 10/28 0648    SpO2 (%) 95 -  97    93 -  98    97 -  99       97 10/28 0648    Flow (L/min) 3 -  4    3 -  4      4       4 10/28 0019    Oxygen Concentration (%)   3           3 10/25 1843          BMI:  Body mass index is 41.11 kg/m².    WEIGHT:      10/27/23  0500 10/28/23  0500   Weight: 99.6 kg (219 lb 8 oz) 95.5 kg (210 lb 8 oz)       DIET:  Diet: Regular/House Diet, Cardiac Diets, Diabetic Diets, Renal Diets; Healthy Heart (2-3 Na+); Consistent Carbohydrate; Low Potassium; Texture: Regular Texture (IDDSI 7); Fluid Consistency: Thin (IDDSI 0)    I&O:  Intake & Output (last 3 days)         10/25 0701  10/26 0700 10/26 0701  10/27 0700 10/27 0701  10/28 0700  10/28 0701  10/29 0700    P.O. 600 640 540     Total Intake(mL/kg) 600 (6) 640 (6.4) 540 (5.3)     Urine (mL/kg/hr)  100 (0) 100 (0)     Stool  0 0     Dialysis  1571      Total Output  1671 100     Net +600 -1031 +440             Urine Unmeasured Occurrence 1 x 2 x 1 x     Stool Unmeasured Occurrence  0 x 1 x             Objective     Physical Exam:     Vitals reviewed.   Constitutional:       Appearance: Normal appearance. Well-developed. Obese.   Eyes:      Conjunctiva/sclera: Conjunctivae normal.   HENT:      Head: Normocephalic.   Neck:      Thyroid: No thyromegaly.      Vascular: No carotid bruit or JVD.   Pulmonary:      Effort: Pulmonary effort is normal.      Breath sounds: Normal breath sounds.   Cardiovascular:      Normal rate. Regular rhythm.   Edema:     Peripheral edema absent.   Abdominal:      General: Bowel sounds are normal.      Palpations: Abdomen is soft. There is no hepatomegaly or splenomegaly.      Tenderness: There is no abdominal tenderness.   Musculoskeletal: Normal range of motion.      Cervical back: Neck supple. Skin:     General: Skin is warm and dry.   Neurological:      Mental Status: Alert and oriented to person, place, and time.   Psychiatric:         Attention and Perception: Attention normal.         Mood and Affect: Mood normal.         Speech: Speech normal.         Behavior: Behavior normal. Behavior is cooperative.         Cognition and Memory: Cognition normal.         Results review   Results Review:   Results from last 7 days   Lab Units 10/28/23  0032 10/27/23  0712 10/26/23  0429 10/25/23  0706 10/24/23  0336 10/23/23  0255   WBC 10*3/mm3 7.37 8.42 8.12 8.53 6.00 5.71   HEMOGLOBIN g/dL 7.9* 8.3* 8.2* 8.1* 8.4* 8.0*   PLATELETS 10*3/mm3 202 209 211 190 192 190     Results from last 7 days   Lab Units 10/28/23  0032 10/27/23  0712 10/26/23  0429 10/25/23  0706 10/24/23  0336 10/23/23  0255   SODIUM mmol/L 135* 136 133* 135* 133* 135*   POTASSIUM mmol/L 3.6 4.0 3.9 4.0 4.9  "4.7   CHLORIDE mmol/L 93* 95* 91* 92* 92* 94*   CO2 mmol/L 30.1* 28.8 28.9 30.0* 27.4 31.0*   BUN mg/dL 19 17 31* 24* 36* 43*   CREATININE mg/dL 4.35* 3.73* 4.77* 3.99* 5.25* 5.43*   CALCIUM mg/dL 9.0 8.9 9.1 9.0 9.3 8.9   GLUCOSE mg/dL 85 97 87 86 103* 100*   ALT (SGPT) U/L <5  --   --   --  5  --    AST (SGOT) U/L 11  --   --   --  16  --      Results from last 7 days   Lab Units 10/21/23  1716 10/21/23  1316   HSTROP T ng/L 54* 51*     Lab Results   Component Value Date    PROBNP 8,093.0 (H) 10/08/2023     Estimated Creatinine Clearance: 13.3 mL/min (A) (by C-G formula based on SCr of 4.35 mg/dL (H)).  Lab Results   Component Value Date    ABSOLUTELUNG 43 (A) 10/13/2023     Lab Results   Component Value Date    INR 1.07 10/24/2023     Lab Results   Component Value Date    MG 2.0 10/09/2023    MG 1.9 10/08/2023     Lab Results   Component Value Date    TSH 6.770 (H) 10/08/2023      No results found for: \"CHOL\", \"TRIG\", \"HDL\", \"LDL\"  Pain Management Panel          Latest Ref Rng & Units 10/9/2023   Pain Management Panel   Creatinine, Urine mg/dL 116.0      Microbiology Results (last 10 days)       Procedure Component Value - Date/Time    Respiratory Culture - Sputum, Cough [[id number]] Collected: 10/25/23 1513    Lab Status: Final result Specimen: Sputum from Cough Updated: 10/27/23 0902     Respiratory Culture Light growth (2+) Normal respiratory yuly. No S. aureus or Pseudomonas aeruginosa detected. Final report.     Gram Stain Few (2+) Epithelial cells seen      Moderate (3+) WBCs seen      Moderate (3+) Mixed bacterial morphotypes seen on Gram Stain    Blood Culture - Blood, Arm, Right [[id number]]  (Abnormal) Collected: 10/20/23 5870    Lab Status: Final result Specimen: Blood from Arm, Right Updated: 10/23/23 0824     Blood Culture Micrococcus species     Comment: No specific CLSI guidelines and no validated TANYA testing method.  Rarely implicated in infections.          Isolated from Aerobic Bottle     Gram " Stain Aerobic Bottle Gram positive cocci in clusters    Narrative:      Probable contaminant requires clinical correlation, susceptibility not performed unless requested by physician.      Blood Culture ID, PCR - Blood, Arm, Right [[id number]] Collected: 10/20/23 1334    Lab Status: Final result Specimen: Blood from Arm, Right Updated: 10/22/23 0519     BCID, PCR Negative by BCID PCR. Culture to Follow.     BOTTLE TYPE Aerobic Bottle    Blood Culture - Blood, Arm, Left [[id number]]  (Normal) Collected: 10/20/23 1234    Lab Status: Final result Specimen: Blood from Arm, Left Updated: 10/25/23 1402     Blood Culture No growth at 5 days           Imaging Results (Last 24 Hours)       Procedure Component Value Units Date/Time    XR Chest 1 View [[id number]] Collected: 10/28/23 0846     Updated: 10/28/23 0849    Narrative:      EXAM:    XR Chest, 1 View     EXAM DATE:    10/28/2023 9:19 AM     CLINICAL HISTORY:    sob; N17.9-Acute kidney failure, unspecified; N18.9-Chronic kidney  disease, unspecified; I50.9-Heart failure, unspecified; J96.21-Acute and  chronic respiratory failure with hypoxia; E87.5-Hyperkalemia;  N17.9-Acute kidney failure, unspecified     TECHNIQUE:    Frontal view of the chest.     COMPARISON:    10/25/2023     FINDINGS:    LUNGS AND PLEURAL SPACES:  Slightly improved aeration of right lung  base airspace disease.  Slightly improved aeration of left lung base  airspace disease.  Probably tiny right pleural effusion.  No  pneumothorax.    HEART:  Heart size is stable.    MEDIASTINUM:  Unremarkable as visualized.    BONES/JOINTS:  Unremarkable as visualized.    TUBES, LINES AND DEVICES:  Right central catheter with tip in SVC.       Impression:      1.  Slightly improved aeration of right lung base airspace disease.  2.  Slightly improved aeration of left lung base airspace disease.  3.  Probably tiny right pleural effusion.        This report was finalized on 10/28/2023 8:47 AM by Dr. Kirk Mcdonald MD.                ECHO:  Results for orders placed during the hospital encounter of 10/08/23    Adult Transthoracic Echo Limited W/ Cont if Necessary Per Protocol    Interpretation Summary    Left ventricular systolic function is hyperdynamic (EF > 70%). Left ventricular ejection fraction appears to be greater than 70%.    Left ventricular diastolic function was not assessed.    The left atrial cavity is mildly dilated.    The right atrial cavity is borderline dilated.    There is a small (<1cm) pericardial effusion.    This is a technical limited study.      STRESS TEST:  Results for orders placed during the hospital encounter of 10/08/23    Stress Test With Myocardial Perfusion One Day    Interpretation Summary    Left ventricular ejection fraction is normal (Calculated EF = 68%).    Myocardial perfusion imaging indicates a normal myocardial perfusion study with no evidence of ischemia.    TID 1.05.    Findings consistent with a normal ECG stress test.       HEART CATH:  No results found for this or any previous visit.      EKG:      TELEMETRY:   sinus 50-60's       I reviewed the patient's new clinical results.    ALLERGIES: Patient has no known allergies.    Medication Review:   Current list of medications may not reflect those currently placed in orders that are not signed or are being held.     acetylcysteine, 1.5 mL, Nebulization, Q6H - RT  amiodarone, 200 mg, Oral, Q12H  aspirin, 81 mg, Oral, Daily  folic acid, 1 mg, Oral, Daily  gabapentin, 200 mg, Oral, Q12H  hydrALAZINE, 25 mg, Oral, Q8H  ipratropium-albuterol, 3 mL, Nebulization, Q6H - RT  levothyroxine, 88 mcg, Oral, Q AM  montelukast, 10 mg, Oral, Nightly  nicotine, 1 patch, Transdermal, Q24H  pantoprazole, 40 mg, Oral, Daily  rosuvastatin, 10 mg, Oral, Nightly  sertraline, 25 mg, Oral, Daily  sodium chloride, 10 mL, Intravenous, Q12H  vitamin B-12, 1,000 mcg, Oral, Daily      heparin, 10.9 Units/kg/hr (Dosing Weight), Last Rate: 10.9 Units/kg/hr (10/28/23  0208)  Pharmacy to Dose Heparin,         acetaminophen    calcium carbonate    dextrose    dextrose    glucagon (human recombinant)    hydrALAZINE    HYDROcodone-acetaminophen    hydrOXYzine    melatonin    metoprolol tartrate    nitroglycerin    ondansetron    Pharmacy to Dose Heparin    [COMPLETED] Insert Peripheral IV **AND** sodium chloride    sodium chloride    sodium chloride    Assessment    Assessment:  1.  Coronary artery disease currently pain-free  #2 atrial fibrillation.    #3 renal failure on hemodialysis  #4 positive blood culture on 10/20/2023 possible contaminant                    Plan   Recommendations:  1.  Cardiac.  Patient with paroxysmal atrial fibrillation.  Currently on amiodarone.  Stress test negative for ischemia with normal EF.  We will continue to watch her closely and clinically.  Will start Eliquis and stop IV heparin              I have discussed this patient with Dr. Castro and together, we have formed a plan of care for this patient as stated above in the recommendations.     I have discussed the patients findings and recommendations with patient.    Thank you very much for asking us to be involved in this patient's care.  We will follow along with you.          Electronically signed by DEEPA Villarreal, 10/28/23, 9:31 AM EDT.            Please note that portions of this note were completed with a voice recognition program.    Please note that portions of this note were copied and has been reviewed and is accurate as of 10/28/2023 .      .Electronically signed by Zheng Castro MD, 10/28/23, 3:16 PM EDT.

## 2023-10-29 LAB
ANION GAP SERPL CALCULATED.3IONS-SCNC: 10.2 MMOL/L (ref 5–15)
BASOPHILS # BLD AUTO: 0.04 10*3/MM3 (ref 0–0.2)
BASOPHILS NFR BLD AUTO: 0.6 % (ref 0–1.5)
BUN SERPL-MCNC: 8 MG/DL (ref 8–23)
BUN/CREAT SERPL: 2.7 (ref 7–25)
CALCIUM SPEC-SCNC: 8.8 MG/DL (ref 8.6–10.5)
CHLORIDE SERPL-SCNC: 93 MMOL/L (ref 98–107)
CO2 SERPL-SCNC: 30.8 MMOL/L (ref 22–29)
CREAT SERPL-MCNC: 2.94 MG/DL (ref 0.57–1)
DEPRECATED RDW RBC AUTO: 58 FL (ref 37–54)
EGFRCR SERPLBLD CKD-EPI 2021: 17.2 ML/MIN/1.73
EOSINOPHIL # BLD AUTO: 0.6 10*3/MM3 (ref 0–0.4)
EOSINOPHIL NFR BLD AUTO: 8.6 % (ref 0.3–6.2)
ERYTHROCYTE [DISTWIDTH] IN BLOOD BY AUTOMATED COUNT: 17 % (ref 12.3–15.4)
GLUCOSE BLDC GLUCOMTR-MCNC: 107 MG/DL (ref 70–130)
GLUCOSE BLDC GLUCOMTR-MCNC: 116 MG/DL (ref 70–130)
GLUCOSE BLDC GLUCOMTR-MCNC: 131 MG/DL (ref 70–130)
GLUCOSE BLDC GLUCOMTR-MCNC: 151 MG/DL (ref 70–130)
GLUCOSE SERPL-MCNC: 86 MG/DL (ref 65–99)
HCT VFR BLD AUTO: 27.5 % (ref 34–46.6)
HGB BLD-MCNC: 8.1 G/DL (ref 12–15.9)
IMM GRANULOCYTES # BLD AUTO: 0.03 10*3/MM3 (ref 0–0.05)
IMM GRANULOCYTES NFR BLD AUTO: 0.4 % (ref 0–0.5)
LYMPHOCYTES # BLD AUTO: 1.49 10*3/MM3 (ref 0.7–3.1)
LYMPHOCYTES NFR BLD AUTO: 21.3 % (ref 19.6–45.3)
MCH RBC QN AUTO: 27.6 PG (ref 26.6–33)
MCHC RBC AUTO-ENTMCNC: 29.5 G/DL (ref 31.5–35.7)
MCV RBC AUTO: 93.5 FL (ref 79–97)
MONOCYTES # BLD AUTO: 0.59 10*3/MM3 (ref 0.1–0.9)
MONOCYTES NFR BLD AUTO: 8.5 % (ref 5–12)
NEUTROPHILS NFR BLD AUTO: 4.23 10*3/MM3 (ref 1.7–7)
NEUTROPHILS NFR BLD AUTO: 60.6 % (ref 42.7–76)
NRBC BLD AUTO-RTO: 0 /100 WBC (ref 0–0.2)
PLATELET # BLD AUTO: 175 10*3/MM3 (ref 140–450)
PMV BLD AUTO: 10.5 FL (ref 6–12)
POTASSIUM SERPL-SCNC: 4.2 MMOL/L (ref 3.5–5.2)
RBC # BLD AUTO: 2.94 10*6/MM3 (ref 3.77–5.28)
SODIUM SERPL-SCNC: 134 MMOL/L (ref 136–145)
WBC NRBC COR # BLD: 6.98 10*3/MM3 (ref 3.4–10.8)

## 2023-10-29 PROCEDURE — 80048 BASIC METABOLIC PNL TOTAL CA: CPT | Performed by: INTERNAL MEDICINE

## 2023-10-29 PROCEDURE — 99233 SBSQ HOSP IP/OBS HIGH 50: CPT | Performed by: INTERNAL MEDICINE

## 2023-10-29 PROCEDURE — 94664 DEMO&/EVAL PT USE INHALER: CPT

## 2023-10-29 PROCEDURE — 82948 REAGENT STRIP/BLOOD GLUCOSE: CPT

## 2023-10-29 PROCEDURE — 94799 UNLISTED PULMONARY SVC/PX: CPT

## 2023-10-29 PROCEDURE — 99232 SBSQ HOSP IP/OBS MODERATE 35: CPT | Performed by: INTERNAL MEDICINE

## 2023-10-29 PROCEDURE — 85025 COMPLETE CBC W/AUTO DIFF WBC: CPT | Performed by: INTERNAL MEDICINE

## 2023-10-29 PROCEDURE — 94761 N-INVAS EAR/PLS OXIMETRY MLT: CPT

## 2023-10-29 RX ORDER — POLYETHYLENE GLYCOL 3350 17 G/17G
17 POWDER, FOR SOLUTION ORAL DAILY
Status: DISCONTINUED | OUTPATIENT
Start: 2023-10-29 | End: 2023-10-30 | Stop reason: HOSPADM

## 2023-10-29 RX ORDER — DOCUSATE SODIUM 100 MG/1
100 CAPSULE, LIQUID FILLED ORAL 2 TIMES DAILY
Status: DISCONTINUED | OUTPATIENT
Start: 2023-10-29 | End: 2023-10-30 | Stop reason: HOSPADM

## 2023-10-29 RX ADMIN — Medication 1 MG: at 09:02

## 2023-10-29 RX ADMIN — ACETYLCYSTEINE 1.5 ML: 200 SOLUTION ORAL; RESPIRATORY (INHALATION) at 00:16

## 2023-10-29 RX ADMIN — ACETYLCYSTEINE 1.5 ML: 200 SOLUTION ORAL; RESPIRATORY (INHALATION) at 19:50

## 2023-10-29 RX ADMIN — ROSUVASTATIN CALCIUM 10 MG: 10 TABLET, FILM COATED ORAL at 21:40

## 2023-10-29 RX ADMIN — Medication 10 MG: at 21:40

## 2023-10-29 RX ADMIN — GABAPENTIN 200 MG: 100 CAPSULE ORAL at 09:02

## 2023-10-29 RX ADMIN — IPRATROPIUM BROMIDE AND ALBUTEROL SULFATE 3 ML: 2.5; .5 SOLUTION RESPIRATORY (INHALATION) at 00:16

## 2023-10-29 RX ADMIN — DOCUSATE SODIUM 100 MG: 100 CAPSULE, LIQUID FILLED ORAL at 21:40

## 2023-10-29 RX ADMIN — HYDRALAZINE HYDROCHLORIDE 25 MG: 50 TABLET, FILM COATED ORAL at 15:49

## 2023-10-29 RX ADMIN — ACETYLCYSTEINE 1.5 ML: 200 SOLUTION ORAL; RESPIRATORY (INHALATION) at 07:15

## 2023-10-29 RX ADMIN — LEVOTHYROXINE SODIUM 88 MCG: 88 TABLET ORAL at 05:26

## 2023-10-29 RX ADMIN — IPRATROPIUM BROMIDE AND ALBUTEROL SULFATE 3 ML: 2.5; .5 SOLUTION RESPIRATORY (INHALATION) at 19:50

## 2023-10-29 RX ADMIN — IPRATROPIUM BROMIDE AND ALBUTEROL SULFATE 3 ML: 2.5; .5 SOLUTION RESPIRATORY (INHALATION) at 07:15

## 2023-10-29 RX ADMIN — AMIODARONE HYDROCHLORIDE 200 MG: 200 TABLET ORAL at 09:02

## 2023-10-29 RX ADMIN — AMIODARONE HYDROCHLORIDE 200 MG: 200 TABLET ORAL at 21:40

## 2023-10-29 RX ADMIN — GABAPENTIN 200 MG: 100 CAPSULE ORAL at 21:39

## 2023-10-29 RX ADMIN — APIXABAN 5 MG: 5 TABLET, FILM COATED ORAL at 21:40

## 2023-10-29 RX ADMIN — HYDROXYZINE HYDROCHLORIDE 25 MG: 25 TABLET, FILM COATED ORAL at 21:39

## 2023-10-29 RX ADMIN — ACETYLCYSTEINE 1.5 ML: 200 SOLUTION ORAL; RESPIRATORY (INHALATION) at 13:31

## 2023-10-29 RX ADMIN — SERTRALINE HYDROCHLORIDE 25 MG: 25 TABLET ORAL at 09:02

## 2023-10-29 RX ADMIN — IPRATROPIUM BROMIDE AND ALBUTEROL SULFATE 3 ML: 2.5; .5 SOLUTION RESPIRATORY (INHALATION) at 13:31

## 2023-10-29 RX ADMIN — APIXABAN 5 MG: 5 TABLET, FILM COATED ORAL at 09:02

## 2023-10-29 RX ADMIN — HYDROCODONE BITARTRATE AND ACETAMINOPHEN 1 TABLET: 10; 325 TABLET ORAL at 21:39

## 2023-10-29 RX ADMIN — Medication 10 ML: at 09:02

## 2023-10-29 RX ADMIN — Medication 1000 MCG: at 09:02

## 2023-10-29 RX ADMIN — Medication 10 ML: at 21:40

## 2023-10-29 RX ADMIN — POLYETHYLENE GLYCOL (3350) 17 G: 17 POWDER, FOR SOLUTION ORAL at 15:49

## 2023-10-29 RX ADMIN — ASPIRIN 81 MG: 81 TABLET, COATED ORAL at 09:02

## 2023-10-29 RX ADMIN — NICOTINE TRANSDERMAL SYSTEM 1 PATCH: 21 PATCH, EXTENDED RELEASE TRANSDERMAL at 09:03

## 2023-10-29 RX ADMIN — PANTOPRAZOLE SODIUM 40 MG: 40 TABLET, DELAYED RELEASE ORAL at 09:02

## 2023-10-29 RX ADMIN — MONTELUKAST SODIUM 10 MG: 10 TABLET, COATED ORAL at 21:40

## 2023-10-29 RX ADMIN — HYDRALAZINE HYDROCHLORIDE 25 MG: 50 TABLET, FILM COATED ORAL at 05:26

## 2023-10-29 NOTE — PROGRESS NOTES
Nephrology Progress Note    Interval History:     Patient Complaints: Feeling better.  Breathing better.  Drinking Coca-Cola.  Eating a little better.        Vital Signs  Temp:  [97.7 °F (36.5 °C)-98.5 °F (36.9 °C)] 97.7 °F (36.5 °C)  Heart Rate:  [58-97] 60  Resp:  [18-20] 20  BP: (121-150)/(61-74) 126/61    Physical Exam:      General:                   Morbid obesity        HEENT: pallor                     Neck: No JVD         Lungs:   Clear   Heart:  irregular,  no rub         Abdomen:   Normal bowel sounds, soft non-tender, non-distended, no guarding         Extremities: Edema +         Skin: No petechiae, no rash         Neurologic: Cranial nerves grossly intact, moves all extremities.        Results Review:    I reviewed the patient's new clinical results.    Lab Results (last 24 hours)       Procedure Component Value Units Date/Time    POC Glucose Once [[id number]]  (Normal) Collected: 10/29/23 1630    Specimen: Blood Updated: 10/29/23 1651     Glucose 107 mg/dL     POC Glucose Once [[id number]]  (Abnormal) Collected: 10/29/23 1105    Specimen: Blood Updated: 10/29/23 1124     Glucose 151 mg/dL     POC Glucose Once [[id number]]  (Normal) Collected: 10/29/23 0658    Specimen: Blood Updated: 10/29/23 0731     Glucose 116 mg/dL     Basic Metabolic Panel [[id number]]  (Abnormal) Collected: 10/29/23 0329    Specimen: Blood Updated: 10/29/23 0435     Glucose 86 mg/dL      BUN 8 mg/dL      Creatinine 2.94 mg/dL      Sodium 134 mmol/L      Potassium 4.2 mmol/L      Comment: Slight hemolysis detected by analyzer. Results may be affected.        Chloride 93 mmol/L      CO2 30.8 mmol/L      Calcium 8.8 mg/dL      BUN/Creatinine Ratio 2.7     Anion Gap 10.2 mmol/L      eGFR 17.2 mL/min/1.73     Narrative:      GFR Normal >60  Chronic Kidney Disease <60  Kidney Failure <15      CBC & Differential [[id number]]  (Abnormal) Collected: 10/29/23 0329    Specimen:  Blood Updated: 10/29/23 0400    Narrative:      The following orders were created for panel order CBC & Differential.  Procedure                               Abnormality         Status                     ---------                               -----------         ------                     CBC Auto Differential[[id number]]        Abnormal            Final result                 Please view results for these tests on the individual orders.    CBC Auto Differential [[id number]]  (Abnormal) Collected: 10/29/23 0329    Specimen: Blood Updated: 10/29/23 0400     WBC 6.98 10*3/mm3      RBC 2.94 10*6/mm3      Hemoglobin 8.1 g/dL      Hematocrit 27.5 %      MCV 93.5 fL      MCH 27.6 pg      MCHC 29.5 g/dL      RDW 17.0 %      RDW-SD 58.0 fl      MPV 10.5 fL      Platelets 175 10*3/mm3      Neutrophil % 60.6 %      Lymphocyte % 21.3 %      Monocyte % 8.5 %      Eosinophil % 8.6 %      Basophil % 0.6 %      Immature Grans % 0.4 %      Neutrophils, Absolute 4.23 10*3/mm3      Lymphocytes, Absolute 1.49 10*3/mm3      Monocytes, Absolute 0.59 10*3/mm3      Eosinophils, Absolute 0.60 10*3/mm3      Basophils, Absolute 0.04 10*3/mm3      Immature Grans, Absolute 0.03 10*3/mm3      nRBC 0.0 /100 WBC     Potassium [[id number]]  (Normal) Collected: 10/28/23 1822    Specimen: Blood Updated: 10/28/23 1859     Potassium 3.7 mmol/L     Magnesium [[id number]]  (Normal) Collected: 10/28/23 1822    Specimen: Blood Updated: 10/28/23 1859     Magnesium 1.8 mg/dL             Imaging Results (Last 24 Hours)       ** No results found for the last 24 hours. **            Assessment and Plan:    ARF on CKD stage 3A on dialysis  Atrophic right kidney  Pneumonia  Hypoxic respiratory failure  Atrial fibrillation paroxysmal  HTN  HfpEF    Encourage patient to stay off dark cola  Volume status is much improved  Continue supportive dialysis as does not appear to be improving      Rome Sherwood MD  10/29/23  18:22 EDT

## 2023-10-29 NOTE — PROGRESS NOTES
[facility]  PROGRESS NOTE    Patient information:  Name: Lesley Michel  Age/Sex: 67 y.o. female  :  1955        PCP: Woodrow Raymond APRN  Attending: Fanny Ford MD  MRN:  [id number]  Visit Number:  [id number]    LOS:  LOS: 19 days     CODE STATUS:    Code Status and Medical Interventions:   Ordered at: 10/12/23 1963     Medical Intervention Limits:    NO intubation (DNI)     Level Of Support Discussed With:    Patient     Code Status (Patient has no pulse and is not breathing):    No CPR (Do Not Attempt to Resuscitate)     Medical Interventions (Patient has pulse or is breathing):    Limited Support       PROBLEM LIST:Principal Problem:    Acute hypoxic respiratory failure  Active Problems:    Acute renal failure (ARF)      Reason for Cardiology follow-up: New onset atrial fibrillation    Subjective   ADMISSION INFORMATION:  Chief Complaint   Patient presents with    Shortness of Breath    Edema       HPI:  Lesley Michel is a 67 y.o. female with a past medical history significant for  CAD chronically occluded well collateralized dominant RCA, status post drug-eluting stent to LAD with high-grade stenosis of the ostium of the small D1 and 60% mid RCA post lateral branch lesion, hyperlipidemia, essential hypertension, COPD, current tobacco abuse, CKD stage II, hypothyroidism, arthritis, anxiety, history of migraines, obesity by BMI, and history of medical noncompliance.        Patient presented to [facility] ([facility]) emergency room (ER) on 10/8/2023 with complaints of increasing lower extremity edema and shortness of breath which has been progressively worsening over the past few days prior to presentation.  10/24/2023, Cardiology was consulted for further evaluation and management for what appears to be new onset atrial fibrillation with RVR since 10/23/2023.      Primary Cardiologist: Imelda ARENAS/Dr. Echeverria and her last  office visit was on 10/6/2023.     PROCEDURES:   10/09/2023: ECHO with EF of 56 to 60%, diastolic function consistent with grade 1A w/ high LAP, mild aortic valve stenosis, and a small (<1 cm) pericardial effusion noted with no evidence of cardiac tamponade.  10/25/2023: P.O. Amiodarone started and is scheduled to be decreased on Saturday 10/28/202  10/26/2023: HD    Interval History:   Patient is in room 321 and was examined by Dr. Castro.  Patient is resting comfortably in bed.  She denies any acute complaint.  Per nursing notes no new events overnight.  /74, heart rate 58.  Potassium 4.2, creatinine 2.94.  Hemoglobin stable at 8.1.     Vital Signs  Temp:  [97.9 °F (36.6 °C)-98.5 °F (36.9 °C)] 98 °F (36.7 °C)  Heart Rate:  [58-97] 96  Resp:  [18-20] 20  BP: (121-157)/(63-79) 150/74  Flow (L/min):  [4] 4  Vital Signs (last 72 hrs)         10/26 0700  10/27 0659 10/27 0700  10/28 0659 10/28 0700  10/29 0659 10/29 0700  10/29 1433   Most Recent      Temp (°F) 97 -  98.5    97.7 -  98.3    97.9 -  98.5      98     98 (36.7) 10/29 1103    Heart Rate 52 -  137    55 -  66    54 -  80    58 -  97     96 10/29 1331    Resp 16 -  20    18 -  20      18    18 -  20     20 10/29 1331    /55 -  181/96    111/77 -  150/73    121/63 -  167/83      150/74     150/74 10/29 1103    SpO2 (%) 93 -  98    97 -  99    94 -  98    94 -  97     94 10/29 1331    Flow (L/min) 3 -  4      4      4      4     4 10/29 1331          BMI:  Body mass index is 40.74 kg/m².    WEIGHT:      10/28/23  0500 10/29/23  0500   Weight: 95.5 kg (210 lb 8 oz) 94.6 kg (208 lb 9.6 oz)       DIET:  Diet: Regular/House Diet; Texture: Regular Texture (IDDSI 7); Fluid Consistency: Thin (IDDSI 0)    I&O:  Intake & Output (last 3 days)         10/26 0701  10/27 0700 10/27 0701  10/28 0700 10/28 0701  10/29 0700 10/29 0701  10/30 0700    P.O. 640 540 840 120    Total Intake(mL/kg) 640 (6.4) 540 (5.3) 840 (8.3) 120 (1.2)    Urine (mL/kg/hr) 100 (0) 100  (0)      Stool 0 0      Dialysis 1571  2000     Total Output 8366 693 2165     Net -1031 +440 -1160 +120            Urine Unmeasured Occurrence 2 x 1 x 1 x     Stool Unmeasured Occurrence 0 x 1 x              Objective     Physical Exam:     Vitals reviewed.   Constitutional:       Appearance: Normal appearance. Well-developed. Obese.      Interventions: Nasal cannula in place.   Eyes:      Conjunctiva/sclera: Conjunctivae normal.   HENT:      Head: Normocephalic.   Neck:      Thyroid: No thyromegaly.      Vascular: No carotid bruit or JVD.   Pulmonary:      Effort: Pulmonary effort is normal.      Breath sounds: Normal breath sounds.   Cardiovascular:      Normal rate. Regular rhythm.   Edema:     Peripheral edema absent.   Abdominal:      General: Bowel sounds are normal.      Palpations: Abdomen is soft.   Musculoskeletal: Normal range of motion.      Cervical back: Neck supple. Skin:     General: Skin is warm and dry.   Neurological:      Mental Status: Alert and oriented to person, place, and time.   Psychiatric:         Attention and Perception: Attention normal.         Mood and Affect: Mood normal.         Speech: Speech normal.         Behavior: Behavior normal. Behavior is cooperative.         Cognition and Memory: Cognition normal.         Results review   Results Review:   Results from last 7 days   Lab Units 10/29/23  0329 10/28/23  0032 10/27/23  0712 10/26/23  0429 10/25/23  0706 10/24/23  0336 10/23/23  0255   WBC 10*3/mm3 6.98 7.37 8.42 8.12 8.53 6.00 5.71   HEMOGLOBIN g/dL 8.1* 7.9* 8.3* 8.2* 8.1* 8.4* 8.0*   PLATELETS 10*3/mm3 175 202 209 211 190 192 190     Results from last 7 days   Lab Units 10/29/23  0329 10/28/23  1822 10/28/23  0032 10/27/23  0712 10/26/23  0429 10/25/23  0706 10/24/23  0336 10/23/23  0255   SODIUM mmol/L 134*  --  135* 136 133* 135* 133* 135*   POTASSIUM mmol/L 4.2 3.7 3.6 4.0 3.9 4.0 4.9 4.7   CHLORIDE mmol/L 93*  --  93* 95* 91* 92* 92* 94*   CO2 mmol/L 30.8*  --  30.1*  "28.8 28.9 30.0* 27.4 31.0*   BUN mg/dL 8  --  19 17 31* 24* 36* 43*   CREATININE mg/dL 2.94*  --  4.35* 3.73* 4.77* 3.99* 5.25* 5.43*   CALCIUM mg/dL 8.8  --  9.0 8.9 9.1 9.0 9.3 8.9   GLUCOSE mg/dL 86  --  85 97 87 86 103* 100*   ALT (SGPT) U/L  --   --  <5  --   --   --  5  --    AST (SGOT) U/L  --   --  11  --   --   --  16  --          Lab Results   Component Value Date    PROBNP 8,093.0 (H) 10/08/2023     Estimated Creatinine Clearance: 19.6 mL/min (A) (by C-G formula based on SCr of 2.94 mg/dL (H)).  Lab Results   Component Value Date    ABSOLUTELUNG 43 (A) 10/13/2023     Lab Results   Component Value Date    INR 1.07 10/24/2023     Lab Results   Component Value Date    MG 1.8 10/28/2023    MG 2.0 10/09/2023    MG 1.9 10/08/2023     Lab Results   Component Value Date    TSH 6.770 (H) 10/08/2023      No results found for: \"CHOL\", \"TRIG\", \"HDL\", \"LDL\"  Pain Management Panel          Latest Ref Rng & Units 10/9/2023   Pain Management Panel   Creatinine, Urine mg/dL 116.0      Microbiology Results (last 10 days)       Procedure Component Value - Date/Time    Respiratory Culture - Sputum, Cough [[id number]] Collected: 10/25/23 1513    Lab Status: Final result Specimen: Sputum from Cough Updated: 10/27/23 0902     Respiratory Culture Light growth (2+) Normal respiratory yuly. No S. aureus or Pseudomonas aeruginosa detected. Final report.     Gram Stain Few (2+) Epithelial cells seen      Moderate (3+) WBCs seen      Moderate (3+) Mixed bacterial morphotypes seen on Gram Stain    Blood Culture - Blood, Arm, Right [[id number]]  (Abnormal) Collected: 10/20/23 1334    Lab Status: Final result Specimen: Blood from Arm, Right Updated: 10/23/23 0892     Blood Culture Micrococcus species     Comment: No specific CLSI guidelines and no validated TANYA testing method.  Rarely implicated in infections.          Isolated from Aerobic Bottle     Gram Stain Aerobic Bottle Gram positive cocci in clusters    Narrative:      Probable " contaminant requires clinical correlation, susceptibility not performed unless requested by physician.      Blood Culture ID, PCR - Blood, Arm, Right [[id number]] Collected: 10/20/23 1334    Lab Status: Final result Specimen: Blood from Arm, Right Updated: 10/22/23 0519     BCID, PCR Negative by BCID PCR. Culture to Follow.     BOTTLE TYPE Aerobic Bottle    Blood Culture - Blood, Arm, Left [[id number]]  (Normal) Collected: 10/20/23 1234    Lab Status: Final result Specimen: Blood from Arm, Left Updated: 10/25/23 1402     Blood Culture No growth at 5 days           Imaging Results (Last 24 Hours)       ** No results found for the last 24 hours. **            ECHO:  Results for orders placed during the hospital encounter of 10/08/23    Adult Transthoracic Echo Limited W/ Cont if Necessary Per Protocol    Interpretation Summary    Left ventricular systolic function is hyperdynamic (EF > 70%). Left ventricular ejection fraction appears to be greater than 70%.    Left ventricular diastolic function was not assessed.    The left atrial cavity is mildly dilated.    The right atrial cavity is borderline dilated.    There is a small (<1cm) pericardial effusion.    This is a technical limited study.      STRESS TEST:  Results for orders placed during the hospital encounter of 10/08/23    Stress Test With Myocardial Perfusion One Day    Interpretation Summary    Left ventricular ejection fraction is normal (Calculated EF = 68%).    Myocardial perfusion imaging indicates a normal myocardial perfusion study with no evidence of ischemia.    TID 1.05.    Findings consistent with a normal ECG stress test.       HEART CATH:  No results found for this or any previous visit.      EKG:      TELEMETRY:          I reviewed the patient's new clinical results.    ALLERGIES: Patient has no known allergies.    Medication Review:   Current list of medications may not reflect those currently placed in orders that are not signed or are being  held.     acetylcysteine, 1.5 mL, Nebulization, Q6H - RT  amiodarone, 200 mg, Oral, Q12H  apixaban, 5 mg, Oral, Q12H  aspirin, 81 mg, Oral, Daily  folic acid, 1 mg, Oral, Daily  gabapentin, 200 mg, Oral, Q12H  hydrALAZINE, 25 mg, Oral, Q8H  ipratropium-albuterol, 3 mL, Nebulization, Q6H - RT  levothyroxine, 88 mcg, Oral, Q AM  montelukast, 10 mg, Oral, Nightly  nicotine, 1 patch, Transdermal, Q24H  pantoprazole, 40 mg, Oral, Daily  rosuvastatin, 10 mg, Oral, Nightly  sertraline, 25 mg, Oral, Daily  sodium chloride, 10 mL, Intravenous, Q12H  vitamin B-12, 1,000 mcg, Oral, Daily           acetaminophen    calcium carbonate    dextrose    dextrose    glucagon (human recombinant)    hydrALAZINE    HYDROcodone-acetaminophen    hydrOXYzine    melatonin    metoprolol tartrate    nitroglycerin    ondansetron    [COMPLETED] Insert Peripheral IV **AND** sodium chloride    sodium chloride    sodium chloride    Assessment    Assessment:  1.  History of coronary artery disease  #2 atrial fibrillation  #3 chronic kidney disease  #4 bilateral pneumonia  #5 hypertension  #6 anemia  #7 elevated high-sensitivity troponin              Plan   Recommendations:  1.  Cardiac.  Patient with atrial fibrillation currently rate controlled.  On anticoagulation.  We will continue amiodarone for now and taper down in 7 to 10 days.  #2 hypertension.  Patient blood pressure is better with current medication  #3 elevated high-sensitivity troponin.  Possible type II MI.  Continue aspirin for now.  Continue Crestor and metoprolol also.              I have discussed this patient with Dr. Castro and together, we have formed a plan of care for this patient as stated above in the recommendations.     I have discussed the patients findings and recommendations with patient.    Thank you very much for asking us to be involved in this patient's care.  We will follow along with you.        Electronically signed by DEEPA Villarreal, 10/29/23, 2:33 PM  EDT.              Please note that portions of this note were completed with a voice recognition program.    Please note that portions of this note were copied and has been reviewed and is accurate as of 10/29/2023 .      .Electronically signed by Zheng Castro MD, 10/29/23, 5:06 PM EDT.

## 2023-10-29 NOTE — PLAN OF CARE
Goal Outcome Evaluation:  Plan of Care Reviewed With: patient        Progress: improving          Patient has been resting this shift. No s/s of acute distress noted. Will continue to follow plan of care.

## 2023-10-29 NOTE — PAYOR COMM NOTE
"[facility]  JOSE ENRIQUE RIOS  PHONE  [phone]  FAX  [phone]  NPI:  [id number]    CLINICAL UPDATE  REF#[id number]    Matthew Michel (67 y.o. Female)       Date of Birth   1955    Social Security Number       Address   [address] [facility] [address]    Home Phone   [phone]    MRN   [id number]       [employer]   [facility]    Marital Status                               Admission Date   10/8/23    Admission Type   Emergency    Admitting Provider   Fanny Ford MD    Attending Provider   José Miguel Tellez DO    Department, Room/Bed   [address], 3321/1P       Discharge Date       Discharge Disposition       Discharge Destination                                 Attending Provider: José Miguel Tellez DO    Allergies: No Known Allergies    Isolation: None   Infection: None   Code Status: No CPR    Ht: 152.4 cm (60\")   Wt: 94.6 kg (208 lb 9.6 oz)    Admission Cmt: None   Principal Problem: Acute hypoxic respiratory failure [J96.01]                   Active Insurance as of 10/8/2023       Primary Coverage       Payor Plan Insurance Group Employer/Plan Group    [facility] [employer] MEDICARE REPLACEMENT        Payor Plan Address Payor Plan Phone Number Payor Plan Fax Number Effective Dates    [address] [address] [phone]  1/1/2023 - None Entered    [facility] [id number]         Subscriber Name Subscriber Birth Date Member ID       MATTHEW MICHEL [id number]                     Emergency Contacts        (Rel.) Home Phone Work Phone Mobile Phone    LOYD MICHEL (Son) [phone] -- --              Current Facility-Administered Medications   Medication Dose Route Frequency Provider Last Rate Last Admin    acetaminophen (TYLENOL) tablet 650 mg  650 mg Oral Q6H PRN Bartolome Schwartz MD   650 mg at 10/27/23 2119    acetylcysteine (MUCOMYST) 20 % nebulizer solution 1.5 mL  1.5 mL Nebulization " Q6H - RT Jay Jay Pritchett DO   1.5 mL at 10/29/23 1331    amiodarone (PACERONE) tablet 200 mg  200 mg Oral Q12H Anita Grove APRN   200 mg at 10/29/23 0902    apixaban (ELIQUIS) tablet 5 mg  5 mg Oral Q12H Zheng Castro MD   5 mg at 10/29/23 0902    aspirin EC tablet 81 mg  81 mg Oral Daily Jay Jay Pritchett DO   81 mg at 10/29/23 0902    calcium carbonate (TUMS) chewable tablet 500 mg (200 mg elemental)  2 tablet Oral TID PRN Jay Jay Pritchett DO        dextrose (D50W) (25 g/50 mL) IV injection 25 g  25 g Intravenous Q15 Min PRN Bartolome Schwartz MD   25 g at 10/09/23 0401    dextrose (GLUTOSE) oral gel 15 g  15 g Oral Q15 Min PRN Bartolome Schwartz MD        folic acid (FOLVITE) tablet 1 mg  1 mg Oral Daily Bartolome Schwartz MD   1 mg at 10/29/23 0902    gabapentin (NEURONTIN) capsule 200 mg  200 mg Oral Q12H Jay Jay Pritchett DO   200 mg at 10/29/23 0902    glucagon HCl (Diagnostic) injection 1 mg  1 mg Intramuscular Q15 Min PRN Bartolome Schwartz MD        hydrALAZINE (APRESOLINE) injection 10 mg  10 mg Intravenous Q6H PRN Bartolome Schwartz MD   10 mg at 10/15/23 0923    hydrALAZINE (APRESOLINE) tablet 25 mg  25 mg Oral Q8H José Miguel Tellez DO   25 mg at 10/29/23 0526    HYDROcodone-acetaminophen (NORCO)  MG per tablet 1 tablet  1 tablet Oral Q8H PRN Bartolome Schwartz MD   1 tablet at 10/27/23 1238    hydrOXYzine (ATARAX) tablet 25 mg  25 mg Oral TID PRN Bartolome Schwartz MD   25 mg at 10/27/23 2119    ipratropium-albuterol (DUO-NEB) nebulizer solution 3 mL  3 mL Nebulization Q6H - RT Jay Jay Pritchett DO   3 mL at 10/29/23 1331    levothyroxine (SYNTHROID, LEVOTHROID) tablet 88 mcg  88 mcg Oral Q AM Bartolome Schwartz MD   88 mcg at 10/29/23 0526    melatonin tablet 10 mg  10 mg Oral Nightly PRN Bartolome Schwartz MD   10 mg at 10/28/23 2143    metoprolol tartrate (LOPRESSOR) injection 5 mg  5 mg Intravenous Q10 Min PRN Anita Grove APRN montelukast  (SINGULAIR) tablet 10 mg  10 mg Oral Nightly Bartolome Schwartz MD   10 mg at 10/28/23 2136    nicotine (NICODERM CQ) 21 MG/24HR patch 1 patch  1 patch Transdermal Q24H Bartolome Schwartz MD   1 patch at 10/29/23 0903    nitroglycerin (NITROSTAT) SL tablet 0.4 mg  0.4 mg Sublingual Q5 Min PRN Bartolome Schwartz MD   0.4 mg at 10/21/23 1203    ondansetron (ZOFRAN) injection 4 mg  4 mg Intravenous Q6H PRN Bartolome Schwartz MD   4 mg at 10/28/23 1854    pantoprazole (PROTONIX) EC tablet 40 mg  40 mg Oral Daily Bartolome Schwartz MD   40 mg at 10/29/23 0902    rosuvastatin (CRESTOR) tablet 10 mg  10 mg Oral Nightly Bartolome Schwartz MD   10 mg at 10/28/23 2136    sertraline (ZOLOFT) tablet 25 mg  25 mg Oral Daily Renny Cruz MD   25 mg at 10/29/23 0902    sodium chloride 0.9 % flush 10 mL  10 mL Intravenous PRN Bartolome Schwartz MD        sodium chloride 0.9 % flush 10 mL  10 mL Intravenous Q12H Bartolome Schwartz MD   10 mL at 10/29/23 0902    sodium chloride 0.9 % flush 10 mL  10 mL Intravenous PRN Bartolome Schwartz MD        sodium chloride 0.9 % infusion 40 mL  40 mL Intravenous PRN Bartolome Schwartz MD   40 mL at 10/12/23 1049    vitamin B-12 (CYANOCOBALAMIN) tablet 1,000 mcg  1,000 mcg Oral Daily Bartolome Schwartz MD   1,000 mcg at 10/29/23 0902        Physician Progress Notes (last 72 hours)        Rome Sherwood MD at 10/28/23 1615                                                                        Nephrology Progress Note    Interval History:     Patient Complaints: Not eating. Some ooze around HD catheter. Sen on HD        Vital Signs  Temp:  [97.7 °F (36.5 °C)-98.3 °F (36.8 °C)] 98 °F (36.7 °C)  Heart Rate:  [54-66] 54  Resp:  [18] 18  BP: (111-167)/(67-86) 167/83    Physical Exam:    General:           Morbid obesity      HEENT: pallor               Neck: No JVD       Lungs:   Exp wheeze   Heart:  regular,  no rub       Abdomen:   Normal bowel sounds,  soft non-tender, non-distended, no guarding       Extremities: Edema ++       Skin: No petechiae, no rash       Neurologic: Cranial nerves grossly intact, moves all extremities.        Results Review:    I reviewed the patient's new clinical results.    Lab Results (last 24 hours)       Procedure Component Value Units Date/Time    POC Glucose Once [[id number]]  (Normal) Collected: 10/28/23 1210    Specimen: Blood Updated: 10/28/23 1216     Glucose 87 mg/dL     Heparin Anti-Xa [[id number]]  (Abnormal) Collected: 10/28/23 0901    Specimen: Blood Updated: 10/28/23 0958     Heparin Anti-Xa (UFH) <0.10 IU/ml     POC Glucose Once [[id number]]  (Normal) Collected: 10/28/23 0648    Specimen: Blood Updated: 10/28/23 0655     Glucose 108 mg/dL     Comprehensive Metabolic Panel [[id number]]  (Abnormal) Collected: 10/28/23 0032    Specimen: Blood Updated: 10/28/23 0150     Glucose 85 mg/dL      BUN 19 mg/dL      Creatinine 4.35 mg/dL      Sodium 135 mmol/L      Potassium 3.6 mmol/L      Chloride 93 mmol/L      CO2 30.1 mmol/L      Calcium 9.0 mg/dL      Total Protein 6.1 g/dL      Albumin 3.2 g/dL      ALT (SGPT) <5 U/L      AST (SGOT) 11 U/L      Alkaline Phosphatase 95 U/L      Total Bilirubin 0.5 mg/dL      Globulin 2.9 gm/dL      A/G Ratio 1.1 g/dL      BUN/Creatinine Ratio 4.4     Anion Gap 11.9 mmol/L      eGFR 10.7 mL/min/1.73      Comment: <15 Indicative of kidney failure       Narrative:      GFR Normal >60  Chronic Kidney Disease <60  Kidney Failure <15      Heparin Anti-Xa [[id number]]  (Abnormal) Collected: 10/28/23 0032    Specimen: Blood Updated: 10/28/23 0131     Heparin Anti-Xa (UFH) <0.10 IU/ml     CBC & Differential [[id number]]  (Abnormal) Collected: 10/28/23 0032    Specimen: Blood Updated: 10/28/23 0120    Narrative:      The following orders were created for panel order CBC & Differential.  Procedure                               Abnormality         Status                     ---------                                -----------         ------                     CBC Auto Differential[[id number]]        Abnormal            Final result                 Please view results for these tests on the individual orders.    CBC Auto Differential [[id number]]  (Abnormal) Collected: 10/28/23 0032    Specimen: Blood Updated: 10/28/23 0120     WBC 7.37 10*3/mm3      RBC 2.90 10*6/mm3      Hemoglobin 7.9 g/dL      Hematocrit 26.5 %      MCV 91.4 fL      MCH 27.2 pg      MCHC 29.8 g/dL      RDW 17.0 %      RDW-SD 55.9 fl      MPV 11.0 fL      Platelets 202 10*3/mm3      Neutrophil % 51.9 %      Lymphocyte % 29.6 %      Monocyte % 9.4 %      Eosinophil % 8.1 %      Basophil % 0.5 %      Immature Grans % 0.5 %      Neutrophils, Absolute 3.82 10*3/mm3      Lymphocytes, Absolute 2.18 10*3/mm3      Monocytes, Absolute 0.69 10*3/mm3      Eosinophils, Absolute 0.60 10*3/mm3      Basophils, Absolute 0.04 10*3/mm3      Immature Grans, Absolute 0.04 10*3/mm3      nRBC 0.0 /100 WBC     Heparin Anti-Xa [[id number]]  (Abnormal) Collected: 10/27/23 2157    Specimen: Blood Updated: 10/27/23 2216     Heparin Anti-Xa (UFH) >1.10 IU/ml     POC Glucose Once [[id number]]  (Abnormal) Collected: 10/27/23 1706    Specimen: Blood Updated: 10/27/23 1712     Glucose 188 mg/dL     Heparin Anti-Xa [[id number]]  (Normal) Collected: 10/27/23 1616    Specimen: Blood Updated: 10/27/23 1635     Heparin Anti-Xa (UFH) 0.43 IU/ml             Imaging Results (Last 24 Hours)       Procedure Component Value Units Date/Time    XR Chest 1 View [[id number]] Collected: 10/28/23 0846     Updated: 10/28/23 0849    Narrative:      EXAM:    XR Chest, 1 View     EXAM DATE:    10/28/2023 9:19 AM     CLINICAL HISTORY:    sob; N17.9-Acute kidney failure, unspecified; N18.9-Chronic kidney  disease, unspecified; I50.9-Heart failure, unspecified; J96.21-Acute and  chronic respiratory failure with hypoxia; E87.5-Hyperkalemia;  N17.9-Acute kidney failure, unspecified     TECHNIQUE:    Frontal view of  the chest.     COMPARISON:    10/25/2023     FINDINGS:    LUNGS AND PLEURAL SPACES:  Slightly improved aeration of right lung  base airspace disease.  Slightly improved aeration of left lung base  airspace disease.  Probably tiny right pleural effusion.  No  pneumothorax.    HEART:  Heart size is stable.    MEDIASTINUM:  Unremarkable as visualized.    BONES/JOINTS:  Unremarkable as visualized.    TUBES, LINES AND DEVICES:  Right central catheter with tip in SVC.       Impression:      1.  Slightly improved aeration of right lung base airspace disease.  2.  Slightly improved aeration of left lung base airspace disease.  3.  Probably tiny right pleural effusion.        This report was finalized on 10/28/2023 8:47 AM by Dr. Kirk Mcdonald MD.               Assessment and Plan:    ARF on CKD stage 3A on dialysis  Atrophic right kidney  Pneumonia  Hypoxic respiratory failure  Atrial fibrillation paroxysmal  HTN  HfpEF      Did well on dialysis  Not eating  Change to a regular diet    Rome Sherwood MD  10/28/23  16:15 EDT          Electronically signed by Rome Sherwood MD at 10/28/23 1623       José Miguel Tellez DO at 10/28/23 1457                [facility] HOSPITALIST PROGRESS NOTE    Subjective     History:   Lesley Michel is a 67 y.o. female admitted on 10/8/2023 secondary to Acute hypoxic respiratory failure     Procedures:   10/19/23: Right IJ tunneled HD catheter placement  10/27/23: Nuclear stress test     Left ventricular ejection fraction is normal (Calculated EF = 68%).    Myocardial perfusion imaging indicates a normal myocardial perfusion study with no evidence of ischemia.    TID 1.05.    Findings consistent with a normal ECG stress test.    CC: Follow up MAICO    Patient seen and examined with NADEEM Martinez. Awake and alert. More interactive today. Dyspnea remains overall improved. No reported CP. No reported vomiting. BP stable. HR overall improved with less fluctuations. No acute  events overnight per RN.     History taken from: patient, chart, and RN.      Objective     Vital Signs  Temp:  [97.7 °F (36.5 °C)-98.3 °F (36.8 °C)] 98 °F (36.7 °C)  Heart Rate:  [54-66] 54  Resp:  [18] 18  BP: (111-167)/(67-86) 167/83    Intake/Output Summary (Last 24 hours) at 10/28/2023 1457  Last data filed at 10/28/2023 0900  Gross per 24 hour   Intake 780 ml   Output --   Net 780 ml         Physical Exam:   General:    Awake, alert, more interactive today, no acute distress, ill appearing   Heart:      Normal S1 and S2. Regular rate and rhythm.    Lungs:     Respirations regular, even and unlabored. Diminished breath sounds at bases. No wheezes appreciated.     Abdomen:   Soft and nontender. No guarding, rebound tenderness or  organomegaly noted. Bowel sounds present x 4.   Extremities:  Tr bilateral lower extremity edema but overall improved. Moves UE and LE equally B/L.     Results Review:    Results from last 7 days   Lab Units 10/28/23  0032 10/27/23  0712 10/26/23  0429 10/25/23  0706 10/24/23  0336 10/23/23  0255   WBC 10*3/mm3 7.37 8.42 8.12 8.53 6.00 5.71   HEMOGLOBIN g/dL 7.9* 8.3* 8.2* 8.1* 8.4* 8.0*   PLATELETS 10*3/mm3 202 209 211 190 192 190     Results from last 7 days   Lab Units 10/28/23  0032 10/27/23  0712 10/26/23  0429 10/25/23  0706 10/24/23  0336 10/23/23  0255   SODIUM mmol/L 135* 136 133* 135* 133* 135*   POTASSIUM mmol/L 3.6 4.0 3.9 4.0 4.9 4.7   CHLORIDE mmol/L 93* 95* 91* 92* 92* 94*   CO2 mmol/L 30.1* 28.8 28.9 30.0* 27.4 31.0*   BUN mg/dL 19 17 31* 24* 36* 43*   CREATININE mg/dL 4.35* 3.73* 4.77* 3.99* 5.25* 5.43*   CALCIUM mg/dL 9.0 8.9 9.1 9.0 9.3 8.9   GLUCOSE mg/dL 85 97 87 86 103* 100*     Results from last 7 days   Lab Units 10/28/23  0032 10/24/23  0336   BILIRUBIN mg/dL 0.5 0.5   ALK PHOS U/L 95 121*   AST (SGOT) U/L 11 16   ALT (SGPT) U/L <5 5           Results from last 7 days   Lab Units 10/24/23  1609   INR  1.07     Results from last 7 days   Lab Units 10/21/23  1716    HSTROP T ng/L 54*       Imaging Results (Last 24 Hours)       Procedure Component Value Units Date/Time    XR Chest 1 View [[id number]] Collected: 10/28/23 0846     Updated: 10/28/23 0849    Narrative:      EXAM:    XR Chest, 1 View     EXAM DATE:    10/28/2023 9:19 AM     CLINICAL HISTORY:    sob; N17.9-Acute kidney failure, unspecified; N18.9-Chronic kidney  disease, unspecified; I50.9-Heart failure, unspecified; J96.21-Acute and  chronic respiratory failure with hypoxia; E87.5-Hyperkalemia;  N17.9-Acute kidney failure, unspecified     TECHNIQUE:    Frontal view of the chest.     COMPARISON:    10/25/2023     FINDINGS:    LUNGS AND PLEURAL SPACES:  Slightly improved aeration of right lung  base airspace disease.  Slightly improved aeration of left lung base  airspace disease.  Probably tiny right pleural effusion.  No  pneumothorax.    HEART:  Heart size is stable.    MEDIASTINUM:  Unremarkable as visualized.    BONES/JOINTS:  Unremarkable as visualized.    TUBES, LINES AND DEVICES:  Right central catheter with tip in SVC.       Impression:      1.  Slightly improved aeration of right lung base airspace disease.  2.  Slightly improved aeration of left lung base airspace disease.  3.  Probably tiny right pleural effusion.        This report was finalized on 10/28/2023 8:47 AM by Dr. Kirk Mcdonald MD.                 Medications:  acetylcysteine, 1.5 mL, Nebulization, Q6H - RT  amiodarone, 200 mg, Oral, Q12H  aspirin, 81 mg, Oral, Daily  folic acid, 1 mg, Oral, Daily  gabapentin, 200 mg, Oral, Q12H  hydrALAZINE, 25 mg, Oral, Q8H  ipratropium-albuterol, 3 mL, Nebulization, Q6H - RT  levothyroxine, 88 mcg, Oral, Q AM  montelukast, 10 mg, Oral, Nightly  nicotine, 1 patch, Transdermal, Q24H  pantoprazole, 40 mg, Oral, Daily  rosuvastatin, 10 mg, Oral, Nightly  sertraline, 25 mg, Oral, Daily  sodium chloride, 10 mL, Intravenous, Q12H  vitamin B-12, 1,000 mcg, Oral, Daily      heparin, 13.9 Units/kg/hr (Dosing Weight),  Last Rate: 13.9 Units/kg/hr (10/28/23 1210)  Pharmacy to Dose Heparin,             Assessment & Plan   Anasarca: proBNP and ReDs vest reading elevated on admission. Imaging revealed CHF. Echo revealed an EF of 56-60%, grade I diastolic dysfunction, mild AS, mild pulmonary HTN and small pericardial effusion with no evidence of cardiac tamponade. Worsening renal failure possibly contributing with acute diastolic CHF as well. Failed trials of diuretics with initiation of HD on 10/19. HD per nephrology.     Acute exacerbation of COPD: Possibly exacerbated by pulmonary edema. Respiratory PCR negative. Pulm consulted and assisted with steroid taper. Course of azithromycin completed. Wheezing resolved. Cont nebs. Pulm input appreciated.     Bilateral pneumonia: Procal normal. Cultures with NGTD. Completed course of azithromycin as above. Cont nebs.     Acute hypoxic respiratory failure: Likely multifactorial in the setting of above. Post-op mucous plugging noted as well but improved. Pt has refused BiPAP. O2 requirements stable today. Treatment as outlined above. Wean supplemental O2 as tolerated.     MAICO on CKD IIIa with initiation of HD this admission: Baseline Cr around 2.2. Non nephrotic proteinuria. Complements are normal with negative serologies. No evidence of hydronephrosis on renal US with US revealing small and echogenic right kidney. Likely 2/2 ATN 2/2 prolonged prerenal state and hemodynamic changes with use of ACE inhibitors as well. S/P tunneled HD catheter placement with initiation of HD on 10/19 as above. Cont scheduled HD. Cont to monitor closely. Nephrology input appreciated.     Hyperkalemia: Resolved with targeted treatment and initiation of HD. Holding home lisinopril. Monitor on telemetry.     New onset Afib with RVR: No evidence of ischemia on stress test. Previously changed Coreg to metoprolol. BP later borderline low limiting Rx options. Decreased hydralazine to allow rise in BP with subsequent  improvement. Cardiology initiated amiodarone. Continued to have short episodes of Afib with RVR in addition to episodes of bradycardia. Cardiology subsequently stopped metoprolol. Appears more stable today. Cont PO amiodarone. Currently on heparin gtt for stroke prevention. Monitor on telemetry. Cardiology input appreciated.     Essential HTN: BP previously elevated but later borderline low after initiation of HD. Changed Coreg to metoprolol as above. Decreased hydralazine with BP improved. Attempt to avoid wide fluctuations in BP. Cont to monitor.     Hypothyroidism: TSH mildly elevated with normal free T4. Cont levothyroxine. Will need repeat labs as an outpatient.     Anxiety: Psychiatry consulted and started sertraline. Nephrology previously ordered Xanax with HD but held 2/2 episodes of oversedation. Cont supportive treatment.     Tobacco abuse: Cont NRT and encourage cessation.     Morbid obesity by BMI: Complicates all aspects of care.     DVT PPX: Heparin gtt     Disposition SS working to arrange HD chair when medically stable.     José Miguel Tellez DO  10/28/23  14:57 EDT     Electronically signed by José Miguel Tellez DO at 10/28/23 1500       Zheng Castro MD at 10/28/23 0921              [facility]  PROGRESS NOTE    Patient information:  Name: Lesley Michel  Age/Sex: 67 y.o. female  :  1955        PCP: Woodrow Raymond APRN  Attending: Fanny Ford MD  MRN:  [id number]  Visit Number:  [id number]    LOS:  LOS: 18 days     CODE STATUS:    Code Status and Medical Interventions:   Ordered at: 10/12/23 1553     Medical Intervention Limits:    NO intubation (DNI)     Level Of Support Discussed With:    Patient     Code Status (Patient has no pulse and is not breathing):    No CPR (Do Not Attempt to Resuscitate)     Medical Interventions (Patient has pulse or is breathing):    Limited Support       PROBLEM LIST:Principal  Problem:    Acute hypoxic respiratory failure  Active Problems:    Acute renal failure (ARF)      Reason for Cardiology follow-up: New onset atrial fibrillation    Subjective   ADMISSION INFORMATION:  Chief Complaint   Patient presents with    Shortness of Breath    Edema       HPI:  Lesley Michel is a 67 y.o. female with a past medical history significant for  CAD chronically occluded well collateralized dominant RCA, status post drug-eluting stent to LAD with high-grade stenosis of the ostium of the small D1 and 60% mid RCA post lateral branch lesion, hyperlipidemia, essential hypertension, COPD, current tobacco abuse, CKD stage II, hypothyroidism, arthritis, anxiety, history of migraines, obesity by BMI, and history of medical noncompliance.        Patient presented to [facility] [place] emergency room (ER) on 10/8/2023 with complaints of increasing lower extremity edema and shortness of breath which has been progressively worsening over the past few days prior to presentation.  10/24/2023, Cardiology was consulted for further evaluation and management for what appears to be new onset atrial fibrillation with RVR since 10/23/2023.      Primary Cardiologist: Imelda ARENAS/Dr. Echeverria and her last office visit was on 10/6/2023.     PROCEDURES:   10/09/2023: ECHO with EF of 56 to 60%, diastolic function consistent with grade 1A w/ high LAP, mild aortic valve stenosis, and a small (<1 cm) pericardial effusion noted with no evidence of cardiac tamponade.  10/25/2023: P.O. Amiodarone started and is scheduled to be decreased on Saturday 10/28/202  10/26/2023: HD      Interval History:   Patient is in room 321 and was examined by Dr. Castro.  Per nursing notes, no new events overnight.  Patient sitting on side of bed. BP this a.m. 134/70, heart rate 50s to 60s.   Patient receives hemodialysis, creatinine 4.35.  Potassium 3.6, hemoglobin 7.9.  Heparin infusion.     Vital Signs  Temp:  [97.7 °F (36.5  °C)-98.3 °F (36.8 °C)] 98.3 °F (36.8 °C)  Heart Rate:  [55-66] 56  Resp:  [18-20] 18  BP: (111-150)/(67-86) 134/70  Flow (L/min):  [4] 4  Vital Signs (last 72 hrs)         10/25 0700  10/26 0659 10/26 0700  10/27 0659 10/27 0700  10/28 0659 10/28 0700  10/28 0921   Most Recent      Temp (°F) 97.4 -  98.3    97 -  98.5    97.7 -  98.3       98.3 (36.8) 10/28 0648    Heart Rate 54 -  66    52 -  137    55 -  66       56 10/28 0648    Resp 16 -  18    16 -  20    18 -  20       18 10/28 0648    /65 -  153/86    108/55 -  181/96    111/77 -  150/73       134/70 10/28 0648    SpO2 (%) 95 -  97    93 -  98    97 -  99       97 10/28 0648    Flow (L/min) 3 -  4    3 -  4      4       4 10/28 0019    Oxygen Concentration (%)   3           3 10/25 1843          BMI:  Body mass index is 41.11 kg/m².    WEIGHT:      10/27/23  0500 10/28/23  0500   Weight: 99.6 kg (219 lb 8 oz) 95.5 kg (210 lb 8 oz)       DIET:  Diet: Regular/House Diet, Cardiac Diets, Diabetic Diets, Renal Diets; Healthy Heart (2-3 Na+); Consistent Carbohydrate; Low Potassium; Texture: Regular Texture (IDDSI 7); Fluid Consistency: Thin (IDDSI 0)    I&O:  Intake & Output (last 3 days)         10/25 0701  10/26 0700 10/26 0701  10/27 0700 10/27 0701  10/28 0700 10/28 0701  10/29 0700    P.O. 600 640 540     Total Intake(mL/kg) 600 (6) 640 (6.4) 540 (5.3)     Urine (mL/kg/hr)  100 (0) 100 (0)     Stool  0 0     Dialysis  1571      Total Output  1671 100     Net +600 -1031 +440             Urine Unmeasured Occurrence 1 x 2 x 1 x     Stool Unmeasured Occurrence  0 x 1 x             Objective     Physical Exam:     Vitals reviewed.   Constitutional:       Appearance: Normal appearance. Well-developed. Obese.   Eyes:      Conjunctiva/sclera: Conjunctivae normal.   HENT:      Head: Normocephalic.   Neck:      Thyroid: No thyromegaly.      Vascular: No carotid bruit or JVD.   Pulmonary:      Effort: Pulmonary effort is normal.      Breath sounds: Normal breath  sounds.   Cardiovascular:      Normal rate. Regular rhythm.   Edema:     Peripheral edema absent.   Abdominal:      General: Bowel sounds are normal.      Palpations: Abdomen is soft. There is no hepatomegaly or splenomegaly.      Tenderness: There is no abdominal tenderness.   Musculoskeletal: Normal range of motion.      Cervical back: Neck supple. Skin:     General: Skin is warm and dry.   Neurological:      Mental Status: Alert and oriented to person, place, and time.   Psychiatric:         Attention and Perception: Attention normal.         Mood and Affect: Mood normal.         Speech: Speech normal.         Behavior: Behavior normal. Behavior is cooperative.         Cognition and Memory: Cognition normal.         Results review   Results Review:   Results from last 7 days   Lab Units 10/28/23  0032 10/27/23  0712 10/26/23  0429 10/25/23  0706 10/24/23  0336 10/23/23  0255   WBC 10*3/mm3 7.37 8.42 8.12 8.53 6.00 5.71   HEMOGLOBIN g/dL 7.9* 8.3* 8.2* 8.1* 8.4* 8.0*   PLATELETS 10*3/mm3 202 209 211 190 192 190     Results from last 7 days   Lab Units 10/28/23  0032 10/27/23  0712 10/26/23  0429 10/25/23  0706 10/24/23  0336 10/23/23  0255   SODIUM mmol/L 135* 136 133* 135* 133* 135*   POTASSIUM mmol/L 3.6 4.0 3.9 4.0 4.9 4.7   CHLORIDE mmol/L 93* 95* 91* 92* 92* 94*   CO2 mmol/L 30.1* 28.8 28.9 30.0* 27.4 31.0*   BUN mg/dL 19 17 31* 24* 36* 43*   CREATININE mg/dL 4.35* 3.73* 4.77* 3.99* 5.25* 5.43*   CALCIUM mg/dL 9.0 8.9 9.1 9.0 9.3 8.9   GLUCOSE mg/dL 85 97 87 86 103* 100*   ALT (SGPT) U/L <5  --   --   --  5  --    AST (SGOT) U/L 11  --   --   --  16  --      Results from last 7 days   Lab Units 10/21/23  1716 10/21/23  1316   HSTROP T ng/L 54* 51*     Lab Results   Component Value Date    PROBNP 8,093.0 (H) 10/08/2023     Estimated Creatinine Clearance: 13.3 mL/min (A) (by C-G formula based on SCr of 4.35 mg/dL (H)).  Lab Results   Component Value Date    ABSOLUTELUNG 43 (A) 10/13/2023     Lab Results  "  Component Value Date    INR 1.07 10/24/2023     Lab Results   Component Value Date    MG 2.0 10/09/2023    MG 1.9 10/08/2023     Lab Results   Component Value Date    TSH 6.770 (H) 10/08/2023      No results found for: \"CHOL\", \"TRIG\", \"HDL\", \"LDL\"  Pain Management Panel          Latest Ref Rng & Units 10/9/2023   Pain Management Panel   Creatinine, Urine mg/dL 116.0      Microbiology Results (last 10 days)       Procedure Component Value - Date/Time    Respiratory Culture - Sputum, Cough [[id number]] Collected: 10/25/23 1513    Lab Status: Final result Specimen: Sputum from Cough Updated: 10/27/23 0902     Respiratory Culture Light growth (2+) Normal respiratory yuly. No S. aureus or Pseudomonas aeruginosa detected. Final report.     Gram Stain Few (2+) Epithelial cells seen      Moderate (3+) WBCs seen      Moderate (3+) Mixed bacterial morphotypes seen on Gram Stain    Blood Culture - Blood, Arm, Right [[id number]]  (Abnormal) Collected: 10/20/23 1334    Lab Status: Final result Specimen: Blood from Arm, Right Updated: 10/23/23 0825     Blood Culture Micrococcus species     Comment: No specific CLSI guidelines and no validated TANYA testing method.  Rarely implicated in infections.          Isolated from Aerobic Bottle     Gram Stain Aerobic Bottle Gram positive cocci in clusters    Narrative:      Probable contaminant requires clinical correlation, susceptibility not performed unless requested by physician.      Blood Culture ID, PCR - Blood, Arm, Right [[id number]] Collected: 10/20/23 1334    Lab Status: Final result Specimen: Blood from Arm, Right Updated: 10/22/23 0519     BCID, PCR Negative by BCID PCR. Culture to Follow.     BOTTLE TYPE Aerobic Bottle    Blood Culture - Blood, Arm, Left [[id number]]  (Normal) Collected: 10/20/23 1234    Lab Status: Final result Specimen: Blood from Arm, Left Updated: 10/25/23 1402     Blood Culture No growth at 5 days           Imaging Results (Last 24 Hours)       Procedure " Component Value Units Date/Time    XR Chest 1 View [[id number]] Collected: 10/28/23 0846     Updated: 10/28/23 0849    Narrative:      EXAM:    XR Chest, 1 View     EXAM DATE:    10/28/2023 9:19 AM     CLINICAL HISTORY:    sob; N17.9-Acute kidney failure, unspecified; N18.9-Chronic kidney  disease, unspecified; I50.9-Heart failure, unspecified; J96.21-Acute and  chronic respiratory failure with hypoxia; E87.5-Hyperkalemia;  N17.9-Acute kidney failure, unspecified     TECHNIQUE:    Frontal view of the chest.     COMPARISON:    10/25/2023     FINDINGS:    LUNGS AND PLEURAL SPACES:  Slightly improved aeration of right lung  base airspace disease.  Slightly improved aeration of left lung base  airspace disease.  Probably tiny right pleural effusion.  No  pneumothorax.    HEART:  Heart size is stable.    MEDIASTINUM:  Unremarkable as visualized.    BONES/JOINTS:  Unremarkable as visualized.    TUBES, LINES AND DEVICES:  Right central catheter with tip in SVC.       Impression:      1.  Slightly improved aeration of right lung base airspace disease.  2.  Slightly improved aeration of left lung base airspace disease.  3.  Probably tiny right pleural effusion.        This report was finalized on 10/28/2023 8:47 AM by Dr. Kirk Mcdonald MD.               ECHO:  Results for orders placed during the hospital encounter of 10/08/23    Adult Transthoracic Echo Limited W/ Cont if Necessary Per Protocol    Interpretation Summary    Left ventricular systolic function is hyperdynamic (EF > 70%). Left ventricular ejection fraction appears to be greater than 70%.    Left ventricular diastolic function was not assessed.    The left atrial cavity is mildly dilated.    The right atrial cavity is borderline dilated.    There is a small (<1cm) pericardial effusion.    This is a technical limited study.      STRESS TEST:  Results for orders placed during the hospital encounter of 10/08/23    Stress Test With Myocardial Perfusion One  Day    Interpretation Summary    Left ventricular ejection fraction is normal (Calculated EF = 68%).    Myocardial perfusion imaging indicates a normal myocardial perfusion study with no evidence of ischemia.    TID 1.05.    Findings consistent with a normal ECG stress test.       HEART CATH:  No results found for this or any previous visit.      EKG:      TELEMETRY:   sinus 50-60's       I reviewed the patient's new clinical results.    ALLERGIES: Patient has no known allergies.    Medication Review:   Current list of medications may not reflect those currently placed in orders that are not signed or are being held.     acetylcysteine, 1.5 mL, Nebulization, Q6H - RT  amiodarone, 200 mg, Oral, Q12H  aspirin, 81 mg, Oral, Daily  folic acid, 1 mg, Oral, Daily  gabapentin, 200 mg, Oral, Q12H  hydrALAZINE, 25 mg, Oral, Q8H  ipratropium-albuterol, 3 mL, Nebulization, Q6H - RT  levothyroxine, 88 mcg, Oral, Q AM  montelukast, 10 mg, Oral, Nightly  nicotine, 1 patch, Transdermal, Q24H  pantoprazole, 40 mg, Oral, Daily  rosuvastatin, 10 mg, Oral, Nightly  sertraline, 25 mg, Oral, Daily  sodium chloride, 10 mL, Intravenous, Q12H  vitamin B-12, 1,000 mcg, Oral, Daily      heparin, 10.9 Units/kg/hr (Dosing Weight), Last Rate: 10.9 Units/kg/hr (10/28/23 0208)  Pharmacy to Dose Heparin,         acetaminophen    calcium carbonate    dextrose    dextrose    glucagon (human recombinant)    hydrALAZINE    HYDROcodone-acetaminophen    hydrOXYzine    melatonin    metoprolol tartrate    nitroglycerin    ondansetron    Pharmacy to Dose Heparin    [COMPLETED] Insert Peripheral IV **AND** sodium chloride    sodium chloride    sodium chloride    Assessment    Assessment:  1.  Coronary artery disease currently pain-free  #2 atrial fibrillation.    #3 renal failure on hemodialysis  #4 positive blood culture on 10/20/2023 possible contaminant                    Plan   Recommendations:  1.  Cardiac.  Patient with paroxysmal atrial fibrillation.   Currently on amiodarone.  Stress test negative for ischemia with normal EF.  We will continue to watch her closely and clinically.  Will start Eliquis and stop IV heparin              I have discussed this patient with Dr. Castro and together, we have formed a plan of care for this patient as stated above in the recommendations.     I have discussed the patients findings and recommendations with patient.    Thank you very much for asking us to be involved in this patient's care.  We will follow along with you.          Electronically signed by DEEPA Villarreal, 10/28/23, 9:31 AM EDT.            Please note that portions of this note were completed with a voice recognition program.    Please note that portions of this note were copied and has been reviewed and is accurate as of 10/28/2023 .      .Electronically signed by Zheng Castro MD, 10/28/23, 3:16 PM EDT.      Electronically signed by Zheng Castro MD at 10/28/23 1517       Vani León MD at 10/27/23 1826          Nephrology Progress Note      Subjective   Not in acute distress, ongoing relatively better.    Objective       Vital signs :     Vitals:    10/27/23 0707 10/27/23 1050 10/27/23 1318 10/27/23 1700   BP:  150/73  145/86   BP Location:  Left arm  Right arm   Patient Position:  Lying  Lying   Pulse: 56 58 58    Resp: 18 18 20 18   Temp:  98 °F (36.7 °C)     TempSrc:  Oral  Oral   SpO2: 99% 98% 98% 98%   Weight:       Height:            Intake/Output                         10/25/23 0701 - 10/26/23 0700 10/26/23 0701 - 10/27/23 0700     0767-0081 4571-6790 Total 4466-1586 2736-0684 Total                 Intake    P.O.  360  240 600  490  150 640    Total Intake 360 240 600 490 150 640       Output    Urine  --  -- --  100  -- 100    Dialysis  --  -- --  1571  -- 1571    Total Output -- -- -- 1671 -- 1671             Physical Exam:    General Appearance : Not in acute distress  Lungs : Bibasilar crackles noted trace edema  Heart :  regular rhythm  "& normal rate, normal S1, S2 and no murmur, no rub  Abdomen : Soft nontender nondistended   extremities : Trace edema  Neurologic :   Unable to assess fully    Laboratory Data :       CBC and coagulation:  Results from last 7 days   Lab Units 10/27/23  0712 10/26/23  0429 10/25/23  0706 10/24/23  1609   PROCALCITONIN ng/mL 0.34*  --   --   --    WBC 10*3/mm3 8.42 8.12 8.53  --    HEMOGLOBIN g/dL 8.3* 8.2* 8.1*  --    HEMATOCRIT % 28.6* 27.2* 26.9*  --    MCV fL 92.6 90.1 90.3  --    MCHC g/dL 29.0* 30.1* 30.1*  --    PLATELETS 10*3/mm3 209 211 190  --    INR   --   --   --  1.07     Acid/base balance:  Results from last 7 days   Lab Units 10/25/23  0252 10/21/23  0907   PH, ARTERIAL pH units 7.381 7.357   PO2 ART mm Hg 75.3* 72.2*   PCO2, ARTERIAL mm Hg 54.7* 52.6*   HCO3 ART mmol/L 32.4* 29.5*       Renal and electrolytes:    Results from last 7 days   Lab Units 10/27/23  0712 10/26/23  0429 10/25/23  0706 10/24/23  0336 10/23/23  0255   SODIUM mmol/L 136 133* 135* 133* 135*   POTASSIUM mmol/L 4.0 3.9 4.0 4.9 4.7   CHLORIDE mmol/L 95* 91* 92* 92* 94*   CO2 mmol/L 28.8 28.9 30.0* 27.4 31.0*   BUN mg/dL 17 31* 24* 36* 43*   CREATININE mg/dL 3.73* 4.77* 3.99* 5.25* 5.43*   CALCIUM mg/dL 8.9 9.1 9.0 9.3 8.9     Estimated Creatinine Clearance: 15.9 mL/min (A) (by C-G formula based on SCr of 3.73 mg/dL (H)).     Liver and pancreatic function:  Results from last 7 days   Lab Units 10/24/23  0336   ALBUMIN g/dL 3.4*   BILIRUBIN mg/dL 0.5   ALK PHOS U/L 121*   AST (SGOT) U/L 16   ALT (SGPT) U/L 5       Albumin No results found for: \"ALBUMIN\"       Magnesium No results found for: \"MG\"         PTH               No results found for: \"PTH\"    Cardiac:        Liver and pancreatic function:  Results from last 7 days   Lab Units 10/24/23  0336   ALBUMIN g/dL 3.4*   BILIRUBIN mg/dL 0.5   ALK PHOS U/L 121*   AST (SGOT) U/L 16   ALT (SGPT) U/L 5       XR Chest 1 View    Result Date: 10/25/2023  Bibasilar consolidation    This report " was finalized on 10/25/2023 12:44 PM by Dr. Julio Dominguez MD.      XR Chest 1 View    Result Date: 10/22/2023   Mild improvement in the appearance of the chest when compared to the prior examination.   This report was finalized on 10/22/2023 8:58 PM by Shayna Gee MD.      XR Chest 1 View    Result Date: 10/20/2023  1.  Better expansion of the right lower lobe with some persistent airspace disease. 2.  Minimal left lower lobe airspace disease. 3.  Right central catheter with tip in SVC. 4.  Coarsened interstitial markings noted throughout the lungs. 5.  Mild cardiomegaly again noted.   This report was finalized on 10/20/2023 8:34 AM by Dr. Kirk Mcdonald MD.        Medications :     acetylcysteine, 1.5 mL, Nebulization, Q6H - RT  amiodarone, 200 mg, Oral, Q12H  aspirin, 81 mg, Oral, Daily  folic acid, 1 mg, Oral, Daily  gabapentin, 200 mg, Oral, Q12H  hydrALAZINE, 25 mg, Oral, Q8H  ipratropium-albuterol, 3 mL, Nebulization, Q6H - RT  levothyroxine, 88 mcg, Oral, Q AM  montelukast, 10 mg, Oral, Nightly  mupirocin, 1 application , Each Nare, BID  nicotine, 1 patch, Transdermal, Q24H  pantoprazole, 40 mg, Oral, Daily  rosuvastatin, 10 mg, Oral, Nightly  sertraline, 25 mg, Oral, Daily  sodium chloride, 10 mL, Intravenous, Q12H  vitamin B-12, 1,000 mcg, Oral, Daily      heparin, 14.9 Units/kg/hr, Last Rate: 14.9 Units/kg/hr (10/27/23 0827)  Pharmacy to Dose Heparin,           Assessment & Plan     -Acute kidney injury initiated on dialysis during this hospitalization on 10/19/2023  -Atrophic right kidney  -Chronic kidney disease stage IIIa  -Acute hypoxic respite failure, improving  -COPD  -Essential hypertension  -Medical noncompliance    Continue on intermittent dialysis 3 times a week, Tuesdays Thursdays and Saturdays.  Awaiting outpatient dialysis chair.  No clinical signs of renal recovery yet    MAICO on CKD most likely multifactori including ATN from prolonged prerenal state and hemodynamic changes with  concomitant use of ACE inhibitors in setting of volume loss   baseline creatinine 2.2, admitted with 3.69   ultrasound of the kidneys showed atrophic right kidney  Serologies are negative    Please avoid nephrotoxic medication and pharmacy to adjust the medication according to the GFR     Plan of care discussed with pt, and family answered all questions, patient verbalized understanding and agreed.      Vani León MD  10/27/23  18:26 EDT      Electronically signed by Vani León MD at 10/27/23 1828       José Miguel Tellez DO at 10/27/23 1631                [facility]    Subjective     History:   Lesley Michel is a 67 y.o. female admitted on 10/8/2023 secondary to Acute hypoxic respiratory failure     Procedures:   10/19/23: Right IJ tunneled HD catheter placement  10/27/23: Nuclear stress test     Left ventricular ejection fraction is normal (Calculated EF = 68%).    Myocardial perfusion imaging indicates a normal myocardial perfusion study with no evidence of ischemia.    TID 1.05.    Findings consistent with a normal ECG stress test.    CC: Follow up MAICO    Patient seen and examined with NADEEM Salas. Awake and alert. Slightly confused. Dyspnea remains overall improved. No reported CP. No reported vomiting. BP stable. HR continues to fluctuate. No acute events overnight per RN.     History taken from: patient, chart, and RN.      Objective     Vital Signs  Temp:  [97.8 °F (36.6 °C)-98.5 °F (36.9 °C)] 98 °F (36.7 °C)  Heart Rate:  [] 58  Resp:  [16-20] 20  BP: (108-150)/(55-98) 150/73    Intake/Output Summary (Last 24 hours) at 10/27/2023 1631  Last data filed at 10/27/2023 0323  Gross per 24 hour   Intake 400 ml   Output --   Net 400 ml         Physical Exam:   General:    Awake, alert, slightly confused, no acute distress, ill appearing   Heart:      Normal S1 and S2. Regular rate and rhythm.    Lungs:     Respirations regular, even and unlabored. Diminished  breath sounds at bases. No wheezes appreciated.     Abdomen:   Soft and nontender. No guarding, rebound tenderness or  organomegaly noted. Bowel sounds present x 4.   Extremities:  Tr bilateral lower extremity edema but overall improved. Moves UE and LE equally B/L.     Results Review:    Results from last 7 days   Lab Units 10/27/23  0712 10/26/23  0429 10/25/23  0706 10/24/23  0336 10/23/23  0255 10/21/23  0325   WBC 10*3/mm3 8.42 8.12 8.53 6.00 5.71 8.84   HEMOGLOBIN g/dL 8.3* 8.2* 8.1* 8.4* 8.0* 8.8*   PLATELETS 10*3/mm3 209 211 190 192 190 226     Results from last 7 days   Lab Units 10/27/23  0712 10/26/23  0429 10/25/23  0706 10/24/23  0336 10/23/23  0255 10/21/23  0325   SODIUM mmol/L 136 133* 135* 133* 135* 133*   POTASSIUM mmol/L 4.0 3.9 4.0 4.9 4.7 4.4   CHLORIDE mmol/L 95* 91* 92* 92* 94* 93*   CO2 mmol/L 28.8 28.9 30.0* 27.4 31.0* 25.6   BUN mg/dL 17 31* 24* 36* 43* 32*   CREATININE mg/dL 3.73* 4.77* 3.99* 5.25* 5.43* 3.83*   CALCIUM mg/dL 8.9 9.1 9.0 9.3 8.9 9.0   GLUCOSE mg/dL 97 87 86 103* 100* 71     Results from last 7 days   Lab Units 10/24/23  0336   BILIRUBIN mg/dL 0.5   ALK PHOS U/L 121*   AST (SGOT) U/L 16   ALT (SGPT) U/L 5           Results from last 7 days   Lab Units 10/24/23  1609   INR  1.07     Results from last 7 days   Lab Units 10/21/23  1716 10/21/23  1316   HSTROP T ng/L 54* 51*       Imaging Results (Last 24 Hours)       ** No results found for the last 24 hours. **              Medications:  acetylcysteine, 1.5 mL, Nebulization, Q6H - RT  amiodarone, 200 mg, Oral, Q12H  aspirin, 81 mg, Oral, Daily  folic acid, 1 mg, Oral, Daily  gabapentin, 200 mg, Oral, Q12H  hydrALAZINE, 25 mg, Oral, Q8H  ipratropium-albuterol, 3 mL, Nebulization, Q6H - RT  levothyroxine, 88 mcg, Oral, Q AM  montelukast, 10 mg, Oral, Nightly  mupirocin, 1 application , Each Nare, BID  nicotine, 1 patch, Transdermal, Q24H  pantoprazole, 40 mg, Oral, Daily  rosuvastatin, 10 mg, Oral, Nightly  sertraline, 25 mg,  Oral, Daily  sodium chloride, 10 mL, Intravenous, Q12H  vitamin B-12, 1,000 mcg, Oral, Daily      heparin, 14.9 Units/kg/hr, Last Rate: 14.9 Units/kg/hr (10/27/23 0827)  Pharmacy to Dose Heparin,             Assessment & Plan   Anasarca: proBNP and ReDs vest reading elevated on admission. Imaging revealed CHF. Echo revealed an EF of 56-60%, grade I diastolic dysfunction, mild AS, mild pulmonary HTN and small pericardial effusion with no evidence of cardiac tamponade. Worsening renal failure possibly contributing with acute diastolic CHF as well. Failed trials of diuretics with initiation of HD on 10/19. HD per nephrology.     Acute exacerbation of COPD: Possibly exacerbated by pulmonary edema. Respiratory PCR negative. Pulm consulted and assisted with steroid taper. Course of azithromycin completed. Wheezing resolved. Cont nebs. Pulm input appreciated.     Bilateral pneumonia: Procal normal. Cultures with NGTD. Completed course of azithromycin as above. Cont nebs.     Acute hypoxic respiratory failure: Likely multifactorial in the setting of above. Post-op mucous plugging noted as well but improved. Pt has refused BiPAP. O2 requirements stable today. Treatment as outlined above. Wean supplemental O2 as tolerated.     MAICO on CKD IIIa with initiation of HD this admission: Baseline Cr around 2.2. Non nephrotic proteinuria. Complements are normal with negative serologies. No evidence of hydronephrosis on renal US with US revealing small and echogenic right kidney. Likely 2/2 ATN 2/2 prolonged prerenal state and hemodynamic changes with use of ACE inhibitors as well. S/P tunneled HD catheter placement with initiation of HD on 10/19 as above. Cont scheduled HD. Cont to monitor closely. Nephrology input appreciated.     Hyperkalemia: Resolved with targeted treatment and initiation of HD. Holding home lisinopril. Monitor on telemetry.     New onset Afib with RVR: No evidence of ischemia on stress test. Previously changed  Coreg to metoprolol. BP later borderline low limiting Rx options. Decreased hydralazine to allow rise in BP with subsequent improvement. Cardiology initiated amiodarone. Continues to have short episodes of Afib with RVR in addition to episodes of bradycardia. Cardiology has stopped metoprolol today. Currently on heparin gtt for stroke prevention. Monitor on telemetry. Cardiology input appreciated.     Essential HTN: BP previously elevated but later borderline low after initiation of HD. Changed Coreg to metoprolol as above. Decreased hydralazine with BP improved. Attempt to avoid wide fluctuations in BP. Cont to monitor.     Hypothyroidism: TSH mildly elevated with normal free T4. Cont levothyroxine. Will need repeat labs as an outpatient.     Anxiety: Psychiatry consulted and started sertraline. Nephrology previously ordered Xanax with HD but held 2/2 episodes of oversedation. Cont supportive treatment.     Tobacco abuse: Cont NRT and encourage cessation.     Morbid obesity by BMI: Complicates all aspects of care.     DVT PPX: Heparin gtt     Disposition SS working to arrange HD chair when medically stable.     José Miguel Tellez DO  10/27/23  16:31 EDT     Electronically signed by José Miguel Tellez DO at 10/27/23 3093       Elmer Mckeon MD at 10/27/23 1049              Referring Provider: dr Tellez  Reason for Consultation: sob, hypoxic respiratory failure      Chief complaint -sob      Sub-all the labs medications and the notes were reviewed.  Resting in bed comfortably.  Not in any distress.  Tolerating dialysis  Better now.     Review of Systems-resting in bed comfortably not in any distress.      History  Past Medical History:   Diagnosis Date    Anxiety     Arthritis     Coronary artery disease     H/O heart artery stent     Hyperlipidemia     Hypertension     Obesity    ,   Past Surgical History:   Procedure Laterality Date    ANKLE SURGERY      RIGHT    APPENDECTOMY      CARDIAC  CATHETERIZATION      LEFT    CORONARY STENT PLACEMENT      HYSTERECTOMY      INSERTION HEMODIALYSIS CATHETER Right 10/19/2023    Procedure: HEMODIALYSIS CATHETER INSERTION;  Surgeon: Bartolome Schwartz MD;  Location: [facility];  Service: General;  Laterality: Right;   ,   Family History   Problem Relation Age of Onset    Heart disease Mother     Heart attack Mother 73    Fibromyalgia Mother     Heart attack Father 72    Ovarian cancer Sister     Coronary artery disease Other     Breast cancer Neg Hx    ,   Social History     Tobacco Use    Smoking status: Every Day     Packs/day: 0.50     Years: 30.00     Additional pack years: 0.00     Total pack years: 15.00     Types: Electronic Cigarette, Cigarettes    Smokeless tobacco: Never   Vaping Use    Vaping Use: Never used   Substance Use Topics    Alcohol use: No    Drug use: No   ,   Medications Prior to Admission   Medication Sig Dispense Refill Last Dose    aspirin 325 MG tablet Take 1 tablet by mouth Daily.   10/8/2023    carvedilol (COREG) 25 MG tablet Take 1 tablet by mouth 2 (Two) Times a Day With Meals. 60 tablet 0 10/8/2023    folic acid (FOLVITE) 1 MG tablet Take 1 tablet by mouth Daily.   10/8/2023    hydrALAZINE (APRESOLINE) 25 MG tablet Take 1 tablet by mouth 2 (Two) Times a Day. 60 tablet 0 10/8/2023    levothyroxine (SYNTHROID, LEVOTHROID) 88 MCG tablet Take 1 tablet by mouth Daily.   10/8/2023    lisinopril (PRINIVIL,ZESTRIL) 5 MG tablet Take 1 tablet by mouth Daily.   10/8/2023    montelukast (SINGULAIR) 10 MG tablet Take 1 tablet by mouth Every Night.   10/8/2023    pantoprazole (PROTONIX) 40 MG EC tablet Take 1 tablet by mouth Daily.   10/8/2023    rosuvastatin (CRESTOR) 20 MG tablet Take 1 tablet by mouth Daily. 30 tablet 0 10/8/2023    vitamin B-12 (CYANOCOBALAMIN) 1000 MCG tablet Take 1 tablet by mouth Daily.   10/8/2023    gabapentin (NEURONTIN) 800 MG tablet Take 1 tablet by mouth 2 (Two) Times a Day As Needed (nerve pain).   Unknown     HYDROcodone-acetaminophen (NORCO)  MG per tablet Take 1 tablet by mouth Every 8 (Eight) Hours As Needed for Moderate Pain.   Unknown    nitroglycerin (NITROSTAT) 0.4 MG SL tablet Place 1 tablet under the tongue Every 5 (Five) Minutes As Needed for Chest Pain. 30 tablet 2 Unknown   , Scheduled Meds:  acetylcysteine, 1.5 mL, Nebulization, Q6H - RT  aspirin, 81 mg, Oral, Daily  folic acid, 1 mg, Oral, Daily  gabapentin, 200 mg, Oral, Q12H  hydrALAZINE, 25 mg, Oral, Q8H  ipratropium-albuterol, 3 mL, Nebulization, Q6H - RT  levothyroxine, 88 mcg, Oral, Q AM  metoprolol tartrate, 75 mg, Oral, Q12H  montelukast, 10 mg, Oral, Nightly  mupirocin, 1 application , Each Nare, BID  nicotine, 1 patch, Transdermal, Q24H  pantoprazole, 40 mg, Oral, Daily  rosuvastatin, 10 mg, Oral, Nightly  sertraline, 25 mg, Oral, Daily  sodium chloride, 10 mL, Intravenous, Q12H  vitamin B-12, 1,000 mcg, Oral, Daily    , Continuous Infusions:  heparin, 14.9 Units/kg/hr, Last Rate: 14.9 Units/kg/hr (10/27/23 0827)  Pharmacy to Dose Heparin,        and Allergies:  Patient has no known allergies.    Objective     Vital Signs   Temp:  [97.2 °F (36.2 °C)-98.5 °F (36.9 °C)] 97.8 °F (36.6 °C)  Heart Rate:  [] 56  Resp:  [16-20] 18  BP: (108-181)/(55-98) 110/57    Physical Exam:          General-not in any respiratory distress.     resting in bed comfortably not in any distress.  HEENT-PERRLA    Neck-supple    Respiratory- not in any respiratory distress.  Wearing nasal cannula oxygen, not in any distress  Cardiovascular-NSR  GI-nontender nondistended bowel sounds positive    CNS-nonfocal    Musculoskeletal -no edema  Extremities- no obvious deformity noticed     Psychiatric-not awake oriented   skin- no visible rash                                                                   Results Review:    LABS:    Lab Results   Component Value Date    GLUCOSE 97 10/27/2023    BUN 17 10/27/2023    CREATININE 3.73 (H) 10/27/2023    BCR 4.6 (L)  10/27/2023    CO2 28.8 10/27/2023    CALCIUM 8.9 10/27/2023    PROTENTOTREF 6.5 10/11/2023    ALBUMIN 3.4 (L) 10/24/2023    LABIL2 1.1 10/11/2023    AST 16 10/24/2023    ALT 5 10/24/2023    WBC 8.42 10/27/2023    HGB 8.3 (L) 10/27/2023    HCT 28.6 (L) 10/27/2023    MCV 92.6 10/27/2023     10/27/2023     10/27/2023    K 4.0 10/27/2023    CL 95 (L) 10/27/2023    ANIONGAP 12.2 10/27/2023       Lab Results   Component Value Date    INR 1.07 10/24/2023    PROTIME 14.4 10/24/2023       Results from last 7 days   Lab Units 10/24/23  1609   INR  1.07   APTT seconds 47.6*          I reviewed the patient's new clinical results.  I reviewed the patient's new imaging results and agree with the interpretation.      Assessment & Plan     Shortness of breath-likely multifactorial most likely related to her chronic heart failure with preserved ejection fraction with elevated brain atretic peptide.  Failed diuretics trial and has been on dialysis since 10/19.  Latest chest x-ray shows consolidations.  Oxygen requirement stable.  White count stable.  With patient's mental status could have aspirated.    Continue aspiration precautions.  Chest x-ray ordered for tomorrow morning.  Consider decreasing the dose of Xanax before she goes for dialysis.  Currently getting hemodialysis.  Vital stable    COPD-continue oxygen to maintain saturation 88 to 92%.  Continue nebs  Continue steroids.  Follow-up with pulmonary in the outpatient basis we will get PFTs and accordingly will start on inhalers.    FLOR-sleep study on the outpatient basis.    Renal failure -on dialysis now.  Nephrology on case.        Smoker-did extensive smoking cessation counseling.  Patient is not ready to quit yet.          Echo-  Results for orders placed during the hospital encounter of 10/08/23    Adult Transthoracic Echo Limited W/ Cont if Necessary Per Protocol    Interpretation Summary    Left ventricular systolic function is hyperdynamic (EF > 70%).  Left ventricular ejection fraction appears to be greater than 70%.    Left ventricular diastolic function was not assessed.    The left atrial cavity is mildly dilated.    The right atrial cavity is borderline dilated.    There is a small (<1cm) pericardial effusion.    This is a technical limited study.          Acute hypoxic respiratory failure    Acute renal failure (ARF)          Elmer Mckeon MD  10/27/23  10:49 EDT         Electronically signed by Elmer Mckeon MD at 10/28/23 0714       Dwayne Lopez MD at 10/27/23 0759              [facility]  PROGRESS NOTE    Patient information:  Name: Lesley Michel  Age/Sex: 67 y.o. female  :  1955        PCP: Woodrow Raymond APRN  Attending: Fanny Ford MD  MRN:  [id number]  Visit Number:  [id number]    LOS:  LOS: 17 days     CODE STATUS:    Code Status and Medical Interventions:   Ordered at: 10/12/23 1553     Medical Intervention Limits:    NO intubation (DNI)     Level Of Support Discussed With:    Patient     Code Status (Patient has no pulse and is not breathing):    No CPR (Do Not Attempt to Resuscitate)     Medical Interventions (Patient has pulse or is breathing):    Limited Support       PROBLEM LIST:Principal Problem:    Acute hypoxic respiratory failure  Active Problems:    Acute renal failure (ARF)      Reason for Cardiology follow-up: New onset atrial fibrillation    Subjective   ADMISSION INFORMATION:  Chief Complaint   Patient presents with    Shortness of Breath    Edema     HPI:  Lesley Michel is a 67 y.o. female with a past medical history significant for  CAD chronically occluded well collateralized dominant RCA, status post drug-eluting stent to LAD with high-grade stenosis of the ostium of the small D1 and 60% mid RCA post lateral branch lesion, hyperlipidemia, essential hypertension, COPD, current tobacco abuse, CKD stage II, hypothyroidism, arthritis, anxiety, history of  migraines, obesity by BMI, and history of medical noncompliance.        Patient [facility] ([facility] emergency room (ER) on 10/8/2023 with complaints of increasing lower extremity edema and shortness of breath which has been progressively worsening over the past few days prior to presentation.  10/24/2023, Cardiology was consulted for further evaluation and management for what appears to be new onset atrial fibrillation with RVR since 10/23/2023.      Primary Cardiologist: Imelda ARENAS/Dr. Echeverria and her last office visit was on 10/6/2023.     PROCEDURES:   10/09/2023: ECHO with EF of 56 to 60%, diastolic function consistent with grade 1A w/ high LAP, mild aortic valve stenosis, and a small (<1 cm) pericardial effusion noted with no evidence of cardiac tamponade.  10/25/2023: P.O. Amiodarone started and is scheduled to be decreased on Saturday 10/28/2023  10/26/2023: HD    Interval History:   Patient is in room 321 and was examined by Dr. Lopez.  Telemetry currently SB 40s to 50s with frequent PACs no atrial fibrillation noted with patient since 7:30 PM on 10/26/2023.  Patient was given IV Lopressor at 7 PM and her heart rate dropped to the 30s with a 2-second pause as seen in telemetry strips attached below.  No other adverse events noted through saved or alarm telemetry strips throughout the night.  Patient continues to be on IV heparin at this time.  A.m. labs are pending.  Stress test and limited echo are pending.  Patient is lying in bed resting quietly.  No acute distress noted at this time.  Patient currently denies any chest pain, shortness of breath, or palpitations.    ORDERS:       Vital Signs  Temp:  [97 °F (36.1 °C)-98.5 °F (36.9 °C)] 97.8 °F (36.6 °C)  Heart Rate:  [] 53  Resp:  [16-20] 18  BP: (108-181)/(55-98) 110/57  Flow (L/min):  [3-4] 4  Device (Oxygen Therapy): humidified;nasal cannula  Vital Signs (last 72 hrs)         10/24 0700  10/25 0659 10/25 0700  10/26  0659 10/26 0700  10/27 0659 10/27 0700  10/27 0759   Most Recent      Temp (°F) 97.7 -  98.3    97.4 -  98.3    97 -  98.5       97.8 (36.6) 10/27 0639    Heart Rate 64 -  140    54 -  66    52 -  137       53 10/27 0639    Resp 17 -  20    16 -  18    16 -  20       18 10/27 0639    BP 98/69 -  143/80    118/65 -  153/86    108/55 -  181/96       110/57 10/27 0639    SpO2 (%) 91 -  98    95 -  97    93 -  98       97 10/27 0639    Flow (L/min) 3 -  4    3 -  4    3 -  4       4 10/27 0027    Oxygen Concentration (%)     3         3 10/25 1843          BMI:Body mass index is 42.87 kg/m².    WEIGHT:      10/25/23  0504 10/26/23  0500 10/27/23  0500   Weight: 102 kg (224 lb 6.4 oz) 99.9 kg (220 lb 3.2 oz) 99.6 kg (219 lb 8 oz)       DIET:Diet: Regular/House Diet, Cardiac Diets, Diabetic Diets, Renal Diets; Healthy Heart (2-3 Na+); Consistent Carbohydrate; Low Potassium; Texture: Regular Texture (IDDSI 7); Fluid Consistency: Thin (IDDSI 0)    I&O:  Intake & Output (last 3 days)         10/24 0701  10/25 0700 10/25 0701  10/26 0700 10/26 0701  10/27 0700 10/27 0701  10/28 0700    P.O. 560 600 640     Total Intake(mL/kg) 560 (5.5) 600 (6) 640 (6.4)     Urine (mL/kg/hr)   100 (0)     Stool   0     Dialysis 2300  1571     Total Output 2300  1671     Net -1740 +600 -1031             Urine Unmeasured Occurrence 0 x 1 x 2 x     Stool Unmeasured Occurrence   0 x             Objective     Physical Exam:      General Appearance:    Alert, cooperative, in no acute distress.  BMI 42.87.   Head:    Normocephalic, without obvious abnormality, atraumatic.   Eyes:                          Conjunctivae and sclerae normal, no icterus, no pallor, corneas clear.   Neck:   No adenopathy, supple, trachea midline, no thyromegaly, no carotid bruit, no JVD.   Lungs:     Clear to auscultation, respirations regular, even and             unlabored.    Heart:    Regular rhythm and normal rate, normal S1 and S2, n grade 2/6 ejection systolic murmur  noted and heard best at aortic area and across sternal border, no gallop, no rub, no click   Chest Wall:    No abnormalities observed.   Abdomen:     Normal bowel sounds, no masses, no organomegaly, soft nontender, nondistended, no guarding, no rebound tenderness.   Extremities:   Moves all extremities well, no edema, no cyanosis, no           redness.   Pulses:   Pulses palpable and equal bilaterally.   Skin:   No bleeding, bruising or rash.   Neurologic:   Alert and Oriented x 3, Speech Clear & comprehensive.       Results review   Results Review:   Results from last 7 days   Lab Units 10/21/23  1716 10/21/23  1316   HSTROP T ng/L 54* 51*     Lab Results   Component Value Date    PROBNP 8,093.0 (H) 10/08/2023     Results from last 7 days   Lab Units 10/27/23  0712 10/26/23  0429 10/25/23  0706 10/24/23  0336 10/23/23  0255 10/21/23  0325   WBC 10*3/mm3 8.42 8.12 8.53 6.00 5.71 8.84   HEMOGLOBIN g/dL 8.3* 8.2* 8.1* 8.4* 8.0* 8.8*   PLATELETS 10*3/mm3 209 211 190 192 190 226     Results from last 7 days   Lab Units 10/26/23  0429 10/25/23  0706 10/24/23  0336 10/23/23  0255 10/21/23  0325   SODIUM mmol/L 133* 135* 133* 135* 133*   POTASSIUM mmol/L 3.9 4.0 4.9 4.7 4.4   CHLORIDE mmol/L 91* 92* 92* 94* 93*   CO2 mmol/L 28.9 30.0* 27.4 31.0* 25.6   BUN mg/dL 31* 24* 36* 43* 32*   CREATININE mg/dL 4.77* 3.99* 5.25* 5.43* 3.83*   CALCIUM mg/dL 9.1 9.0 9.3 8.9 9.0   GLUCOSE mg/dL 87 86 103* 100* 71   ALT (SGPT) U/L  --   --  5  --   --    AST (SGOT) U/L  --   --  16  --   --      Lab Results   Component Value Date    MG 2.0 10/09/2023    MG 1.9 10/08/2023     Estimated Creatinine Clearance: 12.5 mL/min (A) (by C-G formula based on SCr of 4.77 mg/dL (H)).    Lab Results   Component Value Date    HGBA1C 5.30 10/08/2023     Lab Results   Component Value Date    CHOL 140 10/08/2023    TRIG 75 10/08/2023    LDL 91 10/08/2023    HDL 34 (L) 10/08/2023     Lab Results   Component Value Date    ABSOLUTELUNG 43 (A) 10/13/2023     ABSOLUTELUNG 37 (A) 10/09/2023     Lab Results   Component Value Date    INR 1.07 10/24/2023     Lab Results   Component Value Date    TSH 6.770 (H) 10/08/2023      Pain Management Panel          Latest Ref Rng & Units 10/9/2023   Pain Management Panel   Creatinine, Urine mg/dL 116.0      Microbiology Results (last 10 days)       Procedure Component Value - Date/Time    Respiratory Culture - Sputum, Cough [[id number]] Collected: 10/25/23 1513    Lab Status: Preliminary result Specimen: Sputum from Cough Updated: 10/26/23 1119     Respiratory Culture Culture in progress     Gram Stain Few (2+) Epithelial cells seen      Moderate (3+) WBCs seen      Moderate (3+) Mixed bacterial morphotypes seen on Gram Stain    Blood Culture - Blood, Arm, Right [[id number]]  (Abnormal) Collected: 10/20/23 1334    Lab Status: Final result Specimen: Blood from Arm, Right Updated: 10/23/23 0825     Blood Culture Micrococcus species     Comment: No specific CLSI guidelines and no validated TANYA testing method.  Rarely implicated in infections.          Isolated from Aerobic Bottle     Gram Stain Aerobic Bottle Gram positive cocci in clusters    Narrative:      Probable contaminant requires clinical correlation, susceptibility not performed unless requested by physician.      Blood Culture ID, PCR - Blood, Arm, Right [[id number]] Collected: 10/20/23 1334    Lab Status: Final result Specimen: Blood from Arm, Right Updated: 10/22/23 0519     BCID, PCR Negative by BCID PCR. Culture to Follow.     BOTTLE TYPE Aerobic Bottle    Blood Culture - Blood, Arm, Left [[id number]]  (Normal) Collected: 10/20/23 1234    Lab Status: Final result Specimen: Blood from Arm, Left Updated: 10/25/23 1402     Blood Culture No growth at 5 days           Imaging Results (Last 24 Hours)       ** No results found for the last 24 hours. **          ECHO:  Results for orders placed during the hospital encounter of 10/08/23    Adult Transthoracic Echo Complete W/ Cont if  Necessary Per Protocol    Interpretation Summary    Left ventricular systolic function is normal. Left ventricular ejection fraction appears to be 56 - 60%.    Left ventricular diastolic function is consistent with (grade Ia w/high LAP) impaired relaxation.    The left atrial cavity is mild to moderately dilated.    Left atrial volume is moderately increased.    Mild aortic valve stenosis is present.    Estimated right ventricular systolic pressure from tricuspid regurgitation is mildly elevated (35-45 mmHg).    There is a small (<1cm) pericardial effusion. There is no evidence of cardiac tamponade.      STRESS TEST:   Scheduled for today 10/27/2023      HEART CATH:  No results found for this or any previous visit.      TELEMETRY:     SB 40s to 50s with frequent PACs no atrial fibrillation noted with patient since 7:30 PM on 10/26/2023.  Patient was given IV Lopressor at 7 PM and her heart rate dropped to the 30s with a 2-second pause as seen in telemetry strips attached below.  No other adverse events noted through saved or alarm telemetry strips throughout the night.                                        I reviewed the patient's new clinical results.    ALLERGIES: Patient has no known allergies.    Medication Review:   Current list of medications may not reflect those currently placed in orders that are not signed or are being held.     acetylcysteine, 1.5 mL, Nebulization, Q6H - RT  aspirin, 81 mg, Oral, Daily  folic acid, 1 mg, Oral, Daily  gabapentin, 200 mg, Oral, Q12H  hydrALAZINE, 25 mg, Oral, Q8H  ipratropium-albuterol, 3 mL, Nebulization, Q6H - RT  levothyroxine, 88 mcg, Oral, Q AM  metoprolol tartrate, 75 mg, Oral, Q12H  montelukast, 10 mg, Oral, Nightly  mupirocin, 1 application , Each Nare, BID  nicotine, 1 patch, Transdermal, Q24H  pantoprazole, 40 mg, Oral, Daily  rosuvastatin, 10 mg, Oral, Nightly  sertraline, 25 mg, Oral, Daily  sodium chloride, 10 mL, Intravenous, Q12H  vitamin B-12, 1,000 mcg,  Oral, Daily      heparin, 12.9 Units/kg/hr, Last Rate: 12.9 Units/kg/hr (10/27/23 0536)  Pharmacy to Dose Heparin,         acetaminophen    calcium carbonate    dextrose    dextrose    glucagon (human recombinant)    hydrALAZINE    HYDROcodone-acetaminophen    hydrOXYzine    melatonin    metoprolol tartrate    nitroglycerin    ondansetron    Pharmacy to Dose Heparin    [COMPLETED] Insert Peripheral IV **AND** sodium chloride    sodium chloride    sodium chloride    Assessment    New onset atrial fibrillation with RVR chads Vascor is at least 3  MAICO on CKD on hemodialysis  ASCVD status post  of the dominant RCA, status post drug-eluting stent placement to the LAD, and also existent high-grade stenosis of the ostial small first diagonal, was also 60% RCA lateral branch lesion  Dyslipidemia  Essential hypertension  Acute on chronic HFpEF  COPD  Current ongoing tobacco abuse  Mental status changes  Medical noncompliance               Plan   1.  Patient is mostly in sinus rhythm but with intermittent atrial fibrillation, which when she is she is slightly bradycardic we will cut down the dose of metoprolol to 50 mg twice daily  2.  Stress testing today for assessment of ischemia          I have discussed the patients findings and recommendations with patient.    Thank you very much for asking us to be involved in this patient's care.  We will follow along with you.  Electronically signed by Dwayne Lopez MD, 10/27/23, 11:23 AM EDT.                          Please note that portions of this note were completed with a voice recognition program.    Please note that portions of this note were copied and has been reviewed and is accurate as of 10/27/2023 .       Electronically signed by Dwayne Lopez MD at 10/27/23 1128       José Miguel Tellez DO at 10/26/23 4998                [facility] HOSPITALIST PROGRESS NOTE    Subjective     History:   Lesley Michel is a 67 y.o. female admitted on 10/8/2023  secondary to Acute hypoxic respiratory failure     Procedures:   10/19/23: Right IJ tunneled HD catheter placement     CC: Follow up MAICO    Patient seen and examined in dialysis. Awake and alert. Dyspnea remains overall improved. No reported CP. No reported vomiting. BP stable. No acute events overnight per RN.     History taken from: patient, chart, and RN.      Objective     Vital Signs  Temp:  [97 °F (36.1 °C)-98.4 °F (36.9 °C)] 98.4 °F (36.9 °C)  Heart Rate:  [52-67] 67  Resp:  [18-20] 18  BP: (127-181)/(65-96) 159/86    Intake/Output Summary (Last 24 hours) at 10/26/2023 1457  Last data filed at 10/26/2023 1224  Gross per 24 hour   Intake 240 ml   Output 1671 ml   Net -1431 ml         Physical Exam:   General:    Awake, alert, no acute distress, ill appearing   Heart:      Normal S1 and S2. Regular rate and rhythm.    Lungs:     Respirations regular, even and unlabored. Diminished breath sounds at bases. No wheezes appreciated.     Abdomen:   Soft and nontender. No guarding, rebound tenderness or  organomegaly noted. Bowel sounds present x 4.   Extremities:  Tr bilateral lower extremity edema. Moves UE and LE equally B/L.     Results Review:    Results from last 7 days   Lab Units 10/26/23  0429 10/25/23  0706 10/24/23  0336 10/23/23  0255 10/21/23  0325 10/20/23  0157   WBC 10*3/mm3 8.12 8.53 6.00 5.71 8.84 7.83   HEMOGLOBIN g/dL 8.2* 8.1* 8.4* 8.0* 8.8* 8.7*   PLATELETS 10*3/mm3 211 190 192 190 226 222     Results from last 7 days   Lab Units 10/26/23  0429 10/25/23  0706 10/24/23  0336 10/23/23  0255 10/21/23  0325 10/20/23  0157   SODIUM mmol/L 133* 135* 133* 135* 133* 135*   POTASSIUM mmol/L 3.9 4.0 4.9 4.7 4.4 4.6   CHLORIDE mmol/L 91* 92* 92* 94* 93* 97*   CO2 mmol/L 28.9 30.0* 27.4 31.0* 25.6 25.0   BUN mg/dL 31* 24* 36* 43* 32* 50*   CREATININE mg/dL 4.77* 3.99* 5.25* 5.43* 3.83* 5.16*   CALCIUM mg/dL 9.1 9.0 9.3 8.9 9.0 8.9   GLUCOSE mg/dL 87 86 103* 100* 71 81     Results from last 7 days   Lab Units  10/24/23  0336 10/20/23  0157   BILIRUBIN mg/dL 0.5 0.4   ALK PHOS U/L 121* 124*   AST (SGOT) U/L 16 7   ALT (SGPT) U/L 5 5           Results from last 7 days   Lab Units 10/24/23  1609   INR  1.07     Results from last 7 days   Lab Units 10/21/23  1716 10/21/23  1316   HSTROP T ng/L 54* 51*       Imaging Results (Last 24 Hours)       ** No results found for the last 24 hours. **              Medications:  acetylcysteine, 1.5 mL, Nebulization, Q6H - RT  amiodarone, 300 mg, Oral, Q12H  aspirin, 81 mg, Oral, Daily  folic acid, 1 mg, Oral, Daily  gabapentin, 200 mg, Oral, Q12H  hydrALAZINE, 25 mg, Oral, Q8H  ipratropium-albuterol, 3 mL, Nebulization, Q6H - RT  levothyroxine, 88 mcg, Oral, Q AM  metoprolol tartrate, 25 mg, Oral, Once  metoprolol tartrate, 75 mg, Oral, Q12H  montelukast, 10 mg, Oral, Nightly  mupirocin, 1 application , Each Nare, BID  nicotine, 1 patch, Transdermal, Q24H  pantoprazole, 40 mg, Oral, Daily  rosuvastatin, 10 mg, Oral, Nightly  sertraline, 25 mg, Oral, Daily  sodium chloride, 10 mL, Intravenous, Q12H  vitamin B-12, 1,000 mcg, Oral, Daily      heparin, 12.9 Units/kg/hr, Last Rate: 12.9 Units/kg/hr (10/26/23 1023)  Pharmacy to Dose Heparin,             Assessment & Plan   Anasarca: proBNP and ReDs vest reading elevated on admission. Imaging revealed CHF. Echo revealed an EF of 56-60%, grade I diastolic dysfunction, mild AS, mild pulmonary HTN and small pericardial effusion with no evidence of cardiac tamponade. Worsening renal failure possibly contributing with acute diastolic CHF as well. Failed trials of diuretics with initiation of HD on 10/19. HD per nephrology.     Acute exacerbation of COPD: Possibly exacerbated by pulmonary edema. Respiratory PCR negative. Pulm consulted and assisted with steroid taper. Course of azithromycin completed. Wheezing resolved. Cont nebs. Pulm input appreciated.     Bilateral pneumonia: Procal normal. Cultures with NGTD. Completed course of azithromycin as  above. Cont nebs.     Acute hypoxic respiratory failure: Likely multifactorial in the setting of above. Post-op mucous plugging noted as well but improved. Pt has refused BiPAP. O2 requirements stable today. Treatment as outlined above. Wean supplemental O2 as tolerated.     MAICO on CKD IIIa with initiation of HD this admission: Baseline Cr around 2.2. Non nephrotic proteinuria. Complements are normal with negative serologies. No evidence of hydronephrosis on renal US with US revealing small and echogenic right kidney. Likely 2/2 ATN 2/2 prolonged prerenal state and hemodynamic changes with use of ACE inhibitors as well. S/P tunneled HD catheter placement with initiation of HD on 10/19 as above. Cont to monitor closely. Nephrology input appreciated.     Hyperkalemia: Resolved with targeted treatment and initiation of HD. Holding home lisinopril. Monitor on telemetry.     New onset Afib with RVR: Previously changed Coreg to metoprolol. BP later borderline low limiting Rx options. Decreased hydralazine to allow rise in BP. Cont metoprolol. Cardiology has added amiodarone. Continues to have episodes of Afib with RVR. Currently on heparin gtt for stroke prevention. Cards planning ischemic eval with stress test today. Monitor on telemetry. Cardiology input appreciated.     Essential HTN: BP previously elevated but later borderline low after initiation of HD. Changed Coreg to metoprolol as above. Decreased hydralazine with BP improved. Attempt to avoid wide fluctuations in BP. Cont to monitor.     Hypothyroidism: TSH mildly elevated with normal free T4. Cont levothyroxine. Will need repeat labs as an outpatient.     Anxiety: Psychiatry consulted and started sertraline. Nephrology previously ordered Xanax with HD but held 2/2 episodes of oversedation. Cont supportive treatment.     Tobacco abuse: Cont NRT and encourage cessation.     Morbid obesity by BMI: Complicates all aspects of care.     DVT PPX: Heparin gtt      Disposition SS working to arrange HD chair when medically stable.     José Miguel Tellez DO  10/26/23  14:57 EDT     Electronically signed by José Miguel Tellez DO at 10/26/23 1500       Consult Notes (last 72 hours)  Notes from 10/26/23 1354 through 10/29/23 1354   No notes of this type exist for this encounter.

## 2023-10-29 NOTE — PROGRESS NOTES
[facility] HOSPITALIST PROGRESS NOTE    Subjective     History:   Lesley Michel is a 67 y.o. female admitted on 10/8/2023 secondary to Acute hypoxic respiratory failure     Procedures:   10/19/23: Right IJ tunneled HD catheter placement  10/27/23: Nuclear stress test     Left ventricular ejection fraction is normal (Calculated EF = 68%).    Myocardial perfusion imaging indicates a normal myocardial perfusion study with no evidence of ischemia.    TID 1.05.    Findings consistent with a normal ECG stress test.    CC: Follow up MAICO    Patient seen and examined with NADEEM Martinez. Awake and alert. Sitting in bedside chair during my encounter. Dyspnea remains overall improved. No reported CP. No reported vomiting. NO reported BM in a few days. BP stable. HR overall improved with less fluctuations. No acute events overnight per RN.     History taken from: patient, chart, and RN.      Objective     Vital Signs  Temp:  [97.9 °F (36.6 °C)-98.5 °F (36.9 °C)] 98 °F (36.7 °C)  Heart Rate:  [58-97] 96  Resp:  [18-20] 20  BP: (121-157)/(63-79) 150/74    Intake/Output Summary (Last 24 hours) at 10/29/2023 1510  Last data filed at 10/29/2023 0800  Gross per 24 hour   Intake 480 ml   Output 2000 ml   Net -1520 ml         Physical Exam:   General:    Awake, alert, no acute distress, ill appearing   Heart:      Normal S1 and S2. Regular rate and rhythm.    Lungs:     Respirations regular, even and unlabored. Diminished breath sounds at bases. No wheezes appreciated.     Abdomen:   Soft and nontender. No guarding, rebound tenderness or  organomegaly noted. Bowel sounds present x 4.   Extremities:  Tr-1+ bilateral lower extremity edema but overall improved. Moves UE and LE equally B/L.     Results Review:    Results from last 7 days   Lab Units 10/29/23  0329 10/28/23  0032 10/27/23  0712 10/26/23  0429 10/25/23  0706 10/24/23  0336 10/23/23  0255   WBC 10*3/mm3 6.98 7.37 8.42 8.12 8.53 6.00 5.71   HEMOGLOBIN g/dL 8.1*  7.9* 8.3* 8.2* 8.1* 8.4* 8.0*   PLATELETS 10*3/mm3 175 202 209 211 190 192 190     Results from last 7 days   Lab Units 10/29/23  0329 10/28/23  1822 10/28/23  0032 10/27/23  0712 10/26/23  0429 10/25/23  0706 10/24/23  0336 10/23/23  0255   SODIUM mmol/L 134*  --  135* 136 133* 135* 133* 135*   POTASSIUM mmol/L 4.2 3.7 3.6 4.0 3.9 4.0 4.9 4.7   CHLORIDE mmol/L 93*  --  93* 95* 91* 92* 92* 94*   CO2 mmol/L 30.8*  --  30.1* 28.8 28.9 30.0* 27.4 31.0*   BUN mg/dL 8  --  19 17 31* 24* 36* 43*   CREATININE mg/dL 2.94*  --  4.35* 3.73* 4.77* 3.99* 5.25* 5.43*   CALCIUM mg/dL 8.8  --  9.0 8.9 9.1 9.0 9.3 8.9   GLUCOSE mg/dL 86  --  85 97 87 86 103* 100*     Results from last 7 days   Lab Units 10/28/23  0032 10/24/23  0336   BILIRUBIN mg/dL 0.5 0.5   ALK PHOS U/L 95 121*   AST (SGOT) U/L 11 16   ALT (SGPT) U/L <5 5     Results from last 7 days   Lab Units 10/28/23  1822   MAGNESIUM mg/dL 1.8       Results from last 7 days   Lab Units 10/24/23  1609   INR  1.07             Imaging Results (Last 24 Hours)       ** No results found for the last 24 hours. **              Medications:  acetylcysteine, 1.5 mL, Nebulization, Q6H - RT  amiodarone, 200 mg, Oral, Q12H  apixaban, 5 mg, Oral, Q12H  aspirin, 81 mg, Oral, Daily  folic acid, 1 mg, Oral, Daily  gabapentin, 200 mg, Oral, Q12H  hydrALAZINE, 25 mg, Oral, Q8H  ipratropium-albuterol, 3 mL, Nebulization, Q6H - RT  levothyroxine, 88 mcg, Oral, Q AM  montelukast, 10 mg, Oral, Nightly  nicotine, 1 patch, Transdermal, Q24H  pantoprazole, 40 mg, Oral, Daily  rosuvastatin, 10 mg, Oral, Nightly  sertraline, 25 mg, Oral, Daily  sodium chloride, 10 mL, Intravenous, Q12H  vitamin B-12, 1,000 mcg, Oral, Daily                 Assessment & Plan   Anasarca: proBNP and ReDs vest reading elevated on admission. Imaging revealed CHF. Echo revealed an EF of 56-60%, grade I diastolic dysfunction, mild AS, mild pulmonary HTN and small pericardial effusion with no evidence of cardiac tamponade.  Worsening renal failure possibly contributing with acute diastolic CHF as well. Failed trials of diuretics with initiation of HD on 10/19. Appears overall improved. HD per nephrology.     Acute exacerbation of COPD: Possibly exacerbated by pulmonary edema. Respiratory PCR negative. Pulm consulted and assisted with steroid taper. Course of azithromycin completed. Wheezing resolved. Cont nebs. Pulm input appreciated.     Bilateral pneumonia: Procal normal. Cultures with NGTD. Completed course of azithromycin as above. Cont nebs.     Acute hypoxic respiratory failure: Likely multifactorial in the setting of above. Post-op mucous plugging noted as well but improved. Pt has refused BiPAP. O2 requirements stable today. Treatment as outlined above. Wean supplemental O2 as tolerated.     MAICO on CKD IIIa with initiation of HD this admission: Baseline Cr around 2.2. Non nephrotic proteinuria. Complements are normal with negative serologies. No evidence of hydronephrosis on renal US with US revealing small and echogenic right kidney. Likely 2/2 ATN 2/2 prolonged prerenal state and hemodynamic changes with use of ACE inhibitors as well. S/P tunneled HD catheter placement with initiation of HD on 10/19 as above. Appears to be tolerating HD much better throughout the course of the week. Cont scheduled HD. Cont to monitor closely. Nephrology input appreciated.     Hyperkalemia: Resolved with targeted treatment and initiation of HD. Holding home lisinopril. Monitor on telemetry.     New onset Afib with RVR: No evidence of ischemia on stress test. Previously changed Coreg to metoprolol. BP later borderline low limiting Rx options. Decreased hydralazine to allow rise in BP with subsequent improvement. Cardiology initiated amiodarone. Continued to have short episodes of Afib with RVR in addition to episodes of bradycardia. Cardiology subsequently stopped metoprolol. Appears more stable over the last few days. Cont PO amiodarone.  Transitioned from heparin gtt to Eliquis for stroke prevention. Monitor on telemetry. Cardiology input appreciated.     Essential HTN: BP previously elevated but later borderline low after initiation of HD. Changed Coreg to metoprolol and now stopped as above. Decreased hydralazine with BP improved. Attempt to avoid wide fluctuations in BP. Cont to monitor.     Hypothyroidism: TSH mildly elevated with normal free T4. Cont levothyroxine. Will need repeat labs as an outpatient.     Anxiety: Psychiatry consulted and started sertraline. Nephrology previously ordered Xanax with HD but held 2/2 episodes of oversedation. Cont supportive treatment.     Tobacco abuse: Cont NRT and encourage cessation.     Morbid obesity by BMI: Complicates all aspects of care.     DVT PPX: Eliquis    Disposition SS working to arrange HD chair.     José Miguel Tellez DO  10/29/23  15:10 EDT

## 2023-10-29 NOTE — PLAN OF CARE
Goal Outcome Evaluation:  Patient has rested well in bed this shift. A&Ox4. VSS. No visible signs or symptoms of acute distress noted at this time. No requests or complaints at this time. Will continue to follow the plan of care.

## 2023-10-30 ENCOUNTER — READMISSION MANAGEMENT (OUTPATIENT)
Dept: CALL CENTER | Facility: HOSPITAL | Age: 68
End: 2023-10-30
Payer: MEDICARE

## 2023-10-30 ENCOUNTER — APPOINTMENT (OUTPATIENT)
Dept: GENERAL RADIOLOGY | Facility: HOSPITAL | Age: 68
DRG: 291 | End: 2023-10-30
Payer: MEDICARE

## 2023-10-30 VITALS
WEIGHT: 207.9 LBS | OXYGEN SATURATION: 94 % | SYSTOLIC BLOOD PRESSURE: 155 MMHG | DIASTOLIC BLOOD PRESSURE: 70 MMHG | RESPIRATION RATE: 18 BRPM | BODY MASS INDEX: 40.82 KG/M2 | HEART RATE: 95 BPM | TEMPERATURE: 98 F | HEIGHT: 60 IN

## 2023-10-30 LAB
ALBUMIN SERPL-MCNC: 3.4 G/DL (ref 3.5–5.2)
ALBUMIN/GLOB SERPL: 1.1 G/DL
ALP SERPL-CCNC: 97 U/L (ref 39–117)
ALT SERPL W P-5'-P-CCNC: 5 U/L (ref 1–33)
ANION GAP SERPL CALCULATED.3IONS-SCNC: 8.2 MMOL/L (ref 5–15)
AST SERPL-CCNC: 11 U/L (ref 1–32)
BASOPHILS # BLD AUTO: 0.03 10*3/MM3 (ref 0–0.2)
BASOPHILS NFR BLD AUTO: 0.5 % (ref 0–1.5)
BILIRUB SERPL-MCNC: 0.5 MG/DL (ref 0–1.2)
BUN SERPL-MCNC: 12 MG/DL (ref 8–23)
BUN/CREAT SERPL: 3 (ref 7–25)
CALCIUM SPEC-SCNC: 9 MG/DL (ref 8.6–10.5)
CHLORIDE SERPL-SCNC: 94 MMOL/L (ref 98–107)
CO2 SERPL-SCNC: 31.8 MMOL/L (ref 22–29)
CREAT SERPL-MCNC: 4.02 MG/DL (ref 0.57–1)
DEPRECATED RDW RBC AUTO: 56.5 FL (ref 37–54)
EGFRCR SERPLBLD CKD-EPI 2021: 11.8 ML/MIN/1.73
EOSINOPHIL # BLD AUTO: 0.58 10*3/MM3 (ref 0–0.4)
EOSINOPHIL NFR BLD AUTO: 9.3 % (ref 0.3–6.2)
ERYTHROCYTE [DISTWIDTH] IN BLOOD BY AUTOMATED COUNT: 16.9 % (ref 12.3–15.4)
GLOBULIN UR ELPH-MCNC: 3 GM/DL
GLUCOSE BLDC GLUCOMTR-MCNC: 111 MG/DL (ref 70–130)
GLUCOSE BLDC GLUCOMTR-MCNC: 96 MG/DL (ref 70–130)
GLUCOSE SERPL-MCNC: 107 MG/DL (ref 65–99)
HCT VFR BLD AUTO: 27.7 % (ref 34–46.6)
HGB BLD-MCNC: 8.1 G/DL (ref 12–15.9)
IMM GRANULOCYTES # BLD AUTO: 0.03 10*3/MM3 (ref 0–0.05)
IMM GRANULOCYTES NFR BLD AUTO: 0.5 % (ref 0–0.5)
LYMPHOCYTES # BLD AUTO: 1.52 10*3/MM3 (ref 0.7–3.1)
LYMPHOCYTES NFR BLD AUTO: 24.4 % (ref 19.6–45.3)
MAGNESIUM SERPL-MCNC: 1.9 MG/DL (ref 1.6–2.4)
MCH RBC QN AUTO: 27.1 PG (ref 26.6–33)
MCHC RBC AUTO-ENTMCNC: 29.2 G/DL (ref 31.5–35.7)
MCV RBC AUTO: 92.6 FL (ref 79–97)
MONOCYTES # BLD AUTO: 0.65 10*3/MM3 (ref 0.1–0.9)
MONOCYTES NFR BLD AUTO: 10.5 % (ref 5–12)
NEUTROPHILS NFR BLD AUTO: 3.41 10*3/MM3 (ref 1.7–7)
NEUTROPHILS NFR BLD AUTO: 54.8 % (ref 42.7–76)
NRBC BLD AUTO-RTO: 0 /100 WBC (ref 0–0.2)
PHOSPHATE SERPL-MCNC: 2.6 MG/DL (ref 2.5–4.5)
PLATELET # BLD AUTO: 176 10*3/MM3 (ref 140–450)
PMV BLD AUTO: 10.8 FL (ref 6–12)
POTASSIUM SERPL-SCNC: 3.8 MMOL/L (ref 3.5–5.2)
PROT SERPL-MCNC: 6.4 G/DL (ref 6–8.5)
QT INTERVAL: 332 MS
QT INTERVAL: 456 MS
QTC INTERVAL: 456 MS
QTC INTERVAL: 476 MS
RBC # BLD AUTO: 2.99 10*6/MM3 (ref 3.77–5.28)
SODIUM SERPL-SCNC: 134 MMOL/L (ref 136–145)
WBC NRBC COR # BLD: 6.22 10*3/MM3 (ref 3.4–10.8)

## 2023-10-30 PROCEDURE — 94761 N-INVAS EAR/PLS OXIMETRY MLT: CPT

## 2023-10-30 PROCEDURE — 73502 X-RAY EXAM HIP UNI 2-3 VIEWS: CPT | Performed by: RADIOLOGY

## 2023-10-30 PROCEDURE — 73502 X-RAY EXAM HIP UNI 2-3 VIEWS: CPT

## 2023-10-30 PROCEDURE — 83735 ASSAY OF MAGNESIUM: CPT | Performed by: INTERNAL MEDICINE

## 2023-10-30 PROCEDURE — 80053 COMPREHEN METABOLIC PANEL: CPT | Performed by: INTERNAL MEDICINE

## 2023-10-30 PROCEDURE — 94799 UNLISTED PULMONARY SVC/PX: CPT

## 2023-10-30 PROCEDURE — 93005 ELECTROCARDIOGRAM TRACING: CPT | Performed by: NURSE PRACTITIONER

## 2023-10-30 PROCEDURE — 84100 ASSAY OF PHOSPHORUS: CPT | Performed by: INTERNAL MEDICINE

## 2023-10-30 PROCEDURE — 85025 COMPLETE CBC W/AUTO DIFF WBC: CPT | Performed by: INTERNAL MEDICINE

## 2023-10-30 PROCEDURE — 93005 ELECTROCARDIOGRAM TRACING: CPT | Performed by: STUDENT IN AN ORGANIZED HEALTH CARE EDUCATION/TRAINING PROGRAM

## 2023-10-30 PROCEDURE — 93010 ELECTROCARDIOGRAM REPORT: CPT | Performed by: INTERNAL MEDICINE

## 2023-10-30 PROCEDURE — 99232 SBSQ HOSP IP/OBS MODERATE 35: CPT | Performed by: INTERNAL MEDICINE

## 2023-10-30 PROCEDURE — 97110 THERAPEUTIC EXERCISES: CPT

## 2023-10-30 PROCEDURE — 97116 GAIT TRAINING THERAPY: CPT

## 2023-10-30 PROCEDURE — 99239 HOSP IP/OBS DSCHRG MGMT >30: CPT | Performed by: STUDENT IN AN ORGANIZED HEALTH CARE EDUCATION/TRAINING PROGRAM

## 2023-10-30 PROCEDURE — 94664 DEMO&/EVAL PT USE INHALER: CPT

## 2023-10-30 PROCEDURE — 82948 REAGENT STRIP/BLOOD GLUCOSE: CPT

## 2023-10-30 RX ORDER — ALBUTEROL SULFATE 90 UG/1
2 AEROSOL, METERED RESPIRATORY (INHALATION) EVERY 4 HOURS PRN
Qty: 18 G | Refills: 0 | Status: SHIPPED | OUTPATIENT
Start: 2023-10-30

## 2023-10-30 RX ORDER — HYDRALAZINE HYDROCHLORIDE 25 MG/1
25 TABLET, FILM COATED ORAL EVERY 8 HOURS SCHEDULED
Qty: 90 TABLET | Refills: 0 | Status: SHIPPED | OUTPATIENT
Start: 2023-10-30 | End: 2023-11-29

## 2023-10-30 RX ORDER — ASPIRIN 81 MG/1
81 TABLET ORAL DAILY
Qty: 30 TABLET | Refills: 0 | Status: SHIPPED | OUTPATIENT
Start: 2023-10-31 | End: 2023-11-30

## 2023-10-30 RX ORDER — ROSUVASTATIN CALCIUM 10 MG/1
10 TABLET, COATED ORAL NIGHTLY
Qty: 30 TABLET | Refills: 0 | Status: SHIPPED | OUTPATIENT
Start: 2023-10-30

## 2023-10-30 RX ORDER — DOXYCYCLINE 100 MG/1
CAPSULE ORAL
Qty: 10 CAPSULE | Refills: 0 | Status: SHIPPED | OUTPATIENT
Start: 2023-10-30

## 2023-10-30 RX ORDER — PREDNISONE 20 MG/1
40 TABLET ORAL DAILY
Qty: 10 TABLET | Refills: 0 | Status: SHIPPED | OUTPATIENT
Start: 2023-10-30

## 2023-10-30 RX ORDER — IPRATROPIUM BROMIDE AND ALBUTEROL SULFATE 2.5; .5 MG/3ML; MG/3ML
SOLUTION RESPIRATORY (INHALATION)
Qty: 18 ML | Refills: 0 | Status: SHIPPED | OUTPATIENT
Start: 2023-10-30

## 2023-10-30 RX ORDER — IPRATROPIUM BROMIDE AND ALBUTEROL SULFATE 2.5; .5 MG/3ML; MG/3ML
3 SOLUTION RESPIRATORY (INHALATION) EVERY 4 HOURS PRN
Qty: 90 ML | Refills: 1 | Status: SHIPPED | OUTPATIENT
Start: 2023-10-30

## 2023-10-30 RX ORDER — SERTRALINE HYDROCHLORIDE 25 MG/1
25 TABLET, FILM COATED ORAL DAILY
Qty: 30 TABLET | Refills: 0 | Status: SHIPPED | OUTPATIENT
Start: 2023-10-31 | End: 2023-11-30

## 2023-10-30 RX ORDER — ASPIRIN 81 MG/1
81 TABLET ORAL DAILY
Qty: 30 TABLET | Refills: 0 | Status: SHIPPED | OUTPATIENT
Start: 2023-10-31 | End: 2023-10-30 | Stop reason: SDUPTHER

## 2023-10-30 RX ORDER — AMIODARONE HYDROCHLORIDE 200 MG/1
200 TABLET ORAL EVERY 12 HOURS SCHEDULED
Qty: 60 TABLET | Refills: 0 | Status: SHIPPED | OUTPATIENT
Start: 2023-10-30 | End: 2023-11-29

## 2023-10-30 RX ADMIN — ACETYLCYSTEINE 1.5 ML: 200 SOLUTION ORAL; RESPIRATORY (INHALATION) at 13:14

## 2023-10-30 RX ADMIN — METOPROLOL TARTRATE 5 MG: 1 INJECTION, SOLUTION INTRAVENOUS at 13:25

## 2023-10-30 RX ADMIN — IPRATROPIUM BROMIDE AND ALBUTEROL SULFATE 3 ML: 2.5; .5 SOLUTION RESPIRATORY (INHALATION) at 00:38

## 2023-10-30 RX ADMIN — GABAPENTIN 200 MG: 100 CAPSULE ORAL at 08:56

## 2023-10-30 RX ADMIN — Medication 1000 MCG: at 08:56

## 2023-10-30 RX ADMIN — IPRATROPIUM BROMIDE AND ALBUTEROL SULFATE 3 ML: 2.5; .5 SOLUTION RESPIRATORY (INHALATION) at 07:21

## 2023-10-30 RX ADMIN — IPRATROPIUM BROMIDE AND ALBUTEROL SULFATE 3 ML: 2.5; .5 SOLUTION RESPIRATORY (INHALATION) at 13:14

## 2023-10-30 RX ADMIN — LEVOTHYROXINE SODIUM 88 MCG: 88 TABLET ORAL at 05:43

## 2023-10-30 RX ADMIN — ACETYLCYSTEINE 1.5 ML: 200 SOLUTION ORAL; RESPIRATORY (INHALATION) at 07:21

## 2023-10-30 RX ADMIN — NICOTINE TRANSDERMAL SYSTEM 1 PATCH: 21 PATCH, EXTENDED RELEASE TRANSDERMAL at 08:57

## 2023-10-30 RX ADMIN — Medication 1 MG: at 08:56

## 2023-10-30 RX ADMIN — HYDRALAZINE HYDROCHLORIDE 25 MG: 50 TABLET, FILM COATED ORAL at 13:24

## 2023-10-30 RX ADMIN — AMIODARONE HYDROCHLORIDE 200 MG: 200 TABLET ORAL at 08:56

## 2023-10-30 RX ADMIN — APIXABAN 5 MG: 5 TABLET, FILM COATED ORAL at 08:56

## 2023-10-30 RX ADMIN — Medication 10 ML: at 08:57

## 2023-10-30 RX ADMIN — PANTOPRAZOLE SODIUM 40 MG: 40 TABLET, DELAYED RELEASE ORAL at 08:56

## 2023-10-30 RX ADMIN — ACETYLCYSTEINE 1.5 ML: 200 SOLUTION ORAL; RESPIRATORY (INHALATION) at 00:38

## 2023-10-30 RX ADMIN — SERTRALINE HYDROCHLORIDE 25 MG: 25 TABLET ORAL at 08:56

## 2023-10-30 RX ADMIN — HYDRALAZINE HYDROCHLORIDE 25 MG: 50 TABLET, FILM COATED ORAL at 05:43

## 2023-10-30 RX ADMIN — ASPIRIN 81 MG: 81 TABLET, COATED ORAL at 08:56

## 2023-10-30 RX ADMIN — DOCUSATE SODIUM 100 MG: 100 CAPSULE, LIQUID FILLED ORAL at 08:56

## 2023-10-30 NOTE — PLAN OF CARE
Goal Outcome Evaluation:   Patient resting in bed. No acute signs or symptoms of distress. No complaints at this time. Patient on 4L nasal cannula and tolerating well. Dialysis site care completed. Patient has ambulated this shift with minimal assistance. Patient awaiting dialysis chair prior to discharge. Will continue to follow plan of care.

## 2023-10-30 NOTE — PROGRESS NOTES
Progress Note Pulmonary      Subjective     no new complaints, reported snoring and unrefreshing sleep  Interval History: Event overnight    Review of Systems:    Reviewed ; unchanged       Vital Signs  Temp:  [97.6 °F (36.4 °C)-98 °F (36.7 °C)] 98 °F (36.7 °C)  Heart Rate:  [58-96] 63  Resp:  [18-20] 18  BP: ()/(57-79) 155/70  Body mass index is 40.6 kg/m².    Intake/Output Summary (Last 24 hours) at 10/30/2023 1137  Last data filed at 10/30/2023 0800  Gross per 24 hour   Intake 900 ml   Output --   Net 900 ml     I/O this shift:  In: 360 [P.O.:360]  Out: -     Physical Exam:  General- normal in appearance, not in any acute distress    HEENT- pupils equally reactive to light, normal in size, no scleral icterus    Neck- supple    No JVD, no carotid bruit    Respiratory-improved breath sounds over right lung base.  No more wheezing  cardiovascular-  Normal S1 and S2. No S3, S4 or murmurs.    GI-nontender nondistended bowel sounds positive    CNS-alert oriented x3, grossly nonfocal    Extremities- pulses normal bilaterally , no clubbing and 1+ edema     Results Review:      Results from last 7 days   Lab Units 10/30/23  0041 10/29/23  0329 10/28/23  0032   WBC 10*3/mm3 6.22 6.98 7.37   HEMOGLOBIN g/dL 8.1* 8.1* 7.9*   PLATELETS 10*3/mm3 176 175 202     Results from last 7 days   Lab Units 10/30/23  0041 10/29/23  0329 10/28/23  1822 10/28/23  0032   SODIUM mmol/L 134* 134*  --  135*   POTASSIUM mmol/L 3.8 4.2 3.7 3.6   CHLORIDE mmol/L 94* 93*  --  93*   CO2 mmol/L 31.8* 30.8*  --  30.1*   BUN mg/dL 12 8  --  19   CREATININE mg/dL 4.02* 2.94*  --  4.35*   CALCIUM mg/dL 9.0 8.8  --  9.0   GLUCOSE mg/dL 107* 86  --  85   MAGNESIUM mg/dL 1.9  --  1.8  --      Lab Results   Component Value Date    INR 1.07 10/24/2023    PROTIME 14.4 10/24/2023     Results from last 7 days   Lab Units 10/30/23  0041 10/28/23  0032 10/24/23  0336   ALK PHOS U/L 97 95 121*   BILIRUBIN mg/dL 0.5 0.5 0.5   ALT (SGPT) U/L 5 <5 5   AST  (SGOT) U/L 11 11 16     Results from last 7 days   Lab Units 10/25/23  0252   PH, ARTERIAL pH units 7.381   PO2 ART mm Hg 75.3*   PCO2, ARTERIAL mm Hg 54.7*   HCO3 ART mmol/L 32.4*     Imaging Results (Last 24 Hours)       ** No results found for the last 24 hours. **                 acetylcysteine, 1.5 mL, Nebulization, Q6H - RT  amiodarone, 200 mg, Oral, Q12H  apixaban, 5 mg, Oral, Q12H  aspirin, 81 mg, Oral, Daily  docusate sodium, 100 mg, Oral, BID  folic acid, 1 mg, Oral, Daily  gabapentin, 200 mg, Oral, Q12H  hydrALAZINE, 25 mg, Oral, Q8H  ipratropium-albuterol, 3 mL, Nebulization, Q6H - RT  levothyroxine, 88 mcg, Oral, Q AM  montelukast, 10 mg, Oral, Nightly  nicotine, 1 patch, Transdermal, Q24H  pantoprazole, 40 mg, Oral, Daily  polyethylene glycol, 17 g, Oral, Daily  rosuvastatin, 10 mg, Oral, Nightly  sertraline, 25 mg, Oral, Daily  sodium chloride, 10 mL, Intravenous, Q12H  vitamin B-12, 1,000 mcg, Oral, Daily           Medication Review:     Assessment & Plan     # Morbidly obese female with chronic kidney disease.  Initially admitted with worsening shortness of breath and hypoxia.  Hospital course complicated by mucous plugging.  Kidney function continued to deteriorate.  Improved after aggressive chest PT.  Patient now on dialysis.  Patient is feeling better.    # COPD, stable  # suspect obstructive sleep apnea.  refused BiPAP.  We will get a home sleep study as an outpatient  #  Diastolic dysfunction    Titrate oxygen to a saturation of 92%.    DVT and GI prophylaxis.    Total time spent 30 minutes    Anderson Solomon MD  10/30/23  11:37 EDT

## 2023-10-30 NOTE — DISCHARGE SUMMARY
[facility] HOSPITALISTS DISCHARGE SUMMARY    Patient Identification:  Name:  Lesley Michel  Age:  67 y.o.  Sex:  female  :  1955  MRN:  [id number]  Visit Number:  [id number]    Date of Admission: 10/8/2023  Date of Discharge:  10/30/2023     PCP: Woodrow Raymond APRN    DISCHARGE DIAGNOSIS  Anasarca  Hyperkalemia  MAICO on CKD stage IIIa  Progression to end-stage renal disease  Acute exacerbation of COPD  Bilateral pneumonia, treating for bacterial  Acute hypoxemic respiratory failure  New onset atrial fibrillation with RVR  Essential hypertension  Hypothyroidism  Anxiety  Chronic tobacco abuse  Morbid obesity    CONSULTS   Nephrology  Pulmonology  Palliative care  General surgery  Psychiatry  Cardiology    PROCEDURES PERFORMED      HOSPITAL COURSE  Patient is a 67 y.o. female presented to [facility] complaining of increasing lower extremity edema and shortness of breath.  Please see the admitting history and physical for further details.      Initial evaluation emergency department patient admitted to the hospital service for MAICO on CKD with anasarca.  Patient was evaluated for cardiac etiology as well as being seen by cardiology during her hospitalization when she developed atrial fibrillation with very ventricular rate cardiac source of anasarca ruled out.  Nephrology was also consulted given MAICO on CKD unfortunately despite conservative measures creatinine continued to worsen while in hospital.  At the onset of patient's admission she patient also found to have pneumonia and completed a course of azithromycin after cultures all returned negative as well as being treated for an acute exacerbation of COPD likely provoked by pulmonary edema secondary to worsening renal function and volume retention.  Pulmonology was consulted to see and evaluate the patient while in hospital and agree with the above interventions as well as placing patient on a tapering course of  corticosteroids.  Patient continued with persistent anasarca however creatinine initially slightly improved and nephrology recommended to take a more conservative course but patient remains at high risk by 10/15 patient becoming very anxious and agitated over prolonged stay briefly wanting to sign out AMA but ultimately agreed to stay.  The week of 1016 through 1022 patient without improvement in renal function and anasarca persistent and unresponsive diuretics eventually progressing to need for initiation of hemodialysis.  Patient underwent successful tunneled hemodialysis catheter with general surgery and was initiated on hemodialysis.  During the course of her beginning of hemodialysis patient also with development of mucous plugging postoperatively after tunneled hemodialysis catheter placement but was able to be treated with conservative measures and ultimately did not require bronchoscopy.  After initiation of hemodialysis patient with 2 to 3-day course of confusion and delirium and agitation that did resolve with supportive measures and felt to be likely related to initiation of hemodialysis as well as recent sedating medications anesthesia received as well as coupled with hospital delirium.  Initially planned for potential discharge on 10/23 patient unfortunately unable to get dialysis chair quickly and began having difficulties with tolerating hemodialysis with severe anxiety and development of tachycardia and ultimately with development of atrial fibrillation with rapid ventricular rate and cardiology was consulted.  Patient initially started on p.o. amiodarone a as well as beta-blockade which was eventually tapered back in due to episodes of bradycardia.  Patient did undergo stress testing given new onset A-fib which was negative and patient was initiated on Eliquis.  By time of patient's discharge she was tolerating dialysis well, having no episodes of confusion or delirium, anxiety was well controlled  and patient was able to have arrangement for outpatient hemodialysis chair time and was therefore felt to achieve maximal benefit of hospitalization and subsequently discharged home with family in stable medical condition.  On the day of discharge patient did have a fall onto the floor from bed when transitioning to wheelchair but did not suffer any significant injury and a plain film obtained of the hip showed no evidence of fracture and patient still desiring to go home and as such was felt to be medically stable and safe to return home with family.      VITAL SIGNS:  Temp:  [97.6 °F (36.4 °C)-98 °F (36.7 °C)] 98 °F (36.7 °C)  Heart Rate:  [58-95] 95  Resp:  [18-20] 18  BP: ()/(57-79) 155/70  SpO2:  [91 %-96 %] 94 %  on  Flow (L/min):  [4] 4;   Device (Oxygen Therapy): nasal cannula;humidified    Body mass index is 40.6 kg/m².  Wt Readings from Last 3 Encounters:   10/30/23 94.3 kg (207 lb 14.4 oz)   10/06/23 102 kg (225 lb 12.8 oz)   03/02/23 81.6 kg (180 lb)       PHYSICAL EXAM:  Constitutional: Elderly chronically ill-appearing adult female.  No acute distress.      HENT:  Head:  Normocephalic and atraumatic.  Mouth:  Moist mucous membranes.    Eyes:  Conjunctivae and EOM are normal. No scleral icterus.    Cardiovascular:  Normal rate, regular rhythm and normal heart sounds with no murmur.  Pulmonary/Chest:  No respiratory distress, no wheezes, no crackles, with diminished breath sounds at the bases.  Abdominal:  Soft.  Bowel sounds are normal.  No distension and no tenderness.   Musculoskeletal:  No tenderness and no deformity.  No red or swollen joints anywhere.   Neurological: Alert and and oriented to person, place, and time and situation.  No cranial nerve deficit.  No tongue deviation.  No facial droop.  No slurred speech. Intact Sensation throughout  Skin:  Skin is warm and dry. No rash or lesion noted. No pallor.   Peripheral vascular:  Pulses in all 4 extremities with no clubbing, no cyanosis,  trace lower extremity edema present.  Psychiatric: Appropriate mood and affect, pleasant.     DISCHARGE DISPOSITION   Stable    DISCHARGE MEDICATIONS:     Discharge Medications        New Medications        Instructions Start Date   amiodarone 200 MG tablet  Commonly known as: PACERONE   200 mg, Oral, Every 12 Hours Scheduled      aspirin 81 MG EC tablet  Replaces: aspirin 325 MG tablet   81 mg, Oral, Daily   Start Date: October 31, 2023     doxycycline 100 MG capsule  Commonly known as: MONODOX   Take 1 capsule by mouth every 12 hours for 5 days as part of COPD Rescue Kit. (Only Start if in YELLOW ZONE.)      Eliquis 5 MG tablet tablet  Generic drug: apixaban   5 mg, Oral, Every 12 Hours Scheduled      ipratropium-albuterol 0.5-2.5 mg/3 ml nebulizer  Commonly known as: DUO-NEB   3 mL, Nebulization, Every 4 Hours PRN      ipratropium-albuterol 0.5-2.5 mg/3 ml nebulizer  Commonly known as: DUO-NEB   Take 3 mL by neb every 30 minutes as needed for shortness of air for up to 6 doses. Part of COPD Rescue Kit. (Only Start if in YELLOW ZONE.)      metoprolol tartrate 25 MG tablet  Commonly known as: LOPRESSOR   Take 1/2 tablet by mouth 2 (Two) Times a Day for 30 days.      predniSONE 20 MG tablet  Commonly known as: DELTASONE   40 mg, Oral, Daily, Take 2 tablets daily for 5 days as part of COPD Rescue Kit. (Only Start if in YELLOW ZONE.)      sertraline 25 MG tablet  Commonly known as: ZOLOFT   25 mg, Oral, Daily   Start Date: October 31, 2023            Changes to Medications        Instructions Start Date   hydrALAZINE 25 MG tablet  Commonly known as: APRESOLINE  What changed: when to take this   25 mg, Oral, Every 8 Hours Scheduled      rosuvastatin 10 MG tablet  Commonly known as: CRESTOR  What changed:   medication strength  how much to take  when to take this   10 mg, Oral, Nightly             Continue These Medications        Instructions Start Date   folic acid 1 MG tablet  Commonly known as: FOLVITE   1 mg, Oral,  Daily      gabapentin 800 MG tablet  Commonly known as: NEURONTIN   800 mg, Oral, 2 Times Daily PRN      HYDROcodone-acetaminophen  MG per tablet  Commonly known as: NORCO   1 tablet, Oral, Every 8 Hours PRN      levothyroxine 88 MCG tablet  Commonly known as: SYNTHROID, LEVOTHROID   88 mcg, Oral, Daily      montelukast 10 MG tablet  Commonly known as: SINGULAIR   10 mg, Oral, Nightly      nitroglycerin 0.4 MG SL tablet  Commonly known as: NITROSTAT   0.4 mg, Sublingual, Every 5 Minutes PRN      pantoprazole 40 MG EC tablet  Commonly known as: PROTONIX   40 mg, Oral, Daily      vitamin B-12 1000 MCG tablet  Commonly known as: CYANOCOBALAMIN   1,000 mcg, Oral, Daily             Stop These Medications      aspirin 325 MG tablet  Replaced by: aspirin 81 MG EC tablet     carvedilol 25 MG tablet  Commonly known as: COREG     lisinopril 5 MG tablet  Commonly known as: PRINIVIL,ZESTRIL              Diet Instructions    Regular.          Activity Instructions    As tolerated.          Additional Instructions for the Follow-ups that You Need to Schedule       Discharge Follow-up with PCP   As directed       Currently Documented PCP:    Woodrow Raymond APRN    PCP Phone Number:    [phone]     Follow Up Details: 1-2 week post hospital follow up        Discharge Follow-up with Specialty: Cardiology; 2 Weeks   As directed      Specialty: Cardiology   Follow Up: 2 Weeks   Follow Up Details: 2-3 week follow up for afib        Discharge Follow-up with Specialty: Nephrology; 3 Weeks   As directed      Specialty: Nephrology   Follow Up: 3 Weeks   Follow Up Details: 3 week follwo up               Contact information for follow-up providers       [facility] Follow up on 11/6/2023.    Specialty: Cardiology  Why: @ 10:30AM.  Contact information:  [address] [place] [id number] [phone]             Woodrow Raymond APRN Follow up on 11/9/2023.    Specialty: Family  Medicine  Why: @ 11:30AM.  Contact information:  [address] [facility] [address]  [phone]               Palomo Steinberg MD Follow up on 11/17/2023.    Specialty: Nephrology  Why: @ 12:15 AM  Contact information:  [address] [facility] [address]  [phone]               Imelda Woodall APRN Follow up on 10/31/2023.    Specialty: Cardiology  Why: @ 2:30PM.  Contact information:  [address]  [facility] [address]  [phone]                       Contact information for after-discharge care       Dialysis/Infusion       [facility] .    Service: In-Center Hemodialysis  Contact information:  [address]  [facility] [address]  [phone]                                    TEST  RESULTS PENDING AT DISCHARGE       The 10-year ASCVD risk score (Rodo MONROE, et al., 2019) is: 19.4%    Values used to calculate the score:      Age: 67 years      Sex: Female      Is Non- : No      Diabetic: No      Tobacco smoker: Yes      Systolic Blood Pressure: 155 mmHg      Is BP treated: Yes      HDL Cholesterol: 34 mg/dL      Total Cholesterol: 140 mg/dL     CODE STATUS  Code Status and Medical Interventions:   Ordered at: 10/12/23 1553     Medical Intervention Limits:    NO intubation (DNI)     Level Of Support Discussed With:    Patient     Code Status (Patient has no pulse and is not breathing):    No CPR (Do Not Attempt to Resuscitate)     Medical Interventions (Patient has pulse or is breathing):    Limited Support       Jay Jay Pritchett DO  [facility]  10/30/23  15:02 EDT    Please note that this discharge summary required more than 30 minutes to complete.

## 2023-10-30 NOTE — THERAPY TREATMENT NOTE
Acute Care - Physical Therapy Treatment Note   [place]     Patient Name: Lesley Michel  : 1955  MRN: [id number]  Today's Date: 10/30/2023   Onset of Illness/Injury or Date of Surgery: 10/08/23  Visit Dx:     ICD-10-CM ICD-9-CM   1. Acute kidney injury superimposed on chronic kidney disease  N17.9 584.9    N18.9 585.9   2. Acute on chronic congestive heart failure, unspecified heart failure type  I50.9 428.0   3. Acute on chronic respiratory failure with hypoxia  J96.21 518.84     799.02   4. Hyperkalemia  E87.5 276.7   5. Acute renal failure, unspecified acute renal failure type  N17.9 584.9     Patient Active Problem List   Diagnosis    Hypertension    Hyperlipidemia    Migraine without aura    CAD, chronically occluded collateralized dominant RCA, status post drug-eluting stent to LAD , high-grade stenosis of the ostium of the small D1 and 60% mid RCA posterior lateral branch    Chronic renal failure, stage 2 (mild)    Tobacco use    Bilateral lower extremity edema    Other specified hypothyroidism    Cigarette smoker    Class 3 severe obesity without serious comorbidity with body mass index (BMI) of 40.0 to 44.9 in adult    Acute hypoxic respiratory failure    Acute renal failure (ARF)     Past Medical History:   Diagnosis Date    Anxiety     Arthritis     Coronary artery disease     H/O heart artery stent     Hyperlipidemia     Hypertension     Obesity      Past Surgical History:   Procedure Laterality Date    ANKLE SURGERY      RIGHT    APPENDECTOMY      CARDIAC CATHETERIZATION      LEFT    CORONARY STENT PLACEMENT      HYSTERECTOMY      INSERTION HEMODIALYSIS CATHETER Right 10/19/2023    Procedure: HEMODIALYSIS CATHETER INSERTION;  Surgeon: Bartolome Schwartz MD;  [facility];  Service: General;  Laterality: Right;     PT Assessment (last 12 hours)       PT Evaluation and Treatment       Row Name 10/30/23 1313          Physical Therapy Time and Intention    Subjective Information  complains of;weakness;pain  in L Knee  -     Document Type therapy note (daily note)  -HC     Mode of Treatment individual therapy;physical therapy  -HC     Patient Effort adequate  -HC     Comment Pt and RN Leonor in agreement for PT. Pt walked 65' with no AD, pushing O2 cart, CGA/min A. Pts L knee was audible with walking and did complain of pain. EOB exercises until tolerance  -       Row Name 10/30/23 1313          General Information    Patient Profile Reviewed yes  -HC     Existing Precautions/Restrictions fall;oxygen therapy device and L/min  -HC       Row Name 10/30/23 1313          Cognition    Affect/Mental Status (Cognition) WFL  -HC     Follows Commands (Cognition) WFL  -HC     Personal Safety Interventions fall prevention program maintained;gait belt;nonskid shoes/slippers when out of bed;supervised activity  -       Row Name 10/30/23 1313          Bed Mobility    Bed Mobility supine-sit;sit-supine  -     Comment, (Bed Mobility) sitting EOB  -       Row Name 10/30/23 1313          Sit-Stand Transfer    Sit-Stand [place] (Transfers) contact guard  -       Row Name 10/30/23 1313          Stand-Sit Transfer    Stand-Sit [place] (Transfers) contact guard  -       Row Name 10/30/23 1313          Gait/Stairs (Locomotion)    Gait/Stairs Locomotion gait/ambulation independence;distance ambulated  -     [place] Level (Gait) contact guard;minimum assist (75% patient effort)  -     Assistive Device (Gait) other (see comments)  O2 cart  -     Patient was able to Ambulate yes  -     Distance in Feet (Gait) 65'  -     Pattern (Gait) step-to  -HC     Deviations/Abnormal Patterns (Gait) antalgic;base of support, wide;gait speed decreased;stride length decreased  -       Row Name 10/30/23 1313          Safety Issues, Functional Mobility    Impairments Affecting Function (Mobility) endurance/activity tolerance;balance;pain;shortness of breath;strength  -       Row Name 10/30/23  1313          Motor Skills    Therapeutic Exercise other (see comments)  Sitting: AP, LAQ, March, knees in/out  -HC       Row Name             Wound 10/09/23 0422 Left lower arm Extravasation    Wound - Properties Group Placement Date: 10/09/23  -SM Placement Time: 0422 -SM Side: Left  -SM Orientation: lower  -SM Location: arm  -SM Primary Wound Type: Extravasatio  -SM    Retired Wound - Properties Group Placement Date: 10/09/23  -SM Placement Time: 0422 -SM Side: Left  -SM Orientation: lower  -SM Location: arm  -SM Primary Wound Type: Extravasatio  -SM    Retired Wound - Properties Group Date first assessed: 10/09/23  -SM Time first assessed: 0422 -SM Side: Left  -SM Location: arm  -SM Primary Wound Type: Extravasatio  -SM      Row Name             Wound 10/19/23 1251 Right neck Incision    Wound - Properties Group Placement Date: 10/19/23  -KB Placement Time: 1251  -KB Side: Right  -KB Location: neck  -KB Primary Wound Type: Incision  -KB    Retired Wound - Properties Group Placement Date: 10/19/23  -KB Placement Time: 1251  -KB Side: Right  -KB Location: neck  -KB Primary Wound Type: Incision  -KB    Retired Wound - Properties Group Date first assessed: 10/19/23  -KB Time first assessed: 1251  -KB Side: Right  -KB Location: neck  -KB Primary Wound Type: Incision  -KB      Row Name             Wound 10/19/23 1253 Right upper chest Incision    Wound - Properties Group Placement Date: 10/19/23  -KB Placement Time: 1253  -KB Present on Original Admission: N  -KB Side: Right  -KB Orientation: upper  -KB Location: chest  -KB Primary Wound Type: Incision  -KB    Retired Wound - Properties Group Placement Date: 10/19/23  -KB Placement Time: 1253  -KB Present on Original Admission: N  -KB Side: Right  -KB Orientation: upper  -KB Location: chest  -KB Primary Wound Type: Incision  -KB    Retired Wound - Properties Group Date first assessed: 10/19/23  -KB Time first assessed: 1253  -KB Present on Original Admission: N   -KB Side: Right  -KB Location: chest  -KB Primary Wound Type: Incision  -KB      Row Name 10/30/23 1313          Positioning and Restraints    Pre-Treatment Position in bed  -HC     Post Treatment Position bed  -HC     In Bed sitting EOB;call light within reach;encouraged to call for assist  -HC       Row Name 10/30/23 1313          Physical Therapy Goals    Bed Mobility Goal Selection (PT) bed mobility, PT goal 1  -HC     Transfer Goal Selection (PT) transfer, PT goal 1  -HC     Gait Training Goal Selection (PT) gait training, PT goal 1  -HC       Row Name 10/30/23 1313          Bed Mobility Goal 1 (PT)    Activity/Assistive Device (Bed Mobility Goal 1, PT) bed mobility activities, all  -HC     [place] Level/Cues Needed (Bed Mobility Goal 1, PT) modified independence  -HC     Time Frame (Bed Mobility Goal 1, PT) by discharge  -HC     Progress/Outcomes (Bed Mobility Goal 1, PT) goal ongoing  -       Row Name 10/30/23 1313          Transfer Goal 1 (PT)    Activity/Assistive Device (Transfer Goal 1, PT) sit-to-stand/stand-to-sit;bed-to-chair/chair-to-bed;toilet  -     [place] Level/Cues Needed (Transfer Goal 1, PT) modified independence  -HC     Time Frame (Transfer Goal 1, PT) by discharge  -HC     Progress/Outcome (Transfer Goal 1, PT) goal ongoing  -       Row Name 10/30/23 1313          Gait Training Goal 1 (PT)    Activity/Assistive Device (Gait Training Goal 1, PT) gait (walking locomotion);assistive device use;decrease fall risk;diminish gait deviation;improve balance and speed;increase endurance/gait distance  -HC     [place] Level (Gait Training Goal 1, PT) standby assist  -HC     Distance (Gait Training Goal 1, PT) 25  -HC     Time Frame (Gait Training Goal 1, PT) by discharge  -HC     Progress/Outcome (Gait Training Goal 1, PT) goal ongoing  -               User Key  (r) = Recorded By, (t) = Taken By, (c) = Cosigned By      Initials Name Provider Type    Ora Cifuentes RN  Registered Nurse    Fiordaliza Echevarria, RN Registered Nurse    Supriya Lopez PTA Physical Therapist Assistant                    Physical Therapy Education       Title: PT OT SLP Therapies (In Progress)       Topic: Physical Therapy (In Progress)       Point: Mobility training (In Progress)       Learning Progress Summary             Patient Acceptance, E, NR by RD at 10/30/2023 1317    Acceptance, E,D, VU,NR by AG at 10/9/2023 1623                         Point: Home exercise program (In Progress)       Learning Progress Summary             Patient Acceptance, E, NR by RD at 10/30/2023 1317    Acceptance, E,D, VU,NR by AG at 10/9/2023 1623                         Point: Body mechanics (In Progress)       Learning Progress Summary             Patient Acceptance, E, NR by RD at 10/30/2023 1317    Acceptance, E,D, VU,NR by AG at 10/9/2023 1623                         Point: Precautions (In Progress)       Learning Progress Summary             Patient Acceptance, E, NR by RD at 10/30/2023 1317    Acceptance, E,D, VU,NR by  at 10/9/2023 1623                                         User Key       Initials Effective Dates Name Provider Type Discipline     06/16/21 -  Ayana Logan, PT Physical Therapist PT    RD 06/16/21 -  Kylah Clemente, NADEEM Registered Nurse Nurse                  PT Recommendation and Plan             Time Calculation:    PT Charges       Row Name 10/30/23 1331             Time Calculation    PT Received On 10/30/23  -HC         Time Calculation- PT    Total Timed Code Minutes- PT 23 minute(s)  -HC                User Key  (r) = Recorded By, (t) = Taken By, (c) = Cosigned By      Initials Name Provider Type     Supriya Carias PTA Physical Therapist Assistant                  Therapy Charges for Today       Code Description Service Date Service Provider Modifiers Qty    01023179105 HC GAIT TRAINING EA 15 MIN 10/30/2023 Supriya Carias PTA GP, CQ 1    50615030910 HC PT  THER PROC EA 15 MIN 10/30/2023 Supriya Carias, BASILIA GP, CQ 1            PT G-Codes  AM-PAC 6 Clicks Score (PT): 22    Supriya Carias PTA  10/30/2023

## 2023-10-30 NOTE — PLAN OF CARE
Goal Outcome Evaluation:  Patient discharged home today. IV and telemetry D/C'd. Patient A&Ox4. Patient is currently on 4L nasal cannula. Patient has tolerated all interventions. No complaints or concerns at this time. No signs of acute distress noted. Will continue to follow plan of care.    normal

## 2023-10-30 NOTE — CASE MANAGEMENT/SOCIAL WORK
Discharge Planning Assessment  [facility]     Patient Name: Lesley Michel  MRN: [id number]  Today's Date: 10/30/2023    Admit Date: 10/8/2023    Plan: SS spoke with pt's son regarding pt's outpt dialysis arrangements. Pt's son is agreeable and stated that chair time is perfect.  SS notified [facility].  SS will follow.       Discharge Plan       Row Name 10/30/23 1256       Plan    Plan SS spoke with pt's son regarding pt's outpt dialysis arrangements. Pt's son is agreeable and stated that chair time is perfect.  SS notified [facility].  SS will follow.      Row Name 10/30/23 1219       Plan    Plan SS spoke with [facility] per Otilia who provided Dialysis chair times at Tue Thur and Sat at 10:45 with pt needing to be at clinic on 10/31/23 at 10:30 am to complete paperwork.  SS notified pt and provided pt with written contact number and dialysis times.  Pt stated family would transport her via private auto.  Pt stated understanding.                  Continued Care and Services - Admitted Since 10/8/2023       Dialysis/Infusion Coordination complete.      Service Provider Request Status Selected Services Address Phone Fax Patient Preferred    [facility]  Selected In-Center Hemodialysis [address] [id number] [phone] -- --                  Expected Discharge Date and Time       Expected Discharge Date Expected Discharge Time    Oct 30, 2023               LIZBET Schwarz

## 2023-10-30 NOTE — PROGRESS NOTES
Nephrology Progress Note      Subjective   Doing well, no chest pain no shortness of breath    Objective       Vital signs :     Vitals:    10/30/23 0500 10/30/23 0654 10/30/23 0721 10/30/23 0738   BP:  139/79     BP Location:  Right arm     Patient Position:  Lying     Pulse:  61 63 61   Resp:  18 18 18   Temp:  97.9 °F (36.6 °C)     TempSrc:  Oral     SpO2:  96% 93% 94%   Weight: 94.3 kg (207 lb 14.4 oz)      Height:            Intake/Output                         10/28/23 0701 - 10/29/23 0700 10/29/23 0701 - 10/30/23 0700     4623-9992 0686-2787 Total 0051-2173 9849-6428 Total                 Intake    P.O.  480  360 840  360  300 660    Total Intake 480 360 840 360 300 660       Output    Dialysis  2000 -- 2000  --  -- --    Total Output 2000 -- 2000 -- -- --             Physical Exam:    General Appearance : Not in acute distress  Lungs : Bilateral decreased intensity of breath sounds  Heart :  regular rhythm & normal rate, normal S1, S2 and no murmur, no rub  Abdomen : Soft nontender nondistended   extremities : Trace edema  Neurologic : No apparent focal neurological deficit  Laboratory Data :       CBC and coagulation:  Results from last 7 days   Lab Units 10/30/23  0041 10/29/23  0329 10/28/23  0032 10/27/23  0712 10/25/23  0706 10/24/23  1609   PROCALCITONIN ng/mL  --   --   --  0.34*  --   --    WBC 10*3/mm3 6.22 6.98 7.37 8.42   < >  --    HEMOGLOBIN g/dL 8.1* 8.1* 7.9* 8.3*   < >  --    HEMATOCRIT % 27.7* 27.5* 26.5* 28.6*   < >  --    MCV fL 92.6 93.5 91.4 92.6   < >  --    MCHC g/dL 29.2* 29.5* 29.8* 29.0*   < >  --    PLATELETS 10*3/mm3 176 175 202 209   < >  --    INR   --   --   --   --   --  1.07    < > = values in this interval not displayed.     Acid/base balance:  Results from last 7 days   Lab Units 10/25/23  0252   PH, ARTERIAL pH units 7.381   PO2 ART mm Hg 75.3*   PCO2, ARTERIAL mm Hg 54.7*   HCO3 ART mmol/L 32.4*       Renal and electrolytes:    Results from last 7 days   Lab Units  "10/30/23  0041 10/29/23  0329 10/28/23  1822 10/28/23  0032 10/27/23  0712 10/26/23  0429   SODIUM mmol/L 134* 134*  --  135* 136 133*   POTASSIUM mmol/L 3.8 4.2 3.7 3.6 4.0 3.9   MAGNESIUM mg/dL 1.9  --  1.8  --   --   --    CHLORIDE mmol/L 94* 93*  --  93* 95* 91*   CO2 mmol/L 31.8* 30.8*  --  30.1* 28.8 28.9   BUN mg/dL 12 8  --  19 17 31*   CREATININE mg/dL 4.02* 2.94*  --  4.35* 3.73* 4.77*   CALCIUM mg/dL 9.0 8.8  --  9.0 8.9 9.1   PHOSPHORUS mg/dL 2.6  --   --   --   --   --      Estimated Creatinine Clearance: 14.3 mL/min (A) (by C-G formula based on SCr of 4.02 mg/dL (H)).     Liver and pancreatic function:  Results from last 7 days   Lab Units 10/30/23  0041 10/28/23  0032 10/24/23  0336   ALBUMIN g/dL 3.4* 3.2* 3.4*   BILIRUBIN mg/dL 0.5 0.5 0.5   ALK PHOS U/L 97 95 121*   AST (SGOT) U/L 11 11 16   ALT (SGPT) U/L 5 <5 5       Albumin Albumin   Date Value Ref Range Status   10/30/2023 3.4 (L) 3.5 - 5.2 g/dL Final   10/28/2023 3.2 (L) 3.5 - 5.2 g/dL Final          Magnesium Magnesium   Date Value Ref Range Status   10/30/2023 1.9 1.6 - 2.4 mg/dL Final   10/28/2023 1.8 1.6 - 2.4 mg/dL Final            PTH               No results found for: \"PTH\"    Cardiac:        Liver and pancreatic function:  Results from last 7 days   Lab Units 10/30/23  0041 10/28/23  0032 10/24/23  0336   ALBUMIN g/dL 3.4* 3.2* 3.4*   BILIRUBIN mg/dL 0.5 0.5 0.5   ALK PHOS U/L 97 95 121*   AST (SGOT) U/L 11 11 16   ALT (SGPT) U/L 5 <5 5       XR Chest 1 View    Result Date: 10/28/2023  1.  Slightly improved aeration of right lung base airspace disease. 2.  Slightly improved aeration of left lung base airspace disease. 3.  Probably tiny right pleural effusion.   This report was finalized on 10/28/2023 8:47 AM by Dr. Kirk Mcdonald MD.      XR Chest 1 View    Result Date: 10/25/2023  Bibasilar consolidation    This report was finalized on 10/25/2023 12:44 PM by Dr. Julio Dominguez MD.      XR Chest 1 View    Result Date: 10/22/2023   Mild " improvement in the appearance of the chest when compared to the prior examination.   This report was finalized on 10/22/2023 8:58 PM by Shayna Gee MD.        Medications :     acetylcysteine, 1.5 mL, Nebulization, Q6H - RT  amiodarone, 200 mg, Oral, Q12H  apixaban, 5 mg, Oral, Q12H  aspirin, 81 mg, Oral, Daily  docusate sodium, 100 mg, Oral, BID  folic acid, 1 mg, Oral, Daily  gabapentin, 200 mg, Oral, Q12H  hydrALAZINE, 25 mg, Oral, Q8H  ipratropium-albuterol, 3 mL, Nebulization, Q6H - RT  levothyroxine, 88 mcg, Oral, Q AM  montelukast, 10 mg, Oral, Nightly  nicotine, 1 patch, Transdermal, Q24H  pantoprazole, 40 mg, Oral, Daily  polyethylene glycol, 17 g, Oral, Daily  rosuvastatin, 10 mg, Oral, Nightly  sertraline, 25 mg, Oral, Daily  sodium chloride, 10 mL, Intravenous, Q12H  vitamin B-12, 1,000 mcg, Oral, Daily               Assessment & Plan     -Acute kidney injury initiated on dialysis during this hospitalization on 10/19/2023  -Atrophic right kidney  -Chronic kidney disease stage IIIa  -Acute hypoxic respite failure, improving  -COPD  -Essential hypertension  -Medical noncompliance    Dialysis tomorrow and continue on Tuesdays Thursdays and Saturdays schedule.  Awaiting outpatient dialysis chair set up    MAICO on CKD most likely multifactori including ATN from prolonged prerenal state and hemodynamic changes with concomitant use of ACE inhibitors in setting of volume loss   baseline creatinine 2.2, admitted with 3.69   ultrasound of the kidneys showed atrophic right kidney  Serologies are negative    Please avoid nephrotoxic medication and pharmacy to adjust the medication according to the GFR     Plan of care discussed with pt, and family answered all questions, patient verbalized understanding and agreed.      Vani León MD  10/30/23  08:48 EDT

## 2023-10-30 NOTE — CASE MANAGEMENT/SOCIAL WORK
Continued Stay Note   [place]     Patient Name: Lesley Michel  MRN: [id number]  Today's Date: 10/30/2023    Admit Date: 10/8/2023    Plan: CM notified by Frederick Galvez RN that patient was in need of a BP Cuff.  CM [facility] and spoke with Roxanne who states they will deliver to patient before discharge.  No other issues or concerns are noted at this time.   Discharge Plan       Row Name 10/30/23 1620       Plan    Plan CM notified by Frederick Galvez RN that patient was in need of a BP Cuff.  CM phone [facility] and spoke with Roxanne who states they will deliver to patient before discharge.  No other issues or concerns are noted at this time.      Row Name 10/30/23 1542                        Discharge Codes    No documentation.                 Expected Discharge Date and Time       Expected Discharge Date Expected Discharge Time    Oct 30, 2023               Mercedes Angel RN

## 2023-10-30 NOTE — CASE MANAGEMENT/SOCIAL WORK
Continued Stay Note  [facility]     Patient Name: Lesley Michel  MRN: [id number]  Today's Date: 10/30/2023    Admit Date: 10/8/2023    Plan: CM faxed and phoned [facility] and spoke to Margot who states that they will deliver portable tank before discharge.   Discharge Plan       Row Name 10/30/23 1542       Plan    Plan CM faxed and phoned [facility] and spoke to Margot who states that they will deliver portable tank before discharge.      Row Name 10/30/23 1518       Plan    Plan CM phoned patient who states that she has no preference of DME company.  Patient's also states that she does not have anyone to come pick her up right now because they are not off work.  No other issues or concerns are noted at this time.      Row Name 10/30/23 1256                               Discharge Codes    No documentation.                 Expected Discharge Date and Time       Expected Discharge Date Expected Discharge Time    Oct 30, 2023               Mercedes Angel RN

## 2023-10-30 NOTE — NURSING NOTE
Patient ambulated in room while on room air. Oxygen saturation 83% on room air. Oxygen saturation went up to 91% on 4L nasal cannula.

## 2023-10-30 NOTE — DISCHARGE PLACEMENT REQUEST
"Matthew Mcihel (67 y.o. Female)       Date of Birth   1955    Social Security Number       [address] [facility] [facility] [place] [address]    Home Phone   [phone]    MRN   [id number]       [facility]    Marital Status                               Admission Date   10/8/23    Admission Type   Emergency    Admitting Provider   Fanny Ford MD    Attending Provider   Jay Jay Pritchett DO    Department, Room/Bed   [address], [address]/       Discharge Date       Discharge Disposition   Home or Self Care    Discharge Destination                                 Attending Provider: Jay Jay Pritchett DO    Allergies: No Known Allergies    Isolation: None   Infection: None   Code Status: No CPR    Ht: 152.4 cm (60\")   Wt: 94.3 kg (207 lb 14.4 oz)    Admission Cmt: None   Principal Problem: Acute hypoxic respiratory failure [J96.01]                   Active Insurance as of 10/8/2023       Primary Coverage       Payor Plan Insurance Group Employer/Plan Group    [facility] MEDICARE REPLACEMENT [employer] MEDICARE REPLACEMENT        Payor Plan Address Payor Plan Phone Number Payor Plan Fax Number Effective Dates    [address] [address] [phone]  2023 - None Entered    [facility] [id number]         Subscriber Name Subscriber Birth Date Member ID       MATTHEW MICHEL 1955 [id number]                     Emergency Contacts        (Rel.) Home Phone Work Phone Mobile Phone    LOYD MICHEL (Son) [phone] -- --          [address] [address]  Dept. Phone:  [phone]  Dept. Fax:  [phone] Date Ordered: Oct 30, 2023         Patient:  Matthew Michel MRN:  [id number]   [address] [place] [address] :  1955  SSN:    Phone: [phone] Sex:  F     Weight: 94.3 kg (207 lb 14.4 oz)         Ht Readings from Last 1 Encounters:   10/21/23 152.4 cm (60\")         Home " "Nebulizer          (Order ID: [id number])    Diagnosis:  Acute on chronic respiratory failure with hypoxia (J96.21 [ICD-10-CM] 518.84,799.02 [ICD-9-CM])  Acute hypoxic respiratory failure (J96.01 [ICD-10-CM] 518.81 [ICD-9-CM])  COPD with acute exacerbation (J44.1 [ICD-10-CM] 491.21 [ICD-9-CM])   Quantity:  1     Nebulizer Equipment:  Nebulizer w/ Compressor  Nebulizer Accessories:  Nebulizer Kit - Administration Set, Non-Disposable  Length of Need (99 Months = Lifetime): 99 Months = Lifetime        Authorizing Provider's Phone: [phone]  Authorizing Provider:Jay Jay Pritchett DO  Authorizing Provider's NPI: [id number]  Order Entered By: Jay Jay Pritchett DO 10/30/2023  3:02 PM     Electronically signed by: Jay Jay Pritchett DO 10/30/2023  3:02 PM       [address] [address]  Dept. Phone:  [phone]  Dept. Fax:  [phone] Date Ordered: Oct 30, 2023         Patient:  Lesley Michel MRN:  [id number]   [address]  [facility] [address] :  1955  SSN:    Phone: [phone] Sex:  F     Weight: 94.3 kg (207 lb 14.4 oz)         Ht Readings from Last 1 Encounters:   10/21/23 152.4 cm (60\")         Oxygen Therapy         (Order ID: [id number])    Diagnosis:  Acute on chronic congestive heart failure, unspecified heart failure type (I50.9 [ICD-10-CM] 428.0 [ICD-9-CM])  Acute on chronic respiratory failure with hypoxia (J96.21 [ICD-10-CM] 518.84,799.02 [ICD-9-CM])   Quantity:  1     Delivery Modality: Nasal Cannula  Liters Per Minute: 4  Duration: Continuous  Equipment:  Oxygen Concentrator &  &  Portable Gaseous Oxygen System & Portable Oxygen Contents Gaseous &  Conserving Regulator  Length of Need (99 Months = Lifetime): 99 Months = Lifetime        Authorizing Provider's Phone: [phone]  Authorizing Provider:Jay Jay Pritchett DO  Authorizing Provider's NPI: [id number]  Order Entered By: Jay Jay Pritchett DO 10/30/2023  3:02 " PM     Electronically signed by: Jay Jay Pritchett DO 10/30/2023  3:02 PM        Leonor Tracey, RN   Registered Nurse  Nursing     Nursing Note      Signed     Date of Service: 10/30/23 1456  Creation Time: 10/30/23 1456     Signed         Patient ambulated in room while on room air. Oxygen saturation 83% on room air. Oxygen saturation went up to 91% on 4L nasal cannula.                     History & Physical        Chambers, DEEPA Salazar at 10/08/23 1958       Attestation signed by Fanny Ford MD at 10/09/23 0184    I have reviewed this documentation and agree.  I have formulated and discussed the assessment and plan with WAQAS Salazar.  I have also reviewed the patient's past medical history, vital signs, labs, imaging, and EKGs.  Please note that the EKGs dated 10/6/2023, 2/10/2022, 5/15/2018, all look similar to the current EKG.  We await the nephrology consultation.                    [facility] Medicine Services  History & Physical    Patient Identification:  Name:  Lesley Michel  Age:  67 y.o.  Sex:  female  :  1955  MRN:  [id number]   Visit Number:  [id number]  Admit Date: 10/8/2023   Primary Care Physician:  Woodrow Raymond APRN    Subjective     Chief complaint: Shortness of Breath, Edema    History of presenting illness:      Lesley Michel is a 67 y.o. female with past medical history significant for CAD status post stenting, hyperlipidemia, essential hypertension, COPD, tobacco abuse, CKD, hypothyroidism, arthritis, anxiety, history of migraines, obesity by BMI, and history of medical noncompliance.  Patient presents to [facility] emergency department for further evaluation of increasing lower extremity edema and shortness of breath which has been progressively worsening over the past few days.  Reportedly visited her cardiologist 2 days ago.  Per office visit note, patient has been noncompliant with all of her medications.  States that she cannot take  "diuretics due to chronic kidney disease, although it does not appear that she has followed up with nephrologist recently.  Cardiology note documentation included mild CKD.  Creatinine on arrival 3.6. Unknown recent baseline. However, per review of labs from June 2020, creatinine was 1.08 at that time. Patient also has elevated proBNP of 8000. High-sensitivity troponin T indeterminately elevated without significant delta. EKG obtained without evidence of acute arrhythmia or acute ST elevations or depressions.  No evidence of acute ischemia.  ABG was obtained on room air and noted hypoxemia with PO2 of 53.5.  Patient was placed on 2 L nasal cannula and is now stable.  Potassium was elevated at 5.9. Received treatment for elevated potassium in the ED.Patient currently denies any chest pain, palpitations, dizziness, nausea, vomiting, abdominal pain, dysuria, or difficulty urinating. She also denies recent fever, chills, or sputum production. Reports chronic dry cough. She states that she continues to smoke cigarettes and has requested a nicotine patch. Denies illicit substance abuse or alcohol abuse. She reported compliance with home medications during my exam; asked what kinds of medicines she takes and she states \"I don't know. All I know is if the doctor prescribes them, I have been taking them\". She is alert and oriented x4. Calm, cooperative, pleasant. Does not wear supplemental O2 at home; currently requiring 2L. Discussed plan with patient. She voiced agreement and understanding with no further questions, concerns, or needs at this time.     Upon arrival to the ED, vital signs were temperature 98.1, pulse 68, respirations 16, blood pressure 156/78 sitting, SPO2 saturation 88% on room air.  ABG with pH 7.363, PCO2 36.5, PO2 53.5, O2 saturation 88.4% on room air.  High-sensitivity troponin T 23 with +3 delta.  proBNP 8093.  CMP with potassium 5.9, CO2 20.8, BUN 27, creatinine 3.61, EGFR 13.4, glucose 102, alkaline " phosphatase 146, albumin 3.4, otherwise unremarkable.  Lactate 0.9.  CBC with hemoglobin 11.0, RDW 16.5, MCHC 30.4, otherwise unremarkable.  Peripheral blood cultures x2 pending. Chest x-ray noted trace right effusion and right basilar airspace disease.    Known Emergency Department medications received prior to my evaluation included 1000 mg IV calcium gluconate, D50, 10 units of IV regular insulin, 50 mEq sodium bicarb, 10 mg packet of Lokelma. Emergency Department Room location at the time of my evaluation was 102.  Discussed with admitting physician, Fanny Greenberg MD.    ---------------------------------------------------------------------------------------------------------------------   Review of Systems   Constitutional:  Positive for fatigue. Negative for chills and fever.   HENT:  Negative for congestion, sore throat and trouble swallowing.    Respiratory:  Positive for cough (nonproductive) and shortness of breath. Negative for wheezing.    Cardiovascular:  Positive for leg swelling. Negative for chest pain and palpitations.   Gastrointestinal:  Negative for abdominal pain, constipation, diarrhea, nausea and vomiting.   Genitourinary:  Negative for difficulty urinating, flank pain, frequency and urgency.   Musculoskeletal:  Negative for back pain, gait problem, neck pain and neck stiffness.   Neurological:  Negative for dizziness, tremors, seizures, syncope, facial asymmetry, speech difficulty, weakness, light-headedness, numbness and headaches.     ---------------------------------------------------------------------------------------------------------------------   Past Medical History:   Diagnosis Date    Anxiety     Arthritis     Coronary artery disease     H/O heart artery stent     Hyperlipidemia     Hypertension     Obesity      Past Surgical History:   Procedure Laterality Date    ANKLE SURGERY      RIGHT    APPENDECTOMY      CARDIAC CATHETERIZATION      LEFT    CORONARY STENT PLACEMENT       HYSTERECTOMY       Family History   Problem Relation Age of Onset    Heart disease Mother     Heart attack Mother 73    Fibromyalgia Mother     Heart attack Father 72    Ovarian cancer Sister     Coronary artery disease Other     Breast cancer Neg Hx      Social History     Socioeconomic History    Marital status:    Tobacco Use    Smoking status: Every Day     Packs/day: .5     Types: Electronic Cigarette, Cigarettes    Smokeless tobacco: Never   Substance and Sexual Activity    Alcohol use: No    Drug use: No    Sexual activity: Never     ---------------------------------------------------------------------------------------------------------------------   Allergies:  Patient has no known allergies.  ---------------------------------------------------------------------------------------------------------------------   Home medications:    Medications below are reported home medications pulling from within the system; at this time, these medications have not been reconciled unless otherwise specified and are in the verification process for further verifcation as current home medications.  (Not in a hospital admission)      Hospital Scheduled Meds:          Current listed hospital scheduled medications may not yet reflect those currently placed in orders that are signed and held awaiting patient's arrival to floor.   ---------------------------------------------------------------------------------------------------------------------     Objective     Vital Signs:  Temp:  [98.1 °F (36.7 °C)] 98.1 °F (36.7 °C)  Heart Rate:  [58-79] 74  Resp:  [16] 16  BP: (115-156)/(70-90) 154/87      10/08/23  1633   Weight: 102 kg (225 lb)     Body mass index is 43.94 kg/m².  ---------------------------------------------------------------------------------------------------------------------       Physical Exam  Vitals reviewed.   Constitutional:       General: She is awake. She is not in acute distress.     Appearance:  She is obese. She is not diaphoretic.      Interventions: Nasal cannula in place.      Comments: 2L nasal cannula.    HENT:      Head: Normocephalic and atraumatic.      Mouth/Throat:      Mouth: Mucous membranes are moist.      Pharynx: Oropharynx is clear.   Eyes:      Extraocular Movements: Extraocular movements intact.      Pupils: Pupils are equal, round, and reactive to light.   Cardiovascular:      Rate and Rhythm: Normal rate and regular rhythm.      Pulses: Normal pulses.           Dorsalis pedis pulses are 1+ on the right side and 1+ on the left side.      Heart sounds: Normal heart sounds. No murmur heard.     No friction rub.   Pulmonary:      Effort: Pulmonary effort is normal. No accessory muscle usage, respiratory distress or retractions.      Breath sounds: Decreased breath sounds and wheezing present. No rhonchi or rales.      Comments: Mild, end expiratory wheezing present and bilateral upper lobes.  Abdominal:      General: Bowel sounds are normal. There is no distension.      Palpations: Abdomen is soft.      Tenderness: There is no abdominal tenderness. There is no guarding.   Musculoskeletal:      Cervical back: Neck supple. No rigidity.      Right lower le+ Pitting Edema present.      Left lower le+ Pitting Edema present.   Skin:     General: Skin is warm and dry.      Capillary Refill: Capillary refill takes 2 to 3 seconds.      Coloration: Skin is pale.   Neurological:      Mental Status: She is alert and oriented to person, place, and time. Mental status is at baseline.      Cranial Nerves: No dysarthria or facial asymmetry.      Sensory: Sensation is intact.      Motor: No weakness or tremor.   Psychiatric:         Attention and Perception: Attention normal.         Mood and Affect: Mood normal.         Speech: Speech normal.         Behavior: Behavior normal. Behavior is cooperative.         Thought Content: Thought content normal.         Cognition and Memory: Cognition normal.    "      Judgment: Judgment normal.       ---------------------------------------------------------------------------------------------------------------------  EKG:  Pending formal cardiology interpretation. Per my review, appears normal sinus rhythm without evidence of acute ischemia.     Telemetry:  Appearing normal sinus rhythm 60-70s on my exam.   I have personally looked at both the EKG and the telemetry strips.  ---------------------------------------------------------------------------------------------------------------------   Results from last 7 days   Lab Units 10/08/23  1709   LACTATE mmol/L 0.9   WBC 10*3/mm3 7.38   HEMOGLOBIN g/dL 11.0*   HEMATOCRIT % 36.2   MCV fL 88.9   MCHC g/dL 30.4*   PLATELETS 10*3/mm3 289     Results from last 7 days   Lab Units 10/08/23  1707   PH, ARTERIAL pH units 7.363   PO2 ART mm Hg 53.5*   PCO2, ARTERIAL mm Hg 36.5   HCO3 ART mmol/L 20.7     Results from last 7 days   Lab Units 10/08/23  1709   SODIUM mmol/L 138   POTASSIUM mmol/L 5.9*   MAGNESIUM mg/dL 1.9   CHLORIDE mmol/L 107   CO2 mmol/L 20.8*   BUN mg/dL 27*   CREATININE mg/dL 3.61*   CALCIUM mg/dL 9.0   GLUCOSE mg/dL 102*   ALBUMIN g/dL 3.4*   BILIRUBIN mg/dL 0.5   ALK PHOS U/L 146*   AST (SGOT) U/L 7   ALT (SGPT) U/L <5   Estimated Creatinine Clearance: 16.7 mL/min (A) (by C-G formula based on SCr of 3.61 mg/dL (H)).  No results found for: \"AMMONIA\"  Results from last 7 days   Lab Units 10/08/23  1923 10/08/23  1709   HSTROP T ng/L 26* 23*     Results from last 7 days   Lab Units 10/08/23  1709   PROBNP pg/mL 8,093.0*     No results found for: \"HGBA1C\"  No results found for: \"TSH\", \"FREET4\"  No results found for: \"PREGTESTUR\", \"PREGSERUM\", \"HCG\", \"HCGQUANT\"  Pain Management Panel           No data to display                  ---------------------------------------------------------------------------------------------------------------------  Imaging Results (Last 7 Days)       Procedure Component Value Units " Date/Time    XR Chest 1 View [[id number]] Collected: 10/08/23 1917     Updated: 10/08/23 1919    Narrative:      XR CHEST 1 VW-     CLINICAL INDICATION: sob        COMPARISON: None immediately available      TECHNIQUE: Single frontal view of the chest.     FINDINGS:     LUNGS: Trace right effusion and right basilar airspace disease     HEART AND MEDIASTINUM: Heart and mediastinal contours are unremarkable        SKELETON: Bony and soft tissue structures are unremarkable.             Impression:      Trace right effusion and right basilar airspace disease           This report was finalized on 10/8/2023 7:17 PM by Dr. Julio Dominguez MD.               Last echocardiogram:  Results for orders placed during the hospital encounter of 05/23/18    Adult Transthoracic Echo Complete W/ Cont if Necessary Per Protocol    Interpretation Summary  · Normal left ventricular cavity size and wall thickness noted.  · Left ventricular systolic function is normal.Estimated EF appears to be in the range of 61 - 65%.  · Left ventricular diastolic dysfunction (grade I) consistent with impaired relaxation.  · Calcification in the right ventricular cavity noted which is probably calcified moderator band .  · Mild MAC is present with trace mitral valve regurgitation .  · Trace tricuspid valve regurgitation is noted with mild pulmonary hypertension . PRVSP is 44 mmHg.  · No significant valvular heart disease  · Small anterior echo-free space is noted which may represent fat pad V/S trivial pericardial effusion. It is located anteriorly and it is hemodynamically insignificant          I have personally reviewed the above radiology images and read the final radiology report on 10/08/23  ---------------------------------------------------------------------------------------------------------------------  Assessment / Plan     Active Hospital Problems    Diagnosis  POA    **Acute hypoxic respiratory failure [J96.01]  Yes        ASSESSMENT/PLAN:  -Acute on chronic HFpEF, present on admission  -Elevated proBNP and evidence of trace right pleural effusion on arrival  -Acute hypoxemic respiratory failure due to above  -COPD without acute exacerbation  -Ongoing tobacco abuse  -Acute kidney injury on CKD, present on admission  -Acute hyperkalemia, present on admission  -Indeterminately elevated troponin without significant delta, present on admission, likely due to poor renal function and hypoxia  -Mild normocytic anemia, unknown chronicity however suspect chronic due to CKD  -History of CAD status post stenting  -Hyperlipidemia  -Essential hypertension  -67 y.o. female presents to [facility] ED with CC of SOA and increasing lower extremity edema over the last several days. Recently evaluated in outpatient Cardiology office on 10/6/2023. Noted history of medical noncompliance. Not on diuretics due to CKD. Formerly followed with Dr. Sherwood (Nephrology) in the outpatient setting although has not followed up recently.   -proBNP 8000 on arrival. Chest x-ray noted trace right effusion and right basilar airspace disease.  -No leukocytosis.  Pro-Binh negative.  CRP mildly elevated at 5.41.  Patient denies any fever, chills, sputum production.  Do not currently suspect right basilar pneumonia.  Encourage incentive spirometry use and turn, cough, and deep breathing exercises.  -Closely monitor off antibiotics for now.  -Repeat CBC with differential in the a.m.  -Encourage smoking cessation.  -NRT made available.  -Received 80 mg IV Lasix in the ED.  -Monitor response with strict I's and O's and daily weights.  -Currently stable on 2 L. Room air is baseline.  Titrate supplemental O2 to maintain SPO2 saturations greater than 90%.  -Transthoracic echocardiogram from 2019 noted normal EF with grade 1 diastolic dysfunction.  -Obtain updated transthoracic echocardiogram.  -Baseline creatinine unknown with creatinine on admission of 3.6.  -Request records from  outpatient Nephrology clinic (Dr. Sherwood).   -Per review of most recent available labs in June 2020; creatinine 1.08 and GFR was between 56 and 63.  -Monitor urine output and renal function closely.  -Avoid nephrotoxins as able. Avoid hypotension.   -Repeat chemistry panel in AM.   -BP appearing controlled/stable at this time. Continue to monitor VS per hospital policy. Review home antihypertensive agents once reconciled by pharmacy with plans to continue except for any that may contribute to renal failure. Holding parameters to prevent hypotension and/or bradycardia.   -High sensitivity Troponin T 23 with +3 delta. EKG without evidence of acute ischemic changes. Patient denies chest pain on exam. Troponin elevation likely secondary to MAICO and hypoxia.   -Potassium was 5.9 on arrival. Patient received hyperkalemia treatment in the ED.  Repeat chemistry panel pending.  -Continuous cardiac monitoring and pulse oximetry ordered.  -Plan to continue aspirin, statin.   -Obtain HgbA1c and lipid profile.     -Hypoglycemia without known hx of diabetes  -BG 49 on arrival to the floor. Patient was alert&oriented, asymptomatic at the time. Ate a turkey sandwich and peanut butter/jeremy crackers. Repeat BG was 91. Accu checks ordered Q3 hours for now. Obtain HgbA1c. Hypoglycemia protocol in place as necessary. Hypoglycemia likely result of IV insulin that patient received in the ED as part of hyperkalemia treatment.     -Physical debility; PT consult. Maintain fall precautions.  Provide assistance.  -Arthritis; supportive care. Tylenol as needed for mild pain.  -Hypothyroidism; obtain TSH and free T4.  Plan to resume home levothyroxine pending laboratory results.  -History of migraines without aura  -Morbid obesity by BMI; affecting all aspects of care.  Encourage lifestyle modifications.  -History of medical noncompliance; encourage compliance.    ----------  -DVT prophylaxis: Subcu heparin.  -Activity: As tolerated. Fall  precautions.   -Expected length of stay:   INPATIENT status due to the need for care which can only be reasonably provided in an hospital setting such as aggressive/expedited ancillary services and/or consultation services, the necessity for IV medications, close physician monitoring and/or the possible need for procedures.  In such, I feel patient's risk for adverse outcomes and need for care warrant INPATIENT evaluation and predict the patient's care encounter to likely last beyond 2 midnights.   -Disposition pending clinical course.    High risk secondary to acute on chronic HFpEF with elevated proBNP and trace right pleural effusion, MAICO on CKD, acute hyperkalemia, and acute hypoxemic respiratory failure.      CODE STATUS: FULL CODE      Marie Pardo, DEEPA   10/08/23  20:07 EDT  Pager #[phone]  ---------------------------------------------------------------------------------------------------------------------        Electronically signed by Fanny Ford MD at 10/09/23 0752       Oxygen Therapy (last 2 days)       Date/Time SpO2 Device (Oxygen Therapy) Flow (L/min) Oxygen Concentration (%) ETCO2 (mmHg)    10/30/23 1327 94 nasal cannula;humidified 4 -- --    10/30/23 1314 92 nasal cannula;humidified 4 -- --    10/30/23 1110 95 -- -- -- --    10/30/23 0740 -- nasal cannula;humidified 4 -- --    10/30/23 0738 94 nasal cannula;humidified 4 -- --    10/30/23 0721 93 nasal cannula;humidified 4 -- --    10/30/23 0654 96 -- -- -- --    10/30/23 0309 95 -- -- -- --    10/30/23 0038 95 humidified;nasal cannula 4 -- --    10/29/23 2328 92 -- -- -- --    10/29/23 2136 -- humidified;nasal cannula 4 -- --    10/29/23 1950 92 humidified;nasal cannula 4 -- --    10/29/23 1946 91 -- -- -- --    10/29/23 1628 95 -- -- -- --    10/29/23 1331 94 humidified;nasal cannula 4 -- --    10/29/23 1103 97 -- -- -- --    10/29/23 0902 -- humidified;nasal cannula 4 -- --    10/29/23 0715 97 humidified;nasal cannula 4 -- --     10/29/23 0655 96 -- -- -- --    10/29/23 0320 96 -- -- -- --    10/29/23 0144 96 humidified;nasal cannula 4 -- --    10/29/23 0016 96 humidified;nasal cannula 4 -- --    10/28/23 2130 -- humidified;nasal cannula 4 -- --    10/28/23 2024 98 -- -- -- --    10/28/23 1659 98 humidified;nasal cannula -- -- --    10/28/23 1205 96 humidified;nasal cannula -- -- --    10/28/23 0715 94 humidified;nasal cannula 4 -- --    10/28/23 0648 97 humidified;nasal cannula -- -- --    10/28/23 0331 97 -- -- -- --    10/28/23 0019 97 humidified;nasal cannula 4 -- --    10/28/23 0000 98 -- -- -- --          Ventilator/Non-Invasive Ventilation Settings (From admission, onward)       Start     Ordered    10/20/23 1717  NIPPV (CPAP or BIPAP)  At Bedtime & As Needed-RT,   Status:  Canceled        Comments: Respiratory to manage   Question Answer Comment   Indication Sleep Apnea    Type BIPAP    Titrate Oxygen for SpO2 88% or Greater        10/20/23 1716    10/16/23 2029  NIPPV (CPAP or BIPAP)  Until Discontinued,   Status:  Canceled        Comments: Respiratory to manage   Question Answer Comment   Indication Sleep Apnea    Type BIPAP    Titrate Oxygen for SpO2 88% or Greater        10/16/23 2028    10/12/23 1706  NIPPV-IPPV / BIPAP / CPAP  At Bedtime & As Needed-RT,   Status:  Canceled        Question Answer Comment   Indication Acute Respiratory Failure    Type BIPAP    IPAP 14    EPAP 8    Breath Rate 12    Titrate Oxygen for SpO2 88 - 92%        10/12/23 1705                  Respiratory Therapy (last 48 hours)       Respiratory Therapy Flowsheet       Row Name 10/30/23 1403 10/30/23 1327 10/30/23 1314 10/30/23 1110 10/30/23 1003       Oxygen Therapy    SpO2 -- 94 % 92 % 95 % --    Device (Oxygen Therapy) -- nasal cannula;humidified nasal cannula;humidified -- --    Flow (L/min) -- 4 4 -- --    SF Ratio 2433 -- -- -- 2433       Vital Signs    Temp -- -- -- 98 °F (36.7 °C) --    Temp src -- -- -- Oral --    Pulse -- 95 81 63 --    Heart  Rate Source -- -- -- Monitor --    Resp -- 18 18 18 --    Resp Rate Source -- -- -- Visual --    BP -- -- -- 155/70 --    Noninvasive MAP (mmHg) -- -- -- 101 --    BP Location -- -- -- Right arm --    BP Method -- -- -- Automatic --    Patient Position -- -- -- Lying --       Breath Sounds    All Lung Fields Breath Sounds -- -- diminished -- --       Breath Sounds Post-Respiratory Treatment    Breath Sounds Posttreatment All Fields -- no change -- -- --       Aerosol Therapy (SVN)    $ Mini Neb Subsequent -- -- yes -- --    Respiratory Treatment Status (SVN) -- -- given -- --    Treatment Route (SVN) -- -- mouthpiece utilized;oxygen;PEP device, vibratory -- --       Positive Expiratory Pressure (PEP)    $ Flutter/Acapella -- -- yes -- --    Type (PEP Therapy) -- -- vibratory/oscillatory -- --    Device (PEP Therapy) -- -- Aerobika -- --    Route (PEP Therapy) -- -- mouthpiece utilized;instruction provided, initial -- --    PEP Duration (min) -- -- 10 -- --    Settings (PEP Therapy) -- -- PEP 3 -- --    Signs of Intolerance (PEP Therapy) -- -- none -- --      Row Name 10/30/23 0747 10/30/23 0740 10/30/23 0738 10/30/23 0721 10/30/23 0654       $ Oximetry Charges    $ O2 Pt/System Assessment -- -- -- yes --    $ Pulse Oximeter, Continuous (RT) -- -- -- yes --       Oxygen Therapy    SpO2 -- -- 94 % 93 % 96 %    Device (Oxygen Therapy) -- nasal cannula;humidified nasal cannula;humidified nasal cannula;humidified --    Flow (L/min) -- 4 4 4 --       Vital Signs    Temp -- -- -- -- 97.9 °F (36.6 °C)    Temp src -- -- -- -- Oral    Pulse -- -- 61 63 61    Heart Rate Source -- -- -- -- Monitor    Resp -- -- 18 18 18    Resp Rate Source -- -- -- -- Visual    BP -- -- -- -- 139/79    Noninvasive MAP (mmHg) -- -- -- -- 106    BP Location -- -- -- -- Right arm    BP Method -- -- -- -- Automatic    Patient Position -- -- -- -- Lying       Assessment    Respiratory WDL -- WDL -- -- --    Rhythm/Pattern, Respiratory -- no  shortness of breath reported;depth regular;pattern regular;unlabored -- -- --    Expansion/Accessory Muscles/Retractions -- expansion symmetric;no use of accessory muscles -- -- --    Nailbeds -- pale -- -- --    Mucous Membranes -- intact;moist;pink -- -- --    Cough Frequency -- frequent -- -- --    Cough Type -- nonproductive;dry -- -- --    Skin Integrity -- bruised (ecchymotic);intact;other (see comments)  generalized bruising; coccyx RBI -- -- --       Breath Sounds    Breath Sounds -- All Fields -- -- --    All Lung Fields Breath Sounds -- diminished -- diminished;wheezes, expiratory --       Breath Sounds Post-Respiratory Treatment    Breath Sounds Posttreatment All Fields -- -- no change -- --       Treatment/Therapy    Cough And Deep Breathing -- done independently per patient -- -- --    Oral Care patient refused intervention -- -- -- --       Aerosol Therapy (SVN)    $ Mini Neb Subsequent -- -- -- yes --    Respiratory Treatment Status (SVN) -- -- -- given --    Treatment Route (SVN) -- -- -- mouthpiece utilized;oxygen;PEP device, vibratory --    Signs of Intolerance (SVN) -- -- -- none --       Positive Expiratory Pressure (PEP)    $ Flutter/Acapella -- -- -- yes --    Type (PEP Therapy) -- -- -- vibratory/oscillatory --    Device (PEP Therapy) -- -- -- Aerobika --    Route (PEP Therapy) -- -- -- mouthpiece utilized;in-line --    PEP Duration (min) -- -- -- 10 --    Settings (PEP Therapy) -- -- -- PEP 3 --    Signs of Intolerance (PEP Therapy) -- -- -- none --      Row Name 10/30/23 0603 10/30/23 0330 10/30/23 0309 10/30/23 0203 10/30/23 0038       $ Oximetry Charges    $ Pulse Oximeter, Continuous (RT) -- -- -- -- yes       Oxygen Therapy    SpO2 -- -- 95 % -- 95 %    Pulse Oximetry Type -- -- Continuous -- Continuous    Device (Oxygen Therapy) -- -- -- -- humidified;nasal cannula    Flow (L/min) -- -- -- -- 4    SF Ratio 3947 -- -- 2588 --       Vital Signs    Temp -- -- 98 °F (36.7 °C) -- --    Temp  src -- -- Oral -- --    Pulse -- -- 64 -- 58    Heart Rate Source -- -- Monitor -- Monitor    Resp -- -- 20 -- 20    Resp Rate Source -- -- Visual -- Visual    BP -- -- 113/63 -- --    Noninvasive MAP (mmHg) -- -- 80 -- --    BP Location -- -- Left arm -- --    BP Method -- -- Automatic -- --    Patient Position -- -- Lying -- --       Breath Sounds    Breath Sounds -- -- -- -- All Fields    All Lung Fields Breath Sounds -- -- -- -- wheezes, expiratory       Aerosol Therapy (SVN)    $ Mini Neb Subsequent -- -- -- -- yes    Daily Review of Necessity (SVN) -- -- -- -- completed    Respiratory Treatment Status (SVN) -- -- -- -- given    Treatment Route (SVN) -- -- -- -- PEP device, vibratory    Patient Position -- -- -- -- HOB elevated    Posttreatment Assessment (SVN) -- -- -- -- breath sounds improved    Signs of Intolerance (SVN) -- -- -- -- none       Positive Expiratory Pressure (PEP)    $ Flutter/Acapella -- yes -- -- yes    Daily Review of Necessity (PEP Therapy) -- -- -- -- completed    Type (PEP Therapy) -- -- -- -- vibratory/oscillatory    Device (PEP Therapy) -- -- -- -- Aerobika    Route (PEP Therapy) -- -- -- -- aerosol therapy       Other RT Charges    $ Demo/Eval SVN/Inhaler/Metaneb -- -- -- -- yes      Row Name 10/29/23 2328 10/29/23 2203 10/29/23 2136 10/29/23 1950 10/29/23 1946       $ Oximetry Charges    $ Pulse Oximeter, Continuous (RT) -- -- -- yes --       Oxygen Therapy    SpO2 92 % -- -- 92 % 91 %    Pulse Oximetry Type Continuous -- -- Continuous Continuous    Device (Oxygen Therapy) -- -- humidified;nasal cannula humidified;nasal cannula --    Flow (L/min) -- -- 4  on 4L upon assessment 4 --    SF Ratio -- 2000 -- -- --       Vital Signs    Temp 97.6 °F (36.4 °C) -- -- -- 98 °F (36.7 °C)    Temp src Oral -- -- -- Oral    Pulse 58 -- -- 61 73    Heart Rate Source Monitor -- -- Monitor Monitor    Resp 20 -- -- 20 20    Resp Rate Source Visual -- -- Visual Visual    /61 -- -- -- 97/57     Noninvasive MAP (mmHg) 85 -- -- -- 74    BP Location Left arm -- -- -- Left arm    BP Method Automatic -- -- -- Automatic    Patient Position Sitting -- -- -- Sitting       Assessment    Respiratory WDL -- -- WDL -- --    Rhythm/Pattern, Respiratory -- -- no shortness of breath reported;depth regular;pattern regular;unlabored -- --    Expansion/Accessory Muscles/Retractions -- -- expansion symmetric;no use of accessory muscles -- --    Nailbeds -- -- pale -- --    Mucous Membranes -- -- intact;moist;pink -- --    Cough Frequency -- -- frequent -- --    Cough Type -- -- nonproductive;dry -- --    Skin Integrity -- -- bruised (ecchymotic);intact;other (see comments)  Generalized brusing noted/ Coccyx RBI -- --       Breath Sounds    Breath Sounds -- -- All Fields All Fields --    All Lung Fields Breath Sounds -- -- Anterior:;Lateral:;diminished diminished --       Treatment/Therapy    Administration (IS) -- -- refused -- --    Cough And Deep Breathing -- -- done with encouragement -- --    Oral Care -- -- dental appliance cleaned -- --       Aerosol Therapy (SVN)    $ Mini Neb Subsequent -- -- -- yes --    Daily Review of Necessity (SVN) -- -- -- completed --    Respiratory Treatment Status (SVN) -- -- -- given --    Treatment Route (SVN) -- -- -- PEP device, vibratory --    Patient Position -- -- -- HOB elevated --    Posttreatment Assessment (SVN) -- -- -- breath sounds unchanged --    Signs of Intolerance (SVN) -- -- -- none --       Positive Expiratory Pressure (PEP)    $ Flutter/Acapella -- -- -- yes --       Care Plan Interventions    Skin Protection -- -- adhesive use limited;tubing/devices free from skin contact -- --    Device Skin Pressure Protection -- -- adhesive use limited -- --       Other RT Charges    $ Demo/Eval SVN/Inhaler/Metaneb -- -- -- yes --      Row Name 10/29/23 1800 10/29/23 9271 10/29/23 2769 10/29/23 1404 10/29/23 1331       $ Oximetry Charges    $ O2 Pt/System Assessment -- -- -- -- yes        Oxygen Therapy    SpO2 -- -- 95 % -- 94 %    Pulse Oximetry Type -- -- -- -- Continuous    Device (Oxygen Therapy) -- -- -- -- humidified;nasal cannula    Flow (L/min) -- -- -- -- 4    SF Ratio 2667 -- -- 2433 --       Vital Signs    Temp -- -- 97.7 °F (36.5 °C) -- --    Temp src -- -- Oral -- --    Pulse -- -- 60 -- 96    Heart Rate Source -- -- Monitor -- --    Resp -- -- 20 -- 20    Resp Rate Source -- -- Visual -- --    BP -- -- 126/61 -- --    Noninvasive MAP (mmHg) -- -- 87 -- --    BP Location -- -- Left arm -- --    BP Method -- -- Automatic -- --    Patient Position -- -- Sitting -- --       Breath Sounds    Breath Sounds -- -- -- -- All Fields    All Lung Fields Breath Sounds -- -- -- -- diminished;wheezes, expiratory       Breath Sounds Post-Respiratory Treatment    Breath Sounds Posttreatment All Fields -- -- -- -- All Fields    Breath Sounds Posttreatment All Fields -- -- -- -- no change       Treatment/Therapy    Oral Care -- oral rinse provided -- -- --       Aerosol Therapy (SVN)    $ Mini Neb Subsequent -- -- -- -- yes    Respiratory Treatment Status (SVN) -- -- -- -- given    Signs of Intolerance (SVN) -- -- -- -- none      Row Name 10/29/23 1103 10/29/23 1002 10/29/23 0902 10/29/23 0800 10/29/23 0715       $ Oximetry Charges    $ O2 Pt/System Assessment -- -- -- -- yes    $ Pulse Oximeter, Continuous (RT) -- -- -- -- yes       Oxygen Therapy    SpO2 97 % -- -- -- 97 %    Pulse Oximetry Type -- -- -- -- Continuous    Device (Oxygen Therapy) -- -- humidified;nasal cannula -- humidified;nasal cannula    Flow (L/min) -- -- 4 -- 4    SF Ratio -- 3000 -- -- --       Vital Signs    Temp 98 °F (36.7 °C) -- -- -- --    Temp src Oral -- -- -- --    Pulse 58 -- -- -- 97    Heart Rate Source Monitor -- -- -- --    Resp 18 -- -- -- 20    Resp Rate Source Visual -- -- -- --    /74 -- -- -- --    Noninvasive MAP (mmHg) 113 -- -- -- --    BP Location Left arm -- -- -- --    BP Method Automatic -- --  -- --    Patient Position Sitting -- -- -- --       Assessment    Respiratory WDL -- -- WDL -- --    Rhythm/Pattern, Respiratory -- -- no shortness of breath reported;depth regular;pattern regular;unlabored -- --    Expansion/Accessory Muscles/Retractions -- -- expansion symmetric;no retractions;no use of accessory muscles -- --    Nailbeds -- -- pale -- --    Mucous Membranes -- -- intact;moist;pink -- --    Cough Frequency -- -- frequent -- --    Cough Type -- -- nonproductive;dry -- --    Skin Integrity -- -- bruised (ecchymotic);intact;other (see comments)  Gen bruising; Coccyx: red/blanchable -- --       Breath Sounds    Breath Sounds -- -- All Fields -- All Fields    All Lung Fields Breath Sounds -- -- Anterior:;Lateral:;diminished -- diminished       Breath Sounds Post-Respiratory Treatment    Breath Sounds Posttreatment All Fields -- -- -- -- All Fields    Breath Sounds Posttreatment All Fields -- -- -- -- no change       Treatment/Therapy    Cough And Deep Breathing -- -- done with encouragement -- --    Oral Care -- -- -- oral rinse provided --       Positive Expiratory Pressure (PEP)    $ Flutter/Acapella -- -- -- -- yes    Type (PEP Therapy) -- -- -- -- vibratory/oscillatory    Device (PEP Therapy) -- -- -- -- Aerobika    Route (PEP Therapy) -- -- -- -- aerosol therapy    PEP Duration (min) -- -- -- -- 10    Settings (PEP Therapy) -- -- -- -- PEP 3    Patient Position -- -- -- -- Truong's    Posttreatment Assessment (PEP) -- -- -- -- breath sounds unchanged       Care Plan Interventions    Skin Protection -- -- adhesive use limited -- --       Other RT Charges    $ Demo/Eval SVN/Inhaler/Metaneb -- -- -- -- yes      Row Name 10/29/23 0655 10/29/23 0603 10/29/23 0600 10/29/23 0526 10/29/23 0320       Oxygen Therapy    SpO2 96 % -- -- -- 96 %    Pulse Oximetry Type -- -- -- -- Continuous    SF Ratio -- 3000 -- -- --       Vital Signs    Temp -- -- -- -- 98.3 °F (36.8 °C)    Temp src -- -- -- -- Oral     Pulse 64 -- -- 65 64    Heart Rate Source Monitor -- -- -- Monitor    Resp 18 -- -- -- 18    Resp Rate Source Visual -- -- -- Visual    /64 -- -- 121/63 142/74    Noninvasive MAP (mmHg) 80 -- -- -- 90    BP Location Left arm -- -- -- Left arm    BP Method Automatic -- -- -- Automatic    Patient Position Lying -- -- -- Lying       Treatment/Therapy    Administration (IS) -- -- refused -- --      Row Name 10/29/23 0202 10/29/23 0144 10/29/23 0026 10/29/23 0016 10/28/23 2200       $ Oximetry Charges    $ O2 Pt/System Assessment -- -- -- yes --       Oxygen Therapy    SpO2 -- 96 % -- 96 % --    Pulse Oximetry Type -- Continuous -- Continuous --    Device (Oxygen Therapy) -- humidified;nasal cannula -- humidified;nasal cannula --    Flow (L/min) -- 4 -- 4 --    SF Ratio 3000 -- -- -- --       Vital Signs    Temp -- 98.5 °F (36.9 °C) -- -- --    Temp src -- Oral -- -- --    Pulse -- 63 -- 80 --    Heart Rate Source -- Monitor -- Monitor --    Resp -- 18 -- 18 --    Resp Rate Source -- Visual -- Visual --    BP -- 139/70 -- -- --    Noninvasive MAP (mmHg) -- 94 -- -- --    BP Location -- Left arm -- -- --    BP Method -- Automatic -- -- --    Patient Position -- Lying -- -- --       Breath Sounds    Breath Sounds -- -- -- All Fields --    All Lung Fields Breath Sounds -- -- -- diminished --       Treatment/Therapy    Administration (IS) -- -- -- -- refused       Aerosol Therapy (SVN)    $ Mini Neb Subsequent -- -- -- yes --    Respiratory Treatment Status (SVN) -- -- -- given --    Treatment Route (SVN) -- -- -- PEP device, vibratory --    Patient Position -- -- -- HOB elevated --    Posttreatment Assessment (SVN) -- -- breath sounds improved -- --    Signs of Intolerance (SVN) -- -- none -- --       Positive Expiratory Pressure (PEP)    $ Flutter/Acapella -- -- -- yes --    Type (PEP Therapy) -- -- -- vibratory/oscillatory --    Device (PEP Therapy) -- -- -- Aerobika --    Route (PEP Therapy) -- -- -- aerosol  therapy --    PEP Duration (min) -- -- -- 10 --    Patient Position -- -- -- HOB elevated --    Posttreatment Assessment (PEP) -- -- breath sounds improved -- --    Signs of Intolerance (PEP Therapy) -- -- none -- --      Row Name 10/28/23 2130 10/28/23 2024 10/28/23 1840 10/28/23 1800 10/28/23 1707       Oxygen Therapy    SpO2 -- 98 % -- -- --    Pulse Oximetry Type -- Continuous -- -- --    Device (Oxygen Therapy) humidified;nasal cannula -- -- -- --    Flow (L/min) 4 -- -- -- --       Vital Signs    Temp -- 97.9 °F (36.6 °C) -- -- --    Temp src -- Oral -- -- --    Pulse -- 64 -- -- --    Heart Rate Source -- Monitor -- -- --    Resp -- 18 -- -- --    Resp Rate Source -- Visual -- -- --    BP -- 124/64 -- -- --    Noninvasive MAP (mmHg) -- 84 -- -- --    BP Location -- Right arm -- -- --    BP Method -- Automatic -- -- --    Patient Position -- Lying -- -- --       Assessment    Respiratory WDL WDL -- -- -- --    Rhythm/Pattern, Respiratory no shortness of breath reported;depth regular;pattern regular;unlabored -- -- -- --    Expansion/Accessory Muscles/Retractions expansion symmetric;no use of accessory muscles -- -- -- --    Nailbeds pale -- -- -- --    Mucous Membranes intact;moist;pink -- -- -- --    Cough Frequency frequent -- -- -- --    Cough Type nonproductive;dry -- -- -- --    Skin Integrity bruised (ecchymotic);intact;other (see comments)  Generalized brusing noted/ Coccyx RBI -- -- -- --       Breath Sounds    Breath Sounds All Fields -- -- -- --    All Lung Fields Breath Sounds Anterior:;clear;equal bilaterally -- -- -- --       Treatment/Therapy    Cough And Deep Breathing done with encouragement -- -- -- --    Oral Care -- -- -- oral rinse provided oral rinse provided       Aerosol Therapy (SVN)    Respiratory Treatment Status (SVN) -- -- refused  patient vomiting. nurse notified -- --       Inhaler    Respiratory Treatment Status (Inhaler) -- -- refused  patient vomiting. nurse notified. -- --        Care Plan Interventions    Skin Protection adhesive use limited;transparent dressing maintained;incontinence pads utilized -- -- -- --    Device Skin Pressure Protection absorbent pad utilized/changed -- -- -- --      Row Name 10/28/23 1659                   Oxygen Therapy    SpO2 98 %        Pulse Oximetry Type Continuous        Device (Oxygen Therapy) humidified;nasal cannula           Vital Signs    Temp 98.3 °F (36.8 °C)        Temp src Oral        Pulse 59        Heart Rate Source Monitor        Resp 18        Resp Rate Source Visual        /79        Noninvasive MAP (mmHg) 93        BP Location Right arm        BP Method Automatic        Patient Position Lying                         Respiratory Therapy Notes (most recent note)        Katja Robledo, RRT at 10/13/23 3692          ReDs vest completed. Dr. Tellez notified with results.     Electronically signed by Katja Robledo, RRT at 10/13/23 2929

## 2023-10-30 NOTE — CASE MANAGEMENT/SOCIAL WORK
Discharge Planning Assessment  [facility]     Patient Name: Lesley Michel  MRN: [id number]  Today's Date: 10/30/2023    Admit Date: 10/8/2023    Plan: SS spoke with [facility] per Otilia who provided Dialysis chair times at Tue Th and Sat at 10:45 with pt needing to be at clinic on 10/31/23 at 10:30 am to complete paperwork.  SS notified pt and provided pt with written contact number and dialysis times.  Pt stated family would transport her via private auto.  Pt stated understanding.       Discharge Plan       Row Name 10/30/23 1219       Plan    Plan SS spoke with [facility] per Otilia who provided Dialysis chair times at [facility] and Sat at 10:45 with pt needing to be at clinic on 10/31/23 at 10:30 am to complete paperwork.  SS notified pt and provided pt with written contact number and dialysis times.  Pt stated family would transport her via private auto.  Pt stated understanding.                  Continued Care and Services - Admitted Since 10/8/2023       Dialysis/Infusion       Service Provider Request Status Selected Services Address Phone Fax Patient Preferred    [facility] Pending - No Request Sent N/A [address] [facility] [address] [phone] -- --                  Expected Discharge Date and Time       Expected Discharge Date Expected Discharge Time    Oct 30, 2023             LIZBET Schwarz

## 2023-10-30 NOTE — NURSING NOTE
Patient being discharged home today. Transport came to take patient out to patients vehicle. Transport left wheelchair in patients room to come to the nurses station to get belongings bags. When the transporter went back to the room the patient was laying in the floor. The transporter came back to the nurses station and told Madyson  that the patient was in the floor. Patient stated that she tried to get from the bed to the wheelchair and fell. Daughter at bedside and stated that the patient fell on her butt. Yarely MENDEZ made aware.

## 2023-10-31 NOTE — OUTREACH NOTE
Prep Survey      Flowsheet Row Responses   [facility] patient discharged from? [facility]   Is LACE score < 7 ? No   Eligibility [facility]   Date of Admission 10/08/23   Date of Discharge 10/30/23   Discharge Disposition Home or Self Care   Discharge diagnosis HEMODIALYSIS CATHETER INSERTION   Does the patient have one of the following disease processes/diagnoses(primary or secondary)? Other   Does the patient have Home health ordered? No   Is there a DME ordered? Yes   What DME was ordered? [facility]- bp cuff, portable 02 tank   Comments regarding appointments [facility] - In center home dialysis   Prep survey completed? Yes            Maria Isabel LIN - Registered Nurse

## 2023-10-31 NOTE — PAYOR COMM NOTE
"CONTACT:  CESAR IVAN RN  UTILIZATION MANAGEMENT DEPT.   [facility]   [address], [address]   PHONE:  [phone]   FAX: [phone]       AUTH # [id number]    DC SUMMARY      Matthew Michel (67 y.o. Female)       Date of Birth   1955    Social Security Number       Address   [address] [place] [address]    Home Phone   [phone]    MRN   [id number]       [employer]   [facility]    Marital Status                               Admission Date   10/8/23    Admission Type   Emergency    Admitting Provider   Fanny Ford MD    Attending Provider       Department, Room/Bed   [facility] [address] [facility]/       Discharge Date   10/30/2023    Discharge Disposition   Home or Self Care    Discharge Destination                                 Attending Provider: (none)   Allergies: No Known Allergies    Isolation: None   Infection: None   Code Status: Prior    Ht: 152.4 cm (60\")   Wt: 94.3 kg (207 lb 14.4 oz)    Admission Cmt: None   Principal Problem: Acute hypoxic respiratory failure [J96.01]                   Active Insurance as of 10/8/2023       Primary Coverage       Payor Plan Insurance Group Employer/Plan Group    [facility] MEDICARE REPLACEMENT [employer] MEDICARE REPLACEMENT        Payor Plan Address Payor Plan Phone Number Payor Plan Fax Number Effective Dates    [address] [address] [phone]  1/1/2023 - None Entered    [facility] [address]         Subscriber Name Subscriber Birth Date Member ID       MATTHEW MICHEL 1955 [id number]                     Emergency Contacts        (Rel.) Home Phone Work Phone Mobile Phone    LOYD MICHEL (Son) [phone] -- --              Discharge Summary    No notes of this type exist for this encounter.       "

## 2023-11-03 ENCOUNTER — READMISSION MANAGEMENT (OUTPATIENT)
Dept: CALL CENTER | Facility: HOSPITAL | Age: 68
End: 2023-11-03
Payer: MEDICARE

## 2023-11-03 NOTE — OUTREACH NOTE
Medical Week 1 Survey      Flowsheet Row Responses   [facility] facility patient discharged from? [facility]   Does the patient have one of the following disease processes/diagnoses(primary or secondary)? Other   Week 1 attempt successful? No   Unsuccessful attempts Attempt 1            Maria Isabel WALLACE - Registered Nurse

## 2023-11-07 ENCOUNTER — READMISSION MANAGEMENT (OUTPATIENT)
Dept: CALL CENTER | Facility: HOSPITAL | Age: 68
End: 2023-11-07
Payer: MEDICARE

## 2023-11-07 NOTE — OUTREACH NOTE
Medical Week 1 Survey      Flowsheet Row Responses   [facility] facility patient discharged from? [facility]   Does the patient have one of the following disease processes/diagnoses(primary or secondary)? Other   Week 1 attempt successful? No   Unsuccessful attempts Attempt 2            MATT LIN - Registered Nurse

## 2023-11-15 ENCOUNTER — READMISSION MANAGEMENT (OUTPATIENT)
Dept: CALL CENTER | Facility: HOSPITAL | Age: 68
End: 2023-11-15
Payer: MEDICARE

## 2023-11-15 NOTE — OUTREACH NOTE
Medical Week 3 Survey      Flowsheet Row Responses   [facility] patient discharged from? [facility]   Does the patient have one of the following disease processes/diagnoses(primary or secondary)? Other   Week 3 attempt successful? Yes   Call start time 1225   Call end time 1229   Discharge diagnosis HEMODIALYSIS CATHETER INSERTION   Meds reviewed with patient/caregiver? Yes   Is the patient having any side effects they believe may be caused by any medication additions or changes? No   Does the patient have all medications ordered at discharge? Yes   Is the patient taking all medications as directed (includes completed medication regime)? Yes   Does the patient have a primary care provider?  Yes   Does the patient have an appointment with their PCP within 7 days of discharge? Yes   Has the patient kept scheduled appointments due by today? Yes  [phone visit with PCP]   Has home health visited the patient within 72 hours of discharge? N/A   Psychosocial issues? No   Did the patient receive a copy of their discharge instructions? Yes   Nursing interventions Reviewed instructions with patient   What is the patient's perception of their health status since discharge? Improving   Is the patient/caregiver able to teach back signs and symptoms related to disease process for when to call PCP? Yes   Is the patient/caregiver able to teach back signs and symptoms related to disease process for when to call 911? Yes   Is the patient/caregiver able to teach back the hierarchy of who to call/visit for symptoms/problems? PCP, Specialist, Home health nurse, Urgent Care, ED, 911 Yes   If the patient is a current smoker, are they able to teach back resources for cessation? --  [trying to quit]   Week 3 Call Completed? Yes   Graduated Yes   Is the patient interested in additional calls from an ambulatory ? No   Would this patient benefit from a Referral to [facility] Social Work? No   Call end time 1229            Lilia Cano  Registered Nurse

## 2023-11-22 ENCOUNTER — NURSE TRIAGE (OUTPATIENT)
Dept: CALL CENTER | Facility: HOSPITAL | Age: 68
End: 2023-11-22
Payer: MEDICARE

## 2023-11-23 NOTE — TELEPHONE ENCOUNTER
Pt. Son calling says she just got in from dialysis bp is 90's she is little dizzy, then all of sudden vomiting, now, told her to lay down and have him call 911 to be taken to hospital, bp way to low. BP was 98/67, HR 60 just few minutes ago. They verbalized understanding.

## 2023-11-23 NOTE — TELEPHONE ENCOUNTER
"Reason for Disposition   [1] Systolic BP < 90 AND [2] dizzy, lightheaded, or weak    Additional Information   Negative: Started suddenly after an allergic medicine, an allergic food, or bee sting   Negative: Shock suspected (e.g., cold/pale/clammy skin, too weak to stand, low BP, rapid pulse)   Negative: Difficult to awaken or acting confused (e.g., disoriented, slurred speech)   Negative: Fainted   Negative: Chest pain   Negative: Bleeding (e.g., vomiting blood, rectal bleeding or tarry stools, severe vaginal bleeding)(Exception: fainted from sight of small amount of blood; small cut or abrasion)   Negative: Extra heart beats or heart is beating fast  (i.e., \"palpitations\")   Negative: Sounds like a life-threatening emergency to the triager   Negative: [1] Systolic BP < 80 AND [2] NOT dizzy, lightheaded or weak   Negative: Abdominal pain   Negative: Fever > 100.4 F (38.0 C)   Negative: Major surgery in the past month   Negative: [1] Drinking very little AND [2] dehydration suspected (e.g., no urine > 12 hours, very dry mouth, very lightheaded)   Negative: [1] Fall in systolic BP > 20 mm Hg from normal AND [2] dizzy, lightheaded, or weak   Negative: Patient sounds very sick or weak to the triager   Negative: [1] Systolic BP < 90 AND [2] NOT dizzy, lightheaded or weak    Answer Assessment - Initial Assessment Questions  1. BLOOD PRESSURE: \"What is the blood pressure?\" \"Did you take at least two measurements 5 minutes apart?\"      Bp low after dialysis  2. ONSET: \"When did you take your blood pressure?\"      Just now  3. HOW: \"How did you obtain the blood pressure?\" (e.g., visiting nurse, automatic home BP monitor)      Automatic arm cuff  4. HISTORY: \"Do you have a history of low blood pressure?\" \"What is your blood pressure normally?\"      no  5. MEDICATIONS: \"Are you taking any medications for blood pressure?\" If yes: \"Have they been changed recently?\"      Takes meds for high bp  6. PULSE RATE: \"Do you know what " "your pulse rate is?\"      60's  7. OTHER SYMPTOMS: \"Have you been sick recently?\" \"Have you had a recent injury?\"     no  8. PREGNANCY: \"Is there any chance you are pregnant?\" \"When was your last menstrual period?\"      no    Protocols used: Low Blood Pressure-ADULT-AH    "

## 2023-12-01 ENCOUNTER — APPOINTMENT (OUTPATIENT)
Dept: ULTRASOUND IMAGING | Facility: HOSPITAL | Age: 68
End: 2023-12-01
Payer: MEDICARE

## 2023-12-01 ENCOUNTER — HOSPITAL ENCOUNTER (EMERGENCY)
Facility: HOSPITAL | Age: 68
Discharge: HOME | End: 2023-12-01
Attending: EMERGENCY MEDICINE
Payer: MEDICARE

## 2023-12-01 ENCOUNTER — APPOINTMENT (OUTPATIENT)
Dept: CT IMAGING | Facility: HOSPITAL | Age: 68
End: 2023-12-01
Payer: MEDICARE

## 2023-12-01 VITALS
WEIGHT: 192 LBS | TEMPERATURE: 97.9 F | DIASTOLIC BLOOD PRESSURE: 80 MMHG | HEIGHT: 60 IN | SYSTOLIC BLOOD PRESSURE: 149 MMHG | OXYGEN SATURATION: 95 % | HEART RATE: 76 BPM | BODY MASS INDEX: 37.69 KG/M2 | RESPIRATION RATE: 18 BRPM

## 2023-12-01 DIAGNOSIS — M79.671 FOOT PAIN, BILATERAL: Primary | ICD-10-CM

## 2023-12-01 DIAGNOSIS — M79.672 FOOT PAIN, BILATERAL: Primary | ICD-10-CM

## 2023-12-01 PROCEDURE — 74176 CT ABD & PELVIS W/O CONTRAST: CPT | Performed by: RADIOLOGY

## 2023-12-01 PROCEDURE — 99284 EMERGENCY DEPT VISIT MOD MDM: CPT

## 2023-12-01 PROCEDURE — 93923 UPR/LXTR ART STDY 3+ LVLS: CPT

## 2023-12-01 PROCEDURE — 74176 CT ABD & PELVIS W/O CONTRAST: CPT

## 2023-12-01 PROCEDURE — 93923 UPR/LXTR ART STDY 3+ LVLS: CPT | Performed by: RADIOLOGY

## 2023-12-02 NOTE — ED PROVIDER NOTES
Subjective   History of Present Illness  Blue toes, abd pain, came from dialysis    Toe Pain  Severity:  Mild  Onset quality:  Gradual  Duration:  3 days  Associated symptoms: abdominal pain and fatigue        Review of Systems   Constitutional:  Positive for fatigue.   HENT: Negative.     Eyes: Negative.    Respiratory: Negative.     Gastrointestinal:  Positive for abdominal pain.   Endocrine: Negative.    Musculoskeletal: Negative.    Allergic/Immunologic: Negative.    Neurological: Negative.    Hematological: Negative.        Past Medical History:   Diagnosis Date    Anxiety     Arthritis     Coronary artery disease     H/O heart artery stent     Hyperlipidemia     Hypertension     Obesity        No Known Allergies    Past Surgical History:   Procedure Laterality Date    ANKLE SURGERY      RIGHT    APPENDECTOMY      CARDIAC CATHETERIZATION      LEFT    CORONARY STENT PLACEMENT      HYSTERECTOMY      INSERTION HEMODIALYSIS CATHETER Right 10/19/2023    Procedure: HEMODIALYSIS CATHETER INSERTION;  Surgeon: Bartolome Schwartz MD;  Location: [facility];  Service: General;  Laterality: Right;       Family History   Problem Relation Age of Onset    Heart disease Mother     Heart attack Mother 73    Fibromyalgia Mother     Heart attack Father 72    Ovarian cancer Sister     Coronary artery disease Other     Breast cancer Neg Hx        Social History     Socioeconomic History    Marital status:    Tobacco Use    Smoking status: Every Day     Packs/day: 0.50     Years: 30.00     Additional pack years: 0.00     Total pack years: 15.00     Types: Electronic Cigarette, Cigarettes    Smokeless tobacco: Never   Vaping Use    Vaping Use: Never used   Substance and Sexual Activity    Alcohol use: No    Drug use: No    Sexual activity: Never           Objective   Physical Exam  Vitals and nursing note reviewed.   Constitutional:       Appearance: She is well-developed.   HENT:      Head: Normocephalic.   Eyes:       Extraocular Movements: Extraocular movements intact.   Cardiovascular:      Rate and Rhythm: Normal rate.   Abdominal:      General: Abdomen is flat.      Palpations: Abdomen is soft.   Skin:     Coloration: Skin is cyanotic, mottled and pale.   Neurological:      Mental Status: She is alert.         Procedures           ED Course                                             Medical Decision Making  Problems Addressed:  Foot pain, bilateral: complicated acute illness or injury    Amount and/or Complexity of Data Reviewed  Radiology: ordered.        Final diagnoses:   Foot pain, bilateral       ED Disposition  ED Disposition       ED Disposition   Discharge    Condition   Stable    Comment   --               Woodrow Raymond M, APRN  [address] [facility] [address]  [phone]      If symptoms worsen         Medication List        Stop      aspirin 81 MG EC tablet                 Dariel Pulido MD  12/01/23 3366

## 2023-12-28 ENCOUNTER — TELEPHONE (OUTPATIENT)
Dept: CARDIOLOGY | Facility: CLINIC | Age: 68
End: 2023-12-28
Payer: MEDICARE

## 2023-12-28 NOTE — TELEPHONE ENCOUNTER
Caller: Lesley Michel    Relationship: Self    Best call back number: [phone]    Requested Prescriptions:   Requested Prescriptions      No prescriptions requested or ordered in this encounter        Pharmacy where request should be sent: [facility], [place] - [address] [facility] - [phone] [facility] [phone] FX     Last office visit with prescribing clinician: 3/2/2023   Last telemedicine visit with prescribing clinician: Visit date not found   Next office visit with prescribing clinician: Visit date not found     Additional details provided by patient: PT IS NEEDING AMIODARONE 200MG THAT WAS GIVEN WHILE SHE WAS IN THE HOSPITAL.     Does the patient have less than a 3 day supply:  [x] Yes  [] No    Would you like a call back once the refill request has been completed: [x] Yes [] No    If the office needs to give you a call back, can they leave a voicemail: [x] Yes [] No    Melissa Bonilla Rep   12/28/23 12:56 EST

## 2023-12-28 NOTE — TELEPHONE ENCOUNTER
Hub can read       Tried contacting patient, no answer,    We cannot send the amiodarone in as we did not prescribed this. Also where were you in what hospital?  Patient needs a follow up. Has missed a lot of past appts.

## 2023-12-29 ENCOUNTER — HOSPITAL ENCOUNTER (OUTPATIENT)
Dept: CT IMAGING | Facility: HOSPITAL | Age: 68
Discharge: HOME | End: 2023-12-29
Admitting: INTERNAL MEDICINE
Payer: MEDICARE

## 2023-12-29 ENCOUNTER — TRANSCRIBE ORDERS (OUTPATIENT)
Dept: ADMINISTRATIVE | Facility: HOSPITAL | Age: 68
End: 2023-12-29
Payer: MEDICARE

## 2023-12-29 DIAGNOSIS — M62.262 NONTRAUMATIC ISCHEMIC INFARCTION OF MUSCLE OF LEFT LOWER LEG: Primary | ICD-10-CM

## 2023-12-29 DIAGNOSIS — M62.262 NONTRAUMATIC ISCHEMIC INFARCTION OF MUSCLE OF LEFT LOWER LEG: ICD-10-CM

## 2023-12-29 PROCEDURE — 75635 CT ANGIO ABDOMINAL ARTERIES: CPT

## 2023-12-29 PROCEDURE — 25510000001 IOPAMIDOL 61 % SOLUTION: Performed by: INTERNAL MEDICINE

## 2023-12-29 RX ADMIN — IOPAMIDOL 80 ML: 612 INJECTION, SOLUTION INTRAVENOUS at 13:51

## 2024-04-16 ENCOUNTER — APPOINTMENT (OUTPATIENT)
Dept: ULTRASOUND IMAGING | Facility: HOSPITAL | Age: 69
End: 2024-04-16
Payer: MEDICARE

## 2024-04-16 ENCOUNTER — APPOINTMENT (OUTPATIENT)
Dept: CT IMAGING | Facility: HOSPITAL | Age: 69
End: 2024-04-16
Payer: MEDICARE

## 2024-04-16 ENCOUNTER — HOSPITAL ENCOUNTER (EMERGENCY)
Facility: HOSPITAL | Age: 69
Discharge: HOME | End: 2024-04-17
Attending: STUDENT IN AN ORGANIZED HEALTH CARE EDUCATION/TRAINING PROGRAM
Payer: MEDICARE

## 2024-04-16 ENCOUNTER — APPOINTMENT (OUTPATIENT)
Dept: GENERAL RADIOLOGY | Facility: HOSPITAL | Age: 69
End: 2024-04-16
Payer: MEDICARE

## 2024-04-16 VITALS
HEART RATE: 71 BPM | OXYGEN SATURATION: 92 % | TEMPERATURE: 97.9 F | SYSTOLIC BLOOD PRESSURE: 189 MMHG | HEIGHT: 60 IN | RESPIRATION RATE: 19 BRPM | BODY MASS INDEX: 38.09 KG/M2 | WEIGHT: 194 LBS | DIASTOLIC BLOOD PRESSURE: 108 MMHG

## 2024-04-16 DIAGNOSIS — R07.9 CHEST PAIN, UNSPECIFIED TYPE: ICD-10-CM

## 2024-04-16 DIAGNOSIS — R10.13 EPIGASTRIC PAIN: Primary | ICD-10-CM

## 2024-04-16 LAB
ALBUMIN SERPL-MCNC: 3.9 G/DL (ref 3.5–5.2)
ALBUMIN/GLOB SERPL: 1.2 G/DL
ALP SERPL-CCNC: 123 U/L (ref 39–117)
ALT SERPL W P-5'-P-CCNC: 6 U/L (ref 1–33)
ANION GAP SERPL CALCULATED.3IONS-SCNC: 10.8 MMOL/L (ref 5–15)
AST SERPL-CCNC: 11 U/L (ref 1–32)
BASOPHILS # BLD AUTO: 0.1 10*3/MM3 (ref 0–0.2)
BASOPHILS NFR BLD AUTO: 1.2 % (ref 0–1.5)
BILIRUB SERPL-MCNC: 0.4 MG/DL (ref 0–1.2)
BILIRUB UR QL STRIP: NEGATIVE
BUN SERPL-MCNC: 22 MG/DL (ref 8–23)
BUN/CREAT SERPL: 7.8 (ref 7–25)
CALCIUM SPEC-SCNC: 9.5 MG/DL (ref 8.6–10.5)
CHLORIDE SERPL-SCNC: 102 MMOL/L (ref 98–107)
CLARITY UR: CLEAR
CO2 SERPL-SCNC: 22.2 MMOL/L (ref 22–29)
COLOR UR: YELLOW
CREAT SERPL-MCNC: 2.82 MG/DL (ref 0.57–1)
CRP SERPL-MCNC: 2.67 MG/DL (ref 0–0.5)
DEPRECATED RDW RBC AUTO: 50.3 FL (ref 37–54)
EGFRCR SERPLBLD CKD-EPI 2021: 17.9 ML/MIN/1.73
EOSINOPHIL # BLD AUTO: 0.83 10*3/MM3 (ref 0–0.4)
EOSINOPHIL NFR BLD AUTO: 10.1 % (ref 0.3–6.2)
ERYTHROCYTE [DISTWIDTH] IN BLOOD BY AUTOMATED COUNT: 13.9 % (ref 12.3–15.4)
GEN 5 2HR TROPONIN T REFLEX: 20 NG/L
GLOBULIN UR ELPH-MCNC: 3.2 GM/DL
GLUCOSE SERPL-MCNC: 88 MG/DL (ref 65–99)
GLUCOSE UR STRIP-MCNC: NEGATIVE MG/DL
HCT VFR BLD AUTO: 39.4 % (ref 34–46.6)
HGB BLD-MCNC: 12.6 G/DL (ref 12–15.9)
HGB UR QL STRIP.AUTO: NEGATIVE
HOLD SPECIMEN: NORMAL
HOLD SPECIMEN: NORMAL
IMM GRANULOCYTES # BLD AUTO: 0.02 10*3/MM3 (ref 0–0.05)
IMM GRANULOCYTES NFR BLD AUTO: 0.2 % (ref 0–0.5)
KETONES UR QL STRIP: NEGATIVE
LEUKOCYTE ESTERASE UR QL STRIP.AUTO: NEGATIVE
LIPASE SERPL-CCNC: 37 U/L (ref 13–60)
LYMPHOCYTES # BLD AUTO: 2.33 10*3/MM3 (ref 0.7–3.1)
LYMPHOCYTES NFR BLD AUTO: 28.4 % (ref 19.6–45.3)
MAGNESIUM SERPL-MCNC: 2 MG/DL (ref 1.6–2.4)
MCH RBC QN AUTO: 31.2 PG (ref 26.6–33)
MCHC RBC AUTO-ENTMCNC: 32 G/DL (ref 31.5–35.7)
MCV RBC AUTO: 97.5 FL (ref 79–97)
MONOCYTES # BLD AUTO: 0.52 10*3/MM3 (ref 0.1–0.9)
MONOCYTES NFR BLD AUTO: 6.3 % (ref 5–12)
NEUTROPHILS NFR BLD AUTO: 4.39 10*3/MM3 (ref 1.7–7)
NEUTROPHILS NFR BLD AUTO: 53.8 % (ref 42.7–76)
NITRITE UR QL STRIP: NEGATIVE
NRBC BLD AUTO-RTO: 0 /100 WBC (ref 0–0.2)
NT-PROBNP SERPL-MCNC: ABNORMAL PG/ML (ref 0–900)
PH UR STRIP.AUTO: 6 [PH] (ref 5–8)
PLATELET # BLD AUTO: 290 10*3/MM3 (ref 140–450)
PMV BLD AUTO: 10.5 FL (ref 6–12)
POTASSIUM SERPL-SCNC: 4.8 MMOL/L (ref 3.5–5.2)
PROCALCITONIN SERPL-MCNC: 0.1 NG/ML (ref 0–0.25)
PROT SERPL-MCNC: 7.1 G/DL (ref 6–8.5)
PROT UR QL STRIP: NEGATIVE
RBC # BLD AUTO: 4.04 10*6/MM3 (ref 3.77–5.28)
SODIUM SERPL-SCNC: 135 MMOL/L (ref 136–145)
SP GR UR STRIP: 1.01 (ref 1–1.03)
TROPONIN T DELTA: -1 NG/L
TROPONIN T SERPL HS-MCNC: 21 NG/L
UROBILINOGEN UR QL STRIP: NORMAL
WBC NRBC COR # BLD AUTO: 8.19 10*3/MM3 (ref 3.4–10.8)
WHOLE BLOOD HOLD COAG: NORMAL
WHOLE BLOOD HOLD SPECIMEN: NORMAL

## 2024-04-16 PROCEDURE — 83735 ASSAY OF MAGNESIUM: CPT | Performed by: STUDENT IN AN ORGANIZED HEALTH CARE EDUCATION/TRAINING PROGRAM

## 2024-04-16 PROCEDURE — 71045 X-RAY EXAM CHEST 1 VIEW: CPT

## 2024-04-16 PROCEDURE — 76705 ECHO EXAM OF ABDOMEN: CPT

## 2024-04-16 PROCEDURE — 86140 C-REACTIVE PROTEIN: CPT | Performed by: STUDENT IN AN ORGANIZED HEALTH CARE EDUCATION/TRAINING PROGRAM

## 2024-04-16 PROCEDURE — 84145 PROCALCITONIN (PCT): CPT | Performed by: STUDENT IN AN ORGANIZED HEALTH CARE EDUCATION/TRAINING PROGRAM

## 2024-04-16 PROCEDURE — 74176 CT ABD & PELVIS W/O CONTRAST: CPT | Performed by: RADIOLOGY

## 2024-04-16 PROCEDURE — 36415 COLL VENOUS BLD VENIPUNCTURE: CPT

## 2024-04-16 PROCEDURE — 83880 ASSAY OF NATRIURETIC PEPTIDE: CPT | Performed by: STUDENT IN AN ORGANIZED HEALTH CARE EDUCATION/TRAINING PROGRAM

## 2024-04-16 PROCEDURE — 85025 COMPLETE CBC W/AUTO DIFF WBC: CPT | Performed by: STUDENT IN AN ORGANIZED HEALTH CARE EDUCATION/TRAINING PROGRAM

## 2024-04-16 PROCEDURE — 25010000002 MORPHINE PER 10 MG: Performed by: STUDENT IN AN ORGANIZED HEALTH CARE EDUCATION/TRAINING PROGRAM

## 2024-04-16 PROCEDURE — 96375 TX/PRO/DX INJ NEW DRUG ADDON: CPT

## 2024-04-16 PROCEDURE — 25010000002 ONDANSETRON PER 1 MG: Performed by: STUDENT IN AN ORGANIZED HEALTH CARE EDUCATION/TRAINING PROGRAM

## 2024-04-16 PROCEDURE — 71045 X-RAY EXAM CHEST 1 VIEW: CPT | Performed by: RADIOLOGY

## 2024-04-16 PROCEDURE — 96374 THER/PROPH/DIAG INJ IV PUSH: CPT

## 2024-04-16 PROCEDURE — 74176 CT ABD & PELVIS W/O CONTRAST: CPT

## 2024-04-16 PROCEDURE — 93005 ELECTROCARDIOGRAM TRACING: CPT | Performed by: STUDENT IN AN ORGANIZED HEALTH CARE EDUCATION/TRAINING PROGRAM

## 2024-04-16 PROCEDURE — 99284 EMERGENCY DEPT VISIT MOD MDM: CPT

## 2024-04-16 PROCEDURE — 80053 COMPREHEN METABOLIC PANEL: CPT | Performed by: STUDENT IN AN ORGANIZED HEALTH CARE EDUCATION/TRAINING PROGRAM

## 2024-04-16 PROCEDURE — 84484 ASSAY OF TROPONIN QUANT: CPT | Performed by: STUDENT IN AN ORGANIZED HEALTH CARE EDUCATION/TRAINING PROGRAM

## 2024-04-16 PROCEDURE — 83690 ASSAY OF LIPASE: CPT | Performed by: STUDENT IN AN ORGANIZED HEALTH CARE EDUCATION/TRAINING PROGRAM

## 2024-04-16 PROCEDURE — 76705 ECHO EXAM OF ABDOMEN: CPT | Performed by: RADIOLOGY

## 2024-04-16 PROCEDURE — 81003 URINALYSIS AUTO W/O SCOPE: CPT | Performed by: STUDENT IN AN ORGANIZED HEALTH CARE EDUCATION/TRAINING PROGRAM

## 2024-04-16 PROCEDURE — 93010 ELECTROCARDIOGRAM REPORT: CPT | Performed by: INTERNAL MEDICINE

## 2024-04-16 RX ORDER — SODIUM CHLORIDE 0.9 % (FLUSH) 0.9 %
10 SYRINGE (ML) INJECTION AS NEEDED
Status: DISCONTINUED | OUTPATIENT
Start: 2024-04-16 | End: 2024-04-17 | Stop reason: HOSPADM

## 2024-04-16 RX ORDER — ONDANSETRON 2 MG/ML
4 INJECTION INTRAMUSCULAR; INTRAVENOUS ONCE
Status: COMPLETED | OUTPATIENT
Start: 2024-04-16 | End: 2024-04-16

## 2024-04-16 RX ADMIN — MORPHINE SULFATE 4 MG: 4 INJECTION, SOLUTION INTRAMUSCULAR; INTRAVENOUS at 19:36

## 2024-04-16 RX ADMIN — ONDANSETRON 4 MG: 2 INJECTION INTRAMUSCULAR; INTRAVENOUS at 19:35

## 2024-04-16 NOTE — ED PROVIDER NOTES
Subjective   History of Present Illness  68-year-old female past medical history of CAD with stents, hypertension, obesity, anxiety presents to the ED for abdominal pain in the epigastric and right upper quadrant area.  Patient states she is also intermittent chest pain.  No fevers but has had chills.  No urinary symptoms.  States she has had some diarrhea with no constipation.  No lower extremity swelling or tenderness.  No history of blood clots.    History provided by:  Patient      Review of Systems    Past Medical History:   Diagnosis Date    Anxiety     Arthritis     Coronary artery disease     H/O heart artery stent     Hyperlipidemia     Hypertension     Obesity        No Known Allergies    Past Surgical History:   Procedure Laterality Date    ANKLE SURGERY      RIGHT    APPENDECTOMY      CARDIAC CATHETERIZATION      LEFT    CORONARY STENT PLACEMENT      HYSTERECTOMY      INSERTION HEMODIALYSIS CATHETER Right 10/19/2023    Procedure: HEMODIALYSIS CATHETER INSERTION;  Surgeon: Bartolome Schwartz MD;  Location: [facility];  Service: General;  Laterality: Right;       Family History   Problem Relation Age of Onset    Heart disease Mother     Heart attack Mother 73    Fibromyalgia Mother     Heart attack Father 72    Ovarian cancer Sister     Coronary artery disease Other     Breast cancer Neg Hx        Social History     Socioeconomic History    Marital status:    Tobacco Use    Smoking status: Every Day     Current packs/day: 0.50     Average packs/day: 0.5 packs/day for 30.0 years (15.0 ttl pk-yrs)     Types: Electronic Cigarette, Cigarettes    Smokeless tobacco: Never   Vaping Use    Vaping status: Never Used   Substance and Sexual Activity    Alcohol use: No    Drug use: No    Sexual activity: Never           Objective   Physical Exam  Constitutional:       General: She is not in acute distress.     Appearance: She is obese. She is not ill-appearing.   HENT:      Head: Normocephalic and  atraumatic.   Eyes:      Conjunctiva/sclera: Conjunctivae normal.   Cardiovascular:      Rate and Rhythm: Normal rate and regular rhythm.      Heart sounds: Normal heart sounds.   Pulmonary:      Effort: Pulmonary effort is normal.      Breath sounds: Normal breath sounds.   Abdominal:      General: Abdomen is flat. There is no distension.      Palpations: Abdomen is soft.      Tenderness: There is abdominal tenderness in the right upper quadrant and epigastric area.   Neurological:      General: No focal deficit present.      Mental Status: She is alert and oriented to person, place, and time. Mental status is at baseline.         Procedures           ED Course  ED Course as of 04/17/24 0013   Tue Apr 16, 2024 1733 EKG notes sinus rhythm.  88 bpm.  I directly evaluated the EKG of this patient and noted no acute abnormalities.  Electronically signed by Hernandez Denis DO, 04/16/24, 5:33 PM EDT.   [SF]      ED Course User Index  [SF] Hernandez Denis DO KASPER reviewed by Lalo Morrell MD       Medical Decision Making  68-year-old female presents ED with abdominal pain, chest pain.  Well-appearing with normal vitals other than hypertension on my examination.  EKG showed no acute findings.  Lab work and imaging was obtained and showed no significant deviations from patient's baseline.  She remained well-appearing throughout time in the ED with normal vitals other than hypertension.  Delta troponin stable.  Most of patient's pain is in the epigastric area and she states she does have history of ulcers.  No findings to suggest upper GI bleeding.  Suspect pain may be due to symptoms.  Will have her follow-up with her primary care provider and return to ED if symptoms acutely worsen.    Problems Addressed:  Chest pain, unspecified type: complicated acute illness or injury  Epigastric pain: complicated acute illness or injury    Amount and/or Complexity of Data Reviewed  Labs:  ordered.  Radiology: ordered.  ECG/medicine tests: ordered and independent interpretation performed.    Risk  Prescription drug management.        Final diagnoses:   Epigastric pain   Chest pain, unspecified type       ED Disposition  ED Disposition       ED Disposition   Discharge    Condition   Stable    Comment   --               Woodrow Raymond, APRN  [address] [facility] [address]  [phone]    Schedule an appointment as soon as possible for a visit       [facility] EMERGENCY DEPARTMENT  [address] [place] [id number] [phone]    If symptoms worsen         Medication List      No changes were made to your prescriptions during this visit.            Lalo Morrell MD  04/17/24 0013

## 2024-04-17 ENCOUNTER — TRANSCRIBE ORDERS (OUTPATIENT)
Dept: ADMINISTRATIVE | Facility: HOSPITAL | Age: 69
End: 2024-04-17
Payer: MEDICARE

## 2024-04-17 ENCOUNTER — LAB (OUTPATIENT)
Dept: LAB | Facility: HOSPITAL | Age: 69
End: 2024-04-17
Payer: MEDICARE

## 2024-04-17 DIAGNOSIS — N18.6 END STAGE RENAL DISEASE: Primary | ICD-10-CM

## 2024-04-17 DIAGNOSIS — N18.6 END STAGE RENAL DISEASE: ICD-10-CM

## 2024-04-17 LAB
ANION GAP SERPL CALCULATED.3IONS-SCNC: 12.6 MMOL/L (ref 5–15)
BUN SERPL-MCNC: 22 MG/DL (ref 8–23)
BUN/CREAT SERPL: 7.9 (ref 7–25)
CALCIUM SPEC-SCNC: 9 MG/DL (ref 8.6–10.5)
CHLORIDE SERPL-SCNC: 103 MMOL/L (ref 98–107)
CO2 SERPL-SCNC: 14.4 MMOL/L (ref 22–29)
CREAT SERPL-MCNC: 2.8 MG/DL (ref 0.57–1)
EGFRCR SERPLBLD CKD-EPI 2021: 18.1 ML/MIN/1.73
GLUCOSE SERPL-MCNC: 93 MG/DL (ref 65–99)
POTASSIUM SERPL-SCNC: 5.6 MMOL/L (ref 3.5–5.2)
QT INTERVAL: 362 MS
QTC INTERVAL: 438 MS
SODIUM SERPL-SCNC: 130 MMOL/L (ref 136–145)

## 2024-04-17 PROCEDURE — 36415 COLL VENOUS BLD VENIPUNCTURE: CPT

## 2024-04-17 PROCEDURE — 80048 BASIC METABOLIC PNL TOTAL CA: CPT

## 2024-04-29 ENCOUNTER — HOSPITAL ENCOUNTER (OUTPATIENT)
Facility: HOSPITAL | Age: 69
Setting detail: HOSPITAL OUTPATIENT SURGERY
Discharge: HOME | End: 2024-04-29
Attending: INTERNAL MEDICINE | Admitting: INTERNAL MEDICINE
Payer: MEDICARE

## 2024-04-29 VITALS
DIASTOLIC BLOOD PRESSURE: 104 MMHG | HEART RATE: 65 BPM | WEIGHT: 181 LBS | RESPIRATION RATE: 22 BRPM | HEIGHT: 61 IN | SYSTOLIC BLOOD PRESSURE: 185 MMHG | OXYGEN SATURATION: 96 % | TEMPERATURE: 98 F | BODY MASS INDEX: 34.17 KG/M2

## 2024-04-29 RX ORDER — CLONIDINE HYDROCHLORIDE 0.1 MG/1
0.1 TABLET ORAL ONCE
Status: COMPLETED | OUTPATIENT
Start: 2024-04-29 | End: 2024-04-29

## 2024-04-29 RX ORDER — LIDOCAINE HYDROCHLORIDE 20 MG/ML
INJECTION, SOLUTION INFILTRATION; PERINEURAL
Status: DISCONTINUED | OUTPATIENT
Start: 2024-04-29 | End: 2024-04-29 | Stop reason: HOSPADM

## 2024-04-29 RX ADMIN — CLONIDINE HYDROCHLORIDE 0.1 MG: 0.1 TABLET ORAL at 13:19

## 2024-04-29 NOTE — Clinical Note
Pressure dressing X2 applied to Right Chest Wall by MICHAEL Woods.  Secured by [employer].  (As per Dr. Steinberg order).

## 2024-04-29 NOTE — H&P
Chief complaint complication of Access        Subjective     Patient is a 68 y.o. female with end-stage renal disease on hemodialysis has recovered and dialysis.  Almost 4 weeks now her last GFR was 18 mL/min so we have decided to remove the tunneled dialysis catheter which she has in the right internal jugular vein patient denies any shortness of breath no nausea or diarrhea no urgency no frequency no fever no chills    Review of Systems   Pertinent items are noted in HPI    History  Past Medical History:   Diagnosis Date    Anxiety     Arthritis     Coronary artery disease     H/O heart artery stent     Hyperlipidemia     Hypertension     Obesity      Past Surgical History:   Procedure Laterality Date    ANKLE SURGERY      RIGHT    APPENDECTOMY      CARDIAC CATHETERIZATION      LEFT    CORONARY STENT PLACEMENT      HYSTERECTOMY      INSERTION HEMODIALYSIS CATHETER Right 10/19/2023    Procedure: HEMODIALYSIS CATHETER INSERTION;  Surgeon: Bartolome Schwartz MD;  Location: [facility];  Service: General;  Laterality: Right;     Family History   Problem Relation Age of Onset    Heart disease Mother     Heart attack Mother 73    Fibromyalgia Mother     Heart attack Father 72    Ovarian cancer Sister     Coronary artery disease Other     Breast cancer Neg Hx      Social History     Tobacco Use    Smoking status: Every Day     Current packs/day: 0.50     Average packs/day: 0.5 packs/day for 30.0 years (15.0 ttl pk-yrs)     Types: Electronic Cigarette, Cigarettes    Smokeless tobacco: Never   Vaping Use    Vaping status: Never Used   Substance Use Topics    Alcohol use: No    Drug use: No     E-cigarette/Vaping    E-cigarette/Vaping Use Never User     Passive Exposure No     Counseling Given No      E-cigarette/Vaping Substances    Nicotine No     THC No     CBD No     Flavoring No      Medications Prior to Admission   Medication Sig Dispense Refill Last Dose    albuterol sulfate  (90 Base) MCG/ACT inhaler  Inhale 2 puffs Every 4 (Four) Hours As Needed for Wheezing. 18 g 0 Past Month    folic acid (FOLVITE) 1 MG tablet Take 1 tablet by mouth Daily.   4/29/2024    gabapentin (NEURONTIN) 800 MG tablet Take 1 tablet by mouth 2 (Two) Times a Day As Needed (nerve pain).   4/29/2024    HYDROcodone-acetaminophen (NORCO)  MG per tablet Take 1 tablet by mouth Every 8 (Eight) Hours As Needed for Moderate Pain.   4/29/2024    levothyroxine (SYNTHROID, LEVOTHROID) 88 MCG tablet Take 1 tablet by mouth Daily.   4/29/2024    montelukast (SINGULAIR) 10 MG tablet Take 1 tablet by mouth Every Night.   4/28/2024    pantoprazole (PROTONIX) 40 MG EC tablet Take 1 tablet by mouth Daily.   4/29/2024    predniSONE (DELTASONE) 20 MG tablet Take 2 tablets by mouth Daily. Take 2 tablets daily for 5 days as part of COPD Rescue Kit. (Only Start if in YELLOW ZONE.) 10 tablet 0 4/29/2024    rosuvastatin (CRESTOR) 10 MG tablet Take 1 tablet by mouth Every Night. 30 tablet 0 4/28/2024    vitamin B-12 (CYANOCOBALAMIN) 1000 MCG tablet Take 1 tablet by mouth Daily.   Past Week    doxycycline (MONODOX) 100 MG capsule Take 1 capsule by mouth every 12 hours for 5 days as part of COPD Rescue Kit. (Only Start if in YELLOW ZONE.) 10 capsule 0 Unknown    hydrALAZINE (APRESOLINE) 25 MG tablet Take 1 tablet by mouth Every 8 (Eight) Hours for 30 days. 90 tablet 0     ipratropium-albuterol (DUO-NEB) 0.5-2.5 mg/3 ml nebulizer Take 3 mL by nebulization Every 4 (Four) Hours As Needed for Wheezing. 90 mL 1 Unknown    ipratropium-albuterol (DUO-NEB) 0.5-2.5 mg/3 ml nebulizer Take 3 mL by neb every 30 minutes as needed for shortness of air for up to 6 doses. Part of COPD Rescue Kit. (Only Start if in YELLOW ZONE.) 18 mL 0 Unknown    metoprolol tartrate (LOPRESSOR) 25 MG tablet Take 1/2 tablet by mouth 2 (Two) Times a Day for 30 days. 30 tablet 0     nitroglycerin (NITROSTAT) 0.4 MG SL tablet Place 1 tablet under the tongue Every 5 (Five) Minutes As Needed for Chest  Pain. 30 tablet 2 Unknown    sertraline (ZOLOFT) 25 MG tablet Take 1 tablet by mouth Daily for 30 days. 30 tablet 0      Allergies:  Patient has no known allergies.    Objective     Vital Signs  Temp:  [98 °F (36.7 °C)] 98 °F (36.7 °C)  Heart Rate:  [60] 60  Resp:  [18] 18  BP: (158)/(100) 158/100    Physical Exam:      General Appearance:  Alert, cooperative, in no acute distress   Head:  Normocephalic, without obvious abnormality, atraumatic   Eyes:  Lids and lashes normal, conjunctivae and sclerae normal, no icterus, no pallor, corneas clear, PERRLA   Ears:  Ears appear intact with no abnormalities noted   Throat:  No oral lesions, no thrush, oral mucosa moist   Neck:  No adenopathy, supple, trachea midline, no thyromegaly, no carotid bruit, no JVD   Back:  No kyphosis present, no scoliosis present, no skin lesions, erythema or scars, no tenderness to percussion, or palpation, range of motion normal   Lungs:  Clear to auscultation,respirations regular, even and unlabored    Heart:  Regular rhythm and normal rate, normal S1 and S2, no murmur, no gallop, no rub, no click   Breast Exam:  Deferred   Abdomen:  Normal bowel sounds, no masses, no organomegaly, soft non-tender, non-distended, no guarding, no rebound tenderness   Genitalia:  Deferred   Extremities:  Moves all extremities well, no edema, no cyanosis, no redness   Pulses:  Pulses palpable and equal bilaterally   Skin:  No bleeding, bruising or rash   Lymph nodes:  No palpable adenopathy   Neurologic:  Cranial nerves 2 - 12 grossly intact, sensation intact, DTR present and equal bilaterally   Access: right IJ tunnel catheter no redness.  No discharge:     Results Review:    None    Assessment & Plan         -Complication of access  -Chronic kidney disease stage IV  -Hypertension  -Metabolic acidosis  -Peripheral vascular disease    04/29/2024  Patient has been on hemodialysis through right cuffed tunneled internal jugular vein dialysis catheter and now  patient has regained renal function and her last GFR was 18 mm/min so we have decided to stop dialysis and take the tunneled dialysis catheter out.  I already explained to the patient all the risk including but not limited to infection bleeding stroke death etc. options benefits and prognosis with tunneled dialysis catheter removal she understood verbalized agreed    I discussed the patients findings and my recommendations with patient and nursing staff.     MERNA Steinberg MD  04/29/24  13:04 EDT

## 2024-04-29 NOTE — DISCHARGE INSTRUCTIONS
Continue taking all of your current medications.       Leave the dressing to your chest in place until the am 4/30/24.  Apply a band-aid to the site.  Change the band-aid once or twice daily until you follow up with Dr. Steinberg next week.   Keep the area clean and dry and covered until healed.

## 2024-05-03 NOTE — TELEPHONE ENCOUNTER
Patient called stating she had a bottle of carvedilol 25mg at home.  Says to take one tablet by mouth two times a day.  Patient states she has been taking this medicaiton.  
What Type Of Note Output Would You Prefer (Optional)?: Bullet Format

## 2024-05-20 ENCOUNTER — APPOINTMENT (OUTPATIENT)
Dept: GENERAL RADIOLOGY | Facility: HOSPITAL | Age: 69
End: 2024-05-20
Payer: MEDICARE

## 2024-05-20 ENCOUNTER — HOSPITAL ENCOUNTER (EMERGENCY)
Facility: HOSPITAL | Age: 69
Discharge: HOME | End: 2024-05-20
Attending: EMERGENCY MEDICINE | Admitting: EMERGENCY MEDICINE
Payer: MEDICARE

## 2024-05-20 VITALS
WEIGHT: 180 LBS | HEART RATE: 61 BPM | HEIGHT: 61 IN | DIASTOLIC BLOOD PRESSURE: 97 MMHG | SYSTOLIC BLOOD PRESSURE: 174 MMHG | BODY MASS INDEX: 33.99 KG/M2 | RESPIRATION RATE: 18 BRPM | OXYGEN SATURATION: 96 % | TEMPERATURE: 97.7 F

## 2024-05-20 DIAGNOSIS — I10 ESSENTIAL HYPERTENSION: Primary | ICD-10-CM

## 2024-05-20 LAB
ALBUMIN SERPL-MCNC: 3.7 G/DL (ref 3.5–5.2)
ALBUMIN/GLOB SERPL: 1.2 G/DL
ALP SERPL-CCNC: 139 U/L (ref 39–117)
ALT SERPL W P-5'-P-CCNC: <5 U/L (ref 1–33)
ANION GAP SERPL CALCULATED.3IONS-SCNC: 10.8 MMOL/L (ref 5–15)
AST SERPL-CCNC: 10 U/L (ref 1–32)
BASOPHILS # BLD AUTO: 0.09 10*3/MM3 (ref 0–0.2)
BASOPHILS NFR BLD AUTO: 1.2 % (ref 0–1.5)
BILIRUB SERPL-MCNC: 0.5 MG/DL (ref 0–1.2)
BUN SERPL-MCNC: 24 MG/DL (ref 8–23)
BUN/CREAT SERPL: 9 (ref 7–25)
CALCIUM SPEC-SCNC: 8.8 MG/DL (ref 8.6–10.5)
CHLORIDE SERPL-SCNC: 103 MMOL/L (ref 98–107)
CO2 SERPL-SCNC: 21.2 MMOL/L (ref 22–29)
CREAT SERPL-MCNC: 2.68 MG/DL (ref 0.57–1)
DEPRECATED RDW RBC AUTO: 54.4 FL (ref 37–54)
EGFRCR SERPLBLD CKD-EPI 2021: 19.1 ML/MIN/1.73
EOSINOPHIL # BLD AUTO: 1.24 10*3/MM3 (ref 0–0.4)
EOSINOPHIL NFR BLD AUTO: 15.9 % (ref 0.3–6.2)
ERYTHROCYTE [DISTWIDTH] IN BLOOD BY AUTOMATED COUNT: 14.5 % (ref 12.3–15.4)
GLOBULIN UR ELPH-MCNC: 3.1 GM/DL
GLUCOSE SERPL-MCNC: 94 MG/DL (ref 65–99)
HCT VFR BLD AUTO: 36.7 % (ref 34–46.6)
HGB BLD-MCNC: 11.1 G/DL (ref 12–15.9)
HOLD SPECIMEN: NORMAL
HOLD SPECIMEN: NORMAL
IMM GRANULOCYTES # BLD AUTO: 0.03 10*3/MM3 (ref 0–0.05)
IMM GRANULOCYTES NFR BLD AUTO: 0.4 % (ref 0–0.5)
LYMPHOCYTES # BLD AUTO: 1.71 10*3/MM3 (ref 0.7–3.1)
LYMPHOCYTES NFR BLD AUTO: 21.9 % (ref 19.6–45.3)
MCH RBC QN AUTO: 31 PG (ref 26.6–33)
MCHC RBC AUTO-ENTMCNC: 30.2 G/DL (ref 31.5–35.7)
MCV RBC AUTO: 102.5 FL (ref 79–97)
MONOCYTES # BLD AUTO: 0.63 10*3/MM3 (ref 0.1–0.9)
MONOCYTES NFR BLD AUTO: 8.1 % (ref 5–12)
NEUTROPHILS NFR BLD AUTO: 4.12 10*3/MM3 (ref 1.7–7)
NEUTROPHILS NFR BLD AUTO: 52.5 % (ref 42.7–76)
NRBC BLD AUTO-RTO: 0 /100 WBC (ref 0–0.2)
PLATELET # BLD AUTO: 283 10*3/MM3 (ref 140–450)
PMV BLD AUTO: 10.4 FL (ref 6–12)
POTASSIUM SERPL-SCNC: 4.8 MMOL/L (ref 3.5–5.2)
PROT SERPL-MCNC: 6.8 G/DL (ref 6–8.5)
RBC # BLD AUTO: 3.58 10*6/MM3 (ref 3.77–5.28)
SODIUM SERPL-SCNC: 135 MMOL/L (ref 136–145)
TROPONIN T SERPL HS-MCNC: 19 NG/L
WBC NRBC COR # BLD AUTO: 7.82 10*3/MM3 (ref 3.4–10.8)
WHOLE BLOOD HOLD COAG: NORMAL
WHOLE BLOOD HOLD SPECIMEN: NORMAL

## 2024-05-20 PROCEDURE — 71045 X-RAY EXAM CHEST 1 VIEW: CPT

## 2024-05-20 PROCEDURE — 85025 COMPLETE CBC W/AUTO DIFF WBC: CPT | Performed by: EMERGENCY MEDICINE

## 2024-05-20 PROCEDURE — 93005 ELECTROCARDIOGRAM TRACING: CPT | Performed by: EMERGENCY MEDICINE

## 2024-05-20 PROCEDURE — 80053 COMPREHEN METABOLIC PANEL: CPT | Performed by: EMERGENCY MEDICINE

## 2024-05-20 PROCEDURE — 71045 X-RAY EXAM CHEST 1 VIEW: CPT | Performed by: RADIOLOGY

## 2024-05-20 PROCEDURE — 99284 EMERGENCY DEPT VISIT MOD MDM: CPT

## 2024-05-20 PROCEDURE — 36415 COLL VENOUS BLD VENIPUNCTURE: CPT

## 2024-05-20 PROCEDURE — 84484 ASSAY OF TROPONIN QUANT: CPT | Performed by: EMERGENCY MEDICINE

## 2024-05-20 RX ORDER — SODIUM CHLORIDE 0.9 % (FLUSH) 0.9 %
10 SYRINGE (ML) INJECTION AS NEEDED
Status: DISCONTINUED | OUTPATIENT
Start: 2024-05-20 | End: 2024-05-20 | Stop reason: HOSPADM

## 2024-05-20 RX ORDER — ASPIRIN 325 MG
325 TABLET ORAL ONCE
Status: COMPLETED | OUTPATIENT
Start: 2024-05-20 | End: 2024-05-20

## 2024-05-20 RX ADMIN — ASPIRIN 325 MG: 325 TABLET ORAL at 20:07

## 2024-05-20 NOTE — ED NOTES
MEDICAL SCREENING:    Reason for Visit: chest pain    Patient initially seen in triage.  The patient was advised further evaluation and diagnostic testing will be needed, some of the treatment and testing will be initiated in the lobby in order to begin the process.  The patient will be returned to the waiting area for the time being and possibly be re-assessed by a subsequent ED provider.  The patient will be brought back to the treatment area in as timely manner as possible.      Kaela Pulido PA  05/20/24 1931

## 2024-05-21 NOTE — ED PROVIDER NOTES
Subjective   History of Present Illness  Patient presents to Er with chest pain for two days    Chest Pain  Pain location:  L chest  Pain quality: sharp    Pain radiates to:  Does not radiate  Pain severity:  Mild  Onset quality:  Gradual  Duration:  2 days  Timing:  Constant  Chronicity:  New  Context: breathing    Associated symptoms: fatigue        Review of Systems   Constitutional:  Positive for activity change and fatigue.   HENT: Negative.     Eyes: Negative.    Respiratory: Negative.     Cardiovascular:  Positive for chest pain.   Gastrointestinal: Negative.    Endocrine: Negative.    Genitourinary: Negative.    Musculoskeletal: Negative.    Allergic/Immunologic: Negative.    Neurological: Negative.    Hematological: Negative.    Psychiatric/Behavioral: Negative.         Past Medical History:   Diagnosis Date    Anxiety     Arthritis     Coronary artery disease     H/O heart artery stent     Hyperlipidemia     Hypertension     Obesity        No Known Allergies    Past Surgical History:   Procedure Laterality Date    ANKLE SURGERY      RIGHT    APPENDECTOMY      CARDIAC CATHETERIZATION      LEFT    CORONARY STENT PLACEMENT      HYSTERECTOMY      INSERTION HEMODIALYSIS CATHETER Right 10/19/2023    Procedure: HEMODIALYSIS CATHETER INSERTION;  Surgeon: Bartolome Schwartz MD;  Location: [facility];  Service: General;  Laterality: Right;       Family History   Problem Relation Age of Onset    Heart disease Mother     Heart attack Mother 73    Fibromyalgia Mother     Heart attack Father 72    Ovarian cancer Sister     Coronary artery disease Other     Breast cancer Neg Hx        Social History     Socioeconomic History    Marital status:    Tobacco Use    Smoking status: Every Day     Current packs/day: 0.50     Average packs/day: 0.5 packs/day for 30.0 years (15.0 ttl pk-yrs)     Types: Electronic Cigarette, Cigarettes    Smokeless tobacco: Never   Vaping Use    Vaping status: Never Used   Substance and  Sexual Activity    Alcohol use: No    Drug use: No    Sexual activity: Never           Objective   Physical Exam  Vitals and nursing note reviewed.   Constitutional:       Appearance: She is well-developed.   HENT:      Head: Normocephalic.   Eyes:      Pupils: Pupils are equal, round, and reactive to light.   Cardiovascular:      Rate and Rhythm: Normal rate.      Heart sounds: Normal heart sounds.   Pulmonary:      Effort: Pulmonary effort is normal.      Breath sounds: Examination of the right-lower field reveals decreased breath sounds. Examination of the left-lower field reveals decreased breath sounds. Decreased breath sounds present.   Abdominal:      Palpations: Abdomen is soft.   Musculoskeletal:         General: Normal range of motion.      Cervical back: Normal range of motion.   Skin:     General: Skin is dry.   Neurological:      General: No focal deficit present.      Mental Status: She is alert.         Procedures           ED Course  ED Course as of 05/20/24 2058   Mon May 20, 2024   1954 ECG 18:39Sinus rhythm with PACs, rate 76. QT/qTc 398/447 [SELINA]      ED Course User Index  [SELINA] Dariel Pulido MD                                             Medical Decision Making  Amount and/or Complexity of Data Reviewed  Labs: ordered.  Radiology: ordered.  ECG/medicine tests: ordered.    Risk  OTC drugs.  Prescription drug management.        Final diagnoses:   Essential hypertension       ED Disposition  ED Disposition       ED Disposition   Discharge    Condition   Stable    Comment   --               Woodrow Raymond M, APRN  [address] [id number]  [phone]    Schedule an appointment as soon as possible for a visit   If symptoms worsen         Medication List      No changes were made to your prescriptions during this visit.            Dariel Pulido MD  05/20/24 2058

## 2024-05-22 LAB
QT INTERVAL: 398 MS
QTC INTERVAL: 447 MS

## 2024-05-29 ENCOUNTER — TELEPHONE (OUTPATIENT)
Dept: CARDIOLOGY | Facility: CLINIC | Age: 69
End: 2024-05-29

## 2024-05-29 NOTE — TELEPHONE ENCOUNTER
Caller: Lesley Michel    Relationship to patient: Self    Best call back number: [phone]    Chief complaint: HEART PAIN AND SWELLING. ED STATES FLUID AROUND HER HEART. LEFT LEG SWELLING AND FOOT.     Type of visit: FOLLOW UP    Requested date: MONDAY AFTER 1       Additional notes: HAS BEEN 8 MONTHS SINCE LAST SEEN, DOESN'T WANT TO SEE ALYSSA.

## 2024-06-03 ENCOUNTER — OFFICE VISIT (OUTPATIENT)
Dept: CARDIOLOGY | Facility: CLINIC | Age: 69
End: 2024-06-03
Payer: MEDICARE

## 2024-06-03 ENCOUNTER — TELEPHONE (OUTPATIENT)
Dept: CARDIOLOGY | Facility: CLINIC | Age: 69
End: 2024-06-03

## 2024-06-03 VITALS
DIASTOLIC BLOOD PRESSURE: 62 MMHG | WEIGHT: 197.4 LBS | OXYGEN SATURATION: 96 % | BODY MASS INDEX: 37.27 KG/M2 | HEART RATE: 65 BPM | SYSTOLIC BLOOD PRESSURE: 124 MMHG | HEIGHT: 61 IN

## 2024-06-03 DIAGNOSIS — R06.00 DYSPNEA, UNSPECIFIED TYPE: ICD-10-CM

## 2024-06-03 DIAGNOSIS — Z72.0 TOBACCO USE: ICD-10-CM

## 2024-06-03 DIAGNOSIS — I73.9 PAD (PERIPHERAL ARTERY DISEASE): ICD-10-CM

## 2024-06-03 DIAGNOSIS — I10 PRIMARY HYPERTENSION: Chronic | ICD-10-CM

## 2024-06-03 DIAGNOSIS — N18.4 CHRONIC RENAL FAILURE, STAGE 4 (SEVERE): ICD-10-CM

## 2024-06-03 DIAGNOSIS — E78.2 MIXED HYPERLIPIDEMIA: ICD-10-CM

## 2024-06-03 DIAGNOSIS — R60.0 BILATERAL LOWER EXTREMITY EDEMA: ICD-10-CM

## 2024-06-03 DIAGNOSIS — I48.0 PAROXYSMAL ATRIAL FIBRILLATION: ICD-10-CM

## 2024-06-03 DIAGNOSIS — R79.89 ELEVATED TROPONIN: ICD-10-CM

## 2024-06-03 DIAGNOSIS — Z79.01 CHRONIC ANTICOAGULATION: ICD-10-CM

## 2024-06-03 DIAGNOSIS — I25.10 CORONARY ARTERY DISEASE INVOLVING NATIVE CORONARY ARTERY OF NATIVE HEART WITHOUT ANGINA PECTORIS: Primary | ICD-10-CM

## 2024-06-03 RX ORDER — APIXABAN 5 MG/1
5 TABLET, FILM COATED ORAL
COMMUNITY
Start: 2024-05-30

## 2024-06-03 RX ORDER — ASPIRIN 81 MG/1
81 TABLET, CHEWABLE ORAL DAILY
COMMUNITY

## 2024-06-03 RX ORDER — SODIUM BICARBONATE 650 MG/1
650 TABLET ORAL 2 TIMES DAILY
COMMUNITY

## 2024-06-03 RX ORDER — AMLODIPINE BESYLATE 2.5 MG/1
2.5 TABLET ORAL DAILY
COMMUNITY
Start: 2024-05-30

## 2024-06-03 NOTE — PROGRESS NOTES
Subjective     Lesley Michel is a 68 y.o. female.   Chief Complaint   Patient presents with    Coronary Artery Disease      History of Present Illness  Lesley Michel is a 68-year-old female who presents to the clinic for delayed cardiology follow-up and recent ER visit.     Coronary artery disease with chronically occluded well collateralized dominant RCA, status post drug-eluting stent to LAD with high-grade stenosis of the ostium of the small D1.  She also has 60% mid RCA posterolateral branch lesion.  She is currently on aspirin, statin and beta blocker (per patient report and on current medication list). She was previously on Coreg however that was changed during hospitalization to lopressor. She denies current chest pain however was in the ER recently with symptoms and mildly elevated troponin. She denies any recent nitroglycerin use.      Hyperlipidemia currently taking Crestor 10 mg daily.  No recent labs available for review.  She reports compliance with therapy however medication is not listed on her list today.      Hypertension currently on Norvasc 2.5 mg at bedtime, Hydralazine 100 mg TID.  Nephrology has been managing BP.  She reports intermittent BPs at home with variable readings.  She was previously on dialysis but recently dialysis catheter was removed.  Her recent creatinine remains elevated but stable.     She has noted some bilateral lower extremity edema. During hospitalization her BNP was elevated at 10,061.  Due to CKD, nephrology has managed.  Her GFR is less than 30 therefore Farxiga/Jardiance is not recommended. She is not currently using diuretic therapy.  She reports a history of PAD and recently seen Dr. Strong in [place] who performed stents in bilateral lower extremities.    History of paroxysmal atrial fibrillation during hospitalization in October/November 2023.  She was started on amiodarone but it does not appear she is continuing.  She remains on chronic anticoagulation with  Eliquis.    Patient Active Problem List   Diagnosis    Hypertension    Hyperlipidemia    Migraine without aura    CAD, chronically occluded collateralized dominant RCA, status post drug-eluting stent to LAD 2011, high-grade stenosis of the ostium of the small D1 and 60% mid RCA posterior lateral branch    Chronic renal failure, stage 2 (mild)    Tobacco use    Bilateral lower extremity edema    Other specified hypothyroidism    Cigarette smoker    Class 3 severe obesity without serious comorbidity with body mass index (BMI) of 40.0 to 44.9 in adult    Acute hypoxic respiratory failure    Acute renal failure (ARF)     Past Medical History:   Diagnosis Date    Anxiety     Arthritis     Coronary artery disease     H/O heart artery stent     Hyperlipidemia     Hypertension     Obesity      Past Surgical History:   Procedure Laterality Date    ANKLE SURGERY      RIGHT    APPENDECTOMY      CARDIAC CATHETERIZATION      LEFT    CORONARY STENT PLACEMENT      HYSTERECTOMY      INSERTION HEMODIALYSIS CATHETER Right 10/19/2023    Procedure: HEMODIALYSIS CATHETER INSERTION;  Surgeon: Bartolome Schwartz MD;  Location: [facility];  Service: General;  Laterality: Right;       Family History   Problem Relation Age of Onset    Heart disease Mother     Heart attack Mother 73    Fibromyalgia Mother     Heart attack Father 72    Ovarian cancer Sister     Coronary artery disease Other     Breast cancer Neg Hx      Social History     Tobacco Use    Smoking status: Every Day     Current packs/day: 0.50     Average packs/day: 0.5 packs/day for 30.0 years (15.0 ttl pk-yrs)     Types: Electronic Cigarette, Cigarettes     Passive exposure: Never    Smokeless tobacco: Never   Vaping Use    Vaping status: Never Used   Substance Use Topics    Alcohol use: No    Drug use: No         The following portions of the patient's history were reviewed and updated as appropriate: allergies, current medications, past family history, past medical history,  past social history, past surgical history and problem list.    No Known Allergies      Current Outpatient Medications:     albuterol sulfate  (90 Base) MCG/ACT inhaler, Inhale 2 puffs Every 4 (Four) Hours As Needed for Wheezing., Disp: 18 g, Rfl: 0    amLODIPine (NORVASC) 2.5 MG tablet, Take 1 tablet by mouth Daily., Disp: , Rfl:     aspirin 81 MG chewable tablet, Chew 1 tablet Daily., Disp: , Rfl:     Eliquis 5 MG tablet tablet, 1 tablet., Disp: , Rfl:     folic acid (FOLVITE) 1 MG tablet, Take 1 tablet by mouth Daily., Disp: , Rfl:     hydrALAZINE (APRESOLINE) 25 MG tablet, Take 1 tablet by mouth Every 8 (Eight) Hours for 30 days. (Patient taking differently: Take 4 tablets by mouth 3 (Three) Times a Day.), Disp: 90 tablet, Rfl: 0    HYDROcodone-acetaminophen (NORCO)  MG per tablet, Take 1 tablet by mouth Every 8 (Eight) Hours As Needed for Moderate Pain., Disp: , Rfl:     ipratropium-albuterol (DUO-NEB) 0.5-2.5 mg/3 ml nebulizer, Take 3 mL by nebulization Every 4 (Four) Hours As Needed for Wheezing., Disp: 90 mL, Rfl: 1    ipratropium-albuterol (DUO-NEB) 0.5-2.5 mg/3 ml nebulizer, Take 3 mL by neb every 30 minutes as needed for shortness of air for up to 6 doses. Part of COPD Rescue Kit. (Only Start if in YELLOW ZONE.), Disp: 18 mL, Rfl: 0    levothyroxine (SYNTHROID, LEVOTHROID) 88 MCG tablet, Take 1 tablet by mouth Daily., Disp: , Rfl:     metoprolol tartrate (LOPRESSOR) 25 MG tablet, Take 1/2 tablet by mouth 2 (Two) Times a Day for 30 days., Disp: 30 tablet, Rfl: 0    montelukast (SINGULAIR) 10 MG tablet, Take 1 tablet by mouth Every Night., Disp: , Rfl:     nitroglycerin (NITROSTAT) 0.4 MG SL tablet, Place 1 tablet under the tongue Every 5 (Five) Minutes As Needed for Chest Pain., Disp: 30 tablet, Rfl: 2    pantoprazole (PROTONIX) 40 MG EC tablet, Take 1 tablet by mouth Daily., Disp: , Rfl:     sertraline (ZOLOFT) 25 MG tablet, Take 1 tablet by mouth Daily for 30 days., Disp: 30 tablet, Rfl: 0     sodium bicarbonate 650 MG tablet, Take 1 tablet by mouth 2 (Two) Times a Day., Disp: , Rfl:     doxycycline (MONODOX) 100 MG capsule, Take 1 capsule by mouth every 12 hours for 5 days as part of COPD Rescue Kit. (Only Start if in YELLOW ZONE.) (Patient not taking: Reported on 6/3/2024), Disp: 10 capsule, Rfl: 0    gabapentin (NEURONTIN) 800 MG tablet, Take 1 tablet by mouth 2 (Two) Times a Day As Needed (nerve pain). (Patient not taking: Reported on 6/3/2024), Disp: , Rfl:     predniSONE (DELTASONE) 20 MG tablet, Take 2 tablets by mouth Daily. Take 2 tablets daily for 5 days as part of COPD Rescue Kit. (Only Start if in YELLOW ZONE.) (Patient not taking: Reported on 6/3/2024), Disp: 10 tablet, Rfl: 0    rosuvastatin (CRESTOR) 10 MG tablet, Take 1 tablet by mouth Every Night. (Patient not taking: Reported on 6/3/2024), Disp: 30 tablet, Rfl: 0    vitamin B-12 (CYANOCOBALAMIN) 1000 MCG tablet, Take 1 tablet by mouth Daily. (Patient not taking: Reported on 6/3/2024), Disp: , Rfl:     Review of Systems   Constitutional:  Negative for activity change, appetite change, chills, diaphoresis, fatigue and fever.   HENT:  Negative for congestion, drooling, ear discharge, ear pain, mouth sores, nosebleeds, postnasal drip, rhinorrhea, sinus pressure, sneezing and sore throat.    Eyes:  Negative for pain, discharge and visual disturbance.   Respiratory:  Positive for shortness of breath. Negative for cough, chest tightness and wheezing.    Cardiovascular:  Positive for leg swelling. Negative for chest pain and palpitations.   Gastrointestinal:  Negative for abdominal pain, constipation, diarrhea, nausea and vomiting.   Endocrine: Negative for cold intolerance, heat intolerance, polydipsia, polyphagia and polyuria.   Musculoskeletal:  Negative for arthralgias, myalgias and neck pain.   Skin:  Negative for rash and wound.   Neurological:  Negative for dizziness, syncope, speech difficulty, weakness, light-headedness and headaches.  "  Hematological:  Negative for adenopathy. Does not bruise/bleed easily.   Psychiatric/Behavioral:  Negative for confusion, dysphoric mood and sleep disturbance. The patient is not nervous/anxious.    All other systems reviewed and are negative.  Difficult to obtain good history.     /62 (BP Location: Left arm, Patient Position: Sitting, Cuff Size: Adult)   Pulse 65   Ht 154.9 cm (61\")   Wt 89.5 kg (197 lb 6.4 oz)   SpO2 96%   BMI 37.30 kg/m²     Objective   No Known Allergies    Physical Exam  Vitals reviewed.   Constitutional:       Appearance: Normal appearance. She is well-developed. She is obese.   HENT:      Head: Normocephalic.      Right Ear: Tympanic membrane normal.      Left Ear: Tympanic membrane normal.      Nose: Nose normal.   Eyes:      Conjunctiva/sclera: Conjunctivae normal.      Pupils: Pupils are equal, round, and reactive to light.   Neck:      Thyroid: No thyromegaly.      Vascular: No carotid bruit or JVD.   Cardiovascular:      Rate and Rhythm: Normal rate and regular rhythm.   Pulmonary:      Effort: Pulmonary effort is normal.      Breath sounds: Normal breath sounds.   Abdominal:      General: Bowel sounds are normal.      Palpations: Abdomen is soft. There is no hepatomegaly, splenomegaly or mass.      Tenderness: There is no abdominal tenderness.   Musculoskeletal:         General: Normal range of motion.      Cervical back: Neck supple.      Right lower leg: Edema present.      Left lower leg: Edema present.   Skin:     General: Skin is warm and dry.   Neurological:      Mental Status: She is alert and oriented to person, place, and time.   Psychiatric:         Attention and Perception: Attention normal.         Mood and Affect: Mood normal.         Speech: Speech normal.         Behavior: Behavior normal. Behavior is cooperative.         Cognition and Memory: Cognition normal.         LABS  WBC   Date Value Ref Range Status   05/20/2024 7.82 3.40 - 10.80 10*3/mm3 Final "     RBC   Date Value Ref Range Status   05/20/2024 3.58 (L) 3.77 - 5.28 10*6/mm3 Final     Hemoglobin   Date Value Ref Range Status   05/20/2024 11.1 (L) 12.0 - 15.9 g/dL Final     Hematocrit   Date Value Ref Range Status   05/20/2024 36.7 34.0 - 46.6 % Final     MCV   Date Value Ref Range Status   05/20/2024 102.5 (H) 79.0 - 97.0 fL Final     MCH   Date Value Ref Range Status   05/20/2024 31.0 26.6 - 33.0 pg Final     MCHC   Date Value Ref Range Status   05/20/2024 30.2 (L) 31.5 - 35.7 g/dL Final     RDW   Date Value Ref Range Status   05/20/2024 14.5 12.3 - 15.4 % Final     RDW-SD   Date Value Ref Range Status   05/20/2024 54.4 (H) 37.0 - 54.0 fl Final     MPV   Date Value Ref Range Status   05/20/2024 10.4 6.0 - 12.0 fL Final     Platelets   Date Value Ref Range Status   05/20/2024 283 140 - 450 10*3/mm3 Final     Neutrophil %   Date Value Ref Range Status   05/20/2024 52.5 42.7 - 76.0 % Final     Lymphocyte %   Date Value Ref Range Status   05/20/2024 21.9 19.6 - 45.3 % Final     Monocyte %   Date Value Ref Range Status   05/20/2024 8.1 5.0 - 12.0 % Final     Eosinophil %   Date Value Ref Range Status   05/20/2024 15.9 (H) 0.3 - 6.2 % Final     Basophil %   Date Value Ref Range Status   05/20/2024 1.2 0.0 - 1.5 % Final     Immature Grans %   Date Value Ref Range Status   05/20/2024 0.4 0.0 - 0.5 % Final     Neutrophils, Absolute   Date Value Ref Range Status   05/20/2024 4.12 1.70 - 7.00 10*3/mm3 Final     Lymphocytes, Absolute   Date Value Ref Range Status   05/20/2024 1.71 0.70 - 3.10 10*3/mm3 Final     Monocytes, Absolute   Date Value Ref Range Status   05/20/2024 0.63 0.10 - 0.90 10*3/mm3 Final     Eosinophils, Absolute   Date Value Ref Range Status   05/20/2024 1.24 (H) 0.00 - 0.40 10*3/mm3 Final     Basophils, Absolute   Date Value Ref Range Status   05/20/2024 0.09 0.00 - 0.20 10*3/mm3 Final     Immature Grans, Absolute   Date Value Ref Range Status   05/20/2024 0.03 0.00 - 0.05 10*3/mm3 Final     nRBC    Date Value Ref Range Status   05/20/2024 0.0 0.0 - 0.2 /100 WBC Final       Total Cholesterol   Date Value Ref Range Status   10/08/2023 140 0 - 200 mg/dL Final     Triglycerides   Date Value Ref Range Status   10/08/2023 75 0 - 150 mg/dL Final     HDL Cholesterol   Date Value Ref Range Status   10/08/2023 34 (L) 40 - 60 mg/dL Final     LDL Cholesterol    Date Value Ref Range Status   10/08/2023 91 0 - 100 mg/dL Final     IMAGING   XR Chest 1 View    Result Date: 5/20/2024   1.  Enlarged heart size. 2.  Central pulmonary vascular congestion. 3.  Small right pleural effusion. 4.  Mild hypoinflated lungs. 5.  No fracture or foreign body.  This report was finalized on 5/20/2024 7:59 PM by Ralf Anglin MD.      CT Abdomen Pelvis Without Contrast    Result Date: 4/16/2024   1.  Chronic bilateral renal cortical volume loss. 2.  Infrarenal abdominal aortic aneurysm up to 3.21 cm in diameter. 3.  Hysterectomy. 4.  Features of cystitis. 5.  Degenerative disc disease throughout the lumbar spine with endplate and facet hypertrophic changes. 6.  No bowel or renal obstruction. 7.  Trace free fluid in the lower posterior pelvis. No free air, abscess, or hematoma. 8.  Mild enlarged heart size with small pericardial effusion.  This report was finalized on 4/16/2024 11:11 PM by Ralf Anglin MD.      US Gallbladder    Result Date: 4/16/2024   1.  Partially contracted gallbladder with very mild gallbladder wall thickening up to 3.2 mm. 2.  The common bile duct measures 6 mm. 3.  No conclusive features of cholecystitis. 4.  No free fluid in the right upper quadrant. 5.  No sludge or stones in the gallbladder lumen.  This report was finalized on 4/16/2024 8:31 PM by Ralf Anglin MD.      XR Chest 1 View    Result Date: 4/16/2024   1.  Enlarged heart size. 2.  Coarsened bronchovascular pattern to the lungs. 3.  Right central vascular catheter with tip to the SVC. 4.  Mild elevated left hemidiaphragm. 5.  No fracture or foreign  body. 6.  No free air in the upper abdomen.  This report was finalized on 4/16/2024 8:22 PM by Ralf Anglin MD.      Nuclear Stress Test   Interpretation Summary       Left ventricular ejection fraction is normal (Calculated EF = 68%).    Myocardial perfusion imaging indicates a normal myocardial perfusion study with no evidence of ischemia.    TID 1.05.    Findings consistent with a normal ECG stress test.    Echocardiogram   Interpretation Summary       Left ventricular systolic function is hyperdynamic (EF > 70%). Left ventricular ejection fraction appears to be greater than 70%.    Left ventricular diastolic function was not assessed.    The left atrial cavity is mildly dilated.    The right atrial cavity is borderline dilated.    There is a small (<1cm) pericardial effusion.    This is a technical limited study.      Assessment & Plan   Diagnoses and all orders for this visit:    1. Coronary artery disease involving native coronary artery of native heart without angina pectoris (Primary)  Clinically asymptomatic, reviewed nuclear stress test.  Reviewed slightly elevated troponins due to risk factors, will proceed with repeat stress test.  Patient should be on aspirin, statin and beta-blocker.  From her medication list today it does not appear she is taking statin however will plan to recheck labs and reinitiate    2. Primary hypertension  Stable in clinic, will continue to monitor  Management by nephrology    3. Mixed hyperlipidemia  Patient should be on statin however does not appear she is currently taking medication  Labs recommend  Heart healthy diet  Plan to reinitiate medication    4. Bilateral lower extremity edema  Lengthy discussion of utilization of diuretics with CKD, she can further discuss with nephrology  Discussed doing ultrasound imaging, she is agreeable to proceed  Recommend sodium restrictions, fluid restrictions, compression sock    5. Chronic renal failure, stage 4 (severe)  Continue  management per nephrology  Request most recent office    6. Tobacco use  Recommend avoidance of tobacco product    7. Paroxysmal atrial fibrillation  Patient remains in sinus rhythm, continue Lopressor for rate control.  It appears she was started on amiodarone during hospitalization but does not appear on her routine medication list.    8. Chronic anticoagulation  She denies bleeding, will plan to continue    9. PAD (peripheral artery disease)  Continue follow-up with surgeon in [place], request most recent office note    Review of medical record  Labs recommended  Follow-up in 2 weeks with EKG and labs, sooner if needed.

## 2024-06-03 NOTE — TELEPHONE ENCOUNTER
----- Message from Imelda Woodall sent at 6/3/2024  3:36 PM EDT -----  1. Can we request most recent office noted from Dr. Strong surgeon in [place] - vascular/PAD?    2. Request most recent office noted from PCP     3. Request most recent office note from nephrology    Imelda Hyatt

## 2024-06-03 NOTE — LETTER
June 19, 2024     DEEPA Kim  [address] [facility] [address]    Patient: Lesley Michel   YOB: 1955   Date of Visit: 6/3/2024       Dear DEEPA Kim    Lesley Michel was in my office today. Below is a copy of my note.    If you have questions, please do not hesitate to call me. I look forward to following Lesley along with you.         Sincerely,        DEEPA Villarreal        CC: No Recipients    Subjective    Lesley Michel is a 68 y.o. female.   Chief Complaint   Patient presents with   • Coronary Artery Disease      History of Present Illness  Lesley Michel is a 68-year-old female who presents to the clinic for delayed cardiology follow-up and recent ER visit.     Coronary artery disease with chronically occluded well collateralized dominant RCA, status post drug-eluting stent to LAD with high-grade stenosis of the ostium of the small D1.  She also has 60% mid RCA posterolateral branch lesion.  She is currently on aspirin, statin and beta blocker (per patient report and on current medication list). She was previously on Coreg however that was changed during hospitalization to lopressor. She denies current chest pain however was in the ER recently with symptoms and mildly elevated troponin. She denies any recent nitroglycerin use.      Hyperlipidemia currently taking Crestor 10 mg daily.  No recent labs available for review.  She reports compliance with therapy however medication is not listed on her list today.      Hypertension currently on Norvasc 2.5 mg at bedtime, Hydralazine 100 mg TID.  Nephrology has been managing BP.  She reports intermittent BPs at home with variable readings.  She was previously on dialysis but recently dialysis catheter was removed.  Her recent creatinine remains elevated but stable.     She has noted some bilateral lower extremity edema. During hospitalization her BNP was elevated at 10,061.  Due to CKD, nephrology has managed.   Her GFR is less than 30 therefore Farxiga/Jardiance is not recommended. She is not currently using diuretic therapy.  She reports a history of PAD and recently seen Dr. Strong in [place] who performed stents in bilateral lower extremities.    History of paroxysmal atrial fibrillation during hospitalization in October/November 2023.  She was started on amiodarone but it does not appear she is continuing.  She remains on chronic anticoagulation with Eliquis.    Patient Active Problem List   Diagnosis   • Hypertension   • Hyperlipidemia   • Migraine without aura   • CAD, chronically occluded collateralized dominant RCA, status post drug-eluting stent to LAD 2011, high-grade stenosis of the ostium of the small D1 and 60% mid RCA posterior lateral branch   • Chronic renal failure, stage 2 (mild)   • Tobacco use   • Bilateral lower extremity edema   • Other specified hypothyroidism   • Cigarette smoker   • Class 3 severe obesity without serious comorbidity with body mass index (BMI) of 40.0 to 44.9 in adult   • Acute hypoxic respiratory failure   • Acute renal failure (ARF)     Past Medical History:   Diagnosis Date   • Anxiety    • Arthritis    • Coronary artery disease    • H/O heart artery stent    • Hyperlipidemia    • Hypertension    • Obesity      Past Surgical History:   Procedure Laterality Date   • ANKLE SURGERY      RIGHT   • APPENDECTOMY     • CARDIAC CATHETERIZATION      LEFT   • CORONARY STENT PLACEMENT     • HYSTERECTOMY     • INSERTION HEMODIALYSIS CATHETER Right 10/19/2023    Procedure: HEMODIALYSIS CATHETER INSERTION;  Surgeon: Bartolome Schwartz MD;  Location: [facility];  Service: General;  Laterality: Right;       Family History   Problem Relation Age of Onset   • Heart disease Mother    • Heart attack Mother 73   • Fibromyalgia Mother    • Heart attack Father 72   • Ovarian cancer Sister    • Coronary artery disease Other    • Breast cancer Neg Hx      Social History     Tobacco Use   • Smoking  status: Every Day     Current packs/day: 0.50     Average packs/day: 0.5 packs/day for 30.0 years (15.0 ttl pk-yrs)     Types: Electronic Cigarette, Cigarettes     Passive exposure: Never   • Smokeless tobacco: Never   Vaping Use   • Vaping status: Never Used   Substance Use Topics   • Alcohol use: No   • Drug use: No         The following portions of the patient's history were reviewed and updated as appropriate: allergies, current medications, past family history, past medical history, past social history, past surgical history and problem list.    No Known Allergies      Current Outpatient Medications:   •  albuterol sulfate  (90 Base) MCG/ACT inhaler, Inhale 2 puffs Every 4 (Four) Hours As Needed for Wheezing., Disp: 18 g, Rfl: 0  •  amLODIPine (NORVASC) 2.5 MG tablet, Take 1 tablet by mouth Daily., Disp: , Rfl:   •  aspirin 81 MG chewable tablet, Chew 1 tablet Daily., Disp: , Rfl:   •  Eliquis 5 MG tablet tablet, 1 tablet., Disp: , Rfl:   •  folic acid (FOLVITE) 1 MG tablet, Take 1 tablet by mouth Daily., Disp: , Rfl:   •  hydrALAZINE (APRESOLINE) 25 MG tablet, Take 1 tablet by mouth Every 8 (Eight) Hours for 30 days. (Patient taking differently: Take 4 tablets by mouth 3 (Three) Times a Day.), Disp: 90 tablet, Rfl: 0  •  HYDROcodone-acetaminophen (NORCO)  MG per tablet, Take 1 tablet by mouth Every 8 (Eight) Hours As Needed for Moderate Pain., Disp: , Rfl:   •  ipratropium-albuterol (DUO-NEB) 0.5-2.5 mg/3 ml nebulizer, Take 3 mL by nebulization Every 4 (Four) Hours As Needed for Wheezing., Disp: 90 mL, Rfl: 1  •  ipratropium-albuterol (DUO-NEB) 0.5-2.5 mg/3 ml nebulizer, Take 3 mL by neb every 30 minutes as needed for shortness of air for up to 6 doses. Part of COPD Rescue Kit. (Only Start if in YELLOW ZONE.), Disp: 18 mL, Rfl: 0  •  levothyroxine (SYNTHROID, LEVOTHROID) 88 MCG tablet, Take 1 tablet by mouth Daily., Disp: , Rfl:   •  metoprolol tartrate (LOPRESSOR) 25 MG tablet, Take 1/2 tablet by  mouth 2 (Two) Times a Day for 30 days., Disp: 30 tablet, Rfl: 0  •  montelukast (SINGULAIR) 10 MG tablet, Take 1 tablet by mouth Every Night., Disp: , Rfl:   •  nitroglycerin (NITROSTAT) 0.4 MG SL tablet, Place 1 tablet under the tongue Every 5 (Five) Minutes As Needed for Chest Pain., Disp: 30 tablet, Rfl: 2  •  pantoprazole (PROTONIX) 40 MG EC tablet, Take 1 tablet by mouth Daily., Disp: , Rfl:   •  sertraline (ZOLOFT) 25 MG tablet, Take 1 tablet by mouth Daily for 30 days., Disp: 30 tablet, Rfl: 0  •  sodium bicarbonate 650 MG tablet, Take 1 tablet by mouth 2 (Two) Times a Day., Disp: , Rfl:   •  doxycycline (MONODOX) 100 MG capsule, Take 1 capsule by mouth every 12 hours for 5 days as part of COPD Rescue Kit. (Only Start if in YELLOW ZONE.) (Patient not taking: Reported on 6/3/2024), Disp: 10 capsule, Rfl: 0  •  gabapentin (NEURONTIN) 800 MG tablet, Take 1 tablet by mouth 2 (Two) Times a Day As Needed (nerve pain). (Patient not taking: Reported on 6/3/2024), Disp: , Rfl:   •  predniSONE (DELTASONE) 20 MG tablet, Take 2 tablets by mouth Daily. Take 2 tablets daily for 5 days as part of COPD Rescue Kit. (Only Start if in YELLOW ZONE.) (Patient not taking: Reported on 6/3/2024), Disp: 10 tablet, Rfl: 0  •  rosuvastatin (CRESTOR) 10 MG tablet, Take 1 tablet by mouth Every Night. (Patient not taking: Reported on 6/3/2024), Disp: 30 tablet, Rfl: 0  •  vitamin B-12 (CYANOCOBALAMIN) 1000 MCG tablet, Take 1 tablet by mouth Daily. (Patient not taking: Reported on 6/3/2024), Disp: , Rfl:     Review of Systems   Constitutional:  Negative for activity change, appetite change, chills, diaphoresis, fatigue and fever.   HENT:  Negative for congestion, drooling, ear discharge, ear pain, mouth sores, nosebleeds, postnasal drip, rhinorrhea, sinus pressure, sneezing and sore throat.    Eyes:  Negative for pain, discharge and visual disturbance.   Respiratory:  Positive for shortness of breath. Negative for cough, chest tightness  "and wheezing.    Cardiovascular:  Positive for leg swelling. Negative for chest pain and palpitations.   Gastrointestinal:  Negative for abdominal pain, constipation, diarrhea, nausea and vomiting.   Endocrine: Negative for cold intolerance, heat intolerance, polydipsia, polyphagia and polyuria.   Musculoskeletal:  Negative for arthralgias, myalgias and neck pain.   Skin:  Negative for rash and wound.   Neurological:  Negative for dizziness, syncope, speech difficulty, weakness, light-headedness and headaches.   Hematological:  Negative for adenopathy. Does not bruise/bleed easily.   Psychiatric/Behavioral:  Negative for confusion, dysphoric mood and sleep disturbance. The patient is not nervous/anxious.    All other systems reviewed and are negative.  Difficult to obtain good history.     /62 (BP Location: Left arm, Patient Position: Sitting, Cuff Size: Adult)   Pulse 65   Ht 154.9 cm (61\")   Wt 89.5 kg (197 lb 6.4 oz)   SpO2 96%   BMI 37.30 kg/m²     Objective  No Known Allergies    Physical Exam  Vitals reviewed.   Constitutional:       Appearance: Normal appearance. She is well-developed. She is obese.   HENT:      Head: Normocephalic.      Right Ear: Tympanic membrane normal.      Left Ear: Tympanic membrane normal.      Nose: Nose normal.   Eyes:      Conjunctiva/sclera: Conjunctivae normal.      Pupils: Pupils are equal, round, and reactive to light.   Neck:      Thyroid: No thyromegaly.      Vascular: No carotid bruit or JVD.   Cardiovascular:      Rate and Rhythm: Normal rate and regular rhythm.   Pulmonary:      Effort: Pulmonary effort is normal.      Breath sounds: Normal breath sounds.   Abdominal:      General: Bowel sounds are normal.      Palpations: Abdomen is soft. There is no hepatomegaly, splenomegaly or mass.      Tenderness: There is no abdominal tenderness.   Musculoskeletal:         General: Normal range of motion.      Cervical back: Neck supple.      Right lower leg: Edema " present.      Left lower leg: Edema present.   Skin:     General: Skin is warm and dry.   Neurological:      Mental Status: She is alert and oriented to person, place, and time.   Psychiatric:         Attention and Perception: Attention normal.         Mood and Affect: Mood normal.         Speech: Speech normal.         Behavior: Behavior normal. Behavior is cooperative.         Cognition and Memory: Cognition normal.         LABS  WBC   Date Value Ref Range Status   05/20/2024 7.82 3.40 - 10.80 10*3/mm3 Final     RBC   Date Value Ref Range Status   05/20/2024 3.58 (L) 3.77 - 5.28 10*6/mm3 Final     Hemoglobin   Date Value Ref Range Status   05/20/2024 11.1 (L) 12.0 - 15.9 g/dL Final     Hematocrit   Date Value Ref Range Status   05/20/2024 36.7 34.0 - 46.6 % Final     MCV   Date Value Ref Range Status   05/20/2024 102.5 (H) 79.0 - 97.0 fL Final     MCH   Date Value Ref Range Status   05/20/2024 31.0 26.6 - 33.0 pg Final     MCHC   Date Value Ref Range Status   05/20/2024 30.2 (L) 31.5 - 35.7 g/dL Final     RDW   Date Value Ref Range Status   05/20/2024 14.5 12.3 - 15.4 % Final     RDW-SD   Date Value Ref Range Status   05/20/2024 54.4 (H) 37.0 - 54.0 fl Final     MPV   Date Value Ref Range Status   05/20/2024 10.4 6.0 - 12.0 fL Final     Platelets   Date Value Ref Range Status   05/20/2024 283 140 - 450 10*3/mm3 Final     Neutrophil %   Date Value Ref Range Status   05/20/2024 52.5 42.7 - 76.0 % Final     Lymphocyte %   Date Value Ref Range Status   05/20/2024 21.9 19.6 - 45.3 % Final     Monocyte %   Date Value Ref Range Status   05/20/2024 8.1 5.0 - 12.0 % Final     Eosinophil %   Date Value Ref Range Status   05/20/2024 15.9 (H) 0.3 - 6.2 % Final     Basophil %   Date Value Ref Range Status   05/20/2024 1.2 0.0 - 1.5 % Final     Immature Grans %   Date Value Ref Range Status   05/20/2024 0.4 0.0 - 0.5 % Final     Neutrophils, Absolute   Date Value Ref Range Status   05/20/2024 4.12 1.70 - 7.00 10*3/mm3 Final      Lymphocytes, Absolute   Date Value Ref Range Status   05/20/2024 1.71 0.70 - 3.10 10*3/mm3 Final     Monocytes, Absolute   Date Value Ref Range Status   05/20/2024 0.63 0.10 - 0.90 10*3/mm3 Final     Eosinophils, Absolute   Date Value Ref Range Status   05/20/2024 1.24 (H) 0.00 - 0.40 10*3/mm3 Final     Basophils, Absolute   Date Value Ref Range Status   05/20/2024 0.09 0.00 - 0.20 10*3/mm3 Final     Immature Grans, Absolute   Date Value Ref Range Status   05/20/2024 0.03 0.00 - 0.05 10*3/mm3 Final     nRBC   Date Value Ref Range Status   05/20/2024 0.0 0.0 - 0.2 /100 WBC Final       Total Cholesterol   Date Value Ref Range Status   10/08/2023 140 0 - 200 mg/dL Final     Triglycerides   Date Value Ref Range Status   10/08/2023 75 0 - 150 mg/dL Final     HDL Cholesterol   Date Value Ref Range Status   10/08/2023 34 (L) 40 - 60 mg/dL Final     LDL Cholesterol    Date Value Ref Range Status   10/08/2023 91 0 - 100 mg/dL Final     IMAGING   XR Chest 1 View    Result Date: 5/20/2024   1.  Enlarged heart size. 2.  Central pulmonary vascular congestion. 3.  Small right pleural effusion. 4.  Mild hypoinflated lungs. 5.  No fracture or foreign body.  This report was finalized on 5/20/2024 7:59 PM by Ralf Anglin MD.      CT Abdomen Pelvis Without Contrast    Result Date: 4/16/2024   1.  Chronic bilateral renal cortical volume loss. 2.  Infrarenal abdominal aortic aneurysm up to 3.21 cm in diameter. 3.  Hysterectomy. 4.  Features of cystitis. 5.  Degenerative disc disease throughout the lumbar spine with endplate and facet hypertrophic changes. 6.  No bowel or renal obstruction. 7.  Trace free fluid in the lower posterior pelvis. No free air, abscess, or hematoma. 8.  Mild enlarged heart size with small pericardial effusion.  This report was finalized on 4/16/2024 11:11 PM by Ralf Anglin MD.      US Gallbladder    Result Date: 4/16/2024   1.  Partially contracted gallbladder with very mild gallbladder wall thickening  up to 3.2 mm. 2.  The common bile duct measures 6 mm. 3.  No conclusive features of cholecystitis. 4.  No free fluid in the right upper quadrant. 5.  No sludge or stones in the gallbladder lumen.  This report was finalized on 4/16/2024 8:31 PM by Ralf Anglin MD.      XR Chest 1 View    Result Date: 4/16/2024   1.  Enlarged heart size. 2.  Coarsened bronchovascular pattern to the lungs. 3.  Right central vascular catheter with tip to the SVC. 4.  Mild elevated left hemidiaphragm. 5.  No fracture or foreign body. 6.  No free air in the upper abdomen.  This report was finalized on 4/16/2024 8:22 PM by Ralf Anglin MD.      Nuclear Stress Test   Interpretation Summary     •  Left ventricular ejection fraction is normal (Calculated EF = 68%).  •  Myocardial perfusion imaging indicates a normal myocardial perfusion study with no evidence of ischemia.  •  TID 1.05.  •  Findings consistent with a normal ECG stress test.    Echocardiogram   Interpretation Summary     •  Left ventricular systolic function is hyperdynamic (EF > 70%). Left ventricular ejection fraction appears to be greater than 70%.  •  Left ventricular diastolic function was not assessed.  •  The left atrial cavity is mildly dilated.  •  The right atrial cavity is borderline dilated.  •  There is a small (<1cm) pericardial effusion.  •  This is a technical limited study.      Assessment & Plan  Diagnoses and all orders for this visit:    1. Coronary artery disease involving native coronary artery of native heart without angina pectoris (Primary)  Clinically asymptomatic, reviewed nuclear stress test.  Reviewed slightly elevated troponins due to risk factors, will proceed with repeat stress test.  Patient should be on aspirin, statin and beta-blocker.  From her medication list today it does not appear she is taking statin however will plan to recheck labs and reinitiate    2. Primary hypertension  Stable in clinic, will continue to monitor  Management by  nephrology    3. Mixed hyperlipidemia  Patient should be on statin however does not appear she is currently taking medication  Labs recommend  Heart healthy diet  Plan to reinitiate medication    4. Bilateral lower extremity edema  Lengthy discussion of utilization of diuretics with CKD, she can further discuss with nephrology  Discussed doing ultrasound imaging, she is agreeable to proceed  Recommend sodium restrictions, fluid restrictions, compression sock    5. Chronic renal failure, stage 4 (severe)  Continue management per nephrology  Request most recent office    6. Tobacco use  Recommend avoidance of tobacco product    7. Paroxysmal atrial fibrillation  Patient remains in sinus rhythm, continue Lopressor for rate control.  It appears she was started on amiodarone during hospitalization but does not appear on her routine medication list.    8. Chronic anticoagulation  She denies bleeding, will plan to continue    9. PAD (peripheral artery disease)  Continue follow-up with surgeon in [place], request most recent office note    Review of medical record  Labs recommended  Follow-up in 2 weeks with EKG and labs, sooner if needed.

## 2024-06-03 NOTE — TELEPHONE ENCOUNTER
Caller: Lesley Michel    Relationship: Self    Best call back number: [phone]    What is the best time to reach you: ANY    What was the call regarding: PATIENT'S MEDICATIONS ARE AS FOLLOWS:  NEURONTIN 600 MG  SHE SAID ALYSSA WOULD KNOW THE MEDICATION-PATIENT CANNOT REMEMBER SON HAD WRITTEN DOWN ONCE DAILY BUT IT IS TWICE DAILY. PLEASE CALL THE PATIENT IF NECESSARY.     Is it okay if the provider responds through MyChart: NO

## 2024-06-19 ENCOUNTER — OFFICE VISIT (OUTPATIENT)
Dept: CARDIOLOGY | Facility: CLINIC | Age: 69
End: 2024-06-19
Payer: MEDICARE

## 2024-06-19 VITALS
WEIGHT: 199.4 LBS | BODY MASS INDEX: 37.65 KG/M2 | SYSTOLIC BLOOD PRESSURE: 136 MMHG | HEIGHT: 61 IN | DIASTOLIC BLOOD PRESSURE: 88 MMHG | HEART RATE: 65 BPM | OXYGEN SATURATION: 90 %

## 2024-06-19 DIAGNOSIS — I48.0 PAROXYSMAL ATRIAL FIBRILLATION: ICD-10-CM

## 2024-06-19 DIAGNOSIS — N18.4 CHRONIC RENAL FAILURE, STAGE 4 (SEVERE): ICD-10-CM

## 2024-06-19 DIAGNOSIS — I25.10 CORONARY ARTERY DISEASE INVOLVING NATIVE CORONARY ARTERY OF NATIVE HEART WITHOUT ANGINA PECTORIS: Primary | ICD-10-CM

## 2024-06-19 DIAGNOSIS — I73.9 PAD (PERIPHERAL ARTERY DISEASE): ICD-10-CM

## 2024-06-19 DIAGNOSIS — Z79.01 CHRONIC ANTICOAGULATION: ICD-10-CM

## 2024-06-19 DIAGNOSIS — Z72.0 TOBACCO USE: ICD-10-CM

## 2024-06-19 DIAGNOSIS — R60.0 BILATERAL LOWER EXTREMITY EDEMA: ICD-10-CM

## 2024-06-19 DIAGNOSIS — I10 PRIMARY HYPERTENSION: ICD-10-CM

## 2024-06-19 DIAGNOSIS — E78.2 MIXED HYPERLIPIDEMIA: ICD-10-CM

## 2024-06-19 NOTE — PROGRESS NOTES
Subjective     Lesley Michel is a 68 y.o. female.   Chief Complaint   Patient presents with    Coronary Artery Disease      History of Present Illness   Lesley Michel is a 68-year-old female who presents to clinic today for cardiology follow-up.  She does not have medication list with her today. She did not have labs prior to today's appointment.     Coronary artery disease with chronically occluded well collateralized dominant RCA, status post drug-eluting stent to LAD with high-grade stenosis of the ostium of the small D1.  She also has 60% mid RCA posterolateral branch lesion.  She is currently on aspirin, statin (not on medication list) and beta blocker (per patient report and on current medication list). She was previously on Coreg however that was changed during hospitalization to lopressor. She denies current chest pain however was in the ER recently with symptoms and mildly elevated troponin. She denies any recent nitroglycerin use. Testing pending.  If contrast is needed for coronary CT or cardiac cath in the future nephrology clearance will be needed.     Hyperlipidemia supposed to be taking Crestor 10 mg daily (however doesn't appear on medication list).  No recent labs available for review.  She reports compliance with therapy however medication is not listed on her list today.      Hypertension currently on Norvasc 2.5 mg at bedtime (unsure if she is taking), Hydralazine 100 mg TID.  Nephrology has been managing BP.  She reports intermittent BPs at home with variable readings.  She was previously on dialysis but recently dialysis catheter was removed.  Her recent creatinine remains elevated but stable.      She has noted some bilateral lower extremity edema. During hospitalization her BNP was elevated at 10,061.  Due to CKD, nephrology has managed.  Her GFR is less than 30 therefore Farxiga/Jardiance is not recommended. She is not currently using diuretic therapy.  She reports a history of PAD and  recently seen Dr. Strong in [place] who performed stents in bilateral lower extremities. Note requested at last visit and reviewed today.      History of paroxysmal atrial fibrillation during hospitalization in October/November 2023.  She was started on amiodarone but it does not appear she is continuing (not on medication list).  She remains on chronic anticoagulation with Eliquis.       Patient Active Problem List   Diagnosis    Hypertension    Hyperlipidemia    Migraine without aura    CAD, chronically occluded collateralized dominant RCA, status post drug-eluting stent to LAD 2011, high-grade stenosis of the ostium of the small D1 and 60% mid RCA posterior lateral branch    Chronic renal failure, stage 2 (mild)    Tobacco use    Bilateral lower extremity edema    Other specified hypothyroidism    Cigarette smoker    Class 3 severe obesity without serious comorbidity with body mass index (BMI) of 40.0 to 44.9 in adult    Acute hypoxic respiratory failure    Acute renal failure (ARF)     Past Medical History:   Diagnosis Date    Anxiety     Arthritis     Coronary artery disease     H/O heart artery stent     Hyperlipidemia     Hypertension     Obesity      Past Surgical History:   Procedure Laterality Date    ANKLE SURGERY      RIGHT    APPENDECTOMY      CARDIAC CATHETERIZATION      LEFT    CORONARY STENT PLACEMENT      HYSTERECTOMY      INSERTION HEMODIALYSIS CATHETER Right 10/19/2023    Procedure: HEMODIALYSIS CATHETER INSERTION;  Surgeon: Bartolome Schwartz MD;  Location: [facility];  Service: General;  Laterality: Right;       Family History   Problem Relation Age of Onset    Heart disease Mother     Heart attack Mother 73    Fibromyalgia Mother     Heart attack Father 72    Ovarian cancer Sister     Coronary artery disease Other     Breast cancer Neg Hx      Social History     Tobacco Use    Smoking status: Every Day     Current packs/day: 0.50     Average packs/day: 0.5 packs/day for 30.0 years (15.0 ttl  pk-yrs)     Types: Electronic Cigarette, Cigarettes     Passive exposure: Never    Smokeless tobacco: Never   Vaping Use    Vaping status: Never Used   Substance Use Topics    Alcohol use: No    Drug use: No         The following portions of the patient's history were reviewed and updated as appropriate: allergies, current medications, past family history, past medical history, past social history, past surgical history and problem list.    No Known Allergies      Current Outpatient Medications:     albuterol sulfate  (90 Base) MCG/ACT inhaler, Inhale 2 puffs Every 4 (Four) Hours As Needed for Wheezing., Disp: 18 g, Rfl: 0    amLODIPine (NORVASC) 2.5 MG tablet, Take 1 tablet by mouth Daily., Disp: , Rfl:     aspirin 81 MG chewable tablet, Chew 1 tablet Daily., Disp: , Rfl:     Eliquis 5 MG tablet tablet, 1 tablet., Disp: , Rfl:     folic acid (FOLVITE) 1 MG tablet, Take 1 tablet by mouth Daily., Disp: , Rfl:     HYDROcodone-acetaminophen (NORCO)  MG per tablet, Take 1 tablet by mouth Every 8 (Eight) Hours As Needed for Moderate Pain., Disp: , Rfl:     ipratropium-albuterol (DUO-NEB) 0.5-2.5 mg/3 ml nebulizer, Take 3 mL by nebulization Every 4 (Four) Hours As Needed for Wheezing., Disp: 90 mL, Rfl: 1    ipratropium-albuterol (DUO-NEB) 0.5-2.5 mg/3 ml nebulizer, Take 3 mL by neb every 30 minutes as needed for shortness of air for up to 6 doses. Part of COPD Rescue Kit. (Only Start if in YELLOW ZONE.), Disp: 18 mL, Rfl: 0    levothyroxine (SYNTHROID, LEVOTHROID) 88 MCG tablet, Take 1 tablet by mouth Daily., Disp: , Rfl:     montelukast (SINGULAIR) 10 MG tablet, Take 1 tablet by mouth Every Night., Disp: , Rfl:     nitroglycerin (NITROSTAT) 0.4 MG SL tablet, Place 1 tablet under the tongue Every 5 (Five) Minutes As Needed for Chest Pain., Disp: 30 tablet, Rfl: 2    pantoprazole (PROTONIX) 40 MG EC tablet, Take 1 tablet by mouth Daily., Disp: , Rfl:     sodium bicarbonate 650 MG tablet, Take 1 tablet by  mouth 2 (Two) Times a Day., Disp: , Rfl:     doxycycline (MONODOX) 100 MG capsule, Take 1 capsule by mouth every 12 hours for 5 days as part of COPD Rescue Kit. (Only Start if in YELLOW ZONE.) (Patient not taking: Reported on 6/3/2024), Disp: 10 capsule, Rfl: 0    gabapentin (NEURONTIN) 800 MG tablet, Take 1 tablet by mouth 2 (Two) Times a Day As Needed (nerve pain). (Patient not taking: Reported on 6/3/2024), Disp: , Rfl:     hydrALAZINE (APRESOLINE) 25 MG tablet, Take 1 tablet by mouth Every 8 (Eight) Hours for 30 days. (Patient taking differently: Take 4 tablets by mouth 3 (Three) Times a Day.), Disp: 90 tablet, Rfl: 0    metoprolol tartrate (LOPRESSOR) 25 MG tablet, Take 1/2 tablet by mouth 2 (Two) Times a Day for 30 days., Disp: 30 tablet, Rfl: 0    predniSONE (DELTASONE) 20 MG tablet, Take 2 tablets by mouth Daily. Take 2 tablets daily for 5 days as part of COPD Rescue Kit. (Only Start if in YELLOW ZONE.) (Patient not taking: Reported on 6/3/2024), Disp: 10 tablet, Rfl: 0    rosuvastatin (CRESTOR) 10 MG tablet, Take 1 tablet by mouth Every Night. (Patient not taking: Reported on 6/3/2024), Disp: 30 tablet, Rfl: 0    sertraline (ZOLOFT) 25 MG tablet, Take 1 tablet by mouth Daily for 30 days., Disp: 30 tablet, Rfl: 0    vitamin B-12 (CYANOCOBALAMIN) 1000 MCG tablet, Take 1 tablet by mouth Daily. (Patient not taking: Reported on 6/3/2024), Disp: , Rfl:     Review of Systems   Constitutional:  Negative for activity change, appetite change, chills, diaphoresis, fatigue and fever.   HENT:  Negative for congestion, drooling, ear discharge, ear pain, mouth sores, nosebleeds, postnasal drip, rhinorrhea, sinus pressure, sneezing and sore throat.    Eyes:  Negative for pain, discharge and visual disturbance.   Respiratory:  Positive for shortness of breath. Negative for cough, chest tightness and wheezing.    Cardiovascular:  Positive for chest pain, palpitations and leg swelling.   Gastrointestinal:  Negative for  "abdominal pain, constipation, diarrhea, nausea and vomiting.   Endocrine: Negative for cold intolerance, heat intolerance, polydipsia, polyphagia and polyuria.   Musculoskeletal:  Negative for arthralgias, myalgias and neck pain.   Skin:  Negative for rash and wound.   Neurological:  Negative for dizziness, syncope, speech difficulty, weakness, light-headedness and headaches.   Hematological:  Negative for adenopathy. Does not bruise/bleed easily.   Psychiatric/Behavioral:  Negative for confusion, dysphoric mood and sleep disturbance. The patient is not nervous/anxious.    All other systems reviewed and are negative.    /88 (BP Location: Left arm, Patient Position: Sitting, Cuff Size: Large Adult)   Pulse 65   Ht 154.9 cm (61\")   Wt 90.4 kg (199 lb 6.4 oz)   SpO2 90%   BMI 37.68 kg/m²     BP recheck 160/82 manual with large adult cuff on L arm     Objective   No Known Allergies    Physical Exam  Vitals reviewed.   Constitutional:       Appearance: Normal appearance. She is well-developed. She is obese.   HENT:      Head: Normocephalic.      Right Ear: Tympanic membrane normal.      Left Ear: Tympanic membrane normal.      Nose: Nose normal.   Eyes:      Conjunctiva/sclera: Conjunctivae normal.      Pupils: Pupils are equal, round, and reactive to light.   Neck:      Thyroid: No thyromegaly.      Vascular: No carotid bruit or JVD.   Cardiovascular:      Rate and Rhythm: Normal rate and regular rhythm.   Pulmonary:      Effort: Pulmonary effort is normal.      Breath sounds: Normal breath sounds.   Abdominal:      General: Bowel sounds are normal.      Palpations: Abdomen is soft. There is no hepatomegaly, splenomegaly or mass.      Tenderness: There is no abdominal tenderness.   Musculoskeletal:         General: Normal range of motion.      Cervical back: Neck supple.      Right lower leg: Edema present.      Left lower leg: Edema present.   Skin:     General: Skin is warm and dry.   Neurological:      " Mental Status: She is alert and oriented to person, place, and time.   Psychiatric:         Attention and Perception: Attention normal.         Mood and Affect: Mood normal.         Speech: Speech normal.         Behavior: Behavior normal. Behavior is cooperative.         Cognition and Memory: Cognition normal.           ECG 12 Lead    Date/Time: 6/19/2024 4:57 PM  Performed by: Imelda Woodall APRN    Authorized by: Imelda Woodall APRN  Comparison: not compared with previous ECG   Rhythm: sinus rhythm  Ectopy: unifocal PVCs  Rate: normal  BPM: 70  Other findings: non-specific ST-T wave changes    Clinical impression: abnormal EKG  Comments: QT/QTc - 397/418             LABS  WBC   Date Value Ref Range Status   05/20/2024 7.82 3.40 - 10.80 10*3/mm3 Final     RBC   Date Value Ref Range Status   05/20/2024 3.58 (L) 3.77 - 5.28 10*6/mm3 Final     Hemoglobin   Date Value Ref Range Status   05/20/2024 11.1 (L) 12.0 - 15.9 g/dL Final     Hematocrit   Date Value Ref Range Status   05/20/2024 36.7 34.0 - 46.6 % Final     MCV   Date Value Ref Range Status   05/20/2024 102.5 (H) 79.0 - 97.0 fL Final     MCH   Date Value Ref Range Status   05/20/2024 31.0 26.6 - 33.0 pg Final     MCHC   Date Value Ref Range Status   05/20/2024 30.2 (L) 31.5 - 35.7 g/dL Final     RDW   Date Value Ref Range Status   05/20/2024 14.5 12.3 - 15.4 % Final     RDW-SD   Date Value Ref Range Status   05/20/2024 54.4 (H) 37.0 - 54.0 fl Final     MPV   Date Value Ref Range Status   05/20/2024 10.4 6.0 - 12.0 fL Final     Platelets   Date Value Ref Range Status   05/20/2024 283 140 - 450 10*3/mm3 Final     Neutrophil %   Date Value Ref Range Status   05/20/2024 52.5 42.7 - 76.0 % Final     Lymphocyte %   Date Value Ref Range Status   05/20/2024 21.9 19.6 - 45.3 % Final     Monocyte %   Date Value Ref Range Status   05/20/2024 8.1 5.0 - 12.0 % Final     Eosinophil %   Date Value Ref Range Status   05/20/2024 15.9 (H) 0.3 - 6.2 % Final     Basophil %    Date Value Ref Range Status   05/20/2024 1.2 0.0 - 1.5 % Final     Immature Grans %   Date Value Ref Range Status   05/20/2024 0.4 0.0 - 0.5 % Final     Neutrophils, Absolute   Date Value Ref Range Status   05/20/2024 4.12 1.70 - 7.00 10*3/mm3 Final     Lymphocytes, Absolute   Date Value Ref Range Status   05/20/2024 1.71 0.70 - 3.10 10*3/mm3 Final     Monocytes, Absolute   Date Value Ref Range Status   05/20/2024 0.63 0.10 - 0.90 10*3/mm3 Final     Eosinophils, Absolute   Date Value Ref Range Status   05/20/2024 1.24 (H) 0.00 - 0.40 10*3/mm3 Final     Basophils, Absolute   Date Value Ref Range Status   05/20/2024 0.09 0.00 - 0.20 10*3/mm3 Final     Immature Grans, Absolute   Date Value Ref Range Status   05/20/2024 0.03 0.00 - 0.05 10*3/mm3 Final     nRBC   Date Value Ref Range Status   05/20/2024 0.0 0.0 - 0.2 /100 WBC Final       Total Cholesterol   Date Value Ref Range Status   10/08/2023 140 0 - 200 mg/dL Final     Triglycerides   Date Value Ref Range Status   10/08/2023 75 0 - 150 mg/dL Final     HDL Cholesterol   Date Value Ref Range Status   10/08/2023 34 (L) 40 - 60 mg/dL Final     LDL Cholesterol    Date Value Ref Range Status   10/08/2023 91 0 - 100 mg/dL Final     IMAGING  XR Chest 1 View    Result Date: 5/20/2024   1.  Enlarged heart size. 2.  Central pulmonary vascular congestion. 3.  Small right pleural effusion. 4.  Mild hypoinflated lungs. 5.  No fracture or foreign body.  This report was finalized on 5/20/2024 7:59 PM by Ralf Anglin MD.      CT Abdomen Pelvis Without Contrast    Result Date: 4/16/2024   1.  Chronic bilateral renal cortical volume loss. 2.  Infrarenal abdominal aortic aneurysm up to 3.21 cm in diameter. 3.  Hysterectomy. 4.  Features of cystitis. 5.  Degenerative disc disease throughout the lumbar spine with endplate and facet hypertrophic changes. 6.  No bowel or renal obstruction. 7.  Trace free fluid in the lower posterior pelvis. No free air, abscess, or hematoma. 8.  Mild  enlarged heart size with small pericardial effusion.  This report was finalized on 4/16/2024 11:11 PM by Ralf Anglin MD.      US Gallbladder    Result Date: 4/16/2024   1.  Partially contracted gallbladder with very mild gallbladder wall thickening up to 3.2 mm. 2.  The common bile duct measures 6 mm. 3.  No conclusive features of cholecystitis. 4.  No free fluid in the right upper quadrant. 5.  No sludge or stones in the gallbladder lumen.  This report was finalized on 4/16/2024 8:31 PM by Ralf Anglin MD.      XR Chest 1 View    Result Date: 4/16/2024   1.  Enlarged heart size. 2.  Coarsened bronchovascular pattern to the lungs. 3.  Right central vascular catheter with tip to the SVC. 4.  Mild elevated left hemidiaphragm. 5.  No fracture or foreign body. 6.  No free air in the upper abdomen.  This report was finalized on 4/16/2024 8:22 PM by Ralf Anglin MD.              Assessment & Plan   Diagnoses and all orders for this visit:    1. Coronary artery disease involving native coronary artery of native heart without angina pectoris (Primary)  Continue aspirin, statin and beta-blocker if she is taking those.  Ischemic evaluation pending  Consider addition of antianginals in the future    2. Paroxysmal atrial fibrillation  -     Holter Monitor - 72 Hour Up To 15 Days; Future  -     ECG 12 Lead  EKG reviewed and discussed, patient remains in sinus rhythm  Patient is on rate controlling medication, will plan to continue  Patient was previously on rhythm control medicine but does not appear she is currently taking    3. Chronic anticoagulation  Continue Eliquis, patient denies bleeding    4. Mixed hyperlipidemia  Try to clarify if she is taking statins, she is asked to have fasting lab work  Heart healthy diet    5. Primary hypertension  Hypertension initially normal reading but recheck was elevated, nephrology managing BP.  Will verify she is taking hydralazine 100 mg 3 times daily and Norvasc and can  potentially increase Norvasc.  She is complaining of bilateral lower extremity swelling which could be exacerbated with increase in Norvasc dosing.    6. Bilateral lower extremity edema  Conservative therapy with compression socks, elevating feet and legs, sodium restrictions.  Ultrasound imaging pending  Diuretic therapy managed by nephrology    7. Tobacco use  Recommend avoidance of tobacco products    8. Chronic renal failure, stage 4 (severe)  Continue to follow closely with nephrology  Review of most recent office note    9. PAD (peripheral artery disease)  Continue to follow closely with surgeon, review of vascular lab.    Review of medical record    Lifestyle modifications including heart healthy diet, regular exercise, maintenance of desirable body weight and avoidance of tobacco products    She is asked to have labs prior to appointment and bring in medication list at follow-up appointment    Follow-up in 1 month, sooner if needed.

## 2024-06-19 NOTE — LETTER
June 20, 2024     DEEPA Kim  [address] [facility] [address]    Patient: Lesley Michel   YOB: 1955   Date of Visit: 6/19/2024       Dear DEEPA Kim    Lesley Michel was in my office today. Below is a copy of my note.    If you have questions, please do not hesitate to call me. I look forward to following Lesley along with you.         Sincerely,        DEEPA Villarreal        CC: No Recipients    Subjective    Lesley Michel is a 68 y.o. female.   Chief Complaint   Patient presents with   • Coronary Artery Disease      History of Present Illness   Lesley Michel is a 68-year-old female who presents to clinic today for cardiology follow-up.  She does not have medication list with her today. She did not have labs prior to today's appointment.     Coronary artery disease with chronically occluded well collateralized dominant RCA, status post drug-eluting stent to LAD with high-grade stenosis of the ostium of the small D1.  She also has 60% mid RCA posterolateral branch lesion.  She is currently on aspirin, statin (not on medication list) and beta blocker (per patient report and on current medication list). She was previously on Coreg however that was changed during hospitalization to lopressor. She denies current chest pain however was in the ER recently with symptoms and mildly elevated troponin. She denies any recent nitroglycerin use. Testing pending.  If contrast is needed for coronary CT or cardiac cath in the future nephrology clearance will be needed.     Hyperlipidemia supposed to be taking Crestor 10 mg daily (however doesn't appear on medication list).  No recent labs available for review.  She reports compliance with therapy however medication is not listed on her list today.      Hypertension currently on Norvasc 2.5 mg at bedtime (unsure if she is taking), Hydralazine 100 mg TID.  Nephrology has been managing BP.  She reports intermittent BPs at home  with variable readings.  She was previously on dialysis but recently dialysis catheter was removed.  Her recent creatinine remains elevated but stable.      She has noted some bilateral lower extremity edema. During hospitalization her BNP was elevated at 10,061.  Due to CKD, nephrology has managed.  Her GFR is less than 30 therefore Farxiga/Jardiance is not recommended. She is not currently using diuretic therapy.  She reports a history of PAD and recently seen Dr. Strong in [place] who performed stents in bilateral lower extremities. Note requested at last visit and reviewed today.      History of paroxysmal atrial fibrillation during hospitalization in October/November 2023.  She was started on amiodarone but it does not appear she is continuing (not on medication list).  She remains on chronic anticoagulation with Eliquis.       Patient Active Problem List   Diagnosis   • Hypertension   • Hyperlipidemia   • Migraine without aura   • CAD, chronically occluded collateralized dominant RCA, status post drug-eluting stent to LAD 2011, high-grade stenosis of the ostium of the small D1 and 60% mid RCA posterior lateral branch   • Chronic renal failure, stage 2 (mild)   • Tobacco use   • Bilateral lower extremity edema   • Other specified hypothyroidism   • Cigarette smoker   • Class 3 severe obesity without serious comorbidity with body mass index (BMI) of 40.0 to 44.9 in adult   • Acute hypoxic respiratory failure   • Acute renal failure (ARF)     Past Medical History:   Diagnosis Date   • Anxiety    • Arthritis    • Coronary artery disease    • H/O heart artery stent    • Hyperlipidemia    • Hypertension    • Obesity      Past Surgical History:   Procedure Laterality Date   • ANKLE SURGERY      RIGHT   • APPENDECTOMY     • CARDIAC CATHETERIZATION      LEFT   • CORONARY STENT PLACEMENT     • HYSTERECTOMY     • INSERTION HEMODIALYSIS CATHETER Right 10/19/2023    Procedure: HEMODIALYSIS CATHETER INSERTION;  Surgeon:  Bartloome Schwartz MD;  Location: [facility] OR;  Service: General;  Laterality: Right;       Family History   Problem Relation Age of Onset   • Heart disease Mother    • Heart attack Mother 73   • Fibromyalgia Mother    • Heart attack Father 72   • Ovarian cancer Sister    • Coronary artery disease Other    • Breast cancer Neg Hx      Social History     Tobacco Use   • Smoking status: Every Day     Current packs/day: 0.50     Average packs/day: 0.5 packs/day for 30.0 years (15.0 ttl pk-yrs)     Types: Electronic Cigarette, Cigarettes     Passive exposure: Never   • Smokeless tobacco: Never   Vaping Use   • Vaping status: Never Used   Substance Use Topics   • Alcohol use: No   • Drug use: No         The following portions of the patient's history were reviewed and updated as appropriate: allergies, current medications, past family history, past medical history, past social history, past surgical history and problem list.    No Known Allergies      Current Outpatient Medications:   •  albuterol sulfate  (90 Base) MCG/ACT inhaler, Inhale 2 puffs Every 4 (Four) Hours As Needed for Wheezing., Disp: 18 g, Rfl: 0  •  amLODIPine (NORVASC) 2.5 MG tablet, Take 1 tablet by mouth Daily., Disp: , Rfl:   •  aspirin 81 MG chewable tablet, Chew 1 tablet Daily., Disp: , Rfl:   •  Eliquis 5 MG tablet tablet, 1 tablet., Disp: , Rfl:   •  folic acid (FOLVITE) 1 MG tablet, Take 1 tablet by mouth Daily., Disp: , Rfl:   •  HYDROcodone-acetaminophen (NORCO)  MG per tablet, Take 1 tablet by mouth Every 8 (Eight) Hours As Needed for Moderate Pain., Disp: , Rfl:   •  ipratropium-albuterol (DUO-NEB) 0.5-2.5 mg/3 ml nebulizer, Take 3 mL by nebulization Every 4 (Four) Hours As Needed for Wheezing., Disp: 90 mL, Rfl: 1  •  ipratropium-albuterol (DUO-NEB) 0.5-2.5 mg/3 ml nebulizer, Take 3 mL by neb every 30 minutes as needed for shortness of air for up to 6 doses. Part of COPD Rescue Kit. (Only Start if in YELLOW ZONE.), Disp: 18 mL,  Rfl: 0  •  levothyroxine (SYNTHROID, LEVOTHROID) 88 MCG tablet, Take 1 tablet by mouth Daily., Disp: , Rfl:   •  montelukast (SINGULAIR) 10 MG tablet, Take 1 tablet by mouth Every Night., Disp: , Rfl:   •  nitroglycerin (NITROSTAT) 0.4 MG SL tablet, Place 1 tablet under the tongue Every 5 (Five) Minutes As Needed for Chest Pain., Disp: 30 tablet, Rfl: 2  •  pantoprazole (PROTONIX) 40 MG EC tablet, Take 1 tablet by mouth Daily., Disp: , Rfl:   •  sodium bicarbonate 650 MG tablet, Take 1 tablet by mouth 2 (Two) Times a Day., Disp: , Rfl:   •  doxycycline (MONODOX) 100 MG capsule, Take 1 capsule by mouth every 12 hours for 5 days as part of COPD Rescue Kit. (Only Start if in YELLOW ZONE.) (Patient not taking: Reported on 6/3/2024), Disp: 10 capsule, Rfl: 0  •  gabapentin (NEURONTIN) 800 MG tablet, Take 1 tablet by mouth 2 (Two) Times a Day As Needed (nerve pain). (Patient not taking: Reported on 6/3/2024), Disp: , Rfl:   •  hydrALAZINE (APRESOLINE) 25 MG tablet, Take 1 tablet by mouth Every 8 (Eight) Hours for 30 days. (Patient taking differently: Take 4 tablets by mouth 3 (Three) Times a Day.), Disp: 90 tablet, Rfl: 0  •  metoprolol tartrate (LOPRESSOR) 25 MG tablet, Take 1/2 tablet by mouth 2 (Two) Times a Day for 30 days., Disp: 30 tablet, Rfl: 0  •  predniSONE (DELTASONE) 20 MG tablet, Take 2 tablets by mouth Daily. Take 2 tablets daily for 5 days as part of COPD Rescue Kit. (Only Start if in YELLOW ZONE.) (Patient not taking: Reported on 6/3/2024), Disp: 10 tablet, Rfl: 0  •  rosuvastatin (CRESTOR) 10 MG tablet, Take 1 tablet by mouth Every Night. (Patient not taking: Reported on 6/3/2024), Disp: 30 tablet, Rfl: 0  •  sertraline (ZOLOFT) 25 MG tablet, Take 1 tablet by mouth Daily for 30 days., Disp: 30 tablet, Rfl: 0  •  vitamin B-12 (CYANOCOBALAMIN) 1000 MCG tablet, Take 1 tablet by mouth Daily. (Patient not taking: Reported on 6/3/2024), Disp: , Rfl:     Review of Systems   Constitutional:  Negative for activity  "change, appetite change, chills, diaphoresis, fatigue and fever.   HENT:  Negative for congestion, drooling, ear discharge, ear pain, mouth sores, nosebleeds, postnasal drip, rhinorrhea, sinus pressure, sneezing and sore throat.    Eyes:  Negative for pain, discharge and visual disturbance.   Respiratory:  Positive for shortness of breath. Negative for cough, chest tightness and wheezing.    Cardiovascular:  Positive for chest pain, palpitations and leg swelling.   Gastrointestinal:  Negative for abdominal pain, constipation, diarrhea, nausea and vomiting.   Endocrine: Negative for cold intolerance, heat intolerance, polydipsia, polyphagia and polyuria.   Musculoskeletal:  Negative for arthralgias, myalgias and neck pain.   Skin:  Negative for rash and wound.   Neurological:  Negative for dizziness, syncope, speech difficulty, weakness, light-headedness and headaches.   Hematological:  Negative for adenopathy. Does not bruise/bleed easily.   Psychiatric/Behavioral:  Negative for confusion, dysphoric mood and sleep disturbance. The patient is not nervous/anxious.    All other systems reviewed and are negative.    /88 (BP Location: Left arm, Patient Position: Sitting, Cuff Size: Large Adult)   Pulse 65   Ht 154.9 cm (61\")   Wt 90.4 kg (199 lb 6.4 oz)   SpO2 90%   BMI 37.68 kg/m²     BP recheck 160/82 manual with large adult cuff on L arm     Objective  No Known Allergies    Physical Exam  Vitals reviewed.   Constitutional:       Appearance: Normal appearance. She is well-developed. She is obese.   HENT:      Head: Normocephalic.      Right Ear: Tympanic membrane normal.      Left Ear: Tympanic membrane normal.      Nose: Nose normal.   Eyes:      Conjunctiva/sclera: Conjunctivae normal.      Pupils: Pupils are equal, round, and reactive to light.   Neck:      Thyroid: No thyromegaly.      Vascular: No carotid bruit or JVD.   Cardiovascular:      Rate and Rhythm: Normal rate and regular rhythm.   Pulmonary: "      Effort: Pulmonary effort is normal.      Breath sounds: Normal breath sounds.   Abdominal:      General: Bowel sounds are normal.      Palpations: Abdomen is soft. There is no hepatomegaly, splenomegaly or mass.      Tenderness: There is no abdominal tenderness.   Musculoskeletal:         General: Normal range of motion.      Cervical back: Neck supple.      Right lower leg: Edema present.      Left lower leg: Edema present.   Skin:     General: Skin is warm and dry.   Neurological:      Mental Status: She is alert and oriented to person, place, and time.   Psychiatric:         Attention and Perception: Attention normal.         Mood and Affect: Mood normal.         Speech: Speech normal.         Behavior: Behavior normal. Behavior is cooperative.         Cognition and Memory: Cognition normal.           ECG 12 Lead    Date/Time: 6/19/2024 4:57 PM  Performed by: Imelda Woodall APRN    Authorized by: Imelda Woodall APRN  Comparison: not compared with previous ECG   Rhythm: sinus rhythm  Ectopy: unifocal PVCs  Rate: normal  BPM: 70  Other findings: non-specific ST-T wave changes    Clinical impression: abnormal EKG  Comments: QT/QTc - 397/418             LABS  WBC   Date Value Ref Range Status   05/20/2024 7.82 3.40 - 10.80 10*3/mm3 Final     RBC   Date Value Ref Range Status   05/20/2024 3.58 (L) 3.77 - 5.28 10*6/mm3 Final     Hemoglobin   Date Value Ref Range Status   05/20/2024 11.1 (L) 12.0 - 15.9 g/dL Final     Hematocrit   Date Value Ref Range Status   05/20/2024 36.7 34.0 - 46.6 % Final     MCV   Date Value Ref Range Status   05/20/2024 102.5 (H) 79.0 - 97.0 fL Final     MCH   Date Value Ref Range Status   05/20/2024 31.0 26.6 - 33.0 pg Final     MCHC   Date Value Ref Range Status   05/20/2024 30.2 (L) 31.5 - 35.7 g/dL Final     RDW   Date Value Ref Range Status   05/20/2024 14.5 12.3 - 15.4 % Final     RDW-SD   Date Value Ref Range Status   05/20/2024 54.4 (H) 37.0 - 54.0 fl Final     MPV   Date  Value Ref Range Status   05/20/2024 10.4 6.0 - 12.0 fL Final     Platelets   Date Value Ref Range Status   05/20/2024 283 140 - 450 10*3/mm3 Final     Neutrophil %   Date Value Ref Range Status   05/20/2024 52.5 42.7 - 76.0 % Final     Lymphocyte %   Date Value Ref Range Status   05/20/2024 21.9 19.6 - 45.3 % Final     Monocyte %   Date Value Ref Range Status   05/20/2024 8.1 5.0 - 12.0 % Final     Eosinophil %   Date Value Ref Range Status   05/20/2024 15.9 (H) 0.3 - 6.2 % Final     Basophil %   Date Value Ref Range Status   05/20/2024 1.2 0.0 - 1.5 % Final     Immature Grans %   Date Value Ref Range Status   05/20/2024 0.4 0.0 - 0.5 % Final     Neutrophils, Absolute   Date Value Ref Range Status   05/20/2024 4.12 1.70 - 7.00 10*3/mm3 Final     Lymphocytes, Absolute   Date Value Ref Range Status   05/20/2024 1.71 0.70 - 3.10 10*3/mm3 Final     Monocytes, Absolute   Date Value Ref Range Status   05/20/2024 0.63 0.10 - 0.90 10*3/mm3 Final     Eosinophils, Absolute   Date Value Ref Range Status   05/20/2024 1.24 (H) 0.00 - 0.40 10*3/mm3 Final     Basophils, Absolute   Date Value Ref Range Status   05/20/2024 0.09 0.00 - 0.20 10*3/mm3 Final     Immature Grans, Absolute   Date Value Ref Range Status   05/20/2024 0.03 0.00 - 0.05 10*3/mm3 Final     nRBC   Date Value Ref Range Status   05/20/2024 0.0 0.0 - 0.2 /100 WBC Final       Total Cholesterol   Date Value Ref Range Status   10/08/2023 140 0 - 200 mg/dL Final     Triglycerides   Date Value Ref Range Status   10/08/2023 75 0 - 150 mg/dL Final     HDL Cholesterol   Date Value Ref Range Status   10/08/2023 34 (L) 40 - 60 mg/dL Final     LDL Cholesterol    Date Value Ref Range Status   10/08/2023 91 0 - 100 mg/dL Final     IMAGING  XR Chest 1 View    Result Date: 5/20/2024   1.  Enlarged heart size. 2.  Central pulmonary vascular congestion. 3.  Small right pleural effusion. 4.  Mild hypoinflated lungs. 5.  No fracture or foreign body.  This report was finalized on  5/20/2024 7:59 PM by Ralf Anglin MD.      CT Abdomen Pelvis Without Contrast    Result Date: 4/16/2024   1.  Chronic bilateral renal cortical volume loss. 2.  Infrarenal abdominal aortic aneurysm up to 3.21 cm in diameter. 3.  Hysterectomy. 4.  Features of cystitis. 5.  Degenerative disc disease throughout the lumbar spine with endplate and facet hypertrophic changes. 6.  No bowel or renal obstruction. 7.  Trace free fluid in the lower posterior pelvis. No free air, abscess, or hematoma. 8.  Mild enlarged heart size with small pericardial effusion.  This report was finalized on 4/16/2024 11:11 PM by Ralf Anglin MD.      US Gallbladder    Result Date: 4/16/2024   1.  Partially contracted gallbladder with very mild gallbladder wall thickening up to 3.2 mm. 2.  The common bile duct measures 6 mm. 3.  No conclusive features of cholecystitis. 4.  No free fluid in the right upper quadrant. 5.  No sludge or stones in the gallbladder lumen.  This report was finalized on 4/16/2024 8:31 PM by Ralf Anglin MD.      XR Chest 1 View    Result Date: 4/16/2024   1.  Enlarged heart size. 2.  Coarsened bronchovascular pattern to the lungs. 3.  Right central vascular catheter with tip to the SVC. 4.  Mild elevated left hemidiaphragm. 5.  No fracture or foreign body. 6.  No free air in the upper abdomen.  This report was finalized on 4/16/2024 8:22 PM by Ralf Anglin MD.              Assessment & Plan  Diagnoses and all orders for this visit:    1. Coronary artery disease involving native coronary artery of native heart without angina pectoris (Primary)  Continue aspirin, statin and beta-blocker if she is taking those.  Ischemic evaluation pending  Consider addition of antianginals in the future    2. Paroxysmal atrial fibrillation  -     Holter Monitor - 72 Hour Up To 15 Days; Future  -     ECG 12 Lead  EKG reviewed and discussed, patient remains in sinus rhythm  Patient is on rate controlling medication, will plan to  continue  Patient was previously on rhythm control medicine but does not appear she is currently taking    3. Chronic anticoagulation  Continue Eliquis, patient denies bleeding    4. Mixed hyperlipidemia  Try to clarify if she is taking statins, she is asked to have fasting lab work  Heart healthy diet    5. Primary hypertension  Hypertension initially normal reading but recheck was elevated, nephrology managing BP.  Will verify she is taking hydralazine 100 mg 3 times daily and Norvasc and can potentially increase Norvasc.  She is complaining of bilateral lower extremity swelling which could be exacerbated with increase in Norvasc dosing.    6. Bilateral lower extremity edema  Conservative therapy with compression socks, elevating feet and legs, sodium restrictions.  Ultrasound imaging pending  Diuretic therapy managed by nephrology    7. Tobacco use  Recommend avoidance of tobacco products    8. Chronic renal failure, stage 4 (severe)  Continue to follow closely with nephrology  Review of most recent office note    9. PAD (peripheral artery disease)  Continue to follow closely with surgeon, review of vascular lab.    Review of medical record    Lifestyle modifications including heart healthy diet, regular exercise, maintenance of desirable body weight and avoidance of tobacco products    She is asked to have labs prior to appointment and bring in medication list at follow-up appointment    Follow-up in 1 month, sooner if needed.

## 2024-06-19 NOTE — PATIENT INSTRUCTIONS
ARE YOU TAKING THESE MEDICATIONS?      Amlodipine/Norvasc for blood pressure   Amiodarone for heart rate

## 2024-06-20 ENCOUNTER — TELEPHONE (OUTPATIENT)
Dept: CARDIOLOGY | Facility: CLINIC | Age: 69
End: 2024-06-20
Payer: MEDICARE

## 2024-06-20 NOTE — TELEPHONE ENCOUNTER
Patient calling to update medication.  Patient takes Norvasc 2.5mg 1 tablet at night .  Patient had been taking Amiodarone which was prescribed in the hospital but hasn't taken it since it ran out.  Patient doesn't have the medication bottle to provide mg.

## 2024-06-25 NOTE — TELEPHONE ENCOUNTER
Called pt to let her know per Elsy she can take an extra 2.5 mg of Norvasc if BP is 140/90.    LUAlcidesWoodall is also wanting to know how long pt has been off of her amiodarone.    No answer, no vmail.

## 2024-07-01 ENCOUNTER — TELEPHONE (OUTPATIENT)
Dept: CARDIOLOGY | Facility: CLINIC | Age: 69
End: 2024-07-01
Payer: MEDICARE

## 2024-07-01 NOTE — TELEPHONE ENCOUNTER
Caller: Michel Lesley    Relationship: Self    Best call back number: [phone]     What form or medical record are you requesting: NEEDS US TO FAX A LETTER TO HER KIDNEY DR, DR. CROSS/ OLESYA STATING THE PROCEDURE SHE IS HAVING DONE TOMORROW, WANTS TO MAKE SURE THIS ISN'T GOING TO HAVE DYE.     Who is requesting this form or medical record from you: DR. CROSS/ OLESYA, KIDNEY DR     How would you like to receive the form or medical records (pick-up, mail, fax): FAX   If fax, what is the fax number: [phone]    Timeframe paperwork needed: ASAP FOR APPT TOMORROW     ALSO WONDERING WHAT STRESS TYPE IS PT HAVING, PLEASE ADVISE, WILL NOT BE DOING TREADMILL.     UNABLE TO WT

## 2024-07-11 ENCOUNTER — TRANSCRIBE ORDERS (OUTPATIENT)
Dept: ADMINISTRATIVE | Facility: HOSPITAL | Age: 69
End: 2024-07-11
Payer: MEDICARE

## 2024-07-11 DIAGNOSIS — I73.9 PERIPHERAL VASCULAR DISEASE, UNSPECIFIED: Primary | ICD-10-CM

## 2024-07-20 ENCOUNTER — APPOINTMENT (OUTPATIENT)
Dept: GENERAL RADIOLOGY | Facility: HOSPITAL | Age: 69
End: 2024-07-20
Payer: MEDICARE

## 2024-07-20 ENCOUNTER — APPOINTMENT (OUTPATIENT)
Dept: CT IMAGING | Facility: HOSPITAL | Age: 69
End: 2024-07-20
Payer: MEDICARE

## 2024-07-20 ENCOUNTER — HOSPITAL ENCOUNTER (EMERGENCY)
Facility: HOSPITAL | Age: 69
Discharge: HOME | End: 2024-07-20
Attending: STUDENT IN AN ORGANIZED HEALTH CARE EDUCATION/TRAINING PROGRAM
Payer: MEDICARE

## 2024-07-20 VITALS
BODY MASS INDEX: 33.99 KG/M2 | HEART RATE: 65 BPM | DIASTOLIC BLOOD PRESSURE: 96 MMHG | WEIGHT: 180 LBS | RESPIRATION RATE: 18 BRPM | HEIGHT: 61 IN | SYSTOLIC BLOOD PRESSURE: 167 MMHG | TEMPERATURE: 97.9 F | OXYGEN SATURATION: 97 %

## 2024-07-20 DIAGNOSIS — J90 PLEURAL EFFUSION: Primary | ICD-10-CM

## 2024-07-20 LAB
ALBUMIN SERPL-MCNC: 3.6 G/DL (ref 3.5–5.2)
ALBUMIN/GLOB SERPL: 1 G/DL
ALP SERPL-CCNC: 139 U/L (ref 39–117)
ALT SERPL W P-5'-P-CCNC: <5 U/L (ref 1–33)
ANION GAP SERPL CALCULATED.3IONS-SCNC: 9.4 MMOL/L (ref 5–15)
AST SERPL-CCNC: 11 U/L (ref 1–32)
BASOPHILS # BLD AUTO: 0.08 10*3/MM3 (ref 0–0.2)
BASOPHILS NFR BLD AUTO: 1 % (ref 0–1.5)
BILIRUB SERPL-MCNC: 0.4 MG/DL (ref 0–1.2)
BUN SERPL-MCNC: 22 MG/DL (ref 8–23)
BUN/CREAT SERPL: 9.1 (ref 7–25)
CALCIUM SPEC-SCNC: 8.8 MG/DL (ref 8.6–10.5)
CHLORIDE SERPL-SCNC: 105 MMOL/L (ref 98–107)
CO2 SERPL-SCNC: 21.6 MMOL/L (ref 22–29)
CREAT SERPL-MCNC: 2.43 MG/DL (ref 0.57–1)
DEPRECATED RDW RBC AUTO: 53.2 FL (ref 37–54)
EGFRCR SERPLBLD CKD-EPI 2021: 21.5 ML/MIN/1.73
EOSINOPHIL # BLD AUTO: 0.9 10*3/MM3 (ref 0–0.4)
EOSINOPHIL NFR BLD AUTO: 11.3 % (ref 0.3–6.2)
ERYTHROCYTE [DISTWIDTH] IN BLOOD BY AUTOMATED COUNT: 15 % (ref 12.3–15.4)
GEN 5 2HR TROPONIN T REFLEX: 15 NG/L
GLOBULIN UR ELPH-MCNC: 3.6 GM/DL
GLUCOSE SERPL-MCNC: 108 MG/DL (ref 65–99)
HCT VFR BLD AUTO: 30.7 % (ref 34–46.6)
HGB BLD-MCNC: 9.4 G/DL (ref 12–15.9)
HOLD SPECIMEN: NORMAL
HOLD SPECIMEN: NORMAL
IMM GRANULOCYTES # BLD AUTO: 0.03 10*3/MM3 (ref 0–0.05)
IMM GRANULOCYTES NFR BLD AUTO: 0.4 % (ref 0–0.5)
LYMPHOCYTES # BLD AUTO: 1.07 10*3/MM3 (ref 0.7–3.1)
LYMPHOCYTES NFR BLD AUTO: 13.5 % (ref 19.6–45.3)
MCH RBC QN AUTO: 29.4 PG (ref 26.6–33)
MCHC RBC AUTO-ENTMCNC: 30.6 G/DL (ref 31.5–35.7)
MCV RBC AUTO: 95.9 FL (ref 79–97)
MONOCYTES # BLD AUTO: 0.5 10*3/MM3 (ref 0.1–0.9)
MONOCYTES NFR BLD AUTO: 6.3 % (ref 5–12)
NEUTROPHILS NFR BLD AUTO: 5.37 10*3/MM3 (ref 1.7–7)
NEUTROPHILS NFR BLD AUTO: 67.5 % (ref 42.7–76)
NRBC BLD AUTO-RTO: 0 /100 WBC (ref 0–0.2)
NT-PROBNP SERPL-MCNC: 5236 PG/ML (ref 0–900)
PLATELET # BLD AUTO: 283 10*3/MM3 (ref 140–450)
PMV BLD AUTO: 9.9 FL (ref 6–12)
POTASSIUM SERPL-SCNC: 5.2 MMOL/L (ref 3.5–5.2)
PROT SERPL-MCNC: 7.2 G/DL (ref 6–8.5)
QT INTERVAL: 428 MS
QTC INTERVAL: 455 MS
RBC # BLD AUTO: 3.2 10*6/MM3 (ref 3.77–5.28)
SODIUM SERPL-SCNC: 136 MMOL/L (ref 136–145)
TROPONIN T DELTA: 2 NG/L
TROPONIN T SERPL HS-MCNC: 13 NG/L
WBC NRBC COR # BLD AUTO: 7.95 10*3/MM3 (ref 3.4–10.8)
WHOLE BLOOD HOLD COAG: NORMAL
WHOLE BLOOD HOLD SPECIMEN: NORMAL

## 2024-07-20 PROCEDURE — 94799 UNLISTED PULMONARY SVC/PX: CPT

## 2024-07-20 PROCEDURE — 71250 CT THORAX DX C-: CPT

## 2024-07-20 PROCEDURE — 85025 COMPLETE CBC W/AUTO DIFF WBC: CPT | Performed by: STUDENT IN AN ORGANIZED HEALTH CARE EDUCATION/TRAINING PROGRAM

## 2024-07-20 PROCEDURE — 93005 ELECTROCARDIOGRAM TRACING: CPT | Performed by: STUDENT IN AN ORGANIZED HEALTH CARE EDUCATION/TRAINING PROGRAM

## 2024-07-20 PROCEDURE — 36415 COLL VENOUS BLD VENIPUNCTURE: CPT

## 2024-07-20 PROCEDURE — 84484 ASSAY OF TROPONIN QUANT: CPT | Performed by: STUDENT IN AN ORGANIZED HEALTH CARE EDUCATION/TRAINING PROGRAM

## 2024-07-20 PROCEDURE — 96375 TX/PRO/DX INJ NEW DRUG ADDON: CPT

## 2024-07-20 PROCEDURE — 74176 CT ABD & PELVIS W/O CONTRAST: CPT | Performed by: RADIOLOGY

## 2024-07-20 PROCEDURE — 71045 X-RAY EXAM CHEST 1 VIEW: CPT

## 2024-07-20 PROCEDURE — 94640 AIRWAY INHALATION TREATMENT: CPT

## 2024-07-20 PROCEDURE — 80053 COMPREHEN METABOLIC PANEL: CPT | Performed by: STUDENT IN AN ORGANIZED HEALTH CARE EDUCATION/TRAINING PROGRAM

## 2024-07-20 PROCEDURE — 71045 X-RAY EXAM CHEST 1 VIEW: CPT | Performed by: RADIOLOGY

## 2024-07-20 PROCEDURE — 99284 EMERGENCY DEPT VISIT MOD MDM: CPT

## 2024-07-20 PROCEDURE — 93005 ELECTROCARDIOGRAM TRACING: CPT | Performed by: EMERGENCY MEDICINE

## 2024-07-20 PROCEDURE — 71250 CT THORAX DX C-: CPT | Performed by: RADIOLOGY

## 2024-07-20 PROCEDURE — 96374 THER/PROPH/DIAG INJ IV PUSH: CPT

## 2024-07-20 PROCEDURE — 25010000002 BUMETANIDE PER 0.5 MG: Performed by: PHYSICIAN ASSISTANT

## 2024-07-20 PROCEDURE — 74176 CT ABD & PELVIS W/O CONTRAST: CPT

## 2024-07-20 PROCEDURE — 93010 ELECTROCARDIOGRAM REPORT: CPT | Performed by: SPECIALIST

## 2024-07-20 PROCEDURE — 83880 ASSAY OF NATRIURETIC PEPTIDE: CPT | Performed by: STUDENT IN AN ORGANIZED HEALTH CARE EDUCATION/TRAINING PROGRAM

## 2024-07-20 RX ORDER — SODIUM CHLORIDE 0.9 % (FLUSH) 0.9 %
10 SYRINGE (ML) INJECTION AS NEEDED
Status: DISCONTINUED | OUTPATIENT
Start: 2024-07-20 | End: 2024-07-20 | Stop reason: HOSPADM

## 2024-07-20 RX ORDER — ALUMINA, MAGNESIA, AND SIMETHICONE 2400; 2400; 240 MG/30ML; MG/30ML; MG/30ML
15 SUSPENSION ORAL EVERY 6 HOURS PRN
Status: DISCONTINUED | OUTPATIENT
Start: 2024-07-20 | End: 2024-07-20 | Stop reason: HOSPADM

## 2024-07-20 RX ORDER — LIDOCAINE HYDROCHLORIDE 20 MG/ML
5 SOLUTION OROPHARYNGEAL ONCE
Status: COMPLETED | OUTPATIENT
Start: 2024-07-20 | End: 2024-07-20

## 2024-07-20 RX ORDER — FAMOTIDINE 10 MG/ML
20 INJECTION, SOLUTION INTRAVENOUS ONCE
Status: COMPLETED | OUTPATIENT
Start: 2024-07-20 | End: 2024-07-20

## 2024-07-20 RX ORDER — IPRATROPIUM BROMIDE AND ALBUTEROL SULFATE 2.5; .5 MG/3ML; MG/3ML
3 SOLUTION RESPIRATORY (INHALATION) ONCE
Status: COMPLETED | OUTPATIENT
Start: 2024-07-20 | End: 2024-07-20

## 2024-07-20 RX ORDER — ALUMINA, MAGNESIA, AND SIMETHICONE 2400; 2400; 240 MG/30ML; MG/30ML; MG/30ML
15 SUSPENSION ORAL ONCE
Status: COMPLETED | OUTPATIENT
Start: 2024-07-20 | End: 2024-07-20

## 2024-07-20 RX ORDER — BUMETANIDE 0.25 MG/ML
2 INJECTION INTRAMUSCULAR; INTRAVENOUS ONCE
Status: COMPLETED | OUTPATIENT
Start: 2024-07-20 | End: 2024-07-20

## 2024-07-20 RX ADMIN — LIDOCAINE HYDROCHLORIDE 5 ML: 20 SOLUTION ORAL at 04:46

## 2024-07-20 RX ADMIN — FAMOTIDINE 20 MG: 10 INJECTION, SOLUTION INTRAVENOUS at 00:47

## 2024-07-20 RX ADMIN — IPRATROPIUM BROMIDE AND ALBUTEROL SULFATE 3 ML: .5; 2.5 SOLUTION RESPIRATORY (INHALATION) at 02:18

## 2024-07-20 RX ADMIN — ALUMINUM HYDROXIDE, MAGNESIUM HYDROXIDE, DIMETHICONE 15 ML: 400; 400; 40 SUSPENSION ORAL at 04:46

## 2024-07-20 RX ADMIN — BUMETANIDE 1 MG: 0.25 INJECTION INTRAMUSCULAR; INTRAVENOUS at 03:17

## 2024-07-20 NOTE — ED PROVIDER NOTES
Shante   Is a 68-year-old female with past medical history of CAD, as well as a history of COPD presenting with complaint of shortness of breath that has been present since 6 hours prior to arrival.  She states that in addition she is having some minor chest pain that radiates up into her throat.  She attributes to her history of reflux which she takes Protonix for.  She states that in addition to the chest pain going up into her jaw she also has bilateral lower extremity edema which occurred whenever she increased her hydralazine for her hypertension from 50 mg to 100 mg.  This occurred 3 weeks ago.  And the leg swelling has been persistent since then.    Review of Systems    Past Medical History:   Diagnosis Date    Anxiety     Arthritis     Coronary artery disease     H/O heart artery stent     Hyperlipidemia     Hypertension     Obesity        No Known Allergies    Past Surgical History:   Procedure Laterality Date    ANKLE SURGERY      RIGHT    APPENDECTOMY      CARDIAC CATHETERIZATION      LEFT    CORONARY STENT PLACEMENT      HYSTERECTOMY      INSERTION HEMODIALYSIS CATHETER Right 10/19/2023    Procedure: HEMODIALYSIS CATHETER INSERTION;  Surgeon: Bartolome Schwartz MD;  Location: [facility];  Service: General;  Laterality: Right;       Family History   Problem Relation Age of Onset    Heart disease Mother     Heart attack Mother 73    Fibromyalgia Mother     Heart attack Father 72    Ovarian cancer Sister     Coronary artery disease Other     Breast cancer Neg Hx        Social History     Socioeconomic History    Marital status:    Tobacco Use    Smoking status: Every Day     Current packs/day: 0.50     Average packs/day: 0.5 packs/day for 30.0 years (15.0 ttl pk-yrs)     Types: Electronic Cigarette, Cigarettes     Passive exposure: Never    Smokeless tobacco: Never   Vaping Use    Vaping status: Never Used   Substance and Sexual Activity    Alcohol use: No    Drug use: No    Sexual activity:  Never           Objective   Physical Exam  Vitals and nursing note reviewed.   Constitutional:       General: She is not in acute distress.     Appearance: She is obese. She is not ill-appearing, toxic-appearing or diaphoretic.      Interventions: She is not intubated.  HENT:      Head: Normocephalic and atraumatic.      Right Ear: External ear normal.      Left Ear: External ear normal.      Nose: Nose normal.   Eyes:      Conjunctiva/sclera: Conjunctivae normal.      Pupils: Pupils are equal, round, and reactive to light.   Neck:      Vascular: No JVD.      Trachea: No tracheal deviation.   Cardiovascular:      Rate and Rhythm: Normal rate and regular rhythm.      Heart sounds: Normal heart sounds. No murmur heard.  Pulmonary:      Effort: Pulmonary effort is normal. No tachypnea, accessory muscle usage or respiratory distress. She is not intubated.      Breath sounds: Wheezing present.   Chest:      Chest wall: No mass or tenderness.   Abdominal:      General: Bowel sounds are normal.      Palpations: Abdomen is soft.      Tenderness: There is no abdominal tenderness.   Musculoskeletal:         General: No deformity. Normal range of motion.      Cervical back: Normal range of motion and neck supple.      Right lower leg: Edema present.      Left lower leg: Edema present.   Skin:     General: Skin is warm and dry.      Capillary Refill: Capillary refill takes less than 2 seconds.      Coloration: Skin is not pale.      Findings: No erythema or rash.   Neurological:      General: No focal deficit present.      Mental Status: She is alert and oriented to person, place, and time. She is disoriented.      Cranial Nerves: No cranial nerve deficit.   Psychiatric:         Behavior: Behavior normal.         Thought Content: Thought content normal.         Procedures           ED Course  ED Course as of 07/20/24 1419   Sat Jul 20, 2024   0016 EKG interpretation on July 20 2022 for 0: 13 ventricular rate 60 bpm, intervals  normal, normal axis, sinus rhythm, primary T wave inversion in lead III, no signs of acute ischemia  Electronically signed by Elías Bedoya MD, 07/20/24, 12:17 AM EDT.   [AK]   0200 Sign out to quan HICKMAN working with Dr. Phelps at 2AM  Electronically signed by Elías Bedoya MD, 07/20/24, 2:01 AM EDT.   [AK]   0302 XR chest rad interpreted:  1. Cardiac enlargement with borderline to mild edema.  2. Small right pleural effusion, decreased in size.   [RB]   0414 CT abd pelvis rad interpreted:  1. Slight haziness of the fat adjacent to the pancreas as above. This  may be chronic in nature. However, mild or early acute pancreatitis  could result in a similar appearance.  2. Small right pleural effusion.  3. No bowel obstruction or perforation.  4. No acute appendicitis, colitis or diverticulitis.   [RB]   0415 CT chest rad interreted;  1. Mild cardiac enlargement with likely mild pulmonary edema and a small  right pleural effusion.  2. Opacity within the right lower lobe adjacent to the effusion may be  related to atelectasis. However, pneumonia could result in a similar  appearance.   [RB]      ED Course User Index  [AK] Elías Bedoya MD  [RB] Quan Bryant II, PA                                             Medical Decision Making  Patient physical exam remarkable for six 8-year-old female here for chest pain and shortness of breath within the context of bilateral lower extremity edema.  While her history is strongly suggestive that the bilateral lower extremity edema is a side effect of hydralazine, this is not a known side effect for me this is more associated with a calcium channel blocker.  She denies taking calcium channel blocker.  In addition within the context of having chest pain and shortness of breath ordered a cardiac workup,. initial workup remarkable for an elevated BNP, expected this.  She states that she is unable to take any diuretics, she states that her nephrologist told her that she was  likely to have to go on dialysis again if she was on diuretics.  At this time I am pending a CT abdomen pelvis, will sign out to the oncoming doctor    Problems Addressed:  Pleural effusion: complicated acute illness or injury     Details: Feel that patient's shortness of breath is likely secondary to pulmonary edema and small pleural effusion.  Patient cannot be on oral diuretic secondary to her known history of chronic kidney disease.  Patient did receive 1 mg of Bumex which was therapeutic for patient.  Patient has not demanded any more oxygen support than her normal 2 L of oxygen.  No signs of an infectious process.  Troponin not necessitate further cardiac workup.  Recommend the patient discuss possible diuresis with her nephrologist.  Possibly less renal toxic diuretic.  Strongly recommend outpatient follow-up.    Amount and/or Complexity of Data Reviewed  Labs: ordered.  Radiology: ordered. Decision-making details documented in ED Course.  ECG/medicine tests: ordered. Decision-making details documented in ED Course.    Risk  OTC drugs.  Prescription drug management.        Final diagnoses:   Pleural effusion       ED Disposition  ED Disposition       ED Disposition   Discharge    Condition   Stable    Comment   --               Woodrow Raymond, APRN  [address] [id number]  [phone]               Medication List        Changed      hydrALAZINE 25 MG tablet  Commonly known as: APRESOLINE  Take 1 tablet by mouth Every 8 (Eight) Hours for 30 days.  What changed:   how much to take  when to take this                 Kilo Bryant II, PA  07/20/24 8627       Elías Bedoya MD  07/20/24 3464

## 2024-12-28 ENCOUNTER — APPOINTMENT (OUTPATIENT)
Dept: CT IMAGING | Facility: HOSPITAL | Age: 69
End: 2024-12-28
Payer: MEDICARE

## 2024-12-28 ENCOUNTER — HOSPITAL ENCOUNTER (EMERGENCY)
Facility: HOSPITAL | Age: 69
Discharge: HOME | End: 2024-12-28
Attending: STUDENT IN AN ORGANIZED HEALTH CARE EDUCATION/TRAINING PROGRAM
Payer: MEDICARE

## 2024-12-28 ENCOUNTER — APPOINTMENT (OUTPATIENT)
Dept: GENERAL RADIOLOGY | Facility: HOSPITAL | Age: 69
End: 2024-12-28
Payer: MEDICARE

## 2024-12-28 VITALS
DIASTOLIC BLOOD PRESSURE: 77 MMHG | WEIGHT: 179.9 LBS | HEART RATE: 77 BPM | OXYGEN SATURATION: 100 % | HEIGHT: 61 IN | RESPIRATION RATE: 18 BRPM | TEMPERATURE: 98.1 F | SYSTOLIC BLOOD PRESSURE: 175 MMHG | BODY MASS INDEX: 33.96 KG/M2

## 2024-12-28 DIAGNOSIS — I10 PRIMARY HYPERTENSION: ICD-10-CM

## 2024-12-28 DIAGNOSIS — R07.9 CHEST PAIN, UNSPECIFIED TYPE: Primary | ICD-10-CM

## 2024-12-28 DIAGNOSIS — R51.9 HEADACHE, TEMPORAL: ICD-10-CM

## 2024-12-28 LAB
ALBUMIN SERPL-MCNC: 3.7 G/DL (ref 3.5–5.2)
ALBUMIN/GLOB SERPL: 1.2 G/DL
ALP SERPL-CCNC: 114 U/L (ref 39–117)
ALT SERPL W P-5'-P-CCNC: 6 U/L (ref 1–33)
ANION GAP SERPL CALCULATED.3IONS-SCNC: 11.1 MMOL/L (ref 5–15)
ANISOCYTOSIS BLD QL: NORMAL
AST SERPL-CCNC: 14 U/L (ref 1–32)
B PARAPERT DNA SPEC QL NAA+PROBE: NOT DETECTED
B PERT DNA SPEC QL NAA+PROBE: NOT DETECTED
BASOPHILS # BLD AUTO: 0.05 10*3/MM3 (ref 0–0.2)
BASOPHILS NFR BLD AUTO: 0.5 % (ref 0–1.5)
BILIRUB SERPL-MCNC: <0.2 MG/DL (ref 0–1.2)
BUN SERPL-MCNC: 22 MG/DL (ref 8–23)
BUN/CREAT SERPL: 11.5 (ref 7–25)
C PNEUM DNA NPH QL NAA+NON-PROBE: NOT DETECTED
CALCIUM SPEC-SCNC: 8.9 MG/DL (ref 8.6–10.5)
CHLORIDE SERPL-SCNC: 102 MMOL/L (ref 98–107)
CO2 SERPL-SCNC: 23.9 MMOL/L (ref 22–29)
CREAT SERPL-MCNC: 1.91 MG/DL (ref 0.57–1)
DEPRECATED RDW RBC AUTO: 48.2 FL (ref 37–54)
EGFRCR SERPLBLD CKD-EPI 2021: 28.5 ML/MIN/1.73
EOSINOPHIL # BLD AUTO: 0.7 10*3/MM3 (ref 0–0.4)
EOSINOPHIL NFR BLD AUTO: 7.4 % (ref 0.3–6.2)
ERYTHROCYTE [DISTWIDTH] IN BLOOD BY AUTOMATED COUNT: 16.9 % (ref 12.3–15.4)
FLUAV SUBTYP SPEC NAA+PROBE: NOT DETECTED
FLUBV RNA ISLT QL NAA+PROBE: NOT DETECTED
GEN 5 1HR TROPONIN T REFLEX: 21 NG/L
GLOBULIN UR ELPH-MCNC: 3 GM/DL
GLUCOSE SERPL-MCNC: 133 MG/DL (ref 65–99)
HADV DNA SPEC NAA+PROBE: NOT DETECTED
HCOV 229E RNA SPEC QL NAA+PROBE: NOT DETECTED
HCOV HKU1 RNA SPEC QL NAA+PROBE: NOT DETECTED
HCOV NL63 RNA SPEC QL NAA+PROBE: NOT DETECTED
HCOV OC43 RNA SPEC QL NAA+PROBE: NOT DETECTED
HCT VFR BLD AUTO: 27.1 % (ref 34–46.6)
HGB BLD-MCNC: 7.8 G/DL (ref 12–15.9)
HMPV RNA NPH QL NAA+NON-PROBE: NOT DETECTED
HOLD SPECIMEN: NORMAL
HPIV1 RNA ISLT QL NAA+PROBE: NOT DETECTED
HPIV2 RNA SPEC QL NAA+PROBE: NOT DETECTED
HPIV3 RNA NPH QL NAA+PROBE: NOT DETECTED
HPIV4 P GENE NPH QL NAA+PROBE: NOT DETECTED
HYPOCHROMIA BLD QL: NORMAL
IMM GRANULOCYTES # BLD AUTO: 0.02 10*3/MM3 (ref 0–0.05)
IMM GRANULOCYTES NFR BLD AUTO: 0.2 % (ref 0–0.5)
LYMPHOCYTES # BLD AUTO: 1.53 10*3/MM3 (ref 0.7–3.1)
LYMPHOCYTES NFR BLD AUTO: 16.1 % (ref 19.6–45.3)
M PNEUMO IGG SER IA-ACNC: NOT DETECTED
MCH RBC QN AUTO: 22.9 PG (ref 26.6–33)
MCHC RBC AUTO-ENTMCNC: 28.8 G/DL (ref 31.5–35.7)
MCV RBC AUTO: 79.7 FL (ref 79–97)
MONOCYTES # BLD AUTO: 0.79 10*3/MM3 (ref 0.1–0.9)
MONOCYTES NFR BLD AUTO: 8.3 % (ref 5–12)
NEUTROPHILS NFR BLD AUTO: 6.42 10*3/MM3 (ref 1.7–7)
NEUTROPHILS NFR BLD AUTO: 67.5 % (ref 42.7–76)
NRBC BLD AUTO-RTO: 0 /100 WBC (ref 0–0.2)
PLAT MORPH BLD: NORMAL
PLATELET # BLD AUTO: 407 10*3/MM3 (ref 140–450)
PMV BLD AUTO: 9.4 FL (ref 6–12)
POIKILOCYTOSIS BLD QL SMEAR: NORMAL
POTASSIUM SERPL-SCNC: 4.1 MMOL/L (ref 3.5–5.2)
PROT SERPL-MCNC: 6.7 G/DL (ref 6–8.5)
RBC # BLD AUTO: 3.4 10*6/MM3 (ref 3.77–5.28)
RHINOVIRUS RNA SPEC NAA+PROBE: NOT DETECTED
RSV RNA NPH QL NAA+NON-PROBE: NOT DETECTED
SARS-COV-2 RNA RESP QL NAA+PROBE: NOT DETECTED
SODIUM SERPL-SCNC: 137 MMOL/L (ref 136–145)
TROPONIN T % DELTA: 11 %
TROPONIN T NUMERIC DELTA: 2 NG/L
TROPONIN T SERPL HS-MCNC: 19 NG/L
WBC NRBC COR # BLD AUTO: 9.51 10*3/MM3 (ref 3.4–10.8)
WHOLE BLOOD HOLD COAG: NORMAL
WHOLE BLOOD HOLD SPECIMEN: NORMAL

## 2024-12-28 PROCEDURE — 70450 CT HEAD/BRAIN W/O DYE: CPT

## 2024-12-28 PROCEDURE — 85007 BL SMEAR W/DIFF WBC COUNT: CPT | Performed by: STUDENT IN AN ORGANIZED HEALTH CARE EDUCATION/TRAINING PROGRAM

## 2024-12-28 PROCEDURE — 25010000002 ONDANSETRON PER 1 MG: Performed by: EMERGENCY MEDICINE

## 2024-12-28 PROCEDURE — 84484 ASSAY OF TROPONIN QUANT: CPT | Performed by: STUDENT IN AN ORGANIZED HEALTH CARE EDUCATION/TRAINING PROGRAM

## 2024-12-28 PROCEDURE — 25010000002 MORPHINE PER 10 MG: Performed by: EMERGENCY MEDICINE

## 2024-12-28 PROCEDURE — 36415 COLL VENOUS BLD VENIPUNCTURE: CPT

## 2024-12-28 PROCEDURE — 93005 ELECTROCARDIOGRAM TRACING: CPT | Performed by: STUDENT IN AN ORGANIZED HEALTH CARE EDUCATION/TRAINING PROGRAM

## 2024-12-28 PROCEDURE — 85025 COMPLETE CBC W/AUTO DIFF WBC: CPT | Performed by: STUDENT IN AN ORGANIZED HEALTH CARE EDUCATION/TRAINING PROGRAM

## 2024-12-28 PROCEDURE — 80053 COMPREHEN METABOLIC PANEL: CPT | Performed by: STUDENT IN AN ORGANIZED HEALTH CARE EDUCATION/TRAINING PROGRAM

## 2024-12-28 PROCEDURE — 96375 TX/PRO/DX INJ NEW DRUG ADDON: CPT

## 2024-12-28 PROCEDURE — 96374 THER/PROPH/DIAG INJ IV PUSH: CPT

## 2024-12-28 PROCEDURE — 99284 EMERGENCY DEPT VISIT MOD MDM: CPT

## 2024-12-28 PROCEDURE — 0202U NFCT DS 22 TRGT SARS-COV-2: CPT | Performed by: STUDENT IN AN ORGANIZED HEALTH CARE EDUCATION/TRAINING PROGRAM

## 2024-12-28 PROCEDURE — 71045 X-RAY EXAM CHEST 1 VIEW: CPT

## 2024-12-28 RX ORDER — ONDANSETRON 2 MG/ML
4 INJECTION INTRAMUSCULAR; INTRAVENOUS ONCE
Status: COMPLETED | OUTPATIENT
Start: 2024-12-28 | End: 2024-12-28

## 2024-12-28 RX ORDER — MORPHINE SULFATE 2 MG/ML
2 INJECTION, SOLUTION INTRAMUSCULAR; INTRAVENOUS ONCE
Status: COMPLETED | OUTPATIENT
Start: 2024-12-28 | End: 2024-12-28

## 2024-12-28 RX ORDER — AMLODIPINE BESYLATE 5 MG/1
5 TABLET ORAL
Status: DISCONTINUED | OUTPATIENT
Start: 2024-12-28 | End: 2024-12-28 | Stop reason: HOSPADM

## 2024-12-28 RX ORDER — ACETAMINOPHEN 500 MG
1000 TABLET ORAL ONCE
Status: COMPLETED | OUTPATIENT
Start: 2024-12-28 | End: 2024-12-28

## 2024-12-28 RX ADMIN — ONDANSETRON 4 MG: 2 INJECTION INTRAMUSCULAR; INTRAVENOUS at 09:00

## 2024-12-28 RX ADMIN — ACETAMINOPHEN 1000 MG: 500 TABLET ORAL at 04:53

## 2024-12-28 RX ADMIN — MORPHINE SULFATE 2 MG: 2 INJECTION, SOLUTION INTRAMUSCULAR; INTRAVENOUS at 09:39

## 2024-12-28 RX ADMIN — AMLODIPINE BESYLATE 5 MG: 5 TABLET ORAL at 09:38

## 2024-12-28 NOTE — ED PROVIDER NOTES
Subjective   History of Present Illness  18-year-old female presents to the ER with complaints of shortness of breath.  Patient has a history of coronary artery disease and hyperlipidemia presents to the ER.      Review of Systems    Past Medical History:   Diagnosis Date    Anxiety     Arthritis     Coronary artery disease     H/O heart artery stent     Hyperlipidemia     Hypertension     Obesity        No Known Allergies    Past Surgical History:   Procedure Laterality Date    ANKLE SURGERY      RIGHT    APPENDECTOMY      CARDIAC CATHETERIZATION      LEFT    CORONARY STENT PLACEMENT      HYSTERECTOMY      INSERTION HEMODIALYSIS CATHETER Right 10/19/2023    Procedure: HEMODIALYSIS CATHETER INSERTION;  Surgeon: Bartolome Schwartz MD;  Location: [facility];  Service: General;  Laterality: Right;       Family History   Problem Relation Age of Onset    Heart disease Mother     Heart attack Mother 73    Fibromyalgia Mother     Heart attack Father 72    Ovarian cancer Sister     Coronary artery disease Other     Breast cancer Neg Hx        Social History     Socioeconomic History    Marital status:    Tobacco Use    Smoking status: Every Day     Current packs/day: 0.50     Average packs/day: 0.5 packs/day for 30.0 years (15.0 ttl pk-yrs)     Types: Electronic Cigarette, Cigarettes     Passive exposure: Never    Smokeless tobacco: Never   Vaping Use    Vaping status: Never Used   Substance and Sexual Activity    Alcohol use: No    Drug use: No    Sexual activity: Never           Objective   Physical Exam  Vitals and nursing note reviewed.   Constitutional:       General: She is not in acute distress.     Appearance: She is well-developed. She is not diaphoretic.      Comments: Chronically ill-appearing female comfortably resting in bed no acute distress   HENT:      Head: Normocephalic and atraumatic.      Right Ear: External ear normal.      Left Ear: External ear normal.      Nose: Nose normal.   Eyes:       Conjunctiva/sclera: Conjunctivae normal.      Pupils: Pupils are equal, round, and reactive to light.   Neck:      Vascular: No JVD.      Trachea: No tracheal deviation.   Cardiovascular:      Rate and Rhythm: Normal rate and regular rhythm.      Heart sounds: Normal heart sounds. No murmur heard.  Pulmonary:      Effort: Pulmonary effort is normal. No respiratory distress.      Breath sounds: Normal breath sounds. No wheezing.   Abdominal:      General: Bowel sounds are normal.      Palpations: Abdomen is soft.      Tenderness: There is no abdominal tenderness.   Musculoskeletal:         General: No deformity. Normal range of motion.      Cervical back: Normal range of motion and neck supple.   Skin:     General: Skin is warm and dry.      Coloration: Skin is not pale.      Findings: No erythema or rash.   Neurological:      Mental Status: She is alert and oriented to person, place, and time.      Cranial Nerves: No cranial nerve deficit.   Psychiatric:         Behavior: Behavior normal.         Thought Content: Thought content normal.         Procedures       Results for orders placed or performed during the hospital encounter of 12/28/24   ECG 12 Lead Chest Pain    Collection Time: 12/28/24  2:40 AM   Result Value Ref Range    QT Interval 360 ms    QTC Interval 450 ms   High Sensitivity Troponin T    Collection Time: 12/28/24  3:00 AM    Specimen: Arm, Right; Blood   Result Value Ref Range    HS Troponin T 19 (H) <14 ng/L   High Sensitivity Troponin T 1Hr    Collection Time: 12/28/24  4:04 AM    Specimen: Arm, Left; Blood   Result Value Ref Range    HS Troponin T 21 (H) <14 ng/L    Troponin T Numeric Delta 2 ng/L    Troponin T % Delta 11 Abnormal if >/= 20% %   Comprehensive Metabolic Panel    Collection Time: 12/28/24  4:04 AM    Specimen: Arm, Left; Blood   Result Value Ref Range    Glucose 133 (H) 65 - 99 mg/dL    BUN 22 8 - 23 mg/dL    Creatinine 1.91 (H) 0.57 - 1.00 mg/dL    Sodium 137 136 - 145 mmol/L     Potassium 4.1 3.5 - 5.2 mmol/L    Chloride 102 98 - 107 mmol/L    CO2 23.9 22.0 - 29.0 mmol/L    Calcium 8.9 8.6 - 10.5 mg/dL    Total Protein 6.7 6.0 - 8.5 g/dL    Albumin 3.7 3.5 - 5.2 g/dL    ALT (SGPT) 6 1 - 33 U/L    AST (SGOT) 14 1 - 32 U/L    Alkaline Phosphatase 114 39 - 117 U/L    Total Bilirubin <0.2 0.0 - 1.2 mg/dL    Globulin 3.0 gm/dL    A/G Ratio 1.2 g/dL    BUN/Creatinine Ratio 11.5 7.0 - 25.0    Anion Gap 11.1 5.0 - 15.0 mmol/L    eGFR 28.5 (L) >60.0 mL/min/1.73   CBC Auto Differential    Collection Time: 12/28/24  5:28 AM    Specimen: Arm, Right; Blood   Result Value Ref Range    WBC 9.51 3.40 - 10.80 10*3/mm3    RBC 3.40 (L) 3.77 - 5.28 10*6/mm3    Hemoglobin 7.8 (L) 12.0 - 15.9 g/dL    Hematocrit 27.1 (L) 34.0 - 46.6 %    MCV 79.7 79.0 - 97.0 fL    MCH 22.9 (L) 26.6 - 33.0 pg    MCHC 28.8 (L) 31.5 - 35.7 g/dL    RDW 16.9 (H) 12.3 - 15.4 %    RDW-SD 48.2 37.0 - 54.0 fl    MPV 9.4 6.0 - 12.0 fL    Platelets 407 140 - 450 10*3/mm3    Neutrophil % 67.5 42.7 - 76.0 %    Lymphocyte % 16.1 (L) 19.6 - 45.3 %    Monocyte % 8.3 5.0 - 12.0 %    Eosinophil % 7.4 (H) 0.3 - 6.2 %    Basophil % 0.5 0.0 - 1.5 %    Immature Grans % 0.2 0.0 - 0.5 %    Neutrophils, Absolute 6.42 1.70 - 7.00 10*3/mm3    Lymphocytes, Absolute 1.53 0.70 - 3.10 10*3/mm3    Monocytes, Absolute 0.79 0.10 - 0.90 10*3/mm3    Eosinophils, Absolute 0.70 (H) 0.00 - 0.40 10*3/mm3    Basophils, Absolute 0.05 0.00 - 0.20 10*3/mm3    Immature Grans, Absolute 0.02 0.00 - 0.05 10*3/mm3    nRBC 0.0 0.0 - 0.2 /100 WBC   Scan Slide    Collection Time: 12/28/24  5:28 AM    Specimen: Arm, Right; Blood   Result Value Ref Range    Anisocytosis Slight/1+ None Seen    Hypochromia Mod/2+ None Seen    Poikilocytes Slight/1+ None Seen    Platelet Morphology Normal Normal   Lavender Top    Collection Time: 12/28/24  5:28 AM   Result Value Ref Range    Extra Tube hold for add-on    Gold Top - SST    Collection Time: 12/28/24  5:28 AM   Result Value Ref Range     Extra Tube Hold for add-ons.    Light Blue Top    Collection Time: 12/28/24  5:28 AM   Result Value Ref Range    Extra Tube Hold for add-ons.    Respiratory Panel PCR w/COVID-19(SARS-CoV-2) [facility]O/VAZQUEZ/MARLIN/PAD/COR/KELBY In-House, NP Swab in UTM/VTM, 2 HR TAT - Swab, Nasopharynx    Collection Time: 12/28/24  6:01 AM    Specimen: Nasopharynx; Swab   Result Value Ref Range    ADENOVIRUS, PCR Not Detected Not Detected    Coronavirus 229E Not Detected Not Detected    Coronavirus HKU1 Not Detected Not Detected    Coronavirus NL63 Not Detected Not Detected    Coronavirus OC43 Not Detected Not Detected    COVID19 Not Detected Not Detected - Ref. Range    Human Metapneumovirus Not Detected Not Detected    Human Rhinovirus/Enterovirus Not Detected Not Detected    Influenza A PCR Not Detected Not Detected    Influenza B PCR Not Detected Not Detected    Parainfluenza Virus 1 Not Detected Not Detected    Parainfluenza Virus 2 Not Detected Not Detected    Parainfluenza Virus 3 Not Detected Not Detected    Parainfluenza Virus 4 Not Detected Not Detected    RSV, PCR Not Detected Not Detected    Bordetella pertussis pcr Not Detected Not Detected    Bordetella parapertussis PCR Not Detected Not Detected    Chlamydophila pneumoniae PCR Not Detected Not Detected    Mycoplasma pneumo by PCR Not Detected Not Detected         ED Course  ED Course as of 12/28/24 1040   Sat Dec 28, 2024   0709 Patient is currently pending a respiratory swab.  Does state the family members have been RSV positive.  Reports she has utilized oxygen as needed currently on 2 L nasal cannula oxygen saturating 97%. [LK]   0900 Care assumed from Dr. Schmidt at shift change.  Patient reports that she came to the ER because the insurance nurse practitioner came to her house and told her that her blood pressure was high and that if she should have chest pain she needs to go to the ER.  She said she was having some chest pain at that time but it got worse through the night.  It  is improved now.  She also was told at that time that her blood pressure was elevated.  She complains of bitemporal headache which is out of the ordinary for her.  She reports a history of anemia and she had a colonoscopy 1 to 2 years ago.  She denied any bleeding or melena at this time. [BC]   1024 Patient states headache is improved.  No chest pain or shortness of breath at this time.  Review of past records showed that the patient had a normal stress test just over a year ago.  No evidence of MI, PE today.  Have encouraged her to follow-up with her primary care for further workup for her anemia.  Will discharge home. [BC]      ED Course User Index  [BC] Keyon Lanza MD  [LK] Gabby Schmidt DO                  HEART Score: 4                                      Medical Decision Making      Final diagnoses:   Chest pain, unspecified type   Headache, temporal   Primary hypertension       ED Disposition  ED Disposition       ED Disposition   Discharge    Condition   Stable    Comment   --               Woodrow Raymond APRN  [address] [address]  [phone]    In 1 week           Medication List        Changed      hydrALAZINE 25 MG tablet  Commonly known as: APRESOLINE  Take 1 tablet by mouth Every 8 (Eight) Hours for 30 days.  What changed:   how much to take  when to take this                 Keyon Lanza MD  12/28/24 0685

## 2024-12-30 LAB
QT INTERVAL: 360 MS
QTC INTERVAL: 450 MS

## 2025-01-16 ENCOUNTER — APPOINTMENT (OUTPATIENT)
Dept: ULTRASOUND IMAGING | Facility: HOSPITAL | Age: 70
DRG: 417 | End: 2025-01-16
Payer: MEDICARE

## 2025-01-16 ENCOUNTER — APPOINTMENT (OUTPATIENT)
Dept: GENERAL RADIOLOGY | Facility: HOSPITAL | Age: 70
DRG: 417 | End: 2025-01-16
Payer: MEDICARE

## 2025-01-16 ENCOUNTER — APPOINTMENT (OUTPATIENT)
Dept: CT IMAGING | Facility: HOSPITAL | Age: 70
DRG: 417 | End: 2025-01-16
Payer: MEDICARE

## 2025-01-16 ENCOUNTER — HOSPITAL ENCOUNTER (EMERGENCY)
Facility: HOSPITAL | Age: 70
Discharge: HOME | DRG: 417 | End: 2025-01-16
Payer: MEDICARE

## 2025-01-16 VITALS
HEART RATE: 109 BPM | HEIGHT: 61 IN | BODY MASS INDEX: 33.99 KG/M2 | DIASTOLIC BLOOD PRESSURE: 105 MMHG | RESPIRATION RATE: 20 BRPM | WEIGHT: 180 LBS | OXYGEN SATURATION: 98 % | TEMPERATURE: 97.7 F | SYSTOLIC BLOOD PRESSURE: 130 MMHG

## 2025-01-16 DIAGNOSIS — R10.11 RIGHT UPPER QUADRANT PAIN: Primary | ICD-10-CM

## 2025-01-16 LAB
A-A DO2: 75.8 MMHG (ref 0–300)
ALBUMIN SERPL-MCNC: 3.8 G/DL (ref 3.5–5.2)
ALBUMIN/GLOB SERPL: 1.1 G/DL
ALP SERPL-CCNC: 123 U/L (ref 39–117)
ALT SERPL W P-5'-P-CCNC: 6 U/L (ref 1–33)
ANION GAP SERPL CALCULATED.3IONS-SCNC: 10.7 MMOL/L (ref 5–15)
ARTERIAL PATENCY WRIST A: ABNORMAL
AST SERPL-CCNC: 18 U/L (ref 1–32)
ATMOSPHERIC PRESS: 725 MMHG
BASE EXCESS BLDA CALC-SCNC: 1.3 MMOL/L (ref 0–2)
BASOPHILS # BLD AUTO: 0.1 10*3/MM3 (ref 0–0.2)
BASOPHILS NFR BLD AUTO: 1.1 % (ref 0–1.5)
BDY SITE: ABNORMAL
BILIRUB SERPL-MCNC: 0.4 MG/DL (ref 0–1.2)
BUN SERPL-MCNC: 28 MG/DL (ref 8–23)
BUN/CREAT SERPL: 14.3 (ref 7–25)
CALCIUM SPEC-SCNC: 9.2 MG/DL (ref 8.6–10.5)
CHLORIDE SERPL-SCNC: 95 MMOL/L (ref 98–107)
CO2 BLDA-SCNC: 26.9 MMOL/L (ref 22–33)
CO2 SERPL-SCNC: 23.3 MMOL/L (ref 22–29)
COHGB MFR BLD: 3.8 % (ref 0–5)
CREAT SERPL-MCNC: 1.96 MG/DL (ref 0.57–1)
DEPRECATED RDW RBC AUTO: 47.8 FL (ref 37–54)
EGFRCR SERPLBLD CKD-EPI 2021: 27.6 ML/MIN/1.73
EOSINOPHIL # BLD AUTO: 0.48 10*3/MM3 (ref 0–0.4)
EOSINOPHIL NFR BLD AUTO: 5.1 % (ref 0.3–6.2)
ERYTHROCYTE [DISTWIDTH] IN BLOOD BY AUTOMATED COUNT: 17.5 % (ref 12.3–15.4)
GAS FLOW AIRWAY: 1.5 LPM
GEN 5 1HR TROPONIN T REFLEX: 30 NG/L
GLOBULIN UR ELPH-MCNC: 3.4 GM/DL
GLUCOSE SERPL-MCNC: 117 MG/DL (ref 65–99)
HCO3 BLDA-SCNC: 25.7 MMOL/L (ref 20–26)
HCT VFR BLD AUTO: 25 % (ref 34–46.6)
HCT VFR BLD CALC: 22.4 % (ref 38–51)
HGB BLD-MCNC: 7.1 G/DL (ref 12–15.9)
HGB BLDA-MCNC: 7.3 G/DL (ref 13.5–17.5)
HOLD SPECIMEN: NORMAL
HOLD SPECIMEN: NORMAL
IMM GRANULOCYTES # BLD AUTO: 0.03 10*3/MM3 (ref 0–0.05)
IMM GRANULOCYTES NFR BLD AUTO: 0.3 % (ref 0–0.5)
INHALED O2 CONCENTRATION: 26 %
LYMPHOCYTES # BLD AUTO: 1.77 10*3/MM3 (ref 0.7–3.1)
LYMPHOCYTES NFR BLD AUTO: 18.9 % (ref 19.6–45.3)
Lab: ABNORMAL
MCH RBC QN AUTO: 21.7 PG (ref 26.6–33)
MCHC RBC AUTO-ENTMCNC: 28.4 G/DL (ref 31.5–35.7)
MCV RBC AUTO: 76.5 FL (ref 79–97)
METHGB BLD QL: 0.6 % (ref 0–3)
MODALITY: ABNORMAL
MONOCYTES # BLD AUTO: 1.05 10*3/MM3 (ref 0.1–0.9)
MONOCYTES NFR BLD AUTO: 11.2 % (ref 5–12)
NEUTROPHILS NFR BLD AUTO: 5.94 10*3/MM3 (ref 1.7–7)
NEUTROPHILS NFR BLD AUTO: 63.4 % (ref 42.7–76)
NRBC BLD AUTO-RTO: 0.2 /100 WBC (ref 0–0.2)
OXYHGB MFR BLDV: 86.6 % (ref 94–99)
PCO2 BLDA: 38.8 MM HG (ref 35–45)
PCO2 TEMP ADJ BLD: ABNORMAL MM[HG]
PH BLDA: 7.43 PH UNITS (ref 7.35–7.45)
PH, TEMP CORRECTED: ABNORMAL
PLATELET # BLD AUTO: 546 10*3/MM3 (ref 140–450)
PMV BLD AUTO: 9.7 FL (ref 6–12)
PO2 BLDA: 57 MM HG (ref 83–108)
PO2 TEMP ADJ BLD: ABNORMAL MM[HG]
POTASSIUM SERPL-SCNC: 4 MMOL/L (ref 3.5–5.2)
PROT SERPL-MCNC: 7.2 G/DL (ref 6–8.5)
RBC # BLD AUTO: 3.27 10*6/MM3 (ref 3.77–5.28)
SAO2 % BLDCOA: 90.6 % (ref 94–99)
SODIUM SERPL-SCNC: 129 MMOL/L (ref 136–145)
TROPONIN T % DELTA: -6
TROPONIN T NUMERIC DELTA: -2 NG/L
TROPONIN T SERPL HS-MCNC: 32 NG/L
VENTILATOR MODE: ABNORMAL
WBC NRBC COR # BLD AUTO: 9.37 10*3/MM3 (ref 3.4–10.8)
WHOLE BLOOD HOLD COAG: NORMAL
WHOLE BLOOD HOLD SPECIMEN: NORMAL

## 2025-01-16 PROCEDURE — 71250 CT THORAX DX C-: CPT | Performed by: RADIOLOGY

## 2025-01-16 PROCEDURE — 82375 ASSAY CARBOXYHB QUANT: CPT

## 2025-01-16 PROCEDURE — 93010 ELECTROCARDIOGRAM REPORT: CPT | Performed by: STUDENT IN AN ORGANIZED HEALTH CARE EDUCATION/TRAINING PROGRAM

## 2025-01-16 PROCEDURE — 99284 EMERGENCY DEPT VISIT MOD MDM: CPT

## 2025-01-16 PROCEDURE — 25010000002 FUROSEMIDE PER 20 MG

## 2025-01-16 PROCEDURE — 82805 BLOOD GASES W/O2 SATURATION: CPT

## 2025-01-16 PROCEDURE — 74176 CT ABD & PELVIS W/O CONTRAST: CPT | Performed by: RADIOLOGY

## 2025-01-16 PROCEDURE — 71045 X-RAY EXAM CHEST 1 VIEW: CPT | Performed by: RADIOLOGY

## 2025-01-16 PROCEDURE — 71045 X-RAY EXAM CHEST 1 VIEW: CPT

## 2025-01-16 PROCEDURE — 36600 WITHDRAWAL OF ARTERIAL BLOOD: CPT

## 2025-01-16 PROCEDURE — 84484 ASSAY OF TROPONIN QUANT: CPT

## 2025-01-16 PROCEDURE — 83050 HGB METHEMOGLOBIN QUAN: CPT

## 2025-01-16 PROCEDURE — 76705 ECHO EXAM OF ABDOMEN: CPT | Performed by: RADIOLOGY

## 2025-01-16 PROCEDURE — 85025 COMPLETE CBC W/AUTO DIFF WBC: CPT

## 2025-01-16 PROCEDURE — 36415 COLL VENOUS BLD VENIPUNCTURE: CPT

## 2025-01-16 PROCEDURE — 74176 CT ABD & PELVIS W/O CONTRAST: CPT

## 2025-01-16 PROCEDURE — 71250 CT THORAX DX C-: CPT

## 2025-01-16 PROCEDURE — 76705 ECHO EXAM OF ABDOMEN: CPT

## 2025-01-16 PROCEDURE — 25010000002 HYDROMORPHONE PER 4 MG

## 2025-01-16 PROCEDURE — 96374 THER/PROPH/DIAG INJ IV PUSH: CPT

## 2025-01-16 PROCEDURE — 80053 COMPREHEN METABOLIC PANEL: CPT

## 2025-01-16 PROCEDURE — 93005 ELECTROCARDIOGRAM TRACING: CPT

## 2025-01-16 PROCEDURE — 96375 TX/PRO/DX INJ NEW DRUG ADDON: CPT

## 2025-01-16 RX ORDER — HYDROCODONE BITARTRATE AND ACETAMINOPHEN 5; 325 MG/1; MG/1
1 TABLET ORAL EVERY 8 HOURS PRN
Qty: 9 TABLET | Refills: 0 | Status: ON HOLD | OUTPATIENT
Start: 2025-01-16 | End: 2025-01-19

## 2025-01-16 RX ORDER — FUROSEMIDE 10 MG/ML
40 INJECTION INTRAMUSCULAR; INTRAVENOUS ONCE
Status: COMPLETED | OUTPATIENT
Start: 2025-01-16 | End: 2025-01-16

## 2025-01-16 RX ORDER — ONDANSETRON 4 MG/1
4 TABLET, ORALLY DISINTEGRATING ORAL EVERY 8 HOURS PRN
Qty: 9 TABLET | Refills: 0 | Status: ON HOLD | OUTPATIENT
Start: 2025-01-16 | End: 2025-01-21

## 2025-01-16 RX ORDER — ASPIRIN 81 MG/1
324 TABLET, CHEWABLE ORAL ONCE
Status: DISCONTINUED | OUTPATIENT
Start: 2025-01-16 | End: 2025-01-16 | Stop reason: HOSPADM

## 2025-01-16 RX ORDER — HYDROMORPHONE HYDROCHLORIDE 1 MG/ML
0.5 INJECTION, SOLUTION INTRAMUSCULAR; INTRAVENOUS; SUBCUTANEOUS ONCE
Status: COMPLETED | OUTPATIENT
Start: 2025-01-16 | End: 2025-01-16

## 2025-01-16 RX ORDER — SODIUM CHLORIDE 0.9 % (FLUSH) 0.9 %
10 SYRINGE (ML) INJECTION AS NEEDED
Status: DISCONTINUED | OUTPATIENT
Start: 2025-01-16 | End: 2025-01-16 | Stop reason: HOSPADM

## 2025-01-16 RX ADMIN — HYDROMORPHONE HYDROCHLORIDE 0.5 MG: 1 INJECTION, SOLUTION INTRAMUSCULAR; INTRAVENOUS; SUBCUTANEOUS at 16:45

## 2025-01-16 RX ADMIN — FUROSEMIDE 40 MG: 10 INJECTION, SOLUTION INTRAMUSCULAR; INTRAVENOUS at 17:39

## 2025-01-16 NOTE — DISCHARGE INSTRUCTIONS
Exam today is consistent with gallbladder pain.  I recommend you follow-up with a general surgeon in the next couple of days for evaluation and consideration of removal of your gallbladder.  If your symptoms acutely worsen and you experience any shortness of breath fever chills nausea vomiting please return to the ED at that time.  Otherwise be sure to follow-up with your primary care physician take all medications as prescribed.

## 2025-01-16 NOTE — ED PROVIDER NOTES
Subjective   History of Present Illness  69-year-old female with a history of CAD, hypertension, CKD presenting to the ED due to right upper quadrant pain and right shoulder pain.  Pain started 2 weeks ago, off and on currently worse than usual.  She endorses some vomiting.  Denies any fever chest pain shortness of breath.      Review of Systems   Constitutional: Negative.  Negative for fever.   HENT: Negative.     Respiratory: Negative.     Cardiovascular: Negative.  Negative for chest pain.   Gastrointestinal:  Positive for abdominal pain, nausea and vomiting. Negative for anal bleeding, blood in stool, constipation, diarrhea and rectal pain.   Endocrine: Negative.    Genitourinary: Negative.  Negative for dysuria.   Skin: Negative.    Neurological: Negative.    Psychiatric/Behavioral: Negative.     All other systems reviewed and are negative.      Past Medical History:   Diagnosis Date    Anxiety     Arthritis     Coronary artery disease     H/O heart artery stent     Hyperlipidemia     Hypertension     Obesity        No Known Allergies    Past Surgical History:   Procedure Laterality Date    ANKLE SURGERY      RIGHT    APPENDECTOMY      CARDIAC CATHETERIZATION      LEFT    CORONARY STENT PLACEMENT      HYSTERECTOMY      INSERTION HEMODIALYSIS CATHETER Right 10/19/2023    Procedure: HEMODIALYSIS CATHETER INSERTION;  Surgeon: Bartolome Schwartz MD;  Location: [facility];  Service: General;  Laterality: Right;       Family History   Problem Relation Age of Onset    Heart disease Mother     Heart attack Mother 73    Fibromyalgia Mother     Heart attack Father 72    Ovarian cancer Sister     Coronary artery disease Other     Breast cancer Neg Hx        Social History     Socioeconomic History    Marital status:    Tobacco Use    Smoking status: Every Day     Current packs/day: 0.50     Average packs/day: 0.5 packs/day for 30.0 years (15.0 ttl pk-yrs)     Types: Electronic Cigarette, Cigarettes     Passive  exposure: Never    Smokeless tobacco: Never   Vaping Use    Vaping status: Never Used   Substance and Sexual Activity    Alcohol use: No    Drug use: No    Sexual activity: Never           Objective   Physical Exam  Vitals and nursing note reviewed.   Constitutional:       General: She is in acute distress.      Appearance: She is well-developed. She is not ill-appearing, toxic-appearing or diaphoretic.   HENT:      Head: Normocephalic and atraumatic.      Right Ear: External ear normal.      Left Ear: External ear normal.      Nose: Nose normal.   Eyes:      Conjunctiva/sclera: Conjunctivae normal.      Pupils: Pupils are equal, round, and reactive to light.   Neck:      Vascular: No JVD.      Trachea: No tracheal deviation.   Cardiovascular:      Rate and Rhythm: Normal rate and regular rhythm.      Heart sounds: Normal heart sounds. No murmur heard.  Pulmonary:      Effort: Pulmonary effort is normal. No respiratory distress.      Breath sounds: Normal breath sounds. No wheezing.   Abdominal:      General: Bowel sounds are normal.      Palpations: Abdomen is soft.      Tenderness: There is abdominal tenderness.      Comments: Positive Garcia sign   Musculoskeletal:         General: No deformity. Normal range of motion.      Cervical back: Normal range of motion and neck supple.      Right lower leg: Edema present.      Left lower leg: Edema present.   Skin:     General: Skin is warm and dry.      Coloration: Skin is not pale.      Findings: No erythema or rash.   Neurological:      Mental Status: She is alert and oriented to person, place, and time.      Cranial Nerves: No cranial nerve deficit.   Psychiatric:         Behavior: Behavior normal.         Thought Content: Thought content normal.         Procedures           ED Course  ED Course as of 01/16/25 1749   Thu Jan 16, 2025   1507 EKG interpretation 97 bpm QTc is 467 sinus rhythm with PVCs no ST elevations depressions.  Electronically signed by Colin BENNETT  DO Priscilla, 01/16/25, 3:08 PM EST.   [GF]      ED Course User Index  [GF] Priscilla, Colin W, DO                                                       Medical Decision Making  Exam and imaging is consistent with a cholelithiasis or biliary pain.  Discussed the case with Dr. De La Rosa recommend outpatient surgery at this time.  I gave the patient strict ED precautions if symptoms acutely worsen she is to return to the ED.    Problems Addressed:  Right upper quadrant pain: complicated acute illness or injury    Amount and/or Complexity of Data Reviewed  Labs: ordered.  Radiology: ordered.  ECG/medicine tests: ordered.    Risk  OTC drugs.  Prescription drug management.    US Gallbladder    Result Date: 1/16/2025  Impression: 1. No cholelithiasis but the gallbladder wall is thickened and the patient did elicit a positive sonographic Garcia sign.  This report was finalized on 1/16/2025 4:45 PM by Dr. Julio Dominguez MD.      CT Abdomen Pelvis Without Contrast    Result Date: 1/16/2025   1. Equivocal cholelithiasis.  2. Trace free fluid  3. Thickening of the urinary bladder wall         This report was finalized on 1/16/2025 4:06 PM by Dr. Julio Dominguez MD.      CT Chest Without Contrast Diagnostic    Result Date: 1/16/2025   1. Cardiomegaly and pericardial effusion 2. Small right pleural effusion 3. Right basilar consolidation minimal and left upper lobe consolidation minimal      This report was finalized on 1/16/2025 4:03 PM by Dr. Julio Dominguez MD.      XR Chest 1 View    Result Date: 1/16/2025  Bibasilar airspace disease    This report was finalized on 1/16/2025 3:44 PM by Dr. Julio Dominugez MD.     Results for orders placed or performed during the hospital encounter of 01/16/25   Comprehensive Metabolic Panel    Collection Time: 01/16/25  3:01 PM    Specimen: Blood   Result Value Ref Range    Glucose 117 (H) 65 - 99 mg/dL    BUN 28 (H) 8 - 23 mg/dL    Creatinine 1.96 (H) 0.57 - 1.00 mg/dL    Sodium 129 (L) 136 - 145  mmol/L    Potassium 4.0 3.5 - 5.2 mmol/L    Chloride 95 (L) 98 - 107 mmol/L    CO2 23.3 22.0 - 29.0 mmol/L    Calcium 9.2 8.6 - 10.5 mg/dL    Total Protein 7.2 6.0 - 8.5 g/dL    Albumin 3.8 3.5 - 5.2 g/dL    ALT (SGPT) 6 1 - 33 U/L    AST (SGOT) 18 1 - 32 U/L    Alkaline Phosphatase 123 (H) 39 - 117 U/L    Total Bilirubin 0.4 0.0 - 1.2 mg/dL    Globulin 3.4 gm/dL    A/G Ratio 1.1 g/dL    BUN/Creatinine Ratio 14.3 7.0 - 25.0    Anion Gap 10.7 5.0 - 15.0 mmol/L    eGFR 27.6 (L) >60.0 mL/min/1.73   High Sensitivity Troponin T    Collection Time: 01/16/25  3:01 PM    Specimen: Blood   Result Value Ref Range    HS Troponin T 32 (H) <14 ng/L   CBC Auto Differential    Collection Time: 01/16/25  3:01 PM    Specimen: Blood   Result Value Ref Range    WBC 9.37 3.40 - 10.80 10*3/mm3    RBC 3.27 (L) 3.77 - 5.28 10*6/mm3    Hemoglobin 7.1 (L) 12.0 - 15.9 g/dL    Hematocrit 25.0 (L) 34.0 - 46.6 %    MCV 76.5 (L) 79.0 - 97.0 fL    MCH 21.7 (L) 26.6 - 33.0 pg    MCHC 28.4 (L) 31.5 - 35.7 g/dL    RDW 17.5 (H) 12.3 - 15.4 %    RDW-SD 47.8 37.0 - 54.0 fl    MPV 9.7 6.0 - 12.0 fL    Platelets 546 (H) 140 - 450 10*3/mm3    Neutrophil % 63.4 42.7 - 76.0 %    Lymphocyte % 18.9 (L) 19.6 - 45.3 %    Monocyte % 11.2 5.0 - 12.0 %    Eosinophil % 5.1 0.3 - 6.2 %    Basophil % 1.1 0.0 - 1.5 %    Immature Grans % 0.3 0.0 - 0.5 %    Neutrophils, Absolute 5.94 1.70 - 7.00 10*3/mm3    Lymphocytes, Absolute 1.77 0.70 - 3.10 10*3/mm3    Monocytes, Absolute 1.05 (H) 0.10 - 0.90 10*3/mm3    Eosinophils, Absolute 0.48 (H) 0.00 - 0.40 10*3/mm3    Basophils, Absolute 0.10 0.00 - 0.20 10*3/mm3    Immature Grans, Absolute 0.03 0.00 - 0.05 10*3/mm3    nRBC 0.2 0.0 - 0.2 /100 WBC   Green Top (Gel)    Collection Time: 01/16/25  3:01 PM   Result Value Ref Range    Extra Tube Hold for add-ons.    Lavender Top    Collection Time: 01/16/25  3:01 PM   Result Value Ref Range    Extra Tube hold for add-on    Gold Top - SST    Collection Time: 01/16/25  3:01 PM    Result Value Ref Range    Extra Tube Hold for add-ons.    Light Blue Top    Collection Time: 01/16/25  3:01 PM   Result Value Ref Range    Extra Tube Hold for add-ons.    ECG 12 Lead Chest Pain    Collection Time: 01/16/25  3:03 PM   Result Value Ref Range    QT Interval 368 ms    QTC Interval 467 ms   Blood Gas, Arterial With Co-Ox    Collection Time: 01/16/25  3:27 PM    Specimen: Arterial Blood   Result Value Ref Range    Site Right Brachial     Sergei's Test N/A     pH, Arterial 7.430 7.350 - 7.450 pH units    pCO2, Arterial 38.8 35.0 - 45.0 mm Hg    pO2, Arterial 57.0 (L) 83.0 - 108.0 mm Hg    HCO3, Arterial 25.7 20.0 - 26.0 mmol/L    Base Excess, Arterial 1.3 0.0 - 2.0 mmol/L    O2 Saturation, Arterial 90.6 (L) 94.0 - 99.0 %    Hemoglobin, Blood Gas 7.3 (L) 13.5 - 17.5 g/dL    Hematocrit, Blood Gas 22.4 (L) 38.0 - 51.0 %    Oxyhemoglobin 86.6 (L) 94 - 99 %    Methemoglobin 0.60 0.00 - 3.00 %    Carboxyhemoglobin 3.8 0 - 5 %    A-a DO2 75.8 0.0 - 300.0 mmHg    CO2 Content 26.9 22 - 33 mmol/L    Barometric Pressure for Blood Gas 725 mmHg    Modality Nasal Cannula     FIO2 26 %    Flow Rate 1.5 lpm    Ventilator Mode NA     Collected by [id number]     pH, Temp Corrected      pCO2, Temperature Corrected      pO2, Temperature Corrected     High Sensitivity Troponin T 1Hr    Collection Time: 01/16/25  4:15 PM    Specimen: Arm, Right; Blood   Result Value Ref Range    HS Troponin T 30 (H) <14 ng/L    Troponin T Numeric Delta -2 ng/L    Troponin T % Delta -6 Abnormal if >/= 20%         Final diagnoses:   Right upper quadrant pain       ED Disposition  ED Disposition       ED Disposition   Discharge    Condition   Stable    Comment   --               Woodrow Raymond, APRN  [address] [facility] [address]  [phone]    Schedule an appointment as soon as possible for a visit in 1 week      [facility]  [address] [place] [id number] [phone]  Go to   If symptoms  worsen    Bigg De La Rosa MD  [address] [address]  [phone]    Schedule an appointment as soon as possible for a visit in 1 day           Medication List        New Prescriptions      naloxone 4 MG/0.1ML nasal spray  Commonly known as: NARCAN  Call 911. Don't prime. [place] in 1 nostril for overdose. Repeat in 2-3 minutes in other nostril if no or minimal breathing/responsiveness.     ondansetron ODT 4 MG disintegrating tablet  Commonly known as: ZOFRAN-ODT  Take 1 tablet by mouth Every 8 (Eight) Hours As Needed for Nausea or Vomiting for up to 3 days.            Changed      hydrALAZINE 25 MG tablet  Commonly known as: APRESOLINE  Take 1 tablet by mouth Every 8 (Eight) Hours for 30 days.  What changed:   how much to take  when to take this     * HYDROcodone-acetaminophen  MG per tablet  Commonly known as: NORCO  What changed: Another medication with the same name was added. Make sure you understand how and when to take each.     * HYDROcodone-acetaminophen 5-325 MG per tablet  Commonly known as: NORCO  Take 1 tablet by mouth Every 8 (Eight) Hours As Needed for Severe Pain for up to 3 days.  What changed: You were already taking a medication with the same name, and this prescription was added. Make sure you understand how and when to take each.           * This list has 2 medication(s) that are the same as other medications prescribed for you. Read the directions carefully, and ask your doctor or other care provider to review them with you.                   Where to Get Your Medications        These medications were sent to [place], [place] - [address] [facility] [address] [address] - [phone] [facility] [phone]   [address] [facility] [address] [address]      Phone: [phone]   HYDROcodone-acetaminophen 5-325 MG per tablet  naloxone 4 MG/0.1ML nasal spray  ondansetron ODT 4 MG disintegrating tablet            Colin Wells,   01/16/25 1754

## 2025-01-17 LAB
QT INTERVAL: 368 MS
QTC INTERVAL: 467 MS

## 2025-01-19 ENCOUNTER — HOSPITAL ENCOUNTER (INPATIENT)
Facility: HOSPITAL | Age: 70
LOS: 2 days | Discharge: HOME | DRG: 417 | End: 2025-01-21
Attending: EMERGENCY MEDICINE | Admitting: HOSPITALIST
Payer: MEDICARE

## 2025-01-19 ENCOUNTER — APPOINTMENT (OUTPATIENT)
Dept: GENERAL RADIOLOGY | Facility: HOSPITAL | Age: 70
DRG: 417 | End: 2025-01-19
Payer: MEDICARE

## 2025-01-19 ENCOUNTER — APPOINTMENT (OUTPATIENT)
Dept: CARDIOLOGY | Facility: HOSPITAL | Age: 70
DRG: 417 | End: 2025-01-19
Payer: MEDICARE

## 2025-01-19 DIAGNOSIS — K81.0 ACUTE CHOLECYSTITIS: ICD-10-CM

## 2025-01-19 DIAGNOSIS — D64.9 ACUTE ON CHRONIC ANEMIA: Primary | ICD-10-CM

## 2025-01-19 DIAGNOSIS — R06.00 DYSPNEA, UNSPECIFIED TYPE: ICD-10-CM

## 2025-01-19 PROBLEM — I48.91 A-FIB: Status: RESOLVED | Noted: 2025-01-19 | Resolved: 2025-01-19

## 2025-01-19 PROBLEM — I48.91 A-FIB: Status: ACTIVE | Noted: 2025-01-19

## 2025-01-19 LAB
ABO GROUP BLD: NORMAL
ABO GROUP BLD: NORMAL
ALBUMIN SERPL-MCNC: 3.6 G/DL (ref 3.5–5.2)
ALBUMIN SERPL-MCNC: 3.7 G/DL (ref 3.5–5.2)
ALBUMIN/GLOB SERPL: 0.9 G/DL
ALBUMIN/GLOB SERPL: 1 G/DL
ALP SERPL-CCNC: 126 U/L (ref 39–117)
ALP SERPL-CCNC: 135 U/L (ref 39–117)
ALT SERPL W P-5'-P-CCNC: 6 U/L (ref 1–33)
ALT SERPL W P-5'-P-CCNC: <5 U/L (ref 1–33)
ANION GAP SERPL CALCULATED.3IONS-SCNC: 14.4 MMOL/L (ref 5–15)
ANION GAP SERPL CALCULATED.3IONS-SCNC: 9.6 MMOL/L (ref 5–15)
ANISOCYTOSIS BLD QL: NORMAL
ANISOCYTOSIS BLD QL: NORMAL
AST SERPL-CCNC: 15 U/L (ref 1–32)
AST SERPL-CCNC: 16 U/L (ref 1–32)
AV MEAN PRESS GRAD SYS DOP V1V2: 8 MMHG
AV VMAX SYS DOP: 205 CM/SEC
BASOPHILS # BLD AUTO: 0.1 10*3/MM3 (ref 0–0.2)
BASOPHILS # BLD AUTO: 0.11 10*3/MM3 (ref 0–0.2)
BASOPHILS NFR BLD AUTO: 1.1 % (ref 0–1.5)
BASOPHILS NFR BLD AUTO: 1.3 % (ref 0–1.5)
BH CV ECHO MEAS - ACS: 1.8 CM
BH CV ECHO MEAS - AO MAX PG: 16.8 MMHG
BH CV ECHO MEAS - AO ROOT DIAM: 3.2 CM
BH CV ECHO MEAS - AO V2 VTI: 32.2 CM
BH CV ECHO MEAS - AVA(I,D): 2.17 CM2
BH CV ECHO MEAS - EDV(CUBED): 140.6 ML
BH CV ECHO MEAS - EDV(MOD-SP2): 189 ML
BH CV ECHO MEAS - EDV(MOD-SP4): 161 ML
BH CV ECHO MEAS - EF(MOD-SP2): 42.9 %
BH CV ECHO MEAS - EF(MOD-SP4): 42.4 %
BH CV ECHO MEAS - ESV(CUBED): 74.1 ML
BH CV ECHO MEAS - ESV(MOD-SP2): 108 ML
BH CV ECHO MEAS - ESV(MOD-SP4): 92.7 ML
BH CV ECHO MEAS - FS: 19.2 %
BH CV ECHO MEAS - IVS/LVPW: 1.09 CM
BH CV ECHO MEAS - IVSD: 1.2 CM
BH CV ECHO MEAS - LA DIMENSION: 3.7 CM
BH CV ECHO MEAS - LAT PEAK E' VEL: 8.2 CM/SEC
BH CV ECHO MEAS - LV DIASTOLIC VOL/BSA (35-75): 88.5 CM2
BH CV ECHO MEAS - LV MASS(C)D: 234.6 GRAMS
BH CV ECHO MEAS - LV MAX PG: 5.4 MMHG
BH CV ECHO MEAS - LV MEAN PG: 3 MMHG
BH CV ECHO MEAS - LV SYSTOLIC VOL/BSA (12-30): 51 CM2
BH CV ECHO MEAS - LV V1 MAX: 116 CM/SEC
BH CV ECHO MEAS - LV V1 VTI: 22.2 CM
BH CV ECHO MEAS - LVIDD: 5.2 CM
BH CV ECHO MEAS - LVIDS: 4.2 CM
BH CV ECHO MEAS - LVOT AREA: 3.1 CM2
BH CV ECHO MEAS - LVOT DIAM: 2 CM
BH CV ECHO MEAS - LVPWD: 1.1 CM
BH CV ECHO MEAS - MED PEAK E' VEL: 5.3 CM/SEC
BH CV ECHO MEAS - MR MAX PG: 78 MMHG
BH CV ECHO MEAS - MR MAX VEL: 440 CM/SEC
BH CV ECHO MEAS - MV A MAX VEL: 69.4 CM/SEC
BH CV ECHO MEAS - MV DEC SLOPE: 854 CM/SEC2
BH CV ECHO MEAS - MV DEC TIME: 0.17 SEC
BH CV ECHO MEAS - MV E MAX VEL: 144 CM/SEC
BH CV ECHO MEAS - MV E/A: 2.07
BH CV ECHO MEAS - MV MAX PG: 10.8 MMHG
BH CV ECHO MEAS - MV MEAN PG: 4 MMHG
BH CV ECHO MEAS - MV V2 VTI: 43.8 CM
BH CV ECHO MEAS - MVA(VTI): 1.59 CM2
BH CV ECHO MEAS - PA ACC TIME: 0.06 SEC
BH CV ECHO MEAS - PA V2 MAX: 105 CM/SEC
BH CV ECHO MEAS - RAP SYSTOLE: 10 MMHG
BH CV ECHO MEAS - RVSP: 34.6 MMHG
BH CV ECHO MEAS - SV(LVOT): 69.7 ML
BH CV ECHO MEAS - SV(MOD-SP2): 81 ML
BH CV ECHO MEAS - SV(MOD-SP4): 68.3 ML
BH CV ECHO MEAS - SVI(LVOT): 38.3 ML/M2
BH CV ECHO MEAS - SVI(MOD-SP2): 44.5 ML/M2
BH CV ECHO MEAS - SVI(MOD-SP4): 37.5 ML/M2
BH CV ECHO MEAS - TAPSE (>1.6): 1.73 CM
BH CV ECHO MEAS - TR MAX PG: 24.6 MMHG
BH CV ECHO MEAS - TR MAX VEL: 248 CM/SEC
BH CV ECHO MEASUREMENTS AVERAGE E/E' RATIO: 21.33
BILIRUB SERPL-MCNC: 0.3 MG/DL (ref 0–1.2)
BILIRUB SERPL-MCNC: 0.5 MG/DL (ref 0–1.2)
BLD GP AB SCN SERPL QL: NEGATIVE
BUN SERPL-MCNC: 33 MG/DL (ref 8–23)
BUN SERPL-MCNC: 35 MG/DL (ref 8–23)
BUN/CREAT SERPL: 16 (ref 7–25)
BUN/CREAT SERPL: 16.5 (ref 7–25)
CALCIUM SPEC-SCNC: 9 MG/DL (ref 8.6–10.5)
CALCIUM SPEC-SCNC: 9.3 MG/DL (ref 8.6–10.5)
CHLORIDE SERPL-SCNC: 98 MMOL/L (ref 98–107)
CHLORIDE SERPL-SCNC: 98 MMOL/L (ref 98–107)
CO2 SERPL-SCNC: 20.6 MMOL/L (ref 22–29)
CO2 SERPL-SCNC: 25.4 MMOL/L (ref 22–29)
CREAT SERPL-MCNC: 2.06 MG/DL (ref 0.57–1)
CREAT SERPL-MCNC: 2.12 MG/DL (ref 0.57–1)
D-LACTATE SERPL-SCNC: 1.3 MMOL/L (ref 0.5–2)
DEPRECATED RDW RBC AUTO: 46.9 FL (ref 37–54)
DEPRECATED RDW RBC AUTO: 49.6 FL (ref 37–54)
EGFRCR SERPLBLD CKD-EPI 2021: 25.1 ML/MIN/1.73
EGFRCR SERPLBLD CKD-EPI 2021: 26 ML/MIN/1.73
EOSINOPHIL # BLD AUTO: 0.63 10*3/MM3 (ref 0–0.4)
EOSINOPHIL # BLD AUTO: 0.66 10*3/MM3 (ref 0–0.4)
EOSINOPHIL NFR BLD AUTO: 7.5 % (ref 0.3–6.2)
EOSINOPHIL NFR BLD AUTO: 7.6 % (ref 0.3–6.2)
ERYTHROCYTE [DISTWIDTH] IN BLOOD BY AUTOMATED COUNT: 17.3 % (ref 12.3–15.4)
ERYTHROCYTE [DISTWIDTH] IN BLOOD BY AUTOMATED COUNT: 17.6 % (ref 12.3–15.4)
FLUAV RNA RESP QL NAA+PROBE: NOT DETECTED
FLUBV RNA RESP QL NAA+PROBE: NOT DETECTED
GEN 5 1HR TROPONIN T REFLEX: 31 NG/L
GLOBULIN UR ELPH-MCNC: 3.6 GM/DL
GLOBULIN UR ELPH-MCNC: 3.8 GM/DL
GLUCOSE SERPL-MCNC: 107 MG/DL (ref 65–99)
GLUCOSE SERPL-MCNC: 96 MG/DL (ref 65–99)
HCT VFR BLD AUTO: 24.1 % (ref 34–46.6)
HCT VFR BLD AUTO: 27.3 % (ref 34–46.6)
HCT VFR BLD AUTO: 28.9 % (ref 34–46.6)
HCT VFR BLD AUTO: 30.5 % (ref 34–46.6)
HGB BLD-MCNC: 6.9 G/DL (ref 12–15.9)
HGB BLD-MCNC: 8.1 G/DL (ref 12–15.9)
HGB BLD-MCNC: 8.4 G/DL (ref 12–15.9)
HGB BLD-MCNC: 8.5 G/DL (ref 12–15.9)
HOLD SPECIMEN: NORMAL
HOLD SPECIMEN: NORMAL
HYPOCHROMIA BLD QL: NORMAL
HYPOCHROMIA BLD QL: NORMAL
IMM GRANULOCYTES # BLD AUTO: 0.01 10*3/MM3 (ref 0–0.05)
IMM GRANULOCYTES # BLD AUTO: 0.03 10*3/MM3 (ref 0–0.05)
IMM GRANULOCYTES NFR BLD AUTO: 0.1 % (ref 0–0.5)
IMM GRANULOCYTES NFR BLD AUTO: 0.3 % (ref 0–0.5)
LEFT ATRIUM VOLUME INDEX: 51.8 ML/M2
LIPASE SERPL-CCNC: 40 U/L (ref 13–60)
LV EF BIPLANE MOD: 41.4 %
LYMPHOCYTES # BLD AUTO: 1.83 10*3/MM3 (ref 0.7–3.1)
LYMPHOCYTES # BLD AUTO: 1.91 10*3/MM3 (ref 0.7–3.1)
LYMPHOCYTES NFR BLD AUTO: 21 % (ref 19.6–45.3)
LYMPHOCYTES NFR BLD AUTO: 22.7 % (ref 19.6–45.3)
MACROCYTES BLD QL SMEAR: NORMAL
MCH RBC QN AUTO: 21.6 PG (ref 26.6–33)
MCH RBC QN AUTO: 21.9 PG (ref 26.6–33)
MCHC RBC AUTO-ENTMCNC: 27.9 G/DL (ref 31.5–35.7)
MCHC RBC AUTO-ENTMCNC: 28.6 G/DL (ref 31.5–35.7)
MCV RBC AUTO: 75.5 FL (ref 79–97)
MCV RBC AUTO: 78.4 FL (ref 79–97)
MICROCYTES BLD QL: NORMAL
MONOCYTES # BLD AUTO: 0.75 10*3/MM3 (ref 0.1–0.9)
MONOCYTES # BLD AUTO: 0.85 10*3/MM3 (ref 0.1–0.9)
MONOCYTES NFR BLD AUTO: 8.9 % (ref 5–12)
MONOCYTES NFR BLD AUTO: 9.7 % (ref 5–12)
NEUTROPHILS NFR BLD AUTO: 5.01 10*3/MM3 (ref 1.7–7)
NEUTROPHILS NFR BLD AUTO: 5.26 10*3/MM3 (ref 1.7–7)
NEUTROPHILS NFR BLD AUTO: 59.5 % (ref 42.7–76)
NEUTROPHILS NFR BLD AUTO: 60.3 % (ref 42.7–76)
NRBC BLD AUTO-RTO: 0 /100 WBC (ref 0–0.2)
NRBC BLD AUTO-RTO: 0 /100 WBC (ref 0–0.2)
NT-PROBNP SERPL-MCNC: ABNORMAL PG/ML (ref 0–900)
OVALOCYTES BLD QL SMEAR: NORMAL
PLAT MORPH BLD: NORMAL
PLAT MORPH BLD: NORMAL
PLATELET # BLD AUTO: 474 10*3/MM3 (ref 140–450)
PLATELET # BLD AUTO: 532 10*3/MM3 (ref 140–450)
PMV BLD AUTO: 10 FL (ref 6–12)
PMV BLD AUTO: 9.7 FL (ref 6–12)
POLYCHROMASIA BLD QL SMEAR: NORMAL
POTASSIUM SERPL-SCNC: 3.9 MMOL/L (ref 3.5–5.2)
POTASSIUM SERPL-SCNC: 4.2 MMOL/L (ref 3.5–5.2)
PROT SERPL-MCNC: 7.3 G/DL (ref 6–8.5)
PROT SERPL-MCNC: 7.4 G/DL (ref 6–8.5)
QT INTERVAL: 386 MS
QTC INTERVAL: 456 MS
RBC # BLD AUTO: 3.19 10*6/MM3 (ref 3.77–5.28)
RBC # BLD AUTO: 3.89 10*6/MM3 (ref 3.77–5.28)
RH BLD: POSITIVE
RH BLD: POSITIVE
SARS-COV-2 RNA RESP QL NAA+PROBE: NOT DETECTED
SODIUM SERPL-SCNC: 133 MMOL/L (ref 136–145)
SODIUM SERPL-SCNC: 133 MMOL/L (ref 136–145)
STOMATOCYTES BLD QL SMEAR: NORMAL
T&S EXPIRATION DATE: NORMAL
TARGETS BLD QL SMEAR: NORMAL
TARGETS BLD QL SMEAR: NORMAL
TROPONIN T % DELTA: -3
TROPONIN T NUMERIC DELTA: -1 NG/L
TROPONIN T SERPL HS-MCNC: 32 NG/L
WBC NRBC COR # BLD AUTO: 8.42 10*3/MM3 (ref 3.4–10.8)
WBC NRBC COR # BLD AUTO: 8.73 10*3/MM3 (ref 3.4–10.8)
WHOLE BLOOD HOLD COAG: NORMAL
WHOLE BLOOD HOLD SPECIMEN: NORMAL

## 2025-01-19 PROCEDURE — 86900 BLOOD TYPING SEROLOGIC ABO: CPT

## 2025-01-19 PROCEDURE — 86901 BLOOD TYPING SEROLOGIC RH(D): CPT | Performed by: EMERGENCY MEDICINE

## 2025-01-19 PROCEDURE — 86900 BLOOD TYPING SEROLOGIC ABO: CPT | Performed by: EMERGENCY MEDICINE

## 2025-01-19 PROCEDURE — 85007 BL SMEAR W/DIFF WBC COUNT: CPT | Performed by: HOSPITALIST

## 2025-01-19 PROCEDURE — 86923 COMPATIBILITY TEST ELECTRIC: CPT

## 2025-01-19 PROCEDURE — 71046 X-RAY EXAM CHEST 2 VIEWS: CPT | Performed by: RADIOLOGY

## 2025-01-19 PROCEDURE — 99223 1ST HOSP IP/OBS HIGH 75: CPT | Performed by: INTERNAL MEDICINE

## 2025-01-19 PROCEDURE — 93005 ELECTROCARDIOGRAM TRACING: CPT

## 2025-01-19 PROCEDURE — 83690 ASSAY OF LIPASE: CPT

## 2025-01-19 PROCEDURE — 93306 TTE W/DOPPLER COMPLETE: CPT

## 2025-01-19 PROCEDURE — 93010 ELECTROCARDIOGRAM REPORT: CPT | Performed by: SPECIALIST

## 2025-01-19 PROCEDURE — P9047 ALBUMIN (HUMAN), 25%, 50ML: HCPCS | Performed by: INTERNAL MEDICINE

## 2025-01-19 PROCEDURE — 80053 COMPREHEN METABOLIC PANEL: CPT | Performed by: HOSPITALIST

## 2025-01-19 PROCEDURE — 36430 TRANSFUSION BLD/BLD COMPNT: CPT

## 2025-01-19 PROCEDURE — S2900 ROBOTIC SURGICAL SYSTEM: HCPCS | Performed by: SURGERY

## 2025-01-19 PROCEDURE — 94799 UNLISTED PULMONARY SVC/PX: CPT

## 2025-01-19 PROCEDURE — 94761 N-INVAS EAR/PLS OXIMETRY MLT: CPT

## 2025-01-19 PROCEDURE — 85025 COMPLETE CBC W/AUTO DIFF WBC: CPT

## 2025-01-19 PROCEDURE — 25010000002 ALBUMIN HUMAN 25% PER 50 ML: Performed by: INTERNAL MEDICINE

## 2025-01-19 PROCEDURE — 83880 ASSAY OF NATRIURETIC PEPTIDE: CPT

## 2025-01-19 PROCEDURE — 93005 ELECTROCARDIOGRAM TRACING: CPT | Performed by: EMERGENCY MEDICINE

## 2025-01-19 PROCEDURE — 36415 COLL VENOUS BLD VENIPUNCTURE: CPT | Performed by: EMERGENCY MEDICINE

## 2025-01-19 PROCEDURE — 99222 1ST HOSP IP/OBS MODERATE 55: CPT | Performed by: SURGERY

## 2025-01-19 PROCEDURE — 84484 ASSAY OF TROPONIN QUANT: CPT | Performed by: EMERGENCY MEDICINE

## 2025-01-19 PROCEDURE — 85018 HEMOGLOBIN: CPT | Performed by: INTERNAL MEDICINE

## 2025-01-19 PROCEDURE — 84484 ASSAY OF TROPONIN QUANT: CPT

## 2025-01-19 PROCEDURE — 93306 TTE W/DOPPLER COMPLETE: CPT | Performed by: SPECIALIST

## 2025-01-19 PROCEDURE — 80053 COMPREHEN METABOLIC PANEL: CPT

## 2025-01-19 PROCEDURE — 85014 HEMATOCRIT: CPT | Performed by: INTERNAL MEDICINE

## 2025-01-19 PROCEDURE — 25010000002 BUMETANIDE PER 0.5 MG: Performed by: EMERGENCY MEDICINE

## 2025-01-19 PROCEDURE — 86850 RBC ANTIBODY SCREEN: CPT | Performed by: EMERGENCY MEDICINE

## 2025-01-19 PROCEDURE — 85014 HEMATOCRIT: CPT

## 2025-01-19 PROCEDURE — 83605 ASSAY OF LACTIC ACID: CPT

## 2025-01-19 PROCEDURE — 85025 COMPLETE CBC W/AUTO DIFF WBC: CPT | Performed by: HOSPITALIST

## 2025-01-19 PROCEDURE — 87636 SARSCOV2 & INF A&B AMP PRB: CPT

## 2025-01-19 PROCEDURE — 71046 X-RAY EXAM CHEST 2 VIEWS: CPT

## 2025-01-19 PROCEDURE — 99285 EMERGENCY DEPT VISIT HI MDM: CPT

## 2025-01-19 PROCEDURE — 85018 HEMOGLOBIN: CPT

## 2025-01-19 PROCEDURE — 85007 BL SMEAR W/DIFF WBC COUNT: CPT

## 2025-01-19 PROCEDURE — 86901 BLOOD TYPING SEROLOGIC RH(D): CPT

## 2025-01-19 PROCEDURE — P9016 RBC LEUKOCYTES REDUCED: HCPCS

## 2025-01-19 RX ORDER — HYDRALAZINE HYDROCHLORIDE 100 MG/1
100 TABLET, FILM COATED ORAL 3 TIMES DAILY
COMMUNITY
End: 2025-01-21 | Stop reason: HOSPADM

## 2025-01-19 RX ORDER — CHLORTHALIDONE 25 MG/1
25 TABLET ORAL DAILY
Status: ON HOLD | COMMUNITY

## 2025-01-19 RX ORDER — HYDROCODONE BITARTRATE AND ACETAMINOPHEN 10; 325 MG/1; MG/1
1 TABLET ORAL EVERY 8 HOURS PRN
Status: DISCONTINUED | OUTPATIENT
Start: 2025-01-19 | End: 2025-01-21 | Stop reason: HOSPADM

## 2025-01-19 RX ORDER — AMOXICILLIN 250 MG
2 CAPSULE ORAL 2 TIMES DAILY PRN
Status: DISCONTINUED | OUTPATIENT
Start: 2025-01-19 | End: 2025-01-21 | Stop reason: HOSPADM

## 2025-01-19 RX ORDER — ALBUMIN (HUMAN) 12.5 G/50ML
25 SOLUTION INTRAVENOUS ONCE
Status: COMPLETED | OUTPATIENT
Start: 2025-01-19 | End: 2025-01-19

## 2025-01-19 RX ORDER — CHLORTHALIDONE 50 MG/1
25 TABLET ORAL DAILY
Status: CANCELLED | OUTPATIENT
Start: 2025-01-19

## 2025-01-19 RX ORDER — LEVOTHYROXINE SODIUM 88 UG/1
88 TABLET ORAL DAILY
Status: DISCONTINUED | OUTPATIENT
Start: 2025-01-19 | End: 2025-01-21 | Stop reason: HOSPADM

## 2025-01-19 RX ORDER — ROSUVASTATIN CALCIUM 10 MG/1
10 TABLET, COATED ORAL DAILY
Status: ON HOLD | COMMUNITY

## 2025-01-19 RX ORDER — SODIUM CHLORIDE 0.9 % (FLUSH) 0.9 %
10 SYRINGE (ML) INJECTION AS NEEDED
Status: DISCONTINUED | OUTPATIENT
Start: 2025-01-19 | End: 2025-01-21 | Stop reason: HOSPADM

## 2025-01-19 RX ORDER — POLYETHYLENE GLYCOL 3350 17 G/17G
17 POWDER, FOR SOLUTION ORAL DAILY PRN
Status: DISCONTINUED | OUTPATIENT
Start: 2025-01-19 | End: 2025-01-21 | Stop reason: HOSPADM

## 2025-01-19 RX ORDER — GABAPENTIN 300 MG/1
300 CAPSULE ORAL 2 TIMES DAILY PRN
Status: DISCONTINUED | OUTPATIENT
Start: 2025-01-19 | End: 2025-01-21 | Stop reason: HOSPADM

## 2025-01-19 RX ORDER — ASPIRIN 81 MG/1
81 TABLET, CHEWABLE ORAL DAILY
Status: CANCELLED | OUTPATIENT
Start: 2025-01-19

## 2025-01-19 RX ORDER — IPRATROPIUM BROMIDE AND ALBUTEROL SULFATE 2.5; .5 MG/3ML; MG/3ML
3 SOLUTION RESPIRATORY (INHALATION) EVERY 4 HOURS PRN
Status: DISCONTINUED | OUTPATIENT
Start: 2025-01-19 | End: 2025-01-21 | Stop reason: HOSPADM

## 2025-01-19 RX ORDER — ROSUVASTATIN CALCIUM 10 MG/1
10 TABLET, COATED ORAL DAILY
Status: DISCONTINUED | OUTPATIENT
Start: 2025-01-19 | End: 2025-01-21 | Stop reason: HOSPADM

## 2025-01-19 RX ORDER — BUMETANIDE 0.25 MG/ML
2 INJECTION, SOLUTION INTRAMUSCULAR; INTRAVENOUS ONCE
Status: COMPLETED | OUTPATIENT
Start: 2025-01-19 | End: 2025-01-19

## 2025-01-19 RX ORDER — METOPROLOL TARTRATE 25 MG/1
25 TABLET, FILM COATED ORAL DAILY
Status: ON HOLD | COMMUNITY

## 2025-01-19 RX ORDER — GABAPENTIN 600 MG/1
300 TABLET ORAL 3 TIMES DAILY PRN
Status: ON HOLD | COMMUNITY

## 2025-01-19 RX ORDER — HYDRALAZINE HYDROCHLORIDE 50 MG/1
100 TABLET, FILM COATED ORAL 3 TIMES DAILY
Status: CANCELLED | OUTPATIENT
Start: 2025-01-19

## 2025-01-19 RX ORDER — ALBUTEROL SULFATE 0.83 MG/ML
2.5 SOLUTION RESPIRATORY (INHALATION) EVERY 4 HOURS PRN
Status: CANCELLED | OUTPATIENT
Start: 2025-01-19

## 2025-01-19 RX ORDER — NITROGLYCERIN 0.4 MG/1
0.4 TABLET SUBLINGUAL
Status: DISCONTINUED | OUTPATIENT
Start: 2025-01-19 | End: 2025-01-21 | Stop reason: HOSPADM

## 2025-01-19 RX ORDER — SERTRALINE HYDROCHLORIDE 25 MG/1
25 TABLET, FILM COATED ORAL DAILY
Status: ON HOLD | COMMUNITY

## 2025-01-19 RX ORDER — BISACODYL 5 MG/1
5 TABLET, DELAYED RELEASE ORAL DAILY PRN
Status: DISCONTINUED | OUTPATIENT
Start: 2025-01-19 | End: 2025-01-21 | Stop reason: HOSPADM

## 2025-01-19 RX ORDER — SODIUM CHLORIDE 0.9 % (FLUSH) 0.9 %
10 SYRINGE (ML) INJECTION EVERY 12 HOURS SCHEDULED
Status: DISCONTINUED | OUTPATIENT
Start: 2025-01-19 | End: 2025-01-21 | Stop reason: HOSPADM

## 2025-01-19 RX ORDER — ONDANSETRON 4 MG/1
4 TABLET, ORALLY DISINTEGRATING ORAL EVERY 8 HOURS PRN
Status: ACTIVE | OUTPATIENT
Start: 2025-01-19 | End: 2025-01-20

## 2025-01-19 RX ORDER — PANTOPRAZOLE SODIUM 40 MG/1
40 TABLET, DELAYED RELEASE ORAL DAILY
Status: DISCONTINUED | OUTPATIENT
Start: 2025-01-19 | End: 2025-01-21 | Stop reason: HOSPADM

## 2025-01-19 RX ORDER — MORPHINE SULFATE 2 MG/ML
2 INJECTION, SOLUTION INTRAMUSCULAR; INTRAVENOUS EVERY 4 HOURS PRN
Status: DISCONTINUED | OUTPATIENT
Start: 2025-01-19 | End: 2025-01-21 | Stop reason: HOSPADM

## 2025-01-19 RX ORDER — FOLIC ACID 1 MG/1
1 TABLET ORAL DAILY
Status: DISCONTINUED | OUTPATIENT
Start: 2025-01-19 | End: 2025-01-21 | Stop reason: HOSPADM

## 2025-01-19 RX ORDER — SODIUM CHLORIDE 9 MG/ML
40 INJECTION, SOLUTION INTRAVENOUS AS NEEDED
Status: DISCONTINUED | OUTPATIENT
Start: 2025-01-19 | End: 2025-01-21 | Stop reason: HOSPADM

## 2025-01-19 RX ORDER — METOPROLOL TARTRATE 25 MG/1
12.5 TABLET, FILM COATED ORAL 2 TIMES DAILY
Status: DISCONTINUED | OUTPATIENT
Start: 2025-01-19 | End: 2025-01-21 | Stop reason: HOSPADM

## 2025-01-19 RX ORDER — BISACODYL 10 MG
10 SUPPOSITORY, RECTAL RECTAL DAILY PRN
Status: DISCONTINUED | OUTPATIENT
Start: 2025-01-19 | End: 2025-01-21 | Stop reason: HOSPADM

## 2025-01-19 RX ORDER — AMLODIPINE BESYLATE 5 MG/1
2.5 TABLET ORAL DAILY
Status: CANCELLED | OUTPATIENT
Start: 2025-01-19

## 2025-01-19 RX ORDER — SERTRALINE HYDROCHLORIDE 25 MG/1
25 TABLET, FILM COATED ORAL DAILY
Status: DISCONTINUED | OUTPATIENT
Start: 2025-01-19 | End: 2025-01-21 | Stop reason: HOSPADM

## 2025-01-19 RX ADMIN — ROSUVASTATIN 10 MG: 10 TABLET, FILM COATED ORAL at 20:28

## 2025-01-19 RX ADMIN — GABAPENTIN 300 MG: 300 CAPSULE ORAL at 20:28

## 2025-01-19 RX ADMIN — METOPROLOL TARTRATE 12.5 MG: 25 TABLET, FILM COATED ORAL at 20:28

## 2025-01-19 RX ADMIN — HYDROCODONE BITARTRATE AND ACETAMINOPHEN 1 TABLET: 10; 325 TABLET ORAL at 20:29

## 2025-01-19 RX ADMIN — ALBUMIN (HUMAN) 25 G: 0.25 INJECTION, SOLUTION INTRAVENOUS at 14:09

## 2025-01-19 RX ADMIN — LEVOTHYROXINE SODIUM 88 MCG: 0.09 TABLET ORAL at 20:28

## 2025-01-19 RX ADMIN — SERTRALINE HYDROCHLORIDE 25 MG: 25 TABLET ORAL at 20:28

## 2025-01-19 RX ADMIN — Medication 10 ML: at 20:29

## 2025-01-19 RX ADMIN — Medication 10 ML: at 09:28

## 2025-01-19 RX ADMIN — FOLIC ACID 1 MG: 1 TABLET ORAL at 20:29

## 2025-01-19 RX ADMIN — BUMETANIDE 2 MG: 0.25 INJECTION INTRAMUSCULAR; INTRAVENOUS at 05:35

## 2025-01-19 RX ADMIN — PANTOPRAZOLE SODIUM 40 MG: 40 TABLET, DELAYED RELEASE ORAL at 20:28

## 2025-01-19 NOTE — PLAN OF CARE
Goal Outcome Evaluation:         Patient resting in bed, VSS. NPO at midnight for lap barbara in the morning. Plan of care ongoing.

## 2025-01-19 NOTE — ED PROVIDER NOTES
Subjective   History of Present Illness  Patient is a 69-year-old female who was here on January 16, felt likely to have gallbladder disease, found to be anemic, blood transfusion was recommended but the patient was afraid to do it at that time.  She presents now complaining that she has had worsening swelling and bilateral lower extremity edema since that ER visit, and she has now changed her mind and wants to receive the blood transfusion.  She denies fever, chills, chest pain, hematemesis, hematochezia or melena, hematuria, vaginal bleeding or any other sort of bleeding.  She has been having some right upper quadrant abdominal pain intermittently with nausea and vomiting.  She does relate a history of coronary artery disease, congestive heart failure, chronic kidney disease.  She states that she required hemodialysis for 8 months but was recently able to come off of the dialysis.      Review of Systems   All other systems reviewed and are negative.      Past Medical History:   Diagnosis Date    Anxiety     Arthritis     Coronary artery disease     H/O heart artery stent     Hyperlipidemia     Hypertension     Obesity        No Known Allergies    Past Surgical History:   Procedure Laterality Date    ANKLE SURGERY      RIGHT    APPENDECTOMY      CARDIAC CATHETERIZATION      LEFT    CORONARY STENT PLACEMENT      HYSTERECTOMY      INSERTION HEMODIALYSIS CATHETER Right 10/19/2023    Procedure: HEMODIALYSIS CATHETER INSERTION;  Surgeon: Bartolome Schwartz MD;  Location: [facility];  Service: General;  Laterality: Right;       Family History   Problem Relation Age of Onset    Heart disease Mother     Heart attack Mother 73    Fibromyalgia Mother     Heart attack Father 72    Ovarian cancer Sister     Coronary artery disease Other     Breast cancer Neg Hx        Social History     Socioeconomic History    Marital status:    Tobacco Use    Smoking status: Every Day     Current packs/day: 0.50     Average  packs/day: 0.5 packs/day for 30.0 years (15.0 ttl pk-yrs)     Types: Electronic Cigarette, Cigarettes     Passive exposure: Never    Smokeless tobacco: Never   Vaping Use    Vaping status: Never Used   Substance and Sexual Activity    Alcohol use: No    Drug use: No    Sexual activity: Never           Objective   Physical Exam  Vitals and nursing note reviewed.   Constitutional:       Appearance: Normal appearance. She is well-developed. She is not toxic-appearing or diaphoretic.   HENT:      Head: Normocephalic and atraumatic.   Eyes:      General: No scleral icterus.     Pupils: Pupils are equal, round, and reactive to light.   Neck:      Trachea: No tracheal deviation.   Cardiovascular:      Rate and Rhythm: Normal rate and regular rhythm.   Pulmonary:      Effort: No respiratory distress.      Breath sounds: Normal breath sounds.      Comments: When I entered the room the patient is up and ambulating about.  She is tachypneic and appears mildly dyspneic, but lungs are clear to auscultation throughout.  Chest:      Chest wall: No tenderness.   Abdominal:      General: Bowel sounds are normal.      Palpations: Abdomen is soft.      Tenderness: There is no abdominal tenderness. There is no guarding or rebound.   Musculoskeletal:         General: No tenderness. Normal range of motion.      Cervical back: Normal range of motion and neck supple. No rigidity or tenderness.      Right lower leg: No tenderness. Edema present.      Left lower leg: No tenderness. Edema present.   Skin:     General: Skin is warm and dry.      Capillary Refill: Capillary refill takes less than 2 seconds.      Coloration: Skin is pale.   Neurological:      General: No focal deficit present.      Mental Status: She is alert and oriented to person, place, and time.      GCS: GCS eye subscore is 4. GCS verbal subscore is 5. GCS motor subscore is 6.      Motor: No abnormal muscle tone.   Psychiatric:         Mood and Affect: Mood normal.          Behavior: Behavior normal.         Procedures  XR Chest 2 View   Final Result       Enlarged heart size   Central pulmonary vascular congestion.   Mild edema.   No pleural effusion.   No pneumothorax.   No fracture or foreign body.       This report was finalized on 1/19/2025 2:33 AM by Ralf Anglin MD.            Results for orders placed or performed during the hospital encounter of 01/19/25   ECG 12 Lead ED Triage Standing Order; SOA    Collection Time: 01/19/25 12:19 AM   Result Value Ref Range    QT Interval 386 ms    QTC Interval 456 ms   COVID-19 and FLU A/B PCR, 1 HR TAT - Swab, Nasopharynx    Collection Time: 01/19/25  2:47 AM    Specimen: Nasopharynx; Swab   Result Value Ref Range    COVID19 Not Detected Not Detected - Ref. Range    Influenza A PCR Not Detected Not Detected    Influenza B PCR Not Detected Not Detected   Comprehensive Metabolic Panel    Collection Time: 01/19/25  2:47 AM    Specimen: Arm, Left; Blood   Result Value Ref Range    Glucose 107 (H) 65 - 99 mg/dL    BUN 35 (H) 8 - 23 mg/dL    Creatinine 2.12 (H) 0.57 - 1.00 mg/dL    Sodium 133 (L) 136 - 145 mmol/L    Potassium 4.2 3.5 - 5.2 mmol/L    Chloride 98 98 - 107 mmol/L    CO2 25.4 22.0 - 29.0 mmol/L    Calcium 9.0 8.6 - 10.5 mg/dL    Total Protein 7.4 6.0 - 8.5 g/dL    Albumin 3.6 3.5 - 5.2 g/dL    ALT (SGPT) <5 1 - 33 U/L    AST (SGOT) 15 1 - 32 U/L    Alkaline Phosphatase 135 (H) 39 - 117 U/L    Total Bilirubin 0.3 0.0 - 1.2 mg/dL    Globulin 3.8 gm/dL    A/G Ratio 0.9 g/dL    BUN/Creatinine Ratio 16.5 7.0 - 25.0    Anion Gap 9.6 5.0 - 15.0 mmol/L    eGFR 25.1 (L) >60.0 mL/min/1.73   BNP    Collection Time: 01/19/25  2:47 AM    Specimen: Arm, Left; Blood   Result Value Ref Range    proBNP 15,776.0 (H) 0.0 - 900.0 pg/mL   High Sensitivity Troponin T    Collection Time: 01/19/25  2:47 AM    Specimen: Arm, Left; Blood   Result Value Ref Range    HS Troponin T 32 (H) <14 ng/L   CBC Auto Differential    Collection Time: 01/19/25  2:47 AM     Specimen: Arm, Left; Blood   Result Value Ref Range    WBC 8.73 3.40 - 10.80 10*3/mm3    RBC 3.19 (L) 3.77 - 5.28 10*6/mm3    Hemoglobin 6.9 (C) 12.0 - 15.9 g/dL    Hematocrit 24.1 (L) 34.0 - 46.6 %    MCV 75.5 (L) 79.0 - 97.0 fL    MCH 21.6 (L) 26.6 - 33.0 pg    MCHC 28.6 (L) 31.5 - 35.7 g/dL    RDW 17.3 (H) 12.3 - 15.4 %    RDW-SD 46.9 37.0 - 54.0 fl    MPV 9.7 6.0 - 12.0 fL    Platelets 532 (H) 140 - 450 10*3/mm3    Neutrophil % 60.3 42.7 - 76.0 %    Lymphocyte % 21.0 19.6 - 45.3 %    Monocyte % 9.7 5.0 - 12.0 %    Eosinophil % 7.6 (H) 0.3 - 6.2 %    Basophil % 1.1 0.0 - 1.5 %    Immature Grans % 0.3 0.0 - 0.5 %    Neutrophils, Absolute 5.26 1.70 - 7.00 10*3/mm3    Lymphocytes, Absolute 1.83 0.70 - 3.10 10*3/mm3    Monocytes, Absolute 0.85 0.10 - 0.90 10*3/mm3    Eosinophils, Absolute 0.66 (H) 0.00 - 0.40 10*3/mm3    Basophils, Absolute 0.10 0.00 - 0.20 10*3/mm3    Immature Grans, Absolute 0.03 0.00 - 0.05 10*3/mm3    nRBC 0.0 0.0 - 0.2 /100 WBC   Lipase    Collection Time: 01/19/25  2:47 AM    Specimen: Arm, Left; Blood   Result Value Ref Range    Lipase 40 13 - 60 U/L   Scan Slide    Collection Time: 01/19/25  2:47 AM    Specimen: Arm, Left; Blood   Result Value Ref Range    Anisocytosis Slight/1+ None Seen    Hypochromia Large/3+ None Seen    Macrocytes Mod/2+ None Seen    Polychromasia Slight/1+ None Seen    Stomatocytes Slight/1+ None Seen    Target Cells Slight/1+ None Seen    Platelet Morphology Normal Normal   Green Top (Gel)    Collection Time: 01/19/25  2:47 AM   Result Value Ref Range    Extra Tube Hold for add-ons.    Lavender Top    Collection Time: 01/19/25  2:47 AM   Result Value Ref Range    Extra Tube hold for add-on    Gold Top - SST    Collection Time: 01/19/25  2:47 AM   Result Value Ref Range    Extra Tube Hold for add-ons.    Light Blue Top    Collection Time: 01/19/25  2:47 AM   Result Value Ref Range    Extra Tube Hold for add-ons.    High Sensitivity Troponin T 1Hr    Collection Time:  01/19/25  4:54 AM    Specimen: Arm, Left; Blood   Result Value Ref Range    HS Troponin T 31 (H) <14 ng/L    Troponin T Numeric Delta -1 ng/L    Troponin T % Delta -3 Abnormal if >/= 20%   Type & Screen    Collection Time: 01/19/25  4:54 AM    Specimen: Arm, Left; Blood   Result Value Ref Range    ABO Type O     RH type Positive     Antibody Screen Negative     T&S Expiration Date 1/22/2025 11:59:59 PM    ABO RH Specimen Verification    Collection Time: 01/19/25  5:17 AM    Specimen: Blood   Result Value Ref Range    ABO Type O     RH type Positive    Prepare RBC, 1 Units    Collection Time: 01/19/25  5:44 AM   Result Value Ref Range    Product Code R5824S40     Unit Number [id number]     UNIT  ABO O     UNIT  RH POS     Crossmatch Interpretation Compatible     Dispense Status XM     Blood Expiration Date [id number]     Blood Type Barcode 5100                 ED Course  ED Course as of 01/19/25 0551   Sun Jan 19, 2025   0209 EKG shows sinus rhythm, rate 84.  TX interval 138, QRS duration 78, QTc 456 ms.  Some baseline artifact.  Occasional ectopic.  Nonspecific ST-T changes.  No evidence for STEMI.  Electronically signed by Victor Hugo Phelps MD, 01/19/25, 2:10 AM EST.   [CM]   0303 Hospitalist paged for admission. [CM]   3630 Case discussed with Dr. Westfall.  He is admitting patient to the hospitalist service. [CM]      ED Course User Index  [CM] Victor Hugo Phelps MD                                                       Medical Decision Making      Final diagnoses:   Acute on chronic anemia   Dyspnea, unspecified type       ED Disposition  ED Disposition       ED Disposition   Decision to Admit    Condition   --    Comment   --               Please note that portions of this note were completed with a voice recognition program.                Victor Hugo Phelps MD  01/19/25 0551

## 2025-01-19 NOTE — H&P
[facility] Medicine Services  History & Physical    Patient Identification:  Name:  Lesley Michel  Age:  69 y.o.  Sex:  female  :  1955  MRN:  [id number]   Visit Number:  [id number]  Admit Date: 2025   Primary Care Physician:  Woodrow Raymond APRN    Subjective     Chief complaint: shortness of breath     History of presenting illness:      Lesley Michel is a 69 y.o. female who presented for further evaluation of abdominal pain. She was seen and examined in the ED with no family present at the bedside. She reports she has had persisting abdominal pain since recent ED visit. She called for a surgery appointment but has not been scheduled yet. She complains of sharp, stabbing RUQ pain which is worse after eating. She is having associated nausea and vomiting dark green fluid. She denies fevers.   On further discussion she reported worsened shortness of breath from baseline. She is particularly dyspneic on exertion and when trying to lie flat to sleep. She further complains of increased lower extremity edema from baseline. She is prescribed a diuretic by her nephrologist, she is unsure particular drug name, which she is supposed to take every other day- she only takes this about once a week. She is also prescribed aspirin and eliquis. Her last dose of eliquis was over a week ago because she cannot afford it. She took 2 81 mg aspirin yesterday. She does report relatively new onset melena- a couple weeks with last episode about two days ago. She denies hemoptysis or hematemesis. She had a colonoscopy and EGD about a year ago which she was told were normal. She does have history of bleeding ulcer and is compliant with her PPI. Further review of systems was negative, see below.     Past medical history is significant for HTN, HLD, CAD, paroxysmal a fib, COPD with chronic respiratory failure (2 L baseline), hypothyroidism, CKD stage IV, migraine headaches, former smoker    Upon  arrival to the ED, vital signs were temp 98.2, HR 86, RR 22, /81, SpO2 95% on 2L.  Laboratory findings included elevated troponin with flat trend, proBNP 15,000.  Kidney function is around baseline.  Hemoglobin is low at 6.9 with hematocrit 24.1.  Baseline hemoglobin appears to be variable but was 9.4 in July 2024.  Chest x-ray concerning for pulmonary vascular congestion and cardiomegaly.    Known Emergency Department medications received prior to my evaluation included Bumex, PRBCs.   Emergency Department Room location at the time of my evaluation was 114.     ---------------------------------------------------------------------------------------------------------------------   Review of Systems   Constitutional:  Positive for activity change, appetite change and fatigue. Negative for chills and fever.   HENT:  Negative for congestion and rhinorrhea.    Respiratory:  Positive for shortness of breath. Negative for cough.    Cardiovascular:  Positive for leg swelling. Negative for chest pain.   Gastrointestinal:  Positive for abdominal pain, blood in stool, nausea and vomiting. Negative for diarrhea.   Genitourinary:  Negative for difficulty urinating and dysuria.   Musculoskeletal:  Negative for arthralgias and myalgias.   Skin:  Negative for rash and wound.   Neurological:  Negative for dizziness, weakness and light-headedness.        ---------------------------------------------------------------------------------------------------------------------   Past Medical History:   Diagnosis Date    Anxiety     Arthritis     Coronary artery disease     H/O heart artery stent     Hyperlipidemia     Hypertension     Obesity      Past Surgical History:   Procedure Laterality Date    ANKLE SURGERY      RIGHT    APPENDECTOMY      CARDIAC CATHETERIZATION      LEFT    CORONARY STENT PLACEMENT      HYSTERECTOMY      INSERTION HEMODIALYSIS CATHETER Right 10/19/2023    Procedure: HEMODIALYSIS CATHETER INSERTION;  Surgeon:  Efren, Bartolome Mills MD;  Location: [facility];  Service: General;  Laterality: Right;     Family History   Problem Relation Age of Onset    Heart disease Mother     Heart attack Mother 73    Fibromyalgia Mother     Heart attack Father 72    Ovarian cancer Sister     Coronary artery disease Other     Breast cancer Neg Hx      Social History     Socioeconomic History    Marital status:    Tobacco Use    Smoking status: Every Day     Current packs/day: 0.50     Average packs/day: 0.5 packs/day for 30.0 years (15.0 ttl pk-yrs)     Types: Electronic Cigarette, Cigarettes     Passive exposure: Never    Smokeless tobacco: Never   Vaping Use    Vaping status: Never Used   Substance and Sexual Activity    Alcohol use: No    Drug use: No    Sexual activity: Never     ---------------------------------------------------------------------------------------------------------------------   Allergies:  Patient has no known allergies.  ---------------------------------------------------------------------------------------------------------------------   Home medications:    Medications below are reported home medications pulling from within the system; at this time, these medications have not been reconciled unless otherwise specified and are in the verification process for further verifcation as current home medications.  (Not in a hospital admission)      Hospital Scheduled Meds:          Current listed hospital scheduled medications may not yet reflect those currently placed in orders that are signed and held awaiting patient's arrival to floor.   ---------------------------------------------------------------------------------------------------------------------     Objective     Vital Signs:  Temp:  [98.1 °F (36.7 °C)-98.4 °F (36.9 °C)] 98.1 °F (36.7 °C)  Heart Rate:  [72-90] 80  Resp:  [18-22] 18  BP: (127-176)/() 127/76      01/19/25  0024   Weight: 82.6 kg (182 lb)     Body mass index is 34.39  kg/m².  ---------------------------------------------------------------------------------------------------------------------       Physical Exam  Vitals and nursing note reviewed.   Constitutional:       General: She is not in acute distress.     Appearance: She is obese.   HENT:      Head: Normocephalic and atraumatic.   Eyes:      Extraocular Movements: Extraocular movements intact.   Cardiovascular:      Rate and Rhythm: Normal rate and regular rhythm.   Pulmonary:      Effort: Pulmonary effort is normal.      Comments: Diminished bilaterally   Abdominal:      Palpations: Abdomen is soft.      Tenderness: There is no abdominal tenderness (No RUQ tenderness).   Musculoskeletal:      Right lower leg: Edema present.      Left lower leg: Edema present.      Comments: 2-3+   Skin:     General: Skin is warm and dry.   Neurological:      Mental Status: She is alert. Mental status is at baseline.   Psychiatric:         Mood and Affect: Mood normal.         Behavior: Behavior normal.             ---------------------------------------------------------------------------------------------------------------------  EKG:        I have personally looked at the EKG.  ---------------------------------------------------------------------------------------------------------------------   Results from last 7 days   Lab Units 01/19/25  0247 01/16/25  1501   WBC 10*3/mm3 8.73 9.37   HEMOGLOBIN g/dL 6.9* 7.1*   HEMATOCRIT % 24.1* 25.0*   MCV fL 75.5* 76.5*   MCHC g/dL 28.6* 28.4*   PLATELETS 10*3/mm3 532* 546*     Results from last 7 days   Lab Units 01/16/25  1527   PH, ARTERIAL pH units 7.430   PO2 ART mm Hg 57.0*   PCO2, ARTERIAL mm Hg 38.8   HCO3 ART mmol/L 25.7     Results from last 7 days   Lab Units 01/19/25  0247 01/16/25  1501   SODIUM mmol/L 133* 129*   POTASSIUM mmol/L 4.2 4.0   CHLORIDE mmol/L 98 95*   CO2 mmol/L 25.4 23.3   BUN mg/dL 35* 28*   CREATININE mg/dL 2.12* 1.96*   CALCIUM mg/dL 9.0 9.2   GLUCOSE mg/dL 107* 117*  "  ALBUMIN g/dL 3.6 3.8   BILIRUBIN mg/dL 0.3 0.4   ALK PHOS U/L 135* 123*   AST (SGOT) U/L 15 18   ALT (SGPT) U/L <5 6   Estimated Creatinine Clearance: 25.1 mL/min (A) (by C-G formula based on SCr of 2.12 mg/dL (H)).  No results found for: \"AMMONIA\"  Results from last 7 days   Lab Units 01/19/25  0454 01/19/25  0247 01/16/25  1615   HSTROP T ng/L 31* 32* 30*     Results from last 7 days   Lab Units 01/19/25  0247   PROBNP pg/mL 15,776.0*     Lab Results   Component Value Date    HGBA1C 5.30 10/08/2023     Lab Results   Component Value Date    TSH 6.770 (H) 10/08/2023    FREET4 1.01 10/08/2023     No results found for: \"PREGTESTUR\", \"PREGSERUM\", \"HCG\", \"HCGQUANT\"  Pain Management Panel          Latest Ref Rng & Units 10/9/2023   Pain Management Panel   Creatinine, Urine mg/dL 116.0       Details                 No results found for: \"BLOODCX\"  No results found for: \"URINECX\"  No results found for: \"WOUNDCX\"  No results found for: \"STOOLCX\"      ---------------------------------------------------------------------------------------------------------------------  Imaging Results (Last 7 Days)       Procedure Component Value Units Date/Time    XR Chest 2 View [[id number]] Collected: 01/19/25 0232     Updated: 01/19/25 0235    Narrative:      PROCEDURE: Portable chest x-ray examination performed on January 19, 2025. Single view.     HISTORY: Shortness of breath. Difficulty breathing.     COMPARISON: None.     FINDINGS:     Enlarged heart size.  Central pulmonary vascular congestion with edema.  Mild edema.  No pleural effusion.  No pneumothorax  No fracture or foreign body.  No free air in the upper abdomen.       Impression:         Enlarged heart size  Central pulmonary vascular congestion.  Mild edema.  No pleural effusion.  No pneumothorax.  No fracture or foreign body.     This report was finalized on 1/19/2025 2:33 AM by Ralf Anglin MD.               Cultures:  No results found for: \"BLOODCX\", \"URINECX\", " "\"WOUNDCX\", \"MRSACX\", \"RESPCX\", \"STOOLCX\"    Last echocardiogram:  Results for orders placed during the hospital encounter of 10/08/23    Adult Transthoracic Echo Limited W/ Cont if Necessary Per Protocol    Interpretation Summary    Left ventricular systolic function is hyperdynamic (EF > 70%). Left ventricular ejection fraction appears to be greater than 70%.    Left ventricular diastolic function was not assessed.    The left atrial cavity is mildly dilated.    The right atrial cavity is borderline dilated.    There is a small (<1cm) pericardial effusion.    This is a technical limited study.          I have personally reviewed the above radiology images and read the final radiology report on 01/19/25  ---------------------------------------------------------------------------------------------------------------------  Assessment / Plan     Active Hospital Problems    Diagnosis  POA    **Acute on chronic anemia [D64.9]  Yes       ASSESSMENT/PLAN:    Acute on chronic microcytic anemia  Reported melena, hx PUD  Concern for acute CHF  Complicated by CKD stage IV and atrial fibrillation on chronic anticoagulation  Cholelithiasis  Patient was recently seen in this ED complaining of right upper quadrant pain felt to have gallbladder disease at that time and advised to follow-up closely with general surgery.  Also notably anemic at that time but refused blood transfusion.  Returns to care today complaining of persisting abdominal pain, shortness of breath, swelling. On ROS reported recent melena as well with history of PUD. Last dose eliquis over a week ago.   O2 sats on arrival mid 90s on 2 L per nasal cannula.  Hemoglobin at 6.9 with hematocrit 24.1.    Her proBNP was 15,000 and chest x-ray is concerning for pulmonary congestion, edema and cardiomegaly.  Renal function is around recent baseline.  She received Bumex and PRBCs in the ED  Will admit to the telemetry unit for further workup and management  In the setting " of anemia with reported melena and hx PUD and recent concerns for gallbladder disease will consult general surgery for further assistance and recommendations, appreciated  Will continue to monitor clinical volume status closely.  Daily weights, strict I's and O's.  Follow-up response to Bumex given and continue to diurese as clinically indicated.  Closely monitor renal function and avoid other nephrotoxins as able.  Will consult nephrology for further assistance and recommendations.  Will update TTE  Support status closely-titrate supplemental O2 as needed  She will remain n.p.o. for now pending surgery evaluation.  Repeat labs to trend closely  Hold anticoagulation, antiplatelet medications for now    Chronic:  HTN  HLD  Hypothyroidism  CAD  COPD with chronic respiratory failure  PVD  Plan to continue home medications as indicated once med rec is complete  Will monitor vital signs closely    ----------  -DVT prophylaxis: SCDs  -Activity: Ad denise.  -Expected length of stay: INPATIENT status due to the need for care which can only be reasonably provided in an hospital setting such as aggressive/expedited ancillary services and/or consultation services, the necessity for IV medications, close physician monitoring and/or the possible need for procedures.  In such, I feel patient’s risk for adverse outcomes and need for care warrant INPATIENT evaluation and predict the patient’s care encounter to likely last beyond 2 midnights.   -Disposition Pending further clinical course and surgery recommendations.    High risk secondary to acute on chronic anemia, concern for acute CHF    Code Status and Medical Interventions: CPR (Attempt to Resuscitate); Full Support   Ordered at: 01/19/25 0551     Code Status (Patient has no pulse and is not breathing):    CPR (Attempt to Resuscitate)     Medical Interventions (Patient has pulse or is breathing):    Full Support       Wally Lopez PA-C   01/19/25  07:02 EST

## 2025-01-19 NOTE — CONSULTS
Patient Name:  Lesley Michel  YOB: 1955  [id number]       Patient Care Team:  Woodrow Raymond APRN as PCP - General (Family Medicine)      General Surgery Consult Note     Date of Consultation: 01/19/25    Consulting Physician : Fanny Ford MD    Reason for Consult : cholecystitis     Subjective     I have been asked to see  Lesley Michel , a 69 y.o. female in consultation for acute cholecystitis. Patient reports persistent right upper quadrant abdominal pain that occurs after eating. She had presented to the emergency department once before with similar complaints. Ultrasound on this admission shows a thickened gallbladder wall.     Allergy:   Allergies   Allergen Reactions    Xanax [Alprazolam] Other (See Comments)     Severe agitation per patient and family       Medications:  sodium chloride, 10 mL, Intravenous, Q12H         No current facility-administered medications on file prior to encounter.     Current Outpatient Medications on File Prior to Encounter   Medication Sig    albuterol sulfate  (90 Base) MCG/ACT inhaler Inhale 2 puffs Every 4 (Four) Hours As Needed for Wheezing.    amLODIPine (NORVASC) 2.5 MG tablet Take 1 tablet by mouth Daily.    aspirin 81 MG chewable tablet Chew 1 tablet Daily.    chlorthalidone (HYGROTON) 25 MG tablet Take 1 tablet by mouth Daily.    Eliquis 5 MG tablet tablet Take 1 tablet by mouth Every 12 (Twelve) Hours.    folic acid (FOLVITE) 1 MG tablet Take 1 tablet by mouth Daily.    gabapentin (NEURONTIN) 600 MG tablet Take 0.5 tablets by mouth 3 (Three) Times a Day As Needed (nerve pain).    hydrALAZINE (APRESOLINE) 100 MG tablet Take 1 tablet by mouth 3 (Three) Times a Day.    HYDROcodone-acetaminophen (NORCO)  MG per tablet Take 1 tablet by mouth Every 8 (Eight) Hours As Needed for Moderate Pain.    ipratropium-albuterol (DUO-NEB) 0.5-2.5 mg/3 ml nebulizer Take 3 mL by nebulization Every 4 (Four) Hours As Needed for Wheezing.     levothyroxine (SYNTHROID, LEVOTHROID) 88 MCG tablet Take 1 tablet by mouth Daily.    metoprolol tartrate (LOPRESSOR) 25 MG tablet Take 0.5 tablets by mouth 2 (Two) Times a Day.    ondansetron ODT (ZOFRAN-ODT) 4 MG disintegrating tablet Take 1 tablet by mouth Every 8 (Eight) Hours As Needed for Nausea or Vomiting for up to 3 days.    pantoprazole (PROTONIX) 40 MG EC tablet Take 1 tablet by mouth Daily.    rosuvastatin (CRESTOR) 10 MG tablet Take 1 tablet by mouth Daily.    sertraline (ZOLOFT) 25 MG tablet Take 1 tablet by mouth Daily.    [DISCONTINUED] doxycycline (MONODOX) 100 MG capsule Take 1 capsule by mouth every 12 hours for 5 days as part of COPD Rescue Kit. (Only Start if in YELLOW ZONE.) (Patient not taking: Reported on 6/3/2024)    [DISCONTINUED] gabapentin (NEURONTIN) 800 MG tablet Take 1 tablet by mouth 2 (Two) Times a Day As Needed (nerve pain). (Patient not taking: Reported on 6/3/2024)    [DISCONTINUED] hydrALAZINE (APRESOLINE) 25 MG tablet Take 1 tablet by mouth Every 8 (Eight) Hours for 30 days. (Patient taking differently: Take 4 tablets by mouth 3 (Three) Times a Day.)    [DISCONTINUED] HYDROcodone-acetaminophen (NORCO) 5-325 MG per tablet Take 1 tablet by mouth Every 8 (Eight) Hours As Needed for Severe Pain for up to 3 days.    [DISCONTINUED] ipratropium-albuterol (DUO-NEB) 0.5-2.5 mg/3 ml nebulizer Take 3 mL by neb every 30 minutes as needed for shortness of air for up to 6 doses. Part of COPD Rescue Kit. (Only Start if in YELLOW ZONE.)    [DISCONTINUED] metoprolol tartrate (LOPRESSOR) 25 MG tablet Take 1/2 tablet by mouth 2 (Two) Times a Day for 30 days.    [DISCONTINUED] montelukast (SINGULAIR) 10 MG tablet Take 1 tablet by mouth Every Night.    [DISCONTINUED] naloxone (NARCAN) 4 MG/0.1ML nasal spray Call 911. Don't prime. [place] in 1 nostril for overdose. Repeat in 2-3 minutes in other nostril if no or minimal breathing/responsiveness.    [DISCONTINUED] nitroglycerin (NITROSTAT) 0.4 MG SL  tablet Place 1 tablet under the tongue Every 5 (Five) Minutes As Needed for Chest Pain.    [DISCONTINUED] predniSONE (DELTASONE) 20 MG tablet Take 2 tablets by mouth Daily. Take 2 tablets daily for 5 days as part of COPD Rescue Kit. (Only Start if in YELLOW ZONE.) (Patient not taking: Reported on 6/3/2024)    [DISCONTINUED] rosuvastatin (CRESTOR) 10 MG tablet Take 1 tablet by mouth Every Night. (Patient not taking: Reported on 6/3/2024)    [DISCONTINUED] sertraline (ZOLOFT) 25 MG tablet Take 1 tablet by mouth Daily for 30 days.    [DISCONTINUED] sodium bicarbonate 650 MG tablet Take 1 tablet by mouth 2 (Two) Times a Day.    [DISCONTINUED] vitamin B-12 (CYANOCOBALAMIN) 1000 MCG tablet Take 1 tablet by mouth Daily. (Patient not taking: Reported on 6/3/2024)       PMHx:   Past Medical History:   Diagnosis Date    Anxiety     Arthritis     Cigarette smoker 02/10/2022    CKD (chronic kidney disease) stage 4, GFR 15-29 ml/min     MAICO on top of CKD IIIa, HD from 10/23-4/24, some recovery to CKD IV    COPD (chronic obstructive pulmonary disease)     Coronary artery disease     H/O heart artery stent     Hyperlipidemia     Hypertension     hypothyroidism     Obesity     PAF (paroxysmal atrial fibrillation)     PVD (peripheral vascular disease)        Past Surgical History:  Noncontributory     Family History: Noncontributory     Social History:  Noncontributory      Review of Systems   Pertinent items are noted in HPI     Constitutional: No fevers, chills or malaise   Eyes: Denies visual changes    Cardiovascular: Denies chest pain, palpitations   Pulmonary: Denies cough or shortness of breath   Abdominal/ GI: Nontender    Genitourinary: Denies dysuria or hematuria   Musculoskeletal: Denies any but chronic joint aches, pains or deformities   Psychiatric: No recent mood changes   Neurologic: No paresthesias or loss of function          Objective     Physical Exam:      Vital Signs  /77 (BP Location: Right arm, Patient  "Position: Lying)   Pulse 81   Temp 97.7 °F (36.5 °C) (Oral)   Resp 20   Ht 154.9 cm (61\")   Wt 82.6 kg (182 lb)   SpO2 96%   BMI 34.39 kg/m²     Intake/Output Summary (Last 24 hours) at 1/19/2025 1615  Last data filed at 1/19/2025 1613  Gross per 24 hour   Intake 709.75 ml   Output 800 ml   Net -90.25 ml         Physical Exam:    Head: Normocephalic, atraumatic.   Eyes: Pupils equal, round, react to light   Mouth: No lesions noted  CV: Regular rate and rhythm   Lungs: Bilateral chest rise and fall, no use of accessory muscles   Abdomen: Soft, nontender, nondistended  Extremities:  No cyanosis, clubbing or edema bilaterally   Neurologic: No gross deficits      Assessment and Plan:    Problem List Items Addressed This Visit          Hematology and Neoplasia    * (Principal) Acute on chronic anemia - Primary     Other Visit Diagnoses       Dyspnea, unspecified type                 Active Hospital Problems    Diagnosis  POA    **Acute on chronic anemia [D64.9]  Yes      Resolved Hospital Problems    Diagnosis Date Resolved POA    A-fib [I48.91] 01/19/2025 Unknown      Ms Michel is a 68 yo F with acute cholecystitis. Will proceed to the operating room tomorrow for cholecystectomy.       Windy Neal MD  01/19/25  16:15 EST        "

## 2025-01-19 NOTE — CONSULTS
NEPHROLOGY CONSULT NOTE      REASON FOR CONSULTATION: Elevated creatinine    CHIEF COMPLAINT: Shortness of breath, bilateral lower extremity swelling    HISTORY OF PRESENTING ILLNESS:  Lesley Michel is a 69 y.o. female who presented to [facility] emergency department with chief complaint of not feeling well, generalized weakness fatigability shortness of breath and bilateral lower extremity swelling.  Patient stated her shortness of breath has been progressively worsening for the past 4 5 days, stated it has been associated with worsening symptoms on minimal exertion.  Patient denied any associated chest pain.  Patient stated she has been eating a lot of high salt food.  Patient has been taking diuretics but not every day.  Patient denied  Any chronic NSAIDS use. Patient denies hematuria, dysuria, difficulty passing urine. No prior history of renal stones. No family history of renal disease    History  Past Medical History:   Diagnosis Date    Anxiety     Arthritis     Cigarette smoker 02/10/2022    CKD (chronic kidney disease) stage 4, GFR 15-29 ml/min     MAICO on top of CKD IIIa, HD from 10/23-4/24, some recovery to CKD IV    COPD (chronic obstructive pulmonary disease)     Coronary artery disease     H/O heart artery stent     Hyperlipidemia     Hypertension     hypothyroidism     Obesity     PAF (paroxysmal atrial fibrillation)     PVD (peripheral vascular disease)    ,   Past Surgical History:   Procedure Laterality Date    ANKLE SURGERY      RIGHT    APPENDECTOMY      CARDIAC CATHETERIZATION      LEFT    CORONARY STENT PLACEMENT      HYSTERECTOMY      INSERTION HEMODIALYSIS CATHETER Right 10/19/2023    Procedure: HEMODIALYSIS CATHETER INSERTION;  Surgeon: Bartolome Schwartz MD;  Location: [facility];  Service: General;  Laterality: Right;   ,   Family History   Problem Relation Age of Onset    Heart disease Mother     Heart attack Mother 73    Fibromyalgia Mother     Heart attack Father 72     Ovarian cancer Sister     Coronary artery disease Other     Breast cancer Neg Hx    ,   Social History     Tobacco Use    Smoking status: Every Day     Current packs/day: 0.50     Average packs/day: 0.5 packs/day for 30.0 years (15.0 ttl pk-yrs)     Types: Electronic Cigarette, Cigarettes     Passive exposure: Never    Smokeless tobacco: Never   Vaping Use    Vaping status: Never Used   Substance Use Topics    Alcohol use: No    Drug use: No   ,   Medications Prior to Admission   Medication Sig Dispense Refill Last Dose/Taking    albuterol sulfate  (90 Base) MCG/ACT inhaler Inhale 2 puffs Every 4 (Four) Hours As Needed for Wheezing. 18 g 0 Past Month    amLODIPine (NORVASC) 2.5 MG tablet Take 1 tablet by mouth Daily.   1/18/2025    aspirin 81 MG chewable tablet Chew 1 tablet Daily.   1/18/2025 Morning    chlorthalidone (HYGROTON) 25 MG tablet Take 1 tablet by mouth Daily.   1/18/2025    Eliquis 5 MG tablet tablet Take 1 tablet by mouth Every 12 (Twelve) Hours.   1/18/2025    folic acid (FOLVITE) 1 MG tablet Take 1 tablet by mouth Daily.   1/18/2025    gabapentin (NEURONTIN) 600 MG tablet Take 0.5 tablets by mouth 3 (Three) Times a Day As Needed (nerve pain).   1/18/2025    hydrALAZINE (APRESOLINE) 100 MG tablet Take 1 tablet by mouth 3 (Three) Times a Day.   1/18/2025    HYDROcodone-acetaminophen (NORCO)  MG per tablet Take 1 tablet by mouth Every 8 (Eight) Hours As Needed for Moderate Pain.   1/18/2025    ipratropium-albuterol (DUO-NEB) 0.5-2.5 mg/3 ml nebulizer Take 3 mL by nebulization Every 4 (Four) Hours As Needed for Wheezing. 90 mL 1 Past Month    levothyroxine (SYNTHROID, LEVOTHROID) 88 MCG tablet Take 1 tablet by mouth Daily.   1/18/2025    metoprolol tartrate (LOPRESSOR) 25 MG tablet Take 0.5 tablets by mouth 2 (Two) Times a Day.   1/18/2025    ondansetron ODT (ZOFRAN-ODT) 4 MG disintegrating tablet Take 1 tablet by mouth Every 8 (Eight) Hours As Needed for Nausea or Vomiting for up to 3 days.  9 tablet 0 Past Week    pantoprazole (PROTONIX) 40 MG EC tablet Take 1 tablet by mouth Daily.   1/18/2025    rosuvastatin (CRESTOR) 10 MG tablet Take 1 tablet by mouth Daily.   1/18/2025    sertraline (ZOLOFT) 25 MG tablet Take 1 tablet by mouth Daily.   1/18/2025   , Scheduled Meds:  sodium chloride, 10 mL, Intravenous, Q12H    , Continuous Infusions:   , PRN Meds:    senna-docusate sodium **AND** polyethylene glycol **AND** bisacodyl **AND** bisacodyl    nitroglycerin    sodium chloride    sodium chloride    sodium chloride and Allergies:  Xanax [alprazolam]    REVIEW OF SYSTEMS  More than 10 point review of systems was done. Pertinent items are noted in HPI, all other systems reviewed and negative    Objective     Intake/Output                   01/19/25 0701 - 01/20/25 0700     [id number] Total              Intake    P.O.  240  -- 240    I.V.  100  -- 100    Blood  369.8  -- 369.8    Volume (Transfuse RBC Infuse Each Unit Over: 2H) 369.8 -- 369.8    Total Intake 709.8 -- 709.8       Output    Urine  800  -- 800    Total Output 800 -- 800             VITAL SIGNS  Temp:  [97.6 °F (36.4 °C)-98.4 °F (36.9 °C)] 97.7 °F (36.5 °C)  Heart Rate:  [72-90] 81  Resp:  [18-22] 20  BP: (127-176)/() 159/77    PHYSICAL EXAMINATION:    GENERAL EXAM  Laying in bed     MENTAL STATUS EXAM  Alert and oriented, able to answer questions    NECK EXAM  JVP is not elevated, carotid exam normal    CARDIOVASCULAR EXAM  Regular rate and rhythm, no murmurs noted, no added heart sounds, normal apical pulsation  2+ bilateral lower extremity edema  2+ radial pulses bilateral, 2+ femoral/tibial pulses bilaterally    MUSCULOSKELETAL EXAM  No cyanosis or clubbing noted in the digits    RESPIRATORY EXAM  Bilateral decreased intensity of breath sounds    ABDOMINAL EXAM  Abdomen soft and non tender, normal bowel sounds    SKIN EXAM  No new rashes    CLINICAL DATA REVIEW :    CBC, Renal functions, Serum electrolytes, Acid base labs  "reviewed  12 lead EKG was reviewed and demonstrated sinus rhythm at a rate of 90 2  Independent review of CXR and demonstrated findings consistent with; minimal bilateral pulmonary congestion   Discussed the labs with Dr. Ford       WBC WBC   Date Value Ref Range Status   01/19/2025 8.42 3.40 - 10.80 10*3/mm3 Final   01/19/2025 8.73 3.40 - 10.80 10*3/mm3 Final      HGB Hemoglobin   Date Value Ref Range Status   01/19/2025 8.1 (L) 12.0 - 15.9 g/dL Final   01/19/2025 8.5 (L) 12.0 - 15.9 g/dL Final   01/19/2025 8.4 (L) 12.0 - 15.9 g/dL Final   01/19/2025 6.9 (C) 12.0 - 15.9 g/dL Final      HCT Hematocrit   Date Value Ref Range Status   01/19/2025 27.3 (L) 34.0 - 46.6 % Final   01/19/2025 30.5 (L) 34.0 - 46.6 % Final   01/19/2025 28.9 (L) 34.0 - 46.6 % Final   01/19/2025 24.1 (L) 34.0 - 46.6 % Final      Platlets No results found for: \"LABPLAT\"   MCV MCV   Date Value Ref Range Status   01/19/2025 78.4 (L) 79.0 - 97.0 fL Final   01/19/2025 75.5 (L) 79.0 - 97.0 fL Final          Sodium Sodium   Date Value Ref Range Status   01/19/2025 133 (L) 136 - 145 mmol/L Final   01/19/2025 133 (L) 136 - 145 mmol/L Final      Potassium Potassium   Date Value Ref Range Status   01/19/2025 3.9 3.5 - 5.2 mmol/L Final     Comment:     Slight hemolysis detected by analyzer. Result may be falsely elevated.   01/19/2025 4.2 3.5 - 5.2 mmol/L Final      Chloride Chloride   Date Value Ref Range Status   01/19/2025 98 98 - 107 mmol/L Final   01/19/2025 98 98 - 107 mmol/L Final      CO2 CO2   Date Value Ref Range Status   01/19/2025 20.6 (L) 22.0 - 29.0 mmol/L Final   01/19/2025 25.4 22.0 - 29.0 mmol/L Final      BUN BUN   Date Value Ref Range Status   01/19/2025 33 (H) 8 - 23 mg/dL Final   01/19/2025 35 (H) 8 - 23 mg/dL Final      Creatinine Creatinine   Date Value Ref Range Status   01/19/2025 2.06 (H) 0.57 - 1.00 mg/dL Final   01/19/2025 2.12 (H) 0.57 - 1.00 mg/dL Final      Calcium Calcium   Date Value Ref Range Status   01/19/2025 9.3 8.6 " "- 10.5 mg/dL Final   01/19/2025 9.0 8.6 - 10.5 mg/dL Final      PO4 No results found for: \"CAPO4\"   Albumin Albumin   Date Value Ref Range Status   01/19/2025 3.7 3.5 - 5.2 g/dL Final   01/19/2025 3.6 3.5 - 5.2 g/dL Final      Magnesium No results found for: \"MG\"   Uric Acid No results found for: \"URICACID\"     Radiology results :     Imaging Results (Last 72 Hours)       Procedure Component Value Units Date/Time    XR Chest 2 View [[id number]] Collected: 01/19/25 0232     Updated: 01/19/25 0235    Narrative:      PROCEDURE: Portable chest x-ray examination performed on January 19, 2025. Single view.     HISTORY: Shortness of breath. Difficulty breathing.     COMPARISON: None.     FINDINGS:     Enlarged heart size.  Central pulmonary vascular congestion with edema.  Mild edema.  No pleural effusion.  No pneumothorax  No fracture or foreign body.  No free air in the upper abdomen.       Impression:         Enlarged heart size  Central pulmonary vascular congestion.  Mild edema.  No pleural effusion.  No pneumothorax.  No fracture or foreign body.     This report was finalized on 1/19/2025 2:33 AM by Ralf Anglin MD.                 Medications:      sodium chloride, 10 mL, Intravenous, Q12H           Assessment & Plan       Acute on chronic anemia    Acute cholecystitis    -Acute hypoxic respiratory failure likely multifactorial  - Acute symptomatic on chronic anemia  - Chronic kidney disease stage IIIb  - History of dialysis  - Acute metabolic acidosis, normal anion gap  - Acute hyponatremia    Acute hypoxic respiratory failure likely multifactorial including acute symptomatic anemia mild fluid overload likely also been contributing  Patient's baseline creatinine appears to be around 2, admitted with 2  Mild hyponatremia likely due to hypervolemia  -Patient received Bumex 2 mg, will hold further dosing  - IV albumin 1 dose today  - Will get iron panel      Continue strict intake and output record  Please avoid any " nephrotoxic agents, hypotension and adjust medications according to estimated GFR    REVIEWED NOTES OF CLINICAL TEAMS INVOLVED WITH PATIENT CARE.     DISCUSSED IN DETAIL WITH PATIENT AND/OR FAMILY ABOUT CURRENT CLINICAL CONDITIONS    DISCUSSED IN DETAIL WITH PATIENT AND/OR FAMILY ABOUT RISKS ASSOCIATED WITH CURRENT CLINICAL CONDITION AND RISK INVOLVED WITH CURRENT MANAGEMENT.     CODE STATUS REVIEWED.    DISCUSSED IN DETAIL WITH HOSPITALIST    Vani León MD  01/19/25  18:25 EST

## 2025-01-20 ENCOUNTER — ANESTHESIA EVENT (OUTPATIENT)
Dept: PERIOP | Facility: HOSPITAL | Age: 70
End: 2025-01-20
Payer: MEDICARE

## 2025-01-20 ENCOUNTER — APPOINTMENT (OUTPATIENT)
Dept: GENERAL RADIOLOGY | Facility: HOSPITAL | Age: 70
DRG: 417 | End: 2025-01-20
Payer: MEDICARE

## 2025-01-20 ENCOUNTER — ANESTHESIA (OUTPATIENT)
Dept: PERIOP | Facility: HOSPITAL | Age: 70
End: 2025-01-20
Payer: MEDICARE

## 2025-01-20 PROBLEM — K81.0 ACUTE CHOLECYSTITIS: Status: RESOLVED | Noted: 2025-01-19 | Resolved: 2025-01-20

## 2025-01-20 LAB
ALBUMIN SERPL-MCNC: 3.7 G/DL (ref 3.5–5.2)
ALBUMIN/GLOB SERPL: 1.2 G/DL
ALP SERPL-CCNC: 106 U/L (ref 39–117)
ALT SERPL W P-5'-P-CCNC: 7 U/L (ref 1–33)
ANION GAP SERPL CALCULATED.3IONS-SCNC: 10.9 MMOL/L (ref 5–15)
AST SERPL-CCNC: 20 U/L (ref 1–32)
BASOPHILS # BLD AUTO: 0.1 10*3/MM3 (ref 0–0.2)
BASOPHILS NFR BLD AUTO: 1.5 % (ref 0–1.5)
BH BB BLOOD EXPIRATION DATE: NORMAL
BH BB BLOOD TYPE BARCODE: 5100
BH BB DISPENSE STATUS: NORMAL
BH BB PRODUCT CODE: NORMAL
BH BB UNIT NUMBER: NORMAL
BILIRUB SERPL-MCNC: 0.6 MG/DL (ref 0–1.2)
BUN SERPL-MCNC: 31 MG/DL (ref 8–23)
BUN/CREAT SERPL: 15.1 (ref 7–25)
CALCIUM SPEC-SCNC: 9.1 MG/DL (ref 8.6–10.5)
CHLORIDE SERPL-SCNC: 98 MMOL/L (ref 98–107)
CO2 SERPL-SCNC: 24.1 MMOL/L (ref 22–29)
CREAT SERPL-MCNC: 2.05 MG/DL (ref 0.57–1)
CROSSMATCH INTERPRETATION: NORMAL
DEPRECATED RDW RBC AUTO: 48.7 FL (ref 37–54)
EGFRCR SERPLBLD CKD-EPI 2021: 26.1 ML/MIN/1.73
EOSINOPHIL # BLD AUTO: 0.6 10*3/MM3 (ref 0–0.4)
EOSINOPHIL NFR BLD AUTO: 9 % (ref 0.3–6.2)
ERYTHROCYTE [DISTWIDTH] IN BLOOD BY AUTOMATED COUNT: 17.7 % (ref 12.3–15.4)
FOLATE SERPL-MCNC: >20 NG/ML (ref 4.78–24.2)
GLOBULIN UR ELPH-MCNC: 3 GM/DL
GLUCOSE BLDC GLUCOMTR-MCNC: 103 MG/DL (ref 70–130)
GLUCOSE BLDC GLUCOMTR-MCNC: 107 MG/DL (ref 70–130)
GLUCOSE SERPL-MCNC: 103 MG/DL (ref 65–99)
HCT VFR BLD AUTO: 26.8 % (ref 34–46.6)
HGB BLD-MCNC: 7.8 G/DL (ref 12–15.9)
IMM GRANULOCYTES # BLD AUTO: 0.03 10*3/MM3 (ref 0–0.05)
IMM GRANULOCYTES NFR BLD AUTO: 0.4 % (ref 0–0.5)
IRON 24H UR-MRATE: 20 MCG/DL (ref 37–145)
IRON SATN MFR SERPL: 5 % (ref 20–50)
LYMPHOCYTES # BLD AUTO: 1.44 10*3/MM3 (ref 0.7–3.1)
LYMPHOCYTES NFR BLD AUTO: 21.6 % (ref 19.6–45.3)
MAGNESIUM SERPL-MCNC: 2 MG/DL (ref 1.6–2.4)
MCH RBC QN AUTO: 22.4 PG (ref 26.6–33)
MCHC RBC AUTO-ENTMCNC: 29.1 G/DL (ref 31.5–35.7)
MCV RBC AUTO: 77 FL (ref 79–97)
MONOCYTES # BLD AUTO: 0.6 10*3/MM3 (ref 0.1–0.9)
MONOCYTES NFR BLD AUTO: 9 % (ref 5–12)
NEUTROPHILS NFR BLD AUTO: 3.91 10*3/MM3 (ref 1.7–7)
NEUTROPHILS NFR BLD AUTO: 58.5 % (ref 42.7–76)
NRBC BLD AUTO-RTO: 0 /100 WBC (ref 0–0.2)
PHOSPHATE SERPL-MCNC: 3.4 MG/DL (ref 2.5–4.5)
PLATELET # BLD AUTO: 468 10*3/MM3 (ref 140–450)
PMV BLD AUTO: 10 FL (ref 6–12)
POTASSIUM SERPL-SCNC: 4.2 MMOL/L (ref 3.5–5.2)
PROT SERPL-MCNC: 6.7 G/DL (ref 6–8.5)
RBC # BLD AUTO: 3.48 10*6/MM3 (ref 3.77–5.28)
SODIUM SERPL-SCNC: 133 MMOL/L (ref 136–145)
TIBC SERPL-MCNC: 393 MCG/DL (ref 298–536)
TRANSFERRIN SERPL-MCNC: 264 MG/DL (ref 200–360)
UNIT  ABO: NORMAL
UNIT  RH: NORMAL
VIT B12 BLD-MCNC: 1027 PG/ML (ref 211–946)
WBC NRBC COR # BLD AUTO: 6.68 10*3/MM3 (ref 3.4–10.8)

## 2025-01-20 PROCEDURE — 82746 ASSAY OF FOLIC ACID SERUM: CPT | Performed by: INTERNAL MEDICINE

## 2025-01-20 PROCEDURE — 25010000002 DEXAMETHASONE PER 1 MG: Performed by: ANESTHESIOLOGY

## 2025-01-20 PROCEDURE — 84100 ASSAY OF PHOSPHORUS: CPT | Performed by: INTERNAL MEDICINE

## 2025-01-20 PROCEDURE — 25010000002 ONDANSETRON PER 1 MG: Performed by: NURSE ANESTHETIST, CERTIFIED REGISTERED

## 2025-01-20 PROCEDURE — 25010000002 GLYCOPYRROLATE 0.4 MG/2ML SOLUTION: Performed by: NURSE ANESTHETIST, CERTIFIED REGISTERED

## 2025-01-20 PROCEDURE — 82948 REAGENT STRIP/BLOOD GLUCOSE: CPT

## 2025-01-20 PROCEDURE — 83735 ASSAY OF MAGNESIUM: CPT | Performed by: INTERNAL MEDICINE

## 2025-01-20 PROCEDURE — 47563 LAPARO CHOLECYSTECTOMY/GRAPH: CPT | Performed by: SURGERY

## 2025-01-20 PROCEDURE — 83540 ASSAY OF IRON: CPT | Performed by: INTERNAL MEDICINE

## 2025-01-20 PROCEDURE — G0008 ADMIN INFLUENZA VIRUS VAC: HCPCS

## 2025-01-20 PROCEDURE — 84466 ASSAY OF TRANSFERRIN: CPT | Performed by: INTERNAL MEDICINE

## 2025-01-20 PROCEDURE — 25010000002 FENTANYL CITRATE (PF) 50 MCG/ML SOLUTION: Performed by: NURSE ANESTHETIST, CERTIFIED REGISTERED

## 2025-01-20 PROCEDURE — 90662 IIV NO PRSV INCREASED AG IM: CPT

## 2025-01-20 PROCEDURE — 0FT44ZZ RESECTION OF GALLBLADDER, PERCUTANEOUS ENDOSCOPIC APPROACH: ICD-10-PCS | Performed by: SURGERY

## 2025-01-20 PROCEDURE — 25010000002 LIDOCAINE PF 2% 2 % SOLUTION: Performed by: NURSE ANESTHETIST, CERTIFIED REGISTERED

## 2025-01-20 PROCEDURE — 25010000002 INFLUENZA VAC SPLIT HIGH-DOSE 0.5 ML SUSPENSION PREFILLED SYRINGE

## 2025-01-20 PROCEDURE — 25010000002 MORPHINE PER 10 MG: Performed by: SURGERY

## 2025-01-20 PROCEDURE — 25010000002 PIPERACILLIN SOD-TAZOBACTAM PER 1 G: Performed by: SURGERY

## 2025-01-20 PROCEDURE — 82607 VITAMIN B-12: CPT | Performed by: INTERNAL MEDICINE

## 2025-01-20 PROCEDURE — 25010000002 PROPOFOL 200 MG/20ML EMULSION: Performed by: NURSE ANESTHETIST, CERTIFIED REGISTERED

## 2025-01-20 PROCEDURE — 25010000002 INDOCYANINE GREEN 25 MG RECONSTITUTED SOLUTION: Performed by: SURGERY

## 2025-01-20 PROCEDURE — 94640 AIRWAY INHALATION TREATMENT: CPT

## 2025-01-20 PROCEDURE — 80053 COMPREHEN METABOLIC PANEL: CPT | Performed by: INTERNAL MEDICINE

## 2025-01-20 PROCEDURE — 25010000002 NEOSTIGMINE 10 MG/10ML SOLUTION: Performed by: NURSE ANESTHETIST, CERTIFIED REGISTERED

## 2025-01-20 PROCEDURE — 85025 COMPLETE CBC W/AUTO DIFF WBC: CPT | Performed by: INTERNAL MEDICINE

## 2025-01-20 PROCEDURE — 94761 N-INVAS EAR/PLS OXIMETRY MLT: CPT

## 2025-01-20 PROCEDURE — 25010000002 ROPIVACAINE PER 1 MG: Performed by: ANESTHESIOLOGY

## 2025-01-20 PROCEDURE — 8E0W4CZ ROBOTIC ASSISTED PROCEDURE OF TRUNK REGION, PERCUTANEOUS ENDOSCOPIC APPROACH: ICD-10-PCS | Performed by: SURGERY

## 2025-01-20 PROCEDURE — 94799 UNLISTED PULMONARY SVC/PX: CPT

## 2025-01-20 DEVICE — HEMOLOK L 6 CLIPS/CART
Type: IMPLANTABLE DEVICE | Site: ABDOMEN | Status: FUNCTIONAL
Brand: WECK

## 2025-01-20 RX ORDER — ONDANSETRON 2 MG/ML
INJECTION INTRAMUSCULAR; INTRAVENOUS AS NEEDED
Status: DISCONTINUED | OUTPATIENT
Start: 2025-01-20 | End: 2025-01-20 | Stop reason: SURG

## 2025-01-20 RX ORDER — IPRATROPIUM BROMIDE AND ALBUTEROL SULFATE 2.5; .5 MG/3ML; MG/3ML
3 SOLUTION RESPIRATORY (INHALATION) ONCE AS NEEDED
Status: COMPLETED | OUTPATIENT
Start: 2025-01-20 | End: 2025-01-20

## 2025-01-20 RX ORDER — SODIUM CHLORIDE 0.9 % (FLUSH) 0.9 %
10 SYRINGE (ML) INJECTION AS NEEDED
Status: DISCONTINUED | OUTPATIENT
Start: 2025-01-20 | End: 2025-01-20 | Stop reason: HOSPADM

## 2025-01-20 RX ORDER — FENTANYL CITRATE 50 UG/ML
INJECTION, SOLUTION INTRAMUSCULAR; INTRAVENOUS AS NEEDED
Status: DISCONTINUED | OUTPATIENT
Start: 2025-01-20 | End: 2025-01-20 | Stop reason: SURG

## 2025-01-20 RX ORDER — ONDANSETRON 2 MG/ML
4 INJECTION INTRAMUSCULAR; INTRAVENOUS AS NEEDED
Status: DISCONTINUED | OUTPATIENT
Start: 2025-01-20 | End: 2025-01-20 | Stop reason: HOSPADM

## 2025-01-20 RX ORDER — ROPIVACAINE HYDROCHLORIDE 5 MG/ML
INJECTION, SOLUTION EPIDURAL; INFILTRATION; PERINEURAL
Status: COMPLETED | OUTPATIENT
Start: 2025-01-20 | End: 2025-01-20

## 2025-01-20 RX ORDER — PROPOFOL 10 MG/ML
INJECTION, EMULSION INTRAVENOUS AS NEEDED
Status: DISCONTINUED | OUTPATIENT
Start: 2025-01-20 | End: 2025-01-20 | Stop reason: SURG

## 2025-01-20 RX ORDER — FENTANYL CITRATE 50 UG/ML
50 INJECTION, SOLUTION INTRAMUSCULAR; INTRAVENOUS
Status: DISCONTINUED | OUTPATIENT
Start: 2025-01-20 | End: 2025-01-20 | Stop reason: HOSPADM

## 2025-01-20 RX ORDER — OXYCODONE HYDROCHLORIDE 5 MG/1
5 TABLET ORAL EVERY 4 HOURS PRN
Status: DISCONTINUED | OUTPATIENT
Start: 2025-01-20 | End: 2025-01-21 | Stop reason: HOSPADM

## 2025-01-20 RX ORDER — SODIUM CHLORIDE, SODIUM LACTATE, POTASSIUM CHLORIDE, CALCIUM CHLORIDE 600; 310; 30; 20 MG/100ML; MG/100ML; MG/100ML; MG/100ML
125 INJECTION, SOLUTION INTRAVENOUS ONCE
Status: DISCONTINUED | OUTPATIENT
Start: 2025-01-20 | End: 2025-01-20 | Stop reason: HOSPADM

## 2025-01-20 RX ORDER — SODIUM CHLORIDE 0.9 % (FLUSH) 0.9 %
10 SYRINGE (ML) INJECTION EVERY 12 HOURS SCHEDULED
Status: DISCONTINUED | OUTPATIENT
Start: 2025-01-20 | End: 2025-01-20 | Stop reason: HOSPADM

## 2025-01-20 RX ORDER — SODIUM CHLORIDE 9 MG/ML
40 INJECTION, SOLUTION INTRAVENOUS AS NEEDED
Status: DISCONTINUED | OUTPATIENT
Start: 2025-01-20 | End: 2025-01-20 | Stop reason: HOSPADM

## 2025-01-20 RX ORDER — DEXAMETHASONE SODIUM PHOSPHATE 4 MG/ML
INJECTION, SOLUTION INTRA-ARTICULAR; INTRALESIONAL; INTRAMUSCULAR; INTRAVENOUS; SOFT TISSUE
Status: COMPLETED | OUTPATIENT
Start: 2025-01-20 | End: 2025-01-20

## 2025-01-20 RX ORDER — MAGNESIUM HYDROXIDE 1200 MG/15ML
LIQUID ORAL AS NEEDED
Status: DISCONTINUED | OUTPATIENT
Start: 2025-01-20 | End: 2025-01-20 | Stop reason: HOSPADM

## 2025-01-20 RX ORDER — OXYCODONE AND ACETAMINOPHEN 5; 325 MG/1; MG/1
1 TABLET ORAL ONCE AS NEEDED
Status: DISCONTINUED | OUTPATIENT
Start: 2025-01-20 | End: 2025-01-20 | Stop reason: HOSPADM

## 2025-01-20 RX ORDER — VECURONIUM BROMIDE 1 MG/ML
INJECTION, POWDER, LYOPHILIZED, FOR SOLUTION INTRAVENOUS AS NEEDED
Status: DISCONTINUED | OUTPATIENT
Start: 2025-01-20 | End: 2025-01-20 | Stop reason: SURG

## 2025-01-20 RX ORDER — LIDOCAINE HYDROCHLORIDE 20 MG/ML
INJECTION, SOLUTION EPIDURAL; INFILTRATION; INTRACAUDAL; PERINEURAL AS NEEDED
Status: DISCONTINUED | OUTPATIENT
Start: 2025-01-20 | End: 2025-01-20 | Stop reason: SURG

## 2025-01-20 RX ORDER — FAMOTIDINE 10 MG/ML
INJECTION, SOLUTION INTRAVENOUS AS NEEDED
Status: DISCONTINUED | OUTPATIENT
Start: 2025-01-20 | End: 2025-01-20 | Stop reason: SURG

## 2025-01-20 RX ORDER — NEOSTIGMINE METHYLSULFATE 1 MG/ML
INJECTION INTRAVENOUS AS NEEDED
Status: DISCONTINUED | OUTPATIENT
Start: 2025-01-20 | End: 2025-01-20 | Stop reason: SURG

## 2025-01-20 RX ORDER — GLYCOPYRROLATE 0.2 MG/ML
INJECTION INTRAMUSCULAR; INTRAVENOUS AS NEEDED
Status: DISCONTINUED | OUTPATIENT
Start: 2025-01-20 | End: 2025-01-20 | Stop reason: SURG

## 2025-01-20 RX ORDER — MORPHINE SULFATE 2 MG/ML
2 INJECTION, SOLUTION INTRAMUSCULAR; INTRAVENOUS
Status: DISCONTINUED | OUTPATIENT
Start: 2025-01-20 | End: 2025-01-21 | Stop reason: HOSPADM

## 2025-01-20 RX ORDER — PIPERACILLIN SODIUM, TAZOBACTAM SODIUM 3; .375 G/15ML; G/15ML
3.38 INJECTION, POWDER, LYOPHILIZED, FOR SOLUTION INTRAVENOUS ONCE
Status: DISCONTINUED | OUTPATIENT
Start: 2025-01-20 | End: 2025-01-20

## 2025-01-20 RX ORDER — DEXAMETHASONE SODIUM PHOSPHATE 4 MG/ML
INJECTION, SOLUTION INTRA-ARTICULAR; INTRALESIONAL; INTRAMUSCULAR; INTRAVENOUS; SOFT TISSUE AS NEEDED
Status: DISCONTINUED | OUTPATIENT
Start: 2025-01-20 | End: 2025-01-20 | Stop reason: SURG

## 2025-01-20 RX ORDER — INDOCYANINE GREEN AND WATER 25 MG
2.5 KIT INJECTION ONCE
Status: COMPLETED | OUTPATIENT
Start: 2025-01-20 | End: 2025-01-20

## 2025-01-20 RX ADMIN — FENTANYL CITRATE 50 MCG: 50 INJECTION INTRAMUSCULAR; INTRAVENOUS at 12:14

## 2025-01-20 RX ADMIN — HYDROCODONE BITARTRATE AND ACETAMINOPHEN 1 TABLET: 10; 325 TABLET ORAL at 23:59

## 2025-01-20 RX ADMIN — MORPHINE SULFATE 2 MG: 2 INJECTION, SOLUTION INTRAMUSCULAR; INTRAVENOUS at 17:56

## 2025-01-20 RX ADMIN — FOLIC ACID 1 MG: 1 TABLET ORAL at 15:00

## 2025-01-20 RX ADMIN — INFLUENZA A VIRUS A/VICTORIA/4897/2022 IVR-238 (H1N1) ANTIGEN (FORMALDEHYDE INACTIVATED), INFLUENZA A VIRUS A/CALIFORNIA/122/2022 SAN-022 (H3N2) ANTIGEN (FORMALDEHYDE INACTIVATED), AND INFLUENZA B VIRUS B/MICHIGAN/01/2021 ANTIGEN (FORMALDEHYDE INACTIVATED) 0.5 ML: 60; 60; 60 INJECTION, SUSPENSION INTRAMUSCULAR at 00:04

## 2025-01-20 RX ADMIN — VECURONIUM BROMIDE 4 MG: 1 INJECTION, POWDER, LYOPHILIZED, FOR SOLUTION INTRAVENOUS at 12:14

## 2025-01-20 RX ADMIN — ROPIVACAINE HYDROCHLORIDE 258 MG: 5 INJECTION, SOLUTION EPIDURAL; INFILTRATION; PERINEURAL at 12:27

## 2025-01-20 RX ADMIN — NEOSTIGMINE METHYLSULFATE 3 MG: 0.5 INJECTION INTRAVENOUS at 13:18

## 2025-01-20 RX ADMIN — FENTANYL CITRATE 50 MCG: 50 INJECTION INTRAMUSCULAR; INTRAVENOUS at 13:22

## 2025-01-20 RX ADMIN — LIDOCAINE HYDROCHLORIDE 40 MG: 20 INJECTION, SOLUTION EPIDURAL; INFILTRATION; INTRACAUDAL; PERINEURAL at 12:14

## 2025-01-20 RX ADMIN — METOPROLOL TARTRATE 12.5 MG: 25 TABLET, FILM COATED ORAL at 20:47

## 2025-01-20 RX ADMIN — FAMOTIDINE 20 MG: 10 INJECTION, SOLUTION INTRAVENOUS at 12:08

## 2025-01-20 RX ADMIN — PANTOPRAZOLE SODIUM 40 MG: 40 TABLET, DELAYED RELEASE ORAL at 15:00

## 2025-01-20 RX ADMIN — INDOCYANINE GREEN AND WATER 2.5 MG: KIT at 11:57

## 2025-01-20 RX ADMIN — DEXAMETHASONE SODIUM PHOSPHATE 8 MG: 4 INJECTION, SOLUTION INTRA-ARTICULAR; INTRALESIONAL; INTRAMUSCULAR; INTRAVENOUS; SOFT TISSUE at 12:27

## 2025-01-20 RX ADMIN — DEXAMETHASONE SODIUM PHOSPHATE 4 MG: 4 INJECTION, SOLUTION INTRA-ARTICULAR; INTRALESIONAL; INTRAMUSCULAR; INTRAVENOUS; SOFT TISSUE at 13:18

## 2025-01-20 RX ADMIN — ONDANSETRON 4 MG: 2 INJECTION INTRAMUSCULAR; INTRAVENOUS at 13:39

## 2025-01-20 RX ADMIN — GLYCOPYRROLATE 0.4 MG: 0.2 INJECTION INTRAMUSCULAR; INTRAVENOUS at 13:18

## 2025-01-20 RX ADMIN — PIPERACILLIN SODIUM AND TAZOBACTAM SODIUM 3.38 G: 3; .375 INJECTION, POWDER, LYOPHILIZED, FOR SOLUTION INTRAVENOUS at 12:18

## 2025-01-20 RX ADMIN — LEVOTHYROXINE SODIUM 88 MCG: 0.09 TABLET ORAL at 15:00

## 2025-01-20 RX ADMIN — ROSUVASTATIN 10 MG: 10 TABLET, FILM COATED ORAL at 15:00

## 2025-01-20 RX ADMIN — VECURONIUM BROMIDE 1 MG: 1 INJECTION, POWDER, LYOPHILIZED, FOR SOLUTION INTRAVENOUS at 12:30

## 2025-01-20 RX ADMIN — METOPROLOL TARTRATE 12.5 MG: 25 TABLET, FILM COATED ORAL at 11:22

## 2025-01-20 RX ADMIN — Medication 10 ML: at 20:47

## 2025-01-20 RX ADMIN — PROPOFOL 120 MG: 10 INJECTION, EMULSION INTRAVENOUS at 12:14

## 2025-01-20 RX ADMIN — ONDANSETRON 4 MG: 2 INJECTION INTRAMUSCULAR; INTRAVENOUS at 12:08

## 2025-01-20 RX ADMIN — Medication 10 ML: at 11:23

## 2025-01-20 RX ADMIN — IPRATROPIUM BROMIDE AND ALBUTEROL SULFATE 3 ML: .5; 2.5 SOLUTION RESPIRATORY (INHALATION) at 13:56

## 2025-01-20 RX ADMIN — SERTRALINE HYDROCHLORIDE 25 MG: 25 TABLET ORAL at 15:00

## 2025-01-20 RX ADMIN — Medication 5 MG: at 00:27

## 2025-01-20 NOTE — ANESTHESIA PROCEDURE NOTES
"Peripheral Block      Patient reassessed immediately prior to procedure    Patient location during procedure: OR  Start time: 1/20/2025 12:25 PM  Stop time: 1/20/2025 12:27 PM  Reason for block: at surgeon's request and post-op pain management  Performed by  JACQUELINE/CAA: Zuly Coffman CRNA  Preanesthetic Checklist  Completed: patient identified, IV checked, site marked, risks and benefits discussed, surgical consent, monitors and equipment checked, pre-op evaluation and timeout performed  Prep:  Pt Position: supine  Sterile barriers:cap, gloves, sterile barriers and mask  Prep: ChloraPrep  Patient monitoring: blood pressure monitoring, continuous pulse oximetry and EKG  Procedure    Nursing cardiac assessment comments yes: Sedation, GA, Spinal,Epidural   Performed under: general  Guidance:ultrasound guided    ULTRASOUND INTERPRETATION.  Using ultrasound guidance a 20 G (20g 4\" Stimuplex) gauge needle was placed in close proximity to the nerve, at which point, under ultrasound guidance anesthetic was injected in the area of the nerve and spread of the anesthesia was seen on ultrasound in close proximity thereto.  There were no abnormalities seen on ultrasound; a digital image was taken; and the patient tolerated the procedure with no complications. Images:still images obtained    Laterality:Bilateral  Block Type:TAP  Injection Technique:single-shot  Needle Type:short-bevel  Needle Gauge:20 G  Resistance on Injection: none    Medications Used: ropivacaine (NAROPIN) injection 0.5 % - Peripheral Nerve   258 mg - 1/20/2025 12:27:00 PM  dexamethasone (DECADRON) injection - Injection   8 mg - 1/20/2025 12:27:00 PM      Medications  Preservative Free Saline:10ml  Comment:Block Injection:  Total volume divided equally between Right and Left block      Post Assessment  Injection Assessment: negative aspiration for heme, incremental injection and no paresthesia on injection  Patient Tolerance:comfortable throughout " block  Complications:no  Additional Notes  The pt was in the supine position under general anesthesia    Under Ultrasound guidance, a Jha 4inch 360 degree needle was advanced with Normal Saline hydro dissection of tissue.  The Rectus and Transversus Abdominus muscles where visualized.  The needle tip was placed between the Transversus Abdominus and rectus abdominus, local anesthetic spread was visualized in the Transversus Abdominus Plane. Injection was made incrementally with aspiration every 5 mls.  There was no  intravascular injection,  injection pressure was normal, there was no neural injection, and the procedure was completed without difficulty. The same procedure was completed for left and right sided subcostal tap blocks. Thank You.      Performed by: Zuly Coffman CRNA

## 2025-01-20 NOTE — ANESTHESIA POSTPROCEDURE EVALUATION
Patient: Lesley Michel    Procedure Summary       Date: 01/20/25 Room / Location: [facility] OR  /  [facility] OR    Anesthesia Start: 1208 Anesthesia Stop: 1333    Procedure: CHOLECYSTECTOMY LAPAROSCOPIC WITH DAVINCI ROBOT (Abdomen) Diagnosis:       Acute cholecystitis      (Acute cholecystitis [K81.0])    Surgeons: Windy Neal MD Provider: Artur Bess MD    Anesthesia Type: general ASA Status: 3            Anesthesia Type: general    Vitals  Vitals Value Taken Time   /82 01/20/25 1408   Temp 97.8 °F (36.6 °C) 01/20/25 1405   Pulse 71 01/20/25 1408   Resp 19 01/20/25 1408   SpO2 92 % 01/20/25 1408           Post Anesthesia Care and Evaluation    Patient location during evaluation: PHASE II  Patient participation: complete - patient participated  Level of consciousness: awake and alert  Pain score: 1  Pain management: adequate    Airway patency: patent  Anesthetic complications: No anesthetic complications  PONV Status: controlled  Cardiovascular status: acceptable  Respiratory status: acceptable  Hydration status: acceptable

## 2025-01-20 NOTE — ANESTHESIA PROCEDURE NOTES
Airway  Urgency: elective    Date/Time: 1/20/2025 12:18 PM  Airway not difficult    General Information and Staff    Patient location during procedure: OR    Indications and Patient Condition  Indications for airway management: airway protection    Preoxygenated: yes  Mask difficulty assessment: 1 - vent by mask    Final Airway Details  Final airway type: endotracheal airway      Successful airway: ETT  Cuffed: yes   Successful intubation technique: direct laryngoscopy  Endotracheal tube insertion site: oral  Blade: Demetrice  Blade size: 3  ETT size (mm): 7.0  Cormack-Lehane Classification: grade I - full view of glottis  Placement verified by: chest auscultation, capnometry and palpation of cuff   Measured from: lips  ETT/EBT  to lips (cm): 21  Number of attempts at approach: 1  Assessment: lips, teeth, and gum same as pre-op and atraumatic intubation

## 2025-01-20 NOTE — PLAN OF CARE
Goal Outcome Evaluation:  Plan of Care Reviewed With: patient  Progress: no change   Pt resting in bed, watching television. Pt has tolerated all interventions. No complaints/concerns. No acute distress noted. Call light within reach. Plan of care ongoing.

## 2025-01-20 NOTE — PLAN OF CARE
Goal Outcome Evaluation:   Patient is pleasant, compliant with care. Patient shows no s/s of distress at this time. Patient resting in bed, call light in reach. Will continue with plan of care.

## 2025-01-20 NOTE — ANESTHESIA PREPROCEDURE EVALUATION
Anesthesia Evaluation     no history of anesthetic complications:   NPO Solid Status: Waived due to emergency  NPO Liquid Status: Waived due to emergency           Airway   Mallampati: II  TM distance: >3 FB  Neck ROM: full  No difficulty expected  Dental    (+) upper dentures    Pulmonary - normal exam   (+) COPD,home oxygen  Cardiovascular - normal exam    (+) hypertension, CAD, cardiac stents Drug eluting stent more than 12 months ago , dysrhythmias, PVD, hyperlipidemia      Neuro/Psych  (+) headaches, psychiatric history Anxiety  GI/Hepatic/Renal/Endo    (+) obesity, morbid obesity, renal disease- ARF, thyroid problem hypothyroidism    Musculoskeletal     Abdominal  - normal exam    Bowel sounds: normal.   Substance History      OB/GYN          Other   arthritis,     ROS/Med Hx Other: Eliquis 1/18                Anesthesia Plan    ASA 3     general     intravenous induction     Anesthetic plan, risks, benefits, and alternatives have been provided, discussed and informed consent has been obtained with: patient.      CODE STATUS:    Code Status (Patient has no pulse and is not breathing): CPR (Attempt to Resuscitate)  Medical Interventions (Patient has pulse or is breathing): Full Support

## 2025-01-20 NOTE — PAYOR COMM NOTE
"CONTACT:  CESAR IVAN, RN  UTILIZATION MANAGEMENT DEPT.   [facility]    [address] [facility], [address]   PHONE:  [phone]   FAX: [phone]   NPI [id number]        INPATIENT AUTH REQUEST          Matthew Michel (69 y.o. Female)       Date of Birth   1955    Social Security Number       Address   [address] [facility] [address]    Home Phone   [phone]    MRN   [id number]       [employer]   [facility]    Marital Status                               Admission Date   1/19/25    Admission Type   Emergency    Admitting Provider   Hong Westfall MD    Attending Provider   Fanny Ford MD    Department, Room/Bed   [facility], 3304/2S       Discharge Date       Discharge Disposition       Discharge Destination                                 Attending Provider: Fanny Ford MD    Allergies: Xanax [Alprazolam]    Isolation: None   Infection: None   Code Status: CPR    Ht: 154.9 cm (61\")   Wt: 84.1 kg (185 lb 6.4 oz)    Admission Cmt: None   Principal Problem: Acute on chronic anemia [D64.9]                   Active Insurance as of 1/19/2025       Primary Coverage       Payor Plan Insurance Group Employer/Plan Group    ANTHEM MEDICARE REPLACEMENT ANTHEM MED ADV HMO [id number]       Payor Plan Address Payor Plan Phone Number Payor Plan Fax Number Effective Dates    [address] [phone]  1/1/2025 - None Entered    [facility] [address]         Subscriber Name Subscriber Birth Date Member ID       MATTHEW MICHEL 1955 [id number]                     Emergency Contacts        (Rel.) Home Phone Work Phone Mobile Phone    LOYD MICHEL (Son) [phone] -- --    MIGUEL WEI (Grandchild) [phone] -- --                 History & Physical        Wally Lopez PA-C at 01/19/25 0702       Attestation signed by Fanny Ford MD at 01/19/25 2151    I have reviewed this documentation and agree.  I have " discussed and formulated the assessment and plan with PHIL Fiore.  I have also discussed the patient with Dr. León.  Dr. Neal with surgery also saw the patient today and she plans to remove the patient's gallbladder tomorrow.  The patient is allowed a clear liquid diet for now and will be n.p.o. after midnight for her procedure.  Dr. León does not think that the patient is fluid overloaded and thus no more diuresis will be given.  Patient did receive 1 unit of packed red blood cells today.  We will repeat her blood work in the morning so that we can trend her creatinine, electrolytes, and hemoglobin, as well as her inflammatory markers.                      [facility] Medicine Services  History & Physical    Patient Identification:  Name:  Lesley Michel  Age:  69 y.o.  Sex:  female  :  1955  MRN:  [id number]   Visit Number:  [id number]  Admit Date: 2025   Primary Care Physician:  Woodrow Raymond APRN    Subjective     Chief complaint: shortness of breath     History of presenting illness:      Lesley Michel is a 69 y.o. female who presented for further evaluation of abdominal pain. She was seen and examined in the ED with no family present at the bedside. She reports she has had persisting abdominal pain since recent ED visit. She called for a surgery appointment but has not been scheduled yet. She complains of sharp, stabbing RUQ pain which is worse after eating. She is having associated nausea and vomiting dark green fluid. She denies fevers.   On further discussion she reported worsened shortness of breath from baseline. She is particularly dyspneic on exertion and when trying to lie flat to sleep. She further complains of increased lower extremity edema from baseline. She is prescribed a diuretic by her nephrologist, she is unsure particular drug name, which she is supposed to take every other day- she only takes this about once a week. She is also prescribed  aspirin and eliquis. Her last dose of eliquis was over a week ago because she cannot afford it. She took 2 81 mg aspirin yesterday. She does report relatively new onset melena- a couple weeks with last episode about two days ago. She denies hemoptysis or hematemesis. She had a colonoscopy and EGD about a year ago which she was told were normal. She does have history of bleeding ulcer and is compliant with her PPI. Further review of systems was negative, see below.     Past medical history is significant for HTN, HLD, CAD, paroxysmal a fib, COPD with chronic respiratory failure (2 L baseline), hypothyroidism, CKD stage IV, migraine headaches, former smoker    Upon arrival to the ED, vital signs were temp 98.2, HR 86, RR 22, /81, SpO2 95% on 2L.  Laboratory findings included elevated troponin with flat trend, proBNP 15,000.  Kidney function is around baseline.  Hemoglobin is low at 6.9 with hematocrit 24.1.  Baseline hemoglobin appears to be variable but was 9.4 in July 2024.  Chest x-ray concerning for pulmonary vascular congestion and cardiomegaly.    Known Emergency Department medications received prior to my evaluation included Bumex, PRBCs.   Emergency Department Room location at the time of my evaluation was 114.     ---------------------------------------------------------------------------------------------------------------------   Review of Systems   Constitutional:  Positive for activity change, appetite change and fatigue. Negative for chills and fever.   HENT:  Negative for congestion and rhinorrhea.    Respiratory:  Positive for shortness of breath. Negative for cough.    Cardiovascular:  Positive for leg swelling. Negative for chest pain.   Gastrointestinal:  Positive for abdominal pain, blood in stool, nausea and vomiting. Negative for diarrhea.   Genitourinary:  Negative for difficulty urinating and dysuria.   Musculoskeletal:  Negative for arthralgias and myalgias.   Skin:  Negative for rash  and wound.   Neurological:  Negative for dizziness, weakness and light-headedness.        ---------------------------------------------------------------------------------------------------------------------   Past Medical History:   Diagnosis Date    Anxiety     Arthritis     Coronary artery disease     H/O heart artery stent     Hyperlipidemia     Hypertension     Obesity      Past Surgical History:   Procedure Laterality Date    ANKLE SURGERY      RIGHT    APPENDECTOMY      CARDIAC CATHETERIZATION      LEFT    CORONARY STENT PLACEMENT      HYSTERECTOMY      INSERTION HEMODIALYSIS CATHETER Right 10/19/2023    Procedure: HEMODIALYSIS CATHETER INSERTION;  Surgeon: Bartolome Schwartz MD;  Location: [facility];  Service: General;  Laterality: Right;     Family History   Problem Relation Age of Onset    Heart disease Mother     Heart attack Mother 73    Fibromyalgia Mother     Heart attack Father 72    Ovarian cancer Sister     Coronary artery disease Other     Breast cancer Neg Hx      Social History     Socioeconomic History    Marital status:    Tobacco Use    Smoking status: Every Day     Current packs/day: 0.50     Average packs/day: 0.5 packs/day for 30.0 years (15.0 ttl pk-yrs)     Types: Electronic Cigarette, Cigarettes     Passive exposure: Never    Smokeless tobacco: Never   Vaping Use    Vaping status: Never Used   Substance and Sexual Activity    Alcohol use: No    Drug use: No    Sexual activity: Never     ---------------------------------------------------------------------------------------------------------------------   Allergies:  Patient has no known allergies.  ---------------------------------------------------------------------------------------------------------------------   Home medications:    Medications below are reported home medications pulling from within the system; at this time, these medications have not been reconciled unless otherwise specified and are in the verification  process for further verifcation as current home medications.  (Not in a hospital admission)      Hospital Scheduled Meds:          Current listed hospital scheduled medications may not yet reflect those currently placed in orders that are signed and held awaiting patient's arrival to floor.   ---------------------------------------------------------------------------------------------------------------------     Objective     Vital Signs:  Temp:  [98.1 °F (36.7 °C)-98.4 °F (36.9 °C)] 98.1 °F (36.7 °C)  Heart Rate:  [72-90] 80  Resp:  [18-22] 18  BP: (127-176)/() 127/76      01/19/25  0024   Weight: 82.6 kg (182 lb)     Body mass index is 34.39 kg/m².  ---------------------------------------------------------------------------------------------------------------------       Physical Exam  Vitals and nursing note reviewed.   Constitutional:       General: She is not in acute distress.     Appearance: She is obese.   HENT:      Head: Normocephalic and atraumatic.   Eyes:      Extraocular Movements: Extraocular movements intact.   Cardiovascular:      Rate and Rhythm: Normal rate and regular rhythm.   Pulmonary:      Effort: Pulmonary effort is normal.      Comments: Diminished bilaterally   Abdominal:      Palpations: Abdomen is soft.      Tenderness: There is no abdominal tenderness (No RUQ tenderness).   Musculoskeletal:      Right lower leg: Edema present.      Left lower leg: Edema present.      Comments: 2-3+   Skin:     General: Skin is warm and dry.   Neurological:      Mental Status: She is alert. Mental status is at baseline.   Psychiatric:         Mood and Affect: Mood normal.         Behavior: Behavior normal.             ---------------------------------------------------------------------------------------------------------------------  EKG:        I have personally looked at the  "EKG.  ---------------------------------------------------------------------------------------------------------------------   Results from last 7 days   Lab Units 01/19/25  0247 01/16/25  1501   WBC 10*3/mm3 8.73 9.37   HEMOGLOBIN g/dL 6.9* 7.1*   HEMATOCRIT % 24.1* 25.0*   MCV fL 75.5* 76.5*   MCHC g/dL 28.6* 28.4*   PLATELETS 10*3/mm3 532* 546*     Results from last 7 days   Lab Units 01/16/25  1527   PH, ARTERIAL pH units 7.430   PO2 ART mm Hg 57.0*   PCO2, ARTERIAL mm Hg 38.8   HCO3 ART mmol/L 25.7     Results from last 7 days   Lab Units 01/19/25  0247 01/16/25  1501   SODIUM mmol/L 133* 129*   POTASSIUM mmol/L 4.2 4.0   CHLORIDE mmol/L 98 95*   CO2 mmol/L 25.4 23.3   BUN mg/dL 35* 28*   CREATININE mg/dL 2.12* 1.96*   CALCIUM mg/dL 9.0 9.2   GLUCOSE mg/dL 107* 117*   ALBUMIN g/dL 3.6 3.8   BILIRUBIN mg/dL 0.3 0.4   ALK PHOS U/L 135* 123*   AST (SGOT) U/L 15 18   ALT (SGPT) U/L <5 6   Estimated Creatinine Clearance: 25.1 mL/min (A) (by C-G formula based on SCr of 2.12 mg/dL (H)).  No results found for: \"AMMONIA\"  Results from last 7 days   Lab Units 01/19/25  0454 01/19/25  0247 01/16/25  1615   HSTROP T ng/L 31* 32* 30*     Results from last 7 days   Lab Units 01/19/25 0247   PROBNP pg/mL 15,776.0*     Lab Results   Component Value Date    HGBA1C 5.30 10/08/2023     Lab Results   Component Value Date    TSH 6.770 (H) 10/08/2023    FREET4 1.01 10/08/2023     No results found for: \"PREGTESTUR\", \"PREGSERUM\", \"HCG\", \"HCGQUANT\"  Pain Management Panel          Latest Ref Rng & Units 10/9/2023   Pain Management Panel   Creatinine, Urine mg/dL 116.0       Details                 No results found for: \"BLOODCX\"  No results found for: \"URINECX\"  No results found for: \"WOUNDCX\"  No results found for: \"STOOLCX\"      ---------------------------------------------------------------------------------------------------------------------  Imaging Results (Last 7 Days)       Procedure Component Value Units Date/Time    XR Chest " "2 View [[id number]] Collected: 01/19/25 0232     Updated: 01/19/25 0235    Narrative:      PROCEDURE: Portable chest x-ray examination performed on January 19, 2025. Single view.     HISTORY: Shortness of breath. Difficulty breathing.     COMPARISON: None.     FINDINGS:     Enlarged heart size.  Central pulmonary vascular congestion with edema.  Mild edema.  No pleural effusion.  No pneumothorax  No fracture or foreign body.  No free air in the upper abdomen.       Impression:         Enlarged heart size  Central pulmonary vascular congestion.  Mild edema.  No pleural effusion.  No pneumothorax.  No fracture or foreign body.     This report was finalized on 1/19/2025 2:33 AM by Ralf Anglin MD.               Cultures:  No results found for: \"BLOODCX\", \"URINECX\", \"WOUNDCX\", \"MRSACX\", \"RESPCX\", \"STOOLCX\"    Last echocardiogram:  Results for orders placed during the hospital encounter of 10/08/23    Adult Transthoracic Echo Limited W/ Cont if Necessary Per Protocol    Interpretation Summary    Left ventricular systolic function is hyperdynamic (EF > 70%). Left ventricular ejection fraction appears to be greater than 70%.    Left ventricular diastolic function was not assessed.    The left atrial cavity is mildly dilated.    The right atrial cavity is borderline dilated.    There is a small (<1cm) pericardial effusion.    This is a technical limited study.          I have personally reviewed the above radiology images and read the final radiology report on 01/19/25  ---------------------------------------------------------------------------------------------------------------------  Assessment / Plan     Active Hospital Problems    Diagnosis  POA    **Acute on chronic anemia [D64.9]  Yes       ASSESSMENT/PLAN:    Acute on chronic microcytic anemia  Reported melena, hx PUD  Concern for acute CHF  Complicated by CKD stage IV and atrial fibrillation on chronic anticoagulation  Cholelithiasis  Patient was recently seen in " this ED complaining of right upper quadrant pain felt to have gallbladder disease at that time and advised to follow-up closely with general surgery.  Also notably anemic at that time but refused blood transfusion.  Returns to care today complaining of persisting abdominal pain, shortness of breath, swelling. On ROS reported recent melena as well with history of PUD. Last dose eliquis over a week ago.   O2 sats on arrival mid 90s on 2 L per nasal cannula.  Hemoglobin at 6.9 with hematocrit 24.1.    Her proBNP was 15,000 and chest x-ray is concerning for pulmonary congestion, edema and cardiomegaly.  Renal function is around recent baseline.  She received Bumex and PRBCs in the ED  Will admit to the telemetry unit for further workup and management  In the setting of anemia with reported melena and hx PUD and recent concerns for gallbladder disease will consult general surgery for further assistance and recommendations, appreciated  Will continue to monitor clinical volume status closely.  Daily weights, strict I's and O's.  Follow-up response to Bumex given and continue to diurese as clinically indicated.  Closely monitor renal function and avoid other nephrotoxins as able.  Will consult nephrology for further assistance and recommendations.  Will update TTE  Support status closely-titrate supplemental O2 as needed  She will remain n.p.o. for now pending surgery evaluation.  Repeat labs to trend closely  Hold anticoagulation, antiplatelet medications for now    Chronic:  HTN  HLD  Hypothyroidism  CAD  COPD with chronic respiratory failure  PVD  Plan to continue home medications as indicated once med rec is complete  Will monitor vital signs closely    ----------  -DVT prophylaxis: SCDs  -Activity: Ad denise.  -Expected length of stay: INPATIENT status due to the need for care which can only be reasonably provided in an hospital setting such as aggressive/expedited ancillary services and/or consultation services, the  necessity for IV medications, close physician monitoring and/or the possible need for procedures.  In such, I feel patient’s risk for adverse outcomes and need for care warrant INPATIENT evaluation and predict the patient’s care encounter to likely last beyond 2 midnights.   -Disposition Pending further clinical course and surgery recommendations.    High risk secondary to acute on chronic anemia, concern for acute CHF    Code Status and Medical Interventions: CPR (Attempt to Resuscitate); Full Support   Ordered at: 01/19/25 0510     Code Status (Patient has no pulse and is not breathing):    CPR (Attempt to Resuscitate)     Medical Interventions (Patient has pulse or is breathing):    Full Support       Wally Lopez PA-C   01/19/25  07:02 EST    Electronically signed by Fanny Ford MD at 01/19/25 6028          Emergency Department Notes        Victor Hugo Phelps MD at 01/19/25 9101          Subjective   History of Present Illness  Patient is a 69-year-old female who was here on January 16, felt likely to have gallbladder disease, found to be anemic, blood transfusion was recommended but the patient was afraid to do it at that time.  She presents now complaining that she has had worsening swelling and bilateral lower extremity edema since that ER visit, and she has now changed her mind and wants to receive the blood transfusion.  She denies fever, chills, chest pain, hematemesis, hematochezia or melena, hematuria, vaginal bleeding or any other sort of bleeding.  She has been having some right upper quadrant abdominal pain intermittently with nausea and vomiting.  She does relate a history of coronary artery disease, congestive heart failure, chronic kidney disease.  She states that she required hemodialysis for 8 months but was recently able to come off of the dialysis.      Review of Systems   All other systems reviewed and are negative.      Past Medical History:   Diagnosis Date    Anxiety      Arthritis     Coronary artery disease     H/O heart artery stent     Hyperlipidemia     Hypertension     Obesity        No Known Allergies    Past Surgical History:   Procedure Laterality Date    ANKLE SURGERY      RIGHT    APPENDECTOMY      CARDIAC CATHETERIZATION      LEFT    CORONARY STENT PLACEMENT      HYSTERECTOMY      INSERTION HEMODIALYSIS CATHETER Right 10/19/2023    Procedure: HEMODIALYSIS CATHETER INSERTION;  Surgeon: Bartolome Schwartz MD;  Location: [facility];  Service: General;  Laterality: Right;       Family History   Problem Relation Age of Onset    Heart disease Mother     Heart attack Mother 73    Fibromyalgia Mother     Heart attack Father 72    Ovarian cancer Sister     Coronary artery disease Other     Breast cancer Neg Hx        Social History     Socioeconomic History    Marital status:    Tobacco Use    Smoking status: Every Day     Current packs/day: 0.50     Average packs/day: 0.5 packs/day for 30.0 years (15.0 ttl pk-yrs)     Types: Electronic Cigarette, Cigarettes     Passive exposure: Never    Smokeless tobacco: Never   Vaping Use    Vaping status: Never Used   Substance and Sexual Activity    Alcohol use: No    Drug use: No    Sexual activity: Never           Objective   Physical Exam  Vitals and nursing note reviewed.   Constitutional:       Appearance: Normal appearance. She is well-developed. She is not toxic-appearing or diaphoretic.   HENT:      Head: Normocephalic and atraumatic.   Eyes:      General: No scleral icterus.     Pupils: Pupils are equal, round, and reactive to light.   Neck:      Trachea: No tracheal deviation.   Cardiovascular:      Rate and Rhythm: Normal rate and regular rhythm.   Pulmonary:      Effort: No respiratory distress.      Breath sounds: Normal breath sounds.      Comments: When I entered the room the patient is up and ambulating about.  She is tachypneic and appears mildly dyspneic, but lungs are clear to auscultation throughout.  Chest:       Chest wall: No tenderness.   Abdominal:      General: Bowel sounds are normal.      Palpations: Abdomen is soft.      Tenderness: There is no abdominal tenderness. There is no guarding or rebound.   Musculoskeletal:         General: No tenderness. Normal range of motion.      Cervical back: Normal range of motion and neck supple. No rigidity or tenderness.      Right lower leg: No tenderness. Edema present.      Left lower leg: No tenderness. Edema present.   Skin:     General: Skin is warm and dry.      Capillary Refill: Capillary refill takes less than 2 seconds.      Coloration: Skin is pale.   Neurological:      General: No focal deficit present.      Mental Status: She is alert and oriented to person, place, and time.      GCS: GCS eye subscore is 4. GCS verbal subscore is 5. GCS motor subscore is 6.      Motor: No abnormal muscle tone.   Psychiatric:         Mood and Affect: Mood normal.         Behavior: Behavior normal.         Procedures  XR Chest 2 View   Final Result       Enlarged heart size   Central pulmonary vascular congestion.   Mild edema.   No pleural effusion.   No pneumothorax.   No fracture or foreign body.       This report was finalized on 1/19/2025 2:33 AM by Ralf Anglin MD.            Results for orders placed or performed during the hospital encounter of 01/19/25   ECG 12 Lead ED Triage Standing Order; SOA    Collection Time: 01/19/25 12:19 AM   Result Value Ref Range    QT Interval 386 ms    QTC Interval 456 ms   COVID-19 and FLU A/B PCR, 1 HR TAT - Swab, Nasopharynx    Collection Time: 01/19/25  2:47 AM    Specimen: Nasopharynx; Swab   Result Value Ref Range    COVID19 Not Detected Not Detected - Ref. Range    Influenza A PCR Not Detected Not Detected    Influenza B PCR Not Detected Not Detected   Comprehensive Metabolic Panel    Collection Time: 01/19/25  2:47 AM    Specimen: Arm, Left; Blood   Result Value Ref Range    Glucose 107 (H) 65 - 99 mg/dL    BUN 35 (H) 8 - 23 mg/dL     Creatinine 2.12 (H) 0.57 - 1.00 mg/dL    Sodium 133 (L) 136 - 145 mmol/L    Potassium 4.2 3.5 - 5.2 mmol/L    Chloride 98 98 - 107 mmol/L    CO2 25.4 22.0 - 29.0 mmol/L    Calcium 9.0 8.6 - 10.5 mg/dL    Total Protein 7.4 6.0 - 8.5 g/dL    Albumin 3.6 3.5 - 5.2 g/dL    ALT (SGPT) <5 1 - 33 U/L    AST (SGOT) 15 1 - 32 U/L    Alkaline Phosphatase 135 (H) 39 - 117 U/L    Total Bilirubin 0.3 0.0 - 1.2 mg/dL    Globulin 3.8 gm/dL    A/G Ratio 0.9 g/dL    BUN/Creatinine Ratio 16.5 7.0 - 25.0    Anion Gap 9.6 5.0 - 15.0 mmol/L    eGFR 25.1 (L) >60.0 mL/min/1.73   BNP    Collection Time: 01/19/25  2:47 AM    Specimen: Arm, Left; Blood   Result Value Ref Range    proBNP 15,776.0 (H) 0.0 - 900.0 pg/mL   High Sensitivity Troponin T    Collection Time: 01/19/25  2:47 AM    Specimen: Arm, Left; Blood   Result Value Ref Range    HS Troponin T 32 (H) <14 ng/L   CBC Auto Differential    Collection Time: 01/19/25  2:47 AM    Specimen: Arm, Left; Blood   Result Value Ref Range    WBC 8.73 3.40 - 10.80 10*3/mm3    RBC 3.19 (L) 3.77 - 5.28 10*6/mm3    Hemoglobin 6.9 (C) 12.0 - 15.9 g/dL    Hematocrit 24.1 (L) 34.0 - 46.6 %    MCV 75.5 (L) 79.0 - 97.0 fL    MCH 21.6 (L) 26.6 - 33.0 pg    MCHC 28.6 (L) 31.5 - 35.7 g/dL    RDW 17.3 (H) 12.3 - 15.4 %    RDW-SD 46.9 37.0 - 54.0 fl    MPV 9.7 6.0 - 12.0 fL    Platelets 532 (H) 140 - 450 10*3/mm3    Neutrophil % 60.3 42.7 - 76.0 %    Lymphocyte % 21.0 19.6 - 45.3 %    Monocyte % 9.7 5.0 - 12.0 %    Eosinophil % 7.6 (H) 0.3 - 6.2 %    Basophil % 1.1 0.0 - 1.5 %    Immature Grans % 0.3 0.0 - 0.5 %    Neutrophils, Absolute 5.26 1.70 - 7.00 10*3/mm3    Lymphocytes, Absolute 1.83 0.70 - 3.10 10*3/mm3    Monocytes, Absolute 0.85 0.10 - 0.90 10*3/mm3    Eosinophils, Absolute 0.66 (H) 0.00 - 0.40 10*3/mm3    Basophils, Absolute 0.10 0.00 - 0.20 10*3/mm3    Immature Grans, Absolute 0.03 0.00 - 0.05 10*3/mm3    nRBC 0.0 0.0 - 0.2 /100 WBC   Lipase    Collection Time: 01/19/25  2:47 AM    Specimen: Arm,  Left; Blood   Result Value Ref Range    Lipase 40 13 - 60 U/L   Scan Slide    Collection Time: 01/19/25  2:47 AM    Specimen: Arm, Left; Blood   Result Value Ref Range    Anisocytosis Slight/1+ None Seen    Hypochromia Large/3+ None Seen    Macrocytes Mod/2+ None Seen    Polychromasia Slight/1+ None Seen    Stomatocytes Slight/1+ None Seen    Target Cells Slight/1+ None Seen    Platelet Morphology Normal Normal   Green Top (Gel)    Collection Time: 01/19/25  2:47 AM   Result Value Ref Range    Extra Tube Hold for add-ons.    Lavender Top    Collection Time: 01/19/25  2:47 AM   Result Value Ref Range    Extra Tube hold for add-on    Gold Top - SST    Collection Time: 01/19/25  2:47 AM   Result Value Ref Range    Extra Tube Hold for add-ons.    Light Blue Top    Collection Time: 01/19/25  2:47 AM   Result Value Ref Range    Extra Tube Hold for add-ons.    High Sensitivity Troponin T 1Hr    Collection Time: 01/19/25  4:54 AM    Specimen: Arm, Left; Blood   Result Value Ref Range    HS Troponin T 31 (H) <14 ng/L    Troponin T Numeric Delta -1 ng/L    Troponin T % Delta -3 Abnormal if >/= 20%   Type & Screen    Collection Time: 01/19/25  4:54 AM    Specimen: Arm, Left; Blood   Result Value Ref Range    ABO Type O     RH type Positive     Antibody Screen Negative     T&S Expiration Date 1/22/2025 11:59:59 PM    ABO RH Specimen Verification    Collection Time: 01/19/25  5:17 AM    Specimen: Blood   Result Value Ref Range    ABO Type O     RH type Positive    Prepare RBC, 1 Units    Collection Time: 01/19/25  5:44 AM   Result Value Ref Range    Product Code [id number]     Unit Number [id number]     UNIT  ABO O     UNIT  RH POS     Crossmatch Interpretation Compatible     Dispense Status XM     Blood Expiration Date [id number]     Blood Type Barcode 5100                ED Course  ED Course as of 01/19/25 0551   Sun Jan 19, 2025   0209 EKG shows sinus rhythm, rate 84.  MI interval 138, QRS duration 78, QTc 456 ms.  Some  baseline artifact.  Occasional ectopic.  Nonspecific ST-T changes.  No evidence for STEMI.  Electronically signed by Victor Hugo Phelps MD, 01/19/25, 2:10 AM EST.   [CM]   [id number] Hospitalist paged for admission. [CM]   5387 Case discussed with Dr. Westfall.  He is admitting patient to the hospitalist service. [CM]      ED Course User Index  [CM] Victor Hugo Phelps MD                                                       Medical Decision Making      Final diagnoses:   Acute on chronic anemia   Dyspnea, unspecified type       ED Disposition  ED Disposition       ED Disposition   Decision to Admit    Condition   --    Comment   --               Please note that portions of this note were completed with a voice recognition program.                Victor Hugo Phelps MD  01/19/25 0569      Electronically signed by Victor Hugo Phelps MD at 01/19/25 0546       Facility-Administered Medications as of 1/20/2025   Medication Dose Route Frequency Provider Last Rate Last Admin    [COMPLETED] albumin human 25 % IV SOLN 25 g  25 g Intravenous Once Vani León MD   25 g at 01/19/25 1409    sennosides-docusate (PERICOLACE) 8.6-50 MG per tablet 2 tablet  2 tablet Oral BID PRN Hong Westfall MD        And    polyethylene glycol (MIRALAX) packet 17 g  17 g Oral Daily PRN Hong Westfall MD        And    bisacodyl (DULCOLAX) EC tablet 5 mg  5 mg Oral Daily PRN Hong Westfall MD        And    bisacodyl (DULCOLAX) suppository 10 mg  10 mg Rectal Daily PRN Hong Westfall MD        [COMPLETED] bumetanide (BUMEX) injection 2 mg  2 mg Intravenous Once Victor Hugo Phelps MD   2 mg at 01/19/25 0535    folic acid (FOLVITE) tablet 1 mg  1 mg Oral Daily Fanny Ford MD   1 mg at 01/19/25 2029    gabapentin (NEURONTIN) capsule 300 mg  300 mg Oral BID PRN Fanny Ford MD   300 mg at 01/19/25 2028    HYDROcodone-acetaminophen (NORCO)  MG per tablet 1 tablet  1 tablet Oral Q8H PRN Logan  Fanny BAUMAN MD   1 tablet at 01/19/25 2029    [COMPLETED] influenza vac split high-dose (FLUZONE HIGH DOSE) injection 0.5 mL  0.5 mL Intramuscular Once Pollo Duffy PA-C   0.5 mL at 01/20/25 0004    ipratropium-albuterol (DUO-NEB) nebulizer solution 3 mL  3 mL Nebulization Q4H PRN Fanny Ford MD        levothyroxine (SYNTHROID, LEVOTHROID) tablet 88 mcg  88 mcg Oral Daily Fanny Ford MD   88 mcg at 01/19/25 2028    melatonin tablet 5 mg  5 mg Oral Nightly PRN Pollo Duffy PA-C   5 mg at 01/20/25 0027    metoprolol tartrate (LOPRESSOR) tablet 12.5 mg  12.5 mg Oral BID Fanny Ford MD   12.5 mg at 01/19/25 2028    morphine injection 2 mg  2 mg Intravenous Q4H PRN Fanny Ford MD        nitroglycerin (NITROSTAT) SL tablet 0.4 mg  0.4 mg Sublingual Q5 Min PRN Hong Westfall MD        ondansetron ODT (ZOFRAN-ODT) disintegrating tablet 4 mg  4 mg Oral Q8H PRN Fanny Ford MD        pantoprazole (PROTONIX) EC tablet 40 mg  40 mg Oral Daily Fanny Ford MD   40 mg at 01/19/25 2028    rosuvastatin (CRESTOR) tablet 10 mg  10 mg Oral Daily Fanny Ford MD   10 mg at 01/19/25 2028    sertraline (ZOLOFT) tablet 25 mg  25 mg Oral Daily Fanny Ford MD   25 mg at 01/19/25 2028    sodium chloride 0.9 % flush 10 mL  10 mL Intravenous PRN Hong Westfall MD        sodium chloride 0.9 % flush 10 mL  10 mL Intravenous Q12H Hong Westfall MD   10 mL at 01/19/25 2029    sodium chloride 0.9 % flush 10 mL  10 mL Intravenous PRN Hong Westfall MD        sodium chloride 0.9 % infusion 40 mL  40 mL Intravenous PRN Hong Westfall MD         Orders (all)        Start     Ordered    01/20/25 0747  POC Glucose Once  PROCEDURE ONCE        Comments: Complete no more than 45 minutes prior to patient eating      01/20/25 0725    01/20/25 0600  Iron Profile  Morning Draw         01/19/25 1326    01/20/25 0600  CBC & Differential  Morning Draw          01/19/25 2152 01/20/25 0600  Comprehensive Metabolic Panel  Morning Draw         01/19/25 2152 01/20/25 0600  Phosphorus  Morning Draw         01/19/25 2152 01/20/25 0600  Magnesium  Morning Draw         01/19/25 2152 01/20/25 0600  Vitamin B12  Morning Draw         01/19/25 2152 01/20/25 0600  Folate  Morning Draw         01/19/25 2152 01/20/25 0600  CBC Auto Differential  PROCEDURE ONCE         01/19/25 2202 01/20/25 0315  POC Glucose Once  PROCEDURE ONCE        Comments: Complete no more than 45 minutes prior to patient eating      01/20/25 0306    01/20/25 0020  melatonin tablet 5 mg  Nightly PRN         01/20/25 0020    01/20/25 0001  NPO Diet NPO Type: Strict NPO  Diet Effective Midnight         01/19/25 1616    01/19/25 2100  metoprolol tartrate (LOPRESSOR) tablet 12.5 mg  2 Times Daily         01/19/25 1935 01/19/25 2045  influenza vac split high-dose (FLUZONE HIGH DOSE) injection 0.5 mL  Once         01/19/25 2009 01/19/25 2030  pantoprazole (PROTONIX) EC tablet 40 mg  Daily         01/19/25 1935 01/19/25 2030  levothyroxine (SYNTHROID, LEVOTHROID) tablet 88 mcg  Daily         01/19/25 1935 01/19/25 2030  folic acid (FOLVITE) tablet 1 mg  Daily         01/19/25 1935 01/19/25 2030  rosuvastatin (CRESTOR) tablet 10 mg  Daily         01/19/25 1935 01/19/25 2030  sertraline (ZOLOFT) tablet 25 mg  Daily         01/19/25 1935 01/19/25 2009  Inpatient Nutrition Consult  Once        Provider:  (Not yet assigned)    01/19/25 2009 01/19/25 1934  morphine injection 2 mg  Every 4 Hours PRN         01/19/25 1934 01/19/25 1933  ondansetron ODT (ZOFRAN-ODT) disintegrating tablet 4 mg  Every 8 Hours PRN         01/19/25 1935 01/19/25 1933  gabapentin (NEURONTIN) capsule 300 mg  2 Times Daily PRN         01/19/25 1935    01/19/25 1933  HYDROcodone-acetaminophen (NORCO)  MG per tablet 1 tablet  Every 8 Hours PRN         01/19/25 1935    01/19/25 1933   ipratropium-albuterol (DUO-NEB) nebulizer solution 3 mL  Every 4 Hours PRN         01/19/25 1935    01/19/25 1800  Oral Care  2 Times Daily       01/19/25 0828    01/19/25 1626  Case request  Once         01/19/25 1627    01/19/25 1600  Vital Signs  Every 8 Hours        Comments: Per per hospital policy    01/19/25 0828    01/19/25 1415  albumin human 25 % IV SOLN 25 g  Once         01/19/25 1326    01/19/25 1200  Strict Intake & Output  Every 6 Hours         01/19/25 0828    01/19/25 1100  Hemoglobin & Hematocrit, Blood  Timed         01/19/25 0930    01/19/25 1047  Diet: Liquid; Full Liquid; Fluid Consistency: Thin (IDDSI 0)  Diet Effective Now,   Status:  Canceled         01/19/25 1046    01/19/25 0932  Scan Slide  Once         01/19/25 0931    01/19/25 0915  sodium chloride 0.9 % flush 10 mL  Every 12 Hours Scheduled         01/19/25 0828    01/19/25 0847  Inpatient Case Management  Consult  Once        Provider:  (Not yet assigned)    01/19/25 0847    01/19/25 0829  Notify Physician (with Parameters)  Until Discontinued         01/19/25 0828    01/19/25 0829  Insert Peripheral IV  Once         01/19/25 0828    01/19/25 0829  Saline Lock & Maintain IV Access  Continuous,   Status:  Canceled         01/19/25 0828    01/19/25 0829  Continuous Cardiac Monitoring  Continuous        Comments: Follow Standing Orders As Outlined in Process Instructions (Open Order Report to View Full Instructions)    01/19/25 0828    01/19/25 0829  Maintain IV Access  Continuous         01/19/25 0828    01/19/25 0829  Telemetry - Place Orders & Notify Provider of Results When Patient Experiences Acute Chest Pain, Dysrhythmia or Respiratory Distress  Continuous        Comments: Open Order Report to View Parameters Requiring Provider Notification    01/19/25 0828    01/19/25 0829  May Be Off Telemetry for Tests  Continuous         01/19/25 0828    01/19/25 0829  Continuous Pulse Oximetry  Continuous         01/19/25 0828  "   01/19/25 0829  Daily Weights  Daily       01/19/25 0828    01/19/25 0829  NPO Diet NPO Type: Strict NPO  Diet Effective Now,   Status:  Canceled         01/19/25 0828    01/19/25 0829  Inpatient General Surgery Consult  Once        Specialty:  General Surgery  Provider:  Windy Neal MD    01/19/25 0828    01/19/25 0829  CBC Auto Differential  Morning Draw         01/19/25 0828    01/19/25 0829  Comprehensive Metabolic Panel  Morning Draw         01/19/25 0828    01/19/25 0829  Adult Transthoracic Echo Complete w/ Color, Spectral and Contrast if necessary per protocol  Once         01/19/25 0828    01/19/25 0829  Place Sequential Compression Device  Once         01/19/25 0828    01/19/25 0829  Maintain Sequential Compression Device  Continuous         01/19/25 0828    01/19/25 0828  sodium chloride 0.9 % flush 10 mL  As Needed         01/19/25 0828    01/19/25 0828  sodium chloride 0.9 % infusion 40 mL  As Needed         01/19/25 0828    01/19/25 0828  nitroglycerin (NITROSTAT) SL tablet 0.4 mg  Every 5 Minutes PRN         01/19/25 0828    01/19/25 0828  sennosides-docusate (PERICOLACE) 8.6-50 MG per tablet 2 tablet  2 Times Daily PRN        Placed in \"And\" Linked Group    01/19/25 0828    01/19/25 0828  polyethylene glycol (MIRALAX) packet 17 g  Daily PRN        Placed in \"And\" Linked Group    01/19/25 0828    01/19/25 0828  bisacodyl (DULCOLAX) EC tablet 5 mg  Daily PRN        Placed in \"And\" Linked Group    01/19/25 0828    01/19/25 0828  bisacodyl (DULCOLAX) suppository 10 mg  Daily PRN        Placed in \"And\" Linked Group    01/19/25 0828    01/19/25 0808  Adult Transthoracic Echo Complete W/ Cont if Necessary Per Protocol  Once,   Status:  Canceled         01/19/25 0807    01/19/25 0758  Inpatient Nephrology Consult  Once        Specialty:  Nephrology  Provider:  (Not yet assigned)    01/19/25 0758    01/19/25 0744  Hemoglobin & Hematocrit, Blood  Once         01/19/25 0743    01/19/25 0743  Lactic " Acid, Plasma  STAT         01/19/25 0743    01/19/25 0550  Inpatient Admission  Once         01/19/25 0551    01/19/25 0550  Code Status and Medical Interventions: CPR (Attempt to Resuscitate); Full Support  Continuous         01/19/25 0551    01/19/25 0501  ABO RH Specimen Verification  STAT         01/19/25 0500    01/19/25 0442  bumetanide (BUMEX) injection 2 mg  Once         01/19/25 0426    01/19/25 0427  Apply External Urinary Catheter  Once         01/19/25 0426    01/19/25 0426  Type & Screen  Once         01/19/25 0425    01/19/25 0426  Verify Informed Consent  Once         01/19/25 0425    01/19/25 0426  Prepare RBC, 1 Units  Blood - Once         01/19/25 0425    01/19/25 0425  Transfuse RBC Infuse Each Unit Over: 2H  Transfusion         01/19/25 0425    01/19/25 0347  High Sensitivity Troponin T 1Hr  PROCEDURE ONCE         01/19/25 0324    01/19/25 0314  Scan Slide  Once         01/19/25 0313    01/19/25 0032  COVID PRE-OP / PRE-PROCEDURE SCREENING ORDER (NO ISOLATION) - Swab, Nasopharynx  Once         01/19/25 0032    01/19/25 0032  COVID-19 and FLU A/B PCR, 1 HR TAT - Swab, Nasopharynx  PROCEDURE ONCE         01/19/25 0033    01/19/25 0025  Lipase  STAT         01/19/25 0024    01/19/25 0018  NPO Diet NPO Type: Strict NPO  Diet Effective Now,   Status:  Canceled         01/19/25 0018    01/19/25 0018  Undress & Gown  Once         01/19/25 0018    01/19/25 0018  Cardiac Monitoring  Continuous,   Status:  Canceled        Comments: Follow Standing Orders As Outlined in Process Instructions (Open Order Report to View Full Instructions)    01/19/25 0018    01/19/25 0018  Continuous Pulse Oximetry  Continuous,   Status:  Canceled         01/19/25 0018    01/19/25 0018  Vital Signs  Per Hospital Policy         01/19/25 0018    01/19/25 0018  ECG 12 Lead ED Triage Standing Order; SOA  Once         01/19/25 0018    01/19/25 0018  XR Chest 2 View  1 Time Imaging         01/19/25 0018 01/19/25 0018  Insert  Peripheral IV  Once         01/19/25 0018    01/19/25 0018  [place] Draw  Once         01/19/25 0018    01/19/25 0018  CBC & Differential  Once         01/19/25 0018    01/19/25 0018  Comprehensive Metabolic Panel  Once         01/19/25 0018    01/19/25 0018  BNP  Once         01/19/25 0018    01/19/25 0018  High Sensitivity Troponin T  Once         01/19/25 0018    01/19/25 0018  Green Top (Gel)  PROCEDURE ONCE         01/19/25 0018    01/19/25 0018  Lavender Top  PROCEDURE ONCE         01/19/25 0018    01/19/25 0018  Gold Top - SST  PROCEDURE ONCE         01/19/25 0018    01/19/25 0018  Light Blue Top  PROCEDURE ONCE         01/19/25 0018    01/19/25 0018  CBC Auto Differential  PROCEDURE ONCE         01/19/25 0018    01/19/25 0017  sodium chloride 0.9 % flush 10 mL  As Needed         01/19/25 0018    01/19/25 0000  Telemetry Scan  Once         01/19/25 0000    01/19/25 0000  Telemetry Scan  Once         01/19/25 0000    Unscheduled  Oxygen Therapy- Nasal Cannula; Titrate 1-6 LPM Per SpO2; 90 - 95%  Continuous PRN       01/19/25 0018    Unscheduled  Up With Assistance  As Needed       01/19/25 0828    --  chlorthalidone (HYGROTON) 25 MG tablet  Daily         01/19/25 1220    --  gabapentin (NEURONTIN) 600 MG tablet  3 Times Daily PRN         01/19/25 1228    --  hydrALAZINE (APRESOLINE) 100 MG tablet  3 Times Daily         01/19/25 1228    --  metoprolol tartrate (LOPRESSOR) 25 MG tablet  2 Times Daily         01/19/25 1229    --  rosuvastatin (CRESTOR) 10 MG tablet  Daily         01/19/25 1230    --  sertraline (ZOLOFT) 25 MG tablet  Daily         01/19/25 1232                  Physician Progress Notes (all)    No notes of this type exist for this encounter.          Consult Notes (all)        Vani León MD at 01/19/25 7192          NEPHROLOGY CONSULT NOTE      REASON FOR CONSULTATION: Elevated creatinine    CHIEF COMPLAINT: Shortness of breath, bilateral lower extremity swelling    HISTORY OF PRESENTING  ILLNESS:  Lesley Michel is a 69 y.o. female who presented to [facility] emergency department with chief complaint of not feeling well, generalized weakness fatigability shortness of breath and bilateral lower extremity swelling.  Patient stated her shortness of breath has been progressively worsening for the past 4 5 days, stated it has been associated with worsening symptoms on minimal exertion.  Patient denied any associated chest pain.  Patient stated she has been eating a lot of high salt food.  Patient has been taking diuretics but not every day.  Patient denied  Any chronic NSAIDS use. Patient denies hematuria, dysuria, difficulty passing urine. No prior history of renal stones. No family history of renal disease    History  Past Medical History:   Diagnosis Date    Anxiety     Arthritis     Cigarette smoker 02/10/2022    CKD (chronic kidney disease) stage 4, GFR 15-29 ml/min     MAICO on top of CKD IIIa, HD from 10/23-4/24, some recovery to CKD IV    COPD (chronic obstructive pulmonary disease)     Coronary artery disease     H/O heart artery stent     Hyperlipidemia     Hypertension     hypothyroidism     Obesity     PAF (paroxysmal atrial fibrillation)     PVD (peripheral vascular disease)    ,   Past Surgical History:   Procedure Laterality Date    ANKLE SURGERY      RIGHT    APPENDECTOMY      CARDIAC CATHETERIZATION      LEFT    CORONARY STENT PLACEMENT      HYSTERECTOMY      INSERTION HEMODIALYSIS CATHETER Right 10/19/2023    Procedure: HEMODIALYSIS CATHETER INSERTION;  Surgeon: Bartolome Schwartz MD;  Location: [facility];  Service: General;  Laterality: Right;   ,   Family History   Problem Relation Age of Onset    Heart disease Mother     Heart attack Mother 73    Fibromyalgia Mother     Heart attack Father 72    Ovarian cancer Sister     Coronary artery disease Other     Breast cancer Neg Hx    ,   Social History     Tobacco Use    Smoking status: Every Day     Current packs/day: 0.50      Average packs/day: 0.5 packs/day for 30.0 years (15.0 ttl pk-yrs)     Types: Electronic Cigarette, Cigarettes     Passive exposure: Never    Smokeless tobacco: Never   Vaping Use    Vaping status: Never Used   Substance Use Topics    Alcohol use: No    Drug use: No   ,   Medications Prior to Admission   Medication Sig Dispense Refill Last Dose/Taking    albuterol sulfate  (90 Base) MCG/ACT inhaler Inhale 2 puffs Every 4 (Four) Hours As Needed for Wheezing. 18 g 0 Past Month    amLODIPine (NORVASC) 2.5 MG tablet Take 1 tablet by mouth Daily.   1/18/2025    aspirin 81 MG chewable tablet Chew 1 tablet Daily.   1/18/2025 Morning    chlorthalidone (HYGROTON) 25 MG tablet Take 1 tablet by mouth Daily.   1/18/2025    Eliquis 5 MG tablet tablet Take 1 tablet by mouth Every 12 (Twelve) Hours.   1/18/2025    folic acid (FOLVITE) 1 MG tablet Take 1 tablet by mouth Daily.   1/18/2025    gabapentin (NEURONTIN) 600 MG tablet Take 0.5 tablets by mouth 3 (Three) Times a Day As Needed (nerve pain).   1/18/2025    hydrALAZINE (APRESOLINE) 100 MG tablet Take 1 tablet by mouth 3 (Three) Times a Day.   1/18/2025    HYDROcodone-acetaminophen (NORCO)  MG per tablet Take 1 tablet by mouth Every 8 (Eight) Hours As Needed for Moderate Pain.   1/18/2025    ipratropium-albuterol (DUO-NEB) 0.5-2.5 mg/3 ml nebulizer Take 3 mL by nebulization Every 4 (Four) Hours As Needed for Wheezing. 90 mL 1 Past Month    levothyroxine (SYNTHROID, LEVOTHROID) 88 MCG tablet Take 1 tablet by mouth Daily.   1/18/2025    metoprolol tartrate (LOPRESSOR) 25 MG tablet Take 0.5 tablets by mouth 2 (Two) Times a Day.   1/18/2025    ondansetron ODT (ZOFRAN-ODT) 4 MG disintegrating tablet Take 1 tablet by mouth Every 8 (Eight) Hours As Needed for Nausea or Vomiting for up to 3 days. 9 tablet 0 Past Week    pantoprazole (PROTONIX) 40 MG EC tablet Take 1 tablet by mouth Daily.   1/18/2025    rosuvastatin (CRESTOR) 10 MG tablet Take 1 tablet by mouth Daily.    1/18/2025    sertraline (ZOLOFT) 25 MG tablet Take 1 tablet by mouth Daily.   1/18/2025   , Scheduled Meds:  sodium chloride, 10 mL, Intravenous, Q12H    , Continuous Infusions:   , PRN Meds:    senna-docusate sodium **AND** polyethylene glycol **AND** bisacodyl **AND** bisacodyl    nitroglycerin    sodium chloride    sodium chloride    sodium chloride and Allergies:  Xanax [alprazolam]    REVIEW OF SYSTEMS  More than 10 point review of systems was done. Pertinent items are noted in HPI, all other systems reviewed and negative    Objective     Intake/Output                   01/19/25 0701 - 01/20/25 0700     [id number] 5547-4934 Total              Intake    P.O.  240  -- 240    I.V.  100  -- 100    Blood  369.8  -- 369.8    Volume (Transfuse RBC Infuse Each Unit Over: 2H) 369.8 -- 369.8    Total Intake 709.8 -- 709.8       Output    Urine  800  -- 800    Total Output 800 -- 800             VITAL SIGNS  Temp:  [97.6 °F (36.4 °C)-98.4 °F (36.9 °C)] 97.7 °F (36.5 °C)  Heart Rate:  [72-90] 81  Resp:  [18-22] 20  BP: (127-176)/() 159/77    PHYSICAL EXAMINATION:    GENERAL EXAM  Laying in bed     MENTAL STATUS EXAM  Alert and oriented, able to answer questions    NECK EXAM  JVP is not elevated, carotid exam normal    CARDIOVASCULAR EXAM  Regular rate and rhythm, no murmurs noted, no added heart sounds, normal apical pulsation  2+ bilateral lower extremity edema  2+ radial pulses bilateral, 2+ femoral/tibial pulses bilaterally    MUSCULOSKELETAL EXAM  No cyanosis or clubbing noted in the digits    RESPIRATORY EXAM  Bilateral decreased intensity of breath sounds    ABDOMINAL EXAM  Abdomen soft and non tender, normal bowel sounds    SKIN EXAM  No new rashes    CLINICAL DATA REVIEW :    CBC, Renal functions, Serum electrolytes, Acid base labs reviewed  12 lead EKG was reviewed and demonstrated sinus rhythm at a rate of 90 2  Independent review of CXR and demonstrated findings consistent with; minimal bilateral pulmonary  "congestion   Discussed the labs with Dr. Ford       WBC WBC   Date Value Ref Range Status   01/19/2025 8.42 3.40 - 10.80 10*3/mm3 Final   01/19/2025 8.73 3.40 - 10.80 10*3/mm3 Final      HGB Hemoglobin   Date Value Ref Range Status   01/19/2025 8.1 (L) 12.0 - 15.9 g/dL Final   01/19/2025 8.5 (L) 12.0 - 15.9 g/dL Final   01/19/2025 8.4 (L) 12.0 - 15.9 g/dL Final   01/19/2025 6.9 (C) 12.0 - 15.9 g/dL Final      HCT Hematocrit   Date Value Ref Range Status   01/19/2025 27.3 (L) 34.0 - 46.6 % Final   01/19/2025 30.5 (L) 34.0 - 46.6 % Final   01/19/2025 28.9 (L) 34.0 - 46.6 % Final   01/19/2025 24.1 (L) 34.0 - 46.6 % Final      Platlets No results found for: \"LABPLAT\"   MCV MCV   Date Value Ref Range Status   01/19/2025 78.4 (L) 79.0 - 97.0 fL Final   01/19/2025 75.5 (L) 79.0 - 97.0 fL Final          Sodium Sodium   Date Value Ref Range Status   01/19/2025 133 (L) 136 - 145 mmol/L Final   01/19/2025 133 (L) 136 - 145 mmol/L Final      Potassium Potassium   Date Value Ref Range Status   01/19/2025 3.9 3.5 - 5.2 mmol/L Final     Comment:     Slight hemolysis detected by analyzer. Result may be falsely elevated.   01/19/2025 4.2 3.5 - 5.2 mmol/L Final      Chloride Chloride   Date Value Ref Range Status   01/19/2025 98 98 - 107 mmol/L Final   01/19/2025 98 98 - 107 mmol/L Final      CO2 CO2   Date Value Ref Range Status   01/19/2025 20.6 (L) 22.0 - 29.0 mmol/L Final   01/19/2025 25.4 22.0 - 29.0 mmol/L Final      BUN BUN   Date Value Ref Range Status   01/19/2025 33 (H) 8 - 23 mg/dL Final   01/19/2025 35 (H) 8 - 23 mg/dL Final      Creatinine Creatinine   Date Value Ref Range Status   01/19/2025 2.06 (H) 0.57 - 1.00 mg/dL Final   01/19/2025 2.12 (H) 0.57 - 1.00 mg/dL Final      Calcium Calcium   Date Value Ref Range Status   01/19/2025 9.3 8.6 - 10.5 mg/dL Final   01/19/2025 9.0 8.6 - 10.5 mg/dL Final      PO4 No results found for: \"CAPO4\"   Albumin Albumin   Date Value Ref Range Status   01/19/2025 3.7 3.5 - 5.2 g/dL " "Final   01/19/2025 3.6 3.5 - 5.2 g/dL Final      Magnesium No results found for: \"MG\"   Uric Acid No results found for: \"URICACID\"     Radiology results :     Imaging Results (Last 72 Hours)       Procedure Component Value Units Date/Time    XR Chest 2 View [[id number]] Collected: 01/19/25 0232     Updated: 01/19/25 0235    Narrative:      PROCEDURE: Portable chest x-ray examination performed on January 19, 2025. Single view.     HISTORY: Shortness of breath. Difficulty breathing.     COMPARISON: None.     FINDINGS:     Enlarged heart size.  Central pulmonary vascular congestion with edema.  Mild edema.  No pleural effusion.  No pneumothorax  No fracture or foreign body.  No free air in the upper abdomen.       Impression:         Enlarged heart size  Central pulmonary vascular congestion.  Mild edema.  No pleural effusion.  No pneumothorax.  No fracture or foreign body.     This report was finalized on 1/19/2025 2:33 AM by Ralf Anglin MD.                 Medications:      sodium chloride, 10 mL, Intravenous, Q12H           Assessment & Plan       Acute on chronic anemia    Acute cholecystitis    -Acute hypoxic respiratory failure likely multifactorial  - Acute symptomatic on chronic anemia  - Chronic kidney disease stage IIIb  - History of dialysis  - Acute metabolic acidosis, normal anion gap  - Acute hyponatremia    Acute hypoxic respiratory failure likely multifactorial including acute symptomatic anemia mild fluid overload likely also been contributing  Patient's baseline creatinine appears to be around 2, admitted with 2  Mild hyponatremia likely due to hypervolemia  -Patient received Bumex 2 mg, will hold further dosing  - IV albumin 1 dose today  - Will get iron panel      Continue strict intake and output record  Please avoid any nephrotoxic agents, hypotension and adjust medications according to estimated GFR    REVIEWED NOTES OF CLINICAL TEAMS INVOLVED WITH PATIENT CARE.     DISCUSSED IN DETAIL WITH " PATIENT AND/OR FAMILY ABOUT CURRENT CLINICAL CONDITIONS    DISCUSSED IN DETAIL WITH PATIENT AND/OR FAMILY ABOUT RISKS ASSOCIATED WITH CURRENT CLINICAL CONDITION AND RISK INVOLVED WITH CURRENT MANAGEMENT.     CODE STATUS REVIEWED.    DISCUSSED IN DETAIL WITH HOSPITALIST    Vani León MD  01/19/25  18:25 EST      Electronically signed by Vani León MD at 01/19/25 5631       Windy Neal MD at 01/19/25 1615        Consult Orders    1. Inpatient General Surgery Consult [[id number]] ordered by Hong Westfall MD at 01/19/25 0551                 Patient Name:  Lesley Michel  YOB: 1955  [id number]       Patient Care Team:  Woodrow Raymond APRN as PCP - General (Family Medicine)      General Surgery Consult Note     Date of Consultation: 01/19/25    Consulting Physician : Fanny Ford MD    Reason for Consult : cholecystitis     Subjective     I have been asked to see  Lesley Michel , a 69 y.o. female in consultation for acute cholecystitis. Patient reports persistent right upper quadrant abdominal pain that occurs after eating. She had presented to the emergency department once before with similar complaints. Ultrasound on this admission shows a thickened gallbladder wall.     Allergy:   Allergies   Allergen Reactions    Xanax [Alprazolam] Other (See Comments)     Severe agitation per patient and family       Medications:  sodium chloride, 10 mL, Intravenous, Q12H         No current facility-administered medications on file prior to encounter.     Current Outpatient Medications on File Prior to Encounter   Medication Sig    albuterol sulfate  (90 Base) MCG/ACT inhaler Inhale 2 puffs Every 4 (Four) Hours As Needed for Wheezing.    amLODIPine (NORVASC) 2.5 MG tablet Take 1 tablet by mouth Daily.    aspirin 81 MG chewable tablet Chew 1 tablet Daily.    chlorthalidone (HYGROTON) 25 MG tablet Take 1 tablet by mouth Daily.    Eliquis 5 MG tablet tablet Take 1 tablet by  mouth Every 12 (Twelve) Hours.    folic acid (FOLVITE) 1 MG tablet Take 1 tablet by mouth Daily.    gabapentin (NEURONTIN) 600 MG tablet Take 0.5 tablets by mouth 3 (Three) Times a Day As Needed (nerve pain).    hydrALAZINE (APRESOLINE) 100 MG tablet Take 1 tablet by mouth 3 (Three) Times a Day.    HYDROcodone-acetaminophen (NORCO)  MG per tablet Take 1 tablet by mouth Every 8 (Eight) Hours As Needed for Moderate Pain.    ipratropium-albuterol (DUO-NEB) 0.5-2.5 mg/3 ml nebulizer Take 3 mL by nebulization Every 4 (Four) Hours As Needed for Wheezing.    levothyroxine (SYNTHROID, LEVOTHROID) 88 MCG tablet Take 1 tablet by mouth Daily.    metoprolol tartrate (LOPRESSOR) 25 MG tablet Take 0.5 tablets by mouth 2 (Two) Times a Day.    ondansetron ODT (ZOFRAN-ODT) 4 MG disintegrating tablet Take 1 tablet by mouth Every 8 (Eight) Hours As Needed for Nausea or Vomiting for up to 3 days.    pantoprazole (PROTONIX) 40 MG EC tablet Take 1 tablet by mouth Daily.    rosuvastatin (CRESTOR) 10 MG tablet Take 1 tablet by mouth Daily.    sertraline (ZOLOFT) 25 MG tablet Take 1 tablet by mouth Daily.    [DISCONTINUED] doxycycline (MONODOX) 100 MG capsule Take 1 capsule by mouth every 12 hours for 5 days as part of COPD Rescue Kit. (Only Start if in YELLOW ZONE.) (Patient not taking: Reported on 6/3/2024)    [DISCONTINUED] gabapentin (NEURONTIN) 800 MG tablet Take 1 tablet by mouth 2 (Two) Times a Day As Needed (nerve pain). (Patient not taking: Reported on 6/3/2024)    [DISCONTINUED] hydrALAZINE (APRESOLINE) 25 MG tablet Take 1 tablet by mouth Every 8 (Eight) Hours for 30 days. (Patient taking differently: Take 4 tablets by mouth 3 (Three) Times a Day.)    [DISCONTINUED] HYDROcodone-acetaminophen (NORCO) 5-325 MG per tablet Take 1 tablet by mouth Every 8 (Eight) Hours As Needed for Severe Pain for up to 3 days.    [DISCONTINUED] ipratropium-albuterol (DUO-NEB) 0.5-2.5 mg/3 ml nebulizer Take 3 mL by neb every 30 minutes as needed  for shortness of air for up to 6 doses. Part of COPD Rescue Kit. (Only Start if in YELLOW ZONE.)    [DISCONTINUED] metoprolol tartrate (LOPRESSOR) 25 MG tablet Take 1/2 tablet by mouth 2 (Two) Times a Day for 30 days.    [DISCONTINUED] montelukast (SINGULAIR) 10 MG tablet Take 1 tablet by mouth Every Night.    [DISCONTINUED] naloxone (NARCAN) 4 MG/0.1ML nasal spray Call 911. Don't prime. [place] in 1 nostril for overdose. Repeat in 2-3 minutes in other nostril if no or minimal breathing/responsiveness.    [DISCONTINUED] nitroglycerin (NITROSTAT) 0.4 MG SL tablet Place 1 tablet under the tongue Every 5 (Five) Minutes As Needed for Chest Pain.    [DISCONTINUED] predniSONE (DELTASONE) 20 MG tablet Take 2 tablets by mouth Daily. Take 2 tablets daily for 5 days as part of COPD Rescue Kit. (Only Start if in YELLOW ZONE.) (Patient not taking: Reported on 6/3/2024)    [DISCONTINUED] rosuvastatin (CRESTOR) 10 MG tablet Take 1 tablet by mouth Every Night. (Patient not taking: Reported on 6/3/2024)    [DISCONTINUED] sertraline (ZOLOFT) 25 MG tablet Take 1 tablet by mouth Daily for 30 days.    [DISCONTINUED] sodium bicarbonate 650 MG tablet Take 1 tablet by mouth 2 (Two) Times a Day.    [DISCONTINUED] vitamin B-12 (CYANOCOBALAMIN) 1000 MCG tablet Take 1 tablet by mouth Daily. (Patient not taking: Reported on 6/3/2024)       PMHx:   Past Medical History:   Diagnosis Date    Anxiety     Arthritis     Cigarette smoker 02/10/2022    CKD (chronic kidney disease) stage 4, GFR 15-29 ml/min     MAICO on top of CKD IIIa, HD from 10/23-4/24, some recovery to CKD IV    COPD (chronic obstructive pulmonary disease)     Coronary artery disease     H/O heart artery stent     Hyperlipidemia     Hypertension     hypothyroidism     Obesity     PAF (paroxysmal atrial fibrillation)     PVD (peripheral vascular disease)        Past Surgical History:  Noncontributory     Family History: Noncontributory     Social History:  Noncontributory      Review of  "Systems   Pertinent items are noted in HPI     Constitutional: No fevers, chills or malaise   Eyes: Denies visual changes    Cardiovascular: Denies chest pain, palpitations   Pulmonary: Denies cough or shortness of breath   Abdominal/ GI: Nontender    Genitourinary: Denies dysuria or hematuria   Musculoskeletal: Denies any but chronic joint aches, pains or deformities   Psychiatric: No recent mood changes   Neurologic: No paresthesias or loss of function         Objective     Physical Exam:      Vital Signs  /77 (BP Location: Right arm, Patient Position: Lying)   Pulse 81   Temp 97.7 °F (36.5 °C) (Oral)   Resp 20   Ht 154.9 cm (61\")   Wt 82.6 kg (182 lb)   SpO2 96%   BMI 34.39 kg/m²     Intake/Output Summary (Last 24 hours) at 1/19/2025 1615  Last data filed at 1/19/2025 1613  Gross per 24 hour   Intake 709.75 ml   Output 800 ml   Net -90.25 ml         Physical Exam:    Head: Normocephalic, atraumatic.   Eyes: Pupils equal, round, react to light   Mouth: No lesions noted  CV: Regular rate and rhythm   Lungs: Bilateral chest rise and fall, no use of accessory muscles   Abdomen: Soft, nontender, nondistended  Extremities:  No cyanosis, clubbing or edema bilaterally   Neurologic: No gross deficits      Assessment and Plan:    Problem List Items Addressed This Visit          Hematology and Neoplasia    * (Principal) Acute on chronic anemia - Primary     Other Visit Diagnoses       Dyspnea, unspecified type                 Active Hospital Problems    Diagnosis  POA    **Acute on chronic anemia [D64.9]  Yes      Resolved Hospital Problems    Diagnosis Date Resolved POA    A-fib [I48.91] 01/19/2025 Unknown      Ms Michel is a 70 yo F with acute cholecystitis. Will proceed to the operating room tomorrow for cholecystectomy.       Windy Neal MD  01/19/25  16:15 EST          Electronically signed by Windy Neal MD at 01/19/25 2584       "

## 2025-01-21 ENCOUNTER — READMISSION MANAGEMENT (OUTPATIENT)
Dept: CALL CENTER | Facility: HOSPITAL | Age: 70
End: 2025-01-21
Payer: MEDICARE

## 2025-01-21 VITALS
HEIGHT: 61 IN | WEIGHT: 205.5 LBS | RESPIRATION RATE: 18 BRPM | SYSTOLIC BLOOD PRESSURE: 169 MMHG | TEMPERATURE: 97.6 F | BODY MASS INDEX: 38.8 KG/M2 | HEART RATE: 80 BPM | OXYGEN SATURATION: 93 % | DIASTOLIC BLOOD PRESSURE: 93 MMHG

## 2025-01-21 LAB
ALBUMIN SERPL-MCNC: 3.8 G/DL (ref 3.5–5.2)
ALBUMIN/GLOB SERPL: 1.2 G/DL
ALP SERPL-CCNC: 107 U/L (ref 39–117)
ALT SERPL W P-5'-P-CCNC: 7 U/L (ref 1–33)
ANION GAP SERPL CALCULATED.3IONS-SCNC: 10.5 MMOL/L (ref 5–15)
ANISOCYTOSIS BLD QL: NORMAL
AST SERPL-CCNC: 18 U/L (ref 1–32)
BASOPHILS # BLD AUTO: 0.02 10*3/MM3 (ref 0–0.2)
BASOPHILS NFR BLD AUTO: 0.2 % (ref 0–1.5)
BILIRUB SERPL-MCNC: 0.4 MG/DL (ref 0–1.2)
BUN SERPL-MCNC: 33 MG/DL (ref 8–23)
BUN/CREAT SERPL: 15.6 (ref 7–25)
CALCIUM SPEC-SCNC: 9.1 MG/DL (ref 8.6–10.5)
CHLORIDE SERPL-SCNC: 97 MMOL/L (ref 98–107)
CO2 SERPL-SCNC: 24.5 MMOL/L (ref 22–29)
CREAT SERPL-MCNC: 2.11 MG/DL (ref 0.57–1)
DEPRECATED RDW RBC AUTO: 49.3 FL (ref 37–54)
EGFRCR SERPLBLD CKD-EPI 2021: 25.3 ML/MIN/1.73
EOSINOPHIL # BLD AUTO: 0 10*3/MM3 (ref 0–0.4)
EOSINOPHIL NFR BLD AUTO: 0 % (ref 0.3–6.2)
ERYTHROCYTE [DISTWIDTH] IN BLOOD BY AUTOMATED COUNT: 17.5 % (ref 12.3–15.4)
GLOBULIN UR ELPH-MCNC: 3.2 GM/DL
GLUCOSE SERPL-MCNC: 173 MG/DL (ref 65–99)
HCT VFR BLD AUTO: 28.1 % (ref 34–46.6)
HGB BLD-MCNC: 8 G/DL (ref 12–15.9)
HYPOCHROMIA BLD QL: NORMAL
IMM GRANULOCYTES # BLD AUTO: 0.04 10*3/MM3 (ref 0–0.05)
IMM GRANULOCYTES NFR BLD AUTO: 0.5 % (ref 0–0.5)
LYMPHOCYTES # BLD AUTO: 0.69 10*3/MM3 (ref 0.7–3.1)
LYMPHOCYTES NFR BLD AUTO: 8.3 % (ref 19.6–45.3)
MAGNESIUM SERPL-MCNC: 2.1 MG/DL (ref 1.6–2.4)
MCH RBC QN AUTO: 22.3 PG (ref 26.6–33)
MCHC RBC AUTO-ENTMCNC: 28.5 G/DL (ref 31.5–35.7)
MCV RBC AUTO: 78.3 FL (ref 79–97)
MONOCYTES # BLD AUTO: 0.11 10*3/MM3 (ref 0.1–0.9)
MONOCYTES NFR BLD AUTO: 1.3 % (ref 5–12)
NEUTROPHILS NFR BLD AUTO: 7.44 10*3/MM3 (ref 1.7–7)
NEUTROPHILS NFR BLD AUTO: 89.7 % (ref 42.7–76)
NRBC BLD AUTO-RTO: 0 /100 WBC (ref 0–0.2)
PHOSPHATE SERPL-MCNC: 3.9 MG/DL (ref 2.5–4.5)
PLAT MORPH BLD: NORMAL
PLATELET # BLD AUTO: 424 10*3/MM3 (ref 140–450)
PMV BLD AUTO: 9.5 FL (ref 6–12)
POTASSIUM SERPL-SCNC: 4.5 MMOL/L (ref 3.5–5.2)
PROT SERPL-MCNC: 7 G/DL (ref 6–8.5)
RBC # BLD AUTO: 3.59 10*6/MM3 (ref 3.77–5.28)
SODIUM SERPL-SCNC: 132 MMOL/L (ref 136–145)
WBC NRBC COR # BLD AUTO: 8.3 10*3/MM3 (ref 3.4–10.8)

## 2025-01-21 PROCEDURE — 85007 BL SMEAR W/DIFF WBC COUNT: CPT | Performed by: INTERNAL MEDICINE

## 2025-01-21 PROCEDURE — 99239 HOSP IP/OBS DSCHRG MGMT >30: CPT | Performed by: INTERNAL MEDICINE

## 2025-01-21 PROCEDURE — 25010000002 NA FERRIC GLUC CPLX PER 12.5 MG: Performed by: INTERNAL MEDICINE

## 2025-01-21 PROCEDURE — 85025 COMPLETE CBC W/AUTO DIFF WBC: CPT | Performed by: INTERNAL MEDICINE

## 2025-01-21 PROCEDURE — 80053 COMPREHEN METABOLIC PANEL: CPT | Performed by: INTERNAL MEDICINE

## 2025-01-21 PROCEDURE — 94761 N-INVAS EAR/PLS OXIMETRY MLT: CPT

## 2025-01-21 PROCEDURE — 83735 ASSAY OF MAGNESIUM: CPT | Performed by: INTERNAL MEDICINE

## 2025-01-21 PROCEDURE — 25810000003 SODIUM CHLORIDE 0.9 % SOLUTION 250 ML FLEX CONT: Performed by: INTERNAL MEDICINE

## 2025-01-21 PROCEDURE — 97161 PT EVAL LOW COMPLEX 20 MIN: CPT

## 2025-01-21 PROCEDURE — 94799 UNLISTED PULMONARY SVC/PX: CPT

## 2025-01-21 PROCEDURE — 84100 ASSAY OF PHOSPHORUS: CPT | Performed by: INTERNAL MEDICINE

## 2025-01-21 RX ORDER — TORSEMIDE 20 MG/1
40 TABLET ORAL DAILY
Status: DISCONTINUED | OUTPATIENT
Start: 2025-01-22 | End: 2025-01-21 | Stop reason: HOSPADM

## 2025-01-21 RX ORDER — AMOXICILLIN AND CLAVULANATE POTASSIUM 500; 125 MG/1; MG/1
1 TABLET, FILM COATED ORAL EVERY 12 HOURS SCHEDULED
Status: DISCONTINUED | OUTPATIENT
Start: 2025-01-21 | End: 2025-01-21 | Stop reason: HOSPADM

## 2025-01-21 RX ORDER — ONDANSETRON 4 MG/1
4 TABLET, ORALLY DISINTEGRATING ORAL EVERY 8 HOURS PRN
Qty: 9 TABLET | Refills: 0 | Status: SHIPPED | OUTPATIENT
Start: 2025-01-21 | End: 2025-01-24

## 2025-01-21 RX ORDER — TORSEMIDE 20 MG/1
40 TABLET ORAL ONCE
Status: COMPLETED | OUTPATIENT
Start: 2025-01-21 | End: 2025-01-21

## 2025-01-21 RX ORDER — AMLODIPINE BESYLATE 5 MG/1
2.5 TABLET ORAL DAILY
Status: DISCONTINUED | OUTPATIENT
Start: 2025-01-21 | End: 2025-01-21 | Stop reason: HOSPADM

## 2025-01-21 RX ORDER — CHLORTHALIDONE 50 MG/1
25 TABLET ORAL DAILY
Status: DISCONTINUED | OUTPATIENT
Start: 2025-01-21 | End: 2025-01-21 | Stop reason: HOSPADM

## 2025-01-21 RX ORDER — AMOXICILLIN AND CLAVULANATE POTASSIUM 500; 125 MG/1; MG/1
1 TABLET, FILM COATED ORAL EVERY 12 HOURS SCHEDULED
Qty: 9 TABLET | Refills: 0 | Status: SHIPPED | OUTPATIENT
Start: 2025-01-21 | End: 2025-01-26

## 2025-01-21 RX ORDER — ASPIRIN 81 MG/1
81 TABLET, CHEWABLE ORAL DAILY
Status: DISCONTINUED | OUTPATIENT
Start: 2025-01-21 | End: 2025-01-21 | Stop reason: HOSPADM

## 2025-01-21 RX ADMIN — FOLIC ACID 1 MG: 1 TABLET ORAL at 09:10

## 2025-01-21 RX ADMIN — METOPROLOL TARTRATE 12.5 MG: 25 TABLET, FILM COATED ORAL at 09:10

## 2025-01-21 RX ADMIN — SERTRALINE HYDROCHLORIDE 25 MG: 25 TABLET ORAL at 09:11

## 2025-01-21 RX ADMIN — AMOXICILLIN AND CLAVULANATE POTASSIUM 500 MG: 500; 125 TABLET, FILM COATED ORAL at 09:15

## 2025-01-21 RX ADMIN — SODIUM CHLORIDE 250 MG: 9 INJECTION, SOLUTION INTRAVENOUS at 11:30

## 2025-01-21 RX ADMIN — PANTOPRAZOLE SODIUM 40 MG: 40 TABLET, DELAYED RELEASE ORAL at 09:10

## 2025-01-21 RX ADMIN — APIXABAN 5 MG: 5 TABLET, FILM COATED ORAL at 09:11

## 2025-01-21 RX ADMIN — Medication 10 ML: at 09:11

## 2025-01-21 RX ADMIN — AMLODIPINE BESYLATE 2.5 MG: 5 TABLET ORAL at 09:10

## 2025-01-21 RX ADMIN — ASPIRIN 81 MG: 81 TABLET, CHEWABLE ORAL at 09:11

## 2025-01-21 RX ADMIN — LEVOTHYROXINE SODIUM 88 MCG: 0.09 TABLET ORAL at 09:11

## 2025-01-21 RX ADMIN — TORSEMIDE 40 MG: 20 TABLET ORAL at 09:11

## 2025-01-21 RX ADMIN — CHLORTHALIDONE 25 MG: 50 TABLET ORAL at 09:15

## 2025-01-21 NOTE — CASE MANAGEMENT/SOCIAL WORK
Discharge Planning Assessment  [facility]     Patient Name: Lesley Michel  MRN: [id number]  Today's Date: 1/21/2025    Admit Date: 1/19/2025    Plan: SS received consult per ns for discharge planning.  Pt has DME.  SS spoke with pt at bedside.  Pt sitting in bedside chair at time of assessment.  Pt resides at home alone at [address] [place] [place] with son Wes assisting as needed.  Pt stated she will return home at discharge.  Pt currently does not utilize home health services.  Pt's PCP is Woodrow Raymond.  SS will follow and assist with discharge needs.   Discharge Needs Assessment       Row Name 01/21/25 1139       Living Environment    People in Home child(devan), adult    Name(s) of People in Home Gabe Wes assists as needed.    Current Living Arrangements home    Primary Care Provided by self    Provides Primary Care For no one    Family Caregiver if Needed none    Quality of Family Relationships helpful;involved;supportive       Resource/Environmental Concerns    Resource/Environmental Concerns none       Transition Planning    Patient/Family Anticipates Transition to home with family    Patient/Family Anticipated Services at Transition     Transportation Anticipated family or friend will provide                   Discharge Plan       Row Name 01/21/25 1140       Plan    Plan SS received consult per [facility] for discharge planning.  Pt has DME.  SS spoke with pt at bedside.  Pt sitting in bedside chair at time of assessment.  Pt resides at home alone at [address] [place] with gabe Harvey assisting as needed.  Pt stated she will return home at discharge.  Pt currently does not utilize home health services.  Pt's PCP is Woodrow Raymond.  SS will follow and assist with discharge needs.                  Continued Care and Services - Admitted Since 1/19/2025    No active coordination exists for this encounter.       Expected Discharge Date and Time       Expected Discharge Date Expected Discharge Time     Jan 21, 2025            Demographic Summary       Row Name 01/21/25 1139       General Information    Admission Type inpatient    Arrived From home    Referral Source nursing    Reason for Consult discharge planning    Preferred Language English                    Mary Murphy, JAVEDW

## 2025-01-21 NOTE — OP NOTE
OPERATIVE NOTE  Patient Name:  Lesley Michel  YOB: 1955  [id number]    Date of Surgery:  1/20/25     PREOPERATIVE DIAGNOSIS: Acute cholecystitis      POSTOPERATIVE DIAGNOSIS: Same      PROCEDURE PERFORMED:   Robotic cholecystectomy using da Travis     SURGEON: Windy Neal MD     SPECIMENS: gallbladder      EBL: 10     ANESTHESIA: General.      FINDINGS:   1.  Acutely inflamed appearing gallbladder with normal biliary anatomy      INDICATIONS: The patient is a 68 yo F who presented with severe abdominal pain and back pain. Found to have acute cholecystitis. Risks and benefits were discussed including the risk of possible common bile duct injuries and possible post-operative implications of this. They are understanding of the risks and agreeable to proceeding.       DESCRIPTION OF PROCEDURE:               After obtaining informed consent, the patient was taken to the operating room and placed in supine position. After appropriate DVT and antibiotic prophylaxis, general anesthesia was induced. TAP blocks were placed by the anesthesia staff bilaterally to aid in post-op pain control.  The abdomen was prepped and draped in standard sterile fashion.     A Veress needle was inserted at Morel's point and a saline drop test was successful. Opening pressures were appropriate and the abdomen was insufflated.   An 8 mm robotic trocar was placed in the infraumbilical incision and the abdomen was entered with optiview entry. A second 8 mm robotic trocar was placed in the left mid rectus. Two additional 8 mm trocars were placed in a linear fashion to the right of the umbilicus.   At this time the robot was docked to the trocars and the robotic camera inserted.  The patient had been placed in reverse Trendelenburg with left lateral tilt prior to docking.     I then exited the sterile field and entered the robotic console.  I used my 2nd left arm to elevate the gallbladder above the liver.  Firefly was used  to confirm normal biliary anatomy however the gallbladder did not light up.  The gallbladder had significant acute on chronic inflammatory changes present.  Mild adhesions of the peritoneum to the fundus and infundibulum were taken down with cautery hook.  The infundibulum was exposed and grasped and retracted laterally and inferiorly.  The peritoneum on the anterior and posterior surface of the gallbladder was opened with the cautery hook.   The cystic plate was exposed posteriorly. The gallbladder was entered and there was hydrops present. I then divided the gallbladder and this was set to the side. I then dissected out the cystic duct that was directly beneath an impacted stone. Two clips were placed on the cystic duct below the stone.   The cystic duct was divided with the cut mode of the cautery hook with no evidence of cystic duct stump leak.  This was set to the side.  The gallbladder bed was hemostatic.      I then exited the console and returned to bedside for completion. The gallbladder and the additional piece with the impacted stone was placed into an endocatch bag. I removed the gallbladder and 8 mm trocar from the infraumbilical site. This was closed with a Delonte-Jos and a 0 Vicryl suture under direct visualization. The remaining robotic trocars were removed. The skin was closed with 4-0 Monocryl and surgical glue.      At the end of the case the instrument count was correct. The patient was awoken from general anesthesia and transported to PACU for further recovery.      COMPLICATIONS: None

## 2025-01-21 NOTE — PLAN OF CARE
Goal Outcome Evaluation:   Pt being discharged home today. IV and tele removed.

## 2025-01-21 NOTE — THERAPY EVALUATION
Acute Care - Physical Therapy Initial Evaluation   [facility]     Patient Name: Lesley Michel  : 1955  MRN: [id number]  Today's Date: 2025      Visit Dx:     ICD-10-CM ICD-9-CM   1. Acute on chronic anemia  D64.9 285.9   2. Dyspnea, unspecified type  R06.00 786.09   3. Acute cholecystitis  K81.0 575.0     Patient Active Problem List   Diagnosis    Hypertension    Hyperlipidemia    Migraine without aura    CAD, chronically occluded collateralized dominant RCA, status post drug-eluting stent to LAD , high-grade stenosis of the ostium of the small D1 and 60% mid RCA posterior lateral branch    Chronic renal failure, stage 2 (mild)    Tobacco use    Bilateral lower extremity edema    Other specified hypothyroidism    Cigarette smoker    Class 3 severe obesity without serious comorbidity with body mass index (BMI) of 40.0 to 44.9 in adult    Acute hypoxic respiratory failure    Acute renal failure (ARF)    Acute on chronic anemia     Past Medical History:   Diagnosis Date    Anxiety     Arthritis     CHF (congestive heart failure)     Cigarette smoker 02/10/2022    CKD (chronic kidney disease) stage 4, GFR 15-29 ml/min     MAICO on top of CKD IIIa, HD from 10/23-, some recovery to CKD IV    COPD (chronic obstructive pulmonary disease)     Coronary artery disease     Elevated cholesterol     GERD (gastroesophageal reflux disease)     H/O heart artery stent     History of transfusion     Hyperlipidemia     Hypertension     hypothyroidism     Obesity     PAF (paroxysmal atrial fibrillation)     PVD (peripheral vascular disease)     x2 stents     Past Surgical History:   Procedure Laterality Date    ANKLE SURGERY      RIGHT    APPENDECTOMY      CARDIAC CATHETERIZATION      LEFT     SECTION      CORONARY STENT PLACEMENT      HYSTERECTOMY      INSERTION HEMODIALYSIS CATHETER Right 10/19/2023    Procedure: HEMODIALYSIS CATHETER INSERTION;  Surgeon: Bartolome Schwartz MD;  Location: [facility] OR;   Service: General;  Laterality: Right;     PT Assessment (Last 12 Hours)       PT Evaluation and Treatment       Row Name 01/21/25 1418          Physical Therapy Time and Intention    Subjective Information no complaints  -KM     Document Type evaluation  -KM     Mode of Treatment individual therapy;physical therapy  -KM     Patient Effort good  -KM     Symptoms Noted During/After Treatment none  -KM       Row Name 01/21/25 1418          General Information    Patient Profile Reviewed yes  -KM     Patient Observations alert;cooperative;agree to therapy  -KM     Prior Level of Function independent:;all household mobility;ADL's  -KM     Existing Precautions/Restrictions no known precautions/restrictions  -KM     Risks Reviewed patient:;LOB;nausea/vomiting;dizziness;increased discomfort  -KM     Benefits Reviewed patient:;improve function;increase independence;increase strength;increase balance  -KM     Barriers to Rehab none identified  -KM       Row Name 01/21/25 1418          Living Environment    Current Living Arrangements home  -KM     People in Home child(devan), adult  -KM     Primary Care Provided by self  -KM       Row Name 01/21/25 1418          Home Use of Assistive/Adaptive Equipment    Equipment Currently Used at Home oxygen  -KM       Row Name 01/21/25 1418          Cognition    Affect/Mental Status (Cognition) [facility]  -     Orientation Status (Cognition) oriented x 4  -KM     Follows Commands (Cognition) WFL  -       Row Name 01/21/25 1418          Range of Motion (ROM)    Range of Motion bilateral lower extremities;ROM is [facility]  -       Row Name 01/21/25 1418          Strength (Manual Muscle Testing)    Strength (Manual Muscle Testing) bilateral lower extremities;strength is [facility]       Row Name 01/21/25 1418          Bed Mobility    Comment, (Bed Mobility) up in chair upon arrival  -       Row Name 01/21/25 1418          Transfers    Transfers sit-stand transfer;stand-sit transfer  -[facility]  Name 01/21/25 1418          Sit-Stand Transfer    Sit-Stand [place] (Transfers) supervision  -KM       Row Name 01/21/25 1418          Stand-Sit Transfer    Stand-Sit [place] (Transfers) supervision  -KM       Row Name 01/21/25 1418          Gait/Stairs (Locomotion)    Gait/Stairs Locomotion gait/ambulation independence;distance ambulated  -KM     [place] Level (Gait) supervision  -KM     Patient was able to Ambulate yes  -KM     Distance in Feet (Gait) 50  -KM     Pattern (Gait) step-through  -KM       Row Name 01/21/25 1418          Balance    Balance Assessment sitting static balance;standing dynamic balance  -KM     Static Sitting Balance independent  -KM     Position, Sitting Balance sitting in chair  -KM     Dynamic Standing Balance supervision  -KM       Row Name             Wound 01/20/25 1227 Right upper abdomen    Wound - Properties Group Placement Date: 01/20/25  -SS Placement Time: 1227  -SS Side: Right  -SS Orientation: upper  -SS Location: abdomen  -SS Primary Wound Type: Incision  -SS, verress needle site     Retired Wound - Properties Group Placement Date: 01/20/25  - Placement Time: 1227  -SS Side: Right  -SS Orientation: upper  -SS Location: abdomen  -SS Primary Wound Type: Incision  -SS, verress needle site     Retired Wound - Properties Group Placement Date: 01/20/25  - Placement Time: 1227  -SS Side: Right  -SS Orientation: upper  -SS Location: abdomen  -SS Primary Wound Type: Incision  -SS, verress needle site     Retired Wound - Properties Group Date first assessed: 01/20/25  -SS Time first assessed: 1227  -SS Side: Right  -SS Location: abdomen  -SS Primary Wound Type: Incision  -SS, verress needle site       Row Name             Wound 01/20/25 1229 abdomen    Wound - Properties Group Placement Date: 01/20/25  - Placement Time: 1229  -SS Location: abdomen  -SS Primary Wound Type: Incision  -SS, trocar sites x4     Retired Wound - Properties Group Placement Date: 01/20/25   -SS Placement Time: 1229 -SS Location: abdomen  -SS Primary Wound Type: Incision  -SS, trocar sites x4     Retired Wound - Properties Group Placement Date: 01/20/25  -SS Placement Time: 1229 -SS Location: abdomen  -SS Primary Wound Type: Incision  -SS, trocar sites x4     Retired Wound - Properties Group Date first assessed: 01/20/25  -SS Time first assessed: 1229 -SS Location: abdomen  -SS Primary Wound Type: Incision  -SS, trocar sites x4       Row Name 01/21/25 1418          Plan of Care Review    Plan of Care Reviewed With patient  -KM     Outcome Evaluation Pt. evaluation completed during PT session. She was able to perform functional mobility skills w/ supervision. She ambulated moderate distance w/ no AD. She tolerated PT session w/ no complaints. Pt. does not require skilled PT services at this time.  -KM       Row Name 01/21/25 1418          Therapy Assessment/Plan (PT)    Functional Level at Time of Evaluation (PT) supervision  -KM     Criteria for Skilled Interventions Met (PT) no;does not meet criteria for skilled intervention  -KM     Therapy Frequency (PT) evaluation only  -KM       Row Name 01/21/25 1418          Therapy Plan Review/Discharge Plan (PT)    Therapy Plan Review (PT) evaluation/treatment results reviewed;patient  -KM               User Key  (r) = Recorded By, (t) = Taken By, (c) = Cosigned By      Initials Name Provider Type    Jose M Fung, PT Physical Therapist    Dolores Davis, RN Registered Nurse                    Physical Therapy Education       Title: PT OT SLP Therapies (Done)       Topic: Physical Therapy (Done)       Point: Mobility training (Done)       Learning Progress Summary            Patient Acceptance, E,TB, VU by KM at 1/21/2025 1431                      Point: Home exercise program (Done)       Learning Progress Summary            Patient Acceptance, E,TB, VU by KM at 1/21/2025 1431                      Point: Body mechanics (Done)       Learning Progress  Summary            Patient Acceptance, E,TB, VU by  at 1/21/2025 1431                      Point: Precautions (Done)       Learning Progress Summary            Patient Acceptance, E,TB, VU by  at 1/21/2025 1431                                      User Key       Initials Effective Dates Name Provider Type Discipline     05/24/22 -  Jose M Anthony, PT Physical Therapist PT                  PT Recommendation and Plan  Anticipated Discharge Disposition (PT): home with assist  Therapy Frequency (PT): evaluation only  Plan of Care Reviewed With: patient  Outcome Evaluation: Pt. evaluation completed during PT session. She was able to perform functional mobility skills w/ supervision. She ambulated moderate distance w/ no AD. She tolerated PT session w/ no complaints. Pt. does not require skilled PT services at this time.       Time Calculation:    PT Charges       Row Name 01/21/25 1416             Time Calculation    PT Received On 01/21/25  -                User Key  (r) = Recorded By, (t) = Taken By, (c) = Cosigned By      Initials Name Provider Type     Jose M Anthony, PT Physical Therapist                  Therapy Charges for Today       Code Description Service Date Service Provider Modifiers Qty    [id number] HC PT EVAL LOW COMPLEXITY 4 1/21/2025 Jose M Anthony, PT GP 1            PT G-Codes  AM-PAC 6 Clicks Score (PT): 22    Jose M Anthony PT  1/21/2025

## 2025-01-21 NOTE — PLAN OF CARE
Goal Outcome Evaluation:  Plan of Care Reviewed With: patient        Progress: no change  Outcome Evaluation: Pt resting in bed during assessment, VSS, Pt C/O pain, PRN meds given orders. Pt has no other complaints or concerns at this time. Will cont. POC

## 2025-01-21 NOTE — NURSING NOTE
Family isn't able to bring portable oxygen tank when picking patient up. Called [facility] and they are binging a tank for the patient to go home on.

## 2025-01-21 NOTE — PROGRESS NOTES
NEPHROLOGY PROGRESS NOTE      INTERVAL HISTORY:      STATUS OF ACUTE ON CHRONIC PROBLEMS;  1.  Patient stated her shortness of breath is better, she is able to walk a little bit in room.  She continues to have some exertional dyspnea but her paroxysmal nocturnal dyspnea and orthopnea are better 2.  Patient denied any associated chest pain  2.  No headache visual changes or balance issues on walking  3.  Noted patient had about 1.6 L of urine output in last 24 hours her oral intake is also adequate    REVIEW OF SYSTEM;  1.  No nausea vomiting or diarrhea  2.  No rash on any part of the body      Intake/Output                         01/19/25 0701 - 01/20/25 0700 01/20/25 0701 - 01/21/25 0700     [id number] 8621-8770 Total 9401-7637 0897-3806 Total                 Intake    P.O.  240  0 240  120  240 360    I.V.  100  -- 100  --  -- --    Blood  369.8  -- 369.8  --  -- --    Volume (Transfuse RBC Infuse Each Unit Over: 2H) 369.8 -- 369.8 -- -- --    IV Piggyback  --  -- --  100  -- 100    Total Intake 709.8 0 709.8 220 240 460       Output    Urine  800  850 1650  300  250 550    Total Output  300 250 550             VITAL SIGNS :     Temp:  [97.6 °F (36.4 °C)-98.5 °F (36.9 °C)] 97.9 °F (36.6 °C)  Heart Rate:  [68-80] 73  Resp:  [14-20] 18  BP: (120-177)/(68-98) 153/79    PHYSICAL EXAMINATION:    GENERAL EXAM  Laying in bed  Comfortable     MENTAL STATUS EXAM  Alert and oriented, able to answer questions    NECK EXAM  JVP is not elevated, carotid exam normal    CARDIOVASCULAR EXAM  Regular rate and rhythm, no murmurs noted, no added heart sounds, normal apical pulsation  no edema in the lower extremities  2+ radial pulses bilateral, 2+ femoral/tibial pulses bilaterally    MUSCULOSKELETAL EXAM  No cyanosis or clubbing noted in the digits    RESPIRATORY EXAM  Lungs clear to auscultation bilaterally, respiratory effort normal    ABDOMINAL EXAM  Abdomen soft and non tender, normal bowel sounds    SKIN EXAM  No  "new rashes      Laboratory Data :     Albumin Albumin   Date Value Ref Range Status   01/21/2025 3.8 3.5 - 5.2 g/dL Final   01/20/2025 3.7 3.5 - 5.2 g/dL Final   01/19/2025 3.7 3.5 - 5.2 g/dL Final   01/19/2025 3.6 3.5 - 5.2 g/dL Final      Magnesium Magnesium   Date Value Ref Range Status   01/21/2025 2.1 1.6 - 2.4 mg/dL Final   01/20/2025 2.0 1.6 - 2.4 mg/dL Final          PTH               No results found for: \"PTH\"    CBC and coagulation:  Results from last 7 days   Lab Units 01/21/25  0041 01/20/25  0133 01/19/25  1147 01/19/25  0901 01/19/25  0826   LACTATE mmol/L  --   --   --   --  1.3   WBC 10*3/mm3 8.30 6.68  --  8.42  --    HEMOGLOBIN g/dL 8.0* 7.8* 8.1* 8.5* 8.4*   HEMATOCRIT % 28.1* 26.8* 27.3* 30.5* 28.9*   MCV fL 78.3* 77.0*  --  78.4*  --    MCHC g/dL 28.5* 29.1*  --  27.9*  --    PLATELETS 10*3/mm3 424 468*  --  474*  --      Acid/base balance:  Results from last 7 days   Lab Units 01/16/25  1527   PH, ARTERIAL pH units 7.430   PO2 ART mm Hg 57.0*   PCO2, ARTERIAL mm Hg 38.8   HCO3 ART mmol/L 25.7     Renal and electrolytes:    Results from last 7 days   Lab Units 01/21/25  0041 01/20/25  0133 01/19/25  0901 01/19/25  0247 01/16/25  1501   SODIUM mmol/L 132* 133* 133* 133* 129*   POTASSIUM mmol/L 4.5 4.2 3.9 4.2 4.0   MAGNESIUM mg/dL 2.1 2.0  --   --   --    CHLORIDE mmol/L 97* 98 98 98 95*   CO2 mmol/L 24.5 24.1 20.6* 25.4 23.3   BUN mg/dL 33* 31* 33* 35* 28*   CREATININE mg/dL 2.11* 2.05* 2.06* 2.12* 1.96*   CALCIUM mg/dL 9.1 9.1 9.3 9.0 9.2   PHOSPHORUS mg/dL 3.9 3.4  --   --   --      Estimated Creatinine Clearance: 27 mL/min (A) (by C-G formula based on SCr of 2.11 mg/dL (H)).  @GFRCG:3@   Liver and pancreatic function:  Results from last 7 days   Lab Units 01/21/25  0041 01/20/25  0133 01/19/25  0901 01/19/25  0247   ALBUMIN g/dL 3.8 3.7 3.7 3.6   BILIRUBIN mg/dL 0.4 0.6 0.5 0.3   ALK PHOS U/L 107 106 126* 135*   AST (SGOT) U/L 18 20 16 15   ALT (SGPT) U/L 7 7 6 <5   LIPASE U/L  --   --   --  " 40         Cardiac:  Results from last 7 days   Lab Units 01/19/25  0247   PROBNP pg/mL 15,776.0*     Liver and pancreatic function:  Results from last 7 days   Lab Units 01/21/25  0041 01/20/25  0133 01/19/25  0901 01/19/25  0247   ALBUMIN g/dL 3.8 3.7 3.7 3.6   BILIRUBIN mg/dL 0.4 0.6 0.5 0.3   ALK PHOS U/L 107 106 126* 135*   AST (SGOT) U/L 18 20 16 15   ALT (SGPT) U/L 7 7 6 <5   LIPASE U/L  --   --   --  40         CLINICAL DATA REVIEW  CBC, Renal functions, Serum electrolytes, Acid base labs reviewed  Discussed the labs with Dr. Ford     MEDICINES:     folic acid, 1 mg, Oral, Daily  levothyroxine, 88 mcg, Oral, Daily  metoprolol tartrate, 12.5 mg, Oral, BID  pantoprazole, 40 mg, Oral, Daily  rosuvastatin, 10 mg, Oral, Daily  sertraline, 25 mg, Oral, Daily  sodium chloride, 10 mL, Intravenous, Q12H             Assessment & Plan       -Acute hypoxic respiratory failure likely multifactorial  - Acute symptomatic on chronic anemia  - Chronic kidney disease stage IIIb  - History of dialysis  - Acute metabolic acidosis, normal anion gap  - Acute hyponatremia    Creatinine largely unchanged, remains at 2.1.  That is close to her baseline.  Patient is nonoliguric with adequate urine output in last 24 hours.    Acute hypoxic respiratory failure likely multifactorial including acute symptomatic anemia mild fluid overload likely also been contributing  Patient's baseline creatinine appears to be around 2, admitted with 2  Mild hyponatremia likely due to hypervolemia  - IV iron 1 dose today and then start on p.o. on discharge  - Start the patient on torsemide 50 mg daily and to be discharged with the same dose  - Strict intake and output record.   - Please avoid any nephrotoxic agents, hypotension and adjust medications according to estimated GFR.     TREATMENT WITH DIURETICS BEING MONITORED CLOSELY FOR TOXICITIES.     REVIEWED NOTES OF CLINICAL TEAMS INVOLVED WITH PATIENT CARE.     DISCUSSED IN DETAIL WITH PATIENT  AND/OR FAMILY ABOUT CURRENT CLINICAL CONDITION AND RESPONSE TO ONGOING MANAGEMENT.     DISCUSSED IN DETAIL WITH PATIENT AND/OR FAMILY ABOUT RISKS ASSOCIATED WITH CURRENT CLINICAL CONDITION AND RISK INVOLVED WITH CURRENT MANAGEMENT.     DISCUSSED IN DETAIL WITH HOSPITALIST    CODE STATUS REVIEWED,     Vani León MD  01/21/25  07:50 EST

## 2025-01-21 NOTE — DISCHARGE SUMMARY
[facility] HOSPITALISTS DISCHARGE SUMMARY    Patient Identification:  Name:  Lesley Michel  Age:  69 y.o.  Sex:  female  :  1955  MRN:  [id number]  Visit Number:  [id number]    Date of Admission: 2025  Date of Discharge: 2025    PCP: Woodrow Raymond, APRN    DISCHARGE DIAGNOSES  -Acute on chronic microcytic anemia, symptomatic, present on admission, in a patient with reported melena but none seen during the hospitalization  -Acute cholecystitis, present on admission, status post robotic cholecystectomy this admission  -History of PUD  -Mild exacerbation of HFpEF, present on admission, resolved  -Complicated by CKD stage IIIa (creatinine baseline around 2) and atrial fibrillation (chronic anticoagulation)  -History of essential hypertension  -History of hypothyroidism  -History of CAD  -History of COPD with chronic respiratory failure (2 L baseline)   -History of PVD  -History of migraines    CONSULTS    with nephrology  Dr. Neal with general surgery    PROCEDURES PERFORMED  2025: Transfusion of 1 unit of packed red blood cells  2025:  Robotic cholecystectomy using da Travis     HOSPITAL COURSE  Patient is a 69 y.o. female presented to [facility] on 2025 with shortness of air, as well as persisting abdominal pain.  Chest x-ray showed possible vascular congestion, with hemoglobin level of 6.9 grams per deciliter.  The patient was given an IV dose of Bumex while the blood transfusion was given.  CT of the abdomen and pelvis showed cholelithiasis; ultrasound of the gallbladder showed a thickened wall with a positive Garcia sign.  Therefore, the patient was admitted to the telemetry floor for further evaluation and treatment.  Please see the admitting history and physical for further details.  The patient was kept n.p.o. and maleimide entered for input and output closely as well as daily weights.  General surgery was consulted and the patient  underwent cholecystectomy the day after admission; surgeon was impressed by the degree of disease the gallbladder had.  Shortly after surgery, the patient was allowed to eat and drink, which she said did not cause any pain.  Nephrology was consulted and the patient's kidney function remained stable during the hospitalization.  The patient only required the one dose of Bumex.  The patient was sent home with torsemide for her edema and with Augmentin to finish the antibiotics for the acute cholecystitis.    On the day of discharge, patient was requesting to go home.  She was tolerating her regular diet.  She was able to perform her activities of daily living.  She was ambulating without any difficulty.      VITAL SIGNS:  Temp:  [97.9 °F (36.6 °C)-98.5 °F (36.9 °C)] 97.9 °F (36.6 °C)  Heart Rate:  [68-86] 86  Resp:  [18] 18  BP: (144-168)/(68-98) 154/93  SpO2:  [92 %-98 %] 93 %  on  Flow (L/min) (Oxygen Therapy):  [2-3] 2;   Device (Oxygen Therapy): nasal cannula    Body mass index is 38.85 kg/m².  Wt Readings from Last 3 Encounters:   01/21/25 93.2 kg (205 lb 8 oz)   01/16/25 81.6 kg (180 lb)   12/28/24 81.6 kg (179 lb 14.3 oz)       PHYSICAL EXAM:  Physical Exam  Vitals and nursing note reviewed.   Constitutional:       General: She is awake. She is not in acute distress.     Appearance: She is well-developed. She is morbidly obese. She is not ill-appearing.      Comments: On 2 L/min of nasal cannula oxygen.   HENT:      Head: Normocephalic and atraumatic.      Right Ear: External ear normal.      Left Ear: External ear normal.      Nose: Nose normal.   Eyes:      General: No scleral icterus.        Right eye: No discharge.         Left eye: No discharge.      Pupils: Pupils are equal, round, and reactive to light.   Cardiovascular:      Rate and Rhythm: Normal rate and regular rhythm.      Pulses: Normal pulses.      Heart sounds: No murmur heard.  Pulmonary:      Effort: Pulmonary effort is normal. No respiratory  distress.      Breath sounds: No wheezing or rales.   Abdominal:      General: Bowel sounds are normal. There is no distension.      Palpations: Abdomen is soft.      Comments: She was tender around the trocar sites.   Musculoskeletal:         General: No swelling, deformity or signs of injury.   Skin:     Capillary Refill: Capillary refill takes less than 2 seconds.      Coloration: Skin is not jaundiced or pale.   Neurological:      Mental Status: She is alert and oriented to person, place, and time. Mental status is at baseline.      Cranial Nerves: No cranial nerve deficit.   Psychiatric:         Mood and Affect: Mood normal.         Behavior: Behavior normal. Behavior is cooperative.         Thought Content: Thought content normal.         Judgment: Judgment normal.        DISCHARGE DISPOSITION   Stable       Discharge Medications        New Medications        Instructions Start Date   amoxicillin-clavulanate 500-125 MG per tablet  Commonly known as: AUGMENTIN   500 mg, Oral, Every 12 Hours Scheduled      Torsemide 40 MG tablet   40 mg, Oral, Daily   Start Date: January 22, 2025            Continue These Medications        Instructions Start Date   albuterol sulfate  (90 Base) MCG/ACT inhaler  Commonly known as: PROVENTIL HFA;VENTOLIN HFA;PROAIR HFA   2 puffs, Inhalation, Every 4 Hours PRN      amLODIPine 2.5 MG tablet  Commonly known as: NORVASC   2.5 mg, Oral, Daily      aspirin 81 MG chewable tablet   81 mg, Oral, Daily      chlorthalidone 25 MG tablet  Commonly known as: HYGROTON   25 mg, Oral, Daily      Eliquis 5 MG tablet tablet  Generic drug: apixaban   5 mg, Oral, Every 12 Hours Scheduled      folic acid 1 MG tablet  Commonly known as: FOLVITE   1 mg, Oral, Daily      gabapentin 600 MG tablet  Commonly known as: NEURONTIN   300 mg, Oral, 3 Times Daily PRN      HYDROcodone-acetaminophen  MG per tablet  Commonly known as: NORCO   1 tablet, Every 8 Hours PRN      ipratropium-albuterol 0.5-2.5  mg/3 ml nebulizer  Commonly known as: DUO-NEB   3 mL, Nebulization, Every 4 Hours PRN      levothyroxine 88 MCG tablet  Commonly known as: SYNTHROID, LEVOTHROID   88 mcg, Oral, Daily      metoprolol tartrate 25 MG tablet  Commonly known as: LOPRESSOR   12.5 mg, Oral, 2 Times Daily      ondansetron ODT 4 MG disintegrating tablet  Commonly known as: ZOFRAN-ODT   4 mg, Oral, Every 8 Hours PRN      pantoprazole 40 MG EC tablet  Commonly known as: PROTONIX   40 mg, Oral, Daily      rosuvastatin 10 MG tablet  Commonly known as: CRESTOR   10 mg, Oral, Daily      sertraline 25 MG tablet  Commonly known as: ZOLOFT   25 mg, Oral, Daily             Stop These Medications      hydrALAZINE 100 MG tablet  Commonly known as: APRESOLINE            Diet Instructions       Diet: Regular/House Diet, Cardiac Diets; Healthy Heart (2-3 Na+); Regular (IDDSI 7); Thin (IDDSI 0)      Discharge Diet:  Regular/House Diet  Cardiac Diets       Cardiac Diet: Healthy Heart (2-3 Na+)    Texture: Regular (IDDSI 7)    Fluid Consistency: Thin (IDDSI 0)          Activity Instructions       Activity as Tolerated            Additional Instructions for the Follow-ups that You Need to Schedule       Discharge Follow-up with PCP   As directed     Currently Documented PCP:    Woodrow Raymond APRN    PCP Phone Number:    [phone]     Follow Up Details: 2-7 days          Discharge Follow-up with Specified Provider: General surgery; 1 Week   As directed    To: General surgery   Follow Up: 1 Week               Follow-up Information       Woodrow Raymond APRN .    Specialty: Family Medicine  Why: 2-7 days  Contact information:  [address] [place] [address]  [phone]           TEST  RESULTS PENDING AT DISCHARGE  Pending Labs       Order Current Status    TISSUE EXAM, [facility] ([facility]) In process           CODE STATUS  Code Status and Medical Interventions: CPR (Attempt to Resuscitate); Full Support   Ordered at: 01/19/25 0551     Code  Status (Patient has no pulse and is not breathing):    CPR (Attempt to Resuscitate)     Medical Interventions (Patient has pulse or is breathing):    Full Support       Fanny Ford MD  [facility]  01/21/25  14:11 EST    Please note that this discharge summary required more than 30 minutes to complete.

## 2025-01-21 NOTE — CASE MANAGEMENT/SOCIAL WORK
Discharge Planning Assessment   [facility]     Patient Name: Lesley Michel  MRN: [id number]  Today's Date: 1/21/2025    Admit Date: 1/19/2025    Plan: Pt has home 02 at 4 liters nc with portable 02 tanks via [facility].  Pt transports via private auto per family.  SS will follow.    LIZBET Schwarz

## 2025-01-21 NOTE — PROGRESS NOTES
[facility] HOSPITALISTS CROSS COVER NOTE    Patient Identification:  Name:  Lesley Michel  Age:  69 y.o.  Sex:  female  :  1955  MRN:  [id number]  Visit number:  [id number]  Primary Care Provider:  Woodrow Raymond APRN    Length of stay in inpatient status:  1    Brief Update     The patient just returned from the operating room when I saw her today.  She had her gallbladder removed.  The patient was still under the effects of anesthesia when I saw her and thus I was not able to obtain a good history from her.  The patient was not in respiratory distress, though she was on nasal cannula oxygen postop.  We will repeat her blood work in the morning.  I have discussed patient with Dr. Neal, who would like patient to stay 1 more night due to the severity of the acute cholecystitis found in the operating room.  Patient is allowed to have a regular diet.  Will monitor her response to eating closely.    ASSESSMENT/PLAN:   Acute on chronic microcytic anemia  Reported melena, hx PUD  Concern for acute CHF  Complicated by CKD stage IV and atrial fibrillation on chronic anticoagulation  Cholelithiasis  HTN  HLD  Hypothyroidism  CAD  COPD with chronic respiratory failure  PVD    Fanny Ford MD  [facility] Hospitalist  25  22:50 EST

## 2025-01-21 NOTE — OUTREACH NOTE
Prep Survey      Flowsheet Row Responses   [facility] facility patient discharged from? [facility]   Is LACE score < 7 ? No   Eligibility Readm Mgmt   Discharge diagnosis Acute on chronic anemia, Acute cholecystitis, Lap barbara   Does the patient have one of the following disease processes/diagnoses(primary or secondary)? General Surgery   Does the patient have Home health ordered? No   Is there a DME ordered? No   Prep survey completed? Yes            Lisa BAUMAN - Registered Nurse

## 2025-01-21 NOTE — CASE MANAGEMENT/SOCIAL WORK
Discharge Planning Assessment   [place]     Patient Name: Lesley Michel  MRN: [id number]  Today's Date: 1/21/2025    Admit Date: 1/19/2025    Plan: Pt has home 02 at 4 liters nc with portable 02 tanks via [facility].  Pt transports via private auto per family.  SS will follow.       Discharge Plan       Row Name 01/21/25 1441       Plan    Final Discharge Disposition Code 01 - home or self-care    Final Note Pt to be discharged home on this date.      Row Name 01/21/25 1148       Plan    Plan Pt has home 02 at 4 liters nc with portable 02 tanks via [facility].  Pt transports via private auto per family.  SS will follow.                  Continued Care and Services - Admitted Since 1/19/2025    No active coordination exists for this encounter.       Expected Discharge Date and Time       Expected Discharge Date Expected Discharge Time    Jan 21, 2025             LIZBET Schwarz

## 2025-01-22 LAB — REF LAB TEST METHOD: NORMAL

## 2025-01-22 NOTE — PAYOR COMM NOTE
"CONTACT:  CESAR IVAN RN  UTILIZATION MANAGEMENT DEPT.   [facility]    [address] [place], [address]   PHONE:  [phone]   FAX: [phone]   NPI [id number]        [facility] 1/21/2025--auth pending    Ref # [id number]           Matthew Michel (69 y.o. Female)       Date of Birth   1955    Social Security Number       Address   [address] [facility] [address]    Home Phone   [phone]    MRN   [id number]       [facility]    Marital Status                               Admission Date   1/19/25    Admission Type   Emergency    Admitting Provider   Hong Westfall MD    Attending Provider       Department, Room/Bed   [facility], 3305/1S       Discharge Date   1/21/2025    Discharge Disposition   Home or Self Care    Discharge Destination   Home                              Attending Provider: (none)   Allergies: Xanax [Alprazolam]    Isolation: None   Infection: None   Code Status: Prior    Ht: 154.9 cm (60.98\")   Wt: 93.2 kg (205 lb 8 oz)    Admission Cmt: None   Principal Problem: Acute on chronic anemia [D64.9]                   Active Insurance as of 1/19/2025       Primary Coverage       Payor Plan Insurance Group Employer/Plan Group    ANTHEM MEDICARE REPLACEMENT ANTHEM MED ADV HMO [id number]       Payor Plan Address Payor Plan Phone Number Payor Plan Fax Number Effective Dates    [address] [phone]  1/1/2025 - None Entered    [facility] [id number]         Subscriber Name Subscriber Birth Date Member ID       MATTHEW MICHEL 1955 [id number]                     Emergency Contacts        (Rel.) Home Phone Work Phone Mobile Phone    LOYD MICHEL (Son) [phone] -- --    MIGUEL WEI (Grandchild) [phone] -- --              Discharge Summary    No notes of this type exist for this encounter.       Discharge Order (From admission, onward)       Start     Ordered    01/21/25 1358  Discharge " patient  Once        Expected Discharge Date: 01/21/25   Discharge Disposition: Home or Self Care   Physician of Record for Attribution - Please select from Treatment Team: LAINE PIMENTEL [[id number]]   Review needed by CMO to determine Physician of Record: No      Question Answer Comment   Physician of Record for Attribution - Please select from Treatment Team LAINE PIMENTEL    Review needed by CMO to determine Physician of Record No        01/21/25 141

## 2025-01-29 ENCOUNTER — READMISSION MANAGEMENT (OUTPATIENT)
Dept: CALL CENTER | Facility: HOSPITAL | Age: 70
End: 2025-01-29
Payer: MEDICARE

## 2025-01-30 NOTE — OUTREACH NOTE
General Surgery Week 1 Survey      Flowsheet Row Responses   [facility] patient discharged from? [facility]   Does the patient have one of the following disease processes/diagnoses(primary or secondary)? General Surgery   Week 1 attempt successful? Yes   Call start time 1853   Call end time 1901   Discharge diagnosis Acute on chronic anemia, Acute cholecystitis, Lap barbara   Person spoke with today (if not patient) and relationship pt   Meds reviewed with patient/caregiver? Yes   Is the patient having any side effects they believe may be caused by any medication additions or changes? No   Does the patient have all medications related to this admission filled (includes all antibiotics, pain medications, etc.) Yes   Is the patient taking all medications as directed (includes completed medication regime)? Yes   Does the patient have a follow up appointment scheduled with their surgeon? Yes   Has the patient kept scheduled appointments due by today? N/A   Psychosocial issues? No   Did the patient receive a copy of their discharge instructions? Yes   Nursing interventions Reviewed instructions with patient   What is the patient's perception of their health status since discharge? Improving   Nursing interventions Nurse provided patient education   Is the patient /caregiver able to teach back basic post-op care? Continue use of incentive spirometry at least 1 week post discharge, Practice 'cough and deep breath', Lifting as instructed by MD in discharge instructions, Keep incision areas clean,dry and protected   Is the patient/caregiver able to teach back signs and symptoms of incisional infection? Increased redness, swelling or pain at the incisonal site, Increased drainage or bleeding, Incisional warmth, Pus or odor from incision, Fever   Is the patient/caregiver able to teach back steps to recovery at home? Set small, achievable goals for return to baseline health, Make a list of questions for surgeon's appointment    If the patient is a current smoker, are they able to teach back resources for cessation? [id number]   Is the patient/caregiver able to teach back the hierarchy of who to call/visit for symptoms/problems? PCP, Specialist, Home health nurse, Urgent Care, ED, 911 Yes   Week 1 call completed? Yes   Is the patient interested in additional calls from an ambulatory ? No   Would this patient benefit from a Referral to Amb Social Work? No   Wrap up additional comments pt has surgeon appt on friday. Had issues while in the hospital and wants to talk to pt relations herself.   Call end time 1901            MATT LIN - Registered Nurse

## 2025-02-07 ENCOUNTER — READMISSION MANAGEMENT (OUTPATIENT)
Dept: CALL CENTER | Facility: HOSPITAL | Age: 70
End: 2025-02-07
Payer: MEDICARE

## 2025-02-07 NOTE — OUTREACH NOTE
General Surgery Week 2 Survey      Flowsheet Row Responses   [facility] facility patient discharged from? [facility]   Does the patient have one of the following disease processes/diagnoses(primary or secondary)? General Surgery   Week 2 attempt successful? No   Unsuccessful attempts Attempt 1            Amanda Cano Registered Nurse

## 2025-02-12 ENCOUNTER — READMISSION MANAGEMENT (OUTPATIENT)
Dept: CALL CENTER | Facility: HOSPITAL | Age: 70
End: 2025-02-12
Payer: MEDICARE

## 2025-02-12 NOTE — OUTREACH NOTE
General Surgery Week 2 Survey      Flowsheet Row Responses   [facility] facility patient discharged from? [facility]   Does the patient have one of the following disease processes/diagnoses(primary or secondary)? General Surgery   Week 2 attempt successful? No   Unsuccessful attempts Attempt 2  [All numbers listed were attempted,  no answer]            Margot H - Registered Nurse

## 2025-02-13 ENCOUNTER — READMISSION MANAGEMENT (OUTPATIENT)
Dept: CALL CENTER | Facility: HOSPITAL | Age: 70
End: 2025-02-13
Payer: MEDICARE

## 2025-02-13 ENCOUNTER — APPOINTMENT (OUTPATIENT)
Dept: GENERAL RADIOLOGY | Facility: HOSPITAL | Age: 70
DRG: 280 | End: 2025-02-13
Payer: MEDICARE

## 2025-02-13 ENCOUNTER — HOSPITAL ENCOUNTER (INPATIENT)
Facility: HOSPITAL | Age: 70
LOS: 5 days | Discharge: HOME | DRG: 280 | End: 2025-02-18
Attending: STUDENT IN AN ORGANIZED HEALTH CARE EDUCATION/TRAINING PROGRAM | Admitting: STUDENT IN AN ORGANIZED HEALTH CARE EDUCATION/TRAINING PROGRAM
Payer: MEDICARE

## 2025-02-13 DIAGNOSIS — J81.0 ACUTE PULMONARY EDEMA: Primary | ICD-10-CM

## 2025-02-13 DIAGNOSIS — J96.01 ACUTE HYPOXIC RESPIRATORY FAILURE: ICD-10-CM

## 2025-02-13 DIAGNOSIS — N17.9 ACUTE RENAL FAILURE, UNSPECIFIED ACUTE RENAL FAILURE TYPE: ICD-10-CM

## 2025-02-13 DIAGNOSIS — I21.4 NSTEMI (NON-ST ELEVATED MYOCARDIAL INFARCTION): ICD-10-CM

## 2025-02-13 PROBLEM — I16.9 HYPERTENSIVE CRISIS: Status: ACTIVE | Noted: 2025-02-13

## 2025-02-13 LAB
A-A DO2: 116.7 MMHG (ref 0–300)
A-A DO2: 283.8 MMHG (ref 0–300)
ALBUMIN SERPL-MCNC: 4.1 G/DL (ref 3.5–5.2)
ALBUMIN/GLOB SERPL: 0.9 G/DL
ALP SERPL-CCNC: 216 U/L (ref 39–117)
ALT SERPL W P-5'-P-CCNC: 9 U/L (ref 1–33)
ANION GAP SERPL CALCULATED.3IONS-SCNC: 14.5 MMOL/L (ref 5–15)
ANISOCYTOSIS BLD QL: NORMAL
APTT PPP: 35.6 SECONDS (ref 24.5–35.9)
ARTERIAL PATENCY WRIST A: ABNORMAL
ARTERIAL PATENCY WRIST A: ABNORMAL
AST SERPL-CCNC: 23 U/L (ref 1–32)
ATMOSPHERIC PRESS: 724 MMHG
ATMOSPHERIC PRESS: 726 MMHG
BASE EXCESS BLDA CALC-SCNC: -10 MMOL/L (ref 0–2)
BASE EXCESS BLDA CALC-SCNC: -5.8 MMOL/L (ref 0–2)
BASOPHILS # BLD AUTO: 0.11 10*3/MM3 (ref 0–0.2)
BASOPHILS NFR BLD AUTO: 0.8 % (ref 0–1.5)
BDY SITE: ABNORMAL
BDY SITE: ABNORMAL
BILIRUB SERPL-MCNC: 0.4 MG/DL (ref 0–1.2)
BUN SERPL-MCNC: 32 MG/DL (ref 8–23)
BUN/CREAT SERPL: 18.3 (ref 7–25)
CALCIUM SPEC-SCNC: 9.5 MG/DL (ref 8.6–10.5)
CHLORIDE SERPL-SCNC: 105 MMOL/L (ref 98–107)
CO2 BLDA-SCNC: 19.5 MMOL/L (ref 22–33)
CO2 BLDA-SCNC: 20.6 MMOL/L (ref 22–33)
CO2 SERPL-SCNC: 20.5 MMOL/L (ref 22–29)
COHGB MFR BLD: 3.1 % (ref 0–5)
COHGB MFR BLD: 3.1 % (ref 0–5)
CREAT SERPL-MCNC: 1.75 MG/DL (ref 0.57–1)
D-LACTATE SERPL-SCNC: 1.9 MMOL/L (ref 0.5–2)
D-LACTATE SERPL-SCNC: 2.1 MMOL/L (ref 0.5–2)
D-LACTATE SERPL-SCNC: 3.8 MMOL/L (ref 0.5–2)
DEPRECATED RDW RBC AUTO: 58.8 FL (ref 37–54)
EGFRCR SERPLBLD CKD-EPI 2021: 31.6 ML/MIN/1.73
EOSINOPHIL # BLD AUTO: 0.86 10*3/MM3 (ref 0–0.4)
EOSINOPHIL NFR BLD AUTO: 6.2 % (ref 0.3–6.2)
EPAP: 6
ERYTHROCYTE [DISTWIDTH] IN BLOOD BY AUTOMATED COUNT: 21.5 % (ref 12.3–15.4)
FLUAV RNA RESP QL NAA+PROBE: NOT DETECTED
FLUBV RNA RESP QL NAA+PROBE: NOT DETECTED
GAS FLOW AIRWAY: 3 LPM
GEN 5 1HR TROPONIN T REFLEX: 169 NG/L
GLOBULIN UR ELPH-MCNC: 4.5 GM/DL
GLUCOSE SERPL-MCNC: 116 MG/DL (ref 65–99)
HCO3 BLDA-SCNC: 18 MMOL/L (ref 20–26)
HCO3 BLDA-SCNC: 19.4 MMOL/L (ref 20–26)
HCT VFR BLD AUTO: 39.3 % (ref 34–46.6)
HCT VFR BLD CALC: 28.8 % (ref 38–51)
HCT VFR BLD CALC: 33.1 % (ref 38–51)
HGB BLD-MCNC: 11.2 G/DL (ref 12–15.9)
HGB BLDA-MCNC: 10.8 G/DL (ref 13.5–17.5)
HGB BLDA-MCNC: 9.4 G/DL (ref 13.5–17.5)
HYPOCHROMIA BLD QL: NORMAL
IMM GRANULOCYTES # BLD AUTO: 0.04 10*3/MM3 (ref 0–0.05)
IMM GRANULOCYTES NFR BLD AUTO: 0.3 % (ref 0–0.5)
INHALED O2 CONCENTRATION: 32 %
INHALED O2 CONCENTRATION: 60 %
INR PPP: 1.55 (ref 0.9–1.1)
IPAP: 20
LIPASE SERPL-CCNC: 52 U/L (ref 13–60)
LYMPHOCYTES # BLD AUTO: 3.19 10*3/MM3 (ref 0.7–3.1)
LYMPHOCYTES NFR BLD AUTO: 23 % (ref 19.6–45.3)
Lab: ABNORMAL
MCH RBC QN AUTO: 22.5 PG (ref 26.6–33)
MCHC RBC AUTO-ENTMCNC: 28.5 G/DL (ref 31.5–35.7)
MCV RBC AUTO: 78.9 FL (ref 79–97)
METHGB BLD QL: 0 % (ref 0–3)
METHGB BLD QL: 0.3 % (ref 0–3)
MICROCYTES BLD QL: NORMAL
MODALITY: ABNORMAL
MODALITY: ABNORMAL
MONOCYTES # BLD AUTO: 1.01 10*3/MM3 (ref 0.1–0.9)
MONOCYTES NFR BLD AUTO: 7.3 % (ref 5–12)
NEUTROPHILS NFR BLD AUTO: 62.4 % (ref 42.7–76)
NEUTROPHILS NFR BLD AUTO: 8.65 10*3/MM3 (ref 1.7–7)
NOTIFIED BY: ABNORMAL
NOTIFIED WHO: ABNORMAL
NRBC BLD AUTO-RTO: 0 /100 WBC (ref 0–0.2)
NT-PROBNP SERPL-MCNC: ABNORMAL PG/ML (ref 0–900)
OXYHGB MFR BLDV: 67.8 % (ref 94–99)
OXYHGB MFR BLDV: 93.6 % (ref 94–99)
PCO2 BLDA: 36.2 MM HG (ref 35–45)
PCO2 BLDA: 47.9 MM HG (ref 35–45)
PCO2 TEMP ADJ BLD: ABNORMAL MM[HG]
PCO2 TEMP ADJ BLD: ABNORMAL MM[HG]
PH BLDA: 7.18 PH UNITS (ref 7.35–7.45)
PH BLDA: 7.34 PH UNITS (ref 7.35–7.45)
PH, TEMP CORRECTED: ABNORMAL
PH, TEMP CORRECTED: ABNORMAL
PLATELET # BLD AUTO: 612 10*3/MM3 (ref 140–450)
PMV BLD AUTO: 9.4 FL (ref 6–12)
PO2 BLDA: 46.9 MM HG (ref 83–108)
PO2 BLDA: 85.1 MM HG (ref 83–108)
PO2 TEMP ADJ BLD: ABNORMAL MM[HG]
PO2 TEMP ADJ BLD: ABNORMAL MM[HG]
POLYCHROMASIA BLD QL SMEAR: NORMAL
POTASSIUM SERPL-SCNC: 4.8 MMOL/L (ref 3.5–5.2)
PROT SERPL-MCNC: 8.6 G/DL (ref 6–8.5)
PROTHROMBIN TIME: 18.8 SECONDS (ref 11.6–15.1)
QT INTERVAL: 284 MS
QTC INTERVAL: 430 MS
RBC # BLD AUTO: 4.98 10*6/MM3 (ref 3.77–5.28)
RSV RNA RESP QL NAA+PROBE: NOT DETECTED
SAO2 % BLDCOA: 70.2 % (ref 94–99)
SAO2 % BLDCOA: 96.6 % (ref 94–99)
SARS-COV-2 RNA RESP QL NAA+PROBE: NOT DETECTED
SET MECH RESP RATE: 24
SMALL PLATELETS BLD QL SMEAR: NORMAL
SODIUM SERPL-SCNC: 140 MMOL/L (ref 136–145)
TROPONIN T % DELTA: -8
TROPONIN T NUMERIC DELTA: -14 NG/L
TROPONIN T SERPL HS-MCNC: 157 NG/L
TROPONIN T SERPL HS-MCNC: 183 NG/L
UFH PPP CHRO-ACNC: >1.1 IU/ML (ref 0.3–0.7)
VENTILATOR MODE: ABNORMAL
VENTILATOR MODE: ABNORMAL
WBC NRBC COR # BLD AUTO: 13.86 10*3/MM3 (ref 3.4–10.8)

## 2025-02-13 PROCEDURE — 71045 X-RAY EXAM CHEST 1 VIEW: CPT

## 2025-02-13 PROCEDURE — 83605 ASSAY OF LACTIC ACID: CPT | Performed by: STUDENT IN AN ORGANIZED HEALTH CARE EDUCATION/TRAINING PROGRAM

## 2025-02-13 PROCEDURE — 87040 BLOOD CULTURE FOR BACTERIA: CPT | Performed by: STUDENT IN AN ORGANIZED HEALTH CARE EDUCATION/TRAINING PROGRAM

## 2025-02-13 PROCEDURE — 85007 BL SMEAR W/DIFF WBC COUNT: CPT | Performed by: STUDENT IN AN ORGANIZED HEALTH CARE EDUCATION/TRAINING PROGRAM

## 2025-02-13 PROCEDURE — 83690 ASSAY OF LIPASE: CPT | Performed by: STUDENT IN AN ORGANIZED HEALTH CARE EDUCATION/TRAINING PROGRAM

## 2025-02-13 PROCEDURE — 85520 HEPARIN ASSAY: CPT

## 2025-02-13 PROCEDURE — 71045 X-RAY EXAM CHEST 1 VIEW: CPT | Performed by: RADIOLOGY

## 2025-02-13 PROCEDURE — 36600 WITHDRAWAL OF ARTERIAL BLOOD: CPT

## 2025-02-13 PROCEDURE — 85730 THROMBOPLASTIN TIME PARTIAL: CPT | Performed by: STUDENT IN AN ORGANIZED HEALTH CARE EDUCATION/TRAINING PROGRAM

## 2025-02-13 PROCEDURE — 80053 COMPREHEN METABOLIC PANEL: CPT | Performed by: STUDENT IN AN ORGANIZED HEALTH CARE EDUCATION/TRAINING PROGRAM

## 2025-02-13 PROCEDURE — 25010000002 NITROGLYCERIN 200 MCG/ML SOLUTION: Performed by: STUDENT IN AN ORGANIZED HEALTH CARE EDUCATION/TRAINING PROGRAM

## 2025-02-13 PROCEDURE — 93010 ELECTROCARDIOGRAM REPORT: CPT | Performed by: INTERNAL MEDICINE

## 2025-02-13 PROCEDURE — 82805 BLOOD GASES W/O2 SATURATION: CPT

## 2025-02-13 PROCEDURE — 25010000002 CEFEPIME PER 500 MG: Performed by: STUDENT IN AN ORGANIZED HEALTH CARE EDUCATION/TRAINING PROGRAM

## 2025-02-13 PROCEDURE — 25010000002 BUMETANIDE PER 0.5 MG: Performed by: STUDENT IN AN ORGANIZED HEALTH CARE EDUCATION/TRAINING PROGRAM

## 2025-02-13 PROCEDURE — 92610 EVALUATE SWALLOWING FUNCTION: CPT

## 2025-02-13 PROCEDURE — 82375 ASSAY CARBOXYHB QUANT: CPT

## 2025-02-13 PROCEDURE — 25010000002 LORAZEPAM PER 2 MG

## 2025-02-13 PROCEDURE — 93005 ELECTROCARDIOGRAM TRACING: CPT | Performed by: STUDENT IN AN ORGANIZED HEALTH CARE EDUCATION/TRAINING PROGRAM

## 2025-02-13 PROCEDURE — 85025 COMPLETE CBC W/AUTO DIFF WBC: CPT | Performed by: STUDENT IN AN ORGANIZED HEALTH CARE EDUCATION/TRAINING PROGRAM

## 2025-02-13 PROCEDURE — 94799 UNLISTED PULMONARY SVC/PX: CPT

## 2025-02-13 PROCEDURE — 94660 CPAP INITIATION&MGMT: CPT

## 2025-02-13 PROCEDURE — 84484 ASSAY OF TROPONIN QUANT: CPT | Performed by: STUDENT IN AN ORGANIZED HEALTH CARE EDUCATION/TRAINING PROGRAM

## 2025-02-13 PROCEDURE — 99285 EMERGENCY DEPT VISIT HI MDM: CPT

## 2025-02-13 PROCEDURE — 36415 COLL VENOUS BLD VENIPUNCTURE: CPT | Performed by: STUDENT IN AN ORGANIZED HEALTH CARE EDUCATION/TRAINING PROGRAM

## 2025-02-13 PROCEDURE — 94761 N-INVAS EAR/PLS OXIMETRY MLT: CPT

## 2025-02-13 PROCEDURE — 99223 1ST HOSP IP/OBS HIGH 75: CPT

## 2025-02-13 PROCEDURE — 83050 HGB METHEMOGLOBIN QUAN: CPT

## 2025-02-13 PROCEDURE — 85520 HEPARIN ASSAY: CPT | Performed by: STUDENT IN AN ORGANIZED HEALTH CARE EDUCATION/TRAINING PROGRAM

## 2025-02-13 PROCEDURE — 87637 SARSCOV2&INF A&B&RSV AMP PRB: CPT | Performed by: STUDENT IN AN ORGANIZED HEALTH CARE EDUCATION/TRAINING PROGRAM

## 2025-02-13 PROCEDURE — 99222 1ST HOSP IP/OBS MODERATE 55: CPT | Performed by: INTERNAL MEDICINE

## 2025-02-13 PROCEDURE — 85610 PROTHROMBIN TIME: CPT | Performed by: STUDENT IN AN ORGANIZED HEALTH CARE EDUCATION/TRAINING PROGRAM

## 2025-02-13 PROCEDURE — 83880 ASSAY OF NATRIURETIC PEPTIDE: CPT | Performed by: STUDENT IN AN ORGANIZED HEALTH CARE EDUCATION/TRAINING PROGRAM

## 2025-02-13 PROCEDURE — 25010000002 VANCOMYCIN 1.75-0.9 GM/500ML-% SOLUTION: Performed by: STUDENT IN AN ORGANIZED HEALTH CARE EDUCATION/TRAINING PROGRAM

## 2025-02-13 RX ORDER — AMLODIPINE BESYLATE 5 MG/1
2.5 TABLET ORAL DAILY
Status: CANCELLED | OUTPATIENT
Start: 2025-02-13

## 2025-02-13 RX ORDER — POLYETHYLENE GLYCOL 3350 17 G/17G
17 POWDER, FOR SOLUTION ORAL DAILY PRN
Status: DISCONTINUED | OUTPATIENT
Start: 2025-02-13 | End: 2025-02-18 | Stop reason: HOSPADM

## 2025-02-13 RX ORDER — ACETAMINOPHEN 325 MG/1
650 TABLET ORAL EVERY 4 HOURS PRN
Status: DISCONTINUED | OUTPATIENT
Start: 2025-02-13 | End: 2025-02-18 | Stop reason: HOSPADM

## 2025-02-13 RX ORDER — METOPROLOL TARTRATE 25 MG/1
25 TABLET, FILM COATED ORAL EVERY 12 HOURS SCHEDULED
Status: DISCONTINUED | OUTPATIENT
Start: 2025-02-13 | End: 2025-02-18 | Stop reason: HOSPADM

## 2025-02-13 RX ORDER — NITROGLYCERIN 20 MG/100ML
5-200 INJECTION INTRAVENOUS
Status: DISCONTINUED | OUTPATIENT
Start: 2025-02-13 | End: 2025-02-14

## 2025-02-13 RX ORDER — HEPARIN SODIUM 10000 [USP'U]/100ML
11 INJECTION, SOLUTION INTRAVENOUS
Status: DISCONTINUED | OUTPATIENT
Start: 2025-02-13 | End: 2025-02-13

## 2025-02-13 RX ORDER — NITROGLYCERIN 20 MG/100ML
1000 INJECTION INTRAVENOUS ONCE
Status: DISCONTINUED | OUTPATIENT
Start: 2025-02-13 | End: 2025-02-13 | Stop reason: SDUPTHER

## 2025-02-13 RX ORDER — SODIUM CHLORIDE 9 MG/ML
40 INJECTION, SOLUTION INTRAVENOUS AS NEEDED
Status: DISCONTINUED | OUTPATIENT
Start: 2025-02-13 | End: 2025-02-18 | Stop reason: HOSPADM

## 2025-02-13 RX ORDER — ASPIRIN 81 MG/1
324 TABLET, CHEWABLE ORAL ONCE
Status: DISCONTINUED | OUTPATIENT
Start: 2025-02-13 | End: 2025-02-14

## 2025-02-13 RX ORDER — BISACODYL 10 MG
10 SUPPOSITORY, RECTAL RECTAL DAILY PRN
Status: DISCONTINUED | OUTPATIENT
Start: 2025-02-13 | End: 2025-02-18 | Stop reason: HOSPADM

## 2025-02-13 RX ORDER — LEVOTHYROXINE SODIUM 88 UG/1
88 TABLET ORAL DAILY
Status: CANCELLED | OUTPATIENT
Start: 2025-02-13

## 2025-02-13 RX ORDER — HYDRALAZINE HYDROCHLORIDE 50 MG/1
25 TABLET, FILM COATED ORAL EVERY 8 HOURS SCHEDULED
Status: DISCONTINUED | OUTPATIENT
Start: 2025-02-13 | End: 2025-02-14

## 2025-02-13 RX ORDER — LORAZEPAM 2 MG/ML
INJECTION INTRAMUSCULAR
Status: COMPLETED
Start: 2025-02-13 | End: 2025-02-13

## 2025-02-13 RX ORDER — SODIUM CHLORIDE 0.9 % (FLUSH) 0.9 %
10 SYRINGE (ML) INJECTION EVERY 12 HOURS SCHEDULED
Status: DISCONTINUED | OUTPATIENT
Start: 2025-02-13 | End: 2025-02-18 | Stop reason: HOSPADM

## 2025-02-13 RX ORDER — ONDANSETRON 4 MG/1
4 TABLET, ORALLY DISINTEGRATING ORAL EVERY 8 HOURS PRN
COMMUNITY

## 2025-02-13 RX ORDER — NICOTINE 21 MG/24HR
1 PATCH, TRANSDERMAL 24 HOURS TRANSDERMAL
Status: DISCONTINUED | OUTPATIENT
Start: 2025-02-13 | End: 2025-02-18 | Stop reason: HOSPADM

## 2025-02-13 RX ORDER — AMOXICILLIN 250 MG
2 CAPSULE ORAL 2 TIMES DAILY
Status: DISCONTINUED | OUTPATIENT
Start: 2025-02-13 | End: 2025-02-18 | Stop reason: HOSPADM

## 2025-02-13 RX ORDER — NITROGLYCERIN 0.4 MG/1
0.4 TABLET SUBLINGUAL
Status: DISCONTINUED | OUTPATIENT
Start: 2025-02-13 | End: 2025-02-18 | Stop reason: HOSPADM

## 2025-02-13 RX ORDER — AMLODIPINE BESYLATE 5 MG/1
5 TABLET ORAL
Status: DISCONTINUED | OUTPATIENT
Start: 2025-02-13 | End: 2025-02-14

## 2025-02-13 RX ORDER — METOPROLOL TARTRATE 25 MG/1
25 TABLET, FILM COATED ORAL DAILY
Status: CANCELLED | OUTPATIENT
Start: 2025-02-13

## 2025-02-13 RX ORDER — HYDRALAZINE HYDROCHLORIDE 50 MG/1
25 TABLET, FILM COATED ORAL ONCE
Status: COMPLETED | OUTPATIENT
Start: 2025-02-13 | End: 2025-02-13

## 2025-02-13 RX ORDER — HEPARIN SODIUM 5000 [USP'U]/ML
2000 INJECTION, SOLUTION INTRAVENOUS; SUBCUTANEOUS AS NEEDED
Status: DISCONTINUED | OUTPATIENT
Start: 2025-02-13 | End: 2025-02-13

## 2025-02-13 RX ORDER — AMLODIPINE BESYLATE 5 MG/1
5 TABLET ORAL
Status: DISCONTINUED | OUTPATIENT
Start: 2025-02-14 | End: 2025-02-13

## 2025-02-13 RX ORDER — TORSEMIDE 20 MG/1
40 TABLET ORAL DAILY
Status: CANCELLED | OUTPATIENT
Start: 2025-02-13

## 2025-02-13 RX ORDER — ROSUVASTATIN CALCIUM 20 MG/1
10 TABLET, COATED ORAL DAILY
Status: CANCELLED | OUTPATIENT
Start: 2025-02-13

## 2025-02-13 RX ORDER — SODIUM CHLORIDE 0.9 % (FLUSH) 0.9 %
10 SYRINGE (ML) INJECTION AS NEEDED
Status: DISCONTINUED | OUTPATIENT
Start: 2025-02-13 | End: 2025-02-18 | Stop reason: HOSPADM

## 2025-02-13 RX ORDER — HEPARIN SODIUM 5000 [USP'U]/ML
4000 INJECTION, SOLUTION INTRAVENOUS; SUBCUTANEOUS AS NEEDED
Status: DISCONTINUED | OUTPATIENT
Start: 2025-02-13 | End: 2025-02-13

## 2025-02-13 RX ORDER — BISACODYL 5 MG/1
5 TABLET, DELAYED RELEASE ORAL DAILY PRN
Status: DISCONTINUED | OUTPATIENT
Start: 2025-02-13 | End: 2025-02-18 | Stop reason: HOSPADM

## 2025-02-13 RX ORDER — HEPARIN SODIUM 5000 [USP'U]/ML
4000 INJECTION, SOLUTION INTRAVENOUS; SUBCUTANEOUS ONCE
Status: DISCONTINUED | OUTPATIENT
Start: 2025-02-13 | End: 2025-02-13

## 2025-02-13 RX ORDER — HYDROXYZINE HYDROCHLORIDE 10 MG/1
10 TABLET, FILM COATED ORAL 3 TIMES DAILY PRN
Status: DISCONTINUED | OUTPATIENT
Start: 2025-02-13 | End: 2025-02-18 | Stop reason: HOSPADM

## 2025-02-13 RX ORDER — VANCOMYCIN 1.75 GRAM/500 ML IN 0.9 % SODIUM CHLORIDE INTRAVENOUS
20 ONCE
Status: COMPLETED | OUTPATIENT
Start: 2025-02-13 | End: 2025-02-13

## 2025-02-13 RX ORDER — BUMETANIDE 0.25 MG/ML
2 INJECTION, SOLUTION INTRAMUSCULAR; INTRAVENOUS ONCE
Status: COMPLETED | OUTPATIENT
Start: 2025-02-13 | End: 2025-02-13

## 2025-02-13 RX ORDER — LORAZEPAM 2 MG/ML
1 INJECTION INTRAMUSCULAR ONCE
Status: COMPLETED | OUTPATIENT
Start: 2025-02-13 | End: 2025-02-13

## 2025-02-13 RX ORDER — ATORVASTATIN CALCIUM 40 MG/1
80 TABLET, FILM COATED ORAL NIGHTLY
Status: DISCONTINUED | OUTPATIENT
Start: 2025-02-13 | End: 2025-02-18 | Stop reason: HOSPADM

## 2025-02-13 RX ORDER — ALBUTEROL SULFATE 0.83 MG/ML
2.5 SOLUTION RESPIRATORY (INHALATION) EVERY 4 HOURS PRN
Status: CANCELLED | OUTPATIENT
Start: 2025-02-13

## 2025-02-13 RX ADMIN — Medication 10 ML: at 21:51

## 2025-02-13 RX ADMIN — HYDRALAZINE HYDROCHLORIDE 25 MG: 50 TABLET ORAL at 19:15

## 2025-02-13 RX ADMIN — NITROGLYCERIN 180 MCG/MIN: 20 INJECTION INTRAVENOUS at 11:51

## 2025-02-13 RX ADMIN — Medication 10 ML: at 10:08

## 2025-02-13 RX ADMIN — NITROGLYCERIN 1 INCH: 20 OINTMENT TOPICAL at 06:34

## 2025-02-13 RX ADMIN — ACETAMINOPHEN 650 MG: 325 TABLET ORAL at 21:50

## 2025-02-13 RX ADMIN — Medication 10 ML: at 11:54

## 2025-02-13 RX ADMIN — NITROGLYCERIN 150 MCG/MIN: 20 INJECTION INTRAVENOUS at 07:06

## 2025-02-13 RX ADMIN — METOPROLOL TARTRATE 25 MG: 25 TABLET, FILM COATED ORAL at 21:50

## 2025-02-13 RX ADMIN — LORAZEPAM 1 MG: 2 INJECTION INTRAMUSCULAR; INTRAVENOUS at 06:21

## 2025-02-13 RX ADMIN — Medication 1750 MG: at 08:23

## 2025-02-13 RX ADMIN — BUMETANIDE 2 MG: 0.25 INJECTION INTRAMUSCULAR; INTRAVENOUS at 07:17

## 2025-02-13 RX ADMIN — NITROGLYCERIN 400 MCG/MIN: 20 INJECTION INTRAVENOUS at 07:02

## 2025-02-13 RX ADMIN — NITROGLYCERIN 180 MCG/MIN: 20 INJECTION INTRAVENOUS at 16:24

## 2025-02-13 RX ADMIN — NICOTINE 1 PATCH: 21 PATCH TRANSDERMAL at 11:51

## 2025-02-13 RX ADMIN — CEFEPIME 2000 MG: 2 INJECTION, POWDER, FOR SOLUTION INTRAVENOUS at 07:17

## 2025-02-13 RX ADMIN — ATORVASTATIN CALCIUM 80 MG: 40 TABLET, FILM COATED ORAL at 21:50

## 2025-02-13 RX ADMIN — LORAZEPAM 1 MG: 2 INJECTION INTRAMUSCULAR at 06:21

## 2025-02-13 RX ADMIN — AMLODIPINE BESYLATE 5 MG: 5 TABLET ORAL at 21:50

## 2025-02-13 NOTE — H&P
[facility] Medicine Services  History & Physical    Patient Identification:  Name:  Lesley Michel  Age:  69 y.o.  Sex:  female  :  1955  MRN:  [id number]   Visit Number:  [id number]  Admit Date: 2025   Primary Care Physician:  Woodrow Raymond APRN    Subjective     Chief complaint: shortness of breath     History of presenting illness:      Lesley Michel is a 69 y.o. female who presented for further evaluation of shortness of breath. She was seen and examined in the ED with multiple family members at the bedside. She was awake and alert, able to converse but had difficulty speaking over the BiPAP mask. She was able to report worsening shortness of breath over the previous 24 hours. She reported onset of dull chest pain this morning without radiation or associated nausea or diaphoresis. She denied any significant increase in palpitations or lower extremity edema. No cough, fever, or sick contacts. She is unsure how her BP has been running at home, she has a BP cuff but does not check blood pressures as she believes her machine is inaccurate. She did note that at recent discharge her hydralazine was held but she is adamant that she has been taking other home medications as directed. When asked more specifically she did state that she frequently skips doses of her diuretic for fear of decreasing her potassium levels. On further review of systems she endorsed new diarrhea for the past several days but no abdominal pain. No dysuria.     Past medical history is significant for CAD, HTN, atrial fibrillation, HFpEF, CKD stage IV, COPD, PVD, migraine headaches    Upon arrival to the ED, vital signs were heart rate 138, respirations 26, /104, SpO2 64%.  BP did trend as high as 220/172 in the ED.  Initial ABG obtained on 3 L showed pH 7.183 with pCO2 47.9 and pO2 46.9.  Follow-up on BiPAP showed interval improvement with pH 7.338, normal pCO2 and pO2.  Initial HS troponin  was 183 with proBNP 15,000.  Renal function is improved from prior.  Lactate was 3.8.  White blood cell count is elevated at 13K.  Without left shift.  Chest x-ray showed CHF and concern for superimposed bibasilar airspace disease.    Known Emergency Department medications received prior to my evaluation included Bumex, cefepime, Ativan, nitroglycerin, vancomycin.   Emergency Department Room location at the time of my evaluation was 103.     ---------------------------------------------------------------------------------------------------------------------   Review of Systems   Constitutional:  Negative for chills and fever.   HENT:  Negative for congestion and rhinorrhea.    Respiratory:  Positive for shortness of breath. Negative for cough.    Cardiovascular:  Positive for chest pain. Negative for leg swelling.   Gastrointestinal:  Positive for diarrhea. Negative for abdominal pain, nausea and vomiting.   Genitourinary:  Negative for difficulty urinating and dysuria.   Musculoskeletal:  Negative for arthralgias and myalgias.   Skin:  Negative for rash and wound.   Neurological:  Negative for dizziness and light-headedness.        ---------------------------------------------------------------------------------------------------------------------   Past Medical History:   Diagnosis Date    Anxiety     Arthritis     CHF (congestive heart failure)     Cigarette smoker 02/10/2022    CKD (chronic kidney disease) stage 4, GFR 15-29 ml/min     MAICO on top of CKD IIIa, HD from 10/23-4/24, some recovery to CKD IV    COPD (chronic obstructive pulmonary disease)     Coronary artery disease     Elevated cholesterol     GERD (gastroesophageal reflux disease)     H/O heart artery stent     History of transfusion     Hyperlipidemia     Hypertension     hypothyroidism     Obesity     PAF (paroxysmal atrial fibrillation)     PVD (peripheral vascular disease)     x2 stents     Past Surgical History:   Procedure Laterality Date     ANKLE SURGERY      RIGHT    APPENDECTOMY      CARDIAC CATHETERIZATION      LEFT     SECTION      CHOLECYSTECTOMY N/A 2025    Procedure: CHOLECYSTECTOMY LAPAROSCOPIC WITH DAVINCI ROBOT;  Surgeon: Windy Neal MD;  Location: [facility] OR;  Service: Robotics - DaVinci;  Laterality: N/A;    CORONARY STENT PLACEMENT      HYSTERECTOMY      INSERTION HEMODIALYSIS CATHETER Right 10/19/2023    Procedure: HEMODIALYSIS CATHETER INSERTION;  Surgeon: Bartolome Schwartz MD;  Location:  [facility] OR;  Service: General;  Laterality: Right;     Family History   Problem Relation Age of Onset    Heart disease Mother     Heart attack Mother 73    Fibromyalgia Mother     Heart attack Father 72    Ovarian cancer Sister     Coronary artery disease Other     Breast cancer Neg Hx      Social History     Socioeconomic History    Marital status:    Tobacco Use    Smoking status: Every Day     Current packs/day: 0.50     Average packs/day: 0.5 packs/day for 30.0 years (15.0 ttl pk-yrs)     Types: Cigarettes     Passive exposure: Never    Smokeless tobacco: Never   Vaping Use    Vaping status: Never Used   Substance and Sexual Activity    Alcohol use: No    Drug use: No    Sexual activity: Never     ---------------------------------------------------------------------------------------------------------------------   Allergies:  Xanax [alprazolam]  ---------------------------------------------------------------------------------------------------------------------   Home medications:    Medications below are reported home medications pulling from within the system; at this time, these medications have not been reconciled unless otherwise specified and are in the verification process for further verifcation as current home medications.  (Not in a hospital admission)      Hospital Scheduled Meds:  aspirin, 324 mg, Oral, Once  heparin (porcine), 4,000 Units, Intravenous, Once  vancomycin, 20 mg/kg, Intravenous,  Once      heparin, 1,000 Units/hr  nitroglycerin, 5-200 mcg/min, Last Rate: 180 mcg/min (02/13/25 0822)  Pharmacy to Dose Heparin,         Current listed hospital scheduled medications may not yet reflect those currently placed in orders that are signed and held awaiting patient's arrival to floor.   ---------------------------------------------------------------------------------------------------------------------     Objective     Vital Signs:  Temp:  [98 °F (36.7 °C)] 98 °F (36.7 °C)  Heart Rate:  [100-141] 104  Resp:  [26-32] 32  BP: (114-220)/() 169/106      02/13/25  0623   Weight: 90.7 kg (200 lb)     Body mass index is 37.81 kg/m².  ---------------------------------------------------------------------------------------------------------------------       Physical Exam  Vitals and nursing note reviewed.   Constitutional:       General: She is not in acute distress.     Appearance: She is obese. She is ill-appearing.   HENT:      Head: Normocephalic and atraumatic.   Eyes:      Extraocular Movements: Extraocular movements intact.   Cardiovascular:      Rate and Rhythm: Regular rhythm. Tachycardia present.   Pulmonary:      Breath sounds: No wheezing.      Comments: Increased effort, diminished breath sounds bilaterally  Abdominal:      Palpations: Abdomen is soft.   Musculoskeletal:      Right lower leg: Edema present.      Left lower leg: Edema present.      Comments: Trace   Skin:     General: Skin is warm and dry.   Neurological:      Mental Status: She is alert. Mental status is at baseline.   Psychiatric:         Mood and Affect: Mood is anxious.         Behavior: Behavior normal.             ---------------------------------------------------------------------------------------------------------------------  EKG:        I have personally looked at the EKG.  ---------------------------------------------------------------------------------------------------------------------   Results from last 7  "days   Lab Units 02/13/25  0630   LACTATE mmol/L 3.8*   WBC 10*3/mm3 13.86*   HEMOGLOBIN g/dL 11.2*   HEMATOCRIT % 39.3   MCV fL 78.9*   MCHC g/dL 28.5*   PLATELETS 10*3/mm3 612*     Results from last 7 days   Lab Units 02/13/25  0747 02/13/25  0629   PH, ARTERIAL pH units 7.338* 7.183*   PO2 ART mm Hg 85.1 46.9*   PCO2, ARTERIAL mm Hg 36.2 47.9*   HCO3 ART mmol/L 19.4* 18.0*     Results from last 7 days   Lab Units 02/13/25  0630   SODIUM mmol/L 140   POTASSIUM mmol/L 4.8   CHLORIDE mmol/L 105   CO2 mmol/L 20.5*   BUN mg/dL 32*   CREATININE mg/dL 1.75*   CALCIUM mg/dL 9.5   GLUCOSE mg/dL 116*   ALBUMIN g/dL 4.1   BILIRUBIN mg/dL 0.4   ALK PHOS U/L 216*   AST (SGOT) U/L 23   ALT (SGPT) U/L 9   Estimated Creatinine Clearance: 32 mL/min (A) (by C-G formula based on SCr of 1.75 mg/dL (H)).  No results found for: \"AMMONIA\"  Results from last 7 days   Lab Units 02/13/25  0630   HSTROP T ng/L 183*     Results from last 7 days   Lab Units 02/13/25  0630   PROBNP pg/mL 15,163.0*     Lab Results   Component Value Date    HGBA1C 5.30 10/08/2023     Lab Results   Component Value Date    TSH 6.770 (H) 10/08/2023    FREET4 1.01 10/08/2023     No results found for: \"PREGTESTUR\", \"PREGSERUM\", \"HCG\", \"HCGQUANT\"  Pain Management Panel          Latest Ref Rng & Units 10/9/2023   Pain Management Panel   Creatinine, Urine mg/dL 116.0       Details                 No results found for: \"BLOODCX\"  No results found for: \"URINECX\"  No results found for: \"WOUNDCX\"  No results found for: \"STOOLCX\"      ---------------------------------------------------------------------------------------------------------------------  Imaging Results (Last 7 Days)       Procedure Component Value Units Date/Time    XR Chest 1 View [[id number]] Resulted: 02/13/25 0633     Updated: 02/13/25 0634            Cultures:  No results found for: \"BLOODCX\", \"URINECX\", \"WOUNDCX\", \"MRSACX\", \"RESPCX\", \"STOOLCX\"    Last echocardiogram:  Results for orders placed during the " hospital encounter of 01/19/25    Adult Transthoracic Echo Complete w/ Color, Spectral and Contrast if necessary per protocol    Interpretation Summary    Left ventricular systolic function is normal. Left ventricular ejection fraction appears to be 56 - 60%.    Left ventricular wall thickness is consistent with mild concentric hypertrophy.    There is calcification of the aortic valve.    Mild mitral valve stenosis is present.    Estimated right ventricular systolic pressure from tricuspid regurgitation is normal (<35 mmHg).          I have personally reviewed the above radiology images and read the final radiology report on 02/13/25  ---------------------------------------------------------------------------------------------------------------------  Assessment / Plan     There are no active hospital problems to display for this patient.      ASSESSMENT/PLAN:    Acute hypoxic and hypercapnic respiratory failure  Concern for flash pulmonary edema due to hypertensive crisis  Decompensated HFpEF  Complicated by CKD stage IV  Patient presented secondary to 24-hour history worsening shortness of breath she denies any overt increase in her lower extremity edema however does report that she frequently misses doses of her home diuretic.  Has not been checking her blood pressure at home but did note that hydralazine was held at recent discharge.  On arrival to the ED O2 sat was in the 60s.  On initial ABG on 3 L pH was 7.183 with pCO2 47.9 and pO2 46.9.  She was subsequently placed on BiPAP with interval improvement noted-most recent pH 7.338 with normal pCO2 and pO2 on 60% FiO2.  Her HS troponin was 183 with repeat at 169, proBNP 15 163.  Renal function was improved from recent admission.  Her initial lactate was 3.8, white blood cell count was elevated but without left shift.  Chest x-ray concerning for volume overload.  On arrival to the ED BP was 114/104 and did trend as high as 220/172.  She is being admitted to the  CCU for further workup and management  She received Bumex in the ED will follow-up response and continue to diurese as clinically indicated with close monitoring of renal function.  Strict monitoring of I's and O's, clinical volume status, daily weights  Continue to monitor respiratory status closely-titrate supplemental O2 as needed and wean to baseline as able.  N.p.o. while on BiPAP  We are also continuing to monitor her blood pressure trend closely.  Continue nitroglycerin infusion for now  Will follow-up further cardiology recommendations, appreciated.  Trend labs and provide supportive care measures    NSTEMI, T1 versus T2  CAD s/p prior stenting  As above initial HS troponin was 183 with repeat at 169.  Patient complaining of chest pain this morning which was dull, retrosternal to left-sided.  Continue heparin infusion with pharmacy to dose, assistance appreciated.  Monitor per protocol.  Cardiology is consulted as above, ongoing recommendations are appreciated.    Chronic:  Atrial fibrillation  COPD  PVD  Migraine headaches  Anxiety  Continue home medication regimen as indicated once med rec is complete  ----------  -DVT prophylaxis: Heparin infusion  -Activity: Bedrest  -Expected length of stay: INPATIENT status due to the need for care which can only be reasonably provided in an hospital setting such as aggressive/expedited ancillary services and/or consultation services, the necessity for IV medications, close physician monitoring and/or the possible need for procedures.  In such, I feel patient’s risk for adverse outcomes and need for care warrant INPATIENT evaluation and predict the patient’s care encounter to likely last beyond 2 midnights.   -Disposition pending further clinical course     High risk secondary to Respiratory failure, volume overload, NSTEMI    There are no questions and answers to display.       Wally Lopez PA-C   02/13/25  08:45 EST

## 2025-02-13 NOTE — PLAN OF CARE
Goal Outcome Evaluation:                    Patient had improved was able to come off bipap to 2 lpm which is her baseline at home

## 2025-02-13 NOTE — THERAPY EVALUATION
"Acute Care - Speech Language Pathology   Swallow Initial Evaluation [facility]  CLINICAL DYSPHAGIA ASSESSMENT     Patient Name: Lesley Michel  : 1955  MRN: [id number]  Today's Date: 2025     Admit Date: 2025  Lesley Michel  was seen at bedside this date on CCU-4 to assess safety/efficacy of swallowing fnx, determine safest/least restrictive diet tolerance. She has a PMH significant for CAD, HTN, atrial fibrillation, HFpEF, CKD stage IV, COPD, PVD, and migraine headaches. She is unfamiliar to SLP department of [facility] per EMR review.     Ms Michel presnted to [facility] ED via EMS w/ shortness of breath. Per EMR review \"She was seen and examined in the ED with multiple family members at the bedside. She was awake and alert, able to converse but had difficulty speaking over the BiPAP mask. She was able to report worsening shortness of breath over the previous 24 hours. She reported onset of dull chest pain this morning without radiation or associated nausea or diaphoresis. She denied any significant increase in palpitations or lower extremity edema. No cough, fever, or sick contacts. She is unsure how her BP has been running at home, she has a BP cuff but does not check blood pressures as she believes her machine is inaccurate. She did note that at recent discharge her hydralazine was held but she is adamant that she has been taking other home medications as directed. When asked more specifically she did state that she frequently skips doses of her diuretic for fear of decreasing her potassium levels. On further review of systems she endorsed new diarrhea for the past several days but no abdominal pain. No dysuria.\"    Upon arrival to the ED, vital signs were heart rate 138, respirations 26, /104, SpO2 64%.  BP did trend as high as 220/172 in the ED.  Initial ABG obtained on 3 L showed pH 7.183 with pCO2 47.9 and pO2 46.9.  Follow-up on BiPAP showed interval improvement with pH 7.338, normal pCO2 and " pO2.  Initial HS troponin was 183 with proBNP 15,000.  Renal function is improved from prior.  Lactate was 3.8.  White blood cell count is elevated at 13K.  Without left shift.  Chest x-ray showed CHF and concern for superimposed bibasilar airspace disease.     Social History     Socioeconomic History    Marital status:    Tobacco Use    Smoking status: Every Day     Current packs/day: 0.50     Average packs/day: 0.5 packs/day for 30.0 years (15.0 ttl pk-yrs)     Types: Cigarettes     Passive exposure: Never    Smokeless tobacco: Never   Vaping Use    Vaping status: Never Used   Substance and Sexual Activity    Alcohol use: No    Drug use: No    Sexual activity: Never      Imaging:  Radiology Results (last 21 days)  Procedure Component Value Units Date/Time    XR Chest 1 View [[id number]] Jassi as Reviewed   Order Status: Completed Collected: 02/13/25 0844    Updated: 02/13/25 0846   Narrative:     XR CHEST 1 VW-     CLINICAL INDICATION: soa     COMPARISON: 1/19/2025     TECHNIQUE: Single frontal view of the chest.     FINDINGS:     LUNGS: Diffuse interstitial change and probable superimposed airspace  disease in both lung bases.     HEART AND MEDIASTINUM: Heart and mediastinal contours are unremarkable     SKELETON: Bony and soft tissue structures are unremarkable.      Impression:     1. Appearance concerning for CHF and possibly superimposed bibasilar  airspace disease     This report was finalized on 2/13/2025 8:44 AM by Dr. Julio Dominguez MD.        Labs:   Latest Reference Range & Units 02/13/25 06:30   WBC 3.40 - 10.80 10*3/mm3 13.86 (H)   RBC 3.77 - 5.28 10*6/mm3 4.98   Hemoglobin 12.0 - 15.9 g/dL 11.2 (L)   Hematocrit 34.0 - 46.6 % 39.3   Platelets 140 - 450 10*3/mm3 612 (H)   RDW 12.3 - 15.4 % 21.5 (H)   MCV 79.0 - 97.0 fL 78.9 (L)   MCH 26.6 - 33.0 pg 22.5 (L)   MCHC 31.5 - 35.7 g/dL 28.5 (L)   MPV 6.0 - 12.0 fL 9.4   RDW-SD 37.0 - 54.0 fl 58.8 (H)   (H): Data is abnormally high  (L): Data is  abnormally low  Diet Orders (active) (From admission, onward)       Start     Ordered    02/13/25 1300  Diet: Regular/House; Texture: Soft to Chew (NDD 3); Soft to Chew: Whole Meat; Fluid Consistency: Thin (IDDSI 0)  Diet Effective Now         02/13/25 1259                  Ms Michel was observed on 4L nasal cannula w/o complications.     Upon SLP entry, Ms Michel is upright in bed w/ staff present in room for pt care. She is conversant and generally appears pleasant. She is eager for water.     Ms Michel was positioned upright and centered in bed to accept multiple po presentations of ice chips, solid cracker, puree, and thin liquids via spoon, cup, and straw.  She was able to self provide po trials.     Facial/oral structures were symmetrical upon observation. Lingual protrusion revealed no deviation. Oral mucosa were xerostomic, pink, and clean. Secretions were clear, thin, and well controlled. OROM/DIANNE was wfl to imitate oral postures. She is edentulous. Gag is not assessed. Volitional cough was intact w/ adequate intensity, clear in quality, non-productive. Voice was adequate in intensity, clear in quality w/ intelligible speech.    Upon po presentations, adequate bolus anticipation and acceptance w/ good labial seal for bolus clearance via spoon bowl, cup rim stability and suction via straw. Bolus formation, manipulation and control were mildly prolonged w/ rotary mastication pattern. A-p transit was timely w/o significant oral residue appreciated. No overt s/s aspiration before the swallow.      Pharyngeal swallow was timely w/ adequate hyolaryngeal elevation per palpation. No overt s/s aspiration evidenced across this evaluation. No silent aspiration suspected. Patient denied odynophagia.    Visit Dx:     ICD-10-CM ICD-9-CM   1. Acute pulmonary edema  J81.0 518.4   2. Acute hypoxic respiratory failure  J96.01 518.81   3. NSTEMI (non-ST elevated myocardial infarction)  I21.4 410.70     Patient Active  Problem List   Diagnosis    Hypertension    Hyperlipidemia    Migraine without aura    CAD, chronically occluded collateralized dominant RCA, status post drug-eluting stent to LAD , high-grade stenosis of the ostium of the small D1 and 60% mid RCA posterior lateral branch    Chronic renal failure, stage 2 (mild)    Tobacco use    Bilateral lower extremity edema    Other specified hypothyroidism    Cigarette smoker    Class 3 severe obesity without serious comorbidity with body mass index (BMI) of 40.0 to 44.9 in adult    Acute hypoxic respiratory failure    Acute renal failure (ARF)    Acute on chronic anemia    Hypertensive crisis     Past Medical History:   Diagnosis Date    Anxiety     Arthritis     CHF (congestive heart failure)     Cigarette smoker 02/10/2022    CKD (chronic kidney disease) stage 4, GFR 15-29 ml/min     MAICO on top of CKD IIIa, HD from 10/23-, some recovery to CKD IV    COPD (chronic obstructive pulmonary disease)     Coronary artery disease     Elevated cholesterol     GERD (gastroesophageal reflux disease)     H/O heart artery stent     History of transfusion     Hyperlipidemia     Hypertension     hypothyroidism     Obesity     PAF (paroxysmal atrial fibrillation)     PVD (peripheral vascular disease)     x2 stents     Past Surgical History:   Procedure Laterality Date    ANKLE SURGERY      RIGHT    APPENDECTOMY      CARDIAC CATHETERIZATION      LEFT     SECTION      CHOLECYSTECTOMY N/A 2025    Procedure: CHOLECYSTECTOMY LAPAROSCOPIC WITH DAVINCI ROBOT;  Surgeon: Windy Neal MD;  Location: [facility];  Service: Robotics - DaVinci;  Laterality: N/A;    CORONARY STENT PLACEMENT      HYSTERECTOMY      INSERTION HEMODIALYSIS CATHETER Right 10/19/2023    Procedure: HEMODIALYSIS CATHETER INSERTION;  Surgeon: Bartolome Schwartz MD;  Location: [facility] OR;  Service: General;  Laterality: Right;     Impression:     Ms Michel presented w/ a generally wfl oropharyngeal  swallow w/ mildly prolonged mastication of solid textures and no overt s/s aspiration nor s/s concerning for silent aspiration. Current prolonged mastication is felt to be related to current edentulous status. She requests softer foods at this time.     She is felt to most benefit from a modified po diet at this time for mechanical soft solids, whole meats, and thin liquids w/ medications whole in puree/thins per pt preference. Diet to be advanced per pt request.     SLP Recommendation and Plan     1. Mechanical soft solids, whole meats, thin liquids.    2. Medications whole in puree/thins.   3. Upright and centered for all po intake  4. BETY precautions.  5. Oral care protocol.  6. Advance diet per pt request.     No further formal SLP f/u warranted/recommended at this time.    D/w patient results and recommendations w/ verbal agreement.    D/w RN results and recommendations w/ verbal agreement.    Thank you for allowing me to participate in the care of your patient-  Jennifer Ruff M.S., CCC-SLP        EDUCATION  The patient has been educated in the following areas:   Dysphagia (Swallowing Impairment) Oral Care/Hydration.        Time Calculation:   Jennifer Ruff MS CCC-SLP  2/13/2025

## 2025-02-13 NOTE — CASE MANAGEMENT/SOCIAL WORK
Discharge Planning Assessment   [place]     Patient Name: Lesley Michel  MRN: [id number]  Today's Date: 2/13/2025    Admit Date: 2/13/2025    Plan: CM spoke with Pt at bedside. Pt lives at home with son Wes and grandchild and plans to return at d/c. Pt does not have HH/DME. Pt has oxygen 4lnc continuous, verified with [facility] and will need an oxygen tank on d/c. PCP is [facility] and gets prescriptions filled at [facility] in [place]. Pt has [employer] Replacement denies any trouble with coverage. Family will transport at d/c. CM will follow.   Discharge Needs Assessment       Row Name 02/13/25 1547       Living Environment    People in Home child(devan), adult;grandchild(devan)    Primary Care Provided by child(devan)    Provides Primary Care For no one    Able to Return to Prior Arrangements yes       Resource/Environmental Concerns    Resource/Environmental Concerns none    Transportation Concerns none       Transportation Needs    In the past 12 months, has lack of transportation kept you from medical appointments or from getting medications? no    In the past 12 months, has lack of transportation kept you from meetings, work, or from getting things needed for daily living? No       Food Insecurity    Within the past 12 months, you worried that your food would run out before you got the money to buy more. Never true    Within the past 12 months, the food you bought just didn't last and you didn't have money to get more. Never true       Transition Planning    Patient/Family Anticipates Transition to home with family    Patient/Family Anticipated Services at Transition none    Transportation Anticipated family or friend will provide       Discharge Needs Assessment    Equipment Currently Used at Home oxygen  2lnc continuous    Do you want help finding or keeping work or a job? I do not need or want help    Do you want help with school or training? For example, starting or completing job  training or getting a high school diploma, GED or equivalent No    Equipment Needed After Discharge none    Provided Post Acute Provider List? N/A    Provided Post Acute Provider Quality & Resource List? N/A                   Discharge Plan       Row Name 02/13/25 7336       Plan    Plan CM spoke with Pt at bedside. Pt lives at home with son Wes and grandchild and plans to return at d/c. Pt does not have HH/DME. Pt has oxygen 4lnc continuous, verified with [facility] and will need an oxygen tank on d/c. PCP is [facility] and gets prescriptions filled at [facility] in [place]. Pt has [employer] Replacement denies any trouble with coverage. Family will transport at d/c. CM will follow.                                        Tamara Patino RN

## 2025-02-13 NOTE — ED NOTES
EMS came to patient house for chest pain and SOB  patient put on on 3L NC by EMS, patient O2 normally 2 L NC, EMS gave duo neb  Pt states has not slept in 4 days  patient states gall bladdder surgery 2 weeks ago

## 2025-02-13 NOTE — ED PROVIDER NOTES
"Subjective   History of Present Illness  69-year-old female Hx anxiety, CHF and COPD on 2 L nasal cannula presents for shortness of breath.  Patient extremely agitated on arrival able to speak full sentences but with pressured speech saying \"I cannot breathe\".  Was recently in hospital for cholecystectomy and states her medications including her fluid pills/blood pressure medication were DC'd after discharge.  She is still been taking her inhalers.  Shortness of breath started today.        Review of Systems   All other systems reviewed and are negative.      Past Medical History:   Diagnosis Date    Anxiety     Arthritis     CHF (congestive heart failure)     Cigarette smoker 02/10/2022    CKD (chronic kidney disease) stage 4, GFR 15-29 ml/min     MAICO on top of CKD IIIa, HD from 10/23-, some recovery to CKD IV    COPD (chronic obstructive pulmonary disease)     Coronary artery disease     Elevated cholesterol     GERD (gastroesophageal reflux disease)     H/O heart artery stent     History of transfusion     Hyperlipidemia     Hypertension     hypothyroidism     Obesity     PAF (paroxysmal atrial fibrillation)     PVD (peripheral vascular disease)     x2 stents       Allergies   Allergen Reactions    Xanax [Alprazolam] Other (See Comments)     Severe agitation per patient and family       Past Surgical History:   Procedure Laterality Date    ANKLE SURGERY      RIGHT    APPENDECTOMY      CARDIAC CATHETERIZATION      LEFT     SECTION      CHOLECYSTECTOMY N/A 2025    Procedure: CHOLECYSTECTOMY LAPAROSCOPIC WITH BIND [employer] ROBOT;  Surgeon: Windy Neal MD;  Location: [facility];  Service: Robotics - [employer]i;  Laterality: N/A;    CORONARY STENT PLACEMENT      HYSTERECTOMY      INSERTION HEMODIALYSIS CATHETER Right 10/19/2023    Procedure: HEMODIALYSIS CATHETER INSERTION;  Surgeon: Bartolome Schwartz MD;  Location: [facility] OR;  Service: General;  Laterality: Right;       Family History   Problem " Relation Age of Onset    Heart disease Mother     Heart attack Mother 73    Fibromyalgia Mother     Heart attack Father 72    Ovarian cancer Sister     Coronary artery disease Other     Breast cancer Neg Hx        Social History     Socioeconomic History    Marital status:    Tobacco Use    Smoking status: Every Day     Current packs/day: 0.50     Average packs/day: 0.5 packs/day for 30.0 years (15.0 ttl pk-yrs)     Types: Cigarettes     Passive exposure: Never    Smokeless tobacco: Never   Vaping Use    Vaping status: Never Used   Substance and Sexual Activity    Alcohol use: No    Drug use: No    Sexual activity: Never           Objective   Physical Exam  Constitutional:       General: She is in acute distress.      Appearance: She is ill-appearing.   HENT:      Head: Normocephalic and atraumatic.   Cardiovascular:      Rate and Rhythm: Regular rhythm. Tachycardia present.   Pulmonary:      Effort: Tachypnea and accessory muscle usage present.      Breath sounds: Examination of the left-upper field reveals decreased breath sounds. Examination of the right-middle field reveals decreased breath sounds. Examination of the left-middle field reveals decreased breath sounds. Examination of the right-lower field reveals decreased breath sounds. Examination of the left-lower field reveals decreased breath sounds. Decreased breath sounds present.   Abdominal:      Palpations: Abdomen is soft.      Tenderness: There is no abdominal tenderness. There is no guarding.   Musculoskeletal:      Cervical back: Normal range of motion.      Right lower leg: Edema present.      Left lower leg: Edema present.   Skin:     General: Skin is warm.   Neurological:      Mental Status: She is alert and oriented to person, place, and time.         Procedures       Results for orders placed or performed during the hospital encounter of 02/13/25   ECG 12 Lead Dyspnea    Collection Time: 02/13/25  6:13 AM   Result Value Ref Range    QT  Interval 284 ms    QTC Interval 430 ms   Blood Gas, Arterial With Co-Ox    Collection Time: 02/13/25  6:29 AM    Specimen: Arterial Blood   Result Value Ref Range    Site Left Brachial     Sergei's Test N/A     pH, Arterial 7.183 (C) 7.350 - 7.450 pH units    pCO2, Arterial 47.9 (H) 35.0 - 45.0 mm Hg    pO2, Arterial 46.9 (C) 83.0 - 108.0 mm Hg    HCO3, Arterial 18.0 (L) 20.0 - 26.0 mmol/L    Base Excess, Arterial -10.0 (L) 0.0 - 2.0 mmol/L    O2 Saturation, Arterial 70.2 (L) 94.0 - 99.0 %    Hemoglobin, Blood Gas 10.8 (L) 13.5 - 17.5 g/dL    Hematocrit, Blood Gas 33.1 (L) 38.0 - 51.0 %    Oxyhemoglobin 67.8 (L) 94 - 99 %    Methemoglobin 0.30 0.00 - 3.00 %    Carboxyhemoglobin 3.1 0 - 5 %    A-a DO2 116.7 0.0 - 300.0 mmHg    CO2 Content 19.5 (L) 22 - 33 mmol/L    Barometric Pressure for Blood Gas 724 mmHg    Modality Nasal Cannula     FIO2 32 %    Flow Rate 3.0 lpm    Ventilator Mode NA     Notified Who DR MURRAY AND RN     Notified By [id number]     Notified Time 02/13/2025 06:32     Collected by [id number]     pH, Temp Corrected      pCO2, Temperature Corrected      pO2, Temperature Corrected     COVID-19, FLU A/B, RSV PCR 1 HR TAT - Swab, Nasopharynx    Collection Time: 02/13/25  6:30 AM    Specimen: Nasopharynx; Swab   Result Value Ref Range    COVID19 Not Detected Not Detected - Ref. Range    Influenza A PCR Not Detected Not Detected    Influenza B PCR Not Detected Not Detected    RSV, PCR Not Detected Not Detected   Comprehensive Metabolic Panel    Collection Time: 02/13/25  6:30 AM    Specimen: Blood   Result Value Ref Range    Glucose 116 (H) 65 - 99 mg/dL    BUN 32 (H) 8 - 23 mg/dL    Creatinine 1.75 (H) 0.57 - 1.00 mg/dL    Sodium 140 136 - 145 mmol/L    Potassium 4.8 3.5 - 5.2 mmol/L    Chloride 105 98 - 107 mmol/L    CO2 20.5 (L) 22.0 - 29.0 mmol/L    Calcium 9.5 8.6 - 10.5 mg/dL    Total Protein 8.6 (H) 6.0 - 8.5 g/dL    Albumin 4.1 3.5 - 5.2 g/dL    ALT (SGPT) 9 1 - 33 U/L    AST (SGOT) 23 1 - 32 U/L     Alkaline Phosphatase 216 (H) 39 - 117 U/L    Total Bilirubin 0.4 0.0 - 1.2 mg/dL    Globulin 4.5 gm/dL    A/G Ratio 0.9 g/dL    BUN/Creatinine Ratio 18.3 7.0 - 25.0    Anion Gap 14.5 5.0 - 15.0 mmol/L    eGFR 31.6 (L) >60.0 mL/min/1.73   Lipase    Collection Time: 02/13/25  6:30 AM    Specimen: Blood   Result Value Ref Range    Lipase 52 13 - 60 U/L   Lactic Acid, Plasma    Collection Time: 02/13/25  6:30 AM    Specimen: Blood   Result Value Ref Range    Lactate 3.8 (C) 0.5 - 2.0 mmol/L   High Sensitivity Troponin T    Collection Time: 02/13/25  6:30 AM    Specimen: Blood   Result Value Ref Range    HS Troponin T 183 (C) <14 ng/L   CBC Auto Differential    Collection Time: 02/13/25  6:30 AM    Specimen: Blood   Result Value Ref Range    WBC 13.86 (H) 3.40 - 10.80 10*3/mm3    RBC 4.98 3.77 - 5.28 10*6/mm3    Hemoglobin 11.2 (L) 12.0 - 15.9 g/dL    Hematocrit 39.3 34.0 - 46.6 %    MCV 78.9 (L) 79.0 - 97.0 fL    MCH 22.5 (L) 26.6 - 33.0 pg    MCHC 28.5 (L) 31.5 - 35.7 g/dL    RDW 21.5 (H) 12.3 - 15.4 %    RDW-SD 58.8 (H) 37.0 - 54.0 fl    MPV 9.4 6.0 - 12.0 fL    Platelets 612 (H) 140 - 450 10*3/mm3    Neutrophil % 62.4 42.7 - 76.0 %    Lymphocyte % 23.0 19.6 - 45.3 %    Monocyte % 7.3 5.0 - 12.0 %    Eosinophil % 6.2 0.3 - 6.2 %    Basophil % 0.8 0.0 - 1.5 %    Immature Grans % 0.3 0.0 - 0.5 %    Neutrophils, Absolute 8.65 (H) 1.70 - 7.00 10*3/mm3    Lymphocytes, Absolute 3.19 (H) 0.70 - 3.10 10*3/mm3    Monocytes, Absolute 1.01 (H) 0.10 - 0.90 10*3/mm3    Eosinophils, Absolute 0.86 (H) 0.00 - 0.40 10*3/mm3    Basophils, Absolute 0.11 0.00 - 0.20 10*3/mm3    Immature Grans, Absolute 0.04 0.00 - 0.05 10*3/mm3    nRBC 0.0 0.0 - 0.2 /100 WBC   BNP    Collection Time: 02/13/25  6:30 AM    Specimen: Blood   Result Value Ref Range    proBNP 15,163.0 (H) 0.0 - 900.0 pg/mL   Scan Slide    Collection Time: 02/13/25  6:30 AM    Specimen: Blood   Result Value Ref Range    Anisocytosis Large/3+ None Seen    Hypochromia Mod/2+  None Seen    Microcytes Slight/1+ None Seen    Polychromasia Slight/1+ None Seen    Platelet Estimate Increased Normal   Blood Gas, Arterial With Co-Ox    Collection Time: 02/13/25  7:47 AM    Specimen: Arterial Blood   Result Value Ref Range    Site Left Brachial     Sergei's Test N/A     pH, Arterial 7.338 (L) 7.350 - 7.450 pH units    pCO2, Arterial 36.2 35.0 - 45.0 mm Hg    pO2, Arterial 85.1 83.0 - 108.0 mm Hg    HCO3, Arterial 19.4 (L) 20.0 - 26.0 mmol/L    Base Excess, Arterial -5.8 (L) 0.0 - 2.0 mmol/L    O2 Saturation, Arterial 96.6 94.0 - 99.0 %    Hemoglobin, Blood Gas 9.4 (L) 13.5 - 17.5 g/dL    Hematocrit, Blood Gas 28.8 (L) 38.0 - 51.0 %    Oxyhemoglobin 93.6 (L) 94 - 99 %    Methemoglobin 0.00 0.00 - 3.00 %    Carboxyhemoglobin 3.1 0 - 5 %    A-a DO2 283.8 0.0 - 300.0 mmHg    CO2 Content 20.6 (L) 22 - 33 mmol/L    Barometric Pressure for Blood Gas 726 mmHg    Modality BiPap     FIO2 60 %    Ventilator Mode NA     Set Mech Resp Rate 24.0     IPAP 20     EPAP 6     Collected by [id number]     pH, Temp Corrected      pCO2, Temperature Corrected      pO2, Temperature Corrected             ED Course  ED Course as of 02/13/25 0752   u Feb 13, 2025   0710 Patient was taken over from Dr. Noble at shift change.  Currently patient is tolerating BiPAP and is recently been placed on nitro drip.  -Will be admitted to hospital service this morning.  Electronically signed by Gabby Schmidt DO, 02/13/25, 7:11 AM EST.   [LK]   0712 EKG 0613; read at 0618  Rate 138  Normal axis and intervals  Sinus tachycardia with PACs  Interpretation: Sinus tachycardia nonspecific ST changes [AN]   0747 Was taken over from Dr. Noble at shift change- She is currently tolerating BiPAP settings: 20/6, rate of 24 FiO2 of 60% she is 98% with respiratory rate of average of 25-resting in bed and stable for PCU level of care.  Has been accepted by Dr. August for admission.  Electronically signed by Gabby Schmidt DO, 02/13/25, 7:51 AM EST.    [LK]      ED Course User Index  [AN] Andrea Noble DO  [LK] Gabby Schmidt DO                                                       Medical Decision Making  Patient presented respiratory distress.  Was extremely agitated.   ABG consistent with metabolic acidosis  Xray more consistent with Pulmonary edema or; consider this is more likely than multifocal pneumonia.  -Topical Nitropaste was applied while patient was prepped for BiPAP.  Ativan 1 mg IVP given for anxiety/agitation.  Patient placed on BiPAP.  At this time, patient does not want to be intubated.  Nursing staff contacted his son was on his way but has confirmed this as well.      Nitro drip was initiated for SCAPE  Bumex 2 mg IVP given  Patient's lactic acid and WBC count is mildly elevated.  Empiric antibiotic coverage given.  BNP elevated consistent with fluid overload.    ------  After BiPAP repeat  lung exam demonstrates trace Rales in the lower lungs and wheezing in the  left upper lobe.  Troponin slightly elevated but more likely from hypoxia and demand.  Patient denied chest pain.    Patient was signed out to day physician Dr. Schmidt with further results pending.      Problems Addressed:  Acute hypoxic respiratory failure: complicated acute illness or injury  Acute pulmonary edema: complicated acute illness or injury  NSTEMI (non-ST elevated myocardial infarction): complicated acute illness or injury    Amount and/or Complexity of Data Reviewed  Labs: ordered.  Radiology: ordered.  ECG/medicine tests: ordered.    Risk  Prescription drug management.  Decision regarding hospitalization.        Final diagnoses:   Acute pulmonary edema   Acute hypoxic respiratory failure   NSTEMI (non-ST elevated myocardial infarction)       ED Disposition  ED Disposition       ED Disposition   Decision to Admit    Condition   --    Comment   --               No follow-up provider specified.       Medication List      No changes were made to your prescriptions during  this visit.            Gabby Schmidt,   02/13/25 075

## 2025-02-13 NOTE — CONSULTS
"Palliative Care Initial Consult     Attending Physician: Maria L August DO  Referring Provider: Maria L August     assistance with advance directives, assistance with clarification of goals of care, and psychosocial support  Code Status:   Code Status and Medical Interventions: CPR (Attempt to Resuscitate); Full Support   Ordered at: 02/13/25 1326     Level Of Support Discussed With:    Patient     Code Status (Patient has no pulse and is not breathing):    CPR (Attempt to Resuscitate)     Medical Interventions (Patient has pulse or is breathing):    Full Support      Advanced Directives: Advance Directive Status: Patient does not have advance directive   Healthcare surrogate: Son Wes Michel and daughter Yadira Michel  Goals of Care: I was able to have a conversation with Lesley about her goals of care. At first Lesley said she wouldn't want resuscitation and intubation because she states everyone who goes on a ventilator dies,so I said \"so you wouldn't want intubated\" and she states well I don't want to die and then began to make distraught sounds, with no tears, and then would go right back to talking. I encouraged her to talk with her family about her GOC for herself, at which point she said I do not have family, I only have kids. I politely told her until she tells us what she wants she would be a full code by default.    HPI:  Lesley Michel is a 69 y.o. female admitted on 2/13/2025 due to shortness of breath.Lesley was reporting worsening shortness of breath for 24 hours as well as onset of chest pain without radiation or nausea or diaphoretic. Lesley has a medical history of CAD, HTN, atrial fibrillation, HFpEF, CKD stage IV, COPD, PVD, migraine headaches.   vital signs were heart rate 138, respirations 26, /104, SpO2 64%.  BP did trend as high as 220/172 in the ED.  Initial ABG obtained on 3 L showed pH 7.183 with pCO2 47.9 and pO2 46.9.  Follow-up on BiPAP showed interval improvement with pH " 7.338, normal pCO2 and pO2.  Initial HS troponin was 183 with proBNP 15,000.  WBC 13 K.   Chest x-ray showed CHF and concern for superimposed bibasilar airspace disease.   Palliative care consulted for GOC/ACP and support.    ROS: Negative except as above in HPI.     Past Medical History:   Diagnosis Date    Anxiety     Arthritis     CHF (congestive heart failure)     Cigarette smoker 02/10/2022    CKD (chronic kidney disease) stage 4, GFR 15-29 ml/min     MAICO on top of CKD IIIa, HD from 10/23-, some recovery to CKD IV    COPD (chronic obstructive pulmonary disease)     Coronary artery disease     Elevated cholesterol     GERD (gastroesophageal reflux disease)     H/O heart artery stent     History of transfusion     Hyperlipidemia     Hypertension     hypothyroidism     Obesity     PAF (paroxysmal atrial fibrillation)     PVD (peripheral vascular disease)     x2 stents     Past Surgical History:   Procedure Laterality Date    ANKLE SURGERY      RIGHT    APPENDECTOMY      CARDIAC CATHETERIZATION      LEFT     SECTION      CHOLECYSTECTOMY N/A 2025    Procedure: CHOLECYSTECTOMY LAPAROSCOPIC WITH DAVINCI ROBOT;  Surgeon: Windy Neal MD;  Location: [facility];  Service: Robotics - DaVinci;  Laterality: N/A;    CORONARY STENT PLACEMENT      HYSTERECTOMY      INSERTION HEMODIALYSIS CATHETER Right 10/19/2023    Procedure: HEMODIALYSIS CATHETER INSERTION;  Surgeon: Bartolome Schwartz MD;  Location: [facility] OR;  Service: General;  Laterality: Right;     Social History     Socioeconomic History    Marital status:    Tobacco Use    Smoking status: Every Day     Current packs/day: 0.50     Average packs/day: 0.5 packs/day for 30.0 years (15.0 ttl pk-yrs)     Types: Cigarettes     Passive exposure: Never    Smokeless tobacco: Never   Vaping Use    Vaping status: Never Used   Substance and Sexual Activity    Alcohol use: No    Drug use: No    Sexual activity: Never     Family History   Problem  Relation Age of Onset    Heart disease Mother     Heart attack Mother 73    Fibromyalgia Mother     Heart attack Father 72    Ovarian cancer Sister     Coronary artery disease Other     Breast cancer Neg Hx        Allergies   Allergen Reactions    Xanax [Alprazolam] Other (See Comments)     Severe agitation per patient and family       Current Facility-Administered Medications   Medication Dose Route Frequency Provider Last Rate Last Admin    aspirin chewable tablet 324 mg  324 mg Oral Once Maria L August DO        sennosides-docusate (PERICOLACE) 8.6-50 MG per tablet 2 tablet  2 tablet Oral BID Maria L August DO        And    polyethylene glycol (MIRALAX) packet 17 g  17 g Oral Daily PRN Maria L August DO        And    bisacodyl (DULCOLAX) EC tablet 5 mg  5 mg Oral Daily PRN Maria L August DO        And    bisacodyl (DULCOLAX) suppository 10 mg  10 mg Rectal Daily PRN Maria L August DO        hydrOXYzine (ATARAX) tablet 10 mg  10 mg Oral TID PRN Maria L August DO        nicotine (NICODERM CQ) 21 MG/24HR patch 1 patch  1 patch Transdermal Q24H Maria L August DO   1 patch at 02/13/25 1151    nitroglycerin (NITROSTAT) SL tablet 0.4 mg  0.4 mg Sublingual Q5 Min PRN Maria L August DO        nitroglycerin (TRIDIL) 200 mcg/ml infusion  5-200 mcg/min Intravenous Titrated Maria L August DO 54 mL/hr at 02/13/25 1151 180 mcg/min at 02/13/25 1151    Pharmacy to Dose Heparin   Not Applicable Continuous PRN Maria L August DO        sodium chloride 0.9 % flush 10 mL  10 mL Intravenous Q12H Maria L August DO   10 mL at 02/13/25 1008    sodium chloride 0.9 % flush 10 mL  10 mL Intravenous PRN Maria L August DO        sodium chloride 0.9 % flush 10 mL  10 mL Intravenous Q12H Maria L August DO   10 mL at 02/13/25 1154    sodium chloride 0.9 % flush 10 mL  10 mL Intravenous PRN Maria L August DO        sodium chloride 0.9 % infusion 40 mL  40 mL Intravenous PRN Maria L August DO        sodium chloride 0.9 % infusion 40  "mL  40 mL Intravenous PRN Maria L August DO         nitroglycerin, 5-200 mcg/min, Last Rate: 180 mcg/min (02/13/25 1151)  Pharmacy to Dose Heparin,         senna-docusate sodium **AND** polyethylene glycol **AND** bisacodyl **AND** bisacodyl    hydrOXYzine    nitroglycerin    Pharmacy to Dose Heparin    sodium chloride    sodium chloride    sodium chloride    sodium chloride    Current medication reviewed for route, type, dose and frequency and are current per MAR.    Palliative Performance Scale Score:     /87   Pulse 79   Temp 98.6 °F (37 °C) (Oral)   Resp 17   Ht 154.9 cm (61\")   Wt 84.5 kg (186 lb 4.8 oz)   SpO2 94%   BMI 35.20 kg/m²     Intake/Output Summary (Last 24 hours) at 2/13/2025 1458  Last data filed at 2/13/2025 0820  Gross per 24 hour   Intake 100 ml   Output --   Net 100 ml       PE:  General Appearance:    Chronically ill appearing, alert, cooperative, NAD   HEENT:    NC/AT, without obvious abnormality, EOMI, anicteric    Neck:   supple, trachea midline, no JVD   Lungs:     Unlabored respirations, no wheezing or rhonchi noted, diminished in bases    Heart:    RRR, normal S1 and S2, no M/R/G   Abdomen:     Soft, NT, ND, NABS    Extremities:   Moves all extremities, Bilateral lower extremity edema   Pulses:   Pulses palpable and equal bilaterally   Skin:   Warm, dry   Neurologic:   A/Ox3, cooperative   Psych:   Anxious, mood swings       Labs:   Results from last 7 days   Lab Units 02/13/25  0630   WBC 10*3/mm3 13.86*   HEMOGLOBIN g/dL 11.2*   HEMATOCRIT % 39.3   PLATELETS 10*3/mm3 612*     Results from last 7 days   Lab Units 02/13/25  0630   SODIUM mmol/L 140   POTASSIUM mmol/L 4.8   CHLORIDE mmol/L 105   CO2 mmol/L 20.5*   BUN mg/dL 32*   CREATININE mg/dL 1.75*   GLUCOSE mg/dL 116*   CALCIUM mg/dL 9.5     Results from last 7 days   Lab Units 02/13/25  0630   SODIUM mmol/L 140   POTASSIUM mmol/L 4.8   CHLORIDE mmol/L 105   CO2 mmol/L 20.5*   BUN mg/dL 32*   CREATININE mg/dL 1.75* "   CALCIUM mg/dL 9.5   BILIRUBIN mg/dL 0.4   ALK PHOS U/L 216*   ALT (SGPT) U/L 9   AST (SGOT) U/L 23   GLUCOSE mg/dL 116*     Imaging Results (Last 72 Hours)       Procedure Component Value Units Date/Time    XR Chest 1 View [[id number]] Collected: 02/13/25 0844     Updated: 02/13/25 0846    Narrative:      XR CHEST 1 VW-     CLINICAL INDICATION: soa        COMPARISON: 1/19/2025     TECHNIQUE: Single frontal view of the chest.     FINDINGS:     LUNGS: Diffuse interstitial change and probable superimposed airspace  disease in both lung bases.     HEART AND MEDIASTINUM: Heart and mediastinal contours are unremarkable        SKELETON: Bony and soft tissue structures are unremarkable.             Impression:         1. Appearance concerning for CHF and possibly superimposed bibasilar  airspace disease           This report was finalized on 2/13/2025 8:44 AM by Dr. Julio Dominguez MD.               Diagnostics: Reviewed    A: Lesley Michel is a 69 y.o. female admitted on 2/13/2025 due to shortness of breath.Lesley was reporting worsening shortness of breath for 24 hours as well as onset of chest pain without radiation or nausea or diaphoretic. Lesley has a medical history of CAD, HTN, atrial fibrillation, HFpEF, CKD stage IV, COPD, PVD, migraine headaches.   vital signs were heart rate 138, respirations 26, /104, SpO2 64%.  BP did trend as high as 220/172 in the ED.  Initial ABG obtained on 3 L showed pH 7.183 with pCO2 47.9 and pO2 46.9.  Follow-up on BiPAP showed interval improvement with pH 7.338, normal pCO2 and pO2.  Initial HS troponin was 183 with proBNP 15,000.  WBC 13 K.   Chest x-ray showed CHF and concern for superimposed bibasilar airspace disease.   Palliative care consulted for GOC/ACP and support.         P:   I was able to have a conversation with Lesley about her goals of care. At first Lesley said she wouldn't want resuscitation and intubation because she states everyone who goes on a  "ventilator dies,so I said \"so you wouldn't want intubated\" and she states well I don't want to die and then began to make distraught sounds, with no tears, and then would go right back to talking. I encouraged her to talk with her family about her GOC for herself, at which point she said I do not have family, I only have kids. I politely told her until she tells us what she wants she would be a full code by default. I was able to touch base with pts son Wes and discussed my conversation with his mother, now says she wants her children to make her decisions. I discussed pt with Dr August and NADEEM Daniel.    We appreciate the consult and the opportunity to participate in Lesley Michel's care. We will continue to follow along. Please do not hesitate to contact us regarding further symptom management or goals of care needs, including after hours or on weekends via our on call provider at [phone].     Time:  55 minutes spent reviewing medical and medication records, assessing and examining patient, discussing with family, answering questions, providing some guidance about a plan and documentation of care, and coordinating care with other healthcare members, with > 50% time spent face to face.     Gwen Ellis, APRN    2/13/2025    "

## 2025-02-13 NOTE — ED NOTES
Provider and RN's is working with pt at this time trying to get her comfortable. Will attempt to get BC's when pt is comfortable and settled down.

## 2025-02-13 NOTE — ED NOTES
Pt noted to have had a bowel movement at this time. Bed linens changed and purewick applied at this time. Pt tolerated well. SPENCER.

## 2025-02-13 NOTE — PLAN OF CARE
Goal Outcome Evaluation:  Plan of Care Reviewed With: patient, family        Progress: improving  Outcome Evaluation: Patient admitted to CCU this shift. Patient transitioned from bipap to NC successfully. Care ongoing.

## 2025-02-13 NOTE — CONSULTS
[facility]  CONSULT  NOTE      Patient information:  Date of Admit: 2/13/2025  Date of Consult: 02/13/25  Hospitalist/Referring MD:Maria L August DO  PCP: Woodrow Raymond APRN  MRN:  [id number]  Visit Number:  [id number]    LOS: 0  CODE STATUS:  Code Status and Medical Interventions: CPR (Attempt to Resuscitate); Full Support   Ordered at: 02/13/25 1326     Level Of Support Discussed With:    Patient     Code Status (Patient has no pulse and is not breathing):    CPR (Attempt to Resuscitate)     Medical Interventions (Patient has pulse or is breathing):    Full Support       PROBLEM LIST: Principal Problem:    Hypertensive crisis      Reason for Cardiology consultation: NSTEMI, hypertensive crisis, and acute heart failure exacerbation    General Cardiology Consulting Physician: Dr. Ulises Mata MD, [facility]    Primary Cardiologist: DEEPA Gallardo    Assessment      Acute non-ST elevation myocardial infarction (type I versus type II due to uncontrolled hypertension and respiratory failure.  Acute on chronic HFpEF.  ASCVD, status post previous PCI/ROSAURA to LAD with high-grade stenosis in the ostium of the small diagonal branch and 60% stenosis in the mid RCA at [facility] (details unavailable).  Peripheral artery disease, status post previous percutaneous intervention at [facility] in [place].  Paroxysmal atrial fibrillation with a [id number] score of 5, patient is chronically anticoagulated with Eliquis.  Acute on chronic respiratory failure with hypoxia and hypercapnia due to combination of COPD with exacerbation and acute heart failure.  Acute kidney injury on chronic kidney disease (stage IIIb).    Recommendations     For her acute non-STEMI, will treat her with aspirin, IV heparin and statin.  For her uncontrolled hypertension, will treat her with a cardioselective beta-blocker, hydralazine and amlodipine.  For her heart failure,  continue with IV diuretics as needed and tolerated and monitor the kidney function closely.  Continue to trend the troponin levels and if there is no significant rise, will consider further ischemic evaluation with a Lexiscan sestamibi study when her respiratory status improves adequately.      Subjective Data     2/13/2025    ADMISSION INFORMATION:  Chief Complaint   Patient presents with    Shortness of Breath    Chest Pain     History of Present Illness (HPI)  HPI obtained from chart review     Lesley Michel is a 69 y.o. female with a past medical history significant for CAD (with chronically occluded well collateralized dominant RCA, status post drug-eluting stent to LAD with high-grade stenosis of the ostium of the small D1.  She also has 60% mid RCA posterolateral branch lesion), hyperlipidemia, hypertension, PAD (follow-up with Dr. Strong in [place]), paroxysmal atrial fibrillation noted in 2023 and chronically anticoagulated with Eliquis, CHF, COPD on 2 L nasal cannula, history of hypoxic respiratory failure, CKD stage II,and current tobacco use.    Patient presented to [facility] emergency department (ED) on 2/13/2025 with complaints of shortness of breath and unable to speak full sentences. Was recently in hospital for cholecystectomy and states her medications including her fluid pills/blood pressure medication were DC'd after discharge. She is still been taking her inhalers. Shortness of breath started today.  Patient was admitted for acute hypoxic respiratory failure, acute pulmonary edema, and NSTEMI.    While in the ED, respiratory swab was negative.  Creatinine is 1.75, potassium 4.8, HS troponin 182-> , proBNP was 15,163.0, hemoglobin 11.2, WBC 13.86, platelet count 612,    Cardiology has been consulted for further evaluation and management for NSTEMI, hypertensive crisis, and acute heart failure exacerbation.    Primary Cardiologist has been DEEPA Gallardo and she was last  "seen in the office on 6/19/2024.     Patient was in room CCU 4 when she was seen and examined by Dr. Mata.  Patient is lying in bed resting quietly with eyes closed.  No acute distress noted at this time.  She is easily awakened with verbal stimuli.  Patient reports she is feeling much better as she been able \" sleep finally\".  Patient reports she developed shortness of breath yesterday.  She reports she had her gallbladder removed in January 2025 and was discharged with that some of her heart medications.  She states she no longer he is on dialysis.  She reports that she lost her health insurance around July of last year and was not able to cover the cost of her office visit with the cardiologist.  Patient reports she did have x 2 stents for PAD as well as coronary stents as stated above.  patient is currently on IV nitroglycerin still.    ORDERS:  Hydralazine 25 mg p.o. x 1 dose now and call Dr. Mata within 30 minutes of giving this medication..  Decrease IV nitroglycerin down to 90 mcg/min.     EVENT TIMELINE:  1/19: TTE w EF 56-60 %.  Please see full report attached below.      Known medications given enroute via EMS and in the ER:   Medications   nitroglycerin (TRIDIL) 200 mcg/ml infusion (90 mcg/min Intravenous Rate/Dose Change 2/13/25 1800)   aspirin chewable tablet 324 mg (324 mg Oral Not Given 2/13/25 1005)   Pharmacy to Dose Heparin (has no administration in time range)   sodium chloride 0.9 % flush 10 mL (10 mL Intravenous Given 2/13/25 1008)   sodium chloride 0.9 % flush 10 mL (has no administration in time range)   sodium chloride 0.9 % infusion 40 mL (has no administration in time range)   nitroglycerin (NITROSTAT) SL tablet 0.4 mg (has no administration in time range)   sennosides-docusate (PERICOLACE) 8.6-50 MG per tablet 2 tablet (2 tablets Oral Not Given 2/13/25 1005)     And   polyethylene glycol (MIRALAX) packet 17 g (has no administration in time range)     And   bisacodyl (DULCOLAX) EC tablet " 5 mg (has no administration in time range)     And   bisacodyl (DULCOLAX) suppository 10 mg (has no administration in time range)   hydrOXYzine (ATARAX) tablet 10 mg (has no administration in time range)   nicotine (NICODERM CQ) 21 MG/24HR patch 1 patch (1 patch Transdermal Medication Applied 2/13/25 1151)   sodium chloride 0.9 % flush 10 mL (10 mL Intravenous Given 2/13/25 1154)   sodium chloride 0.9 % flush 10 mL (has no administration in time range)   sodium chloride 0.9 % infusion 40 mL (has no administration in time range)   mupirocin (BACTROBAN) 2 % nasal ointment 1 Application (1 Application Each Nare Not Given 2/13/25 1701)   hydrALAZINE (APRESOLINE) tablet 25 mg (has no administration in time range)   LORazepam (ATIVAN) injection 1 mg (1 mg Intravenous Given 2/13/25 0621)   nitroglycerin (NITROSTAT) ointment 1 inch (1 inch Topical Medication Removed 2/13/25 0651)   cefepime 2000 mg IVPB in 100 mL NS (VTB) (0 mg Intravenous Stopped 2/13/25 0820)   vancomycin IVPB 1750 mg in 0.9% Sodium Chloride (premix) 500 mL (1,750 mg Intravenous New Bag 2/13/25 0823)   bumetanide (BUMEX) injection 2 mg (2 mg Intravenous Given 2/13/25 0717)   hydrALAZINE (APRESOLINE) tablet 25 mg (25 mg Oral Given 2/13/25 1915)     Personal History     Cardiac risk factors:arteriosclerotic heart disease, hypercholesterolemia, hypertension, and peripheral vascular disease      Last Echo: Results for orders placed during the hospital encounter of 01/19/25    Adult Transthoracic Echo Complete w/ Color, Spectral and Contrast if necessary per protocol    Interpretation Summary    Left ventricular systolic function is normal. Left ventricular ejection fraction appears to be 56 - 60%.    Left ventricular wall thickness is consistent with mild concentric hypertrophy.    There is calcification of the aortic valve.    Mild mitral valve stenosis is present.    Estimated right ventricular systolic pressure from tricuspid regurgitation is normal (<35  mmHg).         Last Stress: Results for orders placed during the hospital encounter of 10/08/23    Stress Test With Myocardial Perfusion One Day    Interpretation Summary    Left ventricular ejection fraction is normal (Calculated EF = 68%).    Myocardial perfusion imaging indicates a normal myocardial perfusion study with no evidence of ischemia.    TID 1.05.    Findings consistent with a normal ECG stress test.        Last Cath:             EP study: No results found for this or any previous visit.              Past Medical History:   Diagnosis Date    Anxiety     Arthritis     CHF (congestive heart failure)     Cigarette smoker 02/10/2022    CKD (chronic kidney disease) stage 4, GFR 15-29 ml/min     MAICO on top of CKD IIIa, HD from 10/23-, some recovery to CKD IV    COPD (chronic obstructive pulmonary disease)     Coronary artery disease     Elevated cholesterol     GERD (gastroesophageal reflux disease)     H/O heart artery stent     History of transfusion     Hyperlipidemia     Hypertension     hypothyroidism     Obesity     PAF (paroxysmal atrial fibrillation)     PVD (peripheral vascular disease)     x2 stents     Past Surgical History:   Procedure Laterality Date    ANKLE SURGERY      RIGHT    APPENDECTOMY      CARDIAC CATHETERIZATION      LEFT     SECTION      CHOLECYSTECTOMY N/A 2025    Procedure: CHOLECYSTECTOMY LAPAROSCOPIC WITH DAVINCI ROBOT;  Surgeon: Windy Neal MD;  Location: [facility];  Service: Robotics - DaVinci;  Laterality: N/A;    CORONARY STENT PLACEMENT      HYSTERECTOMY      INSERTION HEMODIALYSIS CATHETER Right 10/19/2023    Procedure: HEMODIALYSIS CATHETER INSERTION;  Surgeon: Bartolome Schwartz MD;  Location: [facility];  Service: General;  Laterality: Right;     Family History   Problem Relation Age of Onset    Heart disease Mother     Heart attack Mother 73    Fibromyalgia Mother     Heart attack Father 72    Ovarian cancer Sister     Coronary artery disease  Other     Breast cancer Neg Hx      Social History     Tobacco Use    Smoking status: Every Day     Current packs/day: 0.50     Average packs/day: 0.5 packs/day for 30.0 years (15.0 ttl pk-yrs)     Types: Cigarettes     Passive exposure: Never    Smokeless tobacco: Never   Vaping Use    Vaping status: Never Used   Substance Use Topics    Alcohol use: No    Drug use: No       ALLERGIES: Xanax [alprazolam]    Medications listed below are reported home medications pulling from within the system:  Medications Prior to Admission   Medication Sig Dispense Refill Last Dose/Taking    albuterol sulfate  (90 Base) MCG/ACT inhaler Inhale 2 puffs Every 4 (Four) Hours As Needed for Wheezing. 18 g 0 Past Month    amLODIPine (NORVASC) 2.5 MG tablet Take 1 tablet by mouth Daily.   2/12/2025    aspirin 81 MG chewable tablet Chew 1 tablet Daily.   2/12/2025 Morning    chlorthalidone (HYGROTON) 25 MG tablet Take 1 tablet by mouth Daily.   2/12/2025    Eliquis 5 MG tablet tablet Take 1 tablet by mouth Daily.   2/12/2025    folic acid (FOLVITE) 1 MG tablet Take 1 tablet by mouth Daily.   2/12/2025    gabapentin (NEURONTIN) 600 MG tablet Take 0.5 tablets by mouth 3 (Three) Times a Day As Needed (nerve pain).   2/12/2025    HYDROcodone-acetaminophen (NORCO)  MG per tablet Take 1 tablet by mouth Every 8 (Eight) Hours As Needed for Moderate Pain.   2/12/2025    ipratropium-albuterol (DUO-NEB) 0.5-2.5 mg/3 ml nebulizer Take 3 mL by nebulization Every 4 (Four) Hours As Needed for Wheezing. 90 mL 1 Past Month    metoprolol tartrate (LOPRESSOR) 25 MG tablet Take 1 tablet by mouth Daily.   2/12/2025    ondansetron ODT (ZOFRAN-ODT) 4 MG disintegrating tablet Place 1 tablet on the tongue Every 8 (Eight) Hours As Needed for Nausea or Vomiting.   Past Week    pantoprazole (PROTONIX) 40 MG EC tablet Take 1 tablet by mouth Daily.   2/12/2025    rosuvastatin (CRESTOR) 10 MG tablet Take 1 tablet by mouth Daily.   2/12/2025    sertraline  (ZOLOFT) 25 MG tablet Take 1 tablet by mouth Daily.   2/12/2025    torsemide 40 MG tablet Take 40 mg by mouth Daily for 30 days. 30 tablet 0 2/12/2025       Review of Systems   Constitutional:  Negative for activity change, appetite change and diaphoresis.   HENT:  Negative for facial swelling and trouble swallowing.    Eyes:  Negative for visual disturbance.   Respiratory:  Positive for shortness of breath.    Cardiovascular:  Negative for chest pain, palpitations and leg swelling.   Gastrointestinal:  Negative for blood in stool, nausea and vomiting.   Endocrine: Negative.    Genitourinary:  Negative for hematuria.   Musculoskeletal:  Negative for gait problem.   Skin:  Negative for color change.   Neurological:  Negative for dizziness, syncope, speech difficulty, weakness, light-headedness and headaches.   Psychiatric/Behavioral:  Negative for agitation and behavioral problems.      Objective Data      Vital Signs  Temp:  [98 °F (36.7 °C)-98.6 °F (37 °C)] 98.2 °F (36.8 °C)  Heart Rate:  [] 85  Resp:  [15-32] 20  BP: (114-220)/() 152/85  Flow (L/min) (Oxygen Therapy):  [2-60] 4  Device (Oxygen Therapy): nasal cannula  Vital Signs (last 72 hrs)         02/10 0700  02/11 0659 02/11 0700  02/12 0659 02/12 0700  02/13 0659 02/13 0700  02/13 1008   Most Recent      Temp (°F)       98      98.4     98.4 (36.9) 02/13 0920    Heart Rate     130 -  141    91 -  112     91 02/13 1000    Resp     26 -  32    15 -  32     15 02/13 1000    BP     114/104 -  220/172    133/88 -  179/102     133/88 02/13 1000    SpO2 (%)     64 -  95    89 -  99     99 02/13 1000    Flow (L/min) (Oxygen Therapy)         60     60 02/13 0920    Oxygen Concentration (%)       60      60     60 02/13 0749          BMI:  Body mass index is 35.2 kg/m².  WEIGHT:      02/13/25  0623 02/13/25  0920   Weight: 90.7 kg (200 lb) 84.5 kg (186 lb 4.8 oz)     DIET:  Diet: Regular/House; Texture: Soft to Chew (NDD 3); Soft to Chew: Whole Meat; Fluid  Consistency: Thin (IDDSI 0)  I&O:  Intake & Output (last 3 days)         02/10 0701 02/11 0700 02/11 0701 02/12 0700 02/12 0701 02/13 0700 02/13 0701 02/14 0700    IV Piggyback    100    Total Intake(mL/kg)    100 (1.1)    Net    +100                  Physical Exam  Constitutional:       Appearance: Normal appearance.   HENT:      Head: Normocephalic and atraumatic.      Nose: Nose normal.      Mouth/Throat:      Mouth: Mucous membranes are moist.      Pharynx: Oropharynx is clear.   Eyes:      Conjunctiva/sclera: Conjunctivae normal.   Cardiovascular:      Rate and Rhythm: Normal rate and regular rhythm.      Pulses: Normal pulses.      Heart sounds: Normal heart sounds.   Pulmonary:      Comments: Bibasilar rales  Abdominal:      General: Bowel sounds are normal.      Palpations: Abdomen is soft.   Musculoskeletal:         General: Normal range of motion.      Cervical back: Normal range of motion.   Skin:     General: Skin is warm and dry.   Neurological:      General: No focal deficit present.      Mental Status: She is alert and oriented to person, place, and time.   Psychiatric:         Mood and Affect: Mood normal.         Behavior: Behavior normal.       Results review   Results Review:    I have reviewed the patient's new clinical results. 02/13/25 19:19 EST    Results from last 7 days   Lab Units 02/13/25  1715 02/13/25  0828 02/13/25  0630   HSTROP T ng/L 157* 169* 183*     Lab Results   Component Value Date    PROBNP 15,163.0 (H) 02/13/2025    PROBNP 15,776.0 (H) 01/19/2025    PROBNP 5,236.0 (H) 07/20/2024     Results from last 7 days   Lab Units 02/13/25  0630   WBC 10*3/mm3 13.86*   HEMOGLOBIN g/dL 11.2*   PLATELETS 10*3/mm3 612*     Results from last 7 days   Lab Units 02/13/25  0630   SODIUM mmol/L 140   POTASSIUM mmol/L 4.8   CHLORIDE mmol/L 105   CO2 mmol/L 20.5*   BUN mg/dL 32*   CREATININE mg/dL 1.75*   CALCIUM mg/dL 9.5   GLUCOSE mg/dL 116*   ALT (SGPT) U/L 9   AST (SGOT) U/L 23     Lab  "Results   Component Value Date    MG 2.1 2025    MG 2.0 2025    MG 2.0 2024     Lab Results   Component Value Date    CHOL 140 10/08/2023    TRIG 75 10/08/2023    HDL 34 (L) 10/08/2023    LDL 91 10/08/2023     Estimated Creatinine Clearance: 30.8 mL/min (A) (by C-G formula based on SCr of 1.75 mg/dL (H)).  Lab Results   Component Value Date    HGBA1C 5.30 10/08/2023     Lab Results   Component Value Date    INR 1.55 (H) 2025    INR 1.07 10/24/2023     Lab Results   Component Value Date    LABHEPA >1.10 (C) 2025    LABHEPA >1.10 (C) 2025    LABHEPA <0.10 (L) 10/28/2023    LABHEPA <0.10 (L) 10/28/2023         Lab Results   Component Value Date    TSH 6.770 (H) 10/08/2023      No results found for: \"URICACID\"  Pain Management Panel          Latest Ref Rng & Units 10/9/2023   Pain Management Panel   Creatinine, Urine mg/dL 116.0       Details                 Microbiology Results (last 10 days)       Procedure Component Value - Date/Time    COVID-19, FLU A/B, RSV PCR 1 HR TAT - Swab, Nasopharynx [[id number]]  (Normal) Collected: 25    Lab Status: Final result Specimen: Swab from Nasopharynx Updated: 25     COVID19 Not Detected     Influenza A PCR Not Detected     Influenza B PCR Not Detected     RSV, PCR Not Detected    Narrative:      Fact sheet for providers: https://www.fda.gov/media/545086/download    Fact sheet for patients: https://www.fda.gov/media/932425/download    Test performed by PCR.           No results found for: \"BLOODCX\"      EC2025          ECG/EMG Results (last 24 hours)       Procedure Component Value Units Date/Time    ECG 12 Lead Dyspnea [[id number]] Collected: 25     Updated: 25     QT Interval 284 ms      QTC Interval 430 ms     Narrative:      Test Reason : Dyspnea  Blood Pressure :   */*   mmHG  Vent. Rate : 138 BPM     Atrial Rate : 138 BPM     P-R Int : 142 ms          QRS Dur :  88 ms      QT Int : 284 ms  "      P-R-T Axes :  58  39  40 degrees    QTcB Int : 430 ms    Sinus tachycardia with premature atrial complexes  Otherwise normal ECG  When compared with ECG of 19-Jan-2025 00:19,  premature ventricular complexes are no longer present  premature atrial complexes are now present  Vent. rate has increased by  54 bpm  ST no longer depressed in Anterior leads  ST now depressed in Lateral leads  T wave inversion less evident in Inferior leads  Nonspecific T wave abnormality no longer evident in Anterolateral leads    Referred By: TREVOR           Confirmed By:             TELEMETRY:           RADIOLOGY STUDIES:  Imaging Results (Last 72 Hours)       Procedure Component Value Units Date/Time    XR Chest 1 View [[id number]] Collected: 02/13/25 0844     Updated: 02/13/25 0846    Narrative:      XR CHEST 1 VW-     CLINICAL INDICATION: soa        COMPARISON: 1/19/2025     TECHNIQUE: Single frontal view of the chest.     FINDINGS:     LUNGS: Diffuse interstitial change and probable superimposed airspace  disease in both lung bases.     HEART AND MEDIASTINUM: Heart and mediastinal contours are unremarkable        SKELETON: Bony and soft tissue structures are unremarkable.             Impression:         1. Appearance concerning for CHF and possibly superimposed bibasilar  airspace disease           This report was finalized on 2/13/2025 8:44 AM by Dr. Julio Dominguez MD.               ALLERGIES: Xanax [alprazolam]    CURRENT MEDICATIONS:  Current list of medications may not reflect those currently placed in orders that are not signed or are being held.     aspirin, 324 mg, Oral, Once  hydrALAZINE, 25 mg, Oral, Q8H  mupirocin, 1 Application, Each Nare, BID  nicotine, 1 patch, Transdermal, Q24H  senna-docusate sodium, 2 tablet, Oral, BID  sodium chloride, 10 mL, Intravenous, Q12H  sodium chloride, 10 mL, Intravenous, Q12H      nitroglycerin, 5-200 mcg/min, Last Rate: 90 mcg/min (02/13/25 1800)  Pharmacy to Dose Heparin,          senna-docusate sodium **AND** polyethylene glycol **AND** bisacodyl **AND** bisacodyl    hydrOXYzine    nitroglycerin    Pharmacy to Dose Heparin    sodium chloride    sodium chloride    sodium chloride    sodium chloride    Thank you very much for asking us to be involved in this patient's care. Please do not hesitate to call for any questions or concerns.     I have discussed the patients findings and recommendations with the patient.    Electronically signed by DEEPA Navarro, 02/13/25, 7:19 PM EST.     Electronically signed by Ulises Mata MD, 02/13/25, 8:02 PM EST.            Please note that portions of this note were copied and has been reviewed and is accurate as of 2/13/2025 .      Please note that portions of this note were completed with a voice recognition program.

## 2025-02-14 LAB
A-A DO2: 84.5 MMHG (ref 0–300)
ANION GAP SERPL CALCULATED.3IONS-SCNC: 10.4 MMOL/L (ref 5–15)
ANISOCYTOSIS BLD QL: NORMAL
ARTERIAL PATENCY WRIST A: ABNORMAL
ATMOSPHERIC PRESS: 740 MMHG
BASE EXCESS BLDA CALC-SCNC: -0.9 MMOL/L (ref 0–2)
BASOPHILS # BLD AUTO: 0.06 10*3/MM3 (ref 0–0.2)
BASOPHILS NFR BLD AUTO: 0.7 % (ref 0–1.5)
BDY SITE: ABNORMAL
BUN SERPL-MCNC: 26 MG/DL (ref 8–23)
BUN/CREAT SERPL: 16.7 (ref 7–25)
CALCIUM SPEC-SCNC: 8.5 MG/DL (ref 8.6–10.5)
CHLORIDE SERPL-SCNC: 105 MMOL/L (ref 98–107)
CO2 BLDA-SCNC: 24.2 MMOL/L (ref 22–33)
CO2 SERPL-SCNC: 19.6 MMOL/L (ref 22–29)
COHGB MFR BLD: 2.3 % (ref 0–5)
CREAT SERPL-MCNC: 1.56 MG/DL (ref 0.57–1)
DEPRECATED RDW RBC AUTO: 61.3 FL (ref 37–54)
EGFRCR SERPLBLD CKD-EPI 2021: 36.3 ML/MIN/1.73
EOSINOPHIL # BLD AUTO: 0.46 10*3/MM3 (ref 0–0.4)
EOSINOPHIL NFR BLD AUTO: 5.4 % (ref 0.3–6.2)
ERYTHROCYTE [DISTWIDTH] IN BLOOD BY AUTOMATED COUNT: 21.1 % (ref 12.3–15.4)
GAS FLOW AIRWAY: 2 LPM
GLUCOSE SERPL-MCNC: 119 MG/DL (ref 65–99)
HCO3 BLDA-SCNC: 23.2 MMOL/L (ref 20–26)
HCT VFR BLD AUTO: 28.7 % (ref 34–46.6)
HCT VFR BLD CALC: 23.7 % (ref 38–51)
HGB BLD-MCNC: 7.6 G/DL (ref 12–15.9)
HGB BLDA-MCNC: 7.7 G/DL (ref 13.5–17.5)
HYPOCHROMIA BLD QL: NORMAL
IMM GRANULOCYTES # BLD AUTO: 0.02 10*3/MM3 (ref 0–0.05)
IMM GRANULOCYTES NFR BLD AUTO: 0.2 % (ref 0–0.5)
INHALED O2 CONCENTRATION: 28 %
LARGE PLATELETS: NORMAL
LYMPHOCYTES # BLD AUTO: 1.84 10*3/MM3 (ref 0.7–3.1)
LYMPHOCYTES NFR BLD AUTO: 21.7 % (ref 19.6–45.3)
Lab: ABNORMAL
MAGNESIUM SERPL-MCNC: 1.9 MG/DL (ref 1.6–2.4)
MCH RBC QN AUTO: 21.8 PG (ref 26.6–33)
MCHC RBC AUTO-ENTMCNC: 26.5 G/DL (ref 31.5–35.7)
MCV RBC AUTO: 82.5 FL (ref 79–97)
METHGB BLD QL: 0.7 % (ref 0–3)
MODALITY: ABNORMAL
MONOCYTES # BLD AUTO: 0.78 10*3/MM3 (ref 0.1–0.9)
MONOCYTES NFR BLD AUTO: 9.2 % (ref 5–12)
NEUTROPHILS NFR BLD AUTO: 5.33 10*3/MM3 (ref 1.7–7)
NEUTROPHILS NFR BLD AUTO: 62.8 % (ref 42.7–76)
NRBC BLD AUTO-RTO: 0 /100 WBC (ref 0–0.2)
OXYHGB MFR BLDV: 92.8 % (ref 94–99)
PCO2 BLDA: 34.7 MM HG (ref 35–45)
PCO2 TEMP ADJ BLD: ABNORMAL MM[HG]
PH BLDA: 7.43 PH UNITS (ref 7.35–7.45)
PH, TEMP CORRECTED: ABNORMAL
PLATELET # BLD AUTO: 404 10*3/MM3 (ref 140–450)
PMV BLD AUTO: 9.3 FL (ref 6–12)
PO2 BLDA: 70.6 MM HG (ref 83–108)
PO2 TEMP ADJ BLD: ABNORMAL MM[HG]
POTASSIUM SERPL-SCNC: 4.4 MMOL/L (ref 3.5–5.2)
RBC # BLD AUTO: 3.48 10*6/MM3 (ref 3.77–5.28)
SAO2 % BLDCOA: 95.7 % (ref 94–99)
SODIUM SERPL-SCNC: 135 MMOL/L (ref 136–145)
UFH PPP CHRO-ACNC: 0.92 IU/ML (ref 0.3–0.7)
UFH PPP CHRO-ACNC: >1.1 IU/ML (ref 0.3–0.7)
VENTILATOR MODE: ABNORMAL
WBC NRBC COR # BLD AUTO: 8.49 10*3/MM3 (ref 3.4–10.8)

## 2025-02-14 PROCEDURE — 25010000002 NITROGLYCERIN 200 MCG/ML SOLUTION: Performed by: STUDENT IN AN ORGANIZED HEALTH CARE EDUCATION/TRAINING PROGRAM

## 2025-02-14 PROCEDURE — 82375 ASSAY CARBOXYHB QUANT: CPT

## 2025-02-14 PROCEDURE — 25010000002 HEPARIN (PORCINE) 25000-0.45 UT/250ML-% SOLUTION: Performed by: STUDENT IN AN ORGANIZED HEALTH CARE EDUCATION/TRAINING PROGRAM

## 2025-02-14 PROCEDURE — 94799 UNLISTED PULMONARY SVC/PX: CPT

## 2025-02-14 PROCEDURE — 99232 SBSQ HOSP IP/OBS MODERATE 35: CPT | Performed by: STUDENT IN AN ORGANIZED HEALTH CARE EDUCATION/TRAINING PROGRAM

## 2025-02-14 PROCEDURE — 85007 BL SMEAR W/DIFF WBC COUNT: CPT | Performed by: STUDENT IN AN ORGANIZED HEALTH CARE EDUCATION/TRAINING PROGRAM

## 2025-02-14 PROCEDURE — 80048 BASIC METABOLIC PNL TOTAL CA: CPT | Performed by: INTERNAL MEDICINE

## 2025-02-14 PROCEDURE — 99232 SBSQ HOSP IP/OBS MODERATE 35: CPT | Performed by: INTERNAL MEDICINE

## 2025-02-14 PROCEDURE — 94660 CPAP INITIATION&MGMT: CPT

## 2025-02-14 PROCEDURE — 94761 N-INVAS EAR/PLS OXIMETRY MLT: CPT

## 2025-02-14 PROCEDURE — 85025 COMPLETE CBC W/AUTO DIFF WBC: CPT | Performed by: STUDENT IN AN ORGANIZED HEALTH CARE EDUCATION/TRAINING PROGRAM

## 2025-02-14 PROCEDURE — 82805 BLOOD GASES W/O2 SATURATION: CPT

## 2025-02-14 PROCEDURE — 85520 HEPARIN ASSAY: CPT | Performed by: STUDENT IN AN ORGANIZED HEALTH CARE EDUCATION/TRAINING PROGRAM

## 2025-02-14 PROCEDURE — 83735 ASSAY OF MAGNESIUM: CPT | Performed by: INTERNAL MEDICINE

## 2025-02-14 PROCEDURE — 83050 HGB METHEMOGLOBIN QUAN: CPT

## 2025-02-14 PROCEDURE — 36600 WITHDRAWAL OF ARTERIAL BLOOD: CPT

## 2025-02-14 RX ORDER — HYDROCODONE BITARTRATE AND ACETAMINOPHEN 10; 325 MG/1; MG/1
1 TABLET ORAL EVERY 8 HOURS PRN
Status: DISCONTINUED | OUTPATIENT
Start: 2025-02-14 | End: 2025-02-18 | Stop reason: HOSPADM

## 2025-02-14 RX ORDER — LABETALOL HYDROCHLORIDE 5 MG/ML
10 INJECTION, SOLUTION INTRAVENOUS EVERY 6 HOURS PRN
Status: DISCONTINUED | OUTPATIENT
Start: 2025-02-14 | End: 2025-02-18 | Stop reason: HOSPADM

## 2025-02-14 RX ORDER — ASPIRIN 81 MG/1
81 TABLET ORAL DAILY
Status: DISCONTINUED | OUTPATIENT
Start: 2025-02-14 | End: 2025-02-18 | Stop reason: HOSPADM

## 2025-02-14 RX ORDER — FENTANYL CITRATE 50 UG/ML
100 INJECTION, SOLUTION INTRAMUSCULAR; INTRAVENOUS
Status: DISCONTINUED | OUTPATIENT
Start: 2025-02-14 | End: 2025-02-14

## 2025-02-14 RX ORDER — IPRATROPIUM BROMIDE AND ALBUTEROL SULFATE 2.5; .5 MG/3ML; MG/3ML
3 SOLUTION RESPIRATORY (INHALATION) EVERY 4 HOURS PRN
Status: DISCONTINUED | OUTPATIENT
Start: 2025-02-14 | End: 2025-02-18 | Stop reason: HOSPADM

## 2025-02-14 RX ORDER — PANTOPRAZOLE SODIUM 40 MG/1
40 TABLET, DELAYED RELEASE ORAL DAILY
Status: DISCONTINUED | OUTPATIENT
Start: 2025-02-14 | End: 2025-02-18 | Stop reason: HOSPADM

## 2025-02-14 RX ORDER — HYDRALAZINE HYDROCHLORIDE 50 MG/1
50 TABLET, FILM COATED ORAL EVERY 8 HOURS SCHEDULED
Status: DISCONTINUED | OUTPATIENT
Start: 2025-02-14 | End: 2025-02-14

## 2025-02-14 RX ORDER — TORSEMIDE 20 MG/1
40 TABLET ORAL DAILY
Status: DISCONTINUED | OUTPATIENT
Start: 2025-02-14 | End: 2025-02-18 | Stop reason: HOSPADM

## 2025-02-14 RX ORDER — FOLIC ACID 1 MG/1
1 TABLET ORAL DAILY
Status: DISCONTINUED | OUTPATIENT
Start: 2025-02-14 | End: 2025-02-18 | Stop reason: HOSPADM

## 2025-02-14 RX ORDER — GABAPENTIN 300 MG/1
300 CAPSULE ORAL EVERY 8 HOURS SCHEDULED
Status: DISCONTINUED | OUTPATIENT
Start: 2025-02-14 | End: 2025-02-18 | Stop reason: HOSPADM

## 2025-02-14 RX ORDER — LABETALOL HYDROCHLORIDE 5 MG/ML
10 INJECTION, SOLUTION INTRAVENOUS ONCE
Status: DISCONTINUED | OUTPATIENT
Start: 2025-02-14 | End: 2025-02-14

## 2025-02-14 RX ORDER — CHLORTHALIDONE 50 MG/1
25 TABLET ORAL DAILY
Status: DISCONTINUED | OUTPATIENT
Start: 2025-02-14 | End: 2025-02-18 | Stop reason: HOSPADM

## 2025-02-14 RX ORDER — HEPARIN SODIUM 10000 [USP'U]/100ML
11 INJECTION, SOLUTION INTRAVENOUS
Status: DISPENSED | OUTPATIENT
Start: 2025-02-14 | End: 2025-02-16

## 2025-02-14 RX ORDER — SERTRALINE HYDROCHLORIDE 25 MG/1
25 TABLET, FILM COATED ORAL DAILY
Status: DISCONTINUED | OUTPATIENT
Start: 2025-02-14 | End: 2025-02-18 | Stop reason: HOSPADM

## 2025-02-14 RX ORDER — BUMETANIDE 0.25 MG/ML
2 INJECTION, SOLUTION INTRAMUSCULAR; INTRAVENOUS DAILY
Status: DISCONTINUED | OUTPATIENT
Start: 2025-02-14 | End: 2025-02-14

## 2025-02-14 RX ORDER — HYDRALAZINE HYDROCHLORIDE 50 MG/1
100 TABLET, FILM COATED ORAL EVERY 8 HOURS SCHEDULED
Status: DISCONTINUED | OUTPATIENT
Start: 2025-02-14 | End: 2025-02-18 | Stop reason: HOSPADM

## 2025-02-14 RX ORDER — AMLODIPINE BESYLATE 10 MG/1
10 TABLET ORAL
Status: DISCONTINUED | OUTPATIENT
Start: 2025-02-14 | End: 2025-02-18 | Stop reason: HOSPADM

## 2025-02-14 RX ORDER — ONDANSETRON 4 MG/1
4 TABLET, ORALLY DISINTEGRATING ORAL EVERY 8 HOURS PRN
Status: DISCONTINUED | OUTPATIENT
Start: 2025-02-14 | End: 2025-02-18 | Stop reason: HOSPADM

## 2025-02-14 RX ADMIN — SERTRALINE HYDROCHLORIDE 25 MG: 25 TABLET ORAL at 12:45

## 2025-02-14 RX ADMIN — Medication 10 ML: at 20:36

## 2025-02-14 RX ADMIN — GABAPENTIN 300 MG: 300 CAPSULE ORAL at 20:39

## 2025-02-14 RX ADMIN — FOLIC ACID 1 MG: 1 TABLET ORAL at 12:45

## 2025-02-14 RX ADMIN — HYDROXYZINE HYDROCHLORIDE 10 MG: 10 TABLET ORAL at 06:30

## 2025-02-14 RX ADMIN — NITROGLYCERIN 90 MCG/MIN: 20 INJECTION INTRAVENOUS at 01:02

## 2025-02-14 RX ADMIN — Medication 10 ML: at 08:55

## 2025-02-14 RX ADMIN — MUPIROCIN 1 APPLICATION: 20 OINTMENT TOPICAL at 08:55

## 2025-02-14 RX ADMIN — AMLODIPINE BESYLATE 10 MG: 10 TABLET ORAL at 20:37

## 2025-02-14 RX ADMIN — NICOTINE 1 PATCH: 21 PATCH TRANSDERMAL at 08:54

## 2025-02-14 RX ADMIN — GABAPENTIN 300 MG: 300 CAPSULE ORAL at 13:03

## 2025-02-14 RX ADMIN — TORSEMIDE 40 MG: 20 TABLET ORAL at 12:46

## 2025-02-14 RX ADMIN — MUPIROCIN 1 APPLICATION: 20 OINTMENT TOPICAL at 01:02

## 2025-02-14 RX ADMIN — ACETAMINOPHEN 650 MG: 325 TABLET ORAL at 06:30

## 2025-02-14 RX ADMIN — CHLORTHALIDONE 25 MG: 50 TABLET ORAL at 12:46

## 2025-02-14 RX ADMIN — HEPARIN SODIUM 8 UNITS/KG/HR: 10000 INJECTION, SOLUTION INTRAVENOUS at 18:55

## 2025-02-14 RX ADMIN — HYDRALAZINE HYDROCHLORIDE 25 MG: 50 TABLET ORAL at 06:30

## 2025-02-14 RX ADMIN — SENNOSIDES AND DOCUSATE SODIUM 2 TABLET: 50; 8.6 TABLET ORAL at 08:55

## 2025-02-14 RX ADMIN — ASPIRIN 81 MG: 81 TABLET, COATED ORAL at 10:57

## 2025-02-14 RX ADMIN — HYDRALAZINE HYDROCHLORIDE 100 MG: 50 TABLET ORAL at 23:50

## 2025-02-14 RX ADMIN — PANTOPRAZOLE SODIUM 40 MG: 40 TABLET, DELAYED RELEASE ORAL at 12:45

## 2025-02-14 RX ADMIN — METOPROLOL TARTRATE 25 MG: 25 TABLET, FILM COATED ORAL at 20:37

## 2025-02-14 RX ADMIN — HYDRALAZINE HYDROCHLORIDE 50 MG: 50 TABLET ORAL at 10:57

## 2025-02-14 RX ADMIN — ATORVASTATIN CALCIUM 80 MG: 40 TABLET, FILM COATED ORAL at 20:37

## 2025-02-14 RX ADMIN — METOPROLOL TARTRATE 25 MG: 25 TABLET, FILM COATED ORAL at 08:54

## 2025-02-14 NOTE — NURSING NOTE
Pharmacy called at this time to report that patient's Xa level was appropriate for starting heparin gtt per physician order. RN notified pharmacy that the patient had no IV access and would not be able to receive heparin gtt.    RN contact cardiology, Dr. Marcos, to notify that the patient had no IV access and refused for nursing to obtain new access and therefore could not receive heparin gtt. Dr. Marcos verbalized to discontinue the heparin gtt and restart the patient's home eliquis.    Dr. August notified.

## 2025-02-14 NOTE — PLAN OF CARE
Problem: Noninvasive Ventilation Acute  Goal: Effective Unassisted Ventilation and Oxygenation  Outcome: Progressing     Problem: Adult Inpatient Plan of Care  Goal: Plan of Care Review  Outcome: Progressing  Flowsheets  Taken 2/14/2025 1741 by Dimple Moreno RN  Progress: improving  Outcome Evaluation: Patient transferred to PCU. patient is alert and oriented. Patient is currently on 2L NC. Patient noted to be hypertensive. MD made aware. Call light within reach, bed in the lowest position.  Taken 2/14/2025 0636 by Jennifer Quezada RN  Plan of Care Reviewed With: patient  Goal: Patient-Specific Goal (Individualized)  Outcome: Progressing  Goal: Absence of Hospital-Acquired Illness or Injury  Outcome: Progressing  Intervention: Identify and Manage Fall Risk  Recent Flowsheet Documentation  Taken 2/14/2025 1655 by Dimple Moreno RN  Safety Promotion/Fall Prevention: safety round/check completed  Intervention: Prevent Skin Injury  Recent Flowsheet Documentation  Taken 2/14/2025 1655 by Dimple Moreno RN  Skin Protection: incontinence pads utilized  Intervention: Prevent and Manage VTE (Venous Thromboembolism) Risk  Recent Flowsheet Documentation  Taken 2/14/2025 1655 by Dimple Moreno RN  VTE Prevention/Management: (see MAR) other (see comments)  Goal: Optimal Comfort and Wellbeing  Outcome: Progressing  Intervention: Provide Person-Centered Care  Recent Flowsheet Documentation  Taken 2/14/2025 1655 by Dimple Moreno RN  Trust Relationship/Rapport:   care explained   choices provided   emotional support provided   empathic listening provided   questions answered   questions encouraged   reassurance provided   thoughts/feelings acknowledged  Goal: Readiness for Transition of Care  Outcome: Progressing     Problem: Hypertension Acute  Goal: Blood Pressure Within Desired Range  Outcome: Progressing  Intervention: Normalize Blood Pressure  Recent Flowsheet Documentation  Taken 2/14/2025 1655 by Dimple Moreno  RN  Medication Review/Management: medications reviewed     Problem: Sepsis/Septic Shock  Goal: Optimal Coping  Outcome: Progressing  Intervention: Support Patient and Family Response  Recent Flowsheet Documentation  Taken 2/14/2025 1655 by Dimple Moreno RN  Family/Support System Care: support provided  Goal: Absence of Bleeding  Outcome: Progressing  Goal: Blood Glucose Level Within Target Range  Outcome: Progressing  Goal: Absence of Infection Signs and Symptoms  Outcome: Progressing  Intervention: Promote Stabilization  Recent Flowsheet Documentation  Taken 2/14/2025 1655 by Dimple Moreno RN  Fluid/Electrolyte Management: fluids provided  Goal: Optimal Nutrition Delivery  Outcome: Progressing     Problem: Comorbidity Management  Goal: Maintenance of Heart Failure Symptom Control  Outcome: Progressing  Intervention: Maintain Heart Failure Management  Recent Flowsheet Documentation  Taken 2/14/2025 1655 by Dimple Moreno RN  Medication Review/Management: medications reviewed     Problem: Skin Injury Risk Increased  Goal: Skin Health and Integrity  Outcome: Progressing  Intervention: Optimize Skin Protection  Recent Flowsheet Documentation  Taken 2/14/2025 1655 by Dimple Moreno RN  Pressure Reduction Techniques:   frequent weight shift encouraged   weight shift assistance provided  Pressure Reduction Devices: pressure-redistributing mattress utilized  Skin Protection: incontinence pads utilized     Problem: Fall Injury Risk  Goal: Absence of Fall and Fall-Related Injury  Outcome: Progressing  Intervention: Identify and Manage Contributors  Recent Flowsheet Documentation  Taken 2/14/2025 1655 by Dimple Moreno RN  Medication Review/Management: medications reviewed  Intervention: Promote Injury-Free Environment  Recent Flowsheet Documentation  Taken 2/14/2025 1655 by Dimple Moreno RN  Safety Promotion/Fall Prevention: safety round/check completed   Goal Outcome Evaluation:           Progress: improving  Outcome  Evaluation: Patient transferred to PCU. patient is alert and oriented. Patient is currently on 2L NC. Patient noted to be hypertensive. MD made aware. Call light within reach, bed in the lowest position.

## 2025-02-14 NOTE — NURSING NOTE
Patient rang out at this time to report that her IV was hurting. Upon assessment, IV did not flush. No signs/symptoms of phlebitis or extravasation noted. Site was without redness or swelling. IV was removed. The patient was told that she is on a nitro gtt and would need IV access. Patient refused for nursing staff to obtain new IV access. Nursing explained to patient the importance of maintaining IV access during hospital admission and for the administration of IV medications. Patient continued to refuse. Dr. August notified.

## 2025-02-14 NOTE — PROGRESS NOTES
"Palliative Care Daily Progress Note     S: Medical record reviewed, followed up with Primary NADEEM Daniel and Dr August regarding patient's condition. When I saw Lesley today, both times, she was alert and oriented to person place, time and situation. She was not c/o pain, shortness of breath or nauseated at this time and was more calm today, did not appear to be anxious. No family at bedside at this time.      O:   Palliative Performance Scale Score:     /96   Pulse 80   Temp 98 °F (36.7 °C)   Resp 20   Ht 154.9 cm (61\")   Wt 84.5 kg (186 lb 4.8 oz)   SpO2 96%   BMI 35.20 kg/m²     Intake/Output Summary (Last 24 hours) at 2/14/2025 1710  Last data filed at 2/14/2025 1600  Gross per 24 hour   Intake 1374.1 ml   Output 1850 ml   Net -475.9 ml       PE:  General Appearance:    Chronically ill appearing, alert, cooperative, NAD   HEENT:    NC/AT, without obvious abnormality, EOMI, anicteric    Neck:   supple, trachea midline, no JVD   Lungs:     Unlabored respirations, no wheezing or rhonchi noted, diminished in bases    Heart:    RRR, normal S1 and S2, no M/R/G   Abdomen:     Soft, NT, ND, NABS    Extremities:   Moves all extremities, Bilateral lower extremity edema   Pulses:   Pulses palpable and equal bilaterally   Skin:   Warm, dry   Neurologic:   A/O to person place time and situation   Psych:   Calm         Meds: Reviewed and changes noted    Labs:   Results from last 7 days   Lab Units 02/14/25  0543   WBC 10*3/mm3 8.49   HEMOGLOBIN g/dL 7.6*   HEMATOCRIT % 28.7*   PLATELETS 10*3/mm3 404     Results from last 7 days   Lab Units 02/14/25  1206   SODIUM mmol/L 135*   POTASSIUM mmol/L 4.4   CHLORIDE mmol/L 105   CO2 mmol/L 19.6*   BUN mg/dL 26*   CREATININE mg/dL 1.56*   GLUCOSE mg/dL 119*   CALCIUM mg/dL 8.5*     Results from last 7 days   Lab Units 02/14/25  1206 02/13/25  0630   SODIUM mmol/L 135* 140   POTASSIUM mmol/L 4.4 4.8   CHLORIDE mmol/L 105 105   CO2 mmol/L 19.6* 20.5*   BUN mg/dL 26* 32* "   CREATININE mg/dL 1.56* 1.75*   CALCIUM mg/dL 8.5* 9.5   BILIRUBIN mg/dL  --  0.4   ALK PHOS U/L  --  216*   ALT (SGPT) U/L  --  9   AST (SGOT) U/L  --  23   GLUCOSE mg/dL 119* 116*     Imaging Results (Last 72 Hours)       Procedure Component Value Units Date/Time    XR Chest 1 View [[id number]] Collected: 02/13/25 0844     Updated: 02/13/25 0846    Narrative:      XR CHEST 1 VW-     CLINICAL INDICATION: soa        COMPARISON: 1/19/2025     TECHNIQUE: Single frontal view of the chest.     FINDINGS:     LUNGS: Diffuse interstitial change and probable superimposed airspace  disease in both lung bases.     HEART AND MEDIASTINUM: Heart and mediastinal contours are unremarkable        SKELETON: Bony and soft tissue structures are unremarkable.             Impression:         1. Appearance concerning for CHF and possibly superimposed bibasilar  airspace disease           This report was finalized on 2/13/2025 8:44 AM by Dr. Julio Dominguez MD.                 Diagnostics: Reviewed    A: Lesley Michel is a 69 y.o. female  admitted on 2/13/2025 due to shortness of breath.Lesley was reporting worsening shortness of breath for 24 hours as well as onset of chest pain without radiation or nausea or diaphoretic. Lesley has a medical history of CAD, HTN, atrial fibrillation, HFpEF, CKD stage IV, COPD, PVD, migraine headaches.   vital signs were heart rate 138, respirations 26, /104, SpO2 64%.  BP did trend as high as 220/172 in the ED.  Initial ABG obtained on 3 L showed pH 7.183 with pCO2 47.9 and pO2 46.9.  Follow-up on BiPAP showed interval improvement with pH 7.338, normal pCO2 and pO2.  Initial HS troponin was 183 with proBNP 15,000.  WBC 13 K.   Chest x-ray showed CHF and concern for superimposed bibasilar airspace disease.   Palliative care consulted for GOC/ACP and support.   Today 2/14/2025 T 97.9, HR 77, BP of 137/76 RR 16 and O2 sat of 97% on 2 L NC she was awake alert and oriented times 4, she was more calm  today than yesterday and was not in distress.        P:  I was able to speak with Lesley today further regarding her GOC for herself. After two lengthy conversations she has decided that she would not want to be resuscitated or to be intubated and placed on a ventilator. I asked her if she would want to do a living will naming a healthcare surrogate and line out her wishes, she stated that she would like to do thus. I returned to her bedside this afternoon and she was again alert to person place, time and situation, she was calm and was able to have a rational conversation with me and has decided to make her granddaughter Olivia her primary her HCS and her alternate surrogate her son Wes. She also chose that she would not want life prolonging treatment and would direct that treatment and nourishment and fluids be withheld or withdrawn and that she be permitted to die naturally with only medication administered for her comfort and would not want to be a organ donor. I called her granddaughter and let her know that she had named her her HCS and notified Wes that he was alternate HCS. Living will was scanned into medical records and copies given to pt for her family. I let NADEEM Daniel and dr August know of this conversation and change in code status.      We will continue to follow along. Please do not hesitate to contact us regarding further sx mgmt or GOC needs, including after hours or on weekends via our on call provider at [phone].     Gwen Ellis, APRN    2/14/2025

## 2025-02-14 NOTE — OUTREACH NOTE
General Surgery Week 3 Survey      Flowsheet Row Responses   [facility] patient discharged from? [facility]   Does the patient have one of the following disease processes/diagnoses(primary or secondary)? General Surgery   Week 3 attempt successful? No   Unsuccessful attempts Attempt 1   Revoke Readmitted            SARA LEIGH - Registered Nurse

## 2025-02-14 NOTE — PAYOR COMM NOTE
"  NPI:[id number]    Utilization Review  Contact: Jeimy Beltran RN  Phone: [phone]  Fax:[phone]    INITIATE INPATIENT AUTHORIZATION  Matthew Michel (69 y.o. Female)       Date of Birth   1955    Social Security Number       Address   [address] [place], [place] [address]    Home Phone   [phone]    MRN   [id number]       Caodaism   Mosque    Marital Status                               Admission Date   2/13/25    Admission Type   Emergency    Admitting Provider   Maria L August DO    Attending Provider   Maria L August DO    Department, Room/Bed   [facility] CRITICAL CARE, CC04/       Discharge Date       Discharge Disposition       Discharge Destination                                 Attending Provider: Maria L August DO    Allergies: Xanax [Alprazolam]    Isolation: None   Infection: None   Code Status: CPR    Ht: 154.9 cm (61\")   Wt: 84.5 kg (186 lb 4.8 oz)    Admission Cmt: None   Principal Problem: Hypertensive crisis [I16.9]                   Active Insurance as of 2/13/2025       Primary Coverage       Payor Plan Insurance Group Employer/Plan Group    ANTHEM MEDICARE REPLACEMENT ANTHEM MED ADV HMO [id number]       Payor Plan Address Payor Plan Phone Number Payor Plan Fax Number Effective Dates    [address] [phone]  1/1/2025 - None Entered    [facility] [id number]         Subscriber Name Subscriber Birth Date Member ID       MATTHEW MICHEL 1955 [id number]                     Emergency Contacts        (Rel.) Home Phone Work Phone Mobile Phone    LOYD MICHEL (Son) [phone] -- --    MIGUEL WEI (Grandchild) [phone] -- --    ENA HELM (Relative) [phone] -- --    Yadira Michel (Daughter) -- -- [phone]                 History & Physical        Wally Lopez PA-C at 02/13/25 0895       Attestation signed by Maria L August DO at 02/13/25 2736    I have evaluated the " patient independently, I have reviewed H&P, all labs and images from today and evaluated the patient at bedside.  I have discussed with Wally Lopez PA-C and agree.  At the time of my exam patient's respiratory status had improved and she was able to be weaned from BiPAP to 2L NC.                         [facility] Medicine Services  History & Physical    Patient Identification:  Name:  Lesley Michel  Age:  69 y.o.  Sex:  female  :  1955  MRN:  [id number]   Visit Number:  [id number]  Admit Date: 2025   Primary Care Physician:  Woodrow Raymond APRN    Subjective     Chief complaint: shortness of breath     History of presenting illness:      Lesley Michel is a 69 y.o. female who presented for further evaluation of shortness of breath. She was seen and examined in the ED with multiple family members at the bedside. She was awake and alert, able to converse but had difficulty speaking over the BiPAP mask. She was able to report worsening shortness of breath over the previous 24 hours. She reported onset of dull chest pain this morning without radiation or associated nausea or diaphoresis. She denied any significant increase in palpitations or lower extremity edema. No cough, fever, or sick contacts. She is unsure how her BP has been running at home, she has a BP cuff but does not check blood pressures as she believes her machine is inaccurate. She did note that at recent discharge her hydralazine was held but she is adamant that she has been taking other home medications as directed. When asked more specifically she did state that she frequently skips doses of her diuretic for fear of decreasing her potassium levels. On further review of systems she endorsed new diarrhea for the past several days but no abdominal pain. No dysuria.     Past medical history is significant for CAD, HTN, atrial fibrillation, HFpEF, CKD stage IV, COPD, PVD, migraine headaches    Upon arrival to  the ED, vital signs were heart rate 138, respirations 26, /104, SpO2 64%.  BP did trend as high as 220/172 in the ED.  Initial ABG obtained on 3 L showed pH 7.183 with pCO2 47.9 and pO2 46.9.  Follow-up on BiPAP showed interval improvement with pH 7.338, normal pCO2 and pO2.  Initial HS troponin was 183 with proBNP 15,000.  Renal function is improved from prior.  Lactate was 3.8.  White blood cell count is elevated at 13K.  Without left shift.  Chest x-ray showed CHF and concern for superimposed bibasilar airspace disease.    Known Emergency Department medications received prior to my evaluation included Bumex, cefepime, Ativan, nitroglycerin, vancomycin.   Emergency Department Room location at the time of my evaluation was 103.     ---------------------------------------------------------------------------------------------------------------------   Review of Systems   Constitutional:  Negative for chills and fever.   HENT:  Negative for congestion and rhinorrhea.    Respiratory:  Positive for shortness of breath. Negative for cough.    Cardiovascular:  Positive for chest pain. Negative for leg swelling.   Gastrointestinal:  Positive for diarrhea. Negative for abdominal pain, nausea and vomiting.   Genitourinary:  Negative for difficulty urinating and dysuria.   Musculoskeletal:  Negative for arthralgias and myalgias.   Skin:  Negative for rash and wound.   Neurological:  Negative for dizziness and light-headedness.        ---------------------------------------------------------------------------------------------------------------------   Past Medical History:   Diagnosis Date    Anxiety     Arthritis     CHF (congestive heart failure)     Cigarette smoker 02/10/2022    CKD (chronic kidney disease) stage 4, GFR 15-29 ml/min     MAICO on top of CKD IIIa, HD from 10/23-4/24, some recovery to CKD IV    COPD (chronic obstructive pulmonary disease)     Coronary artery disease     Elevated cholesterol     GERD  (gastroesophageal reflux disease)     H/O heart artery stent     History of transfusion     Hyperlipidemia     Hypertension     hypothyroidism     Obesity     PAF (paroxysmal atrial fibrillation)     PVD (peripheral vascular disease)     x2 stents     Past Surgical History:   Procedure Laterality Date    ANKLE SURGERY      RIGHT    APPENDECTOMY      CARDIAC CATHETERIZATION      LEFT     SECTION      CHOLECYSTECTOMY N/A 2025    Procedure: CHOLECYSTECTOMY LAPAROSCOPIC WITH DAVINCI ROBOT;  Surgeon: Windy Neal MD;  Location: [facility];  Service: Robotics - DaVinci;  Laterality: N/A;    CORONARY STENT PLACEMENT      HYSTERECTOMY      INSERTION HEMODIALYSIS CATHETER Right 10/19/2023    Procedure: HEMODIALYSIS CATHETER INSERTION;  Surgeon: Bartolome Schwartz MD;  Location: [facility];  Service: General;  Laterality: Right;     Family History   Problem Relation Age of Onset    Heart disease Mother     Heart attack Mother 73    Fibromyalgia Mother     Heart attack Father 72    Ovarian cancer Sister     Coronary artery disease Other     Breast cancer Neg Hx      Social History     Socioeconomic History    Marital status:    Tobacco Use    Smoking status: Every Day     Current packs/day: 0.50     Average packs/day: 0.5 packs/day for 30.0 years (15.0 ttl pk-yrs)     Types: Cigarettes     Passive exposure: Never    Smokeless tobacco: Never   Vaping Use    Vaping status: Never Used   Substance and Sexual Activity    Alcohol use: No    Drug use: No    Sexual activity: Never     ---------------------------------------------------------------------------------------------------------------------   Allergies:  Xanax [alprazolam]  ---------------------------------------------------------------------------------------------------------------------   Home medications:    Medications below are reported home medications pulling from within the system; at this time, these medications have not been reconciled  unless otherwise specified and are in the verification process for further verifcation as current home medications.  (Not in a hospital admission)      Hospital Scheduled Meds:  aspirin, 324 mg, Oral, Once  heparin (porcine), 4,000 Units, Intravenous, Once  vancomycin, 20 mg/kg, Intravenous, Once      heparin, 1,000 Units/hr  nitroglycerin, 5-200 mcg/min, Last Rate: 180 mcg/min (02/13/25 0822)  Pharmacy to Dose Heparin,         Current listed hospital scheduled medications may not yet reflect those currently placed in orders that are signed and held awaiting patient's arrival to floor.   ---------------------------------------------------------------------------------------------------------------------     Objective     Vital Signs:  Temp:  [98 °F (36.7 °C)] 98 °F (36.7 °C)  Heart Rate:  [100-141] 104  Resp:  [26-32] 32  BP: (114-220)/() 169/106      02/13/25  0623   Weight: 90.7 kg (200 lb)     Body mass index is 37.81 kg/m².  ---------------------------------------------------------------------------------------------------------------------       Physical Exam  Vitals and nursing note reviewed.   Constitutional:       General: She is not in acute distress.     Appearance: She is obese. She is ill-appearing.   HENT:      Head: Normocephalic and atraumatic.   Eyes:      Extraocular Movements: Extraocular movements intact.   Cardiovascular:      Rate and Rhythm: Regular rhythm. Tachycardia present.   Pulmonary:      Breath sounds: No wheezing.      Comments: Increased effort, diminished breath sounds bilaterally  Abdominal:      Palpations: Abdomen is soft.   Musculoskeletal:      Right lower leg: Edema present.      Left lower leg: Edema present.      Comments: Trace   Skin:     General: Skin is warm and dry.   Neurological:      Mental Status: She is alert. Mental status is at baseline.   Psychiatric:         Mood and Affect: Mood is anxious.         Behavior: Behavior normal.  "            ---------------------------------------------------------------------------------------------------------------------  EKG:        I have personally looked at the EKG.  ---------------------------------------------------------------------------------------------------------------------   Results from last 7 days   Lab Units 02/13/25  0630   LACTATE mmol/L 3.8*   WBC 10*3/mm3 13.86*   HEMOGLOBIN g/dL 11.2*   HEMATOCRIT % 39.3   MCV fL 78.9*   MCHC g/dL 28.5*   PLATELETS 10*3/mm3 612*     Results from last 7 days   Lab Units 02/13/25  0747 02/13/25  0629   PH, ARTERIAL pH units 7.338* 7.183*   PO2 ART mm Hg 85.1 46.9*   PCO2, ARTERIAL mm Hg 36.2 47.9*   HCO3 ART mmol/L 19.4* 18.0*     Results from last 7 days   Lab Units 02/13/25  0630   SODIUM mmol/L 140   POTASSIUM mmol/L 4.8   CHLORIDE mmol/L 105   CO2 mmol/L 20.5*   BUN mg/dL 32*   CREATININE mg/dL 1.75*   CALCIUM mg/dL 9.5   GLUCOSE mg/dL 116*   ALBUMIN g/dL 4.1   BILIRUBIN mg/dL 0.4   ALK PHOS U/L 216*   AST (SGOT) U/L 23   ALT (SGPT) U/L 9   Estimated Creatinine Clearance: 32 mL/min (A) (by C-G formula based on SCr of 1.75 mg/dL (H)).  No results found for: \"AMMONIA\"  Results from last 7 days   Lab Units 02/13/25  0630   HSTROP T ng/L 183*     Results from last 7 days   Lab Units 02/13/25  0630   PROBNP pg/mL 15,163.0*     Lab Results   Component Value Date    HGBA1C 5.30 10/08/2023     Lab Results   Component Value Date    TSH 6.770 (H) 10/08/2023    FREET4 1.01 10/08/2023     No results found for: \"PREGTESTUR\", \"PREGSERUM\", \"HCG\", \"HCGQUANT\"  Pain Management Panel          Latest Ref Rng & Units 10/9/2023   Pain Management Panel   Creatinine, Urine mg/dL 116.0       Details                 No results found for: \"BLOODCX\"  No results found for: \"URINECX\"  No results found for: \"WOUNDCX\"  No results found for: \"STOOLCX\"      ---------------------------------------------------------------------------------------------------------------------  Imaging " "Results (Last 7 Days)       Procedure Component Value Units Date/Time    XR Chest 1 View [[id number]] Resulted: 02/13/25 0633     Updated: 02/13/25 0634            Cultures:  No results found for: \"BLOODCX\", \"URINECX\", \"WOUNDCX\", \"MRSACX\", \"RESPCX\", \"STOOLCX\"    Last echocardiogram:  Results for orders placed during the hospital encounter of 01/19/25    Adult Transthoracic Echo Complete w/ Color, Spectral and Contrast if necessary per protocol    Interpretation Summary    Left ventricular systolic function is normal. Left ventricular ejection fraction appears to be 56 - 60%.    Left ventricular wall thickness is consistent with mild concentric hypertrophy.    There is calcification of the aortic valve.    Mild mitral valve stenosis is present.    Estimated right ventricular systolic pressure from tricuspid regurgitation is normal (<35 mmHg).          I have personally reviewed the above radiology images and read the final radiology report on 02/13/25  ---------------------------------------------------------------------------------------------------------------------  Assessment / Plan     There are no active hospital problems to display for this patient.      ASSESSMENT/PLAN:    Acute hypoxic and hypercapnic respiratory failure  Concern for flash pulmonary edema due to hypertensive crisis  Decompensated HFpEF  Complicated by CKD stage IV  Patient presented secondary to 24-hour history worsening shortness of breath she denies any overt increase in her lower extremity edema however does report that she frequently misses doses of her home diuretic.  Has not been checking her blood pressure at home but did note that hydralazine was held at recent discharge.  On arrival to the ED O2 sat was in the 60s.  On initial ABG on 3 L pH was 7.183 with pCO2 47.9 and pO2 46.9.  She was subsequently placed on BiPAP with interval improvement noted-most recent pH 7.338 with normal pCO2 and pO2 on 60% FiO2.  Her HS troponin was 183 " with repeat at 169, proBNP 15 163.  Renal function was improved from recent admission.  Her initial lactate was 3.8, white blood cell count was elevated but without left shift.  Chest x-ray concerning for volume overload.  On arrival to the ED BP was 114/104 and did trend as high as 220/172.  She is being admitted to the CCU for further workup and management  She received Bumex in the ED will follow-up response and continue to diurese as clinically indicated with close monitoring of renal function.  Strict monitoring of I's and O's, clinical volume status, daily weights  Continue to monitor respiratory status closely-titrate supplemental O2 as needed and wean to baseline as able.  N.p.o. while on BiPAP  We are also continuing to monitor her blood pressure trend closely.  Continue nitroglycerin infusion for now  Will follow-up further cardiology recommendations, appreciated.  Trend labs and provide supportive care measures    NSTEMI, T1 versus T2  CAD s/p prior stenting  As above initial HS troponin was 183 with repeat at 169.  Patient complaining of chest pain this morning which was dull, retrosternal to left-sided.  Continue heparin infusion with pharmacy to dose, assistance appreciated.  Monitor per protocol.  Cardiology is consulted as above, ongoing recommendations are appreciated.    Chronic:  Atrial fibrillation  COPD  PVD  Migraine headaches  Anxiety  Continue home medication regimen as indicated once med rec is complete  ----------  -DVT prophylaxis: Heparin infusion  -Activity: Bedrest  -Expected length of stay: INPATIENT status due to the need for care which can only be reasonably provided in an hospital setting such as aggressive/expedited ancillary services and/or consultation services, the necessity for IV medications, close physician monitoring and/or the possible need for procedures.  In such, I feel patient’s risk for adverse outcomes and need for care warrant INPATIENT evaluation and predict the  "patient’s care encounter to likely last beyond 2 midnights.   -Disposition pending further clinical course     High risk secondary to Respiratory failure, volume overload, NSTEMI    There are no questions and answers to display.       Wally Lopez PA-C   02/13/25  08:45 EST    Electronically signed by Maria L August DO at 02/13/25 2120          Emergency Department Notes        Kimber Woods RN at 02/13/25 0925          Pt transported to CCU by myself, EDT, and RRT. Pt A&OX4. VSS. NADN. Respirations nonlabored. Pt maintaining o2 sats on BiPAP. IV's patent and infusing meds per MAR. Belongings transported with pt. Dentures and necklace sent with pt's family.     Electronically signed by Kimber Woods RN at 02/13/25 0927       Kimber Woods RN at 02/13/25 0742          Family member at bedside at this time.    Electronically signed by Kimber Woods RN at 02/13/25 0742       Kimber Woods RN at 02/13/25 0741          Pt noted to have had a bowel movement at this time. Bed linens changed and purewick applied at this time. Pt tolerated well. NADN.     Electronically signed by Kimber Woods RN at 02/13/25 0741       Imelda Maldonado PCT at 02/13/25 0631          Provider and RN's is working with pt at this time trying to get her comfortable. Will attempt to get BC's when pt is comfortable and settled down.     Electronically signed by Imelda Maldonado PCT at 02/13/25 0632       Gabby Schmidt DO at 02/13/25 0622          Subjective   History of Present Illness  69-year-old female Hx anxiety, CHF and COPD on 2 L nasal cannula presents for shortness of breath.  Patient extremely agitated on arrival able to speak full sentences but with pressured speech saying \"I cannot breathe\".  Was recently in hospital for cholecystectomy and states her medications including her fluid pills/blood pressure medication were DC'd after discharge.  She is still been taking her inhalers.  Shortness of breath " started today.        Review of Systems   All other systems reviewed and are negative.      Past Medical History:   Diagnosis Date    Anxiety     Arthritis     CHF (congestive heart failure)     Cigarette smoker 02/10/2022    CKD (chronic kidney disease) stage 4, GFR 15-29 ml/min     MAICO on top of CKD IIIa, HD from 10/23-, some recovery to CKD IV    COPD (chronic obstructive pulmonary disease)     Coronary artery disease     Elevated cholesterol     GERD (gastroesophageal reflux disease)     H/O heart artery stent     History of transfusion     Hyperlipidemia     Hypertension     hypothyroidism     Obesity     PAF (paroxysmal atrial fibrillation)     PVD (peripheral vascular disease)     x2 stents       Allergies   Allergen Reactions    Xanax [Alprazolam] Other (See Comments)     Severe agitation per patient and family       Past Surgical History:   Procedure Laterality Date    ANKLE SURGERY      RIGHT    APPENDECTOMY      CARDIAC CATHETERIZATION      LEFT     SECTION      CHOLECYSTECTOMY N/A 2025    Procedure: CHOLECYSTECTOMY LAPAROSCOPIC WITH DAVINCI ROBOT;  Surgeon: Windy Neal MD;  Location: [facility];  Service: Robotics - DaVinci;  Laterality: N/A;    CORONARY STENT PLACEMENT      HYSTERECTOMY      INSERTION HEMODIALYSIS CATHETER Right 10/19/2023    Procedure: HEMODIALYSIS CATHETER INSERTION;  Surgeon: Bartolome Schwartz MD;  Location: [facility];  Service: General;  Laterality: Right;       Family History   Problem Relation Age of Onset    Heart disease Mother     Heart attack Mother 73    Fibromyalgia Mother     Heart attack Father 72    Ovarian cancer Sister     Coronary artery disease Other     Breast cancer Neg Hx        Social History     Socioeconomic History    Marital status:    Tobacco Use    Smoking status: Every Day     Current packs/day: 0.50     Average packs/day: 0.5 packs/day for 30.0 years (15.0 ttl pk-yrs)     Types: Cigarettes     Passive exposure: Never     Smokeless tobacco: Never   Vaping Use    Vaping status: Never Used   Substance and Sexual Activity    Alcohol use: No    Drug use: No    Sexual activity: Never           Objective   Physical Exam  Constitutional:       General: She is in acute distress.      Appearance: She is ill-appearing.   HENT:      Head: Normocephalic and atraumatic.   Cardiovascular:      Rate and Rhythm: Regular rhythm. Tachycardia present.   Pulmonary:      Effort: Tachypnea and accessory muscle usage present.      Breath sounds: Examination of the left-upper field reveals decreased breath sounds. Examination of the right-middle field reveals decreased breath sounds. Examination of the left-middle field reveals decreased breath sounds. Examination of the right-lower field reveals decreased breath sounds. Examination of the left-lower field reveals decreased breath sounds. Decreased breath sounds present.   Abdominal:      Palpations: Abdomen is soft.      Tenderness: There is no abdominal tenderness. There is no guarding.   Musculoskeletal:      Cervical back: Normal range of motion.      Right lower leg: Edema present.      Left lower leg: Edema present.   Skin:     General: Skin is warm.   Neurological:      Mental Status: She is alert and oriented to person, place, and time.         Procedures      Results for orders placed or performed during the hospital encounter of 02/13/25   ECG 12 Lead Dyspnea    Collection Time: 02/13/25  6:13 AM   Result Value Ref Range    QT Interval 284 ms    QTC Interval 430 ms   Blood Gas, Arterial With Co-Ox    Collection Time: 02/13/25  6:29 AM    Specimen: Arterial Blood   Result Value Ref Range    Site Left Brachial     Sergei's Test N/A     pH, Arterial 7.183 (C) 7.350 - 7.450 pH units    pCO2, Arterial 47.9 (H) 35.0 - 45.0 mm Hg    pO2, Arterial 46.9 (C) 83.0 - 108.0 mm Hg    HCO3, Arterial 18.0 (L) 20.0 - 26.0 mmol/L    Base Excess, Arterial -10.0 (L) 0.0 - 2.0 mmol/L    O2 Saturation, Arterial 70.2 (L)  94.0 - 99.0 %    Hemoglobin, Blood Gas 10.8 (L) 13.5 - 17.5 g/dL    Hematocrit, Blood Gas 33.1 (L) 38.0 - 51.0 %    Oxyhemoglobin 67.8 (L) 94 - 99 %    Methemoglobin 0.30 0.00 - 3.00 %    Carboxyhemoglobin 3.1 0 - 5 %    A-a DO2 116.7 0.0 - 300.0 mmHg    CO2 Content 19.5 (L) 22 - 33 mmol/L    Barometric Pressure for Blood Gas 724 mmHg    Modality Nasal Cannula     FIO2 32 %    Flow Rate 3.0 lpm    Ventilator Mode NA     Notified Who DR MURRAY AND RN     Notified By [id number]     Notified Time 02/13/2025 06:32     Collected by [id number]     pH, Temp Corrected      pCO2, Temperature Corrected      pO2, Temperature Corrected     COVID-19, FLU A/B, RSV PCR 1 HR TAT - Swab, Nasopharynx    Collection Time: 02/13/25  6:30 AM    Specimen: Nasopharynx; Swab   Result Value Ref Range    COVID19 Not Detected Not Detected - Ref. Range    Influenza A PCR Not Detected Not Detected    Influenza B PCR Not Detected Not Detected    RSV, PCR Not Detected Not Detected   Comprehensive Metabolic Panel    Collection Time: 02/13/25  6:30 AM    Specimen: Blood   Result Value Ref Range    Glucose 116 (H) 65 - 99 mg/dL    BUN 32 (H) 8 - 23 mg/dL    Creatinine 1.75 (H) 0.57 - 1.00 mg/dL    Sodium 140 136 - 145 mmol/L    Potassium 4.8 3.5 - 5.2 mmol/L    Chloride 105 98 - 107 mmol/L    CO2 20.5 (L) 22.0 - 29.0 mmol/L    Calcium 9.5 8.6 - 10.5 mg/dL    Total Protein 8.6 (H) 6.0 - 8.5 g/dL    Albumin 4.1 3.5 - 5.2 g/dL    ALT (SGPT) 9 1 - 33 U/L    AST (SGOT) 23 1 - 32 U/L    Alkaline Phosphatase 216 (H) 39 - 117 U/L    Total Bilirubin 0.4 0.0 - 1.2 mg/dL    Globulin 4.5 gm/dL    A/G Ratio 0.9 g/dL    BUN/Creatinine Ratio 18.3 7.0 - 25.0    Anion Gap 14.5 5.0 - 15.0 mmol/L    eGFR 31.6 (L) >60.0 mL/min/1.73   Lipase    Collection Time: 02/13/25  6:30 AM    Specimen: Blood   Result Value Ref Range    Lipase 52 13 - 60 U/L   Lactic Acid, Plasma    Collection Time: 02/13/25  6:30 AM    Specimen: Blood   Result Value Ref Range    Lactate 3.8 (C) 0.5 - 2.0  mmol/L   High Sensitivity Troponin T    Collection Time: 02/13/25  6:30 AM    Specimen: Blood   Result Value Ref Range    HS Troponin T 183 (C) <14 ng/L   CBC Auto Differential    Collection Time: 02/13/25  6:30 AM    Specimen: Blood   Result Value Ref Range    WBC 13.86 (H) 3.40 - 10.80 10*3/mm3    RBC 4.98 3.77 - 5.28 10*6/mm3    Hemoglobin 11.2 (L) 12.0 - 15.9 g/dL    Hematocrit 39.3 34.0 - 46.6 %    MCV 78.9 (L) 79.0 - 97.0 fL    MCH 22.5 (L) 26.6 - 33.0 pg    MCHC 28.5 (L) 31.5 - 35.7 g/dL    RDW 21.5 (H) 12.3 - 15.4 %    RDW-SD 58.8 (H) 37.0 - 54.0 fl    MPV 9.4 6.0 - 12.0 fL    Platelets 612 (H) 140 - 450 10*3/mm3    Neutrophil % 62.4 42.7 - 76.0 %    Lymphocyte % 23.0 19.6 - 45.3 %    Monocyte % 7.3 5.0 - 12.0 %    Eosinophil % 6.2 0.3 - 6.2 %    Basophil % 0.8 0.0 - 1.5 %    Immature Grans % 0.3 0.0 - 0.5 %    Neutrophils, Absolute 8.65 (H) 1.70 - 7.00 10*3/mm3    Lymphocytes, Absolute 3.19 (H) 0.70 - 3.10 10*3/mm3    Monocytes, Absolute 1.01 (H) 0.10 - 0.90 10*3/mm3    Eosinophils, Absolute 0.86 (H) 0.00 - 0.40 10*3/mm3    Basophils, Absolute 0.11 0.00 - 0.20 10*3/mm3    Immature Grans, Absolute 0.04 0.00 - 0.05 10*3/mm3    nRBC 0.0 0.0 - 0.2 /100 WBC   BNP    Collection Time: 02/13/25  6:30 AM    Specimen: Blood   Result Value Ref Range    proBNP 15,163.0 (H) 0.0 - 900.0 pg/mL   Scan Slide    Collection Time: 02/13/25  6:30 AM    Specimen: Blood   Result Value Ref Range    Anisocytosis Large/3+ None Seen    Hypochromia Mod/2+ None Seen    Microcytes Slight/1+ None Seen    Polychromasia Slight/1+ None Seen    Platelet Estimate Increased Normal   Blood Gas, Arterial With Co-Ox    Collection Time: 02/13/25  7:47 AM    Specimen: Arterial Blood   Result Value Ref Range    Site Left Brachial     Sergei's Test N/A     pH, Arterial 7.338 (L) 7.350 - 7.450 pH units    pCO2, Arterial 36.2 35.0 - 45.0 mm Hg    pO2, Arterial 85.1 83.0 - 108.0 mm Hg    HCO3, Arterial 19.4 (L) 20.0 - 26.0 mmol/L    Base Excess, Arterial  -5.8 (L) 0.0 - 2.0 mmol/L    O2 Saturation, Arterial 96.6 94.0 - 99.0 %    Hemoglobin, Blood Gas 9.4 (L) 13.5 - 17.5 g/dL    Hematocrit, Blood Gas 28.8 (L) 38.0 - 51.0 %    Oxyhemoglobin 93.6 (L) 94 - 99 %    Methemoglobin 0.00 0.00 - 3.00 %    Carboxyhemoglobin 3.1 0 - 5 %    A-a DO2 283.8 0.0 - 300.0 mmHg    CO2 Content 20.6 (L) 22 - 33 mmol/L    Barometric Pressure for Blood Gas 726 mmHg    Modality BiPap     FIO2 60 %    Ventilator Mode NA     Set Mech Resp Rate 24.0     IPAP 20     EPAP 6     Collected by [id number]     pH, Temp Corrected      pCO2, Temperature Corrected      pO2, Temperature Corrected             ED Course  ED Course as of 02/13/25 0752   Thu Feb 13, 2025   0710 Patient was taken over from Dr. Noble at shift change.  Currently patient is tolerating BiPAP and is recently been placed on nitro drip.  -Will be admitted to hospital service this morning.  Electronically signed by Gabby Schmidt DO, 02/13/25, 7:11 AM EST.   [LK]   0712 EKG 0613; read at 0618  Rate 138  Normal axis and intervals  Sinus tachycardia with PACs  Interpretation: Sinus tachycardia nonspecific ST changes [AN]   0747 Was taken over from Dr. Noble at shift change- She is currently tolerating BiPAP settings: 20/6, rate of 24 FiO2 of 60% she is 98% with respiratory rate of average of 25-resting in bed and stable for PCU level of care.  Has been accepted by Dr. August for admission.  Electronically signed by Gabby Schmidt DO, 02/13/25, 7:51 AM EST.   [LK]      ED Course User Index  [AN] Andrea Noble DO  [LK] Gabby Schmidt DO                                                       Medical Decision Making  Patient presented respiratory distress.  Was extremely agitated.   ABG consistent with metabolic acidosis  Xray more consistent with Pulmonary edema or; consider this is more likely than multifocal pneumonia.  -Topical Nitropaste was applied while patient was prepped for BiPAP.  Ativan 1 mg IVP given for anxiety/agitation.   Patient placed on BiPAP.  At this time, patient does not want to be intubated.  Nursing staff contacted his son was on his way but has confirmed this as well.      Nitro drip was initiated for SCAPE  Bumex 2 mg IVP given  Patient's lactic acid and WBC count is mildly elevated.  Empiric antibiotic coverage given.  BNP elevated consistent with fluid overload.    ------  After BiPAP repeat  lung exam demonstrates trace Rales in the lower lungs and wheezing in the  left upper lobe.  Troponin slightly elevated but more likely from hypoxia and demand.  Patient denied chest pain.    Patient was signed out to day physician Dr. Schmidt with further results pending.      Problems Addressed:  Acute hypoxic respiratory failure: complicated acute illness or injury  Acute pulmonary edema: complicated acute illness or injury  NSTEMI (non-ST elevated myocardial infarction): complicated acute illness or injury    Amount and/or Complexity of Data Reviewed  Labs: ordered.  Radiology: ordered.  ECG/medicine tests: ordered.    Risk  Prescription drug management.  Decision regarding hospitalization.        Final diagnoses:   Acute pulmonary edema   Acute hypoxic respiratory failure   NSTEMI (non-ST elevated myocardial infarction)       ED Disposition  ED Disposition       ED Disposition   Decision to Admit    Condition   --    Comment   --               No follow-up provider specified.       Medication List      No changes were made to your prescriptions during this visit.            Gabby Schmidt DO  02/13/25 0752      Electronically signed by Gabby Schmidt DO at 02/13/25 0752       Imelda Maldonado PCT at 02/13/25 0615          Completed an EKG at 0613 and given to DR Noble at 0614    Electronically signed by Imelda Maldonado PCT at 02/13/25 0615       Lucien Andrade, RN at 02/13/25 0608          EMS came to patient house for chest pain and SOB  patient put on on 3L NC by EMS, patient O2 normally 2 L NC, EMS gave duo neb  Pt states has  not slept in 4 days  patient states [facility] surgery 2 weeks ago      Electronically signed by Lucien Andrade, RN at 02/13/25 0630       Facility-Administered Medications as of 2/14/2025   Medication Dose Route Frequency Provider Last Rate Last Admin    acetaminophen (TYLENOL) tablet 650 mg  650 mg Oral Q4H PRN Esteban Lerma APRN   650 mg at 02/14/25 0630    amLODIPine (NORVASC) tablet 5 mg  5 mg Oral Q24H Maria L August DO   5 mg at 02/13/25 2150    aspirin chewable tablet 324 mg  324 mg Oral Once Maria L August DO        atorvastatin (LIPITOR) tablet 80 mg  80 mg Oral Nightly Ulises Mata MD   80 mg at 02/13/25 2150    sennosides-docusate (PERICOLACE) 8.6-50 MG per tablet 2 tablet  2 tablet Oral BID Maria L August DO        And    polyethylene glycol (MIRALAX) packet 17 g  17 g Oral Daily PRN Maria L August DO        And    bisacodyl (DULCOLAX) EC tablet 5 mg  5 mg Oral Daily PRN Maria L August DO        And    bisacodyl (DULCOLAX) suppository 10 mg  10 mg Rectal Daily PRN Maria L August DO        [COMPLETED] bumetanide (BUMEX) injection 2 mg  2 mg Intravenous Once Andrea Noble DO   2 mg at 02/13/25 0717    [COMPLETED] cefepime 2000 mg IVPB in 100 mL NS (VTB)  2,000 mg Intravenous Once Andrea Noble DO   Stopped at 02/13/25 0820    hydrALAZINE (APRESOLINE) tablet 25 mg  25 mg Oral Q8H Ulises Mata MD   25 mg at 02/14/25 0630    [COMPLETED] hydrALAZINE (APRESOLINE) tablet 25 mg  25 mg Oral Once Anita Grove APRN   25 mg at 02/13/25 1915    hydrOXYzine (ATARAX) tablet 10 mg  10 mg Oral TID PRN Maria L August DO   10 mg at 02/14/25 0630    [COMPLETED] LORazepam (ATIVAN) injection 1 mg  1 mg Intravenous Once Andrea Noble DO   1 mg at 02/13/25 0621    metoprolol tartrate (LOPRESSOR) tablet 25 mg  25 mg Oral Q12H Ulises Mata MD   25 mg at 02/13/25 2150    mupirocin (BACTROBAN) 2 % nasal ointment 1 Application  1 Application Each Nare BID Maria L August DO   1 Application at  02/14/25 0102    nicotine (NICODERM CQ) 21 MG/24HR patch 1 patch  1 patch Transdermal Q24H Maria L August DO   1 patch at 02/13/25 1151    [COMPLETED] nitroglycerin (NITROSTAT) ointment 1 inch  1 inch Topical Once Andrea Noble DO   1 inch at 02/13/25 0634    nitroglycerin (NITROSTAT) SL tablet 0.4 mg  0.4 mg Sublingual Q5 Min PRN Maria L August,         nitroglycerin (TRIDIL) 200 mcg/ml infusion  5-200 mcg/min Intravenous Titrated Maria L August DO 27 mL/hr at 02/14/25 0102 90 mcg/min at 02/14/25 0102    Pharmacy to Dose Heparin   Not Applicable Continuous PRN Maira L August,         sodium chloride 0.9 % flush 10 mL  10 mL Intravenous Q12H Maria L August, DO   10 mL at 02/13/25 2151    sodium chloride 0.9 % flush 10 mL  10 mL Intravenous PRN You Augusta, DO        sodium chloride 0.9 % flush 10 mL  10 mL Intravenous Q12H Maria L August, DO   10 mL at 02/13/25 2151    sodium chloride 0.9 % flush 10 mL  10 mL Intravenous PRN Maria L August, DO        sodium chloride 0.9 % infusion 40 mL  40 mL Intravenous PRN Maria L August, DO        sodium chloride 0.9 % infusion 40 mL  40 mL Intravenous PRN Maria L August, DO        [COMPLETED] vancomycin IVPB 1750 mg in 0.9% Sodium Chloride (premix) 500 mL  20 mg/kg Intravenous Once Andrea Noble .7 mL/hr at 02/13/25 0823 1,750 mg at 02/13/25 0823        Physician Progress Notes (all)        Progress Notes signed by Esteban Lerma APRN at 02/13/25 2126           Nurse Practitioner - Brief Progress Note  PERMANENT  02/13/2025 21:20    [facility] [place] [facility] - [facility] [place] ([place]    MATTHEW EASLEY    Date of Service 02/13/2025 21:20    HPI/Events of Note [facility] Provider Intervention Note    Pt is on Nitroglycerin infusion, c/o headache. Tylenol PRN ordered.      __N___   Video Assessment performed    Contact [facility] for any needs if bedside physician is not present.      Interventions Intermediate-Communication  "with other healthcare providers and/or family, Pain - evaluation and management        Electronically Signed by: Esteban Lerma (ANP,CCRN) on 02/13/2025 21:26    Electronically signed by Esteban Lerma APRN at 02/13/25 2126          Consult Notes (all)        Gwen Ellis APRN at 02/13/25 1230        Consult Orders    1. Inpatient Palliative Care MD Consult [[id number]] ordered by Maria L August DO at 02/13/25 1029                 Palliative Care Initial Consult     Attending Physician: Maria L August DO  Referring Provider: Maria L August     assistance with advance directives, assistance with clarification of goals of care, and psychosocial support  Code Status:   Code Status and Medical Interventions: CPR (Attempt to Resuscitate); Full Support   Ordered at: 02/13/25 1326     Level Of Support Discussed With:    Patient     Code Status (Patient has no pulse and is not breathing):    CPR (Attempt to Resuscitate)     Medical Interventions (Patient has pulse or is breathing):    Full Support      Advanced Directives: Advance Directive Status: Patient does not have advance directive   Healthcare surrogate: Son Wes Michel and daughter Yadira Michel  Goals of Care: I was able to have a conversation with Lesley about her goals of care. At first Lesley said she wouldn't want resuscitation and intubation because she states everyone who goes on a ventilator dies,so I said \"so you wouldn't want intubated\" and she states well I don't want to die and then began to make distraught sounds, with no tears, and then would go right back to talking. I encouraged her to talk with her family about her GOC for herself, at which point she said I do not have family, I only have kids. I politely told her until she tells us what she wants she would be a full code by default.    HPI:  Lesley Michel is a 69 y.o. female admitted on 2/13/2025 due to shortness of breath.Lesley was reporting worsening shortness of breath for 24 " hours as well as onset of chest pain without radiation or nausea or diaphoretic. Lesley has a medical history of CAD, HTN, atrial fibrillation, HFpEF, CKD stage IV, COPD, PVD, migraine headaches.   vital signs were heart rate 138, respirations 26, /104, SpO2 64%.  BP did trend as high as 220/172 in the ED.  Initial ABG obtained on 3 L showed pH 7.183 with pCO2 47.9 and pO2 46.9.  Follow-up on BiPAP showed interval improvement with pH 7.338, normal pCO2 and pO2.  Initial HS troponin was 183 with proBNP 15,000.  WBC 13 K.   Chest x-ray showed CHF and concern for superimposed bibasilar airspace disease.   Palliative care consulted for GOC/ACP and support.    ROS: Negative except as above in HPI.     Past Medical History:   Diagnosis Date    Anxiety     Arthritis     CHF (congestive heart failure)     Cigarette smoker 02/10/2022    CKD (chronic kidney disease) stage 4, GFR 15-29 ml/min     MAICO on top of CKD IIIa, HD from 10/23-, some recovery to CKD IV    COPD (chronic obstructive pulmonary disease)     Coronary artery disease     Elevated cholesterol     GERD (gastroesophageal reflux disease)     H/O heart artery stent     History of transfusion     Hyperlipidemia     Hypertension     hypothyroidism     Obesity     PAF (paroxysmal atrial fibrillation)     PVD (peripheral vascular disease)     x2 stents     Past Surgical History:   Procedure Laterality Date    ANKLE SURGERY      RIGHT    APPENDECTOMY      CARDIAC CATHETERIZATION      LEFT     SECTION      CHOLECYSTECTOMY N/A 2025    Procedure: CHOLECYSTECTOMY LAPAROSCOPIC WITH DAVINCI ROBOT;  Surgeon: Windy Neal MD;  Location: [facility] OR;  Service: Robotics - DaVinci;  Laterality: N/A;    CORONARY STENT PLACEMENT      HYSTERECTOMY      INSERTION HEMODIALYSIS CATHETER Right 10/19/2023    Procedure: HEMODIALYSIS CATHETER INSERTION;  Surgeon: Bartolome Schwartz MD;  Location: [facility] OR;  Service: General;  Laterality: Right;     Social  History     Socioeconomic History    Marital status:    Tobacco Use    Smoking status: Every Day     Current packs/day: 0.50     Average packs/day: 0.5 packs/day for 30.0 years (15.0 ttl pk-yrs)     Types: Cigarettes     Passive exposure: Never    Smokeless tobacco: Never   Vaping Use    Vaping status: Never Used   Substance and Sexual Activity    Alcohol use: No    Drug use: No    Sexual activity: Never     Family History   Problem Relation Age of Onset    Heart disease Mother     Heart attack Mother 73    Fibromyalgia Mother     Heart attack Father 72    Ovarian cancer Sister     Coronary artery disease Other     Breast cancer Neg Hx        Allergies   Allergen Reactions    Xanax [Alprazolam] Other (See Comments)     Severe agitation per patient and family       Current Facility-Administered Medications   Medication Dose Route Frequency Provider Last Rate Last Admin    aspirin chewable tablet 324 mg  324 mg Oral Once Maria L August DO        sennosides-docusate (PERICOLACE) 8.6-50 MG per tablet 2 tablet  2 tablet Oral BID Maria L August DO        And    polyethylene glycol (MIRALAX) packet 17 g  17 g Oral Daily PRN Maria L August DO        And    bisacodyl (DULCOLAX) EC tablet 5 mg  5 mg Oral Daily PRN Maria L August DO        And    bisacodyl (DULCOLAX) suppository 10 mg  10 mg Rectal Daily PRN Maria L August DO        hydrOXYzine (ATARAX) tablet 10 mg  10 mg Oral TID PRN Maria L August DO        nicotine (NICODERM CQ) 21 MG/24HR patch 1 patch  1 patch Transdermal Q24H Maria L August DO   1 patch at 02/13/25 1151    nitroglycerin (NITROSTAT) SL tablet 0.4 mg  0.4 mg Sublingual Q5 Min PRN Maria L August DO        nitroglycerin (TRIDIL) 200 mcg/ml infusion  5-200 mcg/min Intravenous Titrated Maria L August DO 54 mL/hr at 02/13/25 1151 180 mcg/min at 02/13/25 1151    Pharmacy to Dose Heparin   Not Applicable Continuous PRN Maria L August DO        sodium chloride 0.9 % flush 10 mL  10 mL  "Intravenous Q12H August, Maria L, DO   10 mL at 02/13/25 1008    sodium chloride 0.9 % flush 10 mL  10 mL Intravenous PRN August, Maria L, DO        sodium chloride 0.9 % flush 10 mL  10 mL Intravenous Q12H August, Maria L, DO   10 mL at 02/13/25 1154    sodium chloride 0.9 % flush 10 mL  10 mL Intravenous PRN August, Maria L, DO        sodium chloride 0.9 % infusion 40 mL  40 mL Intravenous PRN August, Maria L, DO        sodium chloride 0.9 % infusion 40 mL  40 mL Intravenous PRN August, Maria L, DO         nitroglycerin, 5-200 mcg/min, Last Rate: 180 mcg/min (02/13/25 1151)  Pharmacy to Dose Heparin,         senna-docusate sodium **AND** polyethylene glycol **AND** bisacodyl **AND** bisacodyl    hydrOXYzine    nitroglycerin    Pharmacy to Dose Heparin    sodium chloride    sodium chloride    sodium chloride    sodium chloride    Current medication reviewed for route, type, dose and frequency and are current per MAR.    Palliative Performance Scale Score:     /87   Pulse 79   Temp 98.6 °F (37 °C) (Oral)   Resp 17   Ht 154.9 cm (61\")   Wt 84.5 kg (186 lb 4.8 oz)   SpO2 94%   BMI 35.20 kg/m²     Intake/Output Summary (Last 24 hours) at 2/13/2025 1457  Last data filed at 2/13/2025 0820  Gross per 24 hour   Intake 100 ml   Output --   Net 100 ml       PE:  General Appearance:    Chronically ill appearing, alert, cooperative, NAD   HEENT:    NC/AT, without obvious abnormality, EOMI, anicteric    Neck:   supple, trachea midline, no JVD   Lungs:     Unlabored respirations, no wheezing or rhonchi noted, diminished in bases    Heart:    RRR, normal S1 and S2, no M/R/G   Abdomen:     Soft, NT, ND, NABS    Extremities:   Moves all extremities, Bilateral lower extremity edema   Pulses:   Pulses palpable and equal bilaterally   Skin:   Warm, dry   Neurologic:   A/Ox3, cooperative   Psych:   Anxious, mood swings       Labs:   Results from last 7 days   Lab Units 02/13/25  0630   WBC 10*3/mm3 13.86*   HEMOGLOBIN g/dL 11.2* "   HEMATOCRIT % 39.3   PLATELETS 10*3/mm3 612*     Results from last 7 days   Lab Units 02/13/25  0630   SODIUM mmol/L 140   POTASSIUM mmol/L 4.8   CHLORIDE mmol/L 105   CO2 mmol/L 20.5*   BUN mg/dL 32*   CREATININE mg/dL 1.75*   GLUCOSE mg/dL 116*   CALCIUM mg/dL 9.5     Results from last 7 days   Lab Units 02/13/25  0630   SODIUM mmol/L 140   POTASSIUM mmol/L 4.8   CHLORIDE mmol/L 105   CO2 mmol/L 20.5*   BUN mg/dL 32*   CREATININE mg/dL 1.75*   CALCIUM mg/dL 9.5   BILIRUBIN mg/dL 0.4   ALK PHOS U/L 216*   ALT (SGPT) U/L 9   AST (SGOT) U/L 23   GLUCOSE mg/dL 116*     Imaging Results (Last 72 Hours)       Procedure Component Value Units Date/Time    XR Chest 1 View [[id number]] Collected: 02/13/25 0844     Updated: 02/13/25 0846    Narrative:      XR CHEST 1 VW-     CLINICAL INDICATION: soa        COMPARISON: 1/19/2025     TECHNIQUE: Single frontal view of the chest.     FINDINGS:     LUNGS: Diffuse interstitial change and probable superimposed airspace  disease in both lung bases.     HEART AND MEDIASTINUM: Heart and mediastinal contours are unremarkable        SKELETON: Bony and soft tissue structures are unremarkable.             Impression:         1. Appearance concerning for CHF and possibly superimposed bibasilar  airspace disease           This report was finalized on 2/13/2025 8:44 AM by Dr. Julio Dominguez MD.               Diagnostics: Reviewed    A: Lesley Michel is a 69 y.o. female admitted on 2/13/2025 due to shortness of breath.Lesley was reporting worsening shortness of breath for 24 hours as well as onset of chest pain without radiation or nausea or diaphoretic. Lesley has a medical history of CAD, HTN, atrial fibrillation, HFpEF, CKD stage IV, COPD, PVD, migraine headaches.   vital signs were heart rate 138, respirations 26, /104, SpO2 64%.  BP did trend as high as 220/172 in the ED.  Initial ABG obtained on 3 L showed pH 7.183 with pCO2 47.9 and pO2 46.9.  Follow-up on BiPAP showed interval  "improvement with pH 7.338, normal pCO2 and pO2.  Initial HS troponin was 183 with proBNP 15,000.  WBC 13 K.   Chest x-ray showed CHF and concern for superimposed bibasilar airspace disease.   Palliative care consulted for GOC/ACP and support.         P:   I was able to have a conversation with Lesley about her goals of care. At first Lseley said she wouldn't want resuscitation and intubation because she states everyone who goes on a ventilator dies,so I said \"so you wouldn't want intubated\" and she states well I don't want to die and then began to make distraught sounds, with no tears, and then would go right back to talking. I encouraged her to talk with her family about her GOC for herself, at which point she said I do not have family, I only have kids. I politely told her until she tells us what she wants she would be a full code by default. I was able to touch base with pts son Wes and discussed my conversation with his mother, now says she wants her children to make her decisions. I discussed pt with Dr August and NADEEM Daniel.    We appreciate the consult and the opportunity to participate in Lesley Michel's care. We will continue to follow along. Please do not hesitate to contact us regarding further symptom management or goals of care needs, including after hours or on weekends via our on call provider at [phone].     Time:  55 minutes spent reviewing medical and medication records, assessing and examining patient, discussing with family, answering questions, providing some guidance about a plan and documentation of care, and coordinating care with other healthcare members, with > 50% time spent face to face.     DEEPA Johnson    2/13/2025      Electronically signed by Gwen Ellis APRN at 02/13/25 6670       Ulises Mata MD at 02/13/25 1005        Consult Orders    1. Inpatient Cardiology Consult [[id number]] ordered by Maria L August DO at 02/13/25 8937                     [facility] " [facility]  CONSULT  NOTE      Patient information:  Date of Admit: 2/13/2025  Date of Consult: 02/13/25  Hospitalist/Referring MD:Maria L August DO  PCP: Woodrow Raymond APRN  MRN:  [id number]  Visit Number:  [id number]    LOS: 0  CODE STATUS:  Code Status and Medical Interventions: CPR (Attempt to Resuscitate); Full Support   Ordered at: 02/13/25 1326     Level Of Support Discussed With:    Patient     Code Status (Patient has no pulse and is not breathing):    CPR (Attempt to Resuscitate)     Medical Interventions (Patient has pulse or is breathing):    Full Support       PROBLEM LIST: Principal Problem:    Hypertensive crisis      Reason for Cardiology consultation: NSTEMI, hypertensive crisis, and acute heart failure exacerbation    General Cardiology Consulting Physician: Dr. Ulises Mata MD, [facility]    Primary Cardiologist: DEEPA Gallardo    Assessment      Acute non-ST elevation myocardial infarction (type I versus type II due to uncontrolled hypertension and respiratory failure.  Acute on chronic HFpEF.  ASCVD, status post previous PCI/ROSAURA to LAD with high-grade stenosis in the ostium of the small diagonal branch and 60% stenosis in the mid RCA at [facility] (details unavailable).  Peripheral artery disease, status post previous percutaneous intervention at [facility] in [place].  Paroxysmal atrial fibrillation with a [id number] score of 5, patient is chronically anticoagulated with Eliquis.  Acute on chronic respiratory failure with hypoxia and hypercapnia due to combination of COPD with exacerbation and acute heart failure.  Acute kidney injury on chronic kidney disease (stage IIIb).    Recommendations     For her acute non-STEMI, will treat her with aspirin, IV heparin and statin.  For her uncontrolled hypertension, will treat her with a cardioselective beta-blocker, hydralazine and amlodipine.  For her heart failure, continue  with IV diuretics as needed and tolerated and monitor the kidney function closely.  Continue to trend the troponin levels and if there is no significant rise, will consider further ischemic evaluation with a Lexiscan sestamibi study when her respiratory status improves adequately.      Subjective Data     2/13/2025    ADMISSION INFORMATION:  Chief Complaint   Patient presents with    Shortness of Breath    Chest Pain     History of Present Illness (HPI)  HPI obtained from chart review     Lesley Michel is a 69 y.o. female with a past medical history significant for CAD (with chronically occluded well collateralized dominant RCA, status post drug-eluting stent to LAD with high-grade stenosis of the ostium of the small D1.  She also has 60% mid RCA posterolateral branch lesion), hyperlipidemia, hypertension, PAD (follow-up with Dr. Strong in [place]), paroxysmal atrial fibrillation noted in 2023 and chronically anticoagulated with Eliquis, CHF, COPD on 2 L nasal cannula, history of hypoxic respiratory failure, CKD stage II,and current tobacco use.    Patient presented to [facility] ([place] emergency department (ED) on 2/13/2025 with complaints of shortness of breath and unable to speak full sentences. Was recently in hospital for cholecystectomy and states her medications including her fluid pills/blood pressure medication were DC'd after discharge. She is still been taking her inhalers. Shortness of breath started today.  Patient was admitted for acute hypoxic respiratory failure, acute pulmonary edema, and NSTEMI.    While in the ED, respiratory swab was negative.  Creatinine is 1.75, potassium 4.8, HS troponin 182-> , proBNP was 15,163.0, hemoglobin 11.2, WBC 13.86, platelet count 612,    Cardiology has been consulted for further evaluation and management for NSTEMI, hypertensive crisis, and acute heart failure exacerbation.    Primary Cardiologist has been DEEPA Gallardo and she was last seen in  "the office on 6/19/2024.     Patient was in room CCU 4 when she was seen and examined by Dr. Mata.  Patient is lying in bed resting quietly with eyes closed.  No acute distress noted at this time.  She is easily awakened with verbal stimuli.  Patient reports she is feeling much better as she been able \" sleep finally\".  Patient reports she developed shortness of breath yesterday.  She reports she had her gallbladder removed in January 2025 and was discharged with that some of her heart medications.  She states she no longer he is on dialysis.  She reports that she lost her health insurance around July of last year and was not able to cover the cost of her office visit with the cardiologist.  Patient reports she did have x 2 stents for PAD as well as coronary stents as stated above.  patient is currently on IV nitroglycerin still.    ORDERS:  Hydralazine 25 mg p.o. x 1 dose now and call Dr. Mata within 30 minutes of giving this medication..  Decrease IV nitroglycerin down to 90 mcg/min.     EVENT TIMELINE:  1/19: TTE w EF 56-60 %.  Please see full report attached below.      Known medications given enroute via EMS and in the ER:   Medications   nitroglycerin (TRIDIL) 200 mcg/ml infusion (90 mcg/min Intravenous Rate/Dose Change 2/13/25 1800)   aspirin chewable tablet 324 mg (324 mg Oral Not Given 2/13/25 1005)   Pharmacy to Dose Heparin (has no administration in time range)   sodium chloride 0.9 % flush 10 mL (10 mL Intravenous Given 2/13/25 1008)   sodium chloride 0.9 % flush 10 mL (has no administration in time range)   sodium chloride 0.9 % infusion 40 mL (has no administration in time range)   nitroglycerin (NITROSTAT) SL tablet 0.4 mg (has no administration in time range)   sennosides-docusate (PERICOLACE) 8.6-50 MG per tablet 2 tablet (2 tablets Oral Not Given 2/13/25 1005)     And   polyethylene glycol (MIRALAX) packet 17 g (has no administration in time range)     And   bisacodyl (DULCOLAX) EC tablet 5 mg " (has no administration in time range)     And   bisacodyl (DULCOLAX) suppository 10 mg (has no administration in time range)   hydrOXYzine (ATARAX) tablet 10 mg (has no administration in time range)   nicotine (NICODERM CQ) 21 MG/24HR patch 1 patch (1 patch Transdermal Medication Applied 2/13/25 1151)   sodium chloride 0.9 % flush 10 mL (10 mL Intravenous Given 2/13/25 1154)   sodium chloride 0.9 % flush 10 mL (has no administration in time range)   sodium chloride 0.9 % infusion 40 mL (has no administration in time range)   mupirocin (BACTROBAN) 2 % nasal ointment 1 Application (1 Application Each Nare Not Given 2/13/25 1701)   hydrALAZINE (APRESOLINE) tablet 25 mg (has no administration in time range)   LORazepam (ATIVAN) injection 1 mg (1 mg Intravenous Given 2/13/25 0621)   nitroglycerin (NITROSTAT) ointment 1 inch (1 inch Topical Medication Removed 2/13/25 0651)   cefepime 2000 mg IVPB in 100 mL NS (VTB) (0 mg Intravenous Stopped 2/13/25 0820)   vancomycin IVPB 1750 mg in 0.9% Sodium Chloride (premix) 500 mL (1,750 mg Intravenous New Bag 2/13/25 0823)   bumetanide (BUMEX) injection 2 mg (2 mg Intravenous Given 2/13/25 0717)   hydrALAZINE (APRESOLINE) tablet 25 mg (25 mg Oral Given 2/13/25 1915)     Personal History     Cardiac risk factors:arteriosclerotic heart disease, hypercholesterolemia, hypertension, and peripheral vascular disease      Last Echo: Results for orders placed during the hospital encounter of 01/19/25    Adult Transthoracic Echo Complete w/ Color, Spectral and Contrast if necessary per protocol    Interpretation Summary    Left ventricular systolic function is normal. Left ventricular ejection fraction appears to be 56 - 60%.    Left ventricular wall thickness is consistent with mild concentric hypertrophy.    There is calcification of the aortic valve.    Mild mitral valve stenosis is present.    Estimated right ventricular systolic pressure from tricuspid regurgitation is normal (<35  mmHg).         Last Stress: Results for orders placed during the hospital encounter of 10/08/23    Stress Test With Myocardial Perfusion One Day    Interpretation Summary    Left ventricular ejection fraction is normal (Calculated EF = 68%).    Myocardial perfusion imaging indicates a normal myocardial perfusion study with no evidence of ischemia.    TID 1.05.    Findings consistent with a normal ECG stress test.        Last Cath:             EP study: No results found for this or any previous visit.              Past Medical History:   Diagnosis Date    Anxiety     Arthritis     CHF (congestive heart failure)     Cigarette smoker 02/10/2022    CKD (chronic kidney disease) stage 4, GFR 15-29 ml/min     MAICO on top of CKD IIIa, HD from 10/23-, some recovery to CKD IV    COPD (chronic obstructive pulmonary disease)     Coronary artery disease     Elevated cholesterol     GERD (gastroesophageal reflux disease)     H/O heart artery stent     History of transfusion     Hyperlipidemia     Hypertension     hypothyroidism     Obesity     PAF (paroxysmal atrial fibrillation)     PVD (peripheral vascular disease)     x2 stents     Past Surgical History:   Procedure Laterality Date    ANKLE SURGERY      RIGHT    APPENDECTOMY      CARDIAC CATHETERIZATION      LEFT     SECTION      CHOLECYSTECTOMY N/A 2025    Procedure: CHOLECYSTECTOMY LAPAROSCOPIC WITH DAVINCI ROBOT;  Surgeon: Windy Neal MD;  Location: [facility];  Service: Robotics - DaVinci;  Laterality: N/A;    CORONARY STENT PLACEMENT      HYSTERECTOMY      INSERTION HEMODIALYSIS CATHETER Right 10/19/2023    Procedure: HEMODIALYSIS CATHETER INSERTION;  Surgeon: Bartolome Schwartz MD;  Location: [facility];  Service: General;  Laterality: Right;     Family History   Problem Relation Age of Onset    Heart disease Mother     Heart attack Mother 73    Fibromyalgia Mother     Heart attack Father 72    Ovarian cancer Sister     Coronary artery disease  Other     Breast cancer Neg Hx      Social History     Tobacco Use    Smoking status: Every Day     Current packs/day: 0.50     Average packs/day: 0.5 packs/day for 30.0 years (15.0 ttl pk-yrs)     Types: Cigarettes     Passive exposure: Never    Smokeless tobacco: Never   Vaping Use    Vaping status: Never Used   Substance Use Topics    Alcohol use: No    Drug use: No       ALLERGIES: Xanax [alprazolam]    Medications listed below are reported home medications pulling from within the system:  Medications Prior to Admission   Medication Sig Dispense Refill Last Dose/Taking    albuterol sulfate  (90 Base) MCG/ACT inhaler Inhale 2 puffs Every 4 (Four) Hours As Needed for Wheezing. 18 g 0 Past Month    amLODIPine (NORVASC) 2.5 MG tablet Take 1 tablet by mouth Daily.   2/12/2025    aspirin 81 MG chewable tablet Chew 1 tablet Daily.   2/12/2025 Morning    chlorthalidone (HYGROTON) 25 MG tablet Take 1 tablet by mouth Daily.   2/12/2025    Eliquis 5 MG tablet tablet Take 1 tablet by mouth Daily.   2/12/2025    folic acid (FOLVITE) 1 MG tablet Take 1 tablet by mouth Daily.   2/12/2025    gabapentin (NEURONTIN) 600 MG tablet Take 0.5 tablets by mouth 3 (Three) Times a Day As Needed (nerve pain).   2/12/2025    HYDROcodone-acetaminophen (NORCO)  MG per tablet Take 1 tablet by mouth Every 8 (Eight) Hours As Needed for Moderate Pain.   2/12/2025    ipratropium-albuterol (DUO-NEB) 0.5-2.5 mg/3 ml nebulizer Take 3 mL by nebulization Every 4 (Four) Hours As Needed for Wheezing. 90 mL 1 Past Month    metoprolol tartrate (LOPRESSOR) 25 MG tablet Take 1 tablet by mouth Daily.   2/12/2025    ondansetron ODT (ZOFRAN-ODT) 4 MG disintegrating tablet Place 1 tablet on the tongue Every 8 (Eight) Hours As Needed for Nausea or Vomiting.   Past Week    pantoprazole (PROTONIX) 40 MG EC tablet Take 1 tablet by mouth Daily.   2/12/2025    rosuvastatin (CRESTOR) 10 MG tablet Take 1 tablet by mouth Daily.   2/12/2025    sertraline  (ZOLOFT) 25 MG tablet Take 1 tablet by mouth Daily.   2/12/2025    torsemide 40 MG tablet Take 40 mg by mouth Daily for 30 days. 30 tablet 0 2/12/2025       Review of Systems   Constitutional:  Negative for activity change, appetite change and diaphoresis.   HENT:  Negative for facial swelling and trouble swallowing.    Eyes:  Negative for visual disturbance.   Respiratory:  Positive for shortness of breath.    Cardiovascular:  Negative for chest pain, palpitations and leg swelling.   Gastrointestinal:  Negative for blood in stool, nausea and vomiting.   Endocrine: Negative.    Genitourinary:  Negative for hematuria.   Musculoskeletal:  Negative for gait problem.   Skin:  Negative for color change.   Neurological:  Negative for dizziness, syncope, speech difficulty, weakness, light-headedness and headaches.   Psychiatric/Behavioral:  Negative for agitation and behavioral problems.      Objective Data      Vital Signs  Temp:  [98 °F (36.7 °C)-98.6 °F (37 °C)] 98.2 °F (36.8 °C)  Heart Rate:  [] 85  Resp:  [15-32] 20  BP: (114-220)/() 152/85  Flow (L/min) (Oxygen Therapy):  [2-60] 4  Device (Oxygen Therapy): nasal cannula  Vital Signs (last 72 hrs)         02/10 0700  02/11 0659 02/11 0700  02/12 0659 02/12 0700  02/13 0659 02/13 0700  02/13 1008   Most Recent      Temp (°F)       98      98.4     98.4 (36.9) 02/13 0920    Heart Rate     130 -  141    91 -  112     91 02/13 1000    Resp     26 -  32    15 -  32     15 02/13 1000    BP     114/104 -  220/172    133/88 -  179/102     133/88 02/13 1000    SpO2 (%)     64 -  95    89 -  99     99 02/13 1000    Flow (L/min) (Oxygen Therapy)         60     60 02/13 0920    Oxygen Concentration (%)       60      60     60 02/13 0749          BMI:  Body mass index is 35.2 kg/m².  WEIGHT:      02/13/25  0623 02/13/25  0920   Weight: 90.7 kg (200 lb) 84.5 kg (186 lb 4.8 oz)     DIET:  Diet: Regular/House; Texture: Soft to Chew (NDD 3); Soft to Chew: Whole Meat; Fluid  Consistency: Thin (IDDSI 0)  I&O:  Intake & Output (last 3 days)         02/10 0701 02/11 0700 02/11 0701 02/12 0700 02/12 0701 02/13 0700 02/13 0701 02/14 0700    IV Piggyback    100    Total Intake(mL/kg)    100 (1.1)    Net    +100                  Physical Exam  Constitutional:       Appearance: Normal appearance.   HENT:      Head: Normocephalic and atraumatic.      Nose: Nose normal.      Mouth/Throat:      Mouth: Mucous membranes are moist.      Pharynx: Oropharynx is clear.   Eyes:      Conjunctiva/sclera: Conjunctivae normal.   Cardiovascular:      Rate and Rhythm: Normal rate and regular rhythm.      Pulses: Normal pulses.      Heart sounds: Normal heart sounds.   Pulmonary:      Comments: Bibasilar rales  Abdominal:      General: Bowel sounds are normal.      Palpations: Abdomen is soft.   Musculoskeletal:         General: Normal range of motion.      Cervical back: Normal range of motion.   Skin:     General: Skin is warm and dry.   Neurological:      General: No focal deficit present.      Mental Status: She is alert and oriented to person, place, and time.   Psychiatric:         Mood and Affect: Mood normal.         Behavior: Behavior normal.       Results review   Results Review:    I have reviewed the patient's new clinical results. 02/13/25 19:19 EST    Results from last 7 days   Lab Units 02/13/25  1715 02/13/25  0828 02/13/25  0630   HSTROP T ng/L 157* 169* 183*     Lab Results   Component Value Date    PROBNP 15,163.0 (H) 02/13/2025    PROBNP 15,776.0 (H) 01/19/2025    PROBNP 5,236.0 (H) 07/20/2024     Results from last 7 days   Lab Units 02/13/25  0630   WBC 10*3/mm3 13.86*   HEMOGLOBIN g/dL 11.2*   PLATELETS 10*3/mm3 612*     Results from last 7 days   Lab Units 02/13/25  0630   SODIUM mmol/L 140   POTASSIUM mmol/L 4.8   CHLORIDE mmol/L 105   CO2 mmol/L 20.5*   BUN mg/dL 32*   CREATININE mg/dL 1.75*   CALCIUM mg/dL 9.5   GLUCOSE mg/dL 116*   ALT (SGPT) U/L 9   AST (SGOT) U/L 23     Lab  "Results   Component Value Date    MG 2.1 2025    MG 2.0 2025    MG 2.0 2024     Lab Results   Component Value Date    CHOL 140 10/08/2023    TRIG 75 10/08/2023    HDL 34 (L) 10/08/2023    LDL 91 10/08/2023     Estimated Creatinine Clearance: 30.8 mL/min (A) (by C-G formula based on SCr of 1.75 mg/dL (H)).  Lab Results   Component Value Date    HGBA1C 5.30 10/08/2023     Lab Results   Component Value Date    INR 1.55 (H) 2025    INR 1.07 10/24/2023     Lab Results   Component Value Date    LABHEPA >1.10 (C) 2025    LABHEPA >1.10 (C) 2025    LABHEPA <0.10 (L) 10/28/2023    LABHEPA <0.10 (L) 10/28/2023         Lab Results   Component Value Date    TSH 6.770 (H) 10/08/2023      No results found for: \"URICACID\"  Pain Management Panel          Latest Ref Rng & Units 10/9/2023   Pain Management Panel   Creatinine, Urine mg/dL 116.0       Details                 Microbiology Results (last 10 days)       Procedure Component Value - Date/Time    COVID-19, FLU A/B, RSV PCR 1 HR TAT - Swab, Nasopharynx [[id number]]  (Normal) Collected: 25    Lab Status: Final result Specimen: Swab from Nasopharynx Updated: 25     COVID19 Not Detected     Influenza A PCR Not Detected     Influenza B PCR Not Detected     RSV, PCR Not Detected    Narrative:      Fact sheet for providers: https://www.fda.gov/media/041947/download    Fact sheet for patients: https://www.fda.gov/media/255153/download    Test performed by PCR.           No results found for: \"BLOODCX\"      EC2025          ECG/EMG Results (last 24 hours)       Procedure Component Value Units Date/Time    ECG 12 Lead Dyspnea [[id number]] Collected: 25     Updated: 25     QT Interval 284 ms      QTC Interval 430 ms     Narrative:      Test Reason : Dyspnea  Blood Pressure :   */*   mmHG  Vent. Rate : 138 BPM     Atrial Rate : 138 BPM     P-R Int : 142 ms          QRS Dur :  88 ms      QT Int : 284 ms  "      P-R-T Axes :  58  39  40 degrees    QTcB Int : 430 ms    Sinus tachycardia with premature atrial complexes  Otherwise normal ECG  When compared with ECG of 19-Jan-2025 00:19,  premature ventricular complexes are no longer present  premature atrial complexes are now present  Vent. rate has increased by  54 bpm  ST no longer depressed in Anterior leads  ST now depressed in Lateral leads  T wave inversion less evident in Inferior leads  Nonspecific T wave abnormality no longer evident in Anterolateral leads    Referred By: TREVOR           Confirmed By:             TELEMETRY:           RADIOLOGY STUDIES:  Imaging Results (Last 72 Hours)       Procedure Component Value Units Date/Time    XR Chest 1 View [[id number]] Collected: 02/13/25 0844     Updated: 02/13/25 0846    Narrative:      XR CHEST 1 VW-     CLINICAL INDICATION: soa        COMPARISON: 1/19/2025     TECHNIQUE: Single frontal view of the chest.     FINDINGS:     LUNGS: Diffuse interstitial change and probable superimposed airspace  disease in both lung bases.     HEART AND MEDIASTINUM: Heart and mediastinal contours are unremarkable        SKELETON: Bony and soft tissue structures are unremarkable.             Impression:         1. Appearance concerning for CHF and possibly superimposed bibasilar  airspace disease           This report was finalized on 2/13/2025 8:44 AM by Dr. Julio Dominguez MD.               ALLERGIES: Xanax [alprazolam]    CURRENT MEDICATIONS:  Current list of medications may not reflect those currently placed in orders that are not signed or are being held.     aspirin, 324 mg, Oral, Once  hydrALAZINE, 25 mg, Oral, Q8H  mupirocin, 1 Application, Each Nare, BID  nicotine, 1 patch, Transdermal, Q24H  senna-docusate sodium, 2 tablet, Oral, BID  sodium chloride, 10 mL, Intravenous, Q12H  sodium chloride, 10 mL, Intravenous, Q12H      nitroglycerin, 5-200 mcg/min, Last Rate: 90 mcg/min (02/13/25 1800)  Pharmacy to Dose Heparin,          senna-docusate sodium **AND** polyethylene glycol **AND** bisacodyl **AND** bisacodyl    hydrOXYzine    nitroglycerin    Pharmacy to Dose Heparin    sodium chloride    sodium chloride    sodium chloride    sodium chloride    Thank you very much for asking us to be involved in this patient's care. Please do not hesitate to call for any questions or concerns.     I have discussed the patients findings and recommendations with the patient.    Electronically signed by DEEPA Navarro, 02/13/25, 7:19 PM EST.     Electronically signed by Ulises Mata MD, 02/13/25, 8:02 PM EST.            Please note that portions of this note were copied and has been reviewed and is accurate as of 2/13/2025 .      Please note that portions of this note were completed with a voice recognition program.     Electronically signed by Ulises Mata MD at 02/13/25 2003

## 2025-02-14 NOTE — NURSING NOTE
"Cardiology at bedside and talked to patient about IV access and a heparin gtt.     Patient was stated \"You can try one time and then I'm done.\"    IV access was attempted but unsuccessful. Patient appeared very tearful and stated \"I can't handle anymore. I just want to go home.\"    Nursing staff attempted to console patient. Patient was eventually consolable and agreeable to IV access if obtained under ultrasound guidance. Attempted to call [facility] PICC nurse at this time without answer.   "

## 2025-02-14 NOTE — CASE MANAGEMENT/SOCIAL WORK
Case Management Readmission Assessment Note    Case Management Readmission Assessment (all recorded)       Readmission Interview       Row Name 02/14/25 1434             Readmission Indications    Is the patient and/or family able to complete the readmission assessment questions? Yes      Is this hospitalization related to the prior hospital diagnosis? No        Row Name 02/14/25 1434             Recommendation for rehospitalization    Did you speak with your physician prior to coming to the hospital No        [facility] Name 02/14/25 1434             Follow-up Appointments    Do you have a PCP? Yes      Did you have an appointment with PCP after your hospitalization? Yes      When was your appointment scheduled? 01/23/25      Did you go to appointment? Yes      Did you have an appointment with a Specialist? Yes      When was your appointment scheduled? 01/31/25      Did you go to appointment? No      Are you current with the Pulmonary Clinic? No      Are you current with the CHF Clinic? No        Row Name 02/14/25 1434             Medications    Did you have newly prescribed medications at discharge? Yes      Did you understand the reasons for your medications at discharge and how to take them? Yes      Did you understand the side effects of your medications? Yes      Are you taking all of you prescribed medications? Yes      What pharmacy was used to fill prescription(s)? [facility]      Were medications picked up? Yes        [facility] Name 02/14/25 1434             Discharge Instructions    Did you understand your discharge instructions? Yes      Did your family/caregiver hear your instructions? Yes      Were you told to eat a special diet? No      Did you adhere to the diet? No      Were you given a number of someone to call if you had questions or concerns? Yes        Row Name 02/14/25 1434             Index discharge location/services    Where did you go upon discharge? Home      Do you have supportive family or friends  in the home? Yes        Row Name 02/14/25 1434             Discharge Readiness    On a scale of 1-5 (5 being well prepared), how ready were you for discharge 5        Row Name 02/14/25 1434             Palliative Care/Hospice    Are you current with Palliative Care? No      Are you current with Hospice Care? No        Row Name 02/14/25 1434 02/13/25 1226 02/13/25 1024       Advance Directives (For Healthcare)    Pre-existing AND/MOST/POLST Order No -- No    Advance Directive Status Patient does not have advance directive Patient does not have advance directive Patient does not have advance directive    Have you reviewed your Advance Directive and is it valid for this stay? No -- Not applicable    Literature Provided on Advance Directives No -- No    Patient Requests Assistance on Advance Directives -- -- Yes, consult for Advance Care Planning

## 2025-02-14 NOTE — ACP (ADVANCE CARE PLANNING)
consulted for ACP discussion; however patient does not appear able to enter into ACP conversation at time of visit.  Spiritual Care provided.   will follow up.

## 2025-02-14 NOTE — PROGRESS NOTES
LOS: 1 day     Name: Lesley Michel  Age/Sex: 69 y.o. female  :  1955        PCP: Woodrow Raymond, APRN  REF: Gabby Schmidt DO    Principal Problem:    Hypertensive crisis      Reason for follow-up: NSTEMI, hypertensive crisis, and acute heart failure exacerbation    Subjective       Subjective   Lesley Michel is a 69-year-old female with a past medical history significant for CAD (with chronically occluded well collateralized dominant RCA, status post drug-eluting stent to the LAD with high-grade stenosis of the ostium of the small D1.  She also has 60% mid RCA and posterolateral branch lesion), hyperlipidemia, hypertension, PAD, paroxysmal atrial fibrillation chronically anticoagulated with Eliquis, CHF, COPD on nasal cannula, history of hypoxic respiratory failure, CKD stage II, and tobacco abuse.  She presented to [facility] ED on 2025 with complaints of shortness of breath.  She was recently admitted for cholecystectomy and had not been taking diuretics since discharge.  She was admitted for acute hypoxic respiratory failure, acute pulmonary edema, and NSTEMI.  High-sensitivity troponin was 182 at the time of admission, proBNP 15,163, creatinine 1.75.  Cardiology was consulted for further evaluation and management for NSTEMI, hypertensive crisis, and acute heart failure exacerbation.    Interval History: The patient is resting in bed upon examination today.  Her IV infiltrated and was removed by nursing staff.  She is unsure if she wants to have IV placed.  Prior to this, was on IV heparin.  High-sensitivity troponin has trended down from 183-169, 157.  Creatinine today is 1.56 from 1.75 yesterday.  She reports she did have chest pain yesterday but denies any chest pains today.    ROS    Vital Signs  Temp:  [97.9 °F (36.6 °C)-98.8 °F (37.1 °C)] 98 °F (36.7 °C)  Heart Rate:  [60-96] 80  Resp:  [15-29] 18  BP: (114-179)/() 168/97  Vital Signs (last 72 hrs)           "0700  02/12 0659 02/12 0700  02/13 0659 02/13 0700  02/14 0659 02/14 0700 02/14 1623   Most Recent      Temp (°F)     98    98 -  98.8    97.9 -  98     98 (36.7) 02/14 1245    Heart Rate   130 -  141    60 -  112    71 -  96     80 02/14 1615    Resp   26 -  32    15 -  32    15 -  24     18 02/14 1615    BP   114/104 -  220/172    114/65 -  179/102    127/71 -  179/103     168/97 02/14 1615    SpO2 (%)   64 -  95    89 -  100    91 -  97     95 02/14 1615    Flow (L/min) (Oxygen Therapy)     2 -  60      2     2 02/14 1400    Oxygen Concentration (%)     60    40 -  60       40 02/13 2351          Documented weights    02/13/25 0623 02/13/25 0920   Weight: 90.7 kg (200 lb) 84.5 kg (186 lb 4.8 oz)      Body mass index is 35.2 kg/m².    Intake/Output Summary (Last 24 hours) at 2/14/2025 1623  Last data filed at 2/14/2025 1600  Gross per 24 hour   Intake 1374.1 ml   Output 1850 ml   Net -475.9 ml     Objective:  Vital signs: (most recent): Blood pressure 168/97, pulse 80, temperature 98 °F (36.7 °C), resp. rate 18, height 154.9 cm (61\"), weight 84.5 kg (186 lb 4.8 oz), SpO2 95%.    Lungs:  Normal effort.    Heart: Normal rate.  Regular rhythm.    Abdomen: Abdomen is soft.  Bowel sounds are normal.     Neurological: Patient is alert.    Skin:  Warm and dry.                Objective       Physical Exam:  .  Physical Exam  Constitutional:       Appearance: Normal appearance. She is well-developed.   HENT:      Head: Normocephalic and atraumatic.   Cardiovascular:      Rate and Rhythm: Normal rate and regular rhythm.      Heart sounds: Normal heart sounds.   Pulmonary:      Effort: Pulmonary effort is normal.      Comments: Severely diminished air entry bilaterally  Abdominal:      General: Bowel sounds are normal.      Palpations: Abdomen is soft.   Skin:     General: Skin is warm and dry.   Neurological:      General: No focal deficit present.      Mental Status: She is alert and oriented to person, place, and time. " "  Psychiatric:         Mood and Affect: Mood normal.         Behavior: Behavior normal.               Procedures    Results review       Results Review:   Results from last 7 days   Lab Units 02/14/25  0543 02/13/25  0630   WBC 10*3/mm3 8.49 13.86*   HEMOGLOBIN g/dL 7.6* 11.2*   PLATELETS 10*3/mm3 404 612*     Results from last 7 days   Lab Units 02/14/25  1206 02/13/25  0630   SODIUM mmol/L 135* 140   POTASSIUM mmol/L 4.4 4.8   CHLORIDE mmol/L 105 105   CO2 mmol/L 19.6* 20.5*   BUN mg/dL 26* 32*   CREATININE mg/dL 1.56* 1.75*   CALCIUM mg/dL 8.5* 9.5   GLUCOSE mg/dL 119* 116*   ALT (SGPT) U/L  --  9   AST (SGOT) U/L  --  23     Results from last 7 days   Lab Units 02/13/25  1715 02/13/25  0828 02/13/25  0630   HSTROP T ng/L 157* 169* 183*     Lab Results   Component Value Date    INR 1.55 (H) 02/13/2025    INR 1.07 10/24/2023     Lab Results   Component Value Date    MG 1.9 02/14/2025    MG 2.1 01/21/2025    MG 2.0 01/20/2025     Lab Results   Component Value Date    TSH 6.770 (H) 10/08/2023    TRIG 75 10/08/2023    HDL 34 (L) 10/08/2023    LDL 91 10/08/2023      Imaging Results (Last 48 Hours)       Procedure Component Value Units Date/Time    XR Chest 1 View [[id number]] Collected: 02/13/25 0844     Updated: 02/13/25 0846    Narrative:      XR CHEST 1 VW-     CLINICAL INDICATION: soa        COMPARISON: 1/19/2025     TECHNIQUE: Single frontal view of the chest.     FINDINGS:     LUNGS: Diffuse interstitial change and probable superimposed airspace  disease in both lung bases.     HEART AND MEDIASTINUM: Heart and mediastinal contours are unremarkable        SKELETON: Bony and soft tissue structures are unremarkable.             Impression:         1. Appearance concerning for CHF and possibly superimposed bibasilar  airspace disease           This report was finalized on 2/13/2025 8:44 AM by Dr. Julio Dominguez MD.             No results found for: \"BNP\"    Lab Results   Component Value Date    ABSOLUTELUNG 43 (A) " 10/13/2023             Echo   Results for orders placed during the hospital encounter of 01/19/25    Adult Transthoracic Echo Complete w/ Color, Spectral and Contrast if necessary per protocol    Interpretation Summary    Left ventricular systolic function is normal. Left ventricular ejection fraction appears to be 56 - 60%.    Left ventricular wall thickness is consistent with mild concentric hypertrophy.    There is calcification of the aortic valve.    Mild mitral valve stenosis is present.    Estimated right ventricular systolic pressure from tricuspid regurgitation is normal (<35 mmHg).           I reviewed the patient's new clinical results.    Telemetry: sinus rhythm in the 90s       Medication Review:   amLODIPine, 10 mg, Oral, Q24H  aspirin, 81 mg, Oral, Daily  atorvastatin, 80 mg, Oral, Nightly  chlorthalidone, 25 mg, Oral, Daily  folic acid, 1 mg, Oral, Daily  gabapentin, 300 mg, Oral, Q8H  hydrALAZINE, 100 mg, Oral, Q8H  metoprolol tartrate, 25 mg, Oral, Q12H  mupirocin, 1 Application, Each Nare, BID  nicotine, 1 patch, Transdermal, Q24H  pantoprazole, 40 mg, Oral, Daily  senna-docusate sodium, 2 tablet, Oral, BID  sertraline, 25 mg, Oral, Daily  sodium chloride, 10 mL, Intravenous, Q12H  sodium chloride, 10 mL, Intravenous, Q12H  torsemide, 40 mg, Oral, Daily        heparin, 8 Units/kg/hr (Dosing Weight), Last Rate: Stopped (02/14/25 1249)  Pharmacy to Dose Heparin,         Assessment      Assessment:    Acute non-ST elevation myocardial infarction (type I versus type II due to uncontrolled hypertension and respiratory failure.  Acute on chronic HFpEF.  ASCVD, status post previous PCI/ROSAURA to LAD with high-grade stenosis in the ostium of the small diagonal branch and 60% stenosis in the mid RCA at [facility] (details unavailable).  Peripheral artery disease, status post previous percutaneous intervention at [facility] in [place].  Paroxysmal atrial fibrillation with a  [id number] score of 5, patient is chronically anticoagulated with Eliquis.  Acute on chronic respiratory failure with hypoxia and hypercapnia due to combination of COPD with exacerbation and acute heart failure.  Acute kidney injury on chronic kidney disease (stage IIIb).      Plan     Recommendations:    Continue heparin gtt, aspirin 81 Mg daily, Lipitor 80 Mg daily.    Continue Lopressor 25 Mg twice daily, hydralazine 100 Mg 3 times daily.  Patient is also on chlorthalidone 25 Mg daily.  Recommend IV bumex 2 mg daily. Daily BMP, Is/Os, wts.Pt however doesnot want another IV access so will resume home torsemide  Plan for NM stress test Monday.    I discussed the patient's findings and my recommendations with the patient.    Electronically signed by DEEPA Pacheco, 02/14/25, 4:30 PM EST.  Electronically signed by Arely Marcos MD, 02/14/25, 4:58 PM EST.           Please note that portions of this note were completed with a voice recognition program.

## 2025-02-14 NOTE — PROGRESS NOTES
Nurse Practitioner - Brief Progress Note  PERMANENT  02/13/2025 21:20    [facility] [place] [facility] [place] - CCU - [address] - [place], [place] ([facility])    MATTHEW EASLEY    Date of Service 02/13/2025 21:20    HPI/Events of Note [facility] Provider Intervention Note    Pt is on Nitroglycerin infusion, c/o headache. Tylenol PRN ordered.      __N___   Video Assessment performed    Contact [facility] for any needs if bedside physician is not present.      Interventions Intermediate-Communication with other healthcare providers and/or family, Pain - evaluation and management        Electronically Signed by: Esteban Lerma (ANP,CCRN) on 02/13/2025 21:26

## 2025-02-14 NOTE — PLAN OF CARE
Goal Outcome Evaluation:  Plan of Care Reviewed With: patient        Progress: improving         Problem: Noninvasive Ventilation Acute  Goal: Effective Unassisted Ventilation and Oxygenation  Outcome: Progressing     Problem: Adult Inpatient Plan of Care  Goal: Plan of Care Review  Outcome: Progressing  Flowsheets (Taken 2/14/2025 0636)  Progress: improving  Plan of Care Reviewed With: patient  Goal: Patient-Specific Goal (Individualized)  Outcome: Progressing  Goal: Absence of Hospital-Acquired Illness or Injury  Outcome: Progressing  Intervention: Identify and Manage Fall Risk  Recent Flowsheet Documentation  Taken 2/14/2025 0600 by Jennifer Quezada RN  Safety Promotion/Fall Prevention:   safety round/check completed   fall prevention program maintained  Taken 2/14/2025 0500 by Jennifer Quezada RN  Safety Promotion/Fall Prevention:   safety round/check completed   fall prevention program maintained  Taken 2/14/2025 0400 by Jennifer Quezada RN  Safety Promotion/Fall Prevention:   safety round/check completed   fall prevention program maintained  Taken 2/14/2025 0300 by Jennifer Quezada RN  Safety Promotion/Fall Prevention:   safety round/check completed   fall prevention program maintained  Taken 2/14/2025 0200 by Jennifer Quezada RN  Safety Promotion/Fall Prevention:   safety round/check completed   fall prevention program maintained  Taken 2/14/2025 0100 by Jennifer Quezada RN  Safety Promotion/Fall Prevention:   safety round/check completed   fall prevention program maintained  Taken 2/14/2025 0000 by Jennifer Quezada RN  Safety Promotion/Fall Prevention:   safety round/check completed   fall prevention program maintained  Taken 2/13/2025 2300 by Jennifer Quezada RN  Safety Promotion/Fall Prevention:   safety round/check completed   fall prevention program maintained  Taken 2/13/2025 2200 by Jennifer Quezada RN  Safety Promotion/Fall Prevention:   safety round/check completed   fall prevention program maintained  Taken  2/13/2025 2100 by Jennifer Quezada RN  Safety Promotion/Fall Prevention:   safety round/check completed   fall prevention program maintained  Taken 2/13/2025 2000 by Jennifer Quezada RN  Safety Promotion/Fall Prevention:   safety round/check completed   fall prevention program maintained  Taken 2/13/2025 1900 by Jennifer Quezada RN  Safety Promotion/Fall Prevention:   safety round/check completed   fall prevention program maintained  Intervention: Prevent Skin Injury  Recent Flowsheet Documentation  Taken 2/14/2025 0600 by Jennifer Quezada RN  Body Position:   turned   right  Taken 2/14/2025 0400 by Jennifer Quezada RN  Body Position:   turned   left  Taken 2/14/2025 0200 by Jennifer Quezada RN  Body Position:   turned   right  Skin Protection:   skin sealant/moisture barrier applied   pulse oximeter probe site changed  Taken 2/14/2025 0000 by Jennifer Quezada RN  Body Position:   turned   left  Taken 2/13/2025 2200 by Jennifer Quezada RN  Body Position:   turned   right  Taken 2/13/2025 2000 by Jennifer Quezada RN  Body Position:   turned   left  Skin Protection:   skin sealant/moisture barrier applied   pulse oximeter probe site changed  Intervention: Prevent and Manage VTE (Venous Thromboembolism) Risk  Recent Flowsheet Documentation  Taken 2/13/2025 2000 by Jennifer Quezada RN  VTE Prevention/Management: (see MAR) other (see comments)  Goal: Optimal Comfort and Wellbeing  Outcome: Progressing  Intervention: Provide Person-Centered Care  Recent Flowsheet Documentation  Taken 2/14/2025 0200 by Jennifer Quezada RN  Trust Relationship/Rapport:   care explained   choices provided   reassurance provided   thoughts/feelings acknowledged  Taken 2/13/2025 2000 by Jennifer Quezada RN  Trust Relationship/Rapport:   care explained   choices provided   reassurance provided   thoughts/feelings acknowledged  Goal: Readiness for Transition of Care  Outcome: Progressing     Problem: Hypertension Acute  Goal: Blood Pressure Within Desired  Range  Outcome: Progressing  Intervention: Normalize Blood Pressure  Recent Flowsheet Documentation  Taken 2/14/2025 0600 by Jennifer Quezada RN  Medication Review/Management: medications reviewed  Taken 2/14/2025 0500 by Jennifer Quezada RN  Medication Review/Management: medications reviewed  Taken 2/14/2025 0400 by Jennifer Quezada RN  Medication Review/Management: medications reviewed  Taken 2/14/2025 0300 by Jennifer Quezada RN  Medication Review/Management: medications reviewed  Taken 2/14/2025 0200 by Jennifer Quezada RN  Medication Review/Management: medications reviewed  Taken 2/14/2025 0100 by Jennifer Quezada RN  Medication Review/Management: medications reviewed  Taken 2/14/2025 0000 by Jennifer Quezada RN  Medication Review/Management: medications reviewed  Taken 2/13/2025 2300 by Jennifer Quezada RN  Medication Review/Management: medications reviewed  Taken 2/13/2025 2200 by Jennifer Quezada RN  Medication Review/Management: medications reviewed  Taken 2/13/2025 2100 by Jennifer Quezada RN  Medication Review/Management: medications reviewed  Taken 2/13/2025 2000 by Jennifer Quezada RN  Medication Review/Management: medications reviewed  Taken 2/13/2025 1900 by Jennifer Quezada RN  Medication Review/Management: medications reviewed     Problem: Sepsis/Septic Shock  Goal: Optimal Coping  Outcome: Progressing  Intervention: Support Patient and Family Response  Recent Flowsheet Documentation  Taken 2/14/2025 0200 by Jennifer Quezada RN  Family/Support System Care: support provided  Taken 2/13/2025 2000 by Jennifer Quezada RN  Family/Support System Care:   support provided   self-care encouraged  Goal: Absence of Bleeding  Outcome: Progressing  Goal: Blood Glucose Level Within Target Range  Outcome: Progressing  Goal: Absence of Infection Signs and Symptoms  Outcome: Progressing  Intervention: Promote Recovery  Recent Flowsheet Documentation  Taken 2/13/2025 2000 by Jennifer Quezada RN  Activity Management: up ad  denise  Goal: Optimal Nutrition Delivery  Outcome: Progressing     Problem: Comorbidity Management  Goal: Maintenance of Heart Failure Symptom Control  Outcome: Progressing  Intervention: Maintain Heart Failure Management  Recent Flowsheet Documentation  Taken 2/14/2025 0600 by Jennifer Quezada RN  Medication Review/Management: medications reviewed  Taken 2/14/2025 0500 by Jennifer Quezada RN  Medication Review/Management: medications reviewed  Taken 2/14/2025 0400 by Jennifer Quezada RN  Medication Review/Management: medications reviewed  Taken 2/14/2025 0300 by Jennifer Quezada RN  Medication Review/Management: medications reviewed  Taken 2/14/2025 0200 by Jennifer Quezada RN  Medication Review/Management: medications reviewed  Taken 2/14/2025 0100 by Jennifer Quezada RN  Medication Review/Management: medications reviewed  Taken 2/14/2025 0000 by Jennifer Quezada RN  Medication Review/Management: medications reviewed  Taken 2/13/2025 2300 by Jennifer Quezada RN  Medication Review/Management: medications reviewed  Taken 2/13/2025 2200 by Jennifer Quezada RN  Medication Review/Management: medications reviewed  Taken 2/13/2025 2100 by Jennifer Quezada RN  Medication Review/Management: medications reviewed  Taken 2/13/2025 2000 by Jennifer Quezada RN  Medication Review/Management: medications reviewed  Taken 2/13/2025 1900 by Jennifer Quezada RN  Medication Review/Management: medications reviewed     Problem: Skin Injury Risk Increased  Goal: Skin Health and Integrity  Outcome: Progressing  Intervention: Optimize Skin Protection  Recent Flowsheet Documentation  Taken 2/14/2025 0600 by Jennifer Quezada RN  Head of Bed (HOB) Positioning: HOB at 30-45 degrees  Taken 2/14/2025 0400 by Jennifer Quezada RN  Head of Bed (HOB) Positioning: HOB at 30-45 degrees  Taken 2/14/2025 0200 by Jennifer Quezada RN  Pressure Reduction Techniques:   weight shift assistance provided   pressure points protected   heels elevated off bed  Head of Bed (HOB)  Positioning: HOB at 30-45 degrees  Pressure Reduction Devices:   pressure-redistributing mattress utilized   positioning supports utilized   heel offloading device utilized  Skin Protection:   skin sealant/moisture barrier applied   pulse oximeter probe site changed  Taken 2/14/2025 0000 by Jennifer Quezada, RN  Head of Bed ([facility]) Positioning: HOB at 30-45 degrees  Taken 2/13/2025 2000 by Jennifer Quezada RN  Activity Management: up ad denise  Pressure Reduction Techniques:   frequent weight shift encouraged   heels elevated off bed   pressure points protected  Head of Bed ([facility]) Positioning: HOB at 30-45 degrees  Pressure Reduction Devices:   pressure-redistributing mattress utilized   positioning supports utilized   heel offloading device utilized  Skin Protection:   skin sealant/moisture barrier applied   pulse oximeter probe site changed

## 2025-02-15 LAB
ANION GAP SERPL CALCULATED.3IONS-SCNC: 10.4 MMOL/L (ref 5–15)
ANISOCYTOSIS BLD QL: NORMAL
BASOPHILS # BLD AUTO: 0.08 10*3/MM3 (ref 0–0.2)
BASOPHILS NFR BLD AUTO: 1.1 % (ref 0–1.5)
BUN SERPL-MCNC: 27 MG/DL (ref 8–23)
BUN/CREAT SERPL: 15.3 (ref 7–25)
CALCIUM SPEC-SCNC: 8.9 MG/DL (ref 8.6–10.5)
CHLORIDE SERPL-SCNC: 101 MMOL/L (ref 98–107)
CO2 SERPL-SCNC: 22.6 MMOL/L (ref 22–29)
CREAT SERPL-MCNC: 1.76 MG/DL (ref 0.57–1)
DEPRECATED RDW RBC AUTO: 57.5 FL (ref 37–54)
EGFRCR SERPLBLD CKD-EPI 2021: 31.4 ML/MIN/1.73
ELLIPTOCYTES BLD QL SMEAR: NORMAL
EOSINOPHIL # BLD AUTO: 0.78 10*3/MM3 (ref 0–0.4)
EOSINOPHIL NFR BLD AUTO: 10.6 % (ref 0.3–6.2)
ERYTHROCYTE [DISTWIDTH] IN BLOOD BY AUTOMATED COUNT: 20.9 % (ref 12.3–15.4)
GLUCOSE SERPL-MCNC: 93 MG/DL (ref 65–99)
HCT VFR BLD AUTO: 30.2 % (ref 34–46.6)
HGB BLD-MCNC: 8.6 G/DL (ref 12–15.9)
HYPOCHROMIA BLD QL: NORMAL
IMM GRANULOCYTES # BLD AUTO: 0.01 10*3/MM3 (ref 0–0.05)
IMM GRANULOCYTES NFR BLD AUTO: 0.1 % (ref 0–0.5)
LYMPHOCYTES # BLD AUTO: 2.27 10*3/MM3 (ref 0.7–3.1)
LYMPHOCYTES NFR BLD AUTO: 31 % (ref 19.6–45.3)
MAGNESIUM SERPL-MCNC: 2 MG/DL (ref 1.6–2.4)
MCH RBC QN AUTO: 22.3 PG (ref 26.6–33)
MCHC RBC AUTO-ENTMCNC: 28.5 G/DL (ref 31.5–35.7)
MCV RBC AUTO: 78.4 FL (ref 79–97)
MONOCYTES # BLD AUTO: 0.75 10*3/MM3 (ref 0.1–0.9)
MONOCYTES NFR BLD AUTO: 10.2 % (ref 5–12)
NEUTROPHILS NFR BLD AUTO: 3.44 10*3/MM3 (ref 1.7–7)
NEUTROPHILS NFR BLD AUTO: 47 % (ref 42.7–76)
NRBC BLD AUTO-RTO: 0 /100 WBC (ref 0–0.2)
PLAT MORPH BLD: NORMAL
PLATELET # BLD AUTO: 388 10*3/MM3 (ref 140–450)
PMV BLD AUTO: 9.8 FL (ref 6–12)
POTASSIUM SERPL-SCNC: 4.5 MMOL/L (ref 3.5–5.2)
QT INTERVAL: 460 MS
QTC INTERVAL: 530 MS
RBC # BLD AUTO: 3.85 10*6/MM3 (ref 3.77–5.28)
SODIUM SERPL-SCNC: 134 MMOL/L (ref 136–145)
TARGETS BLD QL SMEAR: NORMAL
UFH PPP CHRO-ACNC: 0.3 IU/ML (ref 0.3–0.7)
UFH PPP CHRO-ACNC: 0.43 IU/ML (ref 0.3–0.7)
WBC NRBC COR # BLD AUTO: 7.33 10*3/MM3 (ref 3.4–10.8)

## 2025-02-15 PROCEDURE — 85025 COMPLETE CBC W/AUTO DIFF WBC: CPT

## 2025-02-15 PROCEDURE — 99232 SBSQ HOSP IP/OBS MODERATE 35: CPT | Performed by: INTERNAL MEDICINE

## 2025-02-15 PROCEDURE — 94761 N-INVAS EAR/PLS OXIMETRY MLT: CPT

## 2025-02-15 PROCEDURE — 80048 BASIC METABOLIC PNL TOTAL CA: CPT | Performed by: STUDENT IN AN ORGANIZED HEALTH CARE EDUCATION/TRAINING PROGRAM

## 2025-02-15 PROCEDURE — 85007 BL SMEAR W/DIFF WBC COUNT: CPT

## 2025-02-15 PROCEDURE — 85520 HEPARIN ASSAY: CPT | Performed by: STUDENT IN AN ORGANIZED HEALTH CARE EDUCATION/TRAINING PROGRAM

## 2025-02-15 PROCEDURE — 94799 UNLISTED PULMONARY SVC/PX: CPT

## 2025-02-15 PROCEDURE — 83735 ASSAY OF MAGNESIUM: CPT | Performed by: STUDENT IN AN ORGANIZED HEALTH CARE EDUCATION/TRAINING PROGRAM

## 2025-02-15 PROCEDURE — 99232 SBSQ HOSP IP/OBS MODERATE 35: CPT

## 2025-02-15 RX ADMIN — METOPROLOL TARTRATE 25 MG: 25 TABLET, FILM COATED ORAL at 08:09

## 2025-02-15 RX ADMIN — GABAPENTIN 300 MG: 300 CAPSULE ORAL at 06:09

## 2025-02-15 RX ADMIN — HYDROXYZINE HYDROCHLORIDE 10 MG: 10 TABLET ORAL at 00:41

## 2025-02-15 RX ADMIN — METOPROLOL TARTRATE 25 MG: 25 TABLET, FILM COATED ORAL at 22:11

## 2025-02-15 RX ADMIN — SENNOSIDES AND DOCUSATE SODIUM 2 TABLET: 50; 8.6 TABLET ORAL at 22:11

## 2025-02-15 RX ADMIN — Medication 10 ML: at 22:12

## 2025-02-15 RX ADMIN — MUPIROCIN 1 APPLICATION: 20 OINTMENT TOPICAL at 10:41

## 2025-02-15 RX ADMIN — Medication 10 ML: at 08:11

## 2025-02-15 RX ADMIN — FOLIC ACID 1 MG: 1 TABLET ORAL at 08:09

## 2025-02-15 RX ADMIN — HYDROCODONE BITARTRATE AND ACETAMINOPHEN 1 TABLET: 10; 325 TABLET ORAL at 08:09

## 2025-02-15 RX ADMIN — GABAPENTIN 300 MG: 300 CAPSULE ORAL at 22:11

## 2025-02-15 RX ADMIN — ATORVASTATIN CALCIUM 80 MG: 40 TABLET, FILM COATED ORAL at 22:11

## 2025-02-15 RX ADMIN — HYDRALAZINE HYDROCHLORIDE 100 MG: 50 TABLET ORAL at 22:12

## 2025-02-15 RX ADMIN — PANTOPRAZOLE SODIUM 40 MG: 40 TABLET, DELAYED RELEASE ORAL at 08:09

## 2025-02-15 RX ADMIN — TORSEMIDE 40 MG: 20 TABLET ORAL at 08:09

## 2025-02-15 RX ADMIN — ASPIRIN 81 MG: 81 TABLET, COATED ORAL at 08:09

## 2025-02-15 RX ADMIN — NICOTINE 1 PATCH: 21 PATCH TRANSDERMAL at 08:11

## 2025-02-15 RX ADMIN — AMLODIPINE BESYLATE 10 MG: 10 TABLET ORAL at 22:12

## 2025-02-15 RX ADMIN — ACETAMINOPHEN 650 MG: 325 TABLET ORAL at 20:07

## 2025-02-15 RX ADMIN — HYDRALAZINE HYDROCHLORIDE 100 MG: 50 TABLET ORAL at 06:09

## 2025-02-15 RX ADMIN — GABAPENTIN 300 MG: 300 CAPSULE ORAL at 13:29

## 2025-02-15 RX ADMIN — HYDROXYZINE HYDROCHLORIDE 10 MG: 10 TABLET ORAL at 08:09

## 2025-02-15 RX ADMIN — SERTRALINE HYDROCHLORIDE 25 MG: 25 TABLET ORAL at 08:09

## 2025-02-15 RX ADMIN — HYDROCODONE BITARTRATE AND ACETAMINOPHEN 1 TABLET: 10; 325 TABLET ORAL at 00:41

## 2025-02-15 RX ADMIN — HYDRALAZINE HYDROCHLORIDE 100 MG: 50 TABLET ORAL at 13:29

## 2025-02-15 RX ADMIN — CHLORTHALIDONE 25 MG: 50 TABLET ORAL at 10:41

## 2025-02-15 NOTE — PROGRESS NOTES
Patient Identification:  Name:  Lesley Michel  Age:  69 y.o.  Sex:  female  :  1955  MRN:  [id number]  Visit Number:  [id number]  Primary Care Provider:  Woodrow Raymond APRN    Length of stay:  2    Subjective/Interval History/Consultants/Procedures     Chief Complaint:   Chief Complaint   Patient presents with    Shortness of Breath    Chest Pain       Subjective/Interval History:    69 y.o. female who was admitted on 2025 with acute hypoxic and hypercapnic respiratory failure, hypertensive urgency, decompensated HFpEF    PMH is significant for CAD, HTN, atrial fibrillation, HFpEF, CKD stage IV, COPD, PVD, migraine headaches   For complete admission information, please see history and physical.     Consultations:  Cardiology  Palliative care  CM/SW    Procedures/Scans:  CXR    Today, the patient was seen and examined with no family present at the bedside. She was sitting up in bed in no obvious discomfort. She denied any complaint, reported she felt well and wished to return home. We discussed cardiology plan for further cardiac evaluation Monday and she remains agreeable but is concerned about receiving contrast noting she will refuse the testing if Nephrology does not give their approval. Discussed her current renal function which is improved from her last admission and recent baseline as well as precautions taken for  patients with CKD to prevent further kidney injury however she remains concerned. She also remains anxious, asking frequently if she was going to die while admitted to the hospital and advising she feels that if she remains admitted she likely will die. Otherwise she had no new physical complaints.     Room location at the time of evaluation was [facility].    ----------------------------------------------------------------------------------------------------------------------  [facility] Meds:  amLODIPine, 10 mg, Oral, Q24H  aspirin, 81 mg, Oral, Daily  atorvastatin, 80 mg,  Oral, Nightly  chlorthalidone, 25 mg, Oral, Daily  folic acid, 1 mg, Oral, Daily  gabapentin, 300 mg, Oral, Q8H  hydrALAZINE, 100 mg, Oral, Q8H  metoprolol tartrate, 25 mg, Oral, Q12H  mupirocin, 1 Application, Each Nare, BID  nicotine, 1 patch, Transdermal, Q24H  pantoprazole, 40 mg, Oral, Daily  senna-docusate sodium, 2 tablet, Oral, BID  sertraline, 25 mg, Oral, Daily  sodium chloride, 10 mL, Intravenous, Q12H  sodium chloride, 10 mL, Intravenous, Q12H  torsemide, 40 mg, Oral, Daily      heparin, 8 Units/kg/hr (Dosing Weight), Last Rate: 8 Units/kg/hr (02/14/25 1855)  Pharmacy to Dose Heparin,       ----------------------------------------------------------------------------------------------------------------------      Objective     Vital Signs:  Temp:  [97.9 °F (36.6 °C)-98.5 °F (36.9 °C)] 98.3 °F (36.8 °C)  Heart Rate:  [60-96] 66  Resp:  [10-24] 22  BP: (119-179)/() 140/77      02/13/25  0623 02/13/25  0920 02/15/25  0400   Weight: 90.7 kg (200 lb) 84.5 kg (186 lb 4.8 oz) 90.2 kg (198 lb 13.7 oz)     Body mass index is 37.57 kg/m².    Intake/Output Summary (Last 24 hours) at 2/15/2025 0971  Last data filed at 2/15/2025 0809  Gross per 24 hour   Intake 827.35 ml   Output 1300 ml   Net -472.65 ml     I/O this shift:  In: 240 [P.O.:240]  Out: -   Diet: Regular/House; Texture: Soft to Chew (NDD 3); Soft to Chew: Whole Meat; Fluid Consistency: Thin (IDDSI 0)  ----------------------------------------------------------------------------------------------------------------------    Physical Exam  Vitals and nursing note reviewed.   Constitutional:       General: She is not in acute distress.     Appearance: She is obese.   HENT:      Head: Normocephalic and atraumatic.   Cardiovascular:      Rate and Rhythm: Normal rate and regular rhythm.   Pulmonary:      Effort: Pulmonary effort is normal.   Abdominal:      Palpations: Abdomen is soft.   Musculoskeletal:      Right lower leg: No edema.      Left lower leg:  "No edema.   Skin:     General: Skin is warm and dry.   Neurological:      Mental Status: She is alert. Mental status is at baseline.   Psychiatric:         Mood and Affect: Mood normal.         Behavior: Behavior normal.          ----------------------------------------------------------------------------------------------------------------------  Tele:      ----------------------------------------------------------------------------------------------------------------------  Results from last 7 days   Lab Units 02/13/25  1715 02/13/25  0828 02/13/25  0630   HSTROP T ng/L 157* 169* 183*   PROBNP pg/mL  --   --  15,163.0*     Results from last 7 days   Lab Units 02/15/25  0841 02/14/25  0543 02/13/25  1244 02/13/25  0931 02/13/25  0828 02/13/25  0630   LACTATE mmol/L  --   --  1.9 2.1*  --  3.8*   WBC 10*3/mm3 7.33 8.49  --   --   --  13.86*   HEMOGLOBIN g/dL 8.6* 7.6*  --   --   --  11.2*   HEMATOCRIT % 30.2* 28.7*  --   --   --  39.3   MCV fL 78.4* 82.5  --   --   --  78.9*   MCHC g/dL 28.5* 26.5*  --   --   --  28.5*   PLATELETS 10*3/mm3 388 404  --   --   --  612*   INR   --   --   --   --  1.55*  --      Results from last 7 days   Lab Units 02/14/25  0409   PH, ARTERIAL pH units 7.433   PO2 ART mm Hg 70.6*   PCO2, ARTERIAL mm Hg 34.7*   HCO3 ART mmol/L 23.2     Results from last 7 days   Lab Units 02/15/25  0133 02/14/25  1206 02/13/25  0630   SODIUM mmol/L 134* 135* 140   POTASSIUM mmol/L 4.5 4.4 4.8   MAGNESIUM mg/dL 2.0 1.9  --    CHLORIDE mmol/L 101 105 105   CO2 mmol/L 22.6 19.6* 20.5*   BUN mg/dL 27* 26* 32*   CREATININE mg/dL 1.76* 1.56* 1.75*   CALCIUM mg/dL 8.9 8.5* 9.5   GLUCOSE mg/dL 93 119* 116*   ALBUMIN g/dL  --   --  4.1   BILIRUBIN mg/dL  --   --  0.4   ALK PHOS U/L  --   --  216*   AST (SGOT) U/L  --   --  23   ALT (SGPT) U/L  --   --  9   Estimated Creatinine Clearance: 31.7 mL/min (A) (by C-G formula based on SCr of 1.76 mg/dL (H)).  No results found for: \"AMMONIA\"      Blood Culture   Date " "Value Ref Range Status   02/13/2025 No growth at 2 days  Preliminary   02/13/2025 No growth at 2 days  Preliminary     No results found for: \"URINECX\"  No results found for: \"WOUNDCX\"  No results found for: \"STOOLCX\"  ----------------------------------------------------------------------------------------------------------------------  Imaging Results (Last 24 Hours)       ** No results found for the last 24 hours. **          ----------------------------------------------------------------------------------------------------------------------   I have reviewed the above laboratory values for 02/15/25    Assessment/Plan     Active Hospital Problems    Diagnosis  POA    **Hypertensive crisis [I16.9]  Yes         ASSESSMENT/PLAN:    Acute on chronic hypoxic and hypercapnic respiratory failure, resolved  Hypertensive crisis, POA, now improved  Concern for flash pulmonary edema due to above  Decompensated HFpEF, improved  Complicated by CKD stage IV  Paroxysmal atrial fibrillation  BP trend overall improved and remaining overall 130-140/70-80 over the past 12 to 16 hours  Nitro drip discontinued and hydralazine resumed at 100 mg 3 times daily.  Amlodipine and chlorthalidone continued in addition to metoprolol tartrate  She was given Bumex in the ED-torsemide now resumed  Patient is maintaining appropriate O2 saturations on baseline 2 L nasal cannula  P.o. anticoagulant is held and continuing heparin drip for now.  See below.  Renal function remains stable overall    NSTEMI, T1 versus T2  ASCVD s/p prior PCI/ROSAURA to LAD  Denying any further chest pain  Continuing heparin infusion, ASA, statin  Planning for stress testing on Monday.  Will follow-up    Chronic:  COPD  PVD  Migraine headaches  Anxiety  Chronic microcytic anemia  Home medications continued  -----------  -DVT prophylaxis: Currently on heparin drip  -Disposition plans/anticipated needs: pending further clinical course, anticipate discharge home once work up " complete        The patient is high risk due to the following diagnoses/reasons:  NSTEJARED Lopez PA-C  02/15/25  09:49 EST

## 2025-02-15 NOTE — PROGRESS NOTES
[facility] HOSPITALIST PROGRESS NOTE     Patient Identification:  Name:  Lesley Michel  Age:  69 y.o.  Sex:  female  :  1955  MRN:  [id number]  Visit Number:  [id number]  ROOM: [address]     Primary Care Provider:  Woodrow Raymond APRN    Length of stay in inpatient status:  1    Subjective     Chief Compliant:    Chief Complaint   Patient presents with    Shortness of Breath    Chest Pain       History of Presenting Illness:    Patient reported feeling well this morning and denied shortness of breath or chest pain. BP is improving.     Objective     Current Hospital Meds:amLODIPine, 10 mg, Oral, Q24H  aspirin, 81 mg, Oral, Daily  atorvastatin, 80 mg, Oral, Nightly  chlorthalidone, 25 mg, Oral, Daily  folic acid, 1 mg, Oral, Daily  gabapentin, 300 mg, Oral, Q8H  hydrALAZINE, 100 mg, Oral, Q8H  metoprolol tartrate, 25 mg, Oral, Q12H  mupirocin, 1 Application, Each Nare, BID  nicotine, 1 patch, Transdermal, Q24H  pantoprazole, 40 mg, Oral, Daily  senna-docusate sodium, 2 tablet, Oral, BID  sertraline, 25 mg, Oral, Daily  sodium chloride, 10 mL, Intravenous, Q12H  sodium chloride, 10 mL, Intravenous, Q12H  torsemide, 40 mg, Oral, Daily    heparin, 8 Units/kg/hr (Dosing Weight), Last Rate: 8 Units/kg/hr (25 1855)  Pharmacy to Dose Heparin,         Current Antimicrobial Therapy:  Anti-Infectives (From admission, onward)      Ordered     Dose/Rate Route Frequency Start Stop    25 0656  vancomycin IVPB 1750 mg in 0.9% Sodium Chloride (premix) 500 mL        Ordering Provider: Andrea Noble DO    20 mg/kg × 90.7 kg  285.7 mL/hr over 105 Minutes Intravenous Once 25 0712 25 1008    25 0655  cefepime 2000 mg IVPB in 100 mL NS (VTB)        Ordering Provider: Andrea Noble DO    2,000 mg  over 30 Minutes Intravenous Once 25 0711 25 0820    25 0655  Pharmacy to dose vancomycin  Status:  Discontinued        Ordering Provider: Andrea Noble DO      Not Applicable Continuous PRN 02/13/25 0647 02/13/25 0657          Current Diuretic Therapy:  Diuretics (From admission, onward)      Ordered     Dose/Rate Route Frequency Start Stop    02/14/25 1056  chlorthalidone (HYGROTEN) tablet 25 mg        Ordering Provider: Maria L August DO    25 mg Oral Daily 02/14/25 1200      02/14/25 1057  torsemide (DEMADEX) tablet 40 mg        Ordering Provider: Maria L August DO    40 mg Oral Daily 02/14/25 1200      02/13/25 0701  bumetanide (BUMEX) injection 2 mg        Ordering Provider: Andrea Noble, DO    2 mg Intravenous Once 02/13/25 0717 02/13/25 0717          ----------------------------------------------------------------------------------------------------------------------  Vital Signs:  Temp:  [97.9 °F (36.6 °C)-98.8 °F (37.1 °C)] 98.2 °F (36.8 °C)  Heart Rate:  [60-96] 90  Resp:  [15-29] 18  BP: (114-179)/() 133/71  SpO2:  [90 %-100 %] 97 %  on  Flow (L/min) (Oxygen Therapy):  [2-4] 2;   Device (Oxygen Therapy): nasal cannula  Body mass index is 35.2 kg/m².    Wt Readings from Last 3 Encounters:   02/13/25 84.5 kg (186 lb 4.8 oz)   01/21/25 93.2 kg (205 lb 8 oz)   01/16/25 81.6 kg (180 lb)     Intake & Output (last 3 days)         02/12 0701 02/13 0700 02/13 0701 02/14 0700 02/14 0701  02/15 0700    P.O.  100 230    I.V. (mL/kg)  574.8 (6.3) 469.4 (5.2)    IV Piggyback  100     Total Intake(mL/kg)  774.8 (8.5) 699.4 (7.7)    Urine (mL/kg/hr)  1150 (0.5) 700 (0.5)    Emesis/NG output  0     Stool  0 0    Total Output  1150 700    Net  -375.3 -0.7           Urine Unmeasured Occurrence  1 x 3 x    Stool Unmeasured Occurrence  0 x 2 x    Emesis Unmeasured Occurrence  0 x           Diet: Regular/House; Texture: Soft to Chew (NDD 3); Soft to Chew: Whole Meat; Fluid Consistency: Thin (IDDSI 0)  ----------------------------------------------------------------------------------------------------------------------  Physical exam:   Constitutional:  Well-developed and  "well-nourished.  No acute distress.      HENT:  Head:  Normocephalic and atraumatic.    Cardiovascular:  Normal rate, regular rhythm   Pulmonary/Chest:  No respiratory distress, no wheezes, no crackles, with diminished air movement noted   Musculoskeletal:  No deformity.   Neurological: Awake, alert, no focal deficit on gross examination. No slurred speech or facial droop.   Skin:  Skin is warm and dry.   Peripheral vascular:  No cyanosis, no edema.  Psychiatric: Appropriate mood, very animated    Edited by: Maria L August DO at 2/14/2025 1039  ----------------------------------------------------------------------------------------------------------------------  Results from last 7 days   Lab Units 02/14/25  0543 02/13/25  1244 02/13/25  0931 02/13/25  0828 02/13/25  0630   LACTATE mmol/L  --  1.9 2.1*  --  3.8*   WBC 10*3/mm3 8.49  --   --   --  13.86*   HEMOGLOBIN g/dL 7.6*  --   --   --  11.2*   HEMATOCRIT % 28.7*  --   --   --  39.3   MCV fL 82.5  --   --   --  78.9*   MCHC g/dL 26.5*  --   --   --  28.5*   PLATELETS 10*3/mm3 404  --   --   --  612*   INR   --   --   --  1.55*  --      Results from last 7 days   Lab Units 02/14/25  0409   PH, ARTERIAL pH units 7.433   PO2 ART mm Hg 70.6*   PCO2, ARTERIAL mm Hg 34.7*   HCO3 ART mmol/L 23.2     Results from last 7 days   Lab Units 02/14/25  1206 02/13/25  0630   SODIUM mmol/L 135* 140   POTASSIUM mmol/L 4.4 4.8   MAGNESIUM mg/dL 1.9  --    CHLORIDE mmol/L 105 105   CO2 mmol/L 19.6* 20.5*   BUN mg/dL 26* 32*   CREATININE mg/dL 1.56* 1.75*   CALCIUM mg/dL 8.5* 9.5   GLUCOSE mg/dL 119* 116*   ALBUMIN g/dL  --  4.1   BILIRUBIN mg/dL  --  0.4   ALK PHOS U/L  --  216*   AST (SGOT) U/L  --  23   ALT (SGPT) U/L  --  9   Estimated Creatinine Clearance: 34.5 mL/min (A) (by C-G formula based on SCr of 1.56 mg/dL (H)).  No results found for: \"AMMONIA\"  Results from last 7 days   Lab Units 02/13/25  1715 02/13/25  0828 02/13/25  0630   HSTROP T ng/L 157* 169* 183*     Results " "from last 7 days   Lab Units 02/13/25  0630   PROBNP pg/mL 15,163.0*         No results found for: \"HGBA1C\", \"POCGLU\"  Lab Results   Component Value Date    TSH 6.770 (H) 10/08/2023    FREET4 1.01 10/08/2023     No results found for: \"PREGTESTUR\", \"PREGSERUM\", \"HCG\", \"HCGQUANT\"  Pain Management Panel          Latest Ref Rng & Units 10/9/2023   Pain Management Panel   Creatinine, Urine mg/dL 116.0       Details                 Brief Urine Lab Results  (Last result in the past 365 days)        Color   Clarity   Blood   Leuk Est   Nitrite   Protein   CREAT   Urine HCG        04/16/24 2028 Yellow   Clear   Negative   Negative   Negative   Negative                 Blood Culture   Date Value Ref Range Status   02/13/2025 No growth at 24 hours  Preliminary   02/13/2025 No growth at 24 hours  Preliminary     No results found for: \"URINECX\"  No results found for: \"WOUNDCX\"  No results found for: \"STOOLCX\"  No results found for: \"RESPCX\"  No results found for: \"AFBCX\"  Results from last 7 days   Lab Units 02/13/25  1244 02/13/25  0931 02/13/25  0630   LACTATE mmol/L 1.9 2.1* 3.8*       I have personally looked at the labs and they are summarized above.  ----------------------------------------------------------------------------------------------------------------------  Detailed radiology reports for the last 24 hours:  Imaging Results (Last 24 Hours)       ** No results found for the last 24 hours. **          Assessment & Plan      #Acute hypoxic and hypercapnic respiratory failure  #Concern for flash pulmonary edema due to hypertensive crisis  #Decompensated HFpEF  #CKD stage IV  #Atrial fibrillation  - Hydralazine increased to 100mg po tid. Continue amlodipine 10mg daily, chlorthalidone 25mg daily, and metoprolol tartrate 25mg bid  - Weaned off nitroglycerin drip  - continue heparin drip if patient is agreeable, Cardiology recommends stress test Monday  - monitor on telemetry   - home torsemide resumed    #NSTEMI, type I " vs type II  - plan for stress test, continue heparin drip per Cardiology if patient is agreeable  - continue telemetry     #COPD currently without acute exacerbation  #PVD  #Migraine headaches  #Anxiety  - continue home medications as tolerated  Edited by: Maria L August DO at 2/14/2025 0512    Dispo:  likely home at discharge pending clinical improvement     Maria L August DO  [facility]  02/14/25  21:49 EST

## 2025-02-15 NOTE — PLAN OF CARE
Problem: Noninvasive Ventilation Acute  Goal: Effective Unassisted Ventilation and Oxygenation  Outcome: Progressing   Goal Outcome Evaluation:      Patient not compliant with bipap use.

## 2025-02-15 NOTE — PROGRESS NOTES
HEPARIN INFUSION  Lesley Michel is a  69 y.o. female receiving heparin infusion.     Therapy for (VTE/Cardiac):   Cardiac  Patient Dosing Weight: 90.7 kg  Initial Bolus (Y/N):   N was on Eliquis at home PTA  Any Bolus (Y/N):   Y        Signs or Symptoms of Bleeding: n    Cardiac or Other (Not VTE)   Initial Bolus: 60 units/kg (Max 4,000 units)  Initial rate: 12 units/kg/hr (Max 1,000 units/hr)   Anti Xa Rebolus Infusion Hold time Change infusion Dose (Units/kg/hr) Next Anti Xa or aPTT Level Due   < 0.11 50 Units/kg  (4000 Units Max) None Increase by  3 Units/kg/hr 6 hours   0.11- 0.19 25 Units/kg  (2000 Units Max) None Increase by  2 Units/kg/hr 6 hours   0.2 - 0.29 0 None Increase by  1 Units/kg/hr 6 hours   0.3 - 0.5 0 None No Change 6 hours (after 2 consecutive levels in range check q24h @0700)   0.51 - 0.6 0 None Decrease by  1 Units/kg/hr 6 hours   0.61 - 0.8 0 30 Minutes Decrease by  2 Units/kg/hr 6 hours   0.81 - 1 0 60 Minutes Decrease by  3 Units/kg/hr 6 hours   >1 0 Hold  After Anti Xa less than 0.5 decrease previous rate by  4 Units/kg/hr  Every 2 hours until Anti Xa  less than 0.5 then when infusion restarts in 6 hours         Recommend anti-Xa every 6 hours.          Date   Time   Anti-Xa Current Rate (Unit/kg/hr) Bolus   (Units) Rate Change   (Unit/kg/hr) New Rate (Unit/kg/hr) Next   Anti-Xa Comments  Pump Check Daily   2/13 0828 >1.1    Hold 1430 On eliquis at home. Hold until Xa <1.0. Discussed with María SILVER.   2/13 1519 >1.1    Hold 2130 Discussed with María SILVER   2/13 2140 >1.1    Hold 0400 Continue to  hold   2/14 0400 >1.1    Hold 1000 Continue to hold   2/14 0953 0.92  No initial  8 1800 D/w  María SILVER, has lost IV and refusing IV at this time, nurse to notify cardio   2/14 1842  0   8 0100 Drip has been held for an extended time. Cardiology wants it restarted now per nursing. Restarting at previous rate.   2/15 0240 0.43 8 - - 8 0900 Therapeutic x 1. No s/s of bleeding per NADEEM Robertson.     2/15 0841 0.3 8 - - 8 0700 on 2/16 Therapeutic x 2. Discussed with nurse Pippa. No s/sx bleeding.                                                                                                                                                     Pharmacy will continue to follow anti-Xa results and monitor for signs and symptoms of bleeding or thrombosis.

## 2025-02-15 NOTE — PLAN OF CARE
Goal Outcome Evaluation:              Outcome Evaluation: Patient alert and oriented. Patient was able to ambulate to BSC multiple times.2L NC. Patient has had some hypertension at times but came back down after meds. Heparin infusing. Plan of care ongoing.

## 2025-02-15 NOTE — PROGRESS NOTES
LOS: 2 days     Name: Lesley Michel  Age/Sex: 69 y.o. female  :  1955        PCP: Woodrow Raymond APRN  REF: Gabby Schmidt DO    Principal Problem:    Hypertensive crisis      Reason for follow-up: NSTEMI, hypertensive crisis, and acute heart failure exacerbation    Subjective       Subjective   Lesley Michel is a 69-year-old female with a past medical history significant for CAD (with chronically occluded well collateralized dominant RCA, status post drug-eluting stent to the LAD with high-grade stenosis of the ostium of the small D1. She also has 60% mid RCA and posterolateral branch lesion), hyperlipidemia, hypertension, PAD, paroxysmal atrial fibrillation chronically anticoagulated with Eliquis, CHF, COPD on nasal cannula, history of hypoxic respiratory failure, CKD stage II, and tobacco abuse. She presented to [facility] ED on 2025 with complaints of shortness of breath. She was recently admitted for cholecystectomy and had not been taking diuretics since discharge. She was admitted for acute hypoxic respiratory failure, acute pulmonary edema, and NSTEMI. High-sensitivity troponin was 182 at the time of admission, proBNP 15,163, creatinine 1.75. Cardiology was consulted for further evaluation and management for NSTEMI, hypertensive crisis, and acute heart failure exacerbation.     Interval History: Patient is resting in bed. She denies any chest pains or shortness of breath. States she has been ambulating without difficulty. Patient was agreeable to IV yesterday after discussion. IV heparin gtt was resumed. PO torsemide 40 mg daily was initiated. Creatinine today is 1.76 from 1.56 yesterday. -600 cc net output recorded. Plan is for stress test, 25.    ROS    Vital Signs  Temp:  [97.9 °F (36.6 °C)-98.5 °F (36.9 °C)] 98.3 °F (36.8 °C)  Heart Rate:  [60-96] 66  Resp:  [10-24] 22  BP: (119-179)/() 140/77  Vital Signs (last 72 hrs)          0700    "0659 02/13 0700  02/14 0659 02/14 0700  02/15 0659 02/15 0700  02/15 1017   Most Recent      Temp (°F)   98    98 -  98.8    97.9 -  98.5      98.3     98.3 (36.8) 02/15 0809    Heart Rate 130 -  141    60 -  112    60 -  96    66 -  77     66 02/15 0900    Resp 26 -  32    15 -  32    10 -  24      22     22 02/15 0700    /104 -  220/172    114/65 -  179/102    119/58 -  179/103    140/77 -  149/82     140/77 02/15 0900    SpO2 (%) 64 -  95    89 -  100    90 -  100    93 -  99     99 02/15 0900    Flow (L/min) (Oxygen Therapy)   2 -  60    2 -  4       2 02/15 0600    Oxygen Concentration (%)   60    40 -  60         40 02/13 2351          Documented weights    02/13/25 0623 02/13/25 0920 02/15/25 0400   Weight: 90.7 kg (200 lb) 84.5 kg (186 lb 4.8 oz) 90.2 kg (198 lb 13.7 oz)      Body mass index is 37.57 kg/m².    Intake/Output Summary (Last 24 hours) at 2/15/2025 1017  Last data filed at 2/15/2025 0809  Gross per 24 hour   Intake 827.35 ml   Output 1000 ml   Net -172.65 ml     Objective:  Vital signs: (most recent): Blood pressure 139/75, pulse 64, temperature 97.7 °F (36.5 °C), temperature source Oral, resp. rate 20, height 154.9 cm (61\"), weight 90.2 kg (198 lb 13.7 oz), SpO2 95%.    Lungs:  Normal effort.    Heart: Normal rate.  Regular rhythm.    Abdomen: Abdomen is soft.  Bowel sounds are normal.     Neurological: Patient is alert.    Skin:  Warm and dry.                Objective       Physical Exam:  .  Physical Exam  Constitutional:       Appearance: Normal appearance. She is well-developed.   HENT:      Head: Normocephalic and atraumatic.   Cardiovascular:      Rate and Rhythm: Normal rate and regular rhythm.      Heart sounds: Normal heart sounds.   Pulmonary:      Effort: Pulmonary effort is normal.      Comments: Bilaterally decreased air movement  Abdominal:      General: Bowel sounds are normal.      Palpations: Abdomen is soft.   Skin:     General: Skin is warm and dry.   Neurological:      " "General: No focal deficit present.      Mental Status: She is alert and oriented to person, place, and time.   Psychiatric:         Mood and Affect: Mood normal.         Behavior: Behavior normal.               Procedures    Results review       Results Review:   Results from last 7 days   Lab Units 02/15/25  0841 02/14/25  0543 02/13/25  0630   WBC 10*3/mm3 7.33 8.49 13.86*   HEMOGLOBIN g/dL 8.6* 7.6* 11.2*   PLATELETS 10*3/mm3 388 404 612*     Results from last 7 days   Lab Units 02/15/25  0133 02/14/25  1206 02/13/25  0630   SODIUM mmol/L 134* 135* 140   POTASSIUM mmol/L 4.5 4.4 4.8   CHLORIDE mmol/L 101 105 105   CO2 mmol/L 22.6 19.6* 20.5*   BUN mg/dL 27* 26* 32*   CREATININE mg/dL 1.76* 1.56* 1.75*   CALCIUM mg/dL 8.9 8.5* 9.5   GLUCOSE mg/dL 93 119* 116*   ALT (SGPT) U/L  --   --  9   AST (SGOT) U/L  --   --  23     Results from last 7 days   Lab Units 02/13/25  1715 02/13/25  0828 02/13/25  0630   HSTROP T ng/L 157* 169* 183*     Lab Results   Component Value Date    INR 1.55 (H) 02/13/2025    INR 1.07 10/24/2023     Lab Results   Component Value Date    MG 2.0 02/15/2025    MG 1.9 02/14/2025    MG 2.1 01/21/2025     Lab Results   Component Value Date    TSH 6.770 (H) 10/08/2023    TRIG 75 10/08/2023    HDL 34 (L) 10/08/2023    LDL 91 10/08/2023      Imaging Results (Last 48 Hours)       ** No results found for the last 48 hours. **          No results found for: \"BNP\"    Lab Results   Component Value Date    ABSOLUTELUNG 43 (A) 10/13/2023             Echo   Results for orders placed during the hospital encounter of 01/19/25    Adult Transthoracic Echo Complete w/ Color, Spectral and Contrast if necessary per protocol    Interpretation Summary    Left ventricular systolic function is normal. Left ventricular ejection fraction appears to be 56 - 60%.    Left ventricular wall thickness is consistent with mild concentric hypertrophy.    There is calcification of the aortic valve.    Mild mitral valve stenosis " is present.    Estimated right ventricular systolic pressure from tricuspid regurgitation is normal (<35 mmHg).           I reviewed the patient's new clinical results.    Telemetry: sinus rhythm in the 70s       Medication Review:   amLODIPine, 10 mg, Oral, Q24H  aspirin, 81 mg, Oral, Daily  atorvastatin, 80 mg, Oral, Nightly  chlorthalidone, 25 mg, Oral, Daily  folic acid, 1 mg, Oral, Daily  gabapentin, 300 mg, Oral, Q8H  hydrALAZINE, 100 mg, Oral, Q8H  metoprolol tartrate, 25 mg, Oral, Q12H  mupirocin, 1 Application, Each Nare, BID  nicotine, 1 patch, Transdermal, Q24H  pantoprazole, 40 mg, Oral, Daily  senna-docusate sodium, 2 tablet, Oral, BID  sertraline, 25 mg, Oral, Daily  sodium chloride, 10 mL, Intravenous, Q12H  sodium chloride, 10 mL, Intravenous, Q12H  torsemide, 40 mg, Oral, Daily        heparin, 8 Units/kg/hr (Dosing Weight), Last Rate: 8 Units/kg/hr (02/14/25 8925)  Pharmacy to Dose Heparin,         Assessment      Assessment:    Acute non-ST elevation myocardial infarction (type I versus type II due to uncontrolled hypertension and respiratory failure.  Acute on chronic HFpEF.  ASCVD, status post previous PCI/ROSAURA to LAD with high-grade stenosis in the ostium of the small diagonal branch and 60% stenosis in the mid RCA at [facility] (details unavailable).  Peripheral artery disease, status post previous percutaneous intervention at [facility] in [place].  Paroxysmal atrial fibrillation with a [id number] score of 5, patient is chronically anticoagulated with Eliquis.  Acute on chronic respiratory failure with hypoxia and hypercapnia due to combination of COPD with exacerbation and acute heart failure.  Acute kidney injury on chronic kidney disease (stage IIIb).      Plan     Recommendations:  Continue heparin gtt for total 48 hours, aspirin 81 Mg daily, Lipitor 80 Mg daily.    Continue Lopressor 25 Mg twice daily, hydralazine 100 Mg 3 times daily, amlodipine 10 Mg daily.   Patient is also on chlorthalidone 25 Mg daily.  Continue torsemide 40 Mg daily.  Plan for NM stress test Monday.      I discussed the patient's findings and my recommendations with the patient.    Electronically signed by DEEPA Pacheco, 02/15/25, 2:02 PM EST.  Electronically signed by Arely Marcos MD, 02/15/25, 2:36 PM EST.           Please note that portions of this note were completed with a voice recognition program.

## 2025-02-16 LAB
ABSOLUTE LUNG FLUID CONTENT: 38 % (ref 20–35)
ANION GAP SERPL CALCULATED.3IONS-SCNC: 12.4 MMOL/L (ref 5–15)
BUN SERPL-MCNC: 34 MG/DL (ref 8–23)
BUN/CREAT SERPL: 16 (ref 7–25)
CALCIUM SPEC-SCNC: 9 MG/DL (ref 8.6–10.5)
CHLORIDE SERPL-SCNC: 100 MMOL/L (ref 98–107)
CO2 SERPL-SCNC: 23.6 MMOL/L (ref 22–29)
CREAT SERPL-MCNC: 2.13 MG/DL (ref 0.57–1)
EGFRCR SERPLBLD CKD-EPI 2021: 25 ML/MIN/1.73
GLUCOSE SERPL-MCNC: 102 MG/DL (ref 65–99)
MAGNESIUM SERPL-MCNC: 1.9 MG/DL (ref 1.6–2.4)
POTASSIUM SERPL-SCNC: 4.5 MMOL/L (ref 3.5–5.2)
SODIUM SERPL-SCNC: 136 MMOL/L (ref 136–145)
UFH PPP CHRO-ACNC: 0.19 IU/ML (ref 0.3–0.7)
UFH PPP CHRO-ACNC: 0.21 IU/ML (ref 0.3–0.7)

## 2025-02-16 PROCEDURE — 25010000002 HEPARIN (PORCINE) PER 1000 UNITS: Performed by: STUDENT IN AN ORGANIZED HEALTH CARE EDUCATION/TRAINING PROGRAM

## 2025-02-16 PROCEDURE — 80048 BASIC METABOLIC PNL TOTAL CA: CPT | Performed by: STUDENT IN AN ORGANIZED HEALTH CARE EDUCATION/TRAINING PROGRAM

## 2025-02-16 PROCEDURE — 99232 SBSQ HOSP IP/OBS MODERATE 35: CPT | Performed by: INTERNAL MEDICINE

## 2025-02-16 PROCEDURE — 85520 HEPARIN ASSAY: CPT

## 2025-02-16 PROCEDURE — 94799 UNLISTED PULMONARY SVC/PX: CPT

## 2025-02-16 PROCEDURE — 99232 SBSQ HOSP IP/OBS MODERATE 35: CPT

## 2025-02-16 PROCEDURE — 94664 DEMO&/EVAL PT USE INHALER: CPT

## 2025-02-16 PROCEDURE — 94761 N-INVAS EAR/PLS OXIMETRY MLT: CPT

## 2025-02-16 PROCEDURE — 94726 PLETHYSMOGRAPHY LUNG VOLUMES: CPT

## 2025-02-16 PROCEDURE — 83735 ASSAY OF MAGNESIUM: CPT | Performed by: STUDENT IN AN ORGANIZED HEALTH CARE EDUCATION/TRAINING PROGRAM

## 2025-02-16 PROCEDURE — 94640 AIRWAY INHALATION TREATMENT: CPT

## 2025-02-16 PROCEDURE — 63710000001 ONDANSETRON ODT 4 MG TABLET DISPERSIBLE: Performed by: STUDENT IN AN ORGANIZED HEALTH CARE EDUCATION/TRAINING PROGRAM

## 2025-02-16 PROCEDURE — 94660 CPAP INITIATION&MGMT: CPT

## 2025-02-16 PROCEDURE — 25010000002 HEPARIN (PORCINE) 25000-0.45 UT/250ML-% SOLUTION: Performed by: STUDENT IN AN ORGANIZED HEALTH CARE EDUCATION/TRAINING PROGRAM

## 2025-02-16 RX ORDER — HEPARIN SODIUM 5000 [USP'U]/ML
2000 INJECTION, SOLUTION INTRAVENOUS; SUBCUTANEOUS ONCE
Status: COMPLETED | OUTPATIENT
Start: 2025-02-16 | End: 2025-02-16

## 2025-02-16 RX ADMIN — FOLIC ACID 1 MG: 1 TABLET ORAL at 08:59

## 2025-02-16 RX ADMIN — MUPIROCIN 1 APPLICATION: 20 OINTMENT TOPICAL at 20:57

## 2025-02-16 RX ADMIN — ONDANSETRON 4 MG: 4 TABLET, ORALLY DISINTEGRATING ORAL at 19:38

## 2025-02-16 RX ADMIN — HYDRALAZINE HYDROCHLORIDE 100 MG: 50 TABLET ORAL at 05:24

## 2025-02-16 RX ADMIN — Medication 10 ML: at 09:00

## 2025-02-16 RX ADMIN — HYDRALAZINE HYDROCHLORIDE 100 MG: 50 TABLET ORAL at 20:56

## 2025-02-16 RX ADMIN — CHLORTHALIDONE 25 MG: 50 TABLET ORAL at 09:04

## 2025-02-16 RX ADMIN — AMLODIPINE BESYLATE 10 MG: 10 TABLET ORAL at 20:57

## 2025-02-16 RX ADMIN — SERTRALINE HYDROCHLORIDE 25 MG: 25 TABLET ORAL at 08:59

## 2025-02-16 RX ADMIN — METOPROLOL TARTRATE 25 MG: 25 TABLET, FILM COATED ORAL at 20:57

## 2025-02-16 RX ADMIN — GABAPENTIN 300 MG: 300 CAPSULE ORAL at 20:56

## 2025-02-16 RX ADMIN — ACETAMINOPHEN 650 MG: 325 TABLET ORAL at 19:38

## 2025-02-16 RX ADMIN — Medication 10 ML: at 20:57

## 2025-02-16 RX ADMIN — SENNOSIDES AND DOCUSATE SODIUM 2 TABLET: 50; 8.6 TABLET ORAL at 08:59

## 2025-02-16 RX ADMIN — GABAPENTIN 300 MG: 300 CAPSULE ORAL at 14:26

## 2025-02-16 RX ADMIN — ASPIRIN 81 MG: 81 TABLET, COATED ORAL at 08:59

## 2025-02-16 RX ADMIN — NICOTINE 1 PATCH: 21 PATCH TRANSDERMAL at 09:00

## 2025-02-16 RX ADMIN — MUPIROCIN 1 APPLICATION: 20 OINTMENT TOPICAL at 09:02

## 2025-02-16 RX ADMIN — HYDROCODONE BITARTRATE AND ACETAMINOPHEN 1 TABLET: 10; 325 TABLET ORAL at 03:07

## 2025-02-16 RX ADMIN — HYDRALAZINE HYDROCHLORIDE 100 MG: 50 TABLET ORAL at 14:25

## 2025-02-16 RX ADMIN — PANTOPRAZOLE SODIUM 40 MG: 40 TABLET, DELAYED RELEASE ORAL at 08:59

## 2025-02-16 RX ADMIN — HYDROXYZINE HYDROCHLORIDE 10 MG: 10 TABLET ORAL at 19:39

## 2025-02-16 RX ADMIN — IPRATROPIUM BROMIDE AND ALBUTEROL SULFATE 3 ML: .5; 2.5 SOLUTION RESPIRATORY (INHALATION) at 07:00

## 2025-02-16 RX ADMIN — GABAPENTIN 300 MG: 300 CAPSULE ORAL at 05:24

## 2025-02-16 RX ADMIN — HEPARIN SODIUM 2000 UNITS: 5000 INJECTION, SOLUTION INTRAVENOUS; SUBCUTANEOUS at 08:30

## 2025-02-16 RX ADMIN — HEPARIN SODIUM 8 UNITS/KG/HR: 10000 INJECTION, SOLUTION INTRAVENOUS at 03:21

## 2025-02-16 RX ADMIN — METOPROLOL TARTRATE 25 MG: 25 TABLET, FILM COATED ORAL at 08:59

## 2025-02-16 RX ADMIN — ATORVASTATIN CALCIUM 80 MG: 40 TABLET, FILM COATED ORAL at 20:56

## 2025-02-16 NOTE — PROGRESS NOTES
Patient Identification:  Name:  Lesley Michel  Age:  69 y.o.  Sex:  female  :  1955  MRN:  [id number]  Visit Number:  [id number]  Primary Care Provider:  Woodrow Raymond APRN    Length of stay:  3    Subjective/Interval History/Consultants/Procedures     Chief Complaint:   Chief Complaint   Patient presents with    Shortness of Breath    Chest Pain       Subjective/Interval History:    69 y.o. female who was admitted on 2025 with acute hypoxic and hypercapnic respiratory failure, hypertensive urgency, decompensated HFpEF     PMH is significant for CAD, HTN, atrial fibrillation, HFpEF, CKD stage IV, COPD, PVD, migraine headaches   For complete admission information, please see history and physical.     Consultations:  Cardiology  Palliative care  CM/SW    Procedures/Scans:  CXR     Today, the patient was seen and examined sitting up in bed, comfortable appearing on 2L. BP continues to improve. She remains concerned about receiving mediations for stress testing in the morning.  She otherwise feels well. No further complaint of chest pain or palpitations.     [facility]    ----------------------------------------------------------------------------------------------------------------------  [facility] Meds:  amLODIPine, 10 mg, Oral, Q24H  aspirin, 81 mg, Oral, Daily  atorvastatin, 80 mg, Oral, Nightly  chlorthalidone, 25 mg, Oral, Daily  folic acid, 1 mg, Oral, Daily  gabapentin, 300 mg, Oral, Q8H  hydrALAZINE, 100 mg, Oral, Q8H  metoprolol tartrate, 25 mg, Oral, Q12H  mupirocin, 1 Application, Each Nare, BID  nicotine, 1 patch, Transdermal, Q24H  pantoprazole, 40 mg, Oral, Daily  senna-docusate sodium, 2 tablet, Oral, BID  sertraline, 25 mg, Oral, Daily  sodium chloride, 10 mL, Intravenous, Q12H  sodium chloride, 10 mL, Intravenous, Q12H  [Held by provider] torsemide, 40 mg, Oral, Daily      heparin, 10 Units/kg/hr (Dosing Weight), Last Rate: 10  Units/kg/hr (02/16/25 0830)  Pharmacy to Dose Heparin,       ----------------------------------------------------------------------------------------------------------------------      Objective     Vital Signs:  Temp:  [97.7 °F (36.5 °C)-98.5 °F (36.9 °C)] 97.9 °F (36.6 °C)  Heart Rate:  [60-88] 88  Resp:  [8-18] 18  BP: (112-153)/() 153/82      02/15/25  0400 02/15/25  2111 02/16/25  0500   Weight: 90.2 kg (198 lb 13.7 oz) 83.9 kg (184 lb 15.5 oz) 83.9 kg (184 lb 15.5 oz)     Body mass index is 34.95 kg/m².    Intake/Output Summary (Last 24 hours) at 2/16/2025 1325  Last data filed at 2/16/2025 0800  Gross per 24 hour   Intake 656.33 ml   Output --   Net 656.33 ml     I/O this shift:  In: 360 [P.O.:360]  Out: -   Diet: Regular/House; Texture: Soft to Chew (NDD 3); Soft to Chew: Whole Meat; Fluid Consistency: Thin (IDDSI 0)  NPO Diet NPO Type: Strict NPO  ----------------------------------------------------------------------------------------------------------------------    Physical Exam  Vitals and nursing note reviewed.   Constitutional:       General: She is not in acute distress.  HENT:      Head: Normocephalic and atraumatic.   Eyes:      Extraocular Movements: Extraocular movements intact.   Cardiovascular:      Rate and Rhythm: Normal rate and regular rhythm.   Pulmonary:      Effort: Pulmonary effort is normal.      Breath sounds: Normal breath sounds.   Abdominal:      Palpations: Abdomen is soft.   Musculoskeletal:      Right lower leg: No edema.      Left lower leg: No edema.   Skin:     General: Skin is warm and dry.   Neurological:      Mental Status: She is alert. Mental status is at baseline.   Psychiatric:         Behavior: Behavior normal.      Comments: Anxious        ----------------------------------------------------------------------------------------------------------------------  Tele:   "    ----------------------------------------------------------------------------------------------------------------------  Results from last 7 days   Lab Units 02/13/25  1715 02/13/25  0828 02/13/25  0630   HSTROP T ng/L 157* 169* 183*   PROBNP pg/mL  --   --  15,163.0*     Results from last 7 days   Lab Units 02/15/25  0841 02/14/25  0543 02/13/25  1244 02/13/25  0931 02/13/25  0828 02/13/25  0630   LACTATE mmol/L  --   --  1.9 2.1*  --  3.8*   WBC 10*3/mm3 7.33 8.49  --   --   --  13.86*   HEMOGLOBIN g/dL 8.6* 7.6*  --   --   --  11.2*   HEMATOCRIT % 30.2* 28.7*  --   --   --  39.3   MCV fL 78.4* 82.5  --   --   --  78.9*   MCHC g/dL 28.5* 26.5*  --   --   --  28.5*   PLATELETS 10*3/mm3 388 404  --   --   --  612*   INR   --   --   --   --  1.55*  --      Results from last 7 days   Lab Units 02/14/25  0409   PH, ARTERIAL pH units 7.433   PO2 ART mm Hg 70.6*   PCO2, ARTERIAL mm Hg 34.7*   HCO3 ART mmol/L 23.2     Results from last 7 days   Lab Units 02/16/25  0640 02/15/25  0133 02/14/25  1206 02/13/25  0630   SODIUM mmol/L 136 134* 135* 140   POTASSIUM mmol/L 4.5 4.5 4.4 4.8   MAGNESIUM mg/dL 1.9 2.0 1.9  --    CHLORIDE mmol/L 100 101 105 105   CO2 mmol/L 23.6 22.6 19.6* 20.5*   BUN mg/dL 34* 27* 26* 32*   CREATININE mg/dL 2.13* 1.76* 1.56* 1.75*   CALCIUM mg/dL 9.0 8.9 8.5* 9.5   GLUCOSE mg/dL 102* 93 119* 116*   ALBUMIN g/dL  --   --   --  4.1   BILIRUBIN mg/dL  --   --   --  0.4   ALK PHOS U/L  --   --   --  216*   AST (SGOT) U/L  --   --   --  23   ALT (SGPT) U/L  --   --   --  9   Estimated Creatinine Clearance: 25.2 mL/min (A) (by C-G formula based on SCr of 2.13 mg/dL (H)).  No results found for: \"AMMONIA\"      Blood Culture   Date Value Ref Range Status   02/13/2025 No growth at 3 days  Preliminary   02/13/2025 No growth at 3 days  Preliminary     No results found for: \"URINECX\"  No results found for: \"WOUNDCX\"  No results found for: " "\"STOOLCX\"  ----------------------------------------------------------------------------------------------------------------------  Imaging Results (Last 24 Hours)       ** No results found for the last 24 hours. **          ----------------------------------------------------------------------------------------------------------------------   I have reviewed the above laboratory values for 02/16/25    Assessment/Plan     Active Hospital Problems    Diagnosis  POA    **Hypertensive crisis [I16.9]  Yes         ASSESSMENT/PLAN:    Acute on chronic hypoxic and hypercapnic respiratory failure, resolved  Hypertensive crisis, POA, now resolved  Concern for flash pulmonary edema due to above  Decompensated HFpEF, improved  Complicated by CKD stage IV  Paroxysmal atrial fibrillation  BP trend overall improved and remaining largely at goal  Nitro drip discontinued and hydralazine resumed at 100 mg 3 times daily.  Amlodipine and chlorthalidone continued in addition to metoprolol tartrate  She was given Bumex in the ED-torsemide now resumed but held this morning given creatinine with upward trend. ReDs vest is pending to further evaluate volume status  Patient is maintaining appropriate O2 saturations on baseline 2 L nasal cannula  P.o. anticoagulant is held and continuing heparin drip for now.  See below.  Will continue to trend renal function closely.      NSTEMI, T1 versus T2  ASCVD s/p prior PCI/ROSAURA to LAD  Denying any further chest pain  Continuing heparin infusion, ASA, statin  NPO tonight for stress test in the AM if patient agrees.      Chronic:  COPD  PVD  Migraine headaches  Anxiety  Chronic microcytic anemia  Home medications continued    -----------  -DVT prophylaxis: Currently on heparin drip  -Disposition plans/anticipated needs: Pending course and completion of work up. Anticipate discharge home once complete.        The patient is high risk due to the following diagnoses/reasons:  NSTEMI        Wally Lopez, " PHIL  02/16/25  13:25 EST

## 2025-02-16 NOTE — PROGRESS NOTES
HEPARIN INFUSION  Lesley Michel is a  69 y.o. female receiving heparin infusion.     Therapy for (VTE/Cardiac):   Cardiac  Patient Dosing Weight: 90.7 kg  Initial Bolus (Y/N):   N was on Eliquis at home PTA  Any Bolus (Y/N):   Y        Signs or Symptoms of Bleeding: n    Cardiac or Other (Not VTE)   Initial Bolus: 60 units/kg (Max 4,000 units)  Initial rate: 12 units/kg/hr (Max 1,000 units/hr)   Anti Xa Rebolus Infusion Hold time Change infusion Dose (Units/kg/hr) Next Anti Xa or aPTT Level Due   < 0.11 50 Units/kg  (4000 Units Max) None Increase by  3 Units/kg/hr 6 hours   0.11- 0.19 25 Units/kg  (2000 Units Max) None Increase by  2 Units/kg/hr 6 hours   0.2 - 0.29 0 None Increase by  1 Units/kg/hr 6 hours   0.3 - 0.5 0 None No Change 6 hours (after 2 consecutive levels in range check q24h @0700)   0.51 - 0.6 0 None Decrease by  1 Units/kg/hr 6 hours   0.61 - 0.8 0 30 Minutes Decrease by  2 Units/kg/hr 6 hours   0.81 - 1 0 60 Minutes Decrease by  3 Units/kg/hr 6 hours   >1 0 Hold  After Anti Xa less than 0.5 decrease previous rate by  4 Units/kg/hr  Every 2 hours until Anti Xa  less than 0.5 then when infusion restarts in 6 hours         Recommend anti-Xa every 6 hours.          Date   Time   Anti-Xa Current Rate (Unit/kg/hr) Bolus   (Units) Rate Change   (Unit/kg/hr) New Rate (Unit/kg/hr) Next   Anti-Xa Comments  Pump Check Daily   2/13 0828 >1.1    Hold 1430 On eliquis at home. Hold until Xa <1.0. Discussed with María SILVER.   2/13 1519 >1.1    Hold 2130 Discussed with María SLIVER   2/13 2140 >1.1    Hold 0400 Continue to  hold   2/14 0400 >1.1    Hold 1000 Continue to hold   2/14 0953 0.92  No initial  8 1800 D/w  María SILVER, has lost IV and refusing IV at this time, nurse to notify cardio   2/14 1842  0   8 0100 Drip has been held for an extended time. Cardiology wants it restarted now per nursing. Restarting at previous rate.   2/15 0240 0.43 8 - - 8 0900 Therapeutic x 1. No s/s of bleeding per NADEEM Robertson.     2/15 0841 0.3 8 - - 8 0700 on 2/16 Therapeutic x 2. Discussed with nurse Pippa. No s/sx bleeding.    2/16 0640 0.19 8 2000 +2 10 1400 Discussed with nurse Elias. No s/sx bleeding reported.                                                                                                                                         Pharmacy will continue to follow anti-Xa results and monitor for signs and symptoms of bleeding or thrombosis.

## 2025-02-16 NOTE — PROGRESS NOTES
LOS: 3 days     Name: Lesley Michel  Age/Sex: 69 y.o. female  :  1955        PCP: Woodrow Raymond APRN  REF: Gabby Schmidt DO    Principal Problem:    Hypertensive crisis      Reason for follow-up: NSTEMI, hypertensive crisis, and acute heart failure exacerbation     Subjective       Subjective   Lesley Michel is a 69-year-old female with a past medical history significant for CAD (with chronically occluded well collateralized dominant RCA, status post drug-eluting stent to the LAD with high-grade stenosis of the ostium of the small D1. She also has 60% mid RCA and posterolateral branch lesion), hyperlipidemia, hypertension, PAD, paroxysmal atrial fibrillation chronically anticoagulated with Eliquis, CHF, COPD on nasal cannula, history of hypoxic respiratory failure, CKD stage II, and tobacco abuse. She presented to [facility] ED on 2025 with complaints of shortness of breath. She was recently admitted for cholecystectomy and had not been taking diuretics since discharge. She was admitted for acute hypoxic respiratory failure, acute pulmonary edema, and NSTEMI. High-sensitivity troponin was 182 at the time of admission, proBNP 15,163, creatinine 1.75. Cardiology was consulted for further evaluation and management for NSTEMI, hypertensive crisis, and acute heart failure exacerbation.     Interval History:Patient resting in bed this morning. Denies any chest pains or shortness of breath. Patient has been on torsemide 40 mg PO daily. Creatinine today was 2.13 from 1.76 yesterday. Diuretics held this a.m. No output recorded. She remains on heparin gtt. Stress test planned for tomorrow.     ROS    Vital Signs  Temp:  [97.7 °F (36.5 °C)-98.5 °F (36.9 °C)] 97.9 °F (36.6 °C)  Heart Rate:  [57-73] 65  Resp:  [8-18] 18  BP: (112-139)/() 124/67  Vital Signs (last 72 hrs)          0700  02/14 0659  0700  02/15 0659 02/15 0700   0659  0700   0924   Most Recent   "    Temp (°F) 98 -  98.8    97.9 -  98.5    97.7 -  98.5      97.9     97.9 (36.6) 02/16 0728    Heart Rate 60 -  112    60 -  96    57 -  77    61 -  65     65 02/16 0728    Resp 15 -  32    10 -  24    8 -  22      18     18 02/16 0728    /65 -  179/102    119/58 -  179/103    112/88 -  149/82      124/67     124/67 02/16 0728    SpO2 (%) 89 -  100    90 -  100    90 -  99      94     94 02/16 0728    Flow (L/min) (Oxygen Therapy) 2 -  60    2 -  4    2 -  3      2     2 02/16 0700    Oxygen Concentration (%) 40 -  60           40 02/13 2351          Documented weights    02/13/25 0623 02/13/25 0920 02/15/25 0400 02/15/25 2111   Weight: 90.7 kg (200 lb) 84.5 kg (186 lb 4.8 oz) 90.2 kg (198 lb 13.7 oz) 83.9 kg (184 lb 15.5 oz)    02/16/25 0500   Weight: 83.9 kg (184 lb 15.5 oz)      Body mass index is 34.95 kg/m².    Intake/Output Summary (Last 24 hours) at 2/16/2025 0924  Last data filed at 2/16/2025 0800  Gross per 24 hour   Intake 656.33 ml   Output --   Net 656.33 ml     Objective:  Vital signs: (most recent): Blood pressure 153/82, pulse 88, temperature 97.9 °F (36.6 °C), temperature source Oral, resp. rate 18, height 154.9 cm (61\"), weight 83.9 kg (184 lb 15.5 oz), SpO2 94%.    Lungs:  Normal effort.    Heart: Normal rate.  Regular rhythm.    Abdomen: Abdomen is soft.  Bowel sounds are normal.     Neurological: Patient is alert.    Skin:  Warm and dry.                Objective       Physical Exam:  .  Physical Exam  Constitutional:       Appearance: Normal appearance. She is well-developed.   HENT:      Head: Normocephalic and atraumatic.   Cardiovascular:      Rate and Rhythm: Normal rate and regular rhythm.      Heart sounds: Normal heart sounds.   Pulmonary:      Effort: Pulmonary effort is normal.      Comments: Decreased bilateral air entry  Abdominal:      General: Bowel sounds are normal.      Palpations: Abdomen is soft.   Skin:     General: Skin is warm and dry.   Neurological:      General: No " "focal deficit present.      Mental Status: She is alert and oriented to person, place, and time.   Psychiatric:         Mood and Affect: Mood normal.         Behavior: Behavior normal.               Procedures    Results review       Results Review:   Results from last 7 days   Lab Units 02/15/25  0841 02/14/25  0543 02/13/25  0630   WBC 10*3/mm3 7.33 8.49 13.86*   HEMOGLOBIN g/dL 8.6* 7.6* 11.2*   PLATELETS 10*3/mm3 388 404 612*     Results from last 7 days   Lab Units 02/16/25  0640 02/15/25  0133 02/14/25  1206 02/13/25  0630   SODIUM mmol/L 136 134* 135* 140   POTASSIUM mmol/L 4.5 4.5 4.4 4.8   CHLORIDE mmol/L 100 101 105 105   CO2 mmol/L 23.6 22.6 19.6* 20.5*   BUN mg/dL 34* 27* 26* 32*   CREATININE mg/dL 2.13* 1.76* 1.56* 1.75*   CALCIUM mg/dL 9.0 8.9 8.5* 9.5   GLUCOSE mg/dL 102* 93 119* 116*   ALT (SGPT) U/L  --   --   --  9   AST (SGOT) U/L  --   --   --  23     Results from last 7 days   Lab Units 02/13/25  1715 02/13/25  0828 02/13/25  0630   HSTROP T ng/L 157* 169* 183*     Lab Results   Component Value Date    INR 1.55 (H) 02/13/2025    INR 1.07 10/24/2023     Lab Results   Component Value Date    MG 1.9 02/16/2025    MG 2.0 02/15/2025    MG 1.9 02/14/2025     Lab Results   Component Value Date    TSH 6.770 (H) 10/08/2023    TRIG 75 10/08/2023    HDL 34 (L) 10/08/2023    LDL 91 10/08/2023      Imaging Results (Last 48 Hours)       ** No results found for the last 48 hours. **          No results found for: \"BNP\"    Lab Results   Component Value Date    ABSOLUTELUNG 43 (A) 10/13/2023             Echo   Results for orders placed during the hospital encounter of 01/19/25    Adult Transthoracic Echo Complete w/ Color, Spectral and Contrast if necessary per protocol    Interpretation Summary    Left ventricular systolic function is normal. Left ventricular ejection fraction appears to be 56 - 60%.    Left ventricular wall thickness is consistent with mild concentric hypertrophy.    There is calcification of " the aortic valve.    Mild mitral valve stenosis is present.    Estimated right ventricular systolic pressure from tricuspid regurgitation is normal (<35 mmHg).           I reviewed the patient's new clinical results.    Telemetry: normal sinus rhythm in the 70s       Medication Review:   amLODIPine, 10 mg, Oral, Q24H  aspirin, 81 mg, Oral, Daily  atorvastatin, 80 mg, Oral, Nightly  chlorthalidone, 25 mg, Oral, Daily  folic acid, 1 mg, Oral, Daily  gabapentin, 300 mg, Oral, Q8H  heparin (porcine), 2,000 Units, Intravenous, Once  hydrALAZINE, 100 mg, Oral, Q8H  metoprolol tartrate, 25 mg, Oral, Q12H  mupirocin, 1 Application, Each Nare, BID  nicotine, 1 patch, Transdermal, Q24H  pantoprazole, 40 mg, Oral, Daily  senna-docusate sodium, 2 tablet, Oral, BID  sertraline, 25 mg, Oral, Daily  sodium chloride, 10 mL, Intravenous, Q12H  sodium chloride, 10 mL, Intravenous, Q12H  [Held by provider] torsemide, 40 mg, Oral, Daily        heparin, 10 Units/kg/hr (Dosing Weight), Last Rate: 10 Units/kg/hr (02/16/25 0830)  Pharmacy to Dose Heparin,         Assessment      Assessment:  Acute non-ST elevation myocardial infarction (type I versus type II due to uncontrolled hypertension and respiratory failure.  Acute on chronic HFpEF.  ASCVD, status post previous PCI/ROSAURA to LAD with high-grade stenosis in the ostium of the small diagonal branch and 60% stenosis in the mid RCA at [facility] (details unavailable).  Peripheral artery disease, status post previous percutaneous intervention at [facility] in [place].  Paroxysmal atrial fibrillation with a [id number] score of 5, patient is chronically anticoagulated with Eliquis.  Acute on chronic respiratory failure with hypoxia and hypercapnia due to combination of COPD with exacerbation and acute heart failure.  Acute kidney injury on chronic kidney disease (stage IIIb).        Plan     Recommendations:    Continue heparin gtt for total 48 hours-ends this  afternoon, aspirin 81 Mg daily, Lipitor 80 Mg daily.    Continue Lopressor 25 Mg twice daily, hydralazine 100 Mg 3 times daily, amlodipine 10 Mg daily.  Patient is also on chlorthalidone 25 Mg daily.  Creatinine worse this morning 1.76 -> 2.13.  Torsemide held.  Will consult nephrology for further assistance.  Patient was worried about her kidney function as she was on dialysis in the past and is requesting nephrology to be involved.  Plan for NM stress test Monday.      I discussed the patient's findings and my recommendations with the patient.    Electronically signed by DEEPA Pacheco, 02/16/25, 1:17 PM EST.  Electronically signed by Arely Marcos MD, 02/16/25, 2:43 PM EST.       Please note that portions of this note were completed with a voice recognition program.

## 2025-02-16 NOTE — NURSING NOTE
Pt transferred from pcu room 211 to  room 302A. Pt had no complaints or concerns. Lead nurse contacted family.

## 2025-02-16 NOTE — PLAN OF CARE
Goal Outcome Evaluation:   Pt resting comfortably in bed. VSS. No acute s/s of distress noted or verbalized. IV access maintained. Heparin gtt infusing. Pt will be NPO after MN for stress in AM. POC ongoing.        Eyes:      Extraocular Movements: Extraocular movements intact.      Conjunctiva/sclera: Conjunctivae normal.      Pupils: Pupils are equal, round, and reactive to light.   Pulmonary:      Effort: Pulmonary effort is normal.   Musculoskeletal:         General: No swelling, tenderness, deformity or signs of injury. Normal range of motion.      Cervical back: Normal range of motion and neck supple.   Skin:     General: Skin is warm and dry.   Neurological:      General: No focal deficit present.      Mental Status: He is alert and oriented to person, place, and time. Mental status is at baseline.   Psychiatric:         Mood and Affect: Mood normal.         DIAGNOSTIC RESULTS     EKG: Not clinically indicated    RADIOLOGY: Not clinically indicated    LABS: Not clinically indicated    EMERGENCY DEPARTMENT COURSE and DIFFERENTIAL DIAGNOSIS/MDM:   Vitals:    Vitals:    07/24/24 1353   BP: 112/87   Pulse: 100   Resp: 14   Temp: 98.1 °F (36.7 °C)   TempSrc: Oral   SpO2: 96%       MEDICATIONS GIVEN IN THE ED:  Medications - No data to display    ED ORDERS:  No orders of the defined types were placed in this encounter.      CLINICAL DECISION MAKING:  The patient presented alert with a nontoxic appearance and was seen in conjunction with Dr. Goldberger.     DDx include, but are not limited to: Chronic pain    Test considered, but not ordered: NONE    The patient was involved in his/her plan of care through shared decision making. The testing that was ordered was discussed with the patient. Any medications that may have been ordered were discussed with the patient.     I have reviewed the patient's previous medical records using the electronic health record that we have available that were pertinent to today's visit.      Discussed with the patient taking the naproxen that was sent in for him 2 days ago.  And also following up with one of the family doctors.  All questions were answered    Evaluation and treatment course in

## 2025-02-17 ENCOUNTER — APPOINTMENT (OUTPATIENT)
Dept: NUCLEAR MEDICINE | Facility: HOSPITAL | Age: 70
DRG: 280 | End: 2025-02-17
Payer: MEDICARE

## 2025-02-17 ENCOUNTER — APPOINTMENT (OUTPATIENT)
Dept: CARDIOLOGY | Facility: HOSPITAL | Age: 70
DRG: 280 | End: 2025-02-17
Payer: MEDICARE

## 2025-02-17 LAB
ANION GAP SERPL CALCULATED.3IONS-SCNC: 10.9 MMOL/L (ref 5–15)
APTT PPP: 36.5 SECONDS (ref 24.5–35.9)
BASOPHILS # BLD AUTO: 0.06 10*3/MM3 (ref 0–0.2)
BASOPHILS NFR BLD AUTO: 0.8 % (ref 0–1.5)
BH CV NUCLEAR PRIOR STUDY: 3
BH CV REST NUCLEAR ISOTOPE DOSE: 10.3 MCI
BH CV STRESS BP STAGE 1: NORMAL
BH CV STRESS COMMENTS STAGE 1: NORMAL
BH CV STRESS DOSE REGADENOSON STAGE 1: 0.4
BH CV STRESS DURATION MIN STAGE 1: 0
BH CV STRESS DURATION SEC STAGE 1: 10
BH CV STRESS HR STAGE 1: 78
BH CV STRESS NUCLEAR ISOTOPE DOSE: 29.6 MCI
BH CV STRESS PROTOCOL 1: NORMAL
BH CV STRESS RECOVERY BP: NORMAL MMHG
BH CV STRESS RECOVERY HR: 72 BPM
BH CV STRESS STAGE 1: 1
BUN SERPL-MCNC: 33 MG/DL (ref 8–23)
BUN/CREAT SERPL: 16.3 (ref 7–25)
CALCIUM SPEC-SCNC: 9 MG/DL (ref 8.6–10.5)
CHLORIDE SERPL-SCNC: 99 MMOL/L (ref 98–107)
CO2 SERPL-SCNC: 25.1 MMOL/L (ref 22–29)
CREAT SERPL-MCNC: 2.03 MG/DL (ref 0.57–1)
DEPRECATED RDW RBC AUTO: 57 FL (ref 37–54)
EGFRCR SERPLBLD CKD-EPI 2021: 26.5 ML/MIN/1.73
ELLIPTOCYTES BLD QL SMEAR: NORMAL
EOSINOPHIL # BLD AUTO: 0.67 10*3/MM3 (ref 0–0.4)
EOSINOPHIL NFR BLD AUTO: 9.2 % (ref 0.3–6.2)
ERYTHROCYTE [DISTWIDTH] IN BLOOD BY AUTOMATED COUNT: 20.8 % (ref 12.3–15.4)
GLUCOSE SERPL-MCNC: 96 MG/DL (ref 65–99)
HCT VFR BLD AUTO: 29.9 % (ref 34–46.6)
HGB BLD-MCNC: 8.4 G/DL (ref 12–15.9)
HYPOCHROMIA BLD QL: NORMAL
IMM GRANULOCYTES # BLD AUTO: 0.02 10*3/MM3 (ref 0–0.05)
IMM GRANULOCYTES NFR BLD AUTO: 0.3 % (ref 0–0.5)
INR PPP: 1.17 (ref 0.9–1.1)
LYMPHOCYTES # BLD AUTO: 1.67 10*3/MM3 (ref 0.7–3.1)
LYMPHOCYTES NFR BLD AUTO: 22.8 % (ref 19.6–45.3)
MAXIMAL PREDICTED HEART RATE: 153 BPM
MCH RBC QN AUTO: 22 PG (ref 26.6–33)
MCHC RBC AUTO-ENTMCNC: 28.1 G/DL (ref 31.5–35.7)
MCV RBC AUTO: 78.3 FL (ref 79–97)
MONOCYTES # BLD AUTO: 0.67 10*3/MM3 (ref 0.1–0.9)
MONOCYTES NFR BLD AUTO: 9.2 % (ref 5–12)
NEUTROPHILS NFR BLD AUTO: 4.22 10*3/MM3 (ref 1.7–7)
NEUTROPHILS NFR BLD AUTO: 57.7 % (ref 42.7–76)
NRBC BLD AUTO-RTO: 0 /100 WBC (ref 0–0.2)
PERCENT MAX PREDICTED HR: 50.98 %
PLAT MORPH BLD: NORMAL
PLATELET # BLD AUTO: 422 10*3/MM3 (ref 140–450)
PMV BLD AUTO: 9.9 FL (ref 6–12)
POTASSIUM SERPL-SCNC: 4.5 MMOL/L (ref 3.5–5.2)
PROTHROMBIN TIME: 15.1 SECONDS (ref 11.6–15.1)
RBC # BLD AUTO: 3.82 10*6/MM3 (ref 3.77–5.28)
SODIUM SERPL-SCNC: 135 MMOL/L (ref 136–145)
SPECT HRT GATED+EF W RNC IV: 52 %
STOMATOCYTES BLD QL SMEAR: NORMAL
STRESS BASELINE BP: NORMAL MMHG
STRESS BASELINE HR: 70 BPM
STRESS PERCENT HR: 60 %
STRESS POST PEAK BP: NORMAL MMHG
STRESS POST PEAK HR: 78 BPM
STRESS TARGET HR: 130 BPM
TARGETS BLD QL SMEAR: NORMAL
WBC NRBC COR # BLD AUTO: 7.31 10*3/MM3 (ref 3.4–10.8)

## 2025-02-17 PROCEDURE — 80048 BASIC METABOLIC PNL TOTAL CA: CPT | Performed by: STUDENT IN AN ORGANIZED HEALTH CARE EDUCATION/TRAINING PROGRAM

## 2025-02-17 PROCEDURE — 93017 CV STRESS TEST TRACING ONLY: CPT

## 2025-02-17 PROCEDURE — A9500 TC99M SESTAMIBI: HCPCS | Performed by: INTERNAL MEDICINE

## 2025-02-17 PROCEDURE — 25010000002 REGADENOSON 0.4 MG/5ML SOLUTION: Performed by: INTERNAL MEDICINE

## 2025-02-17 PROCEDURE — 99232 SBSQ HOSP IP/OBS MODERATE 35: CPT | Performed by: INTERNAL MEDICINE

## 2025-02-17 PROCEDURE — 85007 BL SMEAR W/DIFF WBC COUNT: CPT | Performed by: STUDENT IN AN ORGANIZED HEALTH CARE EDUCATION/TRAINING PROGRAM

## 2025-02-17 PROCEDURE — 94660 CPAP INITIATION&MGMT: CPT

## 2025-02-17 PROCEDURE — 85730 THROMBOPLASTIN TIME PARTIAL: CPT | Performed by: INTERNAL MEDICINE

## 2025-02-17 PROCEDURE — 99232 SBSQ HOSP IP/OBS MODERATE 35: CPT

## 2025-02-17 PROCEDURE — 85610 PROTHROMBIN TIME: CPT | Performed by: INTERNAL MEDICINE

## 2025-02-17 PROCEDURE — 34310000005 TECHNETIUM SESTAMIBI: Performed by: INTERNAL MEDICINE

## 2025-02-17 PROCEDURE — 78452 HT MUSCLE IMAGE SPECT MULT: CPT | Performed by: INTERNAL MEDICINE

## 2025-02-17 PROCEDURE — 25010000002 HEPARIN (PORCINE) 25000-0.45 UT/250ML-% SOLUTION: Performed by: INTERNAL MEDICINE

## 2025-02-17 PROCEDURE — 78452 HT MUSCLE IMAGE SPECT MULT: CPT

## 2025-02-17 PROCEDURE — 94761 N-INVAS EAR/PLS OXIMETRY MLT: CPT

## 2025-02-17 PROCEDURE — 94799 UNLISTED PULMONARY SVC/PX: CPT

## 2025-02-17 PROCEDURE — 93018 CV STRESS TEST I&R ONLY: CPT | Performed by: INTERNAL MEDICINE

## 2025-02-17 PROCEDURE — 85025 COMPLETE CBC W/AUTO DIFF WBC: CPT | Performed by: STUDENT IN AN ORGANIZED HEALTH CARE EDUCATION/TRAINING PROGRAM

## 2025-02-17 RX ORDER — HEPARIN SODIUM 10000 [USP'U]/100ML
18 INJECTION, SOLUTION INTRAVENOUS
Status: DISCONTINUED | OUTPATIENT
Start: 2025-02-17 | End: 2025-02-18 | Stop reason: HOSPADM

## 2025-02-17 RX ORDER — REGADENOSON 0.08 MG/ML
0.4 INJECTION, SOLUTION INTRAVENOUS
Status: COMPLETED | OUTPATIENT
Start: 2025-02-17 | End: 2025-02-17

## 2025-02-17 RX ORDER — HEPARIN SODIUM 5000 [USP'U]/ML
2000 INJECTION, SOLUTION INTRAVENOUS; SUBCUTANEOUS AS NEEDED
Status: DISCONTINUED | OUTPATIENT
Start: 2025-02-17 | End: 2025-02-17

## 2025-02-17 RX ORDER — HEPARIN SODIUM 5000 [USP'U]/ML
4000 INJECTION, SOLUTION INTRAVENOUS; SUBCUTANEOUS AS NEEDED
Status: DISCONTINUED | OUTPATIENT
Start: 2025-02-17 | End: 2025-02-17

## 2025-02-17 RX ADMIN — AMLODIPINE BESYLATE 10 MG: 10 TABLET ORAL at 20:52

## 2025-02-17 RX ADMIN — HYDRALAZINE HYDROCHLORIDE 100 MG: 50 TABLET ORAL at 16:43

## 2025-02-17 RX ADMIN — Medication 10 ML: at 20:52

## 2025-02-17 RX ADMIN — TECHNETIUM TC 99M SESTAMIBI 1 DOSE: 1 INJECTION INTRAVENOUS at 10:46

## 2025-02-17 RX ADMIN — HYDRALAZINE HYDROCHLORIDE 100 MG: 50 TABLET ORAL at 05:09

## 2025-02-17 RX ADMIN — Medication 5 MG: at 01:16

## 2025-02-17 RX ADMIN — HEPARIN SODIUM 11 UNITS/KG/HR: 10000 INJECTION, SOLUTION INTRAVENOUS at 17:59

## 2025-02-17 RX ADMIN — GABAPENTIN 300 MG: 300 CAPSULE ORAL at 20:51

## 2025-02-17 RX ADMIN — GABAPENTIN 300 MG: 300 CAPSULE ORAL at 05:09

## 2025-02-17 RX ADMIN — Medication 10 ML: at 10:05

## 2025-02-17 RX ADMIN — TECHNETIUM TC 99M SESTAMIBI 1 DOSE: 1 INJECTION INTRAVENOUS at 08:35

## 2025-02-17 RX ADMIN — ATORVASTATIN CALCIUM 80 MG: 40 TABLET, FILM COATED ORAL at 20:52

## 2025-02-17 RX ADMIN — GABAPENTIN 300 MG: 300 CAPSULE ORAL at 16:43

## 2025-02-17 RX ADMIN — METOPROLOL TARTRATE 25 MG: 25 TABLET, FILM COATED ORAL at 20:52

## 2025-02-17 RX ADMIN — HYDRALAZINE HYDROCHLORIDE 100 MG: 50 TABLET ORAL at 20:51

## 2025-02-17 RX ADMIN — REGADENOSON 0.4 MG: 0.08 INJECTION, SOLUTION INTRAVENOUS at 10:46

## 2025-02-17 NOTE — PROGRESS NOTES
Patient Identification:  Name:  Lesley Michel  Age:  69 y.o.  Sex:  female  :  1955  MRN:  [id number]  Visit Number:  [id number]  Primary Care Provider:  Woodrow Raymond APRN    Length of stay:  4    Subjective/Interval History/Consultants/Procedures     Chief Complaint:   Chief Complaint   Patient presents with    Shortness of Breath    Chest Pain       Subjective/Interval History:    69 y.o. female who was admitted on 2025 with acute hypoxic and hypercapnic respiratory failure, hypertensive urgency, decompensated HFpEF     PMH is significant for CAD, HTN, atrial fibrillation, HFpEF, CKD stage IV, COPD, PVD, migraine headaches   For complete admission information, please see history and physical.     Consultations:  Cardiology  Palliative care  CM/SW    Procedures/Scans:  CXR   Stress test w/ MPI- intermediate risk study  [facility] planned    Today, the patient was seen and examined early AM. She was nervous waiting for stress test this morning but otherwise feeling about the same. She was not more short of breath. Continues to have good UOP.      [facility].    ----------------------------------------------------------------------------------------------------------------------  [facility] [place] Meds:  amLODIPine, 10 mg, Oral, Q24H  aspirin, 81 mg, Oral, Daily  atorvastatin, 80 mg, Oral, Nightly  chlorthalidone, 25 mg, Oral, Daily  folic acid, 1 mg, Oral, Daily  gabapentin, 300 mg, Oral, Q8H  hydrALAZINE, 100 mg, Oral, Q8H  metoprolol tartrate, 25 mg, Oral, Q12H  mupirocin, 1 Application, Each Nare, BID  nicotine, 1 patch, Transdermal, Q24H  pantoprazole, 40 mg, Oral, Daily  senna-docusate sodium, 2 tablet, Oral, BID  sertraline, 25 mg, Oral, Daily  sodium chloride, 10 mL, Intravenous, Q12H  sodium chloride, 10 mL, Intravenous, Q12H  [Held by provider] torsemide, 40 mg, Oral, Daily          ----------------------------------------------------------------------------------------------------------------------      Objective     Vital Signs:  Temp:  [97.5 °F (36.4 °C)-98.4 °F (36.9 °C)] 98 °F (36.7 °C)  Heart Rate:  [63-78] 69  Resp:  [18-20] 18  BP: (137-167)/(71-84) 158/81      02/15/25  2111 02/16/25  0500 02/17/25  0500   Weight: 83.9 kg (184 lb 15.5 oz) 83.9 kg (184 lb 15.5 oz) 83.4 kg (183 lb 13.8 oz)     Body mass index is 34.74 kg/m².    Intake/Output Summary (Last 24 hours) at 2/17/2025 1707  Last data filed at 2/17/2025 1200  Gross per 24 hour   Intake 200 ml   Output --   Net 200 ml     No intake/output data recorded.  Diet: Cardiac; Healthy Heart (2-3 Na+); Fluid Consistency: Thin (IDDSI 0)  ----------------------------------------------------------------------------------------------------------------------    Physical Exam  Vitals and nursing note reviewed.   Constitutional:       General: She is not in acute distress.  HENT:      Head: Normocephalic and atraumatic.   Eyes:      Extraocular Movements: Extraocular movements intact.   Cardiovascular:      Rate and Rhythm: Normal rate.   Pulmonary:      Effort: Pulmonary effort is normal. No respiratory distress.   Abdominal:      Palpations: Abdomen is soft.   Skin:     General: Skin is warm and dry.   Neurological:      Mental Status: She is alert. Mental status is at baseline.   Psychiatric:         Behavior: Behavior normal.          ----------------------------------------------------------------------------------------------------------------------  Tele:      ----------------------------------------------------------------------------------------------------------------------  Results from last 7 days   Lab Units 02/13/25  1715 02/13/25  0828 02/13/25  0630   HSTROP T ng/L 157* 169* 183*   PROBNP pg/mL  --   --  15,163.0*     Results from last 7 days   Lab Units 02/17/25  0017 02/15/25  0841 02/14/25  0543 02/13/25  1244 02/13/25  0931  "02/13/25  0828 02/13/25  0630   LACTATE mmol/L  --   --   --  1.9 2.1*  --  3.8*   WBC 10*3/mm3 7.31 7.33 8.49  --   --   --  13.86*   HEMOGLOBIN g/dL 8.4* 8.6* 7.6*  --   --   --  11.2*   HEMATOCRIT % 29.9* 30.2* 28.7*  --   --   --  39.3   MCV fL 78.3* 78.4* 82.5  --   --   --  78.9*   MCHC g/dL 28.1* 28.5* 26.5*  --   --   --  28.5*   PLATELETS 10*3/mm3 422 388 404  --   --   --  612*   INR   --   --   --   --   --  1.55*  --      Results from last 7 days   Lab Units 02/14/25  0409   PH, ARTERIAL pH units 7.433   PO2 ART mm Hg 70.6*   PCO2, ARTERIAL mm Hg 34.7*   HCO3 ART mmol/L 23.2     Results from last 7 days   Lab Units 02/17/25  0017 02/16/25  0640 02/15/25  0133 02/14/25  1206 02/13/25  0630   SODIUM mmol/L 135* 136 134* 135* 140   POTASSIUM mmol/L 4.5 4.5 4.5 4.4 4.8   MAGNESIUM mg/dL  --  1.9 2.0 1.9  --    CHLORIDE mmol/L 99 100 101 105 105   CO2 mmol/L 25.1 23.6 22.6 19.6* 20.5*   BUN mg/dL 33* 34* 27* 26* 32*   CREATININE mg/dL 2.03* 2.13* 1.76* 1.56* 1.75*   CALCIUM mg/dL 9.0 9.0 8.9 8.5* 9.5   GLUCOSE mg/dL 96 102* 93 119* 116*   ALBUMIN g/dL  --   --   --   --  4.1   BILIRUBIN mg/dL  --   --   --   --  0.4   ALK PHOS U/L  --   --   --   --  216*   AST (SGOT) U/L  --   --   --   --  23   ALT (SGPT) U/L  --   --   --   --  9   Estimated Creatinine Clearance: 26.3 mL/min (A) (by C-G formula based on SCr of 2.03 mg/dL (H)).  No results found for: \"AMMONIA\"      Blood Culture   Date Value Ref Range Status   02/13/2025 No growth at 4 days  Preliminary   02/13/2025 No growth at 4 days  Preliminary     No results found for: \"URINECX\"  No results found for: \"WOUNDCX\"  No results found for: \"STOOLCX\"  ----------------------------------------------------------------------------------------------------------------------  Imaging Results (Last 24 Hours)       ** No results found for the last 24 hours. **      "     ----------------------------------------------------------------------------------------------------------------------   I have reviewed the above laboratory values for 02/17/25    Assessment/Plan     Active Hospital Problems    Diagnosis  POA    **Hypertensive crisis [I16.9]  Yes         ASSESSMENT/PLAN:    Acute on chronic hypoxic and hypercapnic respiratory failure, resolved  Hypertensive crisis, POA, now resolved  Concern for flash pulmonary edema due to above  Decompensated HFpEF, improved  Complicated by MAICO on CKD stage IV, now resolving  Paroxysmal atrial fibrillation  BP trend overall improved, trend higher today than yesterday.  Nitro drip discontinued and hydralazine resumed at 100 mg 3 times daily.  Amlodipine and chlorthalidone continued in addition to metoprolol tartrate  She was given Bumex in the ED-torsemide resumed but held currently due to creatinine with upward trend. ReDs vest is still slightly elevated at 38%  Nephrology consulted, assistance appreciated. Felt MAICO likely secondary to cardiorenal syndrome with ischemic nephropathy. Holding torsemide and added urine protein/creatinine ratio.   Will follow up further recommendations.   Patient is maintaining appropriate O2 saturations on baseline 2 L nasal cannula  P.o. anticoagulant is held and continuing heparin drip for now.  See below.  Will continue to trend renal function closely.      NSTEMI, T1 versus T2  ASCVD s/p prior PCI/ROSAURA to LAD  Denying any further chest pain  ASA, statin  Stress test completed today- intermediate risk study, small to moderate infarct mid to apical anterior wall and apex  Heparin drip continued.  Interventional cardiology consult placed for consideration of LHC and timing thereof if indicated. Recommendations appreciated.      Chronic:  COPD  PVD  Migraine headaches  Anxiety  Chronic microcytic anemia  Home medications continued    -----------  -DVT prophylaxis: Heparin drip   -Disposition plans/anticipated  needs: Anticipate discharge home once cardiac work up complete and clinically stable        The patient is high risk due to the following diagnoses/reasons:  NSTEMI        Wally Lopez PA-C  02/17/25  17:07 EST

## 2025-02-17 NOTE — CASE MANAGEMENT/SOCIAL WORK
Continued Stay Note  [facility]     Patient Name: Lesley Michel  MRN: [id number]  Today's Date: 2/17/2025    Admit Date: 2/13/2025    Plan: This CM met with pt at bedside and verified discharge plan remains home with family to transport; pt reports being up to BSC and feels mobility wise is currently at baseline.  Avani Pope RN

## 2025-02-17 NOTE — PLAN OF CARE
Goal Outcome Evaluation:     Problem: Adult Inpatient Plan of Care  Goal: Absence of Hospital-Acquired Illness or Injury  Intervention: Identify and Manage Fall Risk  Recent Flowsheet Documentation  Taken 2/16/2025 2055 by Wally Phelps RN  Safety Promotion/Fall Prevention: safety round/check completed     Problem: Adult Inpatient Plan of Care  Goal: Absence of Hospital-Acquired Illness or Injury  Intervention: Prevent Infection  Recent Flowsheet Documentation  Taken 2/16/2025 2055 by Wally Phelps RN  Infection Prevention:   hand hygiene promoted   rest/sleep promoted

## 2025-02-17 NOTE — PROGRESS NOTES
[facility]  PROGRESS NOTE    Patient information:  Name: Lesley Michel  Age/Sex: 69 y.o. female  :  1955        PCP: Woodrow Raymond APRN  Attending: Maria L August DO  MRN:  [id number]  Visit Number:  [id number]  LOS: 4  CODE STATUS:    Code Status and Medical Interventions: No CPR (Do Not Attempt to Resuscitate); Limited Support; No intubation (DNI), No dialysis, No cardioversion   Ordered at: 25 1141     Medical Intervention Limits:    No intubation (DNI)    No dialysis    No cardioversion     Level Of Support Discussed With:    Patient     Code Status (Patient has no pulse and is not breathing):    No CPR (Do Not Attempt to Resuscitate)     Medical Interventions (Patient has pulse or is breathing):    Limited Support     PROBLEM LIST:Principal Problem:    Hypertensive crisis    Reason for Cardiology follow-up: NSTEMI, hypertensive crisis, and acute heart failure exacerbation     Subjective   ADMISSION INFORMATION:  Chief Complaint   Patient presents with    Shortness of Breath    Chest Pain     DATE:2025    Admission information/HPI:  Lesley Michel is a 69-year-old female with a past medical history significant for CAD (with chronically occluded well collateralized dominant RCA, status post drug-eluting stent to the LAD with high-grade stenosis of the ostium of the small D1. She also has 60% mid RCA and posterolateral branch lesion), hyperlipidemia, hypertension, PAD, paroxysmal atrial fibrillation chronically anticoagulated with Eliquis, CHF, COPD on nasal cannula, history of hypoxic respiratory failure, CKD stage II, and tobacco abuse. She presented to [facility] ED on 2025 with complaints of shortness of breath. She was recently admitted for cholecystectomy and had not been taking diuretics since discharge. She was admitted for acute hypoxic respiratory failure, acute pulmonary edema, and NSTEMI. High-sensitivity  troponin was 182 at the time of admission, proBNP 15,163, creatinine 1.75. Cardiology was consulted for further evaluation and management for NSTEMI, hypertensive crisis, and acute heart failure exacerbation.     Interval History:   According to patient's I & O flow chart net balance is +920 mL with x 6 unmeasured urine occurrences noted overnight. AM labs reveal sodium 135, potassium 4.5, creatinine 2.03 versus 2.13, hemoglobin 8.4, platelet count 422 and WBC is 7.31.     Patient was in room 302 A when she was seen and examined.  Patient is lying in bed resting quietly.  No acute distress noted at this time.  Patient reports she has completed stress test today.  She does complain of some nausea and headache post stress test.  But denies any chest pain or shortness of breath.    ORDERS:   Healthy heart diet ordered    EVENT TIMELINE:   1/19: TTE w EF 56-60 %.  Please see full report attached below.  2/17: Stress test planned for today     Objective     Vital Signs  Temp:  [97.5 °F (36.4 °C)-98.4 °F (36.9 °C)] 98.2 °F (36.8 °C)  Heart Rate:  [63-78] 78  Resp:  [18-20] 18  BP: (137-167)/(71-84) 148/82  Flow (L/min) (Oxygen Therapy):  [2] 2  Device (Oxygen Therapy): nasal cannula;humidified  Vital Signs (last 72 hrs)         02/14 0700  02/15 0659 02/15 0700  02/16 0659 02/16 0700  02/17 0659 02/17 0700  02/17 0903   Most Recent      Temp (°F) 97.9 -  98.5    97.7 -  98.5    97.5 -  98.4      98.2     98.2 (36.8) 02/17 0718    Heart Rate 60 -  96    57 -  77    61 -  88      78     78 02/17 0718    Resp 10 -  24    8 -  22    18 -  20      18     18 02/17 0718    /58 -  179/103    112/88 -  149/82    124/67 -  167/84      148/82     148/82 02/17 0718    SpO2 (%) 90 -  100    90 -  99    91 -  94      90     90 02/17 0718    Flow (L/min) (Oxygen Therapy) 2 -  4    2 -  3      2       2 02/17 0632          BMI:Body mass index is 34.74 kg/m².    WEIGHT:      02/15/25  2111 02/16/25  0500 02/17/25  0500   Weight:  83.9 kg (184 lb 15.5 oz) 83.9 kg (184 lb 15.5 oz) 83.4 kg (183 lb 13.8 oz)       DIET:Diet: Cardiac; Healthy Heart (2-3 Na+); Fluid Consistency: Thin (IDDSI 0)    I&O:  Intake & Output (last 3 days)         02/14 0701  02/15 0700 02/15 0701  02/16 0700 02/16 0701  02/17 0700 02/17 0701  02/18 0700    P.O. 230 480 920 0    I.V. (mL/kg) 469.4 (5.2) 56.3 (0.6)      IV Piggyback        Total Intake(mL/kg) 699.4 (7.7) 536.3 (5.9) 920 (10.1) 0 (0)    Urine (mL/kg/hr) 1300 (0.6)       Emesis/NG output        Stool 0       Total Output 1300       Net -600.7 +536.3 +920 0            Urine Unmeasured Occurrence 5 x 3 x 6 x     Stool Unmeasured Occurrence 2 x  4 x              PHYSICAL EXAM:  Vitals and nursing note reviewed.   Constitutional:       Appearance: Not in distress.   HENT:      Head: Normocephalic.   Pulmonary:      Effort: Pulmonary effort is normal.      Breath sounds: Normal breath sounds. No wheezing. No rhonchi. No rales.   Cardiovascular:      Normal rate. Regular rhythm.      Murmurs: There is no murmur.      No gallop.  No click. No rub.   Pulses:     Intact distal pulses.   Edema:     Peripheral edema absent.   Musculoskeletal: Normal range of motion.      Cervical back: Normal range of motion and neck supple. Skin:     General: Skin is warm and dry.   Neurological:      General: No focal deficit present.      Mental Status: Alert and oriented to person, place and time.   Psychiatric:         Behavior: Behavior is cooperative.                  Results review   Results Review:    I have reviewed the patient's new clinical results. 02/17/25 13:48 EST    Results from last 7 days   Lab Units 02/13/25  1715 02/13/25  0828 02/13/25  0630   HSTROP T ng/L 157* 169* 183*     Lab Results   Component Value Date    PROBNP 15,163.0 (H) 02/13/2025    PROBNP 15,776.0 (H) 01/19/2025    PROBNP 5,236.0 (H) 07/20/2024     Results from last 7 days   Lab Units 02/17/25  0017 02/15/25  0841 02/14/25  0543 02/13/25  0630   WBC  10*3/mm3 7.31 7.33 8.49 13.86*   HEMOGLOBIN g/dL 8.4* 8.6* 7.6* 11.2*   PLATELETS 10*3/mm3 422 388 404 612*     Results from last 7 days   Lab Units 02/17/25  0017 02/16/25  0640 02/15/25  0133 02/14/25  1206 02/13/25  0630   SODIUM mmol/L 135* 136 134* 135* 140   POTASSIUM mmol/L 4.5 4.5 4.5 4.4 4.8   CHLORIDE mmol/L 99 100 101 105 105   CO2 mmol/L 25.1 23.6 22.6 19.6* 20.5*   BUN mg/dL 33* 34* 27* 26* 32*   CREATININE mg/dL 2.03* 2.13* 1.76* 1.56* 1.75*   CALCIUM mg/dL 9.0 9.0 8.9 8.5* 9.5   GLUCOSE mg/dL 96 102* 93 119* 116*   ALT (SGPT) U/L  --   --   --   --  9   AST (SGOT) U/L  --   --   --   --  23     Lab Results   Component Value Date    MG 1.9 02/16/2025    MG 2.0 02/15/2025    MG 1.9 02/14/2025     Estimated Creatinine Clearance: 26.3 mL/min (A) (by C-G formula based on SCr of 2.03 mg/dL (H)).    Lab Results   Component Value Date    HGBA1C 5.30 10/08/2023     Lab Results   Component Value Date    CHOL 140 10/08/2023    TRIG 75 10/08/2023    LDL 91 10/08/2023    HDL 34 (L) 10/08/2023     Lab Results   Component Value Date    ABSOLUTELUNG 38 (A) 02/16/2025    ABSOLUTELUNG 43 (A) 10/13/2023    ABSOLUTELUNG 37 (A) 10/09/2023     Lab Results   Component Value Date    INR 1.55 (H) 02/13/2025    INR 1.07 10/24/2023     Lab Results   Component Value Date    LABHEPA 0.21 (L) 02/16/2025    LABHEPA 0.19 (L) 02/16/2025    LABHEPA 0.30 02/15/2025    LABHEPA 0.43 02/15/2025       Lab Results   Component Value Date    TSH 6.770 (H) 10/08/2023      Pain Management Panel          Latest Ref Rng & Units 10/9/2023   Pain Management Panel   Creatinine, Urine mg/dL 116.0       Details                 Microbiology Results (last 10 days)       Procedure Component Value - Date/Time    Blood Culture - Blood, Arm, Left [[id number]]  (Normal) Collected: 02/13/25 0645    Lab Status: Preliminary result Specimen: Blood from Arm, Left Updated: 02/17/25 0700     Blood Culture No growth at 4 days    Blood Culture - Blood, Arm, Right  [[id number]]  (Normal) Collected: 02/13/25 0645    Lab Status: Preliminary result Specimen: Blood from Arm, Right Updated: 02/17/25 0700     Blood Culture No growth at 4 days    COVID-19, FLU A/B, RSV PCR 1 HR TAT - Swab, Nasopharynx [[id number]]  (Normal) Collected: 02/13/25 0630    Lab Status: Final result Specimen: Swab from Nasopharynx Updated: 02/13/25 0719     COVID19 Not Detected     Influenza A PCR Not Detected     Influenza B PCR Not Detected     RSV, PCR Not Detected    Narrative:      Fact sheet for providers: https://www.fda.gov/media/574863/download    Fact sheet for patients: https://www.fda.gov/media/857948/download    Test performed by PCR.           Imaging Results (Last 24 Hours)       ** No results found for the last 24 hours. **          ECHO:  Results for orders placed during the hospital encounter of 01/19/25    Adult Transthoracic Echo Complete w/ Color, Spectral and Contrast if necessary per protocol    Interpretation Summary    Left ventricular systolic function is normal. Left ventricular ejection fraction appears to be 56 - 60%.    Left ventricular wall thickness is consistent with mild concentric hypertrophy.    There is calcification of the aortic valve.    Mild mitral valve stenosis is present.    Estimated right ventricular systolic pressure from tricuspid regurgitation is normal (<35 mmHg).      STRESS TEST:  Results for orders placed during the hospital encounter of 10/08/23    Stress Test With Myocardial Perfusion One Day    Interpretation Summary    Left ventricular ejection fraction is normal (Calculated EF = 68%).    Myocardial perfusion imaging indicates a normal myocardial perfusion study with no evidence of ischemia.    TID 1.05.    Findings consistent with a normal ECG stress test.       HEART CATH:  No results found for this or any previous visit.      EP STUDY/Interrogations:   No results found for this or any previous visit.      TELEMETRY:              I reviewed the  patient's new clinical results.    ALLERGIES: Xanax [alprazolam]    Medication Review:   Current list of medications may not reflect those currently placed in orders that are not signed or are being held.     amLODIPine, 10 mg, Oral, Q24H  aspirin, 81 mg, Oral, Daily  atorvastatin, 80 mg, Oral, Nightly  chlorthalidone, 25 mg, Oral, Daily  folic acid, 1 mg, Oral, Daily  gabapentin, 300 mg, Oral, Q8H  hydrALAZINE, 100 mg, Oral, Q8H  metoprolol tartrate, 25 mg, Oral, Q12H  mupirocin, 1 Application, Each Nare, BID  nicotine, 1 patch, Transdermal, Q24H  pantoprazole, 40 mg, Oral, Daily  senna-docusate sodium, 2 tablet, Oral, BID  sertraline, 25 mg, Oral, Daily  sodium chloride, 10 mL, Intravenous, Q12H  sodium chloride, 10 mL, Intravenous, Q12H  [Held by provider] torsemide, 40 mg, Oral, Daily           acetaminophen    senna-docusate sodium **AND** polyethylene glycol **AND** bisacodyl **AND** bisacodyl    HYDROcodone-acetaminophen    hydrOXYzine    ipratropium-albuterol    labetalol    melatonin    nitroglycerin    ondansetron ODT    sodium chloride    sodium chloride    sodium chloride    sodium chloride      Assessment    Acute non-ST elevation myocardial infarction (type I versus type II due to uncontrolled hypertension and respiratory failure.  Acute on chronic HFpEF.  ASCVD, status post previous PCI/ROSAURA to LAD with high-grade stenosis in the ostium of the small diagonal branch and 60% stenosis in the mid RCA at [facility] (details unavailable).  Peripheral artery disease, status post previous percutaneous intervention at [facility] in [place].  Paroxysmal atrial fibrillation with a [id number] score of 5, patient is chronically anticoagulated with Eliquis.  Acute on chronic respiratory failure with hypoxia and hypercapnia due to combination of COPD with exacerbation and acute heart failure.  Acute kidney injury on chronic kidney disease (stage IIIb)    Recommendations / Plan    Continue heparin gtt for now while awaiting IC evaluation, aspirin 81 Mg daily, Lipitor 80 Mg daily.    Continue Lopressor 25 Mg twice daily, hydralazine 100 Mg 3 times daily, amlodipine 10 Mg daily.  Patient is also on chlorthalidone 25 Mg daily.  Will need to be started back on her home Eliquis eventually.  Creatinine better this morning 1.76 -> 2.13-->2.03.  Torsemide held. Nephrology saw patient.  Patient expressed concern her kidney function as she was on dialysis in the past and is requested nephrology to be involved.  NM stress test done today-showing moderate size infarct in anterior wall with moderate to severe ciro-infarct ischemia.  Interventional cardiology is consulted.      I have discussed the patients findings and recommendations with the patient.    Thank you very much for asking us to be involved in this patient's care.      Electronically signed by DEEPA Navarro, 02/17/25, 1:54 PM EST.     Electronically signed by Rosemary De La Garza MD, 02/17/25, 5:17 PM EST.        Please note that portions of this note were completed with a voice recognition program.    Please note that portions of this note were copied and has been reviewed and is accurate as of 2/17/2025 .

## 2025-02-17 NOTE — PLAN OF CARE
Goal Outcome Evaluation:   Pt resting in bed. VSS. No acute s/s of distress noted or verbalized. IV access maintained. Stress test performed this shift. POC ongoing.

## 2025-02-17 NOTE — CONSULTS
NEPHROLOGY CONSULT NOTE    Referring Provider: Dr. Barbosa  Reason for Consultation: Acute kidney injury on chronic kidney stage IIIb    Chief complaint shortness of breath    Subjective .     History of present illness: Patient is a 69-year-old with past medical history of chronic kidney disease stage IIIb baseline creatinine 1.6 hypertension coronary artery disease atrial fibrillation congestive heart failure with preserved EF COPD came to the hospital complaining of shortness of breath when she came in she was not able to talk because of the shortness of breath she had BiPAP but now she is doing much better patient was admitted with hypercapnic hypoxic respiratory failure and also patient had hypertensive urgency now the blood pressure is much better patient denies any chest pain no urgency no frequency no fever and no chills patient is going to get a stress test today patient was recently admitted for cholecystectomy and patient's diuretics were held at that time because patient kidney function got worse per the patient patient denies any chest pain now or shortness of breath is also better      Review of Systems  All systems were reviewed and negative except for: HPI    History:  Past Medical History:   Diagnosis Date    Anxiety     Arthritis     CHF (congestive heart failure)     Cigarette smoker 02/10/2022    CKD (chronic kidney disease) stage 4, GFR 15-29 ml/min     MAICO on top of CKD IIIa, HD from 10/23-4/24, some recovery to CKD IV    COPD (chronic obstructive pulmonary disease)     Coronary artery disease     Elevated cholesterol     GERD (gastroesophageal reflux disease)     H/O heart artery stent     History of transfusion     Hyperlipidemia     Hypertension     hypothyroidism     Obesity     PAF (paroxysmal atrial fibrillation)     PVD (peripheral vascular disease)     x2 stents   ,   Past Surgical History:   Procedure Laterality Date    ANKLE SURGERY      RIGHT    APPENDECTOMY      CARDIAC  CATHETERIZATION      LEFT     SECTION      CHOLECYSTECTOMY N/A 2025    Procedure: CHOLECYSTECTOMY LAPAROSCOPIC WITH DAVINCI ROBOT;  Surgeon: Windy Neal MD;  Location:  [facility];  Service: Robotics - DaVinci;  Laterality: N/A;    CORONARY STENT PLACEMENT      HYSTERECTOMY      INSERTION HEMODIALYSIS CATHETER Right 10/19/2023    Procedure: HEMODIALYSIS CATHETER INSERTION;  Surgeon: Bartolome Schwartz MD;  Location:  [facility];  Service: General;  Laterality: Right;   ,   Family History   Problem Relation Age of Onset    Heart disease Mother     Heart attack Mother 73    Fibromyalgia Mother     Heart attack Father 72    Ovarian cancer Sister     Coronary artery disease Other     Breast cancer Neg Hx    ,   Social History     Tobacco Use    Smoking status: Every Day     Current packs/day: 0.50     Average packs/day: 0.5 packs/day for 30.0 years (15.0 ttl pk-yrs)     Types: Cigarettes     Passive exposure: Never    Smokeless tobacco: Never   Vaping Use    Vaping status: Never Used   Substance Use Topics    Alcohol use: No    Drug use: No   , Allergies:  Xanax [alprazolam],   Current Facility-Administered Medications:     acetaminophen (TYLENOL) tablet 650 mg, 650 mg, Oral, Q4H PRN, Maria L August DO, 650 mg at 25 193    amLODIPine (NORVASC) tablet 10 mg, 10 mg, Oral, Q24H, Maria L August DO, 10 mg at 25    aspirin EC tablet 81 mg, 81 mg, Oral, Daily, Maria L August DO, 81 mg at 25 0859    atorvastatin (LIPITOR) tablet 80 mg, 80 mg, Oral, Nightly, Maria L August DO, 80 mg at 25    sennosides-docusate (PERICOLACE) 8.6-50 MG per tablet 2 tablet, 2 tablet, Oral, BID, 2 tablet at 25 0859 **AND** polyethylene glycol (MIRALAX) packet 17 g, 17 g, Oral, Daily PRN **AND** bisacodyl (DULCOLAX) EC tablet 5 mg, 5 mg, Oral, Daily PRN **AND** bisacodyl (DULCOLAX) suppository 10 mg, 10 mg, Rectal, Daily PRN, Maria L August DO    chlorthalidone (HYGROTEN) tablet 25 mg,  25 mg, Oral, Daily, Maria L August DO, 25 mg at 02/16/25 0904    folic acid (FOLVITE) tablet 1 mg, 1 mg, Oral, Daily, Maria L August DO, 1 mg at 02/16/25 0859    gabapentin (NEURONTIN) capsule 300 mg, 300 mg, Oral, Q8H, Maria L August DO, 300 mg at 02/17/25 0509    hydrALAZINE (APRESOLINE) tablet 100 mg, 100 mg, Oral, Q8H, Maria L August DO, 100 mg at 02/17/25 0509    HYDROcodone-acetaminophen (NORCO)  MG per tablet 1 tablet, 1 tablet, Oral, Q8H PRN, Maria L August DO, 1 tablet at 02/16/25 0307    hydrOXYzine (ATARAX) tablet 10 mg, 10 mg, Oral, TID PRN, Maria L August DO, 10 mg at 02/16/25 1939    ipratropium-albuterol (DUO-NEB) nebulizer solution 3 mL, 3 mL, Nebulization, Q4H PRN, Maria L August DO, 3 mL at 02/16/25 0700    labetalol (NORMODYNE,TRANDATE) injection 10 mg, 10 mg, Intravenous, Q6H PRN, Maria L August DO    melatonin tablet 5 mg, 5 mg, Oral, Nightly PRN, Pollo Duffy PA-C, 5 mg at 02/17/25 0116    metoprolol tartrate (LOPRESSOR) tablet 25 mg, 25 mg, Oral, Q12H, Maria L August DO, 25 mg at 02/16/25 2057    mupirocin (BACTROBAN) 2 % nasal ointment 1 Application, 1 Application, Each Nare, BID, Maria L August DO, 1 Application at 02/16/25 2057    nicotine (NICODERM CQ) 21 MG/24HR patch 1 patch, 1 patch, Transdermal, Q24H, Maria L August DO, 1 patch at 02/16/25 0900    nitroglycerin (NITROSTAT) SL tablet 0.4 mg, 0.4 mg, Sublingual, Q5 Min PRN, Maria L August DO    ondansetron ODT (ZOFRAN-ODT) disintegrating tablet 4 mg, 4 mg, Translingual, Q8H PRN, Maria L August DO, 4 mg at 02/16/25 1938    pantoprazole (PROTONIX) EC tablet 40 mg, 40 mg, Oral, Daily, Maria L August DO, 40 mg at 02/16/25 0859    sertraline (ZOLOFT) tablet 25 mg, 25 mg, Oral, Daily, Maria L August DO, 25 mg at 02/16/25 0859    sodium chloride 0.9 % flush 10 mL, 10 mL, Intravenous, Q12H, Maria L August DO, 10 mL at 02/16/25 2057    sodium chloride 0.9 % flush 10 mL, 10 mL, Intravenous, PRN, Maria L August DO     sodium chloride 0.9 % flush 10 mL, 10 mL, Intravenous, Q12H, Maria L August DO, 10 mL at 02/16/25 2057    sodium chloride 0.9 % flush 10 mL, 10 mL, Intravenous, PRN, Maria L August DO    sodium chloride 0.9 % infusion 40 mL, 40 mL, Intravenous, PRN, Maria L August,     sodium chloride 0.9 % infusion 40 mL, 40 mL, Intravenous, PRN, Maria L August DO    [Held by provider] torsemide (DEMADEX) tablet 40 mg, 40 mg, Oral, Daily, Maria L August DO, 40 mg at 02/15/25 0809     Medications Prior to Admission   Medication Sig Dispense Refill Last Dose/Taking    albuterol sulfate  (90 Base) MCG/ACT inhaler Inhale 2 puffs Every 4 (Four) Hours As Needed for Wheezing. 18 g 0 Past Month    amLODIPine (NORVASC) 2.5 MG tablet Take 1 tablet by mouth Daily.   2/12/2025    aspirin 81 MG chewable tablet Chew 1 tablet Daily.   2/12/2025 Morning    chlorthalidone (HYGROTON) 25 MG tablet Take 1 tablet by mouth Daily.   2/12/2025    Eliquis 5 MG tablet tablet Take 1 tablet by mouth Daily.   2/12/2025    folic acid (FOLVITE) 1 MG tablet Take 1 tablet by mouth Daily.   2/12/2025    gabapentin (NEURONTIN) 600 MG tablet Take 0.5 tablets by mouth 3 (Three) Times a Day As Needed (nerve pain).   2/12/2025    HYDROcodone-acetaminophen (NORCO)  MG per tablet Take 1 tablet by mouth Every 8 (Eight) Hours As Needed for Moderate Pain.   2/12/2025    ipratropium-albuterol (DUO-NEB) 0.5-2.5 mg/3 ml nebulizer Take 3 mL by nebulization Every 4 (Four) Hours As Needed for Wheezing. 90 mL 1 Past Month    metoprolol tartrate (LOPRESSOR) 25 MG tablet Take 1 tablet by mouth Daily.   2/12/2025    ondansetron ODT (ZOFRAN-ODT) 4 MG disintegrating tablet Place 1 tablet on the tongue Every 8 (Eight) Hours As Needed for Nausea or Vomiting.   Past Week    pantoprazole (PROTONIX) 40 MG EC tablet Take 1 tablet by mouth Daily.   2/12/2025    rosuvastatin (CRESTOR) 10 MG tablet Take 1 tablet by mouth Daily.   2/12/2025    sertraline (ZOLOFT) 25 MG tablet  "Take 1 tablet by mouth Daily.   2/12/2025    torsemide 40 MG tablet Take 40 mg by mouth Daily for 30 days. 30 tablet 0 2/12/2025          Objective     Laboratory Data:     Albumin No results found for: \"ALBUMIN\"   Magnesium Magnesium   Date Value Ref Range Status   02/16/2025 1.9 1.6 - 2.4 mg/dL Final   02/15/2025 2.0 1.6 - 2.4 mg/dL Final   02/14/2025 1.9 1.6 - 2.4 mg/dL Final          PTH               No results found for: \"PTH\"    CBC and coagulation:  Results from last 7 days   Lab Units 02/17/25  0017 02/15/25  0841 02/14/25  0543 02/13/25  1244 02/13/25  0931 02/13/25  0828 02/13/25  0630   LACTATE mmol/L  --   --   --  1.9 2.1*  --  3.8*   WBC 10*3/mm3 7.31 7.33 8.49  --   --   --  13.86*   HEMOGLOBIN g/dL 8.4* 8.6* 7.6*  --   --   --  11.2*   HEMATOCRIT % 29.9* 30.2* 28.7*  --   --   --  39.3   MCV fL 78.3* 78.4* 82.5  --   --   --  78.9*   MCHC g/dL 28.1* 28.5* 26.5*  --   --   --  28.5*   PLATELETS 10*3/mm3 422 388 404  --   --   --  612*   INR   --   --   --   --   --  1.55*  --      Acid/base balance:  Results from last 7 days   Lab Units 02/14/25  0409 02/13/25  0747 02/13/25  0629   PH, ARTERIAL pH units 7.433 7.338* 7.183*   PO2 ART mm Hg 70.6* 85.1 46.9*   PCO2, ARTERIAL mm Hg 34.7* 36.2 47.9*   HCO3 ART mmol/L 23.2 19.4* 18.0*     Renal and electrolytes:    Results from last 7 days   Lab Units 02/17/25  0017 02/16/25  0640 02/15/25  0133 02/14/25  1206 02/13/25  0630   SODIUM mmol/L 135* 136 134* 135* 140   POTASSIUM mmol/L 4.5 4.5 4.5 4.4 4.8   MAGNESIUM mg/dL  --  1.9 2.0 1.9  --    CHLORIDE mmol/L 99 100 101 105 105   CO2 mmol/L 25.1 23.6 22.6 19.6* 20.5*   BUN mg/dL 33* 34* 27* 26* 32*   CREATININE mg/dL 2.03* 2.13* 1.76* 1.56* 1.75*   CALCIUM mg/dL 9.0 9.0 8.9 8.5* 9.5     Estimated Creatinine Clearance: 26.3 mL/min (A) (by C-G formula based on SCr of 2.03 mg/dL (H)).    Liver and pancreatic function:  Results from last 7 days   Lab Units 02/13/25  0630   ALBUMIN g/dL 4.1   BILIRUBIN mg/dL " 0.4   ALK PHOS U/L 216*   AST (SGOT) U/L 23   ALT (SGPT) U/L 9   LIPASE U/L 52         Cardiac:  Results from last 7 days   Lab Units 02/13/25  0630   PROBNP pg/mL 15,163.0*     Liver and pancreatic function:  Results from last 7 days   Lab Units 02/13/25  0630   ALBUMIN g/dL 4.1   BILIRUBIN mg/dL 0.4   ALK PHOS U/L 216*   AST (SGOT) U/L 23   ALT (SGPT) U/L 9   LIPASE U/L 52       XR Chest 1 View    Result Date: 2/13/2025   1. Appearance concerning for CHF and possibly superimposed bibasilar airspace disease    This report was finalized on 2/13/2025 8:44 AM by Dr. Julio Dominguez MD.        Vital Signs:   Vitals:    02/16/25 2300 02/17/25 0300 02/17/25 0500 02/17/25 0632   BP: 137/71 156/78     BP Location: Right arm Right arm     Patient Position: Lying Lying     Pulse:    63   Resp: 20 18  18   Temp: 97.8 °F (36.6 °C) 98.4 °F (36.9 °C)     TempSrc: Oral Oral     SpO2:    91%   Weight:   83.4 kg (183 lb 13.8 oz)    Height:            Intake/Output                         02/15/25 0701 - 02/16/25 0700 02/16/25 0701 - 02/17/25 0700     6462-9951 5103-1490 Total 4542-7980 4455-8553 Total                 Intake    P.O.  240  240 480  840  80 920    I.V.  56.3  -- 56.3  --  -- --    Total Intake 296.3 240 536.3 840 80 920       Output    Total Output -- -- -- -- -- --               Physical Exam:     General Appearance:  Alert, cooperative, in no acute distress, oriented x 3.   Head:  Normocephalic, without obvious abnormality.   Eyes:          Conjunctivae and sclerae normal, no icterus, no pallor, pupils CCERLA   Ears:  Ears appear intact.   Throat:      Neck: No adenopathy, no thyromegaly, no carotid bruit, no JVD.   Back:       Lungs:   Clear to auscultation, respirations regular, even and unlabored.    Heart:  Regular rhythm and normal rate, normal S1 and S2, no murmur, no gallop, no rub, no click.   Chest Wall:  No abnormalities observed.   Abdomen:   Normal bowel sounds, no masses, no organomegaly, soft, nontender,  nondistended, no guarding, no rebound tenderness.   Rectal:   Deferred.   Extremities: Moves all extremities well, no edema, no cyanosis, no redness.   Pulses:    Skin: No petechiae, no nodules, bruising or rash.   Lymph nodes:      Neurologic: Cranial nerves grossly intact, sensation normal, moves all extremities.     Results Review:  I reviewed the patient's new clinical results.  I personally viewed and interpreted the patient's EKG/telemetry data.      Assessment and Plan:    -Acute kidney injury  -Chronic kidney disease stage IIIb baseline creatinine 1.6  -Acute on chronic congestive heart failure with preserved EF  -Hypertensive urgency  -Peripheral vascular disease  -Paroxysmal atrial fibrillation  -Acute hypoxic hypercapnic respiratory failure  -Acute non-ST elevation MI      02/17/2025  Patient baseline creatinine is around 1.61 up to 2.1 now down to 2.0 most likely patient had cardiorenal syndrome along with ischemic nephropathy  Patient is on chlorthalidone and is clinically stable so we will hold torsemide today  Blood pressure is already getting better  Recent CT scan did not show any obstruction or renal mass  Will check urine protein creatinine ratio    Prognosis guarded    Discussed with RN    Reviewed hospitalist notes  Reviewed consultant notes  Reviewed the labs  Reviewed radiology    Please avoid nephrotoxic medication and pharmacy to adjust the medication according to the GFR.    Plan of care was discussed with the patient. Patient understood, verbalized, agreed.      Plan of care discussed with patient, answered all questions. Patient verbalized understanding and agreed.    MERNA Steinberg MD  02/17/25  07:13 EST

## 2025-02-18 ENCOUNTER — READMISSION MANAGEMENT (OUTPATIENT)
Dept: CALL CENTER | Facility: HOSPITAL | Age: 70
End: 2025-02-18
Payer: MEDICARE

## 2025-02-18 VITALS
HEART RATE: 67 BPM | OXYGEN SATURATION: 93 % | BODY MASS INDEX: 34.63 KG/M2 | TEMPERATURE: 98 F | WEIGHT: 183.42 LBS | HEIGHT: 61 IN | RESPIRATION RATE: 18 BRPM | DIASTOLIC BLOOD PRESSURE: 74 MMHG | SYSTOLIC BLOOD PRESSURE: 139 MMHG

## 2025-02-18 PROBLEM — I16.9 HYPERTENSIVE CRISIS: Status: RESOLVED | Noted: 2025-02-13 | Resolved: 2025-02-18

## 2025-02-18 LAB
ALBUMIN SERPL-MCNC: 3.2 G/DL (ref 3.5–5.2)
ALBUMIN/GLOB SERPL: 0.8 G/DL
ALP SERPL-CCNC: 196 U/L (ref 39–117)
ALT SERPL W P-5'-P-CCNC: 10 U/L (ref 1–33)
ANION GAP SERPL CALCULATED.3IONS-SCNC: 11 MMOL/L (ref 5–15)
ANISOCYTOSIS BLD QL: NORMAL
AST SERPL-CCNC: 19 U/L (ref 1–32)
BACTERIA SPEC AEROBE CULT: NORMAL
BACTERIA SPEC AEROBE CULT: NORMAL
BASOPHILS # BLD AUTO: 0.06 10*3/MM3 (ref 0–0.2)
BASOPHILS NFR BLD AUTO: 0.8 % (ref 0–1.5)
BILIRUB SERPL-MCNC: 0.4 MG/DL (ref 0–1.2)
BUN SERPL-MCNC: 30 MG/DL (ref 8–23)
BUN/CREAT SERPL: 15.3 (ref 7–25)
CALCIUM SPEC-SCNC: 9.1 MG/DL (ref 8.6–10.5)
CHLORIDE SERPL-SCNC: 101 MMOL/L (ref 98–107)
CO2 SERPL-SCNC: 24 MMOL/L (ref 22–29)
CREAT SERPL-MCNC: 1.96 MG/DL (ref 0.57–1)
DEPRECATED RDW RBC AUTO: 58.9 FL (ref 37–54)
EGFRCR SERPLBLD CKD-EPI 2021: 27.6 ML/MIN/1.73
EOSINOPHIL # BLD AUTO: 0.65 10*3/MM3 (ref 0–0.4)
EOSINOPHIL NFR BLD AUTO: 9.1 % (ref 0.3–6.2)
ERYTHROCYTE [DISTWIDTH] IN BLOOD BY AUTOMATED COUNT: 20.7 % (ref 12.3–15.4)
GLOBULIN UR ELPH-MCNC: 3.8 GM/DL
GLUCOSE SERPL-MCNC: 104 MG/DL (ref 65–99)
HCT VFR BLD AUTO: 29.7 % (ref 34–46.6)
HGB BLD-MCNC: 8.3 G/DL (ref 12–15.9)
HYPOCHROMIA BLD QL: NORMAL
IMM GRANULOCYTES # BLD AUTO: 0.02 10*3/MM3 (ref 0–0.05)
IMM GRANULOCYTES NFR BLD AUTO: 0.3 % (ref 0–0.5)
LYMPHOCYTES # BLD AUTO: 1.11 10*3/MM3 (ref 0.7–3.1)
LYMPHOCYTES NFR BLD AUTO: 15.5 % (ref 19.6–45.3)
MCH RBC QN AUTO: 22.4 PG (ref 26.6–33)
MCHC RBC AUTO-ENTMCNC: 27.9 G/DL (ref 31.5–35.7)
MCV RBC AUTO: 80.1 FL (ref 79–97)
MONOCYTES # BLD AUTO: 0.61 10*3/MM3 (ref 0.1–0.9)
MONOCYTES NFR BLD AUTO: 8.5 % (ref 5–12)
NEUTROPHILS NFR BLD AUTO: 4.7 10*3/MM3 (ref 1.7–7)
NEUTROPHILS NFR BLD AUTO: 65.8 % (ref 42.7–76)
NRBC BLD AUTO-RTO: 0 /100 WBC (ref 0–0.2)
PLAT MORPH BLD: NORMAL
PLATELET # BLD AUTO: 352 10*3/MM3 (ref 140–450)
PMV BLD AUTO: 9.2 FL (ref 6–12)
POTASSIUM SERPL-SCNC: 4.4 MMOL/L (ref 3.5–5.2)
PROT SERPL-MCNC: 7 G/DL (ref 6–8.5)
RBC # BLD AUTO: 3.71 10*6/MM3 (ref 3.77–5.28)
SODIUM SERPL-SCNC: 136 MMOL/L (ref 136–145)
UFH PPP CHRO-ACNC: 0.25 IU/ML (ref 0.3–0.7)
UFH PPP CHRO-ACNC: <0.1 IU/ML (ref 0.3–0.7)
UFH PPP CHRO-ACNC: <0.1 IU/ML (ref 0.3–0.7)
WBC NRBC COR # BLD AUTO: 7.15 10*3/MM3 (ref 3.4–10.8)

## 2025-02-18 PROCEDURE — 99239 HOSP IP/OBS DSCHRG MGMT >30: CPT

## 2025-02-18 PROCEDURE — 25010000002 HEPARIN (PORCINE) PER 1000 UNITS: Performed by: INTERNAL MEDICINE

## 2025-02-18 PROCEDURE — 85520 HEPARIN ASSAY: CPT | Performed by: INTERNAL MEDICINE

## 2025-02-18 PROCEDURE — 99221 1ST HOSP IP/OBS SF/LOW 40: CPT | Performed by: INTERNAL MEDICINE

## 2025-02-18 PROCEDURE — 97162 PT EVAL MOD COMPLEX 30 MIN: CPT

## 2025-02-18 PROCEDURE — 85007 BL SMEAR W/DIFF WBC COUNT: CPT | Performed by: INTERNAL MEDICINE

## 2025-02-18 PROCEDURE — 85025 COMPLETE CBC W/AUTO DIFF WBC: CPT | Performed by: INTERNAL MEDICINE

## 2025-02-18 PROCEDURE — 80053 COMPREHEN METABOLIC PANEL: CPT | Performed by: INTERNAL MEDICINE

## 2025-02-18 PROCEDURE — 94799 UNLISTED PULMONARY SVC/PX: CPT

## 2025-02-18 RX ORDER — ATORVASTATIN CALCIUM 80 MG/1
80 TABLET, FILM COATED ORAL NIGHTLY
Qty: 30 TABLET | Refills: 0 | Status: SHIPPED | OUTPATIENT
Start: 2025-02-18 | End: 2025-03-20

## 2025-02-18 RX ORDER — HYDRALAZINE HYDROCHLORIDE 100 MG/1
100 TABLET, FILM COATED ORAL EVERY 8 HOURS SCHEDULED
Qty: 90 TABLET | Refills: 0 | Status: SHIPPED | OUTPATIENT
Start: 2025-02-18 | End: 2025-03-20

## 2025-02-18 RX ORDER — HEPARIN SODIUM 5000 [USP'U]/ML
4000 INJECTION, SOLUTION INTRAVENOUS; SUBCUTANEOUS ONCE
Status: COMPLETED | OUTPATIENT
Start: 2025-02-18 | End: 2025-02-18

## 2025-02-18 RX ORDER — HEPARIN SODIUM 5000 [USP'U]/ML
4000 INJECTION, SOLUTION INTRAVENOUS; SUBCUTANEOUS ONCE
Status: DISCONTINUED | OUTPATIENT
Start: 2025-02-18 | End: 2025-02-18 | Stop reason: HOSPADM

## 2025-02-18 RX ORDER — METOPROLOL TARTRATE 25 MG/1
25 TABLET, FILM COATED ORAL EVERY 12 HOURS SCHEDULED
Qty: 60 TABLET | Refills: 0 | Status: SHIPPED | OUTPATIENT
Start: 2025-02-18 | End: 2025-03-20

## 2025-02-18 RX ORDER — AMLODIPINE BESYLATE 10 MG/1
10 TABLET ORAL
Qty: 30 TABLET | Refills: 0 | Status: SHIPPED | OUTPATIENT
Start: 2025-02-18 | End: 2025-03-20

## 2025-02-18 RX ADMIN — METOPROLOL TARTRATE 25 MG: 25 TABLET, FILM COATED ORAL at 09:08

## 2025-02-18 RX ADMIN — HEPARIN SODIUM 4000 UNITS: 5000 INJECTION INTRAVENOUS; SUBCUTANEOUS at 00:51

## 2025-02-18 RX ADMIN — SERTRALINE HYDROCHLORIDE 25 MG: 25 TABLET ORAL at 09:08

## 2025-02-18 RX ADMIN — CHLORTHALIDONE 25 MG: 50 TABLET ORAL at 09:07

## 2025-02-18 RX ADMIN — Medication 10 ML: at 09:12

## 2025-02-18 RX ADMIN — FOLIC ACID 1 MG: 1 TABLET ORAL at 09:08

## 2025-02-18 RX ADMIN — GABAPENTIN 300 MG: 300 CAPSULE ORAL at 05:57

## 2025-02-18 RX ADMIN — HYDRALAZINE HYDROCHLORIDE 100 MG: 50 TABLET ORAL at 05:57

## 2025-02-18 RX ADMIN — PANTOPRAZOLE SODIUM 40 MG: 40 TABLET, DELAYED RELEASE ORAL at 09:08

## 2025-02-18 RX ADMIN — ASPIRIN 81 MG: 81 TABLET, COATED ORAL at 09:08

## 2025-02-18 RX ADMIN — NICOTINE 1 PATCH: 21 PATCH TRANSDERMAL at 09:07

## 2025-02-18 NOTE — PROGRESS NOTES
HEPARIN INFUSION  Lesley Michel is a  69 y.o. female receiving heparin infusion.     Therapy for (VTE/Cardiac):   Cardiac  Patient Dosing Weight: 90.7 kg  Initial Bolus (Y/N):   N was on Eliquis at home PTA  Any Bolus (Y/N):   Y        Signs or Symptoms of Bleeding: n    Cardiac or Other (Not VTE)   Initial Bolus: 60 units/kg (Max 4,000 units)  Initial rate: 12 units/kg/hr (Max 1,000 units/hr)   Anti Xa Rebolus Infusion Hold time Change infusion Dose (Units/kg/hr) Next Anti Xa or aPTT Level Due   < 0.11 50 Units/kg  (4000 Units Max) None Increase by  3 Units/kg/hr 6 hours   0.11- 0.19 25 Units/kg  (2000 Units Max) None Increase by  2 Units/kg/hr 6 hours   0.2 - 0.29 0 None Increase by  1 Units/kg/hr 6 hours   0.3 - 0.5 0 None No Change 6 hours (after 2 consecutive levels in range check q24h @0700)   0.51 - 0.6 0 None Decrease by  1 Units/kg/hr 6 hours   0.61 - 0.8 0 30 Minutes Decrease by  2 Units/kg/hr 6 hours   0.81 - 1 0 60 Minutes Decrease by  3 Units/kg/hr 6 hours   >1 0 Hold  After Anti Xa less than 0.5 decrease previous rate by  4 Units/kg/hr  Every 2 hours until Anti Xa  less than 0.5 then when infusion restarts in 6 hours         Recommend anti-Xa every 6 hours.          Date   Time   Anti-Xa Current Rate (Unit/kg/hr) Bolus   (Units) Rate Change   (Unit/kg/hr) New Rate (Unit/kg/hr) Next   Anti-Xa Comments  Pump Check Daily   2/13 0828 >1.1    Hold 1430 On eliquis at home. Hold until Xa <1.0. Discussed with María SILVER.   2/13 1519 >1.1    Hold 2130 Discussed with María SILVER   2/13 2140 >1.1    Hold 0400 Continue to  hold   2/14 0400 >1.1    Hold 1000 Continue to hold   2/14 0953 0.92  No initial  8 1800 D/w  María SILVER, has lost IV and refusing IV at this time, nurse to notify cardio   2/14 1842  0   8 0100 Drip has been held for an extended time. Cardiology wants it restarted now per nursing. Restarting at previous rate.   2/15 0240 0.43 8 - - 8 0900 Therapeutic x 1. No s/s of bleeding per NADEEM Robertson.     2/15 0841 0.3 8 - - 8 0700 on 2/16 Therapeutic x 2. Discussed with nurse Das. No s/sx bleeding.    2/16 0640 0.19 8 2000 +2 10 1400 Discussed with nurse Griffin. No s/sx bleeding reported.   2/16 1519 0.21 10 - +1 11 - Discussed with nurse Griffin. No s/sx bleeding. Heparin drip is ending at 1900 so no further levels.              2/18 1723  0  +11 11 0000 Drip was discontinued per previous note and is now starting back. Starting at previous titrated rate.   2/18 0040 <0.1 11 4000 +3 14 0700 Discussed with NADEEM Okeefe. No s/s of bleeding.    2/18 0707 0.25 14 - +1 15 1300 Rate adjusted, no s/s bleeding, d/w  Michelle SILVER                                                                                  Pharmacy will continue to follow anti-Xa results and monitor for signs and symptoms of bleeding or thrombosis.        rachel  02/18/25

## 2025-02-18 NOTE — THERAPY DISCHARGE NOTE
Acute Care - Physical Therapy Initial Evaluation/Discharge  [facility] [place]     Patient Name: Lesley Michel  : 1955  MRN: [id number]  Today's Date: 2025   Onset of Illness/Injury or Date of Surgery: 25     Referring Physician: Mata      Admit Date: 2025    Visit Dx:    ICD-10-CM ICD-9-CM   1. Acute pulmonary edema  J81.0 518.4   2. Acute hypoxic respiratory failure  J96.01 518.81   3. NSTEMI (non-ST elevated myocardial infarction)  I21.4 410.70   4. Acute renal failure, unspecified acute renal failure type  N17.9 584.9     Patient Active Problem List   Diagnosis    Hypertension    Hyperlipidemia    Migraine without aura    CAD, chronically occluded collateralized dominant RCA, status post drug-eluting stent to LAD , high-grade stenosis of the ostium of the small D1 and 60% mid RCA posterior lateral branch    Chronic renal failure, stage 2 (mild)    Tobacco use    Bilateral lower extremity edema    Other specified hypothyroidism    Cigarette smoker    Class 3 severe obesity without serious comorbidity with body mass index (BMI) of 40.0 to 44.9 in adult    Acute hypoxic respiratory failure    Acute renal failure (ARF)    Acute on chronic anemia     Past Medical History:   Diagnosis Date    Anxiety     Arthritis     CHF (congestive heart failure)     Cigarette smoker 02/10/2022    CKD (chronic kidney disease) stage 4, GFR 15-29 ml/min     MAICO on top of CKD IIIa, HD from 10/23-, some recovery to CKD IV    COPD (chronic obstructive pulmonary disease)     Coronary artery disease     Elevated cholesterol     GERD (gastroesophageal reflux disease)     H/O heart artery stent     History of transfusion     Hyperlipidemia     Hypertension     hypothyroidism     Obesity     PAF (paroxysmal atrial fibrillation)     PVD (peripheral vascular disease)     x2 stents     Past Surgical History:   Procedure Laterality Date    ANKLE SURGERY      RIGHT    APPENDECTOMY      CARDIAC CATHETERIZATION      LEFT      SECTION      CHOLECYSTECTOMY N/A 2025    Procedure: CHOLECYSTECTOMY LAPAROSCOPIC WITH DAVINCI ROBOT;  Surgeon: Windy Neal MD;  Location:  [facility];  Service: Robotics - DaVinci;  Laterality: N/A;    CORONARY STENT PLACEMENT      HYSTERECTOMY      INSERTION HEMODIALYSIS CATHETER Right 10/19/2023    Procedure: HEMODIALYSIS CATHETER INSERTION;  Surgeon: Bartolome Schwartz MD;  Location:  [facility];  Service: General;  Laterality: Right;       PT Assessment (Last 12 Hours)       PT Evaluation and Treatment       Row Name 25 1341          Physical Therapy Time and Intention    Subjective Information no complaints  -CT     Document Type evaluation;discharge evaluation/summary  -CT     Mode of Treatment physical therapy  -CT     Patient Effort good  -CT     Comment Pt reports she is independent PLOF. Pt is SBA/IND for mobility at time of eval.  -CT       Row Name 25 1341          General Information    Patient Profile Reviewed yes  -CT     Onset of Illness/Injury or Date of Surgery 25  -CT     Referring Physician August  -CT     Patient Observations alert;cooperative;agree to therapy  -CT     Prior Level of Function independent:;all household mobility;community mobility  -CT     Existing Precautions/Restrictions no known precautions/restrictions  -CT     Equipment Issued to Patient gait belt  -CT     Risks Reviewed patient:;LOB;nausea/vomiting;dizziness;increased discomfort;change in vital signs;increased drainage;lines disloged  -CT     Benefits Reviewed patient:;improve function;increase independence;increase strength;increase balance;decrease pain;decrease risk of DVT;improve skin integrity;increase knowledge  -CT     Barriers to Rehab none identified  -CT       Row Name 25 1341          Living Environment    Current Living Arrangements home  -CT     Primary Care Provided by self  -CT       Row Name 25 1341          Home Use of Assistive/Adaptive Equipment     Equipment Currently Used at Home oxygen  -CT       Row Name 02/18/25 1341          Cognition    Affect/Mental Status (Cognition) WNL  -CT     Orientation Status (Cognition) oriented x 4  -CT     Follows Commands (Cognition) WNL  -CT     Personal Safety Interventions fall prevention program maintained;gait belt;muscle strengthening facilitated;nonskid shoes/slippers when out of bed;supervised activity  -CT       Row Name 02/18/25 1341          Range of Motion (ROM)    Range of Motion ROM is WFL  -CT       Row Name 02/18/25 1341          Strength (Manual Muscle Testing)    Strength (Manual Muscle Testing) strength is WFL  -CT       Row Name 02/18/25 1341          Bed Mobility    Bed Mobility bed mobility (all) activities  -CT     All Activities, [place] (Bed Mobility) standby assist  -CT     Assistive Device (Bed Mobility) bed rails  -CT       Row Name 02/18/25 1341          Transfers    Transfers sit-stand transfer;stand-sit transfer  -CT       Row Name 02/18/25 1341          Sit-Stand Transfer    Sit-Stand [place] (Transfers) standby assist  -CT       Row Name 02/18/25 1341          Stand-Sit Transfer    Stand-Sit [place] (Transfers) standby assist  -CT       Row Name 02/18/25 1341          Gait/Stairs (Locomotion)    Gait/Stairs Locomotion gait/ambulation independence  -CT     [place] Level (Gait) standby assist  -CT     Patient was able to Ambulate yes  -CT     Distance in Feet (Gait) 40  -CT     Pattern (Gait) swing-through  -CT     Deviations/Abnormal Patterns (Gait) gait speed decreased  -CT       Row Name 02/18/25 1341          Balance    Balance Assessment sitting static balance;sitting dynamic balance;standing static balance;standing dynamic balance  -CT     Static Sitting Balance independent  -CT     Dynamic Sitting Balance independent;set-up  -CT     Position, Sitting Balance sitting edge of bed  -CT     Static Standing Balance standby assist  -CT     Dynamic Standing Balance contact  guard  -CT       Row Name 02/18/25 1341          Coping    Observed Emotional State calm;cooperative  -CT     Verbalized Emotional State acceptance  -CT       Row Name 02/18/25 1341          Plan of Care Review    Plan of Care Reviewed With patient  -CT       Row Name 02/18/25 1341          Positioning and Restraints    Pre-Treatment Position in bed  -CT     Post Treatment Position bed  -CT     In Bed supine;call light within reach;encouraged to call for assist;exit alarm on;patient within staff view  -CT       Row Name 02/18/25 1341          Therapy Assessment/Plan (PT)    Criteria for Skilled Interventions Met (PT) no;no problems identified which require skilled intervention  -CT     Therapy Frequency (PT) evaluation only  -CT       Row Name 02/18/25 1341          Therapy Plan Review/Discharge Plan (PT)    Therapy Plan Review (PT) evaluation/treatment results reviewed  -CT               User Key  (r) = Recorded By, (t) = Taken By, (c) = Cosigned By      Initials Name Provider Type    CT Abida Buchanan, PT Physical Therapist                      Physical Therapy Education       Title: PT OT SLP Therapies (Done)       Topic: Physical Therapy (Done)       Point: Mobility training (Done)       Learning Progress Summary            Patient Acceptance, E,TB, VU by JAMEEL at 2/15/2025 2328                      Point: Home exercise program (Done)       Learning Progress Summary            Patient Acceptance, E,TB, VU by JAMEEL at 2/15/2025 2328                      Point: Body mechanics (Done)       Learning Progress Summary            Patient Acceptance, E,TB, VU by JAMEEL at 2/15/2025 2328                      Point: Precautions (Done)       Learning Progress Summary            Patient Acceptance, E,TB, VU by  at 2/15/2025 2328                                      User Key       Initials Effective Dates Name Provider Type Discipline     01/26/24 -  Vanessa Jeffries, RN Registered Nurse Nurse                    PT Recommendation  and Plan  Anticipated Discharge Disposition (PT): home, home with assist  Therapy Frequency (PT): evaluation only  Plan of Care Reviewed With: patient         Time Calculation:    PT Charges       Row Name 02/18/25 1347             Time Calculation    PT Received On 02/18/25  -CT                User Key  (r) = Recorded By, (t) = Taken By, (c) = Cosigned By      Initials Name Provider Type    CT Abida Buchanan, PT Physical Therapist                  Therapy Charges for Today       Code Description Service Date Service Provider Modifiers Qty    22866420154 HC PT EVAL MOD COMPLEXITY 4 2/18/2025 Abida Buchanan, PT GP 1            PT G-Codes  AM-PAC 6 Clicks Score (PT): 22    PT Discharge Summary  Anticipated Discharge Disposition (PT): home, home with assist    Abida Buchanan PT  2/18/2025

## 2025-02-18 NOTE — CONSULTS
Inpatient Interventional Cardiology Consult  Consult performed by: Sebastian Ferraro MD  Consult ordered by: Marj Barbosa DO        Patient Identification:    Name:  Lesley Michel  Age:  69 y.o.  Sex:  female  :  1955  MRN:  [id number]  Visit Number:  [id number]  Primary care provider:  Woodrow Raymond APRN    Chief complaint:   Hypertensive urgency, NSTEMI    History of presenting illness:   This is a 69-year-old female with past medical history as stated including hypertension, hyperlipidemia, CAD (prior LAD stent , known RCA , known high-grade stenosis of ostial D1), A-fib (on Eliquis), HFpEF, COPD on 2 L nasal cannula, CKD (requiring dialysis in ; currently not on dialysis), PAD, who presented on 2025 with shortness of breath, acute hypoxic respiratory failure, pulmonary edema/acute on chronic HFpEF and NSTEMI in the setting of uncontrolled hypertension.  For further evaluation, patient underwent nuclear stress test 2025 which was abnormal with small to moderate-sized infarct located in apical anterior wall and apex with moderate to severe ciro-infarct ischemia.  Interventional cardiology consult is called for coronary angiogram to further assess CAD.    ---------------------------------------------------------------------------------------------------------------------  ROS: All systems reviewed and negative except noted above.    ---------------------------------------------------------------------------------------------------------------------     Past History:   Family History   Problem Relation Age of Onset    Heart disease Mother     Heart attack Mother 73    Fibromyalgia Mother     Heart attack Father 72    Ovarian cancer Sister     Coronary artery disease Other     Breast cancer Neg Hx      Past Medical History:   Diagnosis Date    Anxiety     Arthritis     CHF (congestive heart failure)     Cigarette smoker 02/10/2022    CKD (chronic kidney disease) stage 4,  GFR 15-29 ml/min     MAICO on top of CKD IIIa, HD from 10/23-, some recovery to CKD IV    COPD (chronic obstructive pulmonary disease)     Coronary artery disease     Elevated cholesterol     GERD (gastroesophageal reflux disease)     H/O heart artery stent     History of transfusion     Hyperlipidemia     Hypertension     hypothyroidism     Obesity     PAF (paroxysmal atrial fibrillation)     PVD (peripheral vascular disease)     x2 stents     Past Surgical History:   Procedure Laterality Date    ANKLE SURGERY      RIGHT    APPENDECTOMY      CARDIAC CATHETERIZATION      LEFT     SECTION      CHOLECYSTECTOMY N/A 2025    Procedure: CHOLECYSTECTOMY LAPAROSCOPIC WITH Flir[employer]comINCI ROBOT;  Surgeon: Windy Neal MD;  Location: [facility];  Service: Robotics - DaVinci;  Laterality: N/A;    CORONARY STENT PLACEMENT      HYSTERECTOMY      INSERTION HEMODIALYSIS CATHETER Right 10/19/2023    Procedure: HEMODIALYSIS CATHETER INSERTION;  Surgeon: Bartolome Schwartz MD;  Location: [facility];  Service: General;  Laterality: Right;     Social History     Socioeconomic History    Marital status:    Tobacco Use    Smoking status: Every Day     Current packs/day: 0.50     Average packs/day: 0.5 packs/day for 30.0 years (15.0 ttl pk-yrs)     Types: Cigarettes     Passive exposure: Never    Smokeless tobacco: Never   Vaping Use    Vaping status: Never Used   Substance and Sexual Activity    Alcohol use: No    Drug use: No    Sexual activity: Never     ---------------------------------------------------------------------------------------------------------------------   Allergies:  Xanax [alprazolam]  ---------------------------------------------------------------------------------------------------------------------   Prior to Admission Medications       Prescriptions Last Dose Informant Patient Reported? Taking?    albuterol sulfate  (90 Base) MCG/ACT inhaler Past Month Other, Child, Pharmacy No Yes     Inhale 2 puffs Every 4 (Four) Hours As Needed for Wheezing.    amLODIPine (NORVASC) 2.5 MG tablet 2/12/2025 Other, Child, Pharmacy Yes Yes    Take 1 tablet by mouth Daily.    aspirin 81 MG chewable tablet 2/12/2025 Other, Child, Pharmacy Yes Yes    Chew 1 tablet Daily.    chlorthalidone (HYGROTON) 25 MG tablet 2/12/2025 Other, Child, Pharmacy Yes Yes    Take 1 tablet by mouth Daily.    Eliquis 5 MG tablet tablet 2/12/2025 Other, Child, Pharmacy Yes Yes    Take 1 tablet by mouth Every 12 (Twelve) Hours.    folic acid (FOLVITE) 1 MG tablet 2/12/2025 Other, Child, Pharmacy Yes Yes    Take 1 tablet by mouth Daily.    gabapentin (NEURONTIN) 600 MG tablet 2/12/2025 Other, Child, Pharmacy Yes Yes    Take 0.5 tablets by mouth 3 (Three) Times a Day As Needed (nerve pain).    HYDROcodone-acetaminophen (NORCO)  MG per tablet 2/12/2025 Other, Child, Pharmacy Yes Yes    Take 1 tablet by mouth Every 8 (Eight) Hours As Needed for Moderate Pain.    ipratropium-albuterol (DUO-NEB) 0.5-2.5 mg/3 ml nebulizer Past Month Other, Child, Pharmacy No Yes    Take 3 mL by nebulization Every 4 (Four) Hours As Needed for Wheezing.    metoprolol tartrate (LOPRESSOR) 25 MG tablet 2/12/2025 Other, Child, Pharmacy Yes Yes    Take 1 tablet by mouth Daily.    ondansetron ODT (ZOFRAN-ODT) 4 MG disintegrating tablet Past Week Other, Child, Pharmacy Yes Yes    Place 1 tablet on the tongue Every 8 (Eight) Hours As Needed for Nausea or Vomiting.    pantoprazole (PROTONIX) 40 MG EC tablet 2/12/2025 Other, Child, Pharmacy Yes Yes    Take 1 tablet by mouth Daily.    rosuvastatin (CRESTOR) 10 MG tablet 2/12/2025 Other, Child, Pharmacy Yes Yes    Take 1 tablet by mouth Daily.    sertraline (ZOLOFT) 25 MG tablet 2/12/2025 Other, Child, Pharmacy Yes Yes    Take 1 tablet by mouth Daily.    torsemide 40 MG tablet 2/12/2025 Pharmacy No Yes    Take 40 mg by mouth Daily for 30 days.          Hospital Meds:  amLODIPine, 10 mg, Oral, Q24H  aspirin, 81 mg, Oral,  Daily  atorvastatin, 80 mg, Oral, Nightly  chlorthalidone, 25 mg, Oral, Daily  folic acid, 1 mg, Oral, Daily  gabapentin, 300 mg, Oral, Q8H  hydrALAZINE, 100 mg, Oral, Q8H  metoprolol tartrate, 25 mg, Oral, Q12H  nicotine, 1 patch, Transdermal, Q24H  pantoprazole, 40 mg, Oral, Daily  senna-docusate sodium, 2 tablet, Oral, BID  sertraline, 25 mg, Oral, Daily  sodium chloride, 10 mL, Intravenous, Q12H  sodium chloride, 10 mL, Intravenous, Q12H  [Held by provider] torsemide, 40 mg, Oral, Daily      heparin, 15 Units/kg/hr (Dosing Weight), Last Rate: 15 Units/kg/hr (02/18/25 0911)  Pharmacy to Dose Heparin,       ---------------------------------------------------------------------------------------------------------------------   Vital Signs:  Temp:  [97.8 °F (36.6 °C)-99.4 °F (37.4 °C)] 98 °F (36.7 °C)  Heart Rate:  [60-72] 67  Resp:  [18-20] 18  BP: (134-163)/(68-81) 139/74      02/16/25  0500 02/17/25  0500 02/18/25  0454   Weight: 83.9 kg (184 lb 15.5 oz) 83.4 kg (183 lb 13.8 oz) 83.2 kg (183 lb 6.8 oz)     Body mass index is 34.66 kg/m².  ---------------------------------------------------------------------------------------------------------------------   Physical exam:   Constitutional:  Well-developed and well-nourished.  No respiratory distress.      HENT:  Head: Normocephalic and atraumatic.  Mouth:  Moist mucous membranes.    Eyes:  Conjunctivae and EOM are normal.  Pupils are equal, round, and reactive to light.  No scleral icterus.  Neck:  Neck supple.  No JVD present.    Cardiovascular:  Normal rate, regular rhythm and normal heart sounds with no murmur.  Pulmonary/Chest: Bibasilar Rales   Abdominal:  Soft.  Bowel sounds are normal.  No distension and no tenderness.   Musculoskeletal:  No edema, no tenderness, and no deformity.  No red or swollen joints anywhere.    Neurological:  Alert and oriented to person, place, and time.  No cranial nerve deficit.  No tongue deviation.  No facial droop.  No slurred  speech.   Skin:  Skin is warm and dry.  No rash noted.  No pallor.   Psychiatric:  Normal mood and affect.  Behavior is normal.  Judgment and thought content normal.   Peripheral vascular:  No edema and strong pulses on all 4 extremities.    ---------------------------------------------------------------------------------------------------------------------   EK2025: NSR at 80 bpm, PACs, LVH, T wave inversions in V1 through V6    Echo:  Results for orders placed during the hospital encounter of 25    Adult Transthoracic Echo Complete w/ Color, Spectral and Contrast if necessary per protocol    Interpretation Summary    Left ventricular systolic function is normal. Left ventricular ejection fraction appears to be 56 - 60%.    Left ventricular wall thickness is consistent with mild concentric hypertrophy.    There is calcification of the aortic valve.    Mild mitral valve stenosis is present.    Estimated right ventricular systolic pressure from tricuspid regurgitation is normal (<35 mmHg).    Loretta SPECT 2025:    Impressions are consistent with an intermediate risk study.    Left ventricular ejection fraction is normal (Calculated EF = 52%). TID 1.07.    Myocardial perfusion imaging indicates a small-to-moderate-sized infarct located in the mid to apical anterior wall and apex with moderate-to-severe ciro-infarct ischemia.    There was no ST segment deviation noted during stress.    ---------------------------------------------------------------------------------------------------------------------   Results from last 7 days   Lab Units 25  0008 25  1733 25  0017 02/15/25  0841 25  0543 25  1244 25  0931 25  0828 25  0630   LACTATE mmol/L  --   --   --   --   --  1.9 2.1*  --  3.8*   WBC 10*3/mm3 7.15  --  7.31 7.33   < >  --   --   --  13.86*   HEMOGLOBIN g/dL 8.3*  --  8.4* 8.6*   < >  --   --   --  11.2*   HEMATOCRIT % 29.7*  --  29.9* 30.2*   < >   "--   --   --  39.3   MCV fL 80.1  --  78.3* 78.4*   < >  --   --   --  78.9*   MCHC g/dL 27.9*  --  28.1* 28.5*   < >  --   --   --  28.5*   PLATELETS 10*3/mm3 352  --  422 388   < >  --   --   --  612*   INR   --  1.17*  --   --   --   --   --  1.55*  --     < > = values in this interval not displayed.     Results from last 7 days   Lab Units 02/14/25  0409   PH, ARTERIAL pH units 7.433   PO2 ART mm Hg 70.6*   PCO2, ARTERIAL mm Hg 34.7*   HCO3 ART mmol/L 23.2     Results from last 7 days   Lab Units 02/18/25  0008 02/17/25  0017 02/16/25  0640 02/15/25  0133 02/14/25  1206 02/13/25  0630   SODIUM mmol/L 136 135* 136 134* 135* 140   POTASSIUM mmol/L 4.4 4.5 4.5 4.5 4.4 4.8   MAGNESIUM mg/dL  --   --  1.9 2.0 1.9  --    CHLORIDE mmol/L 101 99 100 101 105 105   CO2 mmol/L 24.0 25.1 23.6 22.6 19.6* 20.5*   BUN mg/dL 30* 33* 34* 27* 26* 32*   CREATININE mg/dL 1.96* 2.03* 2.13* 1.76* 1.56* 1.75*   CALCIUM mg/dL 9.1 9.0 9.0 8.9 8.5* 9.5   GLUCOSE mg/dL 104* 96 102* 93 119* 116*   ALBUMIN g/dL 3.2*  --   --   --   --  4.1   BILIRUBIN mg/dL 0.4  --   --   --   --  0.4   ALK PHOS U/L 196*  --   --   --   --  216*   AST (SGOT) U/L 19  --   --   --   --  23   ALT (SGPT) U/L 10  --   --   --   --  9   Estimated Creatinine Clearance: 27.3 mL/min (A) (by C-G formula based on SCr of 1.96 mg/dL (H)).  No results found for: \"AMMONIA\"  Results from last 7 days   Lab Units 02/13/25  1715 02/13/25  0828 02/13/25 0630   HSTROP T ng/L 157* 169* 183*     Results from last 7 days   Lab Units 02/13/25  0630   PROBNP pg/mL 15,163.0*     Lab Results   Component Value Date    HGBA1C 5.30 10/08/2023     Lab Results   Component Value Date    TSH 6.770 (H) 10/08/2023    FREET4 1.01 10/08/2023         Blood Culture   Date Value Ref Range Status   02/13/2025 No growth at 5 days  Final   02/13/2025 No growth at 5 days  Final         --------------------------------------------------------------------------------------------------------------------- "   Imaging Results (Last 7 Days)       Procedure Component Value Units Date/Time    XR Chest 1 View [[id number]] Collected: 02/13/25 0844     Updated: 02/13/25 0846    Narrative:      XR CHEST 1 VW-     CLINICAL INDICATION: soa        COMPARISON: 1/19/2025     TECHNIQUE: Single frontal view of the chest.     FINDINGS:     LUNGS: Diffuse interstitial change and probable superimposed airspace  disease in both lung bases.     HEART AND MEDIASTINUM: Heart and mediastinal contours are unremarkable        SKELETON: Bony and soft tissue structures are unremarkable.             Impression:         1. Appearance concerning for CHF and possibly superimposed bibasilar  airspace disease           This report was finalized on 2/13/2025 8:44 AM by Dr. Julio Dominguez MD.             ----------------------------------------------------------------------------------------------------------------------  Assessment:   # Hypertensive urgency  # Acute hypoxic respiratory failure  # Acute on chronic HFpEF  # NSTEMI (type I versus type II)  # Abnormal stress test      Plan:   - Given the abnormal stress test which is suggestive of ischemia, further assessment of CAD with coronary angiogram was discussed with the patient in detail.  Risks, benefits, alternatives were discussed in detail.  - At this time, patient decline undergoing coronary angiogram.    - She states that she will be following with her primary cardiologist in the clinic and discuss further management then.   - Further management as per primary/requesting cardiology team.      Thank you for the consult.    Sebastian Ferraro MD  02/18/25  12:05 EST

## 2025-02-18 NOTE — NURSING NOTE
Patient being discharged this shift. IV and tele removed. O2 at bedside, delivered by Patito Lugo. Patient son on his way to  patient.

## 2025-02-18 NOTE — PLAN OF CARE
Goal Outcome Evaluation:   Patient has been pleasant. Compliant with care. Patient remains on 2L/NC, when oxygen is in place remains above 90% but patient frequently removes she is A&Ox4 and has been educated several times through out the night. Patient resting in bed. Call light in reach.

## 2025-02-18 NOTE — PROGRESS NOTES
Nephrology Progress Note      Subjective     02/18/2025 patient is feeling much better denies any shortness of breath no nausea vomiting diarrhea no urgency    Objective       Vital signs:     Vitals:    02/18/25 0300 02/18/25 0454 02/18/25 0621 02/18/25 0659   BP: 152/76   149/77   BP Location: Right arm   Right arm   Patient Position: Lying   Sitting   Pulse: 60  66 66   Resp: 18 18 18   Temp: 97.8 °F (36.6 °C)   98.3 °F (36.8 °C)   TempSrc: Oral   Oral   SpO2: (!) 87%  94% 93%   Weight:  83.2 kg (183 lb 6.8 oz)     Height:            Intake/Output                         02/16/25 0701 - 02/17/25 0700 02/17/25 0701 - 02/18/25 0700     [id number] 6089-7611 Total 7460-3657 0595-7828 Total                 Intake    P.O.  840  80 920  0  480 480    Total Intake 840 80 920 0 480 480       Output    Total Output -- -- -- -- -- --           02/18/2025 examined and reviewed    Physical Exam:    General Appearance: Alert, pleasant, appears stated age, cooperative   Head: Normocephalic, without obvious abnormality and atraumatic.  Eyes: Conjunctivae and sclerae normal, no icterus, no pallor, and PERRLA.  Neck: No adenopathy, supple, no carotid bruit, no JVD.  Lungs: Clear to auscultation, respirations regular.  Heart: Regular rhythm & normal rate, normal S1, S2 and no murmur, no rub.  Abdomen: Normal bowel sounds, no masses, no hepatomegaly, no splenomegaly, soft, nontender, and no guarding.  Extremities: Moves extremities well, plus edema, no cyanosis and no redness.  Skin: No bleeding, bruising or rash.  Neurologic: Oriented to person, place, time and situation. Grossly no focal deficits.      Laboratory Data:       CBC and coagulation:  Results from last 7 days   Lab Units 02/18/25  0008 02/17/25  1733 02/17/25  0017 02/15/25  0841 02/14/25  0543 02/13/25  1244 02/13/25  0931 02/13/25  0828 02/13/25  0630   LACTATE mmol/L  --   --   --   --   --  1.9 2.1*  --  3.8*   WBC 10*3/mm3 7.15  --  7.31 7.33   < >  --   --   --   "13.86*   HEMOGLOBIN g/dL 8.3*  --  8.4* 8.6*   < >  --   --   --  11.2*   HEMATOCRIT % 29.7*  --  29.9* 30.2*   < >  --   --   --  39.3   MCV fL 80.1  --  78.3* 78.4*   < >  --   --   --  78.9*   MCHC g/dL 27.9*  --  28.1* 28.5*   < >  --   --   --  28.5*   PLATELETS 10*3/mm3 352  --  422 388   < >  --   --   --  612*   INR   --  1.17*  --   --   --   --   --  1.55*  --     < > = values in this interval not displayed.     Acid/base balance:  Results from last 7 days   Lab Units 02/14/25  0409 02/13/25  0747 02/13/25  0629   PH, ARTERIAL pH units 7.433 7.338* 7.183*   PO2 ART mm Hg 70.6* 85.1 46.9*   PCO2, ARTERIAL mm Hg 34.7* 36.2 47.9*   HCO3 ART mmol/L 23.2 19.4* 18.0*     Renal and electrolytes:    Results from last 7 days   Lab Units 02/18/25  0008 02/17/25  0017 02/16/25  0640 02/15/25  0133 02/14/25  1206   SODIUM mmol/L 136 135* 136 134* 135*   POTASSIUM mmol/L 4.4 4.5 4.5 4.5 4.4   MAGNESIUM mg/dL  --   --  1.9 2.0 1.9   CHLORIDE mmol/L 101 99 100 101 105   CO2 mmol/L 24.0 25.1 23.6 22.6 19.6*   BUN mg/dL 30* 33* 34* 27* 26*   CREATININE mg/dL 1.96* 2.03* 2.13* 1.76* 1.56*   CALCIUM mg/dL 9.1 9.0 9.0 8.9 8.5*     Estimated Creatinine Clearance: 27.3 mL/min (A) (by C-G formula based on SCr of 1.96 mg/dL (H)).     Liver and pancreatic function:  Results from last 7 days   Lab Units 02/18/25  0008 02/13/25  0630   ALBUMIN g/dL 3.2* 4.1   BILIRUBIN mg/dL 0.4 0.4   ALK PHOS U/L 196* 216*   AST (SGOT) U/L 19 23   ALT (SGPT) U/L 10 9   LIPASE U/L  --  52       Albumin Albumin   Date Value Ref Range Status   02/18/2025 3.2 (L) 3.5 - 5.2 g/dL Final      Magnesium Magnesium   Date Value Ref Range Status   02/16/2025 1.9 1.6 - 2.4 mg/dL Final          PTH               No results found for: \"PTH\"    Cardiac:  Results from last 7 days   Lab Units 02/13/25  0630   PROBNP pg/mL 15,163.0*     Liver and pancreatic function:  Results from last 7 days   Lab Units 02/18/25  0008 02/13/25  0630   ALBUMIN g/dL 3.2* 4.1 "   BILIRUBIN mg/dL 0.4 0.4   ALK PHOS U/L 196* 216*   AST (SGOT) U/L 19 23   ALT (SGPT) U/L 10 9   LIPASE U/L  --  52       XR Chest 1 View    Result Date: 2/13/2025   1. Appearance concerning for CHF and possibly superimposed bibasilar airspace disease    This report was finalized on 2/13/2025 8:44 AM by Dr. Julio Dominguez MD.        Medications:     amLODIPine, 10 mg, Oral, Q24H  aspirin, 81 mg, Oral, Daily  atorvastatin, 80 mg, Oral, Nightly  chlorthalidone, 25 mg, Oral, Daily  folic acid, 1 mg, Oral, Daily  gabapentin, 300 mg, Oral, Q8H  hydrALAZINE, 100 mg, Oral, Q8H  metoprolol tartrate, 25 mg, Oral, Q12H  mupirocin, 1 Application, Each Nare, BID  nicotine, 1 patch, Transdermal, Q24H  pantoprazole, 40 mg, Oral, Daily  senna-docusate sodium, 2 tablet, Oral, BID  sertraline, 25 mg, Oral, Daily  sodium chloride, 10 mL, Intravenous, Q12H  sodium chloride, 10 mL, Intravenous, Q12H  [Held by provider] torsemide, 40 mg, Oral, Daily      heparin, 14 Units/kg/hr (Dosing Weight), Last Rate: 14 Units/kg/hr (02/18/25 0051)  Pharmacy to Dose Heparin,           Assessment & Plan     02/18/2025 reviewed and updated    -Acute kidney injury  -Chronic kidney disease stage IIIb baseline creatinine 1.6  -Atrophic right kidney with no renal artery stenosis on renal angiogram  -Acute on chronic congestive heart failure with preserved EF  -Hypertensive urgency  -Peripheral vascular disease  -Paroxysmal atrial fibrillation  -Acute hypoxic hypercapnic respiratory failure  -Acute non-ST elevation MI        02/17/2025  Patient baseline creatinine is around 1.61 up to 2.1 now down to 2.0 most likely patient had cardiorenal syndrome along with ischemic nephropathy  Patient is on chlorthalidone and is clinically stable so we will hold torsemide today  Blood pressure is already getting better  Recent CT scan did not show any obstruction or renal mass  Will check urine protein creatinine ratio    02/18/2025  Patient's baseline creatinine is  1.61 proteinuria of 400 mg, atrophic right kidney with no renal artery stenosis on renal angiogram   Patient's creatinine went up to 2.1 now down to 1.96 although on chlorthalidone so stable  Patient had abnormal stress test so interventional cardiology has been consulted will await further recommendation  I explained to the patient the risk of acute kidney injury on dialysis with with IV contrast she understood verbalized and discussed with cardiology  Blood pressure is slowly getting better   Holding torsemide till plan from interventional cardiology        Prognosis guarded     Discussed with RN     Reviewed hospitalist notes  Reviewed consultant notes  Reviewed the labs  Reviewed radiology     Please avoid nephrotoxic medication and pharmacy to adjust the medication according to the GFR.          Plan of care discussed with patient, answered all questions. Patient verbalized understanding and agreed.      S Babak Steinberg MD  02/18/25  07:05 EST

## 2025-02-18 NOTE — OUTREACH NOTE
Prep Survey      Flowsheet Row Responses   [facility] facility patient discharged from? [facility]   Is LACE score < 7 ? No   Eligibility Readm Mgmt   Discharge diagnosis Hypertensive crisis   Does the patient have one of the following disease processes/diagnoses(primary or secondary)? Other   Prep survey completed? Yes            Maria Isabel GONGORA - Registered Nurse

## 2025-02-18 NOTE — PLAN OF CARE
Goal Outcome Evaluation:   Patient is being discharged home today.

## 2025-02-18 NOTE — DISCHARGE SUMMARY
[facility] HOSPITALIST DISCHARGE SUMMARY    Patient Identification:  Name:  Lesley Michel  Age:  69 y.o.  Sex:  female  :  1955  MRN:  [id number]  Visit Number:  [id number]    Date of Admission: 2025  Date of Discharge:  25     PCP: Woodrow Raymond APRN    Discharging Provider: Adebayo Lopez PA-C / Dr. Barbosa    Discharge Diagnoses     Discharge Diagnoses:  Acute on chronic hypoxic and hypercapnic respiratory failure, resolved  Hypertensive crisis, POA, now resolved  Concern for flash pulmonary edema due to #2  Decompensated HFpEF, improved  MAICO on CKD stage IV, resolving  Paroxysmal atrial fibrillation  NSTEMI, T1 versus T2  Abnormal stress test  Known ASCVD with prior PCI/ROSAURA to LAD    Secondary Diagnoses:  COPD  PVD  Migraine headaches  Anxiety  Chronic microcytic anemia    Needs on follow up:  PCP 1 week  Nephrology 2 weeks  Cardiology 2 to 3 weeks  Consults/Procedures     Consults:   Consults       Date and Time Order Name Status Description    2025  4:23 PM Inpatient Interventional Cardiology Consult      2025  1:14 PM Inpatient Nephrology Consult      2025  5:54 PM Inpatient General Surgery Consult      2025 10:29 AM Inpatient Palliative Care MD Consult Completed     2025  9:26 AM Inpatient Cardiology Consult Completed     2025  8:28 AM Inpatient General Surgery Consult Completed             Procedures/Scans Performed:  CXR   Stress test w/ MPI- intermediate risk study       History of Presenting Illness     Chief Complaint   Patient presents with    Shortness of Breath    Chest Pain       Patient is a 69 y.o. female who presented to [facility] complaining of shortness of breath, chest pain.  Please see the admitting history and physical for further details.      Hospital Course     Patient was admitted to [facility] on 2025 following presentation to [facility] ED for further evaluation of shortness of breath.  She reported worsening  shortness of breath x 24 hours with onset of dull retrosternal chest pain without associated nausea, diaphoresis, or radiation the a.m. of ED presentation.  She had not noticed any increased palpitations or lower extremity edema recently.  She had not been sick, no fever or chills.  No cough.  She did note had been admitted to this facility recently-she stated antihypertensives and diuretics were held at discharge at that time.  Per review of the chart torsemide had been continued, hydralazine had been discontinued but other home antihypertensives resumed at discharge.  She had not been monitoring her blood pressure at home due to concerns that her machine is inaccurate.  On arrival to the ED she was tachycardic at 138 with respiratory rate 26 and /104.  Pressure did trend as high as 220/172 in the ED.  Initial ABG on her home 3 L showed pH 7.183 with pCO2 47.9 and pO2 46.9.  She was subsequently placed on BiPAP with improvement noted.  Renal function was found to be stable from prior.  HS troponin was elevated at 183.  Her proBNP was 15,000.  Chest x-ray showed changes of CHF.  She was given Bumex and admitted for further management.    Nitro drip was initiated on admission.  BP trend ultimately improved and drip weaned-Home amlodipine, chlorthalidone, metoprolol tartrate continued and hydralazine has been resumed at 100 mg 3 times daily.  BP trend has been stable the remainder of hospital course.  Her home torsemide was also resumed on admission and with diuresis patient's respiratory status did improve and she had been weaned to her baseline O2 requirement by the date of this documentation.  Given significant renal history, history of HD, and creatinine with slight upward trend nephrology was consulted and followed as well.  Felt MAICO was secondary to cardiorenal syndrome with ischemic nephropathy.  Later in admission torsemide was briefly held and creatinine did improve.  We will continue patient's  torsemide at decreased dose of 20 mg daily at discharge per nephrology recommendation.  We will schedule the patient to follow-up with nephrology in 2 weeks.    Cardiology was consulted and followed as well.  Once BP, volume and respiratory status improved did recommend proceeding with stress testing in the setting of the NSTEMI and chest pain.  Notably once BP had improved the patient had no further complaints of chest pain during this admission.  Stress test performed on 2/17/2025 was consistent with an intermediate risk study.  Calculated LVEF was 52%.  MPI showed small to moderate-sized infarct located in the mid to apical anterior wall and apex with moderate to severe ciro-infarct ischemia.  Given this abnormal finding interventional cardiology was consulted for consideration of left heart catheterization.  Indications for LHC as well as risks and benefits were discussed with the patient per interventional cardiology.  Following further discussion patient has declined LHC at this time.  She reported she would prefer to follow-up with her primary cardiologist to discuss further.  With this in mind we will schedule her to see her primary cardiologist in 2 to 3 weeks.  Continue high intensity statin at discharge as well as her home ASA.    Given patient is otherwise clinically stable with no further inpatient workup planned and expressing desire to return home she was felt to have reached the maximum benefit of the current hospitalization.  As such case was discussed with attending physician and agree she is stable for discharge home on this date.  The above discharge and follow-up plan as well as medication changes were discussed with the patient on the date of discharge and she expressed agreement and understanding.    Discharge Vitals/Physical Examination     Vital Signs:  Temp:  [97.8 °F (36.6 °C)-99.4 °F (37.4 °C)] 98.3 °F (36.8 °C)  Heart Rate:  [60-72] 66  Resp:  [18-20] 18  BP: (134-163)/(68-81)  149/77  Mean Arterial Pressure (Non-Invasive) for the past 24 hrs (Last 3 readings):   Noninvasive MAP (mmHg)   02/18/25 0659 91   02/17/25 1901 115   02/17/25 1628 100     SpO2 Percentage    02/18/25 0300 02/18/25 0621 02/18/25 0659   SpO2: (!) 87% 94% 93%     SpO2:  [87 %-94 %] 93 %  on  Flow (L/min) (Oxygen Therapy):  [2] 2;   Device (Oxygen Therapy): nasal cannula;humidified    Body mass index is 34.66 kg/m².  Wt Readings from Last 3 Encounters:   02/18/25 83.2 kg (183 lb 6.8 oz)   01/21/25 93.2 kg (205 lb 8 oz)   01/16/25 81.6 kg (180 lb)         Physical Exam:  Physical Exam  Vitals and nursing note reviewed.   Constitutional:       General: She is not in acute distress.  HENT:      Head: Normocephalic and atraumatic.   Eyes:      Extraocular Movements: Extraocular movements intact.   Cardiovascular:      Rate and Rhythm: Normal rate.   Pulmonary:      Effort: Pulmonary effort is normal. No respiratory distress.   Abdominal:      Palpations: Abdomen is soft.   Musculoskeletal:      Right lower leg: No edema.      Left lower leg: No edema.   Skin:     General: Skin is warm and dry.   Neurological:      Mental Status: She is alert. Mental status is at baseline.   Psychiatric:         Mood and Affect: Mood normal.         Behavior: Behavior normal.         Pertinent Laboratory/Radiology Results     Pertinent Laboratory Results:  Results from last 7 days   Lab Units 02/13/25  1715 02/13/25  0828 02/13/25  0630   HSTROP T ng/L 157* 169* 183*     Results from last 7 days   Lab Units 02/13/25  0630   PROBNP pg/mL 15,163.0*       Results from last 7 days   Lab Units 02/14/25  0409   PH, ARTERIAL pH units 7.433   PO2 ART mm Hg 70.6*   PCO2, ARTERIAL mm Hg 34.7*   HCO3 ART mmol/L 23.2     Results from last 7 days   Lab Units 02/18/25  0008 02/17/25  1733 02/17/25  0017 02/15/25  0841 02/14/25  0543 02/13/25  1244 02/13/25  0931 02/13/25  0828 02/13/25  0630   LACTATE mmol/L  --   --   --   --   --  1.9 2.1*  --  3.8*  "  WBC 10*3/mm3 7.15  --  7.31 7.33   < >  --   --   --  13.86*   HEMOGLOBIN g/dL 8.3*  --  8.4* 8.6*   < >  --   --   --  11.2*   HEMATOCRIT % 29.7*  --  29.9* 30.2*   < >  --   --   --  39.3   MCV fL 80.1  --  78.3* 78.4*   < >  --   --   --  78.9*   MCHC g/dL 27.9*  --  28.1* 28.5*   < >  --   --   --  28.5*   PLATELETS 10*3/mm3 352  --  422 388   < >  --   --   --  612*   INR   --  1.17*  --   --   --   --   --  1.55*  --     < > = values in this interval not displayed.     Results from last 7 days   Lab Units 02/18/25  0008 02/17/25  0017 02/16/25  0640 02/15/25  0133 02/14/25  1206 02/13/25  0630   SODIUM mmol/L 136 135* 136 134* 135* 140   POTASSIUM mmol/L 4.4 4.5 4.5 4.5 4.4 4.8   MAGNESIUM mg/dL  --   --  1.9 2.0 1.9  --    CHLORIDE mmol/L 101 99 100 101 105 105   CO2 mmol/L 24.0 25.1 23.6 22.6 19.6* 20.5*   BUN mg/dL 30* 33* 34* 27* 26* 32*   CREATININE mg/dL 1.96* 2.03* 2.13* 1.76* 1.56* 1.75*   CALCIUM mg/dL 9.1 9.0 9.0 8.9 8.5* 9.5   GLUCOSE mg/dL 104* 96 102* 93 119* 116*   ALBUMIN g/dL 3.2*  --   --   --   --  4.1   BILIRUBIN mg/dL 0.4  --   --   --   --  0.4   ALK PHOS U/L 196*  --   --   --   --  216*   AST (SGOT) U/L 19  --   --   --   --  23   ALT (SGPT) U/L 10  --   --   --   --  9   Estimated Creatinine Clearance: 27.3 mL/min (A) (by C-G formula based on SCr of 1.96 mg/dL (H)).  No results found for: \"AMMONIA\"    No results found for: \"HGBA1C\", \"POCGLU\"  Lab Results   Component Value Date    HGBA1C 5.30 10/08/2023     Lab Results   Component Value Date    TSH 6.770 (H) 10/08/2023    FREET4 1.01 10/08/2023       Blood Culture   Date Value Ref Range Status   02/13/2025 No growth at 5 days  Final   02/13/2025 No growth at 5 days  Final     No results found for: \"URINECX\"  No results found for: \"WOUNDCX\"  No results found for: \"STOOLCX\"  No results found for: \"RESPCX\"  Pain Management Panel          Latest Ref Rng & Units 10/9/2023   Pain Management Panel   Creatinine, Urine mg/dL 116.0       Details "                   Pertinent Radiology Results:  Imaging Results (All)       Procedure Component Value Units Date/Time    XR Chest 1 View [[id number]] Collected: 02/13/25 0844     Updated: 02/13/25 0846    Narrative:      XR CHEST 1 VW-     CLINICAL INDICATION: soa        COMPARISON: 1/19/2025     TECHNIQUE: Single frontal view of the chest.     FINDINGS:     LUNGS: Diffuse interstitial change and probable superimposed airspace  disease in both lung bases.     HEART AND MEDIASTINUM: Heart and mediastinal contours are unremarkable        SKELETON: Bony and soft tissue structures are unremarkable.             Impression:         1. Appearance concerning for CHF and possibly superimposed bibasilar  airspace disease           This report was finalized on 2/13/2025 8:44 AM by Dr. Julio Dominguez MD.               Discharge Disposition/Discharge Medications/Discharge Appointments     Discharge Disposition:   Home or Self Care    Discharge Follow up:   Additional Instructions for the Follow-ups that You Need to Schedule       Discharge Follow-up with PCP   As directed       Currently Documented PCP:    Woodrow Raymond APRN    PCP Phone Number:    [phone]     Follow Up Details: 1 week               Follow-up Information       Palomo Steinberg MD .    Specialty: Nephrology  Contact information:  [address] [place] [address] [phone]               Woodrow Raymond APRN .    Specialty: Family Medicine  Why: 1 week  Contact information:  [facility] [facility] [address] [phone]                             Condition at Discharge:  Stable     Discharge Medications:     Your medication list        START taking these medications        Instructions Last Dose Given Next Dose Due   atorvastatin 80 MG tablet  Commonly known as: LIPITOR      Take 1 tablet by mouth Every Night for 30 days.       hydrALAZINE 100 MG tablet  Commonly known as: APRESOLINE      Take 1 tablet by mouth Every 8 (Eight) Hours for  30 days.              CHANGE how you take these medications        Instructions Last Dose Given Next Dose Due   amLODIPine 10 MG tablet  Commonly known as: NORVASC  What changed:   medication strength  how much to take  when to take this      Take 1 tablet by mouth Daily for 30 days.       metoprolol tartrate 25 MG tablet  Commonly known as: LOPRESSOR  What changed: when to take this      Take 1 tablet by mouth Every 12 (Twelve) Hours for 30 days.       Torsemide 40 MG tablet  What changed: how much to take      Take 20 mg by mouth Daily for 30 days.              CONTINUE taking these medications        Instructions Last Dose Given Next Dose Due   albuterol sulfate  (90 Base) MCG/ACT inhaler  Commonly known as: PROVENTIL HFA;VENTOLIN HFA;PROAIR HFA      Inhale 2 puffs Every 4 (Four) Hours As Needed for Wheezing.       aspirin 81 MG chewable tablet      Chew 1 tablet Daily.       chlorthalidone 25 MG tablet  Commonly known as: HYGROTON      Take 1 tablet by mouth Daily.       Eliquis 5 MG tablet tablet  Generic drug: apixaban      Take 1 tablet by mouth Every 12 (Twelve) Hours.       folic acid 1 MG tablet  Commonly known as: FOLVITE      Take 1 tablet by mouth Daily.       gabapentin 600 MG tablet  Commonly known as: NEURONTIN      Take 0.5 tablets by mouth 3 (Three) Times a Day As Needed (nerve pain).       HYDROcodone-acetaminophen  MG per tablet  Commonly known as: NORCO      Take 1 tablet by mouth Every 8 (Eight) Hours As Needed for Moderate Pain.       ipratropium-albuterol 0.5-2.5 mg/3 ml nebulizer  Commonly known as: DUO-NEB      Take 3 mL by nebulization Every 4 (Four) Hours As Needed for Wheezing.       ondansetron ODT 4 MG disintegrating tablet  Commonly known as: ZOFRAN-ODT      Place 1 tablet on the tongue Every 8 (Eight) Hours As Needed for Nausea or Vomiting.       pantoprazole 40 MG EC tablet  Commonly known as: PROTONIX      Take 1 tablet by mouth Daily.       sertraline 25 MG  tablet  Commonly known as: ZOLOFT      Take 1 tablet by mouth Daily.              STOP taking these medications      rosuvastatin 10 MG tablet  Commonly known as: CRESTOR                  Where to Get Your Medications        These medications were sent to [facility], [place] - [address] - [phone] [facility] [phone] FX  [address] [place] [address], [facility] [address]      Phone: [phone]   amLODIPine 10 MG tablet  atorvastatin 80 MG tablet  hydrALAZINE 100 MG tablet  metoprolol tartrate 25 MG tablet  Torsemide 40 MG tablet          Discharge Diet:  Cardiac, heart healthy, renal, consistent carb    Discharge Activity:  As tolerated      Time spent on this discharge exceeded 30 minutes.

## 2025-02-19 NOTE — PAYOR COMM NOTE
"[facility]  NPI:[id number]    Utilization Review  Contact: Jeimy Beltran RN  Phone: [phone]  Fax:[phone]    DISCHARGE NOTIFICATIONMatthew Michel (69 y.o. Female)       Date of Birth   1955    Social Security Number       Address   [facility] [place] [facility] [address]    Home Phone   [phone]    MRN   [id number]       Amish   Restorationism    Marital Status                               Admission Date   2/13/25    Admission Type   Emergency    Admitting Provider   Maria L August DO    Attending Provider       Department, Room/Bed   [facility], 3302/1S       Discharge Date   2/18/2025    Discharge Disposition   Home or Self Care    Discharge Destination   Home                              Attending Provider: (none)   Allergies: Xanax [Alprazolam]    Isolation: None   Infection: None   Code Status: Prior    Ht: 154.9 cm (61\")   Wt: 83.2 kg (183 lb 6.8 oz)    Admission Cmt: None   Principal Problem: Hypertensive crisis [I16.9]                   Active Insurance as of 2/13/2025       Primary Coverage       Payor Plan Insurance Group Employer/Plan Group    ANTHEM MEDICARE REPLACEMENT ANTHEM MED ADV HMO [id number]       Payor Plan Address Payor Plan Phone Number Payor Plan Fax Number Effective Dates    [address] [phone]  1/1/2025 - None Entered    [facility] [id number]         Subscriber Name Subscriber Birth Date Member ID       MATTHEW MICHEL 1955 [id number]                     Emergency Contacts        (Rel.) Home Phone Work Phone Mobile Phone    LOYD MICHEL (Son) [phone] -- --    MIGUEL WEI (Grandchild) [phone] -- --    ENA HELM (Relative) [phone] -- --    Yadira Michel (Daughter) -- -- [phone]                 Discharge Summary        Wally Lopez PA-C at 02/18/25 3338       Attestation signed by Marj Barbosa DO at 02/18/25 1650    I have reviewed this documentation " and agree. Discussed with patient and on call Nephrologist. Patient states she may consider an outpatient heart catheterization but is hesitant at this time due to the contrast and wants to discuss more with Nephrology. I instructed her to resume her home Torsemide, 20 mg daily as per Nephrology recommendations. Will ask for PCP, Nephrology and Cardiology follow ups in the outpatient setting.                      [facility]    Patient Identification:  Name:  Lesley Michel  Age:  69 y.o.  Sex:  female  :  1955  MRN:  [id number]  Visit Number:  [id number]    Date of Admission: 2025  Date of Discharge:  25     PCP: Woodrow Raymond APRN    Discharging Provider: Adebayo Lopez PA-C / Dr. Barbosa    Discharge Diagnoses     Discharge Diagnoses:  Acute on chronic hypoxic and hypercapnic respiratory failure, resolved  Hypertensive crisis, POA, now resolved  Concern for flash pulmonary edema due to #2  Decompensated HFpEF, improved  MAICO on CKD stage IV, resolving  Paroxysmal atrial fibrillation  NSTEMI, T1 versus T2  Abnormal stress test  Known ASCVD with prior PCI/ROSAURA to LAD    Secondary Diagnoses:  COPD  PVD  Migraine headaches  Anxiety  Chronic microcytic anemia    Needs on follow up:  PCP 1 week  Nephrology 2 weeks  Cardiology 2 to 3 weeks  Consults/Procedures     Consults:   Consults       Date and Time Order Name Status Description    2025  4:23 PM Inpatient Interventional Cardiology Consult      2025  1:14 PM Inpatient Nephrology Consult      2025  5:54 PM Inpatient General Surgery Consult      2025 10:29 AM Inpatient Palliative Care MD Consult Completed     2025  9:26 AM Inpatient Cardiology Consult Completed     2025  8:28 AM Inpatient General Surgery Consult Completed             Procedures/Scans Performed:  CXR   Stress test w/ MPI- intermediate risk study       History of Presenting Illness     Chief Complaint   Patient  presents with    Shortness of Breath    Chest Pain       Patient is a 69 y.o. female who presented to [facility] complaining of shortness of breath, chest pain.  Please see the admitting history and physical for further details.      Hospital Course     Patient was admitted to [facility] on 2/13/2025 following presentation to [facility] ED for further evaluation of shortness of breath.  She reported worsening shortness of breath x 24 hours with onset of dull retrosternal chest pain without associated nausea, diaphoresis, or radiation the a.m. of ED presentation.  She had not noticed any increased palpitations or lower extremity edema recently.  She had not been sick, no fever or chills.  No cough.  She did note had been admitted to this facility recently-she stated antihypertensives and diuretics were held at discharge at that time.  Per review of the chart torsemide had been continued, hydralazine had been discontinued but other home antihypertensives resumed at discharge.  She had not been monitoring her blood pressure at home due to concerns that her machine is inaccurate.  On arrival to the ED she was tachycardic at 138 with respiratory rate 26 and /104.  Pressure did trend as high as 220/172 in the ED.  Initial ABG on her home 3 L showed pH 7.183 with pCO2 47.9 and pO2 46.9.  She was subsequently placed on BiPAP with improvement noted.  Renal function was found to be stable from prior.  HS troponin was elevated at 183.  Her proBNP was 15,000.  Chest x-ray showed changes of CHF.  She was given Bumex and admitted for further management.    Nitro drip was initiated on admission.  BP trend ultimately improved and drip weaned-Home amlodipine, chlorthalidone, metoprolol tartrate continued and hydralazine has been resumed at 100 mg 3 times daily.  BP trend has been stable the remainder of hospital course.  Her home torsemide was also resumed on admission and with diuresis patient's respiratory status did improve  and she had been weaned to her baseline O2 requirement by the date of this documentation.  Given significant renal history, history of HD, and creatinine with slight upward trend nephrology was consulted and followed as well.  Felt MAICO was secondary to cardiorenal syndrome with ischemic nephropathy.  Later in admission torsemide was briefly held and creatinine did improve.  We will continue patient's torsemide at decreased dose of 20 mg daily at discharge per nephrology recommendation.  We will schedule the patient to follow-up with nephrology in 2 weeks.    Cardiology was consulted and followed as well.  Once BP, volume and respiratory status improved did recommend proceeding with stress testing in the setting of the NSTEMI and chest pain.  Notably once BP had improved the patient had no further complaints of chest pain during this admission.  Stress test performed on 2/17/2025 was consistent with an intermediate risk study.  Calculated LVEF was 52%.  MPI showed small to moderate-sized infarct located in the mid to apical anterior wall and apex with moderate to severe ciro-infarct ischemia.  Given this abnormal finding interventional cardiology was consulted for consideration of left heart catheterization.  Indications for LHC as well as risks and benefits were discussed with the patient per interventional cardiology.  Following further discussion patient has declined LHC at this time.  She reported she would prefer to follow-up with her primary cardiologist to discuss further.  With this in mind we will schedule her to see her primary cardiologist in 2 to 3 weeks.  Continue high intensity statin at discharge as well as her home ASA.    Given patient is otherwise clinically stable with no further inpatient workup planned and expressing desire to return home she was felt to have reached the maximum benefit of the current hospitalization.  As such case was discussed with attending physician and agree she is stable  for discharge home on this date.  The above discharge and follow-up plan as well as medication changes were discussed with the patient on the date of discharge and she expressed agreement and understanding.    Discharge Vitals/Physical Examination     Vital Signs:  Temp:  [97.8 °F (36.6 °C)-99.4 °F (37.4 °C)] 98.3 °F (36.8 °C)  Heart Rate:  [60-72] 66  Resp:  [18-20] 18  BP: (134-163)/(68-81) 149/77  Mean Arterial Pressure (Non-Invasive) for the past 24 hrs (Last 3 readings):   Noninvasive MAP (mmHg)   02/18/25 0659 91   02/17/25 1901 115   02/17/25 1628 100     SpO2 Percentage    02/18/25 0300 02/18/25 0621 02/18/25 0659   SpO2: (!) 87% 94% 93%     SpO2:  [87 %-94 %] 93 %  on  Flow (L/min) (Oxygen Therapy):  [2] 2;   Device (Oxygen Therapy): nasal cannula;humidified    Body mass index is 34.66 kg/m².  Wt Readings from Last 3 Encounters:   02/18/25 83.2 kg (183 lb 6.8 oz)   01/21/25 93.2 kg (205 lb 8 oz)   01/16/25 81.6 kg (180 lb)         Physical Exam:  Physical Exam  Vitals and nursing note reviewed.   Constitutional:       General: She is not in acute distress.  HENT:      Head: Normocephalic and atraumatic.   Eyes:      Extraocular Movements: Extraocular movements intact.   Cardiovascular:      Rate and Rhythm: Normal rate.   Pulmonary:      Effort: Pulmonary effort is normal. No respiratory distress.   Abdominal:      Palpations: Abdomen is soft.   Musculoskeletal:      Right lower leg: No edema.      Left lower leg: No edema.   Skin:     General: Skin is warm and dry.   Neurological:      Mental Status: She is alert. Mental status is at baseline.   Psychiatric:         Mood and Affect: Mood normal.         Behavior: Behavior normal.         Pertinent Laboratory/Radiology Results     Pertinent Laboratory Results:  Results from last 7 days   Lab Units 02/13/25  1715 02/13/25  0828 02/13/25  0630   HSTROP T ng/L 157* 169* 183*     Results from last 7 days   Lab Units 02/13/25  0630   PROBNP pg/mL 15,163.0*      "  Results from last 7 days   Lab Units 02/14/25  0409   PH, ARTERIAL pH units 7.433   PO2 ART mm Hg 70.6*   PCO2, ARTERIAL mm Hg 34.7*   HCO3 ART mmol/L 23.2     Results from last 7 days   Lab Units 02/18/25  0008 02/17/25  1733 02/17/25  0017 02/15/25  0841 02/14/25  0543 02/13/25  1244 02/13/25  0931 02/13/25  0828 02/13/25  0630   LACTATE mmol/L  --   --   --   --   --  1.9 2.1*  --  3.8*   WBC 10*3/mm3 7.15  --  7.31 7.33   < >  --   --   --  13.86*   HEMOGLOBIN g/dL 8.3*  --  8.4* 8.6*   < >  --   --   --  11.2*   HEMATOCRIT % 29.7*  --  29.9* 30.2*   < >  --   --   --  39.3   MCV fL 80.1  --  78.3* 78.4*   < >  --   --   --  78.9*   MCHC g/dL 27.9*  --  28.1* 28.5*   < >  --   --   --  28.5*   PLATELETS 10*3/mm3 352  --  422 388   < >  --   --   --  612*   INR   --  1.17*  --   --   --   --   --  1.55*  --     < > = values in this interval not displayed.     Results from last 7 days   Lab Units 02/18/25  0008 02/17/25  0017 02/16/25  0640 02/15/25  0133 02/14/25  1206 02/13/25  0630   SODIUM mmol/L 136 135* 136 134* 135* 140   POTASSIUM mmol/L 4.4 4.5 4.5 4.5 4.4 4.8   MAGNESIUM mg/dL  --   --  1.9 2.0 1.9  --    CHLORIDE mmol/L 101 99 100 101 105 105   CO2 mmol/L 24.0 25.1 23.6 22.6 19.6* 20.5*   BUN mg/dL 30* 33* 34* 27* 26* 32*   CREATININE mg/dL 1.96* 2.03* 2.13* 1.76* 1.56* 1.75*   CALCIUM mg/dL 9.1 9.0 9.0 8.9 8.5* 9.5   GLUCOSE mg/dL 104* 96 102* 93 119* 116*   ALBUMIN g/dL 3.2*  --   --   --   --  4.1   BILIRUBIN mg/dL 0.4  --   --   --   --  0.4   ALK PHOS U/L 196*  --   --   --   --  216*   AST (SGOT) U/L 19  --   --   --   --  23   ALT (SGPT) U/L 10  --   --   --   --  9   Estimated Creatinine Clearance: 27.3 mL/min (A) (by C-G formula based on SCr of 1.96 mg/dL (H)).  No results found for: \"AMMONIA\"    No results found for: \"HGBA1C\", \"POCGLU\"  Lab Results   Component Value Date    HGBA1C 5.30 10/08/2023     Lab Results   Component Value Date    TSH 6.770 (H) 10/08/2023    FREET4 1.01 10/08/2023 " "      Blood Culture   Date Value Ref Range Status   02/13/2025 No growth at 5 days  Final   02/13/2025 No growth at 5 days  Final     No results found for: \"URINECX\"  No results found for: \"WOUNDCX\"  No results found for: \"STOOLCX\"  No results found for: \"RESPCX\"  Pain Management Panel          Latest Ref Rng & Units 10/9/2023   Pain Management Panel   Creatinine, Urine mg/dL 116.0       Details                   Pertinent Radiology Results:  Imaging Results (All)       Procedure Component Value Units Date/Time    XR Chest 1 View [[id number]] Collected: 02/13/25 0844     Updated: 02/13/25 0846    Narrative:      XR CHEST 1 VW-     CLINICAL INDICATION: soa        COMPARISON: 1/19/2025     TECHNIQUE: Single frontal view of the chest.     FINDINGS:     LUNGS: Diffuse interstitial change and probable superimposed airspace  disease in both lung bases.     HEART AND MEDIASTINUM: Heart and mediastinal contours are unremarkable        SKELETON: Bony and soft tissue structures are unremarkable.             Impression:         1. Appearance concerning for CHF and possibly superimposed bibasilar  airspace disease           This report was finalized on 2/13/2025 8:44 AM by Dr. Julio Dominguez MD.               Discharge Disposition/Discharge Medications/Discharge Appointments     Discharge Disposition:   Home or Self Care    Discharge Follow up:   Additional Instructions for the Follow-ups that You Need to Schedule       Discharge Follow-up with PCP   As directed       Currently Documented PCP:    Woodrow Raymond APRN    PCP Phone Number:    [phone]     Follow Up Details: 1 week               Follow-up Information       Palomo Steinberg MD .    Specialty: Nephrology  Contact information:  [address] [facility] [address] [phone]               Woodrow Raymond APRN .    Specialty: Family Medicine  Why: 1 week  Contact information:  [address] [facility] [place] [address]  [phone]                       "       Condition at Discharge:  Stable     Discharge Medications:     Your medication list        START taking these medications        Instructions Last Dose Given Next Dose Due   atorvastatin 80 MG tablet  Commonly known as: LIPITOR      Take 1 tablet by mouth Every Night for 30 days.       hydrALAZINE 100 MG tablet  Commonly known as: APRESOLINE      Take 1 tablet by mouth Every 8 (Eight) Hours for 30 days.              CHANGE how you take these medications        Instructions Last Dose Given Next Dose Due   amLODIPine 10 MG tablet  Commonly known as: NORVASC  What changed:   medication strength  how much to take  when to take this      Take 1 tablet by mouth Daily for 30 days.       metoprolol tartrate 25 MG tablet  Commonly known as: LOPRESSOR  What changed: when to take this      Take 1 tablet by mouth Every 12 (Twelve) Hours for 30 days.       Torsemide 40 MG tablet  What changed: how much to take      Take 20 mg by mouth Daily for 30 days.              CONTINUE taking these medications        Instructions Last Dose Given Next Dose Due   albuterol sulfate  (90 Base) MCG/ACT inhaler  Commonly known as: PROVENTIL HFA;VENTOLIN HFA;PROAIR HFA      Inhale 2 puffs Every 4 (Four) Hours As Needed for Wheezing.       aspirin 81 MG chewable tablet      Chew 1 tablet Daily.       chlorthalidone 25 MG tablet  Commonly known as: HYGROTON      Take 1 tablet by mouth Daily.       Eliquis 5 MG tablet tablet  Generic drug: apixaban      Take 1 tablet by mouth Every 12 (Twelve) Hours.       folic acid 1 MG tablet  Commonly known as: FOLVITE      Take 1 tablet by mouth Daily.       gabapentin 600 MG tablet  Commonly known as: NEURONTIN      Take 0.5 tablets by mouth 3 (Three) Times a Day As Needed (nerve pain).       HYDROcodone-acetaminophen  MG per tablet  Commonly known as: NORCO      Take 1 tablet by mouth Every 8 (Eight) Hours As Needed for Moderate Pain.       ipratropium-albuterol 0.5-2.5 mg/3 ml  nebulizer  Commonly known as: DUO-NEB      Take 3 mL by nebulization Every 4 (Four) Hours As Needed for Wheezing.       ondansetron ODT 4 MG disintegrating tablet  Commonly known as: ZOFRAN-ODT      Place 1 tablet on the tongue Every 8 (Eight) Hours As Needed for Nausea or Vomiting.       pantoprazole 40 MG EC tablet  Commonly known as: PROTONIX      Take 1 tablet by mouth Daily.       sertraline 25 MG tablet  Commonly known as: ZOLOFT      Take 1 tablet by mouth Daily.              STOP taking these medications      rosuvastatin 10 MG tablet  Commonly known as: CRESTOR                  Where to Get Your Medications        These medications were sent to [facility], [place] - [address] [facility] [address] - [phone] [facility] [phone] FX  [address] [place] [address]      Phone: [phone]   amLODIPine 10 MG tablet  atorvastatin 80 MG tablet  hydrALAZINE 100 MG tablet  metoprolol tartrate 25 MG tablet  Torsemide 40 MG tablet          Discharge Diet:  Cardiac, heart healthy, renal, consistent carb    Discharge Activity:  As tolerated      Time spent on this discharge exceeded 30 minutes.      Electronically signed by Marj Barbosa DO at 02/18/25 3004

## 2025-02-25 ENCOUNTER — READMISSION MANAGEMENT (OUTPATIENT)
Dept: CALL CENTER | Facility: HOSPITAL | Age: 70
End: 2025-02-25
Payer: MEDICARE

## 2025-02-25 NOTE — OUTREACH NOTE
Medical Week 1 Survey      Flowsheet Row Responses   [facility] patient discharged from? [facility]   Does the patient have one of the following disease processes/diagnoses(primary or secondary)? Other   Week 1 attempt successful? Yes   Call start time 1252   Call end time 1254   Discharge diagnosis Hypertensive crisis   Person spoke with today (if not patient) and relationship pt   Meds reviewed with patient/caregiver? Yes   Does the patient have all medications ordered at discharge? Yes   Prescription comments No concerns or questions noted.   Is the patient taking all medications as directed (includes completed medication regime)? Yes   Does the patient have a primary care provider?  Yes   Comments regarding PCP Woodrow Raymond APRN (Family Medicine),  1 week. Appoinment is for Feb. 20th at 11:45.   Comments Appt with pcp went well.   Has home health visited the patient within 72 hours of discharge? N/A   Psychosocial issues? No   Did the patient receive a copy of their discharge instructions? Yes   Nursing interventions Reviewed instructions with patient   What is the patient's perception of their health status since discharge? Improving   Is the patient/caregiver able to teach back signs and symptoms related to disease process for when to call PCP? Yes   Is the patient/caregiver able to teach back signs and symptoms related to disease process for when to call 911? Yes   Is the patient/caregiver able to teach back the hierarchy of who to call/visit for symptoms/problems? PCP, Specialist, Home health nurse, Urgent Care, ED, 911 Yes   Week 1 call completed? Yes   Graduated Yes   Wrap up additional comments Patient reports doing well seen pcp since NM. no concerns or questions noted.   Call end time 1254            Maria Isabel GONGORA - Registered Nurse

## 2025-03-14 ENCOUNTER — ANESTHESIA EVENT (OUTPATIENT)
Dept: PERIOP | Facility: HOSPITAL | Age: 70
End: 2025-03-14
Payer: MEDICARE

## 2025-03-14 ENCOUNTER — APPOINTMENT (OUTPATIENT)
Dept: CT IMAGING | Facility: HOSPITAL | Age: 70
End: 2025-03-14
Payer: MEDICARE

## 2025-03-14 ENCOUNTER — HOSPITAL ENCOUNTER (INPATIENT)
Facility: HOSPITAL | Age: 70
LOS: 3 days | Discharge: LEFT BEFORE DISCHARGE | End: 2025-03-17
Attending: EMERGENCY MEDICINE | Admitting: STUDENT IN AN ORGANIZED HEALTH CARE EDUCATION/TRAINING PROGRAM
Payer: MEDICARE

## 2025-03-14 ENCOUNTER — APPOINTMENT (OUTPATIENT)
Dept: GENERAL RADIOLOGY | Facility: HOSPITAL | Age: 70
End: 2025-03-14
Payer: MEDICARE

## 2025-03-14 DIAGNOSIS — D64.9 ANEMIA, UNSPECIFIED TYPE: ICD-10-CM

## 2025-03-14 DIAGNOSIS — K92.2 GASTROINTESTINAL HEMORRHAGE, UNSPECIFIED GASTROINTESTINAL HEMORRHAGE TYPE: Primary | ICD-10-CM

## 2025-03-14 DIAGNOSIS — D64.9 ACUTE ON CHRONIC ANEMIA: ICD-10-CM

## 2025-03-14 LAB
ABO GROUP BLD: NORMAL
ALBUMIN SERPL-MCNC: 3.7 G/DL (ref 3.5–5.2)
ALBUMIN/GLOB SERPL: 1 G/DL
ALP SERPL-CCNC: 130 U/L (ref 39–117)
ALT SERPL W P-5'-P-CCNC: <5 U/L (ref 1–33)
ANION GAP SERPL CALCULATED.3IONS-SCNC: 18.2 MMOL/L (ref 5–15)
ANISOCYTOSIS BLD QL: NORMAL
APTT PPP: 41.1 SECONDS (ref 24.5–35.9)
AST SERPL-CCNC: 13 U/L (ref 1–32)
BASOPHILS # BLD AUTO: 0.04 10*3/MM3 (ref 0–0.2)
BASOPHILS NFR BLD AUTO: 0.5 % (ref 0–1.5)
BILIRUB SERPL-MCNC: 0.4 MG/DL (ref 0–1.2)
BLD GP AB SCN SERPL QL: NEGATIVE
BUN SERPL-MCNC: 79 MG/DL (ref 8–23)
BUN/CREAT SERPL: 27.1 (ref 7–25)
CALCIUM SPEC-SCNC: 9 MG/DL (ref 8.6–10.5)
CHLORIDE SERPL-SCNC: 90 MMOL/L (ref 98–107)
CO2 SERPL-SCNC: 21.8 MMOL/L (ref 22–29)
CREAT SERPL-MCNC: 2.91 MG/DL (ref 0.57–1)
DEPRECATED RDW RBC AUTO: 53.1 FL (ref 37–54)
EGFRCR SERPLBLD CKD-EPI 2021: 17.2 ML/MIN/1.73
EOSINOPHIL # BLD AUTO: 0.25 10*3/MM3 (ref 0–0.4)
EOSINOPHIL NFR BLD AUTO: 3.2 % (ref 0.3–6.2)
ERYTHROCYTE [DISTWIDTH] IN BLOOD BY AUTOMATED COUNT: 19.5 % (ref 12.3–15.4)
FLUAV RNA RESP QL NAA+PROBE: NOT DETECTED
FLUBV RNA RESP QL NAA+PROBE: NOT DETECTED
GLOBULIN UR ELPH-MCNC: 3.6 GM/DL
GLUCOSE SERPL-MCNC: 132 MG/DL (ref 65–99)
HCT VFR BLD AUTO: 17 % (ref 34–46.6)
HGB BLD-MCNC: 4.9 G/DL (ref 12–15.9)
HOLD SPECIMEN: NORMAL
HOLD SPECIMEN: NORMAL
HYPOCHROMIA BLD QL: NORMAL
IMM GRANULOCYTES # BLD AUTO: 0.02 10*3/MM3 (ref 0–0.05)
IMM GRANULOCYTES NFR BLD AUTO: 0.3 % (ref 0–0.5)
INR PPP: 2.35 (ref 0.9–1.1)
LYMPHOCYTES # BLD AUTO: 1.34 10*3/MM3 (ref 0.7–3.1)
LYMPHOCYTES NFR BLD AUTO: 17.3 % (ref 19.6–45.3)
MCH RBC QN AUTO: 21.7 PG (ref 26.6–33)
MCHC RBC AUTO-ENTMCNC: 28.8 G/DL (ref 31.5–35.7)
MCV RBC AUTO: 75.2 FL (ref 79–97)
MONOCYTES # BLD AUTO: 0.96 10*3/MM3 (ref 0.1–0.9)
MONOCYTES NFR BLD AUTO: 12.4 % (ref 5–12)
NEUTROPHILS NFR BLD AUTO: 5.15 10*3/MM3 (ref 1.7–7)
NEUTROPHILS NFR BLD AUTO: 66.3 % (ref 42.7–76)
NRBC BLD AUTO-RTO: 1.3 /100 WBC (ref 0–0.2)
PLAT MORPH BLD: NORMAL
PLATELET # BLD AUTO: 505 10*3/MM3 (ref 140–450)
PMV BLD AUTO: 9.6 FL (ref 6–12)
POTASSIUM SERPL-SCNC: 3.7 MMOL/L (ref 3.5–5.2)
PROT SERPL-MCNC: 7.3 G/DL (ref 6–8.5)
PROTHROMBIN TIME: 26 SECONDS (ref 11.6–15.1)
RBC # BLD AUTO: 2.26 10*6/MM3 (ref 3.77–5.28)
RH BLD: POSITIVE
SARS-COV-2 RNA RESP QL NAA+PROBE: NOT DETECTED
SCHISTOCYTES BLD QL SMEAR: NORMAL
SODIUM SERPL-SCNC: 130 MMOL/L (ref 136–145)
T&S EXPIRATION DATE: NORMAL
WBC NRBC COR # BLD AUTO: 7.76 10*3/MM3 (ref 3.4–10.8)
WHOLE BLOOD HOLD COAG: NORMAL
WHOLE BLOOD HOLD SPECIMEN: NORMAL

## 2025-03-14 PROCEDURE — 99285 EMERGENCY DEPT VISIT HI MDM: CPT

## 2025-03-14 PROCEDURE — 36430 TRANSFUSION BLD/BLD COMPNT: CPT

## 2025-03-14 PROCEDURE — 70450 CT HEAD/BRAIN W/O DYE: CPT

## 2025-03-14 PROCEDURE — 80053 COMPREHEN METABOLIC PANEL: CPT | Performed by: PHYSICIAN ASSISTANT

## 2025-03-14 PROCEDURE — 86923 COMPATIBILITY TEST ELECTRIC: CPT

## 2025-03-14 PROCEDURE — 86850 RBC ANTIBODY SCREEN: CPT | Performed by: PHYSICIAN ASSISTANT

## 2025-03-14 PROCEDURE — 99223 1ST HOSP IP/OBS HIGH 75: CPT

## 2025-03-14 PROCEDURE — 99214 OFFICE O/P EST MOD 30 MIN: CPT | Performed by: SURGERY

## 2025-03-14 PROCEDURE — 71045 X-RAY EXAM CHEST 1 VIEW: CPT | Performed by: RADIOLOGY

## 2025-03-14 PROCEDURE — 86900 BLOOD TYPING SEROLOGIC ABO: CPT | Performed by: PHYSICIAN ASSISTANT

## 2025-03-14 PROCEDURE — 85730 THROMBOPLASTIN TIME PARTIAL: CPT | Performed by: PHYSICIAN ASSISTANT

## 2025-03-14 PROCEDURE — 85007 BL SMEAR W/DIFF WBC COUNT: CPT | Performed by: PHYSICIAN ASSISTANT

## 2025-03-14 PROCEDURE — 85025 COMPLETE CBC W/AUTO DIFF WBC: CPT | Performed by: PHYSICIAN ASSISTANT

## 2025-03-14 PROCEDURE — 71045 X-RAY EXAM CHEST 1 VIEW: CPT

## 2025-03-14 PROCEDURE — 85610 PROTHROMBIN TIME: CPT | Performed by: PHYSICIAN ASSISTANT

## 2025-03-14 PROCEDURE — 25010000002 BUMETANIDE PER 0.5 MG: Performed by: STUDENT IN AN ORGANIZED HEALTH CARE EDUCATION/TRAINING PROGRAM

## 2025-03-14 PROCEDURE — 86901 BLOOD TYPING SEROLOGIC RH(D): CPT | Performed by: PHYSICIAN ASSISTANT

## 2025-03-14 PROCEDURE — 86900 BLOOD TYPING SEROLOGIC ABO: CPT

## 2025-03-14 PROCEDURE — 70450 CT HEAD/BRAIN W/O DYE: CPT | Performed by: RADIOLOGY

## 2025-03-14 PROCEDURE — 87636 SARSCOV2 & INF A&B AMP PRB: CPT | Performed by: PHYSICIAN ASSISTANT

## 2025-03-14 PROCEDURE — P9016 RBC LEUKOCYTES REDUCED: HCPCS

## 2025-03-14 PROCEDURE — 36415 COLL VENOUS BLD VENIPUNCTURE: CPT

## 2025-03-14 RX ORDER — POLYETHYLENE GLYCOL 3350 17 G/17G
17 POWDER, FOR SOLUTION ORAL DAILY PRN
Status: DISCONTINUED | OUTPATIENT
Start: 2025-03-14 | End: 2025-03-17 | Stop reason: HOSPADM

## 2025-03-14 RX ORDER — TORSEMIDE 20 MG/1
20 TABLET ORAL DAILY
COMMUNITY

## 2025-03-14 RX ORDER — BISACODYL 10 MG
10 SUPPOSITORY, RECTAL RECTAL DAILY PRN
Status: DISCONTINUED | OUTPATIENT
Start: 2025-03-14 | End: 2025-03-17 | Stop reason: HOSPADM

## 2025-03-14 RX ORDER — HYDROCODONE BITARTRATE AND ACETAMINOPHEN 10; 325 MG/1; MG/1
1 TABLET ORAL ONCE AS NEEDED
Refills: 0 | Status: COMPLETED | OUTPATIENT
Start: 2025-03-14 | End: 2025-03-14

## 2025-03-14 RX ORDER — CHLORTHALIDONE 50 MG/1
25 TABLET ORAL DAILY
Status: CANCELLED | OUTPATIENT
Start: 2025-03-15

## 2025-03-14 RX ORDER — SODIUM CHLORIDE 0.9 % (FLUSH) 0.9 %
10 SYRINGE (ML) INJECTION AS NEEDED
Status: DISCONTINUED | OUTPATIENT
Start: 2025-03-14 | End: 2025-03-17 | Stop reason: HOSPADM

## 2025-03-14 RX ORDER — PANTOPRAZOLE SODIUM 40 MG/10ML
40 INJECTION, POWDER, LYOPHILIZED, FOR SOLUTION INTRAVENOUS EVERY 12 HOURS SCHEDULED
Status: DISCONTINUED | OUTPATIENT
Start: 2025-03-14 | End: 2025-03-17 | Stop reason: HOSPADM

## 2025-03-14 RX ORDER — ONDANSETRON 4 MG/1
4 TABLET, ORALLY DISINTEGRATING ORAL EVERY 8 HOURS PRN
Status: CANCELLED | OUTPATIENT
Start: 2025-03-14

## 2025-03-14 RX ORDER — AMOXICILLIN 250 MG
2 CAPSULE ORAL 2 TIMES DAILY PRN
Status: DISCONTINUED | OUTPATIENT
Start: 2025-03-14 | End: 2025-03-17 | Stop reason: HOSPADM

## 2025-03-14 RX ORDER — SODIUM CHLORIDE 9 MG/ML
40 INJECTION, SOLUTION INTRAVENOUS AS NEEDED
Status: DISCONTINUED | OUTPATIENT
Start: 2025-03-14 | End: 2025-03-17 | Stop reason: HOSPADM

## 2025-03-14 RX ORDER — PANTOPRAZOLE SODIUM 40 MG/1
40 TABLET, DELAYED RELEASE ORAL DAILY
Status: CANCELLED | OUTPATIENT
Start: 2025-03-15

## 2025-03-14 RX ORDER — BISACODYL 5 MG/1
5 TABLET, DELAYED RELEASE ORAL DAILY PRN
Status: DISCONTINUED | OUTPATIENT
Start: 2025-03-14 | End: 2025-03-17 | Stop reason: HOSPADM

## 2025-03-14 RX ORDER — NITROGLYCERIN 0.4 MG/1
0.4 TABLET SUBLINGUAL
Status: DISCONTINUED | OUTPATIENT
Start: 2025-03-14 | End: 2025-03-17 | Stop reason: HOSPADM

## 2025-03-14 RX ORDER — ASPIRIN 81 MG/1
81 TABLET, CHEWABLE ORAL DAILY
Status: CANCELLED | OUTPATIENT
Start: 2025-03-15

## 2025-03-14 RX ORDER — SODIUM CHLORIDE 0.9 % (FLUSH) 0.9 %
10 SYRINGE (ML) INJECTION EVERY 12 HOURS SCHEDULED
Status: DISCONTINUED | OUTPATIENT
Start: 2025-03-14 | End: 2025-03-17 | Stop reason: HOSPADM

## 2025-03-14 RX ORDER — TORSEMIDE 20 MG/1
20 TABLET ORAL DAILY
Status: CANCELLED | OUTPATIENT
Start: 2025-03-15

## 2025-03-14 RX ORDER — BUMETANIDE 0.25 MG/ML
2 INJECTION, SOLUTION INTRAMUSCULAR; INTRAVENOUS ONCE
Status: COMPLETED | OUTPATIENT
Start: 2025-03-14 | End: 2025-03-14

## 2025-03-14 RX ORDER — SERTRALINE HYDROCHLORIDE 25 MG/1
25 TABLET, FILM COATED ORAL DAILY
Status: CANCELLED | OUTPATIENT
Start: 2025-03-15

## 2025-03-14 RX ADMIN — Medication 10 ML: at 20:17

## 2025-03-14 RX ADMIN — BUMETANIDE 2 MG: 0.25 INJECTION INTRAMUSCULAR; INTRAVENOUS at 17:41

## 2025-03-14 RX ADMIN — PANTOPRAZOLE SODIUM 40 MG: 40 INJECTION, POWDER, FOR SOLUTION INTRAVENOUS at 20:16

## 2025-03-14 RX ADMIN — Medication 10 ML: at 20:16

## 2025-03-14 RX ADMIN — HYDROCODONE BITARTRATE AND ACETAMINOPHEN 1 TABLET: 10; 325 TABLET ORAL at 17:42

## 2025-03-14 NOTE — H&P
"    [facility] Medicine Services  History & Physical    Patient Identification:  Name:  Lesley Michel  Age:  69 y.o.  Sex:  female  :  1955  MRN:  [id number]   Visit Number:  [id number]  Admit Date: 3/14/2025   Primary Care Physician:  Woodrow Raymond APRN    Subjective     Chief complaint: Generalized weakness    History of presenting illness:      Lesley Michel is a 69 y.o. female who presented for further evaluation of generalized weakness, melena, abdominal pain.  She was seen and examined in the ED with RN at the bedside.  She was anxious appearing but in no acute distress.  She reports 2-week history of intermittent melena which she reports is very dark, nearly black in color.  She denies any bright red blood per rectum.  She has also had intermittent nausea with vomiting as well as gnawing epigastric abdominal pain.  He denies hematemesis or coffee-ground emesis.  She denies any reflux symptoms.  She states she feels like she is \"starving to death.\"  She is not short of breath, she does feel fatigued and has been feeling some.  She does have history of atrial fibrillation as well as CAD with prior stenting.  She reports she takes Eliquis with last dose yesterday evening.  She is also on a daily aspirin, does not know if she takes any other antiplatelet medication.  She denies any fever, no cough, congestion, sore throat.  She does have increased lower extremity swelling currently.  She reports she has been compliant with home antihypertensives and diuretics with no missed doses.  She denies any difficulty with urination.  Further review of systems she did complain of back pain and was requesting a dose of pain medication.    Past medical history is significant for chronic respiratory failure, hypertension, HFpEF, CKD stage IV, paroxysmal atrial fibrillation, ASCVD with prior PCI/ROSAURA to LAD, COPD, PVD, migraine headaches, anxiety, chronic microcytic anemia    Upon arrival " to the ED, vital signs were temp 97.5, heart rate 63, respirations 14, /60, SpO2 94% on RA.  Did desaturate as low as 87% on RA in the ED.  On laboratory workup sodium was 130, anion gap 18.2 with bicarb 21.8.  Baseline creatinine appears to be 1.8-1.9 as elevated at 2.91 with BUN 79.  INR is currently 2.35 with PT 26.  On CBC her hemoglobin was 4.9 with hematocrit 17.0.  Baseline hemoglobin is around 8.5.  CXR showed CHF with cardiomegaly and pulmonary vascular congestion.  CT head noted chronic small vessel ischemic disease.    Known Emergency Department medications received prior to my evaluation included PRBCs infusing.   Emergency Department Room location at the time of my evaluation was [id number].     ---------------------------------------------------------------------------------------------------------------------   Review of Systems   Constitutional:  Positive for activity change, chills and fatigue.   HENT:  Negative for congestion and rhinorrhea.    Respiratory:  Negative for cough and shortness of breath.    Cardiovascular:  Positive for leg swelling. Negative for chest pain.   Gastrointestinal:  Positive for abdominal pain, nausea and vomiting. Negative for diarrhea.   Genitourinary:  Negative for difficulty urinating and dysuria.   Musculoskeletal:  Positive for back pain. Negative for arthralgias and myalgias.   Skin:  Negative for rash and wound.   Neurological:  Negative for dizziness and light-headedness.        ---------------------------------------------------------------------------------------------------------------------   Past Medical History:   Diagnosis Date    Anxiety     Arthritis     CHF (congestive heart failure)     Cigarette smoker 02/10/2022    CKD (chronic kidney disease) stage 4, GFR 15-29 ml/min     MAICO on top of CKD IIIa, HD from 10/23-4/24, some recovery to CKD IV    COPD (chronic obstructive pulmonary disease)     Coronary artery disease     Elevated cholesterol     GERD  (gastroesophageal reflux disease)     H/O heart artery stent     History of transfusion     Hyperlipidemia     Hypertension     hypothyroidism     Obesity     PAF (paroxysmal atrial fibrillation)     PVD (peripheral vascular disease)     x2 stents     Past Surgical History:   Procedure Laterality Date    ANKLE SURGERY      RIGHT    APPENDECTOMY      CARDIAC CATHETERIZATION      LEFT     SECTION      CHOLECYSTECTOMY N/A 2025    Procedure: CHOLECYSTECTOMY LAPAROSCOPIC WITH DAVINCI ROBOT;  Surgeon: Windy Neal MD;  Location: [facility];  Service: Robotics - DaVinci;  Laterality: N/A;    CORONARY STENT PLACEMENT      HYSTERECTOMY      INSERTION HEMODIALYSIS CATHETER Right 10/19/2023    Procedure: HEMODIALYSIS CATHETER INSERTION;  Surgeon: Bartolome Schwartz MD;  Location: [facility] OR;  Service: General;  Laterality: Right;     Family History   Problem Relation Age of Onset    Heart disease Mother     Heart attack Mother 73    Fibromyalgia Mother     Heart attack Father 72    Ovarian cancer Sister     Coronary artery disease Other     Breast cancer Neg Hx      Social History     Socioeconomic History    Marital status:    Tobacco Use    Smoking status: Every Day     Current packs/day: 0.50     Average packs/day: 0.5 packs/day for 30.0 years (15.0 ttl pk-yrs)     Types: Cigarettes     Passive exposure: Never    Smokeless tobacco: Never   Vaping Use    Vaping status: Never Used   Substance and Sexual Activity    Alcohol use: No    Drug use: No    Sexual activity: Never     ---------------------------------------------------------------------------------------------------------------------   Allergies:  Xanax [alprazolam]  ---------------------------------------------------------------------------------------------------------------------   Home medications:    Medications below are reported home medications pulling from within the system; at this time, these medications have not been reconciled  unless otherwise specified and are in the verification process for further verifcation as current home medications.  (Not in a hospital admission)      Hospital Scheduled Meds:  bumetanide, 2 mg, Intravenous, Once  pantoprazole, 40 mg, Intravenous, Q12H           Current listed hospital scheduled medications may not yet reflect those currently placed in orders that are signed and held awaiting patient's arrival to floor.   ---------------------------------------------------------------------------------------------------------------------     Objective     Vital Signs:  Temp:  [97.5 °F (36.4 °C)-98.4 °F (36.9 °C)] 97.7 °F (36.5 °C)  Heart Rate:  [60-66] 64  Resp:  [14-17] 17  BP: (106-127)/(52-72) 110/56      03/14/25  1222   Weight: 81.6 kg (180 lb)     Body mass index is 34.01 kg/m².  ---------------------------------------------------------------------------------------------------------------------       Physical Exam  Vitals and nursing note reviewed.   Constitutional:       General: She is not in acute distress.     Appearance: She is ill-appearing (Chronically ill-appearing).   HENT:      Head: Normocephalic and atraumatic.   Eyes:      Extraocular Movements: Extraocular movements intact.   Cardiovascular:      Rate and Rhythm: Normal rate and regular rhythm.      Heart sounds: Murmur heard.   Pulmonary:      Effort: Pulmonary effort is normal.      Breath sounds: Normal breath sounds.   Abdominal:      Palpations: Abdomen is soft.      Tenderness: There is no abdominal tenderness.   Musculoskeletal:      Right lower leg: Edema present.      Left lower leg: Edema present.      Comments: 2 Plus lower extremity pitting   Skin:     General: Skin is warm and dry.      Coloration: Skin is pale.   Neurological:      Mental Status: She is alert. Mental status is at baseline.   Psychiatric:      Comments: Anxious  "        ---------------------------------------------------------------------------------------------------------------------  EKG:        ---------------------------------------------------------------------------------------------------------------------   Results from last 7 days   Lab Units 03/14/25  1255   WBC 10*3/mm3 7.76   HEMOGLOBIN g/dL 4.9*   HEMATOCRIT % 17.0*   MCV fL 75.2*   MCHC g/dL 28.8*   PLATELETS 10*3/mm3 505*   INR  2.35*         Results from last 7 days   Lab Units 03/14/25  1255   SODIUM mmol/L 130*   POTASSIUM mmol/L 3.7   CHLORIDE mmol/L 90*   CO2 mmol/L 21.8*   BUN mg/dL 79*   CREATININE mg/dL 2.91*   CALCIUM mg/dL 9.0   GLUCOSE mg/dL 132*   ALBUMIN g/dL 3.7   BILIRUBIN mg/dL 0.4   ALK PHOS U/L 130*   AST (SGOT) U/L 13   ALT (SGPT) U/L <5   Estimated Creatinine Clearance: 18.2 mL/min (A) (by C-G formula based on SCr of 2.91 mg/dL (H)).  No results found for: \"AMMONIA\"          Lab Results   Component Value Date    HGBA1C 5.30 10/08/2023     Lab Results   Component Value Date    TSH 6.770 (H) 10/08/2023    FREET4 1.01 10/08/2023     No results found for: \"PREGTESTUR\", \"PREGSERUM\", \"HCG\", \"HCGQUANT\"  Pain Management Panel          Latest Ref Rng & Units 10/9/2023   Pain Management Panel   Creatinine, Urine mg/dL 116.0      No results found for: \"BLOODCX\"  No results found for: \"URINECX\"  No results found for: \"WOUNDCX\"  No results found for: \"STOOLCX\"      ---------------------------------------------------------------------------------------------------------------------  Imaging Results (Last 7 Days)       Procedure Component Value Units Date/Time    CT Head Without Contrast [[id number]] Collected: 03/14/25 1427     Updated: 03/14/25 1431    Narrative:      EXAM:    CT Head Without Intravenous Contrast     EXAM DATE:    3/14/2025 1:43 PM     CLINICAL HISTORY:    weakness     TECHNIQUE:    Axial computed tomography images of the head/brain without intravenous  contrast.  Sagittal and coronal " "reformatted images were created and  reviewed.  This CT exam was performed using one or more of the following  dose reduction techniques:  automated exposure control, adjustment of  the mA and/or kV according to patient size, and/or use of iterative  reconstruction technique.     COMPARISON:    12/28/2024     FINDINGS:    Artifacts:  Motion artifact limits exam.    Brain and extra-axial spaces:  Mild chronic small vessel ischemic  disease.  No hemorrhage.    Bones/joints:  Unremarkable.  No acute fracture.    Soft tissues:  Unremarkable.    Sinuses:  Unremarkable as visualized.  No acute sinusitis.    Mastoid air cells:  Unremarkable as visualized.  No mastoid effusion.       Impression:      1.  Motion artifact limits portions of the exam.  2.  No CT evidence of acute intracranial abnormality is otherwise noted.  3.  Mild chronic small vessel ischemic disease.     This report was finalized on 3/14/2025 2:29 PM by Dr. Eduardo August MD.       XR Chest 1 View [[id number]] Collected: 03/14/25 1427     Updated: 03/14/25 1429    Narrative:      EXAM:    XR Chest, 1 View     EXAM DATE:    3/14/2025 1:52 PM     CLINICAL HISTORY:    weakness     TECHNIQUE:    Frontal view of the chest.     COMPARISON:    2/13/2025     FINDINGS:    Lungs and pleural spaces:  Pulmonary vascular congestion.  No  consolidation.  No pneumothorax.    Heart:  Cardiomegaly.    Mediastinum:  Unremarkable.  Normal mediastinal contour.    Bones/joints:  Unremarkable.  No acute fracture.       Impression:        CHF with cardiomegaly and pulmonary vascular congestion.     This report was finalized on 3/14/2025 2:27 PM by Dr. Eduardo August MD.               Cultures:  No results found for: \"BLOODCX\", \"URINECX\", \"WOUNDCX\", \"MRSACX\", \"RESPCX\", \"STOOLCX\"    Last echocardiogram:  Results for orders placed during the hospital encounter of 01/19/25    Adult Transthoracic Echo Complete w/ Color, Spectral and Contrast if necessary per " protocol    Interpretation Summary    Left ventricular systolic function is normal. Left ventricular ejection fraction appears to be 56 - 60%.    Left ventricular wall thickness is consistent with mild concentric hypertrophy.    There is calcification of the aortic valve.    Mild mitral valve stenosis is present.    Estimated right ventricular systolic pressure from tricuspid regurgitation is normal (<35 mmHg).          I have personally reviewed the above radiology images and read the final radiology report on 03/14/25  ---------------------------------------------------------------------------------------------------------------------  Assessment / Plan     Active Hospital Problems    Diagnosis  POA    **Acute on chronic anemia [D64.9]  Unknown       ASSESSMENT/PLAN:    Acute, symptomatic, on chronic anemia  Melena, concern for GI bleed  Patient presented to the ED secondary to 2-week history worsening fatigue, melena, epigastric, gnawing abdominal pain.  On laboratory workup her hemoglobin was 4.9 with hematocrit 17.0.  Baseline hemoglobin is around 8.5.  She is on anticoagulation-last dose of Eliquis 3/13 p.m. PT/INR today is 26/2.35.  She is receiving 2 units PRBCs in the ED  Admit to the telemetry unit for further workup and management  General surgery was consulted for further assistance and recommendations, appreciated.  Will continue twice daily IV PPI per recommendations  NG tube recommended however patient has refused  Continue to hold antiplatelet, anticoagulating medications  Plan for OR for EGD tomorrow.  N.p.o. at midnight.  CLD for now  Will follow-up H&H closely following transfusion.  Continue to transfuse as clinically indicated.  She is receiving Bumex with transfusions today given mild clinical volume overload, pulmonary vascular congestion on imaging.  Will continue to monitor respiratory status and clinical volume status closely.  May need to repeat IV diuresis if additional transfusions  needed.  Continue supportive care measures    MAICO on CKD stage IV  Elevated anion gap metabolic acidosis  Baseline creatinine appears to be 1.8-1.9-was elevated today at 2.91 with BUN 79.  Consult nephrology for further assistance and recommendations, appreciated.  Will avoid nephrotoxins as able.  Clinically complicated by volume overload requiring transfusions as above.  Monitor I's and O's, hypotension as able  Continue to trend labs    Chronic:  HTN  Paroxysmal atrial fibrillation  ASCVD s/p prior PCI/ROSAURA to LAD  COPD  Chronic HFpEF  Chronic respiratory failure (2 L baseline)  PVD  Migraine headaches  Anxiety  Continue home medication regimen as indicated-avoid nephrotoxins as above  Monitor vital signs closely  Monitor clinical volume status as above  Holding anticoagulants for now  ----------  -DVT prophylaxis: SCDs  -Activity: Up with assistance  -Expected length of stay: INPATIENT status due to the need for care which can only be reasonably provided in an hospital setting such as aggressive/expedited ancillary services and/or consultation services, the necessity for IV medications, close physician monitoring and/or the possible need for procedures.  In such, I feel patient’s risk for adverse outcomes and need for care warrant INPATIENT evaluation and predict the patient’s care encounter to likely last beyond 2 midnights.   -Disposition pending further clinical course and work up     High risk secondary to melena, acute on chronic anemia, GIB, MAICO on CKD IV    There are no questions and answers to display.       Wally Lopez PA-C   03/14/25  15:47 EDT

## 2025-03-14 NOTE — CASE MANAGEMENT/SOCIAL WORK
Discharge Planning Assessment   [facility]     Patient Name: Lesley Michel  MRN: [id number]  Today's Date: 3/14/2025    Admit Date: 3/14/2025    Plan: Pt lives at home with her son Wes and grandchild and plans to return home at discharge pt states she drives herself or family will provide transportation. Pcp is Berto Marshall, she uses [facility] and has [place] [employer] Replacement. Pt uses o2 4 liters from [employer].   Discharge Needs Assessment       Row Name 03/14/25 1544       Living Environment    People in Home child(devan), adult    Current Living Arrangements home    Primary Care Provided by self    Family Caregiver if Needed child(devan), adult    Quality of Family Relationships helpful;involved;supportive    Able to Return to Prior Arrangements yes       Resource/Environmental Concerns    Resource/Environmental Concerns none    Transportation Concerns none       Transition Planning    Patient/Family Anticipates Transition to home with family    Patient/Family Anticipated Services at Transition none    Transportation Anticipated family or friend will provide       Discharge Needs Assessment    Readmission Within the Last 30 Days current reason for admission unrelated to previous admission    Equipment Currently Used at Home oxygen    Concerns to be Addressed no discharge needs identified;denies needs/concerns at this time    Anticipated Changes Related to Illness none    Equipment Needed After Discharge none                   Discharge Plan       Row Name 03/14/25 1546       Plan    Plan Pt lives at home with her son Wes and grandchild and plans to return home at discharge pt states she drives herself or family will provide transportation. Pcp is Berto Marshall, she uses [facility] and has [place] [employer] Replacement. Pt uses o2 4 liters from [employer].    Patient/Family in Agreement with Plan yes                    Expected Discharge Date and Time       Expected Discharge Date Expected Discharge Time     Mar 16, 2025            Demographic Summary       Row Name 03/14/25 1544       General Information    Admission Type inpatient    Arrived From home    Referral Source emergency department    Reason for Consult discharge planning                    Meera Hyde RN

## 2025-03-14 NOTE — CONSULTS
Patient Name:  Lesley Michel  YOB: 1955  [id number]       Patient Care Team:  Woodrow Raymond APRN as PCP - General (Family Medicine)      General Surgery Consult Note     Date of Consultation: 03/14/25    Consulting Physician - Colin Wells DO    Reason for Consult -concern for GI bleed    Subjective     I have been asked to see  Lesley Michel , a 69 y.o. female, anticoagulated on Eliquis for atrial fibrillation and CKD stage IV, in consultation for possible GI bleed.    Patient reports 1 to 2 days of vomiting and some associated abdominal pain.  She reports having dark stool.  Her last colonoscopy was 7 years ago.  She had presented to her nephrologist today and lab work demonstrated severe anemia.  She was sent to the ER for further workup.  Patient reports dyspnea without syncope.  Reports anxiety and palpitations  Patient has some baseline acid reflux, takes pantoprazole which controls the symptoms.  She denies NSAID use.  She is a smoker    Her last dose of Eliquis was yesterday evening.      Allergy:   Allergies   Allergen Reactions    Xanax [Alprazolam] Other (See Comments)     Severe agitation per patient and family       Medications:  pantoprazole, 40 mg, Intravenous, Q12H         No current facility-administered medications on file prior to encounter.     Current Outpatient Medications on File Prior to Encounter   Medication Sig    albuterol sulfate  (90 Base) MCG/ACT inhaler Inhale 2 puffs Every 4 (Four) Hours As Needed for Wheezing.    amLODIPine (NORVASC) 10 MG tablet Take 1 tablet by mouth Daily for 30 days.    aspirin 81 MG chewable tablet Chew 1 tablet Daily.    atorvastatin (LIPITOR) 80 MG tablet Take 1 tablet by mouth Every Night for 30 days.    chlorthalidone (HYGROTON) 25 MG tablet Take 1 tablet by mouth Daily.    Eliquis 5 MG tablet tablet Take 1 tablet by mouth Every 12 (Twelve) Hours.    folic acid (FOLVITE) 1 MG tablet Take 1 tablet by mouth Daily.     gabapentin (NEURONTIN) 600 MG tablet Take 0.5 tablets by mouth 3 (Three) Times a Day As Needed (nerve pain).    hydrALAZINE (APRESOLINE) 100 MG tablet Take 1 tablet by mouth Every 8 (Eight) Hours for 30 days.    HYDROcodone-acetaminophen (NORCO)  MG per tablet Take 1 tablet by mouth Every 8 (Eight) Hours As Needed for Moderate Pain.    ipratropium-albuterol (DUO-NEB) 0.5-2.5 mg/3 ml nebulizer Take 3 mL by nebulization Every 4 (Four) Hours As Needed for Wheezing.    metoprolol tartrate (LOPRESSOR) 25 MG tablet Take 1 tablet by mouth Every 12 (Twelve) Hours for 30 days.    ondansetron ODT (ZOFRAN-ODT) 4 MG disintegrating tablet Place 1 tablet on the tongue Every 8 (Eight) Hours As Needed for Nausea or Vomiting.    pantoprazole (PROTONIX) 40 MG EC tablet Take 1 tablet by mouth Daily.    sertraline (ZOLOFT) 25 MG tablet Take 1 tablet by mouth Daily.    Torsemide 40 MG tablet Take 20 mg by mouth Daily for 30 days.       PMHx:   Past Medical History:   Diagnosis Date    Anxiety     Arthritis     CHF (congestive heart failure)     Cigarette smoker 02/10/2022    CKD (chronic kidney disease) stage 4, GFR 15-29 ml/min     MAICO on top of CKD IIIa, HD from 10/23-, some recovery to CKD IV    COPD (chronic obstructive pulmonary disease)     Coronary artery disease     Elevated cholesterol     GERD (gastroesophageal reflux disease)     H/O heart artery stent     History of transfusion     Hyperlipidemia     Hypertension     hypothyroidism     Obesity     PAF (paroxysmal atrial fibrillation)     PVD (peripheral vascular disease)     x2 stents         Past Surgical History:    ANKLE SURGERY           RIGHT    APPENDECTOMY          CARDIAC CATHETERIZATION           LEFT     SECTION          CHOLECYSTECTOMY  N/A  2025     Procedure: CHOLECYSTECTOMY LAPAROSCOPIC WITH [employer] ROBOT;  Surgeon: Windy Neal MD;  Location: [facility];  Service: Robotics - DaVinci;  Laterality: N/A;    CORONARY STENT PLACEMENT    "       HYSTERECTOMY          INSERTION HEMODIALYSIS CATHETER  Right  10/19/2023     Procedure: HEMODIALYSIS CATHETER INSERTION;  Surgeon: Bartolome Schwartz MD;  Location[facility];  Service: General;  Laterality: Right;    Family History: Coronary artery disease    Social History: Smoker     Review of Systems   14 point review of systems negative except for as noted in HPI          Objective     Physical Exam:      Vital Signs  /57   Pulse 60   Temp 97.5 °F (36.4 °C) (Temporal)   Resp 14   Ht 154.9 cm (61\")   Wt 81.6 kg (180 lb)   SpO2 91%   BMI 34.01 kg/m²   No intake or output data in the 24 hours ending 03/14/25 1510      Physical Exam:      03/14/25  1222   Weight: 81.6 kg (180 lb)    Body mass index is 34.01 kg/m².  Constitution: No acute distress.   Head: Normocephalic, atraumatic.   Eyes: Aligned without strabismus. Conjunctiva noninjected   Ears, Nose, Mouth: No lesions appreciated   Respiratory: Symmetric chest expansion. No respiratory distress.   Gastrointestinal:  soft, nondistended, nontender  Skin:  No cyanosis, clubbing or edema bilaterally    Lymphatics: No abnormal cervical or supraclavicular adenopathy  Neurologic: No gross deficits.  Alert and oriented x3  Psychiatric: Normal mood        Results Review: I have personally reviewed all of the recent lab and imaging results available at this time.     Creatinine elevated 2.9 from baseline around 2.  BUN is elevated at 79.  INR is 2.3    Hemoglobin is 4.9 from baseline around 8.  Platelets are 505            Assessment and Plan:    69 y.o. female smoker, anticoagulated on Eliquis for atrial fibrillation and CKD stage IV, with vomiting, acute on chronic anemia    IV PPI twice daily  Transfuse and monitor response  Patient refused NG tube placement  Hold anticoagulation  N.p.o. at midnight.  Possible EGD tomorrow  Patient reports being DNI        This document has been electronically signed by Luis Antonio Jiang MD   March 14, 2025 " 15:10 EDT    [facility]

## 2025-03-14 NOTE — PLAN OF CARE
Goal Outcome Evaluation:  Patient admitted to  this shift. Patient has tolerated all interventions. No complaints or concerns at this time. No signs of acute distress noted. Will continue to follow plan of care.

## 2025-03-14 NOTE — ED PROVIDER NOTES
Subjective   History of Present Illness  69-year-old female patient with past medical history of CHF, chronic kidney disease, COPD, coronary artery disease, GERD, hyperlipidemia, hypertension, hypothyroidism, history of A-fib, and peripheral vascular disease presents to the emergency room today with complaints of generalized weakness and fatigue.  She reports that she has had increased lower extremity edema.  She states she has been vomiting for the past 2 days.  He states she seen Dr. Steinberg this morning with his sent her to the emergency room.    History provided by:  Patient   used: No        Review of Systems   Constitutional: Negative.  Positive for fatigue.   HENT: Negative.     Eyes: Negative.    Respiratory: Negative.     Cardiovascular: Negative.  Positive for leg swelling.   Gastrointestinal: Negative.  Positive for nausea and vomiting.   Endocrine: Negative.    Genitourinary: Negative.    Musculoskeletal: Negative.    Skin: Negative.    Allergic/Immunologic: Negative.    Neurological: Negative.  Positive for weakness.   Hematological: Negative.    Psychiatric/Behavioral: Negative.     All other systems reviewed and are negative.      Past Medical History:   Diagnosis Date    Anxiety     Arthritis     CHF (congestive heart failure)     Cigarette smoker 02/10/2022    CKD (chronic kidney disease) stage 4, GFR 15-29 ml/min     MAICO on top of CKD IIIa, HD from 10/23-4/24, some recovery to CKD IV    COPD (chronic obstructive pulmonary disease)     Coronary artery disease     Elevated cholesterol     GERD (gastroesophageal reflux disease)     H/O heart artery stent     History of transfusion     Hyperlipidemia     Hypertension     hypothyroidism     Obesity     PAF (paroxysmal atrial fibrillation)     PVD (peripheral vascular disease)     x2 stents       Allergies   Allergen Reactions    Xanax [Alprazolam] Other (See Comments)     Severe agitation per patient and family       Past Surgical  History:   Procedure Laterality Date    ANKLE SURGERY      RIGHT    APPENDECTOMY      CARDIAC CATHETERIZATION      LEFT     SECTION      CHOLECYSTECTOMY N/A 2025    Procedure: CHOLECYSTECTOMY LAPAROSCOPIC WITH DAVINCI ROBOT;  Surgeon: Windy Neal MD;  Location: [facility] OR;  Service: Robotics - DaVinci;  Laterality: N/A;    CORONARY STENT PLACEMENT      HYSTERECTOMY      INSERTION HEMODIALYSIS CATHETER Right 10/19/2023    Procedure: HEMODIALYSIS CATHETER INSERTION;  Surgeon: Bartolome Schwartz MD;  Location:  [facility] OR;  Service: General;  Laterality: Right;       Family History   Problem Relation Age of Onset    Heart disease Mother     Heart attack Mother 73    Fibromyalgia Mother     Heart attack Father 72    Ovarian cancer Sister     Coronary artery disease Other     Breast cancer Neg Hx        Social History     Socioeconomic History    Marital status:    Tobacco Use    Smoking status: Every Day     Current packs/day: 0.50     Average packs/day: 0.5 packs/day for 30.0 years (15.0 ttl pk-yrs)     Types: Cigarettes     Passive exposure: Never    Smokeless tobacco: Never   Vaping Use    Vaping status: Never Used   Substance and Sexual Activity    Alcohol use: No    Drug use: No    Sexual activity: Never           Objective   Physical Exam  Vitals and nursing note reviewed.   Constitutional:       Appearance: Normal appearance. She is normal weight.   HENT:      Head: Normocephalic and atraumatic.      Right Ear: External ear normal.      Left Ear: External ear normal.      Nose: Nose normal.      Mouth/Throat:      Mouth: Mucous membranes are moist.      Pharynx: Oropharynx is clear.   Eyes:      Extraocular Movements: Extraocular movements intact.      Conjunctiva/sclera: Conjunctivae normal.      Pupils: Pupils are equal, round, and reactive to light.   Cardiovascular:      Rate and Rhythm: Normal rate and regular rhythm.      Pulses: Normal pulses.      Heart sounds: Normal heart  sounds.   Pulmonary:      Effort: Pulmonary effort is normal.      Breath sounds: Normal breath sounds.   Abdominal:      General: Abdomen is flat. Bowel sounds are normal.      Palpations: Abdomen is soft.   Musculoskeletal:         General: Normal range of motion.      Cervical back: Normal range of motion and neck supple.   Skin:     General: Skin is warm and dry.      Capillary Refill: Capillary refill takes less than 2 seconds.   Neurological:      General: No focal deficit present.      Mental Status: She is alert and oriented to person, place, and time. Mental status is at baseline.   Psychiatric:         Mood and Affect: Mood normal.         Behavior: Behavior normal.         Thought Content: Thought content normal.         Judgment: Judgment normal.         Procedures           ED Course  ED Course as of 03/14/25 1747   Fri Mar 14, 2025   1310 Hemoglobin(!!): 4.9 [ML]   1323 Creatinine(!): 2.91 [ML]   1323 eGFR(!): 17.2 [ML]   1341 PT states she recently had to have blood and iron infusion.  Not having any blood loss.  [ML]   1437  I have signed this patient out to Dr. Wells  [ML]      ED Course User Index  [ML] Radha Harvey PA                                                       Medical Decision Making  69-year-old female patient with past medical history of CHF, chronic kidney disease, COPD, coronary artery disease, GERD, hyperlipidemia, hypertension, hypothyroidism, history of A-fib, and peripheral vascular disease presents to the emergency room today with complaints of generalized weakness and fatigue.  She reports that she has had increased lower extremity edema.  She states she has been vomiting for the past 2 days.  He states she seen Dr. Steinberg this morning with his sent her to the emergency room.        Problems Addressed:  Anemia, unspecified type: complicated acute illness or injury  Gastrointestinal hemorrhage, unspecified gastrointestinal hemorrhage type: complicated acute illness  or injury    Amount and/or Complexity of Data Reviewed  Labs: ordered. Decision-making details documented in ED Course.  Radiology: ordered.    Risk  Prescription drug management.  Decision regarding hospitalization.        Final diagnoses:   Anemia, unspecified type   Gastrointestinal hemorrhage, unspecified gastrointestinal hemorrhage type     Electronically signed by VICK Dodge, 03/14/25, 2:37 PM EDT.    ED Disposition  ED Disposition       ED Disposition   Decision to Admit    Condition   --    Comment   Level of Care: Telemetry [5]   Diagnosis: Acute on chronic anemia [[id number]]   Admitting Physician: YULIA RIOS [[id number]]   Attending Physician: YULIA RIOS [[id number]]   Certification: I Certify That Inpatient Hospital Services Are Medically Necessary For Greater Than 2 Midnights                 No follow-up provider specified.       Medication List        ASK your doctor about these medications      torsemide 20 MG tablet  Commonly known as: DEMADEX  Ask about: Which instructions should I use?                 Radha Harvey PA  03/14/25 [id number]

## 2025-03-15 ENCOUNTER — ANESTHESIA (OUTPATIENT)
Dept: PERIOP | Facility: HOSPITAL | Age: 70
End: 2025-03-15
Payer: MEDICARE

## 2025-03-15 LAB
ANION GAP SERPL CALCULATED.3IONS-SCNC: 10.7 MMOL/L (ref 5–15)
ANION GAP SERPL CALCULATED.3IONS-SCNC: 12.9 MMOL/L (ref 5–15)
BH BB BLOOD EXPIRATION DATE: NORMAL
BH BB BLOOD EXPIRATION DATE: NORMAL
BH BB BLOOD TYPE BARCODE: 5100
BH BB BLOOD TYPE BARCODE: 5100
BH BB DISPENSE STATUS: NORMAL
BH BB DISPENSE STATUS: NORMAL
BH BB PRODUCT CODE: NORMAL
BH BB PRODUCT CODE: NORMAL
BH BB UNIT NUMBER: NORMAL
BH BB UNIT NUMBER: NORMAL
BUN SERPL-MCNC: 80 MG/DL (ref 8–23)
BUN SERPL-MCNC: 80 MG/DL (ref 8–23)
BUN/CREAT SERPL: 28.7 (ref 7–25)
BUN/CREAT SERPL: 29.7 (ref 7–25)
CALCIUM SPEC-SCNC: 8.6 MG/DL (ref 8.6–10.5)
CALCIUM SPEC-SCNC: 8.7 MG/DL (ref 8.6–10.5)
CHLORIDE SERPL-SCNC: 89 MMOL/L (ref 98–107)
CHLORIDE SERPL-SCNC: 91 MMOL/L (ref 98–107)
CO2 SERPL-SCNC: 25.1 MMOL/L (ref 22–29)
CO2 SERPL-SCNC: 26.3 MMOL/L (ref 22–29)
CREAT SERPL-MCNC: 2.69 MG/DL (ref 0.57–1)
CREAT SERPL-MCNC: 2.79 MG/DL (ref 0.57–1)
CROSSMATCH INTERPRETATION: NORMAL
CROSSMATCH INTERPRETATION: NORMAL
DEPRECATED RDW RBC AUTO: 54.7 FL (ref 37–54)
EGFRCR SERPLBLD CKD-EPI 2021: 18.1 ML/MIN/1.73
EGFRCR SERPLBLD CKD-EPI 2021: 18.9 ML/MIN/1.73
ERYTHROCYTE [DISTWIDTH] IN BLOOD BY AUTOMATED COUNT: 19.1 % (ref 12.3–15.4)
GEN 5 1HR TROPONIN T REFLEX: 67 NG/L
GLUCOSE SERPL-MCNC: 108 MG/DL (ref 65–99)
GLUCOSE SERPL-MCNC: 112 MG/DL (ref 65–99)
HCT VFR BLD AUTO: 23.1 % (ref 34–46.6)
HCT VFR BLD AUTO: 23.1 % (ref 34–46.6)
HGB BLD-MCNC: 7 G/DL (ref 12–15.9)
HGB BLD-MCNC: 7 G/DL (ref 12–15.9)
MCH RBC QN AUTO: 23.8 PG (ref 26.6–33)
MCHC RBC AUTO-ENTMCNC: 30.4 G/DL (ref 31.5–35.7)
MCV RBC AUTO: 78.2 FL (ref 79–97)
PLATELET # BLD AUTO: 411 10*3/MM3 (ref 140–450)
PMV BLD AUTO: 10.2 FL (ref 6–12)
POTASSIUM SERPL-SCNC: 3.2 MMOL/L (ref 3.5–5.2)
POTASSIUM SERPL-SCNC: 3.3 MMOL/L (ref 3.5–5.2)
RBC # BLD AUTO: 2.94 10*6/MM3 (ref 3.77–5.28)
SODIUM SERPL-SCNC: 126 MMOL/L (ref 136–145)
SODIUM SERPL-SCNC: 129 MMOL/L (ref 136–145)
TROPONIN T % DELTA: -6
TROPONIN T NUMERIC DELTA: -4 NG/L
TROPONIN T SERPL HS-MCNC: 71 NG/L
UNIT  ABO: NORMAL
UNIT  ABO: NORMAL
UNIT  RH: NORMAL
UNIT  RH: NORMAL
WBC NRBC COR # BLD AUTO: 7.78 10*3/MM3 (ref 3.4–10.8)

## 2025-03-15 PROCEDURE — 99232 SBSQ HOSP IP/OBS MODERATE 35: CPT | Performed by: STUDENT IN AN ORGANIZED HEALTH CARE EDUCATION/TRAINING PROGRAM

## 2025-03-15 PROCEDURE — 84484 ASSAY OF TROPONIN QUANT: CPT | Performed by: STUDENT IN AN ORGANIZED HEALTH CARE EDUCATION/TRAINING PROGRAM

## 2025-03-15 PROCEDURE — 25010000002 FENTANYL CITRATE (PF) 50 MCG/ML SOLUTION: Performed by: NURSE ANESTHETIST, CERTIFIED REGISTERED

## 2025-03-15 PROCEDURE — 0DJ08ZZ INSPECTION OF UPPER INTESTINAL TRACT, VIA NATURAL OR ARTIFICIAL OPENING ENDOSCOPIC: ICD-10-PCS | Performed by: SURGERY

## 2025-03-15 PROCEDURE — 25010000002 LIDOCAINE PF 2% 2 % SOLUTION: Performed by: NURSE ANESTHETIST, CERTIFIED REGISTERED

## 2025-03-15 PROCEDURE — 80048 BASIC METABOLIC PNL TOTAL CA: CPT | Performed by: STUDENT IN AN ORGANIZED HEALTH CARE EDUCATION/TRAINING PROGRAM

## 2025-03-15 PROCEDURE — 25810000003 LACTATED RINGERS PER 1000 ML: Performed by: NURSE ANESTHETIST, CERTIFIED REGISTERED

## 2025-03-15 PROCEDURE — 85027 COMPLETE CBC AUTOMATED: CPT | Performed by: STUDENT IN AN ORGANIZED HEALTH CARE EDUCATION/TRAINING PROGRAM

## 2025-03-15 PROCEDURE — 25010000002 PROPOFOL 10 MG/ML EMULSION: Performed by: NURSE ANESTHETIST, CERTIFIED REGISTERED

## 2025-03-15 PROCEDURE — 93005 ELECTROCARDIOGRAM TRACING: CPT | Performed by: STUDENT IN AN ORGANIZED HEALTH CARE EDUCATION/TRAINING PROGRAM

## 2025-03-15 PROCEDURE — 80048 BASIC METABOLIC PNL TOTAL CA: CPT | Performed by: INTERNAL MEDICINE

## 2025-03-15 RX ORDER — SODIUM CHLORIDE 0.9 % (FLUSH) 0.9 %
10 SYRINGE (ML) INJECTION AS NEEDED
Status: DISCONTINUED | OUTPATIENT
Start: 2025-03-15 | End: 2025-03-15 | Stop reason: HOSPADM

## 2025-03-15 RX ORDER — ONDANSETRON 2 MG/ML
4 INJECTION INTRAMUSCULAR; INTRAVENOUS AS NEEDED
Status: DISCONTINUED | OUTPATIENT
Start: 2025-03-15 | End: 2025-03-15 | Stop reason: HOSPADM

## 2025-03-15 RX ORDER — SODIUM CHLORIDE, SODIUM LACTATE, POTASSIUM CHLORIDE, CALCIUM CHLORIDE 600; 310; 30; 20 MG/100ML; MG/100ML; MG/100ML; MG/100ML
INJECTION, SOLUTION INTRAVENOUS CONTINUOUS PRN
Status: DISCONTINUED | OUTPATIENT
Start: 2025-03-15 | End: 2025-03-15 | Stop reason: SURG

## 2025-03-15 RX ORDER — FENTANYL CITRATE 50 UG/ML
INJECTION, SOLUTION INTRAMUSCULAR; INTRAVENOUS AS NEEDED
Status: DISCONTINUED | OUTPATIENT
Start: 2025-03-15 | End: 2025-03-15 | Stop reason: SURG

## 2025-03-15 RX ORDER — ATORVASTATIN CALCIUM 40 MG/1
80 TABLET, FILM COATED ORAL NIGHTLY
Status: DISCONTINUED | OUTPATIENT
Start: 2025-03-15 | End: 2025-03-17 | Stop reason: HOSPADM

## 2025-03-15 RX ORDER — POLYETHYLENE GLYCOL 3350 17 G/17G
1 POWDER, FOR SOLUTION ORAL ONCE
Status: COMPLETED | OUTPATIENT
Start: 2025-03-16 | End: 2025-03-16

## 2025-03-15 RX ORDER — FENTANYL CITRATE 50 UG/ML
50 INJECTION, SOLUTION INTRAMUSCULAR; INTRAVENOUS
Status: DISCONTINUED | OUTPATIENT
Start: 2025-03-15 | End: 2025-03-15 | Stop reason: HOSPADM

## 2025-03-15 RX ORDER — METOPROLOL TARTRATE 25 MG/1
25 TABLET, FILM COATED ORAL EVERY 12 HOURS SCHEDULED
Status: DISCONTINUED | OUTPATIENT
Start: 2025-03-15 | End: 2025-03-17 | Stop reason: HOSPADM

## 2025-03-15 RX ORDER — SODIUM CHLORIDE 0.9 % (FLUSH) 0.9 %
10 SYRINGE (ML) INJECTION EVERY 12 HOURS SCHEDULED
Status: DISCONTINUED | OUTPATIENT
Start: 2025-03-15 | End: 2025-03-15 | Stop reason: HOSPADM

## 2025-03-15 RX ORDER — POTASSIUM CHLORIDE 1500 MG/1
20 TABLET, EXTENDED RELEASE ORAL ONCE
Status: COMPLETED | OUTPATIENT
Start: 2025-03-15 | End: 2025-03-15

## 2025-03-15 RX ORDER — HYDROCODONE BITARTRATE AND ACETAMINOPHEN 10; 325 MG/1; MG/1
1 TABLET ORAL EVERY 8 HOURS PRN
Status: DISCONTINUED | OUTPATIENT
Start: 2025-03-15 | End: 2025-03-17 | Stop reason: HOSPADM

## 2025-03-15 RX ORDER — SODIUM CHLORIDE 9 MG/ML
40 INJECTION, SOLUTION INTRAVENOUS AS NEEDED
Status: DISCONTINUED | OUTPATIENT
Start: 2025-03-15 | End: 2025-03-15 | Stop reason: HOSPADM

## 2025-03-15 RX ORDER — OXYCODONE AND ACETAMINOPHEN 5; 325 MG/1; MG/1
1 TABLET ORAL ONCE AS NEEDED
Status: DISCONTINUED | OUTPATIENT
Start: 2025-03-15 | End: 2025-03-15 | Stop reason: HOSPADM

## 2025-03-15 RX ORDER — AMLODIPINE BESYLATE 10 MG/1
10 TABLET ORAL
Status: DISCONTINUED | OUTPATIENT
Start: 2025-03-15 | End: 2025-03-17 | Stop reason: HOSPADM

## 2025-03-15 RX ORDER — HYDRALAZINE HYDROCHLORIDE 50 MG/1
100 TABLET, FILM COATED ORAL EVERY 8 HOURS SCHEDULED
Status: DISCONTINUED | OUTPATIENT
Start: 2025-03-15 | End: 2025-03-17 | Stop reason: HOSPADM

## 2025-03-15 RX ORDER — GABAPENTIN 300 MG/1
300 CAPSULE ORAL 3 TIMES DAILY PRN
Status: DISCONTINUED | OUTPATIENT
Start: 2025-03-15 | End: 2025-03-17 | Stop reason: HOSPADM

## 2025-03-15 RX ORDER — SODIUM CHLORIDE, SODIUM LACTATE, POTASSIUM CHLORIDE, CALCIUM CHLORIDE 600; 310; 30; 20 MG/100ML; MG/100ML; MG/100ML; MG/100ML
125 INJECTION, SOLUTION INTRAVENOUS ONCE
Status: DISCONTINUED | OUTPATIENT
Start: 2025-03-15 | End: 2025-03-15 | Stop reason: HOSPADM

## 2025-03-15 RX ORDER — LIDOCAINE HYDROCHLORIDE 20 MG/ML
INJECTION, SOLUTION EPIDURAL; INFILTRATION; INTRACAUDAL; PERINEURAL AS NEEDED
Status: DISCONTINUED | OUTPATIENT
Start: 2025-03-15 | End: 2025-03-15 | Stop reason: SURG

## 2025-03-15 RX ORDER — PROPOFOL 10 MG/ML
VIAL (ML) INTRAVENOUS AS NEEDED
Status: DISCONTINUED | OUTPATIENT
Start: 2025-03-15 | End: 2025-03-15 | Stop reason: SURG

## 2025-03-15 RX ORDER — FOLIC ACID 1 MG/1
1 TABLET ORAL DAILY
Status: DISCONTINUED | OUTPATIENT
Start: 2025-03-15 | End: 2025-03-17 | Stop reason: HOSPADM

## 2025-03-15 RX ORDER — IPRATROPIUM BROMIDE AND ALBUTEROL SULFATE 2.5; .5 MG/3ML; MG/3ML
3 SOLUTION RESPIRATORY (INHALATION) ONCE AS NEEDED
Status: DISCONTINUED | OUTPATIENT
Start: 2025-03-15 | End: 2025-03-15 | Stop reason: HOSPADM

## 2025-03-15 RX ADMIN — SODIUM CHLORIDE, POTASSIUM CHLORIDE, SODIUM LACTATE AND CALCIUM CHLORIDE: 600; 310; 30; 20 INJECTION, SOLUTION INTRAVENOUS at 08:36

## 2025-03-15 RX ADMIN — FENTANYL CITRATE 100 MCG: 50 INJECTION INTRAMUSCULAR; INTRAVENOUS at 08:38

## 2025-03-15 RX ADMIN — METOPROLOL TARTRATE 25 MG: 25 TABLET, FILM COATED ORAL at 10:58

## 2025-03-15 RX ADMIN — METOPROLOL TARTRATE 25 MG: 25 TABLET, FILM COATED ORAL at 20:35

## 2025-03-15 RX ADMIN — Medication 10 ML: at 11:55

## 2025-03-15 RX ADMIN — AMLODIPINE BESYLATE 10 MG: 10 TABLET ORAL at 10:58

## 2025-03-15 RX ADMIN — HYDROCODONE BITARTRATE AND ACETAMINOPHEN 1 TABLET: 10; 325 TABLET ORAL at 20:35

## 2025-03-15 RX ADMIN — POTASSIUM CHLORIDE 20 MEQ: 1500 TABLET, EXTENDED RELEASE ORAL at 11:55

## 2025-03-15 RX ADMIN — Medication 10 ML: at 20:34

## 2025-03-15 RX ADMIN — LIDOCAINE HYDROCHLORIDE 60 MG: 20 INJECTION, SOLUTION EPIDURAL; INFILTRATION; INTRACAUDAL; PERINEURAL at 08:38

## 2025-03-15 RX ADMIN — FOLIC ACID 1 MG: 1 TABLET ORAL at 10:58

## 2025-03-15 RX ADMIN — HYDRALAZINE HYDROCHLORIDE 100 MG: 50 TABLET ORAL at 10:58

## 2025-03-15 RX ADMIN — PANTOPRAZOLE SODIUM 40 MG: 40 INJECTION, POWDER, FOR SOLUTION INTRAVENOUS at 20:35

## 2025-03-15 RX ADMIN — PROPOFOL 50 MG: 10 INJECTION, EMULSION INTRAVENOUS at 08:42

## 2025-03-15 RX ADMIN — PROPOFOL 160 MCG/KG/MIN: 10 INJECTION, EMULSION INTRAVENOUS at 08:43

## 2025-03-15 RX ADMIN — HYDRALAZINE HYDROCHLORIDE 100 MG: 50 TABLET ORAL at 20:35

## 2025-03-15 RX ADMIN — ATORVASTATIN CALCIUM 80 MG: 40 TABLET, FILM COATED ORAL at 20:35

## 2025-03-15 RX ADMIN — PANTOPRAZOLE SODIUM 40 MG: 40 INJECTION, POWDER, FOR SOLUTION INTRAVENOUS at 10:58

## 2025-03-15 NOTE — PLAN OF CARE
Problem: Adult Inpatient Plan of Care  Goal: Absence of Hospital-Acquired Illness or Injury  Intervention: Identify and Manage Fall Risk  Recent Flowsheet Documentation  Taken 3/14/2025 2300 by Markel Covarrubias RN  Safety Promotion/Fall Prevention: safety round/check completed  Taken 3/14/2025 2100 by Markel Covarrubias, RN  Safety Promotion/Fall Prevention: safety round/check completed  Taken 3/14/2025 1909 by Markel Covarrubias RN  Safety Promotion/Fall Prevention: safety round/check completed     Problem: Adult Inpatient Plan of Care  Goal: Absence of Hospital-Acquired Illness or Injury  Intervention: Prevent Infection  Recent Flowsheet Documentation  Taken 3/14/2025 2300 by Markel Covarrubias RN  Infection Prevention: rest/sleep promoted  Taken 3/14/2025 1909 by Markel Covarrubias RN  Infection Prevention: rest/sleep promoted     Problem: Adult Inpatient Plan of Care  Goal: Readiness for Transition of Care  Outcome: Progressing   Goal Outcome Evaluation:

## 2025-03-15 NOTE — CONSULTS
Nephrology Progress Note      Subjective     03/15/2025 patient was seen by me in the office yesterday and I sent the patient to the ER so I will be seeing this patient has concurrent care patient is a 69-year-old came to my office with presyncope also was having melena and weakness so I sent the patient to the ER and patient came to the hospital her hemoglobin was 4.9 and INR was 2.35 creatinine was 2.91 sodium was 130 patient had a scope today and is waiting for colonoscopy patient denies any shortness of breath still have swelling no urgency no frequency no fever no chills no headache no passing out now potentially passed out and had nausea vomiting few days back    Objective       Vital signs:     Vitals:    03/15/25 0855 03/15/25 0900 03/15/25 0905 03/15/25 0915   BP: 121/60 123/65 130/66 130/60   BP Location: Right arm Right arm Right arm Right arm   Patient Position: Lying Lying Lying Lying   Pulse: 66 67 66 71   Resp: 18 20 18 20   Temp:   98.1 °F (36.7 °C) 98.5 °F (36.9 °C)   TempSrc:   Temporal Oral   SpO2: 90% 92% 93% 97%   Weight:       Height:            Intake/Output                         03/14/25 0701 - 03/15/25 0700 03/15/25 0701 - 03/16/25 0700     2631-2210 4206-3826 Total 2422-8901 7744-8849 Total                 Intake    P.O.  120  -- 120  0  -- 0    I.V.  --  -- --  75  -- 75    Blood  300  300 600  --  -- --    Volume (Transfuse RBC Infuse Each Unit Over: 2H) 300 -- 300 -- -- --    Volume (Transfuse RBC Infuse Each Unit Over: 2H) -- 300 300 -- -- --    Total Intake 420 300 720 75 -- 75       Output    Total Output -- -- -- -- -- --             Physical Exam:    General Appearance: Alert, pleasant, appears stated age, cooperative  Head: Normocephalic, without obvious abnormality and atraumatic.  Eyes: Conjunctivae and sclerae normal, no icterus, no pallor, and PERRLA.  Neck: No adenopathy, supple, no carotid bruit, no JVD.  Lungs: Clear to auscultation, respirations regular.  Heart: Regular  "rhythm & normal rate, normal S1, S2 and no murmur, no rub.  Abdomen: Normal bowel sounds, no masses, no hepatomegaly, no splenomegaly, soft, nontender, and no guarding.  Extremities: Moves extremities well, 2+ edema, no cyanosis and no redness.  Skin: No bleeding, bruising or rash.  Neurologic: Oriented to person, place, time and situation. Grossly no focal deficits.      Laboratory Data:       CBC and coagulation:  Results from last 7 days   Lab Units 03/15/25  0533 03/14/25  1255   WBC 10*3/mm3 7.78 7.76   HEMOGLOBIN g/dL 7.0*  7.0* 4.9*   HEMATOCRIT % 23.1*  23.1* 17.0*   MCV fL 78.2* 75.2*   MCHC g/dL 30.4* 28.8*   PLATELETS 10*3/mm3 411 505*   INR   --  2.35*     Acid/base balance:      Renal and electrolytes:    Results from last 7 days   Lab Units 03/15/25  0700 03/14/25  1255   SODIUM mmol/L 129* 130*   POTASSIUM mmol/L 3.2* 3.7   CHLORIDE mmol/L 91* 90*   CO2 mmol/L 25.1 21.8*   BUN mg/dL 80* 79*   CREATININE mg/dL 2.79* 2.91*   CALCIUM mg/dL 8.6 9.0     Estimated Creatinine Clearance: 19.6 mL/min (A) (by C-G formula based on SCr of 2.79 mg/dL (H)).     Liver and pancreatic function:  Results from last 7 days   Lab Units 03/14/25  1255   ALBUMIN g/dL 3.7   BILIRUBIN mg/dL 0.4   ALK PHOS U/L 130*   AST (SGOT) U/L 13   ALT (SGPT) U/L <5       Albumin Albumin   Date Value Ref Range Status   03/14/2025 3.7 3.5 - 5.2 g/dL Final      Magnesium No results found for: \"MG\"       PTH               No results found for: \"PTH\"    Cardiac:      Liver and pancreatic function:  Results from last 7 days   Lab Units 03/14/25  1255   ALBUMIN g/dL 3.7   BILIRUBIN mg/dL 0.4   ALK PHOS U/L 130*   AST (SGOT) U/L 13   ALT (SGPT) U/L <5       CT Head Without Contrast  Result Date: 3/14/2025  1.  Motion artifact limits portions of the exam. 2.  No CT evidence of acute intracranial abnormality is otherwise noted. 3.  Mild chronic small vessel ischemic disease.  This report was finalized on 3/14/2025 2:29 PM by Dr. Eduardo August, " MD.      XR Chest 1 View  Result Date: 3/14/2025    CHF with cardiomegaly and pulmonary vascular congestion.  This report was finalized on 3/14/2025 2:27 PM by Dr. Eduardo August MD.         Medications:     amLODIPine, 10 mg, Oral, Q24H  atorvastatin, 80 mg, Oral, Nightly  folic acid, 1 mg, Oral, Daily  hydrALAZINE, 100 mg, Oral, Q8H  metoprolol tartrate, 25 mg, Oral, Q12H  pantoprazole, 40 mg, Intravenous, Q12H  [START ON 3/16/2025] polyethylene glycol, 1 bottle, Oral, Once  sodium chloride, 10 mL, Intravenous, Q12H             Assessment & Plan       -Acute kidney injury  -Chronic kidney disease stage IV baseline creatinine 2.0  -Symptomatic anemia  -Acute hyponatremia  -Acute hypokalemia  -Presyncope  -Anion gap metabolic acidosis  -Paroxysmal atrial fibrillation  -Coronary artery disease  -Hypertension      03/15/2025  Patient baseline creatinine is around 2.0 went up to 2.9 down to 2.79 with a BUN of 80 but patient had a symptomatic anemia also was on diuretics which were unguinal hold for now as the patient is clinically compensated and repeat labs we will check urine protein creatinine ratio also  Recent CT scan did not show any obstruction  Patient has severe anemia hemoglobin was 4 is now up to 7.5 after blood transfusion patient received 1 endoscopy also plans to do colonoscopy  Patient is on anticoagulation for atrial fibrillation which is being held  Will continue fluid restriction of 1200 cc  Will replace potassium    Prognosis critical    High risk high complexity patient    Reviewed hospitalist notes  Reviewed consultant notes  Reviewed the labs  Reviewed radiology    Please avoid nephrotoxic medication and pharmacy to adjust the medication according to the GFR.      Plan of care was discussed with the patient. Patient understood, verbalized, agreed.      S Babak Steinberg MD  03/15/25  10:34 EDT

## 2025-03-15 NOTE — ANESTHESIA POSTPROCEDURE EVALUATION
Patient: Lesley Michel    Procedure Summary       Date: 03/15/25 Room / Location: [facility] OR  /  [facility] OR    Anesthesia Start: 0836 Anesthesia Stop: 0849    Procedure: ESOPHAGOGASTRODUODENOSCOPY (Esophagus) Diagnosis:       Acute on chronic anemia      (Acute on chronic anemia [D64.9])    Surgeons: Luis Antonio Jiang MD Provider: Cayetano Gomez MD    Anesthesia Type: general ASA Status: 3            Anesthesia Type: general    Vitals  Vitals Value Taken Time   /65 03/15/25 09:00   Temp 98 °F (36.7 °C) 03/15/25 08:50   Pulse 66 03/15/25 09:01   Resp 20 03/15/25 09:00   SpO2 92 % 03/15/25 09:00   Vitals shown include unfiled device data.        Post Anesthesia Care and Evaluation    Patient location during evaluation: PACU  Patient participation: complete - patient participated  Level of consciousness: awake  Pain score: 0  Pain management: adequate    Airway patency: patent  Anesthetic complications: No anesthetic complications  PONV Status: controlled  Cardiovascular status: acceptable and blood pressure returned to baseline  Respiratory status: acceptable and room air  Hydration status: acceptable

## 2025-03-15 NOTE — PAYOR COMM NOTE
"CONTACT:  CESAR IVAN RN  UTILIZATION MANAGEMENT DEPT.   [facility]    [address] [facility], [address]   PHONE:  [phone]   FAX: [phone]   NPI [id number]        INPATIENT AUTH REQUEST              Matthew Michel (69 y.o. Female)       Date of Birth   1955    Social Security Number       Address   [address] [facility] [address]    Home Phone   [phone]    MRN   [id number]       Yarsani   [facility]    Marital Status                               Admission Date   3/14/2025    Admission Type   Emergency    Admitting Provider   Maria L August DO    Attending Provider   Maria L August DO    Department, Room/Bed   [facility], 3310/1S       Discharge Date       Discharge Disposition       Discharge Destination                                 Attending Provider: Maria L August DO    Allergies: Xanax [Alprazolam]    Isolation: None   Infection: None   Code Status: No CPR    Ht: 154.9 cm (61\")   Wt: 86.9 kg (191 lb 9.6 oz)    Admission Cmt: None   Principal Problem: Acute on chronic anemia [D64.9]                   Active Insurance as of 3/14/2025       Primary Coverage       Payor Plan Insurance Group Employer/Plan Group    ANTHEM MEDICARE REPLACEMENT ANTHEM MEDICARE ADVANTAGE HMO [id number]       Payor Plan Address Payor Plan Phone Number Payor Plan Fax Number Effective Dates    [address] [phone]  1/1/2025 - None Entered    [facility] [address]         Subscriber Name Subscriber Birth Date Member ID       MATTHEW MICHEL 1955 [id number]                     Emergency Contacts        (Rel.) Home Phone Work Phone Mobile Phone    KAUSHALYANELISLOYD (Son) [phone] -- --    MIGUEL WEI (Grandchild) [phone] -- --    ENA HELM (Relative) [phone] -- --    Yadira Michel (Daughter) -- -- [phone]                 History & Physical        Wally Lopez PA-C at 03/14/25 5017       Attestation " "signed by Maria L August DO at 25 222    I have reviewed this documentation and agree. I discussed plan of care with Wally Lopez PA-C and the plan was formulated jointly.                      [facility] Medicine Services  History & Physical    Patient Identification:  Name:  Lesley Michel  Age:  69 y.o.  Sex:  female  :  1955  MRN:  [id number]   Visit Number:  [id number]  Admit Date: 3/14/2025   Primary Care Physician:  Woodrow Raymond APRN    Subjective     Chief complaint: Generalized weakness    History of presenting illness:      Lesley Michel is a 69 y.o. female who presented for further evaluation of generalized weakness, melena, abdominal pain.  She was seen and examined in the ED with RN at the bedside.  She was anxious appearing but in no acute distress.  She reports 2-week history of intermittent melena which she reports is very dark, nearly black in color.  She denies any bright red blood per rectum.  She has also had intermittent nausea with vomiting as well as gnawing epigastric abdominal pain.  He denies hematemesis or coffee-ground emesis.  She denies any reflux symptoms.  She states she feels like she is \"starving to death.\"  She is not short of breath, she does feel fatigued and has been feeling some.  She does have history of atrial fibrillation as well as CAD with prior stenting.  She reports she takes Eliquis with last dose yesterday evening.  She is also on a daily aspirin, does not know if she takes any other antiplatelet medication.  She denies any fever, no cough, congestion, sore throat.  She does have increased lower extremity swelling currently.  She reports she has been compliant with home antihypertensives and diuretics with no missed doses.  She denies any difficulty with urination.  Further review of systems she did complain of back pain and was requesting a dose of pain medication.    Past medical history is significant for chronic " respiratory failure, hypertension, HFpEF, CKD stage IV, paroxysmal atrial fibrillation, ASCVD with prior PCI/ROSAURA to LAD, COPD, PVD, migraine headaches, anxiety, chronic microcytic anemia    Upon arrival to the ED, vital signs were temp 97.5, heart rate 63, respirations 14, /60, SpO2 94% on RA.  Did desaturate as low as 87% on RA in the ED.  On laboratory workup sodium was 130, anion gap 18.2 with bicarb 21.8.  Baseline creatinine appears to be 1.8-1.9 as elevated at 2.91 with BUN 79.  INR is currently 2.35 with PT 26.  On CBC her hemoglobin was 4.9 with hematocrit 17.0.  Baseline hemoglobin is around 8.5.  CXR showed CHF with cardiomegaly and pulmonary vascular congestion.  CT head noted chronic small vessel ischemic disease.    Known Emergency Department medications received prior to my evaluation included PRBCs infusing.   Emergency Department Room location at the time of my evaluation was 407.     ---------------------------------------------------------------------------------------------------------------------   Review of Systems   Constitutional:  Positive for activity change, chills and fatigue.   HENT:  Negative for congestion and rhinorrhea.    Respiratory:  Negative for cough and shortness of breath.    Cardiovascular:  Positive for leg swelling. Negative for chest pain.   Gastrointestinal:  Positive for abdominal pain, nausea and vomiting. Negative for diarrhea.   Genitourinary:  Negative for difficulty urinating and dysuria.   Musculoskeletal:  Positive for back pain. Negative for arthralgias and myalgias.   Skin:  Negative for rash and wound.   Neurological:  Negative for dizziness and light-headedness.        ---------------------------------------------------------------------------------------------------------------------   Past Medical History:   Diagnosis Date    Anxiety     Arthritis     CHF (congestive heart failure)     Cigarette smoker 02/10/2022    CKD (chronic kidney disease) stage 4,  GFR 15-29 ml/min     MAICO on top of CKD IIIa, HD from 10/23-, some recovery to CKD IV    COPD (chronic obstructive pulmonary disease)     Coronary artery disease     Elevated cholesterol     GERD (gastroesophageal reflux disease)     H/O heart artery stent     History of transfusion     Hyperlipidemia     Hypertension     hypothyroidism     Obesity     PAF (paroxysmal atrial fibrillation)     PVD (peripheral vascular disease)     x2 stents     Past Surgical History:   Procedure Laterality Date    ANKLE SURGERY      RIGHT    APPENDECTOMY      CARDIAC CATHETERIZATION      LEFT     SECTION      CHOLECYSTECTOMY N/A 2025    Procedure: CHOLECYSTECTOMY LAPAROSCOPIC WITH DAVINCI ROBOT;  Surgeon: Windy Neal MD;  Location: [facility];  Service: Robotics - DaVinci;  Laterality: N/A;    CORONARY STENT PLACEMENT      HYSTERECTOMY      INSERTION HEMODIALYSIS CATHETER Right 10/19/2023    Procedure: HEMODIALYSIS CATHETER INSERTION;  Surgeon: Bartolome Schwartz MD;  Location: [facility];  Service: General;  Laterality: Right;     Family History   Problem Relation Age of Onset    Heart disease Mother     Heart attack Mother 73    Fibromyalgia Mother     Heart attack Father 72    Ovarian cancer Sister     Coronary artery disease Other     Breast cancer Neg Hx      Social History     Socioeconomic History    Marital status:    Tobacco Use    Smoking status: Every Day     Current packs/day: 0.50     Average packs/day: 0.5 packs/day for 30.0 years (15.0 ttl pk-yrs)     Types: Cigarettes     Passive exposure: Never    Smokeless tobacco: Never   Vaping Use    Vaping status: Never Used   Substance and Sexual Activity    Alcohol use: No    Drug use: No    Sexual activity: Never     ---------------------------------------------------------------------------------------------------------------------   Allergies:  Xanax  [alprazolam]  ---------------------------------------------------------------------------------------------------------------------   Home medications:    Medications below are reported home medications pulling from within the system; at this time, these medications have not been reconciled unless otherwise specified and are in the verification process for further verifcation as current home medications.  (Not in a hospital admission)      Hospital Scheduled Meds:  bumetanide, 2 mg, Intravenous, Once  pantoprazole, 40 mg, Intravenous, Q12H           Current listed hospital scheduled medications may not yet reflect those currently placed in orders that are signed and held awaiting patient's arrival to floor.   ---------------------------------------------------------------------------------------------------------------------     Objective     Vital Signs:  Temp:  [97.5 °F (36.4 °C)-98.4 °F (36.9 °C)] 97.7 °F (36.5 °C)  Heart Rate:  [60-66] 64  Resp:  [14-17] 17  BP: (106-127)/(52-72) 110/56      03/14/25  1222   Weight: 81.6 kg (180 lb)     Body mass index is 34.01 kg/m².  ---------------------------------------------------------------------------------------------------------------------       Physical Exam  Vitals and nursing note reviewed.   Constitutional:       General: She is not in acute distress.     Appearance: She is ill-appearing (Chronically ill-appearing).   HENT:      Head: Normocephalic and atraumatic.   Eyes:      Extraocular Movements: Extraocular movements intact.   Cardiovascular:      Rate and Rhythm: Normal rate and regular rhythm.      Heart sounds: Murmur heard.   Pulmonary:      Effort: Pulmonary effort is normal.      Breath sounds: Normal breath sounds.   Abdominal:      Palpations: Abdomen is soft.      Tenderness: There is no abdominal tenderness.   Musculoskeletal:      Right lower leg: Edema present.      Left lower leg: Edema present.      Comments: 2 Plus lower extremity pitting  "  Skin:     General: Skin is warm and dry.      Coloration: Skin is pale.   Neurological:      Mental Status: She is alert. Mental status is at baseline.   Psychiatric:      Comments: Anxious         ---------------------------------------------------------------------------------------------------------------------  EKG:        ---------------------------------------------------------------------------------------------------------------------   Results from last 7 days   Lab Units 03/14/25  1255   WBC 10*3/mm3 7.76   HEMOGLOBIN g/dL 4.9*   HEMATOCRIT % 17.0*   MCV fL 75.2*   MCHC g/dL 28.8*   PLATELETS 10*3/mm3 505*   INR  2.35*         Results from last 7 days   Lab Units 03/14/25  1255   SODIUM mmol/L 130*   POTASSIUM mmol/L 3.7   CHLORIDE mmol/L 90*   CO2 mmol/L 21.8*   BUN mg/dL 79*   CREATININE mg/dL 2.91*   CALCIUM mg/dL 9.0   GLUCOSE mg/dL 132*   ALBUMIN g/dL 3.7   BILIRUBIN mg/dL 0.4   ALK PHOS U/L 130*   AST (SGOT) U/L 13   ALT (SGPT) U/L <5   Estimated Creatinine Clearance: 18.2 mL/min (A) (by C-G formula based on SCr of 2.91 mg/dL (H)).  No results found for: \"AMMONIA\"          Lab Results   Component Value Date    HGBA1C 5.30 10/08/2023     Lab Results   Component Value Date    TSH 6.770 (H) 10/08/2023    FREET4 1.01 10/08/2023     No results found for: \"PREGTESTUR\", \"PREGSERUM\", \"HCG\", \"HCGQUANT\"  Pain Management Panel          Latest Ref Rng & Units 10/9/2023   Pain Management Panel   Creatinine, Urine mg/dL 116.0      No results found for: \"BLOODCX\"  No results found for: \"URINECX\"  No results found for: \"WOUNDCX\"  No results found for: \"STOOLCX\"      ---------------------------------------------------------------------------------------------------------------------  Imaging Results (Last 7 Days)       Procedure Component Value Units Date/Time    CT Head Without Contrast [[id number]] Collected: 03/14/25 1427     Updated: 03/14/25 1431    Narrative:      EXAM:    CT Head Without Intravenous " "Contrast     EXAM DATE:    3/14/2025 1:43 PM     CLINICAL HISTORY:    weakness     TECHNIQUE:    Axial computed tomography images of the head/brain without intravenous  contrast.  Sagittal and coronal reformatted images were created and  reviewed.  This CT exam was performed using one or more of the following  dose reduction techniques:  automated exposure control, adjustment of  the mA and/or kV according to patient size, and/or use of iterative  reconstruction technique.     COMPARISON:    12/28/2024     FINDINGS:    Artifacts:  Motion artifact limits exam.    Brain and extra-axial spaces:  Mild chronic small vessel ischemic  disease.  No hemorrhage.    Bones/joints:  Unremarkable.  No acute fracture.    Soft tissues:  Unremarkable.    Sinuses:  Unremarkable as visualized.  No acute sinusitis.    Mastoid air cells:  Unremarkable as visualized.  No mastoid effusion.       Impression:      1.  Motion artifact limits portions of the exam.  2.  No CT evidence of acute intracranial abnormality is otherwise noted.  3.  Mild chronic small vessel ischemic disease.     This report was finalized on 3/14/2025 2:29 PM by Dr. Eduardo August MD.       XR Chest 1 View [[id number]] Collected: 03/14/25 1427     Updated: 03/14/25 1429    Narrative:      EXAM:    XR Chest, 1 View     EXAM DATE:    3/14/2025 1:52 PM     CLINICAL HISTORY:    weakness     TECHNIQUE:    Frontal view of the chest.     COMPARISON:    2/13/2025     FINDINGS:    Lungs and pleural spaces:  Pulmonary vascular congestion.  No  consolidation.  No pneumothorax.    Heart:  Cardiomegaly.    Mediastinum:  Unremarkable.  Normal mediastinal contour.    Bones/joints:  Unremarkable.  No acute fracture.       Impression:        CHF with cardiomegaly and pulmonary vascular congestion.     This report was finalized on 3/14/2025 2:27 PM by Dr. Eduardo August MD.               Cultures:  No results found for: \"BLOODCX\", \"URINECX\", \"WOUNDCX\", \"MRSACX\", \"RESPCX\", " "\"STOOLCX\"    Last echocardiogram:  Results for orders placed during the hospital encounter of 01/19/25    Adult Transthoracic Echo Complete w/ Color, Spectral and Contrast if necessary per protocol    Interpretation Summary    Left ventricular systolic function is normal. Left ventricular ejection fraction appears to be 56 - 60%.    Left ventricular wall thickness is consistent with mild concentric hypertrophy.    There is calcification of the aortic valve.    Mild mitral valve stenosis is present.    Estimated right ventricular systolic pressure from tricuspid regurgitation is normal (<35 mmHg).          I have personally reviewed the above radiology images and read the final radiology report on 03/14/25  ---------------------------------------------------------------------------------------------------------------------  Assessment / Plan     Active Hospital Problems    Diagnosis  POA    **Acute on chronic anemia [D64.9]  Unknown       ASSESSMENT/PLAN:    Acute, symptomatic, on chronic anemia  Melena, concern for GI bleed  Patient presented to the ED secondary to 2-week history worsening fatigue, melena, epigastric, gnawing abdominal pain.  On laboratory workup her hemoglobin was 4.9 with hematocrit 17.0.  Baseline hemoglobin is around 8.5.  She is on anticoagulation-last dose of Eliquis 3/13 p.m. PT/INR today is 26/2.35.  She is receiving 2 units PRBCs in the ED  Admit to the telemetry unit for further workup and management  General surgery was consulted for further assistance and recommendations, appreciated.  Will continue twice daily IV PPI per recommendations  NG tube recommended however patient has refused  Continue to hold antiplatelet, anticoagulating medications  Plan for OR for EGD tomorrow.  N.p.o. at midnight.  CLD for now  Will follow-up H&H closely following transfusion.  Continue to transfuse as clinically indicated.  She is receiving Bumex with transfusions today given mild clinical volume " overload, pulmonary vascular congestion on imaging.  Will continue to monitor respiratory status and clinical volume status closely.  May need to repeat IV diuresis if additional transfusions needed.  Continue supportive care measures    MAICO on CKD stage IV  Elevated anion gap metabolic acidosis  Baseline creatinine appears to be 1.8-1.9-was elevated today at 2.91 with BUN 79.  Consult nephrology for further assistance and recommendations, appreciated.  Will avoid nephrotoxins as able.  Clinically complicated by volume overload requiring transfusions as above.  Monitor I's and O's, hypotension as able  Continue to trend labs    Chronic:  HTN  Paroxysmal atrial fibrillation  ASCVD s/p prior PCI/ROSAURA to LAD  COPD  Chronic HFpEF  Chronic respiratory failure (2 L baseline)  PVD  Migraine headaches  Anxiety  Continue home medication regimen as indicated-avoid nephrotoxins as above  Monitor vital signs closely  Monitor clinical volume status as above  Holding anticoagulants for now  ----------  -DVT prophylaxis: SCDs  -Activity: Up with assistance  -Expected length of stay: INPATIENT status due to the need for care which can only be reasonably provided in an hospital setting such as aggressive/expedited ancillary services and/or consultation services, the necessity for IV medications, close physician monitoring and/or the possible need for procedures.  In such, I feel patient’s risk for adverse outcomes and need for care warrant INPATIENT evaluation and predict the patient’s care encounter to likely last beyond 2 midnights.   -Disposition pending further clinical course and work up     High risk secondary to melena, acute on chronic anemia, GIB, MAICO on CKD IV    There are no questions and answers to display.       Wally Lopez PA-C   03/14/25  15:47 EDT    Electronically signed by Maria L August DO at 03/14/25 2221          Emergency Department Notes        Radha Harvey PA at 03/14/25 1226           Subjective   History of Present Illness  69-year-old female patient with past medical history of CHF, chronic kidney disease, COPD, coronary artery disease, GERD, hyperlipidemia, hypertension, hypothyroidism, history of A-fib, and peripheral vascular disease presents to the emergency room today with complaints of generalized weakness and fatigue.  She reports that she has had increased lower extremity edema.  She states she has been vomiting for the past 2 days.  He states she seen Dr. Steinberg this morning with his sent her to the emergency room.    History provided by:  Patient   used: No        Review of Systems   Constitutional: Negative.  Positive for fatigue.   HENT: Negative.     Eyes: Negative.    Respiratory: Negative.     Cardiovascular: Negative.  Positive for leg swelling.   Gastrointestinal: Negative.  Positive for nausea and vomiting.   Endocrine: Negative.    Genitourinary: Negative.    Musculoskeletal: Negative.    Skin: Negative.    Allergic/Immunologic: Negative.    Neurological: Negative.  Positive for weakness.   Hematological: Negative.    Psychiatric/Behavioral: Negative.     All other systems reviewed and are negative.      Past Medical History:   Diagnosis Date    Anxiety     Arthritis     CHF (congestive heart failure)     Cigarette smoker 02/10/2022    CKD (chronic kidney disease) stage 4, GFR 15-29 ml/min     MAICO on top of CKD IIIa, HD from 10/23-4/24, some recovery to CKD IV    COPD (chronic obstructive pulmonary disease)     Coronary artery disease     Elevated cholesterol     GERD (gastroesophageal reflux disease)     H/O heart artery stent     History of transfusion     Hyperlipidemia     Hypertension     hypothyroidism     Obesity     PAF (paroxysmal atrial fibrillation)     PVD (peripheral vascular disease)     x2 stents       Allergies   Allergen Reactions    Xanax [Alprazolam] Other (See Comments)     Severe agitation per patient and family       Past Surgical  History:   Procedure Laterality Date    ANKLE SURGERY      RIGHT    APPENDECTOMY      CARDIAC CATHETERIZATION      LEFT     SECTION      CHOLECYSTECTOMY N/A 2025    Procedure: CHOLECYSTECTOMY LAPAROSCOPIC WITH DAVINCI ROBOT;  Surgeon: Windy Neal MD;  [facility] OR;  Service: Robotics - DaVinci;  Laterality: N/A;    CORONARY STENT PLACEMENT      HYSTERECTOMY      INSERTION HEMODIALYSIS CATHETER Right 10/19/2023    Procedure: HEMODIALYSIS CATHETER INSERTION;  Surgeon: Bartolome Schwartz MD;  Location:  [facility] OR;  Service: General;  Laterality: Right;       Family History   Problem Relation Age of Onset    Heart disease Mother     Heart attack Mother 73    Fibromyalgia Mother     Heart attack Father 72    Ovarian cancer Sister     Coronary artery disease Other     Breast cancer Neg Hx        Social History     Socioeconomic History    Marital status:    Tobacco Use    Smoking status: Every Day     Current packs/day: 0.50     Average packs/day: 0.5 packs/day for 30.0 years (15.0 ttl pk-yrs)     Types: Cigarettes     Passive exposure: Never    Smokeless tobacco: Never   Vaping Use    Vaping status: Never Used   Substance and Sexual Activity    Alcohol use: No    Drug use: No    Sexual activity: Never           Objective   Physical Exam  Vitals and nursing note reviewed.   Constitutional:       Appearance: Normal appearance. She is normal weight.   HENT:      Head: Normocephalic and atraumatic.      Right Ear: External ear normal.      Left Ear: External ear normal.      Nose: Nose normal.      Mouth/Throat:      Mouth: Mucous membranes are moist.      Pharynx: Oropharynx is clear.   Eyes:      Extraocular Movements: Extraocular movements intact.      Conjunctiva/sclera: Conjunctivae normal.      Pupils: Pupils are equal, round, and reactive to light.   Cardiovascular:      Rate and Rhythm: Normal rate and regular rhythm.      Pulses: Normal pulses.      Heart sounds: Normal heart  sounds.   Pulmonary:      Effort: Pulmonary effort is normal.      Breath sounds: Normal breath sounds.   Abdominal:      General: Abdomen is flat. Bowel sounds are normal.      Palpations: Abdomen is soft.   Musculoskeletal:         General: Normal range of motion.      Cervical back: Normal range of motion and neck supple.   Skin:     General: Skin is warm and dry.      Capillary Refill: Capillary refill takes less than 2 seconds.   Neurological:      General: No focal deficit present.      Mental Status: She is alert and oriented to person, place, and time. Mental status is at baseline.   Psychiatric:         Mood and Affect: Mood normal.         Behavior: Behavior normal.         Thought Content: Thought content normal.         Judgment: Judgment normal.         Procedures          ED Course  ED Course as of 03/14/25 1747   Fri Mar 14, 2025   1310 Hemoglobin(!!): 4.9 [ML]   1323 Creatinine(!): 2.91 [ML]   1323 eGFR(!): 17.2 [ML]   1341 PT states she recently had to have blood and iron infusion.  Not having any blood loss.  [ML]   1437  I have signed this patient out to Dr. Wells  [ML]      ED Course User Index  [ML] Radha Harvey PA                                                       Medical Decision Making  69-year-old female patient with past medical history of CHF, chronic kidney disease, COPD, coronary artery disease, GERD, hyperlipidemia, hypertension, hypothyroidism, history of A-fib, and peripheral vascular disease presents to the emergency room today with complaints of generalized weakness and fatigue.  She reports that she has had increased lower extremity edema.  She states she has been vomiting for the past 2 days.  He states she seen Dr. Steinberg this morning with his sent her to the emergency room.        Problems Addressed:  Anemia, unspecified type: complicated acute illness or injury  Gastrointestinal hemorrhage, unspecified gastrointestinal hemorrhage type: complicated acute illness or  injury    Amount and/or Complexity of Data Reviewed  Labs: ordered. Decision-making details documented in ED Course.  Radiology: ordered.    Risk  Prescription drug management.  Decision regarding hospitalization.        Final diagnoses:   Anemia, unspecified type   Gastrointestinal hemorrhage, unspecified gastrointestinal hemorrhage type     Electronically signed by VICK Dodge, 03/14/25, 2:37 PM EDT.    ED Disposition  ED Disposition       ED Disposition   Decision to Admit    Condition   --    Comment   Level of Care: Telemetry [5]   Diagnosis: Acute on chronic anemia [[id number]]   Admitting Physician: YULIA AUGUST [[id number]]   Attending Physician: YULIA AUGUST [[id number]]   Certification: I Certify That Inpatient Hospital Services Are Medically Necessary For Greater Than 2 Midnights                 No follow-up provider specified.       Medication List        ASK your doctor about these medications      torsemide 20 MG tablet  Commonly known as: DEMADEX  Ask about: Which instructions should I use?                 Radha Harvey PA  03/14/25 1747      Electronically signed by Radha Harvey PA at 03/14/25 1747       Facility-Administered Medications as of 3/15/2025   Medication Dose Route Frequency Provider Last Rate Last Admin    amLODIPine (NORVASC) tablet 10 mg  10 mg Oral Q24H Luis Antonio Jiang MD        atorvastatin (LIPITOR) tablet 80 mg  80 mg Oral Nightly Luis Antonio Jiang MD        sennosides-docusate (PERICOLACE) 8.6-50 MG per tablet 2 tablet  2 tablet Oral BID PRN Luis Antonio Jiang MD        And    polyethylene glycol (MIRALAX) packet 17 g  17 g Oral Daily PRN Luis Antonio Jiang MD        And    bisacodyl (DULCOLAX) EC tablet 5 mg  5 mg Oral Daily PRN Luis Antonio iJang MD        And    bisacodyl (DULCOLAX) suppository 10 mg  10 mg Rectal Daily PRN Luis Antonio Jiang MD        [COMPLETED] bumetanide (BUMEX) injection 2 mg  2 mg Intravenous Once Yulia August, DO   2 mg at  03/14/25 1741    folic acid (FOLVITE) tablet 1 mg  1 mg Oral Daily Luis Antonio Jiang MD        gabapentin (NEURONTIN) capsule 300 mg  300 mg Oral TID PRN Luis Antonio Jiang MD        hydrALAZINE (APRESOLINE) tablet 100 mg  100 mg Oral Q8H Luis Antonio Jiang MD        [COMPLETED] HYDROcodone-acetaminophen (NORCO)  MG per tablet 1 tablet  1 tablet Oral Once PRN Maria L August DO   1 tablet at 03/14/25 1742    HYDROcodone-acetaminophen (NORCO)  MG per tablet 1 tablet  1 tablet Oral Q8H PRN Luis Antonio Jiang MD        metoprolol tartrate (LOPRESSOR) tablet 25 mg  25 mg Oral Q12H Luis Antonio Jiang MD        nitroglycerin (NITROSTAT) SL tablet 0.4 mg  0.4 mg Sublingual Q5 Min PRN Luis Antonio Jiang MD        pantoprazole (PROTONIX) injection 40 mg  40 mg Intravenous Q12H Luis Antonio Jiang MD   40 mg at 03/14/25 2016    [START ON 3/16/2025] polyethylene glycol (MIRALAX) powder 1 bottle  1 bottle Oral Once Luis Antonio Jiang MD        potassium chloride (KLOR-CON M20) CR tablet 20 mEq  20 mEq Oral Once Palomo Steinberg MD        sodium chloride 0.9 % flush 10 mL  10 mL Intravenous PRN Luis Antonio Jiang MD        sodium chloride 0.9 % flush 10 mL  10 mL Intravenous Q12H Luis Antonio Jiang MD   10 mL at 03/14/25 2017    sodium chloride 0.9 % flush 10 mL  10 mL Intravenous PRN Luis Antonio Jiang MD   10 mL at 03/14/25 2016    sodium chloride 0.9 % infusion 40 mL  40 mL Intravenous PRN Luis Antonio Jiang MD         Orders (all)        Start     Ordered    03/17/25 0400  NPO Diet NPO Type: Sips with Meds  Diet Effective ____         03/15/25 0911    03/16/25 1300  polyethylene glycol (MIRALAX) powder 1 bottle  Once         03/15/25 0911    03/16/25 0500  Diet: Liquid, Renal; Clear Liquid; Low Sodium (2-3g), Low Phosphorus; Fluid Consistency: Thin (IDDSI 0)  Diet Effective 0500         03/15/25 0911    03/16/25 0400  Comprehensive Metabolic Panel  Morning Draw         03/15/25 1040    03/16/25 0400  CBC &  Differential  Morning Draw         03/15/25 1040    03/16/25 0400  Magnesium  Morning Draw         03/15/25 1041    03/15/25 2100  atorvastatin (LIPITOR) tablet 80 mg  Nightly         03/15/25 0850    03/15/25 1600  Basic Metabolic Panel  Once         03/15/25 1040    03/15/25 1130  potassium chloride (KLOR-CON M20) CR tablet 20 mEq  Once         03/15/25 1041    03/15/25 1042  Fluid restriction 1200 cc in 24 hours  Misc Nursing Order (Specify)  Once        Comments: Fluid restriction 1200 cc in 24 hours    03/15/25 1041    03/15/25 1041  Protein / Creatinine Ratio, Urine - Urine, Clean Catch  Once         03/15/25 1040    03/15/25 1000  hydrALAZINE (APRESOLINE) tablet 100 mg  Every 8 Hours Scheduled         03/15/25 0850    03/15/25 1000  metoprolol tartrate (LOPRESSOR) tablet 25 mg  Every 12 Hours Scheduled         03/15/25 0850    03/15/25 0912  Diet: Liquid, Renal; Full Liquid; Low Sodium (2-3g), Low Phosphorus; Fluid Consistency: Thin (IDDSI 0)  Diet Effective Now         03/15/25 0911    03/15/25 0900  amLODIPine (NORVASC) tablet 10 mg  Every 24 Hours Scheduled         03/15/25 0850    03/15/25 0900  folic acid (FOLVITE) tablet 1 mg  Daily         03/15/25 0850    03/15/25 0900  sodium chloride 0.9 % flush 10 mL  Every 12 Hours Scheduled,   Status:  Discontinued         03/15/25 0851    03/15/25 0853  lactated ringers infusion  Once,   Status:  Discontinued         03/15/25 0851    03/15/25 0852  Continuous Pulse Oximetry  Continuous         03/15/25 0851    03/15/25 0851  Oxygen Therapy- Nasal Cannula; Titrate 1-6 LPM Per SpO2; 90 - 95%  Continuous,   Status:  Canceled         03/15/25 0850    03/15/25 0851  POC Glucose STAT  STAT        Comments: Notify Anesthesia if blood sugar is less than 80 mg/dL or greater than 180mg/dL      03/15/25 0850    03/15/25 0851  Vital signs every 5 minutes for 15 minutes, every 15 minutes thereafter.  Once         03/15/25 0850    03/15/25 0851  Call Anesthesiologist for  additional IV Fluid bolus for Hypotension/Tachycardia  Until Discontinued         03/15/25 0850    03/15/25 0851  Notify Anesthesia of Any Acute Changes in Patient Condition  Continuous        Comments: Open Order Report to View Parameters Requiring Provider Notification    03/15/25 0850    03/15/25 0851  Notify Anesthesia for Unrelieved Pain  Continuous        Comments: Open Order Report to View Parameters Requiring Provider Notification    03/15/25 0850    03/15/25 0851  Once DC criteria to floor met, follow surgeon's orders.  Until Discontinued         03/15/25 0850    03/15/25 0851  Discharge patient from PACU when discharge criteria is met.  Until Discontinued         03/15/25 0850    03/15/25 0851  Vital Signs - Per Anesthesia Protocol  As Needed,   Status:  Canceled       03/15/25 0851    03/15/25 0851  Oxygen Therapy- Nasal Cannula; Titrate 1-6 LPM Per SpO2; 90 - 95%  Continuous         03/15/25 0851    03/15/25 0851  Continuous Pulse Oximetry  Continuous,   Status:  Canceled         03/15/25 0851    03/15/25 0851  POC Glucose Once  Once        Comments: For all diabetic patients and all patients who are to be admitted. Notify Anesthesiologist for blood sugar > 180.      03/15/25 0851    03/15/25 0851  Insert Peripheral IV  Once         03/15/25 0851    03/15/25 0851  Saline Lock & Maintain IV Access  Continuous         03/15/25 0851    03/15/25 0851  sodium chloride 0.9 % flush 10 mL  As Needed,   Status:  Discontinued         03/15/25 0851    03/15/25 0851  sodium chloride 0.9 % infusion 40 mL  As Needed,   Status:  Discontinued         03/15/25 0851    03/15/25 0851  May take Beta Blocker from home with sip of water.  Once         03/15/25 0851    03/15/25 0851  PT Consult: Eval & Treat Functional Mobility Below Baseline  Once         03/15/25 0850    03/15/25 0850  oxyCODONE-acetaminophen (PERCOCET) 5-325 MG per tablet 1 tablet  Once As Needed,   Status:  Discontinued         03/15/25 0850    03/15/25  0850  fentaNYL citrate (PF) (SUBLIMAZE) injection 50 mcg  Every 5 Minutes PRN,   Status:  Discontinued         03/15/25 0850    03/15/25 0850  ondansetron (ZOFRAN) injection 4 mg  As Needed,   Status:  Discontinued         03/15/25 0850    03/15/25 0850  ipratropium-albuterol (DUO-NEB) nebulizer solution 3 mL  Once As Needed,   Status:  Discontinued         03/15/25 0850    03/15/25 0849  HYDROcodone-acetaminophen (NORCO)  MG per tablet 1 tablet  Every 8 Hours PRN         03/15/25 0850    03/15/25 0849  gabapentin (NEURONTIN) capsule 300 mg  3 Times Daily PRN         03/15/25 0850    03/15/25 0828  CPR - Full Support in OR  Once         03/15/25 0827    03/15/25 0814  Upper GI Endoscopy  Once         03/15/25 0814    03/15/25 0800  Code Status and Medical Interventions: No CPR (Do Not Attempt to Resuscitate); Limited Support; No intubation (DNI); clarified with patient 3/15/25  Continuous         03/15/25 0759    03/15/25 0741  CBC (No Diff)  Once         03/15/25 0741    03/15/25 0741  Basic Metabolic Panel  Once         03/15/25 0741    03/15/25 0741  Code Status and Medical Interventions: CPR (Attempt to Resuscitate); Limited Support; No intubation (DNI)  Continuous,   Status:  Canceled         03/15/25 0741    03/15/25 0556  High Sensitivity Troponin T 1Hr  PROCEDURE ONCE         03/15/25 0629    03/15/25 0522  Hemoglobin & Hematocrit, Blood  STAT         03/15/25 0521    03/15/25 0408  ECG 12 Lead Chest Pain  STAT        Comments: Left sided    03/15/25 0407    03/15/25 0408  High Sensitivity Troponin T  STAT         03/15/25 0408    03/15/25 0001  NPO Diet NPO Type: Sips with Meds  Diet Effective Midnight,   Status:  Canceled         03/14/25 1515    03/14/25 2100  pantoprazole (PROTONIX) injection 40 mg  Every 12 Hours Scheduled         03/14/25 1454    03/14/25 2100  sodium chloride 0.9 % flush 10 mL  Every 12 Hours Scheduled         03/14/25 1716    03/14/25 2000  Vital Signs  Every 4 Hours        03/14/25 1716    03/14/25 1800  Oral Care  2 Times Daily       03/14/25 1716    03/14/25 1717  Weigh Patient  Once         03/14/25 1716    03/14/25 1717  Insert Peripheral IV  Once         03/14/25 1716    03/14/25 1717  Saline Lock & Maintain IV Access  Continuous,   Status:  Canceled         03/14/25 1716    03/14/25 1717  Place Sequential Compression Device  Once         03/14/25 1716    03/14/25 1717  Maintain Sequential Compression Device  Continuous         03/14/25 1716    03/14/25 1717  Continuous Cardiac Monitoring  Continuous        Comments: Follow Standing Orders As Outlined in Process Instructions (Open Order Report to View Full Instructions)    03/14/25 1716 03/14/25 1717  Maintain IV Access  Continuous,   Status:  Canceled         03/14/25 1716    03/14/25 1717  Telemetry - Place Orders & Notify Provider of Results When Patient Experiences Acute Chest Pain, Dysrhythmia or Respiratory Distress  Continuous        Comments: Open Order Report to View Parameters Requiring Provider Notification    03/14/25 1716    03/14/25 1717  May Be Off Telemetry for Tests  Continuous         03/14/25 1716    03/14/25 1717  Continuous Pulse Oximetry  Continuous,   Status:  Canceled         03/14/25 1716    03/14/25 1717  Strict Intake & Output  Every Shift       03/14/25 1716    03/14/25 1717  Notify Provider (With Default Parameters)  Continuous        Comments: Open Order Report to View Parameters Requiring Provider Notification    03/14/25 1716    03/14/25 1717  Diet: Cardiac, Renal; Healthy Heart (2-3 Na+); Low Sodium (2-3g), Low Potassium, Low Phosphorus; Fluid Consistency: Thin (IDDSI 0)  Diet Effective Now,   Status:  Canceled         03/14/25 1716    03/14/25 1717  Inpatient Nephrology Consult  Once        Specialty:  Nephrology  Provider:  Vani León MD    03/14/25 1716 03/14/25 1717  Inpatient General Surgery Consult  Once        Specialty:  General Surgery  Provider:  Luis Antonio Jiang MD     "03/14/25 1716    03/14/25 1716  nitroglycerin (NITROSTAT) SL tablet 0.4 mg  Every 5 Minutes PRN         03/14/25 1716    03/14/25 1716  sennosides-docusate (PERICOLACE) 8.6-50 MG per tablet 2 tablet  2 Times Daily PRN        Placed in \"And\" Linked Group    03/14/25 1716    03/14/25 1716  polyethylene glycol (MIRALAX) packet 17 g  Daily PRN        Placed in \"And\" Linked Group    03/14/25 1716    03/14/25 1716  bisacodyl (DULCOLAX) EC tablet 5 mg  Daily PRN        Placed in \"And\" Linked Group    03/14/25 1716    03/14/25 1716  bisacodyl (DULCOLAX) suppository 10 mg  Daily PRN        Placed in \"And\" Linked Group    03/14/25 1716    03/14/25 1716  sodium chloride 0.9 % flush 10 mL  As Needed         03/14/25 1716    03/14/25 1716  sodium chloride 0.9 % infusion 40 mL  As Needed         03/14/25 1716    03/14/25 1650  HYDROcodone-acetaminophen (NORCO)  MG per tablet 1 tablet  Once As Needed         03/14/25 1650    03/14/25 1544  bumetanide (BUMEX) injection 2 mg  Once         03/14/25 1528    03/14/25 1530  Inpatient Admission  Once         03/14/25 1531    03/14/25 1516  Diet: Liquid; Clear Liquid; Fluid Consistency: Thin (IDDSI 0)  Diet Effective Now,   Status:  Canceled         03/14/25 1515    03/14/25 1516  Case request  Once         03/14/25 1515    03/14/25 1453  Nasogastric tube insertion  Once,   Status:  Canceled         03/14/25 1453    03/14/25 1341  CT Head Without Contrast  1 Time Imaging         03/14/25 1340    03/14/25 1341  XR Chest 1 View  1 Time Imaging         03/14/25 1341    03/14/25 1312  Protime-INR  Once         03/14/25 1311    03/14/25 1312  aPTT  Once         03/14/25 1311    03/14/25 1309  Scan Slide  Once         03/14/25 1308    03/14/25 1309  Type & Screen  STAT         03/14/25 1308    03/14/25 1309  Verify Informed Consent  Once         03/14/25 1308    03/14/25 1309  Prepare RBC, 2 Units  Blood - Once         03/14/25 1308    03/14/25 1308  Transfuse RBC, 2 Units Infuse Each " "Unit Over: 2H  Transfusion       03/14/25 1308    03/14/25 1227  CBC & Differential  Once         03/14/25 1226    03/14/25 1227  Comprehensive Metabolic Panel  Once         03/14/25 1226    03/14/25 1227  Urinalysis With Microscopic If Indicated (No Culture) - Urine, Clean Catch  Once         03/14/25 1226    03/14/25 1227  [place] Draw  Once         03/14/25 1226    03/14/25 1227  COVID-19 and FLU A/B PCR, 1 HR TAT - Swab, Nasopharynx  Once         03/14/25 1226    03/14/25 1227  Insert Peripheral IV  Once        Placed in \"And\" Linked Group    03/14/25 1226    03/14/25 1227  CBC Auto Differential  PROCEDURE ONCE         03/14/25 1226    03/14/25 1227  Green Top (Gel)  PROCEDURE ONCE         03/14/25 1226    03/14/25 1227  Lavender Top  PROCEDURE ONCE         03/14/25 1226    03/14/25 1227  Gold Top - SST  PROCEDURE ONCE         03/14/25 1226    03/14/25 1227  Light Blue Top  PROCEDURE ONCE         03/14/25 1226    03/14/25 1226  sodium chloride 0.9 % flush 10 mL  As Needed        Placed in \"And\" Linked Group    03/14/25 1226    03/14/25 0000  Telemetry Scan  Once         03/14/25 0000    03/14/25 0000  Telemetry Scan  Once         03/14/25 0000    Unscheduled  Up With Assistance  As Needed       03/14/25 1716    --  torsemide (DEMADEX) 20 MG tablet  Daily         03/14/25 1731                     Physician Progress Notes (all)        Luis Antonio Jiang MD at 03/15/25 0828          To endoscopy for EGD    Electronically signed by Luis Antonio Jiang MD at 03/15/25 0828          Consult Notes (all)        Palomo Steinberg MD at 03/15/25 1004          Nephrology Progress Note      Subjective     03/15/2025 patient was seen by me in the office yesterday and I sent the patient to the ER so I will be seeing this patient has concurrent care patient is a 69-year-old came to my office with presyncope also was having melena and weakness so I sent the patient to the ER and patient came to the hospital her hemoglobin was " 4.9 and INR was 2.35 creatinine was 2.91 sodium was 130 patient had a scope today and is waiting for colonoscopy patient denies any shortness of breath still have swelling no urgency no frequency no fever no chills no headache no passing out now potentially passed out and had nausea vomiting few days back    Objective       Vital signs:     Vitals:    03/15/25 0855 03/15/25 0900 03/15/25 0905 03/15/25 0915   BP: 121/60 123/65 130/66 130/60   BP Location: Right arm Right arm Right arm Right arm   Patient Position: Lying Lying Lying Lying   Pulse: 66 67 66 71   Resp: 18 20 18 20   Temp:   98.1 °F (36.7 °C) 98.5 °F (36.9 °C)   TempSrc:   Temporal Oral   SpO2: 90% 92% 93% 97%   Weight:       Height:            Intake/Output                         03/14/25 0701 - 03/15/25 0700 03/15/25 0701 - 03/16/25 0700     7429-0500 [id number] Total 8622-1002 6004-7223 Total                 Intake    P.O.  120  -- 120  0  -- 0    I.V.  --  -- --  75  -- 75    Blood  300  300 600  --  -- --    Volume (Transfuse RBC Infuse Each Unit Over: 2H) 300 -- 300 -- -- --    Volume (Transfuse RBC Infuse Each Unit Over: 2H) -- 300 300 -- -- --    Total Intake 420 300 720 75 -- 75       Output    Total Output -- -- -- -- -- --             Physical Exam:    General Appearance: Alert, pleasant, appears stated age, cooperative  Head: Normocephalic, without obvious abnormality and atraumatic.  Eyes: Conjunctivae and sclerae normal, no icterus, no pallor, and PERRLA.  Neck: No adenopathy, supple, no carotid bruit, no JVD.  Lungs: Clear to auscultation, respirations regular.  Heart: Regular rhythm & normal rate, normal S1, S2 and no murmur, no rub.  Abdomen: Normal bowel sounds, no masses, no hepatomegaly, no splenomegaly, soft, nontender, and no guarding.  Extremities: Moves extremities well, 2+ edema, no cyanosis and no redness.  Skin: No bleeding, bruising or rash.  Neurologic: Oriented to person, place, time and situation. Grossly no focal  "deficits.      Laboratory Data:       CBC and coagulation:  Results from last 7 days   Lab Units 03/15/25  0533 03/14/25  1255   WBC 10*3/mm3 7.78 7.76   HEMOGLOBIN g/dL 7.0*  7.0* 4.9*   HEMATOCRIT % 23.1*  23.1* 17.0*   MCV fL 78.2* 75.2*   MCHC g/dL 30.4* 28.8*   PLATELETS 10*3/mm3 411 505*   INR   --  2.35*     Acid/base balance:      Renal and electrolytes:    Results from last 7 days   Lab Units 03/15/25  0700 03/14/25  1255   SODIUM mmol/L 129* 130*   POTASSIUM mmol/L 3.2* 3.7   CHLORIDE mmol/L 91* 90*   CO2 mmol/L 25.1 21.8*   BUN mg/dL 80* 79*   CREATININE mg/dL 2.79* 2.91*   CALCIUM mg/dL 8.6 9.0     Estimated Creatinine Clearance: 19.6 mL/min (A) (by C-G formula based on SCr of 2.79 mg/dL (H)).     Liver and pancreatic function:  Results from last 7 days   Lab Units 03/14/25  1255   ALBUMIN g/dL 3.7   BILIRUBIN mg/dL 0.4   ALK PHOS U/L 130*   AST (SGOT) U/L 13   ALT (SGPT) U/L <5       Albumin Albumin   Date Value Ref Range Status   03/14/2025 3.7 3.5 - 5.2 g/dL Final      Magnesium No results found for: \"MG\"       PTH               No results found for: \"PTH\"    Cardiac:      Liver and pancreatic function:  Results from last 7 days   Lab Units 03/14/25  1255   ALBUMIN g/dL 3.7   BILIRUBIN mg/dL 0.4   ALK PHOS U/L 130*   AST (SGOT) U/L 13   ALT (SGPT) U/L <5       CT Head Without Contrast  Result Date: 3/14/2025  1.  Motion artifact limits portions of the exam. 2.  No CT evidence of acute intracranial abnormality is otherwise noted. 3.  Mild chronic small vessel ischemic disease.  This report was finalized on 3/14/2025 2:29 PM by Dr. Eduardo August MD.      XR Chest 1 View  Result Date: 3/14/2025    CHF with cardiomegaly and pulmonary vascular congestion.  This report was finalized on 3/14/2025 2:27 PM by Dr. Eduardo August MD.         Medications:     amLODIPine, 10 mg, Oral, Q24H  atorvastatin, 80 mg, Oral, Nightly  folic acid, 1 mg, Oral, Daily  hydrALAZINE, 100 mg, Oral, Q8H  metoprolol tartrate, 25 " mg, Oral, Q12H  pantoprazole, 40 mg, Intravenous, Q12H  [START ON 3/16/2025] polyethylene glycol, 1 bottle, Oral, Once  sodium chloride, 10 mL, Intravenous, Q12H             Assessment & Plan       -Acute kidney injury  -Chronic kidney disease stage IV baseline creatinine 2.0  -Symptomatic anemia  -Acute hyponatremia  -Acute hypokalemia  -Presyncope  -Anion gap metabolic acidosis  -Paroxysmal atrial fibrillation  -Coronary artery disease  -Hypertension      03/15/2025  Patient baseline creatinine is around 2.0 went up to 2.9 down to 2.79 with a BUN of 80 but patient had a symptomatic anemia also was on diuretics which were unguinal hold for now as the patient is clinically compensated and repeat labs we will check urine protein creatinine ratio also  Recent CT scan did not show any obstruction  Patient has severe anemia hemoglobin was 4 is now up to 7.5 after blood transfusion patient received 1 endoscopy also plans to do colonoscopy  Patient is on anticoagulation for atrial fibrillation which is being held  Will continue fluid restriction of 1200 cc  Will replace potassium    Prognosis critical    High risk high complexity patient    Reviewed hospitalist notes  Reviewed consultant notes  Reviewed the labs  Reviewed radiology    Please avoid nephrotoxic medication and pharmacy to adjust the medication according to the GFR.      Plan of care was discussed with the patient. Patient understood, verbalized, agreed.      S Babak Steinberg MD  03/15/25  10:34 EDT      Electronically signed by Palomo Steinberg MD at 03/15/25 1039       Luis Antonio Jiang MD at 03/14/25 1510          Patient Name:  Lesley Michel  YOB: 1955  [id number]       Patient Care Team:  Woodrow Raymond APRN as PCP - General (Family Medicine)      General Surgery Consult Note     Date of Consultation: 03/14/25    Consulting Physician - Colin Wells DO    Reason for Consult -concern for GI bleed    Subjective     I have  been asked to see  Lesley Michel , a 69 y.o. female, anticoagulated on Eliquis for atrial fibrillation and CKD stage IV, in consultation for possible GI bleed.    Patient reports 1 to 2 days of vomiting and some associated abdominal pain.  She reports having dark stool.  Her last colonoscopy was 7 years ago.  She had presented to her nephrologist today and lab work demonstrated severe anemia.  She was sent to the ER for further workup.  Patient reports dyspnea without syncope.  Reports anxiety and palpitations  Patient has some baseline acid reflux, takes pantoprazole which controls the symptoms.  She denies NSAID use.  She is a smoker    Her last dose of Eliquis was yesterday evening.      Allergy:   Allergies   Allergen Reactions    Xanax [Alprazolam] Other (See Comments)     Severe agitation per patient and family       Medications:  pantoprazole, 40 mg, Intravenous, Q12H         No current facility-administered medications on file prior to encounter.     Current Outpatient Medications on File Prior to Encounter   Medication Sig    albuterol sulfate  (90 Base) MCG/ACT inhaler Inhale 2 puffs Every 4 (Four) Hours As Needed for Wheezing.    amLODIPine (NORVASC) 10 MG tablet Take 1 tablet by mouth Daily for 30 days.    aspirin 81 MG chewable tablet Chew 1 tablet Daily.    atorvastatin (LIPITOR) 80 MG tablet Take 1 tablet by mouth Every Night for 30 days.    chlorthalidone (HYGROTON) 25 MG tablet Take 1 tablet by mouth Daily.    Eliquis 5 MG tablet tablet Take 1 tablet by mouth Every 12 (Twelve) Hours.    folic acid (FOLVITE) 1 MG tablet Take 1 tablet by mouth Daily.    gabapentin (NEURONTIN) 600 MG tablet Take 0.5 tablets by mouth 3 (Three) Times a Day As Needed (nerve pain).    hydrALAZINE (APRESOLINE) 100 MG tablet Take 1 tablet by mouth Every 8 (Eight) Hours for 30 days.    HYDROcodone-acetaminophen (NORCO)  MG per tablet Take 1 tablet by mouth Every 8 (Eight) Hours As Needed for Moderate Pain.     ipratropium-albuterol (DUO-NEB) 0.5-2.5 mg/3 ml nebulizer Take 3 mL by nebulization Every 4 (Four) Hours As Needed for Wheezing.    metoprolol tartrate (LOPRESSOR) 25 MG tablet Take 1 tablet by mouth Every 12 (Twelve) Hours for 30 days.    ondansetron ODT (ZOFRAN-ODT) 4 MG disintegrating tablet Place 1 tablet on the tongue Every 8 (Eight) Hours As Needed for Nausea or Vomiting.    pantoprazole (PROTONIX) 40 MG EC tablet Take 1 tablet by mouth Daily.    sertraline (ZOLOFT) 25 MG tablet Take 1 tablet by mouth Daily.    Torsemide 40 MG tablet Take 20 mg by mouth Daily for 30 days.       PMHx:   Past Medical History:   Diagnosis Date    Anxiety     Arthritis     CHF (congestive heart failure)     Cigarette smoker 02/10/2022    CKD (chronic kidney disease) stage 4, GFR 15-29 ml/min     MAICO on top of CKD IIIa, HD from 10/23-, some recovery to CKD IV    COPD (chronic obstructive pulmonary disease)     Coronary artery disease     Elevated cholesterol     GERD (gastroesophageal reflux disease)     H/O heart artery stent     History of transfusion     Hyperlipidemia     Hypertension     hypothyroidism     Obesity     PAF (paroxysmal atrial fibrillation)     PVD (peripheral vascular disease)     x2 stents         Past Surgical History:    ANKLE SURGERY           RIGHT    APPENDECTOMY          CARDIAC CATHETERIZATION           LEFT     SECTION          CHOLECYSTECTOMY  N/A  2025     Procedure: CHOLECYSTECTOMY LAPAROSCOPIC WITH DAVINCI ROBOT;  Surgeon: Windy Neal MD;  Location: [facility];  Service: Robotics - DaVinci;  Laterality: N/A;    CORONARY STENT PLACEMENT          HYSTERECTOMY          INSERTION HEMODIALYSIS CATHETER  Right  10/19/2023     Procedure: HEMODIALYSIS CATHETER INSERTION;  Surgeon: Bartolome Schwartz MD;  Location: [facility];  Service: General;  Laterality: Right;    Family History: Coronary artery disease    Social History: Smoker     Review of Systems   14 point review of systems  "negative except for as noted in HPI         Objective     Physical Exam:      Vital Signs  /57   Pulse 60   Temp 97.5 °F (36.4 °C) (Temporal)   Resp 14   Ht 154.9 cm (61\")   Wt 81.6 kg (180 lb)   SpO2 91%   BMI 34.01 kg/m²   No intake or output data in the 24 hours ending 03/14/25 1510      Physical Exam:      03/14/25  1222   Weight: 81.6 kg (180 lb)    Body mass index is 34.01 kg/m².  Constitution: No acute distress.   Head: Normocephalic, atraumatic.   Eyes: Aligned without strabismus. Conjunctiva noninjected   Ears, Nose, Mouth: No lesions appreciated   Respiratory: Symmetric chest expansion. No respiratory distress.   Gastrointestinal:  soft, nondistended, nontender  Skin:  No cyanosis, clubbing or edema bilaterally    Lymphatics: No abnormal cervical or supraclavicular adenopathy  Neurologic: No gross deficits.  Alert and oriented x3  Psychiatric: Normal mood        Results Review: I have personally reviewed all of the recent lab and imaging results available at this time.     Creatinine elevated 2.9 from baseline around 2.  BUN is elevated at 79.  INR is 2.3    Hemoglobin is 4.9 from baseline around 8.  Platelets are 505            Assessment and Plan:    69 y.o. female smoker, anticoagulated on Eliquis for atrial fibrillation and CKD stage IV, with vomiting, acute on chronic anemia    IV PPI twice daily  Transfuse and monitor response  Patient refused NG tube placement  Hold anticoagulation  N.p.o. at midnight.  Possible EGD tomorrow  Patient reports being DNI        This document has been electronically signed by Luis Antonio Jiang MD   March 14, 2025 15:10 EDT    [facility]          Electronically signed by Luis Antonio Jiang MD at 03/14/25 1516       "

## 2025-03-15 NOTE — ANESTHESIA PREPROCEDURE EVALUATION
Anesthesia Evaluation     no history of anesthetic complications:   NPO Solid Status: Waived due to emergency  NPO Liquid Status: Waived due to emergency           Airway   Mallampati: II  TM distance: >3 FB  Neck ROM: full  No difficulty expected  Dental    (+) upper dentures    Pulmonary - normal exam   (+) COPD,home oxygen  Cardiovascular - normal exam    (+) hypertension, CAD, cardiac stents Drug eluting stent more than 12 months ago , dysrhythmias, CHF , PVD, hyperlipidemia      Neuro/Psych  (+) headaches, psychiatric history Anxiety  GI/Hepatic/Renal/Endo    (+) obesity, morbid obesity, renal disease- ARF, thyroid problem hypothyroidism    Musculoskeletal     Abdominal  - normal exam    Bowel sounds: normal.   Substance History      OB/GYN          Other   arthritis,     ROS/Med Hx Other: Eliquis 1/18                    Anesthesia Plan    ASA 3     general   total IV anesthesia  intravenous induction     Anesthetic plan, risks, benefits, and alternatives have been provided, discussed and informed consent has been obtained with: patient.        CODE STATUS:    While under anesthesia and immediate perioperative period:  Code Status: CPR - Full Support

## 2025-03-16 LAB
ALBUMIN SERPL-MCNC: 3.8 G/DL (ref 3.5–5.2)
ALBUMIN/GLOB SERPL: 1.2 G/DL
ALP SERPL-CCNC: 140 U/L (ref 39–117)
ALT SERPL W P-5'-P-CCNC: 6 U/L (ref 1–33)
ANION GAP SERPL CALCULATED.3IONS-SCNC: 16.9 MMOL/L (ref 5–15)
AST SERPL-CCNC: 14 U/L (ref 1–32)
BASOPHILS # BLD AUTO: 0.08 10*3/MM3 (ref 0–0.2)
BASOPHILS NFR BLD AUTO: 0.9 % (ref 0–1.5)
BILIRUB SERPL-MCNC: 0.5 MG/DL (ref 0–1.2)
BUN SERPL-MCNC: 79 MG/DL (ref 8–23)
BUN/CREAT SERPL: 28.2 (ref 7–25)
CALCIUM SPEC-SCNC: 8.8 MG/DL (ref 8.6–10.5)
CHLORIDE SERPL-SCNC: 89 MMOL/L (ref 98–107)
CO2 SERPL-SCNC: 24.1 MMOL/L (ref 22–29)
CREAT SERPL-MCNC: 2.8 MG/DL (ref 0.57–1)
DEPRECATED RDW RBC AUTO: 57.8 FL (ref 37–54)
EGFRCR SERPLBLD CKD-EPI 2021: 18 ML/MIN/1.73
EOSINOPHIL # BLD AUTO: 0.57 10*3/MM3 (ref 0–0.4)
EOSINOPHIL NFR BLD AUTO: 6.6 % (ref 0.3–6.2)
ERYTHROCYTE [DISTWIDTH] IN BLOOD BY AUTOMATED COUNT: 19.8 % (ref 12.3–15.4)
GLOBULIN UR ELPH-MCNC: 3.3 GM/DL
GLUCOSE SERPL-MCNC: 117 MG/DL (ref 65–99)
HCT VFR BLD AUTO: 23.3 % (ref 34–46.6)
HGB BLD-MCNC: 7 G/DL (ref 12–15.9)
IMM GRANULOCYTES # BLD AUTO: 0.04 10*3/MM3 (ref 0–0.05)
IMM GRANULOCYTES NFR BLD AUTO: 0.5 % (ref 0–0.5)
LYMPHOCYTES # BLD AUTO: 1.61 10*3/MM3 (ref 0.7–3.1)
LYMPHOCYTES NFR BLD AUTO: 18.6 % (ref 19.6–45.3)
MAGNESIUM SERPL-MCNC: 2.6 MG/DL (ref 1.6–2.4)
MCH RBC QN AUTO: 24 PG (ref 26.6–33)
MCHC RBC AUTO-ENTMCNC: 30 G/DL (ref 31.5–35.7)
MCV RBC AUTO: 79.8 FL (ref 79–97)
MONOCYTES # BLD AUTO: 1.15 10*3/MM3 (ref 0.1–0.9)
MONOCYTES NFR BLD AUTO: 13.3 % (ref 5–12)
NEUTROPHILS NFR BLD AUTO: 5.2 10*3/MM3 (ref 1.7–7)
NEUTROPHILS NFR BLD AUTO: 60.1 % (ref 42.7–76)
NRBC BLD AUTO-RTO: 0.7 /100 WBC (ref 0–0.2)
PLATELET # BLD AUTO: 424 10*3/MM3 (ref 140–450)
PMV BLD AUTO: 10.3 FL (ref 6–12)
POTASSIUM SERPL-SCNC: 3.5 MMOL/L (ref 3.5–5.2)
PROT SERPL-MCNC: 7.1 G/DL (ref 6–8.5)
QT INTERVAL: 440 MS
QT INTERVAL: 448 MS
QTC INTERVAL: 450 MS
QTC INTERVAL: 458 MS
RBC # BLD AUTO: 2.92 10*6/MM3 (ref 3.77–5.28)
SODIUM SERPL-SCNC: 130 MMOL/L (ref 136–145)
WBC NRBC COR # BLD AUTO: 8.65 10*3/MM3 (ref 3.4–10.8)

## 2025-03-16 PROCEDURE — 99232 SBSQ HOSP IP/OBS MODERATE 35: CPT | Performed by: STUDENT IN AN ORGANIZED HEALTH CARE EDUCATION/TRAINING PROGRAM

## 2025-03-16 PROCEDURE — 83735 ASSAY OF MAGNESIUM: CPT | Performed by: INTERNAL MEDICINE

## 2025-03-16 PROCEDURE — 80053 COMPREHEN METABOLIC PANEL: CPT | Performed by: INTERNAL MEDICINE

## 2025-03-16 PROCEDURE — 85025 COMPLETE CBC W/AUTO DIFF WBC: CPT | Performed by: INTERNAL MEDICINE

## 2025-03-16 PROCEDURE — 63710000001 PROMETHAZINE PER 25 MG: Performed by: STUDENT IN AN ORGANIZED HEALTH CARE EDUCATION/TRAINING PROGRAM

## 2025-03-16 PROCEDURE — 63710000001 ONDANSETRON ODT 4 MG TABLET DISPERSIBLE: Performed by: STUDENT IN AN ORGANIZED HEALTH CARE EDUCATION/TRAINING PROGRAM

## 2025-03-16 PROCEDURE — 93005 ELECTROCARDIOGRAM TRACING: CPT | Performed by: STUDENT IN AN ORGANIZED HEALTH CARE EDUCATION/TRAINING PROGRAM

## 2025-03-16 RX ORDER — POTASSIUM CHLORIDE 1500 MG/1
20 TABLET, EXTENDED RELEASE ORAL ONCE
Status: COMPLETED | OUTPATIENT
Start: 2025-03-16 | End: 2025-03-16

## 2025-03-16 RX ORDER — ONDANSETRON 4 MG/1
4 TABLET, ORALLY DISINTEGRATING ORAL ONCE
Status: COMPLETED | OUTPATIENT
Start: 2025-03-16 | End: 2025-03-16

## 2025-03-16 RX ORDER — PROMETHAZINE HYDROCHLORIDE 25 MG/1
12.5 TABLET ORAL EVERY 8 HOURS PRN
Status: DISCONTINUED | OUTPATIENT
Start: 2025-03-16 | End: 2025-03-17 | Stop reason: HOSPADM

## 2025-03-16 RX ORDER — SODIUM CHLORIDE 1 G/1
2 TABLET ORAL
Status: DISCONTINUED | OUTPATIENT
Start: 2025-03-16 | End: 2025-03-17 | Stop reason: HOSPADM

## 2025-03-16 RX ORDER — PROMETHAZINE HYDROCHLORIDE 12.5 MG/1
12.5 SUPPOSITORY RECTAL EVERY 8 HOURS PRN
Status: DISCONTINUED | OUTPATIENT
Start: 2025-03-16 | End: 2025-03-17 | Stop reason: HOSPADM

## 2025-03-16 RX ADMIN — PANTOPRAZOLE SODIUM 40 MG: 40 INJECTION, POWDER, FOR SOLUTION INTRAVENOUS at 08:37

## 2025-03-16 RX ADMIN — HYDRALAZINE HYDROCHLORIDE 100 MG: 50 TABLET ORAL at 05:18

## 2025-03-16 RX ADMIN — POTASSIUM CHLORIDE 20 MEQ: 1500 TABLET, EXTENDED RELEASE ORAL at 11:28

## 2025-03-16 RX ADMIN — Medication 1 BOTTLE: at 13:01

## 2025-03-16 RX ADMIN — PROMETHAZINE HYDROCHLORIDE 12.5 MG: 25 TABLET ORAL at 18:31

## 2025-03-16 RX ADMIN — Medication 2 G: at 17:32

## 2025-03-16 RX ADMIN — HYDRALAZINE HYDROCHLORIDE 100 MG: 50 TABLET ORAL at 13:01

## 2025-03-16 RX ADMIN — Medication 10 ML: at 08:37

## 2025-03-16 RX ADMIN — Medication 10 ML: at 21:24

## 2025-03-16 RX ADMIN — PANTOPRAZOLE SODIUM 40 MG: 40 INJECTION, POWDER, FOR SOLUTION INTRAVENOUS at 21:24

## 2025-03-16 RX ADMIN — HYDROCODONE BITARTRATE AND ACETAMINOPHEN 1 TABLET: 10; 325 TABLET ORAL at 05:19

## 2025-03-16 RX ADMIN — METOPROLOL TARTRATE 25 MG: 25 TABLET, FILM COATED ORAL at 08:37

## 2025-03-16 RX ADMIN — ONDANSETRON 4 MG: 4 TABLET, ORALLY DISINTEGRATING ORAL at 14:16

## 2025-03-16 RX ADMIN — FOLIC ACID 1 MG: 1 TABLET ORAL at 08:37

## 2025-03-16 RX ADMIN — AMLODIPINE BESYLATE 10 MG: 10 TABLET ORAL at 08:37

## 2025-03-16 RX ADMIN — ATORVASTATIN CALCIUM 80 MG: 40 TABLET, FILM COATED ORAL at 21:24

## 2025-03-16 RX ADMIN — Medication 2 G: at 11:28

## 2025-03-16 NOTE — PROGRESS NOTES
"    [facility] HOSPITALIST PROGRESS NOTE     Patient Identification:  Name:  Lesley Michel  Age:  69 y.o.  Sex:  female  :  1955  MRN:  [id number]  Visit Number:  [id number]  ROOM: [address] [place] [address]     Primary Care Provider:  Woodrow Raymond APRN    Length of stay in inpatient status:  1    Subjective     Chief Compliant:    Chief Complaint   Patient presents with    Weakness - Generalized    Vomiting       History of Presenting Illness:    Patient was seen and examined this morning right before going downstairs for EGD and then briefly seen again after returning. She denied any shortness of breath, chest pain, abdominal pain. She did report nausea but it seemed to be related to anxiety in the setting of discussing the upcoming procedure and also her code status. She has advanced directive paperwork in the chart that says DNR/DNI, but had reported to nursing staff that she wanted to be \"CPR but no intubation.\" I discussed the options with her and let her know that we could make that her code status but that, in the event of cardiac arrest, CPR without the ability to oxygenate would be futile. She then stated she wanted her code status to be DNR/DNI and confirmed that her granddaughter Olivia would be her healthcare surrogate if she became incapacitated.    Objective     Current Hospital Meds:amLODIPine, 10 mg, Oral, Q24H  atorvastatin, 80 mg, Oral, Nightly  folic acid, 1 mg, Oral, Daily  hydrALAZINE, 100 mg, Oral, Q8H  metoprolol tartrate, 25 mg, Oral, Q12H  pantoprazole, 40 mg, Intravenous, Q12H  [START ON 3/16/2025] polyethylene glycol, 1 bottle, Oral, Once  sodium chloride, 10 mL, Intravenous, Q12H         Current Antimicrobial Therapy:  Anti-Infectives (From admission, onward)      None          Current Diuretic Therapy:  Diuretics (From admission, onward)      Ordered     Dose/Rate Route Frequency Start Stop    25 1528  bumetanide (BUMEX) injection 2 mg        Ordering Provider: Mata" DO Maria L    2 mg Intravenous Once 03/14/25 1544 03/14/25 1741          ----------------------------------------------------------------------------------------------------------------------  Vital Signs:  Temp:  [97.1 °F (36.2 °C)-98.5 °F (36.9 °C)] 98.2 °F (36.8 °C)  Heart Rate:  [54-74] 65  Resp:  [18-20] 20  BP: (117-164)/(48-77) 131/67  SpO2:  [90 %-98 %] 90 %  on  Flow (L/min) (Oxygen Therapy):  [2] 2;   Device (Oxygen Therapy): nasal cannula  Body mass index is 36.2 kg/m².    Wt Readings from Last 3 Encounters:   03/15/25 86.9 kg (191 lb 9.6 oz)   02/18/25 83.2 kg (183 lb 6.8 oz)   01/21/25 93.2 kg (205 lb 8 oz)     Intake & Output (last 3 days)         03/13 0701  03/14 0700 03/14 0701  03/15 0700 03/15 0701  03/16 0700    P.O.  120 360    I.V. (mL/kg)   75 (0.9)    Blood  600     Total Intake(mL/kg)  720 (8.3) 435 (5)    Net  +720 +435           Urine Unmeasured Occurrence  2 x 1 x          Diet: Liquid, Renal; Full Liquid; Low Sodium (2-3g), Low Phosphorus; Fluid Consistency: Thin (IDDSI 0)  Diet: Liquid, Renal; Clear Liquid; Low Sodium (2-3g), Low Phosphorus; Fluid Consistency: Thin (IDDSI 0)  NPO Diet NPO Type: Sips with Meds  ----------------------------------------------------------------------------------------------------------------------  Physical exam:   Constitutional:  Well-developed and well-nourished.  No acute distress.      HENT:  Head:  Normocephalic and atraumatic.    Cardiovascular:  Normal rate, regular rhythm, no murmur.  Pulmonary/Chest:  No respiratory distress, breath sounds clear to auscultation bilaterally  Abdominal:  Soft. No distension and no tenderness.   Musculoskeletal:  No deformity.    Neurological: Awake, alert, no focal deficit on gross examination. No slurred speech or facial droop.    Skin:  Skin is warm and dry.   Psychiatric: Anxious    ----------------------------------------------------------------------------------------------------------------------  Results  "from last 7 days   Lab Units 03/15/25  0533 03/14/25  1255   WBC 10*3/mm3 7.78 7.76   HEMOGLOBIN g/dL 7.0*  7.0* 4.9*   HEMATOCRIT % 23.1*  23.1* 17.0*   MCV fL 78.2* 75.2*   MCHC g/dL 30.4* 28.8*   PLATELETS 10*3/mm3 411 505*   INR   --  2.35*         Results from last 7 days   Lab Units 03/15/25  1619 03/15/25  0700 03/14/25  1255   SODIUM mmol/L 126* 129* 130*   POTASSIUM mmol/L 3.3* 3.2* 3.7   CHLORIDE mmol/L 89* 91* 90*   CO2 mmol/L 26.3 25.1 21.8*   BUN mg/dL 80* 80* 79*   CREATININE mg/dL 2.69* 2.79* 2.91*   CALCIUM mg/dL 8.7 8.6 9.0   GLUCOSE mg/dL 112* 108* 132*   ALBUMIN g/dL  --   --  3.7   BILIRUBIN mg/dL  --   --  0.4   ALK PHOS U/L  --   --  130*   AST (SGOT) U/L  --   --  13   ALT (SGPT) U/L  --   --  <5   Estimated Creatinine Clearance: 20.3 mL/min (A) (by C-G formula based on SCr of 2.69 mg/dL (H)).  No results found for: \"AMMONIA\"  Results from last 7 days   Lab Units 03/15/25  0700 03/15/25  0456   HSTROP T ng/L 67* 71*             No results found for: \"HGBA1C\", \"POCGLU\"  Lab Results   Component Value Date    TSH 6.770 (H) 10/08/2023    FREET4 1.01 10/08/2023     No results found for: \"PREGTESTUR\", \"PREGSERUM\", \"HCG\", \"HCGQUANT\"  Pain Management Panel          Latest Ref Rng & Units 10/9/2023   Pain Management Panel   Creatinine, Urine mg/dL 116.0      Brief Urine Lab Results  (Last result in the past 365 days)        Color   Clarity   Blood   Leuk Est   Nitrite   Protein   CREAT   Urine HCG        04/16/24 2028 Yellow   Clear   Negative   Negative   Negative   Negative                   I have personally looked at the labs and they are summarized above.  ----------------------------------------------------------------------------------------------------------------------  Detailed radiology reports for the last 24 hours:  Imaging Results (Last 24 Hours)       ** No results found for the last 24 hours. **          Assessment & Plan      #Acute on chronic anemia  #Melena, concern for GI " bleeding  - Hemoglobin increased up to 7.0 after transfusion of 2 units PRBCs. EGD today revealed patchy, erythematous mucosa in the gastric antrum without any active bleeding.  - Continue PPI  - CBC in a.m.  - Transfuse for hemoglobin <7 or if very symptomatic <8    #MAICO on CKD stage IV  - Appreciate Nephrology assistance  - She received bumex with the transfusion last night. Hold further diuretics for now as volume status appears compensated.   - Per Nephrologist's note today, continue with 1200cc/day fluid restriction    #Hypokalemia  - Potassium was replaced with a one time dose of potassium chloride. She is not on electrolyte replacement protocol due to her renal dysfunction.   - BMP in a.m.    Chronic medical conditions:  #Hypertension - BP well controlled today  #Paroxysmal atrial fibrillation - continue home metoprolol, hold home eliquis due to severe anemia  #ASCVD s/p prior PCI/ROSAURA to LAD - continue home statin. Aspirin is being temporarily held due to severe anemia and concern for GI bleeding.  #COPD - not currently in exacerbation   #Chronic HFpEF - volume status appears compensated  #Chronic respiratory failure (2 L baseline)  #PVD  #Migraine headaches  #Anxiety    Dispo:  likely home at discharge pending medical stability    Maria L August DO  [facility] Hospitalist  03/15/25  20:49 EDT

## 2025-03-16 NOTE — PLAN OF CARE
Problem: Adult Inpatient Plan of Care  Goal: Absence of Hospital-Acquired Illness or Injury  Intervention: Prevent Skin Injury  Recent Flowsheet Documentation  Taken 3/15/2025 1938 by Toribio Holloway RN  Body Position: position changed independently  Skin Protection: incontinence pads utilized     Problem: Adult Inpatient Plan of Care  Goal: Absence of Hospital-Acquired Illness or Injury  Intervention: Prevent and Manage VTE (Venous Thromboembolism) Risk  Recent Flowsheet Documentation  Taken 3/15/2025 1938 by Toribio Holloway RN  VTE Prevention/Management:   SCDs (sequential compression devices) off   patient refused intervention   Goal Outcome Evaluation:

## 2025-03-16 NOTE — PLAN OF CARE
Goal Outcome Evaluation:   Pt has rested this shift. Pt on 2L/NC with sats maintaining above 90%. No s/s of acute distress noted at this time. Plan of care ongoing.

## 2025-03-16 NOTE — PROGRESS NOTES
Nephrology Progress Note      Subjective     03/15/2025 patient had a scope done yesterday for anemia of 4.0 swelling still there    03/16/2025 patient is drinking a lot of water and liquid diet denies any nausea vomiting diarrhea no shortness of breath      Objective       Vital signs:     Vitals:    03/15/25 2334 03/16/25 0347 03/16/25 0500 03/16/25 0720   BP: 124/60 121/60  117/54   BP Location: Right arm Right arm  Right arm   Patient Position: Lying Lying  Lying   Pulse: 55 59     Resp: 20 20  20   Temp: 98.5 °F (36.9 °C) 98.4 °F (36.9 °C)  98.7 °F (37.1 °C)   TempSrc: Oral Oral  Oral   SpO2: 92% 91%     Weight:   87.5 kg (192 lb 13.6 oz)    Height:            Intake/Output                               03/14/25 0701 - 03/15/25 0700 03/15/25 0701 - 03/16/25 0700 03/16/25 0701 - 03/17/25 0700     [id number] 8397-7991 Total 7873-1167 0021-1118 Total 9873-0357 1791-4963 Total                    Intake    P.O.  120  -- 120  360  -- 360  360  -- 360    I.V.  --  -- --  75  -- 75  --  -- --    Blood  300  300 600  --  -- --  --  -- --    Volume (Transfuse RBC Infuse Each Unit Over: 2H) 300 -- 300 -- -- -- -- -- --    Volume (Transfuse RBC Infuse Each Unit Over: 2H) -- 300 300 -- -- -- -- -- --    Total Intake 420 300 720 435 -- 435 360 -- 360       Output    Total Output -- -- -- -- -- -- -- -- --           03/15/2025 examined and reviewed  03/16/2025 examined and reviewed    Physical Exam:    General Appearance: Alert, pleasant, appears stated age, cooperative   Head: Normocephalic, without obvious abnormality and atraumatic.  Eyes: Conjunctivae and sclerae normal, no icterus, no pallor, and PERRLA.  Neck: No adenopathy, supple, no carotid bruit, no JVD.  Lungs: Clear to auscultation, respirations regular.  Heart: Regular rhythm & normal rate, normal S1, S2 and no murmur, no rub.  Abdomen: Normal bowel sounds, no masses, no hepatomegaly, no splenomegaly, soft, nontender, and no guarding.  Extremities: 2 plus edema,  "no cyanosis and no redness.  Skin: No bleeding, bruising or rash.  Neurologic: Oriented to person, place, time and situation. Grossly no focal deficits.      Laboratory Data:       CBC and coagulation:  Results from last 7 days   Lab Units 03/15/25  0533 03/14/25  1255   WBC 10*3/mm3 7.78 7.76   HEMOGLOBIN g/dL 7.0*  7.0* 4.9*   HEMATOCRIT % 23.1*  23.1* 17.0*   MCV fL 78.2* 75.2*   MCHC g/dL 30.4* 28.8*   PLATELETS 10*3/mm3 411 505*   INR   --  2.35*     Acid/base balance:        Renal and electrolytes:    Results from last 7 days   Lab Units 03/15/25  1619 03/15/25  0700 03/14/25  1255   SODIUM mmol/L 126* 129* 130*   POTASSIUM mmol/L 3.3* 3.2* 3.7   CHLORIDE mmol/L 89* 91* 90*   CO2 mmol/L 26.3 25.1 21.8*   BUN mg/dL 80* 80* 79*   CREATININE mg/dL 2.69* 2.79* 2.91*   CALCIUM mg/dL 8.7 8.6 9.0     Estimated Creatinine Clearance: 20.4 mL/min (A) (by C-G formula based on SCr of 2.69 mg/dL (H)).     Liver and pancreatic function:  Results from last 7 days   Lab Units 03/14/25  1255   ALBUMIN g/dL 3.7   BILIRUBIN mg/dL 0.4   ALK PHOS U/L 130*   AST (SGOT) U/L 13   ALT (SGPT) U/L <5       Albumin Albumin   Date Value Ref Range Status   03/14/2025 3.7 3.5 - 5.2 g/dL Final      Magnesium No results found for: \"MG\"         PTH               No results found for: \"PTH\"    Cardiac:        Liver and pancreatic function:  Results from last 7 days   Lab Units 03/14/25  1255   ALBUMIN g/dL 3.7   BILIRUBIN mg/dL 0.4   ALK PHOS U/L 130*   AST (SGOT) U/L 13   ALT (SGPT) U/L <5       CT Head Without Contrast  Result Date: 3/14/2025  1.  Motion artifact limits portions of the exam. 2.  No CT evidence of acute intracranial abnormality is otherwise noted. 3.  Mild chronic small vessel ischemic disease.  This report was finalized on 3/14/2025 2:29 PM by Dr. Eduardo August MD.      XR Chest 1 View  Result Date: 3/14/2025    CHF with cardiomegaly and pulmonary vascular congestion.  This report was finalized on 3/14/2025 2:27 PM by Dr." Eduardo August MD.         Medications:     amLODIPine, 10 mg, Oral, Q24H  atorvastatin, 80 mg, Oral, Nightly  folic acid, 1 mg, Oral, Daily  hydrALAZINE, 100 mg, Oral, Q8H  metoprolol tartrate, 25 mg, Oral, Q12H  pantoprazole, 40 mg, Intravenous, Q12H  polyethylene glycol, 1 bottle, Oral, Once  sodium chloride, 10 mL, Intravenous, Q12H        Assessment & Plan     03/16/2025 reviewed and updated    -Acute kidney injury  -Acute hyponatremia  -Acute hypokalemia  -Chronic kidney disease stage IV baseline creatinine 2.0  -Symptomatic anemia  -Acute hyponatremia  -Acute hypokalemia  -Presyncope  -Anion gap metabolic acidosis  -Paroxysmal atrial fibrillation  -Coronary artery disease  -Hypertension        03/15/2025  Patient baseline creatinine is around 2.0 went up to 2.9 down to 2.79 with a BUN of 80 but patient had a symptomatic anemia also was on diuretics which were unguinal hold for now as the patient is clinically compensated and repeat labs we will check urine protein creatinine ratio also  Recent CT scan did not show any obstruction  Patient has severe anemia hemoglobin was 4 is now up to 7.5 after blood transfusion patient received 1 endoscopy also plans to do colonoscopy  Patient is on anticoagulation for atrial fibrillation which is being held  Will continue fluid restriction of 1200 cc  Will replace potassium    03/16/2025  Patient baseline creatinine is around 2.01 up to 2.9 now down to 2.69 with a BUN of 80 but patient most likely had a GI bleed also as creatinine getting better will continue to hold diuretic   CT scan did not show any obstruction  Proteinuria of 400 mg  No hematuria  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  Also has symptomatic anemia came in with a hemoglobin of 4.9 now is 7.0 and had a scope done yesterday now plan for colonoscopy tomorrow  Patient has hyponatremia most likely secondary to taking a lot of water for bowel prep and liquid diet will fluid restrict  patient 1000 cc and start patient on sodium chloride tablet as the patient is going to get prep for bowel colonoscopy also  Will start patient on potassium chloride also 20 mg daily     Prognosis critical    Discussed with RN     High risk high complexity patient     Reviewed hospitalist notes  Reviewed consultant notes  Reviewed the labs  Reviewed radiology     Please avoid nephrotoxic medication and pharmacy to adjust the medication according to the GFR.        Plan of care was discussed with the patient. Patient understood, verbalized, agreed.      S Babak Steinberg MD  03/16/25  09:13 EDT

## 2025-03-17 ENCOUNTER — ANESTHESIA (OUTPATIENT)
Dept: PERIOP | Facility: HOSPITAL | Age: 70
End: 2025-03-17
Payer: MEDICARE

## 2025-03-17 ENCOUNTER — APPOINTMENT (OUTPATIENT)
Dept: GENERAL RADIOLOGY | Facility: HOSPITAL | Age: 70
End: 2025-03-17
Payer: MEDICARE

## 2025-03-17 ENCOUNTER — APPOINTMENT (OUTPATIENT)
Dept: CT IMAGING | Facility: HOSPITAL | Age: 70
End: 2025-03-17
Payer: MEDICARE

## 2025-03-17 ENCOUNTER — ANESTHESIA EVENT (OUTPATIENT)
Dept: PERIOP | Facility: HOSPITAL | Age: 70
End: 2025-03-17
Payer: MEDICARE

## 2025-03-17 VITALS
SYSTOLIC BLOOD PRESSURE: 159 MMHG | DIASTOLIC BLOOD PRESSURE: 82 MMHG | BODY MASS INDEX: 32.07 KG/M2 | RESPIRATION RATE: 20 BRPM | OXYGEN SATURATION: 90 % | WEIGHT: 181 LBS | HEART RATE: 101 BPM | TEMPERATURE: 98.5 F | HEIGHT: 63 IN

## 2025-03-17 PROBLEM — K92.2 GASTROINTESTINAL HEMORRHAGE: Status: ACTIVE | Noted: 2025-03-14

## 2025-03-17 LAB
A-A DO2: 193.3 MMHG (ref 0–300)
ALBUMIN SERPL-MCNC: 3.5 G/DL (ref 3.5–5.2)
ALBUMIN SERPL-MCNC: 3.6 G/DL (ref 3.5–5.2)
ALBUMIN/GLOB SERPL: 0.9 G/DL
ALBUMIN/GLOB SERPL: 1.2 G/DL
ALP SERPL-CCNC: 130 U/L (ref 39–117)
ALP SERPL-CCNC: 149 U/L (ref 39–117)
ALT SERPL W P-5'-P-CCNC: 6 U/L (ref 1–33)
ALT SERPL W P-5'-P-CCNC: 8 U/L (ref 1–33)
ANION GAP SERPL CALCULATED.3IONS-SCNC: 14.5 MMOL/L (ref 5–15)
ANION GAP SERPL CALCULATED.3IONS-SCNC: 21.6 MMOL/L (ref 5–15)
ANISOCYTOSIS BLD QL: NORMAL
ARTERIAL PATENCY WRIST A: ABNORMAL
AST SERPL-CCNC: 14 U/L (ref 1–32)
AST SERPL-CCNC: 18 U/L (ref 1–32)
ATMOSPHERIC PRESS: 732 MMHG
BASE EXCESS BLDA CALC-SCNC: 1.7 MMOL/L (ref 0–2)
BASOPHILS # BLD AUTO: 0.04 10*3/MM3 (ref 0–0.2)
BASOPHILS NFR BLD AUTO: 0.4 % (ref 0–1.5)
BDY SITE: ABNORMAL
BH BB BLOOD EXPIRATION DATE: NORMAL
BH BB BLOOD TYPE BARCODE: 5100
BH BB DISPENSE STATUS: NORMAL
BH BB PRODUCT CODE: NORMAL
BH BB UNIT NUMBER: NORMAL
BILIRUB SERPL-MCNC: 0.5 MG/DL (ref 0–1.2)
BILIRUB SERPL-MCNC: 0.6 MG/DL (ref 0–1.2)
BUN SERPL-MCNC: 65 MG/DL (ref 8–23)
BUN SERPL-MCNC: 77 MG/DL (ref 8–23)
BUN/CREAT SERPL: 24.7 (ref 7–25)
BUN/CREAT SERPL: 26.9 (ref 7–25)
CALCIUM SPEC-SCNC: 8.8 MG/DL (ref 8.6–10.5)
CALCIUM SPEC-SCNC: 9.3 MG/DL (ref 8.6–10.5)
CHLORIDE SERPL-SCNC: 92 MMOL/L (ref 98–107)
CHLORIDE SERPL-SCNC: 97 MMOL/L (ref 98–107)
CO2 BLDA-SCNC: 26.7 MMOL/L (ref 22–33)
CO2 SERPL-SCNC: 16.4 MMOL/L (ref 22–29)
CO2 SERPL-SCNC: 25.5 MMOL/L (ref 22–29)
COHGB MFR BLD: 1.8 % (ref 0–5)
CREAT SERPL-MCNC: 2.63 MG/DL (ref 0.57–1)
CREAT SERPL-MCNC: 2.86 MG/DL (ref 0.57–1)
CROSSMATCH INTERPRETATION: NORMAL
D DIMER PPP FEU-MCNC: 0.71 MCGFEU/ML (ref 0–0.67)
DACRYOCYTES BLD QL SMEAR: NORMAL
DEPRECATED RDW RBC AUTO: 57.5 FL (ref 37–54)
DEPRECATED RDW RBC AUTO: 60.5 FL (ref 37–54)
EGFRCR SERPLBLD CKD-EPI 2021: 17.5 ML/MIN/1.73
EGFRCR SERPLBLD CKD-EPI 2021: 19.4 ML/MIN/1.73
ELLIPTOCYTES BLD QL SMEAR: NORMAL
EOSINOPHIL # BLD AUTO: 0.09 10*3/MM3 (ref 0–0.4)
EOSINOPHIL NFR BLD AUTO: 0.9 % (ref 0.3–6.2)
ERYTHROCYTE [DISTWIDTH] IN BLOOD BY AUTOMATED COUNT: 20 % (ref 12.3–15.4)
ERYTHROCYTE [DISTWIDTH] IN BLOOD BY AUTOMATED COUNT: 20 % (ref 12.3–15.4)
GAS FLOW AIRWAY: 6 LPM
GLOBULIN UR ELPH-MCNC: 2.9 GM/DL
GLOBULIN UR ELPH-MCNC: 4.1 GM/DL
GLUCOSE SERPL-MCNC: 107 MG/DL (ref 65–99)
GLUCOSE SERPL-MCNC: 143 MG/DL (ref 65–99)
HCO3 BLDA-SCNC: 25.6 MMOL/L (ref 20–26)
HCT VFR BLD AUTO: 22.4 % (ref 34–46.6)
HCT VFR BLD AUTO: 28.7 % (ref 34–46.6)
HCT VFR BLD CALC: 25.4 % (ref 38–51)
HGB BLD-MCNC: 6.7 G/DL (ref 12–15.9)
HGB BLD-MCNC: 8.3 G/DL (ref 12–15.9)
HGB BLDA-MCNC: 8.3 G/DL (ref 13.5–17.5)
HYPOCHROMIA BLD QL: NORMAL
IMM GRANULOCYTES # BLD AUTO: 0.07 10*3/MM3 (ref 0–0.05)
IMM GRANULOCYTES NFR BLD AUTO: 0.7 % (ref 0–0.5)
INHALED O2 CONCENTRATION: 44 %
LYMPHOCYTES # BLD AUTO: 0.56 10*3/MM3 (ref 0.7–3.1)
LYMPHOCYTES NFR BLD AUTO: 5.6 % (ref 19.6–45.3)
Lab: ABNORMAL
MCH RBC QN AUTO: 23.6 PG (ref 26.6–33)
MCH RBC QN AUTO: 24 PG (ref 26.6–33)
MCHC RBC AUTO-ENTMCNC: 28.9 G/DL (ref 31.5–35.7)
MCHC RBC AUTO-ENTMCNC: 29.9 G/DL (ref 31.5–35.7)
MCV RBC AUTO: 78.9 FL (ref 79–97)
MCV RBC AUTO: 82.9 FL (ref 79–97)
METHGB BLD QL: 0.6 % (ref 0–3)
MODALITY: ABNORMAL
MONOCYTES # BLD AUTO: 0.14 10*3/MM3 (ref 0.1–0.9)
MONOCYTES NFR BLD AUTO: 1.4 % (ref 5–12)
NEUTROPHILS NFR BLD AUTO: 9.13 10*3/MM3 (ref 1.7–7)
NEUTROPHILS NFR BLD AUTO: 91 % (ref 42.7–76)
NRBC BLD AUTO-RTO: 0.5 /100 WBC (ref 0–0.2)
NT-PROBNP SERPL-MCNC: ABNORMAL PG/ML (ref 0–900)
OXYHGB MFR BLDV: 93.1 % (ref 94–99)
PCO2 BLDA: 36.2 MM HG (ref 35–45)
PCO2 TEMP ADJ BLD: ABNORMAL MM[HG]
PH BLDA: 7.46 PH UNITS (ref 7.35–7.45)
PH, TEMP CORRECTED: ABNORMAL
PLAT MORPH BLD: NORMAL
PLATELET # BLD AUTO: 384 10*3/MM3 (ref 140–450)
PLATELET # BLD AUTO: 397 10*3/MM3 (ref 140–450)
PMV BLD AUTO: 10.1 FL (ref 6–12)
PMV BLD AUTO: 10.1 FL (ref 6–12)
PO2 BLDA: 68.5 MM HG (ref 83–108)
PO2 TEMP ADJ BLD: ABNORMAL MM[HG]
POIKILOCYTOSIS BLD QL SMEAR: NORMAL
POTASSIUM SERPL-SCNC: 3.6 MMOL/L (ref 3.5–5.2)
POTASSIUM SERPL-SCNC: 4 MMOL/L (ref 3.5–5.2)
PROCALCITONIN SERPL-MCNC: 0.47 NG/ML (ref 0–0.25)
PROT SERPL-MCNC: 6.5 G/DL (ref 6–8.5)
PROT SERPL-MCNC: 7.6 G/DL (ref 6–8.5)
RBC # BLD AUTO: 2.84 10*6/MM3 (ref 3.77–5.28)
RBC # BLD AUTO: 3.46 10*6/MM3 (ref 3.77–5.28)
SAO2 % BLDCOA: 95.4 % (ref 94–99)
SODIUM SERPL-SCNC: 132 MMOL/L (ref 136–145)
SODIUM SERPL-SCNC: 135 MMOL/L (ref 136–145)
STOMATOCYTES BLD QL SMEAR: NORMAL
TARGETS BLD QL SMEAR: NORMAL
UNIT  ABO: NORMAL
UNIT  RH: NORMAL
VENTILATOR MODE: ABNORMAL
WBC NRBC COR # BLD AUTO: 10.03 10*3/MM3 (ref 3.4–10.8)
WBC NRBC COR # BLD AUTO: 8.52 10*3/MM3 (ref 3.4–10.8)

## 2025-03-17 PROCEDURE — 85007 BL SMEAR W/DIFF WBC COUNT: CPT | Performed by: INTERNAL MEDICINE

## 2025-03-17 PROCEDURE — 93010 ELECTROCARDIOGRAM REPORT: CPT | Performed by: INTERNAL MEDICINE

## 2025-03-17 PROCEDURE — 82375 ASSAY CARBOXYHB QUANT: CPT

## 2025-03-17 PROCEDURE — 71045 X-RAY EXAM CHEST 1 VIEW: CPT

## 2025-03-17 PROCEDURE — 84145 PROCALCITONIN (PCT): CPT | Performed by: INTERNAL MEDICINE

## 2025-03-17 PROCEDURE — 97161 PT EVAL LOW COMPLEX 20 MIN: CPT

## 2025-03-17 PROCEDURE — 83050 HGB METHEMOGLOBIN QUAN: CPT

## 2025-03-17 PROCEDURE — 25010000002 DEXAMETHASONE PER 1 MG: Performed by: NURSE ANESTHETIST, CERTIFIED REGISTERED

## 2025-03-17 PROCEDURE — 99239 HOSP IP/OBS DSCHRG MGMT >30: CPT | Performed by: INTERNAL MEDICINE

## 2025-03-17 PROCEDURE — 85379 FIBRIN DEGRADATION QUANT: CPT | Performed by: INTERNAL MEDICINE

## 2025-03-17 PROCEDURE — 80053 COMPREHEN METABOLIC PANEL: CPT | Performed by: INTERNAL MEDICINE

## 2025-03-17 PROCEDURE — 71250 CT THORAX DX C-: CPT | Performed by: RADIOLOGY

## 2025-03-17 PROCEDURE — 25010000002 PROPOFOL 200 MG/20ML EMULSION: Performed by: NURSE ANESTHETIST, CERTIFIED REGISTERED

## 2025-03-17 PROCEDURE — 86900 BLOOD TYPING SEROLOGIC ABO: CPT

## 2025-03-17 PROCEDURE — 36600 WITHDRAWAL OF ARTERIAL BLOOD: CPT

## 2025-03-17 PROCEDURE — 71045 X-RAY EXAM CHEST 1 VIEW: CPT | Performed by: RADIOLOGY

## 2025-03-17 PROCEDURE — 85025 COMPLETE CBC W/AUTO DIFF WBC: CPT | Performed by: INTERNAL MEDICINE

## 2025-03-17 PROCEDURE — P9016 RBC LEUKOCYTES REDUCED: HCPCS

## 2025-03-17 PROCEDURE — 36430 TRANSFUSION BLD/BLD COMPNT: CPT

## 2025-03-17 PROCEDURE — 82805 BLOOD GASES W/O2 SATURATION: CPT

## 2025-03-17 PROCEDURE — 25810000003 SODIUM CHLORIDE 0.9 % SOLUTION: Performed by: INTERNAL MEDICINE

## 2025-03-17 PROCEDURE — 0DJD8ZZ INSPECTION OF LOWER INTESTINAL TRACT, VIA NATURAL OR ARTIFICIAL OPENING ENDOSCOPIC: ICD-10-PCS | Performed by: SURGERY

## 2025-03-17 PROCEDURE — 85027 COMPLETE CBC AUTOMATED: CPT | Performed by: SURGERY

## 2025-03-17 PROCEDURE — 25010000002 GLYCOPYRROLATE 0.4 MG/2ML SOLUTION: Performed by: NURSE ANESTHETIST, CERTIFIED REGISTERED

## 2025-03-17 PROCEDURE — 71250 CT THORAX DX C-: CPT

## 2025-03-17 PROCEDURE — 93005 ELECTROCARDIOGRAM TRACING: CPT | Performed by: INTERNAL MEDICINE

## 2025-03-17 PROCEDURE — 83880 ASSAY OF NATRIURETIC PEPTIDE: CPT | Performed by: INTERNAL MEDICINE

## 2025-03-17 RX ORDER — IPRATROPIUM BROMIDE AND ALBUTEROL SULFATE 2.5; .5 MG/3ML; MG/3ML
3 SOLUTION RESPIRATORY (INHALATION) ONCE AS NEEDED
Status: DISCONTINUED | OUTPATIENT
Start: 2025-03-17 | End: 2025-03-17 | Stop reason: HOSPADM

## 2025-03-17 RX ORDER — DEXAMETHASONE SODIUM PHOSPHATE 4 MG/ML
INJECTION, SOLUTION INTRA-ARTICULAR; INTRALESIONAL; INTRAMUSCULAR; INTRAVENOUS; SOFT TISSUE AS NEEDED
Status: DISCONTINUED | OUTPATIENT
Start: 2025-03-17 | End: 2025-03-17 | Stop reason: SURG

## 2025-03-17 RX ORDER — SODIUM CHLORIDE 9 MG/ML
50 INJECTION, SOLUTION INTRAVENOUS CONTINUOUS
Status: DISCONTINUED | OUTPATIENT
Start: 2025-03-17 | End: 2025-03-17 | Stop reason: HOSPADM

## 2025-03-17 RX ORDER — SODIUM CHLORIDE, SODIUM LACTATE, POTASSIUM CHLORIDE, CALCIUM CHLORIDE 600; 310; 30; 20 MG/100ML; MG/100ML; MG/100ML; MG/100ML
100 INJECTION, SOLUTION INTRAVENOUS ONCE AS NEEDED
Status: DISCONTINUED | OUTPATIENT
Start: 2025-03-17 | End: 2025-03-17 | Stop reason: HOSPADM

## 2025-03-17 RX ORDER — CEFDINIR 300 MG/1
300 CAPSULE ORAL DAILY
Qty: 5 CAPSULE | Refills: 0 | Status: SHIPPED | OUTPATIENT
Start: 2025-03-17

## 2025-03-17 RX ORDER — MEPERIDINE HYDROCHLORIDE 25 MG/ML
12.5 INJECTION INTRAMUSCULAR; INTRAVENOUS; SUBCUTANEOUS
Status: DISCONTINUED | OUTPATIENT
Start: 2025-03-17 | End: 2025-03-17 | Stop reason: HOSPADM

## 2025-03-17 RX ORDER — PROPOFOL 10 MG/ML
INJECTION, EMULSION INTRAVENOUS CONTINUOUS PRN
Status: DISCONTINUED | OUTPATIENT
Start: 2025-03-17 | End: 2025-03-17 | Stop reason: SURG

## 2025-03-17 RX ORDER — DOXYCYCLINE 100 MG/1
100 CAPSULE ORAL 2 TIMES DAILY
Qty: 10 CAPSULE | Refills: 0 | Status: SHIPPED | OUTPATIENT
Start: 2025-03-17 | End: 2025-03-22

## 2025-03-17 RX ORDER — OXYCODONE AND ACETAMINOPHEN 5; 325 MG/1; MG/1
1 TABLET ORAL ONCE AS NEEDED
Status: DISCONTINUED | OUTPATIENT
Start: 2025-03-17 | End: 2025-03-17 | Stop reason: HOSPADM

## 2025-03-17 RX ORDER — GLYCOPYRROLATE 0.2 MG/ML
INJECTION INTRAMUSCULAR; INTRAVENOUS AS NEEDED
Status: DISCONTINUED | OUTPATIENT
Start: 2025-03-17 | End: 2025-03-17 | Stop reason: SURG

## 2025-03-17 RX ORDER — FENTANYL CITRATE 50 UG/ML
50 INJECTION, SOLUTION INTRAMUSCULAR; INTRAVENOUS
Status: DISCONTINUED | OUTPATIENT
Start: 2025-03-17 | End: 2025-03-17 | Stop reason: HOSPADM

## 2025-03-17 RX ORDER — ONDANSETRON 2 MG/ML
4 INJECTION INTRAMUSCULAR; INTRAVENOUS AS NEEDED
Status: DISCONTINUED | OUTPATIENT
Start: 2025-03-17 | End: 2025-03-17 | Stop reason: HOSPADM

## 2025-03-17 RX ADMIN — AMLODIPINE BESYLATE 10 MG: 10 TABLET ORAL at 09:08

## 2025-03-17 RX ADMIN — FOLIC ACID 1 MG: 1 TABLET ORAL at 09:08

## 2025-03-17 RX ADMIN — PANTOPRAZOLE SODIUM 40 MG: 40 INJECTION, POWDER, FOR SOLUTION INTRAVENOUS at 09:08

## 2025-03-17 RX ADMIN — METOPROLOL TARTRATE 25 MG: 25 TABLET, FILM COATED ORAL at 09:08

## 2025-03-17 RX ADMIN — Medication 2 G: at 09:08

## 2025-03-17 RX ADMIN — DEXAMETHASONE SODIUM PHOSPHATE 8 MG: 4 INJECTION, SOLUTION INTRA-ARTICULAR; INTRALESIONAL; INTRAMUSCULAR; INTRAVENOUS; SOFT TISSUE at 13:00

## 2025-03-17 RX ADMIN — Medication 10 ML: at 09:08

## 2025-03-17 RX ADMIN — SODIUM CHLORIDE 50 ML/HR: 9 INJECTION, SOLUTION INTRAVENOUS at 10:29

## 2025-03-17 RX ADMIN — GLYCOPYRROLATE 0.2 MG: 0.4 INJECTION INTRAMUSCULAR; INTRAVENOUS at 13:00

## 2025-03-17 RX ADMIN — Medication 2 G: at 14:21

## 2025-03-17 RX ADMIN — HYDRALAZINE HYDROCHLORIDE 100 MG: 50 TABLET ORAL at 14:21

## 2025-03-17 RX ADMIN — PROPOFOL 150 MCG/KG/MIN: 10 INJECTION, EMULSION INTRAVENOUS at 12:41

## 2025-03-17 NOTE — NURSING NOTE
Pt left AMA with son transporting pt home. O2 tank provided to son for transport home. Educated son on increase of O2 needs today. Son acknowledged and understood.

## 2025-03-17 NOTE — ANESTHESIA PREPROCEDURE EVALUATION
Anesthesia Evaluation     no history of anesthetic complications:   NPO Solid Status: Waived due to emergency  NPO Liquid Status: Waived due to emergency           Airway   Mallampati: II  TM distance: >3 FB  Neck ROM: full  No difficulty expected  Dental    (+) upper dentures    Pulmonary - normal exam   (+) COPD,home oxygen  Cardiovascular - normal exam    (+) hypertension, CAD, cardiac stents Drug eluting stent more than 12 months ago , dysrhythmias, CHF , PVD, hyperlipidemia    ROS comment: Study Impression Myocardial perfusion imaging indicates a small-to-moderate-sized infarct located in the anterior wall and apex with moderate-to-severe ciro-infarct ischemia. Impressions are consistent with an intermediate risk study.  Rest Perfusion Defect 1 There is a small sized defect with mild to moderate reduction in uptake present in the mid anterior wall and apical anterior wall.  Stress Perfusion Defect 1 There is a moderately sized defect of moderate to severe severity present in the mid anterior wall, apical anterior wall and apex.  Ventricle Size / Description Left ventricular ejection fraction is normal (Calculated EF = 52%). Normal LV wall motion noted.        Neuro/Psych  (+) headaches, psychiatric history Anxiety  GI/Hepatic/Renal/Endo    (+) obesity, morbid obesity, GERD, GI bleeding , renal disease- ARF, thyroid problem hypothyroidism    Musculoskeletal     Abdominal  - normal exam    Bowel sounds: normal.   Substance History      OB/GYN          Other   arthritis,                   Anesthesia Plan    ASA 4     general   total IV anesthesia  intravenous induction     Anesthetic plan, risks, benefits, and alternatives have been provided, discussed and informed consent has been obtained with: patient.      CODE STATUS:    Code Status (Patient has no pulse and is not breathing): No CPR (Do Not Attempt to Resuscitate)  Medical Interventions (Patient has pulse or is breathing): Limited Support  Medical  Intervention Limits: No intubation (DNI)  Comments: clarified with patient 3/15/25  Level Of Support Discussed With: Patient

## 2025-03-17 NOTE — ANESTHESIA POSTPROCEDURE EVALUATION
Patient: Lesley Michel    Procedure Summary       Date: 03/17/25 Room / Location: [facility] OR  /  [facility] OR    Anesthesia Start: 1239 Anesthesia Stop: 1300    Procedure: COLONOSCOPY Diagnosis:       Gastrointestinal hemorrhage, unspecified gastrointestinal hemorrhage type      (Gastrointestinal hemorrhage, unspecified gastrointestinal hemorrhage type [K92.2])    Surgeons: Luis Antonio Jiang MD Provider: Vince Emanuel DO    Anesthesia Type: general ASA Status: 4            Anesthesia Type: general    Vitals  Vitals Value Taken Time   /82 03/17/25 13:32   Temp 98 °F (36.7 °C) 03/17/25 13:02   Pulse 105 03/17/25 13:34   Resp 18 03/17/25 13:32   SpO2 89 % 03/17/25 13:34   Vitals shown include unfiled device data.        Post Anesthesia Care and Evaluation    Patient location during evaluation: PHASE II  Patient participation: complete - patient participated  Level of consciousness: awake and alert  Pain score: 1  Pain management: adequate    Airway patency: patent  Anesthetic complications: No anesthetic complications  PONV Status: controlled  Cardiovascular status: acceptable  Respiratory status: acceptable  Hydration status: acceptable

## 2025-03-17 NOTE — NURSING NOTE
"Pt is leaving AMA. Her son is her to give her a ride home. Pt has been educated on the dangers of leaving AMA. Pt stated \"I just want to go home, I've been here long enough.\"   "

## 2025-03-17 NOTE — PLAN OF CARE
Pt resting in bed at this time, pt is A/O X4 and on 2L NC. Pt finished bowel prep, 1 unit of blood infusing. No complaints or distress noted. VSS afebrile. Plan of care ongoing.

## 2025-03-17 NOTE — NURSING NOTE
Pt alerted nursing staff that she is wanting to leave AMA. Contacted Dr. Dasilva about situation. Educated pt on risks involved of leaving and her requirements of O2. Dr. Dasilva has also reiterated the concerns. Pt stated she has called family to come get her.

## 2025-03-17 NOTE — PROGRESS NOTES
Patient has elected to leave [place]. I discussed earlier on exam my concern given her anemia without clear cause and more importantly her hypoxia she developed today. Patient also with renal dysfunction that has not significantly improved. I discussed my concern she will go home and continue to decline and possibly die without further monitoring and w/u. Patient orientedX4 and has clear understanding of why she is here. She notes she has been here long enough and just wants to go home. Initially this morning she was agreeable to stay until tomorrow but this afternoon she again said she was gong to leave [place]. I again came by to make sure she had capacity and explain risk. This time she noted she was not going to stay regardless of what I said and became upset. She notes she plans on seeing PCP tomorrow. Will also order f/u. Pneumonia on differential based on CT, possibly aspiration, will order omnicef/doxy for 5 day course and send to her pharmacy. I don't have enough evidence to empirically anticoagulate for possible PE in setting of blood loss anemia.

## 2025-03-17 NOTE — THERAPY DISCHARGE NOTE
Acute Care - Physical Therapy Initial Evaluation/Discharge  [facility]     Patient Name: Lesley Michel  : 1955  MRN: [id number]  Today's Date: 3/17/2025   Onset of Illness/Injury or Date of Surgery: 25     Referring Physician: Deidre      Admit Date: 3/14/2025    Visit Dx:    ICD-10-CM ICD-9-CM   1. Gastrointestinal hemorrhage, unspecified gastrointestinal hemorrhage type  K92.2 578.9   2. Acute on chronic anemia  D64.9 285.9   3. Anemia, unspecified type  D64.9 285.9     Patient Active Problem List   Diagnosis    Hypertension    Hyperlipidemia    Migraine without aura    CAD, chronically occluded collateralized dominant RCA, status post drug-eluting stent to LAD , high-grade stenosis of the ostium of the small D1 and 60% mid RCA posterior lateral branch    Chronic renal failure, stage 2 (mild)    Tobacco use    Bilateral lower extremity edema    Other specified hypothyroidism    Cigarette smoker    Class 3 severe obesity without serious comorbidity with body mass index (BMI) of 40.0 to 44.9 in adult    Acute hypoxic respiratory failure    Acute renal failure (ARF)    Acute on chronic anemia    Gastrointestinal hemorrhage     Past Medical History:   Diagnosis Date    Anxiety     Arthritis     CHF (congestive heart failure)     Cigarette smoker 02/10/2022    CKD (chronic kidney disease) stage 4, GFR 15-29 ml/min     MAICO on top of CKD IIIa, HD from 10/23-, some recovery to CKD IV    COPD (chronic obstructive pulmonary disease)     Coronary artery disease     Elevated cholesterol     GERD (gastroesophageal reflux disease)     H/O heart artery stent     History of transfusion     Hyperlipidemia     Hypertension     hypothyroidism     Obesity     PAF (paroxysmal atrial fibrillation)     PVD (peripheral vascular disease)     x2 stents     Past Surgical History:   Procedure Laterality Date    ANKLE SURGERY      RIGHT    APPENDECTOMY      CARDIAC CATHETERIZATION      LEFT     SECTION       CHOLECYSTECTOMY N/A 1/20/2025    Procedure: CHOLECYSTECTOMY LAPAROSCOPIC WITH DAVINCI ROBOT;  Surgeon: Windy Neal MD;  Location: [facility] OR;  Service: Robotics - DaVinci;  Laterality: N/A;    CORONARY STENT PLACEMENT      ENDOSCOPY N/A 3/15/2025    Procedure: ESOPHAGOGASTRODUODENOSCOPY;  Surgeon: Luis Antonio Jiang MD;  Location: [facility] OR;  Service: Gastroenterology;  Laterality: N/A;    HYSTERECTOMY      INSERTION HEMODIALYSIS CATHETER Right 10/19/2023    Procedure: HEMODIALYSIS CATHETER INSERTION;  Surgeon: Bartolome Schwartz MD;  Location: [facility] OR;  Service: General;  Laterality: Right;       PT Assessment (Last 12 Hours)       PT Evaluation and Treatment       Row Name 03/17/25 1412          Physical Therapy Time and Intention    Subjective Information no complaints  -CT     Document Type evaluation;discharge evaluation/summary  -CT     Mode of Treatment physical therapy  -CT     Patient Effort good  -CT     Comment Pt reports she lives alone and is independent PLOF. Pt is SBA at time of eval.  -CT       Row Name 03/17/25 1412          General Information    Patient Profile Reviewed yes  -CT     Onset of Illness/Injury or Date of Surgery 03/14/25  -CT     Referring Physician Deidre  -CT     Patient Observations alert;cooperative;agree to therapy  -CT     Prior Level of Function independent:;all household mobility;community mobility  -CT     Existing Precautions/Restrictions no known precautions/restrictions  -CT     Equipment Issued to Patient gait belt  -CT     Risks Reviewed patient:;LOB;nausea/vomiting;dizziness;increased discomfort;change in vital signs;increased drainage;lines disloged  -CT     Benefits Reviewed patient:;improve function;increase independence;increase strength;increase balance;decrease pain;decrease risk of DVT;improve skin integrity;increase knowledge  -CT     Barriers to Rehab none identified  -CT       Row Name 03/17/25 1412          Living Environment    Current Living  Arrangements home  -CT     People in Home child(devan), adult  -CT     Primary Care Provided by self  -CT       Row Name 03/17/25 1412          Cognition    Affect/Mental Status (Cognition) WNL  -CT     Orientation Status (Cognition) oriented x 4  -CT     Follows Commands (Cognition) WNL  -CT     Personal Safety Interventions fall prevention program maintained;gait belt;muscle strengthening facilitated;nonskid shoes/slippers when out of bed;supervised activity  -CT       Row Name 03/17/25 1412          Range of Motion (ROM)    Range of Motion ROM is WFL  -CT       Row Name 03/17/25 1412          Strength (Manual Muscle Testing)    Strength (Manual Muscle Testing) strength is WFL  -CT       Row Name 03/17/25 1412          Bed Mobility    Bed Mobility bed mobility (all) activities  -CT     All Activities, [place] (Bed Mobility) modified independence  -CT     Assistive Device (Bed Mobility) bed rails  -CT       Row Name 03/17/25 1412          Transfers    Transfers sit-stand transfer;stand-sit transfer  -CT       Row Name 03/17/25 1412          Sit-Stand Transfer    Sit-Stand [place] (Transfers) standby assist  -CT       Row Name 03/17/25 1412          Stand-Sit Transfer    Stand-Sit [place] (Transfers) standby assist  -CT       Row Name 03/17/25 1412          Gait/Stairs (Locomotion)    Gait/Stairs Locomotion gait/ambulation independence  -CT     [place] Level (Gait) standby assist  -CT     Patient was able to Ambulate yes  -CT     Distance in Feet (Gait) 15  -CT     Pattern (Gait) swing-through  -CT     Deviations/Abnormal Patterns (Gait) gait speed decreased  -CT       Row Name 03/17/25 1412          Balance    Balance Assessment sitting static balance;sitting dynamic balance;standing static balance;standing dynamic balance  -CT     Static Sitting Balance independent  -CT     Dynamic Sitting Balance independent;set-up  -CT     Position, Sitting Balance sitting edge of bed  -CT     Static Standing  Balance modified independence;standby assist  -CT     Dynamic Standing Balance contact guard  -CT       Row Name 03/17/25 1412          Coping    Observed Emotional State calm;cooperative  -CT     Verbalized Emotional State acceptance  -CT       Row Name 03/17/25 1412          Plan of Care Review    Plan of Care Reviewed With patient  -CT       Row Name 03/17/25 1412          Positioning and Restraints    Pre-Treatment Position bedside commode  -CT     Post Treatment Position bed  -CT     In Bed supine;call light within reach;encouraged to call for assist;exit alarm on;patient within staff view  -CT       Row Name 03/17/25 1412          Therapy Assessment/Plan (PT)    Criteria for Skilled Interventions Met (PT) no;no problems identified which require skilled intervention  -CT     Therapy Frequency (PT) evaluation only  -CT       Row Name 03/17/25 1412          Therapy Plan Review/Discharge Plan (PT)    Therapy Plan Review (PT) evaluation/treatment results reviewed  -CT               User Key  (r) = Recorded By, (t) = Taken By, (c) = Cosigned By      Initials Name Provider Type    CT Abida Buchanan PT Physical Therapist                      Physical Therapy Education       Title: PT OT SLP Therapies (Not Started)       Topic: Physical Therapy (Not Started)       Point: Mobility training (Not Started)       Learner Progress:  Not documented in this visit.              Point: Home exercise program (Not Started)       Learner Progress:  Not documented in this visit.              Point: Body mechanics (Not Started)       Learner Progress:  Not documented in this visit.              Point: Precautions (Not Started)       Learner Progress:  Not documented in this visit.                                    PT Recommendation and Plan  Anticipated Discharge Disposition (PT): home, home with assist  Therapy Frequency (PT): evaluation only  Plan of Care Reviewed With: patient         Time Calculation:    PT Charges       Row  Name 03/17/25 1415             Time Calculation    PT Received On 03/17/25  -CT                User Key  (r) = Recorded By, (t) = Taken By, (c) = Cosigned By      Initials Name Provider Type    CT Abida Buchanan, TYRA Physical Therapist                  Therapy Charges for Today       Code Description Service Date Service Provider Modifiers Qty    89974914151 HC PT EVAL LOW COMPLEXITY 4 3/17/2025 Abida Buchanan, TYRA GP 1            PT G-Codes  AM-PAC 6 Clicks Score (PT): 20    PT Discharge Summary  Anticipated Discharge Disposition (PT): home, home with assist    Abida Buchanan PT  3/17/2025

## 2025-03-17 NOTE — PROGRESS NOTES
NEPHROLOGY PROGRESS NOTE      INTERVAL HISTORY:    HPI, generalized weakness, anemia  Patient likely better, generalized weakness and fatigability is better.  Patient received 1 unit of packed RBCs  Patient's oral intake is poor but no nausea vomiting or diarrhea  Blood pressure relatively seems to be stable over last 24 hours.  Lying on bed comfortably, no shortness of breath, or chest pain      Intake/Output                         03/15/25 0701 - 03/16/25 0700 03/16/25 0701 - 03/17/25 0700     [id number] 1429-6078 Total 9729-1109 9681-9955 Total                 Intake    P.O.  360  -- 360  1200  480 1680    I.V.  75  -- 75  --  -- --    Blood  --  -- --  --  305.3 305.3    Volume (Transfuse RBC Infuse Each Unit Over: 3.5H) -- -- -- -- 305.3 305.3    Total Intake 435 -- 435 1200 785.3 1985.3       Output    Total Output -- -- -- -- -- --             VITAL SIGNS :     Temp:  [97.1 °F (36.2 °C)-98.8 °F (37.1 °C)] 98.2 °F (36.8 °C)  Heart Rate:  [59-76] 69  Resp:  [18-20] 18  BP: (113-159)/(45-64) 159/64    PHYSICAL EXAMINATION:    GENERAL EXAM  Laying in bed   Comfortable     MENTAL STATUS EXAM  Alert and oriented, able to answer questions    NECK EXAM  JVP is not elevated, carotid exam normal    CARDIOVASCULAR EXAM  Regular rate and rhythm, no murmurs noted, no added heart sounds, normal apical pulsation  no edema in the lower extremities  2+ radial pulses bilateral, 2+ femoral/tibial pulses bilaterally    MUSCULOSKELETAL EXAM  No cyanosis or clubbing noted in the digits    RESPIRATORY EXAM  Lungs clear to auscultation bilaterally, respiratory effort normal    ABDOMINAL EXAM  Abdomen soft and non tender, normal bowel sounds    SKIN EXAM  No new rashes      Laboratory Data :     Albumin Albumin   Date Value Ref Range Status   03/17/2025 3.6 3.5 - 5.2 g/dL Final   03/16/2025 3.8 3.5 - 5.2 g/dL Final   03/14/2025 3.7 3.5 - 5.2 g/dL Final      Magnesium Magnesium   Date Value Ref Range Status   03/16/2025 2.6 (H) 1.6 -  "2.4 mg/dL Final          PTH               No results found for: \"PTH\"    CBC and coagulation:  Results from last 7 days   Lab Units 03/17/25  0119 03/16/25  1025 03/15/25  0533 03/14/25  1255   WBC 10*3/mm3 8.52 8.65 7.78 7.76   HEMOGLOBIN g/dL 6.7* 7.0* 7.0*  7.0* 4.9*   HEMATOCRIT % 22.4* 23.3* 23.1*  23.1* 17.0*   MCV fL 78.9* 79.8 78.2* 75.2*   MCHC g/dL 29.9* 30.0* 30.4* 28.8*   PLATELETS 10*3/mm3 384 424 411 505*   INR   --   --   --  2.35*     Acid/base balance:      Renal and electrolytes:    Results from last 7 days   Lab Units 03/17/25  0014 03/16/25  1025 03/15/25  1619 03/15/25  0700 03/14/25  1255   SODIUM mmol/L 132* 130* 126* 129* 130*   POTASSIUM mmol/L 3.6 3.5 3.3* 3.2* 3.7   MAGNESIUM mg/dL  --  2.6*  --   --   --    CHLORIDE mmol/L 92* 89* 89* 91* 90*   CO2 mmol/L 25.5 24.1 26.3 25.1 21.8*   BUN mg/dL 77* 79* 80* 80* 79*   CREATININE mg/dL 2.86* 2.80* 2.69* 2.79* 2.91*   CALCIUM mg/dL 8.8 8.8 8.7 8.6 9.0     Estimated Creatinine Clearance: 19 mL/min (A) (by C-G formula based on SCr of 2.86 mg/dL (H)).  @GFRCG:3@   Liver and pancreatic function:  Results from last 7 days   Lab Units 03/17/25  0014 03/16/25  1025 03/14/25  1255   ALBUMIN g/dL 3.6 3.8 3.7   BILIRUBIN mg/dL 0.5 0.5 0.4   ALK PHOS U/L 130* 140* 130*   AST (SGOT) U/L 14 14 13   ALT (SGPT) U/L 6 6 <5         Cardiac:      Liver and pancreatic function:  Results from last 7 days   Lab Units 03/17/25  0014 03/16/25  1025 03/14/25  1255   ALBUMIN g/dL 3.6 3.8 3.7   BILIRUBIN mg/dL 0.5 0.5 0.4   ALK PHOS U/L 130* 140* 130*   AST (SGOT) U/L 14 14 13   ALT (SGPT) U/L 6 6 <5         CLINICAL DATA REVIEW  CBC, Renal functions, Serum electrolytes, Acid base labs reviewed 1  Discussed the labs with Dr. Dasilva 1    MEDICINES:     amLODIPine, 10 mg, Oral, Q24H  atorvastatin, 80 mg, Oral, Nightly  folic acid, 1 mg, Oral, Daily  hydrALAZINE, 100 mg, Oral, Q8H  metoprolol tartrate, 25 mg, Oral, Q12H  pantoprazole, 40 mg, Intravenous, Q12H  sodium " chloride, 10 mL, Intravenous, Q12H  sodium chloride, 2 g, Oral, TID With Meals             Assessment & Plan     -Acute kidney injury  - Acute severe hyponatremia  - Acute hypokalemia  - Acute on chronic anemia  - Absolute iron deficiency  - Advanced chronic kidney disease stage IV with baseline creatinine appears to be 2-2.2  - Paroxysmal atrial fibrillation  - Essential hypertension    Acute kidney injury most likely due to acute tubular necrosis with most likely due to prolonged prerenal state.  400 mg proteinuria, no hematuria, no hydronephrosis  No improvement in renal functions creatinine is slightly worsened to 2.8    Hyponatremia is slightly better to 132    Anemia most likely multifactorial including anemia of chronic kidney disease    - Patient received 1 unit of packed RBCs today, will hold giving intravenous iron  - Patient is relatively intravascularly volume depleted, will give judicious IV fluid for next 24 hours  - Continue holding diuretics  - Strict intake and output record.   - Please avoid any nephrotoxic agents, hypotension and adjust medications according to estimated GFR.       REVIEWED NOTES OF CLINICAL TEAMS INVOLVED WITH PATIENT CARE.     DISCUSSED IN DETAIL WITH PATIENT AND/OR FAMILY ABOUT CURRENT CLINICAL CONDITION AND RESPONSE TO ONGOING MANAGEMENT.     DISCUSSED IN DETAIL WITH PATIENT AND/OR FAMILY ABOUT RISKS ASSOCIATED WITH CURRENT CLINICAL CONDITION AND RISK INVOLVED WITH CURRENT MANAGEMENT.     DISCUSSED IN DETAIL WITH HOSPITALIST    CODE STATUS REVIEWED,     Vani León MD  03/17/25  08:07 EDT

## 2025-03-17 NOTE — DISCHARGE SUMMARY
"    [facility] HOSPITALISTS DISCHARGE SUMMARY    Patient Identification:  Name:  Lesley Michel  Age:  69 y.o.  Sex:  female  :  1955  MRN:  [id number]  Visit Number:  [id number]    Date of Admission: 3/14/2025  Date of Discharge:  3/17/2025    PCP: Woodrow Raymond, APRN    DISCHARGE DIAGNOSIS  #Acute on chronic NIKIA  #Concern for GI bleed   #MAICO on CKD IV  #Hypokalemia  #Postoperative hypoxia   #HTN  #pAfib  #CAD  #COPD  #Chronic HFpEF  #PVD  #Migraines  #Anxiety     CONSULTS   Nephrology   Surgery     PROCEDURES PERFORMED  EGD 3/15    Colonoscopy 3/17    HOSPITAL COURSE  Patient is a 69 y.o. female presented on 3/14 to [facility] complaining of weakness.  Please see the admitting history and physical for further details.      Ms. Michel is our 70 yo F with hx of chronic respiratory failure, hypertension, HFpEF, CKD stage IV, paroxysmal atrial fibrillation, ASCVD with prior PCI/ROSAURA to LAD, COPD, PVD, migraine headaches, anxiety, chronic microcytic anemia who presents with weakness found to have anemic with hemoglobin of 4.9. Melanotic stools reported but also eats coffee grounds frequently. Has required total of 3 units pRBC. EGD earlier in stay and colonoscopy today did not show source of bleeding. Iron studies consistent with NIKIA. Suspect possible bleeding initially that has since stopped.     Patient requesting discharge. She notes she has been dyspneic since eating lunch after colonoscopy. She thinks she \"ate too fast\". She denies any signs of bleeding. No signs of bleeding in endoscopy and told surgery team she eats significant amount of coffee grounds that may cause melanotic appearence of stool. Please see progress note from later in day. Initially able to convince patient to stay but later in day even with education from myself again and nursing staff, patient elected to leave AMA. Has not appeared confused and appears to have capacity. Patient's w/u for hypoxia leaves us " with differential that includes bibasilar bacterial pneumonia, atelectasis, PE, mild volume overload. She is still requiring 6L NC up from home 2L NC later in the day.     Patient has elected to leave AMA in spite of attempts to convince her to stay. I discussed earlier on exam my concern given her anemia without clear cause and more importantly her hypoxia she developed today. Patient also with renal dysfunction that has not significantly improved. I discussed my concern she will go home and continue to decline and possibly die without further monitoring and w/u. Patient orientedX4 and has clear understanding of why she is here. She notes she has been here long enough and just wants to go home. Initially this morning she was agreeable to stay until tomorrow but this afternoon she again said she was gong to leave AMA. I again came by to make sure she had capacity and explain risk. This time she noted she was not going to stay regardless of what I said and became upset. She notes she plans on seeing PCP tomorrow. Will also order f/u. Pneumonia on differential based on CT, possibly aspiration, will order omnicef/doxy for 5 day course and send to her pharmacy. I don't have enough evidence to empirically anticoagulate for possible PE in setting of blood loss anemia. Would hold apixiban until outpatient appointment.     VITAL SIGNS:  Temp:  [97.5 °F (36.4 °C)-99.2 °F (37.3 °C)] 98.5 °F (36.9 °C)  Heart Rate:  [] 101  Resp:  [16-20] 20  BP: (115-159)/(52-82) 159/82  SpO2:  [88 %-95 %] 90 %  on  Flow (L/min) (Oxygen Therapy):  [2-10] 6;   Device (Oxygen Therapy): humidified;nasal cannula    Body mass index is 32.06 kg/m².  Wt Readings from Last 3 Encounters:   03/17/25 82.1 kg (181 lb)   02/18/25 83.2 kg (183 lb 6.8 oz)   01/21/25 93.2 kg (205 lb 8 oz)       PHYSICAL EXAM:  Constitutional:  Well-developed and well-nourished.  No respiratory distress.      HENT:  Head:  Normocephalic and atraumatic.  Mouth:  Moist  mucous membranes.    Eyes:  Conjunctivae and EOM are normal.  Pupils are equal, round, and reactive to light.  No scleral icterus.    Cardiovascular:  Normal rate, regular rhythm and normal heart sounds with no murmur.  Pulmonary/Chest:  No respiratory distress, no wheezes, coarse breath sounds bilaterally.   Abdominal:  Soft.  Bowel sounds are normal.  No distension and no tenderness.   Musculoskeletal:  No edema, no tenderness, and no deformity.  No red or swollen joints anywhere.    Neurological:  Alert and oriented to person, place, and time.  No gross neurological deficit.   Skin:  Skin is warm and dry. No rash noted. No pallor.   Peripheral vascular:  Strong pulses in all 4 extremities with no clubbing, no cyanosis, no edema.    DISCHARGE DISPOSITION   Stable    DISCHARGE MEDICATIONS:     Discharge Medications        New Medications        Instructions Start Date   cefdinir 300 MG capsule  Commonly known as: OMNICEF   300 mg, Oral, Daily      doxycycline 100 MG capsule  Commonly known as: VIBRAMYCIN   100 mg, Oral, 2 Times Daily             Continue These Medications        Instructions Start Date   amLODIPine 10 MG tablet  Commonly known as: NORVASC   10 mg, Oral, Every 24 Hours Scheduled      aspirin 81 MG chewable tablet   81 mg, Oral, Daily      atorvastatin 80 MG tablet  Commonly known as: LIPITOR   80 mg, Oral, Nightly      chlorthalidone 25 MG tablet  Commonly known as: HYGROTON   25 mg, Oral, Daily      folic acid 1 MG tablet  Commonly known as: FOLVITE   1 mg, Oral, Daily      gabapentin 600 MG tablet  Commonly known as: NEURONTIN   300 mg, Oral, 3 Times Daily PRN      hydrALAZINE 100 MG tablet  Commonly known as: APRESOLINE   100 mg, Oral, Every 8 Hours Scheduled      HYDROcodone-acetaminophen  MG per tablet  Commonly known as: NORCO   1 tablet, Oral, Every 8 Hours PRN      metoprolol tartrate 25 MG tablet  Commonly known as: LOPRESSOR   25 mg, Oral, Every 12 Hours Scheduled      ondansetron  ODT 4 MG disintegrating tablet  Commonly known as: ZOFRAN-ODT   4 mg, Translingual, Every 8 Hours PRN      pantoprazole 40 MG EC tablet  Commonly known as: PROTONIX   40 mg, Oral, Daily      sertraline 25 MG tablet  Commonly known as: ZOLOFT   25 mg, Oral, Daily      torsemide 20 MG tablet  Commonly known as: DEMADEX   20 mg, Oral, Daily             Stop These Medications      Eliquis 5 MG tablet tablet  Generic drug: apixaban                 Follow-up Information       Woodrow Raymond, APRN Follow up in 1 day(s).    Specialty: Family Medicine  Why: Will notify patient with follow-up appt. with Berto Raymond 03/18/25.  Contact information:  [facility] [id number] [phone]                              TEST  RESULTS PENDING AT DISCHARGE         CODE STATUS  Code Status and Medical Interventions: No CPR (Do Not Attempt to Resuscitate); Limited Support; No intubation (DNI); clarified with patient 3/15/25   Ordered at: 03/15/25 0800     Code Status (Patient has no pulse and is not breathing):    No CPR (Do Not Attempt to Resuscitate)     Medical Interventions (Patient has pulse or is breathing):    Limited Support     Medical Intervention Limits:    No intubation (DNI)     Comments:    clarified with patient 3/15/25     Level Of Support Discussed With:    Patient       Antonio Dasilva MD  [facility] Hospitalist  03/17/25  18:58 EDT    Please note that this discharge summary required more than 30 minutes to complete.

## 2025-03-17 NOTE — PROGRESS NOTES
[facility] HOSPITALIST PROGRESS NOTE     Patient Identification:  Name:  Lesley Michel  Age:  69 y.o.  Sex:  female  :  1955  MRN:  [id number]  Visit Number:  [id number]  ROOM: [address] [place] [address]     Primary Care Provider:  Woodrow Raymond APRN    Length of stay in inpatient status:  2    Subjective     Chief Compliant:    Chief Complaint   Patient presents with    Weakness - Generalized    Vomiting       History of Presenting Illness:    Patient seen and examined today, no family present at bedside. She reported feeling nauseated and nursing staff later reported that she vomited even before attempting the bowel prep. She denied abdominal pain. She denied seeing any blood or dark looking emesis.    Objective     Current Hospital Meds:amLODIPine, 10 mg, Oral, Q24H  atorvastatin, 80 mg, Oral, Nightly  folic acid, 1 mg, Oral, Daily  hydrALAZINE, 100 mg, Oral, Q8H  metoprolol tartrate, 25 mg, Oral, Q12H  pantoprazole, 40 mg, Intravenous, Q12H  sodium chloride, 10 mL, Intravenous, Q12H  sodium chloride, 2 g, Oral, TID With Meals         Current Antimicrobial Therapy:  Anti-Infectives (From admission, onward)      None          Current Diuretic Therapy:  Diuretics (From admission, onward)      Ordered     Dose/Rate Route Frequency Start Stop    25 1528  bumetanide (BUMEX) injection 2 mg        Ordering Provider: Maria L August DO    2 mg Intravenous Once 25 1544 25 1741          ----------------------------------------------------------------------------------------------------------------------  Vital Signs:  Temp:  [97.1 °F (36.2 °C)-98.7 °F (37.1 °C)] 97.8 °F (36.6 °C)  Heart Rate:  [55-59] 59  Resp:  [18-20] 18  BP: (113-124)/(45-60) 113/45  SpO2:  [90 %-95 %] 90 %  on  Flow (L/min) (Oxygen Therapy):  [2] 2;   Device (Oxygen Therapy): nasal cannula  Body mass index is 36.44 kg/m².    Wt Readings from Last 3 Encounters:   25 87.5 kg (192 lb 13.6 oz)   25 83.2 kg (183  lb 6.8 oz)   01/21/25 93.2 kg (205 lb 8 oz)     Intake & Output (last 3 days)         03/14 0701  03/15 0700 03/15 0701 03/16 0700 03/16 0701 03/17 0700    P.O.     I.V. (mL/kg)  75 (0.9)     Blood 600      Total Intake(mL/kg) 720 (8.3) 435 (5) 1200 (13.7)    Net +720 +435 +1200           Urine Unmeasured Occurrence 2 x 4 x 4 x          Diet: Liquid, Renal; Clear Liquid; Low Sodium (2-3g), Low Phosphorus; Fluid Consistency: Thin (IDDSI 0)  NPO Diet NPO Type: Sips with Meds  ----------------------------------------------------------------------------------------------------------------------  Physical exam:   Constitutional:  Well-developed and well-nourished.  No acute distress.      HENT:  Head:  Normocephalic and atraumatic.   Cardiovascular:  Normal rate, regular rhythm   Pulmonary/Chest:  No respiratory distress, breath sounds diminished but no wheezing, crackles, or rhonchi  Abdominal:  Soft. No distension and no tenderness.   Musculoskeletal:  No deformity.  No red or swollen joints anywhere.    Neurological: Awake, alert, no focal deficit on gross examination. No slurred speech or facial droop.     Skin:  Skin is warm and dry. No rash noted. No pallor.   Psychiatric: Appropriate mood and affect    Edited by: Maria L August DO at 3/16/2025 2021  ----------------------------------------------------------------------------------------------------------------------  Results from last 7 days   Lab Units 03/16/25  1025 03/15/25  0533 03/14/25  1255   WBC 10*3/mm3 8.65 7.78 7.76   HEMOGLOBIN g/dL 7.0* 7.0*  7.0* 4.9*   HEMATOCRIT % 23.3* 23.1*  23.1* 17.0*   MCV fL 79.8 78.2* 75.2*   MCHC g/dL 30.0* 30.4* 28.8*   PLATELETS 10*3/mm3 424 411 505*   INR   --   --  2.35*         Results from last 7 days   Lab Units 03/16/25  1025 03/15/25  1619 03/15/25  0700 03/14/25  1255   SODIUM mmol/L 130* 126* 129* 130*   POTASSIUM mmol/L 3.5 3.3* 3.2* 3.7   MAGNESIUM mg/dL 2.6*  --   --   --    CHLORIDE mmol/L  "89* 89* 91* 90*   CO2 mmol/L 24.1 26.3 25.1 21.8*   BUN mg/dL 79* 80* 80* 79*   CREATININE mg/dL 2.80* 2.69* 2.79* 2.91*   CALCIUM mg/dL 8.8 8.7 8.6 9.0   GLUCOSE mg/dL 117* 112* 108* 132*   ALBUMIN g/dL 3.8  --   --  3.7   BILIRUBIN mg/dL 0.5  --   --  0.4   ALK PHOS U/L 140*  --   --  130*   AST (SGOT) U/L 14  --   --  13   ALT (SGPT) U/L 6  --   --  <5   Estimated Creatinine Clearance: 19.6 mL/min (A) (by C-G formula based on SCr of 2.8 mg/dL (H)).  No results found for: \"AMMONIA\"  Results from last 7 days   Lab Units 03/15/25  0700 03/15/25  0456   HSTROP T ng/L 67* 71*             No results found for: \"HGBA1C\", \"POCGLU\"  Lab Results   Component Value Date    TSH 6.770 (H) 10/08/2023    FREET4 1.01 10/08/2023     No results found for: \"PREGTESTUR\", \"PREGSERUM\", \"HCG\", \"HCGQUANT\"  Pain Management Panel          Latest Ref Rng & Units 10/9/2023   Pain Management Panel   Creatinine, Urine mg/dL 116.0      Brief Urine Lab Results  (Last result in the past 365 days)        Color   Clarity   Blood   Leuk Est   Nitrite   Protein   CREAT   Urine HCG        04/16/24 2028 Yellow   Clear   Negative   Negative   Negative   Negative                 No results found for: \"BLOODCX\"  No results found for: \"URINECX\"  No results found for: \"WOUNDCX\"  No results found for: \"STOOLCX\"  No results found for: \"RESPCX\"  No results found for: \"AFBCX\"        I have personally looked at the labs and they are summarized above.  ----------------------------------------------------------------------------------------------------------------------  Detailed radiology reports for the last 24 hours:  Imaging Results (Last 24 Hours)       ** No results found for the last 24 hours. **          Assessment & Plan    #Acute on chronic anemia  #Melena, concern for GI bleeding  - Hemoglobin increased up to 7.0 after transfusion of 2 units PRBCs and remains stable at 7.0 today. EGD revealed patchy, erythematous mucosa in the gastric antrum without any " active bleeding.  - Continue PPI  - CBC in a.m.  - Transfuse for hemoglobin <7 or if very symptomatic <8  - Plan for colonoscopy tomorrow. Patient had nausea and vomiting with the bowel prep today so I ordered a one time dose of zofran while waiting on repeat EKG to check her QT, and then prn phenergan later in the shift.     #MAICO on CKD stage IV  - Appreciate Nephrology assistance  - She received bumex with the transfusion. Hold further diuretics for now as volume status appears compensated and renal function is still below baseline. Creatinine 2.8 today (baseline around 2).  - Per Nephrology continue with 1200cc/day fluid restriction  - repeat chemistry panel in a.m.     #Hypokalemia  - Potassium improved after one time dose of potassium chloride. She is not on electrolyte replacement protocol due to her renal dysfunction.   - chemistry panel in in a.m.     Chronic medical conditions:  #Hypertension - BP well controlled today  #Paroxysmal atrial fibrillation - continue home metoprolol, hold home eliquis due to severe anemia  #ASCVD s/p prior PCI/ROSAURA to LAD - continue home statin. Aspirin is being temporarily held due to severe anemia and concern for GI bleeding.  #COPD - not currently in exacerbation   #Chronic HFpEF - volume status appears compensated  #Chronic respiratory failure (2 L baseline)  #PVD  #Migraine headaches  #Anxiety  Edited by: Maria L August DO at 3/16/2025 2021    Active VTE Prophylaxis  Mechanical:        Start        03/14/25 1717  Maintain Sequential Compression Device  Continuous                            Dispo:  likely home at discharge pending clinical improvement     Maria L August DO  [facility]  03/16/25  20:21 EDT

## 2025-03-18 NOTE — PAYOR COMM NOTE
"CONTACT:  ALYSSA BLOUNT MSN, RN  UTILIZATION MANAGEMENT DEPT.  [facility], [address]  PHONE:  [phone]  FAX: [phone]    PATIENT LEFT AMA ON 3/17/25, AWAITING AUTHORIZATION DETERMINATION.    REF # [id number]     Matthew Michel (69 y.o. Female)       Date of Birth   1955    Social Security Number       Address   [address] [address]    Home Phone   [phone]    MRN   [id number]       Worship   [facility] [place]    Marital Status                               Admission Date   3/14/2025    Admission Type   Emergency    Admitting Provider   Maria L August DO    Attending Provider       Department, Room/Bed   [address], [address]       Discharge Date   3/17/2025    Discharge Disposition   Left Against Medical Advice    Discharge Destination                                 Attending Provider: (none)   Allergies: Xanax [Alprazolam]    Isolation: None   Infection: None   Code Status: Prior    Ht: 160 cm (63\")   Wt: 82.1 kg (181 lb)    Admission Cmt: None   Principal Problem: Acute on chronic anemia [D64.9]                   Active Insurance as of 3/14/2025       Primary Coverage       Payor Plan Insurance Group Employer/Plan Group    ANTH MEDICARE REPLACEMENT [facility] MEDICARE ADVANTAGE HMO [id number]       Payor Plan Address Payor Plan Phone Number Payor Plan Fax Number Effective Dates    [address] [phone]  1/1/2025 - None Entered    [facility] [address]         Subscriber Name Subscriber Birth Date Member ID       MATTHEW MICHEL 1955 [id number]                     Emergency Contacts        (Rel.) Home Phone Work Phone Mobile Phone    MICHELLOYD PANDEY (Son) [phone] -- --    MIGUEL WEI (Grandchild) [phone] -- --    ENA HELM (Relative) [phone] -- --    AnandYadira (Daughter) -- -- [phone]                 Discharge Summary        Antonio Dasilva MD at 03/17/25 1848        " "      [facility] HOSPITALISTS DISCHARGE SUMMARY    Patient Identification:  Name:  Lesley Michel  Age:  69 y.o.  Sex:  female  :  1955  MRN:  [id number]  Visit Number:  [id number]    Date of Admission: 3/14/2025  Date of Discharge:  3/17/2025    PCP: Woodrow Raymond, APRN    DISCHARGE DIAGNOSIS  #Acute on chronic NIKIA  #Concern for GI bleed   #MAICO on CKD IV  #Hypokalemia  #Postoperative hypoxia   #HTN  #pAfib  #CAD  #COPD  #Chronic HFpEF  #PVD  #Migraines  #Anxiety     CONSULTS   Nephrology   Surgery     PROCEDURES PERFORMED  EGD 3/15    Colonoscopy 3/17    HOSPITAL COURSE  Patient is a 69 y.o. female presented on 3/14 to [facility] complaining of weakness.  Please see the admitting history and physical for further details.      Ms. Michel is our 70 yo F with hx of chronic respiratory failure, hypertension, HFpEF, CKD stage IV, paroxysmal atrial fibrillation, ASCVD with prior PCI/ROSAURA to LAD, COPD, PVD, migraine headaches, anxiety, chronic microcytic anemia who presents with weakness found to have anemic with hemoglobin of 4.9. Melanotic stools reported but also eats coffee grounds frequently. Has required total of 3 units pRBC. EGD earlier in stay and colonoscopy today did not show source of bleeding. Iron studies consistent with NIKIA. Suspect possible bleeding initially that has since stopped.     Patient requesting discharge. She notes she has been dyspneic since eating lunch after colonoscopy. She thinks she \"ate too fast\". She denies any signs of bleeding. No signs of bleeding in endoscopy and told surgery team she eats significant amount of coffee grounds that may cause melanotic appearence of stool. Please see progress note from later in day. Initially able to convince patient to stay but later in day even with education from myself again and nursing staff, patient elected to leave AMA. Has not appeared confused and appears to have capacity. Patient's w/u for hypoxia leaves " us with differential that includes bibasilar bacterial pneumonia, atelectasis, PE, mild volume overload. She is still requiring 6L NC up from home 2L NC later in the day.     Patient has elected to leave AMA in spite of attempts to convince her to stay. I discussed earlier on exam my concern given her anemia without clear cause and more importantly her hypoxia she developed today. Patient also with renal dysfunction that has not significantly improved. I discussed my concern she will go home and continue to decline and possibly die without further monitoring and w/u. Patient orientedX4 and has clear understanding of why she is here. She notes she has been here long enough and just wants to go home. Initially this morning she was agreeable to stay until tomorrow but this afternoon she again said she was gong to leave AMA. I again came by to make sure she had capacity and explain risk. This time she noted she was not going to stay regardless of what I said and became upset. She notes she plans on seeing PCP tomorrow. Will also order f/u. Pneumonia on differential based on CT, possibly aspiration, will order omnicef/doxy for 5 day course and send to her pharmacy. I don't have enough evidence to empirically anticoagulate for possible PE in setting of blood loss anemia. Would hold apixiban until outpatient appointment.     VITAL SIGNS:  Temp:  [97.5 °F (36.4 °C)-99.2 °F (37.3 °C)] 98.5 °F (36.9 °C)  Heart Rate:  [] 101  Resp:  [16-20] 20  BP: (115-159)/(52-82) 159/82  SpO2:  [88 %-95 %] 90 %  on  Flow (L/min) (Oxygen Therapy):  [2-10] 6;   Device (Oxygen Therapy): humidified;nasal cannula    Body mass index is 32.06 kg/m².  Wt Readings from Last 3 Encounters:   03/17/25 82.1 kg (181 lb)   02/18/25 83.2 kg (183 lb 6.8 oz)   01/21/25 93.2 kg (205 lb 8 oz)       PHYSICAL EXAM:  Constitutional:  Well-developed and well-nourished.  No respiratory distress.      HENT:  Head:  Normocephalic and atraumatic.  Mouth:  Moist  mucous membranes.    Eyes:  Conjunctivae and EOM are normal.  Pupils are equal, round, and reactive to light.  No scleral icterus.    Cardiovascular:  Normal rate, regular rhythm and normal heart sounds with no murmur.  Pulmonary/Chest:  No respiratory distress, no wheezes, coarse breath sounds bilaterally.   Abdominal:  Soft.  Bowel sounds are normal.  No distension and no tenderness.   Musculoskeletal:  No edema, no tenderness, and no deformity.  No red or swollen joints anywhere.    Neurological:  Alert and oriented to person, place, and time.  No gross neurological deficit.   Skin:  Skin is warm and dry. No rash noted. No pallor.   Peripheral vascular:  Strong pulses in all 4 extremities with no clubbing, no cyanosis, no edema.    DISCHARGE DISPOSITION   Stable    DISCHARGE MEDICATIONS:     Discharge Medications        New Medications        Instructions Start Date   cefdinir 300 MG capsule  Commonly known as: OMNICEF   300 mg, Oral, Daily      doxycycline 100 MG capsule  Commonly known as: VIBRAMYCIN   100 mg, Oral, 2 Times Daily             Continue These Medications        Instructions Start Date   amLODIPine 10 MG tablet  Commonly known as: NORVASC   10 mg, Oral, Every 24 Hours Scheduled      aspirin 81 MG chewable tablet   81 mg, Oral, Daily      atorvastatin 80 MG tablet  Commonly known as: LIPITOR   80 mg, Oral, Nightly      chlorthalidone 25 MG tablet  Commonly known as: HYGROTON   25 mg, Oral, Daily      folic acid 1 MG tablet  Commonly known as: FOLVITE   1 mg, Oral, Daily      gabapentin 600 MG tablet  Commonly known as: NEURONTIN   300 mg, Oral, 3 Times Daily PRN      hydrALAZINE 100 MG tablet  Commonly known as: APRESOLINE   100 mg, Oral, Every 8 Hours Scheduled      HYDROcodone-acetaminophen  MG per tablet  Commonly known as: NORCO   1 tablet, Oral, Every 8 Hours PRN      metoprolol tartrate 25 MG tablet  Commonly known as: LOPRESSOR   25 mg, Oral, Every 12 Hours Scheduled      ondansetron  ODT 4 MG disintegrating tablet  Commonly known as: ZOFRAN-ODT   4 mg, Translingual, Every 8 Hours PRN      pantoprazole 40 MG EC tablet  Commonly known as: PROTONIX   40 mg, Oral, Daily      sertraline 25 MG tablet  Commonly known as: ZOLOFT   25 mg, Oral, Daily      torsemide 20 MG tablet  Commonly known as: DEMADEX   20 mg, Oral, Daily             Stop These Medications      Eliquis 5 MG tablet tablet  Generic drug: apixaban                 Follow-up Information       Woodrow Raymond, APRN Follow up in 1 day(s).    Specialty: Family Medicine  Why: Will notify patient with follow-up appt. with Berto Raymond 03/18/25.  Contact information:  [facility] [id number] [phone]                              TEST  RESULTS PENDING AT DISCHARGE         CODE STATUS  Code Status and Medical Interventions: No CPR (Do Not Attempt to Resuscitate); Limited Support; No intubation (DNI); clarified with patient 3/15/25   Ordered at: 03/15/25 0800     Code Status (Patient has no pulse and is not breathing):    No CPR (Do Not Attempt to Resuscitate)     Medical Interventions (Patient has pulse or is breathing):    Limited Support     Medical Intervention Limits:    No intubation (DNI)     Comments:    clarified with patient 3/15/25     Level Of Support Discussed With:    Patient       Antonio Dasilva MD  [facility]  03/17/25  18:58 EDT    Please note that this discharge summary required more than 30 minutes to complete.      Electronically signed by Antonio Dasilva MD at 03/17/25 3401

## 2025-03-19 LAB
QT INTERVAL: 356 MS
QTC INTERVAL: 440 MS

## 2025-03-26 ENCOUNTER — TELEPHONE (OUTPATIENT)
Dept: CARDIOLOGY | Facility: CLINIC | Age: 70
End: 2025-03-26

## 2025-03-26 NOTE — TELEPHONE ENCOUNTER
Caller: Lesley Michel    Relationship to patient: Self    Best call back number: [phone]     Chief complaint: PT WAS IN THE [facility] DUE TO ANXIETY, THOUGHT SHE WAS HAVING A HEART ATTACK.     Type of visit: F/U     Requested date: AFTER 2:30       If rescheduling, when is the original appointment: 3/17     Additional notes: STATES PROVIDER IN OFFICE WANTS HER TO DO A STRESS,  KIDNEY DOCTOR TOLD HER SHE WAS UNABLE TO DO STRESS WITH DYE, OFFICE NEEDS TO GET INTO CONTACT WITH HIM PER PT TO AUTUMN WAGNER KIDNEY

## 2025-04-12 ENCOUNTER — APPOINTMENT (OUTPATIENT)
Dept: GENERAL RADIOLOGY | Facility: HOSPITAL | Age: 70
DRG: 812 | End: 2025-04-12
Payer: MEDICARE

## 2025-04-12 ENCOUNTER — HOSPITAL ENCOUNTER (INPATIENT)
Facility: HOSPITAL | Age: 70
LOS: 2 days | Discharge: HOME | DRG: 812 | End: 2025-04-14
Attending: STUDENT IN AN ORGANIZED HEALTH CARE EDUCATION/TRAINING PROGRAM | Admitting: HOSPITALIST
Payer: MEDICARE

## 2025-04-12 DIAGNOSIS — D64.9 ANEMIA, UNSPECIFIED TYPE: Primary | ICD-10-CM

## 2025-04-12 DIAGNOSIS — K92.1 MELENA: ICD-10-CM

## 2025-04-12 PROBLEM — K92.2 GI BLEED: Status: ACTIVE | Noted: 2025-04-12

## 2025-04-12 LAB
ABO GROUP BLD: NORMAL
ALBUMIN SERPL-MCNC: 3.6 G/DL (ref 3.5–5.2)
ALBUMIN/GLOB SERPL: 1.2 G/DL
ALP SERPL-CCNC: 139 U/L (ref 39–117)
ALT SERPL W P-5'-P-CCNC: 5 U/L (ref 1–33)
ANION GAP SERPL CALCULATED.3IONS-SCNC: 14.6 MMOL/L (ref 5–15)
APTT PPP: 37.9 SECONDS (ref 24.5–35.9)
AST SERPL-CCNC: 12 U/L (ref 1–32)
BASOPHILS # BLD AUTO: 0.07 10*3/MM3 (ref 0–0.2)
BASOPHILS NFR BLD AUTO: 1 % (ref 0–1.5)
BILIRUB SERPL-MCNC: 0.4 MG/DL (ref 0–1.2)
BLD GP AB SCN SERPL QL: NEGATIVE
BUN SERPL-MCNC: 49 MG/DL (ref 8–23)
BUN/CREAT SERPL: 20.8 (ref 7–25)
CALCIUM SPEC-SCNC: 8.9 MG/DL (ref 8.6–10.5)
CHLORIDE SERPL-SCNC: 93 MMOL/L (ref 98–107)
CO2 SERPL-SCNC: 24.4 MMOL/L (ref 22–29)
CREAT SERPL-MCNC: 2.36 MG/DL (ref 0.57–1)
DEPRECATED RDW RBC AUTO: 55.9 FL (ref 37–54)
EGFRCR SERPLBLD CKD-EPI 2021: 22.1 ML/MIN/1.73
EOSINOPHIL # BLD AUTO: 0.26 10*3/MM3 (ref 0–0.4)
EOSINOPHIL NFR BLD AUTO: 3.9 % (ref 0.3–6.2)
ERYTHROCYTE [DISTWIDTH] IN BLOOD BY AUTOMATED COUNT: 20 % (ref 12.3–15.4)
GEN 5 1HR TROPONIN T REFLEX: 33 NG/L
GLOBULIN UR ELPH-MCNC: 3 GM/DL
GLUCOSE SERPL-MCNC: 130 MG/DL (ref 65–99)
HCT VFR BLD AUTO: 17.5 % (ref 34–46.6)
HGB BLD-MCNC: 4.7 G/DL (ref 12–15.9)
HOLD SPECIMEN: NORMAL
HOLD SPECIMEN: NORMAL
IMM GRANULOCYTES # BLD AUTO: 0.02 10*3/MM3 (ref 0–0.05)
IMM GRANULOCYTES NFR BLD AUTO: 0.3 % (ref 0–0.5)
INR PPP: 1.99 (ref 0.9–1.1)
LYMPHOCYTES # BLD AUTO: 1.3 10*3/MM3 (ref 0.7–3.1)
LYMPHOCYTES NFR BLD AUTO: 19.4 % (ref 19.6–45.3)
MCH RBC QN AUTO: 20.9 PG (ref 26.6–33)
MCHC RBC AUTO-ENTMCNC: 26.9 G/DL (ref 31.5–35.7)
MCV RBC AUTO: 77.8 FL (ref 79–97)
MONOCYTES # BLD AUTO: 1.04 10*3/MM3 (ref 0.1–0.9)
MONOCYTES NFR BLD AUTO: 15.5 % (ref 5–12)
NEUTROPHILS NFR BLD AUTO: 4.02 10*3/MM3 (ref 1.7–7)
NEUTROPHILS NFR BLD AUTO: 59.9 % (ref 42.7–76)
NRBC BLD AUTO-RTO: 1.8 /100 WBC (ref 0–0.2)
PLATELET # BLD AUTO: 519 10*3/MM3 (ref 140–450)
PMV BLD AUTO: 10.2 FL (ref 6–12)
POTASSIUM SERPL-SCNC: 3.6 MMOL/L (ref 3.5–5.2)
PROT SERPL-MCNC: 6.6 G/DL (ref 6–8.5)
PROTHROMBIN TIME: 22.9 SECONDS (ref 11.6–15.1)
RBC # BLD AUTO: 2.25 10*6/MM3 (ref 3.77–5.28)
RH BLD: POSITIVE
SODIUM SERPL-SCNC: 132 MMOL/L (ref 136–145)
T&S EXPIRATION DATE: NORMAL
TROPONIN T % DELTA: -3
TROPONIN T NUMERIC DELTA: -1 NG/L
TROPONIN T SERPL HS-MCNC: 34 NG/L
WBC NRBC COR # BLD AUTO: 6.71 10*3/MM3 (ref 3.4–10.8)
WHOLE BLOOD HOLD COAG: NORMAL
WHOLE BLOOD HOLD SPECIMEN: NORMAL

## 2025-04-12 PROCEDURE — 85730 THROMBOPLASTIN TIME PARTIAL: CPT | Performed by: HOSPITALIST

## 2025-04-12 PROCEDURE — 85025 COMPLETE CBC W/AUTO DIFF WBC: CPT | Performed by: STUDENT IN AN ORGANIZED HEALTH CARE EDUCATION/TRAINING PROGRAM

## 2025-04-12 PROCEDURE — 86923 COMPATIBILITY TEST ELECTRIC: CPT

## 2025-04-12 PROCEDURE — 71045 X-RAY EXAM CHEST 1 VIEW: CPT

## 2025-04-12 PROCEDURE — 85610 PROTHROMBIN TIME: CPT | Performed by: HOSPITALIST

## 2025-04-12 PROCEDURE — 71045 X-RAY EXAM CHEST 1 VIEW: CPT | Performed by: RADIOLOGY

## 2025-04-12 PROCEDURE — 93005 ELECTROCARDIOGRAM TRACING: CPT | Performed by: STUDENT IN AN ORGANIZED HEALTH CARE EDUCATION/TRAINING PROGRAM

## 2025-04-12 PROCEDURE — 84484 ASSAY OF TROPONIN QUANT: CPT | Performed by: STUDENT IN AN ORGANIZED HEALTH CARE EDUCATION/TRAINING PROGRAM

## 2025-04-12 PROCEDURE — 36430 TRANSFUSION BLD/BLD COMPNT: CPT

## 2025-04-12 PROCEDURE — P9016 RBC LEUKOCYTES REDUCED: HCPCS

## 2025-04-12 PROCEDURE — 86900 BLOOD TYPING SEROLOGIC ABO: CPT

## 2025-04-12 PROCEDURE — 86901 BLOOD TYPING SEROLOGIC RH(D): CPT | Performed by: STUDENT IN AN ORGANIZED HEALTH CARE EDUCATION/TRAINING PROGRAM

## 2025-04-12 PROCEDURE — 36415 COLL VENOUS BLD VENIPUNCTURE: CPT

## 2025-04-12 PROCEDURE — 86900 BLOOD TYPING SEROLOGIC ABO: CPT | Performed by: STUDENT IN AN ORGANIZED HEALTH CARE EDUCATION/TRAINING PROGRAM

## 2025-04-12 PROCEDURE — 86850 RBC ANTIBODY SCREEN: CPT | Performed by: STUDENT IN AN ORGANIZED HEALTH CARE EDUCATION/TRAINING PROGRAM

## 2025-04-12 PROCEDURE — 93010 ELECTROCARDIOGRAM REPORT: CPT | Performed by: INTERNAL MEDICINE

## 2025-04-12 PROCEDURE — 99223 1ST HOSP IP/OBS HIGH 75: CPT | Performed by: HOSPITALIST

## 2025-04-12 PROCEDURE — 99291 CRITICAL CARE FIRST HOUR: CPT

## 2025-04-12 PROCEDURE — 80053 COMPREHEN METABOLIC PANEL: CPT | Performed by: STUDENT IN AN ORGANIZED HEALTH CARE EDUCATION/TRAINING PROGRAM

## 2025-04-12 RX ORDER — HYDRALAZINE HYDROCHLORIDE 100 MG/1
100 TABLET, FILM COATED ORAL 3 TIMES DAILY
Status: ON HOLD | COMMUNITY
End: 2025-04-16

## 2025-04-12 RX ORDER — METOPROLOL TARTRATE 25 MG/1
25 TABLET, FILM COATED ORAL 2 TIMES DAILY
Status: CANCELLED | OUTPATIENT
Start: 2025-04-12

## 2025-04-12 RX ORDER — METOPROLOL TARTRATE 25 MG/1
25 TABLET, FILM COATED ORAL DAILY
Status: ON HOLD | COMMUNITY
End: 2025-04-16

## 2025-04-12 RX ORDER — FUROSEMIDE 10 MG/ML
40 INJECTION INTRAMUSCULAR; INTRAVENOUS ONCE
Status: COMPLETED | OUTPATIENT
Start: 2025-04-12 | End: 2025-04-13

## 2025-04-12 RX ORDER — ATORVASTATIN CALCIUM 80 MG/1
80 TABLET, FILM COATED ORAL NIGHTLY
COMMUNITY

## 2025-04-12 RX ORDER — AMOXICILLIN 250 MG
2 CAPSULE ORAL 2 TIMES DAILY
Status: DISCONTINUED | OUTPATIENT
Start: 2025-04-12 | End: 2025-04-14 | Stop reason: HOSPADM

## 2025-04-12 RX ORDER — POLYETHYLENE GLYCOL 3350 17 G/17G
17 POWDER, FOR SOLUTION ORAL DAILY PRN
Status: DISCONTINUED | OUTPATIENT
Start: 2025-04-12 | End: 2025-04-14 | Stop reason: HOSPADM

## 2025-04-12 RX ORDER — HYDROXYZINE HYDROCHLORIDE 25 MG/1
25 TABLET, FILM COATED ORAL NIGHTLY PRN
Status: DISCONTINUED | OUTPATIENT
Start: 2025-04-12 | End: 2025-04-14 | Stop reason: HOSPADM

## 2025-04-12 RX ORDER — PANTOPRAZOLE SODIUM 40 MG/1
40 TABLET, DELAYED RELEASE ORAL DAILY
Status: CANCELLED | OUTPATIENT
Start: 2025-04-13

## 2025-04-12 RX ORDER — BISACODYL 10 MG
10 SUPPOSITORY, RECTAL RECTAL DAILY PRN
Status: DISCONTINUED | OUTPATIENT
Start: 2025-04-12 | End: 2025-04-14 | Stop reason: HOSPADM

## 2025-04-12 RX ORDER — GABAPENTIN 300 MG/1
300 CAPSULE ORAL 3 TIMES DAILY PRN
Status: CANCELLED | OUTPATIENT
Start: 2025-04-12

## 2025-04-12 RX ORDER — SODIUM CHLORIDE 0.9 % (FLUSH) 0.9 %
10 SYRINGE (ML) INJECTION AS NEEDED
Status: DISCONTINUED | OUTPATIENT
Start: 2025-04-12 | End: 2025-04-14 | Stop reason: HOSPADM

## 2025-04-12 RX ORDER — POTASSIUM CHLORIDE 1500 MG/1
20 TABLET, EXTENDED RELEASE ORAL ONCE
Status: COMPLETED | OUTPATIENT
Start: 2025-04-12 | End: 2025-04-12

## 2025-04-12 RX ORDER — PANTOPRAZOLE SODIUM 40 MG/10ML
40 INJECTION, POWDER, LYOPHILIZED, FOR SOLUTION INTRAVENOUS
Status: DISCONTINUED | OUTPATIENT
Start: 2025-04-13 | End: 2025-04-13

## 2025-04-12 RX ORDER — ASPIRIN 81 MG/1
81 TABLET, CHEWABLE ORAL DAILY
Status: DISCONTINUED | OUTPATIENT
Start: 2025-04-13 | End: 2025-04-14 | Stop reason: HOSPADM

## 2025-04-12 RX ORDER — AMLODIPINE BESYLATE 10 MG/1
10 TABLET ORAL DAILY
COMMUNITY

## 2025-04-12 RX ORDER — NITROGLYCERIN 0.4 MG/1
0.4 TABLET SUBLINGUAL
Status: DISCONTINUED | OUTPATIENT
Start: 2025-04-12 | End: 2025-04-14 | Stop reason: HOSPADM

## 2025-04-12 RX ORDER — CHLORTHALIDONE 50 MG/1
25 TABLET ORAL DAILY
Status: CANCELLED | OUTPATIENT
Start: 2025-04-13

## 2025-04-12 RX ORDER — SODIUM CHLORIDE 0.9 % (FLUSH) 0.9 %
10 SYRINGE (ML) INJECTION EVERY 12 HOURS SCHEDULED
Status: DISCONTINUED | OUTPATIENT
Start: 2025-04-12 | End: 2025-04-14 | Stop reason: HOSPADM

## 2025-04-12 RX ORDER — METOPROLOL TARTRATE 25 MG/1
25 TABLET, FILM COATED ORAL EVERY 12 HOURS SCHEDULED
Status: DISCONTINUED | OUTPATIENT
Start: 2025-04-12 | End: 2025-04-14 | Stop reason: HOSPADM

## 2025-04-12 RX ORDER — ASPIRIN 81 MG/1
324 TABLET, CHEWABLE ORAL ONCE
Status: COMPLETED | OUTPATIENT
Start: 2025-04-12 | End: 2025-04-12

## 2025-04-12 RX ORDER — BISACODYL 5 MG/1
5 TABLET, DELAYED RELEASE ORAL DAILY PRN
Status: DISCONTINUED | OUTPATIENT
Start: 2025-04-12 | End: 2025-04-14 | Stop reason: HOSPADM

## 2025-04-12 RX ORDER — SODIUM CHLORIDE 9 MG/ML
40 INJECTION, SOLUTION INTRAVENOUS AS NEEDED
Status: DISCONTINUED | OUTPATIENT
Start: 2025-04-12 | End: 2025-04-14 | Stop reason: HOSPADM

## 2025-04-12 RX ADMIN — ASPIRIN 243 MG: 81 TABLET, CHEWABLE ORAL at 15:59

## 2025-04-12 RX ADMIN — METOPROLOL TARTRATE 25 MG: 25 TABLET, FILM COATED ORAL at 21:16

## 2025-04-12 RX ADMIN — Medication 10 ML: at 21:17

## 2025-04-12 RX ADMIN — HYDROXYZINE HYDROCHLORIDE 25 MG: 25 TABLET, FILM COATED ORAL at 21:16

## 2025-04-12 RX ADMIN — POTASSIUM CHLORIDE 20 MEQ: 1500 TABLET, EXTENDED RELEASE ORAL at 21:16

## 2025-04-12 NOTE — H&P
[facility]    Patient Identification:  Name:  Lesley Michel  Age:  69 y.o.  Sex:  female  :  1955  MRN:  [id number]   Visit Number:  [id number]  Admit Date: 2025   Room number:  117/17  Primary Care Physician:  Woodrow Raymond APRN     Chief complaint:    Chief Complaint   Patient presents with    Weakness - Generalized    Chest Pain       History of presenting illness:  69 y.o. female presented emergency room with chief complaints of progressive shortness of breath weakness and fatigue, she was recently discharged on  of this year for similar presentation of melena, fatigue, she was noted to have hemoglobin of 4, she was admitted and received 3 units packed RBC.  Based on chart patient left AMA after transfusion.  She reported she felt better after transfusion, slowly become more short of breath, fatigue, continues to have on and off black stools none diarrhea soft . because of increasing fatigue she was seen in the emergency room.    At the emergency room her hemoglobin was 4.7 with hematocrit of 17.5 on discharge last admission hemoglobin was 8.3 and at the 8 hematocrit.  Her heart rate was 67-80 blood pressure was 121/80 with O2 sat of 97 to 98%.  ---------------------------------------------------------------------------------------------------------------------   Review of Systems   Constitutional:  Positive for fatigue. Negative for activity change, appetite change, chills, diaphoresis, fever and unexpected weight change.   HENT: Negative.     Eyes: Negative.    Respiratory:  Positive for shortness of breath. Negative for apnea, cough, choking, chest tightness and stridor.    Cardiovascular:  Positive for leg swelling (Chronic).   Gastrointestinal:  Positive for blood in stool (Melena).   Endocrine: Negative.    Genitourinary: Negative.    Musculoskeletal: Negative.    Skin: Negative.    Allergic/Immunologic: Negative.     Neurological: Negative.    Hematological: Negative.    Psychiatric/Behavioral: Negative.         ---------------------------------------------------------------------------------------------------------------------   Past Medical History:   Diagnosis Date    Anxiety     Arthritis     CHF (congestive heart failure)     Cigarette smoker 02/10/2022    CKD (chronic kidney disease) stage 4, GFR 15-29 ml/min     MAICO on top of CKD IIIa, HD from 10/23-, some recovery to CKD IV    COPD (chronic obstructive pulmonary disease)     Coronary artery disease     Elevated cholesterol     GERD (gastroesophageal reflux disease)     H/O heart artery stent     History of transfusion     Hyperlipidemia     Hypertension     hypothyroidism     Obesity     PAF (paroxysmal atrial fibrillation)     PVD (peripheral vascular disease)     x2 stents     Past Surgical History:   Procedure Laterality Date    ANKLE SURGERY      RIGHT    APPENDECTOMY      CARDIAC CATHETERIZATION      LEFT     SECTION      CHOLECYSTECTOMY N/A 2025    Procedure: CHOLECYSTECTOMY LAPAROSCOPIC WITH [place] IQINCI ROBOT;  Surgeon: Windy Neal MD;  Location: [facility];  Service: Robotics - DaVinci;  Laterality: N/A;    COLONOSCOPY N/A 3/17/2025    Procedure: COLONOSCOPY;  Surgeon: Luis Antonio Jiang MD;  Location: [facility] OR;  Service: Gastroenterology;  Laterality: N/A;    CORONARY STENT PLACEMENT      ENDOSCOPY N/A 3/15/2025    Procedure: ESOPHAGOGASTRODUODENOSCOPY;  Surgeon: Luis Antonio Jiang MD;  Location: [facility] OR;  Service: Gastroenterology;  Laterality: N/A;    HYSTERECTOMY      INSERTION HEMODIALYSIS CATHETER Right 10/19/2023    Procedure: HEMODIALYSIS CATHETER INSERTION;  Surgeon: Bartolome Schwartz MD;  Location: [facility] OR;  Service: General;  Laterality: Right;     Family History   Problem Relation Age of Onset    Heart disease Mother     Heart attack Mother 73    Fibromyalgia Mother     Heart attack Father 72    Ovarian cancer Sister      Coronary artery disease Other     Breast cancer Neg Hx      Social History     Socioeconomic History    Marital status:    Tobacco Use    Smoking status: Every Day     Current packs/day: 0.50     Average packs/day: 0.5 packs/day for 30.0 years (15.0 ttl pk-yrs)     Types: Cigarettes     Passive exposure: Never    Smokeless tobacco: Never   Vaping Use    Vaping status: Never Used   Substance and Sexual Activity    Alcohol use: No    Drug use: No    Sexual activity: Never     ---------------------------------------------------------------------------------------------------------------------   Allergies:  Xanax [alprazolam]  ---------------------------------------------------------------------------------------------------------------------   Prior to Admission Medications       Prescriptions Last Dose Informant Patient Reported? Taking?    aspirin 81 MG chewable tablet  Child, Pharmacy Yes No    Chew 1 tablet Daily.    cefdinir (OMNICEF) 300 MG capsule   No No    Take 1 capsule by mouth Daily.    chlorthalidone (HYGROTON) 25 MG tablet  Child, Pharmacy Yes No    Take 1 tablet by mouth Daily.    folic acid (FOLVITE) 1 MG tablet  Child, Pharmacy Yes No    Take 1 tablet by mouth Daily.    gabapentin (NEURONTIN) 600 MG tablet  Child, Pharmacy Yes No    Take 0.5 tablets by mouth 3 (Three) Times a Day As Needed (nerve pain).    hydrALAZINE (APRESOLINE) 100 MG tablet  Pharmacy, Child No No    Take 1 tablet by mouth Every 8 (Eight) Hours for 30 days.    HYDROcodone-acetaminophen (NORCO)  MG per tablet  Child, Pharmacy Yes No    Take 1 tablet by mouth Every 8 (Eight) Hours As Needed for Moderate Pain.    metoprolol tartrate (LOPRESSOR) 25 MG tablet  Pharmacy, Child No No    Take 1 tablet by mouth Every 12 (Twelve) Hours for 30 days.    ondansetron ODT (ZOFRAN-ODT) 4 MG disintegrating tablet  Child, Pharmacy Yes No    Place 1 tablet on the tongue Every 8 (Eight) Hours As Needed for Nausea or Vomiting.     pantoprazole (PROTONIX) 40 MG EC tablet  Child, Pharmacy Yes No    Take 1 tablet by mouth Daily.    sertraline (ZOLOFT) 25 MG tablet  Child, Pharmacy Yes No    Take 1 tablet by mouth Daily.    torsemide (DEMADEX) 20 MG tablet  Pharmacy, Child Yes No    Take 1 tablet by mouth Daily.          ---------------------------------------------------------------------------------------------------------------------   Vital Signs:  Temp:  [98 °F (36.7 °C)-98.1 °F (36.7 °C)] 98.1 °F (36.7 °C)  Heart Rate:  [67-80] 69  Resp:  [22] 22  BP: (121-139)/(67-80) 121/80    Mean Arterial Pressure (Non-Invasive) for the past 24 hrs (Last 3 readings):   Noninvasive MAP (mmHg)   04/12/25 1645 117   04/12/25 1630 95   04/12/25 1615 101     SpO2:  [97 %-100 %] 98 %  on  Flow (L/min) (Oxygen Therapy):  [2] 2;   Device (Oxygen Therapy): nasal cannula  Body mass index is 32.06 kg/m².    Wt Readings from Last 3 Encounters:   04/12/25 82.1 kg (181 lb)   03/17/25 82.1 kg (181 lb)   02/18/25 83.2 kg (183 lb 6.8 oz)               ---------------------------------------------------------------------------------------------------------------------   Physical Exam:  Constitutional: He is awake and alert she is comfortable she is not in respiratory distress      HENT:  Head: Normocephalic and atraumatic.  Mouth:  Moist mucous membranes.    Eyes:  Conjunctivae and EOM are normal.  Pupils are equal, round, and reactive to light.  No scleral icterus.  Pale conjunctiva  Neck:  Neck supple.  No JVD present.  No bruit  Cardiovascular: She has a murmur mitral area, no gallop no rub Pulmonary/Chest:  No respiratory distress, no wheezes, no crackles, with normal breath sounds and good air movement.  Abdominal:  Soft.  Bowel sounds are normal.  No distension and no tenderness.   Musculoskeletal:  No edema, no tenderness, and no deformity.  No red or swollen joints anywhere.    Neurological:  Alert and oriented to person, place, and time.  No cranial nerve  "deficit.  No tongue deviation.  No facial droop.  No slurred speech.   Skin:  Skin is warm and dry.  No rash noted.  No pallor.   Peripheral vascular: +1 edema lower half of the legs pitting type no cyanosis or clubbing.  Genitourinary: No Kaiser catheter  ---------------------------------------------------------------------------------------------------------------------  EKG:        Telemetry: Sinus rhythm 68 bpm occasional PACs  I have personally looked at both the EKG and the telemetry strips.  --------------------------------------------------------------------------------------------------------------------  Results from last 7 days   Lab Units 04/12/25  1603   HSTROP T ng/L 34*     Results from last 7 days   Lab Units 04/12/25  1603   WBC 10*3/mm3 6.71   HEMOGLOBIN g/dL 4.7*   HEMATOCRIT % 17.5*   MCV fL 77.8*   MCHC g/dL 26.9*   PLATELETS 10*3/mm3 519*     Results from last 7 days   Lab Units 04/12/25  1603   SODIUM mmol/L 132*   POTASSIUM mmol/L 3.6   CHLORIDE mmol/L 93*   CO2 mmol/L 24.4   BUN mg/dL 49*   CREATININE mg/dL 2.36*   CALCIUM mg/dL 8.9   GLUCOSE mg/dL 130*   ALBUMIN g/dL 3.6   BILIRUBIN mg/dL 0.4   ALK PHOS U/L 139*   AST (SGOT) U/L 12   ALT (SGPT) U/L 5   Estimated Creatinine Clearance: 23.5 mL/min (A) (by C-G formula based on SCr of 2.36 mg/dL (H)).    No results found for: \"HGBA1C\", \"POCGLU\"  No results found for: \"AMMONIA\"        No results found for: \"BLOODCX\"No results found for: \"RESPCX\"No results found for: \"URINECX\"No results found for: \"WOUNDCX\"No results found for: \"BODYFLDCX\"No results found for: \"STOOLCX\"  pH No results found for: \"PHART\"   pO2 No results found for: \"PO2ART\"   pCO2 No results found for: \"QLA3EQB\"   HCO3 No results found for: \"JFZ6QRY\"     I have personally looked at the labs and they are summarized above.  ----------------------------------------------------------------------------------------------------------------------  Imaging Results (Last 24 Hours)       " Procedure Component Value Units Date/Time    XR Chest 1 View [[id number]] Collected: 04/12/25 1703     Updated: 04/12/25 1706    Narrative:      PROCEDURE: Portable chest x-ray examination performed on April 12, 2025.  Single view.     HISTORY: Chest pain. Weakness.     COMPARISON: None.     FINDINGS:     Enlarged heart size.  Mild atelectasis versus scarring in the lung bases.  Small right pleural effusion.  Degenerative disc disease throughout the thoracic spine.  Mild osteoarthritis at the glenohumeral joints  No free air in the upper abdomen.       Impression:         Enlarged heart size.  Small bands of scar versus atelectasis at the lung bases.  Small right pleural effusion.  Mild hypoinflated lungs.  No pneumothorax.           This report was finalized on 4/12/2025 5:04 PM by Ralf Anglin MD.             I have personally reviewed the radiology images and read the final radiology report.  ----------------------------------------------------------------------------------------------------------------------  Assessment and Plan:  - Acute worsening of chronic anemia  - Chronic microcytic anemia with melena from GI bleed, upper GI and lower endoscopy recently performed negative for source of bleeding  - History of hypertension  - History of coronary disease with total occlusion of RCA post ROSAURA LAD 60% lesion of D1 2011.  -Chronic bilateral lower extremity edema   - Iron deficiency anemia likely from GI blood loss   - Tobacco use disorder   - History of hyperlipidemia   - Prior history of paroxysmal atrial fibrillation currently off anticoagulation because of GI bleed  - History of CKD stage IIIb-IV  - History of COPD without exacerbation  - History of mild mitral valve stenosis  - Troponin elevation without chest pain, will repeat    Plan discussed with Dr. Jiang who performed recent upper endoscopy and lower endoscopy recently on her last admission for similar presentation, no findings to suggest source of  melena.  Will monitor H&H transfuse to keep hemoglobin above 7, watch for volume overload, patient may need video capsule endoscopy outpatient once discharged.  She may also benefit hematology appointment for possible IV iron infusion.  Smoking cessation counseling was imparted to the patient, clear liquids for now, IV PPI.  Hold aspirin.      Plan outlined to the patient and agreed, further management may change as condition evolves.      Patient be admitted to medical telemetry for blood transfusion For anemia, to watch for volume overload.    Yaquelin Elliott MD  04/12/25  17:11 EDT

## 2025-04-12 NOTE — ED NOTES
Dr. Holt at bedside discussing blood transfusion, informed consent obtained at this time, and placed on patient's chart.

## 2025-04-12 NOTE — ED PROVIDER NOTES
Subjective   History of Present Illness  69-year-old female with past medical history of anemia, CHF, CAD, presents to the emergency department with weakness.  Patient states that she has had a blood transfusion in the past approximately 1 month ago.  At that time she had an EGD and colonoscopy which were negative for any acute bleeding.  She continued to comfort weakness and therefore came to the emergency department.  She denies any pain associate with her symptoms.  She denies any fevers, chills, nausea, vomiting, shortness of breath, chest pain.        Review of Systems   All other systems reviewed and are negative.      Past Medical History:   Diagnosis Date    Anxiety     Arthritis     CHF (congestive heart failure)     Cigarette smoker 02/10/2022    CKD (chronic kidney disease) stage 4, GFR 15-29 ml/min     MAICO on top of CKD IIIa, HD from 10/23-, some recovery to CKD IV    COPD (chronic obstructive pulmonary disease)     Coronary artery disease     Elevated cholesterol     GERD (gastroesophageal reflux disease)     H/O heart artery stent     History of transfusion     Hyperlipidemia     Hypertension     hypothyroidism     Obesity     PAF (paroxysmal atrial fibrillation)     PVD (peripheral vascular disease)     x2 stents       Allergies   Allergen Reactions    Xanax [Alprazolam] Other (See Comments)     Severe agitation per patient and family       Past Surgical History:   Procedure Laterality Date    ANKLE SURGERY      RIGHT    APPENDECTOMY      CARDIAC CATHETERIZATION      LEFT     SECTION      CHOLECYSTECTOMY N/A 2025    Procedure: CHOLECYSTECTOMY LAPAROSCOPIC WITH DAVINCI ROBOT;  Surgeon: Windy Neal MD;  Location: [facility];  Service: Robotics - DaVinci;  Laterality: N/A;    COLONOSCOPY N/A 3/17/2025    Procedure: COLONOSCOPY;  Surgeon: Luis Antonio Jiang MD;  Location: [facility];  Service: Gastroenterology;  Laterality: N/A;    CORONARY STENT PLACEMENT      ENDOSCOPY N/A  3/15/2025    Procedure: ESOPHAGOGASTRODUODENOSCOPY;  Surgeon: Luis Antonio Jiang MD;  Location:  [facility] OR;  Service: Gastroenterology;  Laterality: N/A;    HYSTERECTOMY      INSERTION HEMODIALYSIS CATHETER Right 10/19/2023    Procedure: HEMODIALYSIS CATHETER INSERTION;  Surgeon: Bartolome Schwartz MD;  Location:  [facility] OR;  Service: General;  Laterality: Right;       Family History   Problem Relation Age of Onset    Heart disease Mother     Heart attack Mother 73    Fibromyalgia Mother     Heart attack Father 72    Ovarian cancer Sister     Coronary artery disease Other     Breast cancer Neg Hx        Social History     Socioeconomic History    Marital status:    Tobacco Use    Smoking status: Every Day     Current packs/day: 0.50     Average packs/day: 0.5 packs/day for 30.0 years (15.0 ttl pk-yrs)     Types: Cigarettes     Passive exposure: Never    Smokeless tobacco: Never   Vaping Use    Vaping status: Never Used   Substance and Sexual Activity    Alcohol use: No    Drug use: No    Sexual activity: Never           Objective   Physical Exam  Constitutional:       Appearance: Normal appearance.   HENT:      Head: Normocephalic.      Mouth/Throat:      Mouth: Mucous membranes are moist.   Eyes:      Pupils: Pupils are equal, round, and reactive to light.   Cardiovascular:      Rate and Rhythm: Normal rate and regular rhythm.   Pulmonary:      Effort: Pulmonary effort is normal.   Abdominal:      Palpations: Abdomen is soft.   Musculoskeletal:         General: Normal range of motion.      Cervical back: Normal range of motion.   Neurological:      General: No focal deficit present.      Mental Status: She is alert.   Psychiatric:         Mood and Affect: Mood normal.         Procedures  48 minutes of critical care provided. This time excludes other billable procedures. Time does include preparation of documents, medical consultations, review of old records, and direct bedside care. Patient is at high risk  for life-threatening deterioration due to anemia.            ED Course  ED Course as of 04/12/25 1642   Sat Apr 12, 2025   1620 EKG independently interpreted by me  Rate 76  OH interval 140  QTc 452  Sinus rhythm with PACs  No STEMI    Electronically signed by Dave Holt DO, 04/12/25, 4:21 PM EDT.   [OI]      ED Course User Index  [OI] Dave Holt DO                                                       Medical Decision Making  69-year-old female with past medical history of anemia, CHF, CAD, presents to the emergency department with weakness.  Patient states that she has had a blood transfusion in the past approximately 1 month ago.  At that time she had an EGD and colonoscopy which were negative for any acute bleeding.  She continued to comfort weakness and therefore came to the emergency department.  She denies any pain associate with her symptoms.  She denies any fevers, chills, nausea, vomiting, shortness of breath, chest pain.  Vital signs within normal limits upon arrival.  Differential diagnosis includes ACS, MI, electrolyte abnormality, anemia, infection, among others.  Labs significant for a hemoglobin of 4.7.  3 units of blood have been ordered.  Patient does have an elevated troponin, but she denies any chest pain.  EKG independently interpreted by me showed no signs of acute ischemia.  I discussed my findings with Dr. Holder he states that since patient has already had EGD and colonoscopy, he will not be doing a repeat examination at this time.  I also discussed my findings with the hospitalist team who has agreed to admit the patient for further medical management.  Patient understands and agrees with our plan for admission.    Problems Addressed:  Anemia, unspecified type: complicated acute illness or injury  Melena: complicated acute illness or injury    Amount and/or Complexity of Data Reviewed  Labs: ordered.  Radiology: ordered.  ECG/medicine tests: ordered.    Risk  OTC drugs.  Prescription  drug management.  Decision regarding hospitalization.        Final diagnoses:   Anemia, unspecified type   Melena       ED Disposition  ED Disposition       ED Disposition   Decision to Admit    Condition   --    Comment   --               No follow-up provider specified.       Medication List      No changes were made to your prescriptions during this visit.            Dave Holt DO  04/12/25 1462

## 2025-04-12 NOTE — CASE MANAGEMENT/SOCIAL WORK
Discharge Planning Assessment  [facility]     Patient Name: Lesley Michel  MRN: [id number]  Today's Date: 4/12/2025    Admit Date: 4/12/2025    Plan: Pt lives at home with her son Wes and grandchild and plans to return home at discharge pt states she drives herself or family will provide transportation. Pcp is Berto Marshall, she uses [facility] and has [employer]. Pt uses o2 4 liters from [employer]. Pt was admitted 3/14/25-3/17/25 and left AMA.   Discharge Needs Assessment       Row Name 04/12/25 1716       Living Environment    People in Home child(devan), adult    Current Living Arrangements home    Primary Care Provided by self    Family Caregiver if Needed child(devan), adult    Quality of Family Relationships helpful;involved;supportive    Able to Return to Prior Arrangements yes       Resource/Environmental Concerns    Resource/Environmental Concerns none    Transportation Concerns none       Transition Planning    Patient/Family Anticipates Transition to home with family    Patient/Family Anticipated Services at Transition none    Transportation Anticipated family or friend will provide;car, drives self       Discharge Needs Assessment    Readmission Within the Last 30 Days previous discharge plan unsuccessful  left [facility]a on 3/17/25    Equipment Currently Used at Home oxygen    Concerns to be Addressed no discharge needs identified;denies needs/concerns at this time    Anticipated Changes Related to Illness none    Equipment Needed After Discharge none                   Discharge Plan       Row Name 04/12/25 1717       Plan    Plan Pt lives at home with her son Wes and grandchild and plans to return home at discharge pt states she drives herself or family will provide transportation. Pcp is Berto Marshall, she uses [facility] and has [employer] Re[employer]. Pt uses o2 4 liters from [employer]. Pt was admitted 3/14/25-3/17/25 and left AMA.                    Expected Discharge Date and  Time       Expected Discharge Date Expected Discharge Time    Apr 14, 2025            Demographic Summary       Row Name 04/12/25 2715       General Information    Admission Type inpatient    Arrived From home    Referral Source emergency department    Reason for Consult discharge planning                  Meera Hyde RN

## 2025-04-13 LAB
ANION GAP SERPL CALCULATED.3IONS-SCNC: 10.4 MMOL/L (ref 5–15)
ANION GAP SERPL CALCULATED.3IONS-SCNC: 11.3 MMOL/L (ref 5–15)
BASOPHILS # BLD AUTO: 0.07 10*3/MM3 (ref 0–0.2)
BASOPHILS # BLD AUTO: 0.07 10*3/MM3 (ref 0–0.2)
BASOPHILS NFR BLD AUTO: 1 % (ref 0–1.5)
BASOPHILS NFR BLD AUTO: 1.1 % (ref 0–1.5)
BH BB BLOOD EXPIRATION DATE: NORMAL
BH BB BLOOD TYPE BARCODE: 5100
BH BB DISPENSE STATUS: NORMAL
BH BB PRODUCT CODE: NORMAL
BH BB UNIT NUMBER: NORMAL
BUN SERPL-MCNC: 46 MG/DL (ref 8–23)
BUN SERPL-MCNC: 48 MG/DL (ref 8–23)
BUN/CREAT SERPL: 18 (ref 7–25)
BUN/CREAT SERPL: 22.1 (ref 7–25)
CALCIUM SPEC-SCNC: 8.7 MG/DL (ref 8.6–10.5)
CALCIUM SPEC-SCNC: 8.8 MG/DL (ref 8.6–10.5)
CHLORIDE SERPL-SCNC: 96 MMOL/L (ref 98–107)
CHLORIDE SERPL-SCNC: 98 MMOL/L (ref 98–107)
CO2 SERPL-SCNC: 24.7 MMOL/L (ref 22–29)
CO2 SERPL-SCNC: 26.6 MMOL/L (ref 22–29)
CREAT SERPL-MCNC: 2.17 MG/DL (ref 0.57–1)
CREAT SERPL-MCNC: 2.55 MG/DL (ref 0.57–1)
CROSSMATCH INTERPRETATION: NORMAL
DEPRECATED RDW RBC AUTO: 54.7 FL (ref 37–54)
DEPRECATED RDW RBC AUTO: 60.6 FL (ref 37–54)
EGFRCR SERPLBLD CKD-EPI 2021: 20.1 ML/MIN/1.73
EGFRCR SERPLBLD CKD-EPI 2021: 24.4 ML/MIN/1.73
EOSINOPHIL # BLD AUTO: 0.36 10*3/MM3 (ref 0–0.4)
EOSINOPHIL # BLD AUTO: 0.47 10*3/MM3 (ref 0–0.4)
EOSINOPHIL NFR BLD AUTO: 5.5 % (ref 0.3–6.2)
EOSINOPHIL NFR BLD AUTO: 6.5 % (ref 0.3–6.2)
ERYTHROCYTE [DISTWIDTH] IN BLOOD BY AUTOMATED COUNT: 18.8 % (ref 12.3–15.4)
ERYTHROCYTE [DISTWIDTH] IN BLOOD BY AUTOMATED COUNT: 19.7 % (ref 12.3–15.4)
GLUCOSE SERPL-MCNC: 120 MG/DL (ref 65–99)
GLUCOSE SERPL-MCNC: 97 MG/DL (ref 65–99)
HCT VFR BLD AUTO: 24.9 % (ref 34–46.6)
HCT VFR BLD AUTO: 26.5 % (ref 34–46.6)
HGB BLD-MCNC: 7.3 G/DL (ref 12–15.9)
HGB BLD-MCNC: 7.4 G/DL (ref 12–15.9)
IMM GRANULOCYTES # BLD AUTO: 0.03 10*3/MM3 (ref 0–0.05)
IMM GRANULOCYTES # BLD AUTO: 0.03 10*3/MM3 (ref 0–0.05)
IMM GRANULOCYTES NFR BLD AUTO: 0.4 % (ref 0–0.5)
IMM GRANULOCYTES NFR BLD AUTO: 0.5 % (ref 0–0.5)
LYMPHOCYTES # BLD AUTO: 1.4 10*3/MM3 (ref 0.7–3.1)
LYMPHOCYTES # BLD AUTO: 1.48 10*3/MM3 (ref 0.7–3.1)
LYMPHOCYTES NFR BLD AUTO: 20.5 % (ref 19.6–45.3)
LYMPHOCYTES NFR BLD AUTO: 21.5 % (ref 19.6–45.3)
MAGNESIUM SERPL-MCNC: 2.1 MG/DL (ref 1.6–2.4)
MAGNESIUM SERPL-MCNC: 2.1 MG/DL (ref 1.6–2.4)
MCH RBC QN AUTO: 23.2 PG (ref 26.6–33)
MCH RBC QN AUTO: 23.5 PG (ref 26.6–33)
MCHC RBC AUTO-ENTMCNC: 27.9 G/DL (ref 31.5–35.7)
MCHC RBC AUTO-ENTMCNC: 29.3 G/DL (ref 31.5–35.7)
MCV RBC AUTO: 79.3 FL (ref 79–97)
MCV RBC AUTO: 84.1 FL (ref 79–97)
MONOCYTES # BLD AUTO: 0.76 10*3/MM3 (ref 0.1–0.9)
MONOCYTES # BLD AUTO: 0.89 10*3/MM3 (ref 0.1–0.9)
MONOCYTES NFR BLD AUTO: 11.7 % (ref 5–12)
MONOCYTES NFR BLD AUTO: 12.3 % (ref 5–12)
NEUTROPHILS NFR BLD AUTO: 3.89 10*3/MM3 (ref 1.7–7)
NEUTROPHILS NFR BLD AUTO: 4.29 10*3/MM3 (ref 1.7–7)
NEUTROPHILS NFR BLD AUTO: 59.3 % (ref 42.7–76)
NEUTROPHILS NFR BLD AUTO: 59.7 % (ref 42.7–76)
NRBC BLD AUTO-RTO: 0.7 /100 WBC (ref 0–0.2)
NRBC BLD AUTO-RTO: 0.8 /100 WBC (ref 0–0.2)
PLATELET # BLD AUTO: 343 10*3/MM3 (ref 140–450)
PLATELET # BLD AUTO: 410 10*3/MM3 (ref 140–450)
PMV BLD AUTO: 10.2 FL (ref 6–12)
PMV BLD AUTO: 10.3 FL (ref 6–12)
POTASSIUM SERPL-SCNC: 3.2 MMOL/L (ref 3.5–5.2)
POTASSIUM SERPL-SCNC: 3.6 MMOL/L (ref 3.5–5.2)
QT INTERVAL: 402 MS
QTC INTERVAL: 452 MS
RBC # BLD AUTO: 3.14 10*6/MM3 (ref 3.77–5.28)
RBC # BLD AUTO: 3.15 10*6/MM3 (ref 3.77–5.28)
SODIUM SERPL-SCNC: 133 MMOL/L (ref 136–145)
SODIUM SERPL-SCNC: 134 MMOL/L (ref 136–145)
UNIT  ABO: NORMAL
UNIT  RH: NORMAL
WBC NRBC COR # BLD AUTO: 6.51 10*3/MM3 (ref 3.4–10.8)
WBC NRBC COR # BLD AUTO: 7.23 10*3/MM3 (ref 3.4–10.8)

## 2025-04-13 PROCEDURE — 84132 ASSAY OF SERUM POTASSIUM: CPT

## 2025-04-13 PROCEDURE — 86900 BLOOD TYPING SEROLOGIC ABO: CPT

## 2025-04-13 PROCEDURE — 84132 ASSAY OF SERUM POTASSIUM: CPT | Performed by: HOSPITALIST

## 2025-04-13 PROCEDURE — 85025 COMPLETE CBC W/AUTO DIFF WBC: CPT | Performed by: HOSPITALIST

## 2025-04-13 PROCEDURE — 36430 TRANSFUSION BLD/BLD COMPNT: CPT

## 2025-04-13 PROCEDURE — 80048 BASIC METABOLIC PNL TOTAL CA: CPT | Performed by: HOSPITALIST

## 2025-04-13 PROCEDURE — 83735 ASSAY OF MAGNESIUM: CPT | Performed by: HOSPITALIST

## 2025-04-13 PROCEDURE — 99232 SBSQ HOSP IP/OBS MODERATE 35: CPT | Performed by: HOSPITALIST

## 2025-04-13 PROCEDURE — 25010000002 FUROSEMIDE PER 20 MG: Performed by: HOSPITALIST

## 2025-04-13 PROCEDURE — P9016 RBC LEUKOCYTES REDUCED: HCPCS

## 2025-04-13 RX ORDER — POTASSIUM CHLORIDE 1.5 G/1.58G
20 POWDER, FOR SOLUTION ORAL ONCE
Status: COMPLETED | OUTPATIENT
Start: 2025-04-14 | End: 2025-04-13

## 2025-04-13 RX ORDER — POTASSIUM CHLORIDE 1.5 G/1.58G
20 POWDER, FOR SOLUTION ORAL ONCE
Status: COMPLETED | OUTPATIENT
Start: 2025-04-13 | End: 2025-04-13

## 2025-04-13 RX ORDER — POTASSIUM CHLORIDE 1500 MG/1
20 TABLET, EXTENDED RELEASE ORAL ONCE
Status: COMPLETED | OUTPATIENT
Start: 2025-04-13 | End: 2025-04-13

## 2025-04-13 RX ORDER — PANTOPRAZOLE SODIUM 40 MG/1
40 TABLET, DELAYED RELEASE ORAL
Status: DISCONTINUED | OUTPATIENT
Start: 2025-04-14 | End: 2025-04-14 | Stop reason: HOSPADM

## 2025-04-13 RX ADMIN — METOPROLOL TARTRATE 25 MG: 25 TABLET, FILM COATED ORAL at 08:01

## 2025-04-13 RX ADMIN — Medication 10 ML: at 08:03

## 2025-04-13 RX ADMIN — POTASSIUM CHLORIDE 20 MEQ: 1500 TABLET, EXTENDED RELEASE ORAL at 12:01

## 2025-04-13 RX ADMIN — HYDROXYZINE HYDROCHLORIDE 25 MG: 25 TABLET, FILM COATED ORAL at 20:55

## 2025-04-13 RX ADMIN — SENNOSIDES AND DOCUSATE SODIUM 2 TABLET: 50; 8.6 TABLET ORAL at 08:02

## 2025-04-13 RX ADMIN — POTASSIUM CHLORIDE 20 MEQ: 1.5 POWDER, FOR SOLUTION ORAL at 23:49

## 2025-04-13 RX ADMIN — PANTOPRAZOLE SODIUM 40 MG: 40 INJECTION, POWDER, FOR SOLUTION INTRAVENOUS at 05:18

## 2025-04-13 RX ADMIN — POTASSIUM CHLORIDE 20 MEQ: 1.5 POWDER, FOR SOLUTION ORAL at 16:23

## 2025-04-13 RX ADMIN — FUROSEMIDE 40 MG: 10 INJECTION, SOLUTION INTRAMUSCULAR; INTRAVENOUS at 00:35

## 2025-04-13 RX ADMIN — Medication 10 ML: at 20:49

## 2025-04-13 RX ADMIN — METOPROLOL TARTRATE 25 MG: 25 TABLET, FILM COATED ORAL at 20:49

## 2025-04-13 NOTE — PROGRESS NOTES
[facility] HOSPITALIST PROGRESS NOTE     Patient Identification:  Name:  Lesley Michel  Age:  69 y.o.  Sex:  female  :  1955  MRN:  [id number]  Visit Number:  [id number]  Primary Care Provider:  Woodrow Raymond APRN    Length of stay:  1    Subjective: Patient seen and examined, she felt much better, she received 3 units packed RBC current hemoglobin 7.3 with hematocrit of 25.  She is wanting to go home, discussed with patient she signed out AMA the last time with incomplete workup, she continues to have on and off melena, she needs workup for the cause of melena.  Fortunately she is agreeable to stay.    Chief Complaint: Creased weakness severe anemia  ----------------------------------------------------------------------------------------------------------------------  Current Hospital Meds:  [Held by provider] aspirin, 81 mg, Oral, Daily  metoprolol tartrate, 25 mg, Oral, Q12H  [START ON 2025] pantoprazole, 40 mg, Oral, Q AM  potassium chloride ER, 20 mEq, Oral, Once  senna-docusate sodium, 2 tablet, Oral, BID  sodium chloride, 10 mL, Intravenous, Q12H         ----------------------------------------------------------------------------------------------------------------------  Vital Signs:  Temp:  [97.6 °F (36.4 °C)-98.8 °F (37.1 °C)] 98.1 °F (36.7 °C)  Heart Rate:  [58-80] 59  Resp:  [16-22] 20  BP: (112-150)/() 135/65       Tele: Tillson rhythm 59 bpm      25  1550 25  1822 25  0500   Weight: 82.1 kg (181 lb) 85.8 kg (189 lb 1.6 oz) (new admit, less then 24 hours.)     Body mass index is 35.73 kg/m².    Intake/Output Summary (Last 24 hours) at 2025 1137  Last data filed at 2025 0804  Gross per 24 hour   Intake 1440 ml   Output 2100 ml   Net -660 ml     Diet: Cardiac; Healthy Heart (2-3 Na+); Fluid Consistency: Thin (IDDSI  0)  ----------------------------------------------------------------------------------------------------------------------  Physical exam:  General: Comfortable,awake, alert, oriented to self, place, and time, well-developed and well-nourished.  No respiratory distress.    Skin:  Skin is warm and dry. No rash noted. No pallor.    HENT:  Head:  Normocephalic and atraumatic.  Mouth:  Moist mucous membranes.    Eyes:  Conjunctivae and EOM are normal.  Pupils are equal, round, and reactive to light.  No scleral icterus.    Neck:  Neck supple.  No JVD present.  No bruit  Pulmonary/Chest:  No respiratory distress, no wheezes, no crackles, with normal breath sounds and good air movement.  Cardiovascular:  Normal rate, regular rhythm and normal heart sounds with no murmur.  Abdominal: Obese soft.  Bowel sounds are normal.  No distension and no tenderness.   Extremities:  No edema, no tenderness, and no deformity.  No red or swollen joints anywhere.  Strong pulses in all 4 extremities with no clubbing, no cyanosis, no edema.  Neurological:  Motor strength equal no obvious deficit, sensory grossly intact.   No cranial nerve deficit.  No tongue deviation.  No facial droop.  No slurred speech.    Genitourinary: No Kaiser catheter  Back:  ----------------------------------------------------------------------------------------------------------------------  ----------------------------------------------------------------------------------------------------------------------  Results from last 7 days   Lab Units 04/12/25  1705 04/12/25  1603   HSTROP T ng/L 33* 34*     Results from last 7 days   Lab Units 04/13/25  0719 04/12/25  1603   WBC 10*3/mm3 6.51 6.71   HEMOGLOBIN g/dL 7.3* 4.7*   HEMATOCRIT % 24.9* 17.5*   MCV fL 79.3 77.8*   MCHC g/dL 29.3* 26.9*   PLATELETS 10*3/mm3 410 519*   INR   --  1.99*         Results from last 7 days   Lab Units 04/13/25  0719 04/12/25  1603   SODIUM mmol/L 133* 132*   POTASSIUM mmol/L 3.6  3.6  "3.6   MAGNESIUM mg/dL 2.1  --    CHLORIDE mmol/L 96* 93*   CO2 mmol/L 26.6 24.4   BUN mg/dL 48* 49*   CREATININE mg/dL 2.17* 2.36*   CALCIUM mg/dL 8.8 8.9   GLUCOSE mg/dL 97 130*   ALBUMIN g/dL  --  3.6   BILIRUBIN mg/dL  --  0.4   ALK PHOS U/L  --  139*   AST (SGOT) U/L  --  12   ALT (SGPT) U/L  --  5   Estimated Creatinine Clearance: 25 mL/min (A) (by C-G formula based on SCr of 2.17 mg/dL (H)).    No results found for: \"AMMONIA\"      No results found for: \"BLOODCX\"  No results found for: \"URINECX\"  No results found for: \"WOUNDCX\"  No results found for: \"STOOLCX\"    I have personally looked at the labs and they are summarized above.  ----------------------------------------------------------------------------------------------------------------------  Imaging Results (Last 24 Hours)       Procedure Component Value Units Date/Time    XR Chest 1 View [[id number]] Collected: 04/12/25 1703     Updated: 04/12/25 1706    Narrative:      PROCEDURE: Portable chest x-ray examination performed on April 12, 2025.  Single view.     HISTORY: Chest pain. Weakness.     COMPARISON: None.     FINDINGS:     Enlarged heart size.  Mild atelectasis versus scarring in the lung bases.  Small right pleural effusion.  Degenerative disc disease throughout the thoracic spine.  Mild osteoarthritis at the glenohumeral joints  No free air in the upper abdomen.       Impression:         Enlarged heart size.  Small bands of scar versus atelectasis at the lung bases.  Small right pleural effusion.  Mild hypoinflated lungs.  No pneumothorax.           This report was finalized on 4/12/2025 5:04 PM by Ralf Anglin MD.             ----------------------------------------------------------------------------------------------------------------------  Assessment and Plan:  - Acute worsening of chronic anemia from GI bleed  -Melena recurrent, upper and lower endoscopy negative for source of bleeding  -Tobacco use disorder  -History of coronary " disease  -History of iron deficiency anemia  -Hyperlipidemia  -CKD stage IIIb-IV  -COPD without exacerbation  -Opponent elevation remained flat likely non-STEMI type II  -History of essential hypertension    Repeat CBC in the morning, discussed with surgery recommended to discuss case with Dr. Carvajal/GI tomorrow for possible arrangement with video capsule endoscopy.  In the meantime start diet, transition to oral PPI.  Activity as tolerated.      Disposition pending      Yaquelin Elliott MD  04/13/25  11:37 EDT

## 2025-04-13 NOTE — PLAN OF CARE
Goal Outcome Evaluation:  Patient is currently resting in bed. A&Ox4. VSS. 3 units of PRBC infused this shift. No visible s/s of acute distress noted at this time. No requests or complaints at this time. Plan of care ongoing.

## 2025-04-13 NOTE — PLAN OF CARE
Goal Outcome Evaluation:  Plan of Care Reviewed With: patient        Progress: improving     Patient has been resting this shift. No s/s of acute distress noted at this time. Plan of care ongoing.

## 2025-04-14 ENCOUNTER — READMISSION MANAGEMENT (OUTPATIENT)
Dept: CALL CENTER | Facility: HOSPITAL | Age: 70
End: 2025-04-14
Payer: MEDICARE

## 2025-04-14 VITALS
WEIGHT: 188.6 LBS | HEART RATE: 62 BPM | DIASTOLIC BLOOD PRESSURE: 65 MMHG | HEIGHT: 61 IN | BODY MASS INDEX: 35.61 KG/M2 | TEMPERATURE: 97.8 F | OXYGEN SATURATION: 95 % | RESPIRATION RATE: 20 BRPM | SYSTOLIC BLOOD PRESSURE: 135 MMHG

## 2025-04-14 LAB
POTASSIUM SERPL-SCNC: 3.6 MMOL/L (ref 3.5–5.2)
POTASSIUM SERPL-SCNC: 3.7 MMOL/L (ref 3.5–5.2)

## 2025-04-14 PROCEDURE — 97162 PT EVAL MOD COMPLEX 30 MIN: CPT

## 2025-04-14 PROCEDURE — 84132 ASSAY OF SERUM POTASSIUM: CPT | Performed by: STUDENT IN AN ORGANIZED HEALTH CARE EDUCATION/TRAINING PROGRAM

## 2025-04-14 PROCEDURE — 84132 ASSAY OF SERUM POTASSIUM: CPT

## 2025-04-14 PROCEDURE — 99239 HOSP IP/OBS DSCHRG MGMT >30: CPT | Performed by: STUDENT IN AN ORGANIZED HEALTH CARE EDUCATION/TRAINING PROGRAM

## 2025-04-14 RX ORDER — HYDRALAZINE HYDROCHLORIDE 50 MG/1
100 TABLET, FILM COATED ORAL 3 TIMES DAILY
Status: DISCONTINUED | OUTPATIENT
Start: 2025-04-14 | End: 2025-04-14 | Stop reason: HOSPADM

## 2025-04-14 RX ORDER — ATORVASTATIN CALCIUM 40 MG/1
80 TABLET, FILM COATED ORAL NIGHTLY
Status: DISCONTINUED | OUTPATIENT
Start: 2025-04-14 | End: 2025-04-14 | Stop reason: HOSPADM

## 2025-04-14 RX ORDER — PEG-3350, SODIUM SULFATE, SODIUM CHLORIDE, POTASSIUM CHLORIDE, SODIUM ASCORBATE AND ASCORBIC ACID 7.5-2.691G
1000 KIT ORAL
Status: DISCONTINUED | OUTPATIENT
Start: 2025-04-14 | End: 2025-04-14

## 2025-04-14 RX ORDER — HYDROCODONE BITARTRATE AND ACETAMINOPHEN 10; 325 MG/1; MG/1
1 TABLET ORAL EVERY 8 HOURS PRN
Status: DISCONTINUED | OUTPATIENT
Start: 2025-04-14 | End: 2025-04-14 | Stop reason: HOSPADM

## 2025-04-14 RX ORDER — GABAPENTIN 300 MG/1
300 CAPSULE ORAL EVERY 12 HOURS SCHEDULED
Status: DISCONTINUED | OUTPATIENT
Start: 2025-04-14 | End: 2025-04-14 | Stop reason: HOSPADM

## 2025-04-14 RX ORDER — FOLIC ACID 1 MG/1
1 TABLET ORAL DAILY
Status: DISCONTINUED | OUTPATIENT
Start: 2025-04-14 | End: 2025-04-14 | Stop reason: HOSPADM

## 2025-04-14 RX ORDER — POTASSIUM CHLORIDE 1500 MG/1
20 TABLET, EXTENDED RELEASE ORAL ONCE
Status: COMPLETED | OUTPATIENT
Start: 2025-04-14 | End: 2025-04-14

## 2025-04-14 RX ORDER — TORSEMIDE 20 MG/1
20 TABLET ORAL DAILY
Status: DISCONTINUED | OUTPATIENT
Start: 2025-04-14 | End: 2025-04-14 | Stop reason: HOSPADM

## 2025-04-14 RX ORDER — TORSEMIDE 20 MG/1
20 TABLET ORAL DAILY
Status: CANCELLED | OUTPATIENT
Start: 2025-04-14

## 2025-04-14 RX ORDER — PEG-3350, SODIUM SULFATE, SODIUM CHLORIDE, POTASSIUM CHLORIDE, SODIUM ASCORBATE AND ASCORBIC ACID 7.5-2.691G
1000 KIT ORAL
Status: DISCONTINUED | OUTPATIENT
Start: 2025-04-15 | End: 2025-04-14

## 2025-04-14 RX ORDER — POLYETHYLENE GLYCOL 3350 17 G/17G
17 POWDER, FOR SOLUTION ORAL ONCE
Status: DISCONTINUED | OUTPATIENT
Start: 2025-04-14 | End: 2025-04-14

## 2025-04-14 RX ORDER — AMLODIPINE BESYLATE 10 MG/1
10 TABLET ORAL DAILY
Status: DISCONTINUED | OUTPATIENT
Start: 2025-04-14 | End: 2025-04-14 | Stop reason: HOSPADM

## 2025-04-14 RX ADMIN — PANTOPRAZOLE SODIUM 40 MG: 40 TABLET, DELAYED RELEASE ORAL at 06:13

## 2025-04-14 RX ADMIN — HYDRALAZINE HYDROCHLORIDE 100 MG: 50 TABLET ORAL at 16:08

## 2025-04-14 RX ADMIN — SERTRALINE 50 MG: 50 TABLET, FILM COATED ORAL at 10:57

## 2025-04-14 RX ADMIN — POTASSIUM CHLORIDE 20 MEQ: 1500 TABLET, EXTENDED RELEASE ORAL at 09:18

## 2025-04-14 RX ADMIN — AMLODIPINE BESYLATE 10 MG: 10 TABLET ORAL at 10:57

## 2025-04-14 RX ADMIN — METOPROLOL TARTRATE 25 MG: 25 TABLET, FILM COATED ORAL at 09:12

## 2025-04-14 RX ADMIN — GABAPENTIN 300 MG: 300 CAPSULE ORAL at 10:57

## 2025-04-14 RX ADMIN — FOLIC ACID 1 MG: 1 TABLET ORAL at 10:57

## 2025-04-14 RX ADMIN — HYDRALAZINE HYDROCHLORIDE 100 MG: 50 TABLET ORAL at 10:55

## 2025-04-14 RX ADMIN — Medication 10 ML: at 09:12

## 2025-04-14 NOTE — PLAN OF CARE
Patient to be DC on this date. IV and telemetry box removed as ordered. Home O2 tank present at bedside, brought by family. Patient declines needs for home O2 supplies. Discharge planning went over with patient and family.

## 2025-04-14 NOTE — NURSING NOTE
Pt being discharged home today on baseline 2 ko Parekh RN made aware the discharge is complete. Family to provide transportation. RN to clarify portable oxygen tank.

## 2025-04-14 NOTE — DISCHARGE SUMMARY
[facility] HOSPITALISTS DISCHARGE SUMMARY    Patient Identification:  Name:  Lesley Michel  Age:  69 y.o.  Sex:  female  :  1955  MRN:  [id number]  Visit Number:  [id number]    Date of Admission: 2025  Date of Discharge:  2025     PCP: Woodrow Raymond APRN    DISCHARGE DIAGNOSIS  Acute on chronic anemia  History of iron deficiency anemia  Recurrent melena  Chronic tobacco use disorder  History of CAD  History of paroxysmal atrial fibrillation  Chronic anticoagulation with Eliquis  CKD stage IIIb  COPD, not in exacerbation  Hyperlipidemia  Hypertension    CONSULTS   None    PROCEDURES PERFORMED  None    HOSPITAL COURSE  Patient is a 69 y.o. female presented to [facility] complaining of shortness of breath, weakness, and fatigue.  Please see the admitting history and physical for further details.      Patient presenting to the hospital with above-noted complaints found to be acutely anemic.  Patient with recent hospitalization in mid March for recurrent acute on chronic anemia felt to be secondary to iron deficiency as well as possible GI bleeding but choosing to leave AMA during that hospitalization.  Patient notable for hemoglobin of 4.7 with hematocrit 75 decreased from previous AMA discharge in March when patient's hemoglobin was 8.3 with hematocrit 8.  Patient subsequently admitted to the hospitalist service for further evaluation and treatment.  Discussion with surgery regarding further evaluation interventions and recommended for avoidance of repeat upper and lower endoscopy as patient with recent intervention such as this that were negative and patient recommended for pill endoscopy.  Patient reports remaining on her home Eliquis since last admission and continuing to take.  Given recommendations by general surgery GI services were contacted regarding pill endoscopy and agreeable to perform here in hospital while patient admitted.  Patient receiving a total  of 3 units this admission with 2 units on 4/12 and the third unit on 4/13 with improvement in hemoglobin which remained stable throughout the course of her brief hospitalization with hemoglobin in the mid sevens and no recurrence of melena.  Unfortunately though patient having significant anxiety over swallowing endoscopic pill and refusing to proceed with this.  Long discussion was had with patient at bedside and continuing to refuse to do pill endoscopy.  As such discussion was had then of with holding her anticoagulation as in all of her prior situations including this 1 while anemic patient had continued to take her Eliquis and risk was felt to outweigh benefit given patient's reluctance/refusal to proceed with further endoscopic evaluation.  Did discuss referral for Watchman procedure so that patient could be permanently abstain from anticoagulation given negative workup today and patient refusing further offered evaluations and patient agreeable.  Given patient's hemoglobin remaining stable for approximately 48 hours posttransfusion and patient refusing to undergo further endoscopic evaluation and agreeable to withholding anticoagulation for now after discussing increased risk of stroke but patient remaining agreeable to follow-up with cardiology for referral and discussion of potential trial of resumption of Eliquis at that time she is felt to achieve maximal benefit of continued inpatient hospitalization and subsequently discharged home per her wishes.  Patient should be considered high risk for readmission for recurrent anemia given her unwillingness to proceed with further diagnostic evaluation while here in hospital.  She will follow-up with her PCP and cardiology postdischarge.      VITAL SIGNS:  Temp:  [97.8 °F (36.6 °C)-98.4 °F (36.9 °C)] 97.8 °F (36.6 °C)  Heart Rate:  [61-87] 62  Resp:  [18-20] 20  BP: (129-163)/(61-80) 135/65  SpO2:  [93 %-98 %] 95 %  on  Flow (L/min) (Oxygen Therapy):  [2] 2;    Device (Oxygen Therapy): nasal cannula    Body mass index is 35.64 kg/m².  Wt Readings from Last 3 Encounters:   04/14/25 85.5 kg (188 lb 9.6 oz)   03/17/25 82.1 kg (181 lb)   02/18/25 83.2 kg (183 lb 6.8 oz)       PHYSICAL EXAM:  Constitutional: Elderly adult female resting in bed, anxious, appears older than stated age      HENT:  Head:  Normocephalic and atraumatic.  Mouth:  Moist mucous membranes.    Eyes:  Conjunctivae and EOM are normal.  No scleral icterus.    Cardiovascular:  Normal rate, regular rhythm and normal heart sounds with no murmur.  Pulmonary/Chest:  No respiratory distress, no wheezes, no crackles, with normal breath sounds and good air movement.  Abdominal:  Soft.  Bowel sounds are normal.  No distension and no tenderness.   Musculoskeletal:  No tenderness, and no deformity.  No red or swollen joints anywhere.    Neurological:  Alert and oriented to person, place, and time.  No cranial nerve deficit.  No tongue deviation.  No facial droop.  No slurred speech.   Skin:  Skin is warm and dry. No rash noted. No pallor.   Peripheral vascular: Pulses in all 4 extremities with no clubbing, no cyanosis, no edema.    DISCHARGE DISPOSITION   Stable    DISCHARGE MEDICATIONS:     Discharge Medications        PAUSE taking these medications        Instructions Start Date   apixaban 5 MG tablet tablet  Wait to take this until your doctor or other care provider tells you to start again.  Hold until discussion with your Cardiologist or for at least 2 weeks, whichever comes first   Commonly known as: ELIQUIS   5 mg, 2 Times Daily             Continue These Medications        Instructions Start Date   amLODIPine 10 MG tablet  Commonly known as: NORVASC   10 mg, Daily      aspirin 81 MG chewable tablet   81 mg, Daily      atorvastatin 80 MG tablet  Commonly known as: LIPITOR   80 mg, Nightly      folic acid 1 MG tablet  Commonly known as: FOLVITE   1 mg, Daily      gabapentin 600 MG tablet  Commonly known as:  NEURONTIN   300 mg, Oral, 3 Times Daily PRN      hydrALAZINE 100 MG tablet  Commonly known as: APRESOLINE   100 mg, 3 Times Daily      HYDROcodone-acetaminophen  MG per tablet  Commonly known as: NORCO   1 tablet, Oral, Every 8 Hours PRN      metoprolol tartrate 25 MG tablet  Commonly known as: LOPRESSOR   25 mg, Oral, Daily      pantoprazole 40 MG EC tablet  Commonly known as: PROTONIX   40 mg, Daily      sertraline 50 MG tablet  Commonly known as: ZOLOFT   50 mg, Daily      Torsemide 40 MG tablet   20 mg, Daily             Stop These Medications      chlorthalidone 25 MG tablet  Commonly known as: HYGROTON                Additional Instructions for the Follow-ups that You Need to Schedule       Discharge Follow-up with PCP   As directed       Currently Documented PCP:    Woodrow Raymond APRN    PCP Phone Number:    [phone]     Follow Up Details: follow up by end of week with repeat CBC        Discharge Follow-up with Specialty: Cardiology; 2 Weeks   As directed      Specialty: Cardiology   Follow Up: 2 Weeks   Follow Up Details: anticoagulation on hold due to GI bleeding, needs watchmen referral               Follow-up Information       Woodrow Raymond APRN .    Specialty: Family Medicine  Why: follow up by end of week with repeat CBC  Contact information:  [facility] [id number]  [phone]                              TEST  RESULTS PENDING AT DISCHARGE  Pending Labs       Order Current Status    Potassium In process             CODE STATUS  Code Status and Medical Interventions: CPR (Attempt to Resuscitate); Full Support   Ordered at: 04/12/25 1710     Code Status (Patient has no pulse and is not breathing):    CPR (Attempt to Resuscitate)     Medical Interventions (Patient has pulse or is breathing):    Full Support       Jay Jay Pritchett DO  [place] Hospitalist  04/14/25  16:37 EDT    Please note that this discharge summary required more than 30 minutes to complete.

## 2025-04-14 NOTE — THERAPY EVALUATION
Acute Care - Physical Therapy Initial Evaluation   [facility]     Patient Name: Lesley Michel  : 1955  MRN: [id number]  Today's Date: 2025   Onset of Illness/Injury or Date of Surgery: 25  Visit Dx:     ICD-10-CM ICD-9-CM   1. Anemia, unspecified type  D64.9 285.9   2. Melena  K92.1 578.1     Patient Active Problem List   Diagnosis    Hypertension    Hyperlipidemia    Migraine without aura    CAD, chronically occluded collateralized dominant RCA, status post drug-eluting stent to LAD , high-grade stenosis of the ostium of the small D1 and 60% mid RCA posterior lateral branch    Chronic renal failure, stage 2 (mild)    Tobacco use    Bilateral lower extremity edema    Other specified hypothyroidism    Cigarette smoker    Class 3 severe obesity without serious comorbidity with body mass index (BMI) of 40.0 to 44.9 in adult    Acute hypoxic respiratory failure    Acute renal failure (ARF)    Acute on chronic anemia    Gastrointestinal hemorrhage    GI bleed     Past Medical History:   Diagnosis Date    Anxiety     Arthritis     CHF (congestive heart failure)     Cigarette smoker 02/10/2022    CKD (chronic kidney disease) stage 4, GFR 15-29 ml/min     MAICO on top of CKD IIIa, HD from 10/23-, some recovery to CKD IV    COPD (chronic obstructive pulmonary disease)     Coronary artery disease     Elevated cholesterol     GERD (gastroesophageal reflux disease)     H/O heart artery stent     History of transfusion     Hyperlipidemia     Hypertension     hypothyroidism     Obesity     PAF (paroxysmal atrial fibrillation)     PVD (peripheral vascular disease)     x2 stents     Past Surgical History:   Procedure Laterality Date    ANKLE SURGERY      RIGHT    APPENDECTOMY      CARDIAC CATHETERIZATION      LEFT     SECTION      CHOLECYSTECTOMY N/A 2025    Procedure: CHOLECYSTECTOMY LAPAROSCOPIC WITH DAVINCI ROBOT;  Surgeon: Windy Neal MD;  Location: [facility];  Service: Robotics -  [place];  Laterality: N/A;    COLONOSCOPY N/A 3/17/2025    Procedure: COLONOSCOPY;  Surgeon: Luis Antonio Jiang MD;  Location: [facility] OR;  Service: Gastroenterology;  Laterality: N/A;    CORONARY STENT PLACEMENT      ENDOSCOPY N/A 3/15/2025    Procedure: ESOPHAGOGASTRODUODENOSCOPY;  Surgeon: Luis Antonio Jiang MD;  Location: [facility] OR;  Service: Gastroenterology;  Laterality: N/A;    HYSTERECTOMY      INSERTION HEMODIALYSIS CATHETER Right 10/19/2023    Procedure: HEMODIALYSIS CATHETER INSERTION;  Surgeon: Bartolome Schwartz MD;  Location: [facility] OR;  Service: General;  Laterality: Right;     PT Assessment (Last 12 Hours)       PT Evaluation and Treatment       Row Name 04/14/25 1533          Physical Therapy Time and Intention    Document Type evaluation  -     Mode of Treatment physical therapy  -     Patient Effort good  -KP       Row Name 04/14/25 1533          General Information    Patient Profile Reviewed yes  -     Onset of Illness/Injury or Date of Surgery 04/12/25  -     Referring Physician Oculam  -     Patient Observations alert;cooperative;agree to therapy  -     Patient/Family/Caregiver Comments/Observations Patient reports she wants to go home and is going to go home today.  RN is aware.  -     Prior Level of Function independent:;all household mobility;gait;transfer;bed mobility  -     Equipment Currently Used at Home none  -     Existing Precautions/Restrictions fall;oxygen therapy device and L/min  -     Limitations/Impairments safety/cognitive  -       Row Name 04/14/25 1533          Previous Level of Function/Home Environm    Bed Mobility, Previous Functional Level independent  -     Transfers, Previous Functional Level independent  -     Household Ambulation, Previous Functional Level independent  -     Stairs, Previous Functional Level independent  -     Community Ambulation, Previous Functional Level independent;other (see comments)  Pt reports she was able to  ambulate in stores and still drives herself  -[facility] Name 04/14/25 1533          Living Environment    Current Living Arrangements home  -     Home Accessibility stairs to enter home  -     People in Home child(devan), adult;grandchild(devan)  -[facility] Name 04/14/25 1533          Home Main Entrance    Number of Stairs, Main Entrance five  -     Stair Railings, Main Entrance railing on left side (ascending)  -[facility] Name 04/14/25 1533          Pain    Additional Documentation Pain Scale: FACES Pre/Post-Treatment (Group)  -[facility] Name 04/14/25 1533          Pain Scale: FACES Pre/Post-Treatment    Pain: FACES Scale, Pretreatment 0-->no hurt  -     Posttreatment Pain Rating 2-->hurts little bit  -     Pre/Posttreatment Pain Comment Pt reports OA in knee's and presents with antalgic gait.  She reports this is normal for her.  She was unsteady during gait and reaching for the wall and furniture without AD.  She performed much better with a FWW and was educated on safety and using AD at home.  She said she would be agreeable to this.  -[facility] Name 04/14/25 1533          Cognition    Affect/Mental Status (Cognition) [facility]     Orientation Status (Cognition) oriented x 3  -     Follows Commands (Cognition) [facility]     Personal Safety Interventions fall prevention program maintained;gait belt;nonskid shoes/slippers when out of bed;supervised activity  -[facility] Name 04/14/25 1533          Range of Motion (ROM)    Range of Motion ROM is L  [facility] Name 04/14/25 1533          Bed Mobility    Bed Mobility supine-sit;sit-supine  -     Supine-Sit [place] (Bed Mobility) standby assist  -     Assistive Device (Bed Mobility) bed rails  -[facility] Name 04/14/25 1533          Transfers    Transfers sit-stand transfer;stand-sit transfer  -[facility] Name 04/14/25 1533          Sit-Stand Transfer    Sit-Stand [place] (Transfers) contact guard;1 person assist;verbal  cues;other (see comments)  minor unsteadiness without AD - would benefit from using one  -       Row Name 04/14/25 1533          Stand-Sit Transfer    Stand-Sit [place] (Transfers) contact guard;1 person assist;verbal cues  -     Assistive Device (Stand-Sit Transfers) walker, front-wheeled  -       Row Name 04/14/25 1533          Gait/Stairs (Locomotion)    [place] Level (Gait) contact guard;minimum assist (75% patient effort);1 person assist;verbal cues;other (see comments)  CGA with FWW and German/CGA without FWW and patient raeching for the wall and furniture to steady herself.  -     Assistive Device (Gait) walker, front-wheeled  -     Patient was able to Ambulate yes  -KP     Distance in Feet (Gait) 75  -KP     Pattern (Gait) step-to;step-through  -KP     Deviations/Abnormal Patterns (Gait) antalgic;umberto decreased;base of support, narrow;festinating/shuffling;gait speed decreased;stride length decreased;weight shifting decreased  -     Bilateral Gait Deviations forward flexed posture  -       Row Name 04/14/25 1533          Balance    Balance Assessment sitting static balance;sitting dynamic balance;standing static balance;standing dynamic balance  -     Static Sitting Balance independent  -     Dynamic Sitting Balance independent  -     Position, Sitting Balance sitting edge of bed  -     Static Standing Balance minimal assist;1-person assist  -       Row Name 04/14/25 1538          Plan of Care Review    Plan of Care Reviewed With patient  -     Outcome Evaluation PT evaluation completed.  Patient presents with very antalgic gait and decreased dynamic standing balance.  She reports she did not use an AD prior.  She was educated on using a walker upon return home for safety and to improve gait pattern.  She will continue to benefit from skilled PT services this admission.  -       Row Name 04/14/25 1536          Positioning and Restraints    Pre-Treatment Position in bed   -     Post Treatment Position bed  -KP     In Bed notified nsg;sitting EOB;call light within reach;encouraged to call for assist;side rails up x3  Nursing is aware bed alarm is not on.  Patient declined bed alarm.  -       Row Name 04/14/25 1533          Therapy Assessment/Plan (PT)    Patient/Family Therapy Goals Statement (PT) Patient wants to return home today  -     Functional Level at Time of Evaluation (PT) CGA for mobility with FWW, German without  -     PT Diagnosis (PT) decreased dynamic standing balance, gait deviations  -     Rehab Potential (PT) fair  -     Criteria for Skilled Interventions Met (PT) yes;meets criteria;skilled treatment is necessary  -     Therapy Frequency (PT) 2 times/wk  -     Predicted Duration of Therapy Intervention (PT) 2 wks  -     Problem List (PT) problems related to;balance;mobility;strength  -     Activity Limitations Related to Problem List (PT) unable to transfer safely;unable to ambulate safely  -       Row Name 04/14/25 1533          PT Evaluation Complexity    History, PT Evaluation Complexity 3 or more personal factors and/or comorbidities  -     Examination of Body Systems (PT Eval Complexity) total of 3 or more elements  -     Clinical Presentation (PT Evaluation Complexity) evolving  -     Clinical Decision Making (PT Evaluation Complexity) moderate complexity  -     Overall Complexity (PT Evaluation Complexity) moderate complexity  -       Row Name 04/14/25 153          Physical Therapy Goals    Transfer Goal Selection (PT) transfer, PT goal 1  -     Gait Training Goal Selection (PT) gait training, PT goal 1  -       Row Name 04/14/25 1533          Transfer Goal 1 (PT)    Activity/Assistive Device (Transfer Goal 1, PT) transfers, all;walker, rolling  -     [place] Level/Cues Needed (Transfer Goal 1, PT) modified independence  -     Time Frame (Transfer Goal 1, PT) long term goal (LTG);by discharge  -       Row Name  04/14/25 1533          Gait Training Goal 1 (PT)    Activity/Assistive Device (Gait Training Goal 1, PT) gait (walking locomotion);assistive device use;walker, rolling  -KP     [place] Level (Gait Training Goal 1, PT) modified independence  -KP     Distance (Gait Training Goal 1, PT) 200  -KP     Time Frame (Gait Training Goal 1, PT) long term goal (LTG);by discharge  -               User Key  (r) = Recorded By, (t) = Taken By, (c) = Cosigned By      Initials Name Provider Type    Jayne Olivarez PT Physical Therapist                    Physical Therapy Education       Title: PT OT SLP Therapies (Not Started)       Topic: Physical Therapy (Not Started)       Point: Mobility training (Not Started)       Learner Progress:  Not documented in this visit.              Point: Home exercise program (Not Started)       Learner Progress:  Not documented in this visit.              Point: Body mechanics (Not Started)       Learner Progress:  Not documented in this visit.              Point: Precautions (Not Started)       Learner Progress:  Not documented in this visit.                                  PT Recommendation and Plan  Planned Therapy Interventions (PT): bed mobility training, balance training, gait training, home exercise program, neuromuscular re-education, patient/family education, stair training, strengthening, stretching, transfer training  Therapy Frequency (PT): 2 times/wk  Plan of Care Reviewed With: patient  Outcome Evaluation: PT evaluation completed.  Patient presents with very antalgic gait and decreased dynamic standing balance.  She reports she did not use an AD prior.  She was educated on using a walker upon return home for safety and to improve gait pattern.  She will continue to benefit from skilled PT services this admission.       Time Calculation:    PT Charges       Row Name 04/14/25 2713             Time Calculation    PT Received On 04/14/25  -      PT Goal Re-Cert Due Date  04/28/25  -                User Key  (r) = Recorded By, (t) = Taken By, (c) = Cosigned By      Initials Name Provider Type    Jayne Olivarez, PT Physical Therapist                  Therapy Charges for Today       Code Description Service Date Service Provider Modifiers Qty    61344440027 HC PT EVAL MOD COMPLEXITY 4 4/14/2025 Jayne Steiner, PT GP 1            PT G-Codes  AM-PAC 6 Clicks Score (PT): 22    Jayne Steiner PT  4/14/2025

## 2025-04-14 NOTE — PROGRESS NOTES
Adult Nutrition  Assessment/PES    Patient Name:  Lesley Michel  YOB: 1955  MRN: [id number]  Admit Date:  4/12/2025    Assessment Date:  4/14/2025    Comments:  MST    Pt on clears today    Adv diet as indicated    Will cont to follow and monitor.      Reason for Assessment       Row Name 04/14/25 150          Reason for Assessment    Reason For Assessment identified at risk by screening criteria  [place], [place]     Diagnosis gastrointestinal disease;pulmonary disease;renal disease  GIB-Anemia, CKD, COPD , CAD     Identified At Risk by Screening Criteria MST SCORE 2+                    Nutrition/Diet History       Row Name 04/14/25 1501          Nutrition/Diet History    Typical Intake (Food/Fluid/EN/PN) called to room, no answer                    Labs/Tests/Procedures/Meds       Row Name 04/14/25 1506          Labs/Procedures/Meds    Lab Results Reviewed reviewed     Lab Results Comments glu 120 H, BUn 46/Creat 2.5 H        Diagnostic Tests/Procedures    Diagnostic Test/Procedure Reviewed reviewed        Medications    Pertinent Medications Reviewed reviewed     Pertinent Medications Comments folic acid, miralax                      Estimated/Assessed Needs - Anthropometrics       Row Name 04/14/25 1508          Anthropometrics    Calculation Weight 85.5 kg (188 lb 7.9 oz)        Estimated/Assessed Needs    Additional Documentation Estimated Calorie Needs (Group);Fluid Requirements (Group);Protein Requirements (Group)        Estimated Calorie Needs    Estimated Calorie Require (kcal/day) 8711-1749 kcal ( [place] 1.2-1.4)     Estimated Calorie Need Method [facility]        Protein Requirements    Est Protein Requirement Amount (gms/kg) 0.8 gm protein  68-86 gm pro (.8-1 gm/kg pro)        Fluid Requirements    Estimated Fluid Requirement Method RDA Method  1959-7423 ml                    Nutrition Prescription Ordered       Row Name 04/14/25 1507          Nutrition Prescription PO     Current PO Diet Clear Liquid                    Evaluation of Received Nutrient/Fluid Intake       Row Name 04/14/25 1508          Fluid Intake Evaluation    Oral Fluid (mL) 690  insufficient data        PO Evaluation    Number of Days PO Intake Evaluated Insufficient Data                       Problem/Interventions:   Problem 1       Row Name 04/14/25 1509          Nutrition Diagnoses Problem 1    Problem 1 Other (comment)  Altered gi tract function related to GIB as evidenced by clears and score.                          Intervention Goal       Row Name 04/14/25 1511          Intervention Goal    General Meet nutritional needs for age/condition     PO Establish PO;PO intake (%)     PO Intake % 50 %                    Nutrition Intervention       Row Name 04/14/25 1511          Nutrition Intervention    RD/Tech Action Follow Tx progress                      Education/Evaluation       Row Name 04/14/25 1511          Education    Education Education not appropriate at this time        Monitor/Evaluation    Monitor Per protocol;I&O;PO intake;Pertinent labs;Weight                     Electronically signed by:  Vickie Amador RD  04/14/25 15:12 EDT

## 2025-04-14 NOTE — OUTREACH NOTE
Prep Survey      Flowsheet Row Responses   [facility] facility patient discharged from? [facility]   Is LACE score < 7 ? No   Eligibility Readm Mgmt   Discharge diagnosis GI bleed   Does the patient have one of the following disease processes/diagnoses(primary or secondary)? Other   Prep survey completed? Yes            Maria Isabel GONGORA - Registered Nurse

## 2025-04-15 ENCOUNTER — HOSPITAL ENCOUNTER (OUTPATIENT)
Facility: HOSPITAL | Age: 70
LOS: 1 days | Discharge: HOME | End: 2025-04-16
Attending: STUDENT IN AN ORGANIZED HEALTH CARE EDUCATION/TRAINING PROGRAM | Admitting: INTERNAL MEDICINE
Payer: MEDICARE

## 2025-04-15 ENCOUNTER — APPOINTMENT (OUTPATIENT)
Dept: GENERAL RADIOLOGY | Facility: HOSPITAL | Age: 70
End: 2025-04-15
Payer: MEDICARE

## 2025-04-15 ENCOUNTER — READMISSION MANAGEMENT (OUTPATIENT)
Dept: CALL CENTER | Facility: HOSPITAL | Age: 70
End: 2025-04-15
Payer: MEDICARE

## 2025-04-15 DIAGNOSIS — I47.10 SVT (SUPRAVENTRICULAR TACHYCARDIA): Primary | ICD-10-CM

## 2025-04-15 LAB
ALBUMIN SERPL-MCNC: 3.7 G/DL (ref 3.5–5.2)
ALBUMIN/GLOB SERPL: 1.1 G/DL
ALP SERPL-CCNC: 188 U/L (ref 39–117)
ALT SERPL W P-5'-P-CCNC: 8 U/L (ref 1–33)
ANION GAP SERPL CALCULATED.3IONS-SCNC: 12.5 MMOL/L (ref 5–15)
ANION GAP SERPL CALCULATED.3IONS-SCNC: 12.9 MMOL/L (ref 5–15)
ANISOCYTOSIS BLD QL: NORMAL
AST SERPL-CCNC: 18 U/L (ref 1–32)
BASOPHILS # BLD AUTO: 0.06 10*3/MM3 (ref 0–0.2)
BASOPHILS # BLD AUTO: 0.09 10*3/MM3 (ref 0–0.2)
BASOPHILS NFR BLD AUTO: 0.8 % (ref 0–1.5)
BASOPHILS NFR BLD AUTO: 1.2 % (ref 0–1.5)
BILIRUB SERPL-MCNC: 0.4 MG/DL (ref 0–1.2)
BUN SERPL-MCNC: 31 MG/DL (ref 8–23)
BUN SERPL-MCNC: 35 MG/DL (ref 8–23)
BUN/CREAT SERPL: 15.6 (ref 7–25)
BUN/CREAT SERPL: 17.5 (ref 7–25)
CALCIUM SPEC-SCNC: 8.7 MG/DL (ref 8.6–10.5)
CALCIUM SPEC-SCNC: 9.1 MG/DL (ref 8.6–10.5)
CHLORIDE SERPL-SCNC: 95 MMOL/L (ref 98–107)
CHLORIDE SERPL-SCNC: 99 MMOL/L (ref 98–107)
CO2 SERPL-SCNC: 25.5 MMOL/L (ref 22–29)
CO2 SERPL-SCNC: 26.1 MMOL/L (ref 22–29)
CREAT SERPL-MCNC: 1.99 MG/DL (ref 0.57–1)
CREAT SERPL-MCNC: 2 MG/DL (ref 0.57–1)
DEPRECATED RDW RBC AUTO: 58.5 FL (ref 37–54)
DEPRECATED RDW RBC AUTO: 60.4 FL (ref 37–54)
EGFRCR SERPLBLD CKD-EPI 2021: 26.9 ML/MIN/1.73
EGFRCR SERPLBLD CKD-EPI 2021: 27.1 ML/MIN/1.73
ELLIPTOCYTES BLD QL SMEAR: NORMAL
EOSINOPHIL # BLD AUTO: 0.31 10*3/MM3 (ref 0–0.4)
EOSINOPHIL # BLD AUTO: 0.4 10*3/MM3 (ref 0–0.4)
EOSINOPHIL NFR BLD AUTO: 4 % (ref 0.3–6.2)
EOSINOPHIL NFR BLD AUTO: 5.3 % (ref 0.3–6.2)
ERYTHROCYTE [DISTWIDTH] IN BLOOD BY AUTOMATED COUNT: 19.9 % (ref 12.3–15.4)
ERYTHROCYTE [DISTWIDTH] IN BLOOD BY AUTOMATED COUNT: 20.3 % (ref 12.3–15.4)
GEN 5 1HR TROPONIN T REFLEX: 50 NG/L
GLOBULIN UR ELPH-MCNC: 3.3 GM/DL
GLUCOSE SERPL-MCNC: 102 MG/DL (ref 65–99)
GLUCOSE SERPL-MCNC: 121 MG/DL (ref 65–99)
HCT VFR BLD AUTO: 24.9 % (ref 34–46.6)
HCT VFR BLD AUTO: 27.3 % (ref 34–46.6)
HGB BLD-MCNC: 7.1 G/DL (ref 12–15.9)
HGB BLD-MCNC: 7.8 G/DL (ref 12–15.9)
HOLD SPECIMEN: NORMAL
HOLD SPECIMEN: NORMAL
HYPOCHROMIA BLD QL: NORMAL
IMM GRANULOCYTES # BLD AUTO: 0.03 10*3/MM3 (ref 0–0.05)
IMM GRANULOCYTES # BLD AUTO: 0.04 10*3/MM3 (ref 0–0.05)
IMM GRANULOCYTES NFR BLD AUTO: 0.4 % (ref 0–0.5)
IMM GRANULOCYTES NFR BLD AUTO: 0.5 % (ref 0–0.5)
LYMPHOCYTES # BLD AUTO: 1.12 10*3/MM3 (ref 0.7–3.1)
LYMPHOCYTES # BLD AUTO: 1.7 10*3/MM3 (ref 0.7–3.1)
LYMPHOCYTES NFR BLD AUTO: 14.3 % (ref 19.6–45.3)
LYMPHOCYTES NFR BLD AUTO: 22.6 % (ref 19.6–45.3)
MAGNESIUM SERPL-MCNC: 2.2 MG/DL (ref 1.6–2.4)
MCH RBC QN AUTO: 23.1 PG (ref 26.6–33)
MCH RBC QN AUTO: 23.4 PG (ref 26.6–33)
MCHC RBC AUTO-ENTMCNC: 28.5 G/DL (ref 31.5–35.7)
MCHC RBC AUTO-ENTMCNC: 28.6 G/DL (ref 31.5–35.7)
MCV RBC AUTO: 80.8 FL (ref 79–97)
MCV RBC AUTO: 81.9 FL (ref 79–97)
MONOCYTES # BLD AUTO: 0.78 10*3/MM3 (ref 0.1–0.9)
MONOCYTES # BLD AUTO: 0.81 10*3/MM3 (ref 0.1–0.9)
MONOCYTES NFR BLD AUTO: 10 % (ref 5–12)
MONOCYTES NFR BLD AUTO: 10.8 % (ref 5–12)
NEUTROPHILS NFR BLD AUTO: 4.53 10*3/MM3 (ref 1.7–7)
NEUTROPHILS NFR BLD AUTO: 5.48 10*3/MM3 (ref 1.7–7)
NEUTROPHILS NFR BLD AUTO: 60.1 % (ref 42.7–76)
NEUTROPHILS NFR BLD AUTO: 70 % (ref 42.7–76)
NRBC BLD AUTO-RTO: 0.5 /100 WBC (ref 0–0.2)
NRBC BLD AUTO-RTO: 0.5 /100 WBC (ref 0–0.2)
PLAT MORPH BLD: NORMAL
PLATELET # BLD AUTO: 369 10*3/MM3 (ref 140–450)
PLATELET # BLD AUTO: 374 10*3/MM3 (ref 140–450)
PMV BLD AUTO: 10.1 FL (ref 6–12)
PMV BLD AUTO: 10.6 FL (ref 6–12)
POLYCHROMASIA BLD QL SMEAR: NORMAL
POTASSIUM SERPL-SCNC: 3.4 MMOL/L (ref 3.5–5.2)
POTASSIUM SERPL-SCNC: 4.1 MMOL/L (ref 3.5–5.2)
POTASSIUM SERPL-SCNC: 4.1 MMOL/L (ref 3.5–5.2)
PROT SERPL-MCNC: 7 G/DL (ref 6–8.5)
QT INTERVAL: 266 MS
QT INTERVAL: 406 MS
QTC INTERVAL: 410 MS
QTC INTERVAL: 425 MS
RBC # BLD AUTO: 3.04 10*6/MM3 (ref 3.77–5.28)
RBC # BLD AUTO: 3.38 10*6/MM3 (ref 3.77–5.28)
SODIUM SERPL-SCNC: 134 MMOL/L (ref 136–145)
SODIUM SERPL-SCNC: 137 MMOL/L (ref 136–145)
TROPONIN T % DELTA: 4
TROPONIN T NUMERIC DELTA: 2 NG/L
TROPONIN T SERPL HS-MCNC: 48 NG/L
WBC NRBC COR # BLD AUTO: 7.53 10*3/MM3 (ref 3.4–10.8)
WBC NRBC COR # BLD AUTO: 7.82 10*3/MM3 (ref 3.4–10.8)
WHOLE BLOOD HOLD COAG: NORMAL
WHOLE BLOOD HOLD SPECIMEN: NORMAL

## 2025-04-15 PROCEDURE — 93005 ELECTROCARDIOGRAM TRACING: CPT | Performed by: STUDENT IN AN ORGANIZED HEALTH CARE EDUCATION/TRAINING PROGRAM

## 2025-04-15 PROCEDURE — 92960 CARDIOVERSION ELECTRIC EXT: CPT

## 2025-04-15 PROCEDURE — 71045 X-RAY EXAM CHEST 1 VIEW: CPT | Performed by: RADIOLOGY

## 2025-04-15 PROCEDURE — 25010000002 HEPARIN (PORCINE) PER 1000 UNITS: Performed by: INTERNAL MEDICINE

## 2025-04-15 PROCEDURE — 99291 CRITICAL CARE FIRST HOUR: CPT

## 2025-04-15 PROCEDURE — 25010000002 MIDAZOLAM PER 1 MG: Performed by: STUDENT IN AN ORGANIZED HEALTH CARE EDUCATION/TRAINING PROGRAM

## 2025-04-15 PROCEDURE — 25010000002 AMIODARONE IN DEXTROSE 5% 360-4.14 MG/200ML-% SOLUTION: Performed by: STUDENT IN AN ORGANIZED HEALTH CARE EDUCATION/TRAINING PROGRAM

## 2025-04-15 PROCEDURE — 96376 TX/PRO/DX INJ SAME DRUG ADON: CPT

## 2025-04-15 PROCEDURE — 93010 ELECTROCARDIOGRAM REPORT: CPT | Performed by: INTERNAL MEDICINE

## 2025-04-15 PROCEDURE — 84484 ASSAY OF TROPONIN QUANT: CPT | Performed by: STUDENT IN AN ORGANIZED HEALTH CARE EDUCATION/TRAINING PROGRAM

## 2025-04-15 PROCEDURE — 80061 LIPID PANEL: CPT | Performed by: NURSE PRACTITIONER

## 2025-04-15 PROCEDURE — 83735 ASSAY OF MAGNESIUM: CPT | Performed by: INTERNAL MEDICINE

## 2025-04-15 PROCEDURE — 93005 ELECTROCARDIOGRAM TRACING: CPT | Performed by: INTERNAL MEDICINE

## 2025-04-15 PROCEDURE — 25810000003 SODIUM CHLORIDE 0.9 % SOLUTION: Performed by: STUDENT IN AN ORGANIZED HEALTH CARE EDUCATION/TRAINING PROGRAM

## 2025-04-15 PROCEDURE — 84439 ASSAY OF FREE THYROXINE: CPT | Performed by: NURSE PRACTITIONER

## 2025-04-15 PROCEDURE — 96372 THER/PROPH/DIAG INJ SC/IM: CPT

## 2025-04-15 PROCEDURE — 96375 TX/PRO/DX INJ NEW DRUG ADDON: CPT

## 2025-04-15 PROCEDURE — 84443 ASSAY THYROID STIM HORMONE: CPT | Performed by: NURSE PRACTITIONER

## 2025-04-15 PROCEDURE — 25010000002 AMIODARONE IN DEXTROSE 5% 150-4.21 MG/100ML-% SOLUTION: Performed by: STUDENT IN AN ORGANIZED HEALTH CARE EDUCATION/TRAINING PROGRAM

## 2025-04-15 PROCEDURE — 36415 COLL VENOUS BLD VENIPUNCTURE: CPT | Performed by: INTERNAL MEDICINE

## 2025-04-15 PROCEDURE — 84132 ASSAY OF SERUM POTASSIUM: CPT | Performed by: INTERNAL MEDICINE

## 2025-04-15 PROCEDURE — 99223 1ST HOSP IP/OBS HIGH 75: CPT

## 2025-04-15 PROCEDURE — 99285 EMERGENCY DEPT VISIT HI MDM: CPT

## 2025-04-15 PROCEDURE — 85007 BL SMEAR W/DIFF WBC COUNT: CPT | Performed by: INTERNAL MEDICINE

## 2025-04-15 PROCEDURE — 71045 X-RAY EXAM CHEST 1 VIEW: CPT

## 2025-04-15 PROCEDURE — 85025 COMPLETE CBC W/AUTO DIFF WBC: CPT | Performed by: INTERNAL MEDICINE

## 2025-04-15 PROCEDURE — 25010000002 ADENOSINE PER 6 MG: Performed by: STUDENT IN AN ORGANIZED HEALTH CARE EDUCATION/TRAINING PROGRAM

## 2025-04-15 PROCEDURE — 96365 THER/PROPH/DIAG IV INF INIT: CPT

## 2025-04-15 PROCEDURE — 80053 COMPREHEN METABOLIC PANEL: CPT | Performed by: STUDENT IN AN ORGANIZED HEALTH CARE EDUCATION/TRAINING PROGRAM

## 2025-04-15 PROCEDURE — 85025 COMPLETE CBC W/AUTO DIFF WBC: CPT | Performed by: STUDENT IN AN ORGANIZED HEALTH CARE EDUCATION/TRAINING PROGRAM

## 2025-04-15 RX ORDER — ADENOSINE 3 MG/ML
12 INJECTION, SOLUTION INTRAVENOUS ONCE
Status: COMPLETED | OUTPATIENT
Start: 2025-04-15 | End: 2025-04-15

## 2025-04-15 RX ORDER — SODIUM CHLORIDE 9 MG/ML
40 INJECTION, SOLUTION INTRAVENOUS AS NEEDED
Status: DISCONTINUED | OUTPATIENT
Start: 2025-04-15 | End: 2025-04-16 | Stop reason: HOSPADM

## 2025-04-15 RX ORDER — NITROGLYCERIN 0.4 MG/1
0.4 TABLET SUBLINGUAL
Status: DISCONTINUED | OUTPATIENT
Start: 2025-04-15 | End: 2025-04-16 | Stop reason: HOSPADM

## 2025-04-15 RX ORDER — HEPARIN SODIUM 5000 [USP'U]/ML
5000 INJECTION, SOLUTION INTRAVENOUS; SUBCUTANEOUS EVERY 8 HOURS SCHEDULED
Status: DISCONTINUED | OUTPATIENT
Start: 2025-04-15 | End: 2025-04-16 | Stop reason: HOSPADM

## 2025-04-15 RX ORDER — HYDRALAZINE HYDROCHLORIDE 50 MG/1
100 TABLET, FILM COATED ORAL 3 TIMES DAILY
Status: DISCONTINUED | OUTPATIENT
Start: 2025-04-15 | End: 2025-04-16 | Stop reason: HOSPADM

## 2025-04-15 RX ORDER — GABAPENTIN 300 MG/1
300 CAPSULE ORAL 3 TIMES DAILY PRN
Status: DISCONTINUED | OUTPATIENT
Start: 2025-04-15 | End: 2025-04-16 | Stop reason: HOSPADM

## 2025-04-15 RX ORDER — SODIUM CHLORIDE 0.9 % (FLUSH) 0.9 %
10 SYRINGE (ML) INJECTION EVERY 12 HOURS SCHEDULED
Status: DISCONTINUED | OUTPATIENT
Start: 2025-04-15 | End: 2025-04-16 | Stop reason: HOSPADM

## 2025-04-15 RX ORDER — METOPROLOL TARTRATE 25 MG/1
25 TABLET, FILM COATED ORAL EVERY 12 HOURS SCHEDULED
Status: DISCONTINUED | OUTPATIENT
Start: 2025-04-15 | End: 2025-04-16 | Stop reason: HOSPADM

## 2025-04-15 RX ORDER — BISACODYL 10 MG
10 SUPPOSITORY, RECTAL RECTAL DAILY PRN
Status: DISCONTINUED | OUTPATIENT
Start: 2025-04-15 | End: 2025-04-16 | Stop reason: HOSPADM

## 2025-04-15 RX ORDER — METOPROLOL TARTRATE 25 MG/1
25 TABLET, FILM COATED ORAL DAILY
Status: CANCELLED | OUTPATIENT
Start: 2025-04-16

## 2025-04-15 RX ORDER — DILTIAZEM HYDROCHLORIDE 5 MG/ML
20 INJECTION INTRAVENOUS ONCE
Status: COMPLETED | OUTPATIENT
Start: 2025-04-15 | End: 2025-04-15

## 2025-04-15 RX ORDER — PANTOPRAZOLE SODIUM 40 MG/1
40 TABLET, DELAYED RELEASE ORAL
Status: DISCONTINUED | OUTPATIENT
Start: 2025-04-16 | End: 2025-04-16 | Stop reason: HOSPADM

## 2025-04-15 RX ORDER — HYDRALAZINE HYDROCHLORIDE 50 MG/1
100 TABLET, FILM COATED ORAL 3 TIMES DAILY
Status: CANCELLED | OUTPATIENT
Start: 2025-04-15

## 2025-04-15 RX ORDER — POLYETHYLENE GLYCOL 3350 17 G/17G
17 POWDER, FOR SOLUTION ORAL DAILY PRN
Status: DISCONTINUED | OUTPATIENT
Start: 2025-04-15 | End: 2025-04-16 | Stop reason: HOSPADM

## 2025-04-15 RX ORDER — ATORVASTATIN CALCIUM 40 MG/1
80 TABLET, FILM COATED ORAL NIGHTLY
Status: DISCONTINUED | OUTPATIENT
Start: 2025-04-15 | End: 2025-04-16 | Stop reason: HOSPADM

## 2025-04-15 RX ORDER — POTASSIUM CHLORIDE 1500 MG/1
20 TABLET, EXTENDED RELEASE ORAL ONCE
Status: COMPLETED | OUTPATIENT
Start: 2025-04-15 | End: 2025-04-15

## 2025-04-15 RX ORDER — DILTIAZEM HYDROCHLORIDE 5 MG/ML
INJECTION INTRAVENOUS
Status: COMPLETED
Start: 2025-04-15 | End: 2025-04-15

## 2025-04-15 RX ORDER — FOLIC ACID 1 MG/1
1 TABLET ORAL DAILY
Status: DISCONTINUED | OUTPATIENT
Start: 2025-04-16 | End: 2025-04-16 | Stop reason: HOSPADM

## 2025-04-15 RX ORDER — SODIUM CHLORIDE 0.9 % (FLUSH) 0.9 %
10 SYRINGE (ML) INJECTION AS NEEDED
Status: DISCONTINUED | OUTPATIENT
Start: 2025-04-15 | End: 2025-04-16 | Stop reason: HOSPADM

## 2025-04-15 RX ORDER — BISACODYL 5 MG/1
5 TABLET, DELAYED RELEASE ORAL DAILY PRN
Status: DISCONTINUED | OUTPATIENT
Start: 2025-04-15 | End: 2025-04-16 | Stop reason: HOSPADM

## 2025-04-15 RX ORDER — HYDROCODONE BITARTRATE AND ACETAMINOPHEN 10; 325 MG/1; MG/1
1 TABLET ORAL EVERY 8 HOURS PRN
Status: DISCONTINUED | OUTPATIENT
Start: 2025-04-15 | End: 2025-04-16 | Stop reason: HOSPADM

## 2025-04-15 RX ORDER — MIDAZOLAM HYDROCHLORIDE 1 MG/ML
5 INJECTION, SOLUTION INTRAMUSCULAR; INTRAVENOUS ONCE
Status: COMPLETED | OUTPATIENT
Start: 2025-04-15 | End: 2025-04-15

## 2025-04-15 RX ORDER — ASPIRIN 81 MG/1
81 TABLET, CHEWABLE ORAL DAILY
Status: DISCONTINUED | OUTPATIENT
Start: 2025-04-16 | End: 2025-04-16

## 2025-04-15 RX ORDER — AMOXICILLIN 250 MG
2 CAPSULE ORAL 2 TIMES DAILY PRN
Status: DISCONTINUED | OUTPATIENT
Start: 2025-04-15 | End: 2025-04-16 | Stop reason: HOSPADM

## 2025-04-15 RX ORDER — AMLODIPINE BESYLATE 10 MG/1
10 TABLET ORAL DAILY
Status: DISCONTINUED | OUTPATIENT
Start: 2025-04-16 | End: 2025-04-16 | Stop reason: HOSPADM

## 2025-04-15 RX ADMIN — POTASSIUM CHLORIDE 20 MEQ: 1500 TABLET, EXTENDED RELEASE ORAL at 16:07

## 2025-04-15 RX ADMIN — DILTIAZEM HYDROCHLORIDE 20 MG: 5 INJECTION INTRAVENOUS at 10:42

## 2025-04-15 RX ADMIN — Medication 10 ML: at 20:57

## 2025-04-15 RX ADMIN — AMIODARONE HYDROCHLORIDE 1 MG/MIN: 1.8 INJECTION, SOLUTION INTRAVENOUS at 12:24

## 2025-04-15 RX ADMIN — METOPROLOL TARTRATE 25 MG: 25 TABLET, FILM COATED ORAL at 17:30

## 2025-04-15 RX ADMIN — HYDROCODONE BITARTRATE AND ACETAMINOPHEN 1 TABLET: 10; 325 TABLET ORAL at 23:07

## 2025-04-15 RX ADMIN — DILTIAZEM HYDROCHLORIDE 20 MG: 5 INJECTION, SOLUTION INTRAVENOUS at 11:45

## 2025-04-15 RX ADMIN — ADENOSINE 12 MG: 3 INJECTION, SOLUTION INTRAVENOUS at 13:07

## 2025-04-15 RX ADMIN — SODIUM CHLORIDE 1000 ML: 9 INJECTION, SOLUTION INTRAVENOUS at 11:00

## 2025-04-15 RX ADMIN — HEPARIN SODIUM 5000 UNITS: 5000 INJECTION INTRAVENOUS; SUBCUTANEOUS at 16:07

## 2025-04-15 RX ADMIN — HEPARIN SODIUM 5000 UNITS: 5000 INJECTION INTRAVENOUS; SUBCUTANEOUS at 21:03

## 2025-04-15 RX ADMIN — MIDAZOLAM HYDROCHLORIDE 2.5 MG: 1 INJECTION, SOLUTION INTRAMUSCULAR; INTRAVENOUS at 14:05

## 2025-04-15 RX ADMIN — AMIODARONE HYDROCHLORIDE 150 MG: 1.5 INJECTION, SOLUTION INTRAVENOUS at 12:11

## 2025-04-15 RX ADMIN — ATORVASTATIN CALCIUM 80 MG: 40 TABLET, FILM COATED ORAL at 20:57

## 2025-04-15 NOTE — H&P
"    [facility] Medicine Services  History & Physical    Patient Identification:  Name:  Lesley Michel  Age:  69 y.o.  Sex:  female  :  1955  MRN:  [id number]   Visit Number:  [id number]  Admit Date: 4/15/2025   Primary Care Physician:  Woodrow Raymond APRN    Subjective     Chief complaint: rapid heart rate     History of presenting illness:      Lesley Michel is a 69 y.o. female who presented for further evaluation of rapid heart rate. Notably was discharged from this facility on  having been treated for acute on chronic anemia and receiving transfusions. She did decline pill endoscopy at that time.   She was seen and examined with no family present at the bedside. She was resting comfortably complaining of fatigue. She states upon waking this morning she felt like her heart was \"beating out of my chest.\" She noted some associated chest discomfort/pressure. She was not nauseous, particularly short of breath or diaphoretic. She does not recall feeling dizzy or light headed. She reports known history paroxysmal a fib. She believes she takes metoprolol at home for her heart rate and endorses compliance with no missed doses. She is also historically anticoagulated however eliquis has been held at recent discharge in the setting of anemia. She is also no longer taking a daily aspirin. She denies any evidence of blood loss today. She denied any further acute complaint, see full ROS below.     Past medical history is significant for HTN, HLD, CAD, CKD, hypothyroidism, chronic anemia requiring intermittent transfusions, atrial fibrillation, COPD    Upon arrival to the ED, vital signs were temp 99.2, , RR 18, /83, SpO2 94% on 2L. HS troponin 48 w/ repeat at 50. Potassium slightly low at 3.4. Creatinine is improved from recent at 2.00. no leukocytosis, hemoglobin is stable from prior.  CXR showed changes concerning for CHF and small right pleural effusion.  Initial EKG " concerning for SVT with rate 143.  Most recent EKG normal sinus rhythm with rate 66.    Known Emergency Department medications received prior to my evaluation included adenosine, amiodarone, diltiazem, Versed, 1 L normal saline.   Emergency Department Room location at the time of my evaluation was 101.     ---------------------------------------------------------------------------------------------------------------------   Review of Systems   Constitutional:  Positive for fatigue. Negative for chills and fever.   HENT:  Negative for congestion and rhinorrhea.    Respiratory:  Positive for shortness of breath. Negative for cough.    Cardiovascular:  Positive for chest pain. Negative for leg swelling.   Gastrointestinal:  Negative for abdominal pain, diarrhea, nausea and vomiting.   Genitourinary:  Negative for difficulty urinating and dysuria.   Musculoskeletal:  Negative for arthralgias and myalgias.   Skin:  Negative for rash and wound.   Neurological:  Negative for dizziness and light-headedness.        ---------------------------------------------------------------------------------------------------------------------   Past Medical History:   Diagnosis Date    Anxiety     Arthritis     CHF (congestive heart failure)     Cigarette smoker 02/10/2022    CKD (chronic kidney disease) stage 4, GFR 15-29 ml/min     MAICO on top of CKD IIIa, HD from 10/23-, some recovery to CKD IV    COPD (chronic obstructive pulmonary disease)     Coronary artery disease     Elevated cholesterol     GERD (gastroesophageal reflux disease)     H/O heart artery stent     History of transfusion     Hyperlipidemia     Hypertension     hypothyroidism     Obesity     PAF (paroxysmal atrial fibrillation)     PVD (peripheral vascular disease)     x2 stents     Past Surgical History:   Procedure Laterality Date    ANKLE SURGERY      RIGHT    APPENDECTOMY      CARDIAC CATHETERIZATION      LEFT     SECTION      CHOLECYSTECTOMY N/A  1/20/2025    Procedure: CHOLECYSTECTOMY LAPAROSCOPIC WITH DAVINCI ROBOT;  Surgeon: Windy Neal MD;  Location: [facility] OR;  Service: Robotics - DaVinci;  Laterality: N/A;    COLONOSCOPY N/A 3/17/2025    Procedure: COLONOSCOPY;  Surgeon: Luis Antonio Jiang MD;  Location: [facility] OR;  Service: Gastroenterology;  Laterality: N/A;    CORONARY STENT PLACEMENT      ENDOSCOPY N/A 3/15/2025    Procedure: ESOPHAGOGASTRODUODENOSCOPY;  Surgeon: Luis Antonio Jiang MD;  Location: [facility] OR;  Service: Gastroenterology;  Laterality: N/A;    HYSTERECTOMY      INSERTION HEMODIALYSIS CATHETER Right 10/19/2023    Procedure: HEMODIALYSIS CATHETER INSERTION;  Surgeon: Bartolome Schwartz MD;  Location: [facility] OR;  Service: General;  Laterality: Right;     Family History   Problem Relation Age of Onset    Heart disease Mother     Heart attack Mother 73    Fibromyalgia Mother     Heart attack Father 72    Ovarian cancer Sister     Coronary artery disease Other     Breast cancer Neg Hx      Social History     Socioeconomic History    Marital status:    Tobacco Use    Smoking status: Every Day     Current packs/day: 0.50     Average packs/day: 0.5 packs/day for 30.0 years (15.0 ttl pk-yrs)     Types: Cigarettes     Passive exposure: Never    Smokeless tobacco: Never   Vaping Use    Vaping status: Never Used   Substance and Sexual Activity    Alcohol use: No    Drug use: No    Sexual activity: Never     ---------------------------------------------------------------------------------------------------------------------   Allergies:  Xanax [alprazolam]  ---------------------------------------------------------------------------------------------------------------------   Home medications:    Medications below are reported home medications pulling from within the system; at this time, these medications have not been reconciled unless otherwise specified and are in the verification process for further verifcation as current home  medications.  (Not in a hospital admission)      Hospital Scheduled Meds:     amiodarone, 1 mg/min, Last Rate: Stopped (04/15/25 1240)   Followed by  amiodarone, 0.5 mg/min        Current listed hospital scheduled medications may not yet reflect those currently placed in orders that are signed and held awaiting patient's arrival to floor.   ---------------------------------------------------------------------------------------------------------------------     Objective     Vital Signs:  Temp:  [99.2 °F (37.3 °C)] 99.2 °F (37.3 °C)  Heart Rate:  [] 70  Resp:  [18] 18  BP: (106-134)/() 123/66      04/15/25  1036   Weight: 85.5 kg (188 lb 7.9 oz)     Body mass index is 35.63 kg/m².  ---------------------------------------------------------------------------------------------------------------------       Physical Exam  Vitals and nursing note reviewed.   Constitutional:       General: She is not in acute distress.  HENT:      Head: Normocephalic and atraumatic.   Eyes:      Extraocular Movements: Extraocular movements intact.   Cardiovascular:      Rate and Rhythm: Normal rate and regular rhythm.   Pulmonary:      Effort: Pulmonary effort is normal.      Breath sounds: Normal breath sounds.   Abdominal:      Palpations: Abdomen is soft.      Tenderness: There is no abdominal tenderness.   Musculoskeletal:      Right lower leg: No edema.      Left lower leg: No edema.   Skin:     General: Skin is warm and dry.   Neurological:      Mental Status: She is alert. Mental status is at baseline.   Psychiatric:         Mood and Affect: Mood normal.         Behavior: Behavior normal.             ---------------------------------------------------------------------------------------------------------------------  EKG:        I have personally looked at the EKG.  ---------------------------------------------------------------------------------------------------------------------   Results from last 7 days   Lab Units  "04/15/25  1050 04/13/25  2210 04/13/25  0719 04/12/25  1603   WBC 10*3/mm3 7.82 7.23 6.51 6.71   HEMOGLOBIN g/dL 7.8* 7.4* 7.3* 4.7*   HEMATOCRIT % 27.3* 26.5* 24.9* 17.5*   MCV fL 80.8 84.1 79.3 77.8*   MCHC g/dL 28.6* 27.9* 29.3* 26.9*   PLATELETS 10*3/mm3 374 343 410 519*   INR   --   --   --  1.99*         Results from last 7 days   Lab Units 04/15/25  1050 04/14/25  1558 04/14/25  0522 04/13/25 2210 04/13/25  1531 04/13/25  0719 04/12/25  1603   SODIUM mmol/L 134*  --   --  134*  --  133* 132*   POTASSIUM mmol/L 3.4* 3.7 3.6 3.6   < > 3.6  3.6 3.6   MAGNESIUM mg/dL  --   --   --  2.1  --  2.1  --    CHLORIDE mmol/L 95*  --   --  98  --  96* 93*   CO2 mmol/L 26.1  --   --  24.7  --  26.6 24.4   BUN mg/dL 35*  --   --  46*  --  48* 49*   CREATININE mg/dL 2.00*  --   --  2.55*  --  2.17* 2.36*   CALCIUM mg/dL 9.1  --   --  8.7  --  8.8 8.9   GLUCOSE mg/dL 121*  --   --  120*  --  97 130*   ALBUMIN g/dL 3.7  --   --   --   --   --  3.6   BILIRUBIN mg/dL 0.4  --   --   --   --   --  0.4   ALK PHOS U/L 188*  --   --   --   --   --  139*   AST (SGOT) U/L 18  --   --   --   --   --  12   ALT (SGPT) U/L 8  --   --   --   --   --  5    < > = values in this interval not displayed.   Estimated Creatinine Clearance: 27.1 mL/min (A) (by C-G formula based on SCr of 2 mg/dL (H)).  No results found for: \"AMMONIA\"  Results from last 7 days   Lab Units 04/15/25  1155 04/15/25  1050 04/12/25  1705   HSTROP T ng/L 50* 48* 33*         Lab Results   Component Value Date    HGBA1C 5.30 10/08/2023     Lab Results   Component Value Date    TSH 6.770 (H) 10/08/2023    FREET4 1.01 10/08/2023     No results found for: \"PREGTESTUR\", \"PREGSERUM\", \"HCG\", \"HCGQUANT\"  Pain Management Panel          Latest Ref Rng & Units 10/9/2023   Pain Management Panel   Creatinine, Urine mg/dL 116.0      No results found for: \"BLOODCX\"  No results found for: \"URINECX\"  No results found for: \"WOUNDCX\"  No results found for: \"STOOLCX\"    " "  ---------------------------------------------------------------------------------------------------------------------  Imaging Results (Last 7 Days)       Procedure Component Value Units Date/Time    XR Chest 1 View [[id number]] Collected: 04/15/25 1132     Updated: 04/15/25 1134    Narrative:      XR CHEST 1 VW-     CLINICAL INDICATION: Chest Pain Protocol        COMPARISON: 4/12/2025     TECHNIQUE: Single frontal view of the chest.     FINDINGS:     LUNGS: Trace right effusion     HEART AND MEDIASTINUM: Probable CHF     SKELETON: Bony and soft tissue structures are unremarkable.             Impression:      Probable CHF and small right effusion           This report was finalized on 4/15/2025 11:32 AM by Dr. Julio Dominguez MD.               Cultures:  No results found for: \"BLOODCX\", \"URINECX\", \"WOUNDCX\", \"MRSACX\", \"RESPCX\", \"STOOLCX\"    Last echocardiogram:  Results for orders placed during the hospital encounter of 01/19/25    Adult Transthoracic Echo Complete w/ Color, Spectral and Contrast if necessary per protocol    Interpretation Summary    Left ventricular systolic function is normal. Left ventricular ejection fraction appears to be 56 - 60%.    Left ventricular wall thickness is consistent with mild concentric hypertrophy.    There is calcification of the aortic valve.    Mild mitral valve stenosis is present.    Estimated right ventricular systolic pressure from tricuspid regurgitation is normal (<35 mmHg).          I have personally reviewed the above radiology images and read the final radiology report on 04/15/25  ---------------------------------------------------------------------------------------------------------------------  Assessment / Plan     Active Hospital Problems    Diagnosis  POA    **SVT (supraventricular tachycardia) [I47.10]  Yes       ASSESSMENT/PLAN:    Supraventricular tachycardia, POA  Hx paroxysmal atrial fibrillation  Hypokalemia  Patient presented to the ED secondary to rapid " heart rate.  On arrival to the ED heart rate was in the 140s.  EKG concerning for SVT.  She was given adenosine, diltiazem, amiodarone without improvement.  Ultimately underwent successful cardioversion.  Follow-up EKG NSR rate 66.  On laboratory workup she was hypokalemic at 3.4.  Creatinine is improved from recent.  HS troponin was 48 with repeat at 50.  Will admit to the PCU for further management  Consult cardiology for further assistance and recommendations, appreciated.  Further amiodarone infusion held for now per cardiology recommendation. Remains in NSR rate in the 60s currently.  Will continue close monitoring on telemetry.  Monitor vital signs.  Standing orders in place for chest pain  Will repeat labs in the morning and continue supportive care measures.  Monitor and replace electrolytes as needed.    Chronic:  HTN  HLD  CAD  CKD  Hypothyroidism  Chronic anemia requiring transfusions  COPD  Continue home medications as indicated once med rec is complete  Monitor vital signs as above  Monitor H&H  ----------  -DVT prophylaxis: Subcu heparin  -Activity: As tolerated  -Expected length of stay: INPATIENT status due to the need for care which can only be reasonably provided in an hospital setting such as aggressive/expedited ancillary services and/or consultation services, the necessity for IV medications, close physician monitoring and/or the possible need for procedures.  In such, I feel patient’s risk for adverse outcomes and need for care warrant INPATIENT evaluation and predict the patient’s care encounter to likely last beyond 2 midnights.   -Disposition pending further clinical course and cardiology recommendations.    High risk secondary to SVT    Code Status and Medical Interventions: CPR (Attempt to Resuscitate); Full Support   Ordered at: 04/15/25 1507     Code Status (Patient has no pulse and is not breathing):    CPR (Attempt to Resuscitate)     Medical Interventions (Patient has pulse or is  breathing):    Full Support       Wally Lopez PA-C   04/15/25  15:29 EDT

## 2025-04-15 NOTE — CASE MANAGEMENT/SOCIAL WORK
Discharge Planning Assessment  [facility]     Patient Name: Lesley Michel  MRN: [id number]  Today's Date: 4/15/2025    Admit Date: 4/15/2025    Plan: Spoke with patient at bedside. Patient lives at home with her son Loyd and grandchild and plans to return home at discharge. Patient has no POA and Living Will on file. Patients Pcp is Berto Marshall, she uses [facility]. Patient uses o2 4 liters from [facility] and recieves no HH. Patients family will transport home at discharge. Patient is a readmit from 4/12/2025 to 4/14/2025.   Discharge Needs Assessment       Row Name 04/15/25 1455       Living Environment    People in Home child(devan), adult;grandchild(devan)    Name(s) of People in Home LOYD MICHEL Son [phone]    Current Living Arrangements home    Potentially Unsafe Housing Conditions none    In the past 12 months has the electric, gas, oil, or water company threatened to shut off services in your home? No    Primary Care Provided by self    Provides Primary Care For no one    Family Caregiver if Needed child(devan), adult    Family Caregiver Names LOYD MICHEL Son [phone]    Quality of Family Relationships helpful    Able to Return to Prior Arrangements yes       Resource/Environmental Concerns    Resource/Environmental Concerns none    Transportation Concerns none       Transportation Needs    In the past 12 months, has lack of transportation kept you from medical appointments or from getting medications? no    In the past 12 months, has lack of transportation kept you from meetings, work, or from getting things needed for daily living? No       Food Insecurity    Within the past 12 months, you worried that your food would run out before you got the money to buy more. Never true    Within the past 12 months, the food you bought just didn't last and you didn't have money to get more. Never true       Transition Planning    Patient/Family Anticipates Transition to home with family    Patient/Family  Anticipated Services at Transition none    Transportation Anticipated family or friend will provide       Discharge Needs Assessment    Readmission Within the Last 30 Days current reason for admission unrelated to previous admission    Equipment Currently Used at Home oxygen    Concerns to be Addressed discharge planning    Do you want help finding or keeping work or a job? I do not need or want help    Do you want help with school or training? For example, starting or completing job training or getting a high school diploma, GED or equivalent No    Anticipated Changes Related to Illness none    Equipment Needed After Discharge none                   Discharge Plan       Row Name 04/15/25 9138       Plan    Plan Spoke with patient at bedside. Patient lives at home with her son Wes and grandchild and plans to return home at discharge. Patient has no POA and Living Will on file. Patients Pcp is Berto Marshall, she uses [facility]. Patient uses o2 4 liters from [facility] and recieves no HH. Patients family will transport home at discharge. Patient is a readmit from 4/12/2025 to 4/14/2025.    Patient/Family in Agreement with Plan yes                    Expected Discharge Date and Time       Expected Discharge Date Expected Discharge Time    Apr 18, 2025            Demographic Summary       Row Name 04/15/25 7135       General Information    Admission Type inpatient    Arrived From home    Referral Source emergency department    Reason for Consult discharge planning    Preferred Language English               Valentine Mckay

## 2025-04-15 NOTE — PLAN OF CARE
Problem: Adult Inpatient Plan of Care  Goal: Plan of Care Review  Outcome: Progressing   Pt resting comfortably. Oriented to unit and call system. VSS on 2LNC. Remains in SR. Updated on plan of care. No questions/concerns at this time.

## 2025-04-15 NOTE — PAYOR COMM NOTE
"PATIENT DISCHARGED TO HOME ON 4/14/25. AWAITING AUTHORIZATION DETERMINATION.    REF # [id number]     Matthew Michel (69 y.o. Female)       Date of Birth   1955    Social Security Number       Address   [address] [facility] [address]    Home Phone   [phone]    MRN   [id number]       Buddhism   [facility]    Marital Status                               Admission Date   4/12/2025    Admission Type   Emergency    Admitting Provider   Yaquelin Elliott MD    Attending Provider       Department, Room/Bed   [address], 3303/1S       Discharge Date   4/14/2025    Discharge Disposition   Home or Self Care    Discharge Destination                                 Attending Provider: (none)   Allergies: Xanax [Alprazolam]    Isolation: None   Infection: None   Code Status: Prior    Ht: 154.9 cm (61\")   Wt: 85.5 kg (188 lb 9.6 oz)    Admission Cmt: None   Principal Problem: GI bleed [K92.2]                   Active Insurance as of 4/12/2025       Primary Coverage       Payor Plan Insurance Group Employer/Plan Group    ANTH MEDICARE REPLACEMENT ANTH MEDICARE ADVANTAGE HMO [id number]       Payor Plan Address Payor Plan Phone Number Payor Plan Fax Number Effective Dates    [address] [phone]  1/1/2025 - None Entered    [facility] [address]         Subscriber Name Subscriber Birth Date Member ID       MATTHEW MICHEL 1955 [id number]                     Emergency Contacts        (Rel.) Home Phone Work Phone Mobile Phone    KAUSHALLOYD (Son) [phone] -- --    MIGUEL WEI (Grandchild) [phone] -- --    ENA HELM (Relative) [phone] -- --    Yadira Michel (Daughter) -- -- [phone]              Discharge Summary    No notes of this type exist for this encounter.       "

## 2025-04-15 NOTE — ED PROVIDER NOTES
Subjective   History of Present Illness  69-year-old female with extensive past medical history presents emergency department with dysrhythmia.  Patient states she began having palpitations and chest pain earlier today.  She states that she has very minimal chest pain at this time, but states that her heart rate is very fast.  She was recently discharged from the hospital yesterday for anemia.  She states that she is compliant with her home medications.  She denies any fevers, chills, nausea, vomiting, shortness of breath.        Review of Systems   All other systems reviewed and are negative.      Past Medical History:   Diagnosis Date    Anxiety     Arthritis     CHF (congestive heart failure)     Cigarette smoker 02/10/2022    CKD (chronic kidney disease) stage 4, GFR 15-29 ml/min     MAICO on top of CKD IIIa, HD from 10/23-, some recovery to CKD IV    COPD (chronic obstructive pulmonary disease)     Coronary artery disease     Elevated cholesterol     GERD (gastroesophageal reflux disease)     H/O heart artery stent     History of transfusion     Hyperlipidemia     Hypertension     hypothyroidism     Obesity     PAF (paroxysmal atrial fibrillation)     PVD (peripheral vascular disease)     x2 stents       Allergies   Allergen Reactions    Xanax [Alprazolam] Other (See Comments)     Severe agitation per patient and family       Past Surgical History:   Procedure Laterality Date    ANKLE SURGERY      RIGHT    APPENDECTOMY      CARDIAC CATHETERIZATION      LEFT     SECTION      CHOLECYSTECTOMY N/A 2025    Procedure: CHOLECYSTECTOMY LAPAROSCOPIC WITH DAVINCI ROBOT;  Surgeon: Windy Neal MD;  [facility];  Service: Robotics - DaVinci;  Laterality: N/A;    COLONOSCOPY N/A 3/17/2025    Procedure: COLONOSCOPY;  Surgeon: Luis Antonio Jiang MD;  Location: [facility] OR;  Service: Gastroenterology;  Laterality: N/A;    CORONARY STENT PLACEMENT      ENDOSCOPY N/A 3/15/2025    Procedure:  ESOPHAGOGASTRODUODENOSCOPY;  Surgeon: Luis Antonio Jiang MD;  Location: [facility] OR;  Service: Gastroenterology;  Laterality: N/A;    HYSTERECTOMY      INSERTION HEMODIALYSIS CATHETER Right 10/19/2023    Procedure: HEMODIALYSIS CATHETER INSERTION;  Surgeon: Bartolome Schwartz MD;  Location: [facility] OR;  Service: General;  Laterality: Right;       Family History   Problem Relation Age of Onset    Heart disease Mother     Heart attack Mother 73    Fibromyalgia Mother     Heart attack Father 72    Ovarian cancer Sister     Coronary artery disease Other     Breast cancer Neg Hx        Social History     Socioeconomic History    Marital status:    Tobacco Use    Smoking status: Every Day     Current packs/day: 0.50     Average packs/day: 0.5 packs/day for 30.0 years (15.0 ttl pk-yrs)     Types: Cigarettes     Passive exposure: Never    Smokeless tobacco: Never   Vaping Use    Vaping status: Never Used   Substance and Sexual Activity    Alcohol use: No    Drug use: No    Sexual activity: Never           Objective   Physical Exam  Constitutional:       Appearance: Normal appearance.   HENT:      Head: Normocephalic.      Mouth/Throat:      Mouth: Mucous membranes are moist.   Eyes:      Pupils: Pupils are equal, round, and reactive to light.   Cardiovascular:      Rate and Rhythm: Regular rhythm. Tachycardia present.   Pulmonary:      Effort: Pulmonary effort is normal.   Abdominal:      Palpations: Abdomen is soft.   Musculoskeletal:         General: Normal range of motion.      Cervical back: Normal range of motion.   Skin:     Coloration: Skin is pale.   Neurological:      General: No focal deficit present.      Mental Status: She is alert.   Psychiatric:         Mood and Affect: Mood normal.         Procedures  123 minutes of critical care provided. This time excludes other billable procedures. Time does include preparation of documents, medical consultations, review of old records, and direct bedside care.  Patient is at high risk for life-threatening deterioration due to dysrhythmia.            ED Course  ED Course as of 04/15/25 1510   Tue Apr 15, 2025   1038 EKG independently interpreted by me  Rate 143  SD interval 184  QRS duration 90  QTc 410  Sinus tachycardia  No STEMI    Electronically signed by Dave Holt DO, 04/15/25, 10:39 AM EDT.   [OI]   1432 Repeat EKG independently interpreted by me  Rate 66  SD interval 158  QTc 425  Normal sinus rhythm  No STEMI    Electronically signed by Dave Holt DO, 04/15/25, 2:32 PM EDT.   [OI]      ED Course User Index  [OI] Dave Holt DO                                                       Medical Decision Making  69-year-old female with extensive past medical history presents emergency department with dysrhythmia.  Patient states she began having palpitations and chest pain earlier today.  She states that she has very minimal chest pain at this time, but states that her heart rate is very fast.  She was recently discharged from the hospital yesterday for anemia.  She states that she is compliant with her home medications.  She denies any fevers, chills, nausea, vomiting, shortness of breath.  Vital signs significant for heart rate in the 140s and SVT upon arrival.  Patient initially treated with 2 separate boluses of Cardizem, and her heart rate did not convert.  She was then given amiodarone along with an amiodarone drip, but again she did not convert.  She was then given 12 mg of adenosine, and again did not convert.  Patient ultimately had cardioversion and is now in sinus rhythm.  I discussed my findings with the on-call cardiologist Dr. Lopez who recommends the patient be admitted to the hospital.  I also discussed my findings with the inpatient team who will be admitting the patient for further medical management.    Problems Addressed:  SVT (supraventricular tachycardia): complicated acute illness or injury    Amount and/or Complexity of Data  Reviewed  Independent Historian: EMS  Labs: ordered.  Radiology: ordered.  ECG/medicine tests: ordered and independent interpretation performed. Decision-making details documented in ED Course.    Risk  Prescription drug management.  Drug therapy requiring intensive monitoring for toxicity.  Decision regarding hospitalization.        Final diagnoses:   SVT (supraventricular tachycardia)       ED Disposition  ED Disposition       ED Disposition   Decision to Admit    Condition   --    Comment   Level of Care: Progressive Care [20]   Diagnosis: SVT (supraventricular tachycardia) [202906]   Certification: I Certify That Inpatient Hospital Services Are Medically Necessary For Greater Than 2 Midnights                 No follow-up provider specified.       Medication List        PAUSE taking these medications      apixaban 5 MG tablet tablet  Wait to take this until your doctor or other care provider tells you to start again.  Hold until discussion with your Cardiologist or for at least 2 weeks, whichever comes first   Commonly known as: Dave Melendrez DO  04/15/25 1539     tablet  Wait to take this until your doctor or other care provider tells you to start again.  Hold until discussion with your Cardiologist or for at least 2 weeks, whichever comes first   Commonly known as: Dave Melendrez,   04/15/25 1539       Dave Holt,   04/22/25 0552

## 2025-04-16 ENCOUNTER — READMISSION MANAGEMENT (OUTPATIENT)
Dept: CALL CENTER | Facility: HOSPITAL | Age: 70
End: 2025-04-16
Payer: MEDICARE

## 2025-04-16 VITALS
RESPIRATION RATE: 16 BRPM | SYSTOLIC BLOOD PRESSURE: 143 MMHG | HEIGHT: 61 IN | DIASTOLIC BLOOD PRESSURE: 84 MMHG | HEART RATE: 67 BPM | TEMPERATURE: 98 F | BODY MASS INDEX: 36.96 KG/M2 | OXYGEN SATURATION: 96 % | WEIGHT: 195.77 LBS

## 2025-04-16 LAB
CHOLEST SERPL-MCNC: 94 MG/DL (ref 0–200)
HDLC SERPL-MCNC: 25 MG/DL (ref 40–60)
LDLC SERPL CALC-MCNC: 52 MG/DL (ref 0–100)
LDLC/HDLC SERPL: 2.1 {RATIO}
QT INTERVAL: 434 MS
QTC INTERVAL: 461 MS
T4 FREE SERPL-MCNC: 1.1 NG/DL (ref 0.92–1.68)
TRIGL SERPL-MCNC: 83 MG/DL (ref 0–150)
TSH SERPL DL<=0.05 MIU/L-ACNC: 8.92 UIU/ML (ref 0.27–4.2)
VLDLC SERPL-MCNC: 17 MG/DL (ref 5–40)

## 2025-04-16 PROCEDURE — 25010000002 HEPARIN (PORCINE) PER 1000 UNITS: Performed by: INTERNAL MEDICINE

## 2025-04-16 PROCEDURE — 96372 THER/PROPH/DIAG INJ SC/IM: CPT

## 2025-04-16 PROCEDURE — G0378 HOSPITAL OBSERVATION PER HR: HCPCS

## 2025-04-16 PROCEDURE — 99239 HOSP IP/OBS DSCHRG MGMT >30: CPT | Performed by: INTERNAL MEDICINE

## 2025-04-16 RX ORDER — METOPROLOL TARTRATE 25 MG/1
25 TABLET, FILM COATED ORAL 2 TIMES DAILY
Qty: 60 TABLET | Refills: 0 | Status: SHIPPED | OUTPATIENT
Start: 2025-04-16 | End: 2025-05-16

## 2025-04-16 RX ORDER — HYDRALAZINE HYDROCHLORIDE 100 MG/1
50 TABLET, FILM COATED ORAL 3 TIMES DAILY
Start: 2025-04-16

## 2025-04-16 RX ADMIN — HEPARIN SODIUM 5000 UNITS: 5000 INJECTION INTRAVENOUS; SUBCUTANEOUS at 05:56

## 2025-04-16 RX ADMIN — METOPROLOL TARTRATE 25 MG: 25 TABLET, FILM COATED ORAL at 08:12

## 2025-04-16 RX ADMIN — PANTOPRAZOLE SODIUM 40 MG: 40 TABLET, DELAYED RELEASE ORAL at 08:12

## 2025-04-16 RX ADMIN — AMLODIPINE BESYLATE 10 MG: 10 TABLET ORAL at 08:12

## 2025-04-16 RX ADMIN — Medication 10 ML: at 08:12

## 2025-04-16 RX ADMIN — FOLIC ACID 1 MG: 1 TABLET ORAL at 08:12

## 2025-04-16 RX ADMIN — SERTRALINE 50 MG: 50 TABLET, FILM COATED ORAL at 08:12

## 2025-04-16 NOTE — PAYOR COMM NOTE
"CONTACT: PARRIS OWENS RN  UTILIZATION MANAGEMENT DEPT.  [facility]  [address] [place] [address]  PHONE: [phone]  FAX: [phone]        STATUS CHANGE UPDATE      PLEASE NOTE, IN REGARDS TO PRIOR FAX FOR IP AUTH REQUEST FOR 4/15/25:  STATUS HAS NOW BEEN CHANGED TO OBSERVATION ON 4/16/25 PRIOR TO DISCHARGE.   THANK YOUI    MEMBER ID: [id number]       Matthew Michel (69 y.o. Female)       Date of Birth   1955    Social Security Number       Address   [address] [place] [id number]    Home Phone   [phone]    MRN   [id number]       [place]   [facility]    Marital Status                               Admission Date   4/15/2025    Admission Type   Emergency    Admitting Provider   Antonio Dasilva MD    Attending Provider   Antonio Dasilva MD    Department, Room/Bed   [facility] PROGRESS CARE, P218/1P       Discharge Date       Discharge Disposition   Home or Self Care    Discharge Destination                                 Attending Provider: Antonio Dasilva MD    Allergies: Xanax [Alprazolam]    Isolation: None   Infection: None   Code Status: CPR    Ht: 154.9 cm (61\")   Wt: 88.8 kg (195 lb 12.3 oz)    Admission Cmt: None   Principal Problem: SVT (supraventricular tachycardia) [I47.10]                   Active Insurance as of 4/15/2025       Primary Coverage       Payor Plan Insurance Group Employer/Plan Group    ANTH MEDICARE REPLACEMENT [facility] MEDICARE ADVANTAGE O [id number]       Payor Plan Address Payor Plan Phone Number Payor Plan Fax Number Effective Dates    [address] [phone]  1/1/2025 - None Entered    [facility] [address]         Subscriber Name Subscriber Birth Date Member ID       MATTHEW MICHEL 1955 [id number]                     Emergency Contacts        (Rel.) Home Phone Work Phone Mobile Phone    LOYD MICHEL (Son) [phone] -- --    MIGUEL WEI (Grandchild) [phone] -- --    ENA HELM " (Relative) [phone] -- --    MichelYadira gonzalez (Daughter) -- -- [phone]              Orders (last 24 hrs)        Start     Ordered    04/16/25 1122  Initiate Observation Status  Once         04/16/25 1121    04/16/25 1120  Discharge patient  Once         04/16/25 1120    04/16/25 0948  Activity As Tolerated  Until Discontinued         04/16/25 0948    04/16/25 0900  amLODIPine (NORVASC) tablet 10 mg  Daily         04/15/25 1705    04/16/25 0900  aspirin chewable tablet 81 mg  Daily,   Status:  Discontinued         04/15/25 1705    04/16/25 0900  folic acid (FOLVITE) tablet 1 mg  Daily         04/15/25 1705    04/16/25 0900  sertraline (ZOLOFT) tablet 50 mg  Daily         04/15/25 1705    04/16/25 0857  T4, Free  Once         04/16/25 0856    04/16/25 0857  Lipid Panel  Once         04/16/25 0856    04/16/25 0856  TSH  Once         04/16/25 0856    04/16/25 0730  pantoprazole (PROTONIX) EC tablet 40 mg  Every Morning Before Breakfast         04/15/25 1705    04/16/25 0600  ECG 12 Lead Drug Monitoring; Amiodarone  Daily,   Status:  Canceled       04/15/25 1156    04/16/25 0600  Basic Metabolic Panel  Morning Draw         04/15/25 1541    04/16/25 0600  CBC Auto Differential  Morning Draw         04/15/25 1541    04/16/25 0600  Magnesium  Morning Draw         04/15/25 1709    04/16/25 0428  Connectors / Hubs Must Be Scrubbed 15 Seconds Using 70% Alcohol Before Access - Allow to Dry Before Accessing Line  Continuous         04/16/25 0427    04/16/25 0000  hydrALAZINE (APRESOLINE) 100 MG tablet  3 Times Daily         04/16/25 1120    04/16/25 0000  metoprolol tartrate (LOPRESSOR) 25 MG tablet  2 Times Daily         04/16/25 1120    04/15/25 2231  Scan Slide  Once         04/15/25 2230    04/15/25 2150  Potassium  Timed         04/15/25 1549    04/15/25 2100  sodium chloride 0.9 % flush 10 mL  Every 12 Hours Scheduled         04/15/25 1541    04/15/25 3683  atorvastatin (LIPITOR) tablet 80 mg  Nightly          "04/15/25 1705    04/15/25 2100  [Held by provider]  hydrALAZINE (APRESOLINE) tablet 100 mg  3 Times Daily        (On hold since yesterday at 1705 until manually unheld; held by Antonio Dasilva MDHold Reason: Abnormal Labs)    04/15/25 1705    04/15/25 1824  amiodarone 360 mg in 200 mL D5W infusion  Continuous,   Status:  Discontinued        Placed in \"Followed by\" Linked Group    04/15/25 1156    04/15/25 1800  Oral Care  2 Times Daily       04/15/25 1541    04/15/25 1800  Incentive Spirometry  Every 4 Hours While Awake       04/15/25 1541    04/15/25 1730  metoprolol tartrate (LOPRESSOR) tablet 25 mg  Every 12 Hours Scheduled         04/15/25 1637    04/15/25 1703  gabapentin (NEURONTIN) capsule 300 mg  3 Times Daily PRN         04/15/25 1705    04/15/25 1703  HYDROcodone-acetaminophen (NORCO)  MG per tablet 1 tablet  Every 8 Hours PRN         04/15/25 1705    04/15/25 1645  potassium chloride (KLOR-CON M20) CR tablet 20 mEq  Once         04/15/25 1549    04/15/25 1638  PT Consult: Eval & Treat Functional Mobility Below Baseline  Once         04/15/25 1637    04/15/25 1634  ECG 12 Lead QT Measurement  Once         04/15/25 1633    04/15/25 1600  Vital Signs  Every 4 Hours       04/15/25 1541    04/15/25 1557  heparin (porcine) 5000 UNIT/ML injection 5,000 Units  Every 8 Hours Scheduled         04/15/25 1541    04/15/25 1542  Intake & Output  Every Shift       04/15/25 1541    04/15/25 1542  Weigh Patient  Once         04/15/25 1541    04/15/25 1542  Insert Peripheral IV  Once         04/15/25 1541    04/15/25 1542  Saline Lock & Maintain IV Access  Continuous,   Status:  Canceled         04/15/25 1541    04/15/25 1542  Continuous Cardiac Monitoring  Continuous        Comments: Follow Standing Orders As Outlined in Process Instructions (Open Order Report to View Full Instructions)    04/15/25 1541    04/15/25 1542  Maintain IV Access  Continuous         04/15/25 1541    04/15/25 1542  Telemetry - Place Orders " "& Notify Provider of Results When Patient Experiences Acute Chest Pain, Dysrhythmia or Respiratory Distress  Continuous        Comments: Open Order Report to View Parameters Requiring Provider Notification    04/15/25 1541    04/15/25 1542  May Be Off Telemetry for Tests  Continuous         04/15/25 1541    04/15/25 1542  Diet: Cardiac, Diabetic; Healthy Heart (2-3 Na+); Consistent Carbohydrate; Fluid Consistency: Thin (IDDSI 0)  Diet Effective Now         04/15/25 1541    04/15/25 1542  Inpatient Cardiology Consult  Once        Specialty:  Cardiology  Provider:  Dwayne Lopez MD    04/15/25 1541    04/15/25 1541  Potassium Replacement - Follow Nurse / BPA Driven Protocol  As Needed         04/15/25 1541    04/15/25 1541  Magnesium Low Dose Replacement - Follow Nurse / BPA Driven Protocol  As Needed         04/15/25 1541    04/15/25 1541  Phosphorus Replacement - Follow Nurse / BPA Driven Protocol  As Needed         04/15/25 1541    04/15/25 1541  Calcium Replacement - Follow Nurse / BPA Driven Protocol  As Needed         04/15/25 1541    04/15/25 1541  sennosides-docusate (PERICOLACE) 8.6-50 MG per tablet 2 tablet  2 Times Daily PRN        Placed in \"And\" Linked Group    04/15/25 1541    04/15/25 1541  polyethylene glycol (MIRALAX) packet 17 g  Daily PRN        Placed in \"And\" Linked Group    04/15/25 1541    04/15/25 1541  bisacodyl (DULCOLAX) EC tablet 5 mg  Daily PRN        Placed in \"And\" Linked Group    04/15/25 1541    04/15/25 1541  bisacodyl (DULCOLAX) suppository 10 mg  Daily PRN        Placed in \"And\" Linked Group    04/15/25 1541    04/15/25 1541  sodium chloride 0.9 % flush 10 mL  As Needed         04/15/25 1541    04/15/25 1541  sodium chloride 0.9 % infusion 40 mL  As Needed         04/15/25 1541    04/15/25 1541  nitroglycerin (NITROSTAT) SL tablet 0.4 mg  Every 5 Minutes PRN         04/15/25 1541    04/15/25 1505  Inpatient Admission  Once         04/15/25 1507    04/15/25 1505  Code Status and " "Medical Interventions: CPR (Attempt to Resuscitate); Full Support  Continuous         04/15/25 1507    04/15/25 1428  Hospitalist (on-call MD unless specified)  Once        Specialty:  Hospitalist  Provider:  (Not yet assigned)    04/15/25 1428    04/15/25 1354  midazolam (VERSED) injection 5 mg  Once         04/15/25 1338    04/15/25 1256  adenosine (ADENOCARD) injection 12 mg  Once         04/15/25 1240    04/15/25 1212  amiodarone 150 mg in 100 mL D5W (loading dose)  Once        Placed in \"Followed by\" Linked Group    04/15/25 1156    04/15/25 1212  amiodarone 360 mg in 200 mL D5W infusion  Continuous,   Status:  Discontinued        Placed in \"Followed by\" Linked Group    04/15/25 1156    04/15/25 1157  Initiate & Follow Amiodarone Protocol  Continuous,   Status:  Canceled         04/15/25 1156    04/15/25 1157  Monitor QTc  Every Shift,   Status:  Canceled       04/15/25 1156    04/15/25 1157  Notify Provider - QTc 500 milliseconds or Greater  Continuous,   Status:  Canceled        Comments: Open Order Report to View Parameters Requiring Provider Notification    04/15/25 1156    04/15/25 1157  ECG 12 Lead Drug Monitoring; Amiodarone  STAT         04/15/25 1156    04/15/25 1155  dilTIAZem (CARDIZEM) injection 20 mg  Once         04/15/25 1139    04/15/25 1035  sodium chloride 0.9 % flush 10 mL  As Needed         04/15/25 1035    Unscheduled  Oxygen Therapy- Nasal Cannula; Titrate 1-6 LPM Per SpO2; 90 - 95%  Continuous PRN       04/15/25 1035    Unscheduled  ECG 12 Lead Chest Pain  As Needed      Comments: Persistent, Unrelieved or Recurrent Chest Pain    04/15/25 1035    Unscheduled  Change Dressing to IV Site As Needed When Damp, Loose or Soiled  As Needed       04/16/25 0427    Unscheduled  Change Needleless Connectors  As Needed      Comments: Change Needleless Connectors When:  - Administration Set Changed  - Dressing Changed  - Removed For Any Reason  - Residual Blood or Debris Within Connector  - Prior to " Drawing Blood Cultures  - Contamination of Connector  - After Administration of Blood or Blood Components    04/16/25 2537

## 2025-04-16 NOTE — DISCHARGE INSTR - APPOINTMENTS
Follow up with PCP   Woodrow Raymond   April 21, @ 9:15 AM     Follow up with Cardiology   Imelda Woodall   April 22, @ 1:30 PM

## 2025-04-16 NOTE — OUTREACH NOTE
Medical Week 1 Survey      Flowsheet Row Responses   [facility] patient discharged from? [facility]   Does the patient have one of the following disease processes/diagnoses(primary or secondary)? Other   Week 1 attempt successful? No   Unsuccessful attempts Attempt 3   Revoke Readmitted            SARA LEIGH - Registered Nurse

## 2025-04-16 NOTE — PAYOR COMM NOTE
"CONTACT: PARRIS OWENS RN  UTILIZATION MANAGEMENT DEPT.  [facility]  [address] [place] [address]  PHONE: [phone]  FAX: [phone]      INPATIENT AUTH REQUEST    ATTEMPTED TO SUBMIT VIA [employer] BUT  ERROR POPPED UP REFLECTING A DUPLICATE REQUEST WITH REF# [id number] FROM PREVIOUS ADMISSION FOR 4/12/25 -4/14/25.    THIS IS A READMISSION WITH NEW REQUEST FOR IP AUTHORIZATION PLEASE. THANK YOU.    Matthew Michel (69 y.o. Female)       Date of Birth   1955    Social Security Number       Address   [address] [id number]    Home Phone   [phone]    MRN   [id number]       [facility]vent   [facility]    Marital Status                               Admission Date   4/15/2025    Admission Type   Emergency    Admitting Provider   Antonio Dasilva MD    Attending Provider   Antonio Dasilva MD    Department, Room/Bed   [facility] PROGRESS CARE, P218/1P       Discharge Date       Discharge Disposition       Discharge Destination                                 Attending Provider: Antonio Dasilva MD    Allergies: Xanax [Alprazolam]    Isolation: None   Infection: None   Code Status: CPR    Ht: 154.9 cm (61\")   Wt: 88.8 kg (195 lb 12.3 oz)    Admission Cmt: None   Principal Problem: SVT (supraventricular tachycardia) [I47.10]                   Active Insurance as of 4/15/2025       Primary Coverage       Payor Plan Insurance Group Employer/Plan Group    ANTH MEDICARE REPLACEMENT [facility] MEDICARE ADVANTAGE O [id number]       Payor Plan Address Payor Plan Phone Number Payor Plan Fax Number Effective Dates    [address] [phone]  1/1/2025 - None Entered    [facility] [id number]         Subscriber Name Subscriber Birth Date Member ID       MATTHEW MICHEL 1955 [id number]                     Emergency Contacts        (Rel.) Home Phone Work Phone Mobile Phone    LOYD MICHEL (Son) [phone] -- --    MIGUEL WEI (Grandchild) " "[phone] -- --    ENA HELM (Relative) [phone] -- --    Yadira Michel (Daughter) -- -- [phone]                                   History & Physical        Wally Lopez PA-C at 04/15/25 1528       Attestation signed by Antonio Dasilva MD at 04/15/25 1711      I have reviewed this documentation and agree.    Ms. Michel is our 70 yo F with hx of HTN, HLD, CAD, CKD, hypothyroidism, chronic anemia requiring intermittent transfusions, atrial fibrillation, COPD who was recently discharged after an admission for GI bleed. Patient declined and still declines capsule endoscopy which she notes to be \"experimental treatment\". She is here today with chest discomfort and shortness of breath starting this morning. Upon arrival to ED she wasfound to have HR in 140's, appears regular thought to be SVT. Patient was given IV Cardizem, IV amiodarone, 12 of adenosine without conversion. She was electrically cardioverted and now in sinus rhythm. Patient has been off anticoagulation for GI bleed since last visit. No symptoms of significant bleeding and hemoglobin is stable. No other signs of exacerbating factors. Electrolytes reviewed, no signs of infection. Potassium slightly low, replacement ordered and will check mag level. Discussed with cardiology, will admit for obs and stop amioderone. Will increase home metoprolol tartrate to 25 mg BID, appears to have been ordered daily. Patient currently denying any symptoms.                           [facility] Medicine Services  History & Physical    Patient Identification:  Name:  Lesley Michel  Age:  69 y.o.  Sex:  female  :  1955  MRN:  [id number]   Visit Number:  [id number]  Admit Date: 4/15/2025   Primary Care Physician:  Woodrow Raymond APRN    Subjective     Chief complaint: rapid heart rate     History of presenting illness:      Lesley Michel is a 69 y.o. female who presented for further evaluation of rapid heart " "rate. Notably was discharged from this facility on 4/14 having been treated for acute on chronic anemia and receiving transfusions. She did decline pill endoscopy at that time.   She was seen and examined with no family present at the bedside. She was resting comfortably complaining of fatigue. She states upon waking this morning she felt like her heart was \"beating out of my chest.\" She noted some associated chest discomfort/pressure. She was not nauseous, particularly short of breath or diaphoretic. She does not recall feeling dizzy or light headed. She reports known history paroxysmal a fib. She believes she takes metoprolol at home for her heart rate and endorses compliance with no missed doses. She is also historically anticoagulated however eliquis has been held at recent discharge in the setting of anemia. She is also no longer taking a daily aspirin. She denies any evidence of blood loss today. She denied any further acute complaint, see full ROS below.     Past medical history is significant for HTN, HLD, CAD, CKD, hypothyroidism, chronic anemia requiring intermittent transfusions, atrial fibrillation, COPD    Upon arrival to the ED, vital signs were temp 99.2, , RR 18, /83, SpO2 94% on 2L. HS troponin 48 w/ repeat at 50. Potassium slightly low at 3.4. Creatinine is improved from recent at 2.00. no leukocytosis, hemoglobin is stable from prior.  CXR showed changes concerning for CHF and small right pleural effusion.  Initial EKG concerning for SVT with rate 143.  Most recent EKG normal sinus rhythm with rate 66.    Known Emergency Department medications received prior to my evaluation included adenosine, amiodarone, diltiazem, Versed, 1 L normal saline.   Emergency Department Room location at the time of my evaluation was 101.     ---------------------------------------------------------------------------------------------------------------------   Review of Systems   Constitutional:  Positive " for fatigue. Negative for chills and fever.   HENT:  Negative for congestion and rhinorrhea.    Respiratory:  Positive for shortness of breath. Negative for cough.    Cardiovascular:  Positive for chest pain. Negative for leg swelling.   Gastrointestinal:  Negative for abdominal pain, diarrhea, nausea and vomiting.   Genitourinary:  Negative for difficulty urinating and dysuria.   Musculoskeletal:  Negative for arthralgias and myalgias.   Skin:  Negative for rash and wound.   Neurological:  Negative for dizziness and light-headedness.        ---------------------------------------------------------------------------------------------------------------------   Past Medical History:   Diagnosis Date    Anxiety     Arthritis     CHF (congestive heart failure)     Cigarette smoker 02/10/2022    CKD (chronic kidney disease) stage 4, GFR 15-29 ml/min     MAICO on top of CKD IIIa, HD from 10/23-, some recovery to CKD IV    COPD (chronic obstructive pulmonary disease)     Coronary artery disease     Elevated cholesterol     GERD (gastroesophageal reflux disease)     H/O heart artery stent     History of transfusion     Hyperlipidemia     Hypertension     hypothyroidism     Obesity     PAF (paroxysmal atrial fibrillation)     PVD (peripheral vascular disease)     x2 stents     Past Surgical History:   Procedure Laterality Date    ANKLE SURGERY      RIGHT    APPENDECTOMY      CARDIAC CATHETERIZATION      LEFT     SECTION      CHOLECYSTECTOMY N/A 2025    Procedure: CHOLECYSTECTOMY LAPAROSCOPIC WITH DAVINCI ROBOT;  Surgeon: Windy Neal MD;  Location: [facility];  Service: Robotics - DaVinci;  Laterality: N/A;    COLONOSCOPY N/A 3/17/2025    Procedure: COLONOSCOPY;  Surgeon: Luis Antonio Jiang MD;  Location: [facility];  Service: Gastroenterology;  Laterality: N/A;    CORONARY STENT PLACEMENT      ENDOSCOPY N/A 3/15/2025    Procedure: ESOPHAGOGASTRODUODENOSCOPY;  Surgeon: Luis Antonio Jiang MD;  Location:   [facility];  Service: Gastroenterology;  Laterality: N/A;    HYSTERECTOMY      INSERTION HEMODIALYSIS CATHETER Right 10/19/2023    Procedure: HEMODIALYSIS CATHETER INSERTION;  Surgeon: Bartolome Schwartz MD;  Location:  [facility];  Service: General;  Laterality: Right;     Family History   Problem Relation Age of Onset    Heart disease Mother     Heart attack Mother 73    Fibromyalgia Mother     Heart attack Father 72    Ovarian cancer Sister     Coronary artery disease Other     Breast cancer Neg Hx      Social History     Socioeconomic History    Marital status:    Tobacco Use    Smoking status: Every Day     Current packs/day: 0.50     Average packs/day: 0.5 packs/day for 30.0 years (15.0 ttl pk-yrs)     Types: Cigarettes     Passive exposure: Never    Smokeless tobacco: Never   Vaping Use    Vaping status: Never Used   Substance and Sexual Activity    Alcohol use: No    Drug use: No    Sexual activity: Never     ---------------------------------------------------------------------------------------------------------------------   Allergies:  Xanax [alprazolam]  ---------------------------------------------------------------------------------------------------------------------   Home medications:    Medications below are reported home medications pulling from within the system; at this time, these medications have not been reconciled unless otherwise specified and are in the verification process for further verifcation as current home medications.  (Not in a hospital admission)      Hospital Scheduled Meds:     amiodarone, 1 mg/min, Last Rate: Stopped (04/15/25 1240)   Followed by  amiodarone, 0.5 mg/min        Current listed hospital scheduled medications may not yet reflect those currently placed in orders that are signed and held awaiting patient's arrival to floor.   ---------------------------------------------------------------------------------------------------------------------     Objective      Vital Signs:  Temp:  [99.2 °F (37.3 °C)] 99.2 °F (37.3 °C)  Heart Rate:  [] 70  Resp:  [18] 18  BP: (106-134)/() 123/66      04/15/25  1036   Weight: 85.5 kg (188 lb 7.9 oz)     Body mass index is 35.63 kg/m².  ---------------------------------------------------------------------------------------------------------------------       Physical Exam  Vitals and nursing note reviewed.   Constitutional:       General: She is not in acute distress.  HENT:      Head: Normocephalic and atraumatic.   Eyes:      Extraocular Movements: Extraocular movements intact.   Cardiovascular:      Rate and Rhythm: Normal rate and regular rhythm.   Pulmonary:      Effort: Pulmonary effort is normal.      Breath sounds: Normal breath sounds.   Abdominal:      Palpations: Abdomen is soft.      Tenderness: There is no abdominal tenderness.   Musculoskeletal:      Right lower leg: No edema.      Left lower leg: No edema.   Skin:     General: Skin is warm and dry.   Neurological:      Mental Status: She is alert. Mental status is at baseline.   Psychiatric:         Mood and Affect: Mood normal.         Behavior: Behavior normal.             ---------------------------------------------------------------------------------------------------------------------  EKG:        I have personally looked at the EKG.  ---------------------------------------------------------------------------------------------------------------------   Results from last 7 days   Lab Units 04/15/25  1050 04/13/25 2210 04/13/25 0719 04/12/25  1603   WBC 10*3/mm3 7.82 7.23 6.51 6.71   HEMOGLOBIN g/dL 7.8* 7.4* 7.3* 4.7*   HEMATOCRIT % 27.3* 26.5* 24.9* 17.5*   MCV fL 80.8 84.1 79.3 77.8*   MCHC g/dL 28.6* 27.9* 29.3* 26.9*   PLATELETS 10*3/mm3 374 343 410 519*   INR   --   --   --  1.99*         Results from last 7 days   Lab Units 04/15/25  1050 04/14/25  1558 04/14/25  0522 04/13/25 2210 04/13/25  1531 04/13/25  0719 04/12/25  1603   SODIUM mmol/L 134*   "--   --  134*  --  133* 132*   POTASSIUM mmol/L 3.4* 3.7 3.6 3.6   < > 3.6  3.6 3.6   MAGNESIUM mg/dL  --   --   --  2.1  --  2.1  --    CHLORIDE mmol/L 95*  --   --  98  --  96* 93*   CO2 mmol/L 26.1  --   --  24.7  --  26.6 24.4   BUN mg/dL 35*  --   --  46*  --  48* 49*   CREATININE mg/dL 2.00*  --   --  2.55*  --  2.17* 2.36*   CALCIUM mg/dL 9.1  --   --  8.7  --  8.8 8.9   GLUCOSE mg/dL 121*  --   --  120*  --  97 130*   ALBUMIN g/dL 3.7  --   --   --   --   --  3.6   BILIRUBIN mg/dL 0.4  --   --   --   --   --  0.4   ALK PHOS U/L 188*  --   --   --   --   --  139*   AST (SGOT) U/L 18  --   --   --   --   --  12   ALT (SGPT) U/L 8  --   --   --   --   --  5    < > = values in this interval not displayed.   Estimated Creatinine Clearance: 27.1 mL/min (A) (by C-G formula based on SCr of 2 mg/dL (H)).  No results found for: \"AMMONIA\"  Results from last 7 days   Lab Units 04/15/25  1155 04/15/25  1050 04/12/25  1705   HSTROP T ng/L 50* 48* 33*         Lab Results   Component Value Date    HGBA1C 5.30 10/08/2023     Lab Results   Component Value Date    TSH 6.770 (H) 10/08/2023    FREET4 1.01 10/08/2023     No results found for: \"PREGTESTUR\", \"PREGSERUM\", \"HCG\", \"HCGQUANT\"  Pain Management Panel          Latest Ref Rng & Units 10/9/2023   Pain Management Panel   Creatinine, Urine mg/dL 116.0      No results found for: \"BLOODCX\"  No results found for: \"URINECX\"  No results found for: \"WOUNDCX\"  No results found for: \"STOOLCX\"      ---------------------------------------------------------------------------------------------------------------------  Imaging Results (Last 7 Days)       Procedure Component Value Units Date/Time    XR Chest 1 View [[id number]] Collected: 04/15/25 1132     Updated: 04/15/25 1134    Narrative:      XR CHEST 1 VW-     CLINICAL INDICATION: Chest Pain Protocol        COMPARISON: 4/12/2025     TECHNIQUE: Single frontal view of the chest.     FINDINGS:     LUNGS: Trace right effusion     HEART " "AND MEDIASTINUM: Probable CHF     SKELETON: Bony and soft tissue structures are unremarkable.             Impression:      Probable CHF and small right effusion           This report was finalized on 4/15/2025 11:32 AM by Dr. Julio Dominguez MD.               Cultures:  No results found for: \"BLOODCX\", \"URINECX\", \"WOUNDCX\", \"MRSACX\", \"RESPCX\", \"STOOLCX\"    Last echocardiogram:  Results for orders placed during the hospital encounter of 01/19/25    Adult Transthoracic Echo Complete w/ Color, Spectral and Contrast if necessary per protocol    Interpretation Summary    Left ventricular systolic function is normal. Left ventricular ejection fraction appears to be 56 - 60%.    Left ventricular wall thickness is consistent with mild concentric hypertrophy.    There is calcification of the aortic valve.    Mild mitral valve stenosis is present.    Estimated right ventricular systolic pressure from tricuspid regurgitation is normal (<35 mmHg).          I have personally reviewed the above radiology images and read the final radiology report on 04/15/25  ---------------------------------------------------------------------------------------------------------------------  Assessment / Plan     Active Hospital Problems    Diagnosis  POA    **SVT (supraventricular tachycardia) [I47.10]  Yes       ASSESSMENT/PLAN:    Supraventricular tachycardia, POA  Hx paroxysmal atrial fibrillation  Hypokalemia  Patient presented to the ED secondary to rapid heart rate.  On arrival to the ED heart rate was in the 140s.  EKG concerning for SVT.  She was given adenosine, diltiazem, amiodarone without improvement.  Ultimately underwent successful cardioversion.  Follow-up EKG NSR rate 66.  On laboratory workup she was hypokalemic at 3.4.  Creatinine is improved from recent.  HS troponin was 48 with repeat at 50.  Will admit to the PCU for further management  Consult cardiology for further assistance and recommendations, appreciated.  Further " amiodarone infusion held for now per cardiology recommendation. Remains in NSR rate in the 60s currently.  Will continue close monitoring on telemetry.  Monitor vital signs.  Standing orders in place for chest pain  Will repeat labs in the morning and continue supportive care measures.  Monitor and replace electrolytes as needed.    Chronic:  HTN  HLD  CAD  CKD  Hypothyroidism  Chronic anemia requiring transfusions  COPD  Continue home medications as indicated once med rec is complete  Monitor vital signs as above  Monitor H&H  ----------  -DVT prophylaxis: Subcu heparin  -Activity: As tolerated  -Expected length of stay: INPATIENT status due to the need for care which can only be reasonably provided in an hospital setting such as aggressive/expedited ancillary services and/or consultation services, the necessity for IV medications, close physician monitoring and/or the possible need for procedures.  In such, I feel patient’s risk for adverse outcomes and need for care warrant INPATIENT evaluation and predict the patient’s care encounter to likely last beyond 2 midnights.   -Disposition pending further clinical course and cardiology recommendations.    High risk secondary to SVT    Code Status and Medical Interventions: CPR (Attempt to Resuscitate); Full Support   Ordered at: 04/15/25 1507     Code Status (Patient has no pulse and is not breathing):    CPR (Attempt to Resuscitate)     Medical Interventions (Patient has pulse or is breathing):    Full Support       Wally Lopez PA-C   04/15/25  15:29 EDT    Electronically signed by Antonio Dasilva MD at 04/15/25 1711          Emergency Department Notes        Dave Holt DO at 04/15/25 1510          Subjective   History of Present Illness  69-year-old female with extensive past medical history presents emergency department with dysrhythmia.  Patient states she began having palpitations and chest pain earlier today.  She states that she has very minimal chest  pain at this time, but states that her heart rate is very fast.  She was recently discharged from the hospital yesterday for anemia.  She states that she is compliant with her home medications.  She denies any fevers, chills, nausea, vomiting, shortness of breath.        Review of Systems   All other systems reviewed and are negative.      Past Medical History:   Diagnosis Date    Anxiety     Arthritis     CHF (congestive heart failure)     Cigarette smoker 02/10/2022    CKD (chronic kidney disease) stage 4, GFR 15-29 ml/min     MAICO on top of CKD IIIa, HD from 10/23-, some recovery to CKD IV    COPD (chronic obstructive pulmonary disease)     Coronary artery disease     Elevated cholesterol     GERD (gastroesophageal reflux disease)     H/O heart artery stent     History of transfusion     Hyperlipidemia     Hypertension     hypothyroidism     Obesity     PAF (paroxysmal atrial fibrillation)     PVD (peripheral vascular disease)     x2 stents       Allergies   Allergen Reactions    Xanax [Alprazolam] Other (See Comments)     Severe agitation per patient and family       Past Surgical History:   Procedure Laterality Date    ANKLE SURGERY      RIGHT    APPENDECTOMY      CARDIAC CATHETERIZATION      LEFT     SECTION      CHOLECYSTECTOMY N/A 2025    Procedure: CHOLECYSTECTOMY LAPAROSCOPIC WITH DAVINCI ROBOT;  Surgeon: Windy Neal MD;  Location: [facility];  Service: Robotics - DaVinci;  Laterality: N/A;    COLONOSCOPY N/A 3/17/2025    Procedure: COLONOSCOPY;  Surgeon: Luis Antonio Jiang MD;  Location: [facility] OR;  Service: Gastroenterology;  Laterality: N/A;    CORONARY STENT PLACEMENT      ENDOSCOPY N/A 3/15/2025    Procedure: ESOPHAGOGASTRODUODENOSCOPY;  Surgeon: Luis Antonio Jiang MD;  Location: [facility] OR;  Service: Gastroenterology;  Laterality: N/A;    HYSTERECTOMY      INSERTION HEMODIALYSIS CATHETER Right 10/19/2023    Procedure: HEMODIALYSIS CATHETER INSERTION;  Surgeon: Bartolome Schwartz  MD Gene;  Location: [facility] OR;  Service: General;  Laterality: Right;       Family History   Problem Relation Age of Onset    Heart disease Mother     Heart attack Mother 73    Fibromyalgia Mother     Heart attack Father 72    Ovarian cancer Sister     Coronary artery disease Other     Breast cancer Neg Hx        Social History     Socioeconomic History    Marital status:    Tobacco Use    Smoking status: Every Day     Current packs/day: 0.50     Average packs/day: 0.5 packs/day for 30.0 years (15.0 ttl pk-yrs)     Types: Cigarettes     Passive exposure: Never    Smokeless tobacco: Never   Vaping Use    Vaping status: Never Used   Substance and Sexual Activity    Alcohol use: No    Drug use: No    Sexual activity: Never           Objective   Physical Exam  Constitutional:       Appearance: Normal appearance.   HENT:      Head: Normocephalic.      Mouth/Throat:      Mouth: Mucous membranes are moist.   Eyes:      Pupils: Pupils are equal, round, and reactive to light.   Cardiovascular:      Rate and Rhythm: Regular rhythm. Tachycardia present.   Pulmonary:      Effort: Pulmonary effort is normal.   Abdominal:      Palpations: Abdomen is soft.   Musculoskeletal:         General: Normal range of motion.      Cervical back: Normal range of motion.   Skin:     Coloration: Skin is pale.   Neurological:      General: No focal deficit present.      Mental Status: She is alert.   Psychiatric:         Mood and Affect: Mood normal.         Procedures  123 minutes of critical care provided. This time excludes other billable procedures. Time does include preparation of documents, medical consultations, review of old records, and direct bedside care. Patient is at high risk for life-threatening deterioration due to dysrhythmia.           ED Course  ED Course as of 04/15/25 1510   Tue Apr 15, 2025   1038 EKG independently interpreted by me  Rate 143  WI interval 184  QRS duration 90  QTc 410  Sinus tachycardia  No  STEMI    Electronically signed by Dave Holt DO, 04/15/25, 10:39 AM EDT.   [OI]   1432 Repeat EKG independently interpreted by me  Rate 66  TX interval 158  QTc 425  Normal sinus rhythm  No STEMI    Electronically signed by Dave Holt DO, 04/15/25, 2:32 PM EDT.   [OI]      ED Course User Index  [OI] Dave Holt DO                                                       Medical Decision Making  69-year-old female with extensive past medical history presents emergency department with dysrhythmia.  Patient states she began having palpitations and chest pain earlier today.  She states that she has very minimal chest pain at this time, but states that her heart rate is very fast.  She was recently discharged from the hospital yesterday for anemia.  She states that she is compliant with her home medications.  She denies any fevers, chills, nausea, vomiting, shortness of breath.  Vital signs significant for heart rate in the 140s and SVT upon arrival.  Patient initially treated with 2 separate boluses of Cardizem, and her heart rate did not convert.  She was then given amiodarone along with an amiodarone drip, but again she did not convert.  She was then given 12 mg of adenosine, and again did not convert.  Patient ultimately had cardioversion and is now in sinus rhythm.  I discussed my findings with the on-call cardiologist Dr. Lopez who recommends the patient be admitted to the hospital.  I also discussed my findings with the inpatient team who will be admitting the patient for further medical management.    Problems Addressed:  SVT (supraventricular tachycardia): complicated acute illness or injury    Amount and/or Complexity of Data Reviewed  Independent Historian: EMS  Labs: ordered.  Radiology: ordered.  ECG/medicine tests: ordered and independent interpretation performed. Decision-making details documented in ED Course.    Risk  Prescription drug management.  Drug therapy requiring intensive monitoring for  toxicity.  Decision regarding hospitalization.        Final diagnoses:   SVT (supraventricular tachycardia)       ED Disposition  ED Disposition       ED Disposition   Decision to Admit    Condition   --    Comment   Level of Care: Progressive Care [20]   Diagnosis: SVT (supraventricular tachycardia) [202906]   Certification: I Certify That Inpatient Hospital Services Are Medically Necessary For Greater Than 2 Midnights                 No follow-up provider specified.       Medication List        PAUSE taking these medications      apixaban 5 MG tablet tablet  Wait to take this until your doctor or other care provider tells you to start again.  Hold until discussion with your Cardiologist or for at least 2 weeks, whichever comes first   Commonly known as: Dave Melendrez DO  04/15/25 1539      Electronically signed by Dave Holt DO at 04/15/25 1539       Ryan Goins at 04/15/25 1218          Dr. Holt stated to wait on ECG at this time.     Electronically signed by Ryan Goins at 04/15/25 1219       Facility-Administered Medications as of 4/16/2025   Medication Dose Route Frequency Provider Last Rate Last Admin    [COMPLETED] adenosine (ADENOCARD) injection 12 mg  12 mg Intravenous Once Dave Holt DO   12 mg at 04/15/25 1307    [COMPLETED] amiodarone 150 mg in 100 mL D5W (loading dose)  150 mg Intravenous Once Dave Holt DO   Stopped at 04/15/25 1221    amLODIPine (NORVASC) tablet 10 mg  10 mg Oral Daily Antonio Dasilva MD   10 mg at 04/16/25 0812    atorvastatin (LIPITOR) tablet 80 mg  80 mg Oral Nightly Antonio Dasilva MD   80 mg at 04/15/25 2057    sennosides-docusate (PERICOLACE) 8.6-50 MG per tablet 2 tablet  2 tablet Oral BID PRN Antonio Dasilva MD        And    polyethylene glycol (MIRALAX) packet 17 g  17 g Oral Daily PRN Antonio Dasilva MD        And    bisacodyl (DULCOLAX) EC tablet 5 mg  5 mg Oral Daily PRN Antonio Dasilva MD        And    bisacodyl (DULCOLAX)  suppository 10 mg  10 mg Rectal Daily PRAntonio Huynh MD        Calcium Replacement - Follow Nurse / BPA Driven Protocol   Not Applicable PRAntonio Huynh MD        [COMPLETED] dilTIAZem (CARDIZEM) injection 20 mg  20 mg Intravenous Once Ilyas, Dave, DO   20 mg at 04/15/25 1042    [COMPLETED] dilTIAZem (CARDIZEM) injection 20 mg  20 mg Intravenous Once Ilyas, Dave, DO   20 mg at 04/15/25 1145    folic acid (FOLVITE) tablet 1 mg  1 mg Oral Daily Antonio Dasilva MD   1 mg at 04/16/25 0812    gabapentin (NEURONTIN) capsule 300 mg  300 mg Oral TID PRN Antonio Dasilva MD        heparin (porcine) 5000 UNIT/ML injection 5,000 Units  5,000 Units Subcutaneous Q8H Antonio Dasilva MD   5,000 Units at 04/16/25 0556    [Held by provider] hydrALAZINE (APRESOLINE) tablet 100 mg  100 mg Oral TID Antonio Dasilva MD        HYDROcodone-acetaminophen (NORCO)  MG per tablet 1 tablet  1 tablet Oral Q8H PRN Antonio Dasilva MD   1 tablet at 04/15/25 2307    Magnesium Low Dose Replacement - Follow Nurse / BPA Driven Protocol   Not Applicable PRAntonio Huynh MD        metoprolol tartrate (LOPRESSOR) tablet 25 mg  25 mg Oral Q12H Antonio Dasilva MD   25 mg at 04/16/25 0812    [COMPLETED] midazolam (VERSED) injection 5 mg  5 mg Intravenous Once Ilyas, Dave, DO   2.5 mg at 04/15/25 1405    nitroglycerin (NITROSTAT) SL tablet 0.4 mg  0.4 mg Sublingual Q5 Min PRAntonio Huynh MD        pantoprazole (PROTONIX) EC tablet 40 mg  40 mg Oral QAM AC Antonio Dasilva MD   40 mg at 04/16/25 0812    Phosphorus Replacement - Follow Nurse / BPA Driven Protocol   Not Applicable Antonio Allen MD        [COMPLETED] potassium chloride (KLOR-CON M20) CR tablet 20 mEq  20 mEq Oral Once Antonio Daislva MD   20 mEq at 04/15/25 1607    Potassium Replacement - Follow Nurse / BPA Driven Protocol   Not Applicable PRN Antonio Dasilva MD        sertraline (ZOLOFT) tablet 50 mg  50 mg Oral Daily Antonio Dasilva MD   50 mg at 04/16/25 0812     [COMPLETED] sodium chloride 0.9 % bolus 1,000 mL  1,000 mL Intravenous Once Dave Holt, DO   Stopped at 04/15/25 1130    sodium chloride 0.9 % flush 10 mL  10 mL Intravenous PRN Dave Holt, DO        sodium chloride 0.9 % flush 10 mL  10 mL Intravenous Q12H Antonio Dasilva MD   10 mL at 04/16/25 0812    sodium chloride 0.9 % flush 10 mL  10 mL Intravenous PRN Antonio Dasilva MD        sodium chloride 0.9 % infusion 40 mL  40 mL Intravenous PRN Antonio Dasilva MD         Orders (all)        Start     Ordered    04/16/25 0948  Activity As Tolerated  Until Discontinued         04/16/25 0948    04/16/25 0900  amLODIPine (NORVASC) tablet 10 mg  Daily         04/15/25 1705    04/16/25 0900  aspirin chewable tablet 81 mg  Daily,   Status:  Discontinued         04/15/25 1705    04/16/25 0900  folic acid (FOLVITE) tablet 1 mg  Daily         04/15/25 1705    04/16/25 0900  sertraline (ZOLOFT) tablet 50 mg  Daily         04/15/25 1705    04/16/25 0857  T4, Free  Once         04/16/25 0856    04/16/25 0857  Lipid Panel  Once         04/16/25 0856    04/16/25 0856  TSH  Once         04/16/25 0856    04/16/25 0730  pantoprazole (PROTONIX) EC tablet 40 mg  Every Morning Before Breakfast         04/15/25 1705    04/16/25 0600  ECG 12 Lead Drug Monitoring; Amiodarone  Daily,   Status:  Canceled       04/15/25 1156    04/16/25 0600  Basic Metabolic Panel  Morning Draw         04/15/25 1541    04/16/25 0600  CBC Auto Differential  Morning Draw         04/15/25 1541    04/16/25 0600  Magnesium  Morning Draw         04/15/25 1709    04/16/25 0428  Connectors / Hubs Must Be Scrubbed 15 Seconds Using 70% Alcohol Before Access - Allow to Dry Before Accessing Line  Continuous         04/16/25 0427    04/15/25 2231  Scan Slide  Once         04/15/25 2230    04/15/25 2150  Potassium  Timed         04/15/25 1549    04/15/25 2100  sodium chloride 0.9 % flush 10 mL  Every 12 Hours Scheduled         04/15/25 1541    04/15/25 2100   "atorvastatin (LIPITOR) tablet 80 mg  Nightly         04/15/25 1705    04/15/25 2100  [Held by provider]  hydrALAZINE (APRESOLINE) tablet 100 mg  3 Times Daily        (On hold since yesterday at 1705 until manually unheld; held by Antonio Dasilva MDHold Reason: Abnormal Labs)    04/15/25 1705    04/15/25 1824  amiodarone 360 mg in 200 mL D5W infusion  Continuous,   Status:  Discontinued        Placed in \"Followed by\" Linked Group    04/15/25 1156    04/15/25 1800  Oral Care  2 Times Daily       04/15/25 1541    04/15/25 1800  Incentive Spirometry  Every 4 Hours While Awake       04/15/25 1541    04/15/25 1730  metoprolol tartrate (LOPRESSOR) tablet 25 mg  Every 12 Hours Scheduled         04/15/25 1637    04/15/25 1703  gabapentin (NEURONTIN) capsule 300 mg  3 Times Daily PRN         04/15/25 1705    04/15/25 1703  HYDROcodone-acetaminophen (NORCO)  MG per tablet 1 tablet  Every 8 Hours PRN         04/15/25 1705    04/15/25 1645  potassium chloride (KLOR-CON M20) CR tablet 20 mEq  Once         04/15/25 1549    04/15/25 1638  PT Consult: Eval & Treat Functional Mobility Below Baseline  Once         04/15/25 1637    04/15/25 1634  ECG 12 Lead QT Measurement  Once         04/15/25 1633    04/15/25 1600  Vital Signs  Every 4 Hours       04/15/25 1541    04/15/25 1557  heparin (porcine) 5000 UNIT/ML injection 5,000 Units  Every 8 Hours Scheduled         04/15/25 1541    04/15/25 1542  Intake & Output  Every Shift       04/15/25 1541    04/15/25 1542  Weigh Patient  Once         04/15/25 1541    04/15/25 1542  Insert Peripheral IV  Once         04/15/25 1541    04/15/25 1542  Saline Lock & Maintain IV Access  Continuous,   Status:  Canceled         04/15/25 1541    04/15/25 1542  Continuous Cardiac Monitoring  Continuous        Comments: Follow Standing Orders As Outlined in Process Instructions (Open Order Report to View Full Instructions)    04/15/25 1541    04/15/25 1542  Maintain IV Access  Continuous         " "04/15/25 1541    04/15/25 1542  Telemetry - Place Orders & Notify Provider of Results When Patient Experiences Acute Chest Pain, Dysrhythmia or Respiratory Distress  Continuous        Comments: Open Order Report to View Parameters Requiring Provider Notification    04/15/25 1541    04/15/25 1542  May Be Off Telemetry for Tests  Continuous         04/15/25 1541    04/15/25 1542  Diet: Cardiac, Diabetic; Healthy Heart (2-3 Na+); Consistent Carbohydrate; Fluid Consistency: Thin (IDDSI 0)  Diet Effective Now         04/15/25 1541    04/15/25 1542  Inpatient Cardiology Consult  Once        Specialty:  Cardiology  Provider:  Dwayne Lopez MD    04/15/25 1541    04/15/25 1541  Potassium Replacement - Follow Nurse / BPA Driven Protocol  As Needed         04/15/25 1541    04/15/25 1541  Magnesium Low Dose Replacement - Follow Nurse / BPA Driven Protocol  As Needed         04/15/25 1541    04/15/25 1541  Phosphorus Replacement - Follow Nurse / BPA Driven Protocol  As Needed         04/15/25 1541    04/15/25 1541  Calcium Replacement - Follow Nurse / BPA Driven Protocol  As Needed         04/15/25 1541    04/15/25 1541  sennosides-docusate (PERICOLACE) 8.6-50 MG per tablet 2 tablet  2 Times Daily PRN        Placed in \"And\" Linked Group    04/15/25 1541    04/15/25 1541  polyethylene glycol (MIRALAX) packet 17 g  Daily PRN        Placed in \"And\" Linked Group    04/15/25 1541    04/15/25 1541  bisacodyl (DULCOLAX) EC tablet 5 mg  Daily PRN        Placed in \"And\" Linked Group    04/15/25 1541    04/15/25 1541  bisacodyl (DULCOLAX) suppository 10 mg  Daily PRN        Placed in \"And\" Linked Group    04/15/25 1541    04/15/25 1541  sodium chloride 0.9 % flush 10 mL  As Needed         04/15/25 1541    04/15/25 1541  sodium chloride 0.9 % infusion 40 mL  As Needed         04/15/25 1541    04/15/25 1541  nitroglycerin (NITROSTAT) SL tablet 0.4 mg  Every 5 Minutes PRN         04/15/25 1541    04/15/25 1505  Inpatient Admission  Once    " "     04/15/25 1507    04/15/25 1505  Code Status and Medical Interventions: CPR (Attempt to Resuscitate); Full Support  Continuous         04/15/25 1507    04/15/25 1428  Hospitalist (on-call MD unless specified)  Once        Specialty:  Hospitalist  Provider:  (Not yet assigned)    04/15/25 1428    04/15/25 1354  midazolam (VERSED) injection 5 mg  Once         04/15/25 1338    04/15/25 1256  adenosine (ADENOCARD) injection 12 mg  Once         04/15/25 1240    04/15/25 1212  amiodarone 150 mg in 100 mL D5W (loading dose)  Once        Placed in \"Followed by\" Linked Group    04/15/25 1156    04/15/25 1212  amiodarone 360 mg in 200 mL D5W infusion  Continuous,   Status:  Discontinued        Placed in \"Followed by\" Linked Group    04/15/25 1156    04/15/25 1157  Initiate & Follow Amiodarone Protocol  Continuous,   Status:  Canceled         04/15/25 1156    04/15/25 1157  Monitor QTc  Every Shift,   Status:  Canceled       04/15/25 1156    04/15/25 1157  Notify Provider - QTc 500 milliseconds or Greater  Continuous,   Status:  Canceled        Comments: Open Order Report to View Parameters Requiring Provider Notification    04/15/25 1156    04/15/25 1157  ECG 12 Lead Drug Monitoring; Amiodarone  STAT         04/15/25 1156    04/15/25 1155  dilTIAZem (CARDIZEM) injection 20 mg  Once         04/15/25 1139    04/15/25 1150  High Sensitivity Troponin T 1Hr  PROCEDURE ONCE         04/15/25 1130    04/15/25 1112  dilTIAZem (CARDIZEM) injection 20 mg  Once         04/15/25 1056    04/15/25 1112  dilTIAZem (CARDIZEM) bolus from bag 1 mg/mL solution 20 mg  Once,   Status:  Discontinued         04/15/25 1133    04/15/25 1107  sodium chloride 0.9 % bolus 1,000 mL  Once         04/15/25 1051    04/15/25 1036  Cardiac Monitoring  Continuous,   Status:  Canceled        Comments: Follow Standing Orders As Outlined in Process Instructions (Open Order Report to View Full Instructions)    04/15/25 1035    04/15/25 1036  Continuous Pulse " Oximetry  Continuous         04/15/25 1035    04/15/25 1036  Vital Signs  Per Hospital Policy/Protocol         04/15/25 1035    04/15/25 1036  ECG 12 Lead Chest Pain  Once         04/15/25 1035    04/15/25 1036  XR Chest 1 View  1 Time Imaging         04/15/25 1035    04/15/25 1036  Insert Peripheral IV  Once         04/15/25 1035    04/15/25 1036  [place] Draw  Once         04/15/25 1035    04/15/25 1036  CBC & Differential  Once         04/15/25 1035    04/15/25 1036  Comprehensive Metabolic Panel  Once         04/15/25 1035    04/15/25 1036  High Sensitivity Troponin T  Once         04/15/25 1035    04/15/25 1036  Green Top (Gel)  PROCEDURE ONCE         04/15/25 1035    04/15/25 1036  Lavender Top  PROCEDURE ONCE         04/15/25 1035    04/15/25 1036  Gold Top - SST  PROCEDURE ONCE         04/15/25 1035    04/15/25 1036  Light Blue Top  PROCEDURE ONCE         04/15/25 1035    04/15/25 1036  CBC Auto Differential  PROCEDURE ONCE         04/15/25 1035    04/15/25 1035  sodium chloride 0.9 % flush 10 mL  As Needed         04/15/25 1035    04/15/25 0000  Telemetry Scan  Once         04/15/25 0000    04/15/25 0000  Telemetry Scan  Once         04/15/25 0000    04/15/25 0000  Telemetry Scan  Once         04/15/25 0000    04/15/25 0000  Telemetry Scan  Once         04/15/25 0000    04/15/25 0000  Telemetry Scan  Once         04/15/25 0000    Unscheduled  Oxygen Therapy- Nasal Cannula; Titrate 1-6 LPM Per SpO2; 90 - 95%  Continuous PRN       04/15/25 1035    Unscheduled  ECG 12 Lead Chest Pain  As Needed      Comments: Persistent, Unrelieved or Recurrent Chest Pain    04/15/25 1035    Unscheduled  Change Dressing to IV Site As Needed When Damp, Loose or Soiled  As Needed       04/16/25 0427    Unscheduled  Change Needleless Connectors  As Needed      Comments: Change Needleless Connectors When:  - Administration Set Changed  - Dressing Changed  - Removed For Any Reason  - Residual Blood or Debris Within Connector  -  Prior to Drawing Blood Cultures  - Contamination of Connector  - After Administration of Blood or Blood Components    04/16/25 0427                  Physician Progress Notes (all)    No notes of this type exist for this encounter.       Consult Notes (all)    No notes of this type exist for this encounter.

## 2025-04-16 NOTE — PLAN OF CARE
Problem: Adult Inpatient Plan of Care  Goal: Plan of Care Review  Outcome: Met  Flowsheets (Taken 4/16/2025 1125)  Outcome Evaluation: patient being discharged today.  Goal: Patient-Specific Goal (Individualized)  Outcome: Met  Goal: Absence of Hospital-Acquired Illness or Injury  Outcome: Met  Goal: Optimal Comfort and Wellbeing  Outcome: Met  Goal: Readiness for Transition of Care  Outcome: Met     Problem: Comorbidity Management  Goal: Maintenance of COPD Symptom Control  Outcome: Met  Goal: Blood Pressure in Desired Range  Outcome: Met   Goal Outcome Evaluation:              Outcome Evaluation: patient being discharged today.

## 2025-04-16 NOTE — PLAN OF CARE
Problem: Adult Inpatient Plan of Care  Goal: Plan of Care Review  Outcome: Progressing  Goal: Patient-Specific Goal (Individualized)  Outcome: Progressing  Goal: Absence of Hospital-Acquired Illness or Injury  Outcome: Progressing  Intervention: Identify and Manage Fall Risk  Recent Flowsheet Documentation  Taken 4/16/2025 0300 by Rhonda Tucker RN  Safety Promotion/Fall Prevention: safety round/check completed  Taken 4/16/2025 0150 by Rhonda Tucker RN  Safety Promotion/Fall Prevention: safety round/check completed  Taken 4/16/2025 0100 by Rhonda Tucker RN  Safety Promotion/Fall Prevention: safety round/check completed  Taken 4/15/2025 2300 by Rhonda Tucker RN  Safety Promotion/Fall Prevention: safety round/check completed  Taken 4/15/2025 2100 by Rhonda Tucker RN  Safety Promotion/Fall Prevention: safety round/check completed  Taken 4/15/2025 2057 by Rhonda Tucker RN  Safety Promotion/Fall Prevention: safety round/check completed  Taken 4/15/2025 1900 by Rhonda Tucker RN  Safety Promotion/Fall Prevention: safety round/check completed  Intervention: Prevent Skin Injury  Recent Flowsheet Documentation  Taken 4/16/2025 0150 by Rhonda Tucker RN  Body Position: position changed independently  Skin Protection:   incontinence pads utilized   pulse oximeter probe site changed  Taken 4/15/2025 2057 by Rhonda Tucker RN  Body Position: position changed independently  Skin Protection:   incontinence pads utilized   pulse oximeter probe site changed  Intervention: Prevent and Manage VTE (Venous Thromboembolism) Risk  Recent Flowsheet Documentation  Taken 4/16/2025 0150 by Rhonda Tucker RN  VTE Prevention/Management: (heparin) other (see comments)  Taken 4/15/2025 2057 by Rhonda Tucker RN  VTE Prevention/Management: (heparin) other (see comments)  Goal: Optimal Comfort and Wellbeing  Outcome: Progressing  Intervention: Monitor Pain and Promote Comfort  Recent Flowsheet Documentation  Taken  4/15/2025 2307 by Rhonda Tucker RN  Pain Management Interventions: pain medication given  Intervention: Provide Person-Centered Care  Recent Flowsheet Documentation  Taken 4/16/2025 0150 by Rhonda Tucker RN  Trust Relationship/Rapport:   care explained   choices provided   thoughts/feelings acknowledged  Taken 4/15/2025 2057 by Rhonda Tucker RN  Trust Relationship/Rapport:   care explained   choices provided   emotional support provided   empathic listening provided   questions answered   questions encouraged   thoughts/feelings acknowledged   reassurance provided  Goal: Readiness for Transition of Care  Outcome: Progressing   Goal Outcome Evaluation:

## 2025-04-16 NOTE — DISCHARGE SUMMARY
"    [facility] HOSPITALISTS DISCHARGE SUMMARY    Patient Identification:  Name:  Lesley Michel  Age:  69 y.o.  Sex:  female  :  1955  MRN:  [id number]  Visit Number:  [id number]    Date of Admission: 4/15/2025  Date of Discharge:  2025    PCP: Woodrow Raymond, APRN    DISCHARGE DIAGNOSIS  Supraventricular tachycardia, POA  Hx paroxysmal atrial fibrillation  Hypokalemia    Chronic:  HTN  HLD  CAD  CKD  Hypothyroidism  Chronic anemia requiring transfusions  COPD    CONSULTS   Cardiology     PROCEDURES PERFORMED  Cardioversion 4/15    HOSPITAL COURSE  Patient is a 69 y.o. female presented on 4/15 to [facility] complaining of dyspnea and palpitations.  Please see the admitting history and physical for further details.      Ms. Michel is our 70 yo F with hx of HTN, HLD, CAD, CKD, hypothyroidism, chronic anemia requiring intermittent transfusions, atrial fibrillation, COPD who was recently discharged after an admission for GI bleed. Patient declined and still declines capsule endoscopy which she notes to be \"experimental treatment\". She is here today with chest discomfort and shortness of breath starting this morning. Upon arrival to ED she wasfound to have HR in 140's, appears regular thought to be SVT. Patient was given IV Cardizem, IV amiodarone, 12 of adenosine without conversion. She was electrically cardioverted and now in sinus rhythm. Patient has been off anticoagulation for GI bleed since last visit. No symptoms of significant bleeding and hemoglobin is stable. No other signs of exacerbating factors. Electrolytes reviewed, no signs of infection. Potassium slightly low, replacement ordered. Repeat K and mag this AM wnl. Discussed with cardiology, admitted for obs and stopped amioderone. Increased home metoprolol tartrate to 25 mg BID, appears to have been ordered daily on home med rec. Patient has been asymptomatic this hospitalization without tachyarrhythmia occurrence. " Patient currently in sinus rhythm with rate in 60's. Discussed with cardiology on day of discharge. Patient stable for discharge. Patient denies any concerns returning home. No signs of bleeding. Continue to hold home apixiban for now. Patient to f/u with her cardiology team and PCP.     VITAL SIGNS:  Temp:  [97.8 °F (36.6 °C)-98.1 °F (36.7 °C)] 98 °F (36.7 °C)  Heart Rate:  [] 66  Resp:  [10-29] 16  BP: (100-165)/() 125/76  SpO2:  [92 %-99 %] 92 %  on  Flow (L/min) (Oxygen Therapy):  [2] 2;   Device (Oxygen Therapy): nasal cannula    Body mass index is 36.99 kg/m².  Wt Readings from Last 3 Encounters:   04/16/25 88.8 kg (195 lb 12.3 oz)   04/14/25 85.5 kg (188 lb 9.6 oz)   03/17/25 82.1 kg (181 lb)       PHYSICAL EXAM:  Constitutional:  Well-developed and well-nourished.  No respiratory distress.      HENT:  Head:  Normocephalic and atraumatic.  Mouth:  Moist mucous membranes.    Eyes:  Conjunctivae and EOM are normal.  Pupils are equal, round, and reactive to light.  No scleral icterus.    Cardiovascular:  Normal rate, regular rhythm and normal heart sounds with no murmur.  Pulmonary/Chest:  No respiratory distress, no wheezes, no crackles, with normal breath sounds and good air movement.  Abdominal:  Soft.  Bowel sounds are normal.  No distension and no tenderness.   Musculoskeletal:  No edema, no tenderness, and no deformity.  No red or swollen joints anywhere.    Neurological:  Alert and oriented to person, place, and time.  No gross neurological deficit.   Skin:  Skin is warm and dry. No rash noted. No pallor.   Peripheral vascular:  Strong pulses in all 4 extremities with no clubbing, no cyanosis, no edema.    DISCHARGE DISPOSITION   Stable    DISCHARGE MEDICATIONS:     Discharge Medications        PAUSE taking these medications        Instructions Start Date   apixaban 5 MG tablet tablet  Wait to take this until your doctor or other care provider tells you to start again.  Hold until discussion  with your Cardiologist or for at least 2 weeks, whichever comes first   Commonly known as: ELIQUIS   5 mg, 2 Times Daily             Changes to Medications        Instructions Start Date   hydrALAZINE 100 MG tablet  Commonly known as: APRESOLINE  What changed: how much to take   50 mg, Oral, 3 Times Daily      metoprolol tartrate 25 MG tablet  Commonly known as: LOPRESSOR  What changed: when to take this   25 mg, Oral, 2 Times Daily             Continue These Medications        Instructions Start Date   amLODIPine 10 MG tablet  Commonly known as: NORVASC   10 mg, Daily      aspirin 81 MG chewable tablet   81 mg, Daily      atorvastatin 80 MG tablet  Commonly known as: LIPITOR   80 mg, Nightly      folic acid 1 MG tablet  Commonly known as: FOLVITE   1 mg, Daily      gabapentin 600 MG tablet  Commonly known as: NEURONTIN   300 mg, 3 Times Daily PRN      HYDROcodone-acetaminophen  MG per tablet  Commonly known as: NORCO   1 tablet, Every 8 Hours PRN      pantoprazole 40 MG EC tablet  Commonly known as: PROTONIX   40 mg, Daily      sertraline 50 MG tablet  Commonly known as: ZOLOFT   50 mg, Daily      Torsemide 40 MG tablet   20 mg, Daily                  Follow-up Information       Woodrow Raymond APRN Follow up in 1 week(s).    Specialty: Family Medicine  Contact information:  [address] [facility] [id number] [phone]               Imelda Woodall APRN Follow up in 1 week(s).    Specialty: Cardiology  Contact information:  [address] [place] [id number]  [phone]                              TEST  RESULTS PENDING AT DISCHARGE       CODE STATUS  Code Status and Medical Interventions: CPR (Attempt to Resuscitate); Full Support   Ordered at: 04/15/25 1507     Code Status (Patient has no pulse and is not breathing):    CPR (Attempt to Resuscitate)     Medical Interventions (Patient has pulse or is breathing):    Full Support       Antonio Dasilva MD  [facility]  04/16/25  11:20  EDT    Please note that this discharge summary required more than 30 minutes to complete.

## 2025-04-16 NOTE — NURSING NOTE
Son is transporting patient home. D/c instructions given to patient with son at bedside. No questions/concerns at this time.

## 2025-04-17 NOTE — PAYOR COMM NOTE
"CONTACT: PARRIS OWENS RN  UTILIZATION MANAGEMENT DEPT.  [facility]  [address] [place] [address]  PHONE: [phone]  FAX: [phone]        CORRECTION OF RECENT FAX:  NO REFERENCE # AVAIL FOR DOS: 4/15-4/16/25 AS PT WAS CHANGED TO OBSERVATION.     STATUS WAS CHANGED BACK TO OBSERVATION DURING THIS DOS, PRIOR TO DC ON 4/16/25      DISCHARGE NOTIFICATION  DC DATE: 4/16/25 DC'D TO HOME.      Matthew Michel (69 y.o. Female)       Date of Birth   1955    Social Security Number       Address   [address] [place] [id number]    Home Phone   [phone]    MRN   [id number]       [place]   [facility]    Marital Status                               Admission Date   4/15/2025    Admission Type   Emergency    Admitting Provider   Antonio Dasilva MD    Attending Provider       Department, Room/Bed   [facility] PROGRESS CARE, P218/1P       Discharge Date   4/16/2025    Discharge Disposition   Home or Self Care    Discharge Destination                                 Attending Provider: (none)   Allergies: Xanax [Alprazolam]    Isolation: None   Infection: None   Code Status: Prior    Ht: 154.9 cm (61\")   Wt: 88.8 kg (195 lb 12.3 oz)    Admission Cmt: None   Principal Problem: SVT (supraventricular tachycardia) [I47.10]                   Active Insurance as of 4/15/2025       Primary Coverage       Payor Plan Insurance Group Employer/Plan Group    [facility] MEDICARE REPLACEMENT [facility] MEDICARE ADVANTAGE O [id number]       Payor Plan Address Payor Plan Phone Number Payor Plan Fax Number Effective Dates    [address] [phone]  1/1/2025 - None Entered    [facility] [address]         Subscriber Name Subscriber Birth Date Member ID       MATTHEW MICHEL 1955 [id number]                     Emergency Contacts        (Rel.) Home Phone Work Phone Mobile Phone    LOYD MICHEL (Son) [phone] -- --    MIGUEL WEI (Grandchild) [phone] -- --    " "ENA HELM (Relative) [phone] -- --    Yadira Michel (Daughter) -- -- [phone]                 Discharge Summary        Antonio Dasilva MD at 25 1120              [facility]OSPITALISTS DISCHARGE SUMMARY    Patient Identification:  Name:  Lesley Michel  Age:  69 y.o.  Sex:  female  :  1955  MRN:  [id number]  Visit Number:  [id number]    Date of Admission: 4/15/2025  Date of Discharge:  2025    PCP: Woodrow Raymond, APRN    DISCHARGE DIAGNOSIS  Supraventricular tachycardia, POA  Hx paroxysmal atrial fibrillation  Hypokalemia    Chronic:  HTN  HLD  CAD  CKD  Hypothyroidism  Chronic anemia requiring transfusions  COPD    CONSULTS   Cardiology     PROCEDURES PERFORMED  Cardioversion 4/15    HOSPITAL COURSE  Patient is a 69 y.o. female presented on 4/15 to [facility] complaining of dyspnea and palpitations.  Please see the admitting history and physical for further details.      Ms. Michel is our 70 yo F with hx of HTN, HLD, CAD, CKD, hypothyroidism, chronic anemia requiring intermittent transfusions, atrial fibrillation, COPD who was recently discharged after an admission for GI bleed. Patient declined and still declines capsule endoscopy which she notes to be \"experimental treatment\". She is here today with chest discomfort and shortness of breath starting this morning. Upon arrival to ED she wasfound to have HR in 140's, appears regular thought to be SVT. Patient was given IV Cardizem, IV amiodarone, 12 of adenosine without conversion. She was electrically cardioverted and now in sinus rhythm. Patient has been off anticoagulation for GI bleed since last visit. No symptoms of significant bleeding and hemoglobin is stable. No other signs of exacerbating factors. Electrolytes reviewed, no signs of infection. Potassium slightly low, replacement ordered. Repeat K and mag this AM wnl. Discussed with cardiology, admitted for obs and stopped amioderone. Increased " home metoprolol tartrate to 25 mg BID, appears to have been ordered daily on home med rec. Patient has been asymptomatic this hospitalization without tachyarrhythmia occurrence. Patient currently in sinus rhythm with rate in 60's. Discussed with cardiology on day of discharge. Patient stable for discharge. Patient denies any concerns returning home. No signs of bleeding. Continue to hold home apixiban for now. Patient to f/u with her cardiology team and PCP.     VITAL SIGNS:  Temp:  [97.8 °F (36.6 °C)-98.1 °F (36.7 °C)] 98 °F (36.7 °C)  Heart Rate:  [] 66  Resp:  [10-29] 16  BP: (100-165)/() 125/76  SpO2:  [92 %-99 %] 92 %  on  Flow (L/min) (Oxygen Therapy):  [2] 2;   Device (Oxygen Therapy): nasal cannula    Body mass index is 36.99 kg/m².  Wt Readings from Last 3 Encounters:   04/16/25 88.8 kg (195 lb 12.3 oz)   04/14/25 85.5 kg (188 lb 9.6 oz)   03/17/25 82.1 kg (181 lb)       PHYSICAL EXAM:  Constitutional:  Well-developed and well-nourished.  No respiratory distress.      HENT:  Head:  Normocephalic and atraumatic.  Mouth:  Moist mucous membranes.    Eyes:  Conjunctivae and EOM are normal.  Pupils are equal, round, and reactive to light.  No scleral icterus.    Cardiovascular:  Normal rate, regular rhythm and normal heart sounds with no murmur.  Pulmonary/Chest:  No respiratory distress, no wheezes, no crackles, with normal breath sounds and good air movement.  Abdominal:  Soft.  Bowel sounds are normal.  No distension and no tenderness.   Musculoskeletal:  No edema, no tenderness, and no deformity.  No red or swollen joints anywhere.    Neurological:  Alert and oriented to person, place, and time.  No gross neurological deficit.   Skin:  Skin is warm and dry. No rash noted. No pallor.   Peripheral vascular:  Strong pulses in all 4 extremities with no clubbing, no cyanosis, no edema.    DISCHARGE DISPOSITION   Stable    DISCHARGE MEDICATIONS:     Discharge Medications        PAUSE taking these  medications        Instructions Start Date   apixaban 5 MG tablet tablet  Wait to take this until your doctor or other care provider tells you to start again.  Hold until discussion with your Cardiologist or for at least 2 weeks, whichever comes first   Commonly known as: ELIQUIS   5 mg, 2 Times Daily             Changes to Medications        Instructions Start Date   hydrALAZINE 100 MG tablet  Commonly known as: APRESOLINE  What changed: how much to take   50 mg, Oral, 3 Times Daily      metoprolol tartrate 25 MG tablet  Commonly known as: LOPRESSOR  What changed: when to take this   25 mg, Oral, 2 Times Daily             Continue These Medications        Instructions Start Date   amLODIPine 10 MG tablet  Commonly known as: NORVASC   10 mg, Daily      aspirin 81 MG chewable tablet   81 mg, Daily      atorvastatin 80 MG tablet  Commonly known as: LIPITOR   80 mg, Nightly      folic acid 1 MG tablet  Commonly known as: FOLVITE   1 mg, Daily      gabapentin 600 MG tablet  Commonly known as: NEURONTIN   300 mg, 3 Times Daily PRN      HYDROcodone-acetaminophen  MG per tablet  Commonly known as: NORCO   1 tablet, Every 8 Hours PRN      pantoprazole 40 MG EC tablet  Commonly known as: PROTONIX   40 mg, Daily      sertraline 50 MG tablet  Commonly known as: ZOLOFT   50 mg, Daily      Torsemide 40 MG tablet   20 mg, Daily                  Follow-up Information       Woodrow Raymond APRN Follow up in 1 week(s).    Specialty: Family Medicine  Contact information:  [address] [id number] [phone]               Imelda Woodall APRN Follow up in 1 week(s).    Specialty: Cardiology  Contact information:  [address] [facility] [address]  [phone]                              TEST  RESULTS PENDING AT DISCHARGE       CODE STATUS  Code Status and Medical Interventions: CPR (Attempt to Resuscitate); Full Support   Ordered at: 04/15/25 3168     Code Status (Patient has no pulse and is not breathing):     CPR (Attempt to Resuscitate)     Medical Interventions (Patient has pulse or is breathing):    Full Support       Angie Dasilva MD  [facility]  04/16/25  11:20 EDT    Please note that this discharge summary required more than 30 minutes to complete.      Electronically signed by Angie Dasilva MD at 04/16/25 1128       Discharge Order (From admission, onward)       Start     Ordered    04/16/25 1120  Discharge patient  Once        Expected Discharge Date: 04/16/25   Discharge Disposition: Home or Self Care   Physician of Record for Attribution - Please select from Treatment Team: ANGIE DASILVA [[id number]]   Review needed by CMO to determine Physician of Record: No      Question Answer Comment   Physician of Record for Attribution - Please select from Treatment Team ANGIE DASILVA    Review needed by CMO to determine Physician of Record No        04/16/25 1120

## 2025-04-17 NOTE — OUTREACH NOTE
Prep Survey      Flowsheet Row Responses   [facility] facility patient discharged from? [facility]   Is LACE score < 7 ? No   Eligibility Readm Mgmt   Discharge diagnosis SVT   Does the patient have one of the following disease processes/diagnoses(primary or secondary)? Other   Does the patient have Home health ordered? No   Is there a DME ordered? No   Prep survey completed? Yes            SARA LEIGH - Registered Nurse

## 2025-04-22 ENCOUNTER — READMISSION MANAGEMENT (OUTPATIENT)
Dept: CALL CENTER | Facility: HOSPITAL | Age: 70
End: 2025-04-22
Payer: MEDICARE

## 2025-04-22 NOTE — OUTREACH NOTE
Medical Week 1 Survey      Flowsheet Row Responses   [facility] facility patient discharged from? [facility]   Does the patient have one of the following disease processes/diagnoses(primary or secondary)? Other   Week 1 attempt successful? No   Unsuccessful attempts Attempt 1            Lilia BAUMAN - Registered Nurse

## 2025-04-22 NOTE — PAYOR COMM NOTE
"CONTACT: PARRIS OWENS RN  UTILIZATION MANAGEMENT DEPT.  [facility]  [address] [place] [address]  PHONE: [phone]  FAX: [phone]        STATUS CHANGE UPDATE      PLEASE NOTE, IN REGARDS TO PRIOR FAX FOR IP AUTH REQUEST FOR 4/15/25:  STATUS  WAS CHANGED TO OBSERVATION ON 4/16/25 PRIOR TO DISCHARGE.   THANK YOUI    REF#[id number]      Matthew Michel (69 y.o. Female)       Date of Birth   1955    Social Security Number       Address   [address] [address]    Home Phone   [phone]    MRN   [id number]       [facility]   [facility]    Marital Status                               Admission Date   4/15/2025    Admission Type   Emergency    Admitting Provider   Antonio Dsailva MD    Attending Provider   Antonio Dasilva MD    Department, Room/Bed   [facility] PROGRESS CARE, P218/1P       Discharge Date       Discharge Disposition   Home or Self Care    Discharge Destination                                 Attending Provider: Antonio Dasilva MD    Allergies: Xanax [Alprazolam]    Isolation: None   Infection: None   Code Status: CPR    Ht: 154.9 cm (61\")   Wt: 88.8 kg (195 lb 12.3 oz)    Admission Cmt: None   Principal Problem: SVT (supraventricular tachycardia) [I47.10]                   Active Insurance as of 4/15/2025       Primary Coverage       Payor Plan Insurance Group Employer/Plan Group    ANTH MEDICARE REPLACEMENT ANTH MEDICARE ADVANTAGE O [id number]       Payor Plan Address Payor Plan Phone Number Payor Plan Fax Number Effective Dates    [address] [phone]  1/1/2025 - None Entered    [facility] [address]         Subscriber Name Subscriber Birth Date Member ID       MATTHEW MICHEL 1955 [id number]                     Emergency Contacts        (Rel.) Home Phone Work Phone Mobile Phone    LOYD MICHEL (Son) [phone] -- --    MIGUEL WEI (Grandchild) [phone] -- --    ENA HELM (Relative) " [phone] -- --    Yadira Michel (Daughter) -- -- [phone]              Orders (last 24 hrs)        Start     Ordered    04/16/25 1122  Initiate Observation Status  Once         04/16/25 1121    04/16/25 1120  Discharge patient  Once         04/16/25 1120    04/16/25 0948  Activity As Tolerated  Until Discontinued         04/16/25 0948    04/16/25 0900  amLODIPine (NORVASC) tablet 10 mg  Daily         04/15/25 1705    04/16/25 0900  aspirin chewable tablet 81 mg  Daily,   Status:  Discontinued         04/15/25 1705    04/16/25 0900  folic acid (FOLVITE) tablet 1 mg  Daily         04/15/25 1705    04/16/25 0900  sertraline (ZOLOFT) tablet 50 mg  Daily         04/15/25 1705    04/16/25 0857  T4, Free  Once         04/16/25 0856    04/16/25 0857  Lipid Panel  Once         04/16/25 0856    04/16/25 0856  TSH  Once         04/16/25 0856    04/16/25 0730  pantoprazole (PROTONIX) EC tablet 40 mg  Every Morning Before Breakfast         04/15/25 1705    04/16/25 0600  ECG 12 Lead Drug Monitoring; Amiodarone  Daily,   Status:  Canceled       04/15/25 1156    04/16/25 0600  Basic Metabolic Panel  Morning Draw         04/15/25 1541    04/16/25 0600  CBC Auto Differential  Morning Draw         04/15/25 1541    04/16/25 0600  Magnesium  Morning Draw         04/15/25 1709    04/16/25 0428  Connectors / Hubs Must Be Scrubbed 15 Seconds Using 70% Alcohol Before Access - Allow to Dry Before Accessing Line  Continuous         04/16/25 0427    04/16/25 0000  hydrALAZINE (APRESOLINE) 100 MG tablet  3 Times Daily         04/16/25 1120    04/16/25 0000  metoprolol tartrate (LOPRESSOR) 25 MG tablet  2 Times Daily         04/16/25 1120    04/15/25 2231  Scan Slide  Once         04/15/25 2230    04/15/25 2150  Potassium  Timed         04/15/25 1549    04/15/25 2100  sodium chloride 0.9 % flush 10 mL  Every 12 Hours Scheduled         04/15/25 1541    04/15/25 2100  atorvastatin (LIPITOR) tablet 80 mg  Nightly         04/15/25 1702     "04/15/25 2100  [Held by provider]  hydrALAZINE (APRESOLINE) tablet 100 mg  3 Times Daily        (On hold since yesterday at 1705 until manually unheld; held by Antonio Dasilva MDHold Reason: Abnormal Labs)    04/15/25 1705    04/15/25 1824  amiodarone 360 mg in 200 mL D5W infusion  Continuous,   Status:  Discontinued        Placed in \"Followed by\" Linked Group    04/15/25 1156    04/15/25 1800  Oral Care  2 Times Daily       04/15/25 1541    04/15/25 1800  Incentive Spirometry  Every 4 Hours While Awake       04/15/25 1541    04/15/25 1730  metoprolol tartrate (LOPRESSOR) tablet 25 mg  Every 12 Hours Scheduled         04/15/25 1637    04/15/25 1703  gabapentin (NEURONTIN) capsule 300 mg  3 Times Daily PRN         04/15/25 1705    04/15/25 1703  HYDROcodone-acetaminophen (NORCO)  MG per tablet 1 tablet  Every 8 Hours PRN         04/15/25 1705    04/15/25 1645  potassium chloride (KLOR-CON M20) CR tablet 20 mEq  Once         04/15/25 1549    04/15/25 1638  PT Consult: Eval & Treat Functional Mobility Below Baseline  Once         04/15/25 1637    04/15/25 1634  ECG 12 Lead QT Measurement  Once         04/15/25 1633    04/15/25 1600  Vital Signs  Every 4 Hours       04/15/25 1541    04/15/25 1557  heparin (porcine) 5000 UNIT/ML injection 5,000 Units  Every 8 Hours Scheduled         04/15/25 1541    04/15/25 1542  Intake & Output  Every Shift       04/15/25 1541    04/15/25 1542  Weigh Patient  Once         04/15/25 1541    04/15/25 1542  Insert Peripheral IV  Once         04/15/25 1541    04/15/25 1542  Saline Lock & Maintain IV Access  Continuous,   Status:  Canceled         04/15/25 1541    04/15/25 1542  Continuous Cardiac Monitoring  Continuous        Comments: Follow Standing Orders As Outlined in Process Instructions (Open Order Report to View Full Instructions)    04/15/25 1541    04/15/25 1542  Maintain IV Access  Continuous         04/15/25 1541    04/15/25 1542  Telemetry - Place Orders & Notify Provider " "of Results When Patient Experiences Acute Chest Pain, Dysrhythmia or Respiratory Distress  Continuous        Comments: Open Order Report to View Parameters Requiring Provider Notification    04/15/25 1541    04/15/25 1542  May Be Off Telemetry for Tests  Continuous         04/15/25 1541    04/15/25 1542  Diet: Cardiac, Diabetic; Healthy Heart (2-3 Na+); Consistent Carbohydrate; Fluid Consistency: Thin (IDDSI 0)  Diet Effective Now         04/15/25 1541    04/15/25 1542  Inpatient Cardiology Consult  Once        Specialty:  Cardiology  Provider:  Dwayne Lopez MD    04/15/25 1541    04/15/25 1541  Potassium Replacement - Follow Nurse / BPA Driven Protocol  As Needed         04/15/25 1541    04/15/25 1541  Magnesium Low Dose Replacement - Follow Nurse / BPA Driven Protocol  As Needed         04/15/25 1541    04/15/25 1541  Phosphorus Replacement - Follow Nurse / BPA Driven Protocol  As Needed         04/15/25 1541    04/15/25 1541  Calcium Replacement - Follow Nurse / BPA Driven Protocol  As Needed         04/15/25 1541    04/15/25 1541  sennosides-docusate (PERICOLACE) 8.6-50 MG per tablet 2 tablet  2 Times Daily PRN        Placed in \"And\" Linked Group    04/15/25 1541    04/15/25 1541  polyethylene glycol (MIRALAX) packet 17 g  Daily PRN        Placed in \"And\" Linked Group    04/15/25 1541    04/15/25 1541  bisacodyl (DULCOLAX) EC tablet 5 mg  Daily PRN        Placed in \"And\" Linked Group    04/15/25 1541    04/15/25 1541  bisacodyl (DULCOLAX) suppository 10 mg  Daily PRN        Placed in \"And\" Linked Group    04/15/25 1541    04/15/25 1541  sodium chloride 0.9 % flush 10 mL  As Needed         04/15/25 1541    04/15/25 1541  sodium chloride 0.9 % infusion 40 mL  As Needed         04/15/25 1541    04/15/25 1541  nitroglycerin (NITROSTAT) SL tablet 0.4 mg  Every 5 Minutes PRN         04/15/25 1541    04/15/25 1505  Inpatient Admission  Once         04/15/25 1507    04/15/25 1505  Code Status and Medical " "Interventions: CPR (Attempt to Resuscitate); Full Support  Continuous         04/15/25 1507    04/15/25 1428  Hospitalist (on-call MD unless specified)  Once        Specialty:  Hospitalist  Provider:  (Not yet assigned)    04/15/25 1428    04/15/25 1354  midazolam (VERSED) injection 5 mg  Once         04/15/25 1338    04/15/25 1256  adenosine (ADENOCARD) injection 12 mg  Once         04/15/25 1240    04/15/25 1212  amiodarone 150 mg in 100 mL D5W (loading dose)  Once        Placed in \"Followed by\" Linked Group    04/15/25 1156    04/15/25 1212  amiodarone 360 mg in 200 mL D5W infusion  Continuous,   Status:  Discontinued        Placed in \"Followed by\" Linked Group    04/15/25 1156    04/15/25 1157  Initiate & Follow Amiodarone Protocol  Continuous,   Status:  Canceled         04/15/25 1156    04/15/25 1157  Monitor QTc  Every Shift,   Status:  Canceled       04/15/25 1156    04/15/25 1157  Notify Provider - QTc 500 milliseconds or Greater  Continuous,   Status:  Canceled        Comments: Open Order Report to View Parameters Requiring Provider Notification    04/15/25 1156    04/15/25 1157  ECG 12 Lead Drug Monitoring; Amiodarone  STAT         04/15/25 1156    04/15/25 1155  dilTIAZem (CARDIZEM) injection 20 mg  Once         04/15/25 1139    04/15/25 1035  sodium chloride 0.9 % flush 10 mL  As Needed         04/15/25 1035    Unscheduled  Oxygen Therapy- Nasal Cannula; Titrate 1-6 LPM Per SpO2; 90 - 95%  Continuous PRN       04/15/25 1035    Unscheduled  ECG 12 Lead Chest Pain  As Needed      Comments: Persistent, Unrelieved or Recurrent Chest Pain    04/15/25 1035    Unscheduled  Change Dressing to IV Site As Needed When Damp, Loose or Soiled  As Needed       04/16/25 0427    Unscheduled  Change Needleless Connectors  As Needed      Comments: Change Needleless Connectors When:  - Administration Set Changed  - Dressing Changed  - Removed For Any Reason  - Residual Blood or Debris Within Connector  - Prior to Drawing " Blood Cultures  - Contamination of Connector  - After Administration of Blood or Blood Components    04/16/25 9385

## 2025-05-02 ENCOUNTER — READMISSION MANAGEMENT (OUTPATIENT)
Dept: CALL CENTER | Facility: HOSPITAL | Age: 70
End: 2025-05-02
Payer: MEDICARE

## 2025-05-02 NOTE — OUTREACH NOTE
Medical Week 2 Survey      Flowsheet Row Responses   [facility] patient discharged from? [facility]   Does the patient have one of the following disease processes/diagnoses(primary or secondary)? Other   Week 2 attempt successful? Yes   Call start time 0925   Discharge diagnosis SVT   Call end time 0930   Is patient permission given to speak with other caregiver? Yes   List who call center can speak with Wes-Gabe   Person spoke with today (if not patient) and relationship Wes-Son   Meds reviewed with patient/caregiver? Yes   Is the patient having any side effects they believe may be caused by any medication additions or changes? No   Does the patient have all medications ordered at discharge? Yes   Is the patient taking all medications as directed (includes completed medication regime)? Yes   Does the patient have a primary care provider?  Yes   Does the patient have an appointment with their PCP within 7 days of discharge? Yes   Comments regarding PCP Has had PCP follow up   Has the patient kept scheduled appointments due by today? Yes   Has home health visited the patient within 72 hours of discharge? N/A   Psychosocial issues? No   Did the patient receive a copy of their discharge instructions? Yes   Nursing interventions Reviewed instructions with patient   What is the patient's perception of their health status since discharge? Improving  [Son reports patient improving but she has had some lower extremity swelling, enocuraged Cardiology follow up]   Is the patient/caregiver able to teach back signs and symptoms related to disease process for when to call PCP? Yes   Is the patient/caregiver able to teach back signs and symptoms related to disease process for when to call 911? Yes   Is the patient/caregiver able to teach back the hierarchy of who to call/visit for symptoms/problems? PCP, Specialist, Home health nurse, Urgent Care, ED, 911 Yes   If the patient is a current smoker, are they able to teach back  resources for cessation? Smoking cessation medications   Week 2 Call Completed? Yes   Is the patient interested in additional calls from an ambulatory ? No   Would this patient benefit from a Referral to [facility] Social Work? No   Call end time 0930            Yanely LIN - Registered Nurse

## 2025-05-12 ENCOUNTER — READMISSION MANAGEMENT (OUTPATIENT)
Dept: CALL CENTER | Facility: HOSPITAL | Age: 70
End: 2025-05-12
Payer: MEDICARE

## 2025-05-12 NOTE — OUTREACH NOTE
Medical Week 3 Survey      Flowsheet Row Responses   [facility] facility patient discharged from? [facility]   Does the patient have one of the following disease processes/diagnoses(primary or secondary)? Other   Week 3 attempt successful? No   Unsuccessful attempts Attempt 1   Erica Cano Registered Nurse

## 2025-05-18 ENCOUNTER — NURSE TRIAGE (OUTPATIENT)
Dept: CALL CENTER | Facility: HOSPITAL | Age: 70
End: 2025-05-18
Payer: MEDICARE

## 2025-05-18 ENCOUNTER — HOSPITAL ENCOUNTER (EMERGENCY)
Facility: HOSPITAL | Age: 70
Discharge: HOME | End: 2025-05-18
Payer: MEDICARE

## 2025-05-18 ENCOUNTER — APPOINTMENT (OUTPATIENT)
Dept: GENERAL RADIOLOGY | Facility: HOSPITAL | Age: 70
End: 2025-05-18
Payer: MEDICARE

## 2025-05-18 VITALS
TEMPERATURE: 98 F | OXYGEN SATURATION: 95 % | DIASTOLIC BLOOD PRESSURE: 93 MMHG | HEART RATE: 85 BPM | WEIGHT: 181 LBS | SYSTOLIC BLOOD PRESSURE: 143 MMHG | RESPIRATION RATE: 20 BRPM | HEIGHT: 61 IN | BODY MASS INDEX: 34.17 KG/M2

## 2025-05-18 DIAGNOSIS — I48.91 ATRIAL FIBRILLATION WITH RVR: Primary | ICD-10-CM

## 2025-05-18 LAB
ALBUMIN SERPL-MCNC: 3.8 G/DL (ref 3.5–5.2)
ALBUMIN/GLOB SERPL: 1.1 G/DL
ALP SERPL-CCNC: 128 U/L (ref 39–117)
ALT SERPL W P-5'-P-CCNC: <5 U/L (ref 1–33)
ANION GAP SERPL CALCULATED.3IONS-SCNC: 10.6 MMOL/L (ref 5–15)
ANISOCYTOSIS BLD QL: ABNORMAL
AST SERPL-CCNC: 13 U/L (ref 1–32)
BASOPHILS # BLD MANUAL: 0.08 10*3/MM3 (ref 0–0.2)
BASOPHILS NFR BLD MANUAL: 1 % (ref 0–1.5)
BILIRUB SERPL-MCNC: 0.3 MG/DL (ref 0–1.2)
BUN SERPL-MCNC: 16 MG/DL (ref 8–23)
BUN/CREAT SERPL: 10.1 (ref 7–25)
CALCIUM SPEC-SCNC: 9.1 MG/DL (ref 8.6–10.5)
CHLORIDE SERPL-SCNC: 104 MMOL/L (ref 98–107)
CO2 SERPL-SCNC: 25.4 MMOL/L (ref 22–29)
CREAT SERPL-MCNC: 1.58 MG/DL (ref 0.57–1)
DEPRECATED RDW RBC AUTO: 56.3 FL (ref 37–54)
EGFRCR SERPLBLD CKD-EPI 2021: 35.7 ML/MIN/1.73
ELLIPTOCYTES BLD QL SMEAR: ABNORMAL
EOSINOPHIL # BLD MANUAL: 0.46 10*3/MM3 (ref 0–0.4)
EOSINOPHIL NFR BLD MANUAL: 6 % (ref 0.3–6.2)
ERYTHROCYTE [DISTWIDTH] IN BLOOD BY AUTOMATED COUNT: 20.4 % (ref 12.3–15.4)
GEN 5 1HR TROPONIN T REFLEX: 28 NG/L
GLOBULIN UR ELPH-MCNC: 3.4 GM/DL
GLUCOSE SERPL-MCNC: 97 MG/DL (ref 65–99)
HCT VFR BLD AUTO: 29.8 % (ref 34–46.6)
HGB BLD-MCNC: 8 G/DL (ref 12–15.9)
HOLD SPECIMEN: NORMAL
HOLD SPECIMEN: NORMAL
HYPOCHROMIA BLD QL: ABNORMAL
LARGE PLATELETS: ABNORMAL
LYMPHOCYTES # BLD MANUAL: 1.92 10*3/MM3 (ref 0.7–3.1)
LYMPHOCYTES NFR BLD MANUAL: 8 % (ref 5–12)
MCH RBC QN AUTO: 20.6 PG (ref 26.6–33)
MCHC RBC AUTO-ENTMCNC: 26.8 G/DL (ref 31.5–35.7)
MCV RBC AUTO: 76.6 FL (ref 79–97)
MICROCYTES BLD QL: ABNORMAL
MONOCYTES # BLD: 0.61 10*3/MM3 (ref 0.1–0.9)
NEUTROPHILS # BLD AUTO: 4.6 10*3/MM3 (ref 1.7–7)
NEUTROPHILS NFR BLD MANUAL: 60 % (ref 42.7–76)
PLATELET # BLD AUTO: 411 10*3/MM3 (ref 140–450)
PMV BLD AUTO: 10.2 FL (ref 6–12)
POLYCHROMASIA BLD QL SMEAR: ABNORMAL
POTASSIUM SERPL-SCNC: 3.8 MMOL/L (ref 3.5–5.2)
PROT SERPL-MCNC: 7.2 G/DL (ref 6–8.5)
RBC # BLD AUTO: 3.89 10*6/MM3 (ref 3.77–5.28)
SODIUM SERPL-SCNC: 140 MMOL/L (ref 136–145)
STOMATOCYTES BLD QL SMEAR: ABNORMAL
TARGETS BLD QL SMEAR: ABNORMAL
TROPONIN T % DELTA: -7
TROPONIN T NUMERIC DELTA: -2 NG/L
TROPONIN T SERPL HS-MCNC: 30 NG/L
VARIANT LYMPHS NFR BLD MANUAL: 25 % (ref 19.6–45.3)
WBC NRBC COR # BLD AUTO: 7.66 10*3/MM3 (ref 3.4–10.8)
WHOLE BLOOD HOLD COAG: NORMAL
WHOLE BLOOD HOLD SPECIMEN: NORMAL

## 2025-05-18 PROCEDURE — 84484 ASSAY OF TROPONIN QUANT: CPT

## 2025-05-18 PROCEDURE — 36415 COLL VENOUS BLD VENIPUNCTURE: CPT

## 2025-05-18 PROCEDURE — 93005 ELECTROCARDIOGRAM TRACING: CPT

## 2025-05-18 PROCEDURE — 85025 COMPLETE CBC W/AUTO DIFF WBC: CPT

## 2025-05-18 PROCEDURE — 80053 COMPREHEN METABOLIC PANEL: CPT

## 2025-05-18 PROCEDURE — 96374 THER/PROPH/DIAG INJ IV PUSH: CPT

## 2025-05-18 PROCEDURE — 71045 X-RAY EXAM CHEST 1 VIEW: CPT

## 2025-05-18 PROCEDURE — 85007 BL SMEAR W/DIFF WBC COUNT: CPT

## 2025-05-18 PROCEDURE — 71045 X-RAY EXAM CHEST 1 VIEW: CPT | Performed by: RADIOLOGY

## 2025-05-18 PROCEDURE — 99284 EMERGENCY DEPT VISIT MOD MDM: CPT

## 2025-05-18 RX ORDER — SODIUM CHLORIDE 0.9 % (FLUSH) 0.9 %
10 SYRINGE (ML) INJECTION AS NEEDED
Status: DISCONTINUED | OUTPATIENT
Start: 2025-05-18 | End: 2025-05-19 | Stop reason: HOSPADM

## 2025-05-18 RX ORDER — METOPROLOL TARTRATE 1 MG/ML
5 INJECTION, SOLUTION INTRAVENOUS ONCE
Status: COMPLETED | OUTPATIENT
Start: 2025-05-18 | End: 2025-05-18

## 2025-05-18 RX ADMIN — METOPROLOL TARTRATE 5 MG: 1 INJECTION, SOLUTION INTRAVENOUS at 19:59

## 2025-05-18 NOTE — ED PROVIDER NOTES
Subjective   History of Present Illness  69-year-old female with a history of CHF, COPD, hypertension, hyperlipidemia , and history of arrhythmias presents to the ED due to fast heartbeat and palpitations.  Patient symptoms started earlier today.  She endorses that usually her blood pressure is up when she feels this way but her blood pressures have been normal.  She noted that her blood heart was very fast she denies any shortness of breath or chest pain at this time.      Review of Systems   Constitutional: Negative.  Negative for fever.   HENT: Negative.     Respiratory: Negative.     Cardiovascular:  Positive for palpitations. Negative for chest pain.   Gastrointestinal: Negative.  Negative for abdominal pain.   Endocrine: Negative.    Genitourinary: Negative.  Negative for dysuria.   Skin: Negative.    Neurological: Negative.    Psychiatric/Behavioral: Negative.     All other systems reviewed and are negative.      Past Medical History:   Diagnosis Date    Anxiety     Arthritis     CHF (congestive heart failure)     Cigarette smoker 02/10/2022    CKD (chronic kidney disease) stage 4, GFR 15-29 ml/min     MAICO on top of CKD IIIa, HD from 10/23-, some recovery to CKD IV    COPD (chronic obstructive pulmonary disease)     Coronary artery disease     Elevated cholesterol     GERD (gastroesophageal reflux disease)     H/O heart artery stent     History of transfusion     Hyperlipidemia     Hypertension     hypothyroidism     Obesity     PAF (paroxysmal atrial fibrillation)     PVD (peripheral vascular disease)     x2 stents       Allergies   Allergen Reactions    Xanax [Alprazolam] Other (See Comments)     Severe agitation per patient and family       Past Surgical History:   Procedure Laterality Date    ANKLE SURGERY      RIGHT    APPENDECTOMY      CARDIAC CATHETERIZATION      LEFT     SECTION      CHOLECYSTECTOMY N/A 2025    Procedure: CHOLECYSTECTOMY LAPAROSCOPIC WITH DAVINCI ROBOT;  Surgeon:  Windy Neal MD;  Location: [facility] OR;  Service: Robotics - DaVinci;  Laterality: N/A;    COLONOSCOPY N/A 3/17/2025    Procedure: COLONOSCOPY;  Surgeon: Luis Antonio Jiang MD;  Location: [facility] OR;  Service: Gastroenterology;  Laterality: N/A;    CORONARY STENT PLACEMENT      ENDOSCOPY N/A 3/15/2025    Procedure: ESOPHAGOGASTRODUODENOSCOPY;  Surgeon: Luis Antonio Jiang MD;  Location: [facility] OR;  Service: Gastroenterology;  Laterality: N/A;    HYSTERECTOMY      INSERTION HEMODIALYSIS CATHETER Right 10/19/2023    Procedure: HEMODIALYSIS CATHETER INSERTION;  Surgeon: Bartolome Schwartz MD;  Location: [facility] OR;  Service: General;  Laterality: Right;       Family History   Problem Relation Age of Onset    Heart disease Mother     Heart attack Mother 73    Fibromyalgia Mother     Heart attack Father 72    Ovarian cancer Sister     Coronary artery disease Other     Breast cancer Neg Hx        Social History     Socioeconomic History    Marital status:    Tobacco Use    Smoking status: Every Day     Current packs/day: 1.00     Average packs/day: 1 pack/day for 30.0 years (30.0 ttl pk-yrs)     Types: Cigarettes     Passive exposure: Never    Smokeless tobacco: Never   Vaping Use    Vaping status: Never Used   Substance and Sexual Activity    Alcohol use: No    Drug use: No    Sexual activity: Never           Objective   Physical Exam  Vitals and nursing note reviewed.   Constitutional:       General: She is in acute distress.      Appearance: She is well-developed. She is obese. She is ill-appearing. She is not toxic-appearing or diaphoretic.   HENT:      Head: Normocephalic and atraumatic.      Right Ear: External ear normal.      Left Ear: External ear normal.      Nose: Nose normal.   Eyes:      Conjunctiva/sclera: Conjunctivae normal.      Pupils: Pupils are equal, round, and reactive to light.   Neck:      Vascular: No JVD.      Trachea: No tracheal deviation.   Cardiovascular:      Rate and Rhythm:  Tachycardia present. Rhythm irregular.      Heart sounds: Normal heart sounds. No murmur heard.  Pulmonary:      Effort: Pulmonary effort is normal. No respiratory distress.      Breath sounds: Normal breath sounds. No wheezing.   Abdominal:      General: Bowel sounds are normal.      Palpations: Abdomen is soft.      Tenderness: There is no abdominal tenderness.   Musculoskeletal:         General: No deformity. Normal range of motion.      Cervical back: Normal range of motion and neck supple.   Skin:     General: Skin is warm and dry.      Coloration: Skin is not pale.      Findings: No erythema or rash.   Neurological:      Mental Status: She is alert and oriented to person, place, and time.      Cranial Nerves: No cranial nerve deficit.   Psychiatric:         Behavior: Behavior normal.         Thought Content: Thought content normal.         Procedures           ED Course  ED Course as of 05/18/25 2054   Sun May 18, 2025   1820 EKG interpretation atrial fibrillation with  bpm QTc is 511 no ST elevations or depressions.  Electronically signed by Colin Wells DO, 05/18/25, 6:20 PM EDT.   [GF]      ED Course User Index  [GF] Colin Wells DO                                                       Medical Decision Making  Patient presents today with palpitations.  Patient has known atrial fibrillation and initial ECG demonstrated clear that the patient was in atrial fibrillation RVR.  The patient was treated with an IV dose of Lopressor and has had excellent response with conversion to sinus rhythm at a rate of 80 to 90 bpm.  Patient has occasional PVCs.  Patient is rate remained clinically stable throughout admission.  Differential diagnosis for presentation would include medication noncompliance, electrolyte abnormalities, thyroid disorders, and persistent atrial fibrillation.  Patient has been referred back to her cardiologist to discuss ongoing management of her heart failure as well as atrial  fibrillation RVR.    Problems Addressed:  Atrial fibrillation with RVR: complicated acute illness or injury    Amount and/or Complexity of Data Reviewed  Labs: ordered.  Radiology: ordered. Decision-making details documented in ED Course.  ECG/medicine tests: ordered and independent interpretation performed. Decision-making details documented in ED Course.    Risk  OTC drugs.  Prescription drug management.        Final diagnoses:   Atrial fibrillation with RVR       ED Disposition  ED Disposition       ED Disposition   Discharge    Condition   Stable    Comment   --               Rebecca Echeverria MD  [address] [place] [id number]  [phone]    Schedule an appointment as soon as possible for a visit in 1 week  A-fib management and to discuss ongoing treatment with Eliquis as well as ongoing use of Lasix         Medication List        PAUSE taking these medications      apixaban 5 MG tablet tablet  Wait to take this until your doctor or other care provider tells you to start again.  Hold until discussion with your Cardiologist or for at least 2 weeks, whichever comes first   Commonly known as: Ralf Sandhu MD  05/18/25 1209

## 2025-05-18 NOTE — TELEPHONE ENCOUNTER
"Called states heart rate is 150 BP is 126/98. Her son is disagreeing with her. He is rechecking her blood pressure. 113/87. Pulse 161.  She has a history of SVT. She feels weak and tired, she has been cooking.       Outcome - She will have son bring her to the ER.  Reason for Disposition   [1] Heart beating very rapidly (e.g., > 140 / minute) AND [2] present now  (Exception: During exercise.)    Additional Information   Negative: Passed out (i.e., lost consciousness, collapsed and was not responding)   Negative: Shock suspected (e.g., cold/pale/clammy skin, too weak to stand, low BP, rapid pulse)   Negative: Difficult to awaken or acting confused (e.g., disoriented, slurred speech)   Negative: Visible sweat on face or sweat dripping down face   Negative: Unable to walk, or can only walk with assistance (e.g., requires support)   Negative: [1] Received SHOCK from implantable cardiac defibrillator AND [2] persisting symptoms (i.e., palpitations, lightheadedness)   Negative: [1] Dizziness, lightheadedness, or weakness AND [2] heart beating very rapidly (e.g., > 140 / minute)   Negative: [1] Dizziness, lightheadedness, or weakness AND [2] heart beating very slowly (e.g., < 50 / minute)   Negative: Sounds like a life-threatening emergency to the triager   Negative: Chest pain   Negative: Implantable Cardiac Defibrillator (ICD) or a pacemaker symptoms or questions   Negative: Difficulty breathing   Negative: Dizziness, lightheadedness, or weakness    Answer Assessment - Initial Assessment Questions  1. DESCRIPTION: \"Please describe your heart rate or heartbeat that you are having\" (e.g., fast/slow, regular/irregular, skipped or extra beats, \"palpitations\")      Feels like flutter  2. ONSET: \"When did it start?\" (Minutes, hours or days)       This am   3. DURATION: \"How long does it last\" (e.g., seconds, minutes, hours)      Last a little while   4. PATTERN \"Does it come and go, or has it been constant since it started?\"  " "\"Does it get worse with exertion?\"   \"Are you feeling it now?\"      It comes and goes.  5. TAP: \"Using your hand, can you tap out what you are feeling on a chair or table in front of you, so that I can hear?\" (Note: not all patients can do this)        no  6. HEART RATE: \"Can you tell me your heart rate?\" \"How many beats in 15 seconds?\"  (Note: not all patients can do this)        It was 150 now 160.  7. RECURRENT SYMPTOM: \"Have you ever had this before?\" If Yes, ask: \"When was the last time?\" and \"What happened that time?\"       Yes hx- SVT   8. CAUSE: \"What do you think is causing the palpitations?\"      Not sure   9. CARDIAC HISTORY: \"Do you have any history of heart disease?\" (e.g., heart attack, angina, bypass surgery, angioplasty, arrhythmia)       Yes   10. OTHER SYMPTOMS: \"Do you have any other symptoms?\" (e.g., dizziness, chest pain, sweating, difficulty breathing)        Weak and tired have been cooking.   11. PREGNANCY: \"Is there any chance you are pregnant?\" \"When was your last menstrual period?\"       no    Protocols used: Heart Rate and Heartbeat Questions-ADULT-AH    "

## 2025-05-19 LAB
QT INTERVAL: 378 MS
QTC INTERVAL: 511 MS

## 2025-05-19 NOTE — DISCHARGE INSTRUCTIONS
Patient is aware that she should restart her apixaban as she is at significant risk of stroke.  The patient has an appointment/referral for cardiology in the outpatient setting.  The patient should return to the emergency department should she have any further episodes of atrial fibrillation RVR-

## 2025-05-23 ENCOUNTER — APPOINTMENT (OUTPATIENT)
Dept: GENERAL RADIOLOGY | Facility: HOSPITAL | Age: 70
End: 2025-05-23
Payer: MEDICARE

## 2025-05-23 ENCOUNTER — NURSE TRIAGE (OUTPATIENT)
Dept: CALL CENTER | Facility: HOSPITAL | Age: 70
End: 2025-05-23
Payer: MEDICARE

## 2025-05-23 ENCOUNTER — HOSPITAL ENCOUNTER (EMERGENCY)
Facility: HOSPITAL | Age: 70
Discharge: HOME | End: 2025-05-23
Attending: STUDENT IN AN ORGANIZED HEALTH CARE EDUCATION/TRAINING PROGRAM
Payer: MEDICARE

## 2025-05-23 VITALS
BODY MASS INDEX: 33.99 KG/M2 | WEIGHT: 180 LBS | TEMPERATURE: 98 F | HEIGHT: 61 IN | RESPIRATION RATE: 24 BRPM | DIASTOLIC BLOOD PRESSURE: 79 MMHG | HEART RATE: 66 BPM | OXYGEN SATURATION: 99 % | SYSTOLIC BLOOD PRESSURE: 101 MMHG

## 2025-05-23 DIAGNOSIS — I50.9 ACUTE EXACERBATION OF CHRONIC HEART FAILURE: ICD-10-CM

## 2025-05-23 DIAGNOSIS — I48.91 ATRIAL FIBRILLATION, UNSPECIFIED TYPE: Primary | ICD-10-CM

## 2025-05-23 LAB
ALBUMIN SERPL-MCNC: 3.6 G/DL (ref 3.5–5.2)
ALBUMIN/GLOB SERPL: 1.1 G/DL
ALP SERPL-CCNC: 125 U/L (ref 39–117)
ALT SERPL W P-5'-P-CCNC: 9 U/L (ref 1–33)
AMPHET+METHAMPHET UR QL: NEGATIVE
AMPHETAMINES UR QL: NEGATIVE
ANION GAP SERPL CALCULATED.3IONS-SCNC: 12.1 MMOL/L (ref 5–15)
ANISOCYTOSIS BLD QL: ABNORMAL
AST SERPL-CCNC: 26 U/L (ref 1–32)
BARBITURATES UR QL SCN: NEGATIVE
BENZODIAZ UR QL SCN: NEGATIVE
BILIRUB SERPL-MCNC: 0.3 MG/DL (ref 0–1.2)
BILIRUB UR QL STRIP: NEGATIVE
BUN SERPL-MCNC: 17 MG/DL (ref 8–23)
BUN/CREAT SERPL: 10.8 (ref 7–25)
BUPRENORPHINE SERPL-MCNC: NEGATIVE NG/ML
CALCIUM SPEC-SCNC: 8.8 MG/DL (ref 8.6–10.5)
CANNABINOIDS SERPL QL: NEGATIVE
CHLORIDE SERPL-SCNC: 102 MMOL/L (ref 98–107)
CHOLEST SERPL-MCNC: 107 MG/DL (ref 0–200)
CLARITY UR: CLEAR
CO2 SERPL-SCNC: 24.9 MMOL/L (ref 22–29)
COCAINE UR QL: NEGATIVE
COLOR UR: YELLOW
CREAT SERPL-MCNC: 1.58 MG/DL (ref 0.57–1)
DEPRECATED RDW RBC AUTO: 55.8 FL (ref 37–54)
EGFRCR SERPLBLD CKD-EPI 2021: 35.7 ML/MIN/1.73
EOSINOPHIL # BLD MANUAL: 0.64 10*3/MM3 (ref 0–0.4)
EOSINOPHIL NFR BLD MANUAL: 10 % (ref 0.3–6.2)
ERYTHROCYTE [DISTWIDTH] IN BLOOD BY AUTOMATED COUNT: 20.3 % (ref 12.3–15.4)
FENTANYL UR-MCNC: NEGATIVE NG/ML
GEN 5 1HR TROPONIN T REFLEX: 37 NG/L
GLOBULIN UR ELPH-MCNC: 3.3 GM/DL
GLUCOSE SERPL-MCNC: 97 MG/DL (ref 65–99)
GLUCOSE UR STRIP-MCNC: NEGATIVE MG/DL
HBA1C MFR BLD: 5.38 % (ref 4.8–5.6)
HCT VFR BLD AUTO: 28.1 % (ref 34–46.6)
HDLC SERPL-MCNC: 40 MG/DL (ref 40–60)
HGB BLD-MCNC: 7.6 G/DL (ref 12–15.9)
HGB UR QL STRIP.AUTO: NEGATIVE
HOLD SPECIMEN: NORMAL
HOLD SPECIMEN: NORMAL
HYPOCHROMIA BLD QL: ABNORMAL
KETONES UR QL STRIP: NEGATIVE
LARGE PLATELETS: ABNORMAL
LDLC SERPL CALC-MCNC: 50 MG/DL (ref 0–100)
LDLC/HDLC SERPL: 1.23 {RATIO}
LEUKOCYTE ESTERASE UR QL STRIP.AUTO: NEGATIVE
LYMPHOCYTES # BLD MANUAL: 1.74 10*3/MM3 (ref 0.7–3.1)
LYMPHOCYTES NFR BLD MANUAL: 11 % (ref 5–12)
MCH RBC QN AUTO: 20.3 PG (ref 26.6–33)
MCHC RBC AUTO-ENTMCNC: 27 G/DL (ref 31.5–35.7)
MCV RBC AUTO: 74.9 FL (ref 79–97)
METHADONE UR QL SCN: NEGATIVE
MICROCYTES BLD QL: ABNORMAL
MONOCYTES # BLD: 0.71 10*3/MM3 (ref 0.1–0.9)
NEUTROPHILS # BLD AUTO: 3.34 10*3/MM3 (ref 1.7–7)
NEUTROPHILS NFR BLD MANUAL: 52 % (ref 42.7–76)
NITRITE UR QL STRIP: NEGATIVE
NT-PROBNP SERPL-MCNC: ABNORMAL PG/ML (ref 0–900)
OPIATES UR QL: NEGATIVE
OXYCODONE UR QL SCN: NEGATIVE
PCP UR QL SCN: NEGATIVE
PH UR STRIP.AUTO: 6 [PH] (ref 5–8)
PLATELET # BLD AUTO: 433 10*3/MM3 (ref 140–450)
PMV BLD AUTO: 9.9 FL (ref 6–12)
POTASSIUM SERPL-SCNC: 3.8 MMOL/L (ref 3.5–5.2)
PROT SERPL-MCNC: 6.9 G/DL (ref 6–8.5)
PROT UR QL STRIP: NEGATIVE
RBC # BLD AUTO: 3.75 10*6/MM3 (ref 3.77–5.28)
SCAN SLIDE: NORMAL
SODIUM SERPL-SCNC: 139 MMOL/L (ref 136–145)
SP GR UR STRIP: 1.01 (ref 1–1.03)
TRICYCLICS UR QL SCN: NEGATIVE
TRIGL SERPL-MCNC: 89 MG/DL (ref 0–150)
TROPONIN T % DELTA: -3
TROPONIN T NUMERIC DELTA: -1 NG/L
TROPONIN T SERPL HS-MCNC: 38 NG/L
TSH SERPL DL<=0.05 MIU/L-ACNC: 7.14 UIU/ML (ref 0.27–4.2)
UROBILINOGEN UR QL STRIP: NORMAL
VARIANT LYMPHS NFR BLD MANUAL: 27 % (ref 19.6–45.3)
VLDLC SERPL-MCNC: 17 MG/DL (ref 5–40)
WBC NRBC COR # BLD AUTO: 6.43 10*3/MM3 (ref 3.4–10.8)
WHOLE BLOOD HOLD COAG: NORMAL
WHOLE BLOOD HOLD SPECIMEN: NORMAL

## 2025-05-23 PROCEDURE — 84484 ASSAY OF TROPONIN QUANT: CPT | Performed by: NURSE PRACTITIONER

## 2025-05-23 PROCEDURE — 71045 X-RAY EXAM CHEST 1 VIEW: CPT | Performed by: RADIOLOGY

## 2025-05-23 PROCEDURE — 83880 ASSAY OF NATRIURETIC PEPTIDE: CPT | Performed by: STUDENT IN AN ORGANIZED HEALTH CARE EDUCATION/TRAINING PROGRAM

## 2025-05-23 PROCEDURE — 36415 COLL VENOUS BLD VENIPUNCTURE: CPT

## 2025-05-23 PROCEDURE — 83036 HEMOGLOBIN GLYCOSYLATED A1C: CPT | Performed by: STUDENT IN AN ORGANIZED HEALTH CARE EDUCATION/TRAINING PROGRAM

## 2025-05-23 PROCEDURE — 99284 EMERGENCY DEPT VISIT MOD MDM: CPT

## 2025-05-23 PROCEDURE — 71045 X-RAY EXAM CHEST 1 VIEW: CPT

## 2025-05-23 PROCEDURE — 25810000003 SODIUM CHLORIDE 0.9 % SOLUTION: Performed by: STUDENT IN AN ORGANIZED HEALTH CARE EDUCATION/TRAINING PROGRAM

## 2025-05-23 PROCEDURE — 93005 ELECTROCARDIOGRAM TRACING: CPT | Performed by: NURSE PRACTITIONER

## 2025-05-23 PROCEDURE — 96374 THER/PROPH/DIAG INJ IV PUSH: CPT

## 2025-05-23 PROCEDURE — 85007 BL SMEAR W/DIFF WBC COUNT: CPT | Performed by: NURSE PRACTITIONER

## 2025-05-23 PROCEDURE — 93005 ELECTROCARDIOGRAM TRACING: CPT | Performed by: EMERGENCY MEDICINE

## 2025-05-23 PROCEDURE — 80053 COMPREHEN METABOLIC PANEL: CPT | Performed by: NURSE PRACTITIONER

## 2025-05-23 PROCEDURE — 25010000002 FUROSEMIDE PER 20 MG: Performed by: STUDENT IN AN ORGANIZED HEALTH CARE EDUCATION/TRAINING PROGRAM

## 2025-05-23 PROCEDURE — 84443 ASSAY THYROID STIM HORMONE: CPT | Performed by: STUDENT IN AN ORGANIZED HEALTH CARE EDUCATION/TRAINING PROGRAM

## 2025-05-23 PROCEDURE — 85025 COMPLETE CBC W/AUTO DIFF WBC: CPT | Performed by: NURSE PRACTITIONER

## 2025-05-23 PROCEDURE — 96375 TX/PRO/DX INJ NEW DRUG ADDON: CPT

## 2025-05-23 PROCEDURE — 81003 URINALYSIS AUTO W/O SCOPE: CPT | Performed by: STUDENT IN AN ORGANIZED HEALTH CARE EDUCATION/TRAINING PROGRAM

## 2025-05-23 PROCEDURE — 25010000002 DIGOXIN PER 500 MCG: Performed by: STUDENT IN AN ORGANIZED HEALTH CARE EDUCATION/TRAINING PROGRAM

## 2025-05-23 PROCEDURE — 96376 TX/PRO/DX INJ SAME DRUG ADON: CPT

## 2025-05-23 PROCEDURE — 80061 LIPID PANEL: CPT | Performed by: STUDENT IN AN ORGANIZED HEALTH CARE EDUCATION/TRAINING PROGRAM

## 2025-05-23 PROCEDURE — 80307 DRUG TEST PRSMV CHEM ANLYZR: CPT | Performed by: STUDENT IN AN ORGANIZED HEALTH CARE EDUCATION/TRAINING PROGRAM

## 2025-05-23 RX ORDER — FUROSEMIDE 10 MG/ML
80 INJECTION INTRAMUSCULAR; INTRAVENOUS ONCE
Status: COMPLETED | OUTPATIENT
Start: 2025-05-23 | End: 2025-05-23

## 2025-05-23 RX ORDER — DIGOXIN 0.25 MG/ML
250 INJECTION INTRAMUSCULAR; INTRAVENOUS ONCE
Status: COMPLETED | OUTPATIENT
Start: 2025-05-23 | End: 2025-05-23

## 2025-05-23 RX ORDER — METOPROLOL TARTRATE 1 MG/ML
5 INJECTION, SOLUTION INTRAVENOUS
Status: DISCONTINUED | OUTPATIENT
Start: 2025-05-23 | End: 2025-05-23 | Stop reason: HOSPADM

## 2025-05-23 RX ORDER — DILTIAZEM HYDROCHLORIDE 5 MG/ML
10 INJECTION INTRAVENOUS ONCE
Status: COMPLETED | OUTPATIENT
Start: 2025-05-23 | End: 2025-05-23

## 2025-05-23 RX ORDER — METOPROLOL SUCCINATE 50 MG/1
50 TABLET, EXTENDED RELEASE ORAL DAILY
Qty: 30 TABLET | Refills: 0 | Status: SHIPPED | OUTPATIENT
Start: 2025-05-23 | End: 2025-06-22

## 2025-05-23 RX ORDER — SODIUM CHLORIDE 0.9 % (FLUSH) 0.9 %
10 SYRINGE (ML) INJECTION AS NEEDED
Status: DISCONTINUED | OUTPATIENT
Start: 2025-05-23 | End: 2025-05-23 | Stop reason: HOSPADM

## 2025-05-23 RX ORDER — ASPIRIN 81 MG/1
324 TABLET, CHEWABLE ORAL ONCE
Status: DISCONTINUED | OUTPATIENT
Start: 2025-05-23 | End: 2025-05-23

## 2025-05-23 RX ORDER — METOPROLOL TARTRATE 50 MG
50 TABLET ORAL ONCE
Status: COMPLETED | OUTPATIENT
Start: 2025-05-23 | End: 2025-05-23

## 2025-05-23 RX ADMIN — METOPROLOL TARTRATE 50 MG: 50 TABLET, FILM COATED ORAL at 17:14

## 2025-05-23 RX ADMIN — SODIUM CHLORIDE 500 ML: 9 INJECTION, SOLUTION INTRAVENOUS at 17:09

## 2025-05-23 RX ADMIN — DIGOXIN 250 MCG: 0.25 INJECTION INTRAMUSCULAR; INTRAVENOUS at 19:28

## 2025-05-23 RX ADMIN — DILTIAZEM HYDROCHLORIDE 10 MG: 5 INJECTION, SOLUTION INTRAVENOUS at 17:15

## 2025-05-23 RX ADMIN — DILTIAZEM HYDROCHLORIDE 10 MG: 5 INJECTION, SOLUTION INTRAVENOUS at 20:45

## 2025-05-23 RX ADMIN — FUROSEMIDE 80 MG: 10 INJECTION, SOLUTION INTRAMUSCULAR; INTRAVENOUS at 18:05

## 2025-05-23 RX ADMIN — DIGOXIN 250 MCG: 0.25 INJECTION INTRAMUSCULAR; INTRAVENOUS at 17:18

## 2025-05-23 NOTE — TELEPHONE ENCOUNTER
"Reason for Disposition   [1] Heart beating very rapidly (e.g., > 140 / minute) AND [2] present now  (Exception: During exercise.)    Additional Information   Negative: Passed out (i.e., lost consciousness, collapsed and was not responding)   Negative: Shock suspected (e.g., cold/pale/clammy skin, too weak to stand, low BP, rapid pulse)   Negative: Difficult to awaken or acting confused (e.g., disoriented, slurred speech)   Negative: Visible sweat on face or sweat dripping down face   Negative: Unable to walk, or can only walk with assistance (e.g., requires support)   Negative: [1] Received SHOCK from implantable cardiac defibrillator AND [2] persisting symptoms (i.e., palpitations, lightheadedness)   Negative: [1] Dizziness, lightheadedness, or weakness AND [2] heart beating very rapidly (e.g., > 140 / minute)   Negative: [1] Dizziness, lightheadedness, or weakness AND [2] heart beating very slowly (e.g., < 50 / minute)   Negative: Sounds like a life-threatening emergency to the triager   Negative: Chest pain   Negative: Implantable Cardiac Defibrillator (ICD) or a pacemaker symptoms or questions   Negative: Difficulty breathing   Negative: Dizziness, lightheadedness, or weakness   Negative: Heart beating very slowly (e.g., < 50 / minute)  (Exception: Athlete and heart rate normal for caller.)   Negative: New or worsened shortness of breath with activity (dyspnea on exertion)   Negative: Patient sounds very sick or weak to the triager    Answer Assessment - Initial Assessment Questions  1. DESCRIPTION: \"Please describe your heart rate or heartbeat that you are having\" (e.g., fast/slow, regular/irregular, skipped or extra beats, \"palpitations\")      162  2. ONSET: \"When did it start?\" (Minutes, hours or days)       2 hours shannon  3. DURATION: \"How long does it last\" (e.g., seconds, minutes, hours)      2 hours so far  4. PATTERN \"Does it come and go, or has it been constant since it started?\"  \"Does it get worse with " "exertion?\"   \"Are you feeling it now?\"      Comes and goes  5. TAP: \"Using your hand, can you tap out what you are feeling on a chair or table in front of you, so that I can hear?\" (Note: not all patients can do this)        irregular  6. HEART RATE: \"Can you tell me your heart rate?\" \"How many beats in 15 seconds?\"  (Note: not all patients can do this)        162  7. RECURRENT SYMPTOM: \"Have you ever had this before?\" If Yes, ask: \"When was the last time?\" and \"What happened that time?\"       05/18  8. CAUSE: \"What do you think is causing the palpitations?\"      afib  9. CARDIAC HISTORY: \"Do you have any history of heart disease?\" (e.g., heart attack, angina, bypass surgery, angioplasty, arrhythmia)       stents  10. OTHER SYMPTOMS: \"Do you have any other symptoms?\" (e.g., dizziness, chest pain, sweating, difficulty breathing)        Chest pain  11. PREGNANCY: \"Is there any chance you are pregnant?\" \"When was your last menstrual period?\"        no    Protocols used: Heart Rate and Heartbeat Questions-ADULT-    "

## 2025-05-23 NOTE — ED PROVIDER NOTES
"Subjective   History of Present Illness  Patient is a 69-year-old female who comes to the ER with atrial fibrillation with RVR.  She ran out of metoprolol 34 days ago and pharmacy would not fill it early.  She complains of palpitations and says she feels the same as she was here a few weeks ago.  She had to be cardioverted at that time because she says \"none of the medicines worked \".  She follows with Dr. Echeverria outpatient for cardiology.  She states she has been compliant with Eliquis and has not missed any doses.      Review of Systems   Constitutional:  Negative for chills, fatigue and fever.   HENT:  Negative for ear pain, sinus pain and sore throat.    Respiratory:  Negative for cough, chest tightness, shortness of breath and wheezing.    Cardiovascular:  Positive for chest pain, palpitations and leg swelling.   Gastrointestinal:  Negative for abdominal pain, constipation, diarrhea, nausea and vomiting.   Genitourinary:  Negative for dysuria, hematuria and urgency.   Musculoskeletal:  Negative for arthralgias and myalgias.   Neurological:  Negative for dizziness, syncope and light-headedness.   Psychiatric/Behavioral:  Negative for confusion.        Past Medical History:   Diagnosis Date    Anxiety     Arthritis     CHF (congestive heart failure)     Cigarette smoker 02/10/2022    CKD (chronic kidney disease) stage 4, GFR 15-29 ml/min     MAICO on top of CKD IIIa, HD from 10/23-4/24, some recovery to CKD IV    COPD (chronic obstructive pulmonary disease)     Coronary artery disease     Elevated cholesterol     GERD (gastroesophageal reflux disease)     H/O heart artery stent     History of transfusion     Hyperlipidemia     Hypertension     hypothyroidism     Obesity     PAF (paroxysmal atrial fibrillation)     PVD (peripheral vascular disease)     x2 stents       Allergies   Allergen Reactions    Xanax [Alprazolam] Other (See Comments)     Severe agitation per patient and family       Past Surgical History: "   Procedure Laterality Date    ANKLE SURGERY      RIGHT    APPENDECTOMY      CARDIAC CATHETERIZATION      LEFT     SECTION      CHOLECYSTECTOMY N/A 2025    Procedure: CHOLECYSTECTOMY LAPAROSCOPIC WITH DAVINCI ROBOT;  Surgeon: Windy Neal MD;  Location: [facility] OR;  Service: Robotics - DaVinci;  Laterality: N/A;    COLONOSCOPY N/A 3/17/2025    Procedure: COLONOSCOPY;  Surgeon: Luis Antonio Jiang MD;  Location: [facility] OR;  Service: Gastroenterology;  Laterality: N/A;    CORONARY STENT PLACEMENT      ENDOSCOPY N/A 3/15/2025    Procedure: ESOPHAGOGASTRODUODENOSCOPY;  Surgeon: Luis Antonio Jiang MD;  Location: [facility] OR;  Service: Gastroenterology;  Laterality: N/A;    HYSTERECTOMY      INSERTION HEMODIALYSIS CATHETER Right 10/19/2023    Procedure: HEMODIALYSIS CATHETER INSERTION;  Surgeon: Bartolome Schwartz MD;  Location: [facility] OR;  Service: General;  Laterality: Right;       Family History   Problem Relation Age of Onset    Heart disease Mother     Heart attack Mother 73    Fibromyalgia Mother     Heart attack Father 72    Ovarian cancer Sister     Coronary artery disease Other     Breast cancer Neg Hx        Social History     Socioeconomic History    Marital status:    Tobacco Use    Smoking status: Every Day     Current packs/day: 1.00     Average packs/day: 1 pack/day for 30.0 years (30.0 ttl pk-yrs)     Types: Cigarettes     Passive exposure: Never    Smokeless tobacco: Never   Vaping Use    Vaping status: Never Used   Substance and Sexual Activity    Alcohol use: No    Drug use: No    Sexual activity: Never           Objective   Physical Exam  Vitals and nursing note reviewed. Exam conducted with a chaperone present.   Constitutional:       Appearance: Normal appearance. She is normal weight.   HENT:      Head: Normocephalic and atraumatic.      Nose: Nose normal.      Mouth/Throat:      Mouth: Mucous membranes are moist.      Pharynx: Oropharynx is clear.   Eyes:      Extraocular  Movements: Extraocular movements intact.      Conjunctiva/sclera: Conjunctivae normal.      Pupils: Pupils are equal, round, and reactive to light.   Cardiovascular:      Rate and Rhythm: Tachycardia present. Rhythm irregular.      Pulses: Normal pulses.      Heart sounds: Normal heart sounds.   Pulmonary:      Effort: Pulmonary effort is normal.      Breath sounds: Normal breath sounds.   Abdominal:      General: Abdomen is flat. Bowel sounds are normal.      Palpations: Abdomen is soft.   Musculoskeletal:         General: Normal range of motion.      Cervical back: Normal range of motion.      Right lower leg: Edema present.      Left lower leg: Edema present.   Skin:     General: Skin is warm and dry.      Capillary Refill: Capillary refill takes less than 2 seconds.   Neurological:      General: No focal deficit present.      Mental Status: She is alert and oriented to person, place, and time.   Psychiatric:         Mood and Affect: Mood normal.         Behavior: Behavior normal.         Procedures           ED Course  ED Course as of 05/23/25 2049   Fri May 23, 2025   1709 ECG 12 Lead Chest Pain  Atrial fibrillation with RVR, rate 159, QTc 491, mild ST depression laterally, rate dependent [CW]      ED Course User Index  [CW] Lang Gutierrez, DO                                                       Medical Decision Making  --Patient presents in A-fib with RVR with heart rate in the 160s to 170s  --BP stable  --Labs proBNP 13K  --IV Cardizem 10 mg bolus, p.o. Lopressor 50 mg x 1, IV Lopressor dig load  --Change p.o. Lopressor 25 twice daily to 50 mg succinate, increase torsemide from 20 mg to 40 mg daily, continue Eliquis for stroke prophylaxis  --Patient became rate controlled with resolution of symptoms  --Instructed her to follow-up with PCP and cardiology in 1 week    Problems Addressed:  Acute exacerbation of chronic heart failure: complicated acute illness or injury  Atrial fibrillation,  unspecified type: complicated acute illness or injury    Amount and/or Complexity of Data Reviewed  ECG/medicine tests:  Decision-making details documented in ED Course.    Risk  Prescription drug management.        Final diagnoses:   Atrial fibrillation, unspecified type   Acute exacerbation of chronic heart failure       ED Disposition  ED Disposition       ED Disposition   Discharge    Condition   Stable    Comment   --               Woodrow Raymond, APRN  [address] [facility] [address]  [phone]    In 1 week      Imelda Woodall, APRN  [address] [facility] [address]  [phone]    In 1 week           Medication List        New Prescriptions      metoprolol succinate XL 50 MG 24 hr tablet  Commonly known as: TOPROL-XL  Take 1 tablet by mouth Daily for 30 days.            Changed      Torsemide 40 MG tablet  Take 40 mg by mouth Daily for 30 days.  What changed: how much to take            Stop      metoprolol tartrate 25 MG tablet  Commonly known as: LOPRESSOR               Where to Get Your Medications        These medications were sent to [address], [address] [facility] - [phone] [place] [id number] [address], [address] [place] [address]      Phone: [phone]   metoprolol succinate XL 50 MG 24 hr tablet  Torsemide 40 MG tablet            Lang Gutierrez,   05/23/25 2043

## 2025-05-23 NOTE — TELEPHONE ENCOUNTER
/100, HR is 162, having somechest pain, no sob, triage done. told to come to ER , she will call EMS ,told her to bring meds  and do not drive

## 2025-05-26 LAB
QT INTERVAL: 302 MS
QTC INTERVAL: 491 MS

## 2025-05-27 LAB
QT INTERVAL: 296 MS
QTC INTERVAL: 493 MS

## 2025-06-22 ENCOUNTER — HOSPITAL ENCOUNTER (OUTPATIENT)
Facility: HOSPITAL | Age: 70
Setting detail: OBSERVATION
Discharge: HOME | End: 2025-06-24
Admitting: INTERNAL MEDICINE
Payer: MEDICARE

## 2025-06-22 ENCOUNTER — APPOINTMENT (OUTPATIENT)
Dept: GENERAL RADIOLOGY | Facility: HOSPITAL | Age: 70
End: 2025-06-22
Payer: MEDICARE

## 2025-06-22 DIAGNOSIS — R07.9 CHEST PAIN, UNSPECIFIED TYPE: ICD-10-CM

## 2025-06-22 DIAGNOSIS — D64.9 ANEMIA, UNSPECIFIED TYPE: Primary | ICD-10-CM

## 2025-06-22 LAB
ABO GROUP BLD: NORMAL
ALBUMIN SERPL-MCNC: 3.5 G/DL (ref 3.5–5.2)
ALBUMIN/GLOB SERPL: 1.2 G/DL
ALP SERPL-CCNC: 127 U/L (ref 39–117)
ALT SERPL W P-5'-P-CCNC: <5 U/L (ref 1–33)
ANION GAP SERPL CALCULATED.3IONS-SCNC: 14.8 MMOL/L (ref 5–15)
ANISOCYTOSIS BLD QL: ABNORMAL
AST SERPL-CCNC: 13 U/L (ref 1–32)
BILIRUB SERPL-MCNC: 0.4 MG/DL (ref 0–1.2)
BLD GP AB SCN SERPL QL: NEGATIVE
BUN SERPL-MCNC: 15.3 MG/DL (ref 8–23)
BUN/CREAT SERPL: 9.9 (ref 7–25)
CALCIUM SPEC-SCNC: 8.4 MG/DL (ref 8.6–10.5)
CHLORIDE SERPL-SCNC: 100 MMOL/L (ref 98–107)
CO2 SERPL-SCNC: 21.2 MMOL/L (ref 22–29)
CREAT SERPL-MCNC: 1.54 MG/DL (ref 0.57–1)
DEPRECATED RDW RBC AUTO: 55.5 FL (ref 37–54)
EGFRCR SERPLBLD CKD-EPI 2021: 36.9 ML/MIN/1.73
EOSINOPHIL # BLD MANUAL: 0.07 10*3/MM3 (ref 0–0.4)
EOSINOPHIL NFR BLD MANUAL: 1 % (ref 0.3–6.2)
ERYTHROCYTE [DISTWIDTH] IN BLOOD BY AUTOMATED COUNT: 20.1 % (ref 12.3–15.4)
GEN 5 1HR TROPONIN T REFLEX: 39 NG/L
GLOBULIN UR ELPH-MCNC: 3 GM/DL
GLUCOSE SERPL-MCNC: 104 MG/DL (ref 65–99)
HCT VFR BLD AUTO: 21.3 % (ref 34–46.6)
HGB BLD-MCNC: 5.2 G/DL (ref 12–15.9)
HOLD SPECIMEN: NORMAL
HOLD SPECIMEN: NORMAL
HYPOCHROMIA BLD QL: ABNORMAL
INR PPP: 1.26 (ref 0.9–1.1)
LARGE PLATELETS: ABNORMAL
LYMPHOCYTES # BLD MANUAL: 1.45 10*3/MM3 (ref 0.7–3.1)
LYMPHOCYTES NFR BLD MANUAL: 3 % (ref 5–12)
MAGNESIUM SERPL-MCNC: 2.2 MG/DL (ref 1.6–2.4)
MCH RBC QN AUTO: 18.4 PG (ref 26.6–33)
MCHC RBC AUTO-ENTMCNC: 24.4 G/DL (ref 31.5–35.7)
MCV RBC AUTO: 75.5 FL (ref 79–97)
METAMYELOCYTES NFR BLD MANUAL: 1 % (ref 0–0)
MONOCYTES # BLD: 0.21 10*3/MM3 (ref 0.1–0.9)
NEUTROPHILS # BLD AUTO: 5.12 10*3/MM3 (ref 1.7–7)
NEUTROPHILS NFR BLD MANUAL: 74 % (ref 42.7–76)
NRBC SPEC MANUAL: 1 /100 WBC (ref 0–0.2)
NT-PROBNP SERPL-MCNC: ABNORMAL PG/ML (ref 0–900)
PLATELET # BLD AUTO: 429 10*3/MM3 (ref 140–450)
PMV BLD AUTO: 9.8 FL (ref 6–12)
POLYCHROMASIA BLD QL SMEAR: ABNORMAL
POTASSIUM SERPL-SCNC: 3.8 MMOL/L (ref 3.5–5.2)
PROT SERPL-MCNC: 6.5 G/DL (ref 6–8.5)
PROTHROMBIN TIME: 16.6 SECONDS (ref 12.5–15.2)
QT INTERVAL: 366 MS
QTC INTERVAL: 472 MS
RBC # BLD AUTO: 2.82 10*6/MM3 (ref 3.77–5.28)
RH BLD: POSITIVE
SODIUM SERPL-SCNC: 136 MMOL/L (ref 136–145)
T&S EXPIRATION DATE: NORMAL
TROPONIN T % DELTA: 8
TROPONIN T NUMERIC DELTA: 3 NG/L
TROPONIN T SERPL HS-MCNC: 36 NG/L
VARIANT LYMPHS NFR BLD MANUAL: 21 % (ref 19.6–45.3)
WBC NRBC COR # BLD AUTO: 6.92 10*3/MM3 (ref 3.4–10.8)
WHOLE BLOOD HOLD COAG: NORMAL
WHOLE BLOOD HOLD SPECIMEN: NORMAL

## 2025-06-22 PROCEDURE — 85025 COMPLETE CBC W/AUTO DIFF WBC: CPT

## 2025-06-22 PROCEDURE — 86901 BLOOD TYPING SEROLOGIC RH(D): CPT

## 2025-06-22 PROCEDURE — P9016 RBC LEUKOCYTES REDUCED: HCPCS

## 2025-06-22 PROCEDURE — 86923 COMPATIBILITY TEST ELECTRIC: CPT

## 2025-06-22 PROCEDURE — 71045 X-RAY EXAM CHEST 1 VIEW: CPT

## 2025-06-22 PROCEDURE — 99223 1ST HOSP IP/OBS HIGH 75: CPT

## 2025-06-22 PROCEDURE — 86900 BLOOD TYPING SEROLOGIC ABO: CPT

## 2025-06-22 PROCEDURE — 71045 X-RAY EXAM CHEST 1 VIEW: CPT | Performed by: RADIOLOGY

## 2025-06-22 PROCEDURE — 80053 COMPREHEN METABOLIC PANEL: CPT

## 2025-06-22 PROCEDURE — G0378 HOSPITAL OBSERVATION PER HR: HCPCS

## 2025-06-22 PROCEDURE — 36430 TRANSFUSION BLD/BLD COMPNT: CPT

## 2025-06-22 PROCEDURE — 83880 ASSAY OF NATRIURETIC PEPTIDE: CPT

## 2025-06-22 PROCEDURE — 93005 ELECTROCARDIOGRAM TRACING: CPT

## 2025-06-22 PROCEDURE — 85610 PROTHROMBIN TIME: CPT

## 2025-06-22 PROCEDURE — 93010 ELECTROCARDIOGRAM REPORT: CPT | Performed by: SPECIALIST

## 2025-06-22 PROCEDURE — 85007 BL SMEAR W/DIFF WBC COUNT: CPT

## 2025-06-22 PROCEDURE — 99285 EMERGENCY DEPT VISIT HI MDM: CPT

## 2025-06-22 PROCEDURE — 84484 ASSAY OF TROPONIN QUANT: CPT

## 2025-06-22 PROCEDURE — 86850 RBC ANTIBODY SCREEN: CPT

## 2025-06-22 PROCEDURE — 83735 ASSAY OF MAGNESIUM: CPT

## 2025-06-22 PROCEDURE — 36415 COLL VENOUS BLD VENIPUNCTURE: CPT

## 2025-06-22 RX ORDER — CHLORTHALIDONE 25 MG/1
25 TABLET ORAL DAILY
COMMUNITY

## 2025-06-22 RX ORDER — TORSEMIDE 20 MG/1
40 TABLET ORAL DAILY
COMMUNITY

## 2025-06-22 RX ORDER — SODIUM CHLORIDE 0.9 % (FLUSH) 0.9 %
10 SYRINGE (ML) INJECTION EVERY 12 HOURS SCHEDULED
Status: DISCONTINUED | OUTPATIENT
Start: 2025-06-22 | End: 2025-06-24 | Stop reason: HOSPADM

## 2025-06-22 RX ORDER — ASPIRIN 81 MG/1
324 TABLET, CHEWABLE ORAL ONCE
Status: COMPLETED | OUTPATIENT
Start: 2025-06-22 | End: 2025-06-22

## 2025-06-22 RX ORDER — ACETAMINOPHEN 650 MG/1
650 SUPPOSITORY RECTAL EVERY 4 HOURS PRN
Status: DISCONTINUED | OUTPATIENT
Start: 2025-06-22 | End: 2025-06-24 | Stop reason: HOSPADM

## 2025-06-22 RX ORDER — HYDRALAZINE HYDROCHLORIDE 100 MG/1
100 TABLET, FILM COATED ORAL 3 TIMES DAILY
COMMUNITY

## 2025-06-22 RX ORDER — ASPIRIN 81 MG/1
81 TABLET, CHEWABLE ORAL DAILY
Status: CANCELLED | OUTPATIENT
Start: 2025-06-23

## 2025-06-22 RX ORDER — NITROGLYCERIN 0.4 MG/1
0.4 TABLET SUBLINGUAL
Status: DISCONTINUED | OUTPATIENT
Start: 2025-06-22 | End: 2025-06-24 | Stop reason: HOSPADM

## 2025-06-22 RX ORDER — SODIUM CHLORIDE 9 MG/ML
40 INJECTION, SOLUTION INTRAVENOUS AS NEEDED
Status: DISCONTINUED | OUTPATIENT
Start: 2025-06-22 | End: 2025-06-24 | Stop reason: HOSPADM

## 2025-06-22 RX ORDER — AMOXICILLIN 250 MG
2 CAPSULE ORAL 2 TIMES DAILY PRN
Status: DISCONTINUED | OUTPATIENT
Start: 2025-06-22 | End: 2025-06-24 | Stop reason: HOSPADM

## 2025-06-22 RX ORDER — BISACODYL 10 MG
10 SUPPOSITORY, RECTAL RECTAL DAILY PRN
Status: DISCONTINUED | OUTPATIENT
Start: 2025-06-22 | End: 2025-06-24 | Stop reason: HOSPADM

## 2025-06-22 RX ORDER — POLYETHYLENE GLYCOL 3350 17 G/17G
17 POWDER, FOR SOLUTION ORAL DAILY PRN
Status: DISCONTINUED | OUTPATIENT
Start: 2025-06-22 | End: 2025-06-24 | Stop reason: HOSPADM

## 2025-06-22 RX ORDER — SODIUM CHLORIDE 0.9 % (FLUSH) 0.9 %
10 SYRINGE (ML) INJECTION AS NEEDED
Status: DISCONTINUED | OUTPATIENT
Start: 2025-06-22 | End: 2025-06-24 | Stop reason: HOSPADM

## 2025-06-22 RX ORDER — CHLORTHALIDONE 50 MG/1
25 TABLET ORAL DAILY
Status: CANCELLED | OUTPATIENT
Start: 2025-06-23

## 2025-06-22 RX ORDER — CALCIUM CARBONATE 500 MG/1
2 TABLET, CHEWABLE ORAL 2 TIMES DAILY PRN
Status: DISCONTINUED | OUTPATIENT
Start: 2025-06-22 | End: 2025-06-24 | Stop reason: HOSPADM

## 2025-06-22 RX ORDER — ACETAMINOPHEN 160 MG/5ML
650 SOLUTION ORAL EVERY 4 HOURS PRN
Status: DISCONTINUED | OUTPATIENT
Start: 2025-06-22 | End: 2025-06-24 | Stop reason: HOSPADM

## 2025-06-22 RX ORDER — ACETAMINOPHEN 325 MG/1
650 TABLET ORAL EVERY 4 HOURS PRN
Status: DISCONTINUED | OUTPATIENT
Start: 2025-06-22 | End: 2025-06-24 | Stop reason: HOSPADM

## 2025-06-22 RX ORDER — BISACODYL 5 MG/1
5 TABLET, DELAYED RELEASE ORAL DAILY PRN
Status: DISCONTINUED | OUTPATIENT
Start: 2025-06-22 | End: 2025-06-24 | Stop reason: HOSPADM

## 2025-06-22 RX ADMIN — ASPIRIN 324 MG: 81 TABLET, CHEWABLE ORAL at 15:18

## 2025-06-22 NOTE — ED PROVIDER NOTES
Subjective   History of Present Illness  69-year-old female with history of CHF, CKD, COPD, A-fib presents to the ED due to chest pain.  The chest pain started last night.  Of note patient has a history of transfusions due to chronic anemia.      Review of Systems   Constitutional:  Positive for fatigue. Negative for diaphoresis and fever.   HENT: Negative.     Respiratory: Negative.  Negative for cough, choking, shortness of breath, wheezing and stridor.    Cardiovascular:  Positive for chest pain. Negative for palpitations and leg swelling.   Gastrointestinal: Negative.  Negative for abdominal pain.   Endocrine: Negative.    Genitourinary: Negative.  Negative for dysuria.   Skin: Negative.    Neurological: Negative.    Psychiatric/Behavioral: Negative.     All other systems reviewed and are negative.      Past Medical History:   Diagnosis Date    Anxiety     Arthritis     CHF (congestive heart failure)     Cigarette smoker 02/10/2022    CKD (chronic kidney disease) stage 4, GFR 15-29 ml/min     MAICO on top of CKD IIIa, HD from 10/23-, some recovery to CKD IV    COPD (chronic obstructive pulmonary disease)     Coronary artery disease     Elevated cholesterol     GERD (gastroesophageal reflux disease)     H/O heart artery stent     History of transfusion     Hyperlipidemia     Hypertension     hypothyroidism     Obesity     PAF (paroxysmal atrial fibrillation)     PVD (peripheral vascular disease)     x2 stents       Allergies   Allergen Reactions    Xanax [Alprazolam] Other (See Comments)     Severe agitation per patient and family       Past Surgical History:   Procedure Laterality Date    ANKLE SURGERY      RIGHT    APPENDECTOMY      CARDIAC CATHETERIZATION      LEFT     SECTION      CHOLECYSTECTOMY N/A 2025    Procedure: CHOLECYSTECTOMY LAPAROSCOPIC WITH DAVINCI ROBOT;  Surgeon: Windy Neal MD;  Location: [facility];  Service: Robotics - DaVinci;  Laterality: N/A;    COLONOSCOPY N/A  3/17/2025    Procedure: COLONOSCOPY;  Surgeon: Luis Antonio Jiang MD;  Location:  [facility];  Service: Gastroenterology;  Laterality: N/A;    CORONARY STENT PLACEMENT      ENDOSCOPY N/A 3/15/2025    Procedure: ESOPHAGOGASTRODUODENOSCOPY;  Surgeon: Luis Antonio Jiang MD;  Location:  [facility];  Service: Gastroenterology;  Laterality: N/A;    HYSTERECTOMY      INSERTION HEMODIALYSIS CATHETER Right 10/19/2023    Procedure: HEMODIALYSIS CATHETER INSERTION;  Surgeon: Bartolome Schwartz MD;  Location:  [facility];  Service: General;  Laterality: Right;       Family History   Problem Relation Age of Onset    Heart disease Mother     Heart attack Mother 73    Fibromyalgia Mother     Heart attack Father 72    Ovarian cancer Sister     Coronary artery disease Other     Breast cancer Neg Hx        Social History     Socioeconomic History    Marital status:    Tobacco Use    Smoking status: Every Day     Current packs/day: 1.00     Average packs/day: 1 pack/day for 30.0 years (30.0 ttl pk-yrs)     Types: Cigarettes     Passive exposure: Never    Smokeless tobacco: Never   Vaping Use    Vaping status: Never Used   Substance and Sexual Activity    Alcohol use: No    Drug use: No    Sexual activity: Never           Objective   Physical Exam  Vitals and nursing note reviewed.   Constitutional:       General: She is in acute distress.      Appearance: She is well-developed. She is ill-appearing. She is not diaphoretic.   HENT:      Head: Normocephalic and atraumatic.      Right Ear: External ear normal.      Left Ear: External ear normal.      Nose: Nose normal.   Eyes:      Conjunctiva/sclera: Conjunctivae normal.      Pupils: Pupils are equal, round, and reactive to light.   Neck:      Vascular: No JVD.      Trachea: No tracheal deviation.   Cardiovascular:      Rate and Rhythm: Tachycardia present. Rhythm irregular.      Heart sounds: Normal heart sounds. No murmur heard.  Pulmonary:      Effort: Pulmonary effort is  normal. No respiratory distress.      Breath sounds: Decreased breath sounds present. No wheezing.   Abdominal:      General: Bowel sounds are normal.      Palpations: Abdomen is soft.      Tenderness: There is no abdominal tenderness.   Musculoskeletal:         General: No deformity. Normal range of motion.      Cervical back: Normal range of motion and neck supple.   Skin:     General: Skin is warm and dry.      Coloration: Skin is not pale.      Findings: No erythema or rash.   Neurological:      Mental Status: She is alert and oriented to person, place, and time.      Cranial Nerves: No cranial nerve deficit.   Psychiatric:         Behavior: Behavior normal.         Thought Content: Thought content normal.         Procedures           ED Course  ED Course as of 06/22/25 1659   Sun Jun 22, 2025   1457 EKG interpretation A-fib 100 bpm QTc is 472 there is ST depressions throughout. Electronically signed by Colin Wells DO, 06/22/25, 2:58 PM EDT.   [GF]   0315 Case discussed with Dr. Jessica santana we reviewed the EKG together and the patient's case likely ST depressions are being seen due to the patient's profound anemia, patient possibly having some ischemia due to poor perfusion [GF]      ED Course User Index  [GF] Colin Wells DO                                                       Medical Decision Making  Patient was found to have a significant anemia.  Her hemoglobin was 5.  She also had some concerning changes on her EKG likely related to the anemia.  Blood transfusion was initiated.  Case was discussed with cardiology.  Additionally patient presented to Dr. Houser for admission.  All parties in agreement with admission at this time.    Problems Addressed:  Anemia, unspecified type: complicated acute illness or injury  Chest pain, unspecified type: complicated acute illness or injury    Amount and/or Complexity of Data Reviewed  Labs: ordered.  Radiology: ordered.  ECG/medicine tests:  ordered.    Risk  OTC drugs.  Prescription drug management.  Decision regarding hospitalization.        Final diagnoses:   Anemia, unspecified type   Chest pain, unspecified type       ED Disposition  ED Disposition       ED Disposition   Decision to Admit    Condition   --    Comment   Level of Care: Telemetry [5]   Diagnosis: Chest pain [299210]   Admitting Physician: SPENCER PETERS [[id number]]   Attending Physician: SPENCER PETERS [[id number]]                 No follow-up provider specified.       Medication List      No changes were made to your prescriptions during this visit.            Colin Wells,   06/22/25 1703

## 2025-06-22 NOTE — H&P
[facility] Medicine Services  History & Physical    Patient Identification:  Name:  Lesley Michel  Age:  69 y.o.  Sex:  female  :  1955  MRN:  [id number]   Visit Number:  [id number]  Admit Date: 2025   Primary Care Physician:  Woodrwo Raymond APRN    Subjective     Chief complaint: Chest pain    History of presenting illness:      Lesley Michel is a 69 y.o. female who presented for further evaluation of chest pain.  She was seen and examined in the ED with no family present at bedside.  She is chronically ill-appearing but was awake, alert, pleasant and cooperative.  She reported over the past several days she has had worsening fatigue, dyspnea on exertion.  She also complains of lack of appetite and occasional nausea.  She feels generally weak.  She states these symptoms she felt were consistent with prior episodes of anemia when she needed transfusions.  She states she had been talking with her son about returning to the ED but was hesitant due to concern regarding the cost and how frequently she has been here in recent months.  Today she suddenly developed palpitations/rapid heart rate and chest pressure so called an ambulance.  She stated chest pressure resolved while being transported to the ED.  She denies any recurrence today.  She does have history of atrial fibrillation-historically her Eliquis has been held intermittently but she has been taking for about the past 6 to 7 weeks.  She states over the past 4 weeks she has had dark, tarry stools.  She denies any abdominal pain.  No GERD symptoms.  No other sign or symptom of bleeding.  Her lower extremities are swollen but she reports this is baseline.  She has been compliant with diuretic.  She wears supplemental O2 at baseline, denies any shortness of breath at rest today.  Further review of systems obtained and otherwise unrevealing, see below.    Past medical history is significant for chronic anemia requiring  intermittent transfusions, HTN, HLD, CAD, CHF, CKD, COPD, hypothyroidism, atrial fibrillation, PVD    Upon arrival to the ED, vital signs were temp 98.6, heart rate 88, respiratory rate 16, BP 98/73, SpO2 96% on 2.5 L nasal cannula.  On laboratory workup HS troponin was similar to prior at 36 and 39.  proBNP is slightly higher than most recent value at 14 502.  Renal function is improved from prior.  Her hemoglobin is low at 5.2 with hematocrit 21.3 and RBC count 2.82.  Chest x-ray was unrevealing.  EKG showed ST depression.  This was reviewed by ED provider with cardiology-Per review of documentation cardiology felt ST depression likely the result of anemia leading to hypoperfusion.    Known Emergency Department medications received prior to my evaluation included full dose aspirin, PRBCs infusing during encounter.   Emergency Department Room location at the time of my evaluation was 114.     ---------------------------------------------------------------------------------------------------------------------   Review of Systems   Constitutional:  Positive for activity change, appetite change and fatigue.   HENT:  Negative for congestion and rhinorrhea.    Respiratory:  Positive for shortness of breath (exertional).    Cardiovascular:  Positive for chest pain, palpitations and leg swelling.   Gastrointestinal:  Positive for nausea. Negative for abdominal pain, diarrhea and vomiting.   Genitourinary:  Negative for difficulty urinating and dysuria.   Musculoskeletal:  Negative for arthralgias and myalgias.   Skin:  Negative for rash and wound.   Neurological:  Positive for weakness.        ---------------------------------------------------------------------------------------------------------------------   Past Medical History:   Diagnosis Date    Anxiety     Arthritis     CHF (congestive heart failure)     Cigarette smoker 02/10/2022    CKD (chronic kidney disease) stage 4, GFR 15-29 ml/min     MAICO on top of CKD  IIIa, HD from 10/23-, some recovery to CKD IV    COPD (chronic obstructive pulmonary disease)     Coronary artery disease     Elevated cholesterol     GERD (gastroesophageal reflux disease)     H/O heart artery stent     History of transfusion     Hyperlipidemia     Hypertension     hypothyroidism     Obesity     PAF (paroxysmal atrial fibrillation)     PVD (peripheral vascular disease)     x2 stents     Past Surgical History:   Procedure Laterality Date    ANKLE SURGERY      RIGHT    APPENDECTOMY      CARDIAC CATHETERIZATION      LEFT     SECTION      CHOLECYSTECTOMY N/A 2025    Procedure: CHOLECYSTECTOMY LAPAROSCOPIC WITH DAVINCI ROBOT;  Surgeon: Windy Neal MD;  Location: [facility] OR;  Service: Robotics - DaVinci;  Laterality: N/A;    COLONOSCOPY N/A 3/17/2025    Procedure: COLONOSCOPY;  Surgeon: Luis Antonio Jiang MD;  Location: [facility] OR;  Service: Gastroenterology;  Laterality: N/A;    CORONARY STENT PLACEMENT      ENDOSCOPY N/A 3/15/2025    Procedure: ESOPHAGOGASTRODUODENOSCOPY;  Surgeon: Luis Antonio Jiang MD;  Location: [facility] OR;  Service: Gastroenterology;  Laterality: N/A;    HYSTERECTOMY      INSERTION HEMODIALYSIS CATHETER Right 10/19/2023    Procedure: HEMODIALYSIS CATHETER INSERTION;  Surgeon: Bartolome Schwartz MD;  Location: [facility] OR;  Service: General;  Laterality: Right;     Family History   Problem Relation Age of Onset    Heart disease Mother     Heart attack Mother 73    Fibromyalgia Mother     Heart attack Father 72    Ovarian cancer Sister     Coronary artery disease Other     Breast cancer Neg Hx      Social History     Socioeconomic History    Marital status:    Tobacco Use    Smoking status: Every Day     Current packs/day: 1.00     Average packs/day: 1 pack/day for 30.0 years (30.0 ttl pk-yrs)     Types: Cigarettes     Passive exposure: Never    Smokeless tobacco: Never   Vaping Use    Vaping status: Never Used   Substance and Sexual Activity    Alcohol  use: No    Drug use: No    Sexual activity: Never     ---------------------------------------------------------------------------------------------------------------------   Allergies:  Xanax [alprazolam]  ---------------------------------------------------------------------------------------------------------------------   Home medications:    Medications below are reported home medications pulling from within the system; at this time, these medications have not been reconciled unless otherwise specified and are in the verification process for further verifcation as current home medications.  (Not in a hospital admission)      Hospital Scheduled Meds:          Current listed hospital scheduled medications may not yet reflect those currently placed in orders that are signed and held awaiting patient's arrival to floor.   ---------------------------------------------------------------------------------------------------------------------     Objective     Vital Signs:  Temp:  [98.6 °F (37 °C)-98.7 °F (37.1 °C)] 98.7 °F (37.1 °C)  Heart Rate:  [] 86  Resp:  [16-17] 17  BP: ()/(64-87) 115/64      06/22/25  1421   Weight: 81.6 kg (179 lb 14.3 oz)     Body mass index is 33.99 kg/m².  ---------------------------------------------------------------------------------------------------------------------       Physical Exam  Vitals and nursing note reviewed.   Constitutional:       General: She is not in acute distress.     Appearance: She is ill-appearing.   HENT:      Head: Normocephalic and atraumatic.   Eyes:      Extraocular Movements: Extraocular movements intact.   Cardiovascular:      Rate and Rhythm: Tachycardia present. Rhythm irregular.   Pulmonary:      Effort: Pulmonary effort is normal. No respiratory distress.      Breath sounds: Normal breath sounds.   Abdominal:      Palpations: Abdomen is soft.   Musculoskeletal:      Right lower leg: Edema present.      Left lower leg: Edema present.       "Comments: 2+   Skin:     General: Skin is warm and dry.      Coloration: Skin is pale.   Neurological:      Mental Status: She is alert. Mental status is at baseline.   Psychiatric:         Mood and Affect: Mood normal.         Behavior: Behavior normal.       ---------------------------------------------------------------------------------------------------------------------  EKG:        I have personally looked at the EKG.  ---------------------------------------------------------------------------------------------------------------------   Results from last 7 days   Lab Units 06/22/25  1429   WBC 10*3/mm3 6.92   HEMOGLOBIN g/dL 5.2*   HEMATOCRIT % 21.3*   MCV fL 75.5*   MCHC g/dL 24.4*   PLATELETS 10*3/mm3 429   INR  1.26*         Results from last 7 days   Lab Units 06/22/25  1429 06/22/25  1426   SODIUM mmol/L 136  --    POTASSIUM mmol/L 3.8  --    MAGNESIUM mg/dL  --  2.2   CHLORIDE mmol/L 100  --    CO2 mmol/L 21.2*  --    BUN mg/dL 15.3  --    CREATININE mg/dL 1.54*  --    CALCIUM mg/dL 8.4*  --    GLUCOSE mg/dL 104*  --    ALBUMIN g/dL 3.5  --    BILIRUBIN mg/dL 0.4  --    ALK PHOS U/L 127*  --    AST (SGOT) U/L 13  --    ALT (SGPT) U/L <5  --    Estimated Creatinine Clearance: 34.3 mL/min (A) (by C-G formula based on SCr of 1.54 mg/dL (H)).  No results found for: \"AMMONIA\"  Results from last 7 days   Lab Units 06/22/25  1549 06/22/25  1426   HSTROP T ng/L 39* 36*     Results from last 7 days   Lab Units 06/22/25  1426   PROBNP pg/mL 14,502.0*     Lab Results   Component Value Date    HGBA1C 5.38 05/23/2025     Lab Results   Component Value Date    TSH 7.140 (H) 05/23/2025    FREET4 1.10 04/15/2025     No results found for: \"PREGTESTUR\", \"PREGSERUM\", \"HCG\", \"HCGQUANT\"  Pain Management Panel  More data may exist         Latest Ref Rng & Units 5/23/2025 10/9/2023   Pain Management Panel   Creatinine, Urine mg/dL - 116.0    Amphetamine, Urine Qual Negative Negative  -   Barbiturates Screen, Urine Negative " "Negative  -   Benzodiazepine Screen, Urine Negative Negative  -   Buprenorphine, Screen, Urine Negative Negative  -   Cocaine Screen, Urine Negative Negative  -   Fentanyl, Urine Negative Negative  -   Methadone Screen , Urine Negative Negative  -   Methamphetamine, Ur Negative Negative  -     No results found for: \"BLOODCX\"  No results found for: \"URINECX\"  No results found for: \"WOUNDCX\"  No results found for: \"STOOLCX\"      ---------------------------------------------------------------------------------------------------------------------  Imaging Results (Last 7 Days)       Procedure Component Value Units Date/Time    XR Chest AP [[id number]] Collected: 06/22/25 1624     Updated: 06/22/25 1627    Narrative:      PROCEDURE: Chest x-ray evaluation performed on June 22, 2025. Single  view.     HISTORY: Atrial fibrillation. Shortness of breath.     COMPARISON: None.     FINDINGS:     Enlarged heart size.  No lobar consolidation or edema.  No pleural effusion or pneumothorax.  Minimal atelectasis at the lung bases.  No pleural effusion or pneumothorax.  No fracture or foreign body.       Impression:         Enlarged heart size.  No lobar consolidation or edema.  Small bands of scar versus atelectasis at the lung bases.  No pleural effusion or pneumothorax.     This report was finalized on 6/22/2025 4:25 PM by Ralf Anglin MD.               Cultures:  No results found for: \"BLOODCX\", \"URINECX\", \"WOUNDCX\", \"MRSACX\", \"RESPCX\", \"STOOLCX\"    Last echocardiogram:  Results for orders placed during the hospital encounter of 01/19/25    Adult Transthoracic Echo Complete w/ Color, Spectral and Contrast if necessary per protocol    Interpretation Summary    Left ventricular systolic function is normal. Left ventricular ejection fraction appears to be 56 - 60%.    Left ventricular wall thickness is consistent with mild concentric hypertrophy.    There is calcification of the aortic valve.    Mild mitral valve stenosis is " present.    Estimated right ventricular systolic pressure from tricuspid regurgitation is normal (<35 mmHg).          I have personally reviewed the above radiology images and read the final radiology report on 06/22/25  ---------------------------------------------------------------------------------------------------------------------  Assessment / Plan     Active Hospital Problems    Diagnosis  POA    **Chest pain [R07.9]  Yes       ASSESSMENT/PLAN:    Acute, symptomatic, on chronic anemia  Complicated by atrial fibrillation on Eliquis  Chest pain  Patient with history of chronic anemia requiring intermittent transfusions presented to the ED secondary to chest pain.  She stated the chest pain began with tachycardia today.  She further added that she has been increasingly fatigued, dyspneic over the past several days and was concerned she may be needing a transfusion.  She has been taking her Eliquis-reports intermittent dark, tarry stools over the past several months.  She has been evaluated at this facility for GI bleeding in the past-her case has also been discussed with gastroenterology and [facility] endoscopy was recommended but patient has refused.  On arrival to the ED her temp was 98.6, heart rate 88, respirations 16, BP 98/73, SpO2 96% on RA.  Troponin elevated but similar to prior.  proBNP slightly elevated compared to prior at 14, 502.  Her hemoglobin was low at 5.2 with hematocrit 21.3.  Her EKG showed ST depression which ED provider did discuss with cardiology who felt it was likely due to anemia causing hypoperfusion.  She received full dose aspirin and PRBCs in the ED.  Is scheduled to receive 2 total units PRBCs this evening.  Admit to the telemetry unit for further observation and management  Will plan to hold Eliquis for now  Continue to monitor H&H closely  Continue to transfuse as clinically indicated for hemoglobin less than 7 or less than 8 with suspected ongoing bleeding.  Continue supportive  care measures  Continue cardiac monitoring  Standing orders in place for recurrent chest pain    Chronic:   HTN  HLD  CAD  CHF  CKD  COPD  Hypothyroidism   PVD  Continue home medication regimen as indicated once med rec is complete  Continue to monitor vital signs closely  Monitor clinical volume status, respiratory status closely as well    ----------  -DVT prophylaxis: SCDs  -Activity: Ad denise.  -Expected length of stay: Less than 2 midnights  -Disposition pending further clinical course    High risk secondary to acute on chronic anemia    Code Status and Medical Interventions: CPR (Attempt to Resuscitate); Full Support   Ordered at: 06/22/25 1708     Code Status (Patient has no pulse and is not breathing):    CPR (Attempt to Resuscitate)     Medical Interventions (Patient has pulse or is breathing):    Full Support       Wally Lopez PA-C   06/22/25  17:36 EDT

## 2025-06-22 NOTE — CASE MANAGEMENT/SOCIAL WORK
Discharge Planning Assessment   Silvestre     Patient Name: Lesley Michel  MRN: [id number]  Today's Date: 6/22/2025    Admit Date: 6/22/2025    Plan: Pt lives at home with family and plans to return home at discharge family will provide transportation. Pcp is HERNAN Marshall, she uses [facility] pharmacy and has [place] Medicare Replacement.  Pt uses o2 4 liters from [employer].   Discharge Needs Assessment       Row Name 06/22/25 1853       Living Environment    People in Home child(devan), adult    Current Living Arrangements home    Potentially Unsafe Housing Conditions none    In the past 12 months has the electric, gas, oil, or water company threatened to shut off services in your home? No    Primary Care Provided by self    Family Caregiver if Needed child(devan), adult    Quality of Family Relationships helpful;involved;supportive    Able to Return to Prior Arrangements yes       Resource/Environmental Concerns    Resource/Environmental Concerns none    Transportation Concerns none       Transportation Needs    In the past 12 months, has lack of transportation kept you from medical appointments or from getting medications? no    In the past 12 months, has lack of transportation kept you from meetings, work, or from getting things needed for daily living? No       Food Insecurity    Within the past 12 months, you worried that your food would run out before you got the money to buy more. Never true    Within the past 12 months, the food you bought just didn't last and you didn't have money to get more. Never true       Transition Planning    Patient/Family Anticipates Transition to home with family    Patient/Family Anticipated Services at Transition none    Transportation Anticipated family or friend will provide       Discharge Needs Assessment    Readmission Within the Last 30 Days no previous admission in last 30 days    Equipment Currently Used at Home oxygen    Concerns to be Addressed no discharge needs  identified;denies needs/concerns at this time    Anticipated Changes Related to Illness none    Equipment Needed After Discharge none                   Discharge Plan       Row Name 06/22/25 4388       Plan    Plan Pt lives at home with family and plans to return home at discharge family will provide transportation. Pcp is HERNAN Marshall, she uses [facility] pharmacy and has [employer] Medicare Replacement.  Pt uses o2 4 liters from [employer].    Patient/Family in Agreement with Plan yes                    Expected Discharge Date and Time       Expected Discharge Date Expected Discharge Time    Jun 24, 2025            Demographic Summary       Row Name 06/22/25 8558       General Information    Admission Type observation    Arrived From home    Referral Source emergency department    Reason for Consult discharge planning                      Meera Hyde, RN

## 2025-06-23 LAB
ALBUMIN SERPL-MCNC: 3.3 G/DL (ref 3.5–5.2)
ALBUMIN/GLOB SERPL: 1.1 G/DL
ALP SERPL-CCNC: 116 U/L (ref 39–117)
ALT SERPL W P-5'-P-CCNC: <5 U/L (ref 1–33)
ANION GAP SERPL CALCULATED.3IONS-SCNC: 10.1 MMOL/L (ref 5–15)
ANISOCYTOSIS BLD QL: ABNORMAL
APTT PPP: 30.5 SECONDS (ref 24.5–35.9)
AST SERPL-CCNC: 14 U/L (ref 1–32)
BH BB BLOOD EXPIRATION DATE: NORMAL
BH BB BLOOD EXPIRATION DATE: NORMAL
BH BB BLOOD TYPE BARCODE: 5100
BH BB BLOOD TYPE BARCODE: 5100
BH BB DISPENSE STATUS: NORMAL
BH BB DISPENSE STATUS: NORMAL
BH BB PRODUCT CODE: NORMAL
BH BB PRODUCT CODE: NORMAL
BH BB UNIT NUMBER: NORMAL
BH BB UNIT NUMBER: NORMAL
BILIRUB SERPL-MCNC: 0.5 MG/DL (ref 0–1.2)
BUN SERPL-MCNC: 14.8 MG/DL (ref 8–23)
BUN/CREAT SERPL: 10 (ref 7–25)
CALCIUM SPEC-SCNC: 8.4 MG/DL (ref 8.6–10.5)
CHLORIDE SERPL-SCNC: 103 MMOL/L (ref 98–107)
CO2 SERPL-SCNC: 22.9 MMOL/L (ref 22–29)
CREAT SERPL-MCNC: 1.48 MG/DL (ref 0.57–1)
CROSSMATCH INTERPRETATION: NORMAL
CROSSMATCH INTERPRETATION: NORMAL
DEPRECATED RDW RBC AUTO: 53.1 FL (ref 37–54)
EGFRCR SERPLBLD CKD-EPI 2021: 38.7 ML/MIN/1.73
EOSINOPHIL # BLD MANUAL: 0.14 10*3/MM3 (ref 0–0.4)
EOSINOPHIL NFR BLD MANUAL: 2 % (ref 0.3–6.2)
ERYTHROCYTE [DISTWIDTH] IN BLOOD BY AUTOMATED COUNT: 19.6 % (ref 12.3–15.4)
GLOBULIN UR ELPH-MCNC: 2.9 GM/DL
GLUCOSE SERPL-MCNC: 95 MG/DL (ref 65–99)
HCT VFR BLD AUTO: 26.4 % (ref 34–46.6)
HGB BLD-MCNC: 7.1 G/DL (ref 12–15.9)
HYPOCHROMIA BLD QL: ABNORMAL
INR PPP: 1.21 (ref 0.9–1.1)
LYMPHOCYTES # BLD MANUAL: 2.65 10*3/MM3 (ref 0.7–3.1)
LYMPHOCYTES NFR BLD MANUAL: 15 % (ref 5–12)
MCH RBC QN AUTO: 20.5 PG (ref 26.6–33)
MCHC RBC AUTO-ENTMCNC: 26.9 G/DL (ref 31.5–35.7)
MCV RBC AUTO: 76.1 FL (ref 79–97)
MONOCYTES # BLD: 1.07 10*3/MM3 (ref 0.1–0.9)
NEUTROPHILS # BLD AUTO: 3.29 10*3/MM3 (ref 1.7–7)
NEUTROPHILS NFR BLD MANUAL: 46 % (ref 42.7–76)
PLAT MORPH BLD: NORMAL
PLATELET # BLD AUTO: 378 10*3/MM3 (ref 140–450)
PMV BLD AUTO: 9.8 FL (ref 6–12)
POLYCHROMASIA BLD QL SMEAR: ABNORMAL
POTASSIUM SERPL-SCNC: 4.1 MMOL/L (ref 3.5–5.2)
PROT SERPL-MCNC: 6.2 G/DL (ref 6–8.5)
PROTHROMBIN TIME: 16.1 SECONDS (ref 12.5–15.2)
RBC # BLD AUTO: 3.47 10*6/MM3 (ref 3.77–5.28)
SCAN SLIDE: NORMAL
SODIUM SERPL-SCNC: 136 MMOL/L (ref 136–145)
STOMATOCYTES BLD QL SMEAR: ABNORMAL
UFH PPP CHRO-ACNC: 0.27 IU/ML (ref 0.3–0.7)
UFH PPP CHRO-ACNC: <0.1 IU/ML (ref 0.3–0.7)
UNIT  ABO: NORMAL
UNIT  ABO: NORMAL
UNIT  RH: NORMAL
UNIT  RH: NORMAL
VARIANT LYMPHS NFR BLD MANUAL: 37 % (ref 19.6–45.3)
WBC NRBC COR # BLD AUTO: 7.15 10*3/MM3 (ref 3.4–10.8)

## 2025-06-23 PROCEDURE — 99214 OFFICE O/P EST MOD 30 MIN: CPT | Performed by: INTERNAL MEDICINE

## 2025-06-23 PROCEDURE — G0378 HOSPITAL OBSERVATION PER HR: HCPCS

## 2025-06-23 PROCEDURE — 85025 COMPLETE CBC W/AUTO DIFF WBC: CPT | Performed by: INTERNAL MEDICINE

## 2025-06-23 PROCEDURE — 85520 HEPARIN ASSAY: CPT

## 2025-06-23 PROCEDURE — 85610 PROTHROMBIN TIME: CPT | Performed by: INTERNAL MEDICINE

## 2025-06-23 PROCEDURE — 96365 THER/PROPH/DIAG IV INF INIT: CPT

## 2025-06-23 PROCEDURE — 96376 TX/PRO/DX INJ SAME DRUG ADON: CPT

## 2025-06-23 PROCEDURE — 93005 ELECTROCARDIOGRAM TRACING: CPT | Performed by: INTERNAL MEDICINE

## 2025-06-23 PROCEDURE — 85007 BL SMEAR W/DIFF WBC COUNT: CPT | Performed by: INTERNAL MEDICINE

## 2025-06-23 PROCEDURE — 25010000002 HEPARIN (PORCINE) 25000-0.45 UT/250ML-% SOLUTION: Performed by: INTERNAL MEDICINE

## 2025-06-23 PROCEDURE — 80053 COMPREHEN METABOLIC PANEL: CPT | Performed by: INTERNAL MEDICINE

## 2025-06-23 PROCEDURE — 25010000002 HEPARIN (PORCINE) PER 1000 UNITS: Performed by: INTERNAL MEDICINE

## 2025-06-23 PROCEDURE — 96366 THER/PROPH/DIAG IV INF ADDON: CPT

## 2025-06-23 PROCEDURE — 85730 THROMBOPLASTIN TIME PARTIAL: CPT | Performed by: INTERNAL MEDICINE

## 2025-06-23 PROCEDURE — 93010 ELECTROCARDIOGRAM REPORT: CPT | Performed by: INTERNAL MEDICINE

## 2025-06-23 PROCEDURE — 99232 SBSQ HOSP IP/OBS MODERATE 35: CPT | Performed by: INTERNAL MEDICINE

## 2025-06-23 PROCEDURE — 85520 HEPARIN ASSAY: CPT | Performed by: INTERNAL MEDICINE

## 2025-06-23 RX ORDER — HEPARIN SODIUM 10000 [USP'U]/100ML
1500 INJECTION, SOLUTION INTRAVENOUS
Status: DISCONTINUED | OUTPATIENT
Start: 2025-06-23 | End: 2025-06-23

## 2025-06-23 RX ORDER — HEPARIN SODIUM 5000 [USP'U]/ML
4000 INJECTION, SOLUTION INTRAVENOUS; SUBCUTANEOUS ONCE
Status: COMPLETED | OUTPATIENT
Start: 2025-06-23 | End: 2025-06-23

## 2025-06-23 RX ORDER — HEPARIN SODIUM 10000 [USP'U]/100ML
12.9 INJECTION, SOLUTION INTRAVENOUS
Status: DISCONTINUED | OUTPATIENT
Start: 2025-06-23 | End: 2025-06-24

## 2025-06-23 RX ORDER — METOPROLOL SUCCINATE 50 MG/1
50 TABLET, EXTENDED RELEASE ORAL DAILY
Status: DISCONTINUED | OUTPATIENT
Start: 2025-06-23 | End: 2025-06-24 | Stop reason: HOSPADM

## 2025-06-23 RX ORDER — HEPARIN SODIUM 5000 [USP'U]/ML
2000 INJECTION, SOLUTION INTRAVENOUS; SUBCUTANEOUS AS NEEDED
Status: DISCONTINUED | OUTPATIENT
Start: 2025-06-23 | End: 2025-06-23

## 2025-06-23 RX ORDER — TORSEMIDE 20 MG/1
40 TABLET ORAL DAILY
Status: DISCONTINUED | OUTPATIENT
Start: 2025-06-23 | End: 2025-06-24 | Stop reason: HOSPADM

## 2025-06-23 RX ORDER — HYDROCODONE BITARTRATE AND ACETAMINOPHEN 10; 325 MG/1; MG/1
1 TABLET ORAL EVERY 8 HOURS PRN
Status: DISCONTINUED | OUTPATIENT
Start: 2025-06-23 | End: 2025-06-24 | Stop reason: HOSPADM

## 2025-06-23 RX ORDER — HEPARIN SODIUM 5000 [USP'U]/ML
80 INJECTION, SOLUTION INTRAVENOUS; SUBCUTANEOUS ONCE
Status: DISCONTINUED | OUTPATIENT
Start: 2025-06-23 | End: 2025-06-23

## 2025-06-23 RX ORDER — HYDRALAZINE HYDROCHLORIDE 50 MG/1
100 TABLET, FILM COATED ORAL 3 TIMES DAILY
Status: DISCONTINUED | OUTPATIENT
Start: 2025-06-23 | End: 2025-06-24 | Stop reason: HOSPADM

## 2025-06-23 RX ORDER — GABAPENTIN 300 MG/1
300 CAPSULE ORAL EVERY 8 HOURS SCHEDULED
Status: DISCONTINUED | OUTPATIENT
Start: 2025-06-23 | End: 2025-06-24 | Stop reason: HOSPADM

## 2025-06-23 RX ORDER — HEPARIN SODIUM 5000 [USP'U]/ML
2000 INJECTION, SOLUTION INTRAVENOUS; SUBCUTANEOUS AS NEEDED
Status: DISCONTINUED | OUTPATIENT
Start: 2025-06-23 | End: 2025-06-24

## 2025-06-23 RX ORDER — HEPARIN SODIUM 5000 [USP'U]/ML
4000 INJECTION, SOLUTION INTRAVENOUS; SUBCUTANEOUS AS NEEDED
Status: DISCONTINUED | OUTPATIENT
Start: 2025-06-23 | End: 2025-06-24

## 2025-06-23 RX ORDER — HEPARIN SODIUM 5000 [USP'U]/ML
4000 INJECTION, SOLUTION INTRAVENOUS; SUBCUTANEOUS AS NEEDED
Status: DISCONTINUED | OUTPATIENT
Start: 2025-06-23 | End: 2025-06-23

## 2025-06-23 RX ORDER — PANTOPRAZOLE SODIUM 40 MG/1
40 TABLET, DELAYED RELEASE ORAL DAILY
Status: DISCONTINUED | OUTPATIENT
Start: 2025-06-23 | End: 2025-06-24 | Stop reason: HOSPADM

## 2025-06-23 RX ORDER — FOLIC ACID 1 MG/1
1 TABLET ORAL DAILY
Status: DISCONTINUED | OUTPATIENT
Start: 2025-06-23 | End: 2025-06-24 | Stop reason: HOSPADM

## 2025-06-23 RX ORDER — ATORVASTATIN CALCIUM 40 MG/1
40 TABLET, FILM COATED ORAL NIGHTLY
Status: DISCONTINUED | OUTPATIENT
Start: 2025-06-23 | End: 2025-06-24 | Stop reason: HOSPADM

## 2025-06-23 RX ADMIN — HYDRALAZINE HYDROCHLORIDE 100 MG: 50 TABLET ORAL at 22:16

## 2025-06-23 RX ADMIN — HYDRALAZINE HYDROCHLORIDE 100 MG: 50 TABLET ORAL at 16:44

## 2025-06-23 RX ADMIN — ATORVASTATIN CALCIUM 40 MG: 40 TABLET, FILM COATED ORAL at 22:16

## 2025-06-23 RX ADMIN — FOLIC ACID 1 MG: 1 TABLET ORAL at 08:02

## 2025-06-23 RX ADMIN — HYDROCODONE BITARTRATE AND ACETAMINOPHEN 1 TABLET: 10; 325 TABLET ORAL at 20:44

## 2025-06-23 RX ADMIN — HEPARIN SODIUM 4000 UNITS: 5000 INJECTION INTRAVENOUS; SUBCUTANEOUS at 12:14

## 2025-06-23 RX ADMIN — GABAPENTIN 300 MG: 300 CAPSULE ORAL at 22:16

## 2025-06-23 RX ADMIN — GABAPENTIN 300 MG: 300 CAPSULE ORAL at 14:47

## 2025-06-23 RX ADMIN — Medication 10 ML: at 08:05

## 2025-06-23 RX ADMIN — SERTRALINE 50 MG: 50 TABLET, FILM COATED ORAL at 08:02

## 2025-06-23 RX ADMIN — PANTOPRAZOLE SODIUM 40 MG: 40 TABLET, DELAYED RELEASE ORAL at 08:02

## 2025-06-23 RX ADMIN — HYDRALAZINE HYDROCHLORIDE 100 MG: 50 TABLET ORAL at 08:01

## 2025-06-23 RX ADMIN — HEPARIN SODIUM 10.9 UNITS/KG/HR: 10000 INJECTION, SOLUTION INTRAVENOUS at 12:14

## 2025-06-23 RX ADMIN — METOPROLOL SUCCINATE 50 MG: 50 TABLET, EXTENDED RELEASE ORAL at 08:02

## 2025-06-23 RX ADMIN — GABAPENTIN 300 MG: 300 CAPSULE ORAL at 06:02

## 2025-06-23 RX ADMIN — TORSEMIDE 40 MG: 20 TABLET ORAL at 08:01

## 2025-06-23 RX ADMIN — Medication 10 ML: at 22:16

## 2025-06-23 NOTE — CONSULTS
[facility]  CONSULT  NOTE      Patient information:  Date of Admit: 6/22/2025  Date of Consult: 06/23/25  Hospitalist/Referring MD:José Miguel Tellez DO  Patient Care Team:  Woodrow Raymond APRN as PCP - General (Family Medicine)    PCP: Woodrow Raymond APRN  MRN:  [id number]  Visit Number:  [id number]    LOS: 0  CODE STATUS:  Code Status and Medical Interventions: CPR (Attempt to Resuscitate); Full Support   Ordered at: 06/22/25 1708     Code Status (Patient has no pulse and is not breathing):    CPR (Attempt to Resuscitate)     Medical Interventions (Patient has pulse or is breathing):    Full Support       Inpatient Cardiology Consult  Consult performed by: Rosemary De La Garza MD  Consult ordered by: José Miguel Tellez DO        09:14 EDT  6/23/2025    Reason for Cardiology consultation: Chest pain in the setting of acute on chronic anemia    General Cardiology Consulting Physician: Dr. Holli MD    Primary Cardiologist: DEEPA Gallardo    PROBLEM LIST: Principal Problem:    Chest pain      Assessment      NSTEMI likely type II in setting of acute on chronic anemia from gastrointestinal bleed  Chronic HFpEF.  ASCVD, status post previous PCI/ROSAURA to LAD with high-grade stenosis in the ostium of the small diagonal branch and 60% stenosis in the mid RCA at [facility] (details unavailable).  Loretta scan from February 2025 showing anterior and apical infarct with ciro-infarct ischemia.  Peripheral artery disease, status post [facility] [place] reportedly about a year ago  Paroxysmal atrial fibrillation with a [id number] score of 5, patient is chronically anticoagulated with Eliquis.  Acute on chronic respiratory failure with hypoxia  Acute kidney injury on chronic kidney disease (stage IIIb)      Planning     -Discussed with patient GIB, she had EGD and colonoscopy in March 2025  "without a source of Anemia identified at that time. She reports the pill cam is \"too big for me to swallow\". She reports she did not have any GIB until 3-4 days ago when she noticed black discolored formed stool.  Her hemoglobin was down to 5.2 requiring 2 units of blood.  She does report she was taking Aspirin and Eliquis prior to admission. Discussed option of Watchman Device and she declined this procedure.   -Patient reports history of CKD and has a previous positive stress test and that is why she declined [facility] in March 2025.    - Patient agreeable to try heparin drip for 24 hours and following H&H with potential transition to Eliquis.  Peripheral vascular intervention was reportedly more than a year ago.  Currently risks of dual therapy with aspirin and Eliquis outweigh the benefits.  Will recommend monotherapy with Eliquis only.   - Continue statin and Toprol-XL 50 mg p.o. once daily  - Continue torsemide 40 mg p.o. once daily.    Subjective Data     6/23/2025    ADMISSION INFORMATION:  Chief Complaint   Patient presents with    Atrial Fibrillation    Shortness of Breath     Pt is here for sob and afib x 1 day pt has a history of afib      History of Present Illness (HPI)  HPI obtained from chart review   Lesley Michel is a 69 y.o. female with a past medical history significant for CAD (with chronically occluded well collateralized dominant RCA, status post drug-eluting stent to the LAD with high-grade stenosis of the ostium of the small D1. She also has 60% mid RCA and posterolateral branch lesion), hyperlipidemia, hypertension, PAD s/p stenting, paroxysmal atrial fibrillation chronically anticoagulated with Eliquis until 4/2025 (stopped 2/2 GIB 4/2025), NIKIA (3/2025, 4/2025 & 6/2025- requiring blood transfusion), CHF, COPD on nasal cannula, history of hypoxic respiratory failure, CKD stage II, and current tobacco abuse.     Patient presented to [facility] [facility] emergency department (ED) on " "6/22/2025 with complaints of chest pain.     Cardiology has been consulted for further evaluation and management for chest pain in the setting of acute on chronic anemia.     Primary Cardiologist has been DEEPA Gallardo and she was last seen in the office on 06/2024.     Admission labs revealed a hemoglobin of 5.2 patient has received a blood transfusion.  A.m. hemoglobin 7.1.    Patient was in room  when she was seen and examined by Dr. De La Garza.  Patient is sitting up in bed resting quietly.  No acute distress noted at this time.  Patient currently denies any chest pain, shortness of breath, palpitations.  Patient reports history of CKD and that is why she declined [facility] in March 2025. Discussed with patient GIB, she had EGD and colonoscopy in March 2025 without a source of Anemia identified at that time. She reports the pill cam is \"too big for me to swallow\". She reports she did not have any GIB until 3-4 days ago when she noticed black discolored formed stool. Denies it being loose or tarry. She does report she was taking Aspirin and Eliquis prior to admission. Discussed option of Watchman Device and she declined this procedure stating, \"I am afraid of all that stuff\". She reports she is still smoking and use home oxygen PRN. Discussed with patient starting IV Heparin and monitoring H & H and for blood loss then restarting Eliquis is Heparin is tolerated well, and patient has agreed.     Known medications given enroute via EMS and in the ER:   Medications   sodium chloride 0.9 % flush 10 mL (has no administration in time range)   sodium chloride 0.9 % flush 10 mL (10 mL Intravenous Given 6/23/25 0805)   sodium chloride 0.9 % flush 10 mL (has no administration in time range)   sodium chloride 0.9 % infusion 40 mL (has no administration in time range)   calcium carbonate (TUMS) chewable tablet 500 mg (200 mg elemental) (has no administration in time range)   nitroglycerin (NITROSTAT) SL tablet 0.4 mg " (has no administration in time range)   acetaminophen (TYLENOL) tablet 650 mg (has no administration in time range)     Or   acetaminophen (TYLENOL) 160 MG/5ML oral solution 650 mg (has no administration in time range)     Or   acetaminophen (TYLENOL) suppository 650 mg (has no administration in time range)   sennosides-docusate (PERICOLACE) 8.6-50 MG per tablet 2 tablet (has no administration in time range)     And   polyethylene glycol (MIRALAX) packet 17 g (has no administration in time range)     And   bisacodyl (DULCOLAX) EC tablet 5 mg (has no administration in time range)     And   bisacodyl (DULCOLAX) suppository 10 mg (has no administration in time range)   folic acid (FOLVITE) tablet 1 mg (1 mg Oral Given 6/23/25 0802)   gabapentin (NEURONTIN) capsule 300 mg (300 mg Oral Given 6/23/25 0602)   hydrALAZINE (APRESOLINE) tablet 100 mg (100 mg Oral Given 6/23/25 0801)   HYDROcodone-acetaminophen (NORCO)  MG per tablet 1 tablet (has no administration in time range)   metoprolol succinate XL (TOPROL-XL) 24 hr tablet 50 mg (50 mg Oral Given 6/23/25 0802)   pantoprazole (PROTONIX) EC tablet 40 mg (40 mg Oral Given 6/23/25 0802)   sertraline (ZOLOFT) tablet 50 mg (50 mg Oral Given 6/23/25 0802)   torsemide (DEMADEX) tablet 40 mg (40 mg Oral Given 6/23/25 0801)   heparin 48211 units/250 mL (100 units/mL) in 0.45 % NaCl infusion (10.9 Units/kg/hr × 91.5 kg (Dosing Weight) Intravenous New Bag 6/23/25 1214)   heparin (porcine) 5000 UNIT/ML injection 4,000 Units (has no administration in time range)   heparin (porcine) 5000 UNIT/ML injection 2,000 Units (has no administration in time range)   Pharmacy to Dose Heparin (has no administration in time range)   aspirin chewable tablet 324 mg (324 mg Oral Given 6/22/25 1518)   heparin (porcine) 5000 UNIT/ML injection 4,000 Units (4,000 Units Intravenous Given 6/23/25 1214)     Personal History     Cardiac risk factors:arteriosclerotic heart disease, hypercholesterolemia,  and hypertension      Last Echo: Results for orders placed during the hospital encounter of 01/19/25    Adult Transthoracic Echo Complete w/ Color, Spectral and Contrast if necessary per protocol    Interpretation Summary    Left ventricular systolic function is normal. Left ventricular ejection fraction appears to be 56 - 60%.    Left ventricular wall thickness is consistent with mild concentric hypertrophy.    There is calcification of the aortic valve.    Mild mitral valve stenosis is present.    Estimated right ventricular systolic pressure from tricuspid regurgitation is normal (<35 mmHg).         Last Stress: Results for orders placed during the hospital encounter of 02/13/25    Stress Test With Myocardial Perfusion One Day    Interpretation Summary  Images from the original result were not included.      Impressions are consistent with an intermediate risk study.    Left ventricular ejection fraction is normal (Calculated EF = 52%). TID 1.07.    Myocardial perfusion imaging indicates a small-to-moderate-sized infarct located in the mid to apical anterior wall and apex with moderate-to-severe ciro-infarct ischemia.    There was no ST segment deviation noted during stress.        Last Cath:  No results found for this or any previous visit.                 EP study: No results found for this or any previous visit.              Past Medical History:   Diagnosis Date    Anxiety     Arthritis     CHF (congestive heart failure)     Cigarette smoker 02/10/2022    CKD (chronic kidney disease) stage 4, GFR 15-29 ml/min     MAICO on top of CKD IIIa, HD from 10/23-4/24, some recovery to CKD IV    COPD (chronic obstructive pulmonary disease)     Coronary artery disease     Elevated cholesterol     GERD (gastroesophageal reflux disease)     H/O heart artery stent     History of transfusion     Hyperlipidemia     Hypertension     hypothyroidism     Obesity     PAF (paroxysmal atrial fibrillation)     PVD (peripheral vascular  disease)     x2 stents     Past Surgical History:   Procedure Laterality Date    ANKLE SURGERY      RIGHT    APPENDECTOMY      CARDIAC CATHETERIZATION      LEFT     SECTION      CHOLECYSTECTOMY N/A 2025    Procedure: CHOLECYSTECTOMY LAPAROSCOPIC WITH DAVINCI ROBOT;  Surgeon: Windy Neal MD;  Location: [facility] OR;  Service: Robotics - DaVinci;  Laterality: N/A;    COLONOSCOPY N/A 3/17/2025    Procedure: COLONOSCOPY;  Surgeon: Luis Antonio Jiang MD;  Location: [facility] OR;  Service: Gastroenterology;  Laterality: N/A;    CORONARY STENT PLACEMENT      ENDOSCOPY N/A 3/15/2025    Procedure: ESOPHAGOGASTRODUODENOSCOPY;  Surgeon: Luis Antonio Jiang MD;  Location: [facility] OR;  Service: Gastroenterology;  Laterality: N/A;    HYSTERECTOMY      INSERTION HEMODIALYSIS CATHETER Right 10/19/2023    Procedure: HEMODIALYSIS CATHETER INSERTION;  Surgeon: Bartolome Schwartz MD;  Location: [facility] OR;  Service: General;  Laterality: Right;     Family History   Problem Relation Age of Onset    Heart disease Mother     Heart attack Mother 73    Fibromyalgia Mother     Heart attack Father 72    Ovarian cancer Sister     Coronary artery disease Other     Breast cancer Neg Hx      Social History     Tobacco Use    Smoking status: Every Day     Current packs/day: 1.00     Average packs/day: 1 pack/day for 30.0 years (30.0 ttl pk-yrs)     Types: Cigarettes     Passive exposure: Never    Smokeless tobacco: Never   Vaping Use    Vaping status: Never Used   Substance Use Topics    Alcohol use: No    Drug use: No     ALLERGIES: Xanax [alprazolam]    Medications listed below are reported home medications pulling from within the system:  Medications Prior to Admission   Medication Sig Dispense Refill Last Dose/Taking    apixaban (ELIQUIS) 5 MG tablet tablet Take 1 tablet by mouth 2 (Two) Times a Day.   2025 Morning    aspirin 81 MG chewable tablet Chew 1 tablet Daily.   2025    chlorthalidone (HYGROTON) 25 MG tablet  Take 1 tablet by mouth Daily.   6/22/2025    folic acid (FOLVITE) 1 MG tablet Take 1 tablet by mouth Daily.   6/22/2025    gabapentin (NEURONTIN) 600 MG tablet Take 1 tablet by mouth 3 (Three) Times a Day.   6/22/2025    hydrALAZINE (APRESOLINE) 100 MG tablet Take 1 tablet by mouth 3 (Three) Times a Day.   6/22/2025 Morning    metoprolol succinate XL (TOPROL-XL) 50 MG 24 hr tablet Take 1 tablet by mouth Daily for 30 days. 30 tablet 0 6/22/2025    pantoprazole (PROTONIX) 40 MG EC tablet Take 1 tablet by mouth Daily.   6/22/2025    sertraline (ZOLOFT) 50 MG tablet Take 1 tablet by mouth Daily.   6/22/2025    torsemide (DEMADEX) 20 MG tablet Take 2 tablets by mouth Daily.   6/22/2025    HYDROcodone-acetaminophen (NORCO)  MG per tablet Take 1 tablet by mouth Every 8 (Eight) Hours As Needed for Moderate Pain.   Unknown       Review of Systems   Constitutional:  Negative for activity change, appetite change and diaphoresis.   HENT:  Negative for facial swelling and trouble swallowing.    Eyes:  Negative for visual disturbance.   Respiratory:  Negative for shortness of breath.    Cardiovascular:  Positive for leg swelling. Negative for chest pain and palpitations.   Gastrointestinal:  Negative for blood in stool, nausea and vomiting.        Black discolored formed stools started 3-4 days prior to this admission    Endocrine: Negative.    Genitourinary:  Negative for hematuria.   Musculoskeletal:  Negative for gait problem.   Skin:  Negative for color change.   Neurological:  Negative for dizziness, syncope, speech difficulty, weakness, light-headedness and headaches.   Psychiatric/Behavioral:  Negative for agitation and behavioral problems.      Objective Data   Vital Signs  Temp:  [97.7 °F (36.5 °C)-98.8 °F (37.1 °C)] 97.8 °F (36.6 °C)  Heart Rate:  [] 70  Resp:  [12-25] 21  BP: ()/() 132/77  Flow (L/min) (Oxygen Therapy):  [2.5] 2.5  Device (Oxygen Therapy): nasal cannula  Vital Signs (last 72  hrs)         06/20 0700  06/21 0659 06/21 0700  06/22 0659 06/22 0700  06/23 0659 06/23 0700  06/23 0914   Most Recent      Temp (°F)     98 -  98.8      97.7     97.7 (36.5) 06/23 0800    Heart Rate     76 -  111    76 -  96     96 06/23 0852    Resp     13 -  25    17 -  20     20 06/23 0800    BP     98/73 -  160/98    117/82 -  157/102     117/82 06/23 0801    SpO2 (%)     96 -  100    90 -  98     90 06/23 0852    Flow (L/min) (Oxygen Therapy)       2.5      2.5     2.5 06/23 0800          BMI:   Body mass index is 33.78 kg/m².  WEIGHT:  Wt Readings from Last 3 Encounters:   06/23/25 91.5 kg (201 lb 11.5 oz)   05/23/25 81.6 kg (180 lb)   05/18/25 82.1 kg (181 lb)     DIET:  Diet: Regular/House, Cardiac; Healthy Heart (2-3 Na+); Fluid Consistency: Thin (IDDSI 0)  I&O:  Intake & Output (last 3 days)         06/20 0701 06/21 0700 06/21 0701  06/22 0700 06/22 0701  06/23 0700 06/23 0701  06/24 0700    P.O.    462    Blood   654     Total Intake(mL/kg)   654 (7.1) 462 (5)    Urine (mL/kg/hr)    1000 (1.6)    Total Output    1000    Net   +654 -538            Urine Unmeasured Occurrence   2 x           Physical Exam  Constitutional:       Appearance: Normal appearance.   HENT:      Head: Normocephalic and atraumatic.      Nose: Nose normal.      Mouth/Throat:      Mouth: Mucous membranes are moist.      Pharynx: Oropharynx is clear.   Eyes:      Conjunctiva/sclera: Conjunctivae normal.   Abdominal:      General: Bowel sounds are normal.      Palpations: Abdomen is soft.   Musculoskeletal:         General: Normal range of motion.      Cervical back: Normal range of motion.   Skin:     General: Skin is warm and dry.   Neurological:      General: No focal deficit present.      Mental Status: She is alert and oriented to person, place, and time.   Psychiatric:         Mood and Affect: Mood normal.         Behavior: Behavior normal.       Results review   Results Review:    I have reviewed the patient's new clinical  "results. 06/23/25 14:03 EDT    Results from last 7 days   Lab Units 06/22/25  1549 06/22/25  1426   HSTROP T ng/L 39* 36*     Lab Results   Component Value Date    PROBNP 14,502.0 (H) 06/22/2025    PROBNP 13,100.0 (H) 05/23/2025    PROBNP 11,388.0 (H) 03/17/2025     Results from last 7 days   Lab Units 06/23/25  0301 06/22/25  1429   WBC 10*3/mm3 7.15 6.92   HEMOGLOBIN g/dL 7.1* 5.2*   PLATELETS 10*3/mm3 378 429     Results from last 7 days   Lab Units 06/23/25  0301 06/22/25  1429   SODIUM mmol/L 136 136   POTASSIUM mmol/L 4.1 3.8   CHLORIDE mmol/L 103 100   CO2 mmol/L 22.9 21.2*   BUN mg/dL 14.8 15.3   CREATININE mg/dL 1.48* 1.54*   CALCIUM mg/dL 8.4* 8.4*   GLUCOSE mg/dL 95 104*   ALT (SGPT) U/L <5 <5   AST (SGOT) U/L 14 13     Lab Results   Component Value Date    MG 2.2 06/22/2025    MG 2.2 04/15/2025    MG 2.1 04/13/2025     Lab Results   Component Value Date    CHOL 107 05/23/2025    TRIG 89 05/23/2025    HDL 40 05/23/2025    LDL 50 05/23/2025     Estimated Creatinine Clearance: 41.1 mL/min (A) (by C-G formula based on SCr of 1.48 mg/dL (H)).  Lab Results   Component Value Date    HGBA1C 5.38 05/23/2025    HGBA1C 5.30 10/08/2023     Lab Results   Component Value Date    INR 1.21 (H) 06/23/2025    INR 1.26 (H) 06/22/2025    INR 1.99 (H) 04/12/2025    INR 2.35 (H) 03/14/2025    INR 1.17 (H) 02/17/2025    INR 1.55 (H) 02/13/2025    INR 1.07 10/24/2023     Lab Results   Component Value Date    LABHEPA <0.10 (L) 06/23/2025    LABHEPA <0.10 (L) 02/18/2025    LABHEPA 0.25 (L) 02/18/2025    LABHEPA <0.10 (L) 02/18/2025     No components found for: \"DIG\"  Lab Results   Component Value Date    TSH 7.140 (H) 05/23/2025      No results found for: \"URICACID\"  Pain Management Panel  More data may exist         Latest Ref Rng & Units 5/23/2025 10/9/2023   Pain Management Panel   Creatinine, Urine mg/dL - 116.0    Amphetamine, Urine Qual Negative Negative  -   Barbiturates Screen, Urine Negative Negative  -   Benzodiazepine " "Screen, Urine Negative Negative  -   Buprenorphine, Screen, Urine Negative Negative  -   Cocaine Screen, Urine Negative Negative  -   Fentanyl, Urine Negative Negative  -   Methadone Screen , Urine Negative Negative  -   Methamphetamine, Ur Negative Negative  -     Microbiology Results (last 10 days)       ** No results found for the last 240 hours. **           No results found for: \"BLOODCX\"    EC2025        ECG/EMG Results (last 24 hours)       Procedure Component Value Units Date/Time    ECG 12 Lead Chest Pain [[id number]] Collected: 25 1425     Updated: 25 1520     QT Interval 366 ms      QTC Interval 472 ms     Narrative:      Test Reason : Chest Pain  Blood Pressure :   */*   mmHG  Vent. Rate : 100 BPM     Atrial Rate :  90 BPM     P-R Int :   * ms          QRS Dur :  88 ms      QT Int : 366 ms       P-R-T Axes :   *  27 224 degrees    QTcB Int : 472 ms    ** Critical Test Result: AV Block  Sinus rhythm with AV dissociation and Accelerated Junctional rhythm with  occasional premature ventricular complexes  Marked ST abnormality, possible inferior subendocardial injury  Abnormal ECG  When compared with ECG of 23-May-2025 16:55,  Significant changes have occurred  Confirmed by Jessica Rice () on 2025 3:19:56 PM    Referred By: CHELSEA           Confirmed By: Jessica Rice    Telemetry Scan [[id number]] Resulted: 25 0359     Updated: 25 0609    Telemetry Scan [[id number]] Resulted: 25 2359     Updated: 25 0609    Telemetry Scan [[id number]] Resulted: 25     Updated: 25 0609    Telemetry Scan [[id number]] Resulted: 25 1903     Updated: 25 0707    ECG 12 Lead Chest Pain [[id number]] Collected: 25 0630     Updated: 25 0716     QT Interval 392 ms      QTC Interval 455 ms     Narrative:      Test Reason : Chest Pain  Blood Pressure :   */*   mmHG  Vent. Rate :  81 BPM     Atrial Rate :  81 BPM     P-R Int : 144 ms          QRS " Dur :  94 ms      QT Int : 392 ms       P-R-T Axes :  87  59 200 degrees    QTcB Int : 455 ms    Sinus rhythm with premature atrial complexes  ST & T wave abnormality, consider inferior ischemia  Abnormal ECG  When compared with ECG of 23-Jun-2025 06:31, (Unconfirmed)  No significant change was found    Referred By: LORRAINE           Confirmed By:             TELEMETRY:             RADIOLOGY STUDIES:  Imaging Results (Last 72 Hours)       Procedure Component Value Units Date/Time    XR Chest AP [[id number]] Collected: 06/22/25 1624     Updated: 06/22/25 1627    Narrative:      PROCEDURE: Chest x-ray evaluation performed on June 22, 2025. Single  view.     HISTORY: Atrial fibrillation. Shortness of breath.     COMPARISON: None.     FINDINGS:     Enlarged heart size.  No lobar consolidation or edema.  No pleural effusion or pneumothorax.  Minimal atelectasis at the lung bases.  No pleural effusion or pneumothorax.  No fracture or foreign body.       Impression:         Enlarged heart size.  No lobar consolidation or edema.  Small bands of scar versus atelectasis at the lung bases.  No pleural effusion or pneumothorax.     This report was finalized on 6/22/2025 4:25 PM by Ralf Anglin MD.               ALLERGIES: Xanax [alprazolam]    CURRENT MEDICATIONS:  Current list of medications may not reflect those currently placed in orders that are not signed or are being held.     folic acid, 1 mg, Oral, Daily  gabapentin, 300 mg, Oral, Q8H  hydrALAZINE, 100 mg, Oral, TID  metoprolol succinate XL, 50 mg, Oral, Daily  pantoprazole, 40 mg, Oral, Daily  sertraline, 50 mg, Oral, Daily  sodium chloride, 10 mL, Intravenous, Q12H  torsemide, 40 mg, Oral, Daily      heparin, 10.9 Units/kg/hr (Dosing Weight), Last Rate: 10.9 Units/kg/hr (06/23/25 1214)  Pharmacy to Dose Heparin,         acetaminophen **OR** acetaminophen **OR** acetaminophen    senna-docusate sodium **AND** polyethylene glycol **AND** bisacodyl **AND** bisacodyl     calcium carbonate    heparin (porcine)    heparin (porcine)    HYDROcodone-acetaminophen    nitroglycerin    Pharmacy to Dose Heparin    sodium chloride    sodium chloride    sodium chloride        Thank you very much for asking us to be involved in this patient's care. Please do not hesitate to call for any questions or concerns.     I have discussed the patients findings and recommendations with the patient.    Electronically signed by DEEPA Navarro, 06/23/25, 2:03 PM EDT.                       Please note that portions of this note were copied and has been reviewed and is accurate as of 6/23/2025 .      Please note that portions of this note were completed with a voice recognition program.

## 2025-06-23 NOTE — PLAN OF CARE
Goal Outcome Evaluation:  Plan of Care Reviewed With: patient        Progress: improving  Outcome Evaluation: Pt resting in bed at this time. Pt alert and oriented. VSS thus far. Pt on 2.5LNC, tolerating well. Heparin gtt started during shift. No S/S of bleeding. No requests at this time. Will continue to follow plan of care till 7p.

## 2025-06-23 NOTE — PLAN OF CARE
Goal Outcome Evaluation:              Outcome Evaluation: Patient admitted to PCU this shift. Patient is alert and oriented x 4. Patient is on 2.5L NC. Patient denies complaints. Patient receieved one unit of packed red blood cells this shift. Call light within reach.

## 2025-06-23 NOTE — PROGRESS NOTES
HEPARIN INFUSION  Lesley Michel is a  69 y.o. female receiving heparin infusion.     Therapy for (VTE/Cardiac):   Cardiac  Patient Dosing Weight: 91.5 kg  Initial Bolus (Y/N):   Y  Any Bolus (Y/N):   Y        Signs or Symptoms of Bleeding: No    Cardiac or Other (Not VTE)   Initial Bolus: 60 units/kg (Max 4,000 units)  Initial rate: 12 units/kg/hr (Max 1,000 units/hr)   Anti Xa Rebolus Infusion Hold time Change infusion Dose (Units/kg/hr) Next Anti Xa or aPTT Level Due   < 0.11 50 Units/kg  (4000 Units Max) None Increase by  3 Units/kg/hr 6 hours   0.11- 0.19 25 Units/kg  (2000 Units Max) None Increase by  2 Units/kg/hr 6 hours   0.2 - 0.29 0 None Increase by  1 Units/kg/hr 6 hours   0.3 - 0.5 0 None No Change 6 hours (after 2 consecutive levels in range check q24h @0700)   0.51 - 0.6 0 None Decrease by  1 Units/kg/hr 6 hours   0.61 - 0.8 0 30 Minutes Decrease by  2 Units/kg/hr 6 hours   0.81 - 1 0 60 Minutes Decrease by  3 Units/kg/hr 6 hours   >1 0 Hold  After Anti Xa less than 0.5 decrease previous rate by  4 Units/kg/hr  Every 2 hours until Anti Xa  less than 0.5 then when infusion restarts in 6 hours       Recommend anti-Xa every 6 hours.          Date   Time   Anti-Xa Current Rate (Unit/kg/hr) Bolus   (Units) Rate Change   (Unit/kg/hr) New Rate (Unit/kg/hr) Next   Anti-Xa Comments  Pump Check Daily   6/23 1122 <0.10 -- 4000 +10.9 10.9 1830 Discussed with Raven SILVER.                                                                                                                                                                                                                                  Pharmacy will continue to follow anti-Xa results and monitor for signs and symptoms of bleeding or thrombosis.    Frances August, PharmD  06/23/25 12:03 EDT

## 2025-06-23 NOTE — PROGRESS NOTES
[facility] HOSPITALIST PROGRESS NOTE     Patient Identification:  Name:  Lesley Michel  Age:  69 y.o.  Sex:  female  :  1955  MRN:  [id number]  Visit Number:  [id number]  Primary Care Provider:  Woodrow Raymond APRN    Length of stay:  0    Chief complaint: None    Subjective:    Patient seen and examined at bedside with no nursing staff present.  Patient states she feels well today and has no specific complaints.  She specifically denies chest pain, shortness of breath, fever, chills, nausea, vomiting or any other symptoms at this time.  Did have lengthy conversation with patient regarding use of anticoagulation in the setting of A-fib and suspected underlying GI bleed.  Patient states that she wishes to attempt heparin drip and transitioning back to Eliquis prior to discharge with discontinuation of aspirin.  Patient is not interested in attempting pill endoscopy and is not interested in any surgical intervention such as Watchman procedure.  ----------------------------------------------------------------------------------------------------------------------  Current Hospital Meds:  atorvastatin, 40 mg, Oral, Nightly  folic acid, 1 mg, Oral, Daily  gabapentin, 300 mg, Oral, Q8H  hydrALAZINE, 100 mg, Oral, TID  metoprolol succinate XL, 50 mg, Oral, Daily  pantoprazole, 40 mg, Oral, Daily  sertraline, 50 mg, Oral, Daily  sodium chloride, 10 mL, Intravenous, Q12H  torsemide, 40 mg, Oral, Daily      heparin, 10.9 Units/kg/hr (Dosing Weight), Last Rate: 10.9 Units/kg/hr (25 5688)  Pharmacy to Dose Heparin,       ----------------------------------------------------------------------------------------------------------------------  Vital Signs:  Temp:  [97.7 °F (36.5 °C)-98.8 °F (37.1 °C)] 98.4 °F (36.9 °C)  Heart Rate:  [64-98] 64  Resp:  [] 18  BP: (117-170)/() 133/86      25  1421 25  2053 25  0400   Weight: 81.6 kg (179 lb 14.3 oz) 89.2 kg (196 lb 10.4  oz) 91.5 kg (201 lb 11.5 oz)     Body mass index is 33.78 kg/m².    Intake/Output Summary (Last 24 hours) at 6/23/2025 1927  Last data filed at 6/23/2025 1756  Gross per 24 hour   Intake 1156.99 ml   Output 1200 ml   Net -43.01 ml     Diet: Regular/House, Cardiac; Healthy Heart (2-3 Na+); Fluid Consistency: Thin (IDDSI 0)  ----------------------------------------------------------------------------------------------------------------------  Physical exam:  Constitutional: Well-nourished  female in no apparent distress.     HENT:  Head:  Normocephalic and atraumatic.  Mouth:  Moist mucous membranes.    Eyes:  Conjunctivae and EOM are normal.  Pupils are equal, round, and reactive to light.  No scleral icterus.    Neck:  Neck supple. No thyromegaly.  No JVD present.    Cardiovascular:  Regular rate and rhythm with no murmurs, rubs, clicks or gallops appreciated.  Pulmonary/Chest:  Clear to auscultation bilaterally with no crackles, wheezes or rhonchi appreciated.  Abdominal:  Soft. Nontender. Nondistended  Bowel sounds are normal in all four quadrants. No organomegally appreciated.   Musculoskeletal:  No edema, no tenderness, and no deformity.  No red or swollen joints anywhere.    Neurological:  Alert, Cranial nerves II-XII intact with no focal deficits.  No facial droop.  No slurred speech.   Skin:  Warm and dry to palpation with no rashes or lesions appreciated.  Peripheral vascular:  2+ radial and pedal pulses in bilateral upper and lower extremities.  Psychiatric:  Alert and oriented x3, demonstrates appropriate judgment and insight.  ---------------------------------------------------------------------------------------  ----------------------------------------------------------------------------------------------------------------------  Results from last 7 days   Lab Units 06/22/25  1549 06/22/25  1426   HSTROP T ng/L 39* 36*     Results from last 7 days   Lab Units 06/23/25  1122 06/23/25  0304  "06/22/25  1429   WBC 10*3/mm3  --  7.15 6.92   HEMOGLOBIN g/dL  --  7.1* 5.2*   HEMATOCRIT %  --  26.4* 21.3*   MCV fL  --  76.1* 75.5*   MCHC g/dL  --  26.9* 24.4*   PLATELETS 10*3/mm3  --  378 429   INR  1.21*  --  1.26*         Results from last 7 days   Lab Units 06/23/25  0301 06/22/25  1429 06/22/25  1426   SODIUM mmol/L 136 136  --    POTASSIUM mmol/L 4.1 3.8  --    MAGNESIUM mg/dL  --   --  2.2   CHLORIDE mmol/L 103 100  --    CO2 mmol/L 22.9 21.2*  --    BUN mg/dL 14.8 15.3  --    CREATININE mg/dL 1.48* 1.54*  --    CALCIUM mg/dL 8.4* 8.4*  --    GLUCOSE mg/dL 95 104*  --    ALBUMIN g/dL 3.3* 3.5  --    BILIRUBIN mg/dL 0.5 0.4  --    ALK PHOS U/L 116 127*  --    AST (SGOT) U/L 14 13  --    ALT (SGPT) U/L <5 <5  --    Estimated Creatinine Clearance: 41.1 mL/min (A) (by C-G formula based on SCr of 1.48 mg/dL (H)).    No results found for: \"AMMONIA\"      No results found for: \"BLOODCX\"  No results found for: \"URINECX\"  No results found for: \"WOUNDCX\"  No results found for: \"STOOLCX\"    I have personally looked at the labs and they are summarized above.  ----------------------------------------------------------------------------------------------------------------------  Imaging Results (Last 24 Hours)       ** No results found for the last 24 hours. **          ----------------------------------------------------------------------------------------------------------------------  Assessment and Plan:    Symptomatic acute on chronic anemia -suspect GI bleed, likely small intestine.  Patient has had normal EGD and colonoscopy in the past.  Patient not interested in PillCam endoscopy.  She has received 2 unit PRBC transfusion, hemoglobin/hematocrit now stable.  Will start heparin drip today and transition to Eliquis tomorrow if hemoglobin/hematocrit remained stable.    2.  Chronic A-fib -remains in A-fib, currently rate controlled.  Continue heparin with plans to transition to Eliquis tomorrow.  Continue " Toprol-XL 50 mg p.o. daily    3.  Essential hypertension -well-controlled, continue current antihypertensive medications    4.  Hyperlipidemia -statin    5.  Type II NSTEMI -likely secondary to acute on chronic anemia from suspected GI bleed.  Appreciate cardiology recommendations, no need for further ischemic evaluation at this time.    6.  Chronic HFpEF -currently stable    7.  CKD stage IIIb -serum creatinine stable, repeat BMP in the morning    Disposition likely discharge tomorrow    Shlomo Asencio,    06/23/25   19:27 EDT

## 2025-06-24 ENCOUNTER — READMISSION MANAGEMENT (OUTPATIENT)
Dept: CALL CENTER | Facility: HOSPITAL | Age: 70
End: 2025-06-24
Payer: MEDICARE

## 2025-06-24 VITALS
RESPIRATION RATE: 22 BRPM | TEMPERATURE: 97.7 F | HEIGHT: 65 IN | OXYGEN SATURATION: 99 % | WEIGHT: 201.06 LBS | BODY MASS INDEX: 33.5 KG/M2 | HEART RATE: 67 BPM | DIASTOLIC BLOOD PRESSURE: 70 MMHG | SYSTOLIC BLOOD PRESSURE: 121 MMHG

## 2025-06-24 PROBLEM — R07.9 CHEST PAIN: Status: RESOLVED | Noted: 2025-06-22 | Resolved: 2025-06-24

## 2025-06-24 LAB
ANION GAP SERPL CALCULATED.3IONS-SCNC: 9.2 MMOL/L (ref 5–15)
ANISOCYTOSIS BLD QL: ABNORMAL
BASOPHILS # BLD MANUAL: 0.08 10*3/MM3 (ref 0–0.2)
BASOPHILS NFR BLD MANUAL: 1 % (ref 0–1.5)
BH BB BLOOD EXPIRATION DATE: NORMAL
BH BB BLOOD TYPE BARCODE: 5100
BH BB DISPENSE STATUS: NORMAL
BH BB PRODUCT CODE: NORMAL
BH BB UNIT NUMBER: NORMAL
BUN SERPL-MCNC: 18.8 MG/DL (ref 8–23)
BUN/CREAT SERPL: 10.2 (ref 7–25)
CALCIUM SPEC-SCNC: 8.3 MG/DL (ref 8.6–10.5)
CHLORIDE SERPL-SCNC: 100 MMOL/L (ref 98–107)
CO2 SERPL-SCNC: 24.8 MMOL/L (ref 22–29)
CREAT SERPL-MCNC: 1.85 MG/DL (ref 0.57–1)
CROSSMATCH INTERPRETATION: NORMAL
DEPRECATED RDW RBC AUTO: 56.7 FL (ref 37–54)
EGFRCR SERPLBLD CKD-EPI 2021: 29.6 ML/MIN/1.73
ELLIPTOCYTES BLD QL SMEAR: ABNORMAL
EOSINOPHIL # BLD MANUAL: 0.15 10*3/MM3 (ref 0–0.4)
EOSINOPHIL NFR BLD MANUAL: 2 % (ref 0.3–6.2)
ERYTHROCYTE [DISTWIDTH] IN BLOOD BY AUTOMATED COUNT: 19.9 % (ref 12.3–15.4)
GLUCOSE SERPL-MCNC: 96 MG/DL (ref 65–99)
HCT VFR BLD AUTO: 25.3 % (ref 34–46.6)
HCT VFR BLD AUTO: 28.8 % (ref 34–46.6)
HGB BLD-MCNC: 6.6 G/DL (ref 12–15.9)
HGB BLD-MCNC: 7.8 G/DL (ref 12–15.9)
HYPOCHROMIA BLD QL: ABNORMAL
LYMPHOCYTES # BLD MANUAL: 1.93 10*3/MM3 (ref 0.7–3.1)
LYMPHOCYTES NFR BLD MANUAL: 3 % (ref 5–12)
MCH RBC QN AUTO: 20.4 PG (ref 26.6–33)
MCHC RBC AUTO-ENTMCNC: 26.1 G/DL (ref 31.5–35.7)
MCV RBC AUTO: 78.1 FL (ref 79–97)
MONOCYTES # BLD: 0.23 10*3/MM3 (ref 0.1–0.9)
NEUTROPHILS # BLD AUTO: 5.33 10*3/MM3 (ref 1.7–7)
NEUTROPHILS NFR BLD MANUAL: 69 % (ref 42.7–76)
PLAT MORPH BLD: NORMAL
PLATELET # BLD AUTO: 342 10*3/MM3 (ref 140–450)
PMV BLD AUTO: 10.2 FL (ref 6–12)
POLYCHROMASIA BLD QL SMEAR: ABNORMAL
POTASSIUM SERPL-SCNC: 4.7 MMOL/L (ref 3.5–5.2)
RBC # BLD AUTO: 3.24 10*6/MM3 (ref 3.77–5.28)
SODIUM SERPL-SCNC: 134 MMOL/L (ref 136–145)
UFH PPP CHRO-ACNC: 0.2 IU/ML (ref 0.3–0.7)
UFH PPP CHRO-ACNC: <0.1 IU/ML (ref 0.3–0.7)
UNIT  ABO: NORMAL
UNIT  RH: NORMAL
VARIANT LYMPHS NFR BLD MANUAL: 25 % (ref 19.6–45.3)
WBC NRBC COR # BLD AUTO: 7.72 10*3/MM3 (ref 3.4–10.8)

## 2025-06-24 PROCEDURE — 85520 HEPARIN ASSAY: CPT | Performed by: INTERNAL MEDICINE

## 2025-06-24 PROCEDURE — 96366 THER/PROPH/DIAG IV INF ADDON: CPT

## 2025-06-24 PROCEDURE — 99239 HOSP IP/OBS DSCHRG MGMT >30: CPT | Performed by: INTERNAL MEDICINE

## 2025-06-24 PROCEDURE — 85007 BL SMEAR W/DIFF WBC COUNT: CPT | Performed by: INTERNAL MEDICINE

## 2025-06-24 PROCEDURE — 85018 HEMOGLOBIN: CPT | Performed by: INTERNAL MEDICINE

## 2025-06-24 PROCEDURE — 36430 TRANSFUSION BLD/BLD COMPNT: CPT

## 2025-06-24 PROCEDURE — 85025 COMPLETE CBC W/AUTO DIFF WBC: CPT | Performed by: INTERNAL MEDICINE

## 2025-06-24 PROCEDURE — P9016 RBC LEUKOCYTES REDUCED: HCPCS

## 2025-06-24 PROCEDURE — G0378 HOSPITAL OBSERVATION PER HR: HCPCS

## 2025-06-24 PROCEDURE — 80048 BASIC METABOLIC PNL TOTAL CA: CPT | Performed by: INTERNAL MEDICINE

## 2025-06-24 PROCEDURE — 85014 HEMATOCRIT: CPT | Performed by: INTERNAL MEDICINE

## 2025-06-24 PROCEDURE — 86900 BLOOD TYPING SEROLOGIC ABO: CPT

## 2025-06-24 RX ORDER — ATORVASTATIN CALCIUM 40 MG/1
40 TABLET, FILM COATED ORAL NIGHTLY
Qty: 90 TABLET | Refills: 1 | Status: SHIPPED | OUTPATIENT
Start: 2025-06-24

## 2025-06-24 RX ADMIN — SERTRALINE 50 MG: 50 TABLET, FILM COATED ORAL at 09:34

## 2025-06-24 RX ADMIN — METOPROLOL SUCCINATE 50 MG: 50 TABLET, EXTENDED RELEASE ORAL at 09:34

## 2025-06-24 RX ADMIN — FOLIC ACID 1 MG: 1 TABLET ORAL at 09:34

## 2025-06-24 RX ADMIN — PANTOPRAZOLE SODIUM 40 MG: 40 TABLET, DELAYED RELEASE ORAL at 09:34

## 2025-06-24 RX ADMIN — GABAPENTIN 300 MG: 300 CAPSULE ORAL at 06:30

## 2025-06-24 RX ADMIN — TORSEMIDE 40 MG: 20 TABLET ORAL at 09:34

## 2025-06-24 RX ADMIN — Medication 10 ML: at 09:34

## 2025-06-24 RX ADMIN — HYDRALAZINE HYDROCHLORIDE 100 MG: 50 TABLET ORAL at 09:34

## 2025-06-24 NOTE — DISCHARGE INSTR - APPOINTMENTS
Follow up with PCP   Woodrow Raymond   June 30, @ 12:00 PM     Follow up with Cardiology   Dr. De La Garza   July 9, @ 9:00 AM

## 2025-06-24 NOTE — DISCHARGE SUMMARY
[facility] HOSPITALIST MEDICINE DISCHARGE SUMMARY    Patient Identification:  Name:  Lesley Michel  Age:  69 y.o.  Sex:  female  :  1955  MRN:  [id number]  Visit Number:  [id number]    Date of Admission: 2025  Date of Discharge: 2025    PCP: Woodrow Raymond, APRN    DISCHARGE DIAGNOSIS   Symptomatic acute on chronic anemia  Chronic A-fib  Essential hypertension  Hyperlipidemia  Type II NSTEMI  Chronic HFpEF  CKD stage IIIb      CONSULTS  Dr. De La Garza, Cardiology      PROCEDURES PERFORMED   None      HOSPITAL COURSE  Ms. Michel is a 69 y.o. female who presented to [facility] ED on 2025 with a chief complaint of chest pain.  Patient has a past medical history remarkable for recurrent anemia, essential hypertension, hyperlipidemia, CAD, CKD COPD, hypothyroidism and atrial fibrillation.  Patient reported worsening fatigue with dyspnea on exertion for approximately 4 to 5 days prior to evaluation in the emergency department.  She also reported occasional nausea with lack of appetite.  She reports the symptoms are consistent with the symptoms she had and a previous episode of anemia when she did require PRBC transfusion.  Patient then developed sudden onset palpitations with chest pressure on the date of presentation.  As such, she called EMS who then brought her to the emergency department for further treatment and evaluation.  Initial evaluation in the emergency department did consist of basic laboratory work as well as physical exam and vital signs.  Initial vital signs found patient's blood pressure 98/73, respiratory rate 16, heart rate 88, temperature 98.6 °F with oxygen saturation 96% on 2.5 L nasal cannula.  Initial lab work did include CBC and CMP.  CBC demonstrated hemoglobin 5.2 with hematocrit 21.3.  Chest x-ray with demonstrated no acute cardiopulmonary process.  EKG demonstrated ST depression.  EKG was discussed with on-call cardiology services who  stated ST depression was likely result of anemia leading to hypoperfusion.  Cardiac enzymes were also obtained which were mildly elevated at 36.  Overall, patient was diagnosed with acute on chronic symptomatic anemia and admitted for further treatment and evaluation.    Patient did receive a total of 2 units PRBC transfusion on date of admission.  Hemoglobin did improve and patient's anticoagulation was held on admission.  Cardiology services were consulted who thoroughly evaluated the patient.  After thorough evaluation, it is revealed patient has had EGD and colonoscopy in the past.  These were unrevealing.  Patient was offered pill endoscopy but she refused, citing the size of the pill was too large for her to swallow.  Lengthy discussions were held with patient regarding need for anticoagulation in the setting of A-fib but relative contraindication to continuing anticoagulation in the setting of recurrent GI bleeding.  Did discuss Watchman device placement for which patient has declined referral.  Patient initially requested to restart anticoagulation on heparin with plan to transition back to Eliquis if bleeding had stopped.  However, after resuming heparin patient became anemic once again requiring additional 1 unit PRBC transfusion.  After further discussions, patient is no longer interested in continuing anticoagulation.  After third unit of PRBC transfusion, hemoglobin has improved to 7.8 and patient is currently asymptomatic.  With this in mind, it is felt patient has reached maximum medical benefit of current hospitalization and will be discharged home in stable condition today.  Have encouraged patient to follow-up with primary care provider and cardiology services within the next 1 week to have further discussions regarding options for relative risk reduction of cardioembolic CVA now that patient has stopped anticoagulation.  Again, patient has been informed that her overall risk of cardioembolic CVA  from not using anticoagulation has increased, and she has been encouraged to reconsider possible Watchman device placement.  Patient again expressed that she is not interested in any other procedures at this time.      VITAL SIGNS:      06/22/25  2053 06/23/25 0400 06/24/25  0400   Weight: 89.2 kg (196 lb 10.4 oz) 91.5 kg (201 lb 11.5 oz) 91.2 kg (201 lb 1 oz)     Body mass index is 33.66 kg/m².    PHYSICAL EXAM:  Constitutional: Well-nourished  female in no apparent distress.     HENT:  Head:  Normocephalic and atraumatic.  Mouth:  Moist mucous membranes.    Eyes:  Conjunctivae and EOM are normal.  Pupils are equal, round, and reactive to light.  No scleral icterus.    Neck:  Neck supple. No thyromegaly.  No JVD present.    Cardiovascular:  Regular rate and rhythm with no murmurs, rubs, clicks or gallops appreciated.  Pulmonary/Chest:  Clear to auscultation bilaterally with no crackles, wheezes or rhonchi appreciated.  Abdominal:  Soft. Nontender. Nondistended  Bowel sounds are normal in all four quadrants. No organomegally appreciated.   Musculoskeletal:  No edema, no tenderness, and no deformity.  No red or swollen joints anywhere.    Neurological:  Alert, Cranial nerves II-XII intact with no focal deficits.  No facial droop.  No slurred speech.   Skin:  Warm and dry to palpation with no rashes or lesions appreciated.  Peripheral vascular:  2+ radial and pedal pulses in bilateral upper and lower extremities.  Psychiatric:  Alert and oriented x3, demonstrates appropriate judgment and insight.    DISCHARGE DISPOSITION   Stable    DISCHARGE MEDICATIONS:     Discharge Medications        New Medications        Instructions Start Date   atorvastatin 40 MG tablet  Commonly known as: LIPITOR   40 mg, Oral, Nightly             Continue These Medications        Instructions Start Date   chlorthalidone 25 MG tablet  Commonly known as: HYGROTON   25 mg, Oral, Daily      folic acid 1 MG tablet  Commonly known as:  FOLVITE   1 mg, Daily      gabapentin 600 MG tablet  Commonly known as: NEURONTIN   600 mg, 3 Times Daily      hydrALAZINE 100 MG tablet  Commonly known as: APRESOLINE   100 mg, Oral, 3 Times Daily      HYDROcodone-acetaminophen  MG per tablet  Commonly known as: NORCO   1 tablet, Every 8 Hours PRN      metoprolol succinate XL 50 MG 24 hr tablet  Commonly known as: TOPROL-XL   50 mg, Oral, Daily      pantoprazole 40 MG EC tablet  Commonly known as: PROTONIX   40 mg, Daily      sertraline 50 MG tablet  Commonly known as: ZOLOFT   50 mg, Daily      torsemide 20 MG tablet  Commonly known as: DEMADEX   40 mg, Oral, Daily             Stop These Medications      apixaban 5 MG tablet tablet  Commonly known as: ELIQUIS     aspirin 81 MG chewable tablet                Your Scheduled Appointments      Jul 09, 2025 9:00 AM  Hospital Follow Up with Rosemary De La Garza MD  [facility] CARDIOLOGY ([address] [address] [phone]      Additional instructions:    Follow up with PCP   Woodrow Raymond   June 30, @ 12:00 PM     Follow up with Cardiology   Dr. De La Garza   July 9, @ 9:00 AM            Additional Instructions for the Follow-ups that You Need to Schedule       Discharge Follow-up with PCP   As directed       Currently Documented PCP:    Woodrow Raymond APRN    PCP Phone Number:    [phone]     Follow Up Details: please follow up with pcp in 1 week        Discharge Follow-up with Specialty: Cardiology; 2 Weeks   As directed      Specialty: Cardiology   Follow Up: 2 Weeks   Follow Up Details: afib               Follow-up Information       Woodrow Raymond APRN .    Specialty: Family Medicine  Why: please follow up with pcp in 1 week  Contact information:  [address] [facility] [address] [phone]                             TEST  RESULTS PENDING AT DISCHARGE       The ASCVD Risk score (Rodo MONROE, et al., 2019) failed to calculate for the following reasons:     Risk score cannot be calculated because patient has a medical history suggesting prior/existing ASCVD   Shlomo Asencio DO  06/24/25  15:11 EDT    Please note that this discharge summary required more than 30 minutes to complete.    Please send a copy of this dictation to the following providers:  Woodrow Raymond, APRN

## 2025-06-24 NOTE — PLAN OF CARE
Goal Outcome Evaluation:              Outcome Evaluation: Patient resting in bed. Patients heparin gtt was stopped this shift after hgb resulted at 6.6. Patient currently has one until of blood infusing. Call light within reach.

## 2025-06-25 LAB
QT INTERVAL: 392 MS
QTC INTERVAL: 455 MS

## 2025-06-25 NOTE — OUTREACH NOTE
Prep Survey      Flowsheet Row Responses   [facility] patient discharged from? [facility]   Is LACE score < 7 ? No   Eligibility Readm Mgmt   Discharge diagnosis Chest pain   Does the patient have one of the following disease processes/diagnoses(primary or secondary)? Other   Does the patient have Home health ordered? No   Is there a DME ordered? No   Prep survey completed? Yes            MATT LIN - Registered Nurse

## 2025-07-11 ENCOUNTER — READMISSION MANAGEMENT (OUTPATIENT)
Dept: CALL CENTER | Facility: HOSPITAL | Age: 70
End: 2025-07-11
Payer: MEDICARE

## 2025-07-11 NOTE — OUTREACH NOTE
Medical Week 3 Survey      Flowsheet Row Responses   [facility] patient discharged from? [facility]   Does the patient have one of the following disease processes/diagnoses(primary or secondary)? Other   Week 3 attempt successful? Yes   Call start time 1414   Call end time 1416   Discharge diagnosis Chest pain   Is patient permission given to speak with other caregiver? Yes   List who call center can speak with Son   Person spoke with today (if not patient) and relationship Son   Meds reviewed with patient/caregiver? Yes   Is the patient taking all medications as directed (includes completed medication regime)? Yes   Does the patient have an appointment with their PCP within 7 days of discharge? Greater than 7 days   What is preventing the patient from scheduling follow up appointments within 7 days of discharge? Earlier appointment not available   Has the patient kept scheduled appointments due by today? N/A   Comments Son reports that the pt is doing well, no chest pain or SOA today.   What is the patient's perception of their health status since discharge? Improving   Is the patient/caregiver able to teach back the hierarchy of who to call/visit for symptoms/problems? PCP, Specialist, Home health nurse, Urgent Care, ED, 911 Yes   Week 3 Call Completed? Yes   Graduated Yes   Call end time 1416            Kinga PAINTER - Registered Nurse

## 2025-07-14 NOTE — ADDENDUM NOTE
Addendum  created 10/19/23 1354 by Taryn Emmanuel CRNA    Clinical Note Signed, Intraprocedure Event edited, Intraprocedure Meds edited      
[Annual] : an annual visit.
[Annual] : an annual visit.

## (undated) DEVICE — GLV SURG SENSICARE W/ALOE PF LF 6.5 STRL

## (undated) DEVICE — KT CATH HEMO CANNON2 PLS LNG TRM 15F 13IN

## (undated) DEVICE — DRAPE,T,LAPARO,TRANS,STERILE: Brand: MEDLINE

## (undated) DEVICE — PICC LINE DRESSING CHANGE TRAY: Brand: MEDLINE

## (undated) DEVICE — ENDOGATOR AUXILIARY WATER JET CONNECTOR: Brand: ENDOGATOR

## (undated) DEVICE — PK BASIC 70

## (undated) DEVICE — TBG PENCL TELESCP MEGADYNE SMOKE EVAC 10FT

## (undated) DEVICE — 2, DISPOSABLE SUCTION/IRRIGATOR WITH DISPOSABLE TIP: Brand: STRYKEFLOW

## (undated) DEVICE — KT CATH HEMO CANNON2 PLS LNG TRM 15F 11IN

## (undated) DEVICE — PERMANENT CAUTERY HOOK: Brand: ENDOWRIST

## (undated) DEVICE — 40595 XL TRENDELENBURG POSITIONING KIT: Brand: 40595 XL TRENDELENBURG POSITIONING KIT

## (undated) DEVICE — TOWEL,OR,DSP,ST,BLUE,STD,4/PK,20PK/CS: Brand: MEDLINE

## (undated) DEVICE — SYR LUERLOK 30CC

## (undated) DEVICE — PATIENT RETURN ELECTRODE, SINGLE-USE, CONTACT QUALITY MONITORING, ADULT, WITH 9FT CORD, FOR PATIENTS WEIGING OVER 33LBS. (15KG): Brand: MEGADYNE

## (undated) DEVICE — SUT VIC 0/0 UR6 27IN DYED J603H

## (undated) DEVICE — UNDERGLV SURG BIOGEL INDICAT PF 8 GRN

## (undated) DEVICE — DRP C/ARM W/BAND W/CLIPS 41X74IN

## (undated) DEVICE — APPL CHLORAPREP HI/LITE 26ML ORNG

## (undated) DEVICE — CANNULA SEAL

## (undated) DEVICE — HOLDER: Brand: DEROYAL

## (undated) DEVICE — LACERATION TRAY: Brand: MEDLINE INDUSTRIES, INC.

## (undated) DEVICE — GRSPR TRAIN ENDOWRIST PROGRASP DAVINCI/X/XI

## (undated) DEVICE — INSUFFLATION NEEDLE TO ESTABLISH PNEUMOPERITONEUM.: Brand: INSUFFLATION NEEDLE

## (undated) DEVICE — Device

## (undated) DEVICE — LARGE HEM-O-LOK CLIP APPLIER: Brand: ENDOWRIST

## (undated) DEVICE — PK LAP GEN 70

## (undated) DEVICE — DEFENDO AIR WATER SUCTION AND BIOPSY VALVE KIT FOR  OLYMPUS: Brand: DEFENDO AIR/WATER/SUCTION AND BIOPSY VALVE

## (undated) DEVICE — PREP SOL POVIDONE/IODINE BT 4OZ

## (undated) DEVICE — CVR DISP HUG U VAC STEEP TREND

## (undated) DEVICE — PAD GRND REM POLYHESIVE A/ DISP

## (undated) DEVICE — SUT NLY 2/0 664G

## (undated) DEVICE — SPNG GZ WOVN 4X4IN 12PLY 10/BX STRL

## (undated) DEVICE — SUT MNCRYL 4/0 PS2 18 IN

## (undated) DEVICE — THE BITE BLOCK MAXI, LATEX FREE STRAP IS USED TO PROTECT THE ENDOSCOPE INSERTION TUBE FROM BEING BITTEN BY THE PATIENT.

## (undated) DEVICE — GLV SURG SENSICARE W/ALOE PF LF 7.5 STRL

## (undated) DEVICE — ARM DRAPE

## (undated) DEVICE — INTENDED FOR TISSUE SEPARATION, AND OTHER PROCEDURES THAT REQUIRE A SHARP SURGICAL BLADE TO PUNCTURE OR CUT.: Brand: BARD-PARKER ® DISPOSABLE SCALPELS

## (undated) DEVICE — CONN Y IRR DISP 1P/U

## (undated) DEVICE — CVR PROB ULTRASND CIVFLEX GEN/PURP TELESCP/FOLD 5.5X58IN LF

## (undated) DEVICE — BLADELESS OBTURATOR: Brand: WECK VISTA

## (undated) DEVICE — CADIERE FORCEPS: Brand: ENDOWRIST